# Patient Record
Sex: MALE | Race: WHITE | NOT HISPANIC OR LATINO | Employment: OTHER | URBAN - METROPOLITAN AREA
[De-identification: names, ages, dates, MRNs, and addresses within clinical notes are randomized per-mention and may not be internally consistent; named-entity substitution may affect disease eponyms.]

---

## 2017-01-04 ENCOUNTER — HOSPITAL ENCOUNTER (OUTPATIENT)
Dept: MRI IMAGING | Facility: CLINIC | Age: 57
Discharge: HOME | End: 2017-01-04
Attending: INTERNAL MEDICINE | Admitting: INTERNAL MEDICINE
Payer: COMMERCIAL

## 2017-01-04 ENCOUNTER — RECORDS - HEALTHEAST (OUTPATIENT)
Dept: ADMINISTRATIVE | Facility: OTHER | Age: 57
End: 2017-01-04

## 2017-01-04 DIAGNOSIS — I10 ESSENTIAL HYPERTENSION WITH GOAL BLOOD PRESSURE LESS THAN 130/85: ICD-10-CM

## 2017-01-04 PROCEDURE — A9577 INJ MULTIHANCE: HCPCS | Performed by: INTERNAL MEDICINE

## 2017-01-04 PROCEDURE — 71555 MRI ANGIO CHEST W OR W/O DYE: CPT

## 2017-01-04 PROCEDURE — 25500045 ZZH RX 255: Performed by: INTERNAL MEDICINE

## 2017-01-04 PROCEDURE — 71555 MRI ANGIO CHEST W OR W/O DYE: CPT | Mod: 26 | Performed by: INTERNAL MEDICINE

## 2017-01-04 RX ADMIN — GADOBENATE DIMEGLUMINE 15 ML: 529 INJECTION, SOLUTION INTRAVENOUS at 08:27

## 2017-01-13 ENCOUNTER — TELEPHONE (OUTPATIENT)
Dept: ORTHOPEDICS | Facility: CLINIC | Age: 57
End: 2017-01-13

## 2017-01-13 NOTE — TELEPHONE ENCOUNTER
Patient would like to be scheduled for trigger thumb release surgery.  Per Dr. Blunt's note it needs to be done at least 3 months after his last injection which was 12/19/2016.  Surgery date of 3/31/17 given to patient.  He does not need to see Dr. Blunt in clinic beforehand.  He was given pre-op education over the phone today.  He has antiseptic soap already.  Preop packet will be mailed to him today.  He understands he needs to have pre-op H&P completed within 30 days prior to surgery.  He has our contact information for questions/concerns.  Surgery scheduler will call him to verify date and time of procedure.

## 2017-01-26 ENCOUNTER — PRE VISIT (OUTPATIENT)
Dept: CARDIOLOGY | Facility: CLINIC | Age: 57
End: 2017-01-26

## 2017-01-26 NOTE — TELEPHONE ENCOUNTER
DATE OF OPERATION:  10/04/2016.        OPERATING AREA:  Room 23.        TITLE OF PROCEDURE:  Replacement and repair of an ascending aortic aneurysm using a 32 mm Gelweave interposition graft on cardiopulmonary bypass.

## 2017-01-27 ENCOUNTER — OFFICE VISIT (OUTPATIENT)
Dept: CARDIOLOGY | Facility: CLINIC | Age: 57
End: 2017-01-27
Attending: INTERNAL MEDICINE
Payer: COMMERCIAL

## 2017-01-27 ENCOUNTER — OFFICE VISIT (OUTPATIENT)
Dept: INTERNAL MEDICINE | Facility: CLINIC | Age: 57
End: 2017-01-27

## 2017-01-27 ENCOUNTER — OFFICE VISIT (OUTPATIENT)
Dept: ORTHOPEDICS | Facility: CLINIC | Age: 57
End: 2017-01-27

## 2017-01-27 VITALS
DIASTOLIC BLOOD PRESSURE: 74 MMHG | OXYGEN SATURATION: 95 % | HEIGHT: 70 IN | HEART RATE: 82 BPM | SYSTOLIC BLOOD PRESSURE: 112 MMHG

## 2017-01-27 VITALS — HEIGHT: 70 IN | WEIGHT: 253.9 LBS | BODY MASS INDEX: 36.35 KG/M2

## 2017-01-27 DIAGNOSIS — Z79.01 LONG-TERM (CURRENT) USE OF ANTICOAGULANTS: ICD-10-CM

## 2017-01-27 DIAGNOSIS — D50.0 IRON DEFICIENCY ANEMIA DUE TO CHRONIC BLOOD LOSS: Primary | ICD-10-CM

## 2017-01-27 DIAGNOSIS — D50.0 IRON DEFICIENCY ANEMIA DUE TO CHRONIC BLOOD LOSS: ICD-10-CM

## 2017-01-27 DIAGNOSIS — R03.0 ELEVATED BLOOD PRESSURE READING WITHOUT DIAGNOSIS OF HYPERTENSION: ICD-10-CM

## 2017-01-27 DIAGNOSIS — I71.21 ASCENDING AORTIC ANEURYSM (H): Primary | ICD-10-CM

## 2017-01-27 DIAGNOSIS — I48.91 ATRIAL FIBRILLATION, UNSPECIFIED TYPE (H): ICD-10-CM

## 2017-01-27 DIAGNOSIS — I48.91 ATRIAL FIBRILLATION (H): ICD-10-CM

## 2017-01-27 DIAGNOSIS — M76.61 ACHILLES TENDINITIS OF RIGHT LOWER EXTREMITY: Primary | ICD-10-CM

## 2017-01-27 LAB
BASOPHILS # BLD AUTO: 0.1 10E9/L (ref 0–0.2)
BASOPHILS NFR BLD AUTO: 0.8 %
DIFFERENTIAL METHOD BLD: NORMAL
EOSINOPHIL # BLD AUTO: 0.2 10E9/L (ref 0–0.7)
EOSINOPHIL NFR BLD AUTO: 2.5 %
ERYTHROCYTE [DISTWIDTH] IN BLOOD BY AUTOMATED COUNT: 14 % (ref 10–15)
FERRITIN SERPL-MCNC: 24 NG/ML (ref 26–388)
FOLATE SERPL-MCNC: 19.8 NG/ML
HCT VFR BLD AUTO: 43.2 % (ref 40–53)
HGB BLD-MCNC: 14.3 G/DL (ref 13.3–17.7)
IMM GRANULOCYTES # BLD: 0 10E9/L (ref 0–0.4)
IMM GRANULOCYTES NFR BLD: 0.3 %
INR PPP: 0.95 (ref 0.86–1.14)
IRON SATN MFR SERPL: 7 % (ref 15–46)
IRON SERPL-MCNC: 27 UG/DL (ref 35–180)
LYMPHOCYTES # BLD AUTO: 1.9 10E9/L (ref 0.8–5.3)
LYMPHOCYTES NFR BLD AUTO: 20.7 %
MCH RBC QN AUTO: 27.3 PG (ref 26.5–33)
MCHC RBC AUTO-ENTMCNC: 33.1 G/DL (ref 31.5–36.5)
MCV RBC AUTO: 83 FL (ref 78–100)
MONOCYTES # BLD AUTO: 0.9 10E9/L (ref 0–1.3)
MONOCYTES NFR BLD AUTO: 9.6 %
NEUTROPHILS # BLD AUTO: 6.1 10E9/L (ref 1.6–8.3)
NEUTROPHILS NFR BLD AUTO: 66.1 %
NRBC # BLD AUTO: 0 10*3/UL
NRBC BLD AUTO-RTO: 0 /100
PLATELET # BLD AUTO: 247 10E9/L (ref 150–450)
RBC # BLD AUTO: 5.23 10E12/L (ref 4.4–5.9)
TIBC SERPL-MCNC: 361 UG/DL (ref 240–430)
TSH SERPL DL<=0.005 MIU/L-ACNC: 3.36 MU/L (ref 0.4–4)
VIT B12 SERPL-MCNC: 513 PG/ML (ref 193–986)
WBC # BLD AUTO: 9.2 10E9/L (ref 4–11)

## 2017-01-27 PROCEDURE — 99214 OFFICE O/P EST MOD 30 MIN: CPT | Mod: GC | Performed by: INTERNAL MEDICINE

## 2017-01-27 PROCEDURE — 99213 OFFICE O/P EST LOW 20 MIN: CPT | Mod: 25,ZF

## 2017-01-27 ASSESSMENT — PAIN SCALES - GENERAL
PAINLEVEL: NO PAIN (0)
PAINLEVEL: MILD PAIN (2)

## 2017-01-27 NOTE — PROGRESS NOTES
"Chief Complaint:   Chief Complaint   Patient presents with     Foot Problems     f/u right foot Achilles tendonitis        No Known Allergies      Subjective: Kobe is a 56 year old male who presents to the clinic today for a follow up of right achilles tendonitis. He has been performing the stretches as directed. He used the brace for a week, but then the pain went away. He relates that he is pain free today. He relates no new complaints.     Objective   5' 10\" 253 lbs 14.4 oz  He relates some minimal discomfort with deep palpation of the right Achilles tendon. Equinus noted BL. He notes no pain along the courses of the PT or peroneal tendons. No pain with lateral calcaneal squeeze BL.       Assessment: Right Achilles tendonitis - resolved.    Plan:   - Pt seen and evaluated  - Continue with stretching. If the pain returns, return to the brace and continue stretching.   - Pt to return to clinic PRN      "

## 2017-01-27 NOTE — PROGRESS NOTES
HPI:    Pt. Comes in for follow up today.  He had aortic graft and valve repair with Dr. Townsend 10/16. He had post-op afib and is now off coumadin and amiodarone.   He had  R  shoulder arthroplasty with Dr. Aceves; follow up 10/8/14, 11/19/14 and was last seen 8/31/15.   He does not smoke, no EtOH abuse. He has no known lung disease. He denies any bleeding or anesthesia issues.  He states he can climb stairs w/o functional limitations. He has occasional stress taking care of his elderly parents. He o/w denies additional HEENT, cardiopulmonary, abdominal, , neurological, systemic complaints.Patient  underwent a TURP procedure on 1/23/14 by Dr. Taveras. He was seen by Dr. Taveras 10/28/14. He was seen again by Dr. Leslie 7/8/16 for bladder neck contracture. He had colonoscopy 6/30/14; no adenomatous polyps.  He was seen by Dr. Koehler Hematology 2/16 for low Ferritin.         PMH:    1. Gastris by pass surgery 2005  2. Cervical neck surgery at [facility] 2007  3. Hand surgery; L middle finger cyst 6/11  4. Chronic neck pain on narcotics  5. HTN  6. Depression  7. SAVITA  8. Possible old h/o elevated glucose last treated with Metformin 2004.  9. Left Total Shoulder Arthroplasty 4/15/14.  10. Bicuspid aortic valve and enlarged aorta; see Cardiology note from 8/19/14 currently stable.       PE:    Vitals noted gen nad cooperative, alert, HEENT, ears normal oropharynx clear no exudate, neck supple nl rom no adenopathy, LCTA, B, normal resp. System exam, RRR, S1, S2,   murmur not present today,  abdomen, nt, nd no masses, normal GI system exam, obese, grossly normal neurological exam. Sternal wound healing no erythema, no drainage, no tenderness.        A/P:    1. HTN; he remains on the same medications; labs checked  Last visit 11/2/16.  2. RTHC; as above for immunization (flu shot done) and colonoscopy; PSA 1/16  3. Neck and back pain see notes from Dr. Puente 11/19/14.  4. He has followed up with Dr. Aceves, [facility] shoulder  8/31/15.  5. Ascending aortic repair with Dr. Townsend done  10/4/16; he was seen 10/25/16.  He was seen by today 1/27/17; he is off amiodarone and coumadin.  6. Pain management; see detailed 8/7/14 and 12/5/14 notes from  Dr. Alford.  He is currently followed at outside Pain Clinic and he is getting all his pain medications there.  7. See Sleep Medicine note from 12/16/14.  8. He was seen by Dr. Renae, ortho 11/15 for R knee pain.   9. He was seen by Dr. Leslie, Urology for bladder neck contracture 7/8/16.  10. Elevated liver tests will recheck 11/2/16 as normal.   He states treatment with interferon for Hep C many years ago. He is antibody positive and Viral load negative for Hep C.   11. Will recheck TSH today for past abnormal labs (related to Amiodarone)  12. Will recheck CBC, iron and ferritin today      I will see him back in 3 months        Total time spent 25 minutes.  More than 50% of the time spent with Mr. Henriquez on counseling / coordinating his care

## 2017-01-27 NOTE — PATIENT INSTRUCTIONS
[facility] Medication Refill Request Information:  * Please contact your pharmacy regarding ANY request for medication refills.  ** [facility] Prescription Fax = [phone]  * Please allow 3 business days for routine medication refills.  * Please allow 5 business days for controlled substance medication refills.     [facility] Test Result notification information:  *You will be notified with in 7-10 days of your appointment day regarding the results of your test.  If you are on MyChart you will be notified as soon as the provider has reviewed the results and signed off on them.

## 2017-01-27 NOTE — NURSING NOTE
Chief Complaint   Patient presents with     Follow Up For     s/p Ascending aortic aneurysm repair, valve sparing on 10/4/16

## 2017-01-27 NOTE — Clinical Note
"1/27/2017       RE: Kobe Buchanan  [address]  [place] [address]     Dear Colleague,    Thank you for referring your patient, Kobe Buchanan, to the [facility] at [facility]. Please see a copy of my visit note below.    Chief Complaint:   Chief Complaint   Patient presents with     Foot Problems     f/u right foot Achilles tendonitis        No Known Allergies      Subjective: Kobe is a 56 year old male who presents to the clinic today for a follow up of right achilles tendonitis. He has been performing the stretches as directed. He used the brace for a week, but then the pain went away. He relates that he is pain free today. He relates no new complaints.     Objective   5' 10\" 253 lbs 14.4 oz  He relates some minimal discomfort with deep palpation of the right Achilles tendon. Equinus noted BL. He notes no pain along the courses of the PT or peroneal tendons. No pain with lateral calcaneal squeeze BL.       Assessment: Right Achilles tendonitis - resolved.    Plan:   - Pt seen and evaluated  - Continue with stretching. If the pain returns, return to the brace and continue stretching.   - Pt to return to clinic PRN    Again, thank you for allowing me to participate in the care of your patient.      Sincerely,    Miguel Albert DPM      "

## 2017-01-27 NOTE — PATIENT INSTRUCTIONS
Thank you for your visit today.  Please call me with any questions or concerns.   Juarez Sahu RN  Cardiology Care Coordinator  [phone], press option 1 then option 3

## 2017-01-27 NOTE — NURSING NOTE
"Reason For Visit:   Chief Complaint   Patient presents with     Foot Problems     f/u right foot Achilles tendonitis       Ht 1.778 m (5' 10\")  Wt 115.168 kg (253 lb 14.4 oz)  BMI 36.43 kg/m2    Pain Assessment  Patient Currently in Pain: No    "

## 2017-01-27 NOTE — NURSING NOTE
Chief Complaint   Patient presents with     Results     Pt is here to follow up on results. VS done at prior appt.      Chika Fisher LPN January 27, 2017 3:49 PM

## 2017-01-27 NOTE — LETTER
1/27/2017      RE: Kobe Buchanan  [address]  [place] [address]       Dear Colleague,    Thank you for the opportunity to participate in the care of your patient, Kobe Buchanan, at the [facility]. Please see a copy of my visit note below.    HPI:   Kobe Buchanan is a 57 year old man, formerly a patient of Dr. Gamez, with history of bicuspid aortic valve and thoracic aortic aneurysm status post open aortic aneurysm repair with 32mm Gelweave graft on 10/4/16, who presents for follow up.     He had MRA aorta on 1/4/17 which showed an adequate repair with no evidence of stenosis and no residual aneurysm.    He reports feeling well. He does have SAVITA but has had difficulty with his current CPAP mask and has not used it recently.    He denies any chest pain, dyspnea at rest or with exertion, palpitations, PND, orthopnea, peripheral edema, LH, or syncope.       PAST MEDICAL HISTORY:  Past Medical History   Diagnosis Date     Hypertension      Ascending aortic aneurysm (H)      Anxiety      Depression      Multiple stiff joints      Sleep apnea      does not use cpap     Hyperlipidemia 4/10/2012     BPPV (benign paroxysmal positional vertigo) 7/11/2014     Obesity 2/9/2015     Chronic neck pain      Chronic narcotic use      Skin picking habit      Bicuspid aortic valve      Degenerative joint disease      Chronic osteoarthritis      Neck injuries        CURRENT MEDICATIONS:  Current Outpatient Prescriptions   Medication Sig Dispense Refill     fentaNYL (DURAGESIC) 50 mcg/hr patch 72 hr Place 1 patch onto the skin every 72 hours       pantoprazole (PROTONIX) 40 MG enteric coated tablet Take 1 tablet (40 mg) by mouth every morning 30 tablet 3     order for DME Walker for cardiac rehab with 4 wheels, brakes and seat 1 Device 0     fluocinonide (LIDEX) 0.05 % ointment Apply twice daily to itchy skin nodules for 1-2  weeks at a time. 30 g 3     senna-docusate (SENOKOT-S;PERICOLACE) 8.6-50 MG per tablet Take 1-2 tablets by mouth 2 times daily To prevent constipation while taking narcotic pain medication. Start with 1 tablet twice daily. If no bowel movement in 24 hours, increase to 2 tablets twice daily.  Discontinue if you have loose stools or when you are no longer taking narcotics. 100 tablet 0     neomycin-polymyxin-hydrocortisone (CORTISPORIN) otic solution Place 3 drops in ear(s) 4 times daily 10 mL 3     tacrolimus (PROTOPIC) 0.1 % ointment Apply topically as needed Apply to affected areas on body. 120 g 11     clonazePAM (KLONOPIN) 0.5 MG tablet Take 1 tablet (0.5 mg) by mouth nightly as needed for anxiety 6 tablet 0     DOCUSATE SODIUM PO Take 100 mg by mouth daily        NAPROXEN SODIUM PO Take 220 mg by mouth 2 times daily       ferrous sulfate 325 (65 FE) MG tablet Take 1 tablet (325 mg) by mouth daily (with breakfast) 30 tablet 3     carvedilol (COREG) 25 MG tablet Take 1 tablet (25 mg) by mouth 2 times daily (with meals) 180 tablet 3     amLODIPine-benazepril (LOTREL) 10-40 MG per capsule Take 1 capsule by mouth daily 90 capsule 3     buPROPion (WELLBUTRIN SR) 200 MG 12 hr tablet Take 1 tablet (200 mg) by mouth 2 times daily 180 tablet 1     amoxicillin (AMOXIL) 500 MG capsule Take 4 tablets 1 hour before dental procedure 4 capsule 4     simvastatin (ZOCOR) 10 MG tablet Take 1 tablet (10 mg) by mouth At Bedtime 90 tablet 3     dexamethasone (DECADRON) 4 MG/ML injection Inject 1 mL (4 mg) as directed once for 1 dose Use 4 mg or dose determined by provider for iontophoresis. 1 mL 0     aspirin 81 MG chewable tablet Take 1 tablet (81 mg) by mouth daily 120 tablet 0     mometasone (ELOCON) 0.1 % ointment Apply  topically daily. (Patient taking differently: Apply topically as needed ) 90 g 1       PAST SURGICAL HISTORY:  Past Surgical History   Procedure Laterality Date     Hand surgery       excision of tendon cyst on  left hand     Excise mass finger  6/14/2011     Procedure:EXCISE MASS FINGER; Middle Flexor Cyst; Surgeon:SHAYY RUSSELL; Location:[facility] OR     Bypass gastric mark-en-y, liver biopsy, combined  8/8/2005     Laser ktp green light photoselective vaporization prostate  1/23/2014     Procedure: LASER KTP GREEN LIGHT PHOTOSELECTIVE VAPORIZATION PROSTATE;  Greenlight Photovaporization Of Prostate  ;  Surgeon: Sahil Taveras MD;  Location: [facility] OR     Arthroplasty shoulder  4/15/2014     Procedure: Left Total Shoulder Arthroplasty ;  Surgeon: Analilia Aceves MD;  Location: [facility] OR     Turp  2014     Colonoscopy  6/30/2014     Procedure: COMBINED COLONOSCOPY, SINGLE BIOPSY/POLYPECTOMY BY BIOPSY;  Surgeon: Chester Patton MD;  [facility]     Esophagoscopy, gastroscopy, duodenoscopy (egd), combined  6/30/2014     Procedure: COMBINED ESOPHAGOSCOPY, GASTROSCOPY, DUODENOSCOPY (EGD), BIOPSY SINGLE OR MULTIPLE;  Surgeon: Chester Patton MD;  [facility]     Arthroplasty shoulder Right 8/26/2014     Procedure: ARTHROPLASTY SHOULDER;  Surgeon: Analilia Aceves MD;  [facility]     Laser holmium lithotripsy bladder N/A 10/15/2014     Procedure: LASER HOLMIUM LITHOTRIPSY BLADDER;  Surgeon: Sahil Taveras MD;  Location: [facility]     Cystoscopy, bladder neck cuts, combined N/A 7/18/2016     Procedure: COMBINED CYSTOSCOPY, BLADDER NECK CUTS;  Surgeon: Riut Leslie MD;  [facility] OR     Repair aneurysm ascending aorta N/A 10/4/2016     Procedure: REPAIR ANEURYSM ASCENDING AORTA;  Surgeon: Mckenzie Townsend MD;  [facility] OR     C shoulder surg proc unlisted       C hand/finger surgery unlisted       Hc vascular surgery procedure unlist         ALLERGIES   No Known Allergies    FAMILY HISTORY:  Family History   Problem Relation Age of Onset     Arthritis Other      GASTROINTESTINAL DISEASE Other      Cardiovascular Father      aortic aneurysm     Arrhythmia Father   "    Nephrolithiasis Father      Sleep Apnea Father      Anxiety Disorder Mother      Anxiety Disorder Father      Anxiety Disorder Sister      Anxiety Disorder Sister      Depression Father      Depression Sister      Hypertension Mother      Hypertension Father      Hypertension Sister      Hypertension Sister      OSTEOPOROSIS Mother      OSTEOPOROSIS Sister      OSTEOPOROSIS Sister      Obesity Mother      Obesity Father      Obesity Sister      Obesity Sister      Hyperlipidemia Mother      Hyperlipidemia Father      Hyperlipidemia Sister      Hyperlipidemia Sister      Coronary Artery Disease Father      history of MI and stent     Low Back Problems Mother      Low Back Problems Father      Low Back Problems Sister      Low Back Problems Sister      Spine Problems Father      Spine Problems Sister               SOCIAL HISTORY:  Social History     Social History     Marital Status: Single     Spouse Name: N/A     Number of Children: N/A     Years of Education: N/A     Social History Main Topics     Smoking status: Former Smoker -- 0.50 packs/day for 6 years     Types: Cigarettes     Start date: 02/01/1977     Quit date: 09/01/1983     Smokeless tobacco: Never Used      Comment: quit 35 years ago     Alcohol Use: No     Drug Use: No     Sexual Activity:     Partners: Female     Birth Control/ Protection: Abstinence     Other Topics Concern     None     Social History Narrative       ROS:   Constitutional: No fever, chills, or sweats. No weight gain/loss   ENT: No visual disturbance, ear ache, epistaxis, sore throat  Allergies/Immunologic: Negative.   Respiratory: No cough, hemoptysia  Cardiovascular: As per HPI  GI: No nausea, vomiting, hematemesis, melena, or hematochezia  : No urinary frequency, dysuria, or hematuria  Integument: Negative  Psychiatric: Negative  Neuro: Negative  Endocrinology: Negative   Musculoskeletal: Negative    EXAM:  /74 mmHg  Pulse 82  Ht 1.778 m (5' 10\")  SpO2 95%  In " general, the patient is a pleasant male in no apparent distress.    HEENT: NC/AT.  PERRLA.  EOMI.  Sclerae white, not injected.  Nares clear.  Pharynx without erythema or exudate.  Dentition intact.    Neck: No adenopathy.  No thyromegaly. Carotids +4/4 bilaterally without bruits.  No jugular venous distension.   Heart: RRR. Normal S1, S2 splits physiologically. No murmur, rub, click, or gallop. The PMI is in the 5th ICS in the midclavicular line. There is no heave.    Lungs: CTA.  No ronchi, wheezes, rales.  No dullness to percussion.   Abdomen: Soft, nontender, nondistended. No organomegaly.  No bruits.   Extremities: No clubbing, cyanosis, or edema.  The pulses are +4/4 at the radial, brachial, femoral, popliteal, DP, and PT sites bilaterally.  No bruits are noted.  Neurologic: Alert and oriented to person/place/time, normal speech, gait and affect  Skin: No petechiae, purpura or rash.    Labs:  LIPID RESULTS:  Lab Results   Component Value Date    CHOL 157 01/28/2016    HDL 45 01/28/2016    LDL 84 01/28/2016    TRIG 142 01/28/2016    CHOLHDLRATIO 3.7 06/08/2015    NHDL 112 01/28/2016       LIVER ENZYME RESULTS:  Lab Results   Component Value Date    AST 20 10/26/2016    ALT 36 10/26/2016       CBC RESULTS:  Lab Results   Component Value Date    WBC 9.2 01/27/2017    RBC 5.23 01/27/2017    HGB 14.3 01/27/2017    HCT 43.2 01/27/2017    MCV 83 01/27/2017    MCH 27.3 01/27/2017    MCHC 33.1 01/27/2017    RDW 14.0 01/27/2017     01/27/2017       BMP RESULTS:  Lab Results   Component Value Date     10/26/2016    POTASSIUM 4.3 10/26/2016    CHLORIDE 102 10/26/2016    CO2 27 10/26/2016    ANIONGAP 7 10/26/2016    * 10/26/2016    BUN 20 10/26/2016    CR 0.99 10/26/2016    GFRESTIMATED 78 10/26/2016    GFRESTBLACK >90   GFR Calc   10/26/2016    NIA 8.9 10/26/2016        A1C RESULTS:  Lab Results   Component Value Date    A1C 5.3 10/05/2016       INR RESULTS:  Lab Results   Component  Value Date    INR 0.95 01/27/2017    INR 2.08* 11/16/2016       Procedures:  MRA 1/4/17:   1. Patient is status post repair of a ascending aortic aneurysm. The ascending aorta, arch, and descending aorta are now normal in diameter. There is no dissection seen.   2. The aortic arch is left sided. There is a bovine variant branching of the arch vessels.   3. The main and proximal branch pulmonary arteries are normal in size.   4. The systemic venous connections are normal.       Bi-orthogonal luminal aortic dimensions are:     1.  Aortic root at the sinuses of Valsalva =  3.6 cm x 3.5 cm    2.  Sinotubular junction =  2.8 cm x 2.6 cm    3.  Mid-ascending aorta (midpoint in length between Nos. 2 and 4) =  3.4 cm x 3.3 cm    4.  Proximal aortic arch (aorta at the origin of the innominateartery) =  3.0 cm x 2.8 cm    5.  Mid-aortic arch (between left common carotid and subclavian arteries) =  2.2 cm x 1.9 cm    6.  Proximal descending thoracic aorta (begins at the isthmus) =  2.1 cm x 2.1 cm  7.  Mid-descending aorta (midpoint in length between Nos. 6 and 8) =  2.1 cm x 2.0 cm    8.  Aorta at diaphragm (2 cm above the celiac axis origin) =  2.1 cm x2.0 cm  9.  Abdominal aorta at the celiac axis origin =  2.0 cm x 1.9 cm       Assessment and Plan:   1. Thoracic aortic aneurysm status post repair:  MRA 1/4/17 with excellent postoperative result. He was counseled regarding the importance of continued tight BP control (see below.)    2. HTN:  We have ordered 24-hour ambulatory BP monitoring for better assessment of his BP control; we will guide our therapy based on this. He should continue his present regimen of amlodipine-benazepril 10/40, carvedilol 25 mg BID for now.     3. Bicuspid aortic valve:  No history of significant AS/AI. We will reassess with TTE at next visit.    4. Obstructive sleep apnea:  He should follow up in sleep clinic for troubleshooting his CPAP system in order to improve adherence.     He should  follow up in 1 year, with TTE at that time.    The patient was seen with and examined by the attending physician, Dr. Novoa, who agrees with the above plan.   Jerel Blum MD  Cardiology Fellow     Attending Attestation:  Patient seen and examined by me with the Fellow, Dr. Blum. I have performed all pertinent elements of the physical examination and reviewed the note above. I have reviewed pertinent laboratory, echocardiographic, imaging, and cardiac catheterization results. I agree with the plan of care as described in this note.    Luis Novoa MD, PhD      CC  Patient Care Team:  Michael Styles MD as PCP - General (Internal Medicine)    Juan Carlos Blunt MD as MD (Hand Surgery)

## 2017-01-27 NOTE — NURSING NOTE
Cardiac Testing: Patient given instructions regarding  echocardiogram in one year. Discussed purpose, preparation, procedure and when to expect results reported back to the patient. Patient demonstrated understanding of this information and agreed to call with further questions or concerns.  Med Reconcile: Reviewed and verified all current medications with the patient. The updated medication list was printed and given to the patient.  Return Appointment:Follow up in one year.  Patient given instructions regarding scheduling next clinic visit. Patient demonstrated understanding of this information and agreed to call with further questions or concerns.  Patient stated he understood all health information given and agreed to call with further questions or concerns.

## 2017-01-30 ENCOUNTER — MYC REFILL (OUTPATIENT)
Dept: INTERNAL MEDICINE | Facility: CLINIC | Age: 57
End: 2017-01-30

## 2017-01-30 DIAGNOSIS — E78.00 HYPERCHOLESTEREMIA: ICD-10-CM

## 2017-01-30 DIAGNOSIS — F34.1 DYSTHYMIC DISORDER: ICD-10-CM

## 2017-01-30 DIAGNOSIS — K00.9 DENTAL ANOMALY: ICD-10-CM

## 2017-01-30 RX ORDER — BUPROPION HYDROCHLORIDE 200 MG/1
200 TABLET, EXTENDED RELEASE ORAL 2 TIMES DAILY
Qty: 180 TABLET | Refills: 1 | Status: SHIPPED | OUTPATIENT
Start: 2017-01-30 | End: 2017-07-20

## 2017-01-30 RX ORDER — SIMVASTATIN 10 MG
10 TABLET ORAL AT BEDTIME
Qty: 90 TABLET | Refills: 1 | Status: CANCELLED | OUTPATIENT
Start: 2017-01-30

## 2017-01-30 RX ORDER — AMOXICILLIN 500 MG/1
CAPSULE ORAL
Qty: 4 CAPSULE | Refills: 4 | Status: SHIPPED | OUTPATIENT
Start: 2017-01-30 | End: 2017-10-02

## 2017-01-30 RX ORDER — SIMVASTATIN 10 MG
10 TABLET ORAL AT BEDTIME
Qty: 90 TABLET | Refills: 3 | Status: SHIPPED | OUTPATIENT
Start: 2017-01-30 | End: 2017-12-20

## 2017-01-30 NOTE — TELEPHONE ENCOUNTER
Message from Lesvia:  Mary Cárdenas RN Mon Jan 30, 2017 1:41 PM        ----- Message -----   From: Kobe Buchanan   Sent: 1/30/2017 1:21 PM   To: [facility] Nursing Staff-  Subject: Medication Renewal Request     Original authorizing provider: MD Kobe Douglass would like a refill of the following medications:  simvastatin (ZOCOR) 10 MG tablet [Michael Styles MD]  buPROPion (WELLBUTRIN SR) 200 MG 12 hr tablet [Michael Styles MD]  amoxicillin (AMOXIL) 500 MG capsule [Michael Styles MD]    Preferred pharmacy: [id number] - [place], [place] - [address]    Comment:  I need about 5 refills on the Amoxicillin for dental procedures. It's a long drawn out affair! I would like a full year's worth of 90 day refills on all the other prescriptions. And please make a note to the pharmacy not to fill any of these prescriptions until I call for them. I will need them all in the next month or so but nothing to be filled immediately.    Medication renewals requested in this message routed to other providers:  carvedilol (COREG) 25 MG tablet [Kvng Gamez MD]  amLODIPine-benazepril (LOTREL) 10-40 MG per capsule [Kvng Gamez MD]

## 2017-01-30 NOTE — TELEPHONE ENCOUNTER
simvastatin (ZOCOR) 10 MG tablet  Last Written Prescription Date:  10/26/16  Last Fill Quantity: 90,   # refills: 1  Last Office Visit with G, P or [facility] prescribing provider: 1/27/17  Future Office visit:   4/26/17  Recent Labs   Lab Test  01/28/16   0735  06/08/15   0755  10/01/13   0902   CHOL  157  138  162   HDL  45  38*  48   LDL  84  58  90   TRIG  142  211*  122   CHOLHDLRATIO   --   3.7  3.4     buPROPion (WELLBUTRIN SR) 200 MG 12 hr tablet  Last Written Prescription Date:  11/16/16  Last Fill Quantity: 180,   # refills: 0     PHQ-9 score:    PHQ-9 SCORE 6/18/2014   Total Score 3     Amoxicillin:  Last Written Prescription Date:  11/16/16  Last Fill Quantity: 4   # refills: 3    Routing refill request to provider for review/approval because:  amoxicillin (AMOXIL) 500 MG capsule  Not on SO protocol for dental.

## 2017-01-31 ENCOUNTER — TELEPHONE (OUTPATIENT)
Dept: SLEEP MEDICINE | Facility: CLINIC | Age: 57
End: 2017-01-31

## 2017-01-31 NOTE — TELEPHONE ENCOUNTER
Orders rev'd from [facility] for cpap head gear.  Order has been rev'd, signed and faxed.    CHANDLER Amaya

## 2017-02-02 ENCOUNTER — HOSPITAL ENCOUNTER (OUTPATIENT)
Dept: CARDIOLOGY | Facility: CLINIC | Age: 57
Discharge: HOME | End: 2017-02-02
Attending: INTERNAL MEDICINE | Admitting: INTERNAL MEDICINE
Payer: COMMERCIAL

## 2017-02-02 DIAGNOSIS — R03.0 ELEVATED BLOOD PRESSURE READING WITHOUT DIAGNOSIS OF HYPERTENSION: ICD-10-CM

## 2017-02-02 PROCEDURE — 93786 AMBL BP MNTR W/SW REC ONLY: CPT | Performed by: INTERNAL MEDICINE

## 2017-02-02 PROCEDURE — 93788 AMBL BP MNTR W/SW A/R: CPT | Performed by: INTERNAL MEDICINE

## 2017-02-02 PROCEDURE — 93790 AMBL BP MNTR W/SW I&R: CPT | Performed by: INTERNAL MEDICINE

## 2017-02-10 ENCOUNTER — TELEPHONE (OUTPATIENT)
Dept: INTERNAL MEDICINE | Facility: CLINIC | Age: 57
End: 2017-02-10

## 2017-02-10 DIAGNOSIS — E61.1 LOW IRON: Primary | ICD-10-CM

## 2017-02-10 DIAGNOSIS — D50.0 IRON DEFICIENCY ANEMIA DUE TO CHRONIC BLOOD LOSS: Primary | ICD-10-CM

## 2017-02-10 NOTE — TELEPHONE ENCOUNTER
Dr. Styles,     I received a telephone call from [facility] that they are not able to get Proferrin in for this patient. Either need to order a different form of iron, or need to find a pharmacy that can order the Proferrin.

## 2017-02-10 NOTE — TELEPHONE ENCOUNTER
I sent pt. A results letter regarding starting Proferrin and sent Rx. To his pharmacy.  AMEYA Styles          Dear Kobe;    Your labs are attached and as we discussed on the phone, I recommend you try to start taking oral iron. I reviewed Dr. Koehler's Hematology note from 2/2016 as well. I sent in a Rx. For Proferrin to your pharmacy. We can also discuss this further at your 3/1/17 appointment with me.      AMEYA Styles MD

## 2017-02-10 NOTE — TELEPHONE ENCOUNTER
Dear Fauzia;     Thanks for the follow up. I have reviewed Hematology notes from Dr. Koehler 2/16 and he needs Proferrin. Please see if we can use different pharmacy.     AMEYA Styles

## 2017-02-14 DIAGNOSIS — D50.0 IRON DEFICIENCY ANEMIA DUE TO CHRONIC BLOOD LOSS: ICD-10-CM

## 2017-02-14 NOTE — TELEPHONE ENCOUNTER
[facility] called and they can order Proferrin 12 mg with 1 mg Folic acid. Message sent to Dr Styles.

## 2017-02-15 NOTE — TELEPHONE ENCOUNTER
Proferrin with 1 mg Folic Acid ordered through [facility].  Pharmacy will call pt if avialable.  Pt called and message left.  Chelsey Blake RN 1:17 PM on 2/20/2017.

## 2017-02-24 ASSESSMENT — ACTIVITIES OF DAILY LIVING (ADL)
DO_MEMBERS_OF_YOUR_HOUSEHOLD_WEAR_SEAT_BELTS?: Y
ARE_THERE_FIREARMS_IN_YOUR_HOME?: N
ARE_THERE_CARBON_MONOXIDE_DETECTORS_IN_YOUR_HOME?: Y
DO_MEMBERS_OF_YOUR_HOUSEHOLD_USE_SUNSCREEN?: N
ARE_THERE_SMOKE_DETECTORS_IN_YOUR_HOME?: Y
DO_MEMBERS_OF_YOUR_HOUSEHOLD_USE_SAFETY_HELMETS?: N

## 2017-03-01 ENCOUNTER — OFFICE VISIT (OUTPATIENT)
Dept: INTERNAL MEDICINE | Facility: CLINIC | Age: 57
End: 2017-03-01

## 2017-03-01 VITALS
HEIGHT: 70 IN | HEART RATE: 68 BPM | WEIGHT: 255.8 LBS | BODY MASS INDEX: 36.62 KG/M2 | SYSTOLIC BLOOD PRESSURE: 139 MMHG | DIASTOLIC BLOOD PRESSURE: 79 MMHG | RESPIRATION RATE: 16 BRPM

## 2017-03-01 DIAGNOSIS — N40.0 BENIGN NODULAR PROSTATIC HYPERPLASIA WITHOUT LOWER URINARY TRACT SYMPTOMS: ICD-10-CM

## 2017-03-01 DIAGNOSIS — D50.0 IRON DEFICIENCY ANEMIA DUE TO CHRONIC BLOOD LOSS: Primary | ICD-10-CM

## 2017-03-01 DIAGNOSIS — I48.91 ATRIAL FIBRILLATION (H): ICD-10-CM

## 2017-03-01 DIAGNOSIS — Z79.01 LONG-TERM (CURRENT) USE OF ANTICOAGULANTS: ICD-10-CM

## 2017-03-01 DIAGNOSIS — D50.0 IRON DEFICIENCY ANEMIA DUE TO CHRONIC BLOOD LOSS: ICD-10-CM

## 2017-03-01 LAB
ALBUMIN SERPL-MCNC: 4.2 G/DL (ref 3.4–5)
ALP SERPL-CCNC: 117 U/L (ref 40–150)
ALT SERPL W P-5'-P-CCNC: 28 U/L (ref 0–70)
ANION GAP SERPL CALCULATED.3IONS-SCNC: 7 MMOL/L (ref 3–14)
AST SERPL W P-5'-P-CCNC: 21 U/L (ref 0–45)
BASOPHILS # BLD AUTO: 0.1 10E9/L (ref 0–0.2)
BASOPHILS NFR BLD AUTO: 0.9 %
BILIRUB SERPL-MCNC: 1.2 MG/DL (ref 0.2–1.3)
BUN SERPL-MCNC: 28 MG/DL (ref 7–30)
CALCIUM SERPL-MCNC: 8.8 MG/DL (ref 8.5–10.1)
CHLORIDE SERPL-SCNC: 102 MMOL/L (ref 94–109)
CO2 SERPL-SCNC: 29 MMOL/L (ref 20–32)
CREAT SERPL-MCNC: 0.84 MG/DL (ref 0.66–1.25)
DIFFERENTIAL METHOD BLD: NORMAL
EOSINOPHIL # BLD AUTO: 0.2 10E9/L (ref 0–0.7)
EOSINOPHIL NFR BLD AUTO: 3.7 %
ERYTHROCYTE [DISTWIDTH] IN BLOOD BY AUTOMATED COUNT: 14.1 % (ref 10–15)
FERRITIN SERPL-MCNC: 61 NG/ML (ref 26–388)
FOLATE SERPL-MCNC: 35.3 NG/ML
GFR SERPL CREATININE-BSD FRML MDRD: NORMAL ML/MIN/1.7M2
GLUCOSE SERPL-MCNC: 97 MG/DL (ref 70–99)
HCT VFR BLD AUTO: 41.9 % (ref 40–53)
HGB BLD-MCNC: 14.3 G/DL (ref 13.3–17.7)
IMM GRANULOCYTES # BLD: 0 10E9/L (ref 0–0.4)
IMM GRANULOCYTES NFR BLD: 0.3 %
INR PPP: 1 (ref 0.86–1.14)
INTERPRETATION ECG - MUSE: NORMAL
IRON SATN MFR SERPL: 26 % (ref 15–46)
IRON SERPL-MCNC: 102 UG/DL (ref 35–180)
LYMPHOCYTES # BLD AUTO: 1.8 10E9/L (ref 0.8–5.3)
LYMPHOCYTES NFR BLD AUTO: 27.9 %
MCH RBC QN AUTO: 27.8 PG (ref 26.5–33)
MCHC RBC AUTO-ENTMCNC: 34.1 G/DL (ref 31.5–36.5)
MCV RBC AUTO: 82 FL (ref 78–100)
MONOCYTES # BLD AUTO: 0.6 10E9/L (ref 0–1.3)
MONOCYTES NFR BLD AUTO: 9.2 %
NEUTROPHILS # BLD AUTO: 3.7 10E9/L (ref 1.6–8.3)
NEUTROPHILS NFR BLD AUTO: 58 %
NRBC # BLD AUTO: 0 10*3/UL
NRBC BLD AUTO-RTO: 0 /100
PLATELET # BLD AUTO: 234 10E9/L (ref 150–450)
POTASSIUM SERPL-SCNC: 4 MMOL/L (ref 3.4–5.3)
PROT SERPL-MCNC: 7.8 G/DL (ref 6.8–8.8)
PSA SERPL-ACNC: 0.18 UG/L (ref 0–4)
RBC # BLD AUTO: 5.14 10E12/L (ref 4.4–5.9)
SODIUM SERPL-SCNC: 139 MMOL/L (ref 133–144)
TIBC SERPL-MCNC: 387 UG/DL (ref 240–430)
TSH SERPL DL<=0.005 MIU/L-ACNC: 3.13 MU/L (ref 0.4–4)
VIT B12 SERPL-MCNC: 526 PG/ML (ref 193–986)
WBC # BLD AUTO: 6.4 10E9/L (ref 4–11)

## 2017-03-01 RX ORDER — HYDROCODONE BITARTRATE AND ACETAMINOPHEN 10; 325 MG/1; MG/1
1 TABLET ORAL
COMMUNITY
End: 2018-10-23

## 2017-03-01 ASSESSMENT — PAIN SCALES - GENERAL: PAINLEVEL: NO PAIN (1)

## 2017-03-01 NOTE — LETTER
"3/1/2017      RE: Kobe Buchanan  [address]  [place] [address]     Surgeon (please enter first and last name): Dr. Blunt  Fax number for Preop Evaluation:   Location of Surgery: [facility] [place]  Date of Surgery: 03/31/17  Procedure: Right Thumb Trigger Release  History of reaction to anesthesia? No          HPI:    Pt. Comes in for preop R thumb surgery. He had \"trigger-like\" sxs. For several months. He denies any anesthesia or bleeding issues.  He had aortic graft and valve repair with Dr. Townsend 10/16. He had post-op afib and is now off coumadin and amiodarone.   He had  R  shoulder arthroplasty with Dr. Aceves; follow up 10/8/14, 11/19/14 and was last seen 8/31/15.   He does not smoke, no EtOH abuse. He has no known lung disease. He denies any bleeding or anesthesia issues.  He states he can climb stairs w/o functional limitations. He has occasional stress taking care of his elderly parents. He o/w denies additional HEENT, cardiopulmonary, abdominal, , neurological, systemic complaints.Patient  underwent a TURP procedure on 1/23/14 by Dr. Taveras. He was seen by Dr. Taveras 10/28/14. He was seen again by Dr. Leslie 7/8/16 for bladder neck contracture. He had colonoscopy 6/30/14; no adenomatous polyps.  He was seen by Dr. Koehler Hematology 2/16 for low Ferritin.         PMH:    1. Gastris by pass surgery 2005  2. Cervical neck surgery at [facility] 2007  3. Hand surgery; L middle finger cyst 6/11  4. Chronic neck pain on narcotics  5. HTN  6. Depression  7. SAVITA  8. Possible old h/o elevated glucose last treated with Metformin 2004.  9. Left Total Shoulder Arthroplasty 4/15/14.  10. Bicuspid aortic valve and enlarged aorta; see Cardiology note from 8/19/14 currently stable.       PE:    Vitals noted gen nad cooperative, alert, HEENT, ears normal oropharynx clear no exudate, neck supple nl rom no adenopathy, LCTA, B, normal resp. System exam, RRR, S1, S2,   murmur not present today,  abdomen, nt, " nd no masses, normal GI system exam, obese, grossly normal neurological exam. Sternal wound healing no erythema, no drainage, no tenderness.    EKG; SR at 69; AL interval 210 ms. No acute findings.     A/P:    Low risk for planned R thumb surgery. EKG and labs today; he can avoid NSAIDS/ASA before surgery. Labs are stable      1. HTN; he remains on the same medications; labs checked  Last visit 11/2/16. He had normal 24 hr BP monitor 2/2/17  2. RTHC; as above for immunization (flu shot done) and colonoscopy; PSA 1/16; ordered recheck PSA today 3/1/17.  3. Neck and back pain see notes from Dr. Puente 11/19/14.  4. He has followed up with jarod Gibson shoulder 8/31/15.  5. Ascending aortic repair with Dr. Townsend done  10/4/16; he was seen 10/25/16.  He was seen by   1/27/17; he is off amiodarone and coumadin.  6. Pain management; see detailed 8/7/14 and 12/5/14 notes from  Dr. Alford.  He is currently followed at outside Pain Clinic and he is getting all his pain medications there.  7. See Sleep Medicine note from 12/16/14.  8. He was seen by jarod Mcclain 11/15 for R knee pain.   9. He was seen by Dr. Leslie, Urology for bladder neck contracture 7/8/16.  10. Elevated liver tests will recheck 11/2/16 as normal.   He states treatment with interferon for Hep C many years ago. He is antibody positive and Viral load negative for Hep C.   11. Will recheck TSH 1/27/17 for past abnormal labs (related to Amiodarone); normal; stable.   12.  Rechecked CBC, iron and ferritin; placed Hematology referral back to Dr. Koehler      Total time spent 25 minutes.  More than 50% of the time spent with Mr. Henriquez on counseling / coordinating his care      Michael Styles MD

## 2017-03-01 NOTE — NURSING NOTE
Chief Complaint   Patient presents with     Pre-Op Exam     Trigger Finger 03/31/2017 with Dr. Blunt at [facility]     LUMA OSPINA at 7:10 AM on 3/1/2017.

## 2017-03-01 NOTE — PROGRESS NOTES
Surgeon (please enter first and last name): Dr. Blunt  Fax number for Preop Evaluation:   Location of [place] [facility] [place]  Date of Surgery: 03/31/17  Procedure: Right Thumb Trigger Release  History of reaction to anesthesia? No

## 2017-03-01 NOTE — PATIENT INSTRUCTIONS
Primary Care Center Medication Refill Request Information:  * Please contact your pharmacy regarding ANY request for medication refills.  ** [facility] Fax = [phone]  * Please allow 3 business days for routine medication refills.  * Please allow 5 business days for controlled substance medication refills.     Primary Care Center Test Result notification information:  *You will be notified with in 7-10 days of your appointment day regarding the results of your test.  If you are on MyChart you will be notified as soon as the provider has reviewed the results and signed off on them.

## 2017-03-01 NOTE — MR AVS SNAPSHOT
After Visit Summary   3/1/2017    Kobe Buchanan    MRN: [id number]           Patient Information     Date Of Birth          1960        Visit Information        Provider Department      3/1/2017 7:05 AM Michael Styles MD [facility]        Today's Diagnoses     Iron deficiency anemia due to chronic blood loss    -  1    Benign nodular prostatic hyperplasia without lower urinary tract symptoms          Care Instructions    Primary Care Center Medication Refill Request Information:  * Please contact your pharmacy regarding ANY request for medication refills.  ** [facility] Prescription Fax = [phone]  * Please allow 3 business days for routine medication refills.  * Please allow 5 business days for controlled substance medication refills.     Primary Care Center Test Result notification information:  *You will be notified with in 7-10 days of your appointment day regarding the results of your test.  If you are on MyChart you will be notified as soon as the provider has reviewed the results and signed off on them.          Follow-ups after your visit        Additional Services     ONC/HEME ADULT REFERRAL       Anemia has seen Dr. Koehler in the past                  Your next 10 appointments already scheduled     Mar 01, 2017  8:00 AM CST   LAB with  LAB   [facility] ([facility])    [address] [place] [id number] [phone]           Patient must bring picture ID.  Patient should be prepared to give a urine specimen  Please do not eat 10-12 hours before your appointment if you are coming in fasting for labs on lipids, cholesterol, or glucose (sugar).  Pregnant women should follow their Care Team instructions. Water with medications is okay. Do not drink coffee or other fluids.   If you have concerns about taking  your medications, please ask at office or if scheduling via [facility], send a message by clicking on Secure  Messaging, Message Your Care Team.            Mar 31, 2017   Procedure with Juan Carlos Blunt MD   [facility] ([facility])    [address] [place] [address]  [address] [facility] [id number] [phone]           Located in the Clinics and Surgery Center at [address] [place] [address].   parking is very convenient and highly recommended.  is a $6 flat rate fee.  Both  and self parkers should enter the main arrival plaza from [facility]; parking attendants will direct you based on your parking preference.            Apr 07, 2017  9:00 AM CDT   (Arrive by 8:45 AM)   POST-OP HAND with Juan Carlos Blunt MD   [facility] ([facility])    [address] [place] [address]  [address] [facility] [id number] [phone]            Apr 26, 2017  7:05 AM CDT   (Arrive by 6:50 AM)   Return Visit with Michael Styles MD   [facility] ([facility])    [address] [place] [address]  [address] [facility] [address] [phone]              Future tests that were ordered for you today     Open Future Orders        Priority Expected Expires Ordered    Iron and iron binding capacity Routine 3/1/2017 3/1/2018 3/1/2017    Ferritin Routine 3/1/2017 3/1/2018 3/1/2017    Vitamin B12 Routine 3/1/2017 3/1/2018 3/1/2017    Folate Routine 3/1/2017 3/1/2018 3/1/2017    TSH with free T4 reflex Routine 3/1/2017 3/1/2018 3/1/2017    PSA screen Routine 3/1/2017 3/1/2018 3/1/2017    CBC with platelets differential Routine 3/1/2017 3/15/2017 3/1/2017    Comprehensive metabolic panel Routine 3/1/2017 3/1/2018 3/1/2017            Who to contact     Please call your clinic at [phone] to:    Ask questions about your health    Make or cancel appointments    Discuss your medicines    Learn about your test results    Speak to your doctor   If you have compliments or concerns about an  "experience at your clinic, or if you wish to file a complaint, please contact [facility] Patient Relations at [phone] or email us at [email or web address]         Additional Information About Your Visit        FolderBoyhart Information     [facility] gives you secure access to your electronic health record. If you see a primary care provider, you can also send messages to your care team and make appointments. If you have questions, please call your primary care clinic.  If you do not have a primary care provider, please call [phone] and they will assist you.      [employer] is an electronic gateway that provides easy, online access to your medical records. With [employer], you can request a clinic appointment, read your test results, renew a prescription or communicate with your care team.     To access your existing account, please contact your [facility] or call [phone] for assistance.        Care EveryWhere ID     This is your Care EveryWhere ID. This could be used by other organizations to access your [facility] medical records  [id number]        Your Vitals Were     Pulse Respirations Height BMI (Body Mass Index)          68 16 1.778 m (5' 10\") 36.7 kg/m2         Blood Pressure from Last 3 Encounters:   03/01/17 139/79   01/27/17 112/74   12/05/16 127/69    Weight from Last 3 Encounters:   03/01/17 116 kg (255 lb 12.8 oz)   01/27/17 115.2 kg (253 lb 14.4 oz)   12/30/16 110.2 kg (243 lb)              We Performed the Following     EKG Performed in Clinic w/ Provider Reading Fee     ONC/HEME ADULT REFERRAL          Today's Medication Changes          These changes are accurate as of: 3/1/17  7:48 AM.  If you have any questions, ask your nurse or doctor.               These medicines have changed or have updated prescriptions.        Dose/Directions    mometasone 0.1 % ointment   Commonly known as:  ELOCON   This may have changed:    - when to " take this  - reasons to take this   Used for:  Eczema        Apply  topically daily.   Quantity:  90 g   Refills:  1                Primary Care Provider Office Phone # Fax #    Michael Styles [phone]       [address] [address]        Thank you!     Thank you for choosing [facility]  for your care. Our goal is always to provide you with excellent care. Hearing back from our patients is one way we can continue to improve our services. Please take a few minutes to complete the written survey that you may receive in the mail after your visit with us. Thank you!             Your Updated Medication List - Protect others around you: Learn how to safely use, store and throw away your medicines at www.disposemymeds.org.          This list is accurate as of: 3/1/17  7:48 AM.  Always use your most recent med list.                   Brand Name Dispense Instructions for use    amLODIPine-benazepril 10-40 MG per capsule    LOTREL    90 capsule    Take 1 capsule by mouth daily       amoxicillin 500 MG capsule    AMOXIL    4 capsule    Take 4 tablets 1 hour before dental procedure       aspirin 81 MG chewable tablet     120 tablet    Take 1 tablet (81 mg) by mouth daily       buPROPion 200 MG 12 hr tablet    WELLBUTRIN SR    180 tablet    Take 1 tablet (200 mg) by mouth 2 times daily       carvedilol 25 MG tablet    COREG    180 tablet    Take 1 tablet (25 mg) by mouth 2 times daily (with meals)       clonazePAM 0.5 MG tablet    klonoPIN    6 tablet    Take 1 tablet (0.5 mg) by mouth nightly as needed for anxiety       dexamethasone 4 MG/ML injection    DECADRON    1 mL    Inject 1 mL (4 mg) as directed once for 1 dose Use 4 mg or dose determined by provider for iontophoresis.       DOCUSATE SODIUM PO      Take 100 mg by mouth daily       Fe Heme Polypeptide-folic acid 12-1 MG Tabs     90 tablet    Take 1 tablet by mouth daily       fentaNYL 50  mcg/hr 72 hr patch    DURAGESIC     Place 1 patch onto the skin every 72 hours       ferrous sulfate 325 (65 FE) MG tablet    IRON    30 tablet    Take 1 tablet (325 mg) by mouth daily (with breakfast)       fluocinonide 0.05 % ointment    LIDEX    30 g    Apply twice daily to itchy skin nodules for 1-2 weeks at a time.       HYDROcodone-acetaminophen  MG per tablet    NORCO     Take 1 tablet by mouth       Iron Heme Polypeptide 12 MG Tabs    PROFERRIN ES    30 tablet    Take 12 mg/day by mouth daily       mometasone 0.1 % ointment    ELOCON    90 g    Apply  topically daily.       NAPROXEN SODIUM PO      Take 220 mg by mouth 2 times daily       neomycin-polymyxin-hydrocortisone otic solution    CORTISPORIN    10 mL    Place 3 drops in ear(s) 4 times daily       order for DME     1 Device    Walker for cardiac rehab with 4 wheels, brakes and seat       pantoprazole 40 MG EC tablet    PROTONIX    30 tablet    Take 1 tablet (40 mg) by mouth every morning       senna-docusate 8.6-50 MG per tablet    SENOKOT-S;PERICOLACE    100 tablet    Take 1-2 tablets by mouth 2 times daily To prevent constipation while taking narcotic pain medication. Start with 1 tablet twice daily. If no bowel movement in 24 hours, increase to 2 tablets twice daily.  Discontinue if you have loose stools or when you are no longer taking narcotics.       simvastatin 10 MG tablet    ZOCOR    90 tablet    Take 1 tablet (10 mg) by mouth At Bedtime       tacrolimus 0.1 % ointment    PROTOPIC    120 g    Apply topically as needed Apply to affected areas on body.

## 2017-03-01 NOTE — PROGRESS NOTES
"HPI:    Pt. Comes in for preop R thumb surgery. He had \"trigger-like\" sxs. For several months. He denies any anesthesia or bleeding issues.  He had aortic graft and valve repair with Dr. Townsend 10/16. He had post-op afib and is now off coumadin and amiodarone.   He had  R  shoulder arthroplasty with Dr. Aceves; follow up 10/8/14, 11/19/14 and was last seen 8/31/15.   He does not smoke, no EtOH abuse. He has no known lung disease. He denies any bleeding or anesthesia issues.  He states he can climb stairs w/o functional limitations. He has occasional stress taking care of his elderly parents. He o/w denies additional HEENT, cardiopulmonary, abdominal, , neurological, systemic complaints.Patient  underwent a TURP procedure on 1/23/14 by Dr. Taveras. He was seen by Dr. Taveras 10/28/14. He was seen again by Dr. Leslie 7/8/16 for bladder neck contracture. He had colonoscopy 6/30/14; no adenomatous polyps.  He was seen by Dr. Koehler Hematology 2/16 for low Ferritin.         PMH:    1. Gastris by pass surgery 2005  2. Cervical neck surgery at [facility] 2007  3. Hand surgery; L middle finger cyst 6/11  4. Chronic neck pain on narcotics  5. HTN  6. Depression  7. SAVITA  8. Possible old h/o elevated glucose last treated with Metformin 2004.  9. Left Total Shoulder Arthroplasty 4/15/14.  10. Bicuspid aortic valve and enlarged aorta; see Cardiology note from 8/19/14 currently stable.       PE:    Vitals noted gen nad cooperative, alert, HEENT, ears normal oropharynx clear no exudate, neck supple nl rom no adenopathy, LCTA, B, normal resp. System exam, RRR, S1, S2,   murmur not present today,  abdomen, nt, nd no masses, normal GI system exam, obese, grossly normal neurological exam. Sternal wound healing no erythema, no drainage, no tenderness.    EKG; SR at 69; SC interval 210 ms. No acute findings.     A/P:    Low risk for planned R thumb surgery. EKG and labs today; he can avoid NSAIDS/ASA before surgery. Labs are " stable      1. HTN; he remains on the same medications; labs checked  Last visit 11/2/16. He had normal 24 hr BP monitor 2/2/17  2. RTHC; as above for immunization (flu shot done) and colonoscopy; PSA 1/16; ordered recheck PSA today 3/1/17.  3. Neck and back pain see notes from Dr. Puente 11/19/14.  4. He has followed up with jarod Gibson shoulder 8/31/15.  5. Ascending aortic repair with Dr. Townsend done  10/4/16; he was seen 10/25/16.  He was seen by   1/27/17; he is off amiodarone and coumadin.  6. Pain management; see detailed 8/7/14 and 12/5/14 notes from  Dr. Alford.  He is currently followed at outside Pain Clinic and he is getting all his pain medications there.  7. See Sleep Medicine note from 12/16/14.  8. He was seen by jarod Mcclain 11/15 for R knee pain.   9. He was seen by Dr. Leslie, Urology for bladder neck contracture 7/8/16.  10. Elevated liver tests will recheck 11/2/16 as normal.   He states treatment with interferon for Hep C many years ago. He is antibody positive and Viral load negative for Hep C.   11. Will recheck TSH 1/27/17 for past abnormal labs (related to Amiodarone); normal; stable.   12.  Rechecked CBC, iron and ferritin; placed Hematology referral back to Dr. Koehler              Total time spent 25 minutes.  More than 50% of the time spent with Mr. Henriquez on counseling / coordinating his care

## 2017-03-31 ENCOUNTER — HOSPITAL ENCOUNTER (OUTPATIENT)
Facility: AMBULATORY SURGERY CENTER | Age: 57
End: 2017-03-31
Attending: ORTHOPAEDIC SURGERY

## 2017-03-31 VITALS
BODY MASS INDEX: 36.62 KG/M2 | HEIGHT: 70 IN | DIASTOLIC BLOOD PRESSURE: 85 MMHG | OXYGEN SATURATION: 98 % | WEIGHT: 255.8 LBS | SYSTOLIC BLOOD PRESSURE: 148 MMHG | RESPIRATION RATE: 14 BRPM | TEMPERATURE: 97.7 F

## 2017-03-31 DIAGNOSIS — M65.311 TRIGGER FINGER OF RIGHT THUMB: Primary | ICD-10-CM

## 2017-03-31 RX ORDER — ONDANSETRON 4 MG/1
4 TABLET, ORALLY DISINTEGRATING ORAL
Status: DISCONTINUED | OUTPATIENT
Start: 2017-03-31 | End: 2017-04-01 | Stop reason: HOSPADM

## 2017-03-31 RX ORDER — HYDROCODONE BITARTRATE AND ACETAMINOPHEN 5; 325 MG/1; MG/1
1-2 TABLET ORAL
Status: DISCONTINUED | OUTPATIENT
Start: 2017-03-31 | End: 2017-04-01 | Stop reason: HOSPADM

## 2017-03-31 RX ORDER — LIDOCAINE HYDROCHLORIDE AND EPINEPHRINE 10; 10 MG/ML; UG/ML
INJECTION, SOLUTION INFILTRATION; PERINEURAL PRN
Status: DISCONTINUED | OUTPATIENT
Start: 2017-03-31 | End: 2017-03-31 | Stop reason: HOSPADM

## 2017-03-31 RX ORDER — CEFAZOLIN SODIUM 1 G/3ML
1 INJECTION, POWDER, FOR SOLUTION INTRAMUSCULAR; INTRAVENOUS SEE ADMIN INSTRUCTIONS
Status: DISCONTINUED | OUTPATIENT
Start: 2017-03-31 | End: 2017-03-31 | Stop reason: HOSPADM

## 2017-03-31 RX ORDER — BUPIVACAINE HYDROCHLORIDE 5 MG/ML
INJECTION, SOLUTION PERINEURAL PRN
Status: DISCONTINUED | OUTPATIENT
Start: 2017-03-31 | End: 2017-03-31 | Stop reason: HOSPADM

## 2017-03-31 NOTE — DISCHARGE INSTRUCTIONS
Discharge Instructions: Following Surgery on your Arm or Hand    Comfort:    Keep the affected arm or hand elevated to reduce pain and swelling. Use pillows at night and a sling (if ordered) during the day.    Take the pain medication as ordered.     Care:    Keep the dressing clean, dry and intact.    Watch for drainage    Check color, motion, and sensation of the fingers/hand on a regular basis.     Activity:    Spend a quiet afternoon and evening at home on the day of your surgery.    No tub baths or showers until your dressing/cast is removed by your doctor.     Report to your doctor at once if:    Your fingers below the dressing/cast are numb, difficult or painful to move, and this is not relieved after elevation.    There is a change in the color of your fingers below the dressing/cast that does not go away when elevated.     The affected arm/hand is much cooler or warmer than the other side.    Your temperature is elevated.    There is an odor or unusual drainage on the dressing/cast.    Your dressing/cast is uncomfortably snug or tight.     Follow up:    Call your doctor s office to schedule a follow-up appointment       [facility]  Home Care Following Your Procedure  Call a doctor if you have signs of infection (fever, growing tenderness at the surgery site, a large amount of drainage or bleeding, severe pain, foul-smelling drainage, redness, swelling).  Your doctor is:  Dr. Juan Carlos Blunt, Orthopaedics: [phone]                                    Or dial [phone] and ask for the resident on call for:  Orthopaedics  For emergency care, call the:  [facility]: [phone] (TTY for hearing impaired: [phone])

## 2017-03-31 NOTE — IP AVS SNAPSHOT
MRN:[id number]                      After Visit Summary   3/31/2017    Kobe Buchanan    MRN: [id number]           Thank you!     Thank you for choosing [facility] for your care. Our goal is always to provide you with excellent care. Hearing back from our patients is one way we can continue to improve our services. Please take a few minutes to complete the written survey that you may receive in the mail after you visit with us. Thank you!        Patient Information     Date Of Birth          1960        About your hospital stay     You were admitted on:  March 31, 2017 You last received care in the:  [facility]    You were discharged on:  March 31, 2017       Who to Call     For medical emergencies, please call 911.  For non-urgent questions about your medical care, please call your primary care provider or clinic, [phone]  For questions related to your surgery, please call your surgery clinic        Attending Provider     Provider Specialty    Juan Carlos Blunt MD Hand Surgery       Primary Care Provider Office Phone # Fax #    Michael Styles [phone]       [place] [address]        After Care Instructions      Diet as Tolerated       Return to diet before surgery, unless instructed otherwise.            Discharge Instructions       Review outpatient procedure discharge instructions with patient as directed by Provider            Dressing Change       Change dressing on third day after surgery.            Ice to affected area       Ice pack to surgical site every 15 minutes per hour for 24 hours            Notify Provider       For signs and symptoms of infection: Fever greater than 101, redness, swelling, heat at site, drainage, pus.            Return to clinic       Return to clinic in 1 week            Weight bearing - As tolerated                 Your next 10 appointments already  scheduled     Apr 07, 2017  9:00 AM CDT   (Arrive by 8:45 AM)   POST-OP HAND with Juan Carlos Blunt MD   [facility] ([facility])    [address] [place] [id number] [phone]            Apr 26, 2017  7:05 AM CDT   (Arrive by 6:50 AM)   Return Visit with Michael Styles MD   [facility] ([facility])    [address] [place] [id number] [phone]              Further instructions from your care team       Discharge Instructions: Following Surgery on your Arm or Hand    Comfort:    Keep the affected arm or hand elevated to reduce pain and swelling. Use pillows at night and a sling (if ordered) during the day.    Take the pain medication as ordered.     Care:    Keep the dressing clean, dry and intact.    Watch for drainage    Check color, motion, and sensation of the fingers/hand on a regular basis.     Activity:    Spend a quiet afternoon and evening at home on the day of your surgery.    No tub baths or showers until your dressing/cast is removed by your doctor.     Report to your doctor at once if:    Your fingers below the dressing/cast are numb, difficult or painful to move, and this is not relieved after elevation.    There is a change in the color of your fingers below the dressing/cast that does not go away when elevated.     The affected arm/hand is much cooler or warmer than the other side.    Your temperature is elevated.    There is an odor or unusual drainage on the dressing/cast.    Your dressing/cast is uncomfortably snug or tight.     Follow up:    Call your doctor s office to schedule a follow-up appointment       [facility]  Home Care Following Your Procedure  Call a doctor if you have signs of infection (fever, growing tenderness at the surgery site, a large amount of drainage or bleeding, severe pain,  "foul-smelling drainage, redness, swelling).  Your doctor is:  Dr. Juan Carlos Blunt, Orthopaedics: [phone]                                    Or dial [phone] and ask for the resident on call for:  Orthopaedics  For emergency care, call the:  [facility]: [phone] (TTY for hearing impaired: [phone])                Pending Results     No orders found from 3/29/2017 to 4/1/2017.            Admission Information     Date & Time Provider Department Dept. Phone    3/31/2017 Juan Carlos Blunt MD [facility] [phone]      Your Vitals Were     Blood Pressure Temperature Respirations Height Weight Pulse Oximetry    161/90 97.7  F (36.5  C) (Oral) 12 1.778 m (5' 10\") 116 kg (255 lb 12.8 oz) 97%    BMI (Body Mass Index)                   36.7 kg/m2           [employer] Information     [employer] gives you secure access to your electronic health record. If you see a primary care provider, you can also send messages to your care team and make appointments. If you have questions, please call your primary care clinic.  If you do not have a primary care provider, please call [phone] and they will assist you.      [employer] is an electronic gateway that provides easy, online access to your medical records. With [employer], you can request a clinic appointment, read your test results, renew a prescription or communicate with your care team.     To access your existing account, please contact your [facility] or call [phone] for assistance.        Care EveryWhere ID     This is your Care EveryWhere ID. This could be used by other organizations to access your [facility] medical records  [id number]           Review of your medicines      CONTINUE these medicines which may have CHANGED, or have new prescriptions. If we are uncertain of the size of tablets/capsules you have at home, strength may be listed as something that might have changed.        Dose / " Directions    mometasone 0.1 % ointment   Commonly known as:  ELOCON   This may have changed:    - when to take this  - reasons to take this   Used for:  Eczema        Apply  topically daily.   Quantity:  90 g   Refills:  1         CONTINUE these medicines which have NOT CHANGED        Dose / Directions    amLODIPine-benazepril 10-40 MG per capsule   Commonly known as:  LOTREL   Used for:  Essential hypertension with goal blood pressure less than 130/85        Dose:  1 capsule   Take 1 capsule by mouth daily   Quantity:  90 capsule   Refills:  3       amoxicillin 500 MG capsule   Commonly known as:  AMOXIL   Used for:  Dental anomaly        Take 4 tablets 1 hour before dental procedure   Quantity:  4 capsule   Refills:  4       aspirin 81 MG chewable tablet   Used for:  S/P ascending aortic aneurysm repair        Dose:  81 mg   Take 1 tablet (81 mg) by mouth daily   Quantity:  120 tablet   Refills:  0       buPROPion 200 MG 12 hr tablet   Commonly known as:  WELLBUTRIN SR   Used for:  Dysthymic disorder        Dose:  200 mg   Take 1 tablet (200 mg) by mouth 2 times daily   Quantity:  180 tablet   Refills:  1       carvedilol 25 MG tablet   Commonly known as:  COREG   Used for:  Essential hypertension with goal blood pressure less than 130/85        Dose:  25 mg   Take 1 tablet (25 mg) by mouth 2 times daily (with meals)   Quantity:  180 tablet   Refills:  3       clonazePAM 0.5 MG tablet   Commonly known as:  klonoPIN   Used for:  Stress        Dose:  0.5 mg   Take 1 tablet (0.5 mg) by mouth nightly as needed for anxiety   Quantity:  6 tablet   Refills:  0       dexamethasone 4 MG/ML injection   Commonly known as:  DECADRON   Used for:  Trigger finger of right thumb        Dose:  4 mg   Inject 1 mL (4 mg) as directed once for 1 dose Use 4 mg or dose determined by provider for iontophoresis.   Quantity:  1 mL   Refills:  0       DOCUSATE SODIUM PO        Dose:  100 mg   Take 100 mg by mouth daily   Refills:  0        Fe Heme Polypeptide-folic acid 12-1 MG Tabs   Used for:  Low iron        Dose:  1 tablet   Take 1 tablet by mouth daily   Quantity:  90 tablet   Refills:  3       fentaNYL 50 mcg/hr 72 hr patch   Commonly known as:  DURAGESIC        Dose:  1 patch   Place 1 patch onto the skin every 72 hours   Refills:  0       ferrous sulfate 325 (65 FE) MG tablet   Commonly known as:  IRON        Dose:  325 mg   Take 1 tablet (325 mg) by mouth daily (with breakfast)   Quantity:  30 tablet   Refills:  3       fluocinonide 0.05 % ointment   Commonly known as:  LIDEX   Used for:  Prurigo nodularis        Apply twice daily to itchy skin nodules for 1-2 weeks at a time.   Quantity:  30 g   Refills:  3       HYDROcodone-acetaminophen  MG per tablet   Commonly known as:  NORCO   Used for:  Iron deficiency anemia due to chronic blood loss, Benign nodular prostatic hyperplasia without lower urinary tract symptoms        Dose:  1 tablet   Take 1 tablet by mouth   Refills:  0       Iron Heme Polypeptide 12 MG Tabs   Commonly known as:  PROFERRIN ES   Used for:  Iron deficiency anemia due to chronic blood loss        Dose:  12 mg/day   Take 12 mg/day by mouth daily   Quantity:  30 tablet   Refills:  3       NAPROXEN SODIUM PO        Dose:  220 mg   Take 220 mg by mouth 2 times daily   Refills:  0       neomycin-polymyxin-hydrocortisone otic solution   Commonly known as:  CORTISPORIN   Used for:  History of impacted ear wax        Dose:  3 drop   Place 3 drops in ear(s) 4 times daily   Quantity:  10 mL   Refills:  3       order for DME   Used for:  SOB (shortness of breath)        Walker for cardiac rehab with 4 wheels, brakes and seat   Quantity:  1 Device   Refills:  0       pantoprazole 40 MG EC tablet   Commonly known as:  PROTONIX   Used for:  Acute post-operative pain        Dose:  40 mg   Take 1 tablet (40 mg) by mouth every morning   Quantity:  30 tablet   Refills:  3       senna-docusate 8.6-50 MG per tablet   Commonly known as:   SENOKOT-S;PERICOLACE   Used for:  Bladder neck contracture        Dose:  1-2 tablet   Take 1-2 tablets by mouth 2 times daily To prevent constipation while taking narcotic pain medication. Start with 1 tablet twice daily. If no bowel movement in 24 hours, increase to 2 tablets twice daily.  Discontinue if you have loose stools or when you are no longer taking narcotics.   Quantity:  100 tablet   Refills:  0       simvastatin 10 MG tablet   Commonly known as:  ZOCOR   Used for:  Hypercholesteremia        Dose:  10 mg   Take 1 tablet (10 mg) by mouth At Bedtime   Quantity:  90 tablet   Refills:  3       tacrolimus 0.1 % ointment   Commonly known as:  PROTOPIC   Used for:  Other eczema, Prurigo nodularis        Apply topically as needed Apply to affected areas on body.   Quantity:  120 g   Refills:  11                Protect others around you: Learn how to safely use, store and throw away your medicines at www.disposemymeds.org.             Medication List: This is a list of all your medications and when to take them. Check marks below indicate your daily home schedule. Keep this list as a reference.      Medications           Morning Afternoon Evening Bedtime As Needed    amLODIPine-benazepril 10-40 MG per capsule   Commonly known as:  LOTREL   Take 1 capsule by mouth daily                                amoxicillin 500 MG capsule   Commonly known as:  AMOXIL   Take 4 tablets 1 hour before dental procedure                                aspirin 81 MG chewable tablet   Take 1 tablet (81 mg) by mouth daily                                buPROPion 200 MG 12 hr tablet   Commonly known as:  WELLBUTRIN SR   Take 1 tablet (200 mg) by mouth 2 times daily                                carvedilol 25 MG tablet   Commonly known as:  COREG   Take 1 tablet (25 mg) by mouth 2 times daily (with meals)                                clonazePAM 0.5 MG tablet   Commonly known as:  klonoPIN   Take 1 tablet (0.5 mg) by mouth nightly as  needed for anxiety                                dexamethasone 4 MG/ML injection   Commonly known as:  DECADRON   Inject 1 mL (4 mg) as directed once for 1 dose Use 4 mg or dose determined by provider for iontophoresis.                                DOCUSATE SODIUM PO   Take 100 mg by mouth daily                                Fe Heme Polypeptide-folic acid 12-1 MG Tabs   Take 1 tablet by mouth daily                                fentaNYL 50 mcg/hr 72 hr patch   Commonly known as:  DURAGESIC   Place 1 patch onto the skin every 72 hours                                ferrous sulfate 325 (65 FE) MG tablet   Commonly known as:  IRON   Take 1 tablet (325 mg) by mouth daily (with breakfast)                                fluocinonide 0.05 % ointment   Commonly known as:  LIDEX   Apply twice daily to itchy skin nodules for 1-2 weeks at a time.                                HYDROcodone-acetaminophen  MG per tablet   Commonly known as:  NORCO   Take 1 tablet by mouth                                Iron Heme Polypeptide 12 MG Tabs   Commonly known as:  PROFERRIN ES   Take 12 mg/day by mouth daily                                mometasone 0.1 % ointment   Commonly known as:  ELOCON   Apply  topically daily.                                NAPROXEN SODIUM PO   Take 220 mg by mouth 2 times daily                                neomycin-polymyxin-hydrocortisone otic solution   Commonly known as:  CORTISPORIN   Place 3 drops in ear(s) 4 times daily                                order for DME   Walker for cardiac rehab with 4 wheels, brakes and seat                                pantoprazole 40 MG EC tablet   Commonly known as:  PROTONIX   Take 1 tablet (40 mg) by mouth every morning                                senna-docusate 8.6-50 MG per tablet   Commonly known as:  SENOKOT-S;PERICOLACE   Take 1-2 tablets by mouth 2 times daily To prevent constipation while taking narcotic pain medication. Start with 1 tablet twice  daily. If no bowel movement in 24 hours, increase to 2 tablets twice daily.  Discontinue if you have loose stools or when you are no longer taking narcotics.                                simvastatin 10 MG tablet   Commonly known as:  ZOCOR   Take 1 tablet (10 mg) by mouth At Bedtime                                tacrolimus 0.1 % ointment   Commonly known as:  PROTOPIC   Apply topically as needed Apply to affected areas on body.

## 2017-03-31 NOTE — IP AVS SNAPSHOT
[facility]    [address] [place] [address]    Phone:  [phone]    Fax:  [phone]                                       After Visit Summary   3/31/2017    Kobe Buchanan    MRN: [id number]           After Visit Summary Signature Page     I have received my discharge instructions, and my questions have been answered. I have discussed any challenges I see with this plan with the nurse or doctor.    ..........................................................................................................................................  Patient/Patient Representative Signature      ..........................................................................................................................................  Patient Representative Print Name and Relationship to Patient    ..................................................               ................................................  Date                                            Time    ..........................................................................................................................................  Reviewed by Signature/Title    ...................................................              ..............................................  Date                                                            Time

## 2017-04-01 NOTE — OP NOTE
DATE OF PROCEDURE:  03/31/2017.        STAFF:  Dr. Juan Carlos Blunt.      PREOPERATIVE DIAGNOSIS:  Right thumb trigger finger.      POSTOPERATIVE DIAGNOSIS:  Right thumb trigger finger.      PROCEDURE PERFORMED:  Right thumb trigger finger release.      ANESTHESIA TYPE:  Local only.      COMPLICATIONS:  None apparent.      ESTIMATED BLOOD LOSS:  2 mL.      TOURNIQUET TIME:  Zero.      INDICATIONS:  The patient is a 57-year-old male who developed a catching and locking of his right thumb.  He was evaluated in the clinic and this was determined to be a trigger digit.  He elected to proceed with release of the A1 pulley after risks and benefits were discussed with him.      DESCRIPTION OF PROCEDURE:  The patient was identified in the preoperative holding area by Dr. Blunt, who placed the site marking, confirmed consent with the patient and injected the local anesthetic.  This was a mixture of 1% lidocaine with epinephrine and 0.5% Marcaine in a 1:1 mixture.  The patient tolerated this injection well.  He was then brought back to the operating room, where a timeout was performed to confirm we had the correct patient, correct procedure to be performed and all needed equipment was available.  Everyone was in agreement and there were no concerns.  The patient was positioned supine on the operating table.  The procedure was done with the local anesthetic only without sedation.  The right upper extremity had forearm placed on the tourniquet, although this was not inflated during the case.  The right upper extremity was then prepped and draped in our standard sterile fashion.  We then ensured that he had adequate analgesia from the local anesthetic.  We marked out our skin incision in the flexor crease at the base of the thumb.  We made our skin incision with a 15 blade and then used tenotomy to dissect down to the level of the A1 pulley.  Prior to exposing the A1 pulley, we did identify the radial digital nerve and took  care to protect this throughout the case.  After identifying the A1 pulley, we sharply incised this in line with the flexor tendon, evaluated to ensure that we had adequately released both proximally and distally.  We then had the patient actively flex and extend his thumb multiple times to ensure that there was no persistent locking or catching.  We also passively tested this.  We then irrigated the wound, ensured we had adequate hemostasis and then closed the wound with subcutaneous Monocryl.  A soft sterile dressing was placed and the patient was brought back to the recovery area in stable condition.      POSTOPERATIVE PLAN:  The patient may change his postop dressing in 3 days.  We did discuss pain control with him, and he was not interested in any further narcotics at this point as he is already on narcotic medication secondary to his back.  He will ice and elevate, can use the hand for gentle activity, will keep his incision clean and dry.  We will have him follow up in 1 week.  However, if he is doing well, he did express an interest in not returning so soon, and in that case he can reschedule.  Dr. Blunt was present and scrubbed for all portions of the case.         CARMEN BLUNT MD       As dictated by SHE SMITH MD            D: 2017 17:44   T: 2017 11:35   MT: MAURISIO      Name:     RAZA SHEPPARD   MRN:      [id number]        Account:        [id number]   :      1960           Procedure Date: 2017      Document: [id number]

## 2017-04-26 ENCOUNTER — OFFICE VISIT (OUTPATIENT)
Dept: INTERNAL MEDICINE | Facility: CLINIC | Age: 57
End: 2017-04-26

## 2017-04-26 VITALS
WEIGHT: 251.5 LBS | DIASTOLIC BLOOD PRESSURE: 82 MMHG | RESPIRATION RATE: 16 BRPM | HEART RATE: 69 BPM | BODY MASS INDEX: 36.09 KG/M2 | SYSTOLIC BLOOD PRESSURE: 123 MMHG

## 2017-04-26 DIAGNOSIS — I10 BENIGN ESSENTIAL HYPERTENSION: Primary | ICD-10-CM

## 2017-04-26 ASSESSMENT — PAIN SCALES - GENERAL: PAINLEVEL: NO PAIN (1)

## 2017-04-26 NOTE — PROGRESS NOTES
"HPI:    Pt. Comes in for follow up. He had 3/31/17  R thumb surgery. He had \"trigger-like\" sxs. For several months. He had aortic graft and valve repair with Dr. Townsend 10/16. He had post-op afib and is now off coumadin and amiodarone.   He had  R  shoulder arthroplasty with Dr. Aceves; follow up 10/8/14, 11/19/14 and was last seen 8/31/15.   He does not smoke, no EtOH abuse. He has no known lung disease. He denies any bleeding or anesthesia issues.  He states he can climb stairs w/o functional limitations. He has occasional stress taking care of his elderly parents. He o/w denies additional HEENT, cardiopulmonary, abdominal, , neurological, systemic complaints.Patient  underwent a TURP procedure on 1/23/14 by Dr. Taveras. He was seen by Dr. Taveras 10/28/14. He was seen again by Dr. Leslie 7/8/16 for bladder neck contracture. He had colonoscopy 6/30/14; no adenomatous polyps.  He was seen by Dr. Koehler Hematology 2/16 for low Ferritin.         PMH:    1. Gastris by pass surgery 2005  2. Cervical neck surgery at [facility] 2007  3. Hand surgery; L middle finger cyst 6/11  4. Chronic neck pain on narcotics  5. HTN  6. Depression  7. SAVITA  8. Possible old h/o elevated glucose last treated with Metformin 2004.  9. Left Total Shoulder Arthroplasty 4/15/14.  10. Bicuspid aortic valve and enlarged aorta; see Cardiology note from 8/19/14 currently stable.       PE:    Vitals noted gen nad cooperative, alert, HEENT, ears normal oropharynx clear no exudate, neck supple nl rom no adenopathy, LCTA, B, normal resp. System exam, RRR, S1, S2,   murmur not present today,  abdomen, nt, nd no masses, normal GI system exam, obese, grossly normal neurological exam.        A/P:    1. HTN; he remains on the same medications; labs checked 3/1/17 as angelica.. He had normal 24 hr BP monitor 2/2/17  2. RTHC; as above for immunization (flu shot done) and colonoscopy; PSA 1/16; ordered rechecked PSA  3/1/17 stable.  3. Neck and back pain see notes " from Dr. Puente 11/19/14.  4. He has followed up with jarod Gibson shoulder 8/31/15.  5. Ascending aortic repair with Dr. Townsend done  10/4/16; he was seen 10/25/16.  He was seen by   1/27/17; he is off amiodarone and coumadin.  6. Pain management; see detailed 8/7/14 and 12/5/14 notes from  Dr. Alford.  He is currently followed at outside Pain Clinic and he is getting all his pain medications there.  7. See Sleep Medicine note from 12/16/14.  8. He was seen by jarod Mcclain 11/15 for R knee pain.   9. He was seen by Dr. Leslie, Urology for bladder neck contracture 7/8/16.  10. Elevated liver tests rechecked 11/2/16 as normal.   He states treatment with interferon for Hep C many years ago. He is antibody positive and Viral load negative for Hep C.   11 .Rechecked TSH  3/1/17 normal  for past abnormal labs (related to Amiodarone).  12.  Rechecked CBC, iron and ferritin stable and placed Hematology referral back to Dr. Koehler on 3/1/17              Total time spent 25 minutes.  More than 50% of the time spent with Mr. Henriquez on counseling / coordinating his care

## 2017-04-26 NOTE — MR AVS SNAPSHOT
After Visit Summary   4/26/2017    Kobe Buchanan    MRN: [id number]           Patient Information     Date Of Birth          1960        Visit Information        Provider Department      4/26/2017 7:05 AM Michael Styles MD [facility]        Care Instructions    Primary Care Center Medication Refill Request Information:  * Please contact your pharmacy regarding ANY request for medication refills.  ** [facility] Prescription Fax = [phone]  * Please allow 3 business days for routine medication refills.  * Please allow 5 business days for controlled substance medication refills.     Primary Care Center Test Result notification information:  *You will be notified with in 7-10 days of your appointment day regarding the results of your test.  If you are on MyChart you will be notified as soon as the provider has reviewed the results and signed off on them.          Follow-ups after your visit        Your next 10 appointments already scheduled     Jul 26, 2017  7:35 AM CDT   (Arrive by 7:20 AM)   Return Visit with Michael Styles MD   [facility] ([facility])    [address] [place] [id number] [phone]              Who to contact     Please call your clinic at [phone] to:    Ask questions about your health    Make or cancel appointments    Discuss your medicines    Learn about your test results    Speak to your doctor   If you have compliments or concerns about an experience at your clinic, or if you wish to file a complaint, please contact [facility] Patient Relations at [phone] or email us at [email or web address]         Additional Information About Your Visit        MyChart Information     [employer] gives you secure access to your electronic health record. If you see a primary care provider, you can also send messages to your care team and make  appointments. If you have questions, please call your primary care clinic.  If you do not have a primary care provider, please call [phone] and they will assist you.      [email or web address] is an electronic gateway that provides easy, online access to your medical records. With [email or web address], you can request a clinic appointment, read your test results, renew a prescription or communicate with your care team.     To access your existing account, please contact your [facility] or call [phone] for assistance.        Care EveryWhere ID     This is your Care EveryWhere ID. This could be used by other organizations to access your [place] medical records  [id number]        Your Vitals Were     Pulse Respirations BMI (Body Mass Index)             69 16 36.09 kg/m2          Blood Pressure from Last 3 Encounters:   04/26/17 123/82   03/31/17 148/85   03/01/17 139/79    Weight from Last 3 Encounters:   04/26/17 114.1 kg (251 lb 8 oz)   03/31/17 116 kg (255 lb 12.8 oz)   03/01/17 116 kg (255 lb 12.8 oz)              Today, you had the following     No orders found for display         Today's Medication Changes          These changes are accurate as of: 4/26/17  7:46 AM.  If you have any questions, ask your nurse or doctor.               These medicines have changed or have updated prescriptions.        Dose/Directions    mometasone 0.1 % ointment   Commonly known as:  ELOCON   This may have changed:    - when to take this  - reasons to take this   Used for:  Eczema        Apply  topically daily.   Quantity:  90 g   Refills:  1                Primary Care Provider Office Phone # Fax #    Michael Styles [phone]       [address] [address]        Thank you!     Thank you for choosing [facility]  for your care. Our goal is always to provide you with excellent care. Hearing back from our patients is one way we can continue  to improve our services. Please take a few minutes to complete the written survey that you may receive in the mail after your visit with us. Thank you!             Your Updated Medication List - Protect others around you: Learn how to safely use, store and throw away your medicines at www.disposemymeds.org.          This list is accurate as of: 4/26/17  7:46 AM.  Always use your most recent med list.                   Brand Name Dispense Instructions for use    amLODIPine-benazepril 10-40 MG per capsule    LOTREL    90 capsule    Take 1 capsule by mouth daily       amoxicillin 500 MG capsule    AMOXIL    4 capsule    Take 4 tablets 1 hour before dental procedure       aspirin 81 MG chewable tablet     120 tablet    Take 1 tablet (81 mg) by mouth daily       buPROPion 200 MG 12 hr tablet    WELLBUTRIN SR    180 tablet    Take 1 tablet (200 mg) by mouth 2 times daily       carvedilol 25 MG tablet    COREG    180 tablet    Take 1 tablet (25 mg) by mouth 2 times daily (with meals)       clonazePAM 0.5 MG tablet    klonoPIN    6 tablet    Take 1 tablet (0.5 mg) by mouth nightly as needed for anxiety       dexamethasone 4 MG/ML injection    DECADRON    1 mL    Inject 1 mL (4 mg) as directed once for 1 dose Use 4 mg or dose determined by provider for iontophoresis.       DOCUSATE SODIUM PO      Take 100 mg by mouth daily       Fe Heme Polypeptide-folic acid 12-1 MG Tabs     90 tablet    Take 1 tablet by mouth daily       fentaNYL 50 mcg/hr 72 hr patch    DURAGESIC     Place 1 patch onto the skin every 72 hours       ferrous sulfate 325 (65 FE) MG tablet    IRON    30 tablet    Take 1 tablet (325 mg) by mouth daily (with breakfast)       fluocinonide 0.05 % ointment    LIDEX    30 g    Apply twice daily to itchy skin nodules for 1-2 weeks at a time.       HYDROcodone-acetaminophen  MG per tablet    NORCO     Take 1 tablet by mouth       Iron Heme Polypeptide 12 MG Tabs    PROFERRIN ES    30 tablet    Take 12 mg/day by  mouth daily       mometasone 0.1 % ointment    ELOCON    90 g    Apply  topically daily.       NAPROXEN SODIUM PO      Take 220 mg by mouth 2 times daily       neomycin-polymyxin-hydrocortisone otic solution    CORTISPORIN    10 mL    Place 3 drops in ear(s) 4 times daily       order for DME     1 Device    Walker for cardiac rehab with 4 wheels, brakes and seat       pantoprazole 40 MG EC tablet    PROTONIX    30 tablet    Take 1 tablet (40 mg) by mouth every morning       senna-docusate 8.6-50 MG per tablet    SENOKOT-S;PERICOLACE    100 tablet    Take 1-2 tablets by mouth 2 times daily To prevent constipation while taking narcotic pain medication. Start with 1 tablet twice daily. If no bowel movement in 24 hours, increase to 2 tablets twice daily.  Discontinue if you have loose stools or when you are no longer taking narcotics.       simvastatin 10 MG tablet    ZOCOR    90 tablet    Take 1 tablet (10 mg) by mouth At Bedtime       tacrolimus 0.1 % ointment    PROTOPIC    120 g    Apply topically as needed Apply to affected areas on body.

## 2017-04-26 NOTE — PATIENT INSTRUCTIONS
Primary Care Center Medication Refill Request Information:  * Please contact your pharmacy regarding ANY request for medication refills.  ** [facility] Prescription Fax = [phone]  * Please allow 3 business days for routine medication refills.  * Please allow 5 business days for controlled substance medication refills.     Primary Care Center Test Result notification information:  *You will be notified with in 7-10 days of your appointment day regarding the results of your test.  If you are on MyChart you will be notified as soon as the provider has reviewed the results and signed off on them.

## 2017-04-26 NOTE — NURSING NOTE
Chief Complaint   Patient presents with     Medication Request     Patient would like to discuss medications and changes     LUMA OSPINA at 6:58 AM on 4/26/2017.

## 2017-05-02 ENCOUNTER — TELEPHONE (OUTPATIENT)
Dept: CARDIOLOGY | Facility: CLINIC | Age: 57
End: 2017-05-02

## 2017-05-02 ENCOUNTER — TELEPHONE (OUTPATIENT)
Dept: INTERNAL MEDICINE | Facility: CLINIC | Age: 57
End: 2017-05-02

## 2017-05-02 NOTE — TELEPHONE ENCOUNTER
Since he vascular surgery he would get on and off vertigo. This morning the vertigo lasted 3-4 minutes, denies nausea or vomiting.. Pt requesting head scan.  Appt 9:25am 05/03/2017 with Dr Segura. Pt does not want to go to the ER

## 2017-05-02 NOTE — TELEPHONE ENCOUNTER
Type of call: Symptoms Call  Symptoms Call  Symptom: Dizziness  Active now: Off and on, intermittent    Duration: 2-4 minutes  Notes: Kobe states that after bending over, and then straightening up he felt very dizzy.  He states that this has happened before, but was usually over quickly.  This time he states that it lasted for at least 4 mins and he thought that he would need someone to come get him because he would not be able to drive.  These symptoms did resolve.      He stated that a couple of months ago he struck his head hard, but did not follow up with an MD about this.    He called stating that he needs to have a Head CT ordered as well as a tilt table test.      I told him that he would have to see an MD before any of these tests would be ordered or scheduled.    He states that he would take care of this, and that he had a telephone call into Dr. Styles's office.

## 2017-05-03 ENCOUNTER — OFFICE VISIT (OUTPATIENT)
Dept: INTERNAL MEDICINE | Facility: CLINIC | Age: 57
End: 2017-05-03

## 2017-05-03 VITALS
HEART RATE: 67 BPM | RESPIRATION RATE: 16 BRPM | SYSTOLIC BLOOD PRESSURE: 138 MMHG | WEIGHT: 250 LBS | BODY MASS INDEX: 35.87 KG/M2 | DIASTOLIC BLOOD PRESSURE: 88 MMHG

## 2017-05-03 DIAGNOSIS — H81.10 BPPV (BENIGN PAROXYSMAL POSITIONAL VERTIGO), UNSPECIFIED LATERALITY: Primary | ICD-10-CM

## 2017-05-03 ASSESSMENT — PAIN SCALES - GENERAL: PAINLEVEL: NO PAIN (1)

## 2017-05-03 NOTE — PATIENT INSTRUCTIONS
[facility] Medication Refill Request Information:  * Please contact your pharmacy regarding ANY request for medication refills.  ** [facility] Prescription Fax = [phone]  * Please allow 3 business days for routine medication refills.  * Please allow 5 business days for controlled substance medication refills.     [facility] Test Result notification information:  *You will be notified with in 7-10 days of your appointment day regarding the results of your test.  If you are on MyChart you will be notified as soon as the provider has reviewed the results and signed off on them.    [facility] [phone]

## 2017-05-03 NOTE — NURSING NOTE
"Chief Complaint   Patient presents with     Dizziness     patient states he is having a \"different kind of dizziness\" that lasted longer     LUMA OSPINA at 9:04 AM on 5/3/2017.      "

## 2017-05-03 NOTE — PROGRESS NOTES
HPI:   Kobe Buchanan is a 57 year old  male presents today for followup. SUBJECTIVE:  He is here today with episodes of vertigo yesterday.  He says he was bending over a couple times yesterday, and then when he sat back up, the room started spinning.  He felt like he was in a gyroscope.  It lasted for about 3 or 4 minutes and then stopped and has not returned.  He had no associated nausea or vomiting.  He says he has had 2 previous episodes of dizziness brought on by turning his head.  Once was about 18 years ago and 1 about 3 years ago.  He had an MRI of the brain 3 years ago that was unremarkable.  Those episodes he says were helped with meclizine.  In October of last year, he had aortic root repair due to a bicuspid aortic valve and aortic root dilation.  Since that time, he says about once a month if he bends over to do something he feels transiently a little lightheaded or dizzy, but that is different than the symptom he is describing to me that occurred yesterday.  He notes that in January and February he slipped on the ice and hit his head a total of 2 times.  He felt fine afterwards and was not evaluated.  He has had no neurologic symptoms since then until this episode yesterday and he wonders if that could have anything to do with his symptoms that occurred yesterday.           ASSESSMENT/PLAN:   1. Episode of vertigoThis episode was self-limited.  Precipitated by positional change and has not recurred.  He has a normal neurologic exam.  I explained that I thought this was most likely benign paroxysmal positional vertigo and explained the pathophysiology of that condition.  Given his unremarkable exam, I suggested we did not need to do any imaging of his brain at this time and he is comfortable with that approach.  I did refer him to Physical Therapy which he will do if he has recurrent episodes.  I also explained that if he has recurrent episodes or if he has prolonged episodes he can discuss with  his primary care doctor, Dr. Styles, further evaluation, but based on the single, self-limited episode, I would not undertake additional imaging at this time.  All questions were answered.             Patient Active Problem List   Diagnosis     Essential hypertension     Hyperlipidemia     Abdominal pain, unspecified abdominal location     Ascending aortic aneurysm (H)     Medication refill- do not delete      Pain medication agreement signed     S/P shoulder replacement     BPPV (benign paroxysmal positional vertigo)     Shoulder joint pain     Obstructive sleep apnea     Obesity     Rhinitis     Right knee pain     Status post coronary angiogram     Long-term (current) use of anticoagulants [Z79.01]     Atrial fibrillation (H) [I48.91]     Trigger finger of right thumb       Current Outpatient Prescriptions   Medication Sig Dispense Refill     HYDROcodone-acetaminophen (NORCO)  MG per tablet Take 1 tablet by mouth       Fe Heme Polypeptide-folic acid 12-1 MG TABS Take 1 tablet by mouth daily 90 tablet 3     Iron Heme Polypeptide (PROFERRIN ES) 12 MG TABS Take 12 mg/day by mouth daily 30 tablet 3     carvedilol (COREG) 25 MG tablet Take 1 tablet (25 mg) by mouth 2 times daily (with meals) 180 tablet 3     amLODIPine-benazepril (LOTREL) 10-40 MG per capsule Take 1 capsule by mouth daily 90 capsule 3     buPROPion (WELLBUTRIN SR) 200 MG 12 hr tablet Take 1 tablet (200 mg) by mouth 2 times daily 180 tablet 1     amoxicillin (AMOXIL) 500 MG capsule Take 4 tablets 1 hour before dental procedure 4 capsule 4     simvastatin (ZOCOR) 10 MG tablet Take 1 tablet (10 mg) by mouth At Bedtime 90 tablet 3     fentaNYL (DURAGESIC) 50 mcg/hr patch 72 hr Place 1 patch onto the skin every 72 hours       pantoprazole (PROTONIX) 40 MG enteric coated tablet Take 1 tablet (40 mg) by mouth every morning 30 tablet 3     order for DME Walker for cardiac rehab with 4 wheels, brakes and seat 1 Device 0     aspirin 81 MG chewable  tablet Take 1 tablet (81 mg) by mouth daily 120 tablet 0     fluocinonide (LIDEX) 0.05 % ointment Apply twice daily to itchy skin nodules for 1-2 weeks at a time. 30 g 3     senna-docusate (SENOKOT-S;PERICOLACE) 8.6-50 MG per tablet Take 1-2 tablets by mouth 2 times daily To prevent constipation while taking narcotic pain medication. Start with 1 tablet twice daily. If no bowel movement in 24 hours, increase to 2 tablets twice daily.  Discontinue if you have loose stools or when you are no longer taking narcotics. 100 tablet 0     neomycin-polymyxin-hydrocortisone (CORTISPORIN) otic solution Place 3 drops in ear(s) 4 times daily 10 mL 3     tacrolimus (PROTOPIC) 0.1 % ointment Apply topically as needed Apply to affected areas on body. 120 g 11     clonazePAM (KLONOPIN) 0.5 MG tablet Take 1 tablet (0.5 mg) by mouth nightly as needed for anxiety 6 tablet 0     DOCUSATE SODIUM PO Take 100 mg by mouth daily        NAPROXEN SODIUM PO Take 220 mg by mouth 2 times daily       ferrous sulfate 325 (65 FE) MG tablet Take 1 tablet (325 mg) by mouth daily (with breakfast) 30 tablet 3     mometasone (ELOCON) 0.1 % ointment Apply  topically daily. (Patient taking differently: Apply topically as needed ) 90 g 1     dexamethasone (DECADRON) 4 MG/ML injection Inject 1 mL (4 mg) as directed once for 1 dose Use 4 mg or dose determined by provider for iontophoresis. 1 mL 0         ROS:    Constitutional: no fevers, chill   Cardiovascular: no chest pain, palpitations  Respiratory: no dyspnea, cough, shortness of breath or wheezing   GI: no nausea, vomiting, diarrhea, no abdominal pain   Musculoskeletal: no edema       PHYSICAL EXAM:   /88  Pulse 67  Resp 16  Wt 113.4 kg (250 lb)  BMI 35.87 kg/m2   Wt Readings from Last 1 Encounters:   05/03/17 113.4 kg (250 lb)       Constitutional: no distress, comfortable, pleasant    HEENT; EOMI, PERRL; no nystagmus.  CN 2-12 intact bilaterally  DTRS 2+ throughout, strength 5/5 UE and  LE  Cardiovascular: regular rate and rhythm, normal S1 and S2, no murmurs, rubs or gallops. No carotid bruits heard  Respiratory: clear to auscultation, no wheezes or crackles, normal breath sounds   Musculoskeletal:  no edema       Supriya Segura MD

## 2017-05-03 NOTE — MR AVS SNAPSHOT
After Visit Summary   5/3/2017    Kobe Buchanan    MRN: [id number]           Patient Information     Date Of Birth          1960        Visit Information        Provider Department      5/3/2017 9:25 AM Supriya Segura MD [facility]        Today's Diagnoses     BPPV (benign paroxysmal positional vertigo), unspecified laterality    -  1      Care Instructions    Primary Care Center Medication Refill Request Information:  * Please contact your pharmacy regarding ANY request for medication refills.  ** [facility] Prescription Fax = [phone]  * Please allow 3 business days for routine medication refills.  * Please allow 5 business days for controlled substance medication refills.     Primary Care Center Test Result notification information:  *You will be notified with in 7-10 days of your appointment day regarding the results of your test.  If you are on MyChart you will be notified as soon as the provider has reviewed the results and signed off on them.    Primary Care Center [phone]           Follow-ups after your visit        Additional Services     PHYSICAL THERAPY REFERRAL       *This therapy referral will be filtered to a centralized scheduling office at [facility] and the patient will receive a call to schedule an appointment at a [place] location most convenient for them. *     [facility] provides Physical Therapy evaluation and treatment and many specialty services across the [place] system.  If requesting a specialty program, please choose from the list below.    If you have not heard from the scheduling office within 2 business days, please call [phone] for all locations, with the exception of [place], please call [phone].  Treatment: Evaluation & Treatment  Special Instructions/Modalities: None - episode of BPPV  Special Programs: Balance/Vestibular    Please be aware that coverage of these services is  subject to the terms and limitations of your health insurance plan.  Call member services at your health plan with any benefit or coverage questions.      **Note to Provider:  If you are referring outside of [place] for the therapy appointment, please list the name of the location in the  special instructions  above, print the referral and give to the patient to schedule the appointment.                  Your next 10 appointments already scheduled     May 23, 2017  7:35 AM CDT   (Arrive by 7:20 AM)   Return Visit with Michael Styles MD   [facility] ([facility])    [address] [place] [id number] [phone]            Jul 26, 2017  7:35 AM CDT   (Arrive by 7:20 AM)   Return Visit with Michael Styles MD   [facility] ([facility])    [address] [place] [id number] [phone]              Who to contact     Please call your clinic at [phone] to:    Ask questions about your health    Make or cancel appointments    Discuss your medicines    Learn about your test results    Speak to your doctor   If you have compliments or concerns about an experience at your clinic, or if you wish to file a complaint, please contact [facility] Patient Relations at [phone] or email us at [email or web address]         Additional Information About Your Visit        MyChart Information     [employer] gives you secure access to your electronic health record. If you see a primary care provider, you can also send messages to your care team and make appointments. If you have questions, please call your primary care clinic.  If you do not have a primary care provider, please call [phone] and they will assist you.      [employer] is an electronic gateway that provides easy, online access to your medical records. With [employer], you can request a clinic  appointment, read your test results, renew a prescription or communicate with your care team.     To access your existing account, please contact your [facility] or call [phone] for assistance.        Care EveryWhere ID     This is your Care EveryWhere ID. This could be used by other organizations to access your [place] medical records  [id number]        Your Vitals Were     Pulse Respirations BMI (Body Mass Index)             67 16 35.87 kg/m2          Blood Pressure from Last 3 Encounters:   05/03/17 138/88   04/26/17 123/82   03/31/17 148/85    Weight from Last 3 Encounters:   05/03/17 113.4 kg (250 lb)   04/26/17 114.1 kg (251 lb 8 oz)   03/31/17 116 kg (255 lb 12.8 oz)              We Performed the Following     PHYSICAL THERAPY REFERRAL          Today's Medication Changes          These changes are accurate as of: 5/3/17  9:26 AM.  If you have any questions, ask your nurse or doctor.               These medicines have changed or have updated prescriptions.        Dose/Directions    mometasone 0.1 % ointment   Commonly known as:  ELOCON   This may have changed:    - when to take this  - reasons to take this   Used for:  Eczema        Apply  topically daily.   Quantity:  90 g   Refills:  1                Primary Care Provider Office Phone # Fax #    Michael Styles [phone]       [address] [address]        Thank you!     Thank you for choosing [facility]  for your care. Our goal is always to provide you with excellent care. Hearing back from our patients is one way we can continue to improve our services. Please take a few minutes to complete the written survey that you may receive in the mail after your visit with us. Thank you!             Your Updated Medication List - Protect others around you: Learn how to safely use, store and throw away your medicines at www.disposemymeds.org.           This list is accurate as of: 5/3/17  9:26 AM.  Always use your most recent med list.                   Brand Name Dispense Instructions for use    amLODIPine-benazepril 10-40 MG per capsule    LOTREL    90 capsule    Take 1 capsule by mouth daily       amoxicillin 500 MG capsule    AMOXIL    4 capsule    Take 4 tablets 1 hour before dental procedure       aspirin 81 MG chewable tablet     120 tablet    Take 1 tablet (81 mg) by mouth daily       buPROPion 200 MG 12 hr tablet    WELLBUTRIN SR    180 tablet    Take 1 tablet (200 mg) by mouth 2 times daily       carvedilol 25 MG tablet    COREG    180 tablet    Take 1 tablet (25 mg) by mouth 2 times daily (with meals)       clonazePAM 0.5 MG tablet    klonoPIN    6 tablet    Take 1 tablet (0.5 mg) by mouth nightly as needed for anxiety       dexamethasone 4 MG/ML injection    DECADRON    1 mL    Inject 1 mL (4 mg) as directed once for 1 dose Use 4 mg or dose determined by provider for iontophoresis.       DOCUSATE SODIUM PO      Take 100 mg by mouth daily       Fe Heme Polypeptide-folic acid 12-1 MG Tabs     90 tablet    Take 1 tablet by mouth daily       fentaNYL 50 mcg/hr 72 hr patch    DURAGESIC     Place 1 patch onto the skin every 72 hours       ferrous sulfate 325 (65 FE) MG tablet    IRON    30 tablet    Take 1 tablet (325 mg) by mouth daily (with breakfast)       fluocinonide 0.05 % ointment    LIDEX    30 g    Apply twice daily to itchy skin nodules for 1-2 weeks at a time.       HYDROcodone-acetaminophen  MG per tablet    NORCO     Take 1 tablet by mouth       Iron Heme Polypeptide 12 MG Tabs    PROFERRIN ES    30 tablet    Take 12 mg/day by mouth daily       mometasone 0.1 % ointment    ELOCON    90 g    Apply  topically daily.       NAPROXEN SODIUM PO      Take 220 mg by mouth 2 times daily       neomycin-polymyxin-hydrocortisone otic solution    CORTISPORIN    10 mL    Place 3 drops in ear(s) 4 times daily       order for DME     1 Device    Walker  for cardiac rehab with 4 wheels, brakes and seat       pantoprazole 40 MG EC tablet    PROTONIX    30 tablet    Take 1 tablet (40 mg) by mouth every morning       senna-docusate 8.6-50 MG per tablet    SENOKOT-S;PERICOLACE    100 tablet    Take 1-2 tablets by mouth 2 times daily To prevent constipation while taking narcotic pain medication. Start with 1 tablet twice daily. If no bowel movement in 24 hours, increase to 2 tablets twice daily.  Discontinue if you have loose stools or when you are no longer taking narcotics.       simvastatin 10 MG tablet    ZOCOR    90 tablet    Take 1 tablet (10 mg) by mouth At Bedtime       tacrolimus 0.1 % ointment    PROTOPIC    120 g    Apply topically as needed Apply to affected areas on body.

## 2017-05-08 DIAGNOSIS — I10 ESSENTIAL HYPERTENSION: Primary | ICD-10-CM

## 2017-05-08 DIAGNOSIS — I71.21 ASCENDING AORTIC ANEURYSM (H): ICD-10-CM

## 2017-05-08 DIAGNOSIS — I10 ESSENTIAL HYPERTENSION WITH GOAL BLOOD PRESSURE LESS THAN 130/85: ICD-10-CM

## 2017-05-08 DIAGNOSIS — I48.91 ATRIAL FIBRILLATION, UNSPECIFIED TYPE (H): ICD-10-CM

## 2017-05-08 RX ORDER — AMLODIPINE AND BENAZEPRIL HYDROCHLORIDE 5; 20 MG/1; MG/1
1 CAPSULE ORAL
Qty: 180 CAPSULE | Refills: 3 | Status: SHIPPED | OUTPATIENT
Start: 2017-05-08 | End: 2018-04-02

## 2017-06-22 ENCOUNTER — HOSPITAL ENCOUNTER (OUTPATIENT)
Dept: PHYSICAL THERAPY | Facility: CLINIC | Age: 57
Setting detail: THERAPIES SERIES
End: 2017-06-22
Attending: INTERNAL MEDICINE
Payer: COMMERCIAL

## 2017-06-22 PROCEDURE — 40000840 ZZHC STATISTIC PT VESTIBULAR VISIT: Performed by: PHYSICAL THERAPIST

## 2017-06-22 PROCEDURE — 97161 PT EVAL LOW COMPLEX 20 MIN: CPT | Mod: GP | Performed by: PHYSICAL THERAPIST

## 2017-06-22 PROCEDURE — 95992 CANALITH REPOSITIONING PROC: CPT | Mod: GP | Performed by: PHYSICAL THERAPIST

## 2017-06-24 NOTE — PROGRESS NOTES
06/22/17 1030   Signing Clinician's Name / Credentials   Signing clinician's name / credentials Madeline Gee, DPT, NCS   Functional Gait Assessment (AMEYA Ahn, DEBORA Valle, et al. (2004))   1. GAIT LEVEL SURFACE 3  (4.93 secs)   2. CHANGE IN GAIT SPEED 3   3. GAIT WITH HORIZONTAL HEAD TURNS 3   4. GAIT WITH VERTICAL HEAD TURNS 2   5. GAIT AND PIVOT TURN 3   6. STEP OVER OBSTACLE 3   7. GAIT WITH NARROW BASE OF SUPPORT 3   8. GAIT WITH EYES CLOSED 3   9. AMBULATING BACKWARDS 3   10. STEPS 2   Total Functional Gait Assessment Score   TOTAL SCORE: (MAXIMUM SCORE 30) 28     Functional Gait Assessment (FGA): The FGA assesses postural stability during various walking tasks.   Gait assistive device used: none    Patient Score: 28/30  Scores of <22/30 have been correlated with predicting falls in community-dwelling older adults according to Jimy & Josh 2010.   Scores of <18/30 have been correlated with increased risk for falls in patients with Parkinsons Disease according to Obey Fu Zhou et al 2014.  Minimal Detectable Change for patients with acute/chronic stroke = 4.2 according to Heike & Margaretteschel 2009  Minimal Detectable Change for patients with vestibular disorder = 8 according to Jimy & Josh 2010    Assessment (rationale for performing, application to patient s function & care plan): assess falls risk, provoking activities.    Minutes billed as physical performance test: 0 - day of eval

## 2017-06-24 NOTE — PROGRESS NOTES
06/22/17 1000   Quick Adds   Type of Visit Initial OP PT Evaluation   General Information   Start of Care Date 06/22/17   Referring Physician Supriya Segura   Orders Evaluate and Treat as Indicated   Order Date 05/03/17   Medical Diagnosis BPPV unspecified laterality   Onset of illness/injury or Date of Surgery 05/03/17   Surgical/Medical history reviewed Yes   Pertinent history of current problem (include personal factors and/or comorbidities that impact the POC) Pt had 2 falls on ice over the winter - hit head each time with noticing neck cracking with impact. Didn't have spinning at all initially. Did have 2 episodes since then when bending over, and then sensation of being on a gyroscope for 3-4 mins afterwards despite head being still. No changes in hearing recently - has B mild tinittus. No migraines - has chronic cervicogenic headaches daily. No change in intensity since then. 10/2016 had ascending aortic aneurysm repaired - some lightheadedness after that, but none since. Pt notes he is working with cardiology re; BP, but still very elevated despite medications and regular exercise. H/o B TSA, chronic neck pain (pt notes surgeon wants to fuse him from C2-7 eventually). Complexity: uncontrolled HTN.   Patient role/Employment history Disabled   [facility]   Home/Community Accessibility Comments stairs ok, driving ok   Patient/Family Goals Statement I want to not feel off balance when walking dog.   General Information Comments Exercise: swimming 4x per week, walk dog daily 2-3miles, helps parents.   Fall Risk Screen   Fall screen completed by PT   Per patient - Fall 2 or more times in past year? Yes   Per patient - Fall with injury in past year? Yes   Is patient a fall risk? No   Fall screen comments both on ice, hit back of head with neck cracking   System Outcome Measures   AM-PAC  Basic Mobility Score Level  (Lower scores equate to lower levels of function) 69.23   AM-PAC  Daily  Activity Score Level  (Lower scores equate to lower levels of function) 62.67   AM-PAC  Applied Cognitive Score Level  (Lower scores equate to lower levels of function) 52.93   Dizziness Handicap Inventory (score out of 100) A decrease in score by 17.18 or greater indicates a clinically significant change in symptoms. 0   Was BPPV resolved at end of session? Yes   Pain   Patient currently in pain No   Pain comments neck and head - chronic   Vital Signs   Pulse 77   BP (!) 171/100   Cognitive Status Examination   Orientation orientation to person, place and time   Level of Consciousness alert   Follows Commands and Answers Questions 100% of the time;able to follow multistep instructions   Personal Safety and Judgment intact   Memory intact   Posture   Posture Forward head position   Posture Comments pt unable to retract to neutral due to chronic neck pain and degeneration   Strength   Manual Muscle Testing Quick Adds No deficits were identified   Transfer Skills   Transfer Comments able to safely and easily stand without UEs   Gait   Gait Comments No gross gait deviations, mildly slowed   Gait Special Tests   Gait Special Tests FUNCTIONAL GAIT ASSESSMENT   Gait Special Tests Functional Gait Assessment Score out of 30   Score out of 30 28   Comments see note   Balance Special Tests Modified CTSIB Conditions   Condition 1, seconds 30 Seconds   Condition 2, seconds 30 Seconds   Condition 4, seconds 30 Seconds   Condition 5, seconds 30 Seconds   Sensory Examination   Sensory Perception no deficits were identified   Cervicogenic Screen   Neck ROM Rotation L 45* with pain end range, R 60*, ext 50*.   Oculomotor Exam   Smooth Pursuit Normal   Saccades Normal   VOR Normal   VOR Cancellation Normal   Rapid Head Thrust Normal   Infrared Goggle Exam or Frenzel Lense Exam   Vestibular Suppressant in Last 24 Hours? No   Exam completed with Infrared Goggles   Spontaneous Nystagmus Negative   Gaze Evoked Nystagmus Negative   Head  Shake Horizontal Nystagmus Negative   [place]-Hallpike (right) Negative   [place]-Hallpike (Left) Upbeating L torsional  (18 secs)   [place]-Hallpike (left) comments no sx   HSCC Supine Roll Test (Right) Negative   HSCC Supine Roll Test (Left) Negative   BPPV Canal(s) L Posterior   BPPV Type Canalithasis   Planned Therapy Interventions   Planned Therapy Interventions Comment canalith repositioning maneuvers   Clinical Impression   Criteria for Skilled Therapeutic Interventions Met yes, treatment indicated   PT Diagnosis vertigo with bending over activities, intermittent   Influenced by the following impairments nystagmus with positional testing   Functional limitations due to impairments imbalance/vertigo with bending over   Clinical Presentation Stable/Uncomplicated   Clinical Presentation Rationale no complicating factors   Clinical Decision Making (Complexity) Low complexity   Therapy Frequency (max 5 visits)   Predicted Duration of Therapy Intervention (days/wks) 90 days   Risk & Benefits of therapy have been explained Yes   Patient, Family & other staff in agreement with plan of care Yes   Goal 1   Goal Identifier Nystagmus   Goal Description Resoluction of BPPV via positional testing with lack of nystagmus.   Target Date 09/19/17   Goal 2   Goal Identifier Bending over   Goal Description Pt report resolution of dizziness with bending over for at least 2 weeks.   Target Date 09/19/17   Total Evaluation Time   Total Evaluation Time (Minutes) 25

## 2017-07-06 ENCOUNTER — HOSPITAL ENCOUNTER (OUTPATIENT)
Dept: PHYSICAL THERAPY | Facility: CLINIC | Age: 57
Setting detail: THERAPIES SERIES
End: 2017-07-06
Attending: INTERNAL MEDICINE
Payer: COMMERCIAL

## 2017-07-06 PROCEDURE — 97530 THERAPEUTIC ACTIVITIES: CPT | Mod: GP | Performed by: PHYSICAL THERAPIST

## 2017-07-06 PROCEDURE — 97112 NEUROMUSCULAR REEDUCATION: CPT | Mod: GP | Performed by: PHYSICAL THERAPIST

## 2017-07-06 PROCEDURE — 40000840 ZZHC STATISTIC PT VESTIBULAR VISIT: Performed by: PHYSICAL THERAPIST

## 2017-07-20 DIAGNOSIS — F34.1 DYSTHYMIC DISORDER: ICD-10-CM

## 2017-07-21 ENCOUNTER — MYC REFILL (OUTPATIENT)
Dept: INTERNAL MEDICINE | Facility: CLINIC | Age: 57
End: 2017-07-21

## 2017-07-21 DIAGNOSIS — F34.1 DYSTHYMIC DISORDER: ICD-10-CM

## 2017-07-21 RX ORDER — BUPROPION HYDROCHLORIDE 200 MG/1
200 TABLET, EXTENDED RELEASE ORAL 2 TIMES DAILY
Qty: 180 TABLET | Refills: 1 | Status: CANCELLED | OUTPATIENT
Start: 2017-07-21

## 2017-07-21 RX ORDER — BUPROPION HYDROCHLORIDE 200 MG/1
TABLET, EXTENDED RELEASE ORAL
Qty: 180 TABLET | Refills: 0 | Status: SHIPPED | OUTPATIENT
Start: 2017-07-21 | End: 2017-09-22

## 2017-07-21 NOTE — TELEPHONE ENCOUNTER
Pt has office visit on 7/26. Last office visit with PCP on 4/26. PHQ-9 due. Will route to PCP.    Meryl Swanson RN

## 2017-07-26 ENCOUNTER — OFFICE VISIT (OUTPATIENT)
Dept: INTERNAL MEDICINE | Facility: CLINIC | Age: 57
End: 2017-07-26

## 2017-07-26 VITALS
RESPIRATION RATE: 16 BRPM | HEART RATE: 68 BPM | WEIGHT: 257.3 LBS | SYSTOLIC BLOOD PRESSURE: 138 MMHG | DIASTOLIC BLOOD PRESSURE: 88 MMHG | BODY MASS INDEX: 36.92 KG/M2

## 2017-07-26 DIAGNOSIS — R42 DIZZINESS: Primary | ICD-10-CM

## 2017-07-26 ASSESSMENT — PAIN SCALES - GENERAL: PAINLEVEL: MILD PAIN (2)

## 2017-07-26 NOTE — PATIENT INSTRUCTIONS
[facility] Medication Refill Request Information:  * Please contact your pharmacy regarding ANY request for medication refills.  ** [facility] Prescription Fax = [phone]  * Please allow 3 business days for routine medication refills.  * Please allow 5 business days for controlled substance medication refills.     [facility] Test Result notification information:  *You will be notified with in 7-10 days of your appointment day regarding the results of your test.  If you are on MyChart you will be notified as soon as the provider has reviewed the results and signed off on them.    [facility] [phone]

## 2017-07-26 NOTE — MR AVS SNAPSHOT
After Visit Summary   7/26/2017    Kobe Buchanan    MRN: [id number]           Patient Information     Date Of Birth          1960        Visit Information        Provider Department      7/26/2017 7:35 AM Michael Styles MD [facility]        Today's Diagnoses     Dizziness    -  1      Care Instructions    Primary Care Center Medication Refill Request Information:  * Please contact your pharmacy regarding ANY request for medication refills.  ** [facility] Prescription Fax = [phone]  * Please allow 3 business days for routine medication refills.  * Please allow 5 business days for controlled substance medication refills.     Primary Care Center Test Result notification information:  *You will be notified with in 7-10 days of your appointment day regarding the results of your test.  If you are on MyChart you will be notified as soon as the provider has reviewed the results and signed off on them.    [facility] [phone]             Follow-ups after your visit        Additional Services     NEUROLOGY ADULT REFERRAL       Dizziness                  Your next 10 appointments already scheduled     Jul 30, 2017  7:00 AM CDT   (Arrive by 6:45 AM)   MR BRAIN W/O CONTRAST with [id number]   [facility] MRI ([facility])    [address] [address] [phone]           Take your medicines as usual, unless your doctor tells you not to. Bring a list of your current medicines to your exam (including vitamins, minerals and over-the-counter drugs). Also bring the results of similar scans you may have had.  Please remove any body piercings and hair extensions before you arrive.  Follow your doctor s orders. If you do not, we may have to postpone your exam.  You will not have contrast for this exam. You do not need to do anything special to prepare.  The MRI machine uses a strong magnet. Please wear clothes  without metal (snaps, zippers). A sweatsuit works well, or we may give you a hospital gown.   **IMPORTANT** THE INSTRUCTIONS BELOW ARE ONLY FOR THOSE PATIENTS WHO HAVE BEEN TOLD THEY WILL RECEIVE SEDATION OR GENERAL ANESTHESIA DURING THEIR MRI PROCEDURE:  IF YOU WILL RECEIVE SEDATION (take medicine to help you relax during your exam):   You must get the medicine from your doctor before you arrive. Bring the medicine to the exam. Do not take it at home.   Arrive one hour early. Bring someone who can take you home after the test. Your medicine will make you sleepy. After the exam, you may not drive, take a bus or take a taxi by yourself.   No eating 8 hours before your exam. You may have clear liquids up until 4 hours before your exam. (Clear liquids include water, clear tea, black coffee and fruit juice without pulp.)  IF YOU WILL RECEIVE ANESTHESIA (be asleep for your exam):   Arrive 1 1/2 hours early. Bring someone who can take you home after the test. You may not drive, take a bus or take a taxi by yourself.   No eating 8 hours before your exam. You may have clear liquids up until 4 hours before your exam. (Clear liquids include water, clear tea, black coffee and fruit juice without pulp.)   You will spend four to five hours in the recovery room.  Please call the Imaging Department at your exam site with any questions.            Sep 15, 2017  7:10 AM CDT   (Arrive by 6:55 AM)   New Patient Visit with Shyam Luna MD   [facility] Neurology ([facility])    [address] [place] [address] [phone]              Future tests that were ordered for you today     Open Future Orders        Priority Expected Expires Ordered    MRI Brain w/o contrast Routine  7/26/2018 7/26/2017            Who to contact     Please call your clinic at [phone] to:    Ask questions about your health    Make or cancel appointments    Discuss your medicines    Learn about your  test results    Speak to your doctor   If you have compliments or concerns about an experience at your clinic, or if you wish to file a complaint, please contact [facility] Patient Relations at [phone] or email us at [email or web address]         Additional Information About Your Visit        [employer] Information     [employer] gives you secure access to your electronic health record. If you see a primary care provider, you can also send messages to your care team and make appointments. If you have questions, please call your primary care clinic.  If you do not have a primary care provider, please call [phone] and they will assist you.      [employer] is an electronic gateway that provides easy, online access to your medical records. With [employer], you can request a clinic appointment, read your test results, renew a prescription or communicate with your care team.     To access your existing account, please contact your [facility] or call [phone] for assistance.        Care EveryWhere ID     This is your Care EveryWhere ID. This could be used by other organizations to access your [place] medical records  [id number]        Your Vitals Were     Pulse Respirations BMI (Body Mass Index)             68 16 36.92 kg/m2          Blood Pressure from Last 3 Encounters:   07/26/17 138/88   05/03/17 138/88   04/26/17 123/82    Weight from Last 3 Encounters:   07/26/17 116.7 kg (257 lb 4.8 oz)   05/03/17 113.4 kg (250 lb)   04/26/17 114.1 kg (251 lb 8 oz)              We Performed the Following     NEUROLOGY ADULT REFERRAL          Today's Medication Changes          These changes are accurate as of: 7/26/17  7:48 AM.  If you have any questions, ask your nurse or doctor.               These medicines have changed or have updated prescriptions.        Dose/Directions    mometasone 0.1 % ointment   Commonly known as:  ELOCON   This may have changed:     - when to take this  - reasons to take this   Used for:  Eczema        Apply  topically daily.   Quantity:  90 g   Refills:  1         Stop taking these medicines if you haven't already. Please contact your care team if you have questions.     dexamethasone 4 MG/ML injection   Commonly known as:  DECADRON   Stopped by:  Michael Styles MD                    Primary Care Provider Office Phone # Fax #    Michael Styles [phone]       [address] [address]        Equal Access to Services     [facility]: Hadii aad ku hadasho Soomaali, waaxda luqadaha, qaybta kaalmada adeegyada, waxay idiin hayaan adeopal mcclellanarashaista huffman . So [place] [phone].    ATENCIÓN: Si habla español, tiene a bee disposición servicios gratuitos de asistencia lingüística. LlSt. Elizabeth Hospital [phone].    We comply with applicable federal civil rights laws and [place] laws. We do not discriminate on the basis of race, color, national origin, age, disability sex, sexual orientation or gender identity.            Thank you!     Thank you for choosing [facility]  for your care. Our goal is always to provide you with excellent care. Hearing back from our patients is one way we can continue to improve our services. Please take a few minutes to complete the written survey that you may receive in the mail after your visit with us. Thank you!             Your Updated Medication List - Protect others around you: Learn how to safely use, store and throw away your medicines at www.disposemymeds.org.          This list is accurate as of: 7/26/17  7:48 AM.  Always use your most recent med list.                   Brand Name Dispense Instructions for use Diagnosis    amLODIPine-benazepril 5-20 MG per capsule    LOTREL    180 capsule    Take 1 capsule by mouth 2 times daily    Essential hypertension with goal blood pressure less than 130/85, Essential hypertension, Ascending aortic  aneurysm (H), Atrial fibrillation, unspecified type (H)       amoxicillin 500 MG capsule    AMOXIL    4 capsule    Take 4 tablets 1 hour before dental procedure    Dental anomaly       aspirin 81 MG chewable tablet     120 tablet    Take 1 tablet (81 mg) by mouth daily    S/P ascending aortic aneurysm repair       buPROPion 200 MG 12 hr tablet    WELLBUTRIN SR    180 tablet    TAKE 1 TABLET BY MOUTH 2 TIMES DAILY    Dysthymic disorder       carvedilol 25 MG tablet    COREG    180 tablet    Take 1 tablet (25 mg) by mouth 2 times daily (with meals)    Essential hypertension with goal blood pressure less than 130/85       clonazePAM 0.5 MG tablet    klonoPIN    6 tablet    Take 1 tablet (0.5 mg) by mouth nightly as needed for anxiety    Stress       DOCUSATE SODIUM PO      Take 100 mg by mouth daily        Fe Heme Polypeptide-folic acid 12-1 MG Tabs     90 tablet    Take 1 tablet by mouth daily    Low iron       fentaNYL 50 mcg/hr 72 hr patch    DURAGESIC     Place 1 patch onto the skin every 72 hours        ferrous sulfate 325 (65 FE) MG tablet    IRON    30 tablet    Take 1 tablet (325 mg) by mouth daily (with breakfast)        fluocinonide 0.05 % ointment    LIDEX    30 g    Apply twice daily to itchy skin nodules for 1-2 weeks at a time.    Prurigo nodularis       HYDROcodone-acetaminophen  MG per tablet    NORCO     Take 1 tablet by mouth    Iron deficiency anemia due to chronic blood loss, Benign nodular prostatic hyperplasia without lower urinary tract symptoms       Iron Heme Polypeptide 12 MG Tabs    PROFERRIN ES    30 tablet    Take 12 mg/day by mouth daily    Iron deficiency anemia due to chronic blood loss       mometasone 0.1 % ointment    ELOCON    90 g    Apply  topically daily.    Eczema       NAPROXEN SODIUM PO      Take 220 mg by mouth 2 times daily        neomycin-polymyxin-hydrocortisone otic solution    CORTISPORIN    10 mL    Place 3 drops in ear(s) 4 times daily    History of impacted ear  wax       order for DME     1 Device    Walker for cardiac rehab with 4 wheels, brakes and seat    SOB (shortness of breath)       pantoprazole 40 MG EC tablet    PROTONIX    30 tablet    Take 1 tablet (40 mg) by mouth every morning    Acute post-operative pain       senna-docusate 8.6-50 MG per tablet    SENOKOT-S;PERICOLACE    100 tablet    Take 1-2 tablets by mouth 2 times daily To prevent constipation while taking narcotic pain medication. Start with 1 tablet twice daily. If no bowel movement in 24 hours, increase to 2 tablets twice daily.  Discontinue if you have loose stools or when you are no longer taking narcotics.    Bladder neck contracture       simvastatin 10 MG tablet    ZOCOR    90 tablet    Take 1 tablet (10 mg) by mouth At Bedtime    Hypercholesteremia       tacrolimus 0.1 % ointment    PROTOPIC    120 g    Apply topically as needed Apply to affected areas on body.    Other eczema, Prurigo nodularis

## 2017-07-26 NOTE — PROGRESS NOTES
"HPI:    Pt. Comes in for follow up. He had 3/31/17  R thumb surgery. He had \"trigger-like\" sxs. For several months. He had aortic graft and valve repair with Dr. Townsend 10/16. He had post-op afib and is now off coumadin and amiodarone.   He had  R  shoulder arthroplasty with Dr. Aceves; follow up 10/8/14, 11/19/14 and was last seen 8/31/15.   He does not smoke, no EtOH abuse. He has no known lung disease. He denies any bleeding or anesthesia issues.  He states he can climb stairs w/o functional limitations. He has occasional stress taking care of his elderly parents. He o/w denies additional HEENT, cardiopulmonary, abdominal, , neurological, systemic complaints.Patient  underwent a TURP procedure on 1/23/14 by Dr. Taveras. He was seen by Dr. Taveras 10/28/14. He was seen again by Dr. Leslie 7/8/16 for bladder neck contracture. He had colonoscopy 6/30/14; no adenomatous polyps.  He was seen by Dr. Koehler Hematology 2/16 for low Ferritin.         PMH:    1. Gastris by pass surgery 2005  2. Cervical neck surgery at [facility] 2007  3. Hand surgery; L middle finger cyst 6/11  4. Chronic neck pain on narcotics  5. HTN  6. Depression  7. SAVITA  8. Possible old h/o elevated glucose last treated with Metformin 2004.  9. Left Total Shoulder Arthroplasty 4/15/14.  10. Bicuspid aortic valve and enlarged aorta; see Cardiology note from 8/19/14 currently stable.       PE:    Vitals noted gen nad cooperative, alert, HEENT, ears normal oropharynx clear no exudate, neck supple nl rom no adenopathy, no B carotid bruits,  LCTA, B, normal resp. System exam, RRR, S1, S2,   murmur not present today,  abdomen, nt, nd no masses, normal GI system exam, obese, Neurological exam B UE/LE/proximal/distal is symmetric and unremarkable for strength, reflexes and sensation.        A/P:    1. HTN; he remains on the same medications; labs checked 3/1/17 as normal. He had normal 24 hr BP monitor 2/2/17  2. RTHC; as above for immunization  and " colonoscopy; PSA  3/1/17 stable.  3. Neck and back pain see notes from Dr. Puente 11/19/14.  4. He has followed up with jarod Gibson shoulder 8/31/15.  5. Ascending aortic repair with Dr. Townsend done  10/4/16; he was seen 10/25/16.  He was seen by   1/27/17; he is off amiodarone and coumadin.  6. Pain management; see detailed 8/7/14 and 12/5/14 notes from  Dr. Alford.  He is currently followed at outside Pain Clinic and he is getting all his pain medications there.  7. See Sleep Medicine note from 12/16/14.  8. He was seen by jarod Mcclain 11/15 for R knee pain.   9. He was seen by Dr. Leslie, Urology for bladder neck contracture 7/8/16.  10. Elevated liver tests rechecked 11/2/16 as normal.   He states treatment with interferon for Hep C many years ago. He is antibody positive and Viral load negative for Hep C.   11 .Rechecked TSH  3/1/17 normal  for past abnormal labs (related to Amiodarone).  12.  Rechecked CBC, iron and ferritin stable and placed Hematology referral back to Dr. Koehler on 3/1/17  13. Dizziness; MRI/MRA brain and refer to Neurology    I will see him back in one month              Total time spent 25 minutes.  More than 50% of the time spent with Mr. Henriquez on counseling / coordinating his care

## 2017-09-04 ASSESSMENT — ENCOUNTER SYMPTOMS
DIFFICULTY URINATING: 1
NUMBNESS: 0
TINGLING: 1
JOINT SWELLING: 0
BACK PAIN: 1
SEIZURES: 0
LOSS OF CONSCIOUSNESS: 0
DISTURBANCES IN COORDINATION: 0
HEADACHES: 1
SPEECH CHANGE: 0
MUSCLE CRAMPS: 0
ARTHRALGIAS: 1
TREMORS: 0
PARALYSIS: 0
WEAKNESS: 0
MEMORY LOSS: 0
MYALGIAS: 1
STIFFNESS: 1
NECK PAIN: 1
DIZZINESS: 1
MUSCLE WEAKNESS: 0

## 2017-09-06 ENCOUNTER — PRE VISIT (OUTPATIENT)
Dept: NEUROLOGY | Facility: CLINIC | Age: 57
End: 2017-09-06

## 2017-09-06 NOTE — TELEPHONE ENCOUNTER
1.  Date/reason for appt:9/15/17, Dizziness     2.  Referring provider: DEANNA FLYNN    3.  Call to patient (Yes / No - short description):No, referred     4.  Previous care at / records requested from:   [facility]

## 2017-09-15 ENCOUNTER — OFFICE VISIT (OUTPATIENT)
Dept: NEUROLOGY | Facility: CLINIC | Age: 57
End: 2017-09-15

## 2017-09-15 VITALS
HEIGHT: 70 IN | SYSTOLIC BLOOD PRESSURE: 146 MMHG | BODY MASS INDEX: 35.79 KG/M2 | HEART RATE: 67 BPM | DIASTOLIC BLOOD PRESSURE: 82 MMHG | WEIGHT: 250 LBS

## 2017-09-15 DIAGNOSIS — H81.10 BPPV (BENIGN PAROXYSMAL POSITIONAL VERTIGO), UNSPECIFIED LATERALITY: Primary | ICD-10-CM

## 2017-09-15 PROBLEM — R42 DIZZINESS: Status: ACTIVE | Noted: 2017-09-15

## 2017-09-15 ASSESSMENT — PAIN SCALES - GENERAL: PAINLEVEL: NO PAIN (1)

## 2017-09-15 NOTE — MR AVS SNAPSHOT
After Visit Summary   9/15/2017    Kobe Buchanan    MRN: [id number]           Patient Information     Date Of Birth          1960        Visit Information        Provider Department      9/15/2017 7:10 AM Shyam Luna MD [facility] Neurology        Today's Diagnoses     BPPV (benign paroxysmal positional vertigo), unspecified laterality    -  1       Follow-ups after your visit        Follow-up notes from your care team     Return if symptoms worsen or fail to improve.      Your next 10 appointments already scheduled     Oct 02, 2017  7:05 AM CDT   (Arrive by 6:50 AM)   Return Visit with Michael Styles MD   [facility] ([facility])    [address] [facility]  [address] [facility] [address] [phone]              Who to contact     Please call your clinic at [phone] to:    Ask questions about your health    Make or cancel appointments    Discuss your medicines    Learn about your test results    Speak to your doctor   If you have compliments or concerns about an experience at your clinic, or if you wish to file a complaint, please contact [facility] Patient Relations at [phone] or email us at [email or web address]         Additional Information About Your Visit        MyChart Information     [employer] gives you secure access to your electronic health record. If you see a primary care provider, you can also send messages to your care team and make appointments. If you have questions, please call your primary care clinic.  If you do not have a primary care provider, please call [phone] and they will assist you.      [employer] is an electronic gateway that provides easy, online access to your medical records. With [employer], you can request a clinic appointment, read your test results, renew a prescription or communicate with your care team.     To access your existing account, please  "contact your [facility] or call [phone] for assistance.        Care EveryWhere ID     This is your Care EveryWhere ID. This could be used by other organizations to access your [place] medical records  [id number]        Your Vitals Were     Pulse Height BMI (Body Mass Index)             67 1.778 m (5' 10\") 35.87 kg/m2          Blood Pressure from Last 3 Encounters:   09/15/17 146/82   07/26/17 138/88   05/03/17 138/88    Weight from Last 3 Encounters:   09/15/17 113.4 kg (250 lb)   07/26/17 116.7 kg (257 lb 4.8 oz)   05/03/17 113.4 kg (250 lb)              Today, you had the following     No orders found for display         Today's Medication Changes          These changes are accurate as of: 9/15/17 11:59 PM.  If you have any questions, ask your nurse or doctor.               These medicines have changed or have updated prescriptions.        Dose/Directions    mometasone 0.1 % ointment   Commonly known as:  ELOCON   This may have changed:    - when to take this  - reasons to take this   Used for:  Eczema        Apply  topically daily.   Quantity:  90 g   Refills:  1                Primary Care Provider Office Phone # Fax #    Michael Styles [phone]       [address] [address]        Equal Access to Services     JOHN FALK AH: Carmen Zaragoza, wasari barton, qaybta robertaljoshua otoole. So [facility] [phone].    ATENCIÓN: Si habla español, tiene a bee disposición servicios gratuitos de asistencia lingüística. Llame al [phone].    We comply with applicable federal civil rights laws and [place] laws. We do not discriminate on the basis of race, color, national origin, age, disability sex, sexual orientation or gender identity.            Thank you!     Thank you for choosing [facility] NEUROLOGY  for your care. Our goal is always to provide you with excellent care. Hearing " back from our patients is one way we can continue to improve our services. Please take a few minutes to complete the written survey that you may receive in the mail after your visit with us. Thank you!             Your Updated Medication List - Protect others around you: Learn how to safely use, store and throw away your medicines at www.disposemymeds.org.          This list is accurate as of: 9/15/17 11:59 PM.  Always use your most recent med list.                   Brand Name Dispense Instructions for use Diagnosis    amLODIPine-benazepril 5-20 MG per capsule    LOTREL    180 capsule    Take 1 capsule by mouth 2 times daily    Essential hypertension with goal blood pressure less than 130/85, Essential hypertension, Ascending aortic aneurysm (H), Atrial fibrillation, unspecified type (H)       amoxicillin 500 MG capsule    AMOXIL    4 capsule    Take 4 tablets 1 hour before dental procedure    Dental anomaly       aspirin 81 MG chewable tablet     120 tablet    Take 1 tablet (81 mg) by mouth daily    S/P ascending aortic aneurysm repair       buPROPion 200 MG 12 hr tablet    WELLBUTRIN SR    180 tablet    TAKE 1 TABLET BY MOUTH 2 TIMES DAILY    Dysthymic disorder       carvedilol 25 MG tablet    COREG    180 tablet    Take 1 tablet (25 mg) by mouth 2 times daily (with meals)    Essential hypertension with goal blood pressure less than 130/85       clonazePAM 0.5 MG tablet    klonoPIN    6 tablet    Take 1 tablet (0.5 mg) by mouth nightly as needed for anxiety    Stress       DOCUSATE SODIUM PO      Take 100 mg by mouth daily        Fe Heme Polypeptide-folic acid 12-1 MG Tabs     90 tablet    Take 1 tablet by mouth daily    Low iron       fentaNYL 50 mcg/hr 72 hr patch    DURAGESIC     Place 1 patch onto the skin every 72 hours        ferrous sulfate 325 (65 FE) MG tablet    IRON    30 tablet    Take 1 tablet (325 mg) by mouth daily (with breakfast)        fluocinonide 0.05 % ointment    LIDEX    30 g    Apply twice  daily to itchy skin nodules for 1-2 weeks at a time.    Prurigo nodularis       HYDROcodone-acetaminophen  MG per tablet    NORCO     Take 1 tablet by mouth    Iron deficiency anemia due to chronic blood loss, Benign nodular prostatic hyperplasia without lower urinary tract symptoms       Iron Heme Polypeptide 12 MG Tabs    PROFERRIN ES    30 tablet    Take 12 mg/day by mouth daily    Iron deficiency anemia due to chronic blood loss       mometasone 0.1 % ointment    ELOCON    90 g    Apply  topically daily.    Eczema       NAPROXEN SODIUM PO      Take 220 mg by mouth 2 times daily        neomycin-polymyxin-hydrocortisone otic solution    CORTISPORIN    10 mL    Place 3 drops in ear(s) 4 times daily    History of impacted ear wax       order for DME     1 Device    Walker for cardiac rehab with 4 wheels, brakes and seat    SOB (shortness of breath)       pantoprazole 40 MG EC tablet    PROTONIX    30 tablet    Take 1 tablet (40 mg) by mouth every morning    Acute post-operative pain       senna-docusate 8.6-50 MG per tablet    SENOKOT-S;PERICOLACE    100 tablet    Take 1-2 tablets by mouth 2 times daily To prevent constipation while taking narcotic pain medication. Start with 1 tablet twice daily. If no bowel movement in 24 hours, increase to 2 tablets twice daily.  Discontinue if you have loose stools or when you are no longer taking narcotics.    Bladder neck contracture       simvastatin 10 MG tablet    ZOCOR    90 tablet    Take 1 tablet (10 mg) by mouth At Bedtime    Hypercholesteremia       tacrolimus 0.1 % ointment    PROTOPIC    120 g    Apply topically as needed Apply to affected areas on body.    Other eczema, Prurigo nodularis

## 2017-09-15 NOTE — LETTER
"9/15/2017       RE: Kobe Buchanan  [address]  [place] [address]     Dear Colleague,    Thank you for referring your patient, Kobe Buchanan, to the [facility] NEUROLOGY at [facility]. Please see a copy of my visit note below.    REASON FOR VISIT:   This patient is a 57-year-old right-handed man seen at the request of Dr. Styles for issues of dizziness.        HISTORY OF PRESENT ILLNESS:   The patient reports that 20 years ago he was diagnosed with benign positional vertigo.  If he would turn his head while driving he would get a vertigo type sensation.  He was diagnosed with benign positional vertigo and successfully treated.  In 10/2016, he had to have surgery for an ascending aortic aneurysm.  After that if he would bend over he would get a sensation of dysequilibrium that was \"more of a gyroscope\" or a lightheadedness.  This was somewhat different from his typical feeling of benign positional vertigo.  Then, about 4-5 months ago he had an episode that lasted about 3 minutes of this type of gyroscope feeling.  He went to physical therapy.  He had successful treatment with the Epley maneuver.  However, 2 weeks later [place] dog park and again had an episode of benign positional vertigo.  Since that time he has not had any symptoms.  However, in the last 6 weeks or so he has noticed that if he sits at the computer he will get a dizzy sensation.  It is not the \"gyroscope\" feeling, but more of a lightheadedness, similar to what he experienced after the aortic aneurysm surgery.  He has had a total of 3 such episodes of lightheadedness.  They each last about 30 seconds.  They pass spontaneously.  He has chronic headaches from his neck problems.  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  He is putting up with the symptoms for now.  The headaches " are more on the left than on the right.      PAST MEDICAL HISTORY:   Significant for gastric bypass surgery many years ago.  He also had the aortic aneurysm surgery as noted above.  He has a history of sleep apnea but that has improved once he lost weight.  He has high blood pressure, but no diabetes, thyroid or asthma.  He does not drink or smoke.      CURRENT MEDICATIONS:  He is on a complex regimen of medications.  This includes:   1.  Clonazepam.     2.  Bupropion.     3.  Fentanyl.     4.  Hydrocodone.     5.  He has orders for 81 mg aspirin daily, but he says he does not take it because he takes naproxen.  I did advise him that he should be taking the aspirin for prevention of thrombotic events.      SOCIAL HISTORY:  He is unmarried without children.  He is an , but is on disability.      FAMILY HISTORY:  Noncontributory.      PHYSICAL EXAMINATION:   GENERAL:  The patient is cooperative and in no distress.   VITAL SIGNS:  His blood pressure is 160/80 in the sitting position and 145/80 in the standing position.     There are no carotid bruits.  Auscultation of the heart shows S1, S2, without murmur, rub or gallop.  Chest is clear to auscultation.     NEUROLOGIC:  The patient is alert, oriented and lucid.  Cranial nerve testing shows full visual fields to confrontation.  Funduscopic exam shows sharp discs bilaterally.  Visual acuity is 20/20 bilaterally.  Eye movements are complete and conjugate without nystagmus.  Pupils react to light.  Facies are symmetric.  Tongue protrudes in the midline.  Head impulse test is negative.  There is no skew deviation.  Hallpike maneuver is negative.  Hearing is intact bilaterally.  Motor evaluation shows no pronator drift, normal finger tapping, finger-nose-finger and heel-knee-shin.  He has good strength in the arms, hands and legs.  Muscle stretch reflexes are absent in the arms and reduced at the knees and ankles.  Toes are downgoing.  Sensory exam shows  preserved vibration and temperature.  Romberg sign is absent.  He can walk on his heels, toes and tandem.      He did have an MRI scan of the brain performed along with an MR angiogram of the head and neck.  These studies were done in July.  I have reviewed with him.  They are unremarkable.  In addition, he had lab work performed.  A vitamin B12 level is 520 and TSH is normal.      ASSESSMENT:   1.  Recurring episodes of lightheadedness.   2.  History of benign positional vertigo.   3.  Complex medical history and medication regimen.      DISCUSSION:  The patient is seen with the above problem list.  The reason for his referral today has to do with the episodes of lightheadedness that occur while he is sitting at the computer.  His exam on this point is normal.  Etiology of these symptoms is not entirely clear to me.  It could relate to his complex regimen of medication.  I reassured the patient on his normal exam and negative workup to date.  I would not pursue additional investigations at this time.  He should be seen in followup on an as needed basis.  He is in agreement with the plan.      Shyam Luna MD      cc:   Michael Styles MD   [address] [address]                 D: 09/15/2017 07:59   T: 09/15/2017 09:33   MT: CONNOR      Name:     RAZA SHEPPARD   MRN:      [id number]        Account:      [id number]   :      1960           Service Date: 09/15/2017      Document: [id number]

## 2017-09-15 NOTE — PROGRESS NOTES
"REASON FOR VISIT:   This patient is a 57-year-old right-handed man seen at the request of Dr. Styles for issues of dizziness.        HISTORY OF PRESENT ILLNESS:   The patient reports that 20 years ago he was diagnosed with benign positional vertigo.  If he would turn his head while driving he would get a vertigo type sensation.  He was diagnosed with benign positional vertigo and successfully treated.  In 10/2016, he had to have surgery for an ascending aortic aneurysm.  After that if he would bend over he would get a sensation of dysequilibrium that was \"more of a gyroscope\" or a lightheadedness.  This was somewhat different from his typical feeling of benign positional vertigo.  Then, about 4-5 months ago he had an episode that lasted about 3 minutes of this type of gyroscope feeling.  He went to physical therapy.  He had successful treatment with the Epley maneuver.  However, 2 weeks later [place] and again had an episode of benign positional vertigo.  Since that time he has not had any symptoms.  However, in the last 6 weeks or so he has noticed that if he sits at the computer he will get a dizzy sensation.  It is not the \"gyroscope\" feeling, but more of a lightheadedness, similar to what he experienced after the aortic aneurysm surgery.  He has had a total of 3 such episodes of lightheadedness.  They each last about 30 seconds.  They pass spontaneously.  He has chronic headaches from his neck problems.  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  He is putting up with the symptoms for now.  The headaches are more on the left than on the right.      PAST MEDICAL HISTORY:   Significant for gastric bypass surgery many years ago.  He also had the aortic aneurysm surgery as noted above.  He has a history of sleep apnea but that has improved once he lost weight.  He has high blood pressure, but no diabetes, " thyroid or asthma.  He does not drink or smoke.      CURRENT MEDICATIONS:  He is on a complex regimen of medications.  This includes:   1.  Clonazepam.     2.  Bupropion.     3.  Fentanyl.     4.  Hydrocodone.     5.  He has orders for 81 mg aspirin daily, but he says he does not take it because he takes naproxen.  I did advise him that he should be taking the aspirin for prevention of thrombotic events.      SOCIAL HISTORY:  He is unmarried without children.  He is an , but is on disability.      FAMILY HISTORY:  Noncontributory.      PHYSICAL EXAMINATION:   GENERAL:  The patient is cooperative and in no distress.   VITAL SIGNS:  His blood pressure is 160/80 in the sitting position and 145/80 in the standing position.     There are no carotid bruits.  Auscultation of the heart shows S1, S2, without murmur, rub or gallop.  Chest is clear to auscultation.     NEUROLOGIC:  The patient is alert, oriented and lucid.  Cranial nerve testing shows full visual fields to confrontation.  Funduscopic exam shows sharp discs bilaterally.  Visual acuity is 20/20 bilaterally.  Eye movements are complete and conjugate without nystagmus.  Pupils react to light.  Facies are symmetric.  Tongue protrudes in the midline.  Head impulse test is negative.  There is no skew deviation.  Hallpike maneuver is negative.  Hearing is intact bilaterally.  Motor evaluation shows no pronator drift, normal finger tapping, finger-nose-finger and heel-knee-shin.  He has good strength in the arms, hands and legs.  Muscle stretch reflexes are absent in the arms and reduced at the knees and ankles.  Toes are downgoing.  Sensory exam shows preserved vibration and temperature.  Romberg sign is absent.  He can walk on his heels, toes and tandem.      He did have an MRI scan of the brain performed along with an MR angiogram of the head and neck.  These studies were done in July.  I have reviewed with him.  They are unremarkable.  In  addition, he had lab work performed.  A vitamin B12 level is 520 and TSH is normal.      ASSESSMENT:   1.  Recurring episodes of lightheadedness.   2.  History of benign positional vertigo.   3.  Complex medical history and medication regimen.      DISCUSSION:  The patient is seen with the above problem list.  The reason for his referral today has to do with the episodes of lightheadedness that occur while he is sitting at the computer.  His exam on this point is normal.  Etiology of these symptoms is not entirely clear to me.  It could relate to his complex regimen of medication.  I reassured the patient on his normal exam and negative workup to date.  I would not pursue additional investigations at this time.  He should be seen in followup on an as needed basis.  He is in agreement with the plan.      Melody Luna MD      cc:   Michael Styles MD   [address] [address]         MELODY LUNA MD             D: 09/15/2017 07:59   T: 09/15/2017 09:33   MT: CONNOR      Name:     RAZA SHEPPARD   MRN:      [id number]        Account:      [id number]   :      1960           Service Date: 09/15/2017      Document: [id number]

## 2017-09-15 NOTE — NURSING NOTE
Chief Complaint   Patient presents with     Consult     [facility] NEW - DIZZINESS     Galina Hastings MA

## 2017-09-20 NOTE — BRIEF OP NOTE
Brief Orthopaedic Op Note    Date of Service: 03/31/2017    Attending Surgeon: Juan Carlos Blunt MD  Resident Surgeons: Maciel RAY  Assistants: BLAS Pa    Pre-Op Diagnosis: Right Trigger Thumb Release  Post-Op Diagnosis: same  Procedures: Procedure(s):  RELEASE TRIGGER FINGER right thumb    EBL: 2cc  Anesthesia: Local anesthesia only  Blood Transfusion:  none administered  Fluids: (See anesthesia record)      Complications:  None  Condition: Stable to PACU  Comments: See Full Dictated Operative Report for Full Details        Post-Operative Care:   -  DC home when meets criteria  - Ice and elevate  - Pain control (patient has own meds)  - Dressing change POD#3  - F/u 1 week     Demi St MD  Orthopaedic Surgery Resident R4  [facility] [phone]               none

## 2017-09-22 DIAGNOSIS — F34.1 DYSTHYMIC DISORDER: ICD-10-CM

## 2017-09-22 RX ORDER — BUPROPION HYDROCHLORIDE 200 MG/1
TABLET, EXTENDED RELEASE ORAL
Qty: 180 TABLET | Refills: 2 | Status: SHIPPED | OUTPATIENT
Start: 2017-09-22 | End: 2017-12-20

## 2017-09-22 NOTE — TELEPHONE ENCOUNTER
wellbutrin  Last Written Prescription Date:  7/21/17  Last Fill Quantity: 180,   # refills: 0  Last Office Visit : 7/26/17  Future Office visit:  10/2/17

## 2017-10-02 ENCOUNTER — OFFICE VISIT (OUTPATIENT)
Dept: INTERNAL MEDICINE | Facility: CLINIC | Age: 57
End: 2017-10-02

## 2017-10-02 VITALS
SYSTOLIC BLOOD PRESSURE: 109 MMHG | RESPIRATION RATE: 12 BRPM | HEART RATE: 52 BPM | BODY MASS INDEX: 35.14 KG/M2 | DIASTOLIC BLOOD PRESSURE: 76 MMHG | WEIGHT: 244.9 LBS

## 2017-10-02 DIAGNOSIS — Z23 NEED FOR PROPHYLACTIC VACCINATION AND INOCULATION AGAINST INFLUENZA: Primary | ICD-10-CM

## 2017-10-02 DIAGNOSIS — D50.0 IRON DEFICIENCY ANEMIA DUE TO CHRONIC BLOOD LOSS: ICD-10-CM

## 2017-10-02 DIAGNOSIS — K00.9 DENTAL ANOMALY: ICD-10-CM

## 2017-10-02 RX ORDER — AMOXICILLIN 500 MG/1
CAPSULE ORAL
Qty: 4 CAPSULE | Refills: 4 | Status: SHIPPED | OUTPATIENT
Start: 2017-10-02 | End: 2018-01-10

## 2017-10-02 ASSESSMENT — PAIN SCALES - GENERAL: PAINLEVEL: NO PAIN (0)

## 2017-10-02 NOTE — NURSING NOTE
"Chief Complaint   Patient presents with     Recheck Medication     3 month check     LUMA OSPINA at 6:55 AM on 10/2/2017.    Injectable Influenza Immunization Documentation    1.  Has the patient received the information for the injectable influenza vaccine? YES     2. Is the patient 6 months of age or older? YES     3. Does the patient have any of the following contraindications?         Severe allergy to eggs? No     Severe allergic reaction to previous influenza vaccines? No   Severe allergy to latex? No       History of Guillain-Sandwich syndrome? No     Currently have a temperature greater than 100.4F? No        4.  Severely egg allergic patients should have flu vaccine eligibility assessed by an MD, RN, or pharmacist, and those who received flu vaccine should be observed for 15 min by an MD, RN, Pharmacist, Medical Technician, or member of clinic staff.\": YES    5. Latex-allergic patients should be given latex-free influenza vaccine No. Please reference the Vaccine latex table to determine if your clinic s product is latex-containing.       Vaccination given by LUMA OSPINA at 7:44 AM on 10/2/2017.            "

## 2017-10-02 NOTE — PROGRESS NOTES
"HPI:    Pt. Comes in for follow up. He had 3/31/17  R thumb surgery. He had \"trigger-like\" sxs. For several months. He had aortic graft and valve repair with Dr. Townsend 10/16. He had post-op afib and is now off coumadin and amiodarone.   He had  R  shoulder arthroplasty with Dr. Aceves; follow up 10/8/14, 11/19/14 and was last seen 8/31/15.   He does not smoke, no EtOH abuse. He has no known lung disease. He denies any bleeding or anesthesia issues.  He states he can climb stairs w/o functional limitations. He has occasional stress taking care of his elderly parents. He o/w denies additional HEENT, cardiopulmonary, abdominal, , neurological, systemic complaints.Patient  underwent a TURP procedure on 1/23/14 by Dr. Taveras. He was seen by Dr. Taveras 10/28/14. He was seen again by Dr. Leslie 7/8/16 for bladder neck contracture. He had colonoscopy 6/30/14; no adenomatous polyps.  He was seen by Dr. Koehler Hematology 2/16 for low Ferritin.         PMH:    1. Gastris by pass surgery 2005  2. Cervical neck surgery at [facility] 2007  3. Hand surgery; L middle finger cyst 6/11  4. Chronic neck pain on narcotics  5. HTN  6. Depression  7. SAVITA  8. Possible old h/o elevated glucose last treated with Metformin 2004.  9. Left Total Shoulder Arthroplasty 4/15/14.  10. Bicuspid aortic valve and enlarged aorta; see Cardiology note from 8/19/14 currently stable.       PE:    Vitals noted gen nad cooperative, alert, HEENT, ears normal oropharynx clear no exudate, neck supple nl rom no adenopathy, no B carotid bruits,  LCTA, B, normal resp. System exam, RRR, S1, S2,   murmur not present today,  abdomen, nt, nd no masses, normal GI system exam, obese, Neurological exam B UE/LE/proximal/distal is symmetric and unremarkable for strength, reflexes and sensation.        A/P:    1. HTN; he remains on the same medications; labs checked 3/1/17 as normal. He had normal 24 hr BP monitor 2/2/17  2. RTHC; as above for immunization  and " colonoscopy; PSA  3/1/17 stable. Flu shot today.   3. Neck and back pain see notes from Dr. Puente 11/19/14.  4. He has followed up with jarod Gibson shoulder 8/31/15.  5. Ascending aortic repair with Dr. Townsend done  10/4/16; he was seen 10/25/16.  He was seen by   1/27/17; he is off amiodarone and coumadin.  6. Pain management; see detailed 8/7/14 and 12/5/14 notes from  Dr. Alford.  He is currently followed at outside Pain Clinic and he is getting all his pain medications there.  7. See Sleep Medicine note from 12/16/14.  8. He was seen by jarod Mcclain 11/15 for R knee pain.   9. He was seen by Dr. Leslie, Urology for bladder neck contracture 7/8/16.  10. Elevated liver tests rechecked 11/2/16 as normal.   He states treatment with interferon for Hep C many years ago. He is antibody positive and Viral load negative for Hep C.   11 .Rechecked TSH  3/1/17 normal  for past abnormal labs (related to Amiodarone).  12.  Rechecked CBC, iron and ferritin stable and placed Hematology referral back to Dr. Koehler on 3/1/17. Ordered labs today 10/3/17  13. Dizziness; MRI/MRA brain done 7/17 and he saw Dr. Luna, Neurology    I will see him back in one month              Total time spent 25 minutes.  More than 50% of the time spent with Mr. Henriquez on counseling / coordinating his care

## 2017-10-02 NOTE — PATIENT INSTRUCTIONS
[facility]: [phone]     [facility] Medication Refill Request Information:  * Please contact your pharmacy regarding ANY request for medication refills.  ** [facility] Prescription Fax = [phone]  * Please allow 3 business days for routine medication refills.  * Please allow 5 business days for controlled substance medication refills.     [facility] Test Result notification information:  *You will be notified with in 7-10 days of your appointment day regarding the results of your test.  If you are on MyChart you will be notified as soon as the provider has reviewed the results and signed off on them.

## 2017-10-02 NOTE — MR AVS SNAPSHOT
After Visit Summary   10/2/2017    Kobe Buchanan    MRN: [id number]           Patient Information     Date Of Birth          1960        Visit Information        Provider Department      10/2/2017 7:05 AM Michael Styles MD [facility]        Today's Diagnoses     Need for prophylactic vaccination and inoculation against influenza    -  1    Iron deficiency anemia due to chronic blood loss          Care Instructions    Primary Care Center: [phone]     Primary Care Center Medication Refill Request Information:  * Please contact your pharmacy regarding ANY request for medication refills.  ** [facility] Prescription Fax = [phone]  * Please allow 3 business days for routine medication refills.  * Please allow 5 business days for controlled substance medication refills.     Primary Care Center Test Result notification information:  *You will be notified with in 7-10 days of your appointment day regarding the results of your test.  If you are on MyChart you will be notified as soon as the provider has reviewed the results and signed off on them.            Follow-ups after your visit        Future tests that were ordered for you today     Open Future Orders        Priority Expected Expires Ordered    CBC with platelets differential Routine 10/2/2017 10/16/2017 10/2/2017    Ferritin Routine 10/2/2017 10/2/2018 10/2/2017    Iron and iron binding capacity Routine 10/2/2017 10/2/2018 10/2/2017    Vitamin B12 Routine 10/2/2017 10/2/2018 10/2/2017    Folate Routine 10/2/2017 10/2/2018 10/2/2017    Comprehensive metabolic panel Routine 10/2/2017 10/2/2018 10/2/2017            Who to contact     Please call your clinic at [phone] to:    Ask questions about your health    Make or cancel appointments    Discuss your medicines    Learn about your test results    Speak to your doctor   If you have compliments or concerns about an experience at your clinic, or if you wish to file a  complaint, please contact [facility] Patient Relations at [phone] or email us at [email or web address]         Additional Information About Your Visit        Technimotionhart Information     Preclick gives you secure access to your electronic health record. If you see a primary care provider, you can also send messages to your care team and make appointments. If you have questions, please call your primary care clinic.  If you do not have a primary care provider, please call [phone] and they will assist you.      Preclick is an electronic gateway that provides easy, online access to your medical records. With Preclick, you can request a clinic appointment, read your test results, renew a prescription or communicate with your care team.     To access your existing account, please contact your [facility] or call [phone] for assistance.        Care EveryWhere ID     This is your Care EveryWhere ID. This could be used by other organizations to access your [facility] medical records  [id number]        Your Vitals Were     Pulse Respirations BMI (Body Mass Index)             52 12 35.14 kg/m2          Blood Pressure from Last 3 Encounters:   10/02/17 109/76   09/15/17 146/82   07/26/17 138/88    Weight from Last 3 Encounters:   10/02/17 111.1 kg (244 lb 14.4 oz)   09/15/17 113.4 kg (250 lb)   07/26/17 116.7 kg (257 lb 4.8 oz)              We Performed the Following     FLU VACCINE, 3 YRS +, IM  [[id number]]          Today's Medication Changes          These changes are accurate as of: 10/2/17  7:35 AM.  If you have any questions, ask your nurse or doctor.               These medicines have changed or have updated prescriptions.        Dose/Directions    mometasone 0.1 % ointment   Commonly known as:  ELOCON   This may have changed:    - when to take this  - reasons to take this   Used for:  Eczema        Apply  topically daily.   Quantity:  90 g   Refills:   1                Primary Care Provider Office Phone # Fax #    Michael Styles [phone] [address] [address]        Equal Access to Services     JOHN FALK : Hadii aad ku hadsteveo Soomaali, waaxda luqadaha, qaybta kaalmada adeegyada, joshua alvesephraim lainez. So [facility] [phone].    ATENCIÓN: Si habla español, tiene a bee disposición servicios gratuitos de asistencia lingüística. Llame al [phone].    We comply with applicable federal civil rights laws and [place] laws. We do not discriminate on the basis of race, color, national origin, age, disability, sex, sexual orientation, or gender identity.            Thank you!     Thank you for choosing [facility]  for your care. Our goal is always to provide you with excellent care. Hearing back from our patients is one way we can continue to improve our services. Please take a few minutes to complete the written survey that you may receive in the mail after your visit with us. Thank you!             Your Updated Medication List - Protect others around you: Learn how to safely use, store and throw away your medicines at www.disposemymeds.org.          This list is accurate as of: 10/2/17  7:35 AM.  Always use your most recent med list.                   Brand Name Dispense Instructions for use Diagnosis    amLODIPine-benazepril 5-20 MG per capsule    LOTREL    180 capsule    Take 1 capsule by mouth 2 times daily    Essential hypertension with goal blood pressure less than 130/85, Essential hypertension, Ascending aortic aneurysm (H), Atrial fibrillation, unspecified type (H)       amoxicillin 500 MG capsule    AMOXIL    4 capsule    Take 4 tablets 1 hour before dental procedure    Dental anomaly       aspirin 81 MG chewable tablet     120 tablet    Take 1 tablet (81 mg) by mouth daily    S/P ascending aortic aneurysm repair       buPROPion 200 MG 12 hr tablet    WELLBUTRIN SR     180 tablet    TAKE 1 TABLET BY MOUTH 2 TIMES DAILY    Dysthymic disorder       carvedilol 25 MG tablet    COREG    180 tablet    Take 1 tablet (25 mg) by mouth 2 times daily (with meals)    Essential hypertension with goal blood pressure less than 130/85       clonazePAM 0.5 MG tablet    klonoPIN    6 tablet    Take 1 tablet (0.5 mg) by mouth nightly as needed for anxiety    Stress       DOCUSATE SODIUM PO      Take 100 mg by mouth daily        Fe Heme Polypeptide-folic acid 12-1 MG Tabs     90 tablet    Take 1 tablet by mouth daily    Low iron       fentaNYL 50 mcg/hr 72 hr patch    DURAGESIC     Place 1 patch onto the skin every 72 hours        ferrous sulfate 325 (65 FE) MG tablet    IRON    30 tablet    Take 1 tablet (325 mg) by mouth daily (with breakfast)        fluocinonide 0.05 % ointment    LIDEX    30 g    Apply twice daily to itchy skin nodules for 1-2 weeks at a time.    Prurigo nodularis       HYDROcodone-acetaminophen  MG per tablet    NORCO     Take 1 tablet by mouth    Iron deficiency anemia due to chronic blood loss, Benign nodular prostatic hyperplasia without lower urinary tract symptoms       Iron Heme Polypeptide 12 MG Tabs    PROFERRIN ES    30 tablet    Take 12 mg/day by mouth daily    Iron deficiency anemia due to chronic blood loss       mometasone 0.1 % ointment    ELOCON    90 g    Apply  topically daily.    Eczema       NAPROXEN SODIUM PO      Take 220 mg by mouth 2 times daily        neomycin-polymyxin-hydrocortisone otic solution    CORTISPORIN    10 mL    Place 3 drops in ear(s) 4 times daily    History of impacted ear wax       order for DME     1 Device    Walker for cardiac rehab with 4 wheels, brakes and seat    SOB (shortness of breath)       pantoprazole 40 MG EC tablet    PROTONIX    30 tablet    Take 1 tablet (40 mg) by mouth every morning    Acute post-operative pain       senna-docusate 8.6-50 MG per tablet    SENOKOT-S;PERICOLACE    100 tablet    Take 1-2 tablets by  mouth 2 times daily To prevent constipation while taking narcotic pain medication. Start with 1 tablet twice daily. If no bowel movement in 24 hours, increase to 2 tablets twice daily.  Discontinue if you have loose stools or when you are no longer taking narcotics.    Bladder neck contracture       simvastatin 10 MG tablet    ZOCOR    90 tablet    Take 1 tablet (10 mg) by mouth At Bedtime    Hypercholesteremia       tacrolimus 0.1 % ointment    PROTOPIC    120 g    Apply topically as needed Apply to affected areas on body.    Other eczema, Prurigo nodularis

## 2017-10-04 ENCOUNTER — TELEPHONE (OUTPATIENT)
Dept: INTERNAL MEDICINE | Facility: CLINIC | Age: 57
End: 2017-10-04

## 2017-10-04 NOTE — TELEPHONE ENCOUNTER
----- Message from Rajat Shanks sent at 10/3/2017  3:27 PM CDT -----  Regarding: Question on cardiology referral   Contact: [phone]  He states his father and him saw Dr. Styles yesterday about seeing a cardiologist. They all see the same cardiologist.     He would like to get in with the new cardiologist that Dr. Styles suggested. Wants to speak with you about this and seeing if he can get a referral for him and his dad.     Referral to Dr Martínez.

## 2017-12-19 NOTE — ADDENDUM NOTE
Encounter addended by: Malia Gee, PT on: 12/19/2017  1:39 PM<BR>     Actions taken: Episode resolved, Pend clinical note, Sign clinical note

## 2017-12-19 NOTE — PROGRESS NOTES
"Outpatient Physical Therapy Discharge Note     Patient: Kobe Buchanan  : 1960    Beginning/End Dates of Reporting Period:  17 to 2017    Referring Provider: Supriya Segura    Therapy Diagnosis: vertigo with bending over activities, intermittent     Client Self Report: I have had 3 instances while sitting and reading, or doing computer work when I have felt \"off.\" Not spinning, vision stable, but not right. I was sitting each time, head not moving.    Outcome Measures (most recent score):  Dizziness Handicap Inventory (score out of 100) A decrease in score by 17.18 or greater indicates a clinically significant change in symptoms.: 0       Goals:  Goal Identifier Nystagmus   Goal Description Resoluction of BPPV via positional testing with lack of nystagmus.   Target Date 17   Date Met  17   Progress:     Goal Identifier Bending over   Goal Description Pt report resolution of dizziness with bending over for at least 2 weeks.   Target Date 17   Date Met      Progress: At last attended session, still had some sx with bending over. Did not hear from pt again after last attended session. Assumed met as pt did not return.       Progress Toward Goals:   Progress this reporting period: no further dizziness.      Plan:  Discharge from therapy.    Discharge:    Reason for Discharge: Patient has met all goals.    Equipment Issued: none    Discharge Plan: Other services: return to PT as needed.  "

## 2017-12-20 DIAGNOSIS — E78.00 HYPERCHOLESTEREMIA: ICD-10-CM

## 2017-12-20 DIAGNOSIS — F34.1 DYSTHYMIC DISORDER: ICD-10-CM

## 2017-12-20 RX ORDER — SIMVASTATIN 10 MG
10 TABLET ORAL AT BEDTIME
Qty: 90 TABLET | Refills: 3 | Status: SHIPPED | OUTPATIENT
Start: 2017-12-20 | End: 2018-04-02

## 2017-12-20 RX ORDER — BUPROPION HYDROCHLORIDE 200 MG/1
TABLET, EXTENDED RELEASE ORAL
Qty: 180 TABLET | Refills: 2 | Status: SHIPPED | OUTPATIENT
Start: 2017-12-20 | End: 2018-04-02

## 2018-01-10 ENCOUNTER — OFFICE VISIT (OUTPATIENT)
Dept: INTERNAL MEDICINE | Facility: CLINIC | Age: 58
End: 2018-01-10
Payer: COMMERCIAL

## 2018-01-10 ENCOUNTER — TELEPHONE (OUTPATIENT)
Dept: INTERNAL MEDICINE | Facility: CLINIC | Age: 58
End: 2018-01-10

## 2018-01-10 VITALS
DIASTOLIC BLOOD PRESSURE: 73 MMHG | WEIGHT: 238 LBS | RESPIRATION RATE: 16 BRPM | SYSTOLIC BLOOD PRESSURE: 121 MMHG | BODY MASS INDEX: 34.15 KG/M2 | HEART RATE: 67 BPM

## 2018-01-10 DIAGNOSIS — K00.9 DENTAL ANOMALY: ICD-10-CM

## 2018-01-10 DIAGNOSIS — Z23 NEED FOR PROPHYLACTIC VACCINATION AND INOCULATION AGAINST INFLUENZA: ICD-10-CM

## 2018-01-10 DIAGNOSIS — R94.6 ABNORMAL FINDING ON THYROID FUNCTION TEST: ICD-10-CM

## 2018-01-10 DIAGNOSIS — E78.00 HYPERCHOLESTEREMIA: ICD-10-CM

## 2018-01-10 DIAGNOSIS — D50.0 IRON DEFICIENCY ANEMIA DUE TO CHRONIC BLOOD LOSS: ICD-10-CM

## 2018-01-10 LAB
ALBUMIN SERPL-MCNC: 4.4 G/DL (ref 3.4–5)
ALP SERPL-CCNC: 90 U/L (ref 40–150)
ALT SERPL W P-5'-P-CCNC: 23 U/L (ref 0–70)
ANION GAP SERPL CALCULATED.3IONS-SCNC: 8 MMOL/L (ref 3–14)
AST SERPL W P-5'-P-CCNC: 20 U/L (ref 0–45)
BASOPHILS # BLD AUTO: 0.1 10E9/L (ref 0–0.2)
BASOPHILS NFR BLD AUTO: 0.7 %
BILIRUB SERPL-MCNC: 1 MG/DL (ref 0.2–1.3)
BUN SERPL-MCNC: 21 MG/DL (ref 7–30)
CALCIUM SERPL-MCNC: 8.9 MG/DL (ref 8.5–10.1)
CHLORIDE SERPL-SCNC: 102 MMOL/L (ref 94–109)
CO2 SERPL-SCNC: 28 MMOL/L (ref 20–32)
CREAT SERPL-MCNC: 0.98 MG/DL (ref 0.66–1.25)
DIFFERENTIAL METHOD BLD: NORMAL
EOSINOPHIL # BLD AUTO: 0.2 10E9/L (ref 0–0.7)
EOSINOPHIL NFR BLD AUTO: 3 %
ERYTHROCYTE [DISTWIDTH] IN BLOOD BY AUTOMATED COUNT: 12.2 % (ref 10–15)
FERRITIN SERPL-MCNC: 48 NG/ML (ref 26–388)
FOLATE SERPL-MCNC: 26 NG/ML
GFR SERPL CREATININE-BSD FRML MDRD: 79 ML/MIN/1.7M2
GLUCOSE SERPL-MCNC: 106 MG/DL (ref 70–99)
HCT VFR BLD AUTO: 45.3 % (ref 40–53)
HGB BLD-MCNC: 15.5 G/DL (ref 13.3–17.7)
IMM GRANULOCYTES # BLD: 0 10E9/L (ref 0–0.4)
IMM GRANULOCYTES NFR BLD: 0.3 %
IRON SATN MFR SERPL: 26 % (ref 15–46)
IRON SERPL-MCNC: 75 UG/DL (ref 35–180)
LYMPHOCYTES # BLD AUTO: 1.6 10E9/L (ref 0.8–5.3)
LYMPHOCYTES NFR BLD AUTO: 23.3 %
MCH RBC QN AUTO: 29.9 PG (ref 26.5–33)
MCHC RBC AUTO-ENTMCNC: 34.2 G/DL (ref 31.5–36.5)
MCV RBC AUTO: 88 FL (ref 78–100)
MONOCYTES # BLD AUTO: 0.5 10E9/L (ref 0–1.3)
MONOCYTES NFR BLD AUTO: 7.7 %
NEUTROPHILS # BLD AUTO: 4.4 10E9/L (ref 1.6–8.3)
NEUTROPHILS NFR BLD AUTO: 65 %
NRBC # BLD AUTO: 0 10*3/UL
NRBC BLD AUTO-RTO: 0 /100
PLATELET # BLD AUTO: 250 10E9/L (ref 150–450)
POTASSIUM SERPL-SCNC: 4.2 MMOL/L (ref 3.4–5.3)
PROT SERPL-MCNC: 7.6 G/DL (ref 6.8–8.8)
RBC # BLD AUTO: 5.18 10E12/L (ref 4.4–5.9)
SODIUM SERPL-SCNC: 136 MMOL/L (ref 133–144)
TIBC SERPL-MCNC: 293 UG/DL (ref 240–430)
TSH SERPL DL<=0.005 MIU/L-ACNC: 2.96 MU/L (ref 0.4–4)
VIT B12 SERPL-MCNC: 567 PG/ML (ref 193–986)
WBC # BLD AUTO: 6.8 10E9/L (ref 4–11)

## 2018-01-10 RX ORDER — AMOXICILLIN 500 MG/1
CAPSULE ORAL
Qty: 24 CAPSULE | Refills: 4 | Status: SHIPPED | OUTPATIENT
Start: 2018-01-10 | End: 2018-10-23

## 2018-01-10 RX ORDER — AMOXICILLIN 500 MG/1
CAPSULE ORAL
Qty: 24 CAPSULE | Refills: 4 | Status: SHIPPED | OUTPATIENT
Start: 2018-01-10 | End: 2018-01-10

## 2018-01-10 RX ORDER — SIMVASTATIN 10 MG
10 TABLET ORAL AT BEDTIME
Qty: 90 TABLET | Refills: 3 | Status: CANCELLED | OUTPATIENT
Start: 2018-01-10

## 2018-01-10 ASSESSMENT — PAIN SCALES - GENERAL: PAINLEVEL: NO PAIN (0)

## 2018-01-10 NOTE — PATIENT INSTRUCTIONS
[facility] Medication Refill Request Information:  * Please contact your pharmacy regarding ANY request for medication refills.  ** [facility] Fax = [phone]  * Please allow 3 business days for routine medication refills.  * Please allow 5 business days for controlled substance medication refills.     [facility] Test Result notification information:  *You will be notified with in 7-10 days of your appointment day regarding the results of your test.  If you are on MyChart you will be notified as soon as the provider has reviewed the results and signed off on them.    [facility] [phone]

## 2018-01-10 NOTE — MR AVS SNAPSHOT
After Visit Summary   1/10/2018    Kobe Buchanan    MRN: [id number]           Patient Information     Date Of Birth          1960        Visit Information        Provider Department      1/10/2018 7:05 AM Michael Styles MD [facility]        Today's Diagnoses     Dental anomaly        Hypercholesteremia          Care Instructions    Primary Care Center Medication Refill Request Information:  * Please contact your pharmacy regarding ANY request for medication refills.  ** [facility] Prescription Fax = [phone]  * Please allow 3 business days for routine medication refills.  * Please allow 5 business days for controlled substance medication refills.     Primary Care Center Test Result notification information:  *You will be notified with in 7-10 days of your appointment day regarding the results of your test.  If you are on MyChart you will be notified as soon as the provider has reviewed the results and signed off on them.    [facility] [phone]             Follow-ups after your visit        Your next 10 appointments already scheduled     Mar 12, 2018  8:00 AM CDT   Ech Complete with [id number]   [facility] ([facility])    [address] [place], [place] [address] [phone]           1. Please bring or wear a comfortable two-piece outfit. 2. You may eat, drink and take your normal medicines. 3. For any questions that cannot be answered, please contact the ordering physician            Apr 02, 2018 12:30 PM CDT   (Arrive by 12:15 PM)   Return Visit with Eufemia Martínez MD   [facility] ([facility])    [address] [place] [address] [phone]            Apr 02, 2018  1:35 PM CDT   (Arrive by 1:20 PM)   Return Visit with Michael Styles MD   [facility] ([facility])    [address] [place] [address]  [facility] [address] [phone]              Who to contact     Please call your clinic at [phone] to:    Ask questions about your health    Make or cancel appointments    Discuss your medicines    Learn about your test results    Speak to your doctor   If you have compliments or concerns about an experience at your clinic, or if you wish to file a complaint, please contact [facility] Patient Relations at [phone] or email us at [email or web address]         Additional Information About Your Visit        Calerahart Information     Hotlist gives you secure access to your electronic health record. If you see a primary care provider, you can also send messages to your care team and make appointments. If you have questions, please call your primary care clinic.  If you do not have a primary care provider, please call [phone] and they will assist you.      Hotlist is an electronic gateway that provides easy, online access to your medical records. With Hotlist, you can request a clinic appointment, read your test results, renew a prescription or communicate with your care team.     To access your existing account, please contact your [facility] or call [phone] for assistance.        Care EveryWhere ID     This is your Care EveryWhere ID. This could be used by other organizations to access your [facility] medical records  [id number]        Your Vitals Were     Pulse Respirations BMI (Body Mass Index)             67 16 34.15 kg/m2          Blood Pressure from Last 3 Encounters:   01/10/18 121/73   10/02/17 109/76   09/15/17 146/82    Weight from Last 3 Encounters:   01/10/18 108 kg (238 lb)   10/02/17 111.1 kg (244 lb 14.4 oz)   09/15/17 113.4 kg (250 lb)              Today, you had the following     No orders found for display         Today's Medication Changes          These changes are accurate as of: 1/10/18  4:44 PM.  If  you have any questions, ask your nurse or doctor.               Start taking these medicines.        Dose/Directions    amoxicillin 500 MG capsule   Commonly known as:  AMOXIL   Used for:  Dental anomaly   Started by:  Michael Styles MD        Take 4 tablets 1 hour before dental procedure   Quantity:  24 capsule   Refills:  4         These medicines have changed or have updated prescriptions.        Dose/Directions    mometasone 0.1 % ointment   Commonly known as:  ELOCON   This may have changed:    - when to take this  - reasons to take this   Used for:  Eczema        Apply  topically daily.   Quantity:  90 g   Refills:  1            Where to get your medicines      These medications were sent to [id number] - [place], [place] - [address]  [address] [facility], [place] [address]     Phone:  [phone]     amoxicillin 500 MG capsule                Primary Care Provider Office Phone # Fax #    Michael Styles [phone]       [address] [address]        Equal Access to Services     [facility] AH: Hadii polly ku hadasho Soomaali, waaxda luqadaha, qaybta kaalmada adeegyada, waxay idiin hayaan jatin huffman . So [facility] [phone].    ATENCIÓN: Si habla español, tiene a bee disposición servicios gratuitos de asistencia lingüística. Llame al [phone].    We comply with applicable federal civil rights laws and [place] laws. We do not discriminate on the basis of race, color, national origin, age, disability, sex, sexual orientation, or gender identity.            Thank you!     Thank you for choosing [facility]  for your care. Our goal is always to provide you with excellent care. Hearing back from our patients is one way we can continue to improve our services. Please take a few minutes to complete the written survey that you may receive in the mail after your visit with us. Thank you!             Your Updated Medication List -  Protect others around you: Learn how to safely use, store and throw away your medicines at www.disposemymeds.org.          This list is accurate as of: 1/10/18  4:44 PM.  Always use your most recent med list.                   Brand Name Dispense Instructions for use Diagnosis    amLODIPine-benazepril 5-20 MG per capsule    LOTREL    180 capsule    Take 1 capsule by mouth 2 times daily    Essential hypertension with goal blood pressure less than 130/85, Essential hypertension, Ascending aortic aneurysm (H), Atrial fibrillation, unspecified type (H)       amoxicillin 500 MG capsule    AMOXIL    24 capsule    Take 4 tablets 1 hour before dental procedure    Dental anomaly       aspirin 81 MG chewable tablet     120 tablet    Take 1 tablet (81 mg) by mouth daily    S/P ascending aortic aneurysm repair       buPROPion 200 MG 12 hr tablet    WELLBUTRIN SR    180 tablet    TAKE 1 TABLET BY MOUTH 2 TIMES DAILY    Dysthymic disorder       carvedilol 25 MG tablet    COREG    180 tablet    Take 1 tablet (25 mg) by mouth 2 times daily (with meals)    Essential hypertension with goal blood pressure less than 130/85       clonazePAM 0.5 MG tablet    klonoPIN    6 tablet    Take 1 tablet (0.5 mg) by mouth nightly as needed for anxiety    Stress       DOCUSATE SODIUM PO      Take 100 mg by mouth daily        Fe Heme Polypeptide-folic acid 12-1 MG Tabs     90 tablet    Take 1 tablet by mouth daily    Low iron       fentaNYL 50 mcg/hr 72 hr patch    DURAGESIC     Place 1 patch onto the skin every 72 hours        ferrous sulfate 325 (65 FE) MG tablet    IRON    30 tablet    Take 1 tablet (325 mg) by mouth daily (with breakfast)        fluocinonide 0.05 % ointment    LIDEX    30 g    Apply twice daily to itchy skin nodules for 1-2 weeks at a time.    Prurigo nodularis       HYDROcodone-acetaminophen  MG per tablet    NORCO     Take 1 tablet by mouth    Iron deficiency anemia due to chronic blood loss, Benign nodular prostatic  hyperplasia without lower urinary tract symptoms       Iron Heme Polypeptide 12 MG Tabs    PROFERRIN ES    30 tablet    Take 12 mg/day by mouth daily    Iron deficiency anemia due to chronic blood loss       mometasone 0.1 % ointment    ELOCON    90 g    Apply  topically daily.    Eczema       NAPROXEN SODIUM PO      Take 220 mg by mouth 2 times daily        neomycin-polymyxin-hydrocortisone otic solution    CORTISPORIN    10 mL    Place 3 drops in ear(s) 4 times daily    History of impacted ear wax       order for DME     1 Device    Walker for cardiac rehab with 4 wheels, brakes and seat    SOB (shortness of breath)       pantoprazole 40 MG EC tablet    PROTONIX    30 tablet    Take 1 tablet (40 mg) by mouth every morning    Acute post-operative pain       senna-docusate 8.6-50 MG per tablet    SENOKOT-S;PERICOLACE    100 tablet    Take 1-2 tablets by mouth 2 times daily To prevent constipation while taking narcotic pain medication. Start with 1 tablet twice daily. If no bowel movement in 24 hours, increase to 2 tablets twice daily.  Discontinue if you have loose stools or when you are no longer taking narcotics.    Bladder neck contracture       simvastatin 10 MG tablet    ZOCOR    90 tablet    Take 1 tablet (10 mg) by mouth At Bedtime    Hypercholesteremia       tacrolimus 0.1 % ointment    PROTOPIC    120 g    Apply topically as needed Apply to affected areas on body.    Other eczema, Prurigo nodularis

## 2018-01-10 NOTE — NURSING NOTE
Chief Complaint   Patient presents with     Refill Request     Patient is here for medication refills and follow up     Tayo Fu CMA ([facility]) at 7:02 AM on 1/10/2018

## 2018-01-10 NOTE — TELEPHONE ENCOUNTER
Pt was seen today in clinic.     Wants to talk to Dr. Styles about some of his issues that he brought up today in clinic. Did not want to provide further details.     He states he need some advice, and would like Dr. Styles to call him in the evening after work if possible.

## 2018-01-10 NOTE — PROGRESS NOTES
HPI:    Pt. Comes in for follow up. He had 3/31/17  R thumb surgery.  He had aortic graft and valve repair with Dr. Townsend 10/16. He had post-op afib and is now off coumadin and amiodarone.   He had  R  shoulder arthroplasty with Dr. Aceves; follow up 10/8/14, 11/19/14 and was last seen 8/31/15.   He does not smoke, no EtOH abuse. He has no known lung disease. He denies any bleeding or anesthesia issues.  He states he can climb stairs w/o functional limitations. He has occasional stress taking care of his elderly parents. He o/w denies additional HEENT, cardiopulmonary, abdominal, , neurological, systemic complaints.Patient  underwent a TURP procedure on 1/23/14 by Dr. Taveras. He was seen by Dr. Taveras 10/28/14. He was seen again by Dr. Leslie 7/8/16 for bladder neck contracture. He had colonoscopy 6/30/14; no adenomatous polyps.  He was seen by Dr. Koehler Hematology 2/16 for low Ferritin.         PMH:    1. Gastris by pass surgery 2005  2. Cervical neck surgery at [facility] 2007  3. Hand surgery; L middle finger cyst 6/11  4. Chronic neck pain on narcotics  5. HTN  6. Depression  7. SAVITA  8. Possible old h/o elevated glucose last treated with Metformin 2004.  9. Left Total Shoulder Arthroplasty 4/15/14.  10. Bicuspid aortic valve and enlarged aorta; see Cardiology note from 8/19/14 currently stable.       PE:    Vitals noted gen nad cooperative, alert, HEENT, ears normal oropharynx clear no exudate, neck supple nl rom no adenopathy, no B carotid bruits,  LCTA, B, normal resp. System exam, RRR, S1, S2,   murmur not present today,  abdomen, nt, nd no masses, normal GI system exam, obese, Neurological exam B UE/LE/proximal/distal is symmetric and unremarkable for strength, reflexes and sensation.        A/P:    1. HTN; he remains on the same medications; labs checked 3/1/17 as normal. He had normal 24 hr BP monitor 2/2/17  2. RTHC; as above for immunization  and colonoscopy; PSA  3/1/17 stable. Flu shot 10/17  3. Neck  and back pain see notes from Dr. Puente 11/19/14.  4. He has followed up with Dr. Aceves, ortho shoulder 8/31/15.  5. Ascending aortic repair with Dr. Townsend done  10/4/16; he was seen 10/25/16.  He was seen by   1/27/17; he is off amiodarone and coumadin.   He will follow up with Dr. Martínez, Cardiology 4/2018  6. Pain management; see detailed 8/7/14 and 12/5/14 notes from  Dr. Alford.    7. See Sleep Medicine note from 12/16/14.  8. He was seen by Dr. Renae ortho 11/15 for R knee pain.   9. He was seen by Dr. Leslie, Urology for bladder neck contracture 7/8/16.  10. Elevated liver tests rechecked 11/2/16 as normal.   He states treatment with interferon for Hep C many years ago. He is antibody positive and Viral load negative for Hep C.   11 .Rechecked TSH  3/1/17 normal  for past abnormal labs (related to Amiodarone).  12.  Rechecked CBC, iron and ferritin stable and placed Hematology referral back to Dr. Koehler on 3/1/17. Ordered labs today 10/3/17  13. Dizziness; MRI/MRA brain done 7/17 and he saw Dr. Luna, Neurology 9/15/17      I will see him back in 3/2018          Total time spent 25 minutes.  More than 50% of the time spent with Mr. Henriquez on counseling / coordinating his care

## 2018-03-12 ENCOUNTER — RADIANT APPOINTMENT (OUTPATIENT)
Dept: CARDIOLOGY | Facility: CLINIC | Age: 58
End: 2018-03-12
Attending: INTERNAL MEDICINE
Payer: COMMERCIAL

## 2018-03-12 DIAGNOSIS — I48.91 ATRIAL FIBRILLATION, UNSPECIFIED TYPE (H): ICD-10-CM

## 2018-03-12 DIAGNOSIS — I71.21 ASCENDING AORTIC ANEURYSM (H): ICD-10-CM

## 2018-03-19 DIAGNOSIS — Z09 FOLLOW-UP EXAMINATION FOLLOWING SURGERY: Primary | ICD-10-CM

## 2018-03-21 ENCOUNTER — OFFICE VISIT (OUTPATIENT)
Dept: ORTHOPEDICS | Facility: CLINIC | Age: 58
End: 2018-03-21
Payer: COMMERCIAL

## 2018-03-21 ENCOUNTER — RADIANT APPOINTMENT (OUTPATIENT)
Dept: GENERAL RADIOLOGY | Facility: CLINIC | Age: 58
End: 2018-03-21
Attending: ORTHOPAEDIC SURGERY
Payer: COMMERCIAL

## 2018-03-21 DIAGNOSIS — E78.5 HYPERLIPIDEMIA LDL GOAL <100: Primary | ICD-10-CM

## 2018-03-21 DIAGNOSIS — Z96.612 STATUS POST REPLACEMENT OF BOTH SHOULDER JOINTS: Primary | ICD-10-CM

## 2018-03-21 DIAGNOSIS — Z96.611 STATUS POST REPLACEMENT OF BOTH SHOULDER JOINTS: Primary | ICD-10-CM

## 2018-03-21 DIAGNOSIS — Z09 FOLLOW-UP EXAMINATION FOLLOWING SURGERY: ICD-10-CM

## 2018-03-21 NOTE — MR AVS SNAPSHOT
After Visit Summary   3/21/2018    Kobe Buchanan    MRN: [id number]           Patient Information     Date Of Birth          1960        Visit Information        Provider Department      3/21/2018 7:15 AM Analilia Aceves MD [facility]        Today's Diagnoses     Status post replacement of both shoulder joints    -  1       Follow-ups after your visit        Your next 10 appointments already scheduled     Apr 02, 2018 12:30 PM CDT   (Arrive by 12:15 PM)   Return Visit with Eufemia Martínez MD   [facility] ([facility])    [address]  [address]  [facility] [id number] [phone]            Apr 02, 2018  1:35 PM CDT   (Arrive by 1:20 PM)   Return Visit with Michael Styles MD   [facility] ([facility])    [address]  4th Floor  [facility] [id number] [phone]            Jul 18, 2018  8:15 AM CDT   NEW RETINA with Brenda Espinal MD   [facility] ([facility])    [address]  [facility] [id number] [phone]              Who to contact     Please call your clinic at [phone] to:    Ask questions about your health    Make or cancel appointments    Discuss your medicines    Learn about your test results    Speak to your doctor            Additional Information About Your Visit        MyChart Information     [employer] gives you secure access to your electronic health record. If you see a primary care provider, you can also send messages to your care team and make appointments. If you have questions, please call your primary care clinic.  If you do not have a primary care provider, please call [phone] and they will assist you.      [employer] is an electronic gateway that provides easy, online access to your medical records. With [employer], you can request a clinic appointment, read your  test results, renew a prescription or communicate with your care team.     To access your existing account, please contact your [facility] or call [phone] for assistance.        Care EveryWhere ID     This is your Care EveryWhere ID. This could be used by other organizations to access your [facility] medical records  [id number]         Blood Pressure from Last 3 Encounters:   01/10/18 121/73   10/02/17 109/76   09/15/17 146/82    Weight from Last 3 Encounters:   01/10/18 108 kg (238 lb)   10/02/17 111.1 kg (244 lb 14.4 oz)   09/15/17 113.4 kg (250 lb)              Today, you had the following     No orders found for display         Today's Medication Changes          These changes are accurate as of 3/21/18  8:47 AM.  If you have any questions, ask your nurse or doctor.               These medicines have changed or have updated prescriptions.        Dose/Directions    mometasone 0.1 % ointment   Commonly known as:  ELOCON   This may have changed:    - when to take this  - reasons to take this   Used for:  Eczema        Apply  topically daily.   Quantity:  90 g   Refills:  1                Primary Care Provider Office Phone # Fax #    Michael Styles [phone]       [address] [address]        Equal Access to Services     JOHN FALK AH: Carmen borjaso Sokatherine, waaxda luqadaha, qaybta kaalmada adeomar, joshua lainez. So [facility] [phone].    ATENCIÓN: Si habla español, tiene a bee disposición servicios gratuitos de asistencia lingüística. Llame al [phone].    We comply with applicable federal civil rights laws and [place] laws. We do not discriminate on the basis of race, color, national origin, age, disability, sex, sexual orientation, or gender identity.            Thank you!     Thank you for choosing [facility]  for your care. Our goal is always to provide you with excellent  care. Hearing back from our patients is one way we can continue to improve our services. Please take a few minutes to complete the written survey that you may receive in the mail after your visit with us. Thank you!             Your Updated Medication List - Protect others around you: Learn how to safely use, store and throw away your medicines at www.disposemymeds.org.          This list is accurate as of 3/21/18  8:47 AM.  Always use your most recent med list.                   Brand Name Dispense Instructions for use Diagnosis    amLODIPine-benazepril 5-20 MG per capsule    LOTREL    180 capsule    Take 1 capsule by mouth 2 times daily    Essential hypertension with goal blood pressure less than 130/85, Essential hypertension, Ascending aortic aneurysm (H), Atrial fibrillation, unspecified type (H)       amoxicillin 500 MG capsule    AMOXIL    24 capsule    Take 4 tablets 1 hour before dental procedure    Dental anomaly       aspirin 81 MG chewable tablet     120 tablet    Take 1 tablet (81 mg) by mouth daily    S/P ascending aortic aneurysm repair       buPROPion 200 MG 12 hr tablet    WELLBUTRIN SR    180 tablet    TAKE 1 TABLET BY MOUTH 2 TIMES DAILY    Dysthymic disorder       carvedilol 25 MG tablet    COREG    180 tablet    Take 1 tablet (25 mg) by mouth 2 times daily (with meals)    Essential hypertension with goal blood pressure less than 130/85       clonazePAM 0.5 MG tablet    klonoPIN    6 tablet    Take 1 tablet (0.5 mg) by mouth nightly as needed for anxiety    Stress       DOCUSATE SODIUM PO      Take 100 mg by mouth daily        Fe Heme Polypeptide-folic acid 12-1 MG Tabs     90 tablet    Take 1 tablet by mouth daily    Low iron       fentaNYL 50 mcg/hr 72 hr patch    DURAGESIC     Place 1 patch onto the skin every 72 hours        ferrous sulfate 325 (65 FE) MG tablet    IRON    30 tablet    Take 1 tablet (325 mg) by mouth daily (with breakfast)        fluocinonide 0.05 % ointment    LIDEX    30 g     Apply twice daily to itchy skin nodules for 1-2 weeks at a time.    Prurigo nodularis       HYDROcodone-acetaminophen  MG per tablet    NORCO     Take 1 tablet by mouth    Iron deficiency anemia due to chronic blood loss, Benign nodular prostatic hyperplasia without lower urinary tract symptoms       Iron Heme Polypeptide 12 MG Tabs    PROFERRIN ES    30 tablet    Take 12 mg/day by mouth daily    Iron deficiency anemia due to chronic blood loss       mometasone 0.1 % ointment    ELOCON    90 g    Apply  topically daily.    Eczema       NAPROXEN SODIUM PO      Take 220 mg by mouth 2 times daily        neomycin-polymyxin-hydrocortisone otic solution    CORTISPORIN    10 mL    Place 3 drops in ear(s) 4 times daily    History of impacted ear wax       order for DME     1 Device    Walker for cardiac rehab with 4 wheels, brakes and seat    SOB (shortness of breath)       pantoprazole 40 MG EC tablet    PROTONIX    30 tablet    Take 1 tablet (40 mg) by mouth every morning    Acute post-operative pain       senna-docusate 8.6-50 MG per tablet    SENOKOT-S;PERICOLACE    100 tablet    Take 1-2 tablets by mouth 2 times daily To prevent constipation while taking narcotic pain medication. Start with 1 tablet twice daily. If no bowel movement in 24 hours, increase to 2 tablets twice daily.  Discontinue if you have loose stools or when you are no longer taking narcotics.    Bladder neck contracture       simvastatin 10 MG tablet    ZOCOR    90 tablet    Take 1 tablet (10 mg) by mouth At Bedtime    Hypercholesteremia       tacrolimus 0.1 % ointment    PROTOPIC    120 g    Apply topically as needed Apply to affected areas on body.    Other eczema, Prurigo nodularis

## 2018-03-21 NOTE — LETTER
3/21/2018       RE: Kobe Henriquez  [address]  [place] [address]     Dear Colleague,    Thank you for referring your patient, Kobe Henriquez, to the [facility] at [facility]. Please see a copy of my visit note below.    CHIEF CONCERN: Right total shoulder arthroplasty and Left TSA  DATE OF SURGERY:  08/26/2014 (R) and 4/15/14 (L)     HISTORY OF PRESENT ILLNESS:  Mr. Henriquez is a 58-year-old male who is status post bilateral TSA. He notes his shoulders are doing well. He would like to be doing more swimming but has been unable to due to time commitments taking care of his parents.      PHYSICAL EXAMINATION:    Exam shows a pleasant, awake, alert male in no distress.   Patient ambulates without antalgic gait without assistive devices   Both incisions are well-healed. Range of motion is symmetric and to 170 of active forward elevation, 140 abduction, external rotation at the side to 70 degrees, internal rotation to T12.     IMAGING:  Bilateral shoulder xrays today demonstrate that both shoulder arthroplasty components are in good position without lucency.     IMPRESSION:     1.  Status post bilateral TSA.        PLAN:    I encouraged the patient to return to swimming if he is able - he enjoys it and feels it helps his shoulder. We again discussed the ongoing restriction to include no lifting more than 50 pounds repetitively. He had all his questions answered. I asked him to follow-up in 5 years.      Again, thank you for allowing me to participate in the care of your patient.      Sincerely,    Analilia Aceves MD

## 2018-03-21 NOTE — PROGRESS NOTES
CHIEF CONCERN: Right total shoulder arthroplasty and Left TSA  DATE OF SURGERY:  08/26/2014 (R) and 4/15/14 (L)     HISTORY OF PRESENT ILLNESS:  Mr. Henriquez is a 58-year-old male who is status post bilateral TSA. He notes his shoulders are doing well. He would like to be doing more swimming but has been unable to due to time commitments taking care of his parents.      PHYSICAL EXAMINATION:    Exam shows a pleasant, awake, alert male in no distress.   Patient ambulates without antalgic gait without assistive devices   Both incisions are well-healed. Range of motion is symmetric and to 170 of active forward elevation, 140 abduction, external rotation at the side to 70 degrees, internal rotation to T12.     IMAGING:  Bilateral shoulder xrays today demonstrate that both shoulder arthroplasty components are in good position without lucency.     IMPRESSION:     1.  Status post bilateral TSA.        PLAN:    I encouraged the patient to return to swimming if he is able - he enjoys it and feels it helps his shoulder. We again discussed the ongoing restriction to include no lifting more than 50 pounds repetitively. He had all his questions answered. I asked him to follow-up in 5 years.    Analilia Aceves MD

## 2018-03-21 NOTE — NURSING NOTE
Reason For Visit:   Chief Complaint   Patient presents with     RECHECK     Follow up bilateral shoulders.  More concern about right shoulder.  R TSA 8/26/2014, L TSA 04/15/2014       PCP: Michael Styles  Ref: Self    ?  No  Occupation Retired.  Currently working? No.  Work status?  Retired.  Date of injury: ongoing    Date of surgery: R TSA 8/26/2014  L TSA 04/15/2014    Smoker: No  Request smoking cessation information: No    Right hand dominant    SANE score  Affected shoulder: Bilateral   Right shoulder SANE: 60  Left shoulder SANE: 60    There were no vitals taken for this visit.      Pain Assessment  Patient Currently in Pain: [place], ATC

## 2018-03-22 DIAGNOSIS — Z79.01 LONG-TERM (CURRENT) USE OF ANTICOAGULANTS: ICD-10-CM

## 2018-03-22 DIAGNOSIS — I48.91 ATRIAL FIBRILLATION (H): ICD-10-CM

## 2018-03-22 DIAGNOSIS — E78.5 HYPERLIPIDEMIA LDL GOAL <100: ICD-10-CM

## 2018-03-22 LAB
CHOLEST SERPL-MCNC: 179 MG/DL
HDLC SERPL-MCNC: 39 MG/DL
INR PPP: 0.97 (ref 0.86–1.14)
LDLC SERPL CALC-MCNC: 89 MG/DL
NONHDLC SERPL-MCNC: 140 MG/DL
TRIGL SERPL-MCNC: 255 MG/DL

## 2018-03-28 ASSESSMENT — ENCOUNTER SYMPTOMS
NECK PAIN: 1
MYALGIAS: 1
BACK PAIN: 1
STIFFNESS: 1

## 2018-04-02 ENCOUNTER — OFFICE VISIT (OUTPATIENT)
Dept: CARDIOLOGY | Facility: CLINIC | Age: 58
End: 2018-04-02
Attending: INTERNAL MEDICINE
Payer: COMMERCIAL

## 2018-04-02 ENCOUNTER — OFFICE VISIT (OUTPATIENT)
Dept: INTERNAL MEDICINE | Facility: CLINIC | Age: 58
End: 2018-04-02
Payer: COMMERCIAL

## 2018-04-02 VITALS
HEIGHT: 71 IN | WEIGHT: 235.1 LBS | OXYGEN SATURATION: 95 % | DIASTOLIC BLOOD PRESSURE: 80 MMHG | HEART RATE: 69 BPM | SYSTOLIC BLOOD PRESSURE: 149 MMHG | BODY MASS INDEX: 32.91 KG/M2

## 2018-04-02 DIAGNOSIS — M79.609 PAIN IN EXTREMITY, UNSPECIFIED EXTREMITY: Primary | ICD-10-CM

## 2018-04-02 DIAGNOSIS — I10 ESSENTIAL HYPERTENSION WITH GOAL BLOOD PRESSURE LESS THAN 130/85: ICD-10-CM

## 2018-04-02 DIAGNOSIS — E78.1 HYPERTRIGLYCERIDEMIA: ICD-10-CM

## 2018-04-02 DIAGNOSIS — F34.1 DYSTHYMIC DISORDER: ICD-10-CM

## 2018-04-02 DIAGNOSIS — E78.00 HYPERCHOLESTEREMIA: ICD-10-CM

## 2018-04-02 DIAGNOSIS — I10 ESSENTIAL HYPERTENSION: Primary | ICD-10-CM

## 2018-04-02 DIAGNOSIS — I71.21 ASCENDING AORTIC ANEURYSM (H): ICD-10-CM

## 2018-04-02 DIAGNOSIS — Z95.2 S/P AVR (AORTIC VALVE REPLACEMENT) AND AORTOPLASTY: ICD-10-CM

## 2018-04-02 DIAGNOSIS — I48.91 ATRIAL FIBRILLATION, UNSPECIFIED TYPE (H): ICD-10-CM

## 2018-04-02 DIAGNOSIS — I10 ESSENTIAL HYPERTENSION: ICD-10-CM

## 2018-04-02 PROCEDURE — 99214 OFFICE O/P EST MOD 30 MIN: CPT | Mod: ZP | Performed by: INTERNAL MEDICINE

## 2018-04-02 PROCEDURE — 93010 ELECTROCARDIOGRAM REPORT: CPT | Mod: ZP | Performed by: INTERNAL MEDICINE

## 2018-04-02 PROCEDURE — 93005 ELECTROCARDIOGRAM TRACING: CPT | Mod: ZF

## 2018-04-02 PROCEDURE — G0463 HOSPITAL OUTPT CLINIC VISIT: HCPCS | Mod: 25,ZF

## 2018-04-02 RX ORDER — CLONIDINE HYDROCHLORIDE 0.1 MG/1
0.1 TABLET ORAL 2 TIMES DAILY
Qty: 90 TABLET | Refills: 3 | Status: SHIPPED | OUTPATIENT
Start: 2018-04-02 | End: 2018-05-01

## 2018-04-02 RX ORDER — BUPROPION HYDROCHLORIDE 200 MG/1
TABLET, EXTENDED RELEASE ORAL
Qty: 180 TABLET | Refills: 3 | Status: SHIPPED | OUTPATIENT
Start: 2018-04-02 | End: 2019-03-14

## 2018-04-02 RX ORDER — AMLODIPINE AND BENAZEPRIL HYDROCHLORIDE 5; 20 MG/1; MG/1
1 CAPSULE ORAL
Qty: 180 CAPSULE | Refills: 3 | Status: SHIPPED | OUTPATIENT
Start: 2018-04-02 | End: 2019-03-14

## 2018-04-02 RX ORDER — SIMVASTATIN 10 MG
10 TABLET ORAL AT BEDTIME
Qty: 90 TABLET | Refills: 3 | Status: SHIPPED | OUTPATIENT
Start: 2018-04-02 | End: 2019-04-08

## 2018-04-02 RX ORDER — CARVEDILOL 25 MG/1
25 TABLET ORAL 2 TIMES DAILY WITH MEALS
Qty: 180 TABLET | Refills: 3 | Status: SHIPPED | OUTPATIENT
Start: 2018-04-02 | End: 2019-03-14

## 2018-04-02 ASSESSMENT — PAIN SCALES - GENERAL
PAINLEVEL: NO PAIN (0)
PAINLEVEL: NO PAIN (0)

## 2018-04-02 NOTE — PROGRESS NOTES
HPI:    Pt. Comes in for follow up.   He had aortic graft and valve repair with Dr. Townsend 10/16. He had post-op afib and is now off coumadin and amiodarone.   He had  R  shoulder arthroplasty with Dr. Aceves; follow up 10/8/14, 11/19/14 and was last seen 3/21/2018.   He does not smoke, no EtOH abuse. He has no known lung disease.  He states he can climb stairs w/o functional limitations. He has occasional stress taking care of his elderly parents. He o/w denies additional HEENT, cardiopulmonary, abdominal, , neurological, systemic complaints.Patient  underwent a TURP procedure on 1/23/14 by Dr. Taveras. He was seen by Dr. Taveras 10/28/14. He was seen again by Dr. Leslie 7/8/16 for bladder neck contracture. He had colonoscopy 6/30/14; no adenomatous polyps.  He was seen by Dr. Koehler H[facility]ology 2/16 for low Ferritin.         PMH:    1. Gastris by pass surgery 2005  2. Cervical neck surgery at [facility] 2007  3. Hand surgery; L middle finger cyst 6/11  4. Chronic neck pain on narcotics  5. HTN  6. Depression  7. SAVITA  8. Possible old h/o elevated glucose last treated with Metformin 2004.  9. Left Total Shoulder Arthroplasty 4/15/14.  10. Bicuspid aortic valve and enlarged aorta; see Cardiology note from 8/19/14 currently stable.       PE:    Vitals noted gen nad cooperative, alert, HEENT, ears normal oropharynx clear no exudate, neck supple nl rom no adenopathy, no B carotid bruits,  LCTA, B, normal resp. System exam, RRR, S1, S2,   murmur not present today,  abdomen, nt, nd no masses, normal GI system exam, obese, Neurological exam B UE/LE/proximal/distal is symmetric and unremarkable for strength, reflexes and sensation.        A/P:    1. HTN; he remains on the same medications; labs checked 1/10/2018 as normal. He had normal 24 hr BP monitor 2/2/17  2. RTHC; as above for immunization  and colonoscopy; PSA  3/1/17 stable. Flu shot 10/17. Check with insurance for new Shingles vaccine.   3. He has followed up with   [facility], ortho shoulder 3/21/2018  4. Ascending aortic repair with Dr. Townsend done  10/4/16; he was seen 10/25/16.  He was seen by   1/27/17; he is off amiodarone and coumadin.   He saw  Dr. Martínez, Cardiology today 4/2/2018 started on Clonidine.   5. Pain management; follows outside Clinic.    6. Elevated liver tests rechecked 11/2/16 as normal.   He states treatment with interferon for Hep C many years ago. He is antibody positive and Viral load negative for Hep C.   7 .Rechecked TSH  1/2018 normal  for past abnormal labs (related to Amiodarone).  8.  Rechecked CBC and other labs 1/10/2018              Total time spent 25 minutes.  More than 50% of the time spent with Mr. Henriquez on counseling / coordinating his care

## 2018-04-02 NOTE — NURSING NOTE
Chief Complaint   Patient presents with     Results     Patient here to go over results.     Jo Cotter LPN at 1:30 PM on 4/2/2018.

## 2018-04-02 NOTE — PATIENT INSTRUCTIONS
You were seen today in the Cardiovascular Clinic at the [facility].     Cardiology Providers you saw during your visit: Dr Eufemia Martínez  Recommendations:    1.  Please work on staggering your blood pressure medication to help with tolerating all of them.   2. Dr. Martínez recommends starting 0.1 mg of Clonidine.  Please have Dr. Styles start you on this prescription.    Follow-up: Follow up with Dr Martínez in 1 year.       For emergencies call 911.    For any scheduling needs, please call [phone].    Thank you for your visit today! If you have questions or concerns about today's visit, please call me.      Flori Samayoa RN  Cardiology Care Coordinator  [facility]  [email or web address]  [phone]

## 2018-04-02 NOTE — NURSING NOTE
Chief Complaint   Patient presents with     Follow Up For      self referred, previous [facility] patient/FLP prior to visit        Med Reconcile: Reviewed and verified all current medications with the patient. The updated medication list was printed and given to the patient.  Return Appointment: Patient given instructions regarding scheduling next clinic visit. Patient demonstrated understanding of this information and agreed to call with further questions or concerns.  Patient stated he understood all health information given and agreed to call with further questions or concerns.     Flori Samayoa RN, BSN  Cardiology Care Coordinator  [facility]  [email or web address]  [phone]

## 2018-04-02 NOTE — PROGRESS NOTES
HPI:   Kobe Buchanan is a 58 year old man, with history of bicuspid aortic valve (partial fusion of the right and left coronary cusps) and thoracic aortic aneurysm (measuring 5-5.3 cm) status post open aortic aneurysm repair with 32mm Gelweave graft on 10/4/16, who presents for follow up. He had MRA aorta on 1/4/17 which showed an adequate repair with no evidence of stenosis and no residual aneurysm.  He has hypertension and is on several medications.  He has chronic pain and takes hydrocodone and is on a fentanyl patch.     He denies any chest pain, dyspnea at rest or with exertion, palpitations, PND, orthopnea, peripheral edema, LH, or syncope.  His blood pressure at home is continues to be in the 140-150 range.  He does not like to restrict his salt intake.  He does not engage in regular exercise.       PAST MEDICAL HISTORY:  Past Medical History:   Diagnosis Date     Anxiety      Ascending aortic aneurysm (H)      Bicuspid aortic valve      BPPV (benign paroxysmal positional vertigo) 7/11/2014     Chronic narcotic use      Chronic neck pain      Chronic osteoarthritis      Degenerative joint disease      Depression      Hyperlipidemia 4/10/2012     Hypertension      Multiple stiff joints      Neck injuries      Obesity 2/9/2015     Skin picking habit      Sleep apnea     does not use cpap       CURRENT MEDICATIONS:  Current Outpatient Prescriptions   Medication Sig Dispense Refill     amoxicillin (AMOXIL) 500 MG capsule Take 4 tablets 1 hour before dental procedure 24 capsule 4     buPROPion (WELLBUTRIN SR) 200 MG 12 hr tablet TAKE 1 TABLET BY MOUTH 2 TIMES DAILY 180 tablet 2     simvastatin (ZOCOR) 10 MG tablet Take 1 tablet (10 mg) by mouth At Bedtime 90 tablet 3     amLODIPine-benazepril (LOTREL) 5-20 MG per capsule Take 1 capsule by mouth 2 times daily 180 capsule 3     HYDROcodone-acetaminophen (NORCO)  MG per tablet Take 1 tablet by mouth       Fe Heme Polypeptide-folic acid 12-1 MG TABS Take  1 tablet by mouth daily 90 tablet 3     Iron Heme Polypeptide (PROFERRIN ES) 12 MG TABS Take 12 mg/day by mouth daily 30 tablet 3     carvedilol (COREG) 25 MG tablet Take 1 tablet (25 mg) by mouth 2 times daily (with meals) 180 tablet 3     fentaNYL (DURAGESIC) 50 mcg/hr patch 72 hr Place 1 patch onto the skin every 72 hours       pantoprazole (PROTONIX) 40 MG enteric coated tablet Take 1 tablet (40 mg) by mouth every morning 30 tablet 3     order for DME Walker for cardiac rehab with 4 wheels, brakes and seat 1 Device 0     fluocinonide (LIDEX) 0.05 % ointment Apply twice daily to itchy skin nodules for 1-2 weeks at a time. 30 g 3     senna-docusate (SENOKOT-S;PERICOLACE) 8.6-50 MG per tablet Take 1-2 tablets by mouth 2 times daily To prevent constipation while taking narcotic pain medication. Start with 1 tablet twice daily. If no bowel movement in 24 hours, increase to 2 tablets twice daily.  Discontinue if you have loose stools or when you are no longer taking narcotics. 100 tablet 0     neomycin-polymyxin-hydrocortisone (CORTISPORIN) otic solution Place 3 drops in ear(s) 4 times daily 10 mL 3     tacrolimus (PROTOPIC) 0.1 % ointment Apply topically as needed Apply to affected areas on body. 120 g 11     clonazePAM (KLONOPIN) 0.5 MG tablet Take 1 tablet (0.5 mg) by mouth nightly as needed for anxiety 6 tablet 0     DOCUSATE SODIUM PO Take 100 mg by mouth daily        NAPROXEN SODIUM PO Take 220 mg by mouth 2 times daily       ferrous sulfate 325 (65 FE) MG tablet Take 1 tablet (325 mg) by mouth daily (with breakfast) 30 tablet 3     mometasone (ELOCON) 0.1 % ointment Apply  topically daily. (Patient taking differently: Apply topically as needed ) 90 g 1       PAST SURGICAL HISTORY:  Past Surgical History:   Procedure Laterality Date     ARTHROPLASTY SHOULDER  4/15/2014    Procedure: Left Total Shoulder Arthroplasty ;  Surgeon: Analilia Aceves MD;  [facility]     ARTHROPLASTY SHOULDER Right 8/26/2014     Procedure: ARTHROPLASTY SHOULDER;  Surgeon: Analilia Aceves MD;  [facility]     BYPASS GASTRIC TAVO-EN-Y, LIVER BIOPSY, COMBINED  8/8/2005     C HAND/FINGER SURGERY UNLISTED       C SHOULDER SURG PROC UNLISTED       COLONOSCOPY  6/30/2014    Procedure: COMBINED COLONOSCOPY, SINGLE BIOPSY/POLYPECTOMY BY BIOPSY;  Surgeon: Chester Patton MD;  [facility]: [facility]     CYSTOSCOPY, BLADDER NECK CUTS, COMBINED N/A 7/18/2016    Procedure: COMBINED CYSTOSCOPY, BLADDER NECK CUTS;  Surgeon: Ritu Leslie MD;  [facility]     ESOPHAGOSCOPY, GASTROSCOPY, DUODENOSCOPY (EGD), COMBINED  6/30/2014    Procedure: COMBINED ESOPHAGOSCOPY, GASTROSCOPY, DUODENOSCOPY (EGD), BIOPSY SINGLE OR MULTIPLE;  Surgeon: Chester Patton MD;  Location: [facility]     EXCISE MASS FINGER  6/14/2011    Procedure:EXCISE MASS FINGER; Middle Flexor Cyst; Surgeon:SHAYY RUSSELL; Location:[facility]     HAND SURGERY      excision of tendon cyst on left hand     [facility] VASCULAR SURGERY PROCEDURE UNLIST       LASER HOLMIUM LITHOTRIPSY BLADDER N/A 10/15/2014    Procedure: LASER HOLMIUM LITHOTRIPSY BLADDER;  Surgeon: Sahil Taveras MD;  [facility]     LASER KTP GREEN LIGHT PHOTOSELECTIVE VAPORIZATION PROSTATE  1/23/2014    Procedure: LASER KTP GREEN LIGHT PHOTOSELECTIVE VAPORIZATION PROSTATE;  Greenlight Photovaporization Of Prostate  ;  Surgeon: Sahil Taveras MD;  Location: [facility]     RELEASE TRIGGER FINGER Right 3/31/2017    Procedure: RELEASE TRIGGER FINGER;  Surgeon: Juan Carlos Blunt MD;  [facility]     REPAIR ANEURYSM ASCENDING AORTA N/A 10/4/2016    Procedure: REPAIR ANEURYSM ASCENDING AORTA;  Surgeon: Mckenzie Townsend MD;  [facility]: [facility]     TURP  2014       ALLERGIES   No Known Allergies    FAMILY HISTORY:  Family History   Problem Relation Age of Onset     Arthritis Other      GASTROINTESTINAL DISEASE Other      Cardiovascular Father      aortic aneurysm     Arrhythmia Father       "Nephrolithiasis Father      Sleep Apnea Father      Anxiety Disorder Father      Depression Father      Hypertension Father      Obesity Father      Hyperlipidemia Father      Coronary Artery Disease Father      history of MI and stent     Low Back Problems Father      Spine Problems Father      Anxiety Disorder Mother      Hypertension Mother      OSTEOPOROSIS Mother      Obesity Mother      Hyperlipidemia Mother      Low Back Problems Mother      Anxiety Disorder Sister      Hypertension Sister      OSTEOPOROSIS Sister      Obesity Sister      Hyperlipidemia Sister      Low Back Problems Sister      Spine Problems Sister      Anxiety Disorder Sister      Depression Sister      Hypertension Sister      OSTEOPOROSIS Sister      Obesity Sister      Hyperlipidemia Sister      Low Back Problems Sister        SOCIAL HISTORY:  Social History     Social History     Marital Status: Single     Spouse Name: N/A     Number of Children: N/A     Years of Education: N/A     Social History Main Topics     Smoking status: Former Smoker -- 0.50 packs/day for 6 years     Types: Cigarettes     Start date: 02/01/1977     Quit date: 09/01/1983     Smokeless tobacco: Never Used      Comment: quit 35 years ago     Alcohol Use: No     Drug Use: No     Sexual Activity:     Partners: Female     Birth Control/ Protection: Abstinence       ROS:   Constitutional: No fever, chills, or sweats. No weight gain/loss   ENT: No visual disturbance, ear ache, epistaxis, sore throat  Allergies/Immunologic: Negative.   Respiratory: No cough, hemoptysia  Cardiovascular: As per HPI  GI: No nausea, vomiting, hematemesis, melena, or hematochezia  : No urinary frequency, dysuria, or hematuria  Integument: Negative  Psychiatric: Negative  Neuro: Negative  Endocrinology: Negative   Musculoskeletal: Negative    EXAM:    /80 (BP Location: Left arm)  Pulse 69  Ht 1.791 m (5' 10.5\")  Wt 106.6 kg (235 lb 1.6 oz)  SpO2 95%  BMI 33.26 kg/m2     R arm " 157/87    In general, the patient is a pleasant male in no apparent distress.      HEENT: NC/AT.  PERRLA.  EOMI.  Sclerae white, not injected.    Neck: Carotids 2+ bilaterally without bruits.  No jugular venous distension.   Lymph: No cervical adenopathy. No thyromegaly.   Heart: RRR. Normal S1, S2. No murmur, rub, click, or gallop. There is no heave.    Lungs: Clear bilaterally.  No rhonchi, wheezes, rales.   GI: Soft, nontender, nondistended.   Extremities: No edema.  The pulses are 2+at the radial and DP bilaterally.  Neuro: grossly non focal.   Skin: no rashes. well healed sternal scar  Musculoskeletal: normal muscle strength, no acute arthritis, gait normal.    Labs:  LIPID RESULTS:  Lab Results   Component Value Date    CHOL 179 03/22/2018    HDL 39 (L) 03/22/2018    LDL 89 03/22/2018    TRIG 255 (H) 03/22/2018    CHOLHDLRATIO 3.7 06/08/2015    NHDL 140 (H) 03/22/2018       LIVER ENZYME RESULTS:  Lab Results   Component Value Date    AST 20 01/10/2018    ALT 23 01/10/2018       CBC RESULTS:  Lab Results   Component Value Date    WBC 6.8 01/10/2018    RBC 5.18 01/10/2018    HGB 15.5 01/10/2018    HCT 45.3 01/10/2018    MCV 88 01/10/2018    MCH 29.9 01/10/2018    MCHC 34.2 01/10/2018    RDW 12.2 01/10/2018     01/10/2018       BMP RESULTS:  Lab Results   Component Value Date     01/10/2018    POTASSIUM 4.2 01/10/2018    CHLORIDE 102 01/10/2018    CO2 28 01/10/2018    ANIONGAP 8 01/10/2018     (H) 01/10/2018    BUN 21 01/10/2018    CR 0.98 01/10/2018    GFRESTIMATED 79 01/10/2018    GFRESTBLACK >90 01/10/2018    NIA 8.9 01/10/2018        A1C RESULTS:  Lab Results   Component Value Date    A1C 5.3 10/05/2016       INR RESULTS:  Lab Results   Component Value Date    INR 0.97 03/22/2018    INR 1.00 03/01/2017       Cardiac data:  ECG today shows normal sinus rhythm with no acute ST-T changes        Echo 3/12/2018  S/P valve-sparing ascending aorta replacement with 32mm Gelweave  interposition  graft 10/4/16 by history.     Normal biventricular size, thickness, global, and regional systolic function, LVEF=60-65%.  Bicsupid aortic valve (Lucy type 1 L-R) with mild aortic insufficiency and no aortic stenosis.  Normal appearance of the aortic root and ascending aortic graft. Sinuses of Valsalva 3.8 cm, ascending aortic graft 3.2 cm.  No pericardial effusion is present.  This study was compared with the study from 8/30/2016 ([facility]OMY) : There has been interval valve-sparing ascending aortic replacement. There has otherwise been no significant change.    MRA 1/4/17:   1. Patient is status post repair of a ascending aortic aneurysm. The ascending aorta, arch, and descending aorta are now normal in diameter. There is no dissection seen.   2. The aortic arch is left sided. There is a bovine variant branching of the arch vessels.   3. The main and proximal branch pulmonary arteries are normal in size.   4. The systemic venous connections are normal.       Bi-orthogonal luminal aortic dimensions are:     1.  Aortic root at the sinuses of Valsalva =  3.6 cm x 3.5 cm    2.  Sinotubular junction =  2.8 cm x 2.6 cm    3.  Mid-ascending aorta (midpoint in length between Nos. 2 and 4) =  3.4 cm x 3.3 cm    4.  Proximal aortic arch (aorta at the origin of the innominateartery) =  3.0 cm x 2.8 cm    5.  Mid-aortic arch (between left common carotid and subclavian arteries) =  2.2 cm x 1.9 cm    6.  Proximal descending thoracic aorta (begins at the isthmus) =  2.1 cm --x 2.1 cm  7.  Mid-descending aorta (midpoint in length between Nos. 6 and 8) =  2.1 cm x 2.0 cm    8.  Aorta at diaphragm (2 cm above the celiac axis origin) =  2.1 cm x2.0 cm  9.  Abdominal aorta at the celiac axis origin =  2.0 cm x 1.9 cm       Assessment and Plan:     58-year-old male with  1. Thoracic aortic aneurysm status post repair:  2. Bicuspid aortic valve, No significant AS/AI.  3. Essential hypertension, suboptimal  4. Obstructive  sleep apnea  5. Hypertriglyceridemia    Kobe is without any active angina or heart failure symptoms.  His MRA dated 1/4/17 showed no evidence of dissection or pseudoaneurysm. He was counseled regarding the importance of continued tight BP control (see below.) He is not interested in undergoing a 24-hour ambulatory BP monitoring for better assessment of his BP control. He is currently on amlodipine-benazepril 10/40, carvedilol 25 mg BID. He is reluctant to take a diuretic. I recommend adding a low-dose clonidine 0.1 mg once daily to be titrated up as he tolerates. Diet low in saturated fat and daily salt was reinforced. Recommended follow up in sleep clinic for troubleshooting his CPAP system in order to improve adherence.     He agrees to follow-up with his primary care physician on a regular basis, and be seen in the cardiology clinic annually.    Eufemia Martínez MD, MS  Staff Cardiologist, [facility]   Pager: [phone]        CC  Patient Care Team:  Michael Styles MD as PCP - General (Internal Medicine)  Michael Styles MD as Referring Physician (Internal Medicine)  Juan Carlos Blunt MD as MD (Hand Surgery)  Kym Yates, RN as Nurse Coordinator (Neurology)  Demi Vance, RN as Nurse Coordinator (Neurology)  Brenda Espinal MD as MD (Ophthalmology)

## 2018-04-02 NOTE — LETTER
4/2/2018      RE: Kobe Buchanan  [address]  [place] [address]       Dear Colleague,    Thank you for the opportunity to participate in the care of your patient, Kobe Buchanan, at the [facility] at [facility]. Please see a copy of my visit note below.    HPI:   Kobe Buchanan is a 58 year old man, with history of bicuspid aortic valve (partial fusion of the right and left coronary cusps) and thoracic aortic aneurysm (measuring 5-5.3 cm) status post open aortic aneurysm repair with 32mm Gelweave graft on 10/4/16, who presents for follow up. He had MRA aorta on 1/4/17 which showed an adequate repair with no evidence of stenosis and no residual aneurysm.  He has hypertension and is on several medications.  He has chronic pain and takes hydrocodone and is on a fentanyl patch.     He denies any chest pain, dyspnea at rest or with exertion, palpitations, PND, orthopnea, peripheral edema, LH, or syncope.  His blood pressure at home is continues to be in the 140-150 range.  He does not like to restrict his salt intake.  He does not engage in regular exercise.       PAST MEDICAL HISTORY:  Past Medical History:   Diagnosis Date     Anxiety      Ascending aortic aneurysm (H)      Bicuspid aortic valve      BPPV (benign paroxysmal positional vertigo) 7/11/2014     Chronic narcotic use      Chronic neck pain      Chronic osteoarthritis      Degenerative joint disease      Depression      Hyperlipidemia 4/10/2012     Hypertension      Multiple stiff joints      Neck injuries      Obesity 2/9/2015     Skin picking habit      Sleep apnea     does not use cpap       CURRENT MEDICATIONS:  Current Outpatient Prescriptions   Medication Sig Dispense Refill     amoxicillin (AMOXIL) 500 MG capsule Take 4 tablets 1 hour before dental procedure 24 capsule 4     buPROPion (WELLBUTRIN SR) 200 MG 12 hr tablet TAKE 1 TABLET BY MOUTH 2 TIMES DAILY 180  tablet 2     simvastatin (ZOCOR) 10 MG tablet Take 1 tablet (10 mg) by mouth At Bedtime 90 tablet 3     amLODIPine-benazepril (LOTREL) 5-20 MG per capsule Take 1 capsule by mouth 2 times daily 180 capsule 3     HYDROcodone-acetaminophen (NORCO)  MG per tablet Take 1 tablet by mouth       Fe Heme Polypeptide-folic acid 12-1 MG TABS Take 1 tablet by mouth daily 90 tablet 3     Iron Heme Polypeptide (PROFERRIN ES) 12 MG TABS Take 12 mg/day by mouth daily 30 tablet 3     carvedilol (COREG) 25 MG tablet Take 1 tablet (25 mg) by mouth 2 times daily (with meals) 180 tablet 3     fentaNYL (DURAGESIC) 50 mcg/hr patch 72 hr Place 1 patch onto the skin every 72 hours       pantoprazole (PROTONIX) 40 MG enteric coated tablet Take 1 tablet (40 mg) by mouth every morning 30 tablet 3     order for DME Walker for cardiac rehab with 4 wheels, brakes and seat 1 Device 0     fluocinonide (LIDEX) 0.05 % ointment Apply twice daily to itchy skin nodules for 1-2 weeks at a time. 30 g 3     senna-docusate (SENOKOT-S;PERICOLACE) 8.6-50 MG per tablet Take 1-2 tablets by mouth 2 times daily To prevent constipation while taking narcotic pain medication. Start with 1 tablet twice daily. If no bowel movement in 24 hours, increase to 2 tablets twice daily.  Discontinue if you have loose stools or when you are no longer taking narcotics. 100 tablet 0     neomycin-polymyxin-hydrocortisone (CORTISPORIN) otic solution Place 3 drops in ear(s) 4 times daily 10 mL 3     tacrolimus (PROTOPIC) 0.1 % ointment Apply topically as needed Apply to affected areas on body. 120 g 11     clonazePAM (KLONOPIN) 0.5 MG tablet Take 1 tablet (0.5 mg) by mouth nightly as needed for anxiety 6 tablet 0     DOCUSATE SODIUM PO Take 100 mg by mouth daily        NAPROXEN SODIUM PO Take 220 mg by mouth 2 times daily       ferrous sulfate 325 (65 FE) MG tablet Take 1 tablet (325 mg) by mouth daily (with breakfast) 30 tablet 3     mometasone (ELOCON) 0.1 % ointment Apply   topically daily. (Patient taking differently: Apply topically as needed ) 90 g 1       PAST SURGICAL HISTORY:  Past Surgical History:   Procedure Laterality Date     ARTHROPLASTY SHOULDER  4/15/2014    Procedure: Left Total Shoulder Arthroplasty ;  Surgeon: Analilia Aceves MD;  [facility]     ARTHROPLASTY SHOULDER Right 8/26/2014    Procedure: ARTHROPLASTY SHOULDER;  Surgeon: Analilia Aceves MD;  [facility]     BYPASS GASTRIC TAVO-EN-Y, LIVER BIOPSY, COMBINED  8/8/2005     C HAND/FINGER SURGERY UNLISTED       C SHOULDER SURG PROC UNLISTED       COLONOSCOPY  6/30/2014    Procedure: COMBINED COLONOSCOPY, SINGLE BIOPSY/POLYPECTOMY BY BIOPSY;  Surgeon: Chester Patton MD;  [facility]     CYSTOSCOPY, BLADDER NECK CUTS, COMBINED N/A 7/18/2016    Procedure: COMBINED CYSTOSCOPY, BLADDER NECK CUTS;  Surgeon: Ritu Leslie MD;  [facility]     ESOPHAGOSCOPY, GASTROSCOPY, DUODENOSCOPY (EGD), COMBINED  6/30/2014    Procedure: COMBINED ESOPHAGOSCOPY, GASTROSCOPY, DUODENOSCOPY (EGD), BIOPSY SINGLE OR MULTIPLE;  Surgeon: Chester Patton MD;  [facility]     EXCISE MASS FINGER  6/14/2011    Procedure:EXCISE MASS FINGER; Middle Flexor Cyst; Surgeon:SHAYY RUSSELL; Location:[facility]     HAND SURGERY      excision of tendon cyst on left hand     HC VASCULAR SURGERY PROCEDURE UNLIST       LASER HOLMIUM LITHOTRIPSY BLADDER N/A 10/15/2014    Procedure: LASER HOLMIUM LITHOTRIPSY BLADDER;  Surgeon: Sahil Taveras MD;  [facility]     LASER KTP GREEN LIGHT PHOTOSELECTIVE VAPORIZATION PROSTATE  1/23/2014    Procedure: LASER KTP GREEN LIGHT PHOTOSELECTIVE VAPORIZATION PROSTATE;  Greenlight Photovaporization Of Prostate  ;  Surgeon: Sahil Taveras MD;  [facility]: [facility]     RELEASE TRIGGER FINGER Right 3/31/2017    Procedure: RELEASE TRIGGER FINGER;  Surgeon: Juan Carlos Blunt MD;  [facility]     REPAIR ANEURYSM ASCENDING AORTA N/A 10/4/2016     Procedure: REPAIR ANEURYSM ASCENDING AORTA;  Surgeon: Mckenzie Townsend MD;  Location: [facility]  2014       ALLERGIES   No Known Allergies    FAMILY HISTORY:  Family History   Problem Relation Age of Onset     Arthritis Other      GASTROINTESTINAL DISEASE Other      Cardiovascular Father      aortic aneurysm     Arrhythmia Father      Nephrolithiasis Father      Sleep Apnea Father      Anxiety Disorder Father      Depression Father      Hypertension Father      Obesity Father      Hyperlipidemia Father      Coronary Artery Disease Father      history of MI and stent     Low Back Problems Father      Spine Problems Father      Anxiety Disorder Mother      Hypertension Mother      OSTEOPOROSIS Mother      Obesity Mother      Hyperlipidemia Mother      Low Back Problems Mother      Anxiety Disorder Sister      Hypertension Sister      OSTEOPOROSIS Sister      Obesity Sister      Hyperlipidemia Sister      Low Back Problems Sister      Spine Problems Sister      Anxiety Disorder Sister      Depression Sister      Hypertension Sister      OSTEOPOROSIS Sister      Obesity Sister      Hyperlipidemia Sister      Low Back Problems Sister        SOCIAL HISTORY:  Social History     Social History     Marital Status: Single     Spouse Name: N/A     Number of Children: N/A     Years of Education: N/A     Social History Main Topics     Smoking status: Former Smoker -- 0.50 packs/day for 6 years     Types: Cigarettes     Start date: 02/01/1977     Quit date: 09/01/1983     Smokeless tobacco: Never Used      Comment: quit 35 years ago     Alcohol Use: No     Drug Use: No     Sexual Activity:     Partners: Female     Birth Control/ Protection: Abstinence       ROS:   Constitutional: No fever, chills, or sweats. No weight gain/loss   ENT: No visual disturbance, ear ache, epistaxis, sore throat  Allergies/Immunologic: Negative.   Respiratory: No cough, hemoptysia  Cardiovascular: As per HPI  GI: No nausea, vomiting,  "hematemesis, melena, or hematochezia  : No urinary frequency, dysuria, or hematuria  Integument: Negative  Psychiatric: Negative  Neuro: Negative  Endocrinology: Negative   Musculoskeletal: Negative    EXAM:    /80 (BP Location: Left arm)  Pulse 69  Ht 1.791 m (5' 10.5\")  Wt 106.6 kg (235 lb 1.6 oz)  SpO2 95%  BMI 33.26 kg/m2     R arm 157/87    In general, the patient is a pleasant male in no apparent distress.      HEENT: NC/AT.  PERRLA.  EOMI.  Sclerae white, not injected.    Neck: Carotids 2+ bilaterally without bruits.  No jugular venous distension.   Lymph: No cervical adenopathy. No thyromegaly.   Heart: RRR. Normal S1, S2. No murmur, rub, click, or gallop. There is no heave.    Lungs: Clear bilaterally.  No rhonchi, wheezes, rales.   GI: Soft, nontender, nondistended.   Extremities: No edema.  The pulses are 2+at the radial and DP bilaterally.  Neuro: grossly non focal.   Skin: no rashes. well healed sternal scar  Musculoskeletal: normal muscle strength, no acute arthritis, gait normal.    Labs:  LIPID RESULTS:  Lab Results   Component Value Date    CHOL 179 03/22/2018    HDL 39 (L) 03/22/2018    LDL 89 03/22/2018    TRIG 255 (H) 03/22/2018    CHOLHDLRATIO 3.7 06/08/2015    NHDL 140 (H) 03/22/2018       LIVER ENZYME RESULTS:  Lab Results   Component Value Date    AST 20 01/10/2018    ALT 23 01/10/2018       CBC RESULTS:  Lab Results   Component Value Date    WBC 6.8 01/10/2018    RBC 5.18 01/10/2018    HGB 15.5 01/10/2018    HCT 45.3 01/10/2018    MCV 88 01/10/2018    MCH 29.9 01/10/2018    MCHC 34.2 01/10/2018    RDW 12.2 01/10/2018     01/10/2018       BMP RESULTS:  Lab Results   Component Value Date     01/10/2018    POTASSIUM 4.2 01/10/2018    CHLORIDE 102 01/10/2018    CO2 28 01/10/2018    ANIONGAP 8 01/10/2018     (H) 01/10/2018    BUN 21 01/10/2018    CR 0.98 01/10/2018    GFRESTIMATED 79 01/10/2018    GFRESTBLACK >90 01/10/2018    NIA 8.9 01/10/2018        A1C " RESULTS:  Lab Results   Component Value Date    A1C 5.3 10/05/2016       INR RESULTS:  Lab Results   Component Value Date    INR 0.97 03/22/2018    INR 1.00 03/01/2017       Cardiac data:  ECG today shows normal sinus rhythm with no acute ST-T changes        Echo 3/12/2018  S/P valve-sparing ascending aorta replacement with 32mm Gelweave interposition  graft 10/4/16 by history.     Normal biventricular size, thickness, global, and regional systolic function, LVEF=60-65%.  Bicsupid aortic valve (Lucy type 1 L-R) with mild aortic insufficiency and no aortic stenosis.  Normal appearance of the aortic root and ascending aortic graft. Sinuses of Valsalva 3.8 cm, ascending aortic graft 3.2 cm.  No pericardial effusion is present.  This study was compared with the study from 8/30/2016 (XIOMY) : There has been interval valve-sparing ascending aortic replacement. There has otherwise been no significant change.    MRA 1/4/17:   1. Patient is status post repair of a ascending aortic aneurysm. The ascending aorta, arch, and descending aorta are now normal in diameter. There is no dissection seen.   2. The aortic arch is left sided. There is a bovine variant branching of the arch vessels.   3. The main and proximal branch pulmonary arteries are normal in size.   4. The systemic venous connections are normal.       Bi-orthogonal luminal aortic dimensions are:     1.  Aortic root at the sinuses of Valsalva =  3.6 cm x 3.5 cm    2.  Sinotubular junction =  2.8 cm x 2.6 cm    3.  Mid-ascending aorta (midpoint in length between Nos. 2 and 4) =  3.4 cm x 3.3 cm    4.  Proximal aortic arch (aorta at the origin of the innominateartery) =  3.0 cm x 2.8 cm    5.  Mid-aortic arch (between left common carotid and subclavian arteries) =  2.2 cm x 1.9 cm    6.  Proximal descending thoracic aorta (begins at the isthmus) =  2.1 cm --x 2.1 cm  7.  Mid-descending aorta (midpoint in length between Nos. 6 and 8) =  2.1 cm x 2.0 cm    8.  Aorta at  diaphragm (2 cm above the celiac axis origin) =  2.1 cm x2.0 cm  9.  Abdominal aorta at the celiac axis origin =  2.0 cm x 1.9 cm       Assessment and Plan:     58-year-old male with  1. Thoracic aortic aneurysm status post repair:  2. Bicuspid aortic valve, No significant AS/AI.  3. Essential hypertension, suboptimal  4. Obstructive sleep apnea  5. Hypertriglyceridemia    Kobe is without any active angina or heart failure symptoms.  His MRA dated 1/4/17 showed no evidence of dissection or pseudoaneurysm. He was counseled regarding the importance of continued tight BP control (see below.) He is not interested in undergoing a 24-hour ambulatory BP monitoring for better assessment of his BP control. He is currently on amlodipine-benazepril 10/40, carvedilol 25 mg BID. He is reluctant to take a diuretic. I recommend adding a low-dose clonidine 0.1 mg once daily to be titrated up as he tolerates. Diet low in saturated fat and daily salt was reinforced. Recommended follow up in sleep clinic for troubleshooting his CPAP system in order to improve adherence.     He agrees to follow-up with his primary care physician on a regular basis, and be seen in the cardiology clinic annually.    Eufemia Martínez MD, MS  Staff Cardiologist, [facility]   Pager: [phone]        CC  Patient Care Team:  Michael Styles MD as PCP - General (Internal Medicine)  Michael Styles MD as Referring Physician (Internal Medicine)  Juan Carlos Blunt MD as MD (Hand Surgery)  Kym Yates, KEELY as Nurse Coordinator (Neurology)  Demi Vance, RN as Nurse Coordinator (Neurology)  Brenda Espinal MD as MD (Ophthalmology)

## 2018-04-02 NOTE — NURSING NOTE
Chief Complaint   Patient presents with     Follow Up For      [facility]     Vitals were taken and medications were reconciled. EKG was performed    CHANDLER Garcia  12:11 PM

## 2018-04-02 NOTE — MR AVS SNAPSHOT
After Visit Summary   4/2/2018    Kobe Buchanan    MRN: [id number]           Patient Information     Date Of Birth          1960        Visit Information        Provider Department      4/2/2018 12:30 PM Eufemia Martínez MD [facility]        Today's Diagnoses     Atrial fibrillation, unspecified type (H)    -  1    HTN (hypertension)        Enlarged aorta (H)          Care Instructions    You were seen today in the Cardiovascular Clinic at the [facility].     Cardiology Providers you saw during your visit: Dr Eufemia Martínez  Recommendations:    1.  Please work on staggering your blood pressure medication to help with tolerating all of them.   2. Dr. Martínez recommends starting 0.1 mg of Clonidine.  Please have Dr. Styles start you on this prescription.    Follow-up: Follow up with Dr Martínez in 1 year.       For emergencies call 911.    For any scheduling needs, please call [phone].    Thank you for your visit today! If you have questions or concerns about today's visit, please call me.      Flori Samayoa RN  Cardiology Care Coordinator  [facility]  [email or web address]  [phone]             Follow-ups after your visit        Additional Services     Follow-Up with Cardiologist                 Your next 10 appointments already scheduled     Apr 02, 2018  1:35 PM CDT   (Arrive by 1:20 PM)   Return Visit with Michael Styles MD   [facility] ([facility])    [address] [id number] [phone]            Jul 18, 2018  8:15 AM CDT   NEW RETINA with Brenda Espinal MD   [facility] ([facility])    [address]  [facility] [address] [phone]              Future tests that were ordered for you today     Open Future Orders        Priority Expected Expires Ordered     "Follow-Up with Cardiologist Routine 4/1/2019 4/2/2019 4/2/2018            Who to contact     If you have questions or need follow up information about today's clinic visit or your schedule please contact [facility] directly at [phone].  Normal or non-critical lab and imaging results will be communicated to you by [employer], letter or phone within 4 business days after the clinic has received the results. If you do not hear from us within 7 days, please contact the clinic through [employer] or phone. If you have a critical or abnormal lab result, we will notify you by phone as soon as possible.  Submit refill requests through [employer] or call your pharmacy and they will forward the refill request to us. Please allow 3 business days for your refill to be completed.          Additional Information About Your Visit        [employer] Information     [employer] gives you secure access to your electronic health record. If you see a primary care provider, you can also send messages to your care team and make appointments. If you have questions, please call your primary care clinic.  If you do not have a primary care provider, please call [phone] and they will assist you.        Care EveryWhere ID     This is your Care EveryWhere ID. This could be used by other organizations to access your [place] medical records  [id number]        Your Vitals Were     Pulse Height Pulse Oximetry BMI (Body Mass Index)          69 1.791 m (5' 10.5\") 95% 33.26 kg/m2         Blood Pressure from Last 3 Encounters:   04/02/18 149/80   01/10/18 121/73   10/02/17 109/76    Weight from Last 3 Encounters:   04/02/18 106.6 kg (235 lb 1.6 oz)   01/10/18 108 kg (238 lb)   10/02/17 111.1 kg (244 lb 14.4 oz)              We Performed the Following     EKG 12-lead, tracing only (Same Day)          Today's Medication Changes          These changes are accurate as of 4/2/18  1:22 PM.  If you have any questions, ask your nurse or doctor.             "   These medicines have changed or have updated prescriptions.        Dose/Directions    mometasone 0.1 % ointment   Commonly known as:  ELOCON   This may have changed:    - when to take this  - reasons to take this   Used for:  Eczema        Apply  topically daily.   Quantity:  90 g   Refills:  1         Stop taking these medicines if you haven't already. Please contact your care team if you have questions.     aspirin 81 MG chewable tablet   Stopped by:  Eufemia Martínez MD                    Primary Care Provider Office Phone # Fax #    Michael FUENTES MD Jensen [phone] [address] [address]        Equal Access to Services     [facility]: Hadii polly cortez hadasho Sokatherine, waaxda luqadaha, qaybta kaalmada adeegyada, joshua huffman . So [facility] [phone].    ATENCIÓN: Si habla español, tiene a bee disposición servicios gratuitos de asistencia lingüística. [facility] [phone].    We comply with applicable federal civil rights laws and [place] laws. We do not discriminate on the basis of race, color, national origin, age, disability, sex, sexual orientation, or gender identity.            Thank you!     Thank you for choosing [facility]  for your care. Our goal is always to provide you with excellent care. Hearing back from our patients is one way we can continue to improve our services. Please take a few minutes to complete the written survey that you may receive in the mail after your visit with us. Thank you!             Your Updated Medication List - Protect others around you: Learn how to safely use, store and throw away your medicines at www.disposemymeds.org.          This list is accurate as of 4/2/18  1:22 PM.  Always use your most recent med list.                   Brand Name Dispense Instructions for use Diagnosis    amLODIPine-benazepril 5-20 MG per capsule    LOTREL    180 capsule    Take 1 capsule by mouth 2 times daily     Essential hypertension with goal blood pressure less than 130/85, Essential hypertension, Ascending aortic aneurysm (H), Atrial fibrillation, unspecified type (H)       amoxicillin 500 MG capsule    AMOXIL    24 capsule    Take 4 tablets 1 hour before dental procedure    Dental anomaly       buPROPion 200 MG 12 hr tablet    WELLBUTRIN SR    180 tablet    TAKE 1 TABLET BY MOUTH 2 TIMES DAILY    Dysthymic disorder       carvedilol 25 MG tablet    COREG    180 tablet    Take 1 tablet (25 mg) by mouth 2 times daily (with meals)    Essential hypertension with goal blood pressure less than 130/85       clonazePAM 0.5 MG tablet    klonoPIN    6 tablet    Take 1 tablet (0.5 mg) by mouth nightly as needed for anxiety    Stress       DOCUSATE SODIUM PO      Take 100 mg by mouth daily        Fe Heme Polypeptide-folic acid 12-1 MG Tabs     90 tablet    Take 1 tablet by mouth daily    Low iron       fentaNYL 50 mcg/hr 72 hr patch    DURAGESIC     Place 1 patch onto the skin every 72 hours        ferrous sulfate 325 (65 FE) MG tablet    IRON    30 tablet    Take 1 tablet (325 mg) by mouth daily (with breakfast)        fluocinonide 0.05 % ointment    LIDEX    30 g    Apply twice daily to itchy skin nodules for 1-2 weeks at a time.    Prurigo nodularis       HYDROcodone-acetaminophen  MG per tablet    NORCO     Take 1 tablet by mouth    Iron deficiency anemia due to chronic blood loss, Benign nodular prostatic hyperplasia without lower urinary tract symptoms       Iron Heme Polypeptide 12 MG Tabs    PROFERRIN ES    30 tablet    Take 12 mg/day by mouth daily    Iron deficiency anemia due to chronic blood loss       mometasone 0.1 % ointment    ELOCON    90 g    Apply  topically daily.    Eczema       NAPROXEN SODIUM PO      Take 220 mg by mouth 2 times daily        neomycin-polymyxin-hydrocortisone otic solution    CORTISPORIN    10 mL    Place 3 drops in ear(s) 4 times daily    History of impacted ear wax       order for DME      1 Device    Walker for cardiac rehab with 4 wheels, brakes and seat    SOB (shortness of breath)       pantoprazole 40 MG EC tablet    PROTONIX    30 tablet    Take 1 tablet (40 mg) by mouth every morning    Acute post-operative pain       senna-docusate 8.6-50 MG per tablet    SENOKOT-S;PERICOLACE    100 tablet    Take 1-2 tablets by mouth 2 times daily To prevent constipation while taking narcotic pain medication. Start with 1 tablet twice daily. If no bowel movement in 24 hours, increase to 2 tablets twice daily.  Discontinue if you have loose stools or when you are no longer taking narcotics.    Bladder neck contracture       simvastatin 10 MG tablet    ZOCOR    90 tablet    Take 1 tablet (10 mg) by mouth At Bedtime    Hypercholesteremia       tacrolimus 0.1 % ointment    PROTOPIC    120 g    Apply topically as needed Apply to affected areas on body.    Other eczema, Prurigo nodularis

## 2018-04-02 NOTE — MR AVS SNAPSHOT
After Visit Summary   4/2/2018    Kobe Buchanan    MRN: [id number]           Patient Information     Date Of Birth          1960        Visit Information        Provider Department      4/2/2018 1:35 PM Michael Styles MD [facility]        Today's Diagnoses     Pain in extremity, unspecified extremity    -  1    Hypercholesteremia        Dysthymic disorder        Essential hypertension with goal blood pressure less than 130/85        Essential hypertension        Ascending aortic aneurysm (H)        Atrial fibrillation, unspecified type (H)           Follow-ups after your visit        Your next 10 appointments already scheduled     Jul 18, 2018  8:15 AM CDT   NEW RETINA with Brenda Espinal MD   Eye Clinic ([facility])    [address] [facility] [address] [place] [address] [phone]              Future tests that were ordered for you today     Open Future Orders        Priority Expected Expires Ordered    Follow-Up with Cardiologist Routine 4/1/2019 4/2/2019 4/2/2018            Who to contact     Please call your clinic at [phone] to:    Ask questions about your health    Make or cancel appointments    Discuss your medicines    Learn about your test results    Speak to your doctor            Additional Information About Your Visit        [employer] Information     [employer] gives you secure access to your electronic health record. If you see a primary care provider, you can also send messages to your care team and make appointments. If you have questions, please call your primary care clinic.  If you do not have a primary care provider, please call [phone] and they will assist you.      [employer] is an electronic gateway that provides easy, online access to your medical records. With [employer], you can request a clinic appointment, read your test results, renew a prescription or communicate with your care  team.     To access your existing account, please contact your [facility] or call [phone] for assistance.        Care EveryWhere ID     This is your Care EveryWhere ID. This could be used by other organizations to access your [place] medical records  [id number]         Blood Pressure from Last 3 Encounters:   04/02/18 149/80   01/10/18 121/73   10/02/17 109/76    Weight from Last 3 Encounters:   04/02/18 106.6 kg (235 lb 1.6 oz)   01/10/18 108 kg (238 lb)   10/02/17 111.1 kg (244 lb 14.4 oz)              Today, you had the following     No orders found for display         Today's Medication Changes          These changes are accurate as of 4/2/18  2:18 PM.  If you have any questions, ask your nurse or doctor.               Start taking these medicines.        Dose/Directions    cloNIDine 0.1 MG tablet   Commonly known as:  CATAPRES   Used for:  Essential hypertension   Started by:  Michael Styles MD        Dose:  0.1 mg   Take 1 tablet (0.1 mg) by mouth 2 times daily   Quantity:  90 tablet   Refills:  3       order for DME   Used for:  Pain in extremity, unspecified extremity   Started by:  Michael Styles MD        Bilateral hand brace(s) for Carpal Tunnel disorder   Quantity:  1 Device   Refills:  1         These medicines have changed or have updated prescriptions.        Dose/Directions    mometasone 0.1 % ointment   Commonly known as:  ELOCON   This may have changed:    - when to take this  - reasons to take this   Used for:  Eczema        Apply  topically daily.   Quantity:  90 g   Refills:  1         Stop taking these medicines if you haven't already. Please contact your care team if you have questions.     aspirin 81 MG chewable tablet   Stopped by:  Eufemia Martínez MD                Where to get your medicines      These medications were sent to [id number] - [place], [place] - [address]  [address], [place] [address]     Phone:   [phone]     amLODIPine-benazepril 5-20 MG per capsule    buPROPion 200 MG 12 hr tablet    carvedilol 25 MG tablet    cloNIDine 0.1 MG tablet    simvastatin 10 MG tablet         Some of these will need a paper prescription and others can be bought over the counter.  Ask your nurse if you have questions.     Bring a paper prescription for each of these medications     order for DME                Primary Care Provider Office Phone # Fax #    Michael Styles [phone]       [address] [address]        Equal Access to Services     [facility]: Hadii aad ku hadasho Soomaali, waaxda luqadaha, qaybta kaalmada adeegyasaturnino, joshua huffman . So [facility] [phone].    ATENCIÓN: Si habla español, tiene a bee disposición servicios gratuitos de asistencia lingüística. [facility] [phone].    We comply with applicable federal civil rights laws and [place] laws. We do not discriminate on the basis of race, color, national origin, age, disability, sex, sexual orientation, or gender identity.            Thank you!     Thank you for choosing [facility]  for your care. Our goal is always to provide you with excellent care. Hearing back from our patients is one way we can continue to improve our services. Please take a few minutes to complete the written survey that you may receive in the mail after your visit with us. Thank you!             Your Updated Medication List - Protect others around you: Learn how to safely use, store and throw away your medicines at www.disposemymeds.org.          This list is accurate as of 4/2/18  2:18 PM.  Always use your most recent med list.                   Brand Name Dispense Instructions for use Diagnosis    amLODIPine-benazepril 5-20 MG per capsule    LOTREL    180 capsule    Take 1 capsule by mouth 2 times daily    Essential hypertension with goal blood pressure less than 130/85, Essential  hypertension, Ascending aortic aneurysm (H), Atrial fibrillation, unspecified type (H)       amoxicillin 500 MG capsule    AMOXIL    24 capsule    Take 4 tablets 1 hour before dental procedure    Dental anomaly       buPROPion 200 MG 12 hr tablet    WELLBUTRIN SR    180 tablet    TAKE 1 TABLET BY MOUTH 2 TIMES DAILY    Dysthymic disorder       carvedilol 25 MG tablet    COREG    180 tablet    Take 1 tablet (25 mg) by mouth 2 times daily (with meals)    Essential hypertension with goal blood pressure less than 130/85       clonazePAM 0.5 MG tablet    klonoPIN    6 tablet    Take 1 tablet (0.5 mg) by mouth nightly as needed for anxiety    Stress       cloNIDine 0.1 MG tablet    CATAPRES    90 tablet    Take 1 tablet (0.1 mg) by mouth 2 times daily    Essential hypertension       DOCUSATE SODIUM PO      Take 100 mg by mouth daily        Fe Heme Polypeptide-folic acid 12-1 MG Tabs     90 tablet    Take 1 tablet by mouth daily    Low iron       fentaNYL 50 mcg/hr 72 hr patch    DURAGESIC     Place 1 patch onto the skin every 72 hours        ferrous sulfate 325 (65 FE) MG tablet    IRON    30 tablet    Take 1 tablet (325 mg) by mouth daily (with breakfast)        fluocinonide 0.05 % ointment    LIDEX    30 g    Apply twice daily to itchy skin nodules for 1-2 weeks at a time.    Prurigo nodularis       HYDROcodone-acetaminophen  MG per tablet    NORCO     Take 1 tablet by mouth    Iron deficiency anemia due to chronic blood loss, Benign nodular prostatic hyperplasia without lower urinary tract symptoms       Iron Heme Polypeptide 12 MG Tabs    PROFERRIN ES    30 tablet    Take 12 mg/day by mouth daily    Iron deficiency anemia due to chronic blood loss       mometasone 0.1 % ointment    ELOCON    90 g    Apply  topically daily.    Eczema       NAPROXEN SODIUM PO      Take 220 mg by mouth 2 times daily        neomycin-polymyxin-hydrocortisone otic solution    CORTISPORIN    10 mL    Place 3 drops in ear(s) 4 times  daily    History of impacted ear wax       order for DME     1 Device    Walker for cardiac rehab with 4 wheels, brakes and seat    SOB (shortness of breath)       order for DME     1 Device    Bilateral hand brace(s) for Carpal Tunnel disorder    Pain in extremity, unspecified extremity       pantoprazole 40 MG EC tablet    PROTONIX    30 tablet    Take 1 tablet (40 mg) by mouth every morning    Acute post-operative pain       senna-docusate 8.6-50 MG per tablet    SENOKOT-S;PERICOLACE    100 tablet    Take 1-2 tablets by mouth 2 times daily To prevent constipation while taking narcotic pain medication. Start with 1 tablet twice daily. If no bowel movement in 24 hours, increase to 2 tablets twice daily.  Discontinue if you have loose stools or when you are no longer taking narcotics.    Bladder neck contracture       simvastatin 10 MG tablet    ZOCOR    90 tablet    Take 1 tablet (10 mg) by mouth At Bedtime    Hypercholesteremia       tacrolimus 0.1 % ointment    PROTOPIC    120 g    Apply topically as needed Apply to affected areas on body.    Other eczema, Prurigo nodularis

## 2018-04-03 LAB — INTERPRETATION ECG - MUSE: NORMAL

## 2018-05-01 DIAGNOSIS — I10 ESSENTIAL HYPERTENSION: ICD-10-CM

## 2018-05-01 RX ORDER — CLONIDINE HYDROCHLORIDE 0.1 MG/1
0.1 TABLET ORAL 2 TIMES DAILY
Qty: 180 TABLET | Refills: 3 | Status: SHIPPED | OUTPATIENT
Start: 2018-05-01 | End: 2019-03-14

## 2018-05-01 NOTE — TELEPHONE ENCOUNTER
Last Clinic Visit: 4/2/2018  [facility]  Pharmacy asking for 90 day supply.    *BP- per last visit, normal 24 hr BP

## 2018-07-18 ENCOUNTER — OFFICE VISIT (OUTPATIENT)
Dept: OPHTHALMOLOGY | Facility: CLINIC | Age: 58
End: 2018-07-18
Attending: OPHTHALMOLOGY
Payer: COMMERCIAL

## 2018-07-18 DIAGNOSIS — Z13.5 SCREENING FOR EYE CONDITION: Primary | ICD-10-CM

## 2018-07-18 DIAGNOSIS — H35.54 PATTERN DYSTROPHY OF MACULA: ICD-10-CM

## 2018-07-18 DIAGNOSIS — Z13.5 SCREENING FOR EYE CONDITION: ICD-10-CM

## 2018-07-18 DIAGNOSIS — H40.053 BORDERLINE GLAUCOMA OF BOTH EYES WITH OCULAR HYPERTENSION: ICD-10-CM

## 2018-07-18 DIAGNOSIS — H43.393 VITREOUS SYNERESIS OF BOTH EYES: Primary | ICD-10-CM

## 2018-07-18 PROCEDURE — G0463 HOSPITAL OUTPT CLINIC VISIT: HCPCS | Mod: ZF

## 2018-07-18 PROCEDURE — 92134 CPTRZ OPH DX IMG PST SGM RTA: CPT | Mod: ZF | Performed by: OPHTHALMOLOGY

## 2018-07-18 PROCEDURE — 92250 FUNDUS PHOTOGRAPHY W/I&R: CPT | Mod: ZF | Performed by: OPHTHALMOLOGY

## 2018-07-18 ASSESSMENT — REFRACTION_WEARINGRX
OS_CYLINDER: +0.50
OD_CYLINDER: +1.00
OD_AXIS: 005
OD_SPHERE: +1.00
SPECS_TYPE: COMPUTER
OD_SPHERE: +2.00
OS_AXIS: 005
OD_ADD: +2.25
OS_CYLINDER: +0.50
OS_SPHERE: +1.25
OS_ADD: +2.25
OS_AXIS: 180
OD_AXIS: 005
OS_SPHERE: +2.00
OD_CYLINDER: +1.00

## 2018-07-18 ASSESSMENT — CONF VISUAL FIELD
METHOD: COUNTING FINGERS
OS_NORMAL: 1
OD_NORMAL: 1

## 2018-07-18 ASSESSMENT — TONOMETRY
IOP_METHOD: TONOPEN
OD_IOP_MMHG: 20
OS_IOP_MMHG: 21

## 2018-07-18 ASSESSMENT — VISUAL ACUITY
CORRECTION_TYPE: GLASSES
OS_CC: 20/25
OD_CC: 20/20
OD_CC+: -2
METHOD: SNELLEN - LINEAR

## 2018-07-18 ASSESSMENT — SLIT LAMP EXAM - LIDS
COMMENTS: NORMAL
COMMENTS: NORMAL

## 2018-07-18 ASSESSMENT — CUP TO DISC RATIO
OS_RATIO: 0.6
OD_RATIO: 0.6

## 2018-07-18 ASSESSMENT — EXTERNAL EXAM - LEFT EYE: OS_EXAM: NORMAL

## 2018-07-18 ASSESSMENT — EXTERNAL EXAM - RIGHT EYE: OD_EXAM: NORMAL

## 2018-07-18 NOTE — MR AVS SNAPSHOT
After Visit Summary   7/18/2018    Kobe Buchanan    MRN: [id number]           Patient Information     Date Of Birth          1960        Visit Information        Provider Department      7/18/2018 8:15 AM Brenda Espinal MD [facility]        Today's Diagnoses     Screening for eye condition - Both Eyes           Follow-ups after your visit        Follow-up notes from your care team     Return in about 6 months (around 1/18/2019) for DFE, OCT RNFL, OCT Mac.      Your next 10 appointments already scheduled     Jan 16, 2019  7:30 AM CST   RETURN RETINA with Brenda Espinal MD   Eye Clinic ([facility])    [place]  [address] [address]  [facility] [address] [phone]              Who to contact     Please call your clinic at [phone] to:    Ask questions about your health    Make or cancel appointments    Discuss your medicines    Learn about your test results    Speak to your doctor            Additional Information About Your Visit        [employer] Information     [employer] gives you secure access to your electronic health record. If you see a primary care provider, you can also send messages to your care team and make appointments. If you have questions, please call your primary care clinic.  If you do not have a primary care provider, please call [phone] and they will assist you.      [employer] is an electronic gateway that provides easy, online access to your medical records. With [employer], you can request a clinic appointment, read your test results, renew a prescription or communicate with your care team.     To access your existing account, please contact your [facility] or call [phone] for assistance.        Care EveryWhere ID     This is your Care EveryWhere ID. This could be used by other organizations to access your [place] medical records  [id number]         Blood Pressure  from Last 3 Encounters:   04/02/18 149/80   01/10/18 121/73   10/02/17 109/76    Weight from Last 3 Encounters:   04/02/18 106.6 kg (235 lb 1.6 oz)   01/10/18 108 kg (238 lb)   10/02/17 111.1 kg (244 lb 14.4 oz)              We Performed the Following     Fundus Autofluorescence Image (FAF) OU (both eyes)     OCT Retina Spectralis OU (both eyes)          Today's Medication Changes          These changes are accurate as of 7/18/18 10:22 AM.  If you have any questions, ask your nurse or doctor.               These medicines have changed or have updated prescriptions.        Dose/Directions    mometasone 0.1 % ointment   Commonly known as:  ELOCON   This may have changed:    - when to take this  - reasons to take this   Used for:  Eczema        Apply  topically daily.   Quantity:  90 g   Refills:  1                Primary Care Provider Office Phone # Fax #    Michael Styles [phone]       5 [address] [address]        Equal Access to Services     [facility]: Hadii polly sin Sokatherine, waaxda luqadaha, qaybta kaalmasaturnino fry, joshua huffman . So [facility] [phone].    ATENCIÓN: Si habla español, tiene a bee disposición servicios gratuitos de asistencia lingüística. Llame al [phone].    We comply with applicable federal civil rights laws and [place] laws. We do not discriminate on the basis of race, color, national origin, age, disability, sex, sexual orientation, or gender identity.            Thank you!     Thank you for choosing EYE CLINIC  for your care. Our goal is always to provide you with excellent care. Hearing back from our patients is one way we can continue to improve our services. Please take a few minutes to complete the written survey that you may receive in the mail after your visit with us. Thank you!             Your Updated Medication List - Protect others around you: Learn how to safely use, store and throw away  your medicines at www.disposemymeds.org.          This list is accurate as of 7/18/18 10:22 AM.  Always use your most recent med list.                   Brand Name Dispense Instructions for use Diagnosis    amLODIPine-benazepril 5-20 MG per capsule    LOTREL    180 capsule    Take 1 capsule by mouth 2 times daily    Essential hypertension with goal blood pressure less than 130/85, Essential hypertension, Ascending aortic aneurysm (H), Atrial fibrillation, unspecified type (H)       amoxicillin 500 MG capsule    AMOXIL    24 capsule    Take 4 tablets 1 hour before dental procedure    Dental anomaly       buPROPion 200 MG 12 hr tablet    WELLBUTRIN SR    180 tablet    TAKE 1 TABLET BY MOUTH 2 TIMES DAILY    Dysthymic disorder       carvedilol 25 MG tablet    COREG    180 tablet    Take 1 tablet (25 mg) by mouth 2 times daily (with meals)    Essential hypertension with goal blood pressure less than 130/85       clonazePAM 0.5 MG tablet    klonoPIN    6 tablet    Take 1 tablet (0.5 mg) by mouth nightly as needed for anxiety    Stress       cloNIDine 0.1 MG tablet    CATAPRES    180 tablet    Take 1 tablet (0.1 mg) by mouth 2 times daily    Essential hypertension       DOCUSATE SODIUM PO      Take 100 mg by mouth daily        Fe Heme Polypeptide-folic acid 12-1 MG Tabs     90 tablet    Take 1 tablet by mouth daily    Low iron       fentaNYL 50 mcg/hr 72 hr patch    DURAGESIC     Place 1 patch onto the skin every 72 hours        ferrous sulfate 325 (65 Fe) MG tablet    IRON    30 tablet    Take 1 tablet (325 mg) by mouth daily (with breakfast)        fluocinonide 0.05 % ointment    LIDEX    30 g    Apply twice daily to itchy skin nodules for 1-2 weeks at a time.    Prurigo nodularis       HYDROcodone-acetaminophen  MG per tablet    NORCO     Take 1 tablet by mouth    Iron deficiency anemia due to chronic blood loss, Benign nodular prostatic hyperplasia without lower urinary tract symptoms       Iron Heme Polypeptide  12 MG Tabs    PROFERRIN ES    30 tablet    Take 12 mg/day by mouth daily    Iron deficiency anemia due to chronic blood loss       mometasone 0.1 % ointment    ELOCON    90 g    Apply  topically daily.    Eczema       NAPROXEN SODIUM PO      Take 220 mg by mouth 2 times daily        neomycin-polymyxin-hydrocortisone otic solution    CORTISPORIN    10 mL    Place 3 drops in ear(s) 4 times daily    History of impacted ear wax       order for DME     1 Device    Walker for cardiac rehab with 4 wheels, brakes and seat    SOB (shortness of breath)       order for DME     1 Device    Bilateral hand brace(s) for Carpal Tunnel disorder    Pain in extremity, unspecified extremity       pantoprazole 40 MG EC tablet    PROTONIX    30 tablet    Take 1 tablet (40 mg) by mouth every morning    Acute post-operative pain       senna-docusate 8.6-50 MG per tablet    SENOKOT-S;PERICOLACE    100 tablet    Take 1-2 tablets by mouth 2 times daily To prevent constipation while taking narcotic pain medication. Start with 1 tablet twice daily. If no bowel movement in 24 hours, increase to 2 tablets twice daily.  Discontinue if you have loose stools or when you are no longer taking narcotics.    Bladder neck contracture       simvastatin 10 MG tablet    ZOCOR    90 tablet    Take 1 tablet (10 mg) by mouth At Bedtime    Hypercholesteremia       tacrolimus 0.1 % ointment    PROTOPIC    120 g    Apply topically as needed Apply to affected areas on body.    Other eczema, Prurigo nodularis

## 2018-07-18 NOTE — PROGRESS NOTES
CC: no visual acuity changes noted. Has family history of Age related macular degeneration vs adult onset vitelliform dystrophy    HPI: Kobe Buchanan is a  58 year old year-old patient with a family history for Age related macular degeneration and would like a screening for this     His mother is patient of Dr. Espinal and was diagnosed with pattern dystrophy    Retinal Imaging:  OCT  7-18-18  RE: superior outer retinal disruption  LE: normal    Autofluorescence 7-18-18  Right eye normal  Left eye normal    Assessment & Plan:  1. Glaucoma suspect   - increased high disc/ ratio   - IOP acceptable today    - baseline OCT RNFL next visit, and OVF 24-2    2. 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 Increased choroidal thickness   -  Monitor    4. Vitreous syneresis    Return to Clinic 1 year with OCT Mac and OCT RNFL both eyes    Sphere Cylinder Axis Add   Right +1.00 +1.00 010 +2.50   Left +1.00 +0.75 005 +2.50       baseline OCT RNFL next visit, and OVF 24-2 and macula Optical Coherence Tomography in 6 months    Adolfo Brown MD, PhD  Vitreoretinal Surgery Fellow    ~~~~~~~~~~~~~~~~~~~~~~~~~~~~~~~~~~   Complete documentation of historical and exam elements from today's encounter can be found in the full encounter summary report (not reduplicated in this progress note).  I personally obtained the chief complaint(s) and history of present illness.  I confirmed and edited as necessary the review of systems, past medical/surgical history, family history, social history, and examination findings as documented by others; and I examined the patient myself.  I personally reviewed the relevant tests, images, and reports as documented above.  I personally reviewed the ophthalmic test(s) associated with this encounter, agree with the interpretation(s) as documented by the resident/fellow, and have edited the corresponding report(s)  as necessary.   I formulated and edited as necessary the assessment and plan and discussed the findings and management plan with the patient and family    Brenda Espinal MD  .  Retina Service   Department of Ophthalmology and Visual Neurosciences   [facility]  Phone: [phone]   Fax: [phone]

## 2018-07-18 NOTE — NURSING NOTE
Chief Complaints and History of Present Illnesses   Patient presents with     Consult For     family hx of AMD     HPI    Affected eye(s):  Both   Symptoms:        Frequency:  Constant       Do you have eye pain now?:  No      Comments:  States va is the same since last visit  No F&F  No red watery or dry  Nai Ledezma COT 8:28 AM July 18, 2018

## 2018-08-29 ENCOUNTER — TELEPHONE (OUTPATIENT)
Dept: INTERNAL MEDICINE | Facility: CLINIC | Age: 58
End: 2018-08-29

## 2018-08-29 DIAGNOSIS — G89.29 OTHER CHRONIC PAIN: Primary | ICD-10-CM

## 2018-08-29 NOTE — TELEPHONE ENCOUNTER
[facility]    Phone Message    May a detailed message be left on voicemail: yes    Reason for Call: Order(s): Other:   Reason for requested: Pt's current pain clinic is closing, and he is requesting a referral to the [facility] pain clinic. Please give him a call once it has been placed.   Date needed: N/A  Provider name: Dr. Styles       Action Taken: Message routed to:  [facility] ([facility] ERNST

## 2018-08-31 NOTE — TELEPHONE ENCOUNTER
Placed Pain Clinic this referral    AMEYA Styles    Pt called and he has appt in pain clinic in Oct 2018.  Chelsey Blake RN 8:24 AM on 9/4/2018.

## 2018-09-18 ENCOUNTER — TRANSFERRED RECORDS (OUTPATIENT)
Dept: HEALTH INFORMATION MANAGEMENT | Facility: CLINIC | Age: 58
End: 2018-09-18

## 2018-09-27 ENCOUNTER — OFFICE VISIT (OUTPATIENT)
Dept: FAMILY MEDICINE | Facility: CLINIC | Age: 58
End: 2018-09-27
Payer: COMMERCIAL

## 2018-09-27 VITALS
HEART RATE: 67 BPM | SYSTOLIC BLOOD PRESSURE: 159 MMHG | TEMPERATURE: 97.5 F | OXYGEN SATURATION: 98 % | BODY MASS INDEX: 35.81 KG/M2 | HEIGHT: 69 IN | DIASTOLIC BLOOD PRESSURE: 90 MMHG | WEIGHT: 241.8 LBS | RESPIRATION RATE: 20 BRPM

## 2018-09-27 DIAGNOSIS — R10.13 ABDOMINAL PAIN, EPIGASTRIC: Primary | ICD-10-CM

## 2018-09-27 DIAGNOSIS — F43.9 STRESS: ICD-10-CM

## 2018-09-27 RX ORDER — MORPHINE SULFATE 30 MG/1
30 TABLET ORAL EVERY 6 HOURS PRN
Qty: 120 TABLET | Refills: 0 | Status: SHIPPED | OUTPATIENT
Start: 2018-09-27 | End: 2018-10-23

## 2018-09-27 RX ORDER — CLONAZEPAM 0.5 MG/1
0.5 TABLET ORAL 3 TIMES DAILY
Qty: 90 TABLET | Refills: 0 | Status: SHIPPED | OUTPATIENT
Start: 2018-09-27 | End: 2018-11-08

## 2018-09-27 NOTE — MR AVS SNAPSHOT
After Visit Summary   9/27/2018    Kobe Buchanan    MRN: [id number]           Patient Information     Date Of Birth          1960        Visit Information        Provider Department      9/27/2018 8:00 AM Riley Fernandez MD [facility]        Today's Diagnoses     Abdominal pain, epigastric    -  1    Stress           Follow-ups after your visit        Your next 10 appointments already scheduled     Oct 02, 2018  7:35 AM CDT   (Arrive by 7:20 AM)   Return Visit with Michael Styles MD   [facility] ([facility])    [address] [place] [id number] [phone]            Oct 12, 2018  8:20 AM CDT   (Arrive by 8:05 AM)   New Patient Visit with ALAYNA Ayon [facility] ([facility])    [address] [place] [id number] [phone]            Jan 16, 2019  7:30 AM CST   RETURN RETINA with Brenda Espinal MD   [facility] Clinic ([facility])    [address]  [facility] [id number] [phone]              Who to contact     Please call your clinic at [phone] to:    Ask questions about your health    Make or cancel appointments    Discuss your medicines    Learn about your test results    Speak to your doctor            Additional Information About Your Visit        [employer] Information     [employer] gives you secure access to your electronic health record. If you see a primary care provider, you can also send messages to your care team and make appointments. If you have questions, please call your primary care clinic.  If you do not have a primary care provider, please call [phone] and they will assist you.      [employer] is an electronic gateway that provides easy, online access to your medical records. With [employer], you can request a  "clinic appointment, read your test results, renew a prescription or communicate with your care team.     To access your existing account, please contact your [facility] or call [phone] for assistance.        Care EveryWhere ID     This is your Care EveryWhere ID. This could be used by other organizations to access your [place] medical records  [id number]        Your Vitals Were     Pulse Temperature Respirations Height Pulse Oximetry BMI (Body Mass Index)    67 97.5  F (36.4  C) 20 5' 8.5\" (174 cm) 98% 36.23 kg/m2       Blood Pressure from Last 3 Encounters:   09/27/18 159/90   04/02/18 149/80   01/10/18 121/73    Weight from Last 3 Encounters:   09/27/18 241 lb 12.8 oz (109.7 kg)   04/02/18 235 lb 1.6 oz (106.6 kg)   01/10/18 238 lb (108 kg)              Today, you had the following     No orders found for display         Today's Medication Changes          These changes are accurate as of 9/27/18 10:59 AM.  If you have any questions, ask your nurse or doctor.               Start taking these medicines.        Dose/Directions    morphine 30 MG IR tablet   Commonly known as:  MSIR   Used for:  Abdominal pain, epigastric   Started by:  Riley Fernandez MD        Dose:  30 mg   Take 1 tablet (30 mg) by mouth every 6 hours as needed for severe pain   Quantity:  120 tablet   Refills:  0         These medicines have changed or have updated prescriptions.        Dose/Directions    clonazePAM 0.5 MG tablet   Commonly known as:  klonoPIN   This may have changed:    - when to take this  - reasons to take this   Used for:  Stress   Changed by:  Riley Fernandez MD        Dose:  0.5 mg   Take 1 tablet (0.5 mg) by mouth 3 times daily   Quantity:  90 tablet   Refills:  0       mometasone 0.1 % ointment   Commonly known as:  ELOCON   This may have changed:    - when to take this  - reasons to take this   Used for:  Eczema        Apply  topically daily.   Quantity:  90 g   Refills:  1    "         Where to get your medicines      Some of these will need a paper prescription and others can be bought over the counter.  Ask your nurse if you have questions.     Bring a paper prescription for each of these medications     clonazePAM 0.5 MG tablet    morphine 30 MG IR tablet               Information about OPIOIDS     PRESCRIPTION OPIOIDS: WHAT YOU NEED TO KNOW   We gave you an opioid (narcotic) pain medicine. It is important to manage your pain, but opioids are not always the best choice. You should first try all the other options your care team gave you. Take this medicine for as short a time (and as few doses) as possible.    Some activities can increase your pain, such as bandage changes or therapy sessions. It may help to take your pain medicine 30 to 60 minutes before these activities. Reduce your stress by getting enough sleep, working on hobbies you enjoy and practicing relaxation or meditation. Talk to your care team about ways to manage your pain beyond prescription opioids.    These medicines have risks:    DO NOT drive when on new or higher doses of pain medicine. These medicines can affect your alertness and reaction times, and you could be arrested for driving under the influence (DUI). If you need to use opioids long-term, talk to your care team about driving.    DO NOT operate heavy machinery    DO NOT do any other dangerous activities while taking these medicines.    DO NOT drink any alcohol while taking these medicines.     If the opioid prescribed includes acetaminophen, DO NOT take with any other medicines that contain acetaminophen. Read all labels carefully. Look for the word  acetaminophen  or  Tylenol.  Ask your pharmacist if you have questions or are unsure.    You can get addicted to pain medicines, especially if you have a history of addiction (chemical, alcohol or substance dependence). Talk to your care team about ways to reduce this risk.    All opioids tend to cause  constipation. Drink plenty of water and eat foods that have a lot of fiber, such as fruits, vegetables, prune juice, apple juice and high-fiber cereal. Take a laxative (Miralax, milk of magnesia, Colace, Senna) if you don t move your bowels at least every other day. Other side effects include upset stomach, sleepiness, dizziness, throwing up, tolerance (needing more of the medicine to have the same effect), physical dependence and slowed breathing.    Store your pills in a secure place, locked if possible. We will not replace any lost or stolen medicine. If you don t finish your medicine, please throw away (dispose) as directed by your pharmacist. The [place] Pollution Control Agency has more information about safe disposal: [email or web address]         Primary Care Provider Office Phone # Fax #    Michael Styles [phone]        [address] [address]        Equal Access to Services     JOHN FALK : Hadii aad ku hadasho Soomaali, waaxda luqadaha, qaybta kaalmada adeegyada, waxay narain haygiann jatin huffman . So [facility] [phone].    ATENCIÓN: Si habla español, tiene a bee disposición servicios gratuitos de asistencia lingüística. [facility] [phone].    We comply with applicable federal civil rights laws and [place] laws. We do not discriminate on the basis of race, color, national origin, age, disability, sex, sexual orientation, or gender identity.            Thank you!     Thank you for choosing [facility]  for your care. Our goal is always to provide you with excellent care. Hearing back from our patients is one way we can continue to improve our services. Please take a few minutes to complete the written survey that you may receive in the mail after your visit with us. Thank you!             Your Updated Medication List - Protect others around you: Learn how to safely use, store and throw away  your medicines at www.disposemymeds.org.          This list is accurate as of 9/27/18 10:59 AM.  Always use your most recent med list.                   Brand Name Dispense Instructions for use Diagnosis    amLODIPine-benazepril 5-20 MG per capsule    LOTREL    180 capsule    Take 1 capsule by mouth 2 times daily    Essential hypertension with goal blood pressure less than 130/85, Essential hypertension, Ascending aortic aneurysm (H), Atrial fibrillation, unspecified type (H)       amoxicillin 500 MG capsule    AMOXIL    24 capsule    Take 4 tablets 1 hour before dental procedure    Dental anomaly       buPROPion 200 MG 12 hr tablet    WELLBUTRIN SR    180 tablet    TAKE 1 TABLET BY MOUTH 2 TIMES DAILY    Dysthymic disorder       carvedilol 25 MG tablet    COREG    180 tablet    Take 1 tablet (25 mg) by mouth 2 times daily (with meals)    Essential hypertension with goal blood pressure less than 130/85       clonazePAM 0.5 MG tablet    klonoPIN    90 tablet    Take 1 tablet (0.5 mg) by mouth 3 times daily    Stress       cloNIDine 0.1 MG tablet    CATAPRES    180 tablet    Take 1 tablet (0.1 mg) by mouth 2 times daily    Essential hypertension       DOCUSATE SODIUM PO      Take 100 mg by mouth daily        Fe Heme Polypeptide-folic acid 12-1 MG Tabs     90 tablet    Take 1 tablet by mouth daily    Low iron       fentaNYL 50 mcg/hr 72 hr patch    DURAGESIC     Place 1 patch onto the skin every 72 hours        ferrous sulfate 325 (65 Fe) MG tablet    IRON    30 tablet    Take 1 tablet (325 mg) by mouth daily (with breakfast)        fluocinonide 0.05 % ointment    LIDEX    30 g    Apply twice daily to itchy skin nodules for 1-2 weeks at a time.    Prurigo nodularis       HYDROcodone-acetaminophen  MG per tablet    NORCO     Take 1 tablet by mouth    Iron deficiency anemia due to chronic blood loss, Benign nodular prostatic hyperplasia without lower urinary tract symptoms       Iron Heme Polypeptide 12 MG Tabs     PROFERRIN ES    30 tablet    Take 12 mg/day by mouth daily    Iron deficiency anemia due to chronic blood loss       mometasone 0.1 % ointment    ELOCON    90 g    Apply  topically daily.    Eczema       morphine 30 MG IR tablet    MSIR    120 tablet    Take 1 tablet (30 mg) by mouth every 6 hours as needed for severe pain    Abdominal pain, epigastric       NAPROXEN SODIUM PO      Take 220 mg by mouth 2 times daily        neomycin-polymyxin-hydrocortisone otic solution    CORTISPORIN    10 mL    Place 3 drops in ear(s) 4 times daily    History of impacted ear wax       order for DME     1 Device    Walker for cardiac rehab with 4 wheels, brakes and seat    SOB (shortness of breath)       order for DME     1 Device    Bilateral hand brace(s) for Carpal Tunnel disorder    Pain in extremity, unspecified extremity       pantoprazole 40 MG EC tablet    PROTONIX    30 tablet    Take 1 tablet (40 mg) by mouth every morning    Acute post-operative pain       senna-docusate 8.6-50 MG per tablet    SENOKOT-S;PERICOLACE    100 tablet    Take 1-2 tablets by mouth 2 times daily To prevent constipation while taking narcotic pain medication. Start with 1 tablet twice daily. If no bowel movement in 24 hours, increase to 2 tablets twice daily.  Discontinue if you have loose stools or when you are no longer taking narcotics.    Bladder neck contracture       simvastatin 10 MG tablet    ZOCOR    90 tablet    Take 1 tablet (10 mg) by mouth At Bedtime    Hypercholesteremia       tacrolimus 0.1 % ointment    PROTOPIC    120 g    Apply topically as needed Apply to affected areas on body.    Other eczema, Prurigo nodularis

## 2018-09-27 NOTE — PROGRESS NOTES
HPI:       Kobe Buchanan is a 58 year old  male who presents for multiple problems. He has a long and complex history at the [place]. His long term pain control has been previously managed by a [facility], but apparently the pain clinic is closing, and the patient is no longer able to get his medications there.  Is awaiting an appointment at the pain clinic.   Patient main problem is degenerative joint disease affecting the nex. He has chronic neck pain, and has been controlled with pain medication for several years. Was on up to 150 mg fentanyl patch, and has had multiple long term dosing with pain medications.  Patient is aware that long term narcotics is not a good idea, and is pursuing the possibility of neck surgery and fusion.  He will need to balance the effect of surgery versus long term pain meds.   Patient is on disability for mental health problems. His insurance is limited to medicare, and he has now bumped into his 'Leftronic hole' where he is responsible for his next $5000. He does not have the money to purchase fentanyl patches.  Patient wants pain control.   He also has a substantial history of depression and anxiety.  He is on buproprion 200 mg bid, which he thinks is a great pill. He is still taking 3-4 clonazepam per day. He knows he has to decrease this medicine as well. Has already tried prozac, zoloft, paxil, but found problems with each of these. Likes the buproprion, but it is not having good control of his anxiety.  Is unable to afford a new medicine such as velazadone at $250 per month.    Patient is limited financially.  States he has little money to pay for his medicines.  Wants the cheapest form of medicine we can find.    Degenerative neck pain.   Is currently on fentanyl patch 25 nmg/hr and also 6 Hydrocodone  per day.r tabs per day.   Also has a major depressive disorder, and has been treated with clonezapam 0.5 four tabs per day. Is also taking burproprion.     He has a sister who is an MD in [place].   Addiction to narcotics- patient is chronically taking narcotics for chronic pain. I have discussed his with him at length.   Anxiety/depression- will hold with juan carlos at this time. He had problems with libido and fatigue with other SSRIs. Have discussed the need to see a psychiatrist, and possible venlafaxine, but patient states he doesn't have the money to see a psychiatrist at this time, and doesn't have the money   Gastric bypass - 2005.     Has MRI on Monday. This is in follow up for the surgeon and the neck pain. He does not want to pursue surgery, but realizes that it is becoming impossible for him to continue with the long term narcotics.         Unemployed, on disability for depression.   FH- Apparently father has a similar problem with his back, and is also on chronic narcotics. Father is with the patient today, but we did not discuss his medical problems.                PMHX:   Current Medications:   Current Outpatient Prescriptions   Medication Sig Dispense Refill     amLODIPine-benazepril (LOTREL) 5-20 MG per capsule Take 1 capsule by mouth 2 times daily 180 capsule 3     buPROPion (WELLBUTRIN SR) 200 MG 12 hr tablet TAKE 1 TABLET BY MOUTH 2 TIMES DAILY 180 tablet 3     carvedilol (COREG) 25 MG tablet Take 1 tablet (25 mg) by mouth 2 times daily (with meals) 180 tablet 3     clonazePAM (KLONOPIN) 0.5 MG tablet Take 1 tablet (0.5 mg) by mouth nightly as needed for anxiety 6 tablet 0     cloNIDine (CATAPRES) 0.1 MG tablet Take 1 tablet (0.1 mg) by mouth 2 times daily 180 tablet 3     Fe Heme Polypeptide-folic acid 12-1 MG TABS Take 1 tablet by mouth daily 90 tablet 3     fentaNYL (DURAGESIC) 50 mcg/hr patch 72 hr Place 1 patch onto the skin every 72 hours       ferrous sulfate 325 (65 FE) MG tablet Take 1 tablet (325 mg) by mouth daily (with breakfast) 30 tablet 3     amoxicillin (AMOXIL) 500 MG capsule Take 4 tablets 1 hour before dental procedure (Patient  not taking: Reported on 9/27/2018) 24 capsule 4     DOCUSATE SODIUM PO Take 100 mg by mouth daily        fluocinonide (LIDEX) 0.05 % ointment Apply twice daily to itchy skin nodules for 1-2 weeks at a time. 30 g 3     HYDROcodone-acetaminophen (NORCO)  MG per tablet Take 1 tablet by mouth       Iron Heme Polypeptide (PROFERRIN ES) 12 MG TABS Take 12 mg/day by mouth daily 30 tablet 3     mometasone (ELOCON) 0.1 % ointment Apply  topically daily. (Patient taking differently: Apply topically as needed ) 90 g 1     NAPROXEN SODIUM PO Take 220 mg by mouth 2 times daily       neomycin-polymyxin-hydrocortisone (CORTISPORIN) otic solution Place 3 drops in ear(s) 4 times daily 10 mL 3     order for DME Bilateral hand brace(s) for Carpal Tunnel disorder 1 Device 1     order for DME Walker for cardiac rehab with 4 wheels, brakes and seat 1 Device 0     pantoprazole (PROTONIX) 40 MG enteric coated tablet Take 1 tablet (40 mg) by mouth every morning 30 tablet 3     senna-docusate (SENOKOT-S;PERICOLACE) 8.6-50 MG per tablet Take 1-2 tablets by mouth 2 times daily To prevent constipation while taking narcotic pain medication. Start with 1 tablet twice daily. If no bowel movement in 24 hours, increase to 2 tablets twice daily.  Discontinue if you have loose stools or when you are no longer taking narcotics. 100 tablet 0     simvastatin (ZOCOR) 10 MG tablet Take 1 tablet (10 mg) by mouth At Bedtime 90 tablet 3     tacrolimus (PROTOPIC) 0.1 % ointment Apply topically as needed Apply to affected areas on body. 120 g 11       Existing Problems  Patient Active Problem List   Diagnosis     Essential hypertension     Hyperlipidemia     Abdominal pain, unspecified abdominal location     Ascending aortic aneurysm (H)     Medication refill- do not delete      Pain medication agreement signed     S/P shoulder replacement     BPPV (benign paroxysmal positional vertigo)     Shoulder joint pain     Obstructive sleep apnea     Obesity      "Rhinitis     Right knee pain     Status post coronary angiogram     Long-term (current) use of anticoagulants [Z79.01]     Atrial fibrillation (H) [I48.91]     Trigger finger of right thumb     Dizzy       Allergies:  Allergies   Allergen Reactions     No Known Allergies        Previous labs:  Lab Results   Component Value Date    WBC 6.8 01/10/2018    HGB 15.5 01/10/2018    HCT 45.3 01/10/2018     01/10/2018    CHOL 179 03/22/2018    TRIG 255 (H) 03/22/2018    HDL 39 (L) 03/22/2018    ALT 23 01/10/2018    AST 20 01/10/2018     01/10/2018    BUN 21 01/10/2018    CO2 28 01/10/2018    TSH 2.96 01/10/2018    PSA 0.18 03/01/2017    INR 0.97 03/22/2018               Review of Systems:    CONSTITUTIONAL: no fatigue, no unexpected change in weight  SKIN: no worrisome rashes, no worrisome moles, no worrisome lesions  EYES: no acute vision problems or changes  ENT: no ear problems, no mouth problems, no throat problems  RESP: no significant cough, no shortness of breath  CV: no chest pain, no palpitations, no new or worsening peripheral edema  GI: no nausea, no vomiting, no constipation, no diarrhea          Physical Exam:     Vitals:    09/27/18 0801   BP: 159/90   Pulse: 67   Resp: 20   Temp: 97.5  F (36.4  C)   SpO2: 98%   Weight: 241 lb 12.8 oz (109.7 kg)   Height: 5' 8.5\" (174 cm)     Body mass index is 36.23 kg/(m^2).    GENERAL:alert, well hydrated, no distress  EYES: Eyes grossly normal to inspection, extraocular movements - intact, and PERRL  HENT: ear canals- normal; TMs- normal; Nose- normal; Mouth- no ulcers, no lesions  NECK: no tenderness, no adenopathy, no asymmetry, no masses, no stiffness; thyroid- normal to palpation  RESP: lungs clear to auscultation - no rales, no rhonchi, no wheezes  CV: regular rates and rhythm, normal S1 S2, no S3 or S4 and no murmur, no click or rub -  ABDOMEN: soft, no tenderness, no  hepatosplenomegaly, no masses, normal bowel sounds             Labs and Procedures "     Office Visit on 04/02/2018   Component Date Value Ref Range Status     Interpretation ECG 04/02/2018 Click View Image link to view waveform and result   Final              Assessment and Plan     1.Chronic pain.  Patient has a long history of degenerative joint disease and neck pain controlled with large doses of chronic narcotics.  We have discussed that we are not able to continue narcotic medicines on a long term basis, and will need to refer him to a chronic pain clinic. Patient understands, and already has an appointment a the [place] clinic in two weeks.  The doctor previously treating his pain has now retired.    2. I will provide him with narcotis at lower than his current level for one month.  Patient will need to pursue further treatment with the pain clinic. If not sucessful at the [facility], will refer to the [facility] chronic pain clinic.  It is a difficult decision to consider meds versus surgery, but patient may need to discuss this with his surgeon.    3.Anxiety and depression - The depression appears satisfactory, but the anxiety component is not well controlled with the buproprion. Would recommend switching to a new serotonin specific reuptake inhibitor such as vilazodone with fewer side effects. Patient can't afford this and refuses at this time.  Will recommend referral to a mental health provider. Patient is resisting this at this time because of cost. I have explained to the patient that better control of the anxiety may allow him to cope more effectively with the neck pain.   . 4 Patient has a long history of medical problems.  He is very well versed in seeking medical care and has high medical literacy.  He wants to come to this clinic because it is easier to stop by and  pain med prescription rather than go down to the [place].  I have suggested he also bring his primary care, including BP control, and other problems here for management.    5. Have discussed possible role with the  pain clinic. He will need his narcotic management at the pain clinic, and so the clinic may not be able to fulfill his expectations as a convenient place to  prescriptions.  Have provided one month supply of morphine, since this is less expensive than the fentanyl patches. Patient will need this to be transferred to the pain clinic.     6. Have provide clonazepam for one month. Patient will need to pursue further treatment of anxiety with mental health. Will manage his anxiety/depresson in cooperation with a mental health provider.         Options for treatment and follow-up care were reviewed with the patient and/or guardian. Kobe Buchanan and/or guardian engaged in the decision making process and verbalized understanding of the options discussed and agreed with the final plan.    Riley Fernandez MD

## 2018-10-01 ENCOUNTER — TRANSFERRED RECORDS (OUTPATIENT)
Dept: HEALTH INFORMATION MANAGEMENT | Facility: CLINIC | Age: 58
End: 2018-10-01

## 2018-10-12 ENCOUNTER — OFFICE VISIT (OUTPATIENT)
Dept: ANESTHESIOLOGY | Facility: CLINIC | Age: 58
End: 2018-10-12
Payer: COMMERCIAL

## 2018-10-12 VITALS
DIASTOLIC BLOOD PRESSURE: 86 MMHG | WEIGHT: 241 LBS | SYSTOLIC BLOOD PRESSURE: 137 MMHG | HEART RATE: 65 BPM | BODY MASS INDEX: 35.7 KG/M2 | HEIGHT: 69 IN | RESPIRATION RATE: 16 BRPM

## 2018-10-12 DIAGNOSIS — F11.90 CHRONIC, CONTINUOUS USE OF OPIOIDS: ICD-10-CM

## 2018-10-12 DIAGNOSIS — M47.812 SPONDYLOSIS OF CERVICAL REGION WITHOUT MYELOPATHY OR RADICULOPATHY: Primary | ICD-10-CM

## 2018-10-12 ASSESSMENT — PAIN SCALES - GENERAL: PAINLEVEL: MODERATE PAIN (4)

## 2018-10-12 NOTE — LETTER
10/12/2018       RE: Kobe Buchanan  [address] [place] [address] [address]  [place] [address]     Dear Colleague,    Thank you for referring your patient, Kobe Buchanan, to the [facility] CL[facility] FOR COMPREHENSIVE PAIN MANAGEMENT at [facility]. Please see a copy of my visit note below.    I had the pleasure of meeting Mr. Kobe Buchanan on 10/12/2018 in the outpatient pain clinic in consult for Dr. Styles with regards to his chronic neck pain.   HPI:  Patient presents with past medical history of cervical spondylosis and degenerative disc disease, chronic narcotic use, and depression and borderline personality disorder; he is here today for evaluation of chronic medication management.     His long term pain control has been previously managed by Dr. Sahil Welch, however, the pain clinic has closed due to inappropriate prescribing practices and the patient is no longer able to get his medications there.    Patient has chronic neck pain, and has been controlled with pain medication for several years. Patient reports hydrocodone initiated in 2008; he was prescribed 3-4 tablets/day for several years. Due to request for an increase of this dose, patient was referred to  Pain Clinic. He underwent a pain psychology evaluation which determined patient's reluctance to participate in comprehensive management. Patient then consulted with [facility] and was placed on fentanyl. 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 Was most recently taking fentanyl 75 mcg Q72 hours and Norco  mg, 10 tablets/day (280 MME/day). Following his presentation to primary care provider, he was prescribed morphine IR 30 mg qid prn (120 MME) on 9/28/18.  Patient is satisfied at this dose currently, primarily for financial reasons, and is hopeful that either myself, or his PCP will continue to manage this for the time being. Patient is aware that long  term narcotics is not a good idea, and is pursuing the possibility of neck surgery and fusion. Dr. Puente offered patient a C2-7 fusion in 2014 but encouraged him to delay as long as possible. Patient denies upper extremity weakness or radicular symptoms. He states he also underwent multiple injections with ILBNC (facet joint and RFA)- all of which did not provide significant pain relief.   ?  Past Medical History:   Diagnosis Date     Anxiety      Ascending aortic aneurysm (H)      Bicuspid aortic valve      BPPV (benign paroxysmal positional vertigo) 7/11/2014     Chronic narcotic use      Chronic neck pain      Chronic osteoarthritis      Degenerative joint disease      Depression      Hyperlipidemia 4/10/2012     Hypertension      Multiple stiff joints      Neck injuries      Obesity 2/9/2015     Skin picking habit      Sleep apnea     does not use cpap    past medical history reviewed with patient.   Past Surgical History:   Procedure Laterality Date     ARTHROPLASTY SHOULDER  4/15/2014    Procedure: Left Total Shoulder Arthroplasty ;  Surgeon: Analilia Aceves MD;  [facility]     ARTHROPLASTY SHOULDER Right 8/26/2014    Procedure: ARTHROPLASTY SHOULDER;  Surgeon: Analilia Aceves MD;  Location:  OR     BYPASS GASTRIC TAVO-EN-Y, LIVER BIOPSY, COMBINED  8/8/2005     C HAND/FINGER SURGERY UNLISTED       C SHOULDER SURG PROC UNLISTED       COLONOSCOPY  6/30/2014    Procedure: COMBINED COLONOSCOPY, SINGLE BIOPSY/POLYPECTOMY BY BIOPSY;  Surgeon: Chester Patton MD;  [facility]     CYSTOSCOPY, BLADDER NECK CUTS, COMBINED N/A 7/18/2016    Procedure: COMBINED CYSTOSCOPY, BLADDER NECK CUTS;  Surgeon: Ritu Leslie MD;  [facility]     ESOPHAGOSCOPY, GASTROSCOPY, DUODENOSCOPY (EGD), COMBINED  6/30/2014    Procedure: COMBINED ESOPHAGOSCOPY, GASTROSCOPY, DUODENOSCOPY (EGD), BIOPSY SINGLE OR MULTIPLE;  Surgeon: Chester Patton MD;  [facility]     EXCISE MASS FINGER   6/14/2011    Procedure:EXCISE MASS FINGER; Middle Flexor Cyst; Surgeon:SHAYY RUSSELL; Location:[facility] OR     HAND SURGERY      excision of tendon cyst on left hand     HC VASCULAR SURGERY PROCEDURE UNLIST       LASER HOLMIUM LITHOTRIPSY BLADDER N/A 10/15/2014    Procedure: LASER HOLMIUM LITHOTRIPSY BLADDER;  Surgeon: Sahil Taveras MD;  Location: [facility] OR     LASER KTP GREEN LIGHT PHOTOSELECTIVE VAPORIZATION PROSTATE  1/23/2014    Procedure: LASER KTP GREEN LIGHT PHOTOSELECTIVE VAPORIZATION PROSTATE;  Greenlight Photovaporization Of Prostate  ;  Surgeon: Sahil Taveras MD;  Location: [facility] OR     RELEASE TRIGGER FINGER Right 3/31/2017    Procedure: RELEASE TRIGGER FINGER;  Surgeon: Juan Carlos Blunt MD;  [facility] OR     REPAIR ANEURYSM ASCENDING AORTA N/A 10/4/2016    Procedure: REPAIR ANEURYSM ASCENDING AORTA;  Surgeon: Mckenzie Townsend MD;  [facility]     TURP  2014    past surgical history reviewed with patient.   Medications:  Current Outpatient Prescriptions   Medication     amLODIPine-benazepril (LOTREL) 5-20 MG per capsule     buPROPion (WELLBUTRIN SR) 200 MG 12 hr tablet     carvedilol (COREG) 25 MG tablet     clonazePAM (KLONOPIN) 0.5 MG tablet     cloNIDine (CATAPRES) 0.1 MG tablet     DOCUSATE SODIUM PO     Fe Heme Polypeptide-folic acid 12-1 MG TABS     fentaNYL (DURAGESIC) 50 mcg/hr patch 72 hr     ferrous sulfate 325 (65 FE) MG tablet     fluocinonide (LIDEX) 0.05 % ointment     HYDROcodone-acetaminophen (NORCO)  MG per tablet     Iron Heme Polypeptide (PROFERRIN ES) 12 MG TABS     mometasone (ELOCON) 0.1 % ointment     morphine (MSIR) 30 MG IR tablet     NAPROXEN SODIUM PO     neomycin-polymyxin-hydrocortisone (CORTISPORIN) otic solution     order for DME     order for DME     pantoprazole (PROTONIX) 40 MG enteric coated tablet     senna-docusate (SENOKOT-S;PERICOLACE) 8.6-50 MG per tablet     simvastatin (ZOCOR) 10 MG tablet     tacrolimus (PROTOPIC) 0.1 %  "ointment     amoxicillin (AMOXIL) 500 MG capsule     No current facility-administered medications for this visit.      A multi-state Prescription Monitoring Program reviewed and no aberrancies noted.     Allergies:     Allergies   Allergen Reactions     No Known Allergies      Family History:  family history includes Anxiety Disorder in his father, mother, sister, and sister; Arrhythmia in his father; Arthritis in an other family member; Cardiovascular in his father; Coronary Artery Disease in his father; Depression in his father and sister; GASTROINTESTINAL DISEASE in an other family member; Hyperlipidemia in his father, mother, sister, and sister; Hypertension in his father, mother, sister, and sister; Low Back Problems in his father, mother, sister, and sister; Nephrolithiasis in his father; Obesity in his father, mother, sister, and sister; Osteoporosis in his mother, sister, and sister; Sleep Apnea in his father; Spine Problems in his father and sister.  Social history: he lives in [place].  He is disabled.   Smoking: former smoker. Alcohol: denies. Street drugs: denies.     ROS:  A 14 point review of systems was completed; results are listed at end of note.     Objective:   /86  Pulse 65  Resp 16  Ht 1.74 m (5' 8.5\")  Wt 109.3 kg (241 lb)  BMI 36.11 kg/m2  Body mass index is 36.11 kg/(m^2).  General: In no apparent distress, obese  Mental status: Normal mood and affect, pleasant  Neuro: Alert, oriented. No sensory deficits.   Head: Atraumatic, normocephalic  Eyes: Extra-ocular movements grossly intact, no scleral icterus  Respiratory: Non-labored breathing; No respiratory distress  Skin: No rashes or lesions noted on exposed areas of skin    Assessment:  Mr. Kobe Buchanan is a 57 yo male seen today for cervical spondylosis C2-7 without myelopathy or radiculopathy.   Plan:   1. Patient education: I went over the above diagnoses and treatment plan with him and answered all of his " questions.  2. Interventions:  -Patient reports he has attempted and not demonstrated prolonged success >2 weeks with RFA/facet joint injections, performed at [facility]. Will have him sign ANDER to obtain most recent cervical MRI from [facility] to determine if there are any procedural options that have not been previously attempted. Patient is reluctant to consider cervical fusion as offered by Dr. Puente at this time.   3. Medications  -I discussed with patient that, per our clinic policy, we typically do not take over management of chronic opioid regimens. Additionally, I discussed the limited role of long-term opiate medications in chronic musculoskeletal pain. However, I did commend patient's dose decrease from 280 MME/day to 120 MME/day. It may be reasonable for his primary care provider, if willing, to continue this regimen and/or attempt to decrease to CDC recommendations of <90 MME/day. Would advise establishment of Controlled Substance Agreement and urine drug screens. Patient was also provided with a list of alternate pain clinics so that he may establish if his PCP will not manage his regimen.  I did offer him temporary management if he were willing to taper his dose; he was not agreeable to this.   4. Follow up: RTC as needed.     Total time spent was 30 minutes, and more than 50% of face to face time was spent in counseling and/or coordination of care regarding the above plan.      Again, thank you for allowing me to participate in the care of your patient.      Sincerely,    ALAYNA Ayon CNP

## 2018-10-12 NOTE — PROGRESS NOTES
I had the pleasure of meeting Mr. Kobe Buchanan on 10/12/2018 in the outpatient pain clinic in consult for Dr. Styles with regards to his chronic neck pain.   HPI:  Patient presents with past medical history of cervical spondylosis and degenerative disc disease, chronic narcotic use, and depression and borderline personality disorder; he is here today for evaluation of chronic medication management.     His long term pain control has been previously managed by Dr. Sahil Welch, however, the pain clinic has closed due to inappropriate prescribing practices and the patient is no longer able to get his medications there.    Patient has chronic neck pain, and has been controlled with pain medication for several years. Patient reports hydrocodone initiated in 2008; he was prescribed 3-4 tablets/day for several years. Due to request for an increase of this dose, patient was referred to  Pain Clinic. He underwent a pain psychology evaluation which determined patient's reluctance to participate in comprehensive management. Patient then consulted with [facility] and was placed on fentanyl. 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 Was most recently taking fentanyl 75 mcg Q72 hours and Norco  mg, 10 tablets/day (280 MME/day). Following his presentation to primary care provider, he was prescribed morphine IR 30 mg qid prn (120 MME) on 9/28/18.  Patient is satisfied at this dose currently, primarily for financial reasons, and is hopeful that either myself, or his PCP will continue to manage this for the time being. Patient is aware that long term narcotics is not a good idea, and is pursuing the possibility of neck surgery and fusion. Dr. Puente offered patient a C2-7 fusion in 2014 but encouraged him to delay as long as possible. Patient denies upper extremity weakness or radicular symptoms. He states he also underwent multiple injections with ILBNC (facet joint and RFA)-  all of which did not provide significant pain relief.   ?  Past Medical History:   Diagnosis Date     Anxiety      Ascending aortic aneurysm (H)      Bicuspid aortic valve      BPPV (benign paroxysmal positional vertigo) 7/11/2014     Chronic narcotic use      Chronic neck pain      Chronic osteoarthritis      Degenerative joint disease      Depression      Hyperlipidemia 4/10/2012     Hypertension      Multiple stiff joints      Neck injuries      Obesity 2/9/2015     Skin picking habit      Sleep apnea     does not use cpap    past medical history reviewed with patient.   Past Surgical History:   Procedure Laterality Date     ARTHROPLASTY SHOULDER  4/15/2014    Procedure: Left Total Shoulder Arthroplasty ;  Surgeon: Analilia Aceves MD;  [facility]     ARTHROPLASTY SHOULDER Right 8/26/2014    Procedure: ARTHROPLASTY SHOULDER;  Surgeon: Analilia Aceves MD;  [facility]     BYPASS GASTRIC TVAO-EN-Y, LIVER BIOPSY, COMBINED  8/8/2005     C HAND/FINGER SURGERY UNLISTED       C SHOULDER SURG PROC UNLISTED       COLONOSCOPY  6/30/2014    Procedure: COMBINED COLONOSCOPY, SINGLE BIOPSY/POLYPECTOMY BY BIOPSY;  Surgeon: Chester Patton MD;  [facility]     CYSTOSCOPY, BLADDER NECK CUTS, COMBINED N/A 7/18/2016    Procedure: COMBINED CYSTOSCOPY, BLADDER NECK CUTS;  Surgeon: Ritu Leslie MD;  [facility]     ESOPHAGOSCOPY, GASTROSCOPY, DUODENOSCOPY (EGD), COMBINED  6/30/2014    Procedure: COMBINED ESOPHAGOSCOPY, GASTROSCOPY, DUODENOSCOPY (EGD), BIOPSY SINGLE OR MULTIPLE;  Surgeon: Chester Patton MD;  [facility]     EXCISE MASS FINGER  6/14/2011    Procedure:EXCISE MASS FINGER; Middle Flexor Cyst; Surgeon:SHAYY RUSSELL; Location:[facility] OR     HAND SURGERY      excision of tendon cyst on left hand     HC VASCULAR SURGERY PROCEDURE UNLIST       LASER HOLMIUM LITHOTRIPSY BLADDER N/A 10/15/2014    Procedure: LASER HOLMIUM LITHOTRIPSY BLADDER;  Surgeon: Darcy  Sahil Duenas MD;  [facility]: [facility] OR     LASER KTP GREEN LIGHT PHOTOSELECTIVE VAPORIZATION PROSTATE  1/23/2014    Procedure: LASER KTP GREEN LIGHT PHOTOSELECTIVE VAPORIZATION PROSTATE;  Greenlight Photovaporization Of Prostate  ;  Surgeon: Sahil Taveras MD;  [facility] OR     RELEASE TRIGGER FINGER Right 3/31/2017    Procedure: RELEASE TRIGGER FINGER;  Surgeon: Juan Carlos Blunt MD;  [facility]     REPAIR ANEURYSM ASCENDING AORTA N/A 10/4/2016    Procedure: REPAIR ANEURYSM ASCENDING AORTA;  Surgeon: Mckenzie Townsend MD;  [facility]     TURP  2014    past surgical history reviewed with patient.   Medications:  Current Outpatient Prescriptions   Medication     amLODIPine-benazepril (LOTREL) 5-20 MG per capsule     buPROPion (WELLBUTRIN SR) 200 MG 12 hr tablet     carvedilol (COREG) 25 MG tablet     clonazePAM (KLONOPIN) 0.5 MG tablet     cloNIDine (CATAPRES) 0.1 MG tablet     DOCUSATE SODIUM PO     Fe Heme Polypeptide-folic acid 12-1 MG TABS     fentaNYL (DURAGESIC) 50 mcg/hr patch 72 hr     ferrous sulfate 325 (65 FE) MG tablet     fluocinonide (LIDEX) 0.05 % ointment     HYDROcodone-acetaminophen (NORCO)  MG per tablet     Iron Heme Polypeptide (PROFERRIN ES) 12 MG TABS     mometasone (ELOCON) 0.1 % ointment     morphine (MSIR) 30 MG IR tablet     NAPROXEN SODIUM PO     neomycin-polymyxin-hydrocortisone (CORTISPORIN) otic solution     order for DME     order for DME     pantoprazole (PROTONIX) 40 MG enteric coated tablet     senna-docusate (SENOKOT-S;PERICOLACE) 8.6-50 MG per tablet     simvastatin (ZOCOR) 10 MG tablet     tacrolimus (PROTOPIC) 0.1 % ointment     amoxicillin (AMOXIL) 500 MG capsule     No current facility-administered medications for this visit.      A multi-state Prescription Monitoring Program reviewed and no aberrancies noted.     Allergies:     Allergies   Allergen Reactions     No Known Allergies      Family History:  family history includes Anxiety Disorder in  "his father, mother, sister, and sister; Arrhythmia in his father; Arthritis in an other family member; Cardiovascular in his father; Coronary Artery Disease in his father; Depression in his father and sister; GASTROINTESTINAL DISEASE in an other family member; Hyperlipidemia in his father, mother, sister, and sister; Hypertension in his father, mother, sister, and sister; Low Back Problems in his father, mother, sister, and sister; Nephrolithiasis in his father; Obesity in his father, mother, sister, and sister; Osteoporosis in his mother, sister, and sister; Sleep Apnea in his father; Spine Problems in his father and sister.  Social history: he lives in [place].  He is disabled.   Smoking: former smoker. Alcohol: denies. Street drugs: denies.     ROS:  A 14 point review of systems was completed; results are listed at end of note.     Objective:   /86  Pulse 65  Resp 16  Ht 1.74 m (5' 8.5\")  Wt 109.3 kg (241 lb)  BMI 36.11 kg/m2  Body mass index is 36.11 kg/(m^2).  General: In no apparent distress, obese  Mental status: Normal mood and affect, pleasant  Neuro: Alert, oriented. No sensory deficits.   Head: Atraumatic, normocephalic  Eyes: Extra-ocular movements grossly intact, no scleral icterus  Respiratory: Non-labored breathing; No respiratory distress  Skin: No rashes or lesions noted on exposed areas of skin    Assessment:  Mr. Kobe Buchanan is a 59 yo male seen today for cervical spondylosis C2-7 without myelopathy or radiculopathy.   Plan:   1. Patient education: I went over the above diagnoses and treatment plan with him and answered all of his questions.  2. Interventions:  -Patient reports he has attempted and not demonstrated prolonged success >2 weeks with RFA/facet joint injections, performed at [facility]. Will have him sign ANDER to obtain most recent cervical MRI from [facility] to determine if there are any procedural options that have not been previously attempted. Patient is " reluctant to consider cervical fusion as offered by Dr. Puente at this time.   3. Medications  -I discussed with patient that, per our clinic policy, we typically do not take over management of chronic opioid regimens. Additionally, I discussed the limited role of long-term opiate medications in chronic musculoskeletal pain. However, I did commend patient's dose decrease from 280 MME/day to 120 MME/day. It may be reasonable for his primary care provider, if willing, to continue this regimen and/or attempt to decrease to CDC recommendations of <90 MME/day. Would advise establishment of Controlled Substance Agreement and urine drug screens. Patient was also provided with a list of alternate pain clinics so that he may establish if his PCP will not manage his regimen.  I did offer him temporary management if he were willing to taper his dose; he was not agreeable to this.   4. Follow up: RTC as needed.     Total time spent was 30 minutes, and more than 50% of face to face time was spent in counseling and/or coordination of care regarding the above plan.    Thank you for the consult.   ALAYNA Ayon, [facility]  [facility]

## 2018-10-12 NOTE — PATIENT INSTRUCTIONS
1. Recommendations will be made in the clinic note, for your care team to reference.     2. List of alternate pain clinic was provided to you per your request.     3. Sign a Release of Information for [facility], for your Images.     Follow up: As needed.     To speak with a nurse, schedule/reschedule/cancel a clinic appointment, or request a medication refill call: [phone]     You can also reach us by "[employer]": [email or web address]    [facility] PAIN CLINIC     Appointments: [phone]  Fax: [phone]    [address] [address] [address]  [place] [address]    [place]  [address] [place] [address]    [place]  [address]  [address]  [place] [address]    [address] [facility]  [address]  [place] [address]    [facility] [place]  [address] [place] [address]      [facility]  [address]  [place] [id number] [phone]     [facility]  [address]   [place] [address] [phone]     [facility] [place]  [address] [place], [place] [address] [phone]        [facility]     [phone] ([phone])     [facility]  Clinic  [address]  [place] [address]     [facility]  Clinic  [address], [address]  [place] [address]  Phone: [phone]     [facility]  Clinic  [address], [address]  [place] [address]  Phone: [phone]     [facility]  Clinic  [address], [address]  [place] [address]  Phone: [phone]     [facility]  Clinic  [address], [address]   [place] [address]  Phone: [phone]     [facility]  Clinic  [address], [address]  [place] [address]  Phone:  [phone]        [place]     [place]  [phone]   [place]   [phone]  All medical and medication questions can be routed to the Pain Nurse Line at [phone].     Rule  25  Contact numbers for each [facility]    [email or web address]      [facility]   [phone]  [facility]    [phone]  [place]   [phone]  These private agencies/hospitals will evaluate for appropriate level of care and provide care or refer for care as allowed by insurance coverage.        Detox/treatment options:     [facility]:  [phone]   Intake line for 28 day treatment program with lodging and out patient programs     [facility] intake line for detox [phone]    [facility] asks that the patient call this number for information and evaluation.     or the patient can go to the Behavioral Health ED at [facility].   There, patient will be evaluated  for appropriate level of care.   Staff will assist patient in arranging for appropriate care.

## 2018-10-12 NOTE — NURSING NOTE
LPN reviewed AVS with Pt.  Pt verbalized an understanding of information, and was asked to contact clinic with questions.    Angely Gonzalez LPN

## 2018-10-16 ENCOUNTER — TELEPHONE (OUTPATIENT)
Dept: FAMILY MEDICINE | Facility: CLINIC | Age: 58
End: 2018-10-16

## 2018-10-16 NOTE — TELEPHONE ENCOUNTER
[facility] phone call message- general phone call:    Reason for call: Patient is calling stating that his last pain clinic provider is no longer. He is wondering if you can give him some input on another one that he could go to. Would like Dr. Fernandez to call him.     Return call needed: Yes    OK to leave a message on voice mail? Yes    Primary language: English      needed? No    Call taken on October 16, 2018 at 8:21 AM by Portia Francis

## 2018-10-16 NOTE — TELEPHONE ENCOUNTER
Called Kobe back. He informs me in the interim from initial contact to receiving return call from myself, he was able to call and get scheduled with another pain clinic for early next week. Will plan on attending this appt to see if it will go well. For now will keep his appt with Dr Larose for 10/23/2018 if after seeing new pain clinic does not work out well. No longer needs to speak with Dr Fernandez, who is expected to be back in clinic 11/1/2018. Baylee RIGGS

## 2018-10-22 ENCOUNTER — TRANSFERRED RECORDS (OUTPATIENT)
Dept: HEALTH INFORMATION MANAGEMENT | Facility: CLINIC | Age: 58
End: 2018-10-22

## 2018-10-23 ENCOUNTER — OFFICE VISIT (OUTPATIENT)
Dept: FAMILY MEDICINE | Facility: CLINIC | Age: 58
End: 2018-10-23
Payer: COMMERCIAL

## 2018-10-23 VITALS
HEART RATE: 76 BPM | BODY MASS INDEX: 34.79 KG/M2 | HEIGHT: 70 IN | DIASTOLIC BLOOD PRESSURE: 94 MMHG | TEMPERATURE: 97.7 F | SYSTOLIC BLOOD PRESSURE: 164 MMHG | WEIGHT: 243 LBS

## 2018-10-23 DIAGNOSIS — F11.90 CHRONIC, CONTINUOUS USE OF OPIOIDS: Primary | ICD-10-CM

## 2018-10-23 DIAGNOSIS — I10 ESSENTIAL HYPERTENSION, BENIGN: ICD-10-CM

## 2018-10-23 DIAGNOSIS — M54.2 CHRONIC NECK PAIN: ICD-10-CM

## 2018-10-23 DIAGNOSIS — G89.29 CHRONIC NECK PAIN: ICD-10-CM

## 2018-10-23 RX ORDER — MORPHINE SULFATE 30 MG/1
30 TABLET ORAL EVERY 6 HOURS PRN
Qty: 84 TABLET | Refills: 0 | Status: SHIPPED | OUTPATIENT
Start: 2018-10-23 | End: 2018-11-08

## 2018-10-23 NOTE — MR AVS SNAPSHOT
After Visit Summary   10/23/2018    Kobe Buchanan    MRN: [id number]           Patient Information     Date Of Birth          1960        Visit Information        Provider Department      10/23/2018 9:00 AM Angely Larose DO [facility]        Today's Diagnoses     Chronic, continuous use of opioids    -  1    Chronic neck pain        Essential hypertension, benign           Follow-ups after your visit        Follow-up notes from your care team     Return in about 4 weeks (around 11/20/2018).      Your next 10 appointments already scheduled     Nov 29, 2018  8:00 AM CST   Return Visit with Riley Fernandez MD   [facility] ([facility])    [address] [place] [id number]   [phone]            Jan 16, 2019  7:30 AM CST   RETURN RETINA with Brenda Espinal MD   [facility] ([facility])    [address]  [facility] [address] [phone]              Who to contact     Please call your clinic at [phone] to:    Ask questions about your health    Make or cancel appointments    Discuss your medicines    Learn about your test results    Speak to your doctor            Additional Information About Your Visit        MyChart Information     [employer] gives you secure access to your electronic health record. If you see a primary care provider, you can also send messages to your care team and make appointments. If you have questions, please call your primary care clinic.  If you do not have a primary care provider, please call [phone] and they will assist you.      [employer] is an electronic gateway that provides easy, online access to your medical records. With [employer], you can request a clinic appointment, read your test results, renew a prescription or communicate with your care team.     To access your existing account, please contact your [facility]  "or call [phone] for assistance.        Care EveryWhere ID     This is your Care EveryWhere ID. This could be used by other organizations to access your [facility] medical records  [id number]        Your Vitals Were     Pulse Temperature Height BMI (Body Mass Index)          76 97.7  F (36.5  C) (Oral) 5' 10.47\" (179 cm) 34.4 kg/m2         Blood Pressure from Last 3 Encounters:   10/23/18 (!) 164/94   10/12/18 137/86   09/27/18 159/90    Weight from Last 3 Encounters:   10/23/18 243 lb (110.2 kg)   10/12/18 241 lb (109.3 kg)   09/27/18 241 lb 12.8 oz (109.7 kg)              Today, you had the following     No orders found for display         Where to get your medicines      Some of these will need a paper prescription and others can be bought over the counter.  Ask your nurse if you have questions.     Bring a paper prescription for each of these medications     morphine 30 MG IR tablet         Information about OPIOIDS     PRESCRIPTION OPIOIDS: WHAT YOU NEED TO KNOW   We gave you an opioid (narcotic) pain medicine. It is important to manage your pain, but opioids are not always the best choice. You should first try all the other options your care team gave you. Take this medicine for as short a time (and as few doses) as possible.    Some activities can increase your pain, such as bandage changes or therapy sessions. It may help to take your pain medicine 30 to 60 minutes before these activities. Reduce your stress by getting enough sleep, working on hobbies you enjoy and practicing relaxation or meditation. Talk to your care team about ways to manage your pain beyond prescription opioids.    These medicines have risks:    DO NOT drive when on new or higher doses of pain medicine. These medicines can affect your alertness and reaction times, and you could be arrested for driving under the influence (DUI). If you need to use opioids long-term, talk to your care team about driving.    DO NOT operate heavy " machinery    DO NOT do any other dangerous activities while taking these medicines.    DO NOT drink any alcohol while taking these medicines.     If the opioid prescribed includes acetaminophen, DO NOT take with any other medicines that contain acetaminophen. Read all labels carefully. Look for the word  acetaminophen  or  Tylenol.  Ask your pharmacist if you have questions or are unsure.    You can get addicted to pain medicines, especially if you have a history of addiction (chemical, alcohol or substance dependence). Talk to your care team about ways to reduce this risk.    All opioids tend to cause constipation. Drink plenty of water and eat foods that have a lot of fiber, such as fruits, vegetables, prune juice, apple juice and high-fiber cereal. Take a laxative (Miralax, milk of magnesia, Colace, Senna) if you don t move your bowels at least every other day. Other side effects include upset stomach, sleepiness, dizziness, throwing up, tolerance (needing more of the medicine to have the same effect), physical dependence and slowed breathing.    Store your pills in a secure place, locked if possible. We will not replace any lost or stolen medicine. If you don t finish your medicine, please throw away (dispose) as directed by your pharmacist. The [place] [employer] has more information about safe disposal: https://www.pca.[email or web address]-medications         Primary Care Provider Office Phone # Fax #    Riley Fernandez [phone]       [facility] [place] [address]        Equal Access to Services     JOHN FALK AH: Hadhoney sin Sokatherine, waaxda luqadaha, qaybta kaalmada joshua fry. So [facility] [phone].    ATENCIÓN: Si habla español, tiene a bee disposición servicios gratuitos de asistencia lingüística. Llame al [phone].    We comply with applicable federal civil rights laws and [place]  laws. We do not discriminate on the basis of race, color, national origin, age, disability, sex, sexual orientation, or gender identity.            Thank you!     Thank you for choosing [facility]  for your care. Our goal is always to provide you with excellent care. Hearing back from our patients is one way we can continue to improve our services. Please take a few minutes to complete the written survey that you may receive in the mail after your visit with us. Thank you!             Your Updated Medication List - Protect others around you: Learn how to safely use, store and throw away your medicines at www.disposemymeds.org.          This list is accurate as of 10/23/18 11:59 PM.  Always use your most recent med list.                   Brand Name Dispense Instructions for use Diagnosis    amLODIPine-benazepril 5-20 MG per capsule    LOTREL    180 capsule    Take 1 capsule by mouth 2 times daily    Essential hypertension with goal blood pressure less than 130/85, Essential hypertension, Ascending aortic aneurysm (H), Atrial fibrillation, unspecified type (H)       buPROPion 200 MG 12 hr tablet    WELLBUTRIN SR    180 tablet    TAKE 1 TABLET BY MOUTH 2 TIMES DAILY    Dysthymic disorder       carvedilol 25 MG tablet    COREG    180 tablet    Take 1 tablet (25 mg) by mouth 2 times daily (with meals)    Essential hypertension with goal blood pressure less than 130/85       clonazePAM 0.5 MG tablet    klonoPIN    90 tablet    Take 1 tablet (0.5 mg) by mouth 3 times daily    Stress       cloNIDine 0.1 MG tablet    CATAPRES    180 tablet    Take 1 tablet (0.1 mg) by mouth 2 times daily    Essential hypertension       DOCUSATE SODIUM PO      Take 100 mg by mouth daily        Fe Heme Polypeptide-folic acid 12-1 MG Tabs     90 tablet    Take 1 tablet by mouth daily    Low iron       ferrous sulfate 325 (65 Fe) MG tablet    IRON    30 tablet    Take 1 tablet (325 mg) by mouth daily (with breakfast)         fluocinonide 0.05 % ointment    LIDEX    30 g    Apply twice daily to itchy skin nodules for 1-2 weeks at a time.    Prurigo nodularis       Iron Heme Polypeptide 12 MG Tabs    PROFERRIN ES    30 tablet    Take 12 mg/day by mouth daily    Iron deficiency anemia due to chronic blood loss       morphine 30 MG IR tablet    MSIR    84 tablet    Take 1 tablet (30 mg) by mouth every 6 hours as needed for severe pain    Chronic neck pain       naloxone nasal spray    NARCAN    0.2 mL    Spray 1 spray (4 mg) into one nostril alternating nostrils as needed for opioid reversal every 2-3 minutes until assistance arrives    Chronic, continuous use of opioids       NAPROXEN SODIUM PO      Take 220 mg by mouth 2 times daily        neomycin-polymyxin-hydrocortisone otic solution    CORTISPORIN    10 mL    Place 3 drops in ear(s) 4 times daily    History of impacted ear wax       order for DME     1 Device    Walker for cardiac rehab with 4 wheels, brakes and seat    SOB (shortness of breath)       order for DME     1 Device    Bilateral hand brace(s) for Carpal Tunnel disorder    Pain in extremity, unspecified extremity       pantoprazole 40 MG EC tablet    PROTONIX    30 tablet    Take 1 tablet (40 mg) by mouth every morning    Acute post-operative pain       senna-docusate 8.6-50 MG per tablet    SENOKOT-S;PERICOLACE    100 tablet    Take 1-2 tablets by mouth 2 times daily To prevent constipation while taking narcotic pain medication. Start with 1 tablet twice daily. If no bowel movement in 24 hours, increase to 2 tablets twice daily.  Discontinue if you have loose stools or when you are no longer taking narcotics.    Bladder neck contracture       simvastatin 10 MG tablet    ZOCOR    90 tablet    Take 1 tablet (10 mg) by mouth At Bedtime    Hypercholesteremia       tacrolimus 0.1 % ointment    PROTOPIC    120 g    Apply topically as needed Apply to affected areas on body.    Other eczema, Prurigo nodularis

## 2018-10-30 ENCOUNTER — TRANSFERRED RECORDS (OUTPATIENT)
Dept: HEALTH INFORMATION MANAGEMENT | Facility: CLINIC | Age: 58
End: 2018-10-30

## 2018-11-01 ENCOUNTER — TELEPHONE (OUTPATIENT)
Dept: FAMILY MEDICINE | Facility: CLINIC | Age: 58
End: 2018-11-01

## 2018-11-01 NOTE — TELEPHONE ENCOUNTER
Left detailed message, per clinic policy he is required to be seen in clinic for controlled medication refills. His primary Dr Fernandez as of 8:57 am this morning has a 11:40am appt available for today.  This is does not work for Kobe or is not available when he returns call, he may see another provider sooner than already scheduled appt. Baylee RIGGS

## 2018-11-01 NOTE — TELEPHONE ENCOUNTER
Spoke with Kobe, his original appt 11/29/2018 has been rescheduled for 11/8/2018, same day as Kobe will be bringing his father in to see Dr Fernandez as well. Baylee RIGGS

## 2018-11-01 NOTE — TELEPHONE ENCOUNTER
[facility] phone call message- patient requesting a refill:    Full Medication Name: Clonazepam    Dose: 0.5 mg    Pharmacy confirmed as   [id number] - [place], [place] - [address]  [address]  [place] [address]  Phone: [phone] Fax: [phone]  : Yes    Additional Comments:  Patient will run out before his appointment with provider    OK to leave a message on voice mail? Yes    Primary language: English      needed? No    Call taken on November 1, 2018 at 8:27 AM by Portia Francis

## 2018-11-05 DIAGNOSIS — Q23.81 BICUSPID AORTIC VALVE: Primary | ICD-10-CM

## 2018-11-08 ENCOUNTER — OFFICE VISIT (OUTPATIENT)
Dept: FAMILY MEDICINE | Facility: CLINIC | Age: 58
End: 2018-11-08
Payer: COMMERCIAL

## 2018-11-08 VITALS
WEIGHT: 242.8 LBS | BODY MASS INDEX: 34.37 KG/M2 | DIASTOLIC BLOOD PRESSURE: 83 MMHG | RESPIRATION RATE: 16 BRPM | HEART RATE: 67 BPM | SYSTOLIC BLOOD PRESSURE: 137 MMHG | TEMPERATURE: 97.9 F | OXYGEN SATURATION: 96 %

## 2018-11-08 DIAGNOSIS — F34.1 DYSTHYMIC DISORDER: ICD-10-CM

## 2018-11-08 DIAGNOSIS — F43.9 STRESS: ICD-10-CM

## 2018-11-08 DIAGNOSIS — G89.29 CHRONIC NECK PAIN: ICD-10-CM

## 2018-11-08 DIAGNOSIS — M54.2 CHRONIC NECK PAIN: ICD-10-CM

## 2018-11-08 RX ORDER — MORPHINE SULFATE 30 MG/1
30 TABLET ORAL EVERY 6 HOURS PRN
Qty: 20 TABLET | Refills: 0 | Status: SHIPPED | OUTPATIENT
Start: 2018-11-08 | End: 2019-10-29

## 2018-11-08 RX ORDER — CLONAZEPAM 0.5 MG/1
0.5 TABLET ORAL 3 TIMES DAILY
Qty: 90 TABLET | Refills: 2 | Status: SHIPPED | OUTPATIENT
Start: 2018-11-08 | End: 2019-02-04

## 2018-11-08 NOTE — PROGRESS NOTES
HPI:       Kobe Buchanan is a 58 year old  male who presents for evaluation of anxiety meds, and pain.   Patient presents with his father who has recently been to the ER.  Patient states that he has been undr a lot of stress, and has run out of his xanax.  He has been taking three per day, but has sometimes een taking 4 times per day.  Due to see the chronic pain clinic next Tuesday. Does not have enough pills to make it to the chronic pain clinic appointment. Patient understands that all pain meds will be provided by the pain clinic once he has been seen there. (He was seen there once, but they don't provide any pain meds during the first visit.)             PMHX:   Current Medications:   Current Outpatient Prescriptions   Medication Sig Dispense Refill     amLODIPine-benazepril (LOTREL) 5-20 MG per capsule Take 1 capsule by mouth 2 times daily 180 capsule 3     buPROPion (WELLBUTRIN SR) 200 MG 12 hr tablet TAKE 1 TABLET BY MOUTH 2 TIMES DAILY 180 tablet 3     carvedilol (COREG) 25 MG tablet Take 1 tablet (25 mg) by mouth 2 times daily (with meals) 180 tablet 3     clonazePAM (KLONOPIN) 0.5 MG tablet Take 1 tablet (0.5 mg) by mouth 3 times daily 90 tablet 0     cloNIDine (CATAPRES) 0.1 MG tablet Take 1 tablet (0.1 mg) by mouth 2 times daily 180 tablet 3     DOCUSATE SODIUM PO Take 100 mg by mouth daily        Fe Heme Polypeptide-folic acid 12-1 MG TABS Take 1 tablet by mouth daily 90 tablet 3     ferrous sulfate 325 (65 FE) MG tablet Take 1 tablet (325 mg) by mouth daily (with breakfast) 30 tablet 3     fluocinonide (LIDEX) 0.05 % ointment Apply twice daily to itchy skin nodules for 1-2 weeks at a time. 30 g 3     Iron Heme Polypeptide (PROFERRIN ES) 12 MG TABS Take 12 mg/day by mouth daily 30 tablet 3     morphine (MSIR) 30 MG IR tablet Take 1 tablet (30 mg) by mouth every 6 hours as needed for severe pain 84 tablet 0     naloxone (NARCAN) nasal spray Spray 1 spray (4 mg) into one nostril alternating  nostrils as needed for opioid reversal every 2-3 minutes until assistance arrives 0.2 mL 0     NAPROXEN SODIUM PO Take 220 mg by mouth 2 times daily       neomycin-polymyxin-hydrocortisone (CORTISPORIN) otic solution Place 3 drops in ear(s) 4 times daily 10 mL 3     order for DME Bilateral hand brace(s) for Carpal Tunnel disorder 1 Device 1     order for DME Walker for cardiac rehab with 4 wheels, brakes and seat 1 Device 0     pantoprazole (PROTONIX) 40 MG enteric coated tablet Take 1 tablet (40 mg) by mouth every morning 30 tablet 3     senna-docusate (SENOKOT-S;PERICOLACE) 8.6-50 MG per tablet Take 1-2 tablets by mouth 2 times daily To prevent constipation while taking narcotic pain medication. Start with 1 tablet twice daily. If no bowel movement in 24 hours, increase to 2 tablets twice daily.  Discontinue if you have loose stools or when you are no longer taking narcotics. 100 tablet 0     simvastatin (ZOCOR) 10 MG tablet Take 1 tablet (10 mg) by mouth At Bedtime 90 tablet 3     tacrolimus (PROTOPIC) 0.1 % ointment Apply topically as needed Apply to affected areas on body. 120 g 11       Existing Problems  Patient Active Problem List   Diagnosis     Essential hypertension     Hyperlipidemia     Abdominal pain, unspecified abdominal location     Ascending aortic aneurysm (H)     Medication refill- do not delete      Pain medication agreement signed     S/P shoulder replacement     BPPV (benign paroxysmal positional vertigo)     Shoulder joint pain     Obstructive sleep apnea     Obesity     Rhinitis     Right knee pain     Status post coronary angiogram     Long-term (current) use of anticoagulants [Z79.01]     Atrial fibrillation (H) [I48.91]     Trigger finger of right thumb     Dizzy     Spondylosis of cervical region without myelopathy or radiculopathy       Allergies:  Allergies   Allergen Reactions     No Known Allergies        Previous labs:  Lab Results   Component Value Date    WBC 6.8 01/10/2018    HGB  15.5 01/10/2018    HCT 45.3 01/10/2018     01/10/2018    CHOL 179 03/22/2018    TRIG 255 (H) 03/22/2018    HDL 39 (L) 03/22/2018    ALT 23 01/10/2018    AST 20 01/10/2018     01/10/2018    BUN 21 01/10/2018    CO2 28 01/10/2018    TSH 2.96 01/10/2018    PSA 0.18 03/01/2017    INR 0.97 03/22/2018               Review of Systems:    CONSTITUTIONAL: no fatigue, no unexpected change in weight  SKIN: no worrisome rashes, no worrisome moles, no worrisome lesions  EYES: no acute vision problems or changes  ENT: no ear problems, no mouth problems, no throat problems  RESP: no significant cough, no shortness of breath  CV: no chest pain, no palpitations, no new or worsening peripheral edema  GI: no nausea, no vomiting, no constipation, no diarrhea          Physical Exam:     Vitals:    11/08/18 0805   BP: 137/83   Pulse: 67   Resp: 16   Temp: 97.9  F (36.6  C)   TempSrc: Oral   SpO2: 96%   Weight: 242 lb 12.8 oz (110.1 kg)     Body mass index is 34.37 kg/(m^2).    GENERAL:alert, well hydrated, no distress  EYES: Eyes grossly normal to inspection, extraocular movements - intact, and PERRL  HENT: Nose- normal; Mouth- no ulcers, no lesions  NECK: no tenderness, no adenopathy, no asymmetry, no masses, no stiffness;  RESP: lungs clear to auscultation - no rales, no rhonchi, no wheezes  CV: regular rates and rhythm, normal S1 S2, no S3 or S4 and no murmur, no click or rub -               Labs and Procedures     Office Visit on 04/02/2018   Component Date Value Ref Range Status     Interpretation ECG 04/02/2018 Click View Image link to view waveform and result   Final              Assessment and Plan     1.Will give patient additional clonezapam- and will refill prescription. Patient is not interested in receiving mental health services because it is too expensive, and because his personality disorder leads to problems with the psychiatrist.   He is comfortable maintaining on buspar and clonezapam, and kia well at  current dose of buspar with 3 Klonopin per day.  Has recently needed four.  2. Pain is going to be managed by the chronic pain clinic. He currently needs a refill to make it to the appointment of Tuesday with the pain clinic.  Patient understands that his pain medication in the future will be managed by the pain clinic only.       Options for treatment and follow-up care were reviewed with the patient and/or guardian. Kobe Buchanan and/or guardian engaged in the decision making process and verbalized understanding of the options discussed and agreed with the final plan.    Riley Fernandez MD

## 2018-11-08 NOTE — MR AVS SNAPSHOT
After Visit Summary   11/8/2018    Kobe Buchanan    MRN: [id number]           Patient Information     Date Of Birth          1960        Visit Information        Provider Department      11/8/2018 8:40 AM Riley Fernandez MD [facility]        Today's Diagnoses     Dysthymic disorder        Stress        Chronic neck pain           Follow-ups after your visit        Your next 10 appointments already scheduled     Jan 16, 2019  7:30 AM CST   RETURN RETINA with Brenda Espinal MD   [facility] ([facility])    [address]  [facility] [address]   [phone]            Apr 02, 2019 11:30 AM CDT   (Arrive by 11:15 AM)   New Vascular Visit with Enriqueta Zhou MD   [facility] ([facility] [place])    [address]  [address]  [facility] [address] [phone]              Who to contact     Please call your clinic at [phone] to:    Ask questions about your health    Make or cancel appointments    Discuss your medicines    Learn about your test results    Speak to your doctor            Additional Information About Your Visit        MyChart Information     [employer] gives you secure access to your electronic health record. If you see a primary care provider, you can also send messages to your care team and make appointments. If you have questions, please call your primary care clinic.  If you do not have a primary care provider, please call [phone] and they will assist you.      [employer] is an electronic gateway that provides easy, online access to your medical records. With [facility], you can request a clinic appointment, read your test results, renew a prescription or communicate with your care team.     To access your existing account, please contact your [facility] or call [phone] for assistance.        Care EveryWhere ID     This  is your Care EveryWhere ID. This could be used by other organizations to access your [facility] medical records  [id number]        Your Vitals Were     Pulse Temperature Respirations Pulse Oximetry BMI (Body Mass Index)       67 97.9  F (36.6  C) (Oral) 16 96% 34.37 kg/m2        Blood Pressure from Last 3 Encounters:   11/08/18 137/83   10/23/18 (!) 164/94   10/12/18 137/86    Weight from Last 3 Encounters:   11/08/18 242 lb 12.8 oz (110.1 kg)   10/23/18 243 lb (110.2 kg)   10/12/18 241 lb (109.3 kg)              Today, you had the following     No orders found for display         Where to get your medicines      Some of these will need a paper prescription and others can be bought over the counter.  Ask your nurse if you have questions.     Bring a paper prescription for each of these medications     clonazePAM 0.5 MG tablet    morphine 30 MG IR tablet         Information about OPIOIDS     PRESCRIPTION OPIOIDS: WHAT YOU NEED TO KNOW   We gave you an opioid (narcotic) pain medicine. It is important to manage your pain, but opioids are not always the best choice. You should first try all the other options your care team gave you. Take this medicine for as short a time (and as few doses) as possible.    Some activities can increase your pain, such as bandage changes or therapy sessions. It may help to take your pain medicine 30 to 60 minutes before these activities. Reduce your stress by getting enough sleep, working on hobbies you enjoy and practicing relaxation or meditation. Talk to your care team about ways to manage your pain beyond prescription opioids.    These medicines have risks:    DO NOT drive when on new or higher doses of pain medicine. These medicines can affect your alertness and reaction times, and you could be arrested for driving under the influence (DUI). If you need to use opioids long-term, talk to your care team about driving.    DO NOT operate heavy machinery    DO NOT do any other dangerous  activities while taking these medicines.    DO NOT drink any alcohol while taking these medicines.     If the opioid prescribed includes acetaminophen, DO NOT take with any other medicines that contain acetaminophen. Read all labels carefully. Look for the word  acetaminophen  or  Tylenol.  Ask your pharmacist if you have questions or are unsure.    You can get addicted to pain medicines, especially if you have a history of addiction (chemical, alcohol or substance dependence). Talk to your care team about ways to reduce this risk.    All opioids tend to cause constipation. Drink plenty of water and eat foods that have a lot of fiber, such as fruits, vegetables, prune juice, apple juice and high-fiber cereal. Take a laxative (Miralax, milk of magnesia, Colace, Senna) if you don t move your bowels at least every other day. Other side effects include upset stomach, sleepiness, dizziness, throwing up, tolerance (needing more of the medicine to have the same effect), physical dependence and slowed breathing.    Store your pills in a secure place, locked if possible. We will not replace any lost or stolen medicine. If you don t finish your medicine, please throw away (dispose) as directed by your pharmacist. The [place] [employer]ution [employer] has more information about safe disposal: [email or web address].[email or web address]/living-[email or web address]/[email or web address]-medications         Primary Care Provider Office Phone # Fax #    Riley Fernandez [phone]       [facility] [place] [address]        Equal Access to Services     JOHN FALK : Hadii aad ku hadasho Sotiaali, waaxda luqadaha, qaybta kaalmada adeopalyada, joshua lainez. So [facility] [place] [phone].    ATENCIÓN: Si habla español, tiene a bee disposición servicios gratuitos de asistencia lingüística. Llame al [phone].    We comply with applicable federal civil rights laws and [place] laws. We do not discriminate on the basis  of race, color, national origin, age, disability, sex, sexual orientation, or gender identity.            Thank you!     Thank you for choosing [facility]  for your care. Our goal is always to provide you with excellent care. Hearing back from our patients is one way we can continue to improve our services. Please take a few minutes to complete the written survey that you may receive in the mail after your visit with us. Thank you!             Your Updated Medication List - Protect others around you: Learn how to safely use, store and throw away your medicines at www.disposemymeds.org.          This list is accurate as of 11/8/18 12:27 PM.  Always use your most recent med list.                   Brand Name Dispense Instructions for use Diagnosis    amLODIPine-benazepril 5-20 MG per capsule    LOTREL    180 capsule    Take 1 capsule by mouth 2 times daily    Essential hypertension with goal blood pressure less than 130/85, Essential hypertension, Ascending aortic aneurysm (H), Atrial fibrillation, unspecified type (H)       buPROPion 200 MG 12 hr tablet    WELLBUTRIN SR    180 tablet    TAKE 1 TABLET BY MOUTH 2 TIMES DAILY    Dysthymic disorder       carvedilol 25 MG tablet    COREG    180 tablet    Take 1 tablet (25 mg) by mouth 2 times daily (with meals)    Essential hypertension with goal blood pressure less than 130/85       clonazePAM 0.5 MG tablet    klonoPIN    90 tablet    Take 1 tablet (0.5 mg) by mouth 3 times daily    Stress       cloNIDine 0.1 MG tablet    CATAPRES    180 tablet    Take 1 tablet (0.1 mg) by mouth 2 times daily    Essential hypertension       DOCUSATE SODIUM PO      Take 100 mg by mouth daily        Fe Heme Polypeptide-folic acid 12-1 MG Tabs     90 tablet    Take 1 tablet by mouth daily    Low iron       ferrous sulfate 325 (65 Fe) MG tablet    IRON    30 tablet    Take 1 tablet (325 mg) by mouth daily (with breakfast)        fluocinonide 0.05 % ointment    LIDEX    30 g     Apply twice daily to itchy skin nodules for 1-2 weeks at a time.    Prurigo nodularis       Iron Heme Polypeptide 12 MG Tabs    PROFERRIN ES    30 tablet    Take 12 mg/day by mouth daily    Iron deficiency anemia due to chronic blood loss       morphine 30 MG IR tablet    MSIR    20 tablet    Take 1 tablet (30 mg) by mouth every 6 hours as needed for severe pain    Chronic neck pain       naloxone nasal spray    NARCAN    0.2 mL    Spray 1 spray (4 mg) into one nostril alternating nostrils as needed for opioid reversal every 2-3 minutes until assistance arrives    Chronic, continuous use of opioids       NAPROXEN SODIUM PO      Take 220 mg by mouth 2 times daily        neomycin-polymyxin-hydrocortisone otic solution    CORTISPORIN    10 mL    Place 3 drops in ear(s) 4 times daily    History of impacted ear wax       order for DME     1 Device    Walker for cardiac rehab with 4 wheels, brakes and seat    SOB (shortness of breath)       order for DME     1 Device    Bilateral hand brace(s) for Carpal Tunnel disorder    Pain in extremity, unspecified extremity       pantoprazole 40 MG EC tablet    PROTONIX    30 tablet    Take 1 tablet (40 mg) by mouth every morning    Acute post-operative pain       senna-docusate 8.6-50 MG per tablet    SENOKOT-S;PERICOLACE    100 tablet    Take 1-2 tablets by mouth 2 times daily To prevent constipation while taking narcotic pain medication. Start with 1 tablet twice daily. If no bowel movement in 24 hours, increase to 2 tablets twice daily.  Discontinue if you have loose stools or when you are no longer taking narcotics.    Bladder neck contracture       simvastatin 10 MG tablet    ZOCOR    90 tablet    Take 1 tablet (10 mg) by mouth At Bedtime    Hypercholesteremia       tacrolimus 0.1 % ointment    PROTOPIC    120 g    Apply topically as needed Apply to affected areas on body.    Other eczema, Prurigo nodularis

## 2018-11-12 ENCOUNTER — TRANSFERRED RECORDS (OUTPATIENT)
Dept: HEALTH INFORMATION MANAGEMENT | Facility: CLINIC | Age: 58
End: 2018-11-12

## 2019-01-16 ENCOUNTER — OFFICE VISIT (OUTPATIENT)
Dept: OPHTHALMOLOGY | Facility: CLINIC | Age: 59
End: 2019-01-16
Attending: OPHTHALMOLOGY
Payer: COMMERCIAL

## 2019-01-16 DIAGNOSIS — H43.393 VITREOUS SYNERESIS OF BOTH EYES: ICD-10-CM

## 2019-01-16 DIAGNOSIS — H40.003 GLAUCOMA SUSPECT OF BOTH EYES: ICD-10-CM

## 2019-01-16 DIAGNOSIS — H43.393 VITREOUS SYNERESIS OF BOTH EYES: Primary | ICD-10-CM

## 2019-01-16 PROCEDURE — 92134 CPTRZ OPH DX IMG PST SGM RTA: CPT | Mod: ZF | Performed by: OPHTHALMOLOGY

## 2019-01-16 PROCEDURE — 92015 DETERMINE REFRACTIVE STATE: CPT | Mod: ZF

## 2019-01-16 PROCEDURE — 92133 CPTRZD OPH DX IMG PST SGM ON: CPT | Mod: ZF | Performed by: OPHTHALMOLOGY

## 2019-01-16 PROCEDURE — G0463 HOSPITAL OUTPT CLINIC VISIT: HCPCS | Mod: ZF

## 2019-01-16 ASSESSMENT — EXTERNAL EXAM - LEFT EYE: OS_EXAM: NORMAL

## 2019-01-16 ASSESSMENT — REFRACTION_MANIFEST
OS_ADD: +2.50
OS_AXIS: 005
OD_ADD: +2.50
OS_AXIS: 005
OD_CYLINDER: +1.25
OD_SPHERE: +1.25
OS_SPHERE: +3.00
OD_AXIS: 010
OS_CYLINDER: +1.00
OS_SPHERE: +1.50
OD_CYLINDER: +1.25
OD_SPHERE: +2.75
OD_AXIS: 010
OS_CYLINDER: +1.00

## 2019-01-16 ASSESSMENT — TONOMETRY
OS_IOP_MMHG: 14
IOP_METHOD: TONOPEN
OD_IOP_MMHG: 13

## 2019-01-16 ASSESSMENT — VISUAL ACUITY
OS_CC: 20/20
CORRECTION_TYPE: GLASSES
METHOD: SNELLEN - LINEAR
OD_CC: 20/20
OS_CC+: -1

## 2019-01-16 ASSESSMENT — REFRACTION_WEARINGRX
OD_ADD: +2.25
OS_SPHERE: +1.25
OD_CYLINDER: +1.00
OD_AXIS: 005
OS_CYLINDER: +0.50
OD_SPHERE: +1.00
OS_AXIS: 180
OS_ADD: +2.25

## 2019-01-16 ASSESSMENT — CONF VISUAL FIELD
OD_NORMAL: 1
METHOD: COUNTING FINGERS
OS_NORMAL: 1

## 2019-01-16 ASSESSMENT — CUP TO DISC RATIO
OS_RATIO: 0.6
OD_RATIO: 0.6

## 2019-01-16 ASSESSMENT — SLIT LAMP EXAM - LIDS
COMMENTS: NORMAL
COMMENTS: NORMAL

## 2019-01-16 ASSESSMENT — EXTERNAL EXAM - RIGHT EYE: OD_EXAM: NORMAL

## 2019-01-16 NOTE — PROGRESS NOTES
CC: no visual acuity changes noted. Has family history of Age related macular degeneration vs adult onset vitelliform dystrophy    HPI: Kobe Buchanan is a  58 year old year-old patient with a family history for Age related macular degeneration and would like a screening for this.    His mother is patient of Dr. Espinal and was diagnosed with pattern dystrophy    Retinal Imaging:  OCT  1-16-19  RE: superior outer retinal disruption  LE: normal    Autofluorescence 7-18-18  OD normal  OS normal    OCT rNFL 1-16-19  OD Normal, no thinning  OS Mild temporal thinning    OVF 24-2 1-16-19  OD: Mild inferotemporal loss  OS: Mild inferior loss    Assessment & Plan:  1. Glaucoma suspect   - increased high disc/ ratio   - Mother with glaucoma   - IOP great today   - baseline OCT rNFL and OVF not worrisome today    2. 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 Increased choroidal thickness   -  Monitor    4. Vitreous syneresis    Return to Clinic 1 year with OCT Mac, FAF. OVF and ONFL    Paramjit Ryan M.D.  PGY-3, Ophthalmology      ~~~~~~~~~~~~~~~~~~~~~~~~~~~~~~~~~~   Complete documentation of historical and exam elements from today's encounter can be found in the full encounter summary report (not reduplicated in this progress note).  I personally obtained the chief complaint(s) and history of present illness.  I confirmed and edited as necessary the review of systems, past medical/surgical history, family history, social history, and examination findings as documented by others; and I examined the patient myself.  I personally reviewed the relevant tests, images, and reports as documented above.  I personally reviewed the ophthalmic test(s) associated with this encounter, agree with the interpretation(s) as documented by the resident/fellow, and have edited the corresponding report(s) as necessary.   I formulated and edited as  necessary the assessment and plan and discussed the findings and management plan with the patient and family    Brenda Espinal MD  .  Retina Service   Department of Ophthalmology and Visual Neurosciences   [facility]  Phone: [phone]   Fax: [phone]

## 2019-01-16 NOTE — NURSING NOTE
Chief Complaints and History of Present Illnesses   Patient presents with     Follow Up     Chief Complaint(s) and History of Present Illness(es)     Follow Up     Laterality: both eyes    Onset: gradual    Quality: States va is the same since last visit      Frequency: constantly    Associated symptoms: Negative for flashes and floaters    Treatments tried: no treatments    Pain scale: 0/10              Comments     Vitreous syneresis   Nai Ledezma COT 7:39 AM January 16, 2019

## 2019-01-17 ENCOUNTER — OFFICE VISIT (OUTPATIENT)
Dept: FAMILY MEDICINE | Facility: CLINIC | Age: 59
End: 2019-01-17
Payer: COMMERCIAL

## 2019-01-17 VITALS
RESPIRATION RATE: 20 BRPM | BODY MASS INDEX: 34.17 KG/M2 | TEMPERATURE: 97.9 F | SYSTOLIC BLOOD PRESSURE: 129 MMHG | HEART RATE: 65 BPM | OXYGEN SATURATION: 96 % | DIASTOLIC BLOOD PRESSURE: 76 MMHG | WEIGHT: 241.4 LBS

## 2019-01-17 DIAGNOSIS — D50.0 IRON DEFICIENCY ANEMIA DUE TO CHRONIC BLOOD LOSS: ICD-10-CM

## 2019-01-17 DIAGNOSIS — L29.0 PRURITUS ANI: ICD-10-CM

## 2019-01-17 DIAGNOSIS — M54.2 NECK PAIN: Primary | ICD-10-CM

## 2019-01-17 DIAGNOSIS — G89.18 ACUTE POST-OPERATIVE PAIN: ICD-10-CM

## 2019-01-17 RX ORDER — CYCLOBENZAPRINE HCL 10 MG
10 TABLET ORAL 3 TIMES DAILY PRN
Qty: 30 TABLET | Refills: 1 | Status: SHIPPED | OUTPATIENT
Start: 2019-01-17 | End: 2019-02-04

## 2019-01-17 RX ORDER — TRIAMCINOLONE ACETONIDE 1 MG/G
CREAM TOPICAL 2 TIMES DAILY
Qty: 80 G | Refills: 0 | Status: SHIPPED | OUTPATIENT
Start: 2019-01-17 | End: 2020-01-28

## 2019-01-17 RX ORDER — PANTOPRAZOLE SODIUM 40 MG/1
40 TABLET, DELAYED RELEASE ORAL EVERY MORNING
Qty: 30 TABLET | Refills: 3 | Status: SHIPPED | OUTPATIENT
Start: 2019-01-17 | End: 2019-08-22

## 2019-01-17 NOTE — PROGRESS NOTES
HPI:       Kobe Buchanan is a 58 year old  male who presents for recheck. He is accompanying his father.  Patient has been having neck pain, but has been overall doing well since his father was hospitalized.  He has had some recurrent trouble with pruritus ani. This has previously responded to triamcinolone.    He also complains that he is out of his prescription for pantoprazole, 40 mg.  He requests a refill   Healso notes that with his back and neck pain, that cyclobenzaprine, 10 mg, is the only one that provides him substantial muscle relaxation. He requests a refill.   He also requests a prescription for Proferrin - a mixture of iron and folic acid for history of iron deficiency.                PMHX:   Current Medications:   Current Outpatient Medications   Medication Sig Dispense Refill     amLODIPine-benazepril (LOTREL) 5-20 MG per capsule Take 1 capsule by mouth 2 times daily 180 capsule 3     buPROPion (WELLBUTRIN SR) 200 MG 12 hr tablet TAKE 1 TABLET BY MOUTH 2 TIMES DAILY 180 tablet 3     carvedilol (COREG) 25 MG tablet Take 1 tablet (25 mg) by mouth 2 times daily (with meals) 180 tablet 3     clonazePAM (KLONOPIN) 0.5 MG tablet Take 1 tablet (0.5 mg) by mouth 3 times daily 90 tablet 2     cloNIDine (CATAPRES) 0.1 MG tablet Take 1 tablet (0.1 mg) by mouth 2 times daily 180 tablet 3     DOCUSATE SODIUM PO Take 100 mg by mouth daily        Fe Heme Polypeptide-folic acid 12-1 MG TABS Take 1 tablet by mouth daily 90 tablet 3     ferrous sulfate 325 (65 FE) MG tablet Take 1 tablet (325 mg) by mouth daily (with breakfast) 30 tablet 3     fluocinonide (LIDEX) 0.05 % ointment Apply twice daily to itchy skin nodules for 1-2 weeks at a time. 30 g 3     Iron Heme Polypeptide (PROFERRIN ES) 12 MG TABS Take 12 mg/day by mouth daily 30 tablet 3     morphine (MSIR) 30 MG IR tablet Take 1 tablet (30 mg) by mouth every 6 hours as needed for severe pain 20 tablet 0     naloxone (NARCAN) nasal spray Spray 1  spray (4 mg) into one nostril alternating nostrils as needed for opioid reversal every 2-3 minutes until assistance arrives 0.2 mL 0     NAPROXEN SODIUM PO Take 220 mg by mouth 2 times daily       neomycin-polymyxin-hydrocortisone (CORTISPORIN) otic solution Place 3 drops in ear(s) 4 times daily 10 mL 3     order for DME Bilateral hand brace(s) for Carpal Tunnel disorder 1 Device 1     order for DME Walker for cardiac rehab with 4 wheels, brakes and seat 1 Device 0     pantoprazole (PROTONIX) 40 MG enteric coated tablet Take 1 tablet (40 mg) by mouth every morning 30 tablet 3     senna-docusate (SENOKOT-S;PERICOLACE) 8.6-50 MG per tablet Take 1-2 tablets by mouth 2 times daily To prevent constipation while taking narcotic pain medication. Start with 1 tablet twice daily. If no bowel movement in 24 hours, increase to 2 tablets twice daily.  Discontinue if you have loose stools or when you are no longer taking narcotics. 100 tablet 0     simvastatin (ZOCOR) 10 MG tablet Take 1 tablet (10 mg) by mouth At Bedtime 90 tablet 3     tacrolimus (PROTOPIC) 0.1 % ointment Apply topically as needed Apply to affected areas on body. 120 g 11       Existing Problems  Patient Active Problem List   Diagnosis     Essential hypertension     Hyperlipidemia     Abdominal pain, unspecified abdominal location     Ascending aortic aneurysm (H)     Medication refill- do not delete      Pain medication agreement signed     S/P shoulder replacement     BPPV (benign paroxysmal positional vertigo)     Shoulder joint pain     Obstructive sleep apnea     Obesity     Rhinitis     Right knee pain     Status post coronary angiogram     Long-term (current) use of anticoagulants [Z79.01]     Atrial fibrillation (H) [I48.91]     Trigger finger of right thumb     Dizzy     Spondylosis of cervical region without myelopathy or radiculopathy       Allergies:  Allergies   Allergen Reactions     No Known Allergies        Previous labs:  Lab Results    Component Value Date    WBC 6.8 01/10/2018    HGB 15.5 01/10/2018    HCT 45.3 01/10/2018     01/10/2018    CHOL 179 03/22/2018    TRIG 255 (H) 03/22/2018    HDL 39 (L) 03/22/2018    ALT 23 01/10/2018    AST 20 01/10/2018     01/10/2018    BUN 21 01/10/2018    CO2 28 01/10/2018    TSH 2.96 01/10/2018    PSA 0.18 03/01/2017    INR 0.97 03/22/2018               Review of Systems:    CONSTITUTIONAL: no fatigue, no unexpected change in weight  SKIN: no worrisome rashes, no worrisome moles, no worrisome lesions  EYES: no acute vision problems or changes  ENT: no ear problems, no mouth problems, no throat problems  RESP: no significant cough, no shortness of breath  CV: no chest pain, no palpitations, no new or worsening peripheral edema  GI: no nausea, no vomiting, no constipation, no diarrhea          Physical Exam:     Vitals:    01/17/19 0809   BP: 129/76   Pulse: 65   Resp: 20   Temp: 97.9  F (36.6  C)   TempSrc: Oral   SpO2: 96%   Weight: 109.5 kg (241 lb 6.4 oz)     Body mass index is 34.17 kg/m .    GENERAL:alert, well hydrated, no distress  EYES: Eyes grossly normal to inspection, extraocular movements - intact, and PERRL  HENT: ear canals- normal; TMs- normal; Nose- normal; Mouth- no ulcers, no lesions  NECK: no tenderness, no adenopathy, no asymmetry, no masses, no stiffness; thyroid- normal to palpation  RESP: lungs clear to auscultation - no rales, no rhonchi, no wheezes  CV: regular rates and rhythm, normal S1 S2, no S3 or S4 and no murmur, no click or rub -               Labs and Procedures     Office Visit on 04/02/2018   Component Date Value Ref Range Status     Interpretation ECG 04/02/2018 Click View Image link to view waveform and result   Final              Assessment and Plan     1.Pruritus ani - refill triamcinolone.   2. Neck pain - will refill the Flexeril  3. Refill the pantoprazole - patient takes this intermittently.   4. Refill the Proferrin for history of iron deficiency.        Options for treatment and follow-up care were reviewed with the patient and/or guardian. Kobe Buchanan and/or guardian engaged in the decision making process and verbalized understanding of the options discussed and agreed with the final plan.    Riley Fernandez MD

## 2019-02-04 DIAGNOSIS — F43.9 STRESS: ICD-10-CM

## 2019-02-04 DIAGNOSIS — M54.2 NECK PAIN: ICD-10-CM

## 2019-02-04 RX ORDER — CLONAZEPAM 0.5 MG/1
0.5 TABLET ORAL 3 TIMES DAILY PRN
Qty: 90 TABLET | Refills: 1 | Status: SHIPPED | OUTPATIENT
Start: 2019-02-04 | End: 2019-03-14

## 2019-02-04 RX ORDER — CYCLOBENZAPRINE HCL 10 MG
10 TABLET ORAL 3 TIMES DAILY PRN
Qty: 30 TABLET | Refills: 1 | Status: SHIPPED | OUTPATIENT
Start: 2019-02-04 | End: 2019-03-14

## 2019-02-05 ENCOUNTER — TELEPHONE (OUTPATIENT)
Dept: FAMILY MEDICINE | Facility: CLINIC | Age: 59
End: 2019-02-05

## 2019-02-05 NOTE — TELEPHONE ENCOUNTER
[facility] phone call message- medication clarification/question:    Full Medication Name: clonazePAM (KLONOPIN)    Dose: 0.5 MG tablet    Question: Caller states the patient was prescribed the medication listed above. She states in order to fill the medication for the patient she needs a note from Dr. Fernandez stating he is are aware and understands that the patient is already taking the medications Morphine and [place] that are prescribed by another provider. Caller states she cannot fill Clonazepam until this note is complete. Please call and advise.     Pharmacy confirmed as [id number] - [place], [place] - [address]: Yes    OK to leave a message on voice mail? Yes    Primary language: English      needed? No    Call taken on February 5, 2019 at 10:07 AM by Amberly Clark

## 2019-02-07 ENCOUNTER — TRANSFERRED RECORDS (OUTPATIENT)
Dept: HEALTH INFORMATION MANAGEMENT | Facility: CLINIC | Age: 59
End: 2019-02-07

## 2019-02-15 DIAGNOSIS — Z86.79 S/P THORACIC AORTIC ANEURYSM REPAIR: ICD-10-CM

## 2019-02-15 DIAGNOSIS — Z98.890 S/P THORACIC AORTIC ANEURYSM REPAIR: ICD-10-CM

## 2019-02-15 DIAGNOSIS — I71.21 ASCENDING AORTIC ANEURYSM (H): Primary | ICD-10-CM

## 2019-02-15 DIAGNOSIS — I10 ESSENTIAL HYPERTENSION: ICD-10-CM

## 2019-02-15 NOTE — PROGRESS NOTES
Date: 2/15/2019    Time of Call: 10:16 AM     Diagnosis:  Ascending aortic aneurysm     [ TORB ] Ordering provider: Enriqueta Zhou MD  Order: MRA chest     Order received by: Juarez Will RN     Follow-up/additional notes: see MyChart message

## 2019-02-19 ENCOUNTER — TRANSFERRED RECORDS (OUTPATIENT)
Dept: HEALTH INFORMATION MANAGEMENT | Facility: CLINIC | Age: 59
End: 2019-02-19

## 2019-02-25 ENCOUNTER — HOSPITAL ENCOUNTER (OUTPATIENT)
Dept: MRI IMAGING | Facility: CLINIC | Age: 59
Discharge: HOME | End: 2019-02-25
Attending: INTERNAL MEDICINE | Admitting: INTERNAL MEDICINE
Payer: COMMERCIAL

## 2019-02-25 DIAGNOSIS — I71.21 ASCENDING AORTIC ANEURYSM (H): ICD-10-CM

## 2019-02-25 DIAGNOSIS — I10 ESSENTIAL HYPERTENSION: ICD-10-CM

## 2019-02-25 DIAGNOSIS — Z86.79 S/P THORACIC AORTIC ANEURYSM REPAIR: ICD-10-CM

## 2019-02-25 DIAGNOSIS — Z98.890 S/P THORACIC AORTIC ANEURYSM REPAIR: ICD-10-CM

## 2019-02-25 PROCEDURE — 25500064 ZZH RX 255 OP 636: Performed by: INTERNAL MEDICINE

## 2019-02-25 PROCEDURE — A9577 INJ MULTIHANCE: HCPCS | Performed by: INTERNAL MEDICINE

## 2019-02-25 PROCEDURE — 71555 MRI ANGIO CHEST W OR W/O DYE: CPT

## 2019-02-25 RX ADMIN — GADOBENATE DIMEGLUMINE 15 ML: 529 INJECTION, SOLUTION INTRAVENOUS at 10:25

## 2019-02-26 ENCOUNTER — TELEPHONE (OUTPATIENT)
Dept: FAMILY MEDICINE | Facility: CLINIC | Age: 59
End: 2019-02-26

## 2019-02-26 NOTE — TELEPHONE ENCOUNTER
[facility] phone call message- medication clarification/question:    Full Medication Name: Generic Aleve   Dose: 500 MG    Question: Patient is requesting a generic brand for Aleve 500 MG to be sent to the pharmacy. He states it's a naproxen-sodium, like a high power Asprin. Notified it may take 2 business days for a response. Please call and advise.      Pharmacy confirmed as [id number] - [place], [place] - [address]: Yes    OK to leave a message on voice mail? Yes    Primary language: English      needed? No    Call taken on February 26, 2019 at 4:10 PM by Amberly Clark

## 2019-03-14 ENCOUNTER — OFFICE VISIT (OUTPATIENT)
Dept: FAMILY MEDICINE | Facility: CLINIC | Age: 59
End: 2019-03-14
Payer: COMMERCIAL

## 2019-03-14 VITALS
BODY MASS INDEX: 32.48 KG/M2 | TEMPERATURE: 98 F | HEART RATE: 72 BPM | DIASTOLIC BLOOD PRESSURE: 84 MMHG | RESPIRATION RATE: 18 BRPM | WEIGHT: 229.4 LBS | SYSTOLIC BLOOD PRESSURE: 117 MMHG | OXYGEN SATURATION: 100 %

## 2019-03-14 DIAGNOSIS — I71.21 ASCENDING AORTIC ANEURYSM (H): ICD-10-CM

## 2019-03-14 DIAGNOSIS — M54.2 NECK PAIN: ICD-10-CM

## 2019-03-14 DIAGNOSIS — I10 ESSENTIAL HYPERTENSION WITH GOAL BLOOD PRESSURE LESS THAN 130/85: ICD-10-CM

## 2019-03-14 DIAGNOSIS — I10 ESSENTIAL HYPERTENSION: ICD-10-CM

## 2019-03-14 DIAGNOSIS — F34.1 DYSTHYMIC DISORDER: ICD-10-CM

## 2019-03-14 DIAGNOSIS — F43.9 STRESS: ICD-10-CM

## 2019-03-14 DIAGNOSIS — I48.91 ATRIAL FIBRILLATION, UNSPECIFIED TYPE (H): ICD-10-CM

## 2019-03-14 RX ORDER — CYCLOBENZAPRINE HCL 10 MG
10 TABLET ORAL 3 TIMES DAILY PRN
Qty: 60 TABLET | Refills: 3 | Status: SHIPPED | OUTPATIENT
Start: 2019-03-14 | End: 2019-08-05

## 2019-03-14 RX ORDER — CLONIDINE HYDROCHLORIDE 0.1 MG/1
0.1 TABLET ORAL 2 TIMES DAILY
Qty: 180 TABLET | Refills: 3 | Status: SHIPPED | OUTPATIENT
Start: 2019-03-14 | End: 2019-04-02 | Stop reason: ALTCHOICE

## 2019-03-14 RX ORDER — AMLODIPINE AND BENAZEPRIL HYDROCHLORIDE 5; 20 MG/1; MG/1
1 CAPSULE ORAL
Qty: 180 CAPSULE | Refills: 3 | Status: SHIPPED | OUTPATIENT
Start: 2019-03-14 | End: 2019-10-29

## 2019-03-14 RX ORDER — CLONAZEPAM 0.5 MG/1
0.5 TABLET ORAL 3 TIMES DAILY PRN
Qty: 90 TABLET | Refills: 5 | Status: SHIPPED | OUTPATIENT
Start: 2019-03-14 | End: 2019-10-29

## 2019-03-14 RX ORDER — NAPROXEN SODIUM 500 MG/1
500 TABLET, FILM COATED, EXTENDED RELEASE ORAL DAILY
Qty: 60 TABLET | Refills: 3 | Status: SHIPPED | OUTPATIENT
Start: 2019-03-14 | End: 2019-04-08 | Stop reason: ALTCHOICE

## 2019-03-14 RX ORDER — CARVEDILOL 25 MG/1
25 TABLET ORAL 2 TIMES DAILY WITH MEALS
Qty: 180 TABLET | Refills: 3 | Status: SHIPPED | OUTPATIENT
Start: 2019-03-14 | End: 2019-10-29

## 2019-03-14 RX ORDER — BUPROPION HYDROCHLORIDE 200 MG/1
TABLET, EXTENDED RELEASE ORAL
Qty: 180 TABLET | Refills: 3 | Status: SHIPPED | OUTPATIENT
Start: 2019-03-14 | End: 2019-10-29

## 2019-03-14 NOTE — PROGRESS NOTES
HPI:       Kobe Buchanan is a 59 year old  male who presents for recheck of neck and back pain, and medication refill.   Patient is well known to the provider. History of anxiety disorder with substantial somatic complaints. Today has been having additional pain of the neck.  He has given up wearing magnets on the neck, but is now wearing a narrow collar. We discussed the dangers of wearing a neck brace for extended periods, and he understands.  He takes it off at night, and agreed to only wear it 3-4 days per week. I advised him to seek physical therapy, but he discussed with another doctor and decided he will go to a chiropractor.    Discussed the clonazepam use. Patient is continuing on the buproprion, which has been a great help. Has tried multiple other serotonin specific reuptake inhibitor with psychiatry, but settled on this.  He takes the klonopin 2-3 times per day, and finds he needs this to stay comfortable.  Have advised patient to return to mental health provider, but he says that he can't afford it at this time.  Patient brought his mother with him today.     Have refilled the patient's meds today.                  PMHX:   Current Medications:   Current Outpatient Medications   Medication Sig Dispense Refill     amLODIPine-benazepril (LOTREL) 5-20 MG per capsule Take 1 capsule by mouth 2 times daily 180 capsule 3     buPROPion (WELLBUTRIN SR) 200 MG 12 hr tablet TAKE 1 TABLET BY MOUTH 2 TIMES DAILY 180 tablet 3     carvedilol (COREG) 25 MG tablet Take 1 tablet (25 mg) by mouth 2 times daily (with meals) 180 tablet 3     clonazePAM (KLONOPIN) 0.5 MG tablet Take 1 tablet (0.5 mg) by mouth 3 times daily as needed for anxiety 90 tablet 1     cloNIDine (CATAPRES) 0.1 MG tablet Take 1 tablet (0.1 mg) by mouth 2 times daily 180 tablet 3     cyclobenzaprine (FLEXERIL) 10 MG tablet Take 1 tablet (10 mg) by mouth 3 times daily as needed for muscle spasms 30 tablet 1     DOCUSATE SODIUM PO Take 100  mg by mouth daily        Fe Heme Polypeptide-folic acid 12-1 MG TABS Take 1 tablet by mouth daily 90 tablet 3     ferrous sulfate 325 (65 FE) MG tablet Take 1 tablet (325 mg) by mouth daily (with breakfast) 30 tablet 3     fluocinonide (LIDEX) 0.05 % ointment Apply twice daily to itchy skin nodules for 1-2 weeks at a time. 30 g 3     Iron Heme Polypeptide (PROFERRIN ES) 12 MG TABS Take 12 mg/day by mouth daily 30 tablet 3     morphine (MSIR) 30 MG IR tablet Take 1 tablet (30 mg) by mouth every 6 hours as needed for severe pain 20 tablet 0     naloxone (NARCAN) nasal spray Spray 1 spray (4 mg) into one nostril alternating nostrils as needed for opioid reversal every 2-3 minutes until assistance arrives 0.2 mL 0     NAPROXEN SODIUM PO Take 220 mg by mouth 2 times daily       neomycin-polymyxin-hydrocortisone (CORTISPORIN) otic solution Place 3 drops in ear(s) 4 times daily 10 mL 3     order for DME Bilateral hand brace(s) for Carpal Tunnel disorder 1 Device 1     order for DME Walker for cardiac rehab with 4 wheels, brakes and seat 1 Device 0     pantoprazole (PROTONIX) 40 MG EC tablet Take 1 tablet (40 mg) by mouth every morning 30 tablet 3     senna-docusate (SENOKOT-S;PERICOLACE) 8.6-50 MG per tablet Take 1-2 tablets by mouth 2 times daily To prevent constipation while taking narcotic pain medication. Start with 1 tablet twice daily. If no bowel movement in 24 hours, increase to 2 tablets twice daily.  Discontinue if you have loose stools or when you are no longer taking narcotics. 100 tablet 0     simvastatin (ZOCOR) 10 MG tablet Take 1 tablet (10 mg) by mouth At Bedtime 90 tablet 3     tacrolimus (PROTOPIC) 0.1 % ointment Apply topically as needed Apply to affected areas on body. 120 g 11     triamcinolone (KENALOG) 0.1 % external cream Apply topically 2 times daily As needed to affected area 80 g 0       Existing Problems  Patient Active Problem List   Diagnosis     Essential hypertension     Hyperlipidemia      Abdominal pain, unspecified abdominal location     Ascending aortic aneurysm (H)     Medication refill- do not delete      Pain medication agreement signed     S/P shoulder replacement     BPPV (benign paroxysmal positional vertigo)     Shoulder joint pain     Obstructive sleep apnea     Obesity     Rhinitis     Right knee pain     Status post coronary angiogram     Long-term (current) use of anticoagulants [Z79.01]     Atrial fibrillation (H) [I48.91]     Trigger finger of right thumb     Dizzy     Spondylosis of cervical region without myelopathy or radiculopathy       Allergies:  Allergies   Allergen Reactions     No Known Allergies        Previous labs:  Lab Results   Component Value Date    WBC 6.8 01/10/2018    HGB 15.5 01/10/2018    HCT 45.3 01/10/2018     01/10/2018    CHOL 179 03/22/2018    TRIG 255 (H) 03/22/2018    HDL 39 (L) 03/22/2018    ALT 23 01/10/2018    AST 20 01/10/2018     01/10/2018    BUN 21 01/10/2018    CO2 28 01/10/2018    TSH 2.96 01/10/2018    PSA 0.18 03/01/2017    INR 0.97 03/22/2018               Review of Systems:    CONSTITUTIONAL: no fatigue, no unexpected change in weight  SKIN: no worrisome rashes, no worrisome moles, no worrisome lesions  EYES: no acute vision problems or changes  ENT: no ear problems, no mouth problems, no throat problems  RESP: no significant cough, no shortness of breath  CV: no chest pain, no palpitations, no new or worsening peripheral edema  GI: no nausea, no vomiting, no constipation, no diarrhea          Physical Exam:     Vitals:    03/14/19 0807   BP: 117/84   Pulse: 72   Resp: 18   Temp: 98  F (36.7  C)   TempSrc: Oral   SpO2: 100%   Weight: 104.1 kg (229 lb 6.4 oz)     Body mass index is 32.48 kg/m .    GENERAL:alert, well hydrated, no distress  EYES: Eyes grossly normal to inspection, extraocular movements - intact, and PERRL  HENT: ear canals- normal; TMs- normal; Nose- normal; Mouth- no ulcers, no lesions  NECK: no tenderness, no  adenopathy, no asymmetry, no masses, no stiffness; thyroid- normal to palpation  RESP: lungs clear to auscultation - no rales, no rhonchi, no wheezes  CV: regular rates and rhythm, normal S1 S2, no S3 or S4 and no murmur, no click or rub -  Neck: range of motion 20 degrees lateral rotation bilaterally.  Limited flexion.  Mildly stiff paravertebral cervical area.               Labs and Procedures     Office Visit on 04/02/2018   Component Date Value Ref Range Status     Interpretation ECG 04/02/2018 Click View Image link to view waveform and result   Final              Assessment and Plan     1.Anxiety and depression - moderately controlled at this time [facility] buproprion and klonopin.  Discussed the need to seek additional mental health support, but patient refuses at this time. Will continue this regimen at this time.   2. Cervical pain - following up with ortho.  3. Refill other meds.   4. More than 50% of this 30 minute visit was spent in face-to-face counseling.  Counseling included discussion of   Depression, anxiety, use of klonopin, need for additional mental health support, use of neck braces and contraindications and side effects, involvement of other doctors in a variety of other complaints.     Note: Kobe is receiving fentanyl patches 50 mcg/hour and morphine sulfate 15 mg 1-2 tabs q 4-6 hours max 4 per day from Dr. Gayle Alford.      Options for treatment and follow-up care were reviewed with the patient and/or guardian. Kobe Buchanan and/or guardian engaged in the decision making process and verbalized understanding of the options discussed and agreed with the final plan.    Riley Fernandez MD

## 2019-03-19 ENCOUNTER — TRANSFERRED RECORDS (OUTPATIENT)
Dept: HEALTH INFORMATION MANAGEMENT | Facility: CLINIC | Age: 59
End: 2019-03-19

## 2019-04-02 ENCOUNTER — OFFICE VISIT (OUTPATIENT)
Dept: CARDIOLOGY | Facility: CLINIC | Age: 59
End: 2019-04-02
Attending: INTERNAL MEDICINE
Payer: COMMERCIAL

## 2019-04-02 VITALS
SYSTOLIC BLOOD PRESSURE: 125 MMHG | OXYGEN SATURATION: 94 % | WEIGHT: 226 LBS | HEIGHT: 71 IN | HEART RATE: 69 BPM | DIASTOLIC BLOOD PRESSURE: 78 MMHG | BODY MASS INDEX: 31.64 KG/M2

## 2019-04-02 DIAGNOSIS — I71.21 ASCENDING AORTIC ANEURYSM (H): Primary | ICD-10-CM

## 2019-04-02 PROCEDURE — G0463 HOSPITAL OUTPT CLINIC VISIT: HCPCS | Mod: ZF

## 2019-04-02 PROCEDURE — 99204 OFFICE O/P NEW MOD 45 MIN: CPT | Mod: 25 | Performed by: INTERNAL MEDICINE

## 2019-04-02 RX ORDER — FENTANYL 75 UG/1
PATCH TRANSDERMAL
Refills: 0 | COMMUNITY
Start: 2019-03-19 | End: 2019-10-29

## 2019-04-02 RX ORDER — LOSARTAN POTASSIUM 25 MG/1
25 TABLET ORAL DAILY
Qty: 30 TABLET | Refills: 3 | Status: SHIPPED | OUTPATIENT
Start: 2019-04-02 | End: 2019-04-08

## 2019-04-02 ASSESSMENT — MIFFLIN-ST. JEOR: SCORE: 1859.32

## 2019-04-02 ASSESSMENT — PAIN SCALES - GENERAL: PAINLEVEL: NO PAIN (0)

## 2019-04-02 NOTE — PATIENT INSTRUCTIONS
You were seen today in the Cardiovascular Clinic at the [facility].      Cardiology Providers you saw during your visit:   Dr. Zhou    Diagnosis:   (I71.2) Ascending aortic aneurysm (H)  (primary encounter diagnosis)    Results:  None today    Recommendations:    -Echocardiogram every year, aortic insufficiency.  Schedule at your convenience.  -stop clonidine, start losartan   -have labs drawn 1 week after starting losartan.  Can be done at local [place] lab.    Follow-up:  6 months with Dr. Zhou      For emergencies call [phone].    For any scheduling needs, please call [phone].     Thank you for your visit today!     Please call if you have any questions or concerns.  Juarez Will RN

## 2019-04-02 NOTE — NURSING NOTE
Chief Complaint   Patient presents with     New Patient     58 y/o for evaluation of bicuspid aortic valve and aortic aneurysm, s/p repair     Medications reviewed and vitals performed.  Alix Luong CMA

## 2019-04-02 NOTE — NURSING NOTE
Cardiac Testing: Patient given instructions regarding  echocardiogram . Discussed purpose, preparation, procedure and when to expect results reported back to the patient. Patient demonstrated understanding of this information and agreed to call with further questions or concerns.  Diet: Patient instructed regarding a heart healthy diet, including discussion of reduced fat and sodium intake. Patient demonstrated understanding of this information and agreed to call with further questions or concerns.  Labs:  Patient was instructed to return for the next laboratory testing 1 week after starting losartan. Patient demonstrated understanding of this information and agreed to call with further questions or concerns.   Med Reconcile: Reviewed and verified all current medications with the patient. The updated medication list was printed and given to the patient.  New Medication: Losartan 25 mg po every day.  Patient was educated regarding newly prescribed medication, including discussion of  the indication, administration, side effects, and when to report to MD or RN. Patient demonstrated understanding of this information and agreed to call with further questions or concerns.  Return Appointment: 6 months with Dr. Zhou.  Patient given instructions regarding scheduling next clinic visit. Patient demonstrated understanding of this information and agreed to call with further questions or concerns.  Medication Change: discontinue clonidine.  Patient was educated regarding prescribed medication change, including discussion of the indication, administration, side effects, and when to report to MD or RN. Patient demonstrated understanding of this information and agreed to call with further questions or concerns.  Patient stated he understood all health information given and agreed to call with further questions or concerns.

## 2019-04-02 NOTE — LETTER
4/2/2019      RE: Kobe Buchanan  [address] [place] [address]       Dear Colleague,    Thank you for the opportunity to participate in the care of your patient, Kobe Buchanan, at the [facility] at [facility]. Please see a copy of my visit note below.             Vascular Cardiology Consultation      HPI:     This is a pleasant 59-year-old male with past medical history hypertension, hyperlipidemia, bicuspid aortic valve R-L with mild aortic insufficiency and a 5.3 cm proximal ascending aortic aneurysm, no coarctation, status post valve sparing aortic aneurysm repair with Gelweave graft.  Patient is referred by Atul Martínez and [place] to establish care into congenital vascular cardiology clinic.  Patient reports his nephew at 28 years old has a known bicuspid aortic valve without aortopathy.  He has been seen in [place] and followed.  His father is 91 years old and has a known aneurysm of 4.9 cm in bicuspid valve.  Patient reports no family history of sudden death or known dissection.  No history of strokes.  Blood pressure has been well controlled on multidrug regimen.  He partakes in no heavy lifting and overall adherence to a healthy lifestyle.     MRA performed today and results are pending. Last echocardiogram performed last year 3/2018.      PAST MEDICAL HISTORY  Past Medical History:   Diagnosis Date     Anxiety      Ascending aortic aneurysm (H)      Bicuspid aortic valve      BPPV (benign paroxysmal positional vertigo) 7/11/2014     Chronic narcotic use      Chronic neck pain      Chronic osteoarthritis      Degenerative joint disease      Depression      Hyperlipidemia 4/10/2012     Hypertension      Multiple stiff joints      Neck injuries      Obesity 2/9/2015     Skin picking habit      Sleep apnea     does not use cpap       CURRENT MEDICATIONS  Current Outpatient Medications   Medication Sig Dispense Refill      amLODIPine-benazepril (LOTREL) 5-20 MG capsule Take 1 capsule by mouth 2 times daily 180 capsule 3     buPROPion (WELLBUTRIN SR) 200 MG 12 hr tablet TAKE 1 TABLET BY MOUTH 2 TIMES DAILY 180 tablet 3     carvedilol (COREG) 25 MG tablet Take 1 tablet (25 mg) by mouth 2 times daily (with meals) 180 tablet 3     clonazePAM (KLONOPIN) 0.5 MG tablet Take 1 tablet (0.5 mg) by mouth 3 times daily as needed for anxiety 90 tablet 5     cloNIDine (CATAPRES) 0.1 MG tablet Take 1 tablet (0.1 mg) by mouth 2 times daily 180 tablet 3     cyclobenzaprine (FLEXERIL) 10 MG tablet Take 1 tablet (10 mg) by mouth 3 times daily as needed for muscle spasms 60 tablet 3     DOCUSATE SODIUM PO Take 100 mg by mouth daily        fentaNYL (DURAGESIC) 75 mcg/hr 72 hr patch   0     fluocinonide (LIDEX) 0.05 % ointment Apply twice daily to itchy skin nodules for 1-2 weeks at a time. 30 g 3     morphine (MSIR) 30 MG IR tablet Take 1 tablet (30 mg) by mouth every 6 hours as needed for severe pain 20 tablet 0     naproxen sodium ER (NAPRELAN) 500 MG 24 hr tablet Take 1 tablet (500 mg) by mouth daily 60 tablet 3     neomycin-polymyxin-hydrocortisone (CORTISPORIN) otic solution Place 3 drops in ear(s) 4 times daily 10 mL 3     order for DME Walker for cardiac rehab with 4 wheels, brakes and seat 1 Device 0     pantoprazole (PROTONIX) 40 MG EC tablet Take 1 tablet (40 mg) by mouth every morning 30 tablet 3     senna-docusate (SENOKOT-S;PERICOLACE) 8.6-50 MG per tablet Take 1-2 tablets by mouth 2 times daily To prevent constipation while taking narcotic pain medication. Start with 1 tablet twice daily. If no bowel movement in 24 hours, increase to 2 tablets twice daily.  Discontinue if you have loose stools or when you are no longer taking narcotics. 100 tablet 0     simvastatin (ZOCOR) 10 MG tablet Take 1 tablet (10 mg) by mouth At Bedtime 90 tablet 3     tacrolimus (PROTOPIC) 0.1 % ointment Apply topically as needed Apply to affected areas on body. 120  g 11     triamcinolone (KENALOG) 0.1 % external cream Apply topically 2 times daily As needed to affected area 80 g 0     Fe Heme Polypeptide-folic acid 12-1 MG TABS Take 1 tablet by mouth daily (Patient not taking: Reported on 4/2/2019) 90 tablet 3     ferrous sulfate 325 (65 FE) MG tablet Take 1 tablet (325 mg) by mouth daily (with breakfast) 30 tablet 3     Iron Heme Polypeptide (PROFERRIN ES) 12 MG TABS Take 12 mg/day by mouth daily (Patient not taking: Reported on 4/2/2019) 30 tablet 3     naloxone (NARCAN) nasal spray Spray 1 spray (4 mg) into one nostril alternating nostrils as needed for opioid reversal every 2-3 minutes until assistance arrives 0.2 mL 0     order for DME Bilateral hand brace(s) for Carpal Tunnel disorder (Patient not taking: Reported on 4/2/2019) 1 Device 1       PAST SURGICAL HISTORY:  Past Surgical History:   Procedure Laterality Date     ARTHROPLASTY SHOULDER  4/15/2014    Procedure: Left Total Shoulder Arthroplasty ;  Surgeon: Analilia Aceves MD;  [facility]     ARTHROPLASTY SHOULDER Right 8/26/2014    Procedure: ARTHROPLASTY SHOULDER;  Surgeon: Analilia Aceves MD;  [facility]     BYPASS GASTRIC TAVO-EN-Y, LIVER BIOPSY, COMBINED  8/8/2005     C HAND/FINGER SURGERY UNLISTED       C SHOULDER SURG PROC UNLISTED       COLONOSCOPY  6/30/2014    Procedure: COMBINED COLONOSCOPY, SINGLE BIOPSY/POLYPECTOMY BY BIOPSY;  Surgeon: Chester Patton MD;  [facility]     CYSTOSCOPY, BLADDER NECK CUTS, COMBINED N/A 7/18/2016    Procedure: COMBINED CYSTOSCOPY, BLADDER NECK CUTS;  Surgeon: Ritu Leslie MD;  [facility]     ESOPHAGOSCOPY, GASTROSCOPY, DUODENOSCOPY (EGD), COMBINED  6/30/2014    Procedure: COMBINED ESOPHAGOSCOPY, GASTROSCOPY, DUODENOSCOPY (EGD), BIOPSY SINGLE OR MULTIPLE;  Surgeon: Chester Patton MD;  [facility]: [facility]     EXCISE MASS FINGER  6/14/2011    Procedure:EXCISE MASS FINGER; Middle Flexor Cyst; Surgeon:SHAYY RUSSELL  ARTI; Location:[facility]     HAND SURGERY      excision of tendon cyst on left hand     [facility] VASCULAR SURGERY PROCEDURE UNLIST       LASER HOLMIUM LITHOTRIPSY BLADDER N/A 10/15/2014    Procedure: LASER HOLMIUM LITHOTRIPSY BLADDER;  Surgeon: Sahil Taveras MD;  [facility]     LASER KTP GREEN LIGHT PHOTOSELECTIVE VAPORIZATION PROSTATE  1/23/2014    Procedure: LASER KTP GREEN LIGHT PHOTOSELECTIVE VAPORIZATION PROSTATE;  Greenlight Photovaporization Of Prostate  ;  Surgeon: Sahil Taveras MD;  Location: [facility]     RELEASE TRIGGER FINGER Right 3/31/2017    Procedure: RELEASE TRIGGER FINGER;  Surgeon: Juan Carlos Blunt MD;  [facility]     REPAIR ANEURYSM ASCENDING AORTA N/A 10/4/2016    Procedure: REPAIR ANEURYSM ASCENDING AORTA;  Surgeon: Mckenzie Townsend MD;  [facility]     TURP  2014       ALLERGIES     Allergies   Allergen Reactions     No Known Allergies        FAMILY HISTORY  Family History   Problem Relation Age of Onset     Arthritis Other      Gastrointestinal Disease Other      Cardiovascular Father         aortic aneurysm     Arrhythmia Father      Nephrolithiasis Father      Sleep Apnea Father      Anxiety Disorder Father      Depression Father      Hypertension Father      Obesity Father      Hyperlipidemia Father      Coronary Artery Disease Father         history of MI and stent     Low Back Problems Father      Spine Problems Father      Anxiety Disorder Mother      Hypertension Mother      Osteoporosis Mother      Obesity Mother      Hyperlipidemia Mother      Low Back Problems Mother      Anxiety Disorder Sister      Hypertension Sister      Osteoporosis Sister      Obesity Sister      Hyperlipidemia Sister      Low Back Problems Sister      Spine Problems Sister      Anxiety Disorder Sister      Depression Sister      Hypertension Sister      Osteoporosis Sister      Obesity Sister      Hyperlipidemia Sister      Low Back Problems Sister        SOCIAL HISTORY  Social  History     Socioeconomic History     Marital status: Single     Spouse name: Not on file     Number of children: Not on file     Years of education: Not on file     Highest education level: Not on file   Occupational History     Occupation: Disabled   Social Needs     Financial resource strain: Not on file     Food insecurity:     Worry: Not on file     Inability: Not on file     Transportation needs:     Medical: Not on file     Non-medical: Not on file   Tobacco Use     Smoking status: Former Smoker     Packs/day: 0.50     Years: 6.00     Pack years: 3.00     Types: Cigarettes     Start date: 1977     Last attempt to quit: 1983     Years since quittin.6     Smokeless tobacco: Never Used     Tobacco comment: quit 35 years ago   Substance and Sexual Activity     Alcohol use: No     Alcohol/week: 0.0 oz     Drug use: No     Sexual activity: Not Currently     Partners: Female     Birth control/protection: Abstinence   Lifestyle     Physical activity:     Days per week: Not on file     Minutes per session: Not on file     Stress: Not on file   Relationships     Social connections:     Talks on phone: Not on file     Gets together: Not on file     Attends Hoahaoism service: Not on file     Active member of club or organization: Not on file     Attends meetings of clubs or organizations: Not on file     Relationship status: Not on file     Intimate partner violence:     Fear of current or ex partner: Not on file     Emotionally abused: Not on file     Physically abused: Not on file     Forced sexual activity: Not on file   Other Topics Concern     Parent/sibling w/ CABG, MI or angioplasty before 65F 55M? Not Asked   Social History Narrative     Not on file       ROS:    Constitutional: No fever, chills, or sweats. No weight gain/loss   ENT: No visual disturbance, ear ache, epistaxis, sore throat  Allergies/Immunologic: Negative  Respiratory: No cough, hemoptysia  Cardiovascular: As per HPI  GI: No nausea,  "vomiting, hematemesis, melena, or hematochezia  : No urinary frequency, dysuria, or hematuria  Integument: Negative  Psychiatric: Negative  Neuro: Negative  Endocrinology: Negative   Musculoskeletal: Negative  Vascular: No walking impairment, claudication, ischemic rest pain or nonhealing wounds    EXAM:  /78 (BP Location: Right arm, Patient Position: Chair, Cuff Size: Adult Large)   Pulse 69   Ht 1.791 m (5' 10.5\")   Wt 102.5 kg (226 lb)   SpO2 94%   BMI 31.97 kg/m     In general, the patient is a pleasant male in no apparent distress.    HEENT: NC/AT.  PERRLA.  EOMI.  Sclerae white, not injected.  Nares clear.  Pharynx without erythema or exudate.  Dentition intact.    Neck: No adenopathy.  No thyromegaly. Carotids +2/2 bilaterally without bruits.  No jugular venous distension.   Heart: RRR. Normal S1, S2 splits physiologically. No murmur, rub, click, or gallop. The PMI is in the 5th ICS in the midclavicular line. There is no heave.    Lungs: CTA.  No ronchi, wheezes, rales.  No dullness to percussion.   Abdomen: Soft, nontender, nondistended. No organomegaly. No AAA.  No bruits.   Extremities: No clubbing, cyanosis, or edema.  No wounds. No varicose veins signs of chronic venous insufficiency.   Vascular: No bruits are noted. 2+ DP and PT pulses bilaterally. 2+ radial and ulnar. 2+ carotid bilaterally.     Labs:  LIPID RESULTS:  Lab Results   Component Value Date    CHOL 179 03/22/2018    HDL 39 (L) 03/22/2018    LDL 89 03/22/2018    TRIG 255 (H) 03/22/2018    CHOLHDLRATIO 3.7 06/08/2015    NHDL 140 (H) 03/22/2018       LIVER ENZYME RESULTS:  Lab Results   Component Value Date    AST 20 01/10/2018    ALT 23 01/10/2018       CBC RESULTS:  Lab Results   Component Value Date    WBC 6.8 01/10/2018    RBC 5.18 01/10/2018    HGB 15.5 01/10/2018    HCT 45.3 01/10/2018    MCV 88 01/10/2018    MCH 29.9 01/10/2018    MCHC 34.2 01/10/2018    RDW 12.2 01/10/2018     01/10/2018       BMP RESULTS:  Lab " Results   Component Value Date     01/10/2018    POTASSIUM 4.2 01/10/2018    CHLORIDE 102 01/10/2018    CO2 28 01/10/2018    ANIONGAP 8 01/10/2018     (H) 01/10/2018    BUN 21 01/10/2018    CR 0.98 01/10/2018    GFRESTIMATED 79 01/10/2018    GFRESTBLACK >90 01/10/2018    NIA 8.9 01/10/2018        A1C RESULTS:  Lab Results   Component Value Date    A1C 5.3 10/05/2016       Procedures:      Echo 3/12/2018  S/P valve-sparing ascending aorta replacement with 32mm Gelweave interposition  graft 10/4/16 by history.     Normal biventricular size, thickness, global, and regional systolic function, LVEF=60-65%.  Bicsupid aortic valve (Lucy type 1 L-R) with mild aortic insufficiency and no aortic stenosis.  Normal appearance of the aortic root and ascending aortic graft. Sinuses of Valsalva 3.8 cm, ascending aortic graft 3.2 cm.  No pericardial effusion is present.  This study was compared with the study from 8/30/2016 (XIOMY) : There has been interval valve-sparing ascending aortic replacement. There has otherwise been no significant change.         MRA 1/4/17:     1. Patient is status post repair of a ascending aortic aneurysm. The ascending aorta, arch, and descending aorta are now normal in diameter. There is no dissection seen.   2. The aortic arch is left sided. There is a bovine variant branching of the arch vessels.   3. The main and proximal branch pulmonary arteries are normal in size.   4. The systemic venous connections are normal.       Bi-orthogonal luminal aortic dimensions are:     1.  Aortic root at the sinuses of Valsalva =  3.6 cm x 3.5 cm    2.  Sinotubular junction =  2.8 cm x 2.6 cm    3.  Mid-ascending aorta (midpoint in length between Nos. 2 and 4) =  3.4 cm x 3.3 cm    4.  Proximal aortic arch (aorta at the origin of the innominateartery) =  3.0 cm x 2.8 cm    5.  Mid-aortic arch (between left common carotid and subclavian arteries) =  2.2 cm x 1.9 cm    6.  Proximal descending thoracic  aorta (begins at the isthmus) =  2.1 cm --x 2.1 cm  7.  Mid-descending aorta (midpoint in length between Nos. 6 and 8) =  2.1 cm x 2.0 cm    8.  Aorta at diaphragm (2 cm above the celiac axis origin) =  2.1 cm x2.0 cm  9.  Abdominal aorta at the celiac axis origin =  2.0 cm x 1.9 cm        Assessment and Plan:     59-year-old male with hypertension, hyperlipidemia, right left fusion of his aortic valve with bicuspid aortopathy.  No known coarctation.  No other peripheral aneurysmal disease.  He is status post valve sparing aortic repair with Gelweave interposition graft.  Blood pressure overall well controlled.  He will need ongoing long-term surveillance of both of his aortic repair and his bicuspid valve given he has some dysfunction within the native valve.  He only has mild aortic insufficiency but will need to follow this over time.  He also has hypertension requiring multidrug regimen.  There is some data suggestive of losartan as aorto protective.  We will prefer he be on losartan over clonidine.    Recommendations:    -Will review MRA from today  -Echocardiogram for aortic valve function  -Start losartan 25 mg daily, stop clonidine  -Will have patient report BPs to us and obtain labs (chemistry panel) in one week after starting  -Follow up 6 months, will order surveillance MRA at that time (likely q1-2 years depending on stability)      Enriqueta Zhou MD MSc   Aortopathy Clinic  Vascular Genetics, Adult Congenital Section  Division of Cardiology  [facility]         Please do not hesitate to contact me if you have any questions/concerns.     Sincerely,     Enriqueta Zhou MD

## 2019-04-02 NOTE — PROGRESS NOTES
Vascular Cardiology Consultation      HPI:     This is a pleasant 59-year-old male with past medical history hypertension, hyperlipidemia, bicuspid aortic valve R-L with mild aortic insufficiency and a 5.3 cm proximal ascending aortic aneurysm, no coarctation, status post valve sparing aortic aneurysm repair with Gelweave graft.  Patient is referred by Atul Martínez and [place] to establish care into congenital vascular cardiology clinic.  Patient reports his nephew at 28 years old has a known bicuspid aortic valve without aortopathy.  He has been seen in [place] and followed.  His father is 91 years old and has a known aneurysm of 4.9 cm in bicuspid valve.  Patient reports no family history of sudden death or known dissection.  No history of strokes.  Blood pressure has been well controlled on multidrug regimen.  He partakes in no heavy lifting and overall adherence to a healthy lifestyle.     MRA performed today and results are pending. Last echocardiogram performed last year 3/2018.      PAST MEDICAL HISTORY  Past Medical History:   Diagnosis Date     Anxiety      Ascending aortic aneurysm (H)      Bicuspid aortic valve      BPPV (benign paroxysmal positional vertigo) 7/11/2014     Chronic narcotic use      Chronic neck pain      Chronic osteoarthritis      Degenerative joint disease      Depression      Hyperlipidemia 4/10/2012     Hypertension      Multiple stiff joints      Neck injuries      Obesity 2/9/2015     Skin picking habit      Sleep apnea     does not use cpap       CURRENT MEDICATIONS  Current Outpatient Medications   Medication Sig Dispense Refill     amLODIPine-benazepril (LOTREL) 5-20 MG capsule Take 1 capsule by mouth 2 times daily 180 capsule 3     buPROPion (WELLBUTRIN SR) 200 MG 12 hr tablet TAKE 1 TABLET BY MOUTH 2 TIMES DAILY 180 tablet 3     carvedilol (COREG) 25 MG tablet Take 1 tablet (25 mg) by mouth 2 times daily (with meals) 180 tablet 3     clonazePAM (KLONOPIN) 0.5 MG tablet  Take 1 tablet (0.5 mg) by mouth 3 times daily as needed for anxiety 90 tablet 5     cloNIDine (CATAPRES) 0.1 MG tablet Take 1 tablet (0.1 mg) by mouth 2 times daily 180 tablet 3     cyclobenzaprine (FLEXERIL) 10 MG tablet Take 1 tablet (10 mg) by mouth 3 times daily as needed for muscle spasms 60 tablet 3     DOCUSATE SODIUM PO Take 100 mg by mouth daily        fentaNYL (DURAGESIC) 75 mcg/hr 72 hr patch   0     fluocinonide (LIDEX) 0.05 % ointment Apply twice daily to itchy skin nodules for 1-2 weeks at a time. 30 g 3     morphine (MSIR) 30 MG IR tablet Take 1 tablet (30 mg) by mouth every 6 hours as needed for severe pain 20 tablet 0     naproxen sodium ER (NAPRELAN) 500 MG 24 hr tablet Take 1 tablet (500 mg) by mouth daily 60 tablet 3     neomycin-polymyxin-hydrocortisone (CORTISPORIN) otic solution Place 3 drops in ear(s) 4 times daily 10 mL 3     order for DME Walker for cardiac rehab with 4 wheels, brakes and seat 1 Device 0     pantoprazole (PROTONIX) 40 MG EC tablet Take 1 tablet (40 mg) by mouth every morning 30 tablet 3     senna-docusate (SENOKOT-S;PERICOLACE) 8.6-50 MG per tablet Take 1-2 tablets by mouth 2 times daily To prevent constipation while taking narcotic pain medication. Start with 1 tablet twice daily. If no bowel movement in 24 hours, increase to 2 tablets twice daily.  Discontinue if you have loose stools or when you are no longer taking narcotics. 100 tablet 0     simvastatin (ZOCOR) 10 MG tablet Take 1 tablet (10 mg) by mouth At Bedtime 90 tablet 3     tacrolimus (PROTOPIC) 0.1 % ointment Apply topically as needed Apply to affected areas on body. 120 g 11     triamcinolone (KENALOG) 0.1 % external cream Apply topically 2 times daily As needed to affected area 80 g 0     Fe Heme Polypeptide-folic acid 12-1 MG TABS Take 1 tablet by mouth daily (Patient not taking: Reported on 4/2/2019) 90 tablet 3     ferrous sulfate 325 (65 FE) MG tablet Take 1 tablet (325 mg) by mouth daily (with breakfast)  30 tablet 3     Iron Heme Polypeptide (PROFERRIN ES) 12 MG TABS Take 12 mg/day by mouth daily (Patient not taking: Reported on 4/2/2019) 30 tablet 3     naloxone (NARCAN) nasal spray Spray 1 spray (4 mg) into one nostril alternating nostrils as needed for opioid reversal every 2-3 minutes until assistance arrives 0.2 mL 0     order for DME Bilateral hand brace(s) for Carpal Tunnel disorder (Patient not taking: Reported on 4/2/2019) 1 Device 1       PAST SURGICAL HISTORY:  Past Surgical History:   Procedure Laterality Date     ARTHROPLASTY SHOULDER  4/15/2014    Procedure: Left Total Shoulder Arthroplasty ;  Surgeon: Analilia Aceves MD;  [facility]     ARTHROPLASTY SHOULDER Right 8/26/2014    Procedure: ARTHROPLASTY SHOULDER;  Surgeon: Analilia Aceves MD;  [facility]     BYPASS GASTRIC TAVO-EN-Y, LIVER BIOPSY, COMBINED  8/8/2005     C HAND/FINGER SURGERY UNLISTED       C SHOULDER SURG PROC UNLISTED       COLONOSCOPY  6/30/2014    Procedure: COMBINED COLONOSCOPY, SINGLE BIOPSY/POLYPECTOMY BY BIOPSY;  Surgeon: Chester Patton MD;  [facility]     CYSTOSCOPY, BLADDER NECK CUTS, COMBINED N/A 7/18/2016    Procedure: COMBINED CYSTOSCOPY, BLADDER NECK CUTS;  Surgeon: Ritu Leslie MD;  [facility]     ESOPHAGOSCOPY, GASTROSCOPY, DUODENOSCOPY (EGD), COMBINED  6/30/2014    Procedure: COMBINED ESOPHAGOSCOPY, GASTROSCOPY, DUODENOSCOPY (EGD), BIOPSY SINGLE OR MULTIPLE;  Surgeon: Chester Patton MD;  [facility]     EXCISE MASS FINGER  6/14/2011    Procedure:EXCISE MASS FINGER; Middle Flexor Cyst; Surgeon:SHAYY RUSSELL; Location:[facility]     HAND SURGERY      excision of tendon cyst on left hand     [facility] VASCULAR SURGERY PROCEDURE UNLIST       LASER HOLMIUM LITHOTRIPSY BLADDER N/A 10/15/2014    Procedure: LASER HOLMIUM LITHOTRIPSY BLADDER;  Surgeon: Sahil Taveras MD;  [facility]     LASER KTP GREEN LIGHT PHOTOSELECTIVE VAPORIZATION PROSTATE  1/23/2014     Procedure: LASER KTP GREEN LIGHT PHOTOSELECTIVE VAPORIZATION PROSTATE;  Greenlight Photovaporization Of Prostate  ;  Surgeon: Sahil Taveras MD;  [facility]     RELEASE TRIGGER FINGER Right 3/31/2017    Procedure: RELEASE TRIGGER FINGER;  Surgeon: Juan Carlos Blunt MD;  [facility]     REPAIR ANEURYSM ASCENDING AORTA N/A 10/4/2016    Procedure: REPAIR ANEURYSM ASCENDING AORTA;  Surgeon: Mckenzie Townsend MD;  [facility]     TURP  2014       ALLERGIES     Allergies   Allergen Reactions     No Known Allergies        FAMILY HISTORY  Family History   Problem Relation Age of Onset     Arthritis Other      Gastrointestinal Disease Other      Cardiovascular Father         aortic aneurysm     Arrhythmia Father      Nephrolithiasis Father      Sleep Apnea Father      Anxiety Disorder Father      Depression Father      Hypertension Father      Obesity Father      Hyperlipidemia Father      Coronary Artery Disease Father         history of MI and stent     Low Back Problems Father      Spine Problems Father      Anxiety Disorder Mother      Hypertension Mother      Osteoporosis Mother      Obesity Mother      Hyperlipidemia Mother      Low Back Problems Mother      Anxiety Disorder Sister      Hypertension Sister      Osteoporosis Sister      Obesity Sister      Hyperlipidemia Sister      Low Back Problems Sister      Spine Problems Sister      Anxiety Disorder Sister      Depression Sister      Hypertension Sister      Osteoporosis Sister      Obesity Sister      Hyperlipidemia Sister      Low Back Problems Sister        SOCIAL HISTORY  Social History     Socioeconomic History     Marital status: Single     Spouse name: Not on file     Number of children: Not on file     Years of education: Not on file     Highest education level: Not on file   Occupational History     Occupation: Disabled   Social Needs     Financial resource strain: Not on file     Food insecurity:     Worry: Not on file      Inability: Not on file     Transportation needs:     Medical: Not on file     Non-medical: Not on file   Tobacco Use     Smoking status: Former Smoker     Packs/day: 0.50     Years: 6.00     Pack years: 3.00     Types: Cigarettes     Start date: 1977     Last attempt to quit: 1983     Years since quittin.6     Smokeless tobacco: Never Used     Tobacco comment: quit 35 years ago   Substance and Sexual Activity     Alcohol use: No     Alcohol/week: 0.0 oz     Drug use: No     Sexual activity: Not Currently     Partners: Female     Birth control/protection: Abstinence   Lifestyle     Physical activity:     Days per week: Not on file     Minutes per session: Not on file     Stress: Not on file   Relationships     Social connections:     Talks on phone: Not on file     Gets together: Not on file     Attends Jew service: Not on file     Active member of club or organization: Not on file     Attends meetings of clubs or organizations: Not on file     Relationship status: Not on file     Intimate partner violence:     Fear of current or ex partner: Not on file     Emotionally abused: Not on file     Physically abused: Not on file     Forced sexual activity: Not on file   Other Topics Concern     Parent/sibling w/ CABG, MI or angioplasty before 65F 55M? Not Asked   Social History Narrative     Not on file       ROS:    Constitutional: No fever, chills, or sweats. No weight gain/loss   ENT: No visual disturbance, ear ache, epistaxis, sore throat  Allergies/Immunologic: Negative  Respiratory: No cough, hemoptysia  Cardiovascular: As per HPI  GI: No nausea, vomiting, hematemesis, melena, or hematochezia  : No urinary frequency, dysuria, or hematuria  Integument: Negative  Psychiatric: Negative  Neuro: Negative  Endocrinology: Negative   Musculoskeletal: Negative  Vascular: No walking impairment, claudication, ischemic rest pain or nonhealing wounds    EXAM:  /78 (BP Location: Right arm, Patient  "Position: Chair, Cuff Size: Adult Large)   Pulse 69   Ht 1.791 m (5' 10.5\")   Wt 102.5 kg (226 lb)   SpO2 94%   BMI 31.97 kg/m    In general, the patient is a pleasant male in no apparent distress.    HEENT: NC/AT.  PERRLA.  EOMI.  Sclerae white, not injected.  Nares clear.  Pharynx without erythema or exudate.  Dentition intact.    Neck: No adenopathy.  No thyromegaly. Carotids +2/2 bilaterally without bruits.  No jugular venous distension.   Heart: RRR. Normal S1, S2 splits physiologically. No murmur, rub, click, or gallop. The PMI is in the 5th ICS in the midclavicular line. There is no heave.    Lungs: CTA.  No ronchi, wheezes, rales.  No dullness to percussion.   Abdomen: Soft, nontender, nondistended. No organomegaly. No AAA.  No bruits.   Extremities: No clubbing, cyanosis, or edema.  No wounds. No varicose veins signs of chronic venous insufficiency.   Vascular: No bruits are noted. 2+ DP and PT pulses bilaterally. 2+ radial and ulnar. 2+ carotid bilaterally.     Labs:  LIPID RESULTS:  Lab Results   Component Value Date    CHOL 179 03/22/2018    HDL 39 (L) 03/22/2018    LDL 89 03/22/2018    TRIG 255 (H) 03/22/2018    CHOLHDLRATIO 3.7 06/08/2015    NHDL 140 (H) 03/22/2018       LIVER ENZYME RESULTS:  Lab Results   Component Value Date    AST 20 01/10/2018    ALT 23 01/10/2018       CBC RESULTS:  Lab Results   Component Value Date    WBC 6.8 01/10/2018    RBC 5.18 01/10/2018    HGB 15.5 01/10/2018    HCT 45.3 01/10/2018    MCV 88 01/10/2018    MCH 29.9 01/10/2018    MCHC 34.2 01/10/2018    RDW 12.2 01/10/2018     01/10/2018       BMP RESULTS:  Lab Results   Component Value Date     01/10/2018    POTASSIUM 4.2 01/10/2018    CHLORIDE 102 01/10/2018    CO2 28 01/10/2018    ANIONGAP 8 01/10/2018     (H) 01/10/2018    BUN 21 01/10/2018    CR 0.98 01/10/2018    GFRESTIMATED 79 01/10/2018    GFRESTBLACK >90 01/10/2018    NIA 8.9 01/10/2018        A1C RESULTS:  Lab Results   Component Value " Date    A1C 5.3 10/05/2016       Procedures:      Echo 3/12/2018  S/P valve-sparing ascending aorta replacement with 32mm Gelweave interposition  graft 10/4/16 by history.     Normal biventricular size, thickness, global, and regional systolic function, LVEF=60-65%.  Bicsupid aortic valve (Lucy type 1 L-R) with mild aortic insufficiency and no aortic stenosis.  Normal appearance of the aortic root and ascending aortic graft. Sinuses of Valsalva 3.8 cm, ascending aortic graft 3.2 cm.  No pericardial effusion is present.  This study was compared with the study from 8/30/2016 (XIOMY) : There has been interval valve-sparing ascending aortic replacement. There has otherwise been no significant change.         MRA 1/4/17:     1. Patient is status post repair of a ascending aortic aneurysm. The ascending aorta, arch, and descending aorta are now normal in diameter. There is no dissection seen.   2. The aortic arch is left sided. There is a bovine variant branching of the arch vessels.   3. The main and proximal branch pulmonary arteries are normal in size.   4. The systemic venous connections are normal.       Bi-orthogonal luminal aortic dimensions are:     1.  Aortic root at the sinuses of Valsalva =  3.6 cm x 3.5 cm    2.  Sinotubular junction =  2.8 cm x 2.6 cm    3.  Mid-ascending aorta (midpoint in length between Nos. 2 and 4) =  3.4 cm x 3.3 cm    4.  Proximal aortic arch (aorta at the origin of the innominateartery) =  3.0 cm x 2.8 cm    5.  Mid-aortic arch (between left common carotid and subclavian arteries) =  2.2 cm x 1.9 cm    6.  Proximal descending thoracic aorta (begins at the isthmus) =  2.1 cm --x 2.1 cm  7.  Mid-descending aorta (midpoint in length between Nos. 6 and 8) =  2.1 cm x 2.0 cm    8.  Aorta at diaphragm (2 cm above the celiac axis origin) =  2.1 cm x2.0 cm  9.  Abdominal aorta at the celiac axis origin =  2.0 cm x 1.9 cm        Assessment and Plan:     59-year-old male with hypertension,  hyperlipidemia, right left fusion of his aortic valve with bicuspid aortopathy.  No known coarctation.  No other peripheral aneurysmal disease.  He is status post valve sparing aortic repair with Gelweave interposition graft.  Blood pressure overall well controlled.  He will need ongoing long-term surveillance of both of his aortic repair and his bicuspid valve given he has some dysfunction within the native valve.  He only has mild aortic insufficiency but will need to follow this over time.  He also has hypertension requiring multidrug regimen.  There is some data suggestive of losartan as aorto protective.  We will prefer he be on losartan over clonidine.    Recommendations:    -Will review MRA from today  -Echocardiogram for aortic valve function  -Start losartan 25 mg daily, stop clonidine  -Will have patient report BPs to us and obtain labs (chemistry panel) in one week after starting  -Follow up 6 months, will order surveillance MRA at that time (likely q1-2 years depending on stability)      Enriqueta Zhou MD MSc   Aortopathy Clinic  Vascular Genetics, Adult Congenital Section  Division of Cardiology  [facility]

## 2019-04-08 ENCOUNTER — MYC MEDICAL ADVICE (OUTPATIENT)
Dept: FAMILY MEDICINE | Facility: CLINIC | Age: 59
End: 2019-04-08

## 2019-04-08 ENCOUNTER — MYC MEDICAL ADVICE (OUTPATIENT)
Dept: CARDIOLOGY | Facility: CLINIC | Age: 59
End: 2019-04-08

## 2019-04-08 DIAGNOSIS — I71.21 ASCENDING AORTIC ANEURYSM (H): ICD-10-CM

## 2019-04-08 DIAGNOSIS — E78.00 HYPERCHOLESTEREMIA: ICD-10-CM

## 2019-04-08 DIAGNOSIS — L28.1 PRURIGO NODULARIS: ICD-10-CM

## 2019-04-08 RX ORDER — NAPROXEN 500 MG/1
500 TABLET ORAL 2 TIMES DAILY
Qty: 60 TABLET | Refills: 3 | Status: SHIPPED | OUTPATIENT
Start: 2019-04-08 | End: 2019-08-22

## 2019-04-08 RX ORDER — FLUOCINONIDE 0.5 MG/G
OINTMENT TOPICAL
Qty: 30 G | Refills: 3 | Status: SHIPPED | OUTPATIENT
Start: 2019-04-08

## 2019-04-08 RX ORDER — LOSARTAN POTASSIUM 25 MG/1
25 TABLET ORAL DAILY
Qty: 90 TABLET | Refills: 1 | Status: SHIPPED | OUTPATIENT
Start: 2019-04-08 | End: 2019-10-29

## 2019-04-08 RX ORDER — SIMVASTATIN 10 MG
10 TABLET ORAL AT BEDTIME
Qty: 90 TABLET | Refills: 3 | Status: SHIPPED | OUTPATIENT
Start: 2019-04-08 | End: 2019-10-29

## 2019-04-10 ENCOUNTER — ANCILLARY PROCEDURE (OUTPATIENT)
Dept: CARDIOLOGY | Facility: CLINIC | Age: 59
End: 2019-04-10
Attending: INTERNAL MEDICINE
Payer: COMMERCIAL

## 2019-04-10 DIAGNOSIS — I71.21 ASCENDING AORTIC ANEURYSM (H): ICD-10-CM

## 2019-04-18 ENCOUNTER — TRANSFERRED RECORDS (OUTPATIENT)
Dept: HEALTH INFORMATION MANAGEMENT | Facility: CLINIC | Age: 59
End: 2019-04-18

## 2019-05-06 DIAGNOSIS — I71.21 ASCENDING AORTIC ANEURYSM (H): Primary | ICD-10-CM

## 2019-05-06 DIAGNOSIS — I71.21 ASCENDING AORTIC ANEURYSM (H): ICD-10-CM

## 2019-05-06 LAB
ANION GAP SERPL CALCULATED.3IONS-SCNC: 7 MMOL/L (ref 3–14)
BUN SERPL-MCNC: 17 MG/DL (ref 7–30)
CALCIUM SERPL-MCNC: 8.9 MG/DL (ref 8.5–10.1)
CHLORIDE SERPL-SCNC: 103 MMOL/L (ref 94–109)
CO2 SERPL-SCNC: 26 MMOL/L (ref 20–32)
CREAT SERPL-MCNC: 1.09 MG/DL (ref 0.66–1.25)
GFR SERPL CREATININE-BSD FRML MDRD: 74 ML/MIN/{1.73_M2}
GLUCOSE SERPL-MCNC: 86 MG/DL (ref 70–99)
POTASSIUM SERPL-SCNC: 4.4 MMOL/L (ref 3.4–5.3)
SODIUM SERPL-SCNC: 136 MMOL/L (ref 133–144)

## 2019-05-06 PROCEDURE — 80048 BASIC METABOLIC PNL TOTAL CA: CPT | Performed by: INTERNAL MEDICINE

## 2019-05-06 PROCEDURE — 36415 COLL VENOUS BLD VENIPUNCTURE: CPT | Performed by: INTERNAL MEDICINE

## 2019-05-16 ENCOUNTER — TRANSFERRED RECORDS (OUTPATIENT)
Dept: HEALTH INFORMATION MANAGEMENT | Facility: CLINIC | Age: 59
End: 2019-05-16

## 2019-06-18 ENCOUNTER — TRANSFERRED RECORDS (OUTPATIENT)
Dept: HEALTH INFORMATION MANAGEMENT | Facility: CLINIC | Age: 59
End: 2019-06-18

## 2019-07-17 ENCOUNTER — TRANSFERRED RECORDS (OUTPATIENT)
Dept: HEALTH INFORMATION MANAGEMENT | Facility: CLINIC | Age: 59
End: 2019-07-17

## 2019-07-31 DIAGNOSIS — K00.9 DENTAL ANOMALY: ICD-10-CM

## 2019-08-02 RX ORDER — AMOXICILLIN 500 MG/1
CAPSULE ORAL
Qty: 24 CAPSULE | Refills: 3 | OUTPATIENT
Start: 2019-08-02

## 2019-08-05 DIAGNOSIS — M54.2 NECK PAIN: ICD-10-CM

## 2019-08-06 RX ORDER — CYCLOBENZAPRINE HCL 10 MG
10 TABLET ORAL 3 TIMES DAILY PRN
Qty: 30 TABLET | Refills: 1 | Status: SHIPPED | OUTPATIENT
Start: 2019-08-06 | End: 2019-08-22

## 2019-08-16 ENCOUNTER — TRANSFERRED RECORDS (OUTPATIENT)
Dept: HEALTH INFORMATION MANAGEMENT | Facility: CLINIC | Age: 59
End: 2019-08-16

## 2019-08-22 ENCOUNTER — OFFICE VISIT (OUTPATIENT)
Dept: FAMILY MEDICINE | Facility: CLINIC | Age: 59
End: 2019-08-22
Payer: COMMERCIAL

## 2019-08-22 VITALS
WEIGHT: 220 LBS | DIASTOLIC BLOOD PRESSURE: 79 MMHG | RESPIRATION RATE: 18 BRPM | TEMPERATURE: 97.4 F | OXYGEN SATURATION: 99 % | SYSTOLIC BLOOD PRESSURE: 133 MMHG | HEART RATE: 58 BPM | BODY MASS INDEX: 31.12 KG/M2

## 2019-08-22 DIAGNOSIS — L28.1 PRURIGO NODULARIS: ICD-10-CM

## 2019-08-22 DIAGNOSIS — M47.812 SPONDYLOSIS OF CERVICAL REGION WITHOUT MYELOPATHY OR RADICULOPATHY: Primary | ICD-10-CM

## 2019-08-22 DIAGNOSIS — M54.2 NECK PAIN: ICD-10-CM

## 2019-08-22 RX ORDER — PANTOPRAZOLE SODIUM 40 MG/1
40 TABLET, DELAYED RELEASE ORAL EVERY MORNING
Qty: 30 TABLET | Refills: 3 | Status: SHIPPED | OUTPATIENT
Start: 2019-08-22 | End: 2019-10-29

## 2019-08-22 RX ORDER — CYCLOBENZAPRINE HCL 10 MG
10 TABLET ORAL 3 TIMES DAILY PRN
Qty: 30 TABLET | Refills: 1 | Status: SHIPPED | OUTPATIENT
Start: 2019-08-22 | End: 2019-10-29

## 2019-08-22 RX ORDER — NAPROXEN 500 MG/1
500 TABLET ORAL 2 TIMES DAILY
Qty: 60 TABLET | Refills: 3 | Status: SHIPPED | OUTPATIENT
Start: 2019-08-22 | End: 2019-10-29

## 2019-08-22 ASSESSMENT — PATIENT HEALTH QUESTIONNAIRE - PHQ9: SUM OF ALL RESPONSES TO PHQ QUESTIONS 1-9: 4

## 2019-08-22 NOTE — PROGRESS NOTES
SUBJECTIVE:            Kobe Buchanan is a 59 year old male, here with father, in today for medication refills.     Chief Complaint   Patient presents with     Recheck Medication     Refill Request         1. Chronic pain:  Stable and just wanted to establish care and discuss meds.  Not wanting any changes in doses: refills for cyclobenzaprine which helps with neck pains, with PPI (no relief from omeprazole only tolerates pantoprazole) and with naproxen which he uses along with morphine to control pain during the day.    ROS:  CONSTITUTIONAL: NEGATIVE for fever, chills, change in weight  ENT/MOUTH: NEGATIVE for ear, mouth and throat problems  RESP: NEGATIVE for significant cough or SOB  CV: NEGATIVE for chest pain, palpitations or peripheral edema    Problem list, Medication list, Allergies, and Medical/Social/Surgical histories reviewed in EPIC and updated as appropriate.    OBJECTIVE:                          /79   Pulse 58   Temp 97.4  F (36.3  C) (Oral)   Resp 18   Wt 99.8 kg (220 lb)   SpO2 99%   BMI 31.12 kg/m     GENERAL: healthy, alert, well nourished, well hydrated, no distress  MENTAL STATUS EXAM:  Appearance/Behavior:No apparent distress and Neatly groomed  Speech:Normal  Mood/Affect:normal affect  Insight:Adequate         Diagnostic testing:(labs, x-rays, EKG) - None    ASSESSMENT/PLAN:            Medication refills for Pantoprazole 40mg, Naproxen DR 500mg, and cyclobenzaprine 10mg sent to pharmacy.     Risks, benefits and alternatives of treatments discussed. Plan agreed on.      Followup: Pt will schedule appointment following appointment with pain clinic 9/5/2019    Will call, return to clinic, or go to ED if worsening or symptoms not improving as discussed.    See patient instructions.     Health Maintenance Due   Topic Date Due     THERESA ASSESSMENT  1960     ADVANCE CARE PLANNING  1960     DEPRESSION ACTION PLAN  1960     HIV SCREENING  01/28/1976     MEDICARE  ANNUAL WELLNESS VISIT  01/28/1979     ZOSTER IMMUNIZATION (1 of 2) 01/28/2011     PHQ-9  12/18/2014     Health maintenance reviewed/updated? Yes        Demi Hardin MD

## 2019-08-27 DIAGNOSIS — I71.21 ASCENDING AORTIC ANEURYSM (H): ICD-10-CM

## 2019-08-29 RX ORDER — LOSARTAN POTASSIUM 25 MG/1
TABLET ORAL
Qty: 90 TABLET | Refills: 0 | OUTPATIENT
Start: 2019-08-29

## 2019-09-13 ENCOUNTER — TRANSFERRED RECORDS (OUTPATIENT)
Dept: HEALTH INFORMATION MANAGEMENT | Facility: CLINIC | Age: 59
End: 2019-09-13

## 2019-09-16 ENCOUNTER — TRANSFERRED RECORDS (OUTPATIENT)
Dept: HEALTH INFORMATION MANAGEMENT | Facility: CLINIC | Age: 59
End: 2019-09-16

## 2019-09-17 ENCOUNTER — TELEPHONE (OUTPATIENT)
Dept: ORTHOPEDICS | Facility: CLINIC | Age: 59
End: 2019-09-17

## 2019-09-17 NOTE — TELEPHONE ENCOUNTER
[facility] Call Center    Phone Message    May a detailed message be left on voicemail: yes    Reason for Call: Other: Pt was wondering if he should come in for a steroid injection for his should pain or if it would be a better idea for him to get an MRI order for his shoulder. Please call pt to to discuss his options. Pt does have an upcoming appt with Dr. Aceves.      Action Taken: Message routed to:  [facility] Orthopedic

## 2019-09-18 NOTE — TELEPHONE ENCOUNTER
Patient was called back and told that Dr. Aceevs will see him first before we decide on an MRI.  He has an appointment to see her next Wednesday.  He stated that he went to lift a 35 lbs jug in his right hand and is arm came right back down.  He is agreeable with the plan to be seen next Wednesday.

## 2019-09-23 DIAGNOSIS — Z96.611 STATUS POST TOTAL SHOULDER ARTHROPLASTY, RIGHT: Primary | ICD-10-CM

## 2019-10-01 ENCOUNTER — HEALTH MAINTENANCE LETTER (OUTPATIENT)
Age: 59
End: 2019-10-01

## 2019-10-07 ENCOUNTER — TELEPHONE (OUTPATIENT)
Dept: ORTHOPEDICS | Facility: CLINIC | Age: 59
End: 2019-10-07

## 2019-10-07 ENCOUNTER — RECORDS - HEALTHEAST (OUTPATIENT)
Dept: LAB | Facility: CLINIC | Age: 59
End: 2019-10-07

## 2019-10-07 ENCOUNTER — OFFICE VISIT - HEALTHEAST (OUTPATIENT)
Dept: GERIATRICS | Facility: CLINIC | Age: 59
End: 2019-10-07

## 2019-10-07 ENCOUNTER — COMMUNICATION - HEALTHEAST (OUTPATIENT)
Dept: GERIATRICS | Facility: CLINIC | Age: 59
End: 2019-10-07

## 2019-10-07 DIAGNOSIS — I10 ESSENTIAL HYPERTENSION: ICD-10-CM

## 2019-10-07 DIAGNOSIS — E78.5 HYPERLIPIDEMIA, UNSPECIFIED HYPERLIPIDEMIA TYPE: ICD-10-CM

## 2019-10-07 DIAGNOSIS — F31.9 BIPOLAR 1 DISORDER (H): ICD-10-CM

## 2019-10-07 DIAGNOSIS — I48.91 ATRIAL FIBRILLATION, UNSPECIFIED TYPE (H): ICD-10-CM

## 2019-10-07 DIAGNOSIS — N17.9 AKI (ACUTE KIDNEY INJURY) (H): ICD-10-CM

## 2019-10-07 DIAGNOSIS — M00.011 STAPHYLOCOCCAL ARTHRITIS OF RIGHT SHOULDER (H): ICD-10-CM

## 2019-10-07 DIAGNOSIS — G89.29 OTHER CHRONIC PAIN: ICD-10-CM

## 2019-10-07 DIAGNOSIS — E11.9 TYPE 2 DIABETES MELLITUS WITHOUT COMPLICATION, WITHOUT LONG-TERM CURRENT USE OF INSULIN (H): ICD-10-CM

## 2019-10-07 DIAGNOSIS — D64.9 ANEMIA, UNSPECIFIED TYPE: ICD-10-CM

## 2019-10-07 DIAGNOSIS — S41.101D: ICD-10-CM

## 2019-10-07 DIAGNOSIS — R52 ACUTE PAIN: ICD-10-CM

## 2019-10-07 LAB
ALP SERPL-CCNC: 111 U/L (ref 45–120)
ALT SERPL W P-5'-P-CCNC: 9 U/L (ref 0–45)
AST SERPL W P-5'-P-CCNC: 27 U/L (ref 0–40)
BASOPHILS # BLD AUTO: 0 THOU/UL (ref 0–0.2)
BASOPHILS NFR BLD AUTO: 0 % (ref 0–2)
BILIRUB DIRECT SERPL-MCNC: 0.3 MG/DL
BILIRUB SERPL-MCNC: 0.6 MG/DL (ref 0–1)
BUN SERPL-MCNC: 12 MG/DL (ref 8–22)
C REACTIVE PROTEIN LHE: 8 MG/DL (ref 0–0.8)
CREAT SERPL-MCNC: 1.16 MG/DL (ref 0.7–1.3)
EOSINOPHIL # BLD AUTO: 0.1 THOU/UL (ref 0–0.4)
EOSINOPHIL NFR BLD AUTO: 2 % (ref 0–6)
ERYTHROCYTE [DISTWIDTH] IN BLOOD BY AUTOMATED COUNT: 13.2 % (ref 11–14.5)
GFR SERPL CREATININE-BSD FRML MDRD: >60 ML/MIN/1.73M2
HCT VFR BLD AUTO: 22.2 % (ref 40–54)
HGB BLD-MCNC: 7.3 G/DL (ref 14–18)
LYMPHOCYTES # BLD AUTO: 0.9 THOU/UL (ref 0.8–4.4)
LYMPHOCYTES NFR BLD AUTO: 15 % (ref 20–40)
MCH RBC QN AUTO: 30 PG (ref 27–34)
MCHC RBC AUTO-ENTMCNC: 32.9 G/DL (ref 32–36)
MCV RBC AUTO: 91 FL (ref 80–100)
MONOCYTES # BLD AUTO: 0.6 THOU/UL (ref 0–0.9)
MONOCYTES NFR BLD AUTO: 10 % (ref 2–10)
NEUTROPHILS # BLD AUTO: 4.4 THOU/UL (ref 2–7.7)
NEUTROPHILS NFR BLD AUTO: 73 % (ref 50–70)
PLATELET # BLD AUTO: 288 THOU/UL (ref 140–440)
PMV BLD AUTO: 9.6 FL (ref 8.5–12.5)
RBC # BLD AUTO: 2.43 MILL/UL (ref 4.4–6.2)
WBC: 6.1 THOU/UL (ref 4–11)

## 2019-10-07 NOTE — TELEPHONE ENCOUNTER
Health Call Center    Phone Message    May a detailed message be left on voicemail: yes    Reason for Call: Other: Janice from [facility] called as the need orders for weight bearing and range of motion for right shoulder.  Please follow up with Janice at [phone].  Thank you!     Action Taken: Message routed to:  [facility]

## 2019-10-07 NOTE — TELEPHONE ENCOUNTER
Writer called pt and notified him that his phone message was received. Dr. Aceves's nurse is out today and will call him back at their earliest convince. He should hear something by this Wednesday, and if he has not he is to call back.     Dania Salazar LPN     [facility] Call Center    Phone Message    May a detailed message be left on voicemail: yes    Reason for Call: Other: Patient would like a call back from Dr. Aceves or nurse, in regard to the cervical degeneration and the ultra sling and seeing Dr. Aceves sooner than later.  Please follow up with patient.  THank you!     Action Taken: Message routed to:  [facility]

## 2019-10-07 NOTE — TELEPHONE ENCOUNTER
I called the [facility] back regarding their questions about Kobe's restrictions. Based on Dr. Aceves's note a year ago which relates the following:  encouraged the patient to return to swimming if he is able - he enjoys it and feels it helps his shoulder. We again discussed the ongoing restriction to include no lifting more than 50 pounds repetitively.    Angela Ramey, ATC

## 2019-10-09 ENCOUNTER — COMMUNICATION - HEALTHEAST (OUTPATIENT)
Dept: GERIATRICS | Facility: CLINIC | Age: 59
End: 2019-10-09

## 2019-10-09 ENCOUNTER — RECORDS - HEALTHEAST (OUTPATIENT)
Dept: LAB | Facility: CLINIC | Age: 59
End: 2019-10-09

## 2019-10-09 ENCOUNTER — TELEPHONE (OUTPATIENT)
Dept: ORTHOPEDICS | Facility: CLINIC | Age: 59
End: 2019-10-09

## 2019-10-09 ENCOUNTER — OFFICE VISIT - HEALTHEAST (OUTPATIENT)
Dept: GERIATRICS | Facility: CLINIC | Age: 59
End: 2019-10-09

## 2019-10-09 ENCOUNTER — AMBULATORY - HEALTHEAST (OUTPATIENT)
Dept: GERIATRICS | Facility: CLINIC | Age: 59
End: 2019-10-09

## 2019-10-09 DIAGNOSIS — T84.59XD INFECTION OF PROSTHETIC SHOULDER JOINT, SUBSEQUENT ENCOUNTER: Primary | ICD-10-CM

## 2019-10-09 DIAGNOSIS — Z96.619 INFECTION OF PROSTHETIC SHOULDER JOINT, SUBSEQUENT ENCOUNTER: Primary | ICD-10-CM

## 2019-10-09 DIAGNOSIS — I10 ESSENTIAL HYPERTENSION: ICD-10-CM

## 2019-10-09 DIAGNOSIS — D62 ACUTE BLOOD LOSS ANEMIA: ICD-10-CM

## 2019-10-09 DIAGNOSIS — I48.91 ATRIAL FIBRILLATION, UNSPECIFIED TYPE (H): ICD-10-CM

## 2019-10-09 DIAGNOSIS — M00.011 STAPHYLOCOCCAL ARTHRITIS OF RIGHT SHOULDER (H): ICD-10-CM

## 2019-10-09 DIAGNOSIS — N17.9 AKI (ACUTE KIDNEY INJURY) (H): ICD-10-CM

## 2019-10-09 DIAGNOSIS — G89.4 CHRONIC PAIN SYNDROME: ICD-10-CM

## 2019-10-09 LAB
BASOPHILS # BLD AUTO: 0 THOU/UL (ref 0–0.2)
BASOPHILS NFR BLD AUTO: 0 % (ref 0–2)
EOSINOPHIL # BLD AUTO: 0.1 THOU/UL (ref 0–0.4)
EOSINOPHIL NFR BLD AUTO: 2 % (ref 0–6)
ERYTHROCYTE [DISTWIDTH] IN BLOOD BY AUTOMATED COUNT: 13.2 % (ref 11–14.5)
FERRITIN SERPL-MCNC: 277 NG/ML (ref 27–300)
HCT VFR BLD AUTO: 24.7 % (ref 40–54)
HGB BLD-MCNC: 8.1 G/DL (ref 14–18)
IRON SATN MFR SERPL: 18 % (ref 20–50)
IRON SERPL-MCNC: 31 UG/DL (ref 42–175)
LYMPHOCYTES # BLD AUTO: 1.2 THOU/UL (ref 0.8–4.4)
LYMPHOCYTES NFR BLD AUTO: 22 % (ref 20–40)
MCH RBC QN AUTO: 30.2 PG (ref 27–34)
MCHC RBC AUTO-ENTMCNC: 32.8 G/DL (ref 32–36)
MCV RBC AUTO: 92 FL (ref 80–100)
MONOCYTES # BLD AUTO: 0.6 THOU/UL (ref 0–0.9)
MONOCYTES NFR BLD AUTO: 11 % (ref 2–10)
NEUTROPHILS # BLD AUTO: 3.6 THOU/UL (ref 2–7.7)
NEUTROPHILS NFR BLD AUTO: 65 % (ref 50–70)
PLATELET # BLD AUTO: 249 THOU/UL (ref 140–440)
PMV BLD AUTO: 9.2 FL (ref 8.5–12.5)
RBC # BLD AUTO: 2.68 MILL/UL (ref 4.4–6.2)
TIBC SERPL-MCNC: 174 UG/DL (ref 313–563)
TRANSFERRIN SERPL-MCNC: 139 MG/DL (ref 212–360)
WBC: 5.6 THOU/UL (ref 4–11)

## 2019-10-09 NOTE — TELEPHONE ENCOUNTER
[facility] Call Center    Phone Message    May a detailed message be left on voicemail: yes    Reason for Call: Other: Pt calling back because he hasn't heard back from Ketan's nurse. Pt is wanting someone to help him pick out slings for his shoulders and arms. Pt has been given a 75 pound limit weight restriction. Please call pt back to advise.     Action Taken: Message routed to:  [facility] ortho

## 2019-10-10 ENCOUNTER — COMMUNICATION - HEALTHEAST (OUTPATIENT)
Dept: GERIATRICS | Facility: CLINIC | Age: 59
End: 2019-10-10

## 2019-10-10 ENCOUNTER — OFFICE VISIT - HEALTHEAST (OUTPATIENT)
Dept: GERIATRICS | Facility: CLINIC | Age: 59
End: 2019-10-10

## 2019-10-10 ENCOUNTER — RECORDS - HEALTHEAST (OUTPATIENT)
Dept: LAB | Facility: CLINIC | Age: 59
End: 2019-10-10

## 2019-10-10 DIAGNOSIS — F31.9 BIPOLAR AFFECTIVE DISORDER, REMISSION STATUS UNSPECIFIED (H): ICD-10-CM

## 2019-10-10 DIAGNOSIS — I10 HYPERTENSION, UNSPECIFIED TYPE: ICD-10-CM

## 2019-10-10 DIAGNOSIS — D62 ACUTE BLOOD LOSS ANEMIA: ICD-10-CM

## 2019-10-10 DIAGNOSIS — M00.9 PYOGENIC ARTHRITIS OF RIGHT SHOULDER REGION, DUE TO UNSPECIFIED ORGANISM (H): ICD-10-CM

## 2019-10-10 LAB — HGB BLD-MCNC: 7.6 G/DL (ref 14–18)

## 2019-10-11 ENCOUNTER — OFFICE VISIT - HEALTHEAST (OUTPATIENT)
Dept: GERIATRICS | Facility: CLINIC | Age: 59
End: 2019-10-11

## 2019-10-11 ENCOUNTER — TELEPHONE (OUTPATIENT)
Dept: ORTHOPEDICS | Facility: CLINIC | Age: 59
End: 2019-10-11

## 2019-10-11 DIAGNOSIS — I48.20 CHRONIC ATRIAL FIBRILLATION (H): ICD-10-CM

## 2019-10-11 DIAGNOSIS — E11.9 TYPE 2 DIABETES MELLITUS WITHOUT COMPLICATION, WITHOUT LONG-TERM CURRENT USE OF INSULIN (H): ICD-10-CM

## 2019-10-11 DIAGNOSIS — F31.9 BIPOLAR 1 DISORDER (H): ICD-10-CM

## 2019-10-11 DIAGNOSIS — K92.2 GASTROINTESTINAL HEMORRHAGE, UNSPECIFIED GASTROINTESTINAL HEMORRHAGE TYPE: ICD-10-CM

## 2019-10-11 DIAGNOSIS — G89.4 CHRONIC PAIN SYNDROME: ICD-10-CM

## 2019-10-11 DIAGNOSIS — B95.61 MSSA BACTEREMIA: ICD-10-CM

## 2019-10-11 DIAGNOSIS — E44.0 MODERATE PROTEIN-CALORIE MALNUTRITION (H): ICD-10-CM

## 2019-10-11 DIAGNOSIS — D62 ACUTE BLOOD LOSS ANEMIA: ICD-10-CM

## 2019-10-11 DIAGNOSIS — R52 ACUTE PAIN: ICD-10-CM

## 2019-10-11 DIAGNOSIS — I10 ESSENTIAL HYPERTENSION: ICD-10-CM

## 2019-10-11 DIAGNOSIS — R78.81 MSSA BACTEREMIA: ICD-10-CM

## 2019-10-11 DIAGNOSIS — G47.33 OBSTRUCTIVE SLEEP APNEA: ICD-10-CM

## 2019-10-11 LAB
ERYTHROCYTE [DISTWIDTH] IN BLOOD BY AUTOMATED COUNT: 13.2 % (ref 11–14.5)
HCT VFR BLD AUTO: 21.9 % (ref 40–54)
HGB BLD-MCNC: 7 G/DL (ref 14–18)
MCH RBC QN AUTO: 29.4 PG (ref 27–34)
MCHC RBC AUTO-ENTMCNC: 32 G/DL (ref 32–36)
MCV RBC AUTO: 92 FL (ref 80–100)
PLATELET # BLD AUTO: 219 THOU/UL (ref 140–440)
PMV BLD AUTO: 9 FL (ref 8.5–12.5)
RBC # BLD AUTO: 2.38 MILL/UL (ref 4.4–6.2)
WBC: 5.2 THOU/UL (ref 4–11)

## 2019-10-11 NOTE — TELEPHONE ENCOUNTER
Patient is to have no shoulder range of motion at this time.  He can do elbow, wrist, hand range of motion only.  Message left on therapist's voicemail with instruction to call for any further questions.

## 2019-10-11 NOTE — TELEPHONE ENCOUNTER
[facility] Call Center    Phone Message    May a detailed message be left on voicemail: yes    Reason for Call: Other: Pt requesting call back from his care team. Pt stated that at [facility] they took out his shoulder replacement implant due to infection. Pt needing call back to the therapy dept at the [facility] that he is currently at to discuss how much he can move his shoulder and arm, etc. They can be reached at [phone]      Action Taken: Message routed to:  [facility] ortho

## 2019-10-14 ENCOUNTER — RECORDS - HEALTHEAST (OUTPATIENT)
Dept: LAB | Facility: CLINIC | Age: 59
End: 2019-10-14

## 2019-10-14 ENCOUNTER — OFFICE VISIT - HEALTHEAST (OUTPATIENT)
Dept: GERIATRICS | Facility: CLINIC | Age: 59
End: 2019-10-14

## 2019-10-14 DIAGNOSIS — B95.61 MSSA BACTEREMIA: ICD-10-CM

## 2019-10-14 DIAGNOSIS — I10 ESSENTIAL HYPERTENSION: ICD-10-CM

## 2019-10-14 DIAGNOSIS — D62 ACUTE BLOOD LOSS ANEMIA: ICD-10-CM

## 2019-10-14 DIAGNOSIS — M00.011 STAPHYLOCOCCAL ARTHRITIS OF RIGHT SHOULDER (H): ICD-10-CM

## 2019-10-14 DIAGNOSIS — R78.81 MSSA BACTEREMIA: ICD-10-CM

## 2019-10-14 LAB
ALP SERPL-CCNC: 138 U/L (ref 45–120)
ALT SERPL W P-5'-P-CCNC: <9 U/L (ref 0–45)
ANION GAP SERPL CALCULATED.3IONS-SCNC: 9 MMOL/L (ref 5–18)
BASOPHILS # BLD AUTO: 0 THOU/UL (ref 0–0.2)
BASOPHILS NFR BLD AUTO: 0 % (ref 0–2)
BILIRUB SERPL-MCNC: 0.4 MG/DL (ref 0–1)
BUN SERPL-MCNC: 11 MG/DL (ref 8–22)
BUN SERPL-MCNC: 11 MG/DL (ref 8–22)
C REACTIVE PROTEIN LHE: 4.1 MG/DL (ref 0–0.8)
CALCIUM SERPL-MCNC: 8.4 MG/DL (ref 8.5–10.5)
CHLORIDE BLD-SCNC: 100 MMOL/L (ref 98–107)
CO2 SERPL-SCNC: 25 MMOL/L (ref 22–31)
CREAT SERPL-MCNC: 1.2 MG/DL (ref 0.7–1.3)
CREAT SERPL-MCNC: 1.2 MG/DL (ref 0.7–1.3)
EOSINOPHIL # BLD AUTO: 0.1 THOU/UL (ref 0–0.4)
EOSINOPHIL NFR BLD AUTO: 1 % (ref 0–6)
ERYTHROCYTE [DISTWIDTH] IN BLOOD BY AUTOMATED COUNT: 13.2 % (ref 11–14.5)
GFR SERPL CREATININE-BSD FRML MDRD: >60 ML/MIN/1.73M2
GFR SERPL CREATININE-BSD FRML MDRD: >60 ML/MIN/1.73M2
GLUCOSE BLD-MCNC: 201 MG/DL (ref 70–125)
HCT VFR BLD AUTO: 24.3 % (ref 40–54)
HGB BLD-MCNC: 7.6 G/DL (ref 14–18)
LYMPHOCYTES # BLD AUTO: 0.7 THOU/UL (ref 0.8–4.4)
LYMPHOCYTES NFR BLD AUTO: 10 % (ref 20–40)
MCH RBC QN AUTO: 29.6 PG (ref 27–34)
MCHC RBC AUTO-ENTMCNC: 31.3 G/DL (ref 32–36)
MCV RBC AUTO: 95 FL (ref 80–100)
MONOCYTES # BLD AUTO: 0.5 THOU/UL (ref 0–0.9)
MONOCYTES NFR BLD AUTO: 7 % (ref 2–10)
NEUTROPHILS # BLD AUTO: 5.9 THOU/UL (ref 2–7.7)
NEUTROPHILS NFR BLD AUTO: 81 % (ref 50–70)
PLATELET # BLD AUTO: 269 THOU/UL (ref 140–440)
PMV BLD AUTO: 8.9 FL (ref 8.5–12.5)
POTASSIUM BLD-SCNC: 4.5 MMOL/L (ref 3.5–5)
RBC # BLD AUTO: 2.57 MILL/UL (ref 4.4–6.2)
SODIUM SERPL-SCNC: 134 MMOL/L (ref 136–145)
WBC: 7.2 THOU/UL (ref 4–11)

## 2019-10-16 ENCOUNTER — TELEPHONE (OUTPATIENT)
Dept: ORTHOPEDICS | Facility: CLINIC | Age: 59
End: 2019-10-16

## 2019-10-16 NOTE — TELEPHONE ENCOUNTER
[facility] Call Center    Phone Message    May a detailed message be left on voicemail: yes    Reason for Call: Other: Pt would like call back to discuss upcoming appt and possible further surgery. Pt would like call back ASAP from Krupa MUNROE,     Action Taken: Message routed to:  [facility] Ortho

## 2019-10-17 ENCOUNTER — RECORDS - HEALTHEAST (OUTPATIENT)
Dept: LAB | Facility: CLINIC | Age: 59
End: 2019-10-17

## 2019-10-17 ENCOUNTER — OFFICE VISIT - HEALTHEAST (OUTPATIENT)
Dept: GERIATRICS | Facility: CLINIC | Age: 59
End: 2019-10-17

## 2019-10-17 ENCOUNTER — HOME CARE/HOSPICE - HEALTHEAST (OUTPATIENT)
Dept: HOME HEALTH SERVICES | Facility: HOME HEALTH | Age: 59
End: 2019-10-17

## 2019-10-17 DIAGNOSIS — I10 ESSENTIAL HYPERTENSION: ICD-10-CM

## 2019-10-17 DIAGNOSIS — D62 ACUTE BLOOD LOSS ANEMIA: ICD-10-CM

## 2019-10-17 DIAGNOSIS — E87.8 ELECTROLYTE IMBALANCE: ICD-10-CM

## 2019-10-17 DIAGNOSIS — R78.81 MSSA BACTEREMIA: ICD-10-CM

## 2019-10-17 DIAGNOSIS — G89.4 CHRONIC PAIN SYNDROME: ICD-10-CM

## 2019-10-17 DIAGNOSIS — B95.61 MSSA BACTEREMIA: ICD-10-CM

## 2019-10-17 DIAGNOSIS — M00.011 STAPHYLOCOCCAL ARTHRITIS OF RIGHT SHOULDER (H): ICD-10-CM

## 2019-10-17 LAB
ANION GAP SERPL CALCULATED.3IONS-SCNC: 10 MMOL/L (ref 5–18)
BUN SERPL-MCNC: 13 MG/DL (ref 8–22)
CALCIUM SERPL-MCNC: 9.1 MG/DL (ref 8.5–10.5)
CHLORIDE BLD-SCNC: 101 MMOL/L (ref 98–107)
CO2 SERPL-SCNC: 24 MMOL/L (ref 22–31)
CREAT SERPL-MCNC: 1.23 MG/DL (ref 0.7–1.3)
GFR SERPL CREATININE-BSD FRML MDRD: 60 ML/MIN/1.73M2
GLUCOSE BLD-MCNC: 125 MG/DL (ref 70–125)
HGB BLD-MCNC: 8.4 G/DL (ref 14–18)
POTASSIUM BLD-SCNC: 5.1 MMOL/L (ref 3.5–5)
SODIUM SERPL-SCNC: 135 MMOL/L (ref 136–145)

## 2019-10-18 ENCOUNTER — RECORDS - HEALTHEAST (OUTPATIENT)
Dept: LAB | Facility: CLINIC | Age: 59
End: 2019-10-18

## 2019-10-18 LAB
POTASSIUM BLD-SCNC: 4.5 MMOL/L (ref 3.5–5)
SODIUM SERPL-SCNC: 136 MMOL/L (ref 136–145)

## 2019-10-18 NOTE — TELEPHONE ENCOUNTER
[facility] Call Center    Phone Message    May a detailed message be left on voicemail: yes    Reason for Call: Other: Pt returned call to Krupa MUNROE Pt needing to know if the CT scan will also be moved to 10/23?     Action Taken: Message routed to:  [facility] ortho

## 2019-10-18 NOTE — TELEPHONE ENCOUNTER
Patient was informed Dr Aceves will be consulted concerning the urgency of the CT.  It can be done on the day of the appointment if needed.  He verbalized understanding.

## 2019-10-18 NOTE — TELEPHONE ENCOUNTER
Message left to call the clinic.  Appointment with Dr Aceves will be moved up to 10/23/19 at 10:15 am.

## 2019-10-21 ENCOUNTER — RECORDS - HEALTHEAST (OUTPATIENT)
Dept: LAB | Facility: CLINIC | Age: 59
End: 2019-10-21

## 2019-10-21 LAB
ALP SERPL-CCNC: 140 U/L (ref 45–120)
ALT SERPL W P-5'-P-CCNC: <9 U/L (ref 0–45)
BASOPHILS # BLD AUTO: 0.1 THOU/UL (ref 0–0.2)
BASOPHILS NFR BLD AUTO: 1 % (ref 0–2)
BILIRUB SERPL-MCNC: 0.5 MG/DL (ref 0–1)
BUN SERPL-MCNC: 15 MG/DL (ref 8–22)
C REACTIVE PROTEIN LHE: 2.3 MG/DL (ref 0–0.8)
CREAT SERPL-MCNC: 1.21 MG/DL (ref 0.7–1.3)
EOSINOPHIL # BLD AUTO: 0.2 THOU/UL (ref 0–0.4)
EOSINOPHIL NFR BLD AUTO: 2 % (ref 0–6)
ERYTHROCYTE [DISTWIDTH] IN BLOOD BY AUTOMATED COUNT: 14.1 % (ref 11–14.5)
GFR SERPL CREATININE-BSD FRML MDRD: >60 ML/MIN/1.73M2
HCT VFR BLD AUTO: 28.8 % (ref 40–54)
HGB BLD-MCNC: 9.2 G/DL (ref 14–18)
LYMPHOCYTES # BLD AUTO: 1.4 THOU/UL (ref 0.8–4.4)
LYMPHOCYTES NFR BLD AUTO: 16 % (ref 20–40)
MCH RBC QN AUTO: 29.6 PG (ref 27–34)
MCHC RBC AUTO-ENTMCNC: 31.9 G/DL (ref 32–36)
MCV RBC AUTO: 93 FL (ref 80–100)
MONOCYTES # BLD AUTO: 0.8 THOU/UL (ref 0–0.9)
MONOCYTES NFR BLD AUTO: 9 % (ref 2–10)
NEUTROPHILS # BLD AUTO: 6.6 THOU/UL (ref 2–7.7)
NEUTROPHILS NFR BLD AUTO: 72 % (ref 50–70)
PLATELET # BLD AUTO: 316 THOU/UL (ref 140–440)
PMV BLD AUTO: 8.9 FL (ref 8.5–12.5)
RBC # BLD AUTO: 3.11 MILL/UL (ref 4.4–6.2)
WBC: 9.1 THOU/UL (ref 4–11)

## 2019-10-22 ENCOUNTER — OFFICE VISIT - HEALTHEAST (OUTPATIENT)
Dept: GERIATRICS | Facility: CLINIC | Age: 59
End: 2019-10-22

## 2019-10-22 DIAGNOSIS — M25.511 RIGHT SHOULDER PAIN, UNSPECIFIED CHRONICITY: Primary | ICD-10-CM

## 2019-10-22 DIAGNOSIS — G89.4 CHRONIC PAIN SYNDROME: ICD-10-CM

## 2019-10-22 DIAGNOSIS — E87.8 ELECTROLYTE IMBALANCE: ICD-10-CM

## 2019-10-22 DIAGNOSIS — M00.011 STAPHYLOCOCCAL ARTHRITIS OF RIGHT SHOULDER (H): ICD-10-CM

## 2019-10-22 DIAGNOSIS — I10 ESSENTIAL HYPERTENSION: ICD-10-CM

## 2019-10-22 DIAGNOSIS — D62 ACUTE BLOOD LOSS ANEMIA: ICD-10-CM

## 2019-10-22 DIAGNOSIS — I48.20 CHRONIC ATRIAL FIBRILLATION (H): ICD-10-CM

## 2019-10-23 ENCOUNTER — OFFICE VISIT (OUTPATIENT)
Dept: ORTHOPEDICS | Facility: CLINIC | Age: 59
End: 2019-10-23
Payer: COMMERCIAL

## 2019-10-23 ENCOUNTER — TELEPHONE (OUTPATIENT)
Dept: ORTHOPEDICS | Facility: CLINIC | Age: 59
End: 2019-10-23

## 2019-10-23 ENCOUNTER — ANCILLARY PROCEDURE (OUTPATIENT)
Dept: GENERAL RADIOLOGY | Facility: CLINIC | Age: 59
End: 2019-10-23
Attending: ORTHOPAEDIC SURGERY
Payer: COMMERCIAL

## 2019-10-23 ENCOUNTER — OFFICE VISIT - HEALTHEAST (OUTPATIENT)
Dept: GERIATRICS | Facility: CLINIC | Age: 59
End: 2019-10-23

## 2019-10-23 DIAGNOSIS — M25.511 RIGHT SHOULDER PAIN, UNSPECIFIED CHRONICITY: ICD-10-CM

## 2019-10-23 DIAGNOSIS — G89.4 CHRONIC PAIN SYNDROME: ICD-10-CM

## 2019-10-23 DIAGNOSIS — D62 ACUTE BLOOD LOSS ANEMIA: ICD-10-CM

## 2019-10-23 DIAGNOSIS — F31.9 BIPOLAR 1 DISORDER (H): ICD-10-CM

## 2019-10-23 DIAGNOSIS — M00.011 STAPHYLOCOCCAL ARTHRITIS OF RIGHT SHOULDER (H): ICD-10-CM

## 2019-10-23 DIAGNOSIS — I10 ESSENTIAL HYPERTENSION: ICD-10-CM

## 2019-10-23 DIAGNOSIS — M86.9 BONE INFECTION, SHOULDER REGION (H): ICD-10-CM

## 2019-10-23 DIAGNOSIS — M86.9: Primary | ICD-10-CM

## 2019-10-23 RX ORDER — HEPARIN SODIUM,PORCINE 10 UNIT/ML
5 VIAL (ML) INTRAVENOUS
Status: CANCELLED | OUTPATIENT
Start: 2019-10-23

## 2019-10-23 RX ORDER — CEFAZOLIN SODIUM 2 G/50ML
2 SOLUTION INTRAVENOUS EVERY 8 HOURS
Status: CANCELLED
Start: 2019-10-23

## 2019-10-23 RX ORDER — HEPARIN SODIUM (PORCINE) LOCK FLUSH IV SOLN 100 UNIT/ML 100 UNIT/ML
5 SOLUTION INTRAVENOUS
Status: CANCELLED | OUTPATIENT
Start: 2019-10-23

## 2019-10-23 NOTE — LETTER
10/23/2019       RE: Kobe Buchanan  [address] [place] [address]  [address]  [place] [address]     Dear Colleague,    Thank you for referring your patient, Kobe Buchanan, to the [facility]. Please see a copy of my visit note below.    CHIEF CONCERN: Follow-up right shoulder irrigation debridement, explant and replacement of spacer on September 23, 2019 at [facility]    HISTORY OF PRESENT ILLNESS: Patient is a 59-year-old right-hand-dominant male status post above procedure who presents with concern of draining right shoulder wound.  States right shoulder wound has had a quarter size amount of drainage at each dressing change in morning and night until 1 day ago.  No pain.  Compliant with shoulder range of motion restriction. Denies fevers or chills.      Currently on IV Ancef 3 times a day, seeking the home infusion therapy for continued IV antibiotics through November 4.  Endorses left shoulder pain with concern for infection.  Seeking medical evaluation/infectious workup if possible    PHYSICAL EXAM:    Gen: Adult  in no acute distress. Articulates and communicates with normal affect.  Pulm: Respirations even and unlabored on room air  CV: Regular rate via peripheral pulse, extremities wwp  MSK: Focused upper extremity exam:   RUE:  - Well-healing surgical incision, no associated erythema, drainage, or fluctuance.   -Unable to express further drainage  -Nontender to palpation over incision  - Sensation intact all dermatomes into the hand to light touch.   - Fires EPL, FPL, and Intrinsics.      IMAGING:  XR R shoulder 10/23/2019:  Explantation of a right total shoulder arthroplasty and placement of antibiotic spacer proximal humerus.    ASSESSMENT:  59-year-old right-hand-dominant male status post right total shoulder arthroplasty explantation and conversion to antibiotic spacer, irrigation and debridement.    PLAN:  -No immediate operative  plans at this point given well-healing incision and lack of drainage  -Continued IV antibiotics through November 4; will then proceed with arthroscopic culture of right shoulder and aspiration of left shoulder synovial fluid 2 weeks later  -Recommended against aspiration of left shoulder at this point given possibility for false negative on antibiotics  -Will contact infectious disease to try and establish home infusion therapy for continued IV antibiotics    Simon Alvarado MD  Orthopaedic Surgery PGY-1    Patient seen and discussed with Dr. Aceves who agrees with aforementioned plan.    I have personally examined this patient and have reviewed the clinical presentation and progress note with the resident.  I agree with the treatment plan as outlined.  The plan was formulated with the resident on the day of the resident's note.         Again, thank you for allowing me to participate in the care of your patient.      Sincerely,    Analilia Aceves MD

## 2019-10-23 NOTE — NURSING NOTE
Reason For Visit:   Chief Complaint   Patient presents with     RECHECK     Follow up right shoulder I&D, explant and placement of spacer on 9/23/19 at [facility]       PCP: Michael Styles  Ref: Self     ?  No  Occupation Retired.  Currently working? No.  Work status?  Retired.  Date of injury: ongoing     Date of surgery: R TSA 8/26/2014  L TSA 04/15/2014    Date of surgery: 9/23/19  Type of surgery: I and D Right shoulder at [facility]     Smoker: No  Request smoking cessation information: No     Right hand dominant    SANE score  Affected shoulder: Right  Right shoulder SANE: 0  Left shoulder SANE: 100    There were no vitals taken for this visit.      Pain Assessment  Patient Currently in Pain: Denies(only with movement )    Roberta Obregon ATC

## 2019-10-23 NOTE — TELEPHONE ENCOUNTER
Nurse and  Dmirty ([phone]) were informed patient does not need to have surgery today.  He will complete his 6 week IV antibiotic course, have a 2 week hiatus, and then have repeat biopsy for cultures.  Patient is agreeable with this.  He currently is getting his IV infusions through [facility].  Patient plans to go home today.   Dmitry stated he has a referral for home infusions which he will activate today.

## 2019-10-23 NOTE — PROGRESS NOTES
CHIEF CONCERN: Follow-up right shoulder irrigation debridement, explant and replacement of spacer on September 23, 2019 at [facility]    HISTORY OF PRESENT ILLNESS: Patient is a 59-year-old right-hand-dominant male status post above procedure who presents with concern of draining right shoulder wound.  States right shoulder wound has had a quarter size amount of drainage at each dressing change in morning and night until 1 day ago.  No pain.  Compliant with shoulder range of motion restriction. Denies fevers or chills.      Currently on IV Ancef 3 times a day, seeking the home infusion therapy for continued IV antibiotics through November 4.  Endorses left shoulder pain with concern for infection.  Seeking medical evaluation/infectious workup if possible    PHYSICAL EXAM:    Gen: Adult  in no acute distress. Articulates and communicates with normal affect.  Pulm: Respirations even and unlabored on room air  CV: Regular rate via peripheral pulse, extremities wwp  MSK: Focused upper extremity exam:   RUE:  - Well-healing surgical incision, no associated erythema, drainage, or fluctuance.   -Unable to express further drainage  -Nontender to palpation over incision  - Sensation intact all dermatomes into the hand to light touch.   - Fires EPL, FPL, and Intrinsics.      IMAGING:  XR R shoulder 10/23/2019:  Explantation of a right total shoulder arthroplasty and placement of antibiotic spacer proximal humerus.    ASSESSMENT:  59-year-old right-hand-dominant male status post right total shoulder arthroplasty explantation and conversion to antibiotic spacer, irrigation and debridement.    PLAN:  -No immediate operative plans at this point given well-healing incision and lack of drainage  -Continued IV antibiotics through November 4; will then proceed with arthroscopic culture of right shoulder and aspiration of left shoulder synovial fluid 2 weeks later  -Recommended against aspiration of left shoulder at this point given  possibility for false negative on antibiotics  -Will contact infectious disease to try and establish home infusion therapy for continued IV antibiotics    Simon Alvarado MD  Orthopaedic Surgery PGY-1    Patient seen and discussed with Dr. Aceves who agrees with aforementioned plan.    I have personally examined this patient and have reviewed the clinical presentation and progress note with the resident.  I agree with the treatment plan as outlined.  The plan was formulated with the resident on the day of the resident's note.

## 2019-10-23 NOTE — TELEPHONE ENCOUNTER
[facility] Call Center    Phone Message    May a detailed message be left on voicemail: yes    Reason for Call: Other: [facility] wanted clarification: Patient has the impression that his surgery is scheduled for today, at 3pm, after he sees Dr. Aceves at 10am.  [facility] would like to know where this communication is coming from.     Action Taken: Message routed to:  [facility] ([facility] orth[facility]

## 2019-10-23 NOTE — NURSING NOTE
Teaching Flowsheet   Relevant Diagnosis: Shoulder infection  Teaching Topic: Pre-op for right shoulder arthroscopic biopsy for cultures, left shoulder aspiration - surgery coordinator will call to schedule procedure 2 weeks after completion of IV antibiotic course     Person(s) involved in teaching:   Patient  Sister     Motivation Level:  Asks Questions: Yes  Eager to Learn: Yes  Cooperative: Yes  Receptive (willing/able to accept information): Yes  Any cultural factors/Yazdanism beliefs that may influence understanding or compliance? No     Family demonstrates understanding of the following:  Reason for the appointment, diagnosis and treatment plan: Yes  Knowledge of proper use of medications and conditions for which they are ordered (with special attention to potential side effects or drug interactions): Yes  Which situations necessitate calling provider and whom to contact: Yes     Teaching Concerns Addressed:   PAC for H&P  Family member will provide transportation - patient will verify before surgery  [facility] will be contacted concerning pain medication management  Aware of post-op expectations - sling prn.  Patient requests Ultrasling IV due to neck issues.     Proper use and care of sling prn (medical equip, care aids, etc.): Yes  Nutritional needs and diet plan: Yes  Pain management techniques: Yes  Wound Care: Yes  How and/when to access community resources: Yes     Instructional Materials Used/Given: Pre-op packet

## 2019-10-25 ENCOUNTER — RECORDS - HEALTHEAST (OUTPATIENT)
Dept: LAB | Facility: CLINIC | Age: 59
End: 2019-10-25

## 2019-10-25 ENCOUNTER — HOME CARE/HOSPICE - HEALTHEAST (OUTPATIENT)
Dept: HOME HEALTH SERVICES | Facility: HOME HEALTH | Age: 59
End: 2019-10-25

## 2019-10-25 LAB
ALP SERPL-CCNC: 131 U/L (ref 45–120)
AST SERPL W P-5'-P-CCNC: 14 U/L (ref 0–40)
BASOPHILS # BLD AUTO: 0.1 THOU/UL (ref 0–0.2)
BASOPHILS NFR BLD AUTO: 1 % (ref 0–2)
BUN SERPL-MCNC: 13 MG/DL (ref 8–22)
BUN SERPL-MCNC: 13 MG/DL (ref 8–22)
C REACTIVE PROTEIN LHE: 3 MG/DL (ref 0–0.8)
CREAT SERPL-MCNC: 1.08 MG/DL (ref 0.7–1.3)
EOSINOPHIL # BLD AUTO: 0.1 THOU/UL (ref 0–0.4)
EOSINOPHIL NFR BLD AUTO: 2 % (ref 0–6)
ERYTHROCYTE [DISTWIDTH] IN BLOOD BY AUTOMATED COUNT: 13.8 % (ref 11–14.5)
GFR SERPL CREATININE-BSD FRML MDRD: >60 ML/MIN/1.73M2
HCT VFR BLD AUTO: 28.5 % (ref 40–54)
HGB BLD-MCNC: 9.2 G/DL (ref 14–18)
LYMPHOCYTES # BLD AUTO: 1.4 THOU/UL (ref 0.8–4.4)
LYMPHOCYTES NFR BLD AUTO: 19 % (ref 20–40)
MCH RBC QN AUTO: 29.4 PG (ref 27–34)
MCHC RBC AUTO-ENTMCNC: 32.3 G/DL (ref 32–36)
MCV RBC AUTO: 91 FL (ref 80–100)
MONOCYTES # BLD AUTO: 0.6 THOU/UL (ref 0–0.9)
MONOCYTES NFR BLD AUTO: 8 % (ref 2–10)
NEUTROPHILS # BLD AUTO: 5.3 THOU/UL (ref 2–7.7)
NEUTROPHILS NFR BLD AUTO: 70 % (ref 50–70)
PLATELET # BLD AUTO: 324 THOU/UL (ref 140–440)
PMV BLD AUTO: 8.8 FL (ref 8.5–12.5)
RBC # BLD AUTO: 3.13 MILL/UL (ref 4.4–6.2)
WBC: 7.5 THOU/UL (ref 4–11)

## 2019-10-28 ENCOUNTER — HOME CARE/HOSPICE - HEALTHEAST (OUTPATIENT)
Dept: HOME HEALTH SERVICES | Facility: HOME HEALTH | Age: 59
End: 2019-10-28

## 2019-10-28 NOTE — PROGRESS NOTES
SUBJECTIVE:            Kobe Buchanan is a 59 year old male, here {ACCOMPANIED BY:041468}, in today for:    1. Infected shoulder and hospital/TCU follow up    Patient recent hx:    MSSA bacteremia with a right septic shoulder. He had his right shoulder washed out and was put on long-term IV antibiotics. His hospitalization was complicated by NEMESIO in which he needed 2 rounds of dialysis with the last one being 9/24.     He also had acute toxic encephalopathy so there was a question of septic emboli however this did resolve. EEG showed no signs of seizure.    2. Chronic pain    3. HTN    4. Anemia      ---------------------------------------------------------------------------------------------------------------------  Patient Active Problem List   Diagnosis     Essential hypertension     Hyperlipidemia     Abdominal pain, unspecified abdominal location     Ascending aortic aneurysm (H)     Medication refill- do not delete      Pain medication agreement signed     S/P shoulder replacement     BPPV (benign paroxysmal positional vertigo)     Shoulder joint pain     Obstructive sleep apnea     Obesity     Rhinitis     Right knee pain     Status post coronary angiogram     Atrial fibrillation (H) [I48.91]     Trigger finger of right thumb     Dizzy     Spondylosis of cervical region without myelopathy or radiculopathy     Bone infection, shoulder region (H)     Past Surgical History:   Procedure Laterality Date     ARTHROPLASTY SHOULDER  4/15/2014    Procedure: Left Total Shoulder Arthroplasty ;  Surgeon: Analilia Aceves MD;  [facility]     ARTHROPLASTY SHOULDER Right 8/26/2014    Procedure: ARTHROPLASTY SHOULDER;  Surgeon: Analilia Aceves MD;  [facility]     BYPASS GASTRIC TAVO-EN-Y, LIVER BIOPSY, COMBINED  8/8/2005     C HAND/FINGER SURGERY UNLISTED       C SHOULDER SURG PROC UNLISTED       COLONOSCOPY  6/30/2014    Procedure: COMBINED COLONOSCOPY, SINGLE BIOPSY/POLYPECTOMY BY BIOPSY;   Surgeon: Chester Patton MD;  [facility]     CYSTOSCOPY, BLADDER NECK CUTS, COMBINED N/A 2016    Procedure: COMBINED CYSTOSCOPY, BLADDER NECK CUTS;  Surgeon: Ritu Leslie MD;  [facility]     ESOPHAGOSCOPY, GASTROSCOPY, DUODENOSCOPY (EGD), COMBINED  2014    Procedure: COMBINED ESOPHAGOSCOPY, GASTROSCOPY, DUODENOSCOPY (EGD), BIOPSY SINGLE OR MULTIPLE;  Surgeon: Chester Patton MD;  Location: [facility]     EXCISE MASS FINGER  2011    Procedure:EXCISE MASS FINGER; Middle Flexor Cyst; Surgeon:SHAYY RUSSELL; Location:[facility]     HAND SURGERY      excision of tendon cyst on left hand     [facility] VASCULAR SURGERY PROCEDURE UNLIST       LASER HOLMIUM LITHOTRIPSY BLADDER N/A 10/15/2014    Procedure: LASER HOLMIUM LITHOTRIPSY BLADDER;  Surgeon: Sahil Taveras MD;  [facility]: [facility]     LASER KTP GREEN LIGHT PHOTOSELECTIVE VAPORIZATION PROSTATE  2014    Procedure: LASER KTP GREEN LIGHT PHOTOSELECTIVE VAPORIZATION PROSTATE;  Greenlight Photovaporization Of Prostate  ;  Surgeon: Sahil Taveras MD;  Location: [facility]     RELEASE TRIGGER FINGER Right 3/31/2017    Procedure: RELEASE TRIGGER FINGER;  Surgeon: Juan Carlos Blunt MD;  [facility]     REPAIR ANEURYSM ASCENDING AORTA N/A 10/4/2016    Procedure: REPAIR ANEURYSM ASCENDING AORTA;  Surgeon: Mckenzie Townsend MD;  [facility]: [facility]     TURP         Social History     Tobacco Use     Smoking status: Former Smoker     Packs/day: 0.50     Years: 6.00     Pack years: 3.00     Types: Cigarettes     Start date: 1977     Last attempt to quit: 1983     Years since quittin.1     Smokeless tobacco: Never Used     Tobacco comment: quit 35 years ago   Substance Use Topics     Alcohol use: No     Alcohol/week: 0.0 standard drinks     Family History   Problem Relation Age of Onset     Arthritis Other      Gastrointestinal Disease Other      Cardiovascular Father         aortic aneurysm     Arrhythmia  "Father      Nephrolithiasis Father      Sleep Apnea Father      Anxiety Disorder Father      Depression Father      Hypertension Father      Obesity Father      Hyperlipidemia Father      Coronary Artery Disease Father         history of MI and stent     Low Back Problems Father      Spine Problems Father      Anxiety Disorder Mother      Hypertension Mother      Osteoporosis Mother      Obesity Mother      Hyperlipidemia Mother      Low Back Problems Mother      Anxiety Disorder Sister      Hypertension Sister      Osteoporosis Sister      Obesity Sister      Hyperlipidemia Sister      Low Back Problems Sister      Spine Problems Sister      Anxiety Disorder Sister      Depression Sister      Hypertension Sister      Osteoporosis Sister      Obesity Sister      Hyperlipidemia Sister      Low Back Problems Sister          ROS:  { :[id number]}    Problem list, Medication list, Allergies, and Medical/Social/Surgical histories reviewed in [facility] and updated as appropriate.    OBJECTIVE:                          There were no vitals taken for this visit.   { :877177::\"GENERAL: healthy, alert, well nourished, well hydrated, no distress\",\"HENT: ear canals- normal; TMs- normal; Nose- normal; Mouth- no ulcers, no lesions\",\"NECK: no tenderness, no adenopathy, no asymmetry, no masses, no stiffness; thyroid- normal to palpation\",\"RESP: lungs clear to auscultation - no rales, no rhonchi, no wheezes\",\"CV: regular rates and rhythm, normal S1 S2, no S3 or S4 and no murmur, no click or rub -\",\"ABDOMEN: soft, no tenderness, no  hepatosplenomegaly, no masses, normal bowel sounds\"}     Diagnostic testing:(labs, x-rays, EKG) - {[place] test list:345463::\"None\"}    ASSESSMENT/PLAN:            {Diag Picklist:586223}    Risks, benefits and alternatives of treatments discussed. Plan agreed on.      Followup:***    Will call, return to clinic, or go to ED if worsening or symptoms not improving as discussed.    See patient instructions. " "    Health Maintenance Due   Topic Date Due     THERESA ASSESSMENT  1960     ADVANCE CARE PLANNING  1960     DEPRESSION ACTION PLAN  1960     HIV SCREENING  01/28/1976     MEDICARE ANNUAL WELLNESS VISIT  01/28/1979     ZOSTER IMMUNIZATION (1 of 2) 01/28/2011     INFLUENZA VACCINE (1) 09/01/2019     Health maintenance reviewed/updated? {Yes/No:943566::\"Yes\"}    Demi Hardin MD  [facility]        "

## 2019-10-29 ENCOUNTER — RECORDS - HEALTHEAST (OUTPATIENT)
Dept: ADMINISTRATIVE | Facility: OTHER | Age: 59
End: 2019-10-29

## 2019-10-29 ENCOUNTER — OFFICE VISIT (OUTPATIENT)
Dept: FAMILY MEDICINE | Facility: CLINIC | Age: 59
End: 2019-10-29
Payer: COMMERCIAL

## 2019-10-29 VITALS
OXYGEN SATURATION: 98 % | TEMPERATURE: 97.6 F | BODY MASS INDEX: 28.29 KG/M2 | WEIGHT: 200 LBS | HEART RATE: 74 BPM | RESPIRATION RATE: 16 BRPM

## 2019-10-29 DIAGNOSIS — R78.81 MSSA BACTEREMIA: Primary | ICD-10-CM

## 2019-10-29 DIAGNOSIS — E78.00 HYPERCHOLESTEREMIA: ICD-10-CM

## 2019-10-29 DIAGNOSIS — F34.1 DYSTHYMIC DISORDER: ICD-10-CM

## 2019-10-29 DIAGNOSIS — B95.61 MSSA BACTEREMIA: Primary | ICD-10-CM

## 2019-10-29 DIAGNOSIS — M00.011 STAPHYLOCOCCAL ARTHRITIS OF RIGHT SHOULDER (H): ICD-10-CM

## 2019-10-29 DIAGNOSIS — I10 ESSENTIAL HYPERTENSION: ICD-10-CM

## 2019-10-29 DIAGNOSIS — I48.91 ATRIAL FIBRILLATION, UNSPECIFIED TYPE (H): ICD-10-CM

## 2019-10-29 DIAGNOSIS — D50.0 IRON DEFICIENCY ANEMIA DUE TO CHRONIC BLOOD LOSS: ICD-10-CM

## 2019-10-29 DIAGNOSIS — F43.9 STRESS: ICD-10-CM

## 2019-10-29 DIAGNOSIS — I71.21 ASCENDING AORTIC ANEURYSM (H): ICD-10-CM

## 2019-10-29 DIAGNOSIS — M54.2 NECK PAIN: ICD-10-CM

## 2019-10-29 DIAGNOSIS — I10 ESSENTIAL HYPERTENSION WITH GOAL BLOOD PRESSURE LESS THAN 130/85: ICD-10-CM

## 2019-10-29 RX ORDER — CLONAZEPAM 0.5 MG/1
0.5 TABLET ORAL 3 TIMES DAILY PRN
Qty: 90 TABLET | Refills: 5 | Status: SHIPPED | OUTPATIENT
Start: 2019-10-29 | End: 2020-04-14

## 2019-10-29 RX ORDER — BUPROPION HYDROCHLORIDE 200 MG/1
TABLET, EXTENDED RELEASE ORAL
Qty: 180 TABLET | Refills: 3 | Status: SHIPPED | OUTPATIENT
Start: 2019-10-29 | End: 2020-12-07

## 2019-10-29 RX ORDER — PANTOPRAZOLE SODIUM 40 MG/1
40 TABLET, DELAYED RELEASE ORAL EVERY MORNING
Qty: 30 TABLET | Refills: 3 | Status: SHIPPED | OUTPATIENT
Start: 2019-10-29 | End: 2019-11-12

## 2019-10-29 RX ORDER — CARVEDILOL 25 MG/1
25 TABLET ORAL 2 TIMES DAILY WITH MEALS
Qty: 180 TABLET | Refills: 3 | Status: SHIPPED | OUTPATIENT
Start: 2019-10-29 | End: 2020-10-27

## 2019-10-29 RX ORDER — OXYCODONE HYDROCHLORIDE 10 MG/1
TABLET ORAL
Status: ON HOLD | COMMUNITY
Start: 2019-10-28 | End: 2019-11-19

## 2019-10-29 RX ORDER — AMLODIPINE AND BENAZEPRIL HYDROCHLORIDE 5; 20 MG/1; MG/1
1 CAPSULE ORAL
Qty: 180 CAPSULE | Refills: 3 | Status: SHIPPED | OUTPATIENT
Start: 2019-10-29 | End: 2020-02-14

## 2019-10-29 RX ORDER — SIMVASTATIN 10 MG
10 TABLET ORAL AT BEDTIME
Qty: 90 TABLET | Refills: 3 | Status: SHIPPED | OUTPATIENT
Start: 2019-10-29 | End: 2020-04-14

## 2019-10-29 RX ORDER — LOSARTAN POTASSIUM 25 MG/1
25 TABLET ORAL DAILY
Qty: 90 TABLET | Refills: 1 | Status: ON HOLD | OUTPATIENT
Start: 2019-10-29 | End: 2020-01-31

## 2019-10-29 RX ORDER — CYCLOBENZAPRINE HCL 10 MG
10 TABLET ORAL 3 TIMES DAILY PRN
Qty: 30 TABLET | Refills: 1 | Status: SHIPPED | OUTPATIENT
Start: 2019-10-29 | End: 2019-11-29

## 2019-10-29 RX ORDER — FENTANYL 75 UG/1
1 PATCH TRANSDERMAL
COMMUNITY
End: 2021-06-30

## 2019-10-29 NOTE — PATIENT INSTRUCTIONS
Referral for :     Infectious Disease   [facility]Provider [facility]   DATE & TIME :    Location will call, they requested notes and etc be sent first.   PHONE :     [phone]  FAX :    [phone]  Appointment made by clinic staff/:    Portia

## 2019-10-29 NOTE — PROGRESS NOTES
SUBJECTIVE:            Kobe Buchanan is a 59 year old male, here with mother, in today for:    1. Infected shoulder and hospital/TCU follow up [facility] then TCU now home    Patient recent hx:    MSSA bacteremia with a right septic shoulder. He had his right shoulder washed out and was put on long-term IV antibiotics (6 weeks). His hospitalization was complicated by NEMESIO in which he needed 2 rounds of dialysis with the last one being 9/24.     -will need abx until 11/4 and [facility]has surgery of right and left shoulders planned after 6 weeks of abx.    He also had acute toxic encephalopathy so there was a question of septic emboli however this did resolve. EEG showed no signs of seizure.  Considered metabolic, infectious, medication     2. Chronic pain: on 75 mcg of fentanyl and 10 mg oxycodone q 4 hr using almost scheduled (Sees [facility])    3. HTN: on meds and checking BP only checking on legs due to arm catheter and shoulder surgery    4. Anemia: does think he has some blood in the stool, on iron supplement    5. Anxiety: taking klonopin consistently and wellbutrin and that seems to be working      ---------------------------------------------------------------------------------------------------------------------  Patient Active Problem List   Diagnosis     Essential hypertension     Hyperlipidemia     Abdominal pain, unspecified abdominal location     Ascending aortic aneurysm (H)     Medication refill- do not delete      Pain medication agreement signed     S/P shoulder replacement     BPPV (benign paroxysmal positional vertigo)     Shoulder joint pain     Obstructive sleep apnea     Obesity     Rhinitis     Right knee pain     Status post coronary angiogram     Atrial fibrillation (H) [I48.91]     Trigger finger of right thumb     Dizzy     Spondylosis of cervical region without myelopathy or radiculopathy     Bone infection, shoulder region (H)     Past  Surgical History:   Procedure Laterality Date     ARTHROPLASTY SHOULDER  4/15/2014    Procedure: Left Total Shoulder Arthroplasty ;  Surgeon: Analilia Aceves MD;  [facility]     ARTHROPLASTY SHOULDER Right 8/26/2014    Procedure: ARTHROPLASTY SHOULDER;  Surgeon: Analilia Aceves MD;  [facility]     BYPASS GASTRIC TAVO-EN-Y, LIVER BIOPSY, COMBINED  8/8/2005     C HAND/FINGER SURGERY UNLISTED       C SHOULDER SURG PROC UNLISTED       COLONOSCOPY  6/30/2014    Procedure: COMBINED COLONOSCOPY, SINGLE BIOPSY/POLYPECTOMY BY BIOPSY;  Surgeon: Chester Patton MD;  [facility]     CYSTOSCOPY, BLADDER NECK CUTS, COMBINED N/A 7/18/2016    Procedure: COMBINED CYSTOSCOPY, BLADDER NECK CUTS;  Surgeon: Ritu Leslie MD;  [facility]     ESOPHAGOSCOPY, GASTROSCOPY, DUODENOSCOPY (EGD), COMBINED  6/30/2014    Procedure: COMBINED ESOPHAGOSCOPY, GASTROSCOPY, DUODENOSCOPY (EGD), BIOPSY SINGLE OR MULTIPLE;  Surgeon: Chester Patton MD;  Location: [facility]     EXCISE MASS FINGER  6/14/2011    Procedure:EXCISE MASS FINGER; Middle Flexor Cyst; Surgeon:SHAYY RUSSELL; Location:[facility] OR     HAND SURGERY      excision of tendon cyst on left hand     HC VASCULAR SURGERY PROCEDURE UNLIST       LASER HOLMIUM LITHOTRIPSY BLADDER N/A 10/15/2014    Procedure: LASER HOLMIUM LITHOTRIPSY BLADDER;  Surgeon: Sahil Taveras MD;  Location: [facility]     LASER KTP GREEN LIGHT PHOTOSELECTIVE VAPORIZATION PROSTATE  1/23/2014    Procedure: LASER KTP GREEN LIGHT PHOTOSELECTIVE VAPORIZATION PROSTATE;  Greenlight Photovaporization Of Prostate  ;  Surgeon: Sahil Taveras MD;  Location: [facility] OR     RELEASE TRIGGER FINGER Right 3/31/2017    Procedure: RELEASE TRIGGER FINGER;  Surgeon: Juan Carlos Blunt MD;  [facility]     REPAIR ANEURYSM ASCENDING AORTA N/A 10/4/2016    Procedure: REPAIR ANEURYSM ASCENDING AORTA;  Surgeon: Mckenzie Townsend MD;  [facility]: [facility]     TURP  2014        Social History     Tobacco Use     Smoking status: Former Smoker     Packs/day: 0.50     Years: 6.00     Pack years: 3.00     Types: Cigarettes     Start date: 1977     Last attempt to quit: 1983     Years since quittin.1     Smokeless tobacco: Never Used     Tobacco comment: quit 35 years ago   Substance Use Topics     Alcohol use: No     Alcohol/week: 0.0 standard drinks     Family History   Problem Relation Age of Onset     Arthritis Other      Gastrointestinal Disease Other      Cardiovascular Father         aortic aneurysm     Arrhythmia Father      Nephrolithiasis Father      Sleep Apnea Father      Anxiety Disorder Father      Depression Father      Hypertension Father      Obesity Father      Hyperlipidemia Father      Coronary Artery Disease Father         history of MI and stent     Low Back Problems Father      Spine Problems Father      Anxiety Disorder Mother      Hypertension Mother      Osteoporosis Mother      Obesity Mother      Hyperlipidemia Mother      Low Back Problems Mother      Anxiety Disorder Sister      Hypertension Sister      Osteoporosis Sister      Obesity Sister      Hyperlipidemia Sister      Low Back Problems Sister      Spine Problems Sister      Anxiety Disorder Sister      Depression Sister      Hypertension Sister      Osteoporosis Sister      Obesity Sister      Hyperlipidemia Sister      Low Back Problems Sister          ROS:  Denies fevers/chills  Denies n/v/d/c  Denies cp, skipping    Problem list, Medication list, Allergies, and Medical/Social/Surgical histories reviewed in [facility] and updated as appropriate.    OBJECTIVE:                          Pulse 74   Temp 97.6  F (36.4  C) (Oral)   Resp 16   Wt 90.7 kg (200 lb)   SpO2 98%   BMI 28.29 kg/m     GENERAL: pale,alert, well nourished, well hydrated, no distress, looking less robust then previously  NECK: no tenderness, no adenopathy, no asymmetry, no masses, no stiffness; thyroid- normal to  palpation  RESP: lungs clear to auscultation - no rales, no rhonchi, no wheezes  CV: regular rates and rhythm, normal S1 S2, no S3 or S4 and II/VI systolic murmur, no click or rub -  ABDOMEN: soft, no tenderness, no  hepatosplenomegaly, no masses, normal bowel sounds  SHOULDER Exam-Bilateral very minimal movement, arms extend perhaps 30 degrees        Diagnostic testing:(labs, x-rays, EKG) -   Reviewed recent labs, getting routinely at infectious disease [facility]    ASSESSMENT/PLAN:            (R78.81) MSSA bacteremia  (primary encounter diagnosis)  Comment: will transfer care to location he desires  Plan: INFECTIOUS DISEASE REFERRAL        Continue with home IV as planned    (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)  Comment: continue plan  Plan: INFECTIOUS DISEASE REFERRAL            (D50.0) Iron deficiency anemia due to chronic blood loss  Comment: refilled iron medication and PPI  Plan: Fe Heme Polypeptide-folic acid         (PROFERRIN-FORTE) 12-1 MG TABS, pantoprazole         (PROTONIX) 40 MG EC tablet            (F43.9) Stress  Comment: refilled, appears stable, complex management, has normal renal function currently  Plan: clonazePAM (KLONOPIN) 0.5 MG tablet            (I71.2) Ascending aortic aneurysm (H)  Comment: stable  Plan: losartan (COZAAR) 25 MG tablet,         amLODIPine-benazepril (LOTREL) 5-20 MG capsule        Given anemia and sepsis need to watch closely, had discused metoprolol when reviewing TCU records, patient not interested given psych issues    (E78.00) Hypercholesteremia  Comment: contineu  Plan: simvastatin (ZOCOR) 10 MG tablet            (F34.1) Dysthymic disorder  Comment: stable  Plan: buPROPion (WELLBUTRIN SR) 200 MG 12 hr tablet            (M54.2) Neck pain  Comment: relatively stable, difficult to tell with shoulder issues  Plan: cyclobenzaprine (FLEXERIL) 10 MG tablet            (I10) Essential hypertension with goal blood pressure less than 130/85  Comment:  refilled  Plan: carvedilol (COREG) 25 MG tablet,         amLODIPine-benazepril (LOTREL) 5-20 MG capsule            (I10) Essential hypertension  Comment: refilled  Plan: amLODIPine-benazepril (LOTREL) 5-20 MG capsule            (I48.91) Atrial fibrillation, unspecified type (H)  Comment: refilled  Plan: amLODIPine-benazepril (LOTREL) 5-20 MG capsule              Risks, benefits and alternatives of treatments discussed. Plan agreed on.      Followup:one month, will need preop    Will call, return to clinic, or go to ED if worsening or symptoms not improving as discussed.    See patient instructions.     Health Maintenance Due   Topic Date Due     THERESA ASSESSMENT  1960     ADVANCE CARE PLANNING  1960     DEPRESSION ACTION PLAN  1960     HIV SCREENING  01/28/1976     MEDICARE ANNUAL WELLNESS VISIT  01/28/1979     ZOSTER IMMUNIZATION (1 of 2) 01/28/2011     INFLUENZA VACCINE (1) 09/01/2019     Health maintenance reviewed/updated? Yes    Demi Hardin MD  [facility]

## 2019-10-30 ENCOUNTER — TELEPHONE (OUTPATIENT)
Dept: ORTHOPEDICS | Facility: CLINIC | Age: 59
End: 2019-10-30

## 2019-10-30 ENCOUNTER — COMMUNICATION - HEALTHEAST (OUTPATIENT)
Dept: HOME HEALTH SERVICES | Facility: HOME HEALTH | Age: 59
End: 2019-10-30

## 2019-10-30 ENCOUNTER — COMMUNICATION - HEALTHEAST (OUTPATIENT)
Dept: ADMINISTRATIVE | Facility: CLINIC | Age: 59
End: 2019-10-30

## 2019-10-30 NOTE — TELEPHONE ENCOUNTER
Patient requested clarification concerning surgery,  He was informed the procedure will be done 12/10/19 at the [facility].  He plans to have the PICC removed after his last antibiotic infusion 11/05/19 as recommended by [facility] and his Infectious Disease provider.

## 2019-10-31 ENCOUNTER — HOME CARE/HOSPICE - HEALTHEAST (OUTPATIENT)
Dept: HOME HEALTH SERVICES | Facility: HOME HEALTH | Age: 59
End: 2019-10-31

## 2019-10-31 ENCOUNTER — RECORDS - HEALTHEAST (OUTPATIENT)
Dept: LAB | Facility: CLINIC | Age: 59
End: 2019-10-31

## 2019-10-31 PROBLEM — R78.81 MSSA BACTEREMIA: Status: ACTIVE | Noted: 2019-10-13

## 2019-10-31 PROBLEM — E44.0 MODERATE PROTEIN-CALORIE MALNUTRITION (H): Status: ACTIVE | Noted: 2019-10-13

## 2019-10-31 PROBLEM — M00.9: Status: ACTIVE | Noted: 2019-10-13

## 2019-10-31 PROBLEM — B95.61 MSSA BACTEREMIA: Status: ACTIVE | Noted: 2019-10-13

## 2019-10-31 PROBLEM — D62 ACUTE BLOOD LOSS ANEMIA: Status: ACTIVE | Noted: 2019-10-13

## 2019-10-31 LAB
ALP SERPL-CCNC: 103 U/L (ref 45–120)
ALT SERPL W P-5'-P-CCNC: <9 U/L (ref 0–45)
AST SERPL W P-5'-P-CCNC: 18 U/L (ref 0–40)
BASOPHILS # BLD AUTO: 0 THOU/UL (ref 0–0.2)
BASOPHILS NFR BLD AUTO: 1 % (ref 0–2)
BUN SERPL-MCNC: 18 MG/DL (ref 8–22)
C REACTIVE PROTEIN LHE: 2.2 MG/DL (ref 0–0.8)
CREAT SERPL-MCNC: 0.94 MG/DL (ref 0.7–1.3)
EOSINOPHIL # BLD AUTO: 0.1 THOU/UL (ref 0–0.4)
EOSINOPHIL NFR BLD AUTO: 2 % (ref 0–6)
ERYTHROCYTE [DISTWIDTH] IN BLOOD BY AUTOMATED COUNT: 13.6 % (ref 11–14.5)
GFR SERPL CREATININE-BSD FRML MDRD: >60 ML/MIN/1.73M2
HCT VFR BLD AUTO: 25.9 % (ref 40–54)
HGB BLD-MCNC: 8.4 G/DL (ref 14–18)
LYMPHOCYTES # BLD AUTO: 1.5 THOU/UL (ref 0.8–4.4)
LYMPHOCYTES NFR BLD AUTO: 24 % (ref 20–40)
MCH RBC QN AUTO: 29.8 PG (ref 27–34)
MCHC RBC AUTO-ENTMCNC: 32.4 G/DL (ref 32–36)
MCV RBC AUTO: 92 FL (ref 80–100)
MONOCYTES # BLD AUTO: 0.6 THOU/UL (ref 0–0.9)
MONOCYTES NFR BLD AUTO: 10 % (ref 2–10)
NEUTROPHILS # BLD AUTO: 3.8 THOU/UL (ref 2–7.7)
NEUTROPHILS NFR BLD AUTO: 63 % (ref 50–70)
PLATELET # BLD AUTO: 250 THOU/UL (ref 140–440)
PMV BLD AUTO: 9.3 FL (ref 8.5–12.5)
POTASSIUM BLD-SCNC: 4.8 MMOL/L (ref 3.5–5)
RBC # BLD AUTO: 2.82 MILL/UL (ref 4.4–6.2)
WBC: 6.1 THOU/UL (ref 4–11)

## 2019-11-01 ENCOUNTER — HOME INFUSION (PRE-WILLOW HOME INFUSION) (OUTPATIENT)
Dept: PHARMACY | Facility: CLINIC | Age: 59
End: 2019-11-01

## 2019-11-02 ENCOUNTER — HOME INFUSION (PRE-WILLOW HOME INFUSION) (OUTPATIENT)
Dept: PHARMACY | Facility: CLINIC | Age: 59
End: 2019-11-02

## 2019-11-04 NOTE — PROGRESS NOTES
This is a recent snapshot of the patient's [facility] medical record.  For current drug dose and complete information and questions, call [phone] or In Basket pool, fv home infusion ([id number])  CSN Number:  [id number]

## 2019-11-04 NOTE — PROGRESS NOTES
This is a recent snapshot of the patient's [facility] medical record.  For current drug dose and complete information and questions, call [phone] or In Basket pool, fv home infusion ([id number])  CSN Number:  [id number]

## 2019-11-05 ENCOUNTER — HOME CARE/HOSPICE - HEALTHEAST (OUTPATIENT)
Dept: HOME HEALTH SERVICES | Facility: HOME HEALTH | Age: 59
End: 2019-11-05

## 2019-11-05 ENCOUNTER — ALLIED HEALTH/NURSE VISIT (OUTPATIENT)
Dept: FAMILY MEDICINE | Facility: CLINIC | Age: 59
End: 2019-11-05
Payer: COMMERCIAL

## 2019-11-05 DIAGNOSIS — Z23 NEED FOR PROPHYLACTIC VACCINATION AND INOCULATION AGAINST INFLUENZA: Primary | ICD-10-CM

## 2019-11-07 ENCOUNTER — HOME CARE/HOSPICE - HEALTHEAST (OUTPATIENT)
Dept: HOME HEALTH SERVICES | Facility: HOME HEALTH | Age: 59
End: 2019-11-07

## 2019-11-07 ENCOUNTER — MEDICAL CORRESPONDENCE (OUTPATIENT)
Dept: HEALTH INFORMATION MANAGEMENT | Facility: CLINIC | Age: 59
End: 2019-11-07

## 2019-11-07 LAB
ALP SERPL-CCNC: 103 U/L (ref 40–150)
ALT SERPL W P-5'-P-CCNC: 7 U/L (ref 0–70)
AST SERPL W P-5'-P-CCNC: 13 U/L (ref 0–45)
BASOPHILS # BLD AUTO: 0 10E9/L (ref 0–0.2)
BASOPHILS NFR BLD AUTO: 0.2 %
BUN SERPL-MCNC: 21 MG/DL (ref 7–30)
CREAT SERPL-MCNC: 1.06 MG/DL (ref 0.66–1.25)
CRP SERPL-MCNC: 17.5 MG/L (ref 0–8)
DIFFERENTIAL METHOD BLD: ABNORMAL
EOSINOPHIL # BLD AUTO: 0.3 10E9/L (ref 0–0.7)
EOSINOPHIL NFR BLD AUTO: 4 %
ERYTHROCYTE [DISTWIDTH] IN BLOOD BY AUTOMATED COUNT: 13.7 % (ref 10–15)
GFR SERPL CREATININE-BSD FRML MDRD: 77 ML/MIN/{1.73_M2}
HCT VFR BLD AUTO: 26.5 % (ref 40–53)
HGB BLD-MCNC: 8.7 G/DL (ref 13.3–17.7)
IMM GRANULOCYTES # BLD: 0 10E9/L (ref 0–0.4)
IMM GRANULOCYTES NFR BLD: 0.3 %
LYMPHOCYTES # BLD AUTO: 1.5 10E9/L (ref 0.8–5.3)
LYMPHOCYTES NFR BLD AUTO: 22.6 %
MCH RBC QN AUTO: 29.9 PG (ref 26.5–33)
MCHC RBC AUTO-ENTMCNC: 32.8 G/DL (ref 31.5–36.5)
MCV RBC AUTO: 91 FL (ref 78–100)
MONOCYTES # BLD AUTO: 0.7 10E9/L (ref 0–1.3)
MONOCYTES NFR BLD AUTO: 10.3 %
NEUTROPHILS # BLD AUTO: 4.1 10E9/L (ref 1.6–8.3)
NEUTROPHILS NFR BLD AUTO: 62.6 %
NRBC # BLD AUTO: 0 10*3/UL
NRBC BLD AUTO-RTO: 0 /100
PLATELET # BLD AUTO: 245 10E9/L (ref 150–450)
POTASSIUM SERPL-SCNC: 4.3 MMOL/L (ref 3.4–5.3)
RBC # BLD AUTO: 2.91 10E12/L (ref 4.4–5.9)
WBC # BLD AUTO: 6.6 10E9/L (ref 4–11)

## 2019-11-07 PROCEDURE — 85025 COMPLETE CBC W/AUTO DIFF WBC: CPT | Performed by: INTERNAL MEDICINE

## 2019-11-07 PROCEDURE — 82565 ASSAY OF CREATININE: CPT | Performed by: INTERNAL MEDICINE

## 2019-11-07 PROCEDURE — 84132 ASSAY OF SERUM POTASSIUM: CPT | Performed by: INTERNAL MEDICINE

## 2019-11-07 PROCEDURE — 84520 ASSAY OF UREA NITROGEN: CPT | Performed by: INTERNAL MEDICINE

## 2019-11-07 PROCEDURE — 84450 TRANSFERASE (AST) (SGOT): CPT | Performed by: INTERNAL MEDICINE

## 2019-11-07 PROCEDURE — 86140 C-REACTIVE PROTEIN: CPT | Performed by: INTERNAL MEDICINE

## 2019-11-07 PROCEDURE — 84460 ALANINE AMINO (ALT) (SGPT): CPT | Performed by: INTERNAL MEDICINE

## 2019-11-07 PROCEDURE — 84075 ASSAY ALKALINE PHOSPHATASE: CPT | Performed by: INTERNAL MEDICINE

## 2019-11-08 ENCOUNTER — HOME INFUSION (PRE-WILLOW HOME INFUSION) (OUTPATIENT)
Dept: PHARMACY | Facility: CLINIC | Age: 59
End: 2019-11-08

## 2019-11-08 ENCOUNTER — RECORDS - HEALTHEAST (OUTPATIENT)
Dept: ADMINISTRATIVE | Facility: OTHER | Age: 59
End: 2019-11-08

## 2019-11-10 ENCOUNTER — HOME CARE/HOSPICE - HEALTHEAST (OUTPATIENT)
Dept: HOME HEALTH SERVICES | Facility: HOME HEALTH | Age: 59
End: 2019-11-10

## 2019-11-11 ENCOUNTER — AMBULATORY - HEALTHEAST (OUTPATIENT)
Dept: LAB | Facility: CLINIC | Age: 59
End: 2019-11-11

## 2019-11-11 ENCOUNTER — TRANSFERRED RECORDS (OUTPATIENT)
Dept: HEALTH INFORMATION MANAGEMENT | Facility: CLINIC | Age: 59
End: 2019-11-11

## 2019-11-11 ENCOUNTER — TELEPHONE (OUTPATIENT)
Dept: ORTHOPEDICS | Facility: CLINIC | Age: 59
End: 2019-11-11

## 2019-11-11 ENCOUNTER — OFFICE VISIT - HEALTHEAST (OUTPATIENT)
Dept: INFECTIOUS DISEASES | Facility: CLINIC | Age: 59
End: 2019-11-11

## 2019-11-11 DIAGNOSIS — D50.9 IRON DEFICIENCY ANEMIA, UNSPECIFIED IRON DEFICIENCY ANEMIA TYPE: ICD-10-CM

## 2019-11-11 LAB
BASOPHILS # BLD AUTO: 0 THOU/UL (ref 0–0.2)
BASOPHILS NFR BLD AUTO: 0 % (ref 0–2)
C REACTIVE PROTEIN LHE: 2.7 MG/DL (ref 0–0.8)
EOSINOPHIL # BLD AUTO: 0.3 THOU/UL (ref 0–0.4)
EOSINOPHIL NFR BLD AUTO: 4 % (ref 0–6)
ERYTHROCYTE [DISTWIDTH] IN BLOOD BY AUTOMATED COUNT: 13.1 % (ref 11–14.5)
HCT VFR BLD AUTO: 26.6 % (ref 40–54)
HGB BLD-MCNC: 8.8 G/DL (ref 14–18)
IRON SERPL-MCNC: 29 UG/DL (ref 42–175)
LYMPHOCYTES # BLD AUTO: 1.8 THOU/UL (ref 0.8–4.4)
LYMPHOCYTES NFR BLD AUTO: 26 % (ref 20–40)
MCH RBC QN AUTO: 29.4 PG (ref 27–34)
MCHC RBC AUTO-ENTMCNC: 33.1 G/DL (ref 32–36)
MCV RBC AUTO: 89 FL (ref 80–100)
MONOCYTES # BLD AUTO: 0.5 THOU/UL (ref 0–0.9)
MONOCYTES NFR BLD AUTO: 7 % (ref 2–10)
NEUTROPHILS # BLD AUTO: 4.5 THOU/UL (ref 2–7.7)
NEUTROPHILS NFR BLD AUTO: 63 % (ref 50–70)
PLATELET # BLD AUTO: 273 THOU/UL (ref 140–440)
PMV BLD AUTO: 6.5 FL (ref 7–10)
RBC # BLD AUTO: 2.99 MILL/UL (ref 4.4–6.2)
WBC: 7.1 THOU/UL (ref 4–11)

## 2019-11-11 NOTE — PROGRESS NOTES
This is a recent snapshot of the patient's [facility] medical record.  For current drug dose and complete information and questions, call [phone] or In Basket pool, fv home infusion ([id number])  CSN Number:  [id number]

## 2019-11-11 NOTE — TELEPHONE ENCOUNTER
"[facility] Call Center    Phone Message    May a detailed message be left on voicemail: yes    Reason for Call: Symptoms or Concerns     If patient has red-flag symptoms, warm transfer to triage line    Current symptom or concern: Left shoulder hurts so bad, patient can hardly move the L shoulder    Symptoms have been present for:  1-2 day(s)    Has patient previously been seen for this? No    Are there any new or worsening symptoms? Yes: as soon as patient starts to engage any muscles for the left shoulder, patient is in \"bloody murder\" pain.      Action Taken: Message routed to:  [facility]    "

## 2019-11-12 ENCOUNTER — APPOINTMENT (OUTPATIENT)
Dept: CT IMAGING | Facility: CLINIC | Age: 59
DRG: 497 | End: 2019-11-12
Attending: INTERNAL MEDICINE
Payer: COMMERCIAL

## 2019-11-12 ENCOUNTER — HOSPITAL ENCOUNTER (INPATIENT)
Facility: CLINIC | Age: 59
LOS: 6 days | Discharge: HOME HEALTH | DRG: 497 | End: 2019-11-19
Attending: INTERNAL MEDICINE | Admitting: PHYSICIAN ASSISTANT
Payer: COMMERCIAL

## 2019-11-12 ENCOUNTER — APPOINTMENT (OUTPATIENT)
Dept: GENERAL RADIOLOGY | Facility: CLINIC | Age: 59
DRG: 497 | End: 2019-11-12
Attending: PHYSICIAN ASSISTANT
Payer: COMMERCIAL

## 2019-11-12 DIAGNOSIS — Z96.612 S/P SHOULDER REPLACEMENT, LEFT: ICD-10-CM

## 2019-11-12 DIAGNOSIS — R79.82 ELEVATED C-REACTIVE PROTEIN (CRP): ICD-10-CM

## 2019-11-12 DIAGNOSIS — M25.512 ACUTE PAIN OF LEFT SHOULDER: ICD-10-CM

## 2019-11-12 DIAGNOSIS — M00.012 STAPHYLOCOCCAL ARTHRITIS OF LEFT SHOULDER (H): Primary | ICD-10-CM

## 2019-11-12 LAB
ALBUMIN SERPL-MCNC: 3.4 G/DL (ref 3.4–5)
ALP SERPL-CCNC: 105 U/L (ref 40–150)
ALT SERPL W P-5'-P-CCNC: 10 U/L (ref 0–70)
ANION GAP SERPL CALCULATED.3IONS-SCNC: 5 MMOL/L (ref 3–14)
APPEARANCE FLD: NORMAL
AST SERPL W P-5'-P-CCNC: 16 U/L (ref 0–45)
BASOPHILS # BLD AUTO: 0.1 10E9/L (ref 0–0.2)
BASOPHILS NFR BLD AUTO: 0.5 %
BILIRUB SERPL-MCNC: 0.8 MG/DL (ref 0.2–1.3)
BUN SERPL-MCNC: 16 MG/DL (ref 7–30)
CALCIUM SERPL-MCNC: 9.5 MG/DL (ref 8.5–10.1)
CHLORIDE SERPL-SCNC: 100 MMOL/L (ref 94–109)
CO2 BLDCOV-SCNC: 27 MMOL/L (ref 21–28)
CO2 SERPL-SCNC: 30 MMOL/L (ref 20–32)
COLOR FLD: NORMAL
CREAT SERPL-MCNC: 1.08 MG/DL (ref 0.66–1.25)
CRP SERPL-MCNC: 98 MG/L (ref 0–8)
DIFFERENTIAL METHOD BLD: ABNORMAL
EOSINOPHIL # BLD AUTO: 0.1 10E9/L (ref 0–0.7)
EOSINOPHIL NFR BLD AUTO: 0.7 %
EOSINOPHIL NFR FLD MANUAL: 1 %
ERYTHROCYTE [DISTWIDTH] IN BLOOD BY AUTOMATED COUNT: 13 % (ref 10–15)
ERYTHROCYTE [SEDIMENTATION RATE] IN BLOOD BY WESTERGREN METHOD: 91 MM/H (ref 0–20)
GFR SERPL CREATININE-BSD FRML MDRD: 75 ML/MIN/{1.73_M2}
GLUCOSE SERPL-MCNC: 117 MG/DL (ref 70–99)
GRAM STN SPEC: NORMAL
HCT VFR BLD AUTO: 30.3 % (ref 40–53)
HGB BLD-MCNC: 9.6 G/DL (ref 13.3–17.7)
IMM GRANULOCYTES # BLD: 0.1 10E9/L (ref 0–0.4)
IMM GRANULOCYTES NFR BLD: 0.7 %
INR PPP: 1.16 (ref 0.86–1.14)
LACTATE BLD-SCNC: 0.6 MMOL/L (ref 0.7–2.1)
LYMPHOCYTES # BLD AUTO: 1.2 10E9/L (ref 0.8–5.3)
LYMPHOCYTES NFR BLD AUTO: 13.2 %
LYMPHOCYTES NFR FLD MANUAL: 12 %
MCH RBC QN AUTO: 29.4 PG (ref 26.5–33)
MCHC RBC AUTO-ENTMCNC: 31.7 G/DL (ref 31.5–36.5)
MCV RBC AUTO: 93 FL (ref 78–100)
MONOCYTES # BLD AUTO: 1 10E9/L (ref 0–1.3)
MONOCYTES NFR BLD AUTO: 10.8 %
MONOS+MACROS NFR FLD MANUAL: 6 %
NEUTROPHILS # BLD AUTO: 6.8 10E9/L (ref 1.6–8.3)
NEUTROPHILS NFR BLD AUTO: 74.1 %
NEUTS BAND NFR FLD MANUAL: 81 %
NRBC # BLD AUTO: 0 10*3/UL
NRBC BLD AUTO-RTO: 0 /100
PCO2 BLDV: 42 MM HG (ref 40–50)
PH BLDV: 7.41 PH (ref 7.32–7.43)
PLATELET # BLD AUTO: 267 10E9/L (ref 150–450)
PO2 BLDV: 27 MM HG (ref 25–47)
POTASSIUM SERPL-SCNC: 4.8 MMOL/L (ref 3.4–5.3)
PROT SERPL-MCNC: 7.9 G/DL (ref 6.8–8.8)
RBC # BLD AUTO: 3.27 10E12/L (ref 4.4–5.9)
SAO2 % BLDV FROM PO2: 51 %
SODIUM SERPL-SCNC: 134 MMOL/L (ref 133–144)
SPECIMEN SOURCE FLD: NORMAL
SPECIMEN SOURCE: NORMAL
WBC # BLD AUTO: 9.2 10E9/L (ref 4–11)
WBC # FLD AUTO: 1588 /UL

## 2019-11-12 PROCEDURE — 85610 PROTHROMBIN TIME: CPT | Performed by: INTERNAL MEDICINE

## 2019-11-12 PROCEDURE — 25800030 ZZH RX IP 258 OP 636: Performed by: PHYSICIAN ASSISTANT

## 2019-11-12 PROCEDURE — 87070 CULTURE OTHR SPECIMN AEROBIC: CPT | Performed by: PHYSICIAN ASSISTANT

## 2019-11-12 PROCEDURE — 82803 BLOOD GASES ANY COMBINATION: CPT

## 2019-11-12 PROCEDURE — 80053 COMPREHEN METABOLIC PANEL: CPT | Performed by: INTERNAL MEDICINE

## 2019-11-12 PROCEDURE — 86140 C-REACTIVE PROTEIN: CPT | Performed by: INTERNAL MEDICINE

## 2019-11-12 PROCEDURE — 99207 ZZC CONSULT E&M CHANGED TO INITIAL LEVEL: CPT | Performed by: INTERNAL MEDICINE

## 2019-11-12 PROCEDURE — 73030 X-RAY EXAM OF SHOULDER: CPT | Mod: LT

## 2019-11-12 PROCEDURE — 99285 EMERGENCY DEPT VISIT HI MDM: CPT | Mod: Z6 | Performed by: INTERNAL MEDICINE

## 2019-11-12 PROCEDURE — 99285 EMERGENCY DEPT VISIT HI MDM: CPT | Mod: 25 | Performed by: INTERNAL MEDICINE

## 2019-11-12 PROCEDURE — 83605 ASSAY OF LACTIC ACID: CPT

## 2019-11-12 PROCEDURE — 85025 COMPLETE CBC W/AUTO DIFF WBC: CPT | Performed by: INTERNAL MEDICINE

## 2019-11-12 PROCEDURE — 25800030 ZZH RX IP 258 OP 636: Performed by: INTERNAL MEDICINE

## 2019-11-12 PROCEDURE — 96360 HYDRATION IV INFUSION INIT: CPT | Performed by: INTERNAL MEDICINE

## 2019-11-12 PROCEDURE — 99223 1ST HOSP IP/OBS HIGH 75: CPT | Performed by: INTERNAL MEDICINE

## 2019-11-12 PROCEDURE — 87205 SMEAR GRAM STAIN: CPT | Performed by: PHYSICIAN ASSISTANT

## 2019-11-12 PROCEDURE — 0R9K3ZX DRAINAGE OF LEFT SHOULDER JOINT, PERCUTANEOUS APPROACH, DIAGNOSTIC: ICD-10-PCS | Performed by: PHYSICIAN ASSISTANT

## 2019-11-12 PROCEDURE — 89051 BODY FLUID CELL COUNT: CPT | Performed by: PHYSICIAN ASSISTANT

## 2019-11-12 PROCEDURE — 73201 CT UPPER EXTREMITY W/DYE: CPT | Mod: LT

## 2019-11-12 PROCEDURE — 25000132 ZZH RX MED GY IP 250 OP 250 PS 637: Performed by: INTERNAL MEDICINE

## 2019-11-12 PROCEDURE — 25000128 H RX IP 250 OP 636: Performed by: RADIOLOGY

## 2019-11-12 PROCEDURE — G0378 HOSPITAL OBSERVATION PER HR: HCPCS

## 2019-11-12 PROCEDURE — 96361 HYDRATE IV INFUSION ADD-ON: CPT | Performed by: INTERNAL MEDICINE

## 2019-11-12 PROCEDURE — 85652 RBC SED RATE AUTOMATED: CPT | Performed by: INTERNAL MEDICINE

## 2019-11-12 RX ORDER — BUPROPION HYDROCHLORIDE 200 MG/1
200 TABLET, EXTENDED RELEASE ORAL 2 TIMES DAILY
Status: DISCONTINUED | OUTPATIENT
Start: 2019-11-12 | End: 2019-11-19 | Stop reason: HOSPADM

## 2019-11-12 RX ORDER — OXYCODONE HYDROCHLORIDE 10 MG/1
10 TABLET ORAL EVERY 4 HOURS PRN
Status: DISCONTINUED | OUTPATIENT
Start: 2019-11-12 | End: 2019-11-13

## 2019-11-12 RX ORDER — OXYCODONE HYDROCHLORIDE 5 MG/1
5 TABLET ORAL EVERY 4 HOURS PRN
Status: DISCONTINUED | OUTPATIENT
Start: 2019-11-12 | End: 2019-11-12

## 2019-11-12 RX ORDER — CLONAZEPAM 0.5 MG/1
0.5 TABLET ORAL 3 TIMES DAILY PRN
Status: DISCONTINUED | OUTPATIENT
Start: 2019-11-12 | End: 2019-11-19 | Stop reason: HOSPADM

## 2019-11-12 RX ORDER — FENTANYL 75 UG/1
75 PATCH TRANSDERMAL
Status: DISCONTINUED | OUTPATIENT
Start: 2019-11-14 | End: 2019-11-14

## 2019-11-12 RX ORDER — AMLODIPINE AND BENAZEPRIL HYDROCHLORIDE 5; 20 MG/1; MG/1
1 CAPSULE ORAL
Status: DISCONTINUED | OUTPATIENT
Start: 2019-11-13 | End: 2019-11-19 | Stop reason: HOSPADM

## 2019-11-12 RX ORDER — CYCLOBENZAPRINE HCL 10 MG
10 TABLET ORAL 3 TIMES DAILY PRN
Status: DISCONTINUED | OUTPATIENT
Start: 2019-11-12 | End: 2019-11-19 | Stop reason: HOSPADM

## 2019-11-12 RX ORDER — ONDANSETRON 4 MG/1
4 TABLET, ORALLY DISINTEGRATING ORAL EVERY 6 HOURS PRN
Status: DISCONTINUED | OUTPATIENT
Start: 2019-11-12 | End: 2019-11-19 | Stop reason: HOSPADM

## 2019-11-12 RX ORDER — CARVEDILOL 25 MG/1
25 TABLET ORAL 2 TIMES DAILY WITH MEALS
Status: DISCONTINUED | OUTPATIENT
Start: 2019-11-12 | End: 2019-11-19 | Stop reason: HOSPADM

## 2019-11-12 RX ORDER — LOSARTAN POTASSIUM 25 MG/1
25 TABLET ORAL DAILY
Status: DISCONTINUED | OUTPATIENT
Start: 2019-11-13 | End: 2019-11-19 | Stop reason: HOSPADM

## 2019-11-12 RX ORDER — AMOXICILLIN 250 MG
CAPSULE ORAL
Status: ON HOLD | COMMUNITY
End: 2019-11-19

## 2019-11-12 RX ORDER — LIDOCAINE 40 MG/G
CREAM TOPICAL
Status: DISCONTINUED | OUTPATIENT
Start: 2019-11-12 | End: 2019-11-15

## 2019-11-12 RX ORDER — ACETAMINOPHEN 650 MG/1
650 SUPPOSITORY RECTAL EVERY 4 HOURS PRN
Status: DISCONTINUED | OUTPATIENT
Start: 2019-11-12 | End: 2019-11-19 | Stop reason: HOSPADM

## 2019-11-12 RX ORDER — CLONAZEPAM 0.5 MG/1
0.5 TABLET ORAL ONCE
Status: COMPLETED | OUTPATIENT
Start: 2019-11-12 | End: 2019-11-12

## 2019-11-12 RX ORDER — LIDOCAINE HYDROCHLORIDE 10 MG/ML
INJECTION, SOLUTION EPIDURAL; INFILTRATION; INTRACAUDAL; PERINEURAL
Status: DISCONTINUED
Start: 2019-11-12 | End: 2019-11-12 | Stop reason: HOSPADM

## 2019-11-12 RX ORDER — OXYCODONE HYDROCHLORIDE 5 MG/1
10 TABLET ORAL ONCE
Status: COMPLETED | OUTPATIENT
Start: 2019-11-12 | End: 2019-11-12

## 2019-11-12 RX ORDER — SODIUM CHLORIDE, SODIUM LACTATE, POTASSIUM CHLORIDE, CALCIUM CHLORIDE 600; 310; 30; 20 MG/100ML; MG/100ML; MG/100ML; MG/100ML
INJECTION, SOLUTION INTRAVENOUS CONTINUOUS
Status: DISCONTINUED | OUTPATIENT
Start: 2019-11-12 | End: 2019-11-14

## 2019-11-12 RX ORDER — SIMVASTATIN 10 MG
10 TABLET ORAL AT BEDTIME
Status: DISCONTINUED | OUTPATIENT
Start: 2019-11-12 | End: 2019-11-19 | Stop reason: HOSPADM

## 2019-11-12 RX ORDER — HYDROMORPHONE HCL/0.9% NACL/PF 0.2MG/0.2
0.2 SYRINGE (ML) INTRAVENOUS
Status: DISCONTINUED | OUTPATIENT
Start: 2019-11-12 | End: 2019-11-15

## 2019-11-12 RX ORDER — AMOXICILLIN 250 MG
2 CAPSULE ORAL 2 TIMES DAILY
Status: DISCONTINUED | OUTPATIENT
Start: 2019-11-12 | End: 2019-11-13

## 2019-11-12 RX ORDER — ACETAMINOPHEN 325 MG/1
650 TABLET ORAL EVERY 4 HOURS PRN
Status: DISCONTINUED | OUTPATIENT
Start: 2019-11-12 | End: 2019-11-19 | Stop reason: HOSPADM

## 2019-11-12 RX ORDER — NALOXONE HYDROCHLORIDE 0.4 MG/ML
.1-.4 INJECTION, SOLUTION INTRAMUSCULAR; INTRAVENOUS; SUBCUTANEOUS
Status: DISCONTINUED | OUTPATIENT
Start: 2019-11-12 | End: 2019-11-19 | Stop reason: HOSPADM

## 2019-11-12 RX ORDER — IOPAMIDOL 755 MG/ML
122 INJECTION, SOLUTION INTRAVASCULAR ONCE
Status: COMPLETED | OUTPATIENT
Start: 2019-11-12 | End: 2019-11-12

## 2019-11-12 RX ORDER — DOCUSATE SODIUM 100 MG/1
100 CAPSULE, LIQUID FILLED ORAL 2 TIMES DAILY
Status: DISCONTINUED | OUTPATIENT
Start: 2019-11-12 | End: 2019-11-13

## 2019-11-12 RX ORDER — ONDANSETRON 2 MG/ML
4 INJECTION INTRAMUSCULAR; INTRAVENOUS EVERY 6 HOURS PRN
Status: DISCONTINUED | OUTPATIENT
Start: 2019-11-12 | End: 2019-11-19 | Stop reason: HOSPADM

## 2019-11-12 RX ADMIN — OXYCODONE HYDROCHLORIDE 10 MG: 5 TABLET ORAL at 15:38

## 2019-11-12 RX ADMIN — SODIUM CHLORIDE, POTASSIUM CHLORIDE, SODIUM LACTATE AND CALCIUM CHLORIDE: 600; 310; 30; 20 INJECTION, SOLUTION INTRAVENOUS at 21:51

## 2019-11-12 RX ADMIN — SODIUM CHLORIDE 1000 ML: 9 INJECTION, SOLUTION INTRAVENOUS at 10:44

## 2019-11-12 RX ADMIN — CLONAZEPAM 0.5 MG: 0.5 TABLET ORAL at 15:38

## 2019-11-12 RX ADMIN — IOPAMIDOL 122 ML: 755 INJECTION, SOLUTION INTRAVENOUS at 11:02

## 2019-11-12 ASSESSMENT — ENCOUNTER SYMPTOMS
EYE REDNESS: 0
SHORTNESS OF BREATH: 0
NECK STIFFNESS: 0
HEADACHES: 0
FEVER: 0
COLOR CHANGE: 0
ARTHRALGIAS: 1
JOINT SWELLING: 0
ACTIVITY CHANGE: 1
CONFUSION: 0
ABDOMINAL PAIN: 0
DIFFICULTY URINATING: 0

## 2019-11-12 ASSESSMENT — MIFFLIN-ST. JEOR: SCORE: 1726.19

## 2019-11-12 NOTE — ED PROVIDER NOTES
[place] EMERGENCY DEPARTMENT ([facility])  11/12/19 Vertical Triage D    History     Chief Complaint   Patient presents with     Arm Pain     The history is provided by the patient, the spouse and medical records.     Kobe Buchanan is a 59 year old male with a past medical history significant for HTN, HLD, chronic neck pain, TAA repair, bipolar disorder, atrial fibrillation (not anticoagulated), SAVITA, DM2 (Diet managed), s/p bilateral shoulder replacements (2014) c/b recent MSSA bacteremia of right septic shoulder (9/2019) s/p washout/explant of shoulder arthroplasty and long-term IV antibiotics (IV cefazolin x6 week, with PICC removal 11/10/2019) c/b NEMESIO requiring 2 rounds of dialysis (Last on 9/24), acute toxic encephalopathy and postop anemia requiring blood transfusion who presents here to the Emergency Department due to left shoulder pain. Patient reports that he has been having left shoulder pain for the past 2 days. Patient notes that this shoulder pain feels similar to his MSSA infection he had in his right shoulder and is concerned this is what is occurring. Patient reports he is unable to do his daily tasks, like driving due to his shoulder pain. Patient denies fevers or swelling. Notes that movement exacerbates his pain.    Per chart review patient was seen at [facility] yesterday for transfer of care to the [facility] system. Patient endorsed at that time that he had been complaining of pain in his left shoulder since the episode of bacteremia and believed his left shoulder is infected. It was noted that he had fairly good motion on exam and had no fluid aspirated from the left shoulder. Patient had a CRP of 2.7 and Iron of 29 at that time. Patient has plans to replace the arthroplasty in his right shoulder for early December so it was recommended no antibiotics be started at that time. It was advised he continue hemogram and CRP checks weekly.    I have reviewed the  Medications, Allergies, Past Medical and Surgical History, and Social History in the [facility] system.    Past Medical History:   Diagnosis Date     Anxiety      Ascending aortic aneurysm (H)      Bicuspid aortic valve      BPPV (benign paroxysmal positional vertigo) 7/11/2014     Chronic narcotic use      Chronic neck pain      Chronic osteoarthritis      Degenerative joint disease      Depression      Hyperlipidemia 4/10/2012     Hypertension      Multiple stiff joints      Neck injuries      Obesity 2/9/2015     Skin picking habit      Sleep apnea     does not use cpap       Past Surgical History:   Procedure Laterality Date     ARTHROPLASTY SHOULDER  4/15/2014    Procedure: Left Total Shoulder Arthroplasty ;  Surgeon: Analilia Aceves MD;  [facility]     ARTHROPLASTY SHOULDER Right 8/26/2014    Procedure: ARTHROPLASTY SHOULDER;  Surgeon: Analilia Aceves MD;  [facility]: [facility]     BYPASS GASTRIC TAVO-EN-Y, LIVER BIOPSY, COMBINED  8/8/2005     C HAND/FINGER SURGERY UNLISTED       C SHOULDER SURG PROC UNLISTED       COLONOSCOPY  6/30/2014    Procedure: COMBINED COLONOSCOPY, SINGLE BIOPSY/POLYPECTOMY BY BIOPSY;  Surgeon: Chester Patton MD;  [facility]     CYSTOSCOPY, BLADDER NECK CUTS, COMBINED N/A 7/18/2016    Procedure: COMBINED CYSTOSCOPY, BLADDER NECK CUTS;  Surgeon: Ritu Leslie MD;  [facility]: [facility] OR     ESOPHAGOSCOPY, GASTROSCOPY, DUODENOSCOPY (EGD), COMBINED  6/30/2014    Procedure: COMBINED ESOPHAGOSCOPY, GASTROSCOPY, DUODENOSCOPY (EGD), BIOPSY SINGLE OR MULTIPLE;  Surgeon: Chester Patton MD;  Location: [facility]     EXCISE MASS FINGER  6/14/2011    Procedure:EXCISE MASS FINGER; Middle Flexor Cyst; Surgeon:SHAYY RUSSELL; Location:[facility] OR     HAND SURGERY      excision of tendon cyst on left hand     HC VASCULAR SURGERY PROCEDURE UNLIST       LASER HOLMIUM LITHOTRIPSY BLADDER N/A 10/15/2014    Procedure: LASER HOLMIUM LITHOTRIPSY BLADDER;  Surgeon: Sahil Taveras  MD Yulissa;  Location: [facility] OR     LASER KTP GREEN LIGHT PHOTOSELECTIVE VAPORIZATION PROSTATE  2014    Procedure: LASER KTP GREEN LIGHT PHOTOSELECTIVE VAPORIZATION PROSTATE;  Greenlight Photovaporization Of Prostate  ;  Surgeon: Sahil Taveras MD;  Location: [facility] OR     RELEASE TRIGGER FINGER Right 3/31/2017    Procedure: RELEASE TRIGGER FINGER;  Surgeon: Juan Carlos Blunt MD;  [facility]: [facility] OR     REPAIR ANEURYSM ASCENDING AORTA N/A 10/4/2016    Procedure: REPAIR ANEURYSM ASCENDING AORTA;  Surgeon: Mckenzie Townsend MD;  Location: [facility] OR     TURP         Family History   Problem Relation Age of Onset     Arthritis Other      Gastrointestinal Disease Other      Cardiovascular Father         aortic aneurysm     Arrhythmia Father      Nephrolithiasis Father      Sleep Apnea Father      Anxiety Disorder Father      Depression Father      Hypertension Father      Obesity Father      Hyperlipidemia Father      Coronary Artery Disease Father         history of MI and stent     Low Back Problems Father      Spine Problems Father      Anxiety Disorder Mother      Hypertension Mother      Osteoporosis Mother      Obesity Mother      Hyperlipidemia Mother      Low Back Problems Mother      Anxiety Disorder Sister      Hypertension Sister      Osteoporosis Sister      Obesity Sister      Hyperlipidemia Sister      Low Back Problems Sister      Spine Problems Sister      Anxiety Disorder Sister      Depression Sister      Hypertension Sister      Osteoporosis Sister      Obesity Sister      Hyperlipidemia Sister      Low Back Problems Sister        Social History     Tobacco Use     Smoking status: Former Smoker     Packs/day: 0.50     Years: 6.00     Pack years: 3.00     Types: Cigarettes     Start date: 1977     Last attempt to quit: 1983     Years since quittin.2     Smokeless tobacco: Never Used     Tobacco comment: quit 35 years ago   Substance Use Topics     Alcohol use: No     Alcohol/week:  "0.0 standard drinks       No current facility-administered medications for this encounter.      Current Outpatient Medications   Medication     amLODIPine-benazepril (LOTREL) 5-20 MG capsule     buPROPion (WELLBUTRIN SR) 200 MG 12 hr tablet     carvedilol (COREG) 25 MG tablet     clonazePAM (KLONOPIN) 0.5 MG tablet     cyclobenzaprine (FLEXERIL) 10 MG tablet     docusate sodium (COLACE) 100 MG capsule     Fe Heme Polypeptide-folic acid (PROFERRIN-FORTE) 12-1 MG TABS     fentaNYL (DURAGESIC) 75 mcg/hr 72 hr patch     fluocinonide (LIDEX) 0.05 % external ointment     losartan (COZAAR) 25 MG tablet     oxyCODONE IR (ROXICODONE) 10 MG tablet     senna-docusate (SENOKOT-S/PERICOLACE) 8.6-50 MG tablet     simvastatin (ZOCOR) 10 MG tablet     tacrolimus (PROTOPIC) 0.1 % ointment     triamcinolone (KENALOG) 0.1 % external cream     naloxone (NARCAN) nasal spray     neomycin-polymyxin-hydrocortisone (CORTISPORIN) otic solution     order for DME        Allergies   Allergen Reactions     Ciprofloxacin      History of aortic aneurysms     Liquid Adhesive Other (See Comments)     Blistering of skin         Review of Systems   Constitutional: Positive for activity change. Negative for fever.   HENT: Negative for congestion.    Eyes: Negative for redness.   Respiratory: Negative for shortness of breath.    Cardiovascular: Negative for chest pain.   Gastrointestinal: Negative for abdominal pain.   Genitourinary: Negative for difficulty urinating.   Musculoskeletal: Positive for arthralgias. Negative for joint swelling and neck stiffness.        Positive for left shoulder pain  Negative for left shoulder swelling   Skin: Negative for color change.   Neurological: Negative for headaches.   Psychiatric/Behavioral: Negative for confusion.       Physical Exam   BP: (!) 161/104(left lower leg)  Pulse: 79  Temp: 98.5  F (36.9  C)  Resp: 16  Height: 177.8 cm (5' 10\")  Weight: 90 kg (198 lb 6.4 oz)  SpO2: 97 %      Physical " Exam  Constitutional:       General: He is not in acute distress.     Appearance: He is not diaphoretic.   HENT:      Head: Atraumatic.   Eyes:      General: No scleral icterus.     Pupils: Pupils are equal, round, and reactive to light.   Neck:      Musculoskeletal: Neck supple.   Cardiovascular:      Rate and Rhythm: Normal rate and regular rhythm.      Heart sounds: Normal heart sounds. No murmur. No friction rub. No gallop.    Pulmonary:      Effort: Pulmonary effort is normal. No respiratory distress.      Breath sounds: Normal breath sounds. No stridor. No wheezing, rhonchi or rales.   Chest:      Chest wall: No tenderness.   Abdominal:      General: Abdomen is flat. Bowel sounds are normal. There is no distension.      Palpations: Abdomen is soft. There is no mass.      Tenderness: There is no tenderness. There is no right CVA tenderness, left CVA tenderness, guarding or rebound.      Hernia: No hernia is present.   Musculoskeletal:      Left shoulder: He exhibits decreased range of motion, tenderness, pain and spasm. He exhibits no bony tenderness, no swelling, no effusion, no crepitus, no deformity, no laceration, normal pulse and normal strength.        Arms:    Skin:     General: Skin is warm.      Findings: No rash.         ED Course   9:50 AM  The patient was seen and examined by Monserrat Ríos MD in Room VTD.        Procedures        Results for orders placed or performed during the hospital encounter of 11/12/19 (from the past 24 hour(s))   CBC with platelets differential     Status: Abnormal    Collection Time: 11/12/19 10:44 AM   Result Value Ref Range    WBC 9.2 4.0 - 11.0 10e9/L    RBC Count 3.27 (L) 4.4 - 5.9 10e12/L    Hemoglobin 9.6 (L) 13.3 - 17.7 g/dL    Hematocrit 30.3 (L) 40.0 - 53.0 %    MCV 93 78 - 100 fl    MCH 29.4 26.5 - 33.0 pg    MCHC 31.7 31.5 - 36.5 g/dL    RDW 13.0 10.0 - 15.0 %    Platelet Count 267 150 - 450 10e9/L    Diff Method Automated Method     % Neutrophils 74.1 %    %  Lymphocytes 13.2 %    % Monocytes 10.8 %    % Eosinophils 0.7 %    % Basophils 0.5 %    % Immature Granulocytes 0.7 %    Nucleated RBCs 0 0 /100    Absolute Neutrophil 6.8 1.6 - 8.3 10e9/L    Absolute Lymphocytes 1.2 0.8 - 5.3 10e9/L    Absolute Monocytes 1.0 0.0 - 1.3 10e9/L    Absolute Eosinophils 0.1 0.0 - 0.7 10e9/L    Absolute Basophils 0.1 0.0 - 0.2 10e9/L    Abs Immature Granulocytes 0.1 0 - 0.4 10e9/L    Absolute Nucleated RBC 0.0    INR     Status: Abnormal    Collection Time: 11/12/19 10:44 AM   Result Value Ref Range    INR 1.16 (H) 0.86 - 1.14   Comprehensive metabolic panel     Status: Abnormal    Collection Time: 11/12/19 10:44 AM   Result Value Ref Range    Sodium 134 133 - 144 mmol/L    Potassium 4.8 3.4 - 5.3 mmol/L    Chloride 100 94 - 109 mmol/L    Carbon Dioxide 30 20 - 32 mmol/L    Anion Gap 5 3 - 14 mmol/L    Glucose 117 (H) 70 - 99 mg/dL    Urea Nitrogen 16 7 - 30 mg/dL    Creatinine 1.08 0.66 - 1.25 mg/dL    GFR Estimate 75 >60 mL/min/[1.73_m2]    GFR Estimate If Black 87 >60 mL/min/[1.73_m2]    Calcium 9.5 8.5 - 10.1 mg/dL    Bilirubin Total 0.8 0.2 - 1.3 mg/dL    Albumin 3.4 3.4 - 5.0 g/dL    Protein Total 7.9 6.8 - 8.8 g/dL    Alkaline Phosphatase 105 40 - 150 U/L    ALT 10 0 - 70 U/L    AST 16 0 - 45 U/L   Erythrocyte sedimentation rate auto     Status: Abnormal    Collection Time: 11/12/19 10:44 AM   Result Value Ref Range    Sed Rate 91 (H) 0 - 20 mm/h   CRP inflammation     Status: Abnormal    Collection Time: 11/12/19 10:44 AM   Result Value Ref Range    CRP Inflammation 98.0 (H) 0.0 - 8.0 mg/L   ISTAT gases lactate tanja POCT     Status: Abnormal    Collection Time: 11/12/19 10:53 AM   Result Value Ref Range    Ph Venous 7.41 7.32 - 7.43 pH    PCO2 Venous 42 40 - 50 mm Hg    PO2 Venous 27 25 - 47 mm Hg    Bicarbonate Venous 27 21 - 28 mmol/L    O2 Sat Venous 51 %    Lactic Acid 0.6 (L) 0.7 - 2.1 mmol/L   CT Shoulder Left w Contrast     Status: None    Collection Time: 11/12/19 11:12 AM     Narrative    EXAM: CT SHOULDER LEFT W CONTRAST  11/12/2019 11:12 AM      HISTORY: Shoulder replaced, symptomatic, initial exam    COMPARISON: Radiographs 3/21/2018    TECHNIQUE: CT imaging of the left shoulder after the administration of  IV contrast. Contrast: iopamidol (ISOVUE-370) solution 122 mL.  Multiplanar reconstructions.    FINDINGS:     Examination degraded by metal artifact related to total left shoulder  arthroplasty.    Total left shoulder arthroplasty without evidence of hardware  complication. No abnormal humeral periprosthetic lucency. Apparent  increasing lucency about the glenoid component, especially at the  cranial and caudal margins on coronal series 7 image 94. No acute  fracture.    Type II acromion with moderate acromial clavicular joint degenerative  changes.    CT is limited for evaluation of internal derangement. Small joint  effusion (axial series 2 image 42). Small calcific density within the  dependent joint space on series 3 image 31. Muscle bulk grossly  maintained.    Visualized lung grossly clear. Median sternotomy wires.       Impression    IMPRESSION:   Examination degraded by metal artifact related to total left shoulder  arthroplasty.    1. Nonspecific increasing lucency about the cranial and caudal margins  of the glenoid component. Further evaluation with radiographs would be  helpful for comparison to prior radiographs 3/21/2018 and 8/19/2015.    2. Small left shoulder effusion.    LENA (Smiin VIGIL MD   XR Shoulder Left G/E 3 Views     Status: None    Collection Time: 11/12/19  1:36 PM    Narrative    4 views left shoulder radiographs 11/12/2019 2:06 PM    History: Pain, infection, comparison to 3/21/2019 films     Comparison: 3/21/2018, CT same-day    Findings:    AP, Grashey, transscapular Y, axillary  views of the left shoulder  were obtained.     No acute osseous abnormality.    Total left shoulder arthroplasty. Fractured glenoid metallic marker  unchanged.  Slight inferior angulation of the glenoid marker new since  prior however may be exaggerated by positioning. Notching of the  medial proximal humerus not substantially changed. Slightly increased  lucency surrounding the proximal humeral stem when compared to  8/19/2015 but not substantially changed compared to 3/21/2018.  Real-time bone loss is not appear substantially changed compared to  3/21/2018. Calcific fragments about the left shoulder similar to 2018.  Degenerative changes of the common clavicular joints.    The visualized left lung is grossly clear. Median sternotomy wires.      Impression    Impression:  Total left shoulder arthroplasty. Less than 2 mm of lucency  surrounding the humeral stem similar to 3/21/2018 but increased  compared to 8/19/2015. Stable fractured glenoid metallic marker.  Degree of glenoid bone loss does not appear radiographically changed  compared to prior radiographs.    LENA VIGIL MD (Joe)   Cell count with differential fluid     Status: None    Collection Time: 11/12/19  2:14 PM   Result Value Ref Range    Body Fluid Analysis Source Synovial fluid     % Neutrophils Fluid 81 %    % Lymphocytes Fluid 12 %    % Mono/Macro Fluid 6 %    % Eosinophils Fluid 1 %    Color Fluid Bloody     Appearance Fluid Slightly Cloudy     WBC Fluid 1588 /uL   Gram stain     Status: None    Collection Time: 11/12/19  2:14 PM   Result Value Ref Range    Specimen Description Synovial fluid LEFT SHOULDER     Gram Stain No organisms seen     Gram Stain Rare  PMNs seen       Gram Stain       Critical Value/Significant Value called to and read back by  Dr. Monserrat Ríos @16:07 on 11/12/19,KO             Labs Ordered and Resulted from Time of ED Arrival Up to the Time of Departure from the ED   CBC WITH PLATELETS DIFFERENTIAL - Abnormal; Notable for the following components:       Result Value    RBC Count 3.27 (*)     Hemoglobin 9.6 (*)     Hematocrit 30.3 (*)     All other components within normal  limits   INR - Abnormal; Notable for the following components:    INR 1.16 (*)     All other components within normal limits   COMPREHENSIVE METABOLIC PANEL - Abnormal; Notable for the following components:    Glucose 117 (*)     All other components within normal limits   ERYTHROCYTE SEDIMENTATION RATE AUTO - Abnormal; Notable for the following components:    Sed Rate 91 (*)     All other components within normal limits   CRP INFLAMMATION - Abnormal; Notable for the following components:    CRP Inflammation 98.0 (*)     All other components within normal limits   ISTAT  GASES LACTATE RACHELLE POCT - Abnormal; Notable for the following components:    Lactic Acid 0.6 (*)     All other components within normal limits   ISTAT CG4 GASES LACTATE RACHELLE NURSING POCT   CELL COUNT WITH DIFFERENTIAL FLUID   FLUID CULTURE AEROBIC BACTERIAL   GRAM STAIN       Consults  Neurosurgery: Responded (11/12/19 6110)  Orthopedics: Called (11/12/19 1144)    Assessments & Plan (with Medical Decision Making)  Acute nontraumatic left shoulder pain s/p shoulder replaced in the pt who had infected right shoulder replament s/p drug eluting spacer s/p iv ancef just completed 2 days ago, CT increased air, acutely more elevated inflammatory markers noted, D/W Ortho-who attempted tap, admit to [facility] Dr Aceves as observation, plan for IR tap.       I have reviewed the nursing notes.    I have reviewed the findings, diagnosis, plan and need for follow up with the patient.    New Prescriptions    No medications on file       Final diagnoses:   Acute pain of left shoulder   S/P shoulder replacement, left   Elevated C-reactive protein (CRP)     IJaden, am serving as a trained medical scribe to document services personally performed by Monserrat Ríos MD, based on the provider's statements to me.   IMonserrat MD, was physically present and have reviewed and verified the accuracy of this note documented by Jaden Neil.    11/12/2019    [facility], [place], EMERGENCY DEPARTMENT     Monserrat Ríos MD  11/12/19 2223

## 2019-11-12 NOTE — PROCEDURES
DATE OF PROCEDURE: 11/12/2019     PROVIDER: Juan Jose Guo PA-C    ANESTHESIA: none required    INDICATION: suspected left shoulder periprosthetic joint infection    POSTPROCEDURE DIAGNOSIS: same    PROCEDURE(S) PERFORMED: left shoulder aspiration    BACKGROUND:  Patient with history of left TSA in 2011, status post right shoulder periprosthetic joint infection (explant and spacer placement in 9/2019, placed on IV cefazolin x 6 weeks). Completed ABx on 11/10/2019, now complains of 2 days of acute left shoulder pain    PROCEDURE FINDINGS: return of 1-2 ml bloody fluid    PROCEDURE:    After written informed consent was obtained, and the patient elected to proceed, a brief time out was held in accordance with hospital policy confirming the correct patient, procedure, site, and side. The left shoulder was then prepped and draped in the usual sterile fashion using chlorhexidine scrub x2. The area was locally infilitrated with 3 ml lidocaine without epinephrine. After analgesia had taken affect, the left shoulder joint was then entered via a posterior approach, approximately 1 cm distal of the inferior acromion border with a 22 gauge needle. The joint was then aspirated with return of 1-2 cc of bloody fluid. The needle was withdrawn, and a sterile bandage was placed. The patient tolerated the procedure well and no immediate complications were observed. CMS was tested and intact following the aspiration.     Fluid will be sent for cell count with diff (priority) and gram stain, aerobic cultures.     Juan Jose Guo PA-C  Orthopedic Surgery  Pager: [phone]

## 2019-11-12 NOTE — TELEPHONE ENCOUNTER
Patient was contacted after message was received.  He is currently in the [place] ER. He reports he was also seen at [facility] 11/11/19.  Dr Aceves will be informed.

## 2019-11-12 NOTE — TELEPHONE ENCOUNTER
Health Call Center    Phone Message    May a detailed message be left on voicemail: yes    Reason for Call: Symptoms or Concerns     Current symptom or concern: Left shoulder hurts and has severe pain    Symptoms have been present for:  1-2 day(s)    Has patient previously been seen for this? Yes    Are there any new or worsening symptoms? Yes: can't move left shoulder, can only move left arm from wrist to elbow only. This same exact thing happened in the Right Shoulder, so now it's in the Left one.      Action Taken: Message routed to:  [facility] Clinic

## 2019-11-12 NOTE — H&P
"[facility]  ORTHOPAEDIC SURGERY CONSULT - HISTORY AND PHYSICAL    DATE OF CONSULT: 11/12/2019   REQUESTING PROVIDER: Monserrat Ríos MD, MD    CC: left shoulder pain  DATE OF INJURY: two days, status post left total shoulder arthoplasty in 4/2014       HISTORY OF PRESENT ILLNESS:   Kobe Buchanan is a 59 year old right-hand dominant male with a significant PMH including hypertension, hyperlipidemia, chronic back pain, TAA repair, bipolar disorder, atrial fibrillation (not currently anticoagulated), obstructive sleep apnea, and type 2 diabetes mellitus (diet managed). The patient is status post right total shoulder arthroplasty with subsequent explant and antibiotic spacer placement in 09/2014, MSSA, and status post left total shoulder arthroplasty in 4/2014. The patient presents with complaint of left shoulder pain, worsening over the last two days. Notably, the patient was receiving treatment with IV cefazolin for previous periprosthetic right shoulder infection x 6 weeks, last dose on 11/10/2019. He describes his symptoms of pain as 10/10 burning/aching pain located in the left shoulder that is exacerbated with any movement, somewhat alleviated by rest. While he endorses chronic aches and pains of his left shoulder since September 2019, his present symptoms are new and have been worsening over the last 24 hours. In fact, he reports, \"Yesterday morning, I was able to drive my car. This morning, I can't even get my arm up to the steering wheel without excruciating pain.\" He denies any preceding trauma. He denies peripheral pain, muscle weakness, new numbness and tingling. He denies fevers, chills, chest pain, shortness of breath, change in urinary frequency, dysuria, change in bowel habits - diarrhea, constipation. He has experienced similar symptoms before only on the contralateral shoulder (right) back in September 2019 before admission for MSSA bacteremia and  right shoulder periprosthetic " joint infection.     With regard to the right shoulder, he initially had a right total shoulder arthroplasty in 2014. Unfortunately, he developed a right shoulder periprosthetic joint infection, MSSA bacteremia in 09/2019. He underwent an open irrigation and debridement with explant of right total shoulder arthroplasty, placement of antibiotic spacer on 9/23/2019. He was discharged on IV cefazolin.      In addition to the above complaints, the patient also reports the following:   General: denies fever, chills, sweats, fatigue, recent changes in weight or appetite  Integument: denies rashes, sores, lumps/bumps not otherwise noted above  Respiratory: denies cough, sputum production, hemoptysis, dyspnea, and wheezing  Cardiac: denies chest pain, SOB, palpitations  GI: denies abdominal pain, nausea, vomiting, change in bowel habits - diarrhea or constipation  Peripheral Vascular: denies history of DVT, peripheral edema  Musculoskeletal:  denies joint pain, swollen or stiff joints not otherwise noted above  Neurological: endorses some dizziness earlier today, but quickly resolved when seated; denies blackouts, focal weakness or paralysis, numbness/loss of sensation, tingling/altered sensation, tremors or other involuntary movements not otherwise noted above  Hematological: denies blood abnormalities other than those noted above    History obtained from patient interview and chart review. All relevant notes were reviewed and HPI/pertinent ROS were updated to reflect their current presentation and hospital course.       PAST MEDICAL HISTORY:   Past Medical History:   Diagnosis Date     Anxiety      Ascending aortic aneurysm (H)      Bicuspid aortic valve      BPPV (benign paroxysmal positional vertigo) 7/11/2014     Chronic narcotic use      Chronic neck pain      Chronic osteoarthritis      Degenerative joint disease      Depression      Hyperlipidemia 4/10/2012     Hypertension      Multiple stiff joints      Neck  injuries      Obesity 2/9/2015     Skin picking habit      Sleep apnea     does not use cpap       Patient denies any personal history of bleeding disorders, clotting disorders, or adverse reactions to anesthesia.      PAST SURGICAL HISTORY:   Past Surgical History:   Procedure Laterality Date     ARTHROPLASTY SHOULDER  4/15/2014    Procedure: Left Total Shoulder Arthroplasty ;  Surgeon: Analilia Aceves MD;  [facility]     ARTHROPLASTY SHOULDER Right 8/26/2014    Procedure: ARTHROPLASTY SHOULDER;  Surgeon: Analilia Aceves MD;  [facility]     BYPASS GASTRIC TAVO-EN-Y, LIVER BIOPSY, COMBINED  8/8/2005     C HAND/FINGER SURGERY UNLISTED       C SHOULDER SURG PROC UNLISTED       COLONOSCOPY  6/30/2014    Procedure: COMBINED COLONOSCOPY, SINGLE BIOPSY/POLYPECTOMY BY BIOPSY;  Surgeon: Chester Patton MD;  [facility]     CYSTOSCOPY, BLADDER NECK CUTS, COMBINED N/A 7/18/2016    Procedure: COMBINED CYSTOSCOPY, BLADDER NECK CUTS;  Surgeon: Ritu Leslie MD;  [facility]     ESOPHAGOSCOPY, GASTROSCOPY, DUODENOSCOPY (EGD), COMBINED  6/30/2014    Procedure: COMBINED ESOPHAGOSCOPY, GASTROSCOPY, DUODENOSCOPY (EGD), BIOPSY SINGLE OR MULTIPLE;  Surgeon: Chester Patton MD;  Location: [facility]     EXCISE MASS FINGER  6/14/2011    Procedure:EXCISE MASS FINGER; Middle Flexor Cyst; Surgeon:SHAYY RUSSELL; Location:[facility]     HAND SURGERY      excision of tendon cyst on left hand     [facility] VASCULAR SURGERY PROCEDURE UNLIST       LASER HOLMIUM LITHOTRIPSY BLADDER N/A 10/15/2014    Procedure: LASER HOLMIUM LITHOTRIPSY BLADDER;  Surgeon: Sahil Taveras MD;  Location: [facility]     LASER KTP GREEN LIGHT PHOTOSELECTIVE VAPORIZATION PROSTATE  1/23/2014    Procedure: LASER KTP GREEN LIGHT PHOTOSELECTIVE VAPORIZATION PROSTATE;  Greenlight Photovaporization Of Prostate  ;  Surgeon: Sahil Taveras MD;  Location: [facility]     RELEASE TRIGGER FINGER Right 3/31/2017    Procedure:  RELEASE TRIGGER FINGER;  Surgeon: Juan Carlos Blunt MD;  [facility]     REPAIR ANEURYSM ASCENDING AORTA N/A 10/4/2016    Procedure: REPAIR ANEURYSM ASCENDING AORTA;  Surgeon: Mckenzie Townsend MD;  [facility]: [facility]     TUR  2014         MEDICATIONS:   Prior to Admission medications    Medication Sig Last Dose Taking? Auth Provider   amLODIPine-benazepril (LOTREL) 5-20 MG capsule Take 1 capsule by mouth 2 times daily   Demi Hardin MD   buPROPion (WELLBUTRIN SR) 200 MG 12 hr tablet TAKE 1 TABLET BY MOUTH 2 TIMES DAILY   Demi Hardin MD   carvedilol (COREG) 25 MG tablet Take 1 tablet (25 mg) by mouth 2 times daily (with meals)   Demi Hardin MD   CEFAZOLIN IN D5W IV Inject 200 mg into the vein 3 times daily   Reported, Patient   clonazePAM (KLONOPIN) 0.5 MG tablet Take 1 tablet (0.5 mg) by mouth 3 times daily as needed for anxiety   Demi Hardin MD   cyclobenzaprine (FLEXERIL) 10 MG tablet Take 1 tablet (10 mg) by mouth 3 times daily as needed for muscle spasms   Demi Hardin MD   DOCUSATE SODIUM PO Take 100 mg by mouth daily    Reported, Patient   Fe Heme Polypeptide-folic acid (PROFERRIN-FORTE) 12-1 MG TABS Take 1 tablet by mouth daily   Demi Hardin MD   fentaNYL (DURAGESIC) 75 mcg/hr 72 hr patch Place 1 patch onto the skin   Reported, Patient   fluocinonide (LIDEX) 0.05 % external ointment Apply twice daily to itchy skin nodules for 1-2 weeks at a time.   Riley Fernandez MD   losartan (COZAAR) 25 MG tablet Take 1 tablet (25 mg) by mouth daily   Demi Hardin MD   naloxone (NARCAN) nasal spray Spray 1 spray (4 mg) into one nostril alternating nostrils as needed for opioid reversal every 2-3 minutes until assistance arrives   Angely Larose DO   neomycin-polymyxin-hydrocortisone (CORTISPORIN) otic solution Place 3 drops in ear(s) 4 times daily   Rose Marie Sheldon MD   order for [facility] for cardiac rehab with 4 wheels, brakes and seat    Michael Styles MD   oxyCODONE IR (ROXICODONE) 10 MG tablet take 1 tablet by oral route  every 4 hours as needed for pain, max 6/day   Reported, Patient   pantoprazole (PROTONIX) 40 MG EC tablet Take 1 tablet (40 mg) by mouth every morning   Demi Hardin MD   senna-docusate (SENOKOT-S;PERICOLACE) 8.6-50 MG per tablet Take 1-2 tablets by mouth 2 times daily To prevent constipation while taking narcotic pain medication. Start with 1 tablet twice daily. If no bowel movement in 24 hours, increase to 2 tablets twice daily.  Discontinue if you have loose stools or when you are no longer taking narcotics.   Ritu Leslie MD   simvastatin (ZOCOR) 10 MG tablet Take 1 tablet (10 mg) by mouth At Bedtime   Demi Hardin MD   tacrolimus (PROTOPIC) 0.1 % ointment Apply topically as needed Apply to affected areas on body.   Rose Marie Sheldon MD   triamcinolone (KENALOG) 0.1 % external cream Apply topically 2 times daily As needed to affected area   Rliey Fernandez MD         ALLERGIES:   Ciprofloxacin and Liquid adhesive      SOCIAL HISTORY:   Social History     Occupational History     Occupation: Disabled   Tobacco Use     Smoking status: Former Smoker     Packs/day: 0.50     Years: 6.00     Pack years: 3.00     Types: Cigarettes     Start date: 1977     Last attempt to quit: 1983     Years since quittin.2     Smokeless tobacco: Never Used     Tobacco comment: quit 35 years ago   Substance and Sexual Activity     Alcohol use: No     Alcohol/week: 0.0 standard drinks     Drug use: No     Sexual activity: Not Currently     Partners: Female     Birth control/protection: Abstinence       FAMILY HISTORY:  Patient denies known family history of bleeding, clotting, or anesthesia related complications.     REVIEW OF SYSTEMS:   A brief 10-point ROS was performed during the patient encounter. All pertinent items are included in the HPI. Other systems were negative, as below:    Head:   "denies new headache; endorses one episode of light-headedness/dizziness earlier this AM  Eyes: denies new changes in vision, blurred vision, diplopia, excessive tearing  Ears: denies new hearing loss, pain  Nose: denies recent nasal congestion   Mouth: denies new oral sores, ulcers  Throat:  denies new pain, hoarseness, difficulty swallowing  Urinary: denies new onset dysuria, hematuria, polyuria, nocturia   Endocrine:  denies excessive sweating, polyuria, polydipsia, polyphagia   Psychiatric: denies new onset depression, sleep disturbances, substance abuse      PHYSICAL EXAM:   Vitals:    11/12/19 0917   BP: (!) 161/104   Pulse: 79   Resp: 16   Temp: 98.5  F (36.9  C)   TempSrc: Oral   SpO2: 97%   Weight: 90 kg (198 lb 6.4 oz)   Height: 1.778 m (5' 10\")     General: Awake, alert, appropriate, following commands, NAD  Neuro: CN II-XII grossly intact  Psych: Appropriate affect  Skin: No rashes, lumps or bumps; skin color normal  HEENT:  Normocephalic, atraumatic; EOMs grossly intact; external ear without erythema or edema bilaterally; no septal deviation  Lungs: Breathing comfortably and nonlabored, no wheezes or stridor noted  Heart/Cardiovascular: Regular pulse, no peripheral cyanosis  Abdomen: Soft, non-tender, non-distended   Musculoskeletal:   Right Upper Extremity:     Prior anterior incision dry, without surrounding erythema     No significant tenderness to palpation over clavicle, arm, elbow, forearm, wrist.     Normal ROM of elbow, wrist without pain; limited flexion, adduction, and abduction of shoulder; extension not attempted; limited ER/IR rotation, non-painfull    Motor intact distally throughout the radial, ulnar, and median nerve distributions as indicated by intact, 5/5 strength with thumbs up, finger crossing, and OK sign    Neurologic: SILT ax/m/r/u nerve distributions.     Vascular: Radial pulse palpable, 2+.   Left Upper Extremity:     Previous anterior incision clean, dry, without surrounding " erythema    No significant tenderness to palpation over clavicle, AC joint, shoulder, arm, elbow, forearm, wrist.     Normal ROM of elbow, wrist without pain; limited flexion and extension to 30 and -10, respectively; no ability to abduct, adduction tender and limited to 15 degrees; IR/ER limited by tenderness at 80 degrees, 45 degrees, respectively    Motor intact distally throughout the radial, ulnar, and median nerve distributions as indicated by intact, 5/5 strength with thumbs up, finger crossing, and OK sign    Neurologic: SILT ax/m/r/u nerve distributions.     Vascular: Radial pulse palpable, 2+.   Lower Extremity:     Ambulates without complication      LABS:  CBC  Hemoglobin   Date Value Ref Range Status   2019 9.6 (L) 13.3 - 17.7 g/dL Final     WBC   Date Value Ref Range Status   2019 9.2 4.0 - 11.0 10e9/L Final     Hematocrit   Date Value Ref Range Status   2019 30.3 (L) 40.0 - 53.0 % Final     Platelet Count   Date Value Ref Range Status   2019 267 150 - 450 10e9/L Final       CREATININE  Creatinine   Date Value Ref Range Status   2019 1.08 0.66 - 1.25 mg/dL Final       GLUCOSE  Glucose   Date Value Ref Range Status   2019 117 (H) 70 - 99 mg/dL Final       INR  INR   Date Value Ref Range Status   2019 1.16 (H) 0.86 - 1.14 Final       INFLAMMATORY LABS  WBC   Date Value Ref Range Status   2019 9.2 4.0 - 11.0 10e9/L Final     CRP Inflammation   Date Value Ref Range Status   2019 98.0 (H) 0.0 - 8.0 mg/L Final     Sed Rate   Date Value Ref Range Status   2019 91 (H) 0 - 20 mm/h Final         IMAGIN views left shoulder radiographs 2019 2:06 PM     History: Pain, infection, comparison to 3/21/2019 films      Comparison: 3/21/2018, CT same-day     Findings:     AP, Grashey, transscapular Y, axillary  views of the left shoulder  were obtained.      No acute osseous abnormality.     Total left shoulder arthroplasty. Fractured glenoid metallic  marker  unchanged. Slight inferior angulation of the glenoid marker new since  prior however may be exaggerated by positioning. Notching of the  medial proximal humerus not substantially changed. Slightly increased  lucency surrounding the proximal humeral stem when compared to  8/19/2015 but not substantially changed compared to 3/21/2018.  Real-time bone loss is not appear substantially changed compared to  3/21/2018. Calcific fragments about the left shoulder similar to 2018.  Degenerative changes of the common clavicular joints.     The visualized left lung is grossly clear. Median sternotomy wires.                                                                      Impression:  Total left shoulder arthroplasty. Less than 2 mm of lucency  surrounding the humeral stem similar to 3/21/2018 but increased  compared to 8/19/2015. Stable fractured glenoid metallic marker.  Degree of glenoid bone loss does not appear radiographically changed  compared to prior radiographs.    ASSESSMENT AND PLAN:    Kobe Buchanan is a 59 year old male who presents with acute onset left shoulder pain in the setting of previous left total shoulder arthroplasty and elevated CRP and ESR since discontinuation of antibiotics on 11/10/2019 for previous right shoulder periprosthetic joint infection.     The patient presents with acute pain of 2 days in the setting of elevated CRP and ESR following discontinuation of IV antibiotics on 11/10/2019.  Although the patient is afebrile and presents with a normal white blood cell count, suspect left shoulder periprosthetic joint infection.    An aspiration was attempted at bedside.  Unfortunately, the aspiration provided only 1 to 2 cc of bloody fluid.  This fluid was sent down for cell count with differential and Gram stain/aerobic cultures, however, it is unclear whether this sample will be enough to obtain the desired labs. The next best step would be to obtain an IR guided aspiration of  the left shoulder.    The patient potentially faces a very complex explant of left total shoulder component with placement of antibiotic spacer if aspiration results indicate a left shoulder periprosthetic joint infection. The case was discussed with Dr. Aceves. Given the complex nature of this case and potential surgical implication, the patient will be transferred to [facility]. The patient will be admitted to obs and an IR-guided aspiration will be performed as soon as possible.     Activity: NWB right upper extremity, WBAT with left upper extremity; ROM left upper extremity as tolerated  Antibiotics/Tetanus: none at this time  Diet: OK for diet  Labs: following aspiration results  Consults: IR for left shoulder aspiration    Admit to ortho 8A for obs.    Assessment and Plan discussed with Dr. Aceves.       Juan Jose Guo PA-C  Orthopaedic Surgery  Pager: [phone]     Thank you for allowing me to participate in this patient's care. Please page me at [phone] with any questions/concerns. If there is no response, if it is a weekend, or if it is during evening hours, please page the orthopaedic surgery resident on call.

## 2019-11-12 NOTE — PHARMACY-ADMISSION MEDICATION HISTORY
"Admission medication history interview status for the 11/12/2019 admission is complete. See Epic admission navigator for allergy information, pharmacy, prior to admission medications and immunization status.     Medication history interview sources:  Patient, Chart Review, Care Everywhere, and Dispense Records     Changes made to PTA medication list (reason)  Added: None  Deleted: Cefazolin in D5W (Patient completed 6 week course of antibiotic on 11/9, and PICC was pulled on 11/10)  Pantoprazole 40 mg tablet (Per patient does not use this, but it was added to list by [facility] during last hospital stay)  Changed: Docusate 100 mg capsule from 1 capsule by mouth daily to Takes 1 capsule by mouth in the AM and 2 capsules by mouth in the PM (Per patient)   Fentanyl 75 mcg/hr patch from Place 1 patch every 72 hours to Place 1 patch every 48 hours (Per patient)    Additional medication history information: Patient was a wonderful historian. Patient knew his medications and doses.  Of note    Patient is currently wearing Fentanyl 75 mcg/hr patch that was placed this morning (11/12/19), and he changes his patch every 48 hours.    Patient uses neomycin-polymyxin-hydrocortisone otic solution twice a year for an increase in cerumen    Patient cannot take ferrous sulfate as it will \"burn a whole\" in his stomach, and must use the chelated Proferrin-Forte     Patient asked writer to document that he likes to eat many little meals instead of 3 larger meals, as he received gastric bypass ~15 years ago, and also asked writer to document that if he wakes up in the middle the night, a warm cup of Cream of Mushroom Soup would put him right back to sleep.     Prior to Admission medications    Medication Sig Last Dose Taking? Auth Provider   amLODIPine-benazepril (LOTREL) 5-20 MG capsule Take 1 capsule by mouth 2 times daily 11/12/2019 at AM Yes Demi Hardin MD   buPROPion (WELLBUTRIN SR) 200 MG 12 hr tablet TAKE 1 TABLET BY MOUTH " 2 TIMES DAILY 11/12/2019 at AM Yes Demi Hardin MD   carvedilol (COREG) 25 MG tablet Take 1 tablet (25 mg) by mouth 2 times daily (with meals) 11/12/2019 at AM Yes Demi Hardin MD   clonazePAM (KLONOPIN) 0.5 MG tablet Take 1 tablet (0.5 mg) by mouth 3 times daily as needed for anxiety 11/12/2019 at Unknown time Yes Demi Hardin MD   cyclobenzaprine (FLEXERIL) 10 MG tablet Take 1 tablet (10 mg) by mouth 3 times daily as needed for muscle spasms Past Week at Unknown time Yes Demi Hardin MD   docusate sodium (COLACE) 100 MG capsule Take 1 capsule in the AM and 2 capsules in the PM 11/12/2019 at Unknown time Yes Reported, Patient   Fe Heme Polypeptide-folic acid (PROFERRIN-FORTE) 12-1 MG TABS Take 1 tablet by mouth daily 11/12/2019 at Unknown time Yes Demi Hardin MD   fentaNYL (DURAGESIC) 75 mcg/hr 72 hr patch Place 1 patch onto the skin every 48 hours  11/12/2019 at Unknown time Yes Reported, Patient   fluocinonide (LIDEX) 0.05 % external ointment Apply twice daily to itchy skin nodules for 1-2 weeks at a time.  Yes Riley Fernandez MD   losartan (COZAAR) 25 MG tablet Take 1 tablet (25 mg) by mouth daily 11/12/2019 at Unknown time Yes Demi Hardin MD   oxyCODONE IR (ROXICODONE) 10 MG tablet take 1 tablet by oral route  every 4 hours as needed for pain, max 6/day 11/12/2019 at Unknown time Yes Reported, Patient   senna-docusate (SENOKOT-S/PERICOLACE) 8.6-50 MG tablet Takes 3 tablets by mouth in the AM and 4 tablets by mouth in the PM most days, but will sometimes take 2 tablets by mouth in AM and 3 tablets by mouth in the PM 11/12/2019 at Unknown time Yes Unknown, Entered By History   simvastatin (ZOCOR) 10 MG tablet Take 1 tablet (10 mg) by mouth At Bedtime 11/11/2019 at Unknown time Yes Demi Hardin MD   tacrolimus (PROTOPIC) 0.1 % ointment Apply topically as needed Apply to affected areas on body.  Yes Rose Marie Sheldon MD   triamcinolone (KENALOG) 0.1 %  external cream Apply topically 2 times daily As needed to affected area  Yes Riley Fernandez MD   naloxone (NARCAN) nasal spray Spray 1 spray (4 mg) into one nostril alternating nostrils as needed for opioid reversal every 2-3 minutes until assistance arrives   Angely Larose DO   neomycin-polymyxin-hydrocortisone (CORTISPORIN) otic solution Place 3 drops in ear(s) 4 times daily More than a month at Unknown time  Rose Marie Sheldon MD   order for [facility] for cardiac rehab with 4 wheels, brakes and seat   Michael Styles MD         Medication history completed by:   Cora Chavez  Pharm D Candidate  November 12, 2019

## 2019-11-12 NOTE — ED TRIAGE NOTES
Patient presents ambulatory from home with complaints of left shoulder pain that he is worried is related to infection. Patient reports he had bilateral shoulder replacements done approximately 5 years ago. At the end of September 2019 the right shoulder implant was removed due to infection. Patient was started on IV antibiotics for 6 weeks for the left shoulder infection but patient reports he finished the antibiotics recently and the PICC line was pulled but now he is unable to move the shoulder and it is causing him a lot of pain.

## 2019-11-12 NOTE — ED NOTES
[facility], [place]   ED Nurse to Floor Handoff     Kobe Buchanan is a 59 year old male who speaks English and lives with a spouse,  in a home  They arrived in the ED by car from home    ED Chief Complaint: Arm Pain    ED Dx;   Final diagnoses:   Acute pain of left shoulder   S/P shoulder replacement, left   Elevated C-reactive protein (CRP)         Needed?: No    Allergies:   Allergies   Allergen Reactions     Ciprofloxacin      History of aortic aneurysms     Liquid Adhesive Other (See Comments)     Blistering of skin   .  Past Medical Hx:   Past Medical History:   Diagnosis Date     Anxiety      Ascending aortic aneurysm (H)      Bicuspid aortic valve      BPPV (benign paroxysmal positional vertigo) 7/11/2014     Chronic narcotic use      Chronic neck pain      Chronic osteoarthritis      Degenerative joint disease      Depression      Hyperlipidemia 4/10/2012     Hypertension      Multiple stiff joints      Neck injuries      Obesity 2/9/2015     Skin picking habit      Sleep apnea     does not use cpap      Baseline Mental status: WDL  Current Mental Status changes: at basesline    Infection present or suspected this encounter: no  Sepsis suspected: No  Isolation type: No active isolations     Activity level - Baseline/Home:  Independent  Activity Level - Current:   Independent    Bariatric equipment needed?: No    In the ED these meds were given:   Medications   0.9% sodium chloride BOLUS (1,000 mLs Intravenous New Bag 11/12/19 1044)   iopamidol (ISOVUE-370) solution 122 mL (122 mLs Intravenous Given 11/12/19 1102)   sodium chloride (PF) 0.9% PF flush 80 mL (80 mLs Intravenous Given 11/12/19 1102)       Drips running?  No    Home pump  No    Current LDAs  Peripheral IV 11/12/19 Right Lower forearm (Active)   Number of days: 0       Urethral Catheter Latex 22 fr (Active)   Number of days: 1854       Urethral Catheter Non-latex;Silver  coated;Double-lumen;Straight-tip 24 fr (Active)   Number of days: 1212       Incision/Surgical Site 10/15/14 Penis (Active)   Number of days: 1854       Incision/Surgical Site 10/04/16 Chest (Active)   Number of days: 1134       Incision/Surgical Site 03/31/17 Right Hand (Active)   Number of days: 956       Labs results:   Labs Ordered and Resulted from Time of ED Arrival Up to the Time of Departure from the ED   CBC WITH PLATELETS DIFFERENTIAL - Abnormal; Notable for the following components:       Result Value    RBC Count 3.27 (*)     Hemoglobin 9.6 (*)     Hematocrit 30.3 (*)     All other components within normal limits   INR - Abnormal; Notable for the following components:    INR 1.16 (*)     All other components within normal limits   COMPREHENSIVE METABOLIC PANEL - Abnormal; Notable for the following components:    Glucose 117 (*)     All other components within normal limits   ERYTHROCYTE SEDIMENTATION RATE AUTO - Abnormal; Notable for the following components:    Sed Rate 91 (*)     All other components within normal limits   CRP INFLAMMATION - Abnormal; Notable for the following components:    CRP Inflammation 98.0 (*)     All other components within normal limits   ISTAT  GASES LACTATE RACHELLE POCT - Abnormal; Notable for the following components:    Lactic Acid 0.6 (*)     All other components within normal limits   ISTAT CG4 GASES LACTATE RACHELLE NURSING POCT   CELL COUNT WITH DIFFERENTIAL FLUID   FLUID CULTURE AEROBIC BACTERIAL   GRAM STAIN       Imaging Studies:   Recent Results (from the past 24 hour(s))   CT Shoulder Left w Contrast    Narrative    EXAM: CT SHOULDER LEFT W CONTRAST  11/12/2019 11:12 AM      HISTORY: Shoulder replaced, symptomatic, initial exam    COMPARISON: Radiographs 3/21/2018    TECHNIQUE: CT imaging of the left shoulder after the administration of  IV contrast. Contrast: iopamidol (ISOVUE-370) solution 122 mL.  Multiplanar reconstructions.    FINDINGS:     Examination degraded by  metal artifact related to total left shoulder  arthroplasty.    Total left shoulder arthroplasty without evidence of hardware  complication. No abnormal humeral periprosthetic lucency. Apparent  increasing lucency about the glenoid component, especially at the  cranial and caudal margins on coronal series 7 image 94. No acute  fracture.    Type II acromion with moderate acromial clavicular joint degenerative  changes.    CT is limited for evaluation of internal derangement. Small joint  effusion (axial series 2 image 42). Small calcific density within the  dependent joint space on series 3 image 31. Muscle bulk grossly  maintained.    Visualized lung grossly clear. Median sternotomy wires.       Impression    IMPRESSION:   Examination degraded by metal artifact related to total left shoulder  arthroplasty.    1. Nonspecific increasing lucency about the cranial and caudal margins  of the glenoid component. Further evaluation with radiographs would be  helpful for comparison to prior radiographs 3/21/2018 and 8/19/2015.    2. Small left shoulder effusion.    LENA (Simin VIGIL MD   XR Shoulder Left G/E 3 Views    Narrative    4 views left shoulder radiographs 11/12/2019 2:06 PM    History: Pain, infection, comparison to 3/21/2019 films     Comparison: 3/21/2018, CT same-day    Findings:    AP, Grashey, transscapular Y, axillary  views of the left shoulder  were obtained.     No acute osseous abnormality.    Total left shoulder arthroplasty. Fractured glenoid metallic marker  unchanged. Slight inferior angulation of the glenoid marker new since  prior however may be exaggerated by positioning. Notching of the  medial proximal humerus not substantially changed. Slightly increased  lucency surrounding the proximal humeral stem when compared to  8/19/2015 but not substantially changed compared to 3/21/2018.  Real-time bone loss is not appear substantially changed compared to  3/21/2018. Calcific fragments about  "the left shoulder similar to 2018.  Degenerative changes of the common clavicular joints.    The visualized left lung is grossly clear. Median sternotomy wires.      Impression    Impression:  Total left shoulder arthroplasty. Less than 2 mm of lucency  surrounding the humeral stem similar to 3/21/2018 but increased  compared to 8/19/2015. Stable fractured glenoid metallic marker.  Degree of glenoid bone loss does not appear radiographically changed  compared to prior radiographs.    LENA (Simin VIGIL MD       Recent vital signs:   BP (!) 161/104   Pulse 79   Temp 98.5  F (36.9  C) (Oral)   Resp 16   Ht 1.778 m (5' 10\")   Wt 90 kg (198 lb 6.4 oz)   SpO2 97%   BMI 28.47 kg/m      Isaias Coma Scale Score: 15 (11/12/19 1155)       Cardiac Rhythm: Normal Sinus  Pt needs tele? No  Skin/wound Issues: have not completed full skin assessment    Code Status: Full Code    Pain control: fair    Nausea control: pt had none    Abnormal labs/tests/findings requiring intervention:     Family present during ED course? No   Family Comments/Social Situation comments:     Tasks needing completion: None    Irish Ngo, RN    [phone] [facility], [place]    "

## 2019-11-13 ENCOUNTER — APPOINTMENT (OUTPATIENT)
Dept: INTERVENTIONAL RADIOLOGY/VASCULAR | Facility: CLINIC | Age: 59
DRG: 497 | End: 2019-11-13
Attending: PHYSICIAN ASSISTANT
Payer: COMMERCIAL

## 2019-11-13 PROBLEM — M00.9 SEPTIC JOINT OF LEFT SHOULDER REGION (H): Status: ACTIVE | Noted: 2019-11-13

## 2019-11-13 LAB
APPEARANCE FLD: NORMAL
BASOPHILS # BLD AUTO: 0.1 10E9/L (ref 0–0.2)
BASOPHILS NFR BLD AUTO: 0.5 %
COLOR FLD: YELLOW
CRYSTALS SNV MICRO: NORMAL
DIFFERENTIAL METHOD BLD: ABNORMAL
EOSINOPHIL # BLD AUTO: 0.2 10E9/L (ref 0–0.7)
EOSINOPHIL NFR BLD AUTO: 2.1 %
EOSINOPHIL NFR FLD MANUAL: 8 %
ERYTHROCYTE [DISTWIDTH] IN BLOOD BY AUTOMATED COUNT: 12.9 % (ref 10–15)
GRAM STN SPEC: ABNORMAL
HCT VFR BLD AUTO: 28.9 % (ref 40–53)
HGB BLD-MCNC: 9.5 G/DL (ref 13.3–17.7)
IMM GRANULOCYTES # BLD: 0 10E9/L (ref 0–0.4)
IMM GRANULOCYTES NFR BLD: 0.4 %
LYMPHOCYTES # BLD AUTO: 1.7 10E9/L (ref 0.8–5.3)
LYMPHOCYTES NFR BLD AUTO: 18 %
LYMPHOCYTES NFR FLD MANUAL: 6 %
MCH RBC QN AUTO: 29.5 PG (ref 26.5–33)
MCHC RBC AUTO-ENTMCNC: 32.9 G/DL (ref 31.5–36.5)
MCV RBC AUTO: 90 FL (ref 78–100)
MONOCYTES # BLD AUTO: 1 10E9/L (ref 0–1.3)
MONOCYTES NFR BLD AUTO: 10.8 %
MONOS+MACROS NFR FLD MANUAL: 6 %
NEUTROPHILS # BLD AUTO: 6.5 10E9/L (ref 1.6–8.3)
NEUTROPHILS NFR BLD AUTO: 68.2 %
NEUTS BAND NFR FLD MANUAL: 80 %
NRBC # BLD AUTO: 0 10*3/UL
NRBC BLD AUTO-RTO: 0 /100
PLATELET # BLD AUTO: 290 10E9/L (ref 150–450)
RBC # BLD AUTO: 3.22 10E12/L (ref 4.4–5.9)
SPECIMEN SOURCE FLD: NORMAL
SPECIMEN SOURCE SNV: NORMAL
SPECIMEN SOURCE: ABNORMAL
WBC # BLD AUTO: 9.6 10E9/L (ref 4–11)
WBC # FLD AUTO: NORMAL /UL

## 2019-11-13 PROCEDURE — 0R9K3ZX DRAINAGE OF LEFT SHOULDER JOINT, PERCUTANEOUS APPROACH, DIAGNOSTIC: ICD-10-PCS | Performed by: PHYSICIAN ASSISTANT

## 2019-11-13 PROCEDURE — 36415 COLL VENOUS BLD VENIPUNCTURE: CPT | Performed by: INTERNAL MEDICINE

## 2019-11-13 PROCEDURE — 87040 BLOOD CULTURE FOR BACTERIA: CPT | Performed by: INTERNAL MEDICINE

## 2019-11-13 PROCEDURE — 85025 COMPLETE CBC W/AUTO DIFF WBC: CPT | Performed by: PHYSICIAN ASSISTANT

## 2019-11-13 PROCEDURE — 99232 SBSQ HOSP IP/OBS MODERATE 35: CPT | Performed by: HOSPITALIST

## 2019-11-13 PROCEDURE — 87205 SMEAR GRAM STAIN: CPT | Performed by: PHYSICIAN ASSISTANT

## 2019-11-13 PROCEDURE — 27211039 ZZH NEEDLE CR2

## 2019-11-13 PROCEDURE — 87015 SPECIMEN INFECT AGNT CONCNTJ: CPT | Performed by: PHYSICIAN ASSISTANT

## 2019-11-13 PROCEDURE — 25000132 ZZH RX MED GY IP 250 OP 250 PS 637: Performed by: NURSE PRACTITIONER

## 2019-11-13 PROCEDURE — 89051 BODY FLUID CELL COUNT: CPT | Performed by: PHYSICIAN ASSISTANT

## 2019-11-13 PROCEDURE — 36415 COLL VENOUS BLD VENIPUNCTURE: CPT | Performed by: PHYSICIAN ASSISTANT

## 2019-11-13 PROCEDURE — 25000128 H RX IP 250 OP 636: Performed by: NURSE PRACTITIONER

## 2019-11-13 PROCEDURE — 12000001 ZZH R&B MED SURG/OB UMMC

## 2019-11-13 PROCEDURE — 87075 CULTR BACTERIA EXCEPT BLOOD: CPT | Performed by: PHYSICIAN ASSISTANT

## 2019-11-13 PROCEDURE — 25000132 ZZH RX MED GY IP 250 OP 250 PS 637: Performed by: STUDENT IN AN ORGANIZED HEALTH CARE EDUCATION/TRAINING PROGRAM

## 2019-11-13 PROCEDURE — 87077 CULTURE AEROBIC IDENTIFY: CPT | Performed by: PHYSICIAN ASSISTANT

## 2019-11-13 PROCEDURE — 76942 ECHO GUIDE FOR BIOPSY: CPT

## 2019-11-13 PROCEDURE — 25000132 ZZH RX MED GY IP 250 OP 250 PS 637: Performed by: INTERNAL MEDICINE

## 2019-11-13 PROCEDURE — 20605 DRAIN/INJ JOINT/BURSA W/O US: CPT

## 2019-11-13 PROCEDURE — 89060 EXAM SYNOVIAL FLUID CRYSTALS: CPT | Performed by: PHYSICIAN ASSISTANT

## 2019-11-13 PROCEDURE — 87070 CULTURE OTHR SPECIMN AEROBIC: CPT | Performed by: PHYSICIAN ASSISTANT

## 2019-11-13 PROCEDURE — 25000132 ZZH RX MED GY IP 250 OP 250 PS 637: Performed by: HOSPITALIST

## 2019-11-13 PROCEDURE — 77002 NEEDLE LOCALIZATION BY XRAY: CPT

## 2019-11-13 PROCEDURE — 25800030 ZZH RX IP 258 OP 636: Performed by: PHYSICIAN ASSISTANT

## 2019-11-13 PROCEDURE — 87186 SC STD MICRODIL/AGAR DIL: CPT | Performed by: PHYSICIAN ASSISTANT

## 2019-11-13 PROCEDURE — 25000125 ZZHC RX 250: Performed by: PHYSICIAN ASSISTANT

## 2019-11-13 PROCEDURE — G0378 HOSPITAL OBSERVATION PER HR: HCPCS

## 2019-11-13 RX ORDER — CEFAZOLIN SODIUM 2 G/100ML
2 INJECTION, SOLUTION INTRAVENOUS EVERY 8 HOURS
Status: DISCONTINUED | OUTPATIENT
Start: 2019-11-13 | End: 2019-11-19 | Stop reason: HOSPADM

## 2019-11-13 RX ORDER — LACTOBACILLUS RHAMNOSUS GG 10B CELL
1 CAPSULE ORAL DAILY
Status: DISCONTINUED | OUTPATIENT
Start: 2019-11-13 | End: 2019-11-19 | Stop reason: HOSPADM

## 2019-11-13 RX ORDER — OXYCODONE HYDROCHLORIDE 5 MG/1
5-10 TABLET ORAL
Status: DISCONTINUED | OUTPATIENT
Start: 2019-11-13 | End: 2019-11-15

## 2019-11-13 RX ORDER — SENNOSIDES 8.6 MG
3 TABLET ORAL DAILY
Status: DISCONTINUED | OUTPATIENT
Start: 2019-11-14 | End: 2019-11-19 | Stop reason: HOSPADM

## 2019-11-13 RX ORDER — DOCUSATE SODIUM 100 MG/1
100 CAPSULE, LIQUID FILLED ORAL DAILY
Status: DISCONTINUED | OUTPATIENT
Start: 2019-11-14 | End: 2019-11-19 | Stop reason: HOSPADM

## 2019-11-13 RX ORDER — SENNOSIDES 8.6 MG
4 TABLET ORAL AT BEDTIME
Status: DISCONTINUED | OUTPATIENT
Start: 2019-11-13 | End: 2019-11-19 | Stop reason: HOSPADM

## 2019-11-13 RX ORDER — AMOXICILLIN 250 MG
2 CAPSULE ORAL 3 TIMES DAILY
Status: DISCONTINUED | OUTPATIENT
Start: 2019-11-13 | End: 2019-11-13

## 2019-11-13 RX ORDER — DOCUSATE SODIUM 100 MG/1
200 CAPSULE, LIQUID FILLED ORAL AT BEDTIME
Status: DISCONTINUED | OUTPATIENT
Start: 2019-11-13 | End: 2019-11-19 | Stop reason: HOSPADM

## 2019-11-13 RX ADMIN — CYCLOBENZAPRINE 10 MG: 10 TABLET, FILM COATED ORAL at 19:46

## 2019-11-13 RX ADMIN — OXYCODONE HYDROCHLORIDE 10 MG: 10 TABLET ORAL at 01:02

## 2019-11-13 RX ADMIN — AMLODIPINE AND BENAZEPRIL HYDROCHLORIDE 1 CAPSULE: 5; 20 CAPSULE ORAL at 07:45

## 2019-11-13 RX ADMIN — OXYCODONE HYDROCHLORIDE 10 MG: 5 TABLET ORAL at 23:15

## 2019-11-13 RX ADMIN — SODIUM CHLORIDE, POTASSIUM CHLORIDE, SODIUM LACTATE AND CALCIUM CHLORIDE: 600; 310; 30; 20 INJECTION, SOLUTION INTRAVENOUS at 07:46

## 2019-11-13 RX ADMIN — CLONAZEPAM 0.5 MG: 0.5 TABLET ORAL at 10:05

## 2019-11-13 RX ADMIN — CARVEDILOL 25 MG: 25 TABLET, FILM COATED ORAL at 05:53

## 2019-11-13 RX ADMIN — BUPROPION HYDROCHLORIDE 200 MG: 200 TABLET, EXTENDED RELEASE ORAL at 01:20

## 2019-11-13 RX ADMIN — SENNOSIDES 4 TABLET: 8.6 TABLET, FILM COATED ORAL at 17:21

## 2019-11-13 RX ADMIN — AMLODIPINE AND BENAZEPRIL HYDROCHLORIDE 1 CAPSULE: 5; 20 CAPSULE ORAL at 17:21

## 2019-11-13 RX ADMIN — OXYCODONE HYDROCHLORIDE 10 MG: 5 TABLET ORAL at 20:19

## 2019-11-13 RX ADMIN — OXYCODONE HYDROCHLORIDE 10 MG: 5 TABLET ORAL at 17:21

## 2019-11-13 RX ADMIN — SENNOSIDES AND DOCUSATE SODIUM 2 TABLET: 8.6; 5 TABLET ORAL at 13:57

## 2019-11-13 RX ADMIN — LOSARTAN POTASSIUM 25 MG: 25 TABLET ORAL at 05:54

## 2019-11-13 RX ADMIN — SENNOSIDES AND DOCUSATE SODIUM 2 TABLET: 8.6; 5 TABLET ORAL at 05:53

## 2019-11-13 RX ADMIN — Medication 1 CAPSULE: at 05:53

## 2019-11-13 RX ADMIN — OXYCODONE HYDROCHLORIDE 10 MG: 10 TABLET ORAL at 13:57

## 2019-11-13 RX ADMIN — SIMVASTATIN 10 MG: 10 TABLET, FILM COATED ORAL at 17:21

## 2019-11-13 RX ADMIN — CARVEDILOL 25 MG: 25 TABLET, FILM COATED ORAL at 01:09

## 2019-11-13 RX ADMIN — DOCUSATE SODIUM 100 MG: 100 CAPSULE, LIQUID FILLED ORAL at 01:09

## 2019-11-13 RX ADMIN — SIMVASTATIN 10 MG: 10 TABLET, FILM COATED ORAL at 01:08

## 2019-11-13 RX ADMIN — CLONAZEPAM 0.5 MG: 0.5 TABLET ORAL at 17:21

## 2019-11-13 RX ADMIN — DOCUSATE SODIUM 200 MG: 100 CAPSULE, LIQUID FILLED ORAL at 17:21

## 2019-11-13 RX ADMIN — BUPROPION HYDROCHLORIDE 200 MG: 200 TABLET, EXTENDED RELEASE ORAL at 05:53

## 2019-11-13 RX ADMIN — OXYCODONE HYDROCHLORIDE 10 MG: 10 TABLET ORAL at 05:17

## 2019-11-13 RX ADMIN — DOCUSATE SODIUM 100 MG: 100 CAPSULE, LIQUID FILLED ORAL at 05:54

## 2019-11-13 RX ADMIN — BUPROPION HYDROCHLORIDE 200 MG: 200 TABLET, EXTENDED RELEASE ORAL at 17:21

## 2019-11-13 RX ADMIN — SENNOSIDES AND DOCUSATE SODIUM 2 TABLET: 8.6; 5 TABLET ORAL at 01:08

## 2019-11-13 RX ADMIN — CEFAZOLIN SODIUM 2 G: 2 INJECTION, SOLUTION INTRAVENOUS at 17:37

## 2019-11-13 RX ADMIN — CARVEDILOL 25 MG: 25 TABLET, FILM COATED ORAL at 17:22

## 2019-11-13 RX ADMIN — OXYCODONE HYDROCHLORIDE 10 MG: 10 TABLET ORAL at 09:20

## 2019-11-13 RX ADMIN — LIDOCAINE HYDROCHLORIDE 0.5 ML: 10 INJECTION, SOLUTION EPIDURAL; INFILTRATION; INTRACAUDAL; PERINEURAL at 08:47

## 2019-11-13 ASSESSMENT — ACTIVITIES OF DAILY LIVING (ADL)
FALL_HISTORY_WITHIN_LAST_SIX_MONTHS: NO
BATHING: 0-->INDEPENDENT
TOILETING: 0-->INDEPENDENT
DRESS: 0-->INDEPENDENT
AMBULATION: 0-->INDEPENDENT
SWALLOWING: 0-->SWALLOWS FOODS/LIQUIDS WITHOUT DIFFICULTY
RETIRED_EATING: 0-->INDEPENDENT
TRANSFERRING: 0-->INDEPENDENT
COGNITION: 0 - NO COGNITION ISSUES REPORTED
ADLS_ACUITY_SCORE: 13
RETIRED_COMMUNICATION: 0-->UNDERSTANDS/COMMUNICATES WITHOUT DIFFICULTY
ADLS_ACUITY_SCORE: 13

## 2019-11-13 NOTE — PROGRESS NOTES
[facility], [place]    Hospitalist Progress Note      Assessment & Plan     Kobe Buchanan is a 59 year old male admitted on 11/12/2019. He has a significant PMH including hypertension, hyperlipidemia, chronic back pain, TAA repair, bipolar disorder, atrial fibrillation (not currently anticoagulated), obstructive sleep apnea, and type 2 diabetes mellitus (diet managed). 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   Individual problems and their management are outlined below     # Concern for infection of Left prosthetic shoulder joint: Presented with tenderness and marked restriction in movement of the left joint, similar to how he had presented with his rt shoulder joint infection. CRP at 98. Left shoulder  Synovial fluid analysis with 33K WBC (predominantly neutrophils). Likely infected. NO crystals on analysis.   -can start IV Ab as we have cultre sample from aspirate.   -ID consult.   # HTN. BP is high side, but they are being checked in the leg, so not accurate. Can check in Forearm.   -Ct home mediations of Lotrel 5-20 mg, carvedilol 25 mg BID, Losartan 25 mg   -Check BP in Forearm.  # Anxiety: Resume Klonopin 1 mg TID PRN  # HO Major Depression: Resume Wellbutrin 200 mg BID  # Dyslipidemia: Resume Simvastatin 10 mg daily       DVT Prophylaxis: Per primary team  Code Status: Full Code  Disposition: Per primary    Adam Gonzales MD  Text Page  (7am - 5pm, M-F)    Interval History       Overnight Events reviewed, Chart Reviewed   No chest Pain or Sob Or Cough or Wheezing   No Nausea or Vomiting   No Fever    -Data reviewed today: I reviewed all new labs and imaging results over the last 24 hours.     Physical Exam   Temp: 97.4  F (36.3  C) Temp src: Oral BP: (!) 178/83(antihypertensive  given (morning meds)) Pulse: 75 Heart Rate: 76 Resp: 16 SpO2: 96 % O2 Device: None (Room air)    Vitals:    11/12/19 0917   Weight: 90 kg (198 lb 6.4 oz)     Vital Signs with Ranges  Temp:  [97.4  F (36.3  C)-98.2  F (36.8  C)] 97.4  F (36.3  C)  Pulse:  [75-86] 75  Heart Rate:  [76-88] 76  Resp:  [16] 16  BP: (139-178)/(58-83) 178/83  SpO2:  [92 %-98 %] 96 %  I/O last 3 completed shifts:  In: -   Out: 800 [Urine:800]    Constitutional: No distress noted, Alert, Answering questions appropriately  Respiratory: AE is goog on both sides, no wheezing onr crackles  Cardiovascular: S1S2 normal, no new murmur  GI: Soft, non tender  Skin/Integumen: No rash  Other: No lege edema    Medications     lactated ringers 100 mL/hr at 11/13/19 0746       amLODIPine-benazepril  1 capsule Oral BID     buPROPion  200 mg Oral BID     carvedilol  25 mg Oral BID w/meals     ceFAZolin  2 g Intravenous Q8H     [START ON 11/14/2019] docusate sodium  100 mg Oral Daily     docusate sodium  200 mg Oral At Bedtime     [START ON 11/15/2019] fentaNYL  75 mcg Transdermal Q72H     fentaNYL   Transdermal Q8H     [START ON 11/15/2019] fentaNYL   Transdermal Q72H     lactobacillus rhamnosus (GG)  1 capsule Oral Daily     losartan  25 mg Oral Daily     [START ON 11/14/2019] sennosides  3 tablet Oral Daily     sennosides  4 tablet Oral At Bedtime     simvastatin  10 mg Oral At Bedtime     sodium chloride (PF)  3 mL Intracatheter Q8H       Data   Recent Labs   Lab 11/13/19  0648 11/12/19  1044 11/07/19  1545   WBC 9.6 9.2 6.6   HGB 9.5* 9.6* 8.7*   MCV 90 93 91    267 245   INR  --  1.16*  --    NA  --  134  --    POTASSIUM  --  4.8 4.3   CHLORIDE  --  100  --    CO2  --  30  --    BUN  --  16 21   CR  --  1.08 1.06   ANIONGAP  --  5  --    NIA  --  9.5  --    GLC  --  117*  --    ALBUMIN  --  3.4  --    PROTTOTAL  --  7.9  --    BILITOTAL  --  0.8  --    ALKPHOS  --  105 103   ALT  --  10 7   AST  --  16 13       Recent Results (from the past 24  hour(s))   IR Fine Needle Aspiration w Imaging    Narrative    EXAMINATION: IR FINE NEEDLE ASPIRATION WITH FLUOROSCOPY    DATE: 11/13/2019    HISTORY: Patient with history of left total shoulder arthroplasty in  2011 now with left shoulder pain concerning for infection of  prosthesis. An aspiration was attempted at bedside by orthopedics  yesterday with return of 1 cc of blood-tinged fluid so IR has been  consulted for left shoulder joint aspiration to obtain more fluid for  analysis as patient may require explantation.     FINDINGS: The procedure was discussed with the patient including the  benefits and risks. Written informed consent was obtained. The patient  was placed in a supine position on the fluoroscopy table. Limited  ultrasound shows effusion of the left shoulder joint. The skin over  the left shoulder was sterilely prepped and draped in the usual  fashion. One mL 1% lidocaine was used for local anesthesia. Following,  a 22-gauge needle was advanced into the left glenohumeral joint under  fluoroscopic guidance. The stylette was removed with immediate return  of hazy yellow fluid. 10 mL was aspirated and sent for laboratory  analysis as requested by orthopedics. The patient tolerated the  procedure well. There were no immediate complications. Patient was  transferred to the floor.    FLUOROSCOPY TIME: 0.2 minutes      Impression    IMPRESSION:  Completed fluoroscopy-guided left glenohumeral joint aspiration. 10 mL  hazy yellow fluid aspirated and sent to lab for analysis.     BRENT DAWN PA-C

## 2019-11-13 NOTE — PROGRESS NOTES
[id number]    Outpatient/Observation goals to be met before discharge home: in process  Obtain IR-guided aspiration results : in process     VSS. Pain managed with oxycodone 10 mg Q4H.

## 2019-11-13 NOTE — CONSULTS
INTERVENTIONAL RADIOLOGY CONSULT    Kobe Buchanan is a 59 year old male s/p total shoulder arthroplasty in 2011 with new 2 day history of left shoulder pain concerning for infection of prosthesis. This is consistent with recent right shoulder prosthesis infection which he just finished antibiotic course for. IR has been consulted for left shoulder aspiration. An aspiration was performed yesterday with 1-2 mL of fluid aspirated and ortho would like more to help decide if an explant is necessary.    Patient is on IR schedule this morning for a left shoulder joint aspiration. Consent will be done prior to procedure.    Discussed with Juan Jose Guo PA-C, ortho.    Kee Mendez PA-C  Interventional Radiology  [phone] (daytime IR pager)

## 2019-11-13 NOTE — PROGRESS NOTES
"Orthopedic Surgery Progress Note    Subjective: No acute events overnight. Pain in left shoulder present, but denies cp, sob, calf pain, fevers, chills, sweats. Denies new onset numbness/tingling in operative extremity.    Exam:  BP (!) 178/83   Pulse 75   Temp 97.4  F (36.3  C) (Oral)   Resp 16   Ht 1.778 m (5' 10\")   Wt 90 kg (198 lb 6.4 oz)   SpO2 96%   BMI 28.47 kg/m    Gen: Awake, alert, NAD  Resp: breathing equal and non-labored  Extremities:    LUE: pain in left shoulder with A/PROM. 5/5 AIN/PIN/m/r/u. SILT in a/m/r/u distributions. 2+ radial pulse. Fingers WWP    Labs:  Recent Labs   Lab 11/13/19  0648 11/12/19  1044 11/07/19  1545   WBC 9.6 9.2 6.6   HGB 9.5* 9.6* 8.7*    267 245     Recent Labs   Lab 11/12/19  1044 11/07/19  1545     --    POTASSIUM 4.8 4.3   CHLORIDE 100  --    CO2 30  --    BUN 16 21   CR 1.08 1.06   *  --      Recent Labs   Lab 11/12/19  1044   INR 1.16*       Assessment:   59 year old male with PMH of bilateral TSA s/p right shoulder PJI followed by explant, spacer placement (9/2019) placed on IV ancef x6 weeks. Completed abx course on 11/10/19 now with acute development with left shoulder pain.     Attempted bedside left shoulder aspiration yielded 1-2ml blood fluid. >1500 cells, 81% PMNs.     Plan for today:  - IR image guided left shoulder arthrocentesis     Plan:  Ortho Primary - observation status  Activity: Up with assist.  Weight bearing status: NWB RUE, WBAT LUE  Antibiotics: hold pending aspiration today  Diet: ok for diet  Pain management: transition from IV to orals as tolerated.   Cultures: pending, follow closely  Consults: infectious disease, hospitalist, IR  Follow-up: pending hospital course    Disposition: pending IR aspiration today. Possible explant left TSA, abx spacer placement as early as 11/15 with Dr. Aceves.     Future Appointments   Date Time Provider Department Center   12/24/2019  1:30 PM Enriqueta Zhou MD [facility] "   12/27/2019  9:45 AM Analilia Aceves MD [facility]   1/22/2020  9:00 AM Analilia Aceves MD [facility]        Juan Carlos Hernandez, PGY4  Orthopedic Surgery Resident  Pager [phone]

## 2019-11-13 NOTE — PROGRESS NOTES
[id number]    Outpatient/Observation goals to be met before discharge home: in process  Obtain IR-guided aspiration results : complete

## 2019-11-13 NOTE — PLAN OF CARE
Patient A & O x4. VSS. Pt arrived from [place] ED at 2045 was oriented to room/call light. Pt up with SBA. Lung sounds clear throughout. Patient denies chest pain, SOB, lightheadedness, dizziness, tingling, numbness, nausea, and vomiting. Bowel sounds active, passing flatus, and tolerating regular diet. Drinking well and voiding spontaneously without difficulties. PIV infusing in R lower. Patient able to wiggle fingers. CMS intact. Pt has limited mobility with BUE, RUE is worse than LUE right now. Pain is tolerable and patient taking PRN oxy for pain, ice pack applied. Pt had bedside aspiration at ED but only able to get 1-2cc of bloody fluid. Plan is to have aspiration down in IR to get more fluid. Hospitalist saw pt after arrival. Patient demonstrates ability to use call light appropriately. Will continue to monitor patient.         OBS Goals: 2045-2330 2045-2245-pt oriented to room  Obtain IR-guided aspiration results: No    [id number] laying in bed. Just talked with MD about home meds.   Obtain IR-guided aspiration results: No

## 2019-11-13 NOTE — PLAN OF CARE
VS: BP before morning medication was 178/83. Gave scheduled antihypertensive. Other vitals stable this shift.    O2: Room air, maintaining sats >90   Output: Voiding adequate amounts. Used bedside urinal x1 due to pain, ambulated to bathroom when more awake.   Last BM: 11/12/19   Activity: Assist x1, pt. Has limited range of motion BUE (only 12 inches he is able to reach).    Skin: Incisional scars to BUE shoulders and scar on midline chest.    Pain: Managed with Oxycodone x2 this shift.   CMS: Denies numbness/tingling   Dressing: Dressing to back of left shoulder CDI. Dressing on Left arm CDI.   Diet: Regular as tolerated by pt.    LDA: Right peripheral IV infusing   Equipment: IV pole   Plan: Continue plan of care.   Additional Info: Pt. Morning meds were moved to 0500 per pt. Request.

## 2019-11-13 NOTE — CONSULTS
[facility], [place]  Consult Note - Hospitalist Service     Date of Admission:  11/12/2019  Consult Requested by: Juan Jose Guo PA-C  Reason for Consult: Medical Co-Management     Assessment & Plan   Kobe Buchanan is a 59 year old male admitted on 11/12/2019. He has a significant PMH including hypertension, hyperlipidemia, chronic back pain, TAA repair, bipolar disorder, atrial fibrillation (not currently anticoagulated), obstructive sleep apnea, and type 2 diabetes mellitus (diet managed). 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   Individual problems and their management are outlined below    Concern for infection of Left prosthetic shoulder joint:  Presented with tenderness and marked restriction in movement of the left joint, similar to how he had presented with his rt shoulder joint infection   CRP at 98. Left shoulder  Synovial fluid analysis with 1599 WBC ( 81% neutrophils)  Abx and surgical management per primary team  Resume patient's home pain regimen ( Fentanyl patch 75mcg/ Q 72 hrs) and Oxycodone 10 mg Q 4 hrs PRN  Bowel regimen in place    HTN  Resume home mediations of Lotrel 5-20 mg, carvedilol 25 mg BID, Losartan 25 mg     Anxiety  Resume Klonopin 1 mg TID PRN    Depression:  Resume Wellbutrin 200 mg BID     Dyslipidemia:   Resume Simvastatin 10 mg daily       The patient's care was discussed with the Bedside Nurse and Patient.    Kevin Holguin MD  [facility], [place]    ______________________________________________________________________    Chief Complaint   Left shoulder pain     History is obtained from the patient and chart review     History of Present Illness   Kobe Buchanan is a 59 year old male  with a significant  PMH including hypertension, hyperlipidemia, chronic back pain, TAA repair, bipolar disorder, atrial fibrillation (not currently anticoagulated), obstructive sleep apnea, and type 2 diabetes mellitus (diet managed).    Heis  status post right total shoulder arthroplasty with subsequent explant and antibiotic spacer placement in 09/2014, MSSA, and status post left total shoulder arthroplasty in 4/2014. The patient presents with complaint of left shoulder pain, worsening over the last two days. Notably, the patient was receiving treatment with IV cefazolin for previous periprosthetic right shoulder infection x 6 weeks, last dose on 11/10/2019. He describes his symptoms of pain as 10/10 burning/aching pain located in the left shoulder that is exacerbated with any movement, somewhat alleviated by rest. While he endorses chronic aches and pains of his left shoulder since September 2019, his present symptoms are new and have been worsening over the last 24 hours. Patient thinks that his left shoulder was infected in September    He denies any preceding trauma. He denies peripheral pain, muscle weakness, new numbness and tingling. He denies fevers, chills, chest pain, shortness of breath, change in urinary frequency, dysuria, change in bowel habits - diarrhea, constipation. He has experienced similar symptoms before only on the contralateral shoulder (right) back in September 2019 before admission for MSSA bacteremia and  right shoulder periprosthetic joint infection.   He denies any chest pain or SOB   Denies any fevers or chills, but has always felt cold ever since he has had the infetion   Denies any nausea, vomiting or diarrhea  He Is upset that he is going to be so limited with his ability to use his hands given the spacer in the right shoulder and now concerns of infection in his left shoulder    All medications were reviewed with patient     Review of Systems   The 10 point Review of Systems is negative other than  noted in the HPI     Past Medical History    I have reviewed this patient's medical history and updated it with pertinent information if needed.   Past Medical History:   Diagnosis Date     Anxiety      Ascending aortic aneurysm (H)      Bicuspid aortic valve      BPPV (benign paroxysmal positional vertigo) 7/11/2014     Chronic narcotic use      Chronic neck pain      Chronic osteoarthritis      Degenerative joint disease      Depression      Hyperlipidemia 4/10/2012     Hypertension      Multiple stiff joints      Neck injuries      Obesity 2/9/2015     Skin picking habit      Sleep apnea     does not use cpap       Past Surgical History   I have reviewed this patient's surgical history and updated it with pertinent information if needed.  Past Surgical History:   Procedure Laterality Date     ARTHROPLASTY SHOULDER  4/15/2014    Procedure: Left Total Shoulder Arthroplasty ;  Surgeon: Analilia Aceves MD;  [facility]: [facility]     ARTHROPLASTY SHOULDER Right 8/26/2014    Procedure: ARTHROPLASTY SHOULDER;  Surgeon: Analilia Aceves MD;  Location:  OR     BYPASS GASTRIC TAVO-EN-Y, LIVER BIOPSY, COMBINED  8/8/2005     C HAND/FINGER SURGERY UNLISTED       C SHOULDER SURG PROC UNLISTED       COLONOSCOPY  6/30/2014    Procedure: COMBINED COLONOSCOPY, SINGLE BIOPSY/POLYPECTOMY BY BIOPSY;  Surgeon: Chester Patton MD;  Location: [facility]     CYSTOSCOPY, BLADDER NECK CUTS, COMBINED N/A 7/18/2016    Procedure: COMBINED CYSTOSCOPY, BLADDER NECK CUTS;  Surgeon: Ritu Leslie MD;  [facility]: [facility]     ESOPHAGOSCOPY, GASTROSCOPY, DUODENOSCOPY (EGD), COMBINED  6/30/2014    Procedure: COMBINED ESOPHAGOSCOPY, GASTROSCOPY, DUODENOSCOPY (EGD), BIOPSY SINGLE OR MULTIPLE;  Surgeon: Chester Patton MD;  Location: [facility]     EXCISE MASS FINGER  6/14/2011    Procedure:EXCISE MASS FINGER; Middle Flexor Cyst; Surgeon:SHAYY RUSSELL; Location:[facility] OR     HAND SURGERY      excision of tendon cyst on left  hand     HC VASCULAR SURGERY PROCEDURE UNLIST       LASER HOLMIUM LITHOTRIPSY BLADDER N/A 10/15/2014    Procedure: LASER HOLMIUM LITHOTRIPSY BLADDER;  Surgeon: Sahil Taveras MD;  Location: [facility] OR     LASER KTP GREEN LIGHT PHOTOSELECTIVE VAPORIZATION PROSTATE  2014    Procedure: LASER KTP GREEN LIGHT PHOTOSELECTIVE VAPORIZATION PROSTATE;  Greenlight Photovaporization Of Prostate  ;  Surgeon: Sahil Taveras MD;  Location: [facility] OR     RELEASE TRIGGER FINGER Right 3/31/2017    Procedure: RELEASE TRIGGER FINGER;  Surgeon: Juan Carlos Blunt MD;  [facility]: [facility]     REPAIR ANEURYSM ASCENDING AORTA N/A 10/4/2016    Procedure: REPAIR ANEURYSM ASCENDING AORTA;  Surgeon: Mckenzie Townsend MD;  Location: [facility]     TURP         Social History   I have reviewed this patient's social history and updated it with pertinent information if needed.  Social History     Tobacco Use     Smoking status: Former Smoker     Packs/day: 0.50     Years: 6.00     Pack years: 3.00     Types: Cigarettes     Start date: 1977     Last attempt to quit: 1983     Years since quittin.2     Smokeless tobacco: Never Used     Tobacco comment: quit 35 years ago   Substance Use Topics     Alcohol use: No     Alcohol/week: 0.0 standard drinks     Drug use: No       Family History   I have reviewed this patient's family history and updated it with pertinent information if needed.   Family History   Problem Relation Age of Onset     Arthritis Other      Gastrointestinal Disease Other      Cardiovascular Father         aortic aneurysm     Arrhythmia Father      Nephrolithiasis Father      Sleep Apnea Father      Anxiety Disorder Father      Depression Father      Hypertension Father      Obesity Father      Hyperlipidemia Father      Coronary Artery Disease Father         history of MI and stent     Low Back Problems Father      Spine Problems Father      Anxiety Disorder Mother      Hypertension Mother      Osteoporosis  Mother      Obesity Mother      Hyperlipidemia Mother      Low Back Problems Mother      Anxiety Disorder Sister      Hypertension Sister      Osteoporosis Sister      Obesity Sister      Hyperlipidemia Sister      Low Back Problems Sister      Spine Problems Sister      Anxiety Disorder Sister      Depression Sister      Hypertension Sister      Osteoporosis Sister      Obesity Sister      Hyperlipidemia Sister      Low Back Problems Sister        Medications   Medications Prior to Admission   Medication Sig Dispense Refill Last Dose     amLODIPine-benazepril (LOTREL) 5-20 MG capsule Take 1 capsule by mouth 2 times daily 180 capsule 3 11/12/2019 at AM     buPROPion (WELLBUTRIN SR) 200 MG 12 hr tablet TAKE 1 TABLET BY MOUTH 2 TIMES DAILY 180 tablet 3 11/12/2019 at AM     carvedilol (COREG) 25 MG tablet Take 1 tablet (25 mg) by mouth 2 times daily (with meals) 180 tablet 3 11/12/2019 at AM     clonazePAM (KLONOPIN) 0.5 MG tablet Take 1 tablet (0.5 mg) by mouth 3 times daily as needed for anxiety 90 tablet 5 11/12/2019 at Unknown time     cyclobenzaprine (FLEXERIL) 10 MG tablet Take 1 tablet (10 mg) by mouth 3 times daily as needed for muscle spasms 30 tablet 1 Past Week at Unknown time     docusate sodium (COLACE) 100 MG capsule Take 1 capsule in the AM and 2 capsules in the PM   11/12/2019 at Unknown time     Fe Heme Polypeptide-folic acid (PROFERRIN-FORTE) 12-1 MG TABS Take 1 tablet by mouth daily 30 tablet 3 11/12/2019 at Unknown time     fentaNYL (DURAGESIC) 75 mcg/hr 72 hr patch Place 1 patch onto the skin every 48 hours    11/12/2019 at Unknown time     fluocinonide (LIDEX) 0.05 % external ointment Apply twice daily to itchy skin nodules for 1-2 weeks at a time. 30 g 3 Taking     losartan (COZAAR) 25 MG tablet Take 1 tablet (25 mg) by mouth daily 90 tablet 1 11/12/2019 at Unknown time     oxyCODONE IR (ROXICODONE) 10 MG tablet take 1 tablet by oral route  every 4 hours as needed for pain, max 6/day   11/12/2019  at Unknown time     senna-docusate (SENOKOT-S/PERICOLACE) 8.6-50 MG tablet Takes 3 tablets by mouth in the AM and 4 tablets by mouth in the PM most days, but will sometimes take 2 tablets by mouth in AM and 3 tablets by mouth in the PM   11/12/2019 at Unknown time     simvastatin (ZOCOR) 10 MG tablet Take 1 tablet (10 mg) by mouth At Bedtime 90 tablet 3 11/11/2019 at Unknown time     tacrolimus (PROTOPIC) 0.1 % ointment Apply topically as needed Apply to affected areas on body. 120 g 11 Taking     triamcinolone (KENALOG) 0.1 % external cream Apply topically 2 times daily As needed to affected area 80 g 0 Taking     naloxone (NARCAN) nasal spray Spray 1 spray (4 mg) into one nostril alternating nostrils as needed for opioid reversal every 2-3 minutes until assistance arrives 0.2 mL 0 Taking     neomycin-polymyxin-hydrocortisone (CORTISPORIN) otic solution Place 3 drops in ear(s) 4 times daily 10 mL 3 More than a month at Unknown time     order for DME [facility] for cardiac rehab with 4 wheels, brakes and seat 1 Device 0 Taking       Allergies   Allergies   Allergen Reactions     Ciprofloxacin      History of aortic aneurysms     Liquid Adhesive Other (See Comments)     Blistering of skin       Physical Exam   Vital Signs: Temp: 97.9  F (36.6  C) Temp src: Oral BP: (!) 150/58 Pulse: 86 Heart Rate: 88 Resp: 16 SpO2: 98 % O2 Device: None (Room air)    Weight: 198 lbs 6.4 oz    Constitutional: awake, alert, cooperative, no apparent distress, and appears stated age  Eyes: Lids and lashes normal, pupils equal, round and reactive to light, extra ocular muscles intact, sclera clear, conjunctiva normal  ENT: Normocephalic, without obvious abnormality, atraumatic, sinuses nontender on palpation, external ears without lesions, oral pharynx with moist mucous membranes,   Respiratory: No increased work of breathing, good air exchange, clear to auscultation bilaterally, no crackles or wheezing  Cardiovascular: Normal apical impulse,  regular rate and rhythm, normal S1 and S2, no S3 or S4, and no murmur noted  GI: Normal bowel sounds, soft, non-distended, non-tender, no masses palpated, no hepatosplenomegally  Skin: no bruising or bleeding  Musculoskeletal: Rt shoulder incision well healed. Left shoulder with no obvious erythema or swelling. Warm to touch, with significant restriction of shoulder movements due to pain. No distal neurovascular deficits   Neurologic: Awake, alert, oriented to name, place and time.  Cranial nerves II-XII are grossly intact.      Data   Results for orders placed or performed during the hospital encounter of 11/12/19 (from the past 24 hour(s))   CBC with platelets differential   Result Value Ref Range    WBC 9.2 4.0 - 11.0 10e9/L    RBC Count 3.27 (L) 4.4 - 5.9 10e12/L    Hemoglobin 9.6 (L) 13.3 - 17.7 g/dL    Hematocrit 30.3 (L) 40.0 - 53.0 %    MCV 93 78 - 100 fl    MCH 29.4 26.5 - 33.0 pg    MCHC 31.7 31.5 - 36.5 g/dL    RDW 13.0 10.0 - 15.0 %    Platelet Count 267 150 - 450 10e9/L    Diff Method Automated Method     % Neutrophils 74.1 %    % Lymphocytes 13.2 %    % Monocytes 10.8 %    % Eosinophils 0.7 %    % Basophils 0.5 %    % Immature Granulocytes 0.7 %    Nucleated RBCs 0 0 /100    Absolute Neutrophil 6.8 1.6 - 8.3 10e9/L    Absolute Lymphocytes 1.2 0.8 - 5.3 10e9/L    Absolute Monocytes 1.0 0.0 - 1.3 10e9/L    Absolute Eosinophils 0.1 0.0 - 0.7 10e9/L    Absolute Basophils 0.1 0.0 - 0.2 10e9/L    Abs Immature Granulocytes 0.1 0 - 0.4 10e9/L    Absolute Nucleated RBC 0.0    INR   Result Value Ref Range    INR 1.16 (H) 0.86 - 1.14   Comprehensive metabolic panel   Result Value Ref Range    Sodium 134 133 - 144 mmol/L    Potassium 4.8 3.4 - 5.3 mmol/L    Chloride 100 94 - 109 mmol/L    Carbon Dioxide 30 20 - 32 mmol/L    Anion Gap 5 3 - 14 mmol/L    Glucose 117 (H) 70 - 99 mg/dL    Urea Nitrogen 16 7 - 30 mg/dL    Creatinine 1.08 0.66 - 1.25 mg/dL    GFR Estimate 75 >60 mL/min/[1.73_m2]    GFR Estimate If Black  87 >60 mL/min/[1.73_m2]    Calcium 9.5 8.5 - 10.1 mg/dL    Bilirubin Total 0.8 0.2 - 1.3 mg/dL    Albumin 3.4 3.4 - 5.0 g/dL    Protein Total 7.9 6.8 - 8.8 g/dL    Alkaline Phosphatase 105 40 - 150 U/L    ALT 10 0 - 70 U/L    AST 16 0 - 45 U/L   Erythrocyte sedimentation rate auto   Result Value Ref Range    Sed Rate 91 (H) 0 - 20 mm/h   CRP inflammation   Result Value Ref Range    CRP Inflammation 98.0 (H) 0.0 - 8.0 mg/L   ISTAT gases lactate tanja POCT   Result Value Ref Range    Ph Venous 7.41 7.32 - 7.43 pH    PCO2 Venous 42 40 - 50 mm Hg    PO2 Venous 27 25 - 47 mm Hg    Bicarbonate Venous 27 21 - 28 mmol/L    O2 Sat Venous 51 %    Lactic Acid 0.6 (L) 0.7 - 2.1 mmol/L   CT Shoulder Left w Contrast    Narrative    EXAM: CT SHOULDER LEFT W CONTRAST  11/12/2019 11:12 AM      HISTORY: Shoulder replaced, symptomatic, initial exam    COMPARISON: Radiographs 3/21/2018    TECHNIQUE: CT imaging of the left shoulder after the administration of  IV contrast. Contrast: iopamidol (ISOVUE-370) solution 122 mL.  Multiplanar reconstructions.    FINDINGS:     Examination degraded by metal artifact related to total left shoulder  arthroplasty.    Total left shoulder arthroplasty without evidence of hardware  complication. No abnormal humeral periprosthetic lucency. Apparent  increasing lucency about the glenoid component, especially at the  cranial and caudal margins on coronal series 7 image 94. No acute  fracture.    Type II acromion with moderate acromial clavicular joint degenerative  changes.    CT is limited for evaluation of internal derangement. Small joint  effusion (axial series 2 image 42). Small calcific density within the  dependent joint space on series 3 image 31. Muscle bulk grossly  maintained.    Visualized lung grossly clear. Median sternotomy wires.       Impression    IMPRESSION:   Examination degraded by metal artifact related to total left shoulder  arthroplasty.    1. Nonspecific increasing lucency about  the cranial and caudal margins  of the glenoid component. Further evaluation with radiographs would be  helpful for comparison to prior radiographs 3/21/2018 and 8/19/2015.    2. Small left shoulder effusion.    LENA VIGIL MD (Joe)   XR Shoulder Left G/E 3 Views    Narrative    4 views left shoulder radiographs 11/12/2019 2:06 PM    History: Pain, infection, comparison to 3/21/2019 films     Comparison: 3/21/2018, CT same-day    Findings:    AP, Grashey, transscapular Y, axillary  views of the left shoulder  were obtained.     No acute osseous abnormality.    Total left shoulder arthroplasty. Fractured glenoid metallic marker  unchanged. Slight inferior angulation of the glenoid marker new since  prior however may be exaggerated by positioning. Notching of the  medial proximal humerus not substantially changed. Slightly increased  lucency surrounding the proximal humeral stem when compared to  8/19/2015 but not substantially changed compared to 3/21/2018.  Real-time bone loss is not appear substantially changed compared to  3/21/2018. Calcific fragments about the left shoulder similar to 2018.  Degenerative changes of the common clavicular joints.    The visualized left lung is grossly clear. Median sternotomy wires.      Impression    Impression:  Total left shoulder arthroplasty. Less than 2 mm of lucency  surrounding the humeral stem similar to 3/21/2018 but increased  compared to 8/19/2015. Stable fractured glenoid metallic marker.  Degree of glenoid bone loss does not appear radiographically changed  compared to prior radiographs.    LENA VIGIL MD (Joe)   Cell count with differential fluid   Result Value Ref Range    Body Fluid Analysis Source Synovial fluid     % Neutrophils Fluid 81 %    % Lymphocytes Fluid 12 %    % Mono/Macro Fluid 6 %    % Eosinophils Fluid 1 %    Color Fluid Bloody     Appearance Fluid Slightly Cloudy     WBC Fluid 1588 /uL   Gram stain   Result Value Ref Range     Specimen Description Synovial fluid LEFT SHOULDER     Gram Stain No organisms seen     Gram Stain Rare  PMNs seen       Gram Stain       Critical Value/Significant Value called to and read back by  Dr. Monserrat Ríos @16:07 on 11/12/19,MATT

## 2019-11-13 NOTE — PROCEDURES
[facility], [place]    Procedure: IR FINE NEEDLE ASPIRATION  Date/Time: 11/13/2019 9:00 AM  Performed by: Nael Mendez PA-C  Authorized by: Nael Mendez PA-C     UNIVERSAL PROTOCOL   Site Marked: NA  Prior Images Obtained and Reviewed:  Yes  Required items: Required blood products, implants, devices and special equipment available    Patient identity confirmed:  Verbally with patient, provided demographic data, hospital-assigned identification number and arm band  NA - No sedation, light sedation, or local anesthesia  Confirmation Checklist:  Patient's identity using two indicators, relevant allergies, procedure was appropriate and matched the consent or emergent situation and correct equipment/implants were available  Time out: Immediately prior to the procedure a time out was called    Preparation: Patient was prepped and draped in usual sterile fashion    ESBL (mL):  0     ANESTHESIA    Anesthesia: Local infiltration  Local Anesthetic:  Lidocaine 1% without epinephrine  Anesthetic Total (mL):  0.5      SEDATION    Patient Sedated: No    Fluoroscopy Time: 0 minute(s)  See dictated procedure note for full details.  Findings: Tolerated local well    Specimens: fluid and/or tissue for laboratory analysis and culture    Complications: None    Condition: Stable    Plan: Return to floor. Lab results pending.    PROCEDURE   Patient Tolerance:  Patient tolerated the procedure well with no immediate complications  Describe Procedure: Completed fluoroscopy-guided left shoulder aspiration. 10 mL cloudy yellow fluid aspirated from left shoulder joint.  Length of time physician/provider present for 1:1 monitoring during sedation: 0

## 2019-11-13 NOTE — CONSULTS
GENERAL ID SERVICE CONSULTATION     Patient:  Kobe Buchanan   Date of birth 1960, Medical record number [id number]  Date of Visit:  11/13/2019  Date of Admission: 11/12/2019  Consult Requester:Analilia Aceves, *  Reason for Consult : Right TSA infection s/p explant , now with acute left TSA pain s/p finishing 6 weeks of ancef          Assessment and Recommendations:   Kobe Buchanan is a 59 year old male  , right handed, with history significant for  hypertension, hyperlipidemia, chronic back pain, TAA repair 2016, bipolar disorder, atrial fibrillation (not on anticoagulation ), obstructive sleep apnea, obesity,  and type 2 diabetes mellitus (diet control),hx of gastric bypass 2005,  hx of C.diff, skin picking habit, chronic neck pain,  DJD, osteoarthritis,  s/p  Left total shoulder arthroplasty on 4/15/14  and right  total shoulder arthroplasty on 8/26/2014.     He presented to [facility] on 8/22/19 with right shoulder sharp and burning pain He was hospitalized at [facility] 9/20/19-10/4/19 for confusion, weakness , acute bilateral shoulder pain and after a mechanical fall. found to have acute kidney injury requiring hemodialysis,  and MSSA bacteremia  with positive blood culture from 9/20/19 -9/22/19 . EEG was negative. XIOMY was negative for thrombus / vegetation .     1. left shoulder prosthetic joint infection   - pain started in September 2019   - worsening pain x 2 days when Cefazolin was discontinued on 11/10/19  -  WBC normal Hb 9.5 Plt 290   -  worsening  ESR 91 CRP 98 on 11/12/19 ( CRP was 11/7/19)    - left shoulder aspiration 11/13/19 - cloudy yellow fluid with 58078 WBC and 80% neutrophils . Gram stain few gram positive cocci , many WBCs predom PMNs    2. Hx Right shoulder prostheric joint infection   - s/p irrigation and debridement , explant and replacement of spacer on 9/23/19.   - Intra-op cultures grew MSSA .   - s/p 6 weeks of Cefazolin and  completed antibotic on  11/10/19.  PICC was removed.     3. history of MSSA bacteremia/septicemia (9/20/19 -9/22/19)  - XIOMY neg for thrombus/vegetation 9/24/19    RECOMMENDATION:  - blood culture x2  - start Cefazolin 2 gram IV q8hr   - agree with ortho's plan to do surgery - prosthetic joint explanation, I+D, spacer   - pain management per Ortho   - follow-up cultures     Plan discussed with Angelica ( Ortho's service)     Thank you for this consult. ID will continue to follow.       Eugene Alva MD,M.Med.Sc.  Infectious Diseases  Pager: [phone]          History of Present Illness:     Kobe Buchanan is a 59 year old male  , right handed, with history significant for  hypertension, hyperlipidemia, chronic back pain, TAA repair 2016, bipolar disorder, atrial fibrillation (not on anticoagulation ), obstructive sleep apnea, obesity,  and type 2 diabetes mellitus (diet control),hx of gastric bypass 2005,  hx of C.diff, skin picking habit, chronic neck pain,  DJD, osteoarthritis,  s/p  Left total shoulder arthroplasty on 4/15/14  and right  total shoulder arthroplasty on 8/26/2014.     He presented to [facility] [place] on 8/22/19 with right shoulder sharp and burning pain He was hospitalized at [facility] 9/20/19-10/4/19 for confusion, weakness , acute bilateral shoulder pain and after a mechanical fall. found to have acute kidney injury requiring hemodialysis,  and MSSA bacteremia  with positive blood culture from 9/20/19 -9/22/19 . EEG was negative. XIOMY was negative for thrombus / vegetation .      He had  right shoulder irrigation and debridement , explant and replacement of spacer on 9/23/19. Intra-op cultures grew MSSA . He was treated with 6 weeks of Cefazolin and  completed antibotic on 11/10/19.  PICC was removed.     on 10/23/19 visit to Ortho clinic,  he had draining right shoulder wound. drainage has stopped.     He is admitted on 11/12/19 for acute on chronic left shoulder pain x 2 days described as 10/10  burning/aching pain, worse with movements . he started to have left shoulder pain at the same time he had right shoulder pain in September 2019.  Afebrile .no chills, night sweats.  WBC normal Hb 9.5 Plt 290   - but worsening  ESR 91 CRP 98 on 11/12/19 ( CRP was 11/7/19)      Imaging:  CT left shoulder w contrast 11/12/19  1. Nonspecific increasing lucency about the cranial and caudal margins of the glenoid component. Further evaluation with radiographs would be helpful for comparison to prior radiographs 3/21/2018 and 8/19/2015.  2. Small left shoulder effusion.    Xray left shoulder 11/12/19   Impression:  Total left shoulder arthroplasty. Less than 2 mm of lucency surrounding the humeral stem similar to 3/21/2018 but increased  compared to 8/19/2015. Stable fractured glenoid metallic marker. Degree of glenoid bone loss does not appear radiographically changed  compared to prior radiographs.    11/12/19  - IR - Synovial fluid - bloody , cloudy WBC 1588 with 81 % neutrophils. gram stain - rare PMNs no organism, culture - pending     11/13/19 -  fluoroscopy-guided left glenohumeral joint aspiration. 10 mL  hazy yellow fluid aspirated . cloudy yellow fluid with 88401 WBC and 80% neutrophils . Gram stain few gram positive cocci , many WBCs predom PMNs          Review of Systems:   CONSTITUTIONAL:  No fevers or chills  EYES: negative for icterus  ENT:  negative for hearing loss, tinnitus and sore throat  RESPIRATORY:  negative for cough with sputum and dyspnea  CARDIOVASCULAR:  negative for chest pain, dyspnea  GASTROINTESTINAL:  negative for nausea, vomiting, diarrhea and constipation  GENITOURINARY:  negative for dysuria  HEME:  No easy bruising  INTEGUMENT:  see HPI   NEURO:  see HPI          Past Medical History:     Past Medical History:   Diagnosis Date     Anxiety      Ascending aortic aneurysm (H)      Bicuspid aortic valve      BPPV (benign paroxysmal positional vertigo) 7/11/2014     Chronic narcotic use       Chronic neck pain      Chronic osteoarthritis      Degenerative joint disease      Depression      Hyperlipidemia 4/10/2012     Hypertension      Multiple stiff joints      Neck injuries      Obesity 2/9/2015     Skin picking habit      Sleep apnea     does not use cpap          Past Surgical History:     Past Surgical History:   Procedure Laterality Date     ARTHROPLASTY SHOULDER  4/15/2014    Procedure: Left Total Shoulder Arthroplasty ;  Surgeon: Analilia Aceves MD;  [facility]     ARTHROPLASTY SHOULDER Right 8/26/2014    Procedure: ARTHROPLASTY SHOULDER;  Surgeon: Analilia Aceves MD;  [facility]     BYPASS GASTRIC TAVO-EN-Y, LIVER BIOPSY, COMBINED  8/8/2005     C HAND/FINGER SURGERY UNLISTED       C SHOULDER SURG PROC UNLISTED       COLONOSCOPY  6/30/2014    Procedure: COMBINED COLONOSCOPY, SINGLE BIOPSY/POLYPECTOMY BY BIOPSY;  Surgeon: Chester Patton MD;  [facility]     CYSTOSCOPY, BLADDER NECK CUTS, COMBINED N/A 7/18/2016    Procedure: COMBINED CYSTOSCOPY, BLADDER NECK CUTS;  Surgeon: Ritu Leslie MD;  [facility]     ESOPHAGOSCOPY, GASTROSCOPY, DUODENOSCOPY (EGD), COMBINED  6/30/2014    Procedure: COMBINED ESOPHAGOSCOPY, GASTROSCOPY, DUODENOSCOPY (EGD), BIOPSY SINGLE OR MULTIPLE;  Surgeon: Chester Patton MD;  Location: [facility]     EXCISE MASS FINGER  6/14/2011    Procedure:EXCISE MASS FINGER; Middle Flexor Cyst; Surgeon:SHAYY RUSSELL; Location:[facility] OR     HAND SURGERY      excision of tendon cyst on left hand     [facility] VASCULAR SURGERY PROCEDURE UNLIST       LASER HOLMIUM LITHOTRIPSY BLADDER N/A 10/15/2014    Procedure: LASER HOLMIUM LITHOTRIPSY BLADDER;  Surgeon: Sahil Taveras MD;  Location: [facility] OR     LASER KTP GREEN LIGHT PHOTOSELECTIVE VAPORIZATION PROSTATE  1/23/2014    Procedure: LASER KTP GREEN LIGHT PHOTOSELECTIVE VAPORIZATION PROSTATE;  Greenlight Photovaporization Of Prostate  ;  Surgeon: Sahil Taveras MD;  Location: [facility]  OR     RELEASE TRIGGER FINGER Right 3/31/2017    Procedure: RELEASE TRIGGER FINGER;  Surgeon: Juan Carlos Blunt MD;  [facility]     REPAIR ANEURYSM ASCENDING AORTA N/A 10/4/2016    Procedure: REPAIR ANEURYSM ASCENDING AORTA;  Surgeon: Mckenzie Townsend MD;  [facility]     TURP            Family History:   Reviewed and non-contributory.   Family History   Problem Relation Age of Onset     Arthritis Other      Gastrointestinal Disease Other      Cardiovascular Father         aortic aneurysm     Arrhythmia Father      Nephrolithiasis Father      Sleep Apnea Father      Anxiety Disorder Father      Depression Father      Hypertension Father      Obesity Father      Hyperlipidemia Father      Coronary Artery Disease Father         history of MI and stent     Low Back Problems Father      Spine Problems Father      Anxiety Disorder Mother      Hypertension Mother      Osteoporosis Mother      Obesity Mother      Hyperlipidemia Mother      Low Back Problems Mother      Anxiety Disorder Sister      Hypertension Sister      Osteoporosis Sister      Obesity Sister      Hyperlipidemia Sister      Low Back Problems Sister      Spine Problems Sister      Anxiety Disorder Sister      Depression Sister      Hypertension Sister      Osteoporosis Sister      Obesity Sister      Hyperlipidemia Sister      Low Back Problems Sister           Social History:     Social History     Tobacco Use     Smoking status: Former Smoker     Packs/day: 0.50     Years: 6.00     Pack years: 3.00     Types: Cigarettes     Start date: 1977     Last attempt to quit: 1983     Years since quittin.2     Smokeless tobacco: Never Used     Tobacco comment: quit 35 years ago   Substance Use Topics     Alcohol use: No     Alcohol/week: 0.0 standard drinks   lives by himself in an apartment close to his parents; home. takes care of his parents late 80s and early 90s     History   Sexual Activity     Sexual activity: Not  "Currently     Partners: Female     Birth control/ protection: Abstinence          Current Medications:       amLODIPine-benazepril  1 capsule Oral BID     buPROPion  200 mg Oral BID     carvedilol  25 mg Oral BID w/meals     docusate sodium  100 mg Oral BID     [START ON 11/15/2019] fentaNYL  75 mcg Transdermal Q72H     fentaNYL   Transdermal Q8H     [START ON 11/15/2019] fentaNYL   Transdermal Q72H     lactobacillus rhamnosus (GG)  1 capsule Oral Daily     lidocaine  1-5 mL Intradermal Once     losartan  25 mg Oral Daily     senna-docusate  2 tablet Oral BID     simvastatin  10 mg Oral At Bedtime     sodium chloride (PF)  3 mL Intracatheter Q8H          Allergies:     Allergies   Allergen Reactions     Ciprofloxacin      History of aortic aneurysms     Liquid Adhesive Other (See Comments)     Blistering of skin          Physical Exam:   Vitals were reviewed  Patient Vitals for the past 24 hrs:   BP Temp Temp src Pulse Heart Rate Resp SpO2 Height Weight   11/13/19 0707 -- 97.4  F (36.3  C) Oral -- 76 16 96 % -- --   11/13/19 0530 (!) 178/83 -- -- -- -- -- -- -- --   11/13/19 0106 139/61 97.5  F (36.4  C) Oral 75 -- -- 92 % -- --   11/12/19 2044 (!) 150/58 97.9  F (36.6  C) -- 86 -- 16 98 % -- --   11/12/19 2026 (!) 168/64 98.2  F (36.8  C) Oral -- 88 16 98 % -- --   11/12/19 1539 (!) 160/59 -- -- 74 -- 16 96 % -- --   11/12/19 0917 (!) 161/104 98.5  F (36.9  C) Oral 79 -- 16 97 % 1.778 m (5' 10\") 90 kg (198 lb 6.4 oz)     Physical Examination:  GENERAL:  well-developed, well-nourished, in bed in no acute distress.   HEENT:  Head is normocephalic, atraumatic   EYES:  Eyes have anicteric sclerae without conjunctival injection   ENT:  Oropharynx is moist without exudates or ulcers. Tongue is midline  NECK:  Supple. No cervical lymphadenopathy  LUNGS:  Clear to auscultation bilateral.   CARDIOVASCULAR:  Regular rate and rhythm with no murmurs, gallops or rubs.  ABDOMEN:  Normal bowel sounds, soft, nontender. No " appreciable hepatosplenomegaly  SKIN:  No acute rashes.  Line(s) are in place without any surrounding erythema or exudate. many scars from skin picking   NEUROLOGIC: alert oriented, limited bilateral shoulder movement  left shoulder ; not tender on exam, no swelling, no increased warmth.          Laboratory Data:     Inflammatory Markers    Recent Labs   Lab Test 11/12/19  1044 11/07/19  1545   SED 91*  --    CRP 98.0* 17.5*     Hematology Studies    Recent Labs   Lab Test 11/13/19  0648 11/12/19  1044 11/07/19  1545 01/10/18  0808 03/01/17  1013 01/27/17  1636   WBC 9.6 9.2 6.6 6.8 6.4 9.2   ANEU 6.5 6.8 4.1 4.4 3.7 6.1   AEOS 0.2 0.1 0.3 0.2 0.2 0.2   HGB 9.5* 9.6* 8.7* 15.5 14.3 14.3   MCV 90 93 91 88 82 83    267 245 250 234 247     Metabolic Studies     Recent Labs   Lab Test 11/12/19  1044 11/07/19  1545 05/06/19  0939 01/10/18  0808 03/01/17  1013 10/26/16  0804     --  136 136 139 135   POTASSIUM 4.8 4.3 4.4 4.2 4.0 4.3   CHLORIDE 100  --  103 102 102 102   CO2 30  --  26 28 29 27   BUN 16 21 17 21 28 20   CR 1.08 1.06 1.09 0.98 0.84 0.99   GFRESTIMATED 75 77 74 79 >90  Non  GFR Calc   78     Hepatic Studies    Recent Labs   Lab Test 11/12/19  1044 11/07/19  1545 01/10/18  0808 03/01/17  1013 10/26/16  0804 10/11/16  0937 10/10/16  0744   BILITOTAL 0.8  --  1.0 1.2 0.7 1.0 0.7   ALKPHOS 105 103 90 117 119 178* 196*   ALBUMIN 3.4  --  4.4 4.2 3.2* 3.0* 2.8*   AST 16 13 20 21 20 86* 115*   ALT 10 7 23 28 36 117* 112*     Microbiology:  Culture Micro   Date Value Ref Range Status   07/12/2016 No growth  Final   04/06/2016 No growth  Final   03/04/2016 No growth  Final   01/28/2016 No growth  Final   09/30/2014 No growth  Final   04/02/2014 No growth  Final   02/24/2014 No growth  Final   02/24/2014 No growth  Final   02/11/2014 No growth  Final   08/03/2011 No growth  Final     Urine Studies    Recent Labs   Lab Test 09/15/16  1234 04/06/16  0955 03/04/16  0950 01/28/16  0729  08/21/14  1128   LEUKEST Negative Negative Negative Negative Negative   WBCU 1 0* 1* 4* <1     Hepatitis C Antibody   Date Value Ref Range Status   10/11/2016 (A) NR Final    Reactive   A reactive result indicates one of the following 1) current HCV infection 2)   past HCV infection that has resolved or 3) false positivity. The CDC recommends   that a reactive result should be followed by Nucleic acid testing for HCV RNA.  If HCV RNA is detected, that indicates current HCV infection. If HCV RNA is not   detected, that indicates either past, resolved HCV infection, or false HCV   antibody positivity.   Assay performance characteristics have not been established for newborns,   infants, and children

## 2019-11-13 NOTE — PROGRESS NOTES
[id number]    Outpatient/Observation goals to be met before discharge home: in process  Obtain IR-guided aspiration results : in process

## 2019-11-13 NOTE — PLAN OF CARE
VS: A&Ox4. VSS. Denies SOB, chest pain, dizziness, lightheadedness, numbness, tingling, nausea, and vomiting.    O2: >90% RA. Lung sounds clear bilaterally. IS encouraged.    Output: Voiding spontaneously without difficulty.    Last BM: Bowel sounds active, passing gas, and last BM 11/12 per pt report.    Activity: Up ad faby, ambulates in room and julian.    Up for meals? Up for meals.   Skin: Dressing in left shoulder and scapula. Incisional scars to BUE shoulders and scar on midline chest.   Pain: Has oxycodone 10 mg Q4H. Klonopin 0.5 mg Q6H.    CMS: Intact.    Dressing: Dressing on left shoulder CDI.    Diet: Regular diet and tolerating well.    LDA: PIV patent and infusing LR 100ml/hr.    Equipment: IV pole, personal belongings at bedside.    Plan: TBD.    Additional Info: Patient is on observation. Patient had needle aspiration with imaging on left shoulder today. Gram positive cocci with many WBC result from left shoulder - Dr. Gonzales notified. Patient able to make needs known. Will continue to monitor.

## 2019-11-14 ENCOUNTER — COMMUNICATION - HEALTHEAST (OUTPATIENT)
Dept: INFECTIOUS DISEASES | Facility: CLINIC | Age: 59
End: 2019-11-14

## 2019-11-14 ENCOUNTER — ANESTHESIA EVENT (OUTPATIENT)
Dept: SURGERY | Facility: CLINIC | Age: 59
DRG: 497 | End: 2019-11-14
Payer: COMMERCIAL

## 2019-11-14 PROBLEM — Z96.612 S/P SHOULDER REPLACEMENT, LEFT: Status: ACTIVE | Noted: 2019-11-12

## 2019-11-14 LAB
ANION GAP SERPL CALCULATED.3IONS-SCNC: 7 MMOL/L (ref 3–14)
BUN SERPL-MCNC: 13 MG/DL (ref 7–30)
CALCIUM SERPL-MCNC: 8.8 MG/DL (ref 8.5–10.1)
CHLORIDE SERPL-SCNC: 103 MMOL/L (ref 94–109)
CO2 SERPL-SCNC: 26 MMOL/L (ref 20–32)
CREAT SERPL-MCNC: 0.91 MG/DL (ref 0.66–1.25)
ERYTHROCYTE [DISTWIDTH] IN BLOOD BY AUTOMATED COUNT: 13 % (ref 10–15)
GFR SERPL CREATININE-BSD FRML MDRD: >90 ML/MIN/{1.73_M2}
GLUCOSE SERPL-MCNC: 99 MG/DL (ref 70–99)
HCT VFR BLD AUTO: 28.5 % (ref 40–53)
HGB BLD-MCNC: 9.2 G/DL (ref 13.3–17.7)
MCH RBC QN AUTO: 29.1 PG (ref 26.5–33)
MCHC RBC AUTO-ENTMCNC: 32.3 G/DL (ref 31.5–36.5)
MCV RBC AUTO: 90 FL (ref 78–100)
PLATELET # BLD AUTO: 260 10E9/L (ref 150–450)
POTASSIUM SERPL-SCNC: 4.2 MMOL/L (ref 3.4–5.3)
RBC # BLD AUTO: 3.16 10E12/L (ref 4.4–5.9)
SODIUM SERPL-SCNC: 136 MMOL/L (ref 133–144)
WBC # BLD AUTO: 7.1 10E9/L (ref 4–11)

## 2019-11-14 PROCEDURE — 80048 BASIC METABOLIC PNL TOTAL CA: CPT | Performed by: HOSPITALIST

## 2019-11-14 PROCEDURE — 12000001 ZZH R&B MED SURG/OB UMMC

## 2019-11-14 PROCEDURE — 99222 1ST HOSP IP/OBS MODERATE 55: CPT | Performed by: PHYSICIAN ASSISTANT

## 2019-11-14 PROCEDURE — 99207 ZZC CONSULT E&M CHANGED TO INITIAL LEVEL: CPT | Performed by: PHYSICIAN ASSISTANT

## 2019-11-14 PROCEDURE — 99232 SBSQ HOSP IP/OBS MODERATE 35: CPT | Performed by: HOSPITALIST

## 2019-11-14 PROCEDURE — 25000132 ZZH RX MED GY IP 250 OP 250 PS 637: Performed by: STUDENT IN AN ORGANIZED HEALTH CARE EDUCATION/TRAINING PROGRAM

## 2019-11-14 PROCEDURE — 85027 COMPLETE CBC AUTOMATED: CPT | Performed by: HOSPITALIST

## 2019-11-14 PROCEDURE — 25000128 H RX IP 250 OP 636: Performed by: NURSE PRACTITIONER

## 2019-11-14 PROCEDURE — 25000132 ZZH RX MED GY IP 250 OP 250 PS 637: Performed by: HOSPITALIST

## 2019-11-14 PROCEDURE — 36415 COLL VENOUS BLD VENIPUNCTURE: CPT | Performed by: HOSPITALIST

## 2019-11-14 PROCEDURE — 25000132 ZZH RX MED GY IP 250 OP 250 PS 637: Performed by: INTERNAL MEDICINE

## 2019-11-14 PROCEDURE — 25000132 ZZH RX MED GY IP 250 OP 250 PS 637: Performed by: NURSE PRACTITIONER

## 2019-11-14 PROCEDURE — 25000132 ZZH RX MED GY IP 250 OP 250 PS 637: Performed by: PHYSICIAN ASSISTANT

## 2019-11-14 RX ORDER — FENTANYL 75 UG/1
75 PATCH TRANSDERMAL
Status: DISCONTINUED | OUTPATIENT
Start: 2019-11-14 | End: 2019-11-19 | Stop reason: HOSPADM

## 2019-11-14 RX ORDER — FENTANYL 75 UG/1
75 PATCH TRANSDERMAL
Status: DISCONTINUED | OUTPATIENT
Start: 2019-11-16 | End: 2019-11-14

## 2019-11-14 RX ORDER — MAGNESIUM CARB/ALUMINUM HYDROX 105-160MG
296 TABLET,CHEWABLE ORAL ONCE
Status: COMPLETED | OUTPATIENT
Start: 2019-11-14 | End: 2019-11-14

## 2019-11-14 RX ADMIN — OXYCODONE HYDROCHLORIDE 10 MG: 5 TABLET ORAL at 09:06

## 2019-11-14 RX ADMIN — SENNOSIDES 3 TABLET: 8.6 TABLET, FILM COATED ORAL at 05:19

## 2019-11-14 RX ADMIN — CLONAZEPAM 0.5 MG: 0.5 TABLET ORAL at 13:13

## 2019-11-14 RX ADMIN — BUPROPION HYDROCHLORIDE 200 MG: 200 TABLET, EXTENDED RELEASE ORAL at 05:20

## 2019-11-14 RX ADMIN — DOCUSATE SODIUM 100 MG: 100 CAPSULE, LIQUID FILLED ORAL at 05:19

## 2019-11-14 RX ADMIN — MAGNESIUM CITRATE 296 ML: 1.75 LIQUID ORAL at 17:57

## 2019-11-14 RX ADMIN — CEFAZOLIN SODIUM 2 G: 2 INJECTION, SOLUTION INTRAVENOUS at 09:29

## 2019-11-14 RX ADMIN — OXYCODONE HYDROCHLORIDE 10 MG: 5 TABLET ORAL at 19:43

## 2019-11-14 RX ADMIN — SIMVASTATIN 10 MG: 10 TABLET, FILM COATED ORAL at 17:06

## 2019-11-14 RX ADMIN — LOSARTAN POTASSIUM 25 MG: 25 TABLET ORAL at 05:21

## 2019-11-14 RX ADMIN — CYCLOBENZAPRINE 10 MG: 10 TABLET, FILM COATED ORAL at 04:37

## 2019-11-14 RX ADMIN — Medication 1 CAPSULE: at 05:20

## 2019-11-14 RX ADMIN — SENNOSIDES 4 TABLET: 8.6 TABLET, FILM COATED ORAL at 17:05

## 2019-11-14 RX ADMIN — CARVEDILOL 25 MG: 25 TABLET, FILM COATED ORAL at 17:06

## 2019-11-14 RX ADMIN — BUPROPION HYDROCHLORIDE 200 MG: 200 TABLET, EXTENDED RELEASE ORAL at 17:04

## 2019-11-14 RX ADMIN — CEFAZOLIN SODIUM 2 G: 2 INJECTION, SOLUTION INTRAVENOUS at 17:12

## 2019-11-14 RX ADMIN — AMLODIPINE AND BENAZEPRIL HYDROCHLORIDE 1 CAPSULE: 5; 20 CAPSULE ORAL at 05:20

## 2019-11-14 RX ADMIN — DOCUSATE SODIUM 200 MG: 100 CAPSULE, LIQUID FILLED ORAL at 17:06

## 2019-11-14 RX ADMIN — OXYCODONE HYDROCHLORIDE 10 MG: 5 TABLET ORAL at 13:13

## 2019-11-14 RX ADMIN — CARVEDILOL 25 MG: 25 TABLET, FILM COATED ORAL at 05:20

## 2019-11-14 RX ADMIN — CEFAZOLIN SODIUM 2 G: 2 INJECTION, SOLUTION INTRAVENOUS at 01:51

## 2019-11-14 RX ADMIN — FENTANYL 1 PATCH: 75 PATCH, EXTENDED RELEASE TRANSDERMAL at 09:07

## 2019-11-14 RX ADMIN — OXYCODONE HYDROCHLORIDE 10 MG: 5 TABLET ORAL at 05:20

## 2019-11-14 RX ADMIN — AMLODIPINE AND BENAZEPRIL HYDROCHLORIDE 1 CAPSULE: 5; 20 CAPSULE ORAL at 16:22

## 2019-11-14 RX ADMIN — OXYCODONE HYDROCHLORIDE 10 MG: 5 TABLET ORAL at 16:27

## 2019-11-14 RX ADMIN — CLONAZEPAM 0.5 MG: 0.5 TABLET ORAL at 04:37

## 2019-11-14 RX ADMIN — CLONAZEPAM 0.5 MG: 0.5 TABLET ORAL at 19:43

## 2019-11-14 RX ADMIN — OXYCODONE HYDROCHLORIDE 10 MG: 5 TABLET ORAL at 02:14

## 2019-11-14 RX ADMIN — ACETAMINOPHEN 650 MG: 325 TABLET, FILM COATED ORAL at 04:37

## 2019-11-14 ASSESSMENT — ACTIVITIES OF DAILY LIVING (ADL)
ADLS_ACUITY_SCORE: 13
ADLS_ACUITY_SCORE: 13
ADLS_ACUITY_SCORE: 11
ADLS_ACUITY_SCORE: 13

## 2019-11-14 NOTE — PLAN OF CARE
VS: VSS ex elevated BP prior to med administration   O2: >90% on RA   Output: Voiding without difficulty in BR or urinal   Last BM: LBM 11/12 per pt report, passing flatus, BS active   Activity: Pt up independently, limited ROM of BUE (L worse than R)   Skin: Skin intact ex for scars and incision to LUE, skin is warm and dry   Pain: Pain is being managed with PRN oxycodone 10 mg Q3H around-the-clock dosing (please wake pt up for pain meds overnight). States chronic pain to posterior neck, radiates into shoulders at times. Ice utilized as well.    CMS: CMS intact, denies N/T in all extremities. Radial pulse +2 bilaterally, able to wiggle fingers.    Dressing: Dressing to LUE CDI, no drainage visible. Ice applied.   Diet: Tolerating regular diet without N/V, adequate intake of PO fluids.    LDA: PIV SL between abx   Equipment: IV pole, personal belongings at bedside   Plan: Continue to monitor patient, manage pain, treat infection. Possible explant left TSA, abx spacer placement as early as 11/15 with Dr. Aceves.   Additional Info: PRN klonopin TID, given x1

## 2019-11-14 NOTE — PROGRESS NOTES
A/Ox's 4. Pt rated pain as tolerable. Oxycodone and Ice packs given for pain control. Pt calls for pain meds 30-45 minutes before the become available.  Dressing CDI. CMS intact. Tolerated regular diet. Denied any nausea, CP, SOB, lightheadedness or dizziness. Antibiotics given per plan of care. Voiding without pain or difficulty. Passing flatus. Up with Ind. Limited ROM BUE. Resting in bed at this time with call light in reach. Able to make needs known. Continue to monitor.

## 2019-11-14 NOTE — PROGRESS NOTES
[facility], [place]    Hospitalist Progress Note      Assessment & Plan     Kobe Buchanan is a 59 year old male admitted on 11/12/2019. He has a significant PMH including hypertension, hyperlipidemia, chronic back pain, TAA repair, bipolar disorder, atrial fibrillation (not currently anticoagulated), obstructive sleep apnea, and type 2 diabetes mellitus (diet managed). 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   Individual problems and their management are outlined below     # Concern for infection of Left prosthetic shoulder joint: Presented with tenderness and marked restriction in movement of the left joint, similar to how he had presented with his rt shoulder joint infection. CRP at 98. Left shoulder  Synovial fluid analysis with 33K WBC (predominantly neutrophils). Likely infected. NO crystals on analysis.   -Ct Ancef.   -ID consult.   # HTN. BP is high side, but they are being checked in the leg, so not accurate. Can check in Forearm.   -Ct home mediations of Lotrel 5-20 mg, carvedilol 25 mg BID, Losartan 25 mg   -Check BP in Forearm.  # Anxiety: Resume Klonopin 1 mg TID PRN  # HO Major Depression: Resume Wellbutrin 200 mg BID  # Dyslipidemia: Resume Simvastatin 10 mg daily   # Ho Chronic Neck pain: Not worse than usual. Likely chronic issues, but If worsening consider MRI neck. Fentanyl patch changed to every 48 hr as per home. Pain consult for post op pain management.       DVT Prophylaxis: Per primary team  Code Status: Full Code  Disposition: Per primary    Adam Gonzales MD  Text Page  (7am - 5pm, M-F)    Interval History      Reports fentanyl patch does not stay for 72 hrs  Otherwise doing okay    -Data reviewed today: I reviewed all new labs and imaging results over the last  24 hours.     Physical Exam   Temp: 97.1  F (36.2  C) Temp src: Oral BP: 124/54 Pulse: 62 Heart Rate: 73 Resp: 16 SpO2: 98 % O2 Device: None (Room air)    Vitals:    11/12/19 0917   Weight: 90 kg (198 lb 6.4 oz)     Vital Signs with Ranges  Temp:  [97.1  F (36.2  C)-98  F (36.7  C)] 97.1  F (36.2  C)  Pulse:  [62] 62  Heart Rate:  [71-77] 73  Resp:  [16] 16  BP: (108-136)/(46-65) 124/54  SpO2:  [97 %-98 %] 98 %  No intake/output data recorded.    Constitutional: No distress noted, Alert, Answering questions appropriately  Respiratory: AE is goog on both sides, no wheezing onr crackles  Cardiovascular: S1S2 normal, no new murmur  GI: Soft, non tender  Skin/Integumen: No rash  Other: No lege edema    Medications       amLODIPine-benazepril  1 capsule Oral BID     buPROPion  200 mg Oral BID     carvedilol  25 mg Oral BID w/meals     ceFAZolin  2 g Intravenous Q8H     docusate sodium  100 mg Oral Daily     docusate sodium  200 mg Oral At Bedtime     fentaNYL  75 mcg Transdermal Q48H     fentaNYL   Transdermal Q8H     [START ON 11/15/2019] fentaNYL   Transdermal Q72H     lactobacillus rhamnosus (GG)  1 capsule Oral Daily     losartan  25 mg Oral Daily     magnesium citrate  296 mL Oral Once     sennosides  3 tablet Oral Daily     sennosides  4 tablet Oral At Bedtime     simvastatin  10 mg Oral At Bedtime     sodium chloride (PF)  3 mL Intracatheter Q8H       Data   Recent Labs   Lab 11/14/19  0709 11/13/19  0648 11/12/19  1044   WBC 7.1 9.6 9.2   HGB 9.2* 9.5* 9.6*   MCV 90 90 93    290 267   INR  --   --  1.16*     --  134   POTASSIUM 4.2  --  4.8   CHLORIDE 103  --  100   CO2 26  --  30   BUN 13  --  16   CR 0.91  --  1.08   ANIONGAP 7  --  5   NIA 8.8  --  9.5   GLC 99  --  117*   ALBUMIN  --   --  3.4   PROTTOTAL  --   --  7.9   BILITOTAL  --   --  0.8   ALKPHOS  --   --  105   ALT  --   --  10   AST  --   --  16       No results found for this or any previous visit (from the past 24 hour(s)).

## 2019-11-14 NOTE — PLAN OF CARE
11/14  07:00-15:30  VS: Stable   O2 Sats 98% on room air  Lungs CTA  IS goal 3100, able to reach 4000 without difficulty   Output: Voids spontaneously without difficutly   Bowels/BM BS + all quadrants, + flatus  Last BM 11/14  Requesting mag citrate prior to surgery, states he always gets bound up after anesthesia and pain meds.  New order for mag citrate obtained and was given this shift.  14:50  Pt states he wants to read about the mag citrate before he takes it.   Activity Up ad Charlene    NWB bilateral upper extremities   Skin: Intact except puncture site left shoulder.    Pain: Shoulder and neck pain managed with prn oxycodone, prn flexaril and klonopin prn.  Ice pack to neck   Neuro/CMS: Intact   Dressing: Gauze dressing left shoulder CDI   Diet: Regular, tolerating well   LDA: PIV right forearm   Equipment: IV pump and pole   Plan: Surgery 11//15: removal of hardware and placement of antibiotic spacer  NPO after midnight  Pre surgical scrubs pm and night shift   Additional Info: Neil from lab called @ 14:12,  Left shoulder fluid showing light growth probable staph aureus.  DOTTIE Veliz ortho NP notified, stated it was expected   Pt had bilateral TSA's in 2014, had hardware removal of right TSA in Sept 2019, with ABX spacer.

## 2019-11-14 NOTE — CONSULTS
Inpatient Pain Management Service: Consultation      DATE OF CONSULT: November 14, 2019      REASON FOR PAIN CONSULTATION:  Kobe Buchanan is a 59 year old male I am seeing in consultation at the request of Angelica Veliz CNP for evaluation and recommendations for his left shoulder pain.       CHIEF PAIN COMPLAINT: left shoulder infection, although states that he has minimal to no pain there.      ASSESSMENT:   1. Left shoulder MSSA infection- L shoulder pain began on 11/10/19, plan for patient to undergo I&D of bilat. shoulders, Left TSA explant and left ABX spacer placement on 11/15/19 and completed 6- week course of ABX on 11/10/19.  Pt originally had bilat. TSA in 2014 by Dr. 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  2. Chronic neck pain (no h/o cervical spine sx as he states that ortho states it's too advanced).  3. Chronic opioid management.  Currently managed at [facility].  On  fentanyl 75mcg/hour patch, 1 patch TD Q 48 hours and oxycodone 10mg up to 6tabs/day.  4. H/o bipolar disorder    -- Outpatient opioid requirements prior to admission: fentanyl 75mcg/hour patch, 1 patch TD Q 72 hours and oxycodone 10mg up to 6tabs/day.  - reviewed.    10/29/2019 clonazepam 0.5 mg tablet #90  for 30 days  10/28 2019 oxycodone 10 mg tablet #180 for 30 days  10/22/2019 oxycodone 5 mg tablet #30 for 2 days  10/19/2019 oxycodone 5 mg tablet to # 22 for 2 days  10/07/2019 fentanyl 75 mcgs/hour patch #10 for 30 days ( pt states due to insurance it had to be written this way, each really is to be changed every 48 hours-I called [facility] on 11/14/19and verified   that fentanyl dose was to be changed every 48 hours.    Primary Care Provider: Ankush Dahl  Chronic Pain Provider:[facility].     TREATMENT RECOMMENDATIONS/PLAN:     Pre Surgery pain recommendations:  1. Continue PTA fentanyl 75mcg/hour  "patch, 1 patch TD Q 48 hours.  2. Continue with oxycodone 5 to 10 mg p.o. every 3 hours as needed.  3. Continue with Dilaudid 0.2mg IV Q 2 hours PRN.   4. Continue with Flexeril 10 mg p.o. 3 times daily as needed   5. continue acetaminophen tablet 650 mg p.o. every 4 hours as needed  6. Bowel regimen to prevent opioid induced constipation.      For Post Operative pain management on 11/15/19:  1. Continue PTA fentanyl 75mcg/hour patch, 1 patch TD Q 48 hours.  2. Increase oxycodone 10-15mg PO Q3 hours PRN.  Start at 10mg dose, if pain is not well controlled and no s/s of sedation or respiratory depression, then increase to 15mg.  3. Increase Dilaudid IV to 0.3-0.5mg IV Q 2 hours PRN if maximized oral opioid is not controlling pain and no s/s of sedation or respiratory depression.  4. Continue with  acetaminophen tablet 650 mg p.o. every 4 hours as needed  5. Continue with Flexeril 10 mg p.o. 3 times daily as needed.  (Do not change to Robaxin as patient states that it made him \"feel bad\" and \"icky\"-and won't use)   5. Bowel regimen to prevent opioid induced constipation.  6. Patient declines any topicals due to ineffectiveness in the past.  7. Avoid NSAIDs due to h/o gastric bypass.  6. Please consult RAPS service for any nerve blocks for analgesia.  Dr. Marie would recommend interscalene catheter.        Pain Service will Continue to follow at this time.     Recommendations were discussed with ortho team and pain team  Plan was reviewed by the Pain Service consisting of Avril Marie MD    Thank you for consulting the Inpatient Pain Management Service.   The above recommendations are to be acted upon at the primary team s discretion.    To reach us:  Mon - Friday 8 AM - 3 PM: Pager [phone] (Text Page)  After hours, weekends and holidays: Primary service should call [phone] for the on-call pain specialist    HISTORY OF PRESENT ILLNESS: Kobe Buchanan is a 59 year old male with history of hypertension, " TAA repair, afib, SAVITA, diet controlled DM2, gastric bypass in 2005, bipolar disorder, chronic lower back, chronic opioid management who was admitted on 11/12/19 with left shoulder pain.  Pt has h/o bilateral TSA  In 2014 that was later c/b right shoulder PJI on Sept. 2019  Subsequently undergoing right TSA explant, ABX spacer placement on Sept. 2019 and completed a 6 week course of ABX on 11/10/19. Patient was admitted on 11/12/19 due to increased left shoulder pain which is found to be due to infection.  Patient is scheduled for Left TSA explant, ABX space placement with Dr. Aceves on 11/15/19.    He is lying in bed, AAOx4, and very conversant.  NAD. He states that pain is mainly from the chronic posterior neck x years, which is due to degenerative changes that are non operable and is being managed by high doses of opioids.  His baseline neck pain is 2-6/10 on the VAS and is very tolerable.  Today, he states that his pain is 2/10 while lying still in bed with ice pack behind his neck.  ROMs/movements are painful.  The reason for admission is the left shoulder prosthetic joint injection, however he states that he doesn't have pain in the shoulders, just the neck.    We reviewed the current pain medication regimen that he has available to use.  Discussed indication, risks and benefits which he is well aware.  He wanted to make sure that he has a pain plan for management of his pain post-operatively.  He states that he will return to home as he can not afford TCU as he maximized his TCU benefits for the year and would also like to be discharged on POD #2.  We reviewed the post operative pain management as outlined above which he is in agreement.   He will return to [facility] ([facility]) for opioid management of his chronic neck pain. I called [facility] and verified his PTA opioid regimen and it was as patient reported and the clinic's plan is to keep this regimen unchanged in the future.    PAIN HISTORY:  "  Location: neck  Duration: constant  Pain intensity: 2/10lying still  Quality of the pain: \"it just hurts.\"  Can't say that it's \"sharp or burning \"etc.  Aggravating factors: movements  Relieving factors : pain medication, rest    CAPA (Clinically Aligned Pain Assessment)  Comfort (How is your pain?): Comfortably manageable  Change in Pain (Since your last medication/intervention?): About the same  Pain Control (How are your pain treatments working?):  effective pain control  Functioning (Are you able to do activities to get better?) : Can do most things, but pain gets in the way of some   Sleep (Does your pain management allow you to sleep or rest?): Awake with occasional pain       FUNCTIONAL STATUS:  Change:      unchanged  Oral intake:     Regular  Activity level:     Up ambulating independently in room  Sleep:      Awake with occasional pain  Mood:      Somewhat irritable, cooperative      REVIEW OF 10 BODY SYSTEMS: 10 point ROS of systems including Constitutional, Eyes, Respiratory, Cardiovascular, Gastroenterology, Genitourinary, Integumentary, Musculoskeletal, Psychiatric were all negative except for pertinent positives noted in my HPI.       INPATIENT MEDICATIONS PERTINENT TO PAIN CONSULT:   Medications related to Pain Management (From now, onward)    Start     Dose/Rate Route Frequency Ordered Stop    11/14/19 0841  fentaNYL (DURAGESIC) 75 mcg/hr 72 hr patch 1 patch      75 mcg Transdermal EVERY 48 HOURS 11/14/19 0841      11/14/19 0500  docusate sodium (COLACE) capsule 100 mg      100 mg Oral DAILY 11/13/19 1511      11/14/19 0500  sennosides (SENOKOT) tablet 3 tablet      3 tablet Oral DAILY 11/13/19 1511      11/13/19 1715  docusate sodium (COLACE) capsule 200 mg      200 mg Oral AT BEDTIME 11/13/19 1511      11/13/19 1715  sennosides (SENOKOT) tablet 4 tablet      4 tablet Oral AT BEDTIME 11/13/19 1511      11/13/19 1515  oxyCODONE (ROXICODONE) tablet 5-10 mg      5-10 mg Oral EVERY 3 HOURS PRN " 11/13/19 1512      11/13/19 1511  magnesium hydroxide (MILK OF MAGNESIA) suspension 30 mL      30 mL Oral DAILY PRN 11/13/19 1512      11/13/19 0115  fentaNYL (DURAGESIC) Patch in Place       Transdermal EVERY 8 HOURS 11/13/19 0104      11/12/19 2226  cyclobenzaprine (FLEXERIL) tablet 10 mg      10 mg Oral 3 TIMES DAILY PRN 11/12/19 2227      11/12/19 2225  clonazePAM (klonoPIN) tablet 0.5 mg      0.5 mg Oral 3 TIMES DAILY PRN 11/12/19 2227      11/12/19 2042  lidocaine 1 % 0.1-1 mL      0.1-1 mL Other EVERY 1 HOUR PRN 11/12/19 2042      11/12/19 2042  lidocaine (LMX4) cream       Topical EVERY 1 HOUR PRN 11/12/19 2042      11/12/19 2042  HYDROmorphone (DILAUDID) injection 0.2 mg      0.2 mg Intravenous EVERY 2 HOURS PRN 11/12/19 2042      11/12/19 2042  acetaminophen (TYLENOL) tablet 650 mg      650 mg Oral EVERY 4 HOURS PRN 11/12/19 2042      11/12/19 2042  acetaminophen (TYLENOL) Suppository 650 mg      650 mg Rectal EVERY 4 HOURS PRN 11/12/19 2042              CURRENT MEDICATIONS:   Current Facility-Administered Medications Ordered in Epic   Medication Dose Route Frequency Provider Last Rate Last Dose     acetaminophen (TYLENOL) Suppository 650 mg  650 mg Rectal Q4H PRN Juan Jsoe Guo PA-C         acetaminophen (TYLENOL) tablet 650 mg  650 mg Oral Q4H PRN Juan Jose Guo PA-C   650 mg at 11/14/19 0437     amLODIPine-benazepril (LOTREL) 5-20 MG per capsule 1 capsule  1 capsule Oral BID Vinay Maurice MD   1 capsule at 11/14/19 0520     buPROPion (WELLBUTRIN SR) 12 hr tablet 200 mg  200 mg Oral BID Vinay Maurice MD   200 mg at 11/14/19 0520     carvedilol (COREG) tablet 25 mg  25 mg Oral BID w/meals Vinay Maurice MD   25 mg at 11/14/19 0520     ceFAZolin (ANCEF) intermittent infusion 2 g in 100 mL dextrose PRE-MIX  2 g Intravenous Q8H Angelica Veliz APRN CNP   2 g at 11/14/19 0917     clonazePAM (klonoPIN) tablet 0.5 mg  0.5 mg Oral TID PRN  Vinay Maurice MD   0.5 mg at 11/14/19 0437     cyclobenzaprine (FLEXERIL) tablet 10 mg  10 mg Oral TID PRN Vinay Maurice MD   10 mg at 11/14/19 0437     docusate sodium (COLACE) capsule 100 mg  100 mg Oral Daily Angelica Veliz APRN CNP   100 mg at 11/14/19 0519     docusate sodium (COLACE) capsule 200 mg  200 mg Oral At Bedtime Angelica Veliz APRN CNP   200 mg at 11/13/19 1721     fentaNYL (DURAGESIC) 75 mcg/hr 72 hr patch 1 patch  75 mcg Transdermal Q48H Adam Gonzales MD   1 patch at 11/14/19 0907     fentaNYL (DURAGESIC) Patch in Place   Transdermal Q8H Analilia Aceves MD         [START ON 11/15/2019] fentaNYL (DURAGESIC) patch REMOVAL   Transdermal Q72H Analilia Aceves MD         HYDROmorphone (DILAUDID) injection 0.2 mg  0.2 mg Intravenous Q2H PRN Juan Jose Guo PA-C         lactobacillus rhamnosus (GG) (CULTURELL) capsule 1 capsule  1 capsule Oral Daily Juan Carlos Pop MD   1 capsule at 11/14/19 0520     lidocaine (LMX4) cream   Topical Q1H PRN Juan Jose Guo PA-C         lidocaine 1 % 0.1-1 mL  0.1-1 mL Other Q1H PRN Juan Jose Guo PA-C         losartan (COZAAR) tablet 25 mg  25 mg Oral Daily Vinay Maurice MD   25 mg at 11/14/19 0521     magnesium hydroxide (MILK OF MAGNESIA) suspension 30 mL  30 mL Oral Daily PRN Angelica Veliz APRN CNP         naloxone (NARCAN) injection 0.1-0.4 mg  0.1-0.4 mg Intravenous Q2 Min PRN Juan Jose Guo PA-C         ondansetron (ZOFRAN-ODT) ODT tab 4 mg  4 mg Oral Q6H PRN Juan Jose Guo PA-C        Or     ondansetron (ZOFRAN) injection 4 mg  4 mg Intravenous Q6H PRN Juan Jose Guo PA-C         oxyCODONE (ROXICODONE) tablet 5-10 mg  5-10 mg Oral Q3H PRN Angelica Veliz APRN CNP   10 mg at 11/14/19 0906     sennosides (SENOKOT) tablet 3 tablet  3 tablet Oral Daily Angelica Veliz APRN CNP   3 tablet at 11/14/19 0519     sennosides  (SENOKOT) tablet 4 tablet  4 tablet Oral At Bedtime Jose Alfredo Angelica Roxana, APRN CNP   4 tablet at 11/13/19 1721     simvastatin (ZOCOR) tablet 10 mg  10 mg Oral At Bedtime Vinay Maurice MD   10 mg at 11/13/19 1721     sodium chloride (PF) 0.9% PF flush 3 mL  3 mL Intracatheter q1 min prn Juan Jose Guo PA-C   3 mL at 11/14/19 0927     sodium chloride (PF) 0.9% PF flush 3 mL  3 mL Intracatheter Q8H Juan Jose Guo PA-C   3 mL at 11/12/19 2151     No current [facility]-ordered outpatient medications on file.           HOME/PREVIOUS MEDICATIONS:   Prior to Admission medications    Medication Sig Start Date End Date Taking? Authorizing Provider   amLODIPine-benazepril (LOTREL) 5-20 MG capsule Take 1 capsule by mouth 2 times daily 10/29/19  Yes Demi Hardin MD   buPROPion (WELLBUTRIN SR) 200 MG 12 hr tablet TAKE 1 TABLET BY MOUTH 2 TIMES DAILY 10/29/19  Yes Demi Hardin MD   carvedilol (COREG) 25 MG tablet Take 1 tablet (25 mg) by mouth 2 times daily (with meals) 10/29/19  Yes Demi Hardin MD   clonazePAM (KLONOPIN) 0.5 MG tablet Take 1 tablet (0.5 mg) by mouth 3 times daily as needed for anxiety 10/29/19  Yes Demi Hardin MD   cyclobenzaprine (FLEXERIL) 10 MG tablet Take 1 tablet (10 mg) by mouth 3 times daily as needed for muscle spasms 10/29/19  Yes Demi Hardin MD   docusate sodium (COLACE) 100 MG capsule Take 1 capsule in the AM and 2 capsules in the PM   Yes Reported, Patient   Fe Heme Polypeptide-folic acid (PROFERRIN-FORTE) 12-1 MG TABS Take 1 tablet by mouth daily 10/29/19  Yes Demi Hardin MD   fentaNYL (DURAGESIC) 75 mcg/hr 72 hr patch Place 1 patch onto the skin every 48 hours    Yes Reported, Patient   fluocinonide (LIDEX) 0.05 % external ointment Apply twice daily to itchy skin nodules for 1-2 weeks at a time. 4/8/19  Yes Riley Fernandez MD   losartan (COZAAR) 25 MG tablet Take 1 tablet (25 mg) by mouth daily 10/29/19  Yes Lashawn  Demi Galeano MD   oxyCODONE IR (ROXICODONE) 10 MG tablet take 1 tablet by oral route  every 4 hours as needed for pain, max 6/day 10/28/19 11/26/19 Yes Reported, Patient   senna-docusate (SENOKOT-S/PERICOLACE) 8.6-50 MG tablet Takes 3 tablets by mouth in the AM and 4 tablets by mouth in the PM most days, but will sometimes take 2 tablets by mouth in AM and 3 tablets by mouth in the PM   Yes Unknown, Entered By History   simvastatin (ZOCOR) 10 MG tablet Take 1 tablet (10 mg) by mouth At Bedtime 10/29/19  Yes Demi Hardin MD   tacrolimus (PROTOPIC) 0.1 % ointment Apply topically as needed Apply to affected areas on body. 6/30/16  Yes Rose Marie Sheldon MD   triamcinolone (KENALOG) 0.1 % external cream Apply topically 2 times daily As needed to affected area 1/17/19 1/17/20 Yes Riley Fernandez MD   naloxone (NARCAN) nasal spray Spray 1 spray (4 mg) into one nostril alternating nostrils as needed for opioid reversal every 2-3 minutes until assistance arrives 10/23/18   Angely Larose DO   neomycin-polymyxin-hydrocortisone (CORTISPORIN) otic solution Place 3 drops in ear(s) 4 times daily 6/30/16   Rose Marie Sheldon MD   order for [facility] for cardiac rehab with 4 wheels, brakes and seat 10/14/16   Michael Styles MD           PAST PAIN TREATMENT:     Chronic opioid management  Cervical RF  PT    ALLERGIES:    Allergies   Allergen Reactions     Ciprofloxacin      History of aortic aneurysms     Liquid Adhesive Other (See Comments)     Blistering of skin            PAST MEDICAL AND PSYCHIATRIC HISTORY:    Past Medical History:   Diagnosis Date     Anxiety      Ascending aortic aneurysm (H)      Bicuspid aortic valve      BPPV (benign paroxysmal positional vertigo) 7/11/2014     Chronic narcotic use      Chronic neck pain      Chronic osteoarthritis      Degenerative joint disease      Depression      Hyperlipidemia 4/10/2012     Hypertension      Multiple stiff joints      Neck injuries      Obesity  2/9/2015     Skin picking habit      Sleep apnea     does not use cpap           PAST SURGICAL HISTORY:   Past Surgical History:   Procedure Laterality Date     ARTHROPLASTY SHOULDER  4/15/2014    Procedure: Left Total Shoulder Arthroplasty ;  Surgeon: Analilia Aceves MD;  [facility]     ARTHROPLASTY SHOULDER Right 8/26/2014    Procedure: ARTHROPLASTY SHOULDER;  Surgeon: Analilia Aceves MD;  [facility]     BYPASS GASTRIC TAVO-EN-Y, LIVER BIOPSY, COMBINED  8/8/2005     C HAND/FINGER SURGERY UNLISTED       C SHOULDER SURG PROC UNLISTED       COLONOSCOPY  6/30/2014    Procedure: COMBINED COLONOSCOPY, SINGLE BIOPSY/POLYPECTOMY BY BIOPSY;  Surgeon: Chester Patton MD;  Location: [facility]     CYSTOSCOPY, BLADDER NECK CUTS, COMBINED N/A 7/18/2016    Procedure: COMBINED CYSTOSCOPY, BLADDER NECK CUTS;  Surgeon: Ritu Leslie MD;  Location: [facility]     ESOPHAGOSCOPY, GASTROSCOPY, DUODENOSCOPY (EGD), COMBINED  6/30/2014    Procedure: COMBINED ESOPHAGOSCOPY, GASTROSCOPY, DUODENOSCOPY (EGD), BIOPSY SINGLE OR MULTIPLE;  Surgeon: Chester Patton MD;  Location: [facility]     EXCISE MASS FINGER  6/14/2011    Procedure:EXCISE MASS FINGER; Middle Flexor Cyst; Surgeon:SHAYY RUSSELL; Location:[facility] OR     HAND SURGERY      excision of tendon cyst on left hand     HC VASCULAR SURGERY PROCEDURE UNLIST       IR FINE NEEDLE ASPIRATION W ULTRASOUND  11/13/2019     LASER HOLMIUM LITHOTRIPSY BLADDER N/A 10/15/2014    Procedure: LASER HOLMIUM LITHOTRIPSY BLADDER;  Surgeon: Sahil Taveras MD;  Location: [facility]     LASER KTP GREEN LIGHT PHOTOSELECTIVE VAPORIZATION PROSTATE  1/23/2014    Procedure: LASER KTP GREEN LIGHT PHOTOSELECTIVE VAPORIZATION PROSTATE;  Greenlight Photovaporization Of Prostate  ;  Surgeon: Sahil Taveras MD;  Location: [facility] OR     RELEASE TRIGGER FINGER Right 3/31/2017    Procedure: RELEASE TRIGGER FINGER;  Surgeon: Juan Carlos Blunt MD;  [facility]: [facility]      REPAIR ANEURYSM ASCENDING AORTA N/A 10/4/2016    Procedure: REPAIR ANEURYSM ASCENDING AORTA;  Surgeon: Mckenzie Townsend MD;  Location: [facility]             FAMILY HISTORY: family history includes Anxiety Disorder in his father, mother, sister, and sister; Arrhythmia in his father; Arthritis in an other family member; Cardiovascular in his father; Coronary Artery Disease in his father; Depression in his father and sister; Gastrointestinal Disease in an other family member; Hyperlipidemia in his father, mother, sister, and sister; Hypertension in his father, mother, sister, and sister; Low Back Problems in his father, mother, sister, and sister; Nephrolithiasis in his father; Obesity in his father, mother, sister, and sister; Osteoporosis in his mother, sister, and sister; Sleep Apnea in his father; Spine Problems in his father and sister.      HEALTH & LIFESTYLE PRACTICES:   Tobacco:  reports that he quit smoking about 36 years ago. His smoking use included cigarettes. He started smoking about 42 years ago. He has a 3.00 pack-year smoking history. He has never used smokeless tobacco.  Alcohol:  reports no history of alcohol use.  Illicit drugs:  reports no history of drug use.      SOCIAL HISTORY:   Social History     Socioeconomic History     Marital status: Single     Spouse name: Not on file     Number of children: Not on file     Years of education: Not on file     Highest education level: Not on file   Occupational History     Occupation: Disabled   Social Needs     Financial resource strain: Not on file     Food insecurity:     Worry: Not on file     Inability: Not on file     Transportation needs:     Medical: Not on file     Non-medical: Not on file   Tobacco Use     Smoking status: Former Smoker     Packs/day: 0.50     Years: 6.00     Pack years: 3.00     Types: Cigarettes     Start date: 1977     Last attempt to quit: 1983     Years since quittin.2     Smokeless tobacco: Never Used      Tobacco comment: quit 35 years ago   Substance and Sexual Activity     Alcohol use: No     Alcohol/week: 0.0 standard drinks     Drug use: No     Sexual activity: Not Currently     Partners: Female     Birth control/protection: Abstinence   Lifestyle     Physical activity:     Days per week: Not on file     Minutes per session: Not on file     Stress: Not on file   Relationships     Social connections:     Talks on phone: Not on file     Gets together: Not on file     Attends Faith service: Not on file     Active member of club or organization: Not on file     Attends meetings of clubs or organizations: Not on file     Relationship status: Not on file     Intimate partner violence:     Fear of current or ex partner: Not on file     Emotionally abused: Not on file     Physically abused: Not on file     Forced sexual activity: Not on file   Other Topics Concern     Parent/sibling w/ CABG, MI or angioplasty before 65F 55M? Not Asked   Social History Narrative     Not on file       LABORATORY VALUES:   Last Basic Metabolic Panel:  Lab Results   Component Value Date     11/14/2019      Lab Results   Component Value Date    POTASSIUM 4.2 11/14/2019     Lab Results   Component Value Date    CHLORIDE 103 11/14/2019     Lab Results   Component Value Date    NIA 8.8 11/14/2019     Lab Results   Component Value Date    CO2 26 11/14/2019     Lab Results   Component Value Date    BUN 13 11/14/2019     Lab Results   Component Value Date    CR 0.91 11/14/2019     Lab Results   Component Value Date    GLC 99 11/14/2019       CBC results:  Lab Results   Component Value Date    WBC 7.1 11/14/2019     Lab Results   Component Value Date    HGB 9.2 11/14/2019     Lab Results   Component Value Date    HCT 28.5 11/14/2019     Lab Results   Component Value Date     11/14/2019       DIAGNOSTIC TESTS:       Labs above reviewed as well as additional relevant diagnostic studies from the EPIC record.       PHYSICAL  EXAMINATION:  VITAL SIGNS:  B/P: 108/46, T: 97.5, P: 62, R: 16    CONSTITUTIONAL/GENERAL APPEARANCE: AAOx3. Conversant.  EYES: EOMI, sclerae clear  ENT/NECK: neck is supple  RESPIRATORY: CTA, non labored breathing  CARDIOVASCULAR: S1 and S2 appreciated  GI:soft, nontender, bowel sounds present  MUSCULOSKELETAL/BACK/SPINE/EXTREMITIES: Shoulder ROMs limited and painful.  Good elbow, wrist and hand ROMs.  Moving LEs well.     NEURO:  SILT to UE and LE.  SKIN/VASCULAR EXAM:  Dry and warm.  PSYCHIATRIC/BEHAVIORAL/OBSERVATIONS:  No objective signs of pain observed during our interview.   Judgment/Insight -fair   Orientation - x3   Memory -good   Mood and affect - alert, mildly irritable, cooperative            Total face to face time spent was 60 minutes, and more than 50% of face to face time was spent in counseling and/or coordination of care regarding principles of multidisciplinary care which included discussions on self care, rehabilitation, psychological aspects of pain, medication management and interventions.    Karishma Bo PA-C  November 14, 2019, 9:43 AM  Inpatient Pain Management Service

## 2019-11-14 NOTE — PROGRESS NOTES
"Orthopedic Surgery Progress Note    Subjective: went to IR yesterday for left shoulder aspiration. Tolerated procedure well without complication. Started on IV ancef per ID recs.  Denies cp, sob, calf pain, fevers, chills, sweats. Denies new onset numbness/tingling in operative extremity.    Exam:  /65 (BP Location: Left leg)   Pulse 75   Temp 98  F (36.7  C) (Oral)   Resp 16   Ht 1.778 m (5' 10\")   Wt 90 kg (198 lb 6.4 oz)   SpO2 97%   BMI 28.47 kg/m    Gen: Awake, alert, NAD  Resp: breathing equal and non-labored  Extremities:    LUE: pain in left shoulder with A/PROM. 5/5 AIN/PIN/m/r/u. SILT in a/m/r/u distributions. 2+ radial pulse. 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 Completed abx course on 11/10/19 now with acute development with left shoulder pain just 2 days later.     Attempted bedside left shoulder aspiration yielded 1-2ml blood fluid. >1500 cells, 81% PMNs.     Planned Procedure:  Left TSA explantation, placement abx spacer on 11/15 with Dr. Aceves    Plan:  [facility]tho [facility] - inpatient status  Activity: Up with assist.  Weight bearing status: NWB BUE  Antibiotics: IV ancef per ID recs  Diet: ok for diet - NPO midnight  Pain management: transition from IV to orals as tolerated.   Cultures: pending, follow closely  Consults: infectious disease, hospitalist, IR  Follow-up: pending hospital " course    Disposition: plan for NPO midnight for explant left TSA, abx spacer placement on 11/15 with Dr. Aceves.     Future Appointments   Date Time Provider Department Center   12/24/2019  1:30 PM Enriqueta Zhou MD [facility]   12/27/2019  9:45 AM Analilia Aceves MD [facility]   1/22/2020  9:00 AM Analilia Aceves MD [facility]        Juan Carlos Hernandez, PGY4  Orthopedic Surgery Resident  Pager [phone]

## 2019-11-15 ENCOUNTER — ANESTHESIA (OUTPATIENT)
Dept: SURGERY | Facility: CLINIC | Age: 59
DRG: 497 | End: 2019-11-15
Payer: COMMERCIAL

## 2019-11-15 LAB
ABO + RH BLD: NORMAL
ABO + RH BLD: NORMAL
BLD GP AB SCN SERPL QL: NORMAL
BLOOD BANK CMNT PATIENT-IMP: NORMAL
HGB BLD-MCNC: 9.1 G/DL (ref 13.3–17.7)
INR PPP: 1.22 (ref 0.86–1.14)
SPECIMEN EXP DATE BLD: NORMAL

## 2019-11-15 PROCEDURE — 37000009 ZZH ANESTHESIA TECHNICAL FEE, EACH ADDTL 15 MIN: Performed by: ORTHOPAEDIC SURGERY

## 2019-11-15 PROCEDURE — 25000125 ZZHC RX 250: Performed by: NURSE ANESTHETIST, CERTIFIED REGISTERED

## 2019-11-15 PROCEDURE — 40000170 ZZH STATISTIC PRE-PROCEDURE ASSESSMENT II: Performed by: ORTHOPAEDIC SURGERY

## 2019-11-15 PROCEDURE — 27210794 ZZH OR GENERAL SUPPLY STERILE: Performed by: ORTHOPAEDIC SURGERY

## 2019-11-15 PROCEDURE — 25000128 H RX IP 250 OP 636: Performed by: ORTHOPAEDIC SURGERY

## 2019-11-15 PROCEDURE — 87070 CULTURE OTHR SPECIMN AEROBIC: CPT | Performed by: ORTHOPAEDIC SURGERY

## 2019-11-15 PROCEDURE — 25000125 ZZHC RX 250: Performed by: ANESTHESIOLOGY

## 2019-11-15 PROCEDURE — 25000128 H RX IP 250 OP 636: Performed by: ANESTHESIOLOGY

## 2019-11-15 PROCEDURE — 85018 HEMOGLOBIN: CPT | Performed by: STUDENT IN AN ORGANIZED HEALTH CARE EDUCATION/TRAINING PROGRAM

## 2019-11-15 PROCEDURE — 25000128 H RX IP 250 OP 636: Performed by: NURSE ANESTHETIST, CERTIFIED REGISTERED

## 2019-11-15 PROCEDURE — 25000565 ZZH ISOFLURANE, EA 15 MIN: Performed by: ORTHOPAEDIC SURGERY

## 2019-11-15 PROCEDURE — 27110028 ZZH OR GENERAL SUPPLY NON-STERILE: Performed by: ORTHOPAEDIC SURGERY

## 2019-11-15 PROCEDURE — 86850 RBC ANTIBODY SCREEN: CPT | Performed by: STUDENT IN AN ORGANIZED HEALTH CARE EDUCATION/TRAINING PROGRAM

## 2019-11-15 PROCEDURE — 25000132 ZZH RX MED GY IP 250 OP 250 PS 637: Performed by: ORTHOPAEDIC SURGERY

## 2019-11-15 PROCEDURE — 99207 ZZC NO CHARGE FOLLOW UP PS: CPT

## 2019-11-15 PROCEDURE — 99207 ZZC CDG-CUT & PASTE-POTENTIAL IMPACT ON LEVEL: CPT | Performed by: HOSPITALIST

## 2019-11-15 PROCEDURE — 86901 BLOOD TYPING SEROLOGIC RH(D): CPT | Performed by: STUDENT IN AN ORGANIZED HEALTH CARE EDUCATION/TRAINING PROGRAM

## 2019-11-15 PROCEDURE — 25800030 ZZH RX IP 258 OP 636: Performed by: NURSE ANESTHETIST, CERTIFIED REGISTERED

## 2019-11-15 PROCEDURE — 71000014 ZZH RECOVERY PHASE 1 LEVEL 2 FIRST HR: Performed by: ORTHOPAEDIC SURGERY

## 2019-11-15 PROCEDURE — 25000132 ZZH RX MED GY IP 250 OP 250 PS 637: Performed by: INTERNAL MEDICINE

## 2019-11-15 PROCEDURE — 37000008 ZZH ANESTHESIA TECHNICAL FEE, 1ST 30 MIN: Performed by: ORTHOPAEDIC SURGERY

## 2019-11-15 PROCEDURE — 87205 SMEAR GRAM STAIN: CPT | Performed by: ORTHOPAEDIC SURGERY

## 2019-11-15 PROCEDURE — 87176 TISSUE HOMOGENIZATION CULTR: CPT | Performed by: ORTHOPAEDIC SURGERY

## 2019-11-15 PROCEDURE — 25000128 H RX IP 250 OP 636: Performed by: NURSE PRACTITIONER

## 2019-11-15 PROCEDURE — C1713 ANCHOR/SCREW BN/BN,TIS/BN: HCPCS | Performed by: ORTHOPAEDIC SURGERY

## 2019-11-15 PROCEDURE — 25800025 ZZH RX 258: Performed by: ORTHOPAEDIC SURGERY

## 2019-11-15 PROCEDURE — 85610 PROTHROMBIN TIME: CPT | Performed by: STUDENT IN AN ORGANIZED HEALTH CARE EDUCATION/TRAINING PROGRAM

## 2019-11-15 PROCEDURE — 86900 BLOOD TYPING SEROLOGIC ABO: CPT | Performed by: STUDENT IN AN ORGANIZED HEALTH CARE EDUCATION/TRAINING PROGRAM

## 2019-11-15 PROCEDURE — 87075 CULTR BACTERIA EXCEPT BLOOD: CPT | Performed by: ORTHOPAEDIC SURGERY

## 2019-11-15 PROCEDURE — 36000064 ZZH SURGERY LEVEL 4 EA 15 ADDTL MIN - UMMC: Performed by: ORTHOPAEDIC SURGERY

## 2019-11-15 PROCEDURE — 0RHK08Z INSERTION OF SPACER INTO LEFT SHOULDER JOINT, OPEN APPROACH: ICD-10-PCS | Performed by: ORTHOPAEDIC SURGERY

## 2019-11-15 PROCEDURE — 25000132 ZZH RX MED GY IP 250 OP 250 PS 637: Performed by: STUDENT IN AN ORGANIZED HEALTH CARE EDUCATION/TRAINING PROGRAM

## 2019-11-15 PROCEDURE — 25000125 ZZHC RX 250: Performed by: ORTHOPAEDIC SURGERY

## 2019-11-15 PROCEDURE — 99232 SBSQ HOSP IP/OBS MODERATE 35: CPT | Performed by: HOSPITALIST

## 2019-11-15 PROCEDURE — 36000062 ZZH SURGERY LEVEL 4 1ST 30 MIN - UMMC: Performed by: ORTHOPAEDIC SURGERY

## 2019-11-15 PROCEDURE — 36415 COLL VENOUS BLD VENIPUNCTURE: CPT | Performed by: STUDENT IN AN ORGANIZED HEALTH CARE EDUCATION/TRAINING PROGRAM

## 2019-11-15 PROCEDURE — 12000001 ZZH R&B MED SURG/OB UMMC

## 2019-11-15 PROCEDURE — 25000128 H RX IP 250 OP 636: Performed by: STUDENT IN AN ORGANIZED HEALTH CARE EDUCATION/TRAINING PROGRAM

## 2019-11-15 PROCEDURE — P9041 ALBUMIN (HUMAN),5%, 50ML: HCPCS | Performed by: NURSE ANESTHETIST, CERTIFIED REGISTERED

## 2019-11-15 PROCEDURE — 0RPK0JZ REMOVAL OF SYNTHETIC SUBSTITUTE FROM LEFT SHOULDER JOINT, OPEN APPROACH: ICD-10-PCS | Performed by: ORTHOPAEDIC SURGERY

## 2019-11-15 PROCEDURE — 87077 CULTURE AEROBIC IDENTIFY: CPT | Performed by: ORTHOPAEDIC SURGERY

## 2019-11-15 PROCEDURE — 25000132 ZZH RX MED GY IP 250 OP 250 PS 637: Performed by: NURSE PRACTITIONER

## 2019-11-15 RX ORDER — HYDROMORPHONE HYDROCHLORIDE 1 MG/ML
.2-.4 INJECTION, SOLUTION INTRAMUSCULAR; INTRAVENOUS; SUBCUTANEOUS EVERY 10 MIN PRN
Status: DISCONTINUED | OUTPATIENT
Start: 2019-11-15 | End: 2019-11-15 | Stop reason: HOSPADM

## 2019-11-15 RX ORDER — SODIUM CHLORIDE, SODIUM LACTATE, POTASSIUM CHLORIDE, CALCIUM CHLORIDE 600; 310; 30; 20 MG/100ML; MG/100ML; MG/100ML; MG/100ML
INJECTION, SOLUTION INTRAVENOUS CONTINUOUS PRN
Status: DISCONTINUED | OUTPATIENT
Start: 2019-11-15 | End: 2019-11-15

## 2019-11-15 RX ORDER — NALOXONE HYDROCHLORIDE 0.4 MG/ML
.1-.4 INJECTION, SOLUTION INTRAMUSCULAR; INTRAVENOUS; SUBCUTANEOUS
Status: DISCONTINUED | OUTPATIENT
Start: 2019-11-15 | End: 2019-11-15

## 2019-11-15 RX ORDER — LIDOCAINE 40 MG/G
CREAM TOPICAL
Status: DISCONTINUED | OUTPATIENT
Start: 2019-11-15 | End: 2019-11-19

## 2019-11-15 RX ORDER — FENTANYL CITRATE 50 UG/ML
25-50 INJECTION, SOLUTION INTRAMUSCULAR; INTRAVENOUS
Status: DISCONTINUED | OUTPATIENT
Start: 2019-11-15 | End: 2019-11-15

## 2019-11-15 RX ORDER — NALOXONE HYDROCHLORIDE 0.4 MG/ML
.1-.4 INJECTION, SOLUTION INTRAMUSCULAR; INTRAVENOUS; SUBCUTANEOUS
Status: DISCONTINUED | OUTPATIENT
Start: 2019-11-15 | End: 2019-11-15 | Stop reason: HOSPADM

## 2019-11-15 RX ORDER — MAGNESIUM HYDROXIDE 1200 MG/15ML
LIQUID ORAL PRN
Status: DISCONTINUED | OUTPATIENT
Start: 2019-11-15 | End: 2019-11-15 | Stop reason: HOSPADM

## 2019-11-15 RX ORDER — CEFAZOLIN SODIUM 2 G/100ML
2 INJECTION, SOLUTION INTRAVENOUS
Status: COMPLETED | OUTPATIENT
Start: 2019-11-15 | End: 2019-11-15

## 2019-11-15 RX ORDER — ONDANSETRON 4 MG/1
4 TABLET, ORALLY DISINTEGRATING ORAL EVERY 30 MIN PRN
Status: DISCONTINUED | OUTPATIENT
Start: 2019-11-15 | End: 2019-11-15 | Stop reason: HOSPADM

## 2019-11-15 RX ORDER — OXYCODONE HYDROCHLORIDE 10 MG/1
10 TABLET ORAL
Status: DISCONTINUED | OUTPATIENT
Start: 2019-11-15 | End: 2019-11-16

## 2019-11-15 RX ORDER — ALBUMIN HUMAN 5 %
INTRAVENOUS SOLUTION INTRAVENOUS PRN
Status: DISCONTINUED | OUTPATIENT
Start: 2019-11-15 | End: 2019-11-15

## 2019-11-15 RX ORDER — ACETAMINOPHEN 325 MG/1
975 TABLET ORAL EVERY 6 HOURS PRN
Status: DISCONTINUED | OUTPATIENT
Start: 2019-11-15 | End: 2019-11-15 | Stop reason: HOSPADM

## 2019-11-15 RX ORDER — ONDANSETRON 2 MG/ML
INJECTION INTRAMUSCULAR; INTRAVENOUS PRN
Status: DISCONTINUED | OUTPATIENT
Start: 2019-11-15 | End: 2019-11-15

## 2019-11-15 RX ORDER — DEXAMETHASONE SODIUM PHOSPHATE 4 MG/ML
INJECTION, SOLUTION INTRA-ARTICULAR; INTRALESIONAL; INTRAMUSCULAR; INTRAVENOUS; SOFT TISSUE PRN
Status: DISCONTINUED | OUTPATIENT
Start: 2019-11-15 | End: 2019-11-15

## 2019-11-15 RX ORDER — HYDROMORPHONE HYDROCHLORIDE 1 MG/ML
.3-.5 INJECTION, SOLUTION INTRAMUSCULAR; INTRAVENOUS; SUBCUTANEOUS
Status: DISCONTINUED | OUTPATIENT
Start: 2019-11-15 | End: 2019-11-16

## 2019-11-15 RX ORDER — SODIUM CHLORIDE, SODIUM LACTATE, POTASSIUM CHLORIDE, CALCIUM CHLORIDE 600; 310; 30; 20 MG/100ML; MG/100ML; MG/100ML; MG/100ML
INJECTION, SOLUTION INTRAVENOUS CONTINUOUS
Status: DISCONTINUED | OUTPATIENT
Start: 2019-11-15 | End: 2019-11-15 | Stop reason: HOSPADM

## 2019-11-15 RX ORDER — SODIUM CHLORIDE, SODIUM LACTATE, POTASSIUM CHLORIDE, CALCIUM CHLORIDE 600; 310; 30; 20 MG/100ML; MG/100ML; MG/100ML; MG/100ML
INJECTION, SOLUTION INTRAVENOUS CONTINUOUS
Status: DISCONTINUED | OUTPATIENT
Start: 2019-11-15 | End: 2019-11-19 | Stop reason: HOSPADM

## 2019-11-15 RX ORDER — LIDOCAINE HYDROCHLORIDE 20 MG/ML
INJECTION, SOLUTION INFILTRATION; PERINEURAL PRN
Status: DISCONTINUED | OUTPATIENT
Start: 2019-11-15 | End: 2019-11-15

## 2019-11-15 RX ORDER — FENTANYL CITRATE 50 UG/ML
25-50 INJECTION, SOLUTION INTRAMUSCULAR; INTRAVENOUS EVERY 5 MIN PRN
Status: DISCONTINUED | OUTPATIENT
Start: 2019-11-15 | End: 2019-11-15 | Stop reason: HOSPADM

## 2019-11-15 RX ORDER — ONDANSETRON 2 MG/ML
4 INJECTION INTRAMUSCULAR; INTRAVENOUS EVERY 30 MIN PRN
Status: DISCONTINUED | OUTPATIENT
Start: 2019-11-15 | End: 2019-11-15 | Stop reason: HOSPADM

## 2019-11-15 RX ORDER — PROPOFOL 10 MG/ML
INJECTION, EMULSION INTRAVENOUS PRN
Status: DISCONTINUED | OUTPATIENT
Start: 2019-11-15 | End: 2019-11-15

## 2019-11-15 RX ORDER — CEFAZOLIN SODIUM 1 G/3ML
1 INJECTION, POWDER, FOR SOLUTION INTRAMUSCULAR; INTRAVENOUS SEE ADMIN INSTRUCTIONS
Status: DISCONTINUED | OUTPATIENT
Start: 2019-11-15 | End: 2019-11-17

## 2019-11-15 RX ORDER — FENTANYL CITRATE 50 UG/ML
INJECTION, SOLUTION INTRAMUSCULAR; INTRAVENOUS PRN
Status: DISCONTINUED | OUTPATIENT
Start: 2019-11-15 | End: 2019-11-15

## 2019-11-15 RX ORDER — EPHEDRINE SULFATE 50 MG/ML
INJECTION, SOLUTION INTRAMUSCULAR; INTRAVENOUS; SUBCUTANEOUS PRN
Status: DISCONTINUED | OUTPATIENT
Start: 2019-11-15 | End: 2019-11-15

## 2019-11-15 RX ORDER — FLUMAZENIL 0.1 MG/ML
0.2 INJECTION, SOLUTION INTRAVENOUS
Status: DISCONTINUED | OUTPATIENT
Start: 2019-11-15 | End: 2019-11-15

## 2019-11-15 RX ORDER — ALBUMIN, HUMAN INJ 5% 5 %
SOLUTION INTRAVENOUS CONTINUOUS PRN
Status: DISCONTINUED | OUTPATIENT
Start: 2019-11-15 | End: 2019-11-15

## 2019-11-15 RX ORDER — BUPIVACAINE HYDROCHLORIDE AND EPINEPHRINE 5; 5 MG/ML; UG/ML
INJECTION, SOLUTION PERINEURAL PRN
Status: DISCONTINUED | OUTPATIENT
Start: 2019-11-15 | End: 2019-11-15

## 2019-11-15 RX ORDER — OXYCODONE HYDROCHLORIDE 5 MG/1
5 TABLET ORAL EVERY 4 HOURS PRN
Status: DISCONTINUED | OUTPATIENT
Start: 2019-11-15 | End: 2019-11-15 | Stop reason: HOSPADM

## 2019-11-15 RX ORDER — HEPARIN SODIUM,PORCINE 10 UNIT/ML
2-5 VIAL (ML) INTRAVENOUS
Status: DISCONTINUED | OUTPATIENT
Start: 2019-11-15 | End: 2019-11-19 | Stop reason: HOSPADM

## 2019-11-15 RX ADMIN — PHENYLEPHRINE HYDROCHLORIDE 200 MCG: 10 INJECTION INTRAVENOUS at 18:01

## 2019-11-15 RX ADMIN — Medication 10 MG: at 18:25

## 2019-11-15 RX ADMIN — CLONAZEPAM 0.5 MG: 0.5 TABLET ORAL at 08:57

## 2019-11-15 RX ADMIN — SIMVASTATIN 10 MG: 10 TABLET, FILM COATED ORAL at 22:13

## 2019-11-15 RX ADMIN — PHENYLEPHRINE HYDROCHLORIDE 200 MCG: 10 INJECTION INTRAVENOUS at 17:49

## 2019-11-15 RX ADMIN — FENTANYL CITRATE 100 MCG: 50 INJECTION, SOLUTION INTRAMUSCULAR; INTRAVENOUS at 17:38

## 2019-11-15 RX ADMIN — Medication 10 MG: at 17:48

## 2019-11-15 RX ADMIN — BUPIVACAINE HYDROCHLORIDE AND EPINEPHRINE BITARTRATE 20 ML: 5; .005 INJECTION, SOLUTION EPIDURAL; INTRACAUDAL; PERINEURAL at 14:52

## 2019-11-15 RX ADMIN — CEFAZOLIN SODIUM 2 G: 2 INJECTION, SOLUTION INTRAVENOUS at 18:08

## 2019-11-15 RX ADMIN — BUPROPION HYDROCHLORIDE 200 MG: 200 TABLET, EXTENDED RELEASE ORAL at 22:12

## 2019-11-15 RX ADMIN — ROCURONIUM BROMIDE 50 MG: 10 INJECTION INTRAVENOUS at 17:38

## 2019-11-15 RX ADMIN — PROPOFOL 200 MG: 10 INJECTION, EMULSION INTRAVENOUS at 17:38

## 2019-11-15 RX ADMIN — PHENYLEPHRINE HYDROCHLORIDE 200 MCG: 10 INJECTION INTRAVENOUS at 18:25

## 2019-11-15 RX ADMIN — OXYCODONE HYDROCHLORIDE 10 MG: 5 TABLET ORAL at 00:03

## 2019-11-15 RX ADMIN — Medication 10 MG: at 18:13

## 2019-11-15 RX ADMIN — Medication 10 MG: at 17:55

## 2019-11-15 RX ADMIN — CEFAZOLIN SODIUM 2 G: 2 INJECTION, SOLUTION INTRAVENOUS at 08:49

## 2019-11-15 RX ADMIN — Medication 1 CAPSULE: at 05:45

## 2019-11-15 RX ADMIN — PHENYLEPHRINE HYDROCHLORIDE 0.5 MCG/KG/MIN: 10 INJECTION INTRAVENOUS at 18:07

## 2019-11-15 RX ADMIN — SENNOSIDES 4 TABLET: 8.6 TABLET, FILM COATED ORAL at 22:13

## 2019-11-15 RX ADMIN — CEFAZOLIN SODIUM 2 G: 2 INJECTION, SOLUTION INTRAVENOUS at 00:21

## 2019-11-15 RX ADMIN — SUGAMMADEX 200 MG: 100 INJECTION, SOLUTION INTRAVENOUS at 19:38

## 2019-11-15 RX ADMIN — FENTANYL CITRATE 50 MCG: 50 INJECTION INTRAMUSCULAR; INTRAVENOUS at 20:30

## 2019-11-15 RX ADMIN — MIDAZOLAM 2 MG: 1 INJECTION INTRAMUSCULAR; INTRAVENOUS at 17:34

## 2019-11-15 RX ADMIN — PHENYLEPHRINE HYDROCHLORIDE 200 MCG: 10 INJECTION INTRAVENOUS at 17:45

## 2019-11-15 RX ADMIN — ALBUMIN HUMAN: 0.05 INJECTION, SOLUTION INTRAVENOUS at 18:11

## 2019-11-15 RX ADMIN — SODIUM CHLORIDE, POTASSIUM CHLORIDE, SODIUM LACTATE AND CALCIUM CHLORIDE: 600; 310; 30; 20 INJECTION, SOLUTION INTRAVENOUS at 17:34

## 2019-11-15 RX ADMIN — OXYCODONE HYDROCHLORIDE 10 MG: 5 TABLET ORAL at 09:56

## 2019-11-15 RX ADMIN — LOSARTAN POTASSIUM 25 MG: 25 TABLET ORAL at 05:45

## 2019-11-15 RX ADMIN — FENTANYL CITRATE 25 MCG: 50 INJECTION, SOLUTION INTRAMUSCULAR; INTRAVENOUS at 14:40

## 2019-11-15 RX ADMIN — BUPROPION HYDROCHLORIDE 200 MG: 200 TABLET, EXTENDED RELEASE ORAL at 05:45

## 2019-11-15 RX ADMIN — CARVEDILOL 25 MG: 25 TABLET, FILM COATED ORAL at 22:14

## 2019-11-15 RX ADMIN — LIDOCAINE HYDROCHLORIDE 80 MG: 20 INJECTION, SOLUTION INFILTRATION; PERINEURAL at 17:38

## 2019-11-15 RX ADMIN — DOCUSATE SODIUM 200 MG: 100 CAPSULE, LIQUID FILLED ORAL at 22:14

## 2019-11-15 RX ADMIN — OXYCODONE HYDROCHLORIDE 10 MG: 10 TABLET ORAL at 22:12

## 2019-11-15 RX ADMIN — Medication 10 MG: at 17:45

## 2019-11-15 RX ADMIN — DEXAMETHASONE SODIUM PHOSPHATE 4 MG: 4 INJECTION, SOLUTION INTRAMUSCULAR; INTRAVENOUS at 19:10

## 2019-11-15 RX ADMIN — ONDANSETRON 4 MG: 2 INJECTION INTRAMUSCULAR; INTRAVENOUS at 17:38

## 2019-11-15 RX ADMIN — CLONAZEPAM 0.5 MG: 0.5 TABLET ORAL at 22:57

## 2019-11-15 RX ADMIN — OXYCODONE HYDROCHLORIDE 10 MG: 5 TABLET ORAL at 06:43

## 2019-11-15 RX ADMIN — Medication 10 MG: at 18:04

## 2019-11-15 RX ADMIN — PHENYLEPHRINE HYDROCHLORIDE 200 MCG: 10 INJECTION INTRAVENOUS at 18:07

## 2019-11-15 RX ADMIN — PHENYLEPHRINE HYDROCHLORIDE 200 MCG: 10 INJECTION INTRAVENOUS at 17:56

## 2019-11-15 RX ADMIN — CARVEDILOL 25 MG: 25 TABLET, FILM COATED ORAL at 05:44

## 2019-11-15 RX ADMIN — OXYCODONE HYDROCHLORIDE 10 MG: 5 TABLET ORAL at 03:15

## 2019-11-15 RX ADMIN — AMLODIPINE AND BENAZEPRIL HYDROCHLORIDE 1 CAPSULE: 5; 20 CAPSULE ORAL at 05:44

## 2019-11-15 ASSESSMENT — COPD QUESTIONNAIRES: COPD: 0

## 2019-11-15 ASSESSMENT — ENCOUNTER SYMPTOMS
DYSRHYTHMIAS: 0
SEIZURES: 0

## 2019-11-15 ASSESSMENT — ACTIVITIES OF DAILY LIVING (ADL)
ADLS_ACUITY_SCORE: 13

## 2019-11-15 NOTE — ANESTHESIA PROCEDURE NOTES
Peripheral Nerve Block Procedure Note    Staff:     Anesthesiologist:  Miguel Gregory DO  Location: Pre-op  Procedure Start/Stop TImes:      11/15/2019 2:46 PM     11/15/2019 2:53 PM    patient identified, IV checked, site marked, risks and benefits discussed, informed consent, monitors and equipment checked, pre-op evaluation, at physician/surgeon's request and post-op pain management      Correct Patient: Yes      Correct Position: Yes      Correct Site: Yes      Correct Procedure: Yes      Correct Laterality:  Yes    Site Marked:  Yes  Procedure details:     Procedure:  Interscalene    ASA:  3    Diagnosis:  Post op pain    Laterality:  Left    Position:  Supine    Sterile Prep: chloraprep, mask and sterile gloves      Local skin infiltration:  2% lidocaine    amount (mL):  2    Needle:  Short bevel and insulated    Needle gauge:  21    Needle length (inches):  4    Ultrasound: Yes      Ultrasound used to identify targeted nerve, plexus, or vascular structure and placed a needle adjacent to it      Permanent Image entered into patiient's record      Abnormal pain on injection: No      Blood Aspirated: No      Paresthesias:  No    Bleeding at site: No      Bolus via:  Needle    Infusion Method:  Single Shot    Complications:  None  Assessment/Narrative:     Injection made incrementally with aspirations every (mL):  5     Informed consent obtained.  All risks and benefits of the nerve block discussed with the patient.  All questions answered and all parties agreed with the plan.   Block was placed at the surgeon's request for post operative pain control.

## 2019-11-15 NOTE — PLAN OF CARE
Patient A&O x4 and able to make needs known using call light. VSS and lung sounds clear and equal bilaterally. Bowel sounds active and passing gas. Denies chest pain, lightheadedness, dizziness, and SOB. IV SL in between abx. NPO since midnight, last sip of water with medications at 1000. Voiding spontaneously without difficulties. Able to wiggle fingers and CMS intact; denies numbness or tingling. Pain in neck and taking PRN Oxycodone with relief. Ice pack applied to neck. Dressing to left shoulder from puncture yesterday. Repositions independently. Able to transfer independently. Will have PICC line placed either today or tomorrow for long term antibiotics. Second shower completed at 1130. Patient taken to pre-op at 1330 by RN.

## 2019-11-15 NOTE — PLAN OF CARE
"  VS: /62 (BP Location: Left leg)   Pulse 66   Temp 96.8  F (36  C) (Oral)   Resp 16   Ht 1.778 m (5' 10\")   Wt 90 kg (198 lb 6.4 oz)   SpO2 96%   BMI 28.47 kg/m     O2: Room air. No complaints of SoB   Output: Voids spontaneously   Last BM: LBM: 11/15/19. Pt received Magnesium citrate during evening shift. Pt reports large BM at 0400 in the morning. Pt had 2 more loose/watery BM's overnight.    Activity: Independent in room.    Skin: Skin is intact besides puncture site on (L) shoulder due to aspiration.   Pain: Pt complains of chronic neck pain. Pt received Oxycodone x3 overnight along with ice packs for neck pain. Pt states that he is always in pain but the medications keep it bearable.     Pt has fentanyl patch in place on abdomen   CMS: Alert and oriented. Calls appropriatly   Dressing: CDI on (L) shoulder   Diet: NPO after midnight   LDA: PIV in (R) AC. Saline locked in between antibiotics   Equipment: IV pole, IV pump. Call light and phone within reach.    Plan: Continue with POC. Shoulder surgery 1600 on 11/15/19   Additional Info: Surgery prep: Chlorhexidine bed bath given x1 at 0615. Pt agreed to take shower prior to surgery for second scrub.       "

## 2019-11-15 NOTE — ANESTHESIA PREPROCEDURE EVALUATION
Anesthesia Pre-Procedure Evaluation    Patient: Kobe Buchanan   MRN:     [id number] Gender:   male   Age:    59 year old :      1960        Preoperative Diagnosis: S/P shoulder replacement, left [Z96.612]   Procedure(s):  Explantation of left total shoulder arthroplasty, irrigation and debridement, and placement of antibiotic spacer     Past Medical History:   Diagnosis Date     Anxiety      Ascending aortic aneurysm (H)      Bicuspid aortic valve      BPPV (benign paroxysmal positional vertigo) 2014     Chronic narcotic use      Chronic neck pain      Chronic osteoarthritis      Degenerative joint disease      Depression      Hyperlipidemia 4/10/2012     Hypertension      Multiple stiff joints      Neck injuries      Obesity 2015     Skin picking habit      Sleep apnea     does not use cpap      Past Surgical History:   Procedure Laterality Date     ARTHROPLASTY SHOULDER  4/15/2014    Procedure: Left Total Shoulder Arthroplasty ;  Surgeon: Analilia Aceves MD;  [facility]     ARTHROPLASTY SHOULDER Right 2014    Procedure: ARTHROPLASTY SHOULDER;  Surgeon: Analilia Aceves MD;  [facility]     BYPASS GASTRIC TAVO-EN-Y, LIVER BIOPSY, COMBINED  2005     C HAND/FINGER SURGERY UNLISTED       C SHOULDER SURG PROC UNLISTED       COLONOSCOPY  2014    Procedure: COMBINED COLONOSCOPY, SINGLE BIOPSY/POLYPECTOMY BY BIOPSY;  Surgeon: Chester Patton MD;  [facility]     CYSTOSCOPY, BLADDER NECK CUTS, COMBINED N/A 2016    Procedure: COMBINED CYSTOSCOPY, BLADDER NECK CUTS;  Surgeon: Ritu Leslie MD;  [facility]     ESOPHAGOSCOPY, GASTROSCOPY, DUODENOSCOPY (EGD), COMBINED  2014    Procedure: COMBINED ESOPHAGOSCOPY, GASTROSCOPY, DUODENOSCOPY (EGD), BIOPSY SINGLE OR MULTIPLE;  Surgeon: Chester Patton MD;  Location: [facility]     EXCISE MASS FINGER  2011    Procedure:EXCISE MASS FINGER; Middle Flexor Cyst; Surgeon:SHAYY RUSSELL  ARTI; Location:[facility]     HAND SURGERY      excision of tendon cyst on left hand     [facility] VASCULAR SURGERY PROCEDURE UNLIST       IR FINE NEEDLE ASPIRATION W ULTRASOUND  11/13/2019     LASER HOLMIUM LITHOTRIPSY BLADDER N/A 10/15/2014    Procedure: LASER HOLMIUM LITHOTRIPSY BLADDER;  Surgeon: Sahil Taveras MD;  Location: [facility]     LASER KTP GREEN LIGHT PHOTOSELECTIVE VAPORIZATION PROSTATE  1/23/2014    Procedure: LASER KTP GREEN LIGHT PHOTOSELECTIVE VAPORIZATION PROSTATE;  Greenlight Photovaporization Of Prostate  ;  Surgeon: Sahil Taveras MD;  Location: [facility]     RELEASE TRIGGER FINGER Right 3/31/2017    Procedure: RELEASE TRIGGER FINGER;  Surgeon: Juan Carlos Blunt MD;  [facility]     REPAIR ANEURYSM ASCENDING AORTA N/A 10/4/2016    Procedure: REPAIR ANEURYSM ASCENDING AORTA;  Surgeon: Mckenzie Townsend MD;  [facility]: [facility]     TURP  2014          Anesthesia Evaluation     . Pt has had prior anesthetic. Type: General    No history of anesthetic complications          ROS/MED HX    ENT/Pulmonary:     (+)sleep apnea, uses CPAP , . .   (-) asthma, COPD and recent URI   Neurologic:  - neg neurologic ROS    (-) seizures   Cardiovascular:     (+) hypertension-range: 140/, ---. : . . . :. . Previous cardiac testing Echodate:2019results:Global and regional left ventricular function is normal with an EF of 60-65%.  Aortic valve is bicuspid with fusion of the right and left coronary cusps,  Lucy Type 1.  There is aortic sclerosis not meeting criteria for stenosis, Vmax 2.3 m/s,  mean gradient 11mmHg, VINCE (VTI) 2cmÂ . There is trace to mild central aortic  insufficiency.date: results: date: results: date: results:         (-) CAD, taking anticoagulants/antiplatelets and arrhythmias   METS/Exercise Tolerance:  4 - Raking leaves, gardening   Hematologic:  - neg hematologic  ROS      (-) history of blood clots and anemia   Musculoskeletal:   (+) arthritis,  other musculoskeletal- Chronic Left  shoulder OA; s/p cervical fusion      GI/Hepatic:  - neg GI/hepatic ROS      (-) GERD   Renal/Genitourinary:     (+) BPH,       Endo:     (+) Obesity, .      Psychiatric:  - neg psychiatric ROS       Infectious Disease: Comment: Patient with infected left total shoulder        Malignancy:      - no malignancy   Other:    (+) No chance of pregnancy C-spine cleared: N/A, H/O Chronic Pain,H/O chronic opiod use , no other significant disability                    JZG [facility] AN PHYSICAL EXAM    LABS:  CBC:   Lab Results   Component Value Date    WBC 7.1 11/14/2019    WBC 9.6 11/13/2019    HGB 9.2 (L) 11/14/2019    HGB 9.5 (L) 11/13/2019    HCT 28.5 (L) 11/14/2019    HCT 28.9 (L) 11/13/2019     11/14/2019     11/13/2019     BMP:   Lab Results   Component Value Date     11/14/2019     11/12/2019    POTASSIUM 4.2 11/14/2019    POTASSIUM 4.8 11/12/2019    CHLORIDE 103 11/14/2019    CHLORIDE 100 11/12/2019    CO2 26 11/14/2019    CO2 30 11/12/2019    BUN 13 11/14/2019    BUN 16 11/12/2019    CR 0.91 11/14/2019    CR 1.08 11/12/2019    GLC 99 11/14/2019     (H) 11/12/2019     COAGS:   Lab Results   Component Value Date    PTT 39 (H) 10/04/2016    INR 1.16 (H) 11/12/2019     POC:   Lab Results   Component Value Date     (H) 10/10/2016     OTHER:   Lab Results   Component Value Date    PH 7.39 10/04/2016    LACT 0.6 (L) 11/12/2019    A1C 5.3 10/05/2016    NIA 8.8 11/14/2019    PHOS 3.1 10/05/2016    MAG 2.5 (H) 10/07/2016    ALBUMIN 3.4 11/12/2019    PROTTOTAL 7.9 11/12/2019    ALT 10 11/12/2019    AST 16 11/12/2019    ALKPHOS 105 11/12/2019    BILITOTAL 0.8 11/12/2019    TSH 2.96 01/10/2018    T4 1.45 11/02/2016    CRP 98.0 (H) 11/12/2019    SED 91 (H) 11/12/2019        Preop Vitals    BP Readings from Last 3 Encounters:   11/15/19 126/61   08/22/19 133/79   04/02/19 125/78    Pulse Readings from Last 3 Encounters:   11/14/19 66   10/29/19 74   08/22/19 58      Resp Readings from Last 3  "Encounters:   11/15/19 16   10/29/19 16   08/22/19 18    SpO2 Readings from Last 3 Encounters:   11/15/19 97%   10/29/19 98%   08/22/19 99%      Temp Readings from Last 1 Encounters:   11/15/19 36.5  C (97.7  F) (Oral)    Ht Readings from Last 1 Encounters:   11/12/19 1.778 m (5' 10\")      Wt Readings from Last 1 Encounters:   11/12/19 90 kg (198 lb 6.4 oz)    Estimated body mass index is 28.47 kg/m  as calculated from the following:    Height as of this encounter: 1.778 m (5' 10\").    Weight as of this encounter: 90 kg (198 lb 6.4 oz).     LDA:  Peripheral IV 11/12/19 Right Lower forearm (Active)   Site Assessment WDL 11/15/2019 12:00 PM   Line Status Saline locked 11/15/2019 12:00 PM   Phlebitis Scale 0-->no symptoms 11/15/2019 12:00 PM   Infiltration Scale 0 11/15/2019 12:00 PM   Infiltration Site Treatment Method  None 11/15/2019 12:00 PM   Extravasation? No 11/15/2019 12:00 PM   Number of days: 3       Urethral Catheter Latex 22 fr (Active)   Number of days: 1857       Urethral Catheter Non-latex;Silver coated;Double-lumen;Straight-tip 24 fr (Active)   Number of days: 1215        Assessment:   ASA SCORE: 3    H&P: History and physical reviewed and following examination; no interval change.    NPO Status: NPO Appropriate     Plan:   Anes. Type:  General; Peripheral Nerve Block; For Post-op pain in coordination with surgeon     Block Details: Single Shot; Exparel; Interscalene   Pre-Medication: Gabapentin; Acetaminophen   Induction:  IV (Standard)   Airway: ETT; Oral   Access/Monitoring: PIV   Maintenance: Balanced     Postop Plan:   Postop Pain: Opioids  Postop Sedation/Airway: Not planned  Disposition: Outpatient     PONV Management:   Adult Risk Factors:, Postop Opioids   Prevention: Ondansetron, Dexamethasone     CONSENT: Direct conversation   Plan and risks discussed with: Patient   Blood Products: Consent Deferred (Minimal Blood Loss)                   Michael Tenorio MD  "

## 2019-11-15 NOTE — OR NURSING
Block done in preop. Pt tolerated procedure well. Vital signs stable, reports pain is comfortable in neck. Fentanyl given, see mar.

## 2019-11-15 NOTE — PROGRESS NOTES
GENERAL ID SERVICE PROGRESS NOTE     Patient:  Kobe Buchanan   Date of birth 1960, Medical record number [id number]  Date of Visit:  11/15/2019  Date of Admission: 11/12/2019  Consult Requester:Analilia Aceves, *  Reason for Consult : Right TSA infection s/p explant , now with acute left TSA pain s/p finishing 6 weeks of ancef          Assessment and Recommendations:   Kobe Buchanan is a 59 year old male  , right handed, with history significant for  hypertension, hyperlipidemia, chronic back pain, TAA repair 2016, bipolar disorder, atrial fibrillation (not on anticoagulation ), obstructive sleep apnea, obesity,  and type 2 diabetes mellitus (diet control),hx of gastric bypass 2005,  hx of C.diff, skin picking habit, chronic neck pain,  DJD, osteoarthritis,  s/p  Left total shoulder arthroplasty on 4/15/14  and right  total shoulder arthroplasty on 8/26/2014.     He presented to [facility] on 8/22/19 with right shoulder sharp and burning pain He was hospitalized at [facility] 9/20/19-10/4/19 for confusion, weakness , acute bilateral shoulder pain and after a mechanical fall. found to have acute kidney injury requiring hemodialysis,  and MSSA bacteremia  with positive blood culture from 9/20/19 -9/22/19 . EEG was negative. XIOMY was negative for thrombus / vegetation .     1. left shoulder prosthetic joint infection   - pain started in September 2019   - worsening pain x 2 days when Cefazolin was discontinued on 11/10/19  -  WBC normal Hb 9.5 Plt 290   -  worsening  ESR 91 CRP 98 on 11/12/19 ( CRP was 11/7/19)    - left shoulder aspiration 11/13/19 - cloudy yellow fluid with 60258 WBC and 80% neutrophils . Gram stain few gram positive cocci , many WBCs predom PMNs. CX grew MSSA     2. Hx Right shoulder prostheric joint infection   - s/p irrigation and debridement , explant and replacement of spacer on 9/23/19.   - Intra-op cultures grew MSSA .   - s/p 6 weeks of Cefazolin and  completed  antibotic on 11/10/19.       3. history of MSSA bacteremia/septicemia (9/20/19 -9/22/19)  - XIOMY neg for thrombus/vegetation 9/24/19  - blood cx - neg x 2 on 11/13/19     RECOMMENDATION:  - continue Cefazolin 2 gram IV q8hr   - surgery - explant left TSA, antibiotic spacer later today 11/15/19.   - recommend intra-op gram stain, aerobic, anaerobic cx     - pain management per Ortho     ID will continue to follow. Dr Fagan will be on call this weekend and Dr Hilton will assume care next week     Eugene Alva MD,M.Med.Sc.  Infectious Diseases  Pager: [phone]     Interval history   continue to have pain in left shoulder with movement . no fever, chills, diarrhea .           History of Present Illness: 11/13/19     Kobe Buchanan is a 59 year old male  , right handed, with history significant for  hypertension, hyperlipidemia, chronic back pain, TAA repair 2016, bipolar disorder, atrial fibrillation (not on anticoagulation ), obstructive sleep apnea, obesity,  and type 2 diabetes mellitus (diet control),hx of gastric bypass 2005,  hx of C.diff, skin picking habit, chronic neck pain,  DJD, osteoarthritis,  s/p  Left total shoulder arthroplasty on 4/15/14  and right  total shoulder arthroplasty on 8/26/2014.     He presented to [facility] on 8/22/19 with right shoulder sharp and burning pain He was hospitalized at [facility] 9/20/19-10/4/19 for confusion, weakness , acute bilateral shoulder pain and after a mechanical fall. found to have acute kidney injury requiring hemodialysis,  and MSSA bacteremia  with positive blood culture from 9/20/19 -9/22/19 . EEG was negative. XIOMY was negative for thrombus / vegetation .      He had  right shoulder irrigation and debridement , explant and replacement of spacer on 9/23/19. Intra-op cultures grew MSSA . He was treated with 6 weeks of Cefazolin and  completed antibotic on 11/10/19.  PICC was removed.     on 10/23/19 visit to Ortho clinic,  he had  draining right shoulder wound. drainage has stopped.     He is admitted on 11/12/19 for acute on chronic left shoulder pain x 2 days described as 10/10 burning/aching pain, worse with movements . he started to have left shoulder pain at the same time he had right shoulder pain in September 2019.  Afebrile .no chills, night sweats.  WBC normal Hb 9.5 Plt 290   - but worsening  ESR 91 CRP 98 on 11/12/19 ( CRP was 11/7/19)      Imaging:  CT left shoulder w contrast 11/12/19  1. Nonspecific increasing lucency about the cranial and caudal margins of the glenoid component. Further evaluation with radiographs would be helpful for comparison to prior radiographs 3/21/2018 and 8/19/2015.  2. Small left shoulder effusion.    Xray left shoulder 11/12/19   Impression:  Total left shoulder arthroplasty. Less than 2 mm of lucency surrounding the humeral stem similar to 3/21/2018 but increased  compared to 8/19/2015. Stable fractured glenoid metallic marker. Degree of glenoid bone loss does not appear radiographically changed  compared to prior radiographs.    11/12/19  - IR - Synovial fluid - bloody , cloudy WBC 1588 with 81 % neutrophils. gram stain - rare PMNs no organism, culture - pending     11/13/19 -  fluoroscopy-guided left glenohumeral joint aspiration. 10 mL  hazy yellow fluid aspirated . cloudy yellow fluid with 40153 WBC and 80% neutrophils . Gram stain few gram positive cocci , many WBCs predom PMNs          Review of Systems:   CONSTITUTIONAL:  No fevers or chills  EYES: negative for icterus  ENT:  negative for hearing loss, tinnitus and sore throat  RESPIRATORY:  negative for cough with sputum and dyspnea  CARDIOVASCULAR:  negative for chest pain, dyspnea  GASTROINTESTINAL:  negative for nausea, vomiting, diarrhea and constipation  GENITOURINARY:  negative for dysuria  HEME:  No easy bruising  INTEGUMENT:  see HPI   NEURO:  see HPI          Past Medical History:     Past Medical History:   Diagnosis Date      Anxiety      Ascending aortic aneurysm (H)      Bicuspid aortic valve      BPPV (benign paroxysmal positional vertigo) 7/11/2014     Chronic narcotic use      Chronic neck pain      Chronic osteoarthritis      Degenerative joint disease      Depression      Hyperlipidemia 4/10/2012     Hypertension      Multiple stiff joints      Neck injuries      Obesity 2/9/2015     Skin picking habit      Sleep apnea     does not use cpap          Past Surgical History:     Past Surgical History:   Procedure Laterality Date     ARTHROPLASTY SHOULDER  4/15/2014    Procedure: Left Total Shoulder Arthroplasty ;  Surgeon: Analilia Aceves MD;  [facility]     ARTHROPLASTY SHOULDER Right 8/26/2014    Procedure: ARTHROPLASTY SHOULDER;  Surgeon: Analilia Aceves MD;  [facility]     BYPASS GASTRIC TAVO-EN-Y, LIVER BIOPSY, COMBINED  8/8/2005     C HAND/FINGER SURGERY UNLISTED       C SHOULDER SURG PROC UNLISTED       COLONOSCOPY  6/30/2014    Procedure: COMBINED COLONOSCOPY, SINGLE BIOPSY/POLYPECTOMY BY BIOPSY;  Surgeon: Chester Patton MD;  [facility]     CYSTOSCOPY, BLADDER NECK CUTS, COMBINED N/A 7/18/2016    Procedure: COMBINED CYSTOSCOPY, BLADDER NECK CUTS;  Surgeon: Ritu Leslie MD;  [facility]     ESOPHAGOSCOPY, GASTROSCOPY, DUODENOSCOPY (EGD), COMBINED  6/30/2014    Procedure: COMBINED ESOPHAGOSCOPY, GASTROSCOPY, DUODENOSCOPY (EGD), BIOPSY SINGLE OR MULTIPLE;  Surgeon: Chester Patton MD;  Location: [facility]     EXCISE MASS FINGER  6/14/2011    Procedure:EXCISE MASS FINGER; Middle Flexor Cyst; Surgeon:SHAYY RUSSELL; Location:[facility] OR     HAND SURGERY      excision of tendon cyst on left hand     [facility] VASCULAR SURGERY PROCEDURE UNLIST       IR FINE NEEDLE ASPIRATION W ULTRASOUND  11/13/2019     LASER HOLMIUM LITHOTRIPSY BLADDER N/A 10/15/2014    Procedure: LASER HOLMIUM LITHOTRIPSY BLADDER;  Surgeon: Sahil Taveras MD;  Location: [facility]     LASER KTP GREEN LIGHT  PHOTOSELECTIVE VAPORIZATION PROSTATE  2014    Procedure: LASER KTP GREEN LIGHT PHOTOSELECTIVE VAPORIZATION PROSTATE;  Greenlight Photovaporization Of Prostate  ;  Surgeon: Sahil Taveras MD;  Location: [facility]     RELEASE TRIGGER FINGER Right 3/31/2017    Procedure: RELEASE TRIGGER FINGER;  Surgeon: Juan Carlos Blunt MD;  [facility]     REPAIR ANEURYSM ASCENDING AORTA N/A 10/4/2016    Procedure: REPAIR ANEURYSM ASCENDING AORTA;  Surgeon: Mckenzie Townsend MD;  [facility]     TURP            Family History:   Reviewed and non-contributory.   Family History   Problem Relation Age of Onset     Arthritis Other      Gastrointestinal Disease Other      Cardiovascular Father         aortic aneurysm     Arrhythmia Father      Nephrolithiasis Father      Sleep Apnea Father      Anxiety Disorder Father      Depression Father      Hypertension Father      Obesity Father      Hyperlipidemia Father      Coronary Artery Disease Father         history of MI and stent     Low Back Problems Father      Spine Problems Father      Anxiety Disorder Mother      Hypertension Mother      Osteoporosis Mother      Obesity Mother      Hyperlipidemia Mother      Low Back Problems Mother      Anxiety Disorder Sister      Hypertension Sister      Osteoporosis Sister      Obesity Sister      Hyperlipidemia Sister      Low Back Problems Sister      Spine Problems Sister      Anxiety Disorder Sister      Depression Sister      Hypertension Sister      Osteoporosis Sister      Obesity Sister      Hyperlipidemia Sister      Low Back Problems Sister           Social History:     Social History     Tobacco Use     Smoking status: Former Smoker     Packs/day: 0.50     Years: 6.00     Pack years: 3.00     Types: Cigarettes     Start date: 1977     Last attempt to quit: 1983     Years since quittin.2     Smokeless tobacco: Never Used     Tobacco comment: quit 35 years ago   Substance Use Topics     Alcohol use:  No     Alcohol/week: 0.0 standard drinks   lives by himself in an apartment close to his parents; home. takes care of his parents late 80s and early 90s     History   Sexual Activity     Sexual activity: Not Currently     Partners: Female     Birth control/ protection: Abstinence          Current Medications:       amLODIPine-benazepril  1 capsule Oral BID     buPROPion  200 mg Oral BID     carvedilol  25 mg Oral BID w/meals     ceFAZolin  2 g Intravenous Q8H     docusate sodium  100 mg Oral Daily     docusate sodium  200 mg Oral At Bedtime     fentaNYL  75 mcg Transdermal Q48H     fentaNYL   Transdermal Q8H     [START ON 11/16/2019] fentaNYL   Transdermal Q48H     lactobacillus rhamnosus (GG)  1 capsule Oral Daily     losartan  25 mg Oral Daily     sennosides  3 tablet Oral Daily     sennosides  4 tablet Oral At Bedtime     simvastatin  10 mg Oral At Bedtime     sodium chloride (PF)  3 mL Intracatheter Q8H          Allergies:     Allergies   Allergen Reactions     Ciprofloxacin      History of aortic aneurysms     Liquid Adhesive Other (See Comments)     Blistering of skin          Physical Exam:   Vitals were reviewed  Patient Vitals for the past 24 hrs:   BP Temp Temp src Pulse Heart Rate Resp SpO2   11/15/19 0544 134/60 -- -- -- -- -- --   11/14/19 2209 121/62 96.8  F (36  C) Oral -- 68 16 96 %   11/14/19 2021 119/57 96.9  F (36.1  C) Oral 66 -- 16 96 %   11/14/19 1703 127/68 -- -- -- 79 -- 94 %   11/14/19 1600 124/54 97.1  F (36.2  C) Oral -- 73 16 98 %     Physical Examination:  GENERAL:  well-developed, well-nourished, in bed in no acute distress.   HEENT:  Head is normocephalic, atraumatic   EYES:  Eyes have anicteric sclerae without conjunctival injection   ENT:  Oropharynx is moist without exudates or ulcers. Tongue is midline  NECK:  Supple. No cervical lymphadenopathy  LUNGS:  Clear to auscultation bilateral.   CARDIOVASCULAR:  Regular rate and rhythm with no murmurs, gallops or rubs.  ABDOMEN:  Normal  bowel sounds, soft, nontender. No appreciable hepatosplenomegaly  SKIN:  No acute rashes.  Line(s) are in place without any surrounding erythema or exudate. many scars from skin picking   NEUROLOGIC: alert oriented, limited bilateral shoulder movement  left shoulder ; not tender on exam, no swelling, no increased warmth. pain with movement limited range of motion Left and right shoulder   extremities : no edema bilateral          Laboratory Data:     Inflammatory Markers    Recent Labs   Lab Test 11/12/19  1044 11/07/19  1545   SED 91*  --    CRP 98.0* 17.5*     Hematology Studies    Recent Labs   Lab Test 11/14/19  0709 11/13/19  0648 11/12/19  1044 11/07/19  1545 01/10/18  0808 03/01/17  1013 01/27/17  1636   WBC 7.1 9.6 9.2 6.6 6.8 6.4 9.2   ANEU  --  6.5 6.8 4.1 4.4 3.7 6.1   AEOS  --  0.2 0.1 0.3 0.2 0.2 0.2   HGB 9.2* 9.5* 9.6* 8.7* 15.5 14.3 14.3   MCV 90 90 93 91 88 82 83    290 267 245 250 234 247     Metabolic Studies     Recent Labs   Lab Test 11/14/19  0709 11/12/19  1044 11/07/19  1545 05/06/19  0939 01/10/18  0808 03/01/17  1013    134  --  136 136 139   POTASSIUM 4.2 4.8 4.3 4.4 4.2 4.0   CHLORIDE 103 100  --  103 102 102   CO2 26 30  --  26 28 29   BUN 13 16 21 17 21 28   CR 0.91 1.08 1.06 1.09 0.98 0.84   GFRESTIMATED >90 75 77 74 79 >90  Non  GFR Calc       Hepatic Studies    Recent Labs   Lab Test 11/12/19  1044 11/07/19  1545 01/10/18  0808 03/01/17  1013 10/26/16  0804 10/11/16  0937 10/10/16  0744   BILITOTAL 0.8  --  1.0 1.2 0.7 1.0 0.7   ALKPHOS 105 103 90 117 119 178* 196*   ALBUMIN 3.4  --  4.4 4.2 3.2* 3.0* 2.8*   AST 16 13 20 21 20 86* 115*   ALT 10 7 23 28 36 117* 112*     Microbiology:  Culture Micro   Date Value Ref Range Status   11/13/2019 No growth after 2 days  Preliminary   11/13/2019 No growth after 2 days  Preliminary   11/13/2019 Light growth  Staphylococcus aureus   (A)  Preliminary   11/13/2019   Preliminary    Critical Value/Significant Value,  preliminary result only, called to and read back by  Cyndee Burroughs RN at 1410 11.14.19 [id number]     11/13/2019 Culture negative monitoring continues  Preliminary   11/12/2019 Culture negative monitoring continues  Preliminary   07/12/2016 No growth  Final   04/06/2016 No growth  Final   03/04/2016 No growth  Final   01/28/2016 No growth  Final   09/30/2014 No growth  Final   04/02/2014 No growth  Final   02/24/2014 No growth  Final   02/24/2014 No growth  Final   02/11/2014 No growth  Final   08/03/2011 No growth  Final     Urine Studies    Recent Labs   Lab Test 09/15/16  1234 04/06/16  0955 03/04/16  0950 01/28/16  0729 08/21/14  1128   LEUKEST Negative Negative Negative Negative Negative   WBCU 1 0* 1* 4* <1     Hepatitis C Antibody   Date Value Ref Range Status   10/11/2016 (A) NR Final    Reactive   A reactive result indicates one of the following 1) current HCV infection 2)   past HCV infection that has resolved or 3) false positivity. The CDC recommends   that a reactive result should be followed by Nucleic acid testing for HCV RNA.  If HCV RNA is detected, that indicates current HCV infection. If HCV RNA is not   detected, that indicates either past, resolved HCV infection, or false HCV   antibody positivity.   Assay performance characteristics have not been established for newborns,   infants, and children

## 2019-11-15 NOTE — PROGRESS NOTES
"Orthopedic Surgery Progress Note    Subjective: no acute issues overnight. Pain controlled. Able to sleep. NPO since midnight.  Denies cp, sob, calf pain, fevers, chills, sweats. Denies new onset numbness/tingling in operative extremity.    Exam:  /60   Pulse 66   Temp 96.8  F (36  C) (Oral)   Resp 16   Ht 1.778 m (5' 10\")   Wt 90 kg (198 lb 6.4 oz)   SpO2 96%   BMI 28.47 kg/m    Gen: Awake, alert, NAD  Resp: breathing equal and non-labored  Extremities:    LUE: pain in left shoulder with A/PROM. 5/5 AIN/PIN/m/r/u. SILT in a/m/r/u distributions. 2+ radial pulse. Fingers WWP    Labs:    Recent Labs   Lab 11/14/19  0709 11/13/19  0648 11/12/19  1044   WBC 7.1 9.6 9.2   HGB 9.2* 9.5* 9.6*    290 267     Recent Labs   Lab 11/14/19  0709 11/12/19  1044    134   POTASSIUM 4.2 4.8   CHLORIDE 103 100   CO2 26 30   BUN 13 16   CR 0.91 1.08   GLC 99 117*     Recent Labs   Lab 11/12/19  1044   INR 1.16*       IR image guided L shoulder aspiration (11/13) - 33,500 WBCs, 80% PMNs  Gram stain - GPC  Cultures - staph aureus      Assessment:   59 year old male with PMH of 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 Completed abx course on 11/10/19 now with acute development with left shoulder pain just 2 days later.     Attempted bedside left shoulder aspiration yielded 1-2ml blood fluid. >1500 cells, 81% PMNs.     Planned Procedure:  Left TSA explantation, placement abx spacer on 11/15 with Dr. Aceves    Plan:  [facility] - inpatient status  Activity: Up with assist.  Weight bearing status: NWB BUE  Antibiotics: IV ancef per ID recs  Diet:NPO  Pain management: transition from IV to orals as tolerated.   Cultures: pending, follow closely  Consults: infectious disease, hospitalist, IR  Follow-up: pending hospital course    Disposition: NPO for explant left TSA, abx spacer placement on 11/15 with Dr. Aceves.     Future " Appointments   Date Time Provider Department Center   12/24/2019  1:30 PM Enriqueta Zhou MD [facility]   12/27/2019  9:45 AM Analilia Aceves MD [facility]   1/22/2020  9:00 AM Analilia Aceves MD [facility]        Juan Carlos Hernandez, PGY4  Orthopedic Surgery Resident  Pager [phone]

## 2019-11-15 NOTE — PLAN OF CARE
"Patient A&O times 4; VSS; LS clear and BS+; c/o pain to neck.  Oxycodone, Klonopin given for pain; using ice pack to neck; IV antibiotic infused; bowel meds, including Mag Citrate given and encouraged to shower per surgical order.  Did not eat during shift but tolerated PO fluid intake; PIV patent and intact; up ambulating ind in room and voiding good amounts; no BM result after bowel meds; patient directs care and refused to shower; patient verbalized \" I will shower in morning and that will be enough\"; asleep currently.  Continue with patient care.  "

## 2019-11-15 NOTE — PROGRESS NOTES
[facility], [place]    Hospitalist Progress Note      Assessment & Plan     Kobe Buchanan is a 59 year old male admitted on 11/12/2019. He has a significant PMH including hypertension, hyperlipidemia, chronic back pain, TAA repair, bipolar disorder, atrial fibrillation (not currently anticoagulated), obstructive sleep apnea, and type 2 diabetes mellitus (diet managed). 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   Individual problems and their management are outlined below     # Concern for infection of Left prosthetic shoulder joint: Presented with tenderness and marked restriction in movement of the left joint, similar to how he had presented with his rt shoulder joint infection. CRP at 98. Left shoulder  Synovial fluid analysis with 33K WBC (predominantly neutrophils). Likely infected. NO crystals on analysis.   -Ct Ancef foir MSSA from the joint  -ID seeing.   -going to OR today  # HTN. BP is high side, but they are being checked in the leg, so not accurate. Can check in Forearm.   -PTA mediations of Lotrel 5-20 mg, carvedilol 25 mg BID, Losartan 25 mg.   -Check BP in Forearm.  -Post op continue Coreg, hold others and restart as able.   # Anxiety: Resume Klonopin 1 mg TID PRN  # HO Major Depression: Resume Wellbutrin 200 mg BID  # Dyslipidemia: Resume Simvastatin 10 mg daily   # Ho Chronic Neck pain: Not worse than usual. Likely chronic issues, but If worsening consider MRI neck. Fentanyl patch changed to every 48 hr as per home. Pain consult for post op pain management.       DVT Prophylaxis: Per primary team  Code Status: Full Code  Disposition: Per primary    Adam Gonzales MD  Text Page  (7am - 5pm, M-F)    Interval History      Going to OR today. Doing well otherwise.     -Data  reviewed today: I reviewed all new labs and imaging results over the last 24 hours.     Physical Exam   Temp: 96.8  F (36  C) Temp src: Oral BP: 134/60 Pulse: 66 Heart Rate: 68 Resp: 16 SpO2: 96 % O2 Device: None (Room air)    Vitals:    11/12/19 0917   Weight: 90 kg (198 lb 6.4 oz)     Vital Signs with Ranges  Temp:  [96.8  F (36  C)-97.1  F (36.2  C)] 96.8  F (36  C)  Pulse:  [66] 66  Heart Rate:  [68-79] 68  Resp:  [16] 16  BP: (119-134)/(54-68) 134/60  SpO2:  [94 %-98 %] 96 %  I/O last 3 completed shifts:  In: 726 [P.O.:720; I.V.:6]  Out: -     Constitutional: No distress noted, Alert, Answering questions appropriately  Respiratory: AE is goog on both sides, no wheezing onr crackles  Cardiovascular: S1S2 normal, no new murmur  GI: Soft, non tender  Skin/Integumen: No rash  Other: No lege edema    Medications       amLODIPine-benazepril  1 capsule Oral BID     buPROPion  200 mg Oral BID     carvedilol  25 mg Oral BID w/meals     ceFAZolin  2 g Intravenous Q8H     docusate sodium  100 mg Oral Daily     docusate sodium  200 mg Oral At Bedtime     fentaNYL  75 mcg Transdermal Q48H     fentaNYL   Transdermal Q8H     [START ON 11/16/2019] fentaNYL   Transdermal Q48H     lactobacillus rhamnosus (GG)  1 capsule Oral Daily     losartan  25 mg Oral Daily     sennosides  3 tablet Oral Daily     sennosides  4 tablet Oral At Bedtime     simvastatin  10 mg Oral At Bedtime     sodium chloride (PF)  3 mL Intracatheter Q8H       Data   Recent Labs   Lab 11/14/19  0709 11/13/19  0648 11/12/19  1044   WBC 7.1 9.6 9.2   HGB 9.2* 9.5* 9.6*   MCV 90 90 93    290 267   INR  --   --  1.16*     --  134   POTASSIUM 4.2  --  4.8   CHLORIDE 103  --  100   CO2 26  --  30   BUN 13  --  16   CR 0.91  --  1.08   ANIONGAP 7  --  5   NIA 8.8  --  9.5   GLC 99  --  117*   ALBUMIN  --   --  3.4   PROTTOTAL  --   --  7.9   BILITOTAL  --   --  0.8   ALKPHOS  --   --  105   ALT  --   --  10   AST  --   --  16       No results found for  this or any previous visit (from the past 24 hour(s)).

## 2019-11-16 LAB
GRAM STN SPEC: NORMAL
SPECIMEN SOURCE: NORMAL

## 2019-11-16 PROCEDURE — 12000001 ZZH R&B MED SURG/OB UMMC

## 2019-11-16 PROCEDURE — 25000132 ZZH RX MED GY IP 250 OP 250 PS 637: Performed by: STUDENT IN AN ORGANIZED HEALTH CARE EDUCATION/TRAINING PROGRAM

## 2019-11-16 PROCEDURE — 25000132 ZZH RX MED GY IP 250 OP 250 PS 637: Performed by: ORTHOPAEDIC SURGERY

## 2019-11-16 PROCEDURE — 40000894 ZZH STATISTIC OT IP EVAL DEFER

## 2019-11-16 PROCEDURE — 99232 SBSQ HOSP IP/OBS MODERATE 35: CPT | Performed by: HOSPITALIST

## 2019-11-16 PROCEDURE — 25000128 H RX IP 250 OP 636: Performed by: STUDENT IN AN ORGANIZED HEALTH CARE EDUCATION/TRAINING PROGRAM

## 2019-11-16 PROCEDURE — 99207 ZZC CDG-CUT & PASTE-POTENTIAL IMPACT ON LEVEL: CPT | Performed by: HOSPITALIST

## 2019-11-16 PROCEDURE — 36569 INSJ PICC 5 YR+ W/O IMAGING: CPT | Mod: 52

## 2019-11-16 PROCEDURE — 25000128 H RX IP 250 OP 636: Performed by: ORTHOPAEDIC SURGERY

## 2019-11-16 RX ORDER — HYDROMORPHONE HYDROCHLORIDE 1 MG/ML
.5-1 INJECTION, SOLUTION INTRAMUSCULAR; INTRAVENOUS; SUBCUTANEOUS
Status: DISCONTINUED | OUTPATIENT
Start: 2019-11-16 | End: 2019-11-18

## 2019-11-16 RX ORDER — OXYCODONE HYDROCHLORIDE 5 MG/1
10-15 TABLET ORAL
Status: DISCONTINUED | OUTPATIENT
Start: 2019-11-16 | End: 2019-11-18

## 2019-11-16 RX ADMIN — HYDROMORPHONE HYDROCHLORIDE 0.5 MG: 1 INJECTION, SOLUTION INTRAMUSCULAR; INTRAVENOUS; SUBCUTANEOUS at 16:11

## 2019-11-16 RX ADMIN — CLONAZEPAM 0.5 MG: 0.5 TABLET ORAL at 23:42

## 2019-11-16 RX ADMIN — CEFAZOLIN SODIUM 2 G: 2 INJECTION, SOLUTION INTRAVENOUS at 16:55

## 2019-11-16 RX ADMIN — SENNOSIDES 3 TABLET: 8.6 TABLET, FILM COATED ORAL at 05:20

## 2019-11-16 RX ADMIN — HYDROMORPHONE HYDROCHLORIDE 0.5 MG: 1 INJECTION, SOLUTION INTRAMUSCULAR; INTRAVENOUS; SUBCUTANEOUS at 13:38

## 2019-11-16 RX ADMIN — OXYCODONE HYDROCHLORIDE 15 MG: 5 TABLET ORAL at 14:25

## 2019-11-16 RX ADMIN — CARVEDILOL 25 MG: 25 TABLET, FILM COATED ORAL at 05:21

## 2019-11-16 RX ADMIN — CLONAZEPAM 0.5 MG: 0.5 TABLET ORAL at 18:45

## 2019-11-16 RX ADMIN — OXYCODONE HYDROCHLORIDE 15 MG: 5 TABLET ORAL at 23:28

## 2019-11-16 RX ADMIN — AMLODIPINE AND BENAZEPRIL HYDROCHLORIDE 1 CAPSULE: 5; 20 CAPSULE ORAL at 16:54

## 2019-11-16 RX ADMIN — DOCUSATE SODIUM 200 MG: 100 CAPSULE, LIQUID FILLED ORAL at 16:54

## 2019-11-16 RX ADMIN — OXYCODONE HYDROCHLORIDE 10 MG: 10 TABLET ORAL at 01:28

## 2019-11-16 RX ADMIN — CEFAZOLIN SODIUM 2 G: 2 INJECTION, SOLUTION INTRAVENOUS at 01:22

## 2019-11-16 RX ADMIN — BUPROPION HYDROCHLORIDE 200 MG: 200 TABLET, EXTENDED RELEASE ORAL at 05:20

## 2019-11-16 RX ADMIN — CEFAZOLIN SODIUM 2 G: 2 INJECTION, SOLUTION INTRAVENOUS at 08:36

## 2019-11-16 RX ADMIN — FENTANYL 1 PATCH: 75 PATCH, EXTENDED RELEASE TRANSDERMAL at 08:29

## 2019-11-16 RX ADMIN — BUPROPION HYDROCHLORIDE 200 MG: 200 TABLET, EXTENDED RELEASE ORAL at 16:54

## 2019-11-16 RX ADMIN — Medication 1 CAPSULE: at 05:20

## 2019-11-16 RX ADMIN — OXYCODONE HYDROCHLORIDE 10 MG: 10 TABLET ORAL at 11:23

## 2019-11-16 RX ADMIN — OXYCODONE HYDROCHLORIDE 10 MG: 10 TABLET ORAL at 08:28

## 2019-11-16 RX ADMIN — OXYCODONE HYDROCHLORIDE 10 MG: 10 TABLET ORAL at 05:27

## 2019-11-16 RX ADMIN — SIMVASTATIN 10 MG: 10 TABLET, FILM COATED ORAL at 16:54

## 2019-11-16 RX ADMIN — DOCUSATE SODIUM 100 MG: 100 CAPSULE, LIQUID FILLED ORAL at 05:20

## 2019-11-16 RX ADMIN — OXYCODONE HYDROCHLORIDE 15 MG: 5 TABLET ORAL at 20:30

## 2019-11-16 RX ADMIN — OXYCODONE HYDROCHLORIDE 15 MG: 5 TABLET ORAL at 17:35

## 2019-11-16 RX ADMIN — AMLODIPINE AND BENAZEPRIL HYDROCHLORIDE 1 CAPSULE: 5; 20 CAPSULE ORAL at 05:21

## 2019-11-16 RX ADMIN — CARVEDILOL 25 MG: 25 TABLET, FILM COATED ORAL at 16:55

## 2019-11-16 RX ADMIN — HYDROMORPHONE HYDROCHLORIDE 1 MG: 1 INJECTION, SOLUTION INTRAMUSCULAR; INTRAVENOUS; SUBCUTANEOUS at 18:46

## 2019-11-16 RX ADMIN — LOSARTAN POTASSIUM 25 MG: 25 TABLET ORAL at 05:21

## 2019-11-16 RX ADMIN — HYDROMORPHONE HYDROCHLORIDE 0.5 MG: 1 INJECTION, SOLUTION INTRAMUSCULAR; INTRAVENOUS; SUBCUTANEOUS at 16:45

## 2019-11-16 RX ADMIN — SENNOSIDES 4 TABLET: 8.6 TABLET, FILM COATED ORAL at 16:54

## 2019-11-16 ASSESSMENT — ACTIVITIES OF DAILY LIVING (ADL)
ADLS_ACUITY_SCORE: 14
ADLS_ACUITY_SCORE: 13

## 2019-11-16 NOTE — PLAN OF CARE
VS: VSS   O2: >90% on RA   Output: Adequate in BR   Last BM: 11/15; +fl   Activity: NWB BUE. Up ind and ambulating in halls. Needs assistance positioning in bed.    Up for meals? Yes   Skin: Incision L shoulder, approximated incision R shoulder   Pain: 10mg oxycodone q3h increased to 10-15. Fentanyl patch in place on abdomen LUQ   CMS: Numbness LUE; block wearing off.   Dressing: CDI   Diet: Regular   LDA: R hand PIV SL btw ancef q8h; HV patent.   Equipment: PCDs, sling, IV pole   Plan: PICC placement, home with home infusion   Additional Info: Unable to place PICC at bedside. IR consult placed for PICC placement Monday.

## 2019-11-16 NOTE — BRIEF OP NOTE
[facility], [place]    Brief Operative Note    Pre-operative diagnosis: Infected left total shoulder arthroplasty  Post-operative diagnosis Same    Procedure: Procedure(s):  Explantation of left total shoulder arthroplasty, irrigation and debridement, and placement of antibiotic spacer  Surgeon: Surgeon(s) and Role:     * Analilia Aceves MD - Primary  Anesthesia: Choice   Estimated blood loss: 150ml  Drains: Hemovac  Specimens:   ID Type Source Tests Collected by Time Destination   1 : Left Shoulder #1 Tissue Shoulder ANAEROBIC BACTERIAL CULTURE, GRAM STAIN, TISSUE CULTURE AEROBIC BACTERIAL Analilia Aceves MD 11/15/2019  6:42 PM    2 : Left Shoulder #2 Tissue Shoulder ANAEROBIC BACTERIAL CULTURE, GRAM STAIN, TISSUE CULTURE AEROBIC BACTERIAL Analilia Aceves MD 11/15/2019  6:47 PM    3 : Left Sholder #3 Tissue Shoulder ANAEROBIC BACTERIAL CULTURE, GRAM STAIN, TISSUE CULTURE AEROBIC BACTERIAL Analilia Aceves MD 11/15/2019  6:49 PM    4 : Left Shoulder #4 Tissue Shoulder ANAEROBIC BACTERIAL CULTURE, GRAM STAIN, TISSUE CULTURE AEROBIC BACTERIAL Analilia Aceves MD 11/15/2019  6:59 PM    5 : Left Shoulder #5 Tissue Shoulder ANAEROBIC BACTERIAL CULTURE, GRAM STAIN, TISSUE CULTURE AEROBIC BACTERIAL Analilia Aceves MD 11/15/2019  7:00 PM      Findings:   Loose total shoulder arthroplasty with cloudy fluid.  Complications: None.  Implants:   Implant Name Type Inv. Item Serial No.  Lot No. LRB No. Used   Palacos LV+G Bone Cement with Gentamicin   -349-01 MAICOL [id number] Left 2   Biomet Stage One Shoulder Spacer Mold    [id number]  [id number] Left 1   Biomet Cartridge Thread Adapter   [id number] BIOMET [id number] Left 1   Maicol 3 Dose Compact Mixing System    -719-01 MAICOL [id number] Left 1   BONE CEMENT SIMPLEX W/TOBRAMYCIN 6197-9-001  BONE CEMENT SIMPLEX W/TOBRAMYCIN 6197-9-001  [employer] [id number] Left 1   IMP COMP GLENOID ZIM  52MM -781-00  IMP COMP GLENOID ZIM 52MM -464-00  [employer] [id number] Left 1   IMP HEAD MOD HUMERAL ZIM [id number]  IMP HEAD MOD HUMERAL ZIM [id number]  [employer] [id number] Left 1   IMP STEM HUMERAL MOD ZIM [id number]  IMP STEM HUMERAL MOD ZIM [id number]  [employer] [id number] Left 1

## 2019-11-16 NOTE — PROGRESS NOTES
[facility], [place]    Hospitalist Progress Note      Assessment & Plan     Kobe Buchanan is a 59 year old male admitted on 11/12/2019. He has a significant PMH including hypertension, hyperlipidemia, chronic back pain, TAA repair, bipolar disorder, atrial fibrillation (not currently anticoagulated), obstructive sleep apnea, and type 2 diabetes mellitus (diet managed). 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   Individual problems and their management are outlined below     # Concern for infection of Left prosthetic shoulder joint: Presented with tenderness and marked restriction in movement of the left joint, similar to how he had presented with his rt shoulder joint infection. CRP at 98. Left shoulder  Synovial fluid analysis with 33K WBC (predominantly neutrophils). Likely infected. NO crystals on analysis.   -Ct Ancef foir MSSA from the joint  -ID seeing.   -SP OR explant and spacer after washout  -Need PICC  -Ct Ancef  # HTN. BP is high side, but they are being checked in the leg, so not accurate. Can check in Forearm.   -PTA mediations of Lotrel 5-20 mg, carvedilol 25 mg BID, Losartan 25 mg.   -Check BP in Forearm.  -Post op continue Coreg, hold others and restart as able.   # Anxiety: Resume Klonopin 1 mg TID PRN  # HO Major Depression: Resume Wellbutrin 200 mg BID  # Dyslipidemia: Resume Simvastatin 10 mg daily   # Ho Chronic Neck pain: Not worse than usual. Likely chronic issues, but If worsening consider MRI neck. Fentanyl patch changed to every 48 hr as per home. Pain consult for post op pain management.       DVT Prophylaxis: Per primary team  Code Status: Full Code  Disposition: Per primary    Adam Gonzales MD  Text Page  (7am - 5pm, M-F)    Interval History      SP surgery  "now. Doing good    -Data reviewed today: I reviewed all new labs and imaging results over the last 24 hours.     Physical Exam   Temp: 96.8  F (36  C) Temp src: Oral BP: (!) 141/53 Pulse: 64 Heart Rate: 78 Resp: 16 SpO2: 96 % O2 Device: None (Room air) Oxygen Delivery: 2 LPM  Vitals:    11/12/19 0917   Weight: 90 kg (198 lb 6.4 oz)     Vital Signs with Ranges  Temp:  [96  F (35.6  C)-97.7  F (36.5  C)] 96.8  F (36  C)  Pulse:  [64-81] 64  Heart Rate:  [66-78] 78  Resp:  [8-20] 16  BP: (105-175)/(40-74) 141/53  SpO2:  [92 %-100 %] 96 %  I/O last 3 completed shifts:  In: 1450 [P.O.:150; I.V.:800]  Out: 250 [Drains:200; Blood:50]    Constitutional: No distress noted, Alert, Answering questions appropriately  Respiratory: AE is goog on both sides, no wheezing onr crackles  Cardiovascular: S1S2 normal, no new murmur  GI: Soft, non tender  Skin/Integumen: No rash  Other: No lege edema    Medications     bupivacaine liposome (EXPAREL) LONG ACTING injection was administered into the infiltration site to produce postsurgical analgesia. Duration of action is up to 72 hours, and other \"trevor\" medications should not be given for 96 hours with the exception of the lidocaine 5% patch (LIDODERM) and the lidocaine 10mg in potassium infusions. This entry is for INFORMATION ONLY.       lactated ringers         amLODIPine-benazepril  1 capsule Oral BID     buPROPion  200 mg Oral BID     carvedilol  25 mg Oral BID w/meals     ceFAZolin  1 g Intravenous See Admin Instructions     ceFAZolin  2 g Intravenous Q8H     docusate sodium  100 mg Oral Daily     docusate sodium  200 mg Oral At Bedtime     fentaNYL  75 mcg Transdermal Q48H     fentaNYL   Transdermal Q8H     fentaNYL   Transdermal Q48H     lactobacillus rhamnosus (GG)  1 capsule Oral Daily     losartan  25 mg Oral Daily     sennosides  3 tablet Oral Daily     sennosides  4 tablet Oral At Bedtime     simvastatin  10 mg Oral At Bedtime     sodium chloride (PF)  3 mL Intracatheter Q8H "       Data   Recent Labs   Lab 11/15/19  1517 11/14/19  0709 11/13/19  0648 11/12/19  1044   WBC  --  7.1 9.6 9.2   HGB 9.1* 9.2* 9.5* 9.6*   MCV  --  90 90 93   PLT  --  260 290 267   INR 1.22*  --   --  1.16*   NA  --  136  --  134   POTASSIUM  --  4.2  --  4.8   CHLORIDE  --  103  --  100   CO2  --  26  --  30   BUN  --  13  --  16   CR  --  0.91  --  1.08   ANIONGAP  --  7  --  5   NIA  --  8.8  --  9.5   GLC  --  99  --  117*   ALBUMIN  --   --   --  3.4   PROTTOTAL  --   --   --  7.9   BILITOTAL  --   --   --  0.8   ALKPHOS  --   --   --  105   ALT  --   --   --  10   AST  --   --   --  16       No results found for this or any previous visit (from the past 24 hour(s)).

## 2019-11-16 NOTE — OR NURSING
PACU to Inpatient Nursing Handoff    Patient Kobe Buchanan is a 59 year old male who speaks English.   Procedure Procedure(s):  Explantation of left total shoulder arthroplasty, irrigation and debridement, and placement of antibiotic spacer   Surgeon(s) Primary: Analilia Aceves MD     Allergies   Allergen Reactions     Ciprofloxacin      History of aortic aneurysms     Liquid Adhesive Other (See Comments)     Blistering of skin       Isolation  [unfilled]     Past Medical History   has a past medical history of Anxiety, Ascending aortic aneurysm (H), Bicuspid aortic valve, BPPV (benign paroxysmal positional vertigo) (7/11/2014), Chronic narcotic use, Chronic neck pain, Chronic osteoarthritis, Degenerative joint disease, Depression, Hyperlipidemia (4/10/2012), Hypertension, Multiple stiff joints, Neck injuries, Obesity (2/9/2015), Skin picking habit, and Sleep apnea.    Anesthesia Choice   Dermatome Level     Preop Meds Not applicable   Nerve block Interscalene.  Location:left. Med:bupivacaine. Time given: 1500   Intraop Meds dexamethasone (Decadron)  fentanyl (Sublimaze): 100 mcg total  ondansetron (Zofran): last given at 1738   Local Meds No   Antibiotics cefazolin (Ancef) - last given at 1808     Pain Patient Currently in Pain: yes  Comfort: comfortably manageable  Pain Control: partially effective   PACU meds  fentanyl (Sublimaze): 50 mcg (total dose) last given at 0830    PCA / epidural No   Capnography     Telemetry ECG Rhythm: Normal sinus rhythm   Inpatient Telemetry Monitor Ordered? No        Labs Glucose Lab Results   Component Value Date    GLC 99 11/14/2019       Hgb Lab Results   Component Value Date    HGB 9.1 11/15/2019       INR Lab Results   Component Value Date    INR 1.22 11/15/2019      PACU Imaging Not applicable     Wound/Incision Incision/Surgical Site 10/15/14 Penis (Active)   Number of days: 1857       Incision/Surgical Site 10/04/16 Chest (Active)   Number of days: 1137        Incision/Surgical Site 03/31/17 Right Hand (Active)   Number of days: 959       Incision/Surgical Site 11/15/19 Left Shoulder (Active)   Incision Assessment UTV 11/15/2019  8:00 PM   Closure Staples 11/15/2019  7:53 PM   Dressing Intervention Clean, dry, intact 11/15/2019  8:00 PM   Number of days: 0      CMS        Equipment ice pack and shoulder sling   Other LDA       IV Access Peripheral IV 11/12/19 Right Lower forearm (Active)   Site Assessment [facility] 11/15/2019  8:00 PM   Line Status Saline locked 11/15/2019  8:00 PM   Phlebitis Scale 0-->no symptoms 11/15/2019  8:00 PM   Infiltration Scale 0 11/15/2019  8:00 PM   Infiltration Site Treatment Method  None 11/15/2019  2:00 PM   Extravasation? No 11/15/2019  2:00 PM   Number of days: 3       Peripheral IV 11/15/19 Right Hand (Active)   Site Assessment [facility] 11/15/2019  8:00 PM   Line Status Infusing 11/15/2019  8:00 PM   Phlebitis Scale 0-->no symptoms 11/15/2019  8:00 PM   Infiltration Scale 0 11/15/2019  8:00 PM   Number of days: 0      Blood Products Albumin EBL 50 mL   Intake/Output Date 11/15/19 0700 - 11/16/19 0659   Shift 1788-9718 8822-1959 5629-1515 24 Hour Total   INTAKE   I.V.  800  800   Colloid  500  500   Shift Total(mL/kg)  1300(14.45)  1300(14.45)   OUTPUT   Blood  50  50   Shift Total(mL/kg)  50(0.56)  50(0.56)   Weight (kg) 89.99 89.99 89.99 89.99      Drains / Serrano Urethral Catheter Latex 22 fr (Active)   Number of days: 1857       Urethral Catheter Non-latex;Silver coated;Double-lumen;Straight-tip 24 fr (Active)   Number of days: 1215      Time of void PreOp Void Prior to Procedure: 1345 (11/15/19 1431)    PostOp Voided (mL): 800 mL (11/13/19 0056)  Urine Occurrence: 1 (11/14/19 1946)    Diapered? No   Bladder Scan      mL (11/14/19 1946)  tolerating sips     Vitals    B/P: 108/56  T: 97.5  F (36.4  C)    Temp src: Oral  P:  Pulse: 71 (11/15/19 2000)    Heart Rate: 73 (11/15/19 2000)     R: 20  O2:  SpO2: 100 %    O2 Device: Simple face  mask (11/15/19 1957)    Oxygen Delivery: 4 LPM (11/15/19 1957)         Family/support present none   Patient belongings     Patient transported on cart and air mat   DC meds/scripts (obs/outpt) Not applicable   Inpatient Pain Meds Released? Yes       Special needs/considerations None   Tasks needing completion None       Socorro Wylie, RN  [facility]COM [id number]

## 2019-11-16 NOTE — PROGRESS NOTES
A/Ox's 4. Pt rated pain as tolerable. Oxycodone given for pain control. Dressing CDI. Pt has a Drain. Numbness RUE d/t the block. Tolerated regular diet. Denied any nausea, CP, SOB, lightheadedness or dizziness. Voiding without pain or difficulty. Passing flatus. Up with SBA. Resting in bed at this time with call light in reach. Able to make needs known. Continue to monitor.

## 2019-11-16 NOTE — ANESTHESIA CARE TRANSFER NOTE
Patient: Kobe Buchanan    Procedure(s):  Explantation of left total shoulder arthroplasty, irrigation and debridement, and placement of antibiotic spacer    Diagnosis: S/P shoulder replacement, left [Z96.612]  Diagnosis Additional Information: No value filed.    Anesthesia Type:   General, Peripheral Nerve Block, For Post-op pain in coordination with surgeon     Note:  Airway :Face Mask  Patient transferred to:PACU  Handoff Report: Identifed the Patient, Identified the Reponsible Provider, Reviewed the pertinent medical history, Discussed the surgical course, Reviewed Intra-OP anesthesia mangement and issues during anesthesia, Set expectations for post-procedure period and Allowed opportunity for questions and acknowledgement of understanding      Vitals: (Last set prior to Anesthesia Care Transfer)    CRNA VITALS  11/15/2019 1925 - 11/15/2019 2000      11/15/2019             Pulse:  75    SpO2:  100 %    Resp Rate (observed):  (!) 4                Electronically Signed By: ALAYNA Poe CRNA  November 15, 2019  8:00 PM

## 2019-11-16 NOTE — PROGRESS NOTES
"Orthopedic Surgery Progress Note    Subjective: returned to the floor s/p procedure last evening. No acute issues overnight. Pain controlled. Able to sleep. Tolerating PO trials.  Denies cp, sob, calf pain, fevers, chills, sweats. Denies new onset numbness/tingling in operative extremity.    Exam:  BP (!) 141/59 (BP Location: Left arm)   Pulse 64   Temp 96  F (35.6  C) (Oral)   Resp 12   Ht 1.778 m (5' 10\")   Wt 90 kg (198 lb 6.4 oz)   SpO2 96%   BMI 28.47 kg/m    Gen: Awake, alert, NAD  Resp: breathing equal and non-labored  Extremities:    LUE: Operative dressing c/d/i. Drain in place, patent. Numb in m/r/u distributions 2/2 regional block. 2+ radial pulse. Fingers WWP    Labs:    Recent Labs   Lab 11/15/19  1517 11/14/19  0709 11/13/19  0648 11/12/19  1044   WBC  --  7.1 9.6 9.2   HGB 9.1* 9.2* 9.5* 9.6*   PLT  --  260 290 267     Recent Labs   Lab 11/14/19  0709 11/12/19  1044    134   POTASSIUM 4.2 4.8   CHLORIDE 103 100   CO2 26 30   BUN 13 16   CR 0.91 1.08   GLC 99 117*     Recent Labs   Lab 11/15/19  1517 11/12/19  1044   INR 1.22* 1.16*       IR image guided L shoulder aspiration (11/13) - 33,500 WBCs, 80% PMNs  Gram stain - GPC  Cultures - staph aureus    Intraop Cxs - pending      Assessment:   59 year old male with PMH of 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 Completed abx course on 11/10/19 now with acute development with left shoulder pain just 2 days later.     Attempted bedside left shoulder aspiration yielded 1-2ml blood fluid. >1500 cells, 81% PMNs.     Procedure:  Left TSA explantation, placement abx spacer on 11/15 with Dr. Aceves    Plan:  Ortho Primary - inpatient status  Activity: Up with assist.  Weight bearing status: NWB BUE  Antibiotics: IV ancef per ID recs  Diet: OK for diet   Drain: continue to record output q shift  Pain management: transition from IV to orals as tolerated.   Cultures: " pending, follow closely - ID managing Abx tailoring   Consults: infectious disease, hospitalist, IR  Follow-up: clinic with Dr. Aceves care team in 2 weeks for wound check    Disposition: pending intraop cultures, ID recs for Abx tailoring, anticipated PICC placement (ID to order if appropriate). Anticipate discharge with home infusion early next week.     Future Appointments   Date Time Provider Department Center   12/24/2019  1:30 PM Enriqueta Zhou MD [facility]   12/27/2019  9:45 AM Analilia Aceves MD [facility]   1/22/2020  9:00 AM Analilia Aceves MD [facility]        Juan Cralos Hernandez, PGY4  Orthopedic Surgery Resident  Pager [phone]

## 2019-11-16 NOTE — CONSULTS
INTERVENTIONAL RADIOLOGY CONSULTATION    Name: Kobe Buchanan  Age: 59 year old   Referring Physician: Dr. Hussein ref. provider found   REASON FOR REFERRAL: PICC     HPI: We are consulted because the patient will require  PICC line for long term antibiotics, and the vascular access team is unable to place.    PAST MEDICAL HISTORY:   Past Medical History:   Diagnosis Date     Anxiety      Ascending aortic aneurysm (H)      Bicuspid aortic valve      BPPV (benign paroxysmal positional vertigo) 7/11/2014     Chronic narcotic use      Chronic neck pain      Chronic osteoarthritis      Degenerative joint disease      Depression      Hyperlipidemia 4/10/2012     Hypertension      Multiple stiff joints      Neck injuries      Obesity 2/9/2015     Skin picking habit      Sleep apnea     does not use cpap       PAST SURGICAL HISTORY:   Past Surgical History:   Procedure Laterality Date     ARTHROPLASTY SHOULDER  4/15/2014    Procedure: Left Total Shoulder Arthroplasty ;  Surgeon: Analilia Aceves MD;  [facility]     ARTHROPLASTY SHOULDER Right 8/26/2014    Procedure: ARTHROPLASTY SHOULDER;  Surgeon: Analilia Aceves MD;  [facility]     BYPASS GASTRIC TAVO-EN-Y, LIVER BIOPSY, COMBINED  8/8/2005     C HAND/FINGER SURGERY UNLISTED       C SHOULDER SURG PROC UNLISTED       COLONOSCOPY  6/30/2014    Procedure: COMBINED COLONOSCOPY, SINGLE BIOPSY/POLYPECTOMY BY BIOPSY;  Surgeon: Chester Patton MD;  [facility]     CYSTOSCOPY, BLADDER NECK CUTS, COMBINED N/A 7/18/2016    Procedure: COMBINED CYSTOSCOPY, BLADDER NECK CUTS;  Surgeon: Ritu Leslie MD;  [facility]     ESOPHAGOSCOPY, GASTROSCOPY, DUODENOSCOPY (EGD), COMBINED  6/30/2014    Procedure: COMBINED ESOPHAGOSCOPY, GASTROSCOPY, DUODENOSCOPY (EGD), BIOPSY SINGLE OR MULTIPLE;  Surgeon: Chester Patton MD;  Location: [facility]     EXCISE MASS FINGER  6/14/2011    Procedure:EXCISE MASS FINGER; Middle Flexor Cyst;  "Surgeon:SHAYY RUSSELL; Location:[facility] OR     HAND SURGERY      excision of tendon cyst on left hand     [facility] VASCULAR SURGERY PROCEDURE UNLIST       IR FINE NEEDLE ASPIRATION W ULTRASOUND  11/13/2019     LASER HOLMIUM LITHOTRIPSY BLADDER N/A 10/15/2014    Procedure: LASER HOLMIUM LITHOTRIPSY BLADDER;  Surgeon: Sahil Taveras MD;  Location: [facility]     LASER KTP GREEN LIGHT PHOTOSELECTIVE VAPORIZATION PROSTATE  1/23/2014    Procedure: LASER KTP GREEN LIGHT PHOTOSELECTIVE VAPORIZATION PROSTATE;  Greenlight Photovaporization Of Prostate  ;  Surgeon: Sahil Taveras MD;  Location: [facility] OR     RELEASE TRIGGER FINGER Right 3/31/2017    Procedure: RELEASE TRIGGER FINGER;  Surgeon: Juan Carlos Blunt MD;  [facility]     REPAIR ANEURYSM ASCENDING AORTA N/A 10/4/2016    Procedure: REPAIR ANEURYSM ASCENDING AORTA;  Surgeon: Mckenzie Townsend MD;  [facility]: [facility]     TURP  2014         Physical Examination:   VITALS:   BP (!) 157/66 (BP Location: Left leg)   Pulse 64   Temp 97.7  F (36.5  C) (Oral)   Resp 16   Ht 1.778 m (5' 10\")   Wt 90 kg (198 lb 6.4 oz)   SpO2 94%   BMI 28.47 kg/m      Labs:    BMP RESULTS:  Lab Results   Component Value Date     11/14/2019    POTASSIUM 4.2 11/14/2019    CHLORIDE 103 11/14/2019    CO2 26 11/14/2019    ANIONGAP 7 11/14/2019    GLC 99 11/14/2019    BUN 13 11/14/2019    CR 0.91 11/14/2019    GFRESTIMATED >90 11/14/2019    GFRESTBLACK >90 11/14/2019    NIA 8.8 11/14/2019        CBC RESULTS:  Lab Results   Component Value Date    WBC 7.1 11/14/2019    RBC 3.16 (L) 11/14/2019    HGB 9.1 (L) 11/15/2019    HCT 28.5 (L) 11/14/2019    MCV 90 11/14/2019    MCH 29.1 11/14/2019    MCHC 32.3 11/14/2019    RDW 13.0 11/14/2019     11/14/2019       INR/PTT:  Lab Results   Component Value Date    INR 1.22 (H) 11/15/2019    PTT 39 (H) 10/04/2016       Assessment/Plan:  No indication for urgent/emergent intervention. We will contact the team on Monday morning for " scheduling of IR PICC placement this week.    CC  Patient Care Team:  Ankush Dahl MD as PCP - General (Family Practice)  Michael Styles MD as Referring Physician (Internal Medicine)  Juan Carlos Blunt MD as MD (Hand Surgery)  Brenda Espinal MD as MD (Ophthalmology)  Analilia Aceves MD as MD (Orthopedics)

## 2019-11-16 NOTE — PLAN OF CARE
OT order received. Patient declines OT at this time. Patient reports having help at home, and verbalizes understanding of non weight bearing status. OT reviewed dressing, bathing, toileting and wrist/elbow exercises with patient, and he was able to verbalize how to complete tasks. Please reach out if new OT concerns arise.

## 2019-11-16 NOTE — PROCEDURES
Failed PICC/MIDLINE - Please document the following:          Reason for PICC: antibiotics       Type of Line requested:  Single vs double: single lumen 4 fr          1)    Which arm(s) used:right      2)    What vessel(s) were used:basilic      3)    How many attempts were made?3      4)    Reason for fail: (ie: not able to thread / vessel too small etc.) : appeared to be in the vessel but no blood returned. Attempt to aspirate blood with no return.      5)     Pt said he told providers NOT to take out the picc he had in place but they insisted on it.    Time out and report to Roberta RIGGS on 8a. Told her to contact the provider so that they could put in the order for an IR consult and to call Radiology to let them know a picc was needed.

## 2019-11-17 LAB
BACTERIA SPEC CULT: ABNORMAL
BACTERIA SPEC CULT: NO GROWTH
SPECIMEN SOURCE: ABNORMAL
SPECIMEN SOURCE: ABNORMAL
SPECIMEN SOURCE: NORMAL

## 2019-11-17 PROCEDURE — 25000132 ZZH RX MED GY IP 250 OP 250 PS 637: Performed by: ORTHOPAEDIC SURGERY

## 2019-11-17 PROCEDURE — 25000128 H RX IP 250 OP 636: Performed by: STUDENT IN AN ORGANIZED HEALTH CARE EDUCATION/TRAINING PROGRAM

## 2019-11-17 PROCEDURE — 25000132 ZZH RX MED GY IP 250 OP 250 PS 637: Performed by: STUDENT IN AN ORGANIZED HEALTH CARE EDUCATION/TRAINING PROGRAM

## 2019-11-17 PROCEDURE — 25000128 H RX IP 250 OP 636: Performed by: ORTHOPAEDIC SURGERY

## 2019-11-17 PROCEDURE — 12000001 ZZH R&B MED SURG/OB UMMC

## 2019-11-17 PROCEDURE — 99207 ZZC CDG-CUT & PASTE-POTENTIAL IMPACT ON LEVEL: CPT | Performed by: HOSPITALIST

## 2019-11-17 PROCEDURE — 99232 SBSQ HOSP IP/OBS MODERATE 35: CPT | Performed by: HOSPITALIST

## 2019-11-17 RX ORDER — SODIUM CHLORIDE 9 MG/ML
INJECTION, SOLUTION INTRAVENOUS
Status: DISPENSED
Start: 2019-11-17 | End: 2019-11-17

## 2019-11-17 RX ADMIN — BUPROPION HYDROCHLORIDE 200 MG: 200 TABLET, EXTENDED RELEASE ORAL at 05:40

## 2019-11-17 RX ADMIN — AMLODIPINE AND BENAZEPRIL HYDROCHLORIDE 1 CAPSULE: 5; 20 CAPSULE ORAL at 17:19

## 2019-11-17 RX ADMIN — SENNOSIDES 4 TABLET: 8.6 TABLET, FILM COATED ORAL at 17:19

## 2019-11-17 RX ADMIN — OXYCODONE HYDROCHLORIDE 15 MG: 5 TABLET ORAL at 21:53

## 2019-11-17 RX ADMIN — SENNOSIDES 3 TABLET: 8.6 TABLET, FILM COATED ORAL at 06:10

## 2019-11-17 RX ADMIN — BUPROPION HYDROCHLORIDE 200 MG: 200 TABLET, EXTENDED RELEASE ORAL at 17:19

## 2019-11-17 RX ADMIN — LOSARTAN POTASSIUM 25 MG: 25 TABLET ORAL at 05:40

## 2019-11-17 RX ADMIN — OXYCODONE HYDROCHLORIDE 15 MG: 5 TABLET ORAL at 02:56

## 2019-11-17 RX ADMIN — CLONAZEPAM 1 MG: 0.5 TABLET ORAL at 19:02

## 2019-11-17 RX ADMIN — OXYCODONE HYDROCHLORIDE 15 MG: 5 TABLET ORAL at 12:49

## 2019-11-17 RX ADMIN — CEFAZOLIN SODIUM 2 G: 2 INJECTION, SOLUTION INTRAVENOUS at 17:20

## 2019-11-17 RX ADMIN — OXYCODONE HYDROCHLORIDE 15 MG: 5 TABLET ORAL at 05:41

## 2019-11-17 RX ADMIN — DOCUSATE SODIUM 200 MG: 100 CAPSULE, LIQUID FILLED ORAL at 17:19

## 2019-11-17 RX ADMIN — OXYCODONE HYDROCHLORIDE 15 MG: 5 TABLET ORAL at 16:02

## 2019-11-17 RX ADMIN — CARVEDILOL 25 MG: 25 TABLET, FILM COATED ORAL at 05:40

## 2019-11-17 RX ADMIN — CARVEDILOL 25 MG: 25 TABLET, FILM COATED ORAL at 17:19

## 2019-11-17 RX ADMIN — HYDROMORPHONE HYDROCHLORIDE 1 MG: 1 INJECTION, SOLUTION INTRAMUSCULAR; INTRAVENOUS; SUBCUTANEOUS at 11:58

## 2019-11-17 RX ADMIN — Medication 1 CAPSULE: at 05:41

## 2019-11-17 RX ADMIN — AMLODIPINE AND BENAZEPRIL HYDROCHLORIDE 1 CAPSULE: 5; 20 CAPSULE ORAL at 05:41

## 2019-11-17 RX ADMIN — OXYCODONE HYDROCHLORIDE 15 MG: 5 TABLET ORAL at 19:03

## 2019-11-17 RX ADMIN — CEFAZOLIN SODIUM 2 G: 2 INJECTION, SOLUTION INTRAVENOUS at 08:43

## 2019-11-17 RX ADMIN — DOCUSATE SODIUM 100 MG: 100 CAPSULE, LIQUID FILLED ORAL at 05:39

## 2019-11-17 RX ADMIN — CEFAZOLIN SODIUM 2 G: 2 INJECTION, SOLUTION INTRAVENOUS at 00:46

## 2019-11-17 RX ADMIN — OXYCODONE HYDROCHLORIDE 15 MG: 5 TABLET ORAL at 08:43

## 2019-11-17 RX ADMIN — SIMVASTATIN 10 MG: 10 TABLET, FILM COATED ORAL at 17:19

## 2019-11-17 ASSESSMENT — ACTIVITIES OF DAILY LIVING (ADL)
ADLS_ACUITY_SCORE: 14
ADLS_ACUITY_SCORE: 14
ADLS_ACUITY_SCORE: 15
ADLS_ACUITY_SCORE: 14
ADLS_ACUITY_SCORE: 15
ADLS_ACUITY_SCORE: 15

## 2019-11-17 NOTE — PLAN OF CARE
VS: A&Ox4. VSS. Denies SOB, chest pain, dizziness, lightheadedness, numbness, tingling, nausea, and vomiting.    O2: >90% RA. Lung sounds clear bilaterally. IS encouraged.    Output: Voiding spontaneously without difficulty.    Last BM: Bowel sounds active, passing gas, and last BM 11/17 per pt report.    Activity: Up ad faby, ambulates in room and julian.    Up for meals? Up for meals.   Skin: Incision on LUE.    Pain: Has oxycodone 10-15 mg Q3H. Klonopin 0.5 mg Q6H.    CMS: Intact.    Dressing: Dressing on left shoulder CDI.    Diet: Regular diet and tolerating well.    LDA: PIV patent and infusing TKO between abx. Hemovac in place.     Equipment: IV pole, personal belongings at bedside.    Plan: TBD.   Additional Info: Patient able to make needs known. Will continue to monitor.

## 2019-11-17 NOTE — PROGRESS NOTES
"Orthopedic Surgery Progress Note    Subjective: admits to postop acute pain in the setting of known chronic pain. Not well controlled per patient. Otherwise, feeling well. Tolerating PO trials.  Denies cp, sob, calf pain, fevers, chills, sweats. Denies new onset numbness/tingling in operative extremity.    Exam:  BP (!) 158/70 (BP Location: Left leg)   Pulse 72   Temp 96.2  F (35.7  C) (Oral)   Resp 16   Ht 1.778 m (5' 10\")   Wt 90 kg (198 lb 6.4 oz)   SpO2 94%   BMI 28.47 kg/m    Gen: Awake, alert, NAD  Resp: breathing equal and non-labored  Extremities:    LUE: Operative dressing c/d/i. Drain in place, patent. Numb in m/r/u distributions 2/2 regional block. 2+ radial pulse. Fingers WWP    Drain: 200 / 0 / 0cc output last 3 shifts    Labs:    Recent Labs   Lab 11/15/19  1517 11/14/19  0709 11/13/19  0648 11/12/19  1044   WBC  --  7.1 9.6 9.2   HGB 9.1* 9.2* 9.5* 9.6*   PLT  --  260 290 267     Recent Labs   Lab 11/14/19  0709 11/12/19  1044    134   POTASSIUM 4.2 4.8   CHLORIDE 103 100   CO2 26 30   BUN 13 16   CR 0.91 1.08   GLC 99 117*     Recent Labs   Lab 11/15/19  1517 11/12/19  1044   INR 1.22* 1.16*       IR image guided L shoulder aspiration (11/13) - 33,500 WBCs, 80% PMNs  Gram stain - GPC  Cultures - staph aureus    Intraop Cxs - staph aureus      Assessment:   59 year old male with PMH of 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 Completed abx course on 11/10/19 now with acute development with left shoulder pain just 2 days later.     Procedure:  Left TSA explantation, placement abx spacer on 11/15 with Dr. Aceves    Plan:  Ortho Primary - inpatient status  Activity: Up with assist.  Weight bearing status: NWB BUE  Antibiotics: IV ancef per ID recs. Outpatient abx plan pending, anticipate PICC placement 11/18  Diet: OK for diet   Drain: continue to record output q shift  Pain management: transition from IV to " orals as tolerated.   Cultures: pending, follow closely - ID managing Abx tailoring   Consults: infectious disease, hospitalist, IR  Follow-up: clinic with Dr. Aceves care team in 2 weeks for wound check    Disposition: pending intraop cultures, ID recs for Abx tailoring, anticipated PICC placement (IR placement 11/18). Anticipate discharge with home infusion early next week.     Future Appointments   Date Time Provider Department Center   12/24/2019  1:30 PM Enriqueta Zhou MD [facility]   12/27/2019  9:45 AM Analilia Aceves MD [facility]   1/22/2020  9:00 AM Analilia Aceves MD [facility]        Juan Carlos Hernandez, PGY4  Orthopedic Surgery Resident  Pager [phone]

## 2019-11-17 NOTE — PLAN OF CARE
VS: VSS ex elevated BP Temp: 97.7  F (36.5  C) Temp src: Oral BP: (!) 157/66 Pulse: 64 Heart Rate: 80 Resp: 16 SpO2: 94 % O2 Device: None (Room air) Oxygen Delivery: 2 LPM   O2: >90% on RA   Output: Voiding without difficulty in urinal    Last BM: LBM 11/15 per pt report, passing flatus, BS active    Activity: Pt up independently, NWB to BUE   Skin: Skin intact ex for incision to LUE, incision to RUE approximated, skin is warm and dry.    Pain: Pain is being managed with PRN oxycodone 10-15 mg Q3H, given IV dilaudid 1 mg x2 but did not get much relief to L shoulder. States he does not feel like his pain is controlled on current regimen- requested to page ortho on call tonight again to adjust medications but pt adamant about RN not paging tonight. Please give 15 mg oxycodone Q3H around-the-clock tonight. Given klonopin x1. Refused flexeril when offered.    CMS: CMS intact, denies N/T. Radial pulse +2 bilaterally, good hand strength.    Dressing: Dressing to L shoulder CDI, R incision EMELI and approximated.    Diet: Tolerating regular without N/V, adequate intake of PO fluids    LDA: PIV infusing TKO between abx, will have PICC placement on Monday by IR (failed at bedside today).    Equipment: Sling, personal belongings, urinal   Plan: Continue to monitor patient, manage pain, will discharge home with home infusions once medically stable.    Additional Info: Pain not controlled on current regimen, IV dilaudid increased to 1mg Q2H but did not get relief at all to shoulder pain. Pain team may need to re-assess pt on Monday due to chronic pain and opioid tolerance.

## 2019-11-17 NOTE — PROVIDER NOTIFICATION
Notified Dr. Gonzales that no labs have been drawn since pt surgery. Pt requesting to know his hemoglobin and creatinine- verbal order placed to check labs tomorrow morning CBC and BMP.    Paged Dr. Grey regarding pt request to take full 1 mg klonopin tonight to help with anxiety and to help him sleep, current order is 0.5mg klonopin TID- per MD okay to give 2x 0.5 mg at once tonight.

## 2019-11-17 NOTE — PLAN OF CARE
VS: Vs stable this shift   O2: On room air, maintaining sats >90    Output: Voiding adequate amounts. Using bedside urinal at night   Last BM: 11/15/19   Activity: Minimal assist x1, limited range of motion BUE   Skin: LUE incision, red marks right arm (from previous PICC insertion)   Pain: Managed with prn Oxycodone 15mg x2 this shift, Fentanyl patch in place on lower abdomen   CMS: Denies numbness/tingling   Dressing: LUE dressing CDI   Diet: Regular diet as tolerated by pt.   LDA: Hemovac drain (0 output)   Equipment:    Plan: Drain out today, continue plan of care   Additional Info:

## 2019-11-17 NOTE — PROGRESS NOTES
[facility], [place]    Hospitalist Progress Note      Assessment & Plan     Kobe Buchanan is a 59 year old male admitted on 11/12/2019. He has a significant PMH including hypertension, hyperlipidemia, chronic back pain, TAA repair, bipolar disorder, atrial fibrillation (not currently anticoagulated), obstructive sleep apnea, and type 2 diabetes mellitus (diet managed). 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   Individual problems and their management are outlined below     # Concern for infection of Left prosthetic shoulder joint: Presented with tenderness and marked restriction in movement of the left joint, similar to how he had presented with his rt shoulder joint infection. CRP at 98. Left shoulder  Synovial fluid analysis with 33K WBC (predominantly neutrophils). Likely infected. NO crystals on analysis.   -Ct Ancef foir MSSA from the joint  -ID seeing.   -SP OR explant and spacer after washout  -Need PICC  -Ct Ancef  # HTN. BP is high side, but they are being checked in the leg, so not accurate. Can check in Forearm.   -PTA mediations of Lotrel 5-20 mg, carvedilol 25 mg BID, Losartan 25 mg.   -Check BP in Forearm.  -Post op continue Coreg, hold others and restart as able.   # Anxiety: Resume Klonopin 1 mg TID PRN  # HO Major Depression: Resume Wellbutrin 200 mg BID  # Dyslipidemia: Resume Simvastatin 10 mg daily   # Ho Chronic Neck pain: Not worse than usual. Likely chronic issues, but If worsening consider MRI neck. Fentanyl patch changed to every 48 hr as per home. Pain consult for post op pain management.       DVT Prophylaxis: Per primary team  Code Status: Full Code  Disposition: Per primary    Adam Gonzales MD  Text Page  (7am - 5pm, M-F)    Interval History      No new  "issues noted.     -Data reviewed today: I reviewed all new labs and imaging results over the last 24 hours.     Physical Exam   Temp: 95.5  F (35.3  C) Temp src: Oral BP: (!) 148/53 Pulse: 73 Heart Rate: 69 Resp: 16 SpO2: 95 % O2 Device: None (Room air)    Vitals:    11/12/19 0917   Weight: 90 kg (198 lb 6.4 oz)     Vital Signs with Ranges  Temp:  [95.5  F (35.3  C)-97.7  F (36.5  C)] 95.5  F (35.3  C)  Pulse:  [72-73] 73  Heart Rate:  [69-80] 69  Resp:  [16] 16  BP: (148-160)/(53-70) 148/53  SpO2:  [94 %-96 %] 95 %  I/O last 3 completed shifts:  In: -   Out: 850 [Urine:650; Drains:200]    Constitutional: No distress noted, Alert, Answering questions appropriately  Respiratory: AE is goog on both sides, no wheezing onr crackles  Cardiovascular: S1S2 normal, no new murmur  GI: Soft, non tender  Skin/Integumen: No rash  Other: No lege edema    Medications     bupivacaine liposome (EXPAREL) LONG ACTING injection was administered into the infiltration site to produce postsurgical analgesia. Duration of action is up to 72 hours, and other \"trevor\" medications should not be given for 96 hours with the exception of the lidocaine 5% patch (LIDODERM) and the lidocaine 10mg in potassium infusions. This entry is for INFORMATION ONLY.       lactated ringers         amLODIPine-benazepril  1 capsule Oral BID     buPROPion  200 mg Oral BID     carvedilol  25 mg Oral BID w/meals     ceFAZolin  2 g Intravenous Q8H     docusate sodium  100 mg Oral Daily     docusate sodium  200 mg Oral At Bedtime     fentaNYL  75 mcg Transdermal Q48H     fentaNYL   Transdermal Q8H     fentaNYL   Transdermal Q48H     lactobacillus rhamnosus (GG)  1 capsule Oral Daily     losartan  25 mg Oral Daily     sennosides  3 tablet Oral Daily     sennosides  4 tablet Oral At Bedtime     simvastatin  10 mg Oral At Bedtime     sodium chloride (PF)  3 mL Intracatheter Q8H       Data   Recent Labs   Lab 11/15/19  1517 11/14/19  0709 11/13/19  0648 11/12/19  1044 "   WBC  --  7.1 9.6 9.2   HGB 9.1* 9.2* 9.5* 9.6*   MCV  --  90 90 93   PLT  --  260 290 267   INR 1.22*  --   --  1.16*   NA  --  136  --  134   POTASSIUM  --  4.2  --  4.8   CHLORIDE  --  103  --  100   CO2  --  26  --  30   BUN  --  13  --  16   CR  --  0.91  --  1.08   ANIONGAP  --  7  --  5   NIA  --  8.8  --  9.5   GLC  --  99  --  117*   ALBUMIN  --   --   --  3.4   PROTTOTAL  --   --   --  7.9   BILITOTAL  --   --   --  0.8   ALKPHOS  --   --   --  105   ALT  --   --   --  10   AST  --   --   --  16       No results found for this or any previous visit (from the past 24 hour(s)).

## 2019-11-18 ENCOUNTER — HOME INFUSION (PRE-WILLOW HOME INFUSION) (OUTPATIENT)
Dept: PHARMACY | Facility: CLINIC | Age: 59
End: 2019-11-18

## 2019-11-18 LAB
ANION GAP SERPL CALCULATED.3IONS-SCNC: 8 MMOL/L (ref 3–14)
BACTERIA SPEC CULT: ABNORMAL
BACTERIA SPEC CULT: ABNORMAL
BUN SERPL-MCNC: 15 MG/DL (ref 7–30)
CALCIUM SERPL-MCNC: 8.9 MG/DL (ref 8.5–10.1)
CHLORIDE SERPL-SCNC: 101 MMOL/L (ref 94–109)
CO2 SERPL-SCNC: 24 MMOL/L (ref 20–32)
CREAT SERPL-MCNC: 0.89 MG/DL (ref 0.66–1.25)
ERYTHROCYTE [DISTWIDTH] IN BLOOD BY AUTOMATED COUNT: 12.9 % (ref 10–15)
GFR SERPL CREATININE-BSD FRML MDRD: >90 ML/MIN/{1.73_M2}
GLUCOSE SERPL-MCNC: 101 MG/DL (ref 70–99)
HCT VFR BLD AUTO: 28.7 % (ref 40–53)
HGB BLD-MCNC: 9.5 G/DL (ref 13.3–17.7)
MCH RBC QN AUTO: 29.4 PG (ref 26.5–33)
MCHC RBC AUTO-ENTMCNC: 33.1 G/DL (ref 31.5–36.5)
MCV RBC AUTO: 89 FL (ref 78–100)
PLATELET # BLD AUTO: 346 10E9/L (ref 150–450)
POTASSIUM SERPL-SCNC: 4.5 MMOL/L (ref 3.4–5.3)
RBC # BLD AUTO: 3.23 10E12/L (ref 4.4–5.9)
SODIUM SERPL-SCNC: 133 MMOL/L (ref 133–144)
SPECIMEN SOURCE: ABNORMAL
WBC # BLD AUTO: 9.5 10E9/L (ref 4–11)

## 2019-11-18 PROCEDURE — 25000132 ZZH RX MED GY IP 250 OP 250 PS 637: Performed by: ORTHOPAEDIC SURGERY

## 2019-11-18 PROCEDURE — 99207 ZZC CDG-CUT & PASTE-POTENTIAL IMPACT ON LEVEL: CPT | Performed by: HOSPITALIST

## 2019-11-18 PROCEDURE — 80048 BASIC METABOLIC PNL TOTAL CA: CPT | Performed by: HOSPITALIST

## 2019-11-18 PROCEDURE — 12000001 ZZH R&B MED SURG/OB UMMC

## 2019-11-18 PROCEDURE — 36416 COLLJ CAPILLARY BLOOD SPEC: CPT | Performed by: HOSPITALIST

## 2019-11-18 PROCEDURE — 85027 COMPLETE CBC AUTOMATED: CPT | Performed by: HOSPITALIST

## 2019-11-18 PROCEDURE — 25000132 ZZH RX MED GY IP 250 OP 250 PS 637: Performed by: NURSE PRACTITIONER

## 2019-11-18 PROCEDURE — 99207 ZZC NO CHARGE FIRST FOLLOW UP PS: CPT

## 2019-11-18 PROCEDURE — 99232 SBSQ HOSP IP/OBS MODERATE 35: CPT | Performed by: HOSPITALIST

## 2019-11-18 PROCEDURE — 25000128 H RX IP 250 OP 636: Performed by: ORTHOPAEDIC SURGERY

## 2019-11-18 PROCEDURE — 25000132 ZZH RX MED GY IP 250 OP 250 PS 637: Performed by: STUDENT IN AN ORGANIZED HEALTH CARE EDUCATION/TRAINING PROGRAM

## 2019-11-18 RX ORDER — HYDROMORPHONE HYDROCHLORIDE 1 MG/ML
.3-.5 INJECTION, SOLUTION INTRAMUSCULAR; INTRAVENOUS; SUBCUTANEOUS
Status: DISCONTINUED | OUTPATIENT
Start: 2019-11-18 | End: 2019-11-19 | Stop reason: HOSPADM

## 2019-11-18 RX ORDER — DEXTROSE MONOHYDRATE 25 G/50ML
25-50 INJECTION, SOLUTION INTRAVENOUS
Status: DISCONTINUED | OUTPATIENT
Start: 2019-11-18 | End: 2019-11-18 | Stop reason: HOSPADM

## 2019-11-18 RX ORDER — OXYCODONE HYDROCHLORIDE 10 MG/1
10-20 TABLET ORAL
Status: DISCONTINUED | OUTPATIENT
Start: 2019-11-18 | End: 2019-11-19 | Stop reason: HOSPADM

## 2019-11-18 RX ORDER — NICOTINE POLACRILEX 4 MG
15-30 LOZENGE BUCCAL
Status: DISCONTINUED | OUTPATIENT
Start: 2019-11-18 | End: 2019-11-18 | Stop reason: HOSPADM

## 2019-11-18 RX ORDER — HEPARIN SODIUM,PORCINE 10 UNIT/ML
2-5 VIAL (ML) INTRAVENOUS
Status: DISCONTINUED | OUTPATIENT
Start: 2019-11-18 | End: 2019-11-19 | Stop reason: HOSPADM

## 2019-11-18 RX ORDER — ACETAMINOPHEN 325 MG/1
975 TABLET ORAL EVERY 8 HOURS
Status: DISCONTINUED | OUTPATIENT
Start: 2019-11-18 | End: 2019-11-19 | Stop reason: HOSPADM

## 2019-11-18 RX ORDER — LIDOCAINE 40 MG/G
CREAM TOPICAL
Status: DISCONTINUED | OUTPATIENT
Start: 2019-11-18 | End: 2019-11-19 | Stop reason: HOSPADM

## 2019-11-18 RX ADMIN — CEFAZOLIN SODIUM 2 G: 2 INJECTION, SOLUTION INTRAVENOUS at 01:03

## 2019-11-18 RX ADMIN — CYCLOBENZAPRINE 10 MG: 10 TABLET, FILM COATED ORAL at 08:02

## 2019-11-18 RX ADMIN — OXYCODONE HYDROCHLORIDE 20 MG: 10 TABLET ORAL at 22:37

## 2019-11-18 RX ADMIN — AMLODIPINE AND BENAZEPRIL HYDROCHLORIDE 1 CAPSULE: 5; 20 CAPSULE ORAL at 05:13

## 2019-11-18 RX ADMIN — SIMVASTATIN 10 MG: 10 TABLET, FILM COATED ORAL at 17:00

## 2019-11-18 RX ADMIN — OXYCODONE HYDROCHLORIDE 20 MG: 10 TABLET ORAL at 19:37

## 2019-11-18 RX ADMIN — SENNOSIDES 3 TABLET: 8.6 TABLET, FILM COATED ORAL at 05:13

## 2019-11-18 RX ADMIN — CLONAZEPAM 0.5 MG: 0.5 TABLET ORAL at 04:15

## 2019-11-18 RX ADMIN — ACETAMINOPHEN 975 MG: 325 TABLET, FILM COATED ORAL at 22:37

## 2019-11-18 RX ADMIN — OXYCODONE HYDROCHLORIDE 20 MG: 10 TABLET ORAL at 14:45

## 2019-11-18 RX ADMIN — SENNOSIDES 4 TABLET: 8.6 TABLET, FILM COATED ORAL at 17:00

## 2019-11-18 RX ADMIN — FENTANYL 1 PATCH: 75 PATCH, EXTENDED RELEASE TRANSDERMAL at 08:02

## 2019-11-18 RX ADMIN — DOCUSATE SODIUM 200 MG: 100 CAPSULE, LIQUID FILLED ORAL at 17:00

## 2019-11-18 RX ADMIN — OXYCODONE HYDROCHLORIDE 15 MG: 5 TABLET ORAL at 11:33

## 2019-11-18 RX ADMIN — ACETAMINOPHEN 975 MG: 325 TABLET, FILM COATED ORAL at 15:21

## 2019-11-18 RX ADMIN — CARVEDILOL 25 MG: 25 TABLET, FILM COATED ORAL at 17:00

## 2019-11-18 RX ADMIN — OXYCODONE HYDROCHLORIDE 15 MG: 5 TABLET ORAL at 08:02

## 2019-11-18 RX ADMIN — BUPROPION HYDROCHLORIDE 200 MG: 200 TABLET, EXTENDED RELEASE ORAL at 05:13

## 2019-11-18 RX ADMIN — Medication 1 CAPSULE: at 05:13

## 2019-11-18 RX ADMIN — OXYCODONE HYDROCHLORIDE 15 MG: 5 TABLET ORAL at 01:01

## 2019-11-18 RX ADMIN — CARVEDILOL 25 MG: 25 TABLET, FILM COATED ORAL at 05:13

## 2019-11-18 RX ADMIN — AMLODIPINE AND BENAZEPRIL HYDROCHLORIDE 1 CAPSULE: 5; 20 CAPSULE ORAL at 17:00

## 2019-11-18 RX ADMIN — BUPROPION HYDROCHLORIDE 200 MG: 200 TABLET, EXTENDED RELEASE ORAL at 17:00

## 2019-11-18 RX ADMIN — DOCUSATE SODIUM 100 MG: 100 CAPSULE, LIQUID FILLED ORAL at 05:13

## 2019-11-18 RX ADMIN — OXYCODONE HYDROCHLORIDE 15 MG: 5 TABLET ORAL at 04:15

## 2019-11-18 RX ADMIN — LOSARTAN POTASSIUM 25 MG: 25 TABLET ORAL at 05:13

## 2019-11-18 ASSESSMENT — ACTIVITIES OF DAILY LIVING (ADL)
ADLS_ACUITY_SCORE: 15

## 2019-11-18 NOTE — CONSULTS
Patient is a 59 year old male with infected prosthesis. Patient's team requesting single lumen PICC for IV antibiotics. Due to patient's age, IR will defer to IV oncology for PICC placement. Primary team (Dr. Gonzales) was given contact information for IV oncology service.       Case discussed with primary team and IR attending physician.    Juanjose Bray PA-C  Interventional Radiology  [phone] [facility].

## 2019-11-18 NOTE — PROGRESS NOTES
Therapy: IV ABX  Insurance: [facility] MEDICARE PLAN   Ded: $0  Met: $0    Co-Insurance: 0  Max Out of Pocket: $0  Met: $0    Please contact Intake with any questions, [phone] or In Basket pool, [facility] ([id number]).  IN REFERENCE TO ADMISSION DATE 11/12/2019 TO CHECK IV ABX COVERAGE

## 2019-11-18 NOTE — PROGRESS NOTES
Patient: Kobe Buchanan  Date of Service: November 18, 2019 Admission Date:11/12/2019   3 Days Post-Op     Chief Pain Endorsement: left shoulder pain s/p left TSA explant and left ABX spacer placement on 11/15/19    Recommendations were discussed and relayed to Angelica Veliz CNP Orthopedics   Plan was reviewed by the Inpatient Pain Service and staff attending, Dr. Redman      1. Change acetaminophen to 975 mg PO every 8 hours scheduled if primary team OK with possibility of masking fevers. Would continue x1 week scheduled then change to PRN.   2. Change oxycodone to 10-20 mg PO every 4 hours as needed for moderate to severe pain. Continue at this dose upon discharge and taper with healing by cutting back on dose by 5 mg/dose every 2-3 days until back toward prior to admission dosing of oxycodone 10 mg PO ~6x/day. Further follow up per outpatient pain providers.   3. Discontinue hydromorphone 0.3-0.5 mg IV every 2 hours as needed for breakthrough pain not controlled by oral medications.   4. Continue fentanyl 75 mcg/hour patch, 1 patch transdermal every 48 hours. This is his prior to admission dose.   5. Continue cyclobenzaprine 10 mg PO TID as needed for muscle spasms. This is a prior to admission medication.   6. Patient declining other adjuvant and topical mediation therapies for pain stating he has tried them all and they don't work.  7. Bowel regimen per primary team to prevent opioid induced constipation.    Pain Service will Sign Off at this time.     Thank you for consulting the Inpatient Pain Management Service. The above recommendations are to be acted upon at the primary team s discretion.     To reach us:  Mon - Friday 8 AM - 3 PM: Pager [phone] (Text Page)  After hours, weekends and holidays: Primary service should call [phone] for the on-call pain specialist    PAIN MEDICATION SAFE USE:   Prior to discharge instruct patient on the following in addition to the medication fact  sheet:    Caution: these medications can cause sedation    Take prescription medicine only if it has been prescribed by your doctor    Do not take more medicine or take it more often than instructed     Call your doctor if pain gets worse    Never mix pain medicine with alcohol, sleeping pills, or any illicit drugs    Do not operate heavy machinery, including vehicles, when initiation opioid therapy or increasing dosage    Store prescription opioids in a locked container, whenever possible     Dispose of unused opioids appropriately     Do not stop abruptly once at higher doses.  These medications must be tapered off.    Opioid pain medications do carry the risk for physical dependence and addiction and patients should be counseled about this.         1. Left shoulder MSSA infection (presented 11/12/19) now s/p left TSA explant and left antibiotic spacer placement on 11/15/19. Pt originally had bilateral TSA in 2014 by Dr. 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  2. Chronic neck pain (no h/o cervical spine surgery as he states that ortho states it's too advanced for operative management).  3. Chronic opioid management.  Currently managed at [facility]. On fentanyl 75 mcg/hour patch, 1 patch transdermal every 48 hours and oxycodone 10 mg up to 6 tabs/day.   4. H/o bipolar disorder    -- Outpatient opioid requirements prior to admission: fentanyl 75 mcg/hour patch, 1 patch transdermal every 48 hours and oxycodone 10 mg up to 6 tabs/day.   - reviewed.     10/29/2019 clonazepam 0.5 mg tablet #90  for 30 days  10/28 2019 oxycodone 10 mg tablet #180 for 30 days  10/22/2019 oxycodone 5 mg tablet #30 for 2 days  10/19/2019 oxycodone 5 mg tablet to # 22 for 2 days  10/07/2019 fentanyl 75 mcgs/hour patch #10 for 30 days ( pt states  due to insurance it had to be written this way, each really is to be changed every 48 hours - Karishma Bo PA-C called [facility] on 11/14/19 and verified that fentanyl dose was to be changed every 48 hours.)     Primary Care Provider: Ankush Dahl  Chronic Pain Provider: [facility].     Interval History:  Kobe Buchanan was seen today (November 18, 2019). He was seen sitting in the chair in his room and was in no acute distress. He endorses his pain has been uncontrolled since Saturday when his nerve block wore off. Kobe described an extensive history with pain medications and stated he is satisfied with his current PTA regimen. He reported he is experiencing acute post-op left shoulder pain in addition to his chronic neck pain that is a constant 3-7/10 pain score. Kobe stated he isn't able to describe the shoulder pain though said it may be a burning sensation. He said he never had post-op pain before with previous surgeries, so this is unusual to him.     Kobe is focused on specific medication doses and numbers throughout the interview and stated he thinks he needs 30 mg doses. After discussion about discharge regimen plans, it was decided to change to oxycodone 10-20 mg every 4 hours as needed to which he was agreeable. He also expressed interest in eventually changing back to morphine for which he was recommended to follow up outpatient with his pain clinic. Kobe reported lack of benefit from hydromorphone IV and stated it gives him headaches, so he requested it be removed from his medication orders. When discussing NSAIDs and acetaminophen, Kobe reported a recent incident of maroon stools during an admission 9/2019 at [facility] and does have a history of gastric bypass surgery in ~2004 or 2005.    Kobe stated ice packs have been helpful for his neck pain as well as his current shoulder pain. He also stated that in the past he has not received benefit from topical analgesics and does not  "want them at this time. Kobe reported sleeping 5.5 hours last night which he said was the best sleep he's had since admission and similar to his regular 5 hours of sleep at home. The medication changes were reviewed with Kobe and he was agreeable to the plan.    His last bowel movement was 11/17/19 and it was Normal with some diarrhea.   CAPA (Clinically Aligned Pain Assessment):    Comfort (How is your pain?): Tolerable with discomfort  Change in Pain (Since your last medication/intervention?): About the same  Pain Control (How are your pain treatments working?):  Partially effective control  Functioning (Are you able to do activities to get better?) : Can do most things, but pain gets in the way of some   Sleep (Does your pain management allow you to sleep or rest?): Awake with occasional pain      FUNCTIONAL STATUS:  Change:      staying the same  Oral intake:     Regular  Activity level:     Up ambulating independently in room, ambulating in julian  Mood:      adjusting to situation     -- Inpatient Medications Related to Pain Management:   Medications related to Pain Management (From now, onward)    Start     Dose/Rate Route Frequency Ordered Stop    11/18/19 1321  oxyCODONE IR (ROXICODONE) tablet 10-20 mg      10-20 mg Oral EVERY 3 HOURS PRN 11/18/19 1321      11/18/19 1051  HYDROmorphone (PF) (DILAUDID) injection 0.3-0.5 mg      0.3-0.5 mg Intravenous EVERY 2 HOURS PRN 11/18/19 1051      11/18/19 0842  lidocaine 1 % 0.1-1 mL      0.1-1 mL Other EVERY 1 HOUR PRN 11/18/19 0844      11/18/19 0842  lidocaine (LMX4) cream       Topical EVERY 1 HOUR PRN 11/18/19 0844      11/15/19 2128  bupivacaine liposome (EXPAREL) LONG ACTING injection was administered into the infiltration site to produce postsurgical analgesia. Duration of action is up to 72 hours, and other \"trevor\" medications should not be given for 96 hours with the exception of the lidocaine 5% patch (LIDODERM) and the lidocaine 10mg in potassium " infusions. This entry is for INFORMATION ONLY.       Does not apply CONTINUOUS PRN 11/15/19 2128 11/19/19 2127    11/15/19 0903  lidocaine (LMX4) cream       Topical EVERY 1 HOUR PRN 11/15/19 0905      11/14/19 0841  fentaNYL (DURAGESIC) 75 mcg/hr 72 hr patch 1 patch      75 mcg Transdermal EVERY 48 HOURS 11/14/19 0841      11/14/19 0500  docusate sodium (COLACE) capsule 100 mg      100 mg Oral DAILY 11/13/19 1511      11/14/19 0500  sennosides (SENOKOT) tablet 3 tablet      3 tablet Oral DAILY 11/13/19 1511      11/13/19 1715  docusate sodium (COLACE) capsule 200 mg      200 mg Oral AT BEDTIME 11/13/19 1511      11/13/19 1715  sennosides (SENOKOT) tablet 4 tablet      4 tablet Oral AT BEDTIME 11/13/19 1511      11/13/19 1511  magnesium hydroxide (MILK OF MAGNESIA) suspension 30 mL      30 mL Oral DAILY PRN 11/13/19 1512      11/13/19 0115  fentaNYL (DURAGESIC) Patch in Place       Transdermal EVERY 8 HOURS 11/13/19 0104      11/12/19 2226  cyclobenzaprine (FLEXERIL) tablet 10 mg      10 mg Oral 3 TIMES DAILY PRN 11/12/19 2227      11/12/19 2225  clonazePAM (klonoPIN) tablet 0.5 mg      0.5 mg Oral 3 TIMES DAILY PRN 11/12/19 2227      11/12/19 2042  lidocaine 1 % 0.1-1 mL      0.1-1 mL Other EVERY 1 HOUR PRN 11/12/19 2042 11/12/19 2042  acetaminophen (TYLENOL) tablet 650 mg      650 mg Oral EVERY 4 HOURS PRN 11/12/19 2042 11/12/19 2042  acetaminophen (TYLENOL) Suppository 650 mg      650 mg Rectal EVERY 4 HOURS PRN 11/12/19 2042            LAB DATA:  Recent Labs   Lab 11/18/19  0623 11/15/19  1517 11/14/19  0709 11/13/19  0648 11/12/19  1044   CR 0.89  --  0.91  --  1.08   WBC 9.5  --  7.1 9.6 9.2   HGB 9.5* 9.1* 9.2* 9.5* 9.6*   AST  --   --   --   --  16   ALT  --   --   --   --  10         ----------------------------------------------------------------------------------  Deana Pace [facility]  Inpatient Pain Service     To reach us:  Mon - Friday 8 AM - 3 PM: Pager [phone] (Text Page)  After  hours, weekends and holidays: Primary service should call [phone] for the on-call pain specialist    Helpful Resources:  Getting Rid of Unwanted Medications (printable PDF for patients)   Opioid Overdose Prevention Toolkit (printable PDF for patients)   Prescription Opioids: What You Need To Know (printable PDF for patients)

## 2019-11-18 NOTE — PLAN OF CARE
VS: VSS ex BP elevated Temp: 96.9  F (36.1  C) Temp src: Oral BP: (!) 141/54 Pulse: 73 Heart Rate: 77 Resp: 16 SpO2: 98 % O2 Device: None (Room air)     O2: >90% on RA   Output: Voiding without difficulty in urinal    Last BM: LBM 11/17 per pt report, passing flatus, BS active    Activity: Pt up independently, NWB to BUE. Draw sheet placed on bed in case staff needs to assist (IV placed in wrist now for overnight and makes transferring difficult).   Skin: Skin intact ex for incision to LUE, incision to RUE approximated, skin is warm and dry.    Pain: Pain is being managed with PRN oxycodone 10-15 mg Q3H, please give 15 mg oxycodone Q3H around-the-clock tonight. Given klonopin x1 (full 1 mg x1, ok with MD for tonight to help with anxiety and sleeping). Refused flexeril when offered. Ice utilized.    CMS: CMS intact, denies N/T. Radial pulse +2 bilaterally, good hand strength.    Dressing: Dressing to L shoulder CDI, R incision EMELI and approximated.    Diet: Tolerating regular without N/V, adequate intake of PO fluids    LDA: PIV infusing TKO between abx, will have PICC placement on Monday by IR (pt requesting ASAP in morning). PIV placed by ED (very difficult stick, RN failed and anesthesia failed). PIV is placed in wrist and is a very small catheter, please be extremely careful overnight to ensure pt does not lose PIV. Coband placed around tubing to secure. Hemovac with 25 mL out- plan to remove tomorrow AM.    Equipment: Sling, personal belongings, urinal, IV pole   Plan: Continue to monitor patient, manage pain, will discharge home with home infusions once medically stable. Plan for PICC Placement tomorrow in IR.   Additional Info: Pain not controlled on current regimen, pain team may need to re-assess pt on Monday due to chronic pain and opioid tolerance.

## 2019-11-18 NOTE — PROGRESS NOTES
[facility], [place]    Hospitalist Progress Note      Assessment & Plan     Kobe Buchanan is a 59 year old male admitted on 11/12/2019. He has a significant PMH including hypertension, hyperlipidemia, chronic back pain, TAA repair, bipolar disorder, atrial fibrillation (not currently anticoagulated), obstructive sleep apnea, and type 2 diabetes mellitus (diet managed). 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     Individual problems and their management are outlined below     # Concern for infection of Left prosthetic shoulder joint: Presented with tenderness and marked restriction in movement of the left joint, similar to how he had presented with his rt shoulder joint infection. CRP at 98. Left shoulder  Synovial fluid analysis with 33K WBC (predominantly neutrophils). Likely infected. NO crystals on analysis.   -Started on Ancef for MSSA from the joint  -ID seeing.   -SP OR explant and spacer after washout  -PICC placement by IR awaited  -Ct Ancef  -Follow-up final cultures and decide Ab based on that.   -duration 6 weeks minimum. ID seeing.     # HTN. BP is high side, but they are being checked in the leg, so not accurate. Can not check in both Forearms.   -PTA mediations of Lotrel 5-20 mg, carvedilol 25 mg BID, Losartan 25 mg.   -Post op continue Coreg, hold others and restart as able.     # Anxiety: Resume Klonopin 1 mg TID PRN  # HO Major Depression: Resume Wellbutrin 200 mg BID  # Dyslipidemia: Resume Simvastatin 10 mg daily   # Ho Chronic Neck pain: Not worse than usual. Likely chronic issues, but If worsening consider MRI neck. Fentanyl patch changed to every 48 hr as per home. Pain consult for post op pain management.       DVT Prophylaxis: Per primary team  Code Status: Full Code  "  Disposition: Per primary    Adam Gonzales MD  Text Page  (7am - 5pm, M-F)    Interval History      Waiting for PICC placement. No new issues raised.     -Data reviewed today: I reviewed all new labs and imaging results over the last 24 hours.     Physical Exam   Temp: 98  F (36.7  C) Temp src: Oral BP: (!) 140/60   Heart Rate: 97 Resp: 16 SpO2: 98 % O2 Device: None (Room air)    Vitals:    11/12/19 0917   Weight: 90 kg (198 lb 6.4 oz)     Vital Signs with Ranges  Temp:  [96.9  F (36.1  C)-98  F (36.7  C)] 98  F (36.7  C)  Heart Rate:  [77-97] 97  Resp:  [16] 16  BP: (140-144)/(54-60) 140/60  SpO2:  [98 %] 98 %  I/O last 3 completed shifts:  In: -   Out: 2125 [Urine:2100; Drains:25]    Constitutional: No distress noted, Alert, Answering questions appropriately  Respiratory: AE is goog on both sides, no wheezing onr crackles  Cardiovascular: S1S2 normal, no new murmur  GI: Soft, non tender  Skin/Integumen: No rash  Other: No lege edema    Medications     bupivacaine liposome (EXPAREL) LONG ACTING injection was administered into the infiltration site to produce postsurgical analgesia. Duration of action is up to 72 hours, and other \"trevor\" medications should not be given for 96 hours with the exception of the lidocaine 5% patch (LIDODERM) and the lidocaine 10mg in potassium infusions. This entry is for INFORMATION ONLY.       lactated ringers         amLODIPine-benazepril  1 capsule Oral BID     buPROPion  200 mg Oral BID     carvedilol  25 mg Oral BID w/meals     ceFAZolin  2 g Intravenous Q8H     docusate sodium  100 mg Oral Daily     docusate sodium  200 mg Oral At Bedtime     fentaNYL  75 mcg Transdermal Q48H     fentaNYL   Transdermal Q8H     fentaNYL   Transdermal Q48H     lactobacillus rhamnosus (GG)  1 capsule Oral Daily     losartan  25 mg Oral Daily     sennosides  3 tablet Oral Daily     sennosides  4 tablet Oral At Bedtime     simvastatin  10 mg Oral At Bedtime     sodium chloride (PF)  3 mL " Intracatheter Q8H       Data   Recent Labs   Lab 11/18/19  0623 11/15/19  1517 11/14/19  0709 11/13/19  0648 11/12/19  1044   WBC 9.5  --  7.1 9.6 9.2   HGB 9.5* 9.1* 9.2* 9.5* 9.6*   MCV 89  --  90 90 93     --  260 290 267   INR  --  1.22*  --   --  1.16*     --  136  --  134   POTASSIUM 4.5  --  4.2  --  4.8   CHLORIDE 101  --  103  --  100   CO2 24  --  26  --  30   BUN 15  --  13  --  16   CR 0.89  --  0.91  --  1.08   ANIONGAP 8  --  7  --  5   NIA 8.9  --  8.8  --  9.5   *  --  99  --  117*   ALBUMIN  --   --   --   --  3.4   PROTTOTAL  --   --   --   --  7.9   BILITOTAL  --   --   --   --  0.8   ALKPHOS  --   --   --   --  105   ALT  --   --   --   --  10   AST  --   --   --   --  16       No results found for this or any previous visit (from the past 24 hour(s)).

## 2019-11-18 NOTE — PLAN OF CARE
VS: VSS   O2: > 90% on RA. Denies SOB   Output: Voiding adequate amount spontaneously in urinal   Last BM: 11/17/2019. Passing gas, bowel sounds active. Given am stool softeners at 0500   Activity: Up ad faby. NWB BUE.   Skin: Intact ex incisions   Pain: Chronic pain from cervical spine issues. Pain being managed w PRN oxycodone 15mg Q3H and PRN Klonopin TID. MD advised to not give IV dilaudid. Wants to speak w pain team to get more adequate relief d/t high opioid tolerance   CMS: Intact   Dressing: CDI   Diet: Regular   LDA: PIV removed after antiobiotic per pt request. Pt to receive PICC in IR today   Equipment: IV pump and pole, personal belongings, urinal, sling   Plan: Continue to monitor patient, manage pain, will discharge home with home infusions once medically stable. Plan for PICC Placement today in IR.   Additional Info:

## 2019-11-18 NOTE — PLAN OF CARE
"  VS: BP (!) 140/60   Pulse 73   Temp 98  F (36.7  C) (Oral)   Resp 16   Ht 1.778 m (5' 10\")   Wt 90 kg (198 lb 6.4 oz)   SpO2 98%   BMI 28.47 kg/m     O2: Room air, no complaints of SoB   Output: Voids spontaneously without difficulty in bathroom   Last BM: 11/17/2019   Activity: Up independently, using arm sling to immobilize right arm   Skin: Intact ex incision on left arm   Pain: Pain located primarily in the neck and left shoulder, using oxycodone 15 mg x 3hr, 75 mcg of fentanyl patch on LUQ, and cyclosperone for muscle spasms in neck   CMS: Intact, patient denies numbness and tingling   Dressing: Dressing on left shoulder UTV, changed today by resident   Diet: Regular, tolerating well   LDA: Hemovac removed today, PICC line will be placed today or tomorrow   Equipment: Arm sling, IV pump and pole   Plan: Plan to establish IV access, begin IVAB course, and discharge patient    Additional Info: Patients 0900 ancef has not been given, when it is given change the due time for the next dose to 8 hours in the future per pharmacy request       "

## 2019-11-18 NOTE — PROGRESS NOTES
"Orthopedic Surgery Progress Note    Subjective: Image guided PICC placement today. Wants to speak with pain service regarding ongoing acute postop pain in setting of chronic pains.  Denies cp, sob, calf pain, fevers, chills, sweats. Denies new onset numbness/tingling in operative extremity.    Exam:  BP (!) 140/60   Pulse 73   Temp 98  F (36.7  C) (Oral)   Resp 16   Ht 1.778 m (5' 10\")   Wt 90 kg (198 lb 6.4 oz)   SpO2 98%   BMI 28.47 kg/m    Gen: Awake, alert, NAD  Resp: breathing equal and non-labored  Extremities:    LUE: Operative dressing c/d/i. Drain in place, patent. Numb in m/r/u distributions 2/2 regional block. 2+ radial pulse. Fingers WWP    Drain: 0 / - / 25cc output last 3 shifts    Labs:    Recent Labs   Lab 11/18/19  0623 11/15/19  1517 11/14/19  0709 11/13/19  0648 11/12/19  1044   WBC 9.5  --  7.1 9.6 9.2   HGB 9.5* 9.1* 9.2* 9.5* 9.6*     --  260 290 267     Recent Labs   Lab 11/18/19  0623 11/14/19  0709 11/12/19  1044    136 134   POTASSIUM 4.5 4.2 4.8   CHLORIDE 101 103 100   CO2 24 26 30   BUN 15 13 16   CR 0.89 0.91 1.08   * 99 117*     Recent Labs   Lab 11/15/19  1517 11/12/19  1044   INR 1.22* 1.16*       IR image guided L shoulder aspiration (11/13) - 33,500 WBCs, 80% PMNs  Gram stain - GPC  Cultures - staph aureus    Intraop Cxs - staph aureus      Assessment:   59 year old male with PMH of 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 Completed abx course on 11/10/19 now with acute development with left shoulder pain just 2 days later.     Procedure:  Left TSA explantation, placement abx spacer on 11/15 with Dr. Aceves    Plan:  Ortho Primary - inpatient status  Activity: Up with assist.  Weight bearing status: NWB BUE  Antibiotics: IV ancef per ID recs. Outpatient abx plan pending, anticipate PICC placement 11/18  Diet: OK for diet   Drain: removed 11/18  Pain management: transition " from IV to orals as tolerated.   Cultures: pending, follow closely - ID managing Abx tailoring   Consults: infectious disease, hospitalist, IR  Follow-up: clinic with Dr. Aceves care team in 2 weeks for wound check    Disposition: pending intraop cultures, ID recs for Abx tailoring, anticipated PICC placement (IR placement 11/18). Anticipate discharge with home infusion early next week.     Future Appointments   Date Time Provider Department Center   12/24/2019  1:30 PM Enriqueta Zhou MD [facility]   12/27/2019  9:45 AM Analilia Aceves MD [facility]   1/22/2020  9:00 AM Analilia Aceves MD [facility]        Juan Carlos Hernandez, PGY4  Orthopedic Surgery Resident  Pager [phone]

## 2019-11-18 NOTE — PROVIDER NOTIFICATION
Janet paged regarding abx of Ancef. Pt currently waiting for PICC placement but has no PIV access/is a very hard stick. Per verbal order, hold off ancef until PICC is placed.

## 2019-11-18 NOTE — PROGRESS NOTES
Care Coordinator Progress Note    Admission Date/Time:  11/12/2019  Attending MD:  Analilia Aceves, *    Data  Chart reviewed, discussed with interdisciplinary team.   Patient was admitted for:    Acute pain of left shoulder  S/P shoulder replacement, left  Elevated C-reactive protein (CRP)  S/P shoulder replacement, left.    Assessment  Benefit check in progress with [facility] for home IV antibiotics. RNCC will continue to follow.    [facility]  Phone # [phone]  Fax # [phone]      Plan  Anticipated Discharge Date:  11/20/2019  Anticipated Discharge Plan:  Home with home infusion.    Hoda Baptiste RN, BSN  Care Coordinator, 8A  Phone [phone]  Pager [phone]    Udate: [facility] $30.00 a day for abx and $155.00 for each RN visit.   Referrals also sent to [facility] and [facility].

## 2019-11-18 NOTE — ANESTHESIA POSTPROCEDURE EVALUATION
Anesthesia POST Procedure Evaluation    Patient: Kobe Buchanan   MRN:     [id number] Gender:   male   Age:    59 year old :      1960        Preoperative Diagnosis: S/P shoulder replacement, left [Z96.612]   Procedure(s):  Explantation of left total shoulder arthroplasty, irrigation and debridement, and placement of antibiotic spacer   Postop Comments: No value filed.       Anesthesia Type:  Not documented  General, Peripheral Nerve Block, For Post-op pain in coordination with surgeon    Reportable Event: NO     PAIN: Uncomplicated   Sign Out status: Comfortable, Well controlled pain     PONV: No PONV   Sign Out status:  No Nausea or Vomiting     Neuro/Psych: Uneventful perioperative course   Sign Out Status: Preoperative baseline; Age appropriate mentation     Airway/Resp.: Uneventful perioperative course   Sign Out Status: Non labored breathing, age appropriate RR; Resp. Status within EXPECTED Parameters     CV: Uneventful perioperative course   Sign Out status: Appropriate BP and perfusion indices; Appropriate HR/Rhythm     Disposition:   Sign Out in:  PACU  Disposition:  Phase II; Home  Recovery Course: Uneventful  Follow-Up: Not required           Last Anesthesia Record Vitals:  CRNA VITALS  11/15/2019 1925 - 11/15/2019 2025      11/15/2019             Temp:  36.4  C (97.5  F)    EKG:  Sinus rhythm          Last PACU Vitals:  Vitals Value Taken Time   /54 2019  3:44 PM   Temp 36.1  C (96.9  F) 2019  3:44 PM   Pulse 73 2019  8:16 AM   Resp 16 2019  3:44 PM   SpO2 98 % 2019  3:44 PM   Temp src     NIBP     Pulse     SpO2     Resp     Temp 36.4  C (97.5  F) 11/15/2019  7:59 PM   Ht Rate     Temp 2           Electronically Signed By: Yesenia Caba MD, 2019, 8:45 PM

## 2019-11-19 ENCOUNTER — PATIENT OUTREACH (OUTPATIENT)
Dept: CARE COORDINATION | Facility: CLINIC | Age: 59
End: 2019-11-19

## 2019-11-19 ENCOUNTER — APPOINTMENT (OUTPATIENT)
Dept: INTERVENTIONAL RADIOLOGY/VASCULAR | Facility: CLINIC | Age: 59
DRG: 497 | End: 2019-11-19
Attending: PHYSICIAN ASSISTANT
Payer: COMMERCIAL

## 2019-11-19 ENCOUNTER — MEDICAL CORRESPONDENCE (OUTPATIENT)
Dept: HEALTH INFORMATION MANAGEMENT | Facility: CLINIC | Age: 59
End: 2019-11-19

## 2019-11-19 VITALS
OXYGEN SATURATION: 98 % | DIASTOLIC BLOOD PRESSURE: 62 MMHG | SYSTOLIC BLOOD PRESSURE: 133 MMHG | WEIGHT: 191.8 LBS | RESPIRATION RATE: 16 BRPM | HEART RATE: 73 BPM | BODY MASS INDEX: 27.46 KG/M2 | TEMPERATURE: 97.1 F | HEIGHT: 70 IN

## 2019-11-19 LAB
BACTERIA SPEC CULT: NO GROWTH
BACTERIA SPEC CULT: NO GROWTH
Lab: NORMAL
Lab: NORMAL
SPECIMEN SOURCE: NORMAL
SPECIMEN SOURCE: NORMAL

## 2019-11-19 PROCEDURE — 99207 ZZC NO CHARGE SIGN-OFF PS: CPT

## 2019-11-19 PROCEDURE — 25000125 ZZHC RX 250: Performed by: PHYSICIAN ASSISTANT

## 2019-11-19 PROCEDURE — 25000132 ZZH RX MED GY IP 250 OP 250 PS 637: Performed by: ORTHOPAEDIC SURGERY

## 2019-11-19 PROCEDURE — 25000128 H RX IP 250 OP 636: Performed by: ORTHOPAEDIC SURGERY

## 2019-11-19 PROCEDURE — C1751 CATH, INF, PER/CENT/MIDLINE: HCPCS

## 2019-11-19 PROCEDURE — 36573 INSJ PICC RS&I 5 YR+: CPT

## 2019-11-19 PROCEDURE — 27210886 ZZH ACCESSORY CR5

## 2019-11-19 PROCEDURE — 25000132 ZZH RX MED GY IP 250 OP 250 PS 637: Performed by: NURSE PRACTITIONER

## 2019-11-19 RX ORDER — ACETAMINOPHEN 325 MG/1
650 TABLET ORAL EVERY 4 HOURS PRN
Qty: 100 TABLET | Refills: 0 | Status: SHIPPED | OUTPATIENT
Start: 2019-11-19 | End: 2020-08-21

## 2019-11-19 RX ORDER — NICOTINE POLACRILEX 4 MG
15-30 LOZENGE BUCCAL
Status: CANCELLED | OUTPATIENT
Start: 2019-11-19

## 2019-11-19 RX ORDER — DEXTROSE MONOHYDRATE 25 G/50ML
25-50 INJECTION, SOLUTION INTRAVENOUS
Status: CANCELLED | OUTPATIENT
Start: 2019-11-19

## 2019-11-19 RX ORDER — OXYCODONE HYDROCHLORIDE 10 MG/1
10-20 TABLET ORAL EVERY 4 HOURS PRN
Qty: 50 TABLET | Refills: 0 | Status: SHIPPED | OUTPATIENT
Start: 2019-11-19 | End: 2020-01-28

## 2019-11-19 RX ORDER — SENNOSIDES 8.6 MG
4 TABLET ORAL AT BEDTIME
Qty: 30 TABLET | Refills: 0 | Status: SHIPPED | OUTPATIENT
Start: 2019-11-19 | End: 2019-11-19

## 2019-11-19 RX ORDER — OXYCODONE HYDROCHLORIDE 10 MG/1
10-20 TABLET ORAL
Qty: 30 TABLET | Refills: 0 | Status: SHIPPED | OUTPATIENT
Start: 2019-11-19 | End: 2019-11-19

## 2019-11-19 RX ORDER — CEFAZOLIN SODIUM 2 G/100ML
2 INJECTION, SOLUTION INTRAVENOUS EVERY 8 HOURS
Qty: 12600 ML | Refills: 0 | Status: SHIPPED | OUTPATIENT
Start: 2019-11-15 | End: 2020-01-15

## 2019-11-19 RX ADMIN — OXYCODONE HYDROCHLORIDE 20 MG: 10 TABLET ORAL at 12:22

## 2019-11-19 RX ADMIN — OXYCODONE HYDROCHLORIDE 20 MG: 10 TABLET ORAL at 02:00

## 2019-11-19 RX ADMIN — OXYCODONE HYDROCHLORIDE 20 MG: 10 TABLET ORAL at 08:02

## 2019-11-19 RX ADMIN — AMLODIPINE AND BENAZEPRIL HYDROCHLORIDE 1 CAPSULE: 5; 20 CAPSULE ORAL at 04:28

## 2019-11-19 RX ADMIN — LOSARTAN POTASSIUM 25 MG: 25 TABLET ORAL at 04:28

## 2019-11-19 RX ADMIN — CEFAZOLIN SODIUM 2 G: 2 INJECTION, SOLUTION INTRAVENOUS at 10:21

## 2019-11-19 RX ADMIN — DOCUSATE SODIUM 100 MG: 100 CAPSULE, LIQUID FILLED ORAL at 04:28

## 2019-11-19 RX ADMIN — SENNOSIDES 3 TABLET: 8.6 TABLET, FILM COATED ORAL at 04:29

## 2019-11-19 RX ADMIN — Medication 1 CAPSULE: at 04:28

## 2019-11-19 RX ADMIN — ACETAMINOPHEN 975 MG: 325 TABLET, FILM COATED ORAL at 14:01

## 2019-11-19 RX ADMIN — LIDOCAINE HYDROCHLORIDE 1 ML: 10 INJECTION, SOLUTION EPIDURAL; INFILTRATION; INTRACAUDAL; PERINEURAL at 09:45

## 2019-11-19 RX ADMIN — ACETAMINOPHEN 975 MG: 325 TABLET, FILM COATED ORAL at 06:32

## 2019-11-19 RX ADMIN — BUPROPION HYDROCHLORIDE 200 MG: 200 TABLET, EXTENDED RELEASE ORAL at 04:28

## 2019-11-19 RX ADMIN — CYCLOBENZAPRINE 10 MG: 10 TABLET, FILM COATED ORAL at 04:29

## 2019-11-19 RX ADMIN — OXYCODONE HYDROCHLORIDE 20 MG: 10 TABLET ORAL at 04:28

## 2019-11-19 RX ADMIN — CARVEDILOL 25 MG: 25 TABLET, FILM COATED ORAL at 04:29

## 2019-11-19 ASSESSMENT — ACTIVITIES OF DAILY LIVING (ADL)
ADLS_ACUITY_SCORE: 15

## 2019-11-19 ASSESSMENT — MIFFLIN-ST. JEOR: SCORE: 1696.25

## 2019-11-19 NOTE — PROGRESS NOTES
"Charts reviewed. No new complaints  Patient to be discharged today on IV abx. Plan in place  PICC line placed today    labs largely within normal limits  Labs within acceptable limits     /62 (BP Location: Left leg)   Pulse 73   Temp 97.1  F (36.2  C) (Oral)   Resp 16   Ht 1.778 m (5' 10\")   Wt 87 kg (191 lb 12.8 oz)   SpO2 98%   BMI 27.52 kg/m      ID recommendation as follows:  - continue Cefazolin 2 gram IV q8hr. He will need a minimum of 6 weeks from 11/15 which translates to a stop date of 12/27. He will require weeily CMP, CBC with diff, ESR and CRP.   - repeat blood cultures 1 week after cefazolin stops, (ie, around 1/3) for surveillance given burden of infection and TAA repair  - would suggest at least 4 week drug holiday with arthrocentesis/arthroscopic biopsies prior to hardware re-insertion given high risk for relapsed infection in this case    # HTN.   -PTA mediations of Lotrel 5-20 mg, carvedilol 25 mg BID, Losartan 25 mg.   Resume home meds at discharge       # Anxiety: Resume Klonopin 1 mg TID PRN  # Depression: Resume Wellbutrin 200 mg BID  # Dyslipidemia: Resume Simvastatin 10 mg daily       Dr MARCELL Brown MD, [facility]  Hospitalist ( Internal medicine)  Pager: [phone]    "

## 2019-11-19 NOTE — PROGRESS NOTES
VS: stable   O2: Room air saturations WNL   Output: Up to bathroom. Did not measure urine output.    Last BM: 11/17/2019 pt preferred to not take any further bowel med's this am. Will continue to monitor and offer bowel teaching tips.    Activity: Up ad faby   Skin: Left shoulder dressing CDI/old scar tissue on right shoulder.   Pain: Taking Oxycodone almost every 3 hours constantly.    CMS: Intact   Dressing: Left shoulder dressing is CDI   Diet: Regular   LDA: Patient Went to IR this am to have a new PICC line placed with success. IV are able to be run through PICC line per IR   Equipment: PICC line/IS   Plan: Awaiting for discharge authorization in hopes of patient potentially being discharged to home with his parents today.    Additional Info: Will await for insurance authorization. Will continue to monitor patients status.

## 2019-11-19 NOTE — PROGRESS NOTES
Focus: Patients discharge to home  DB: Patient very excited to be discharged to home.   I: Patient had his scripts filled here at the hospital prior to discharge. All belongings were returned to patient prior to discharge. Pt has PICC line in place and home care is established for IV antibiotic infusion( See discharge plans of cares)  E: Plan is for patient to receive IV antibiotics at 1800 tonight and have an extension placed on PICC line. Patient seems to have a good understanding of plan of cares for discharge. Patient was escorted to front door in wheelchair to be discharged to his parents home. Parents are driving patient home via car.

## 2019-11-19 NOTE — PLAN OF CARE
VS: VSS.   O2: Room air >90%.    Output: Voiding in urinal without difficulty.    Last BM: 11/17.   Activity: Up ad faby. Arm immobilizer/sling.    Skin: Intact except incision.    Pain: Managed with Oxy Q3H and scheduled Tylenol. Ice as needed.   CMS: Intact.    Dressing: CDI.    Diet: Regular.    LDA: No access/Very hard stick. Waiting for PICC placement.    Equipment: Personal belongings.   Plan: PICC placement TBD.   Additional Info: Per Dr. Gonzales telephone order, hold off on Ancef antibiotics until PICC is placed.

## 2019-11-19 NOTE — PROGRESS NOTES
Care Coordinator Progress Note    Admission Date/Time:  11/12/2019  Attending MD:  Analilia Aceves, *    Data  Chart reviewed, discussed with interdisciplinary team.   Patient was admitted for:    Acute pain of left shoulder  S/P shoulder replacement, left  Elevated C-reactive protein (CRP)  S/P shoulder replacement, left  Staphylococcal arthritis of left shoulder (H).    Concerns with insurance coverage for discharge needs: None.  Current Living Situation: Patient lives alone.  Support System: Supportive and Involved  Services Involved: Home Infusion  Transportation at Discharge: Family or friend will provide  Transportation to Medical Appointments:   - Not applicable  Barriers to Discharge: NA    Coordination of Care and Referrals: Provided patient/family with options for Home Infusion.        Assessment  Met with patient at bedside to discuss discharge planning. A referral was sent to [facility] for home IV antibiotic infusion. Patient will have home RN visit this evening and antibiotics will be delivered to his parents home, where he will be staying. All orders faxed to [facility]. RNCC available as needed.    Parents address  [address]  [place], [place] [address] [place] [address]  Phone [phone]  Fax [phone]    Plan  Anticipated Discharge Date:  11/19/2019  Anticipated Discharge Plan:  Home with home infusion.    Hoda Baptiste RN, BSN  Care Coordinator, 8A  Phone [phone]  Pager [phone]

## 2019-11-19 NOTE — PROCEDURES
[facility], [place]    Procedure: IR Procedure Note  Date/Time: 11/19/2019 9:49 AM  Performed by: Kobe Bray PA-C  Authorized by: Kobe Bray PA-C     UNIVERSAL PROTOCOL   Site Marked: NA  Prior Images Obtained and Reviewed:  No  Required items: Required blood products, implants, devices and special equipment available    Patient identity confirmed:  Verbally with patient  Patient was reevaluated immediately before administering moderate or deep sedation or anesthesia  Confirmation Checklist:  Patient's identity using two indicators, relevant allergies, procedure was appropriate and matched the consent or emergent situation and correct equipment/implants were available  Time out: Immediately prior to the procedure a time out was called    Preparation: Patient was prepped and draped in usual sterile fashion    ESBL (mL):  1     ANESTHESIA    Anesthesia: Local infiltration  Local Anesthetic:  Lidocaine 1% without epinephrine  Anesthetic Total (mL):  1      SEDATION    Patient Sedated: No    See dictated procedure note for full details.  Findings: Image guided placement of left basilic vein, 3 Fr., 41 cm single lumen valved PICC. PICC is ready for use.    Specimens: none    Complications: None    Condition: Stable    Plan: Follow up per primary team.    PROCEDURE   Patient Tolerance:  Patient tolerated the procedure well with no immediate complications    Length of time physician/provider present for 1:1 monitoring during sedation: 0

## 2019-11-19 NOTE — PROGRESS NOTES
GENERAL ID SERVICE PROGRESS NOTE     Patient:  Kobe Buchanan   Date of birth 1960, Medical record number [id number]  Date of Visit:  11/19/2019  Date of Admission: 11/12/2019  Consult Requester:Analilia Aceves, *  Reason for Consult : Right TSA infection s/p explant , now with acute left TSA pain s/p finishing 6 weeks of ancef          Assessment and Recommendations:   Kobe Buchanan is a 59 year old male, right handed, with history significant for  hypertension, hyperlipidemia, chronic back pain, TAA repair 2016, bipolar disorder, atrial fibrillation (not on anticoagulation ), obstructive sleep apnea, obesity,  and type 2 diabetes mellitus (diet control),hx of gastric bypass 2005,  hx of C.diff, skin picking habit, chronic neck pain,  DJD, osteoarthritis,  s/p  Left total shoulder arthroplasty on 4/15/14  and right  total shoulder arthroplasty on 8/26/2014. He is presenting with L TSA PJI with symptom onset 2 days after completing a 6 week antibiotic course for a recent episode of MSSA R TSA PJI s/p hardware explant.    1. left shoulder prosthetic joint infection. Notably refractory to cefazolin for #2 with relapsed sx 2 days after cefazolin was discontinued. S/p L TSA explant 11/15 with cx showing MSSA    2. Hx Right shoulder prostheric joint infection   - s/p irrigation and debridement , explant and replacement of spacer on 9/23/19.   - Intra-op cultures grew MSSA .   - s/p 6 weeks of Cefazolin and  completed antibotic on 11/10/19.       3. history of MSSA bacteremia/septicemia (9/20/19 -9/22/19) with secondary R and now L TSA PJI (see above)  - XIOMY neg for thrombus/vegetation 9/24/19  - blood cx - neg x 2 on 11/13/19   - may have been preceded by skin picking/dermatitis    RECOMMENDATION:  - continue Cefazolin 2 gram IV q8hr. He will need a minimum of 6 weeks from 11/15 which translates to a stop date of 12/27. He will require weekly CMP, CBC with diff, ESR and CRP.   - repeat blood  cultures 1 week after cefazolin stops, (ie, around 1/3) for surveillance given burden of infection and TAA repair  - would suggest at least 4 week drug holiday with arthrocentesis/arthroscopic biopsies prior to hardware re-insertion given high risk for relapsed infection in this case  - I will request that he see me in clinic 3 weeks from discharge (12/11/19)    ID will follow with you.  Ivory Hilton MD   of Medicine, Division of Infectious Diseases  [facility] [phone]      Interval history   Ongoing L shoulder pain with movement. No R shoulder pain. No chest or abdominal pain. No SOBr. No skin or urinary symptoms.         History of Present Illness: 11/13/19     Kobe Buchanan is a 59 year old male  , right handed, with history significant for  hypertension, hyperlipidemia, chronic back pain, TAA repair 2016, bipolar disorder, atrial fibrillation (not on anticoagulation ), obstructive sleep apnea, obesity,  and type 2 diabetes mellitus (diet control),hx of gastric bypass 2005,  hx of C.diff, skin picking habit, chronic neck pain,  DJD, osteoarthritis,  s/p  Left total shoulder arthroplasty on 4/15/14  and right  total shoulder arthroplasty on 8/26/2014.     He presented to [facility] on 8/22/19 with right shoulder sharp and burning pain. He was hospitalized at [facility] 9/20/19-10/4/19 for confusion, weakness , acute bilateral shoulder pain and after a mechanical fall. found to have acute kidney injury requiring hemodialysis,  and MSSA bacteremia  with positive blood culture from 9/20/19 -9/22/19 . EEG was negative. XIOMY was negative for thrombus / vegetation .      He had  right shoulder irrigation and debridement , explant and replacement of spacer on 9/23/19. Intra-op cultures grew MSSA. On 10/23/19 visit to Ortho clinic,  he had draining right shoulder wound. drainage has stopped. He was ultimately treated with 6 weeks of Cefazolin and  completed antibotic on 11/10/19.  PICC was removed.        He was admitted on 11/12/19 for acute on chronic left shoulder pain x 2 days described as 10/10 burning/aching pain, worse with movements. 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                Current Medications:       acetaminophen  975 mg Oral Q8H     amLODIPine-benazepril  1 capsule Oral BID     buPROPion  200 mg Oral BID     carvedilol  25 mg Oral BID w/meals     ceFAZolin  2 g Intravenous Q8H     docusate sodium  100 mg Oral Daily     docusate sodium  200 mg Oral At Bedtime     fentaNYL  75 mcg Transdermal Q48H     fentaNYL   Transdermal Q8H     fentaNYL   Transdermal Q48H     lactobacillus rhamnosus (GG)  1 capsule Oral Daily     losartan  25 mg Oral Daily     sennosides  3 tablet Oral Daily     sennosides  4 tablet Oral At Bedtime     simvastatin  10 mg Oral At Bedtime     sodium chloride (PF)  3 mL Intracatheter Q8H          Allergies:     Allergies   Allergen Reactions     Ciprofloxacin      History of aortic aneurysms     Liquid Adhesive Other (See Comments)     Blistering of skin          Physical Exam:   Vitals were reviewed  Patient Vitals for the past 24 hrs:   BP Temp Temp src Heart Rate Resp SpO2 Weight   11/19/19 0802 133/62 97.1  F (36.2  C) Oral 74 16 98 % --   11/19/19 0421 -- -- -- -- -- -- 87 kg (191 lb 12.8 oz)   11/19/19 0149 (!) 158/59 96.3  F (35.7  C) Oral 70 16 93 % --   11/18/19 2245 130/64 96.9  F (36.1  C) Oral 66 18 94 % --   11/18/19 1700 125/52 -- -- -- -- -- --   11/18/19 1534 (!) 152/68 97.4  F (36.3  C) Oral 72 16 98 % --     Physical Examination:  GENERAL:  well-developed, well-nourished, in bed in no acute distress.   HEENT:  Head is normocephalic, atraumatic   EYES:  Eyes have anicteric sclerae   LUNGS:  Clear to auscultation bilateral.   CARDIOVASCULAR:  RRR. I appreciate a 2/6 systolic murmur and the patient tells me this is chronic.  ABDOMEN:  Soft  SKIN:  No acute rashes.  many scars from skin picking            Laboratory Data:      Inflammatory Markers    Recent Labs   Lab Test 11/12/19  1044 11/07/19  1545   SED 91*  --    CRP 98.0* 17.5*     Hematology Studies    Recent Labs   Lab Test 11/18/19  0623 11/15/19  1517 11/14/19  0709 11/13/19  0648 11/12/19  1044 11/07/19  1545 01/10/18  0808 03/01/17  1013 01/27/17  1636   WBC 9.5  --  7.1 9.6 9.2 6.6 6.8 6.4 9.2   ANEU  --   --   --  6.5 6.8 4.1 4.4 3.7 6.1   AEOS  --   --   --  0.2 0.1 0.3 0.2 0.2 0.2   HGB 9.5* 9.1* 9.2* 9.5* 9.6* 8.7* 15.5 14.3 14.3   MCV 89  --  90 90 93 91 88 82 83     --  260 290 267 245 250 234 247     Metabolic Studies     Recent Labs   Lab Test 11/18/19  0623 11/14/19  0709 11/12/19  1044 11/07/19  1545 05/06/19  0939 01/10/18  0808    136 134  --  136 136   POTASSIUM 4.5 4.2 4.8 4.3 4.4 4.2   CHLORIDE 101 103 100  --  103 102   CO2 24 26 30  --  26 28   BUN 15 13 16 21 17 21   CR 0.89 0.91 1.08 1.06 1.09 0.98   GFRESTIMATED >90 >90 75 77 74 79     Hepatic Studies    Recent Labs   Lab Test 11/12/19  1044 11/07/19  1545 01/10/18  0808 03/01/17  1013 10/26/16  0804 10/11/16  0937 10/10/16  0744   BILITOTAL 0.8  --  1.0 1.2 0.7 1.0 0.7   ALKPHOS 105 103 90 117 119 178* 196*   ALBUMIN 3.4  --  4.4 4.2 3.2* 3.0* 2.8*   AST 16 13 20 21 20 86* 115*   ALT 10 7 23 28 36 117* 112*     Microbiology:  Culture Micro   Date Value Ref Range Status   11/15/2019 Culture negative monitoring continues  Preliminary   11/15/2019 Light growth  Staphylococcus aureus   (A)  Final   11/15/2019 Susceptibility testing done on previous specimen  Final   11/15/2019 Culture negative monitoring continues  Preliminary   11/15/2019 (A)  Preliminary    Light growth  Staphylococcus aureus  Susceptibility testing done on previous specimen     11/15/2019 (A)  Preliminary    On day 3, isolated in broth only:  Staphylococcus species  not isolated or reported on routine culture  Susceptibility testing done on previous specimen     11/15/2019 Culture negative monitoring continues   Preliminary   11/15/2019 Culture negative monitoring continues  Preliminary   11/15/2019 Culture negative monitoring continues  Preliminary   11/15/2019 Culture negative monitoring continues  Preliminary   11/15/2019 Light growth  Staphylococcus aureus   (A)  Final   11/15/2019 Susceptibility testing done on previous specimen  Final   11/13/2019 No growth  Final   11/13/2019 No growth  Final   11/13/2019 Light growth  Staphylococcus aureus   (A)  Final   11/13/2019   Final    Critical Value/Significant Value, preliminary result only, called to and read back by  Cyndee Burroughs RN at 1410 11.14.19 [id number]     11/13/2019 Culture negative monitoring continues  Preliminary   11/12/2019 No growth  Final   07/12/2016 No growth  Final   04/06/2016 No growth  Final   03/04/2016 No growth  Final   01/28/2016 No growth  Final   09/30/2014 No growth  Final   04/02/2014 No growth  Final   02/24/2014 No growth  Final   02/24/2014 No growth  Final   02/11/2014 No growth  Final   08/03/2011 No growth  Final     Urine Studies    Recent Labs   Lab Test 09/15/16  1234 04/06/16  0955 03/04/16  0950 01/28/16  0729 08/21/14  1128   LEUKEST Negative Negative Negative Negative Negative   WBCU 1 0* 1* 4* <1     Hepatitis C Antibody   Date Value Ref Range Status   10/11/2016 (A) NR Final    Reactive   A reactive result indicates one of the following 1) current HCV infection 2)   past HCV infection that has resolved or 3) false positivity. The CDC recommends   that a reactive result should be followed by Nucleic acid testing for HCV RNA.  If HCV RNA is detected, that indicates current HCV infection. If HCV RNA is not   detected, that indicates either past, resolved HCV infection, or false HCV   antibody positivity.   Assay performance characteristics have not been established for newborns,   infants, and children

## 2019-11-19 NOTE — PLAN OF CARE
VS: VS stable this shift.=   O2: Room air, maintaining sats >90 this shift   Output: Voiding adequate amounts. Up to bathroom this shift.    Last BM: 11/17/19   Activity: Independent   Skin: Incision to LUE shoulder, red marks on right arm (previous picc placement per pt. Report)   Pain: Managed with scheduled tylenol, prn Oxycodone 20mg x2, and flexeril x1, ice   CMS: Denies numbness/tingling   Dressing: LUE Dressing CDI   Diet: Regular as tolerated by pt.    LDA: None    Equipment:    Plan: PICC placement today 11/19/19   Additional Info:

## 2019-11-19 NOTE — PROGRESS NOTES
GENERAL ID SERVICE PROGRESS NOTE     Patient:  Kobe Buchanan   Date of birth 1960, Medical record number [id number]  Date of Visit:  11/18/2019  Date of Admission: 11/12/2019  Consult Requester:Analilia Aceves, *  Reason for Consult : Right TSA infection s/p explant , now with acute left TSA pain s/p finishing 6 weeks of ancef          Assessment and Recommendations:   Kobe Buchanan is a 59 year old male, right handed, with history significant for  hypertension, hyperlipidemia, chronic back pain, TAA repair 2016, bipolar disorder, atrial fibrillation (not on anticoagulation ), obstructive sleep apnea, obesity,  and type 2 diabetes mellitus (diet control),hx of gastric bypass 2005,  hx of C.diff, skin picking habit, chronic neck pain,  DJD, osteoarthritis,  s/p  Left total shoulder arthroplasty on 4/15/14  and right  total shoulder arthroplasty on 8/26/2014.     He presented to [facility] on 8/22/19 with right shoulder sharp and burning pain He was hospitalized at [facility] 9/20/19-10/4/19 for confusion, weakness , acute bilateral shoulder pain and after a mechanical fall. found to have acute kidney injury requiring hemodialysis,  and MSSA bacteremia  with positive blood culture from 9/20/19 -9/22/19 . EEG was negative. XIOMY was negative for thrombus / vegetation .     1. left shoulder prosthetic joint infection. Notably refractory to cefazolin for #2 with relapsed sx 2 days after cefazolin was discontinued. S/p L TSA explant 11/15 with cx showing MSSA    2. Hx Right shoulder prostheric joint infection   - s/p irrigation and debridement , explant and replacement of spacer on 9/23/19.   - Intra-op cultures grew MSSA .   - s/p 6 weeks of Cefazolin and  completed antibotic on 11/10/19.       3. history of MSSA bacteremia/septicemia (9/20/19 -9/22/19)  - XIOMY neg for thrombus/vegetation 9/24/19  - blood cx - neg x 2 on 11/13/19   - likely source for #1 and 2. May have been preceded by skin  picking/dermatitis    RECOMMENDATION:  - continue Cefazolin 2 gram IV q8hr. He will need a minimum of 6 weeks from 11/15 which translates to a stop date of 12/27. He will require weeily CMP, CBC with diff, ESR and CRP.   - repeat blood cultures 1 week after cefazolin stops, (ie, around 1/3) for surveillance given burden of infection and TAA repair  - would suggest at least 4 week drug holiday with arthrocentesis/arthroscopic biopsies prior to hardware re-insertion given high risk for relapsed infection in this case    ID will follow with you.  Ivory Hilton MD   of Medicine, Division of Infectious Diseases  p[facility] [phone]      Interval history   continue to have pain in left shoulder with movement. No fever. No SOBr. No chest pain. No diarrhea. No urinary complaints.         History of Present Illness: 11/13/19     Koeb Buchanan is a 59 year old male  , right handed, with history significant for  hypertension, hyperlipidemia, chronic back pain, TAA repair 2016, bipolar disorder, atrial fibrillation (not on anticoagulation ), obstructive sleep apnea, obesity,  and type 2 diabetes mellitus (diet control),hx of gastric bypass 2005,  hx of C.diff, skin picking habit, chronic neck pain,  DJD, osteoarthritis,  s/p  Left total shoulder arthroplasty on 4/15/14  and right  total shoulder arthroplasty on 8/26/2014.     He presented to [facility] on 8/22/19 with right shoulder sharp and burning pain He was hospitalized at [facility] 9/20/19-10/4/19 for confusion, weakness , acute bilateral shoulder pain and after a mechanical fall. found to have acute kidney injury requiring hemodialysis,  and MSSA bacteremia  with positive blood culture from 9/20/19 -9/22/19 . EEG was negative. XIOMY was negative for thrombus / vegetation .      He had  right shoulder irrigation and debridement , explant and replacement of spacer on 9/23/19. Intra-op cultures grew MSSA . He was treated with 6 weeks of Cefazolin and   completed antibotic on 11/10/19.  PICC was removed.     on 10/23/19 visit to Ortho clinic,  he had draining right shoulder wound. drainage has stopped.     He is admitted on 11/12/19 for acute on chronic left shoulder pain x 2 days described as 10/10 burning/aching pain, worse with movements . he started to have left shoulder pain at the same time he had right shoulder pain in September 2019.  Afebrile .no chills, night sweats.  WBC normal Hb 9.5 Plt 290   - but worsening  ESR 91 CRP 98 on 11/12/19 ( CRP was 11/7/19)      Imaging:  CT left shoulder w contrast 11/12/19  1. Nonspecific increasing lucency about the cranial and caudal margins of the glenoid component. Further evaluation with radiographs would be helpful for comparison to prior radiographs 3/21/2018 and 8/19/2015.  2. Small left shoulder effusion.    Xray left shoulder 11/12/19   Impression:  Total left shoulder arthroplasty. Less than 2 mm of lucency surrounding the humeral stem similar to 3/21/2018 but increased  compared to 8/19/2015. Stable fractured glenoid metallic marker. Degree of glenoid bone loss does not appear radiographically changed  compared to prior radiographs.    11/12/19  - IR - Synovial fluid - bloody , cloudy WBC 1588 with 81 % neutrophils. gram stain - rare PMNs no organism, culture - pending     11/13/19 -  fluoroscopy-guided left glenohumeral joint aspiration. 10 mL  hazy yellow fluid aspirated . cloudy yellow fluid with 70960 WBC and 80% neutrophils . Gram stain few gram positive cocci , many WBCs predom PMNs          Current Medications:       acetaminophen  975 mg Oral Q8H     amLODIPine-benazepril  1 capsule Oral BID     buPROPion  200 mg Oral BID     carvedilol  25 mg Oral BID w/meals     ceFAZolin  2 g Intravenous Q8H     docusate sodium  100 mg Oral Daily     docusate sodium  200 mg Oral At Bedtime     fentaNYL  75 mcg Transdermal Q48H     fentaNYL   Transdermal Q8H     fentaNYL   Transdermal Q48H     lactobacillus  rhamnosus (GG)  1 capsule Oral Daily     losartan  25 mg Oral Daily     sennosides  3 tablet Oral Daily     sennosides  4 tablet Oral At Bedtime     simvastatin  10 mg Oral At Bedtime     sodium chloride (PF)  3 mL Intracatheter Q8H          Allergies:     Allergies   Allergen Reactions     Ciprofloxacin      History of aortic aneurysms     Liquid Adhesive Other (See Comments)     Blistering of skin          Physical Exam:   Vitals were reviewed  Patient Vitals for the past 24 hrs:   BP Temp Temp src Heart Rate Resp SpO2   11/18/19 1700 125/52 -- -- -- -- --   11/18/19 1534 (!) 152/68 97.4  F (36.3  C) Oral 72 16 98 %   11/18/19 1218 129/55 97.3  F (36.3  C) Oral 76 14 100 %   11/18/19 0513 (!) 140/60 -- -- -- -- --   11/18/19 0028 (!) 144/58 98  F (36.7  C) Oral 97 16 98 %     Physical Examination:  GENERAL:  well-developed, well-nourished, in bed in no acute distress.   HEENT:  Head is normocephalic, atraumatic   EYES:  Eyes have anicteric sclerae   LUNGS:  Clear to auscultation bilateral.   CARDIOVASCULAR:  RRR. I appreciate a 2/6 systolic murmur and the patient tells me this is chronic.  ABDOMEN:  Normal bowel sounds, soft, nontender. No appreciable hepatosplenomegaly  SKIN:  No acute rashes.  many scars from skin picking   NEUROLOGIC: alert oriented, limited bilateral shoulder movement  left shoulder ; not tender on exam, no swelling, no increased warmth. pain with movement limited range of motion Left and right shoulder   extremities : no edema bilateral          Laboratory Data:     Inflammatory Markers    Recent Labs   Lab Test 11/12/19  1044 11/07/19  1545   SED 91*  --    CRP 98.0* 17.5*     Hematology Studies    Recent Labs   Lab Test 11/18/19  0623 11/15/19  1517 11/14/19  0709 11/13/19  0648 11/12/19  1044 11/07/19  1545 01/10/18  0808 03/01/17  1013 01/27/17  1636   WBC 9.5  --  7.1 9.6 9.2 6.6 6.8 6.4 9.2   ANEU  --   --   --  6.5 6.8 4.1 4.4 3.7 6.1   AEOS  --   --   --  0.2 0.1 0.3 0.2 0.2 0.2   HGB  9.5* 9.1* 9.2* 9.5* 9.6* 8.7* 15.5 14.3 14.3   MCV 89  --  90 90 93 91 88 82 83     --  260 290 267 245 250 234 247     Metabolic Studies     Recent Labs   Lab Test 11/18/19  0623 11/14/19  0709 11/12/19  1044 11/07/19  1545 05/06/19  0939 01/10/18  0808    136 134  --  136 136   POTASSIUM 4.5 4.2 4.8 4.3 4.4 4.2   CHLORIDE 101 103 100  --  103 102   CO2 24 26 30  --  26 28   BUN 15 13 16 21 17 21   CR 0.89 0.91 1.08 1.06 1.09 0.98   GFRESTIMATED >90 >90 75 77 74 79     Hepatic Studies    Recent Labs   Lab Test 11/12/19  1044 11/07/19  1545 01/10/18  0808 03/01/17  1013 10/26/16  0804 10/11/16  0937 10/10/16  0744   BILITOTAL 0.8  --  1.0 1.2 0.7 1.0 0.7   ALKPHOS 105 103 90 117 119 178* 196*   ALBUMIN 3.4  --  4.4 4.2 3.2* 3.0* 2.8*   AST 16 13 20 21 20 86* 115*   ALT 10 7 23 28 36 117* 112*     Microbiology:  Culture Micro   Date Value Ref Range Status   11/15/2019 Culture negative monitoring continues  Preliminary   11/15/2019 Light growth  Staphylococcus aureus   (A)  Final   11/15/2019 Susceptibility testing done on previous specimen  Final   11/15/2019 Culture negative monitoring continues  Preliminary   11/15/2019 (A)  Preliminary    Light growth  Staphylococcus aureus  Susceptibility testing done on previous specimen     11/15/2019 (A)  Preliminary    On day 3, isolated in broth only:  Gram positive cocci in clusters  Possible aerobic organism, aerotolerance testing in progress. Await final report.     11/15/2019 Culture negative monitoring continues  Preliminary   11/15/2019 Culture negative monitoring continues  Preliminary   11/15/2019 Culture negative monitoring continues  Preliminary   11/15/2019 Culture negative monitoring continues  Preliminary   11/15/2019 Light growth  Staphylococcus aureus   (A)  Final   11/15/2019 Susceptibility testing done on previous specimen  Final   11/13/2019 No growth after 5 days  Preliminary   11/13/2019 No growth after 5 days  Preliminary   11/13/2019 Light  growth  Staphylococcus aureus   (A)  Final   11/13/2019   Final    Critical Value/Significant Value, preliminary result only, called to and read back by  Cyndee Burroughs RN at 1410 11.14.19 [id number]     11/13/2019 Culture negative monitoring continues  Preliminary   11/12/2019 No growth  Final   07/12/2016 No growth  Final   04/06/2016 No growth  Final   03/04/2016 No growth  Final   01/28/2016 No growth  Final   09/30/2014 No growth  Final   04/02/2014 No growth  Final   02/24/2014 No growth  Final   02/24/2014 No growth  Final   02/11/2014 No growth  Final   08/03/2011 No growth  Final     Urine Studies    Recent Labs   Lab Test 09/15/16  1234 04/06/16  0955 03/04/16  0950 01/28/16  0729 08/21/14  1128   LEUKEST Negative Negative Negative Negative Negative   WBCU 1 0* 1* 4* <1     Hepatitis C Antibody   Date Value Ref Range Status   10/11/2016 (A) NR Final    Reactive   A reactive result indicates one of the following 1) current HCV infection 2)   past HCV infection that has resolved or 3) false positivity. The CDC recommends   that a reactive result should be followed by Nucleic acid testing for HCV RNA.  If HCV RNA is detected, that indicates current HCV infection. If HCV RNA is not   detected, that indicates either past, resolved HCV infection, or false HCV   antibody positivity.   Assay performance characteristics have not been established for newborns,   infants, and children

## 2019-11-19 NOTE — PROGRESS NOTES
"Orthopedic Surgery Progress Note    Subjective: PICC placement pending. Patient understandably frustrated with delays. Pain generally controlled at this time. Evaluated by inpatient pain service again yesterday with improved pain control overnight.  Denies cp, sob, calf pain, fevers, chills, sweats. Denies new onset numbness/tingling in operative extremity.    Exam:  BP (!) 158/59 (BP Location: Left leg)   Pulse 73   Temp 96.3  F (35.7  C) (Oral)   Resp 16   Ht 1.778 m (5' 10\")   Wt 87 kg (191 lb 12.8 oz)   SpO2 93%   BMI 27.52 kg/m    Gen: Awake, alert, NAD  Resp: breathing equal and non-labored  Extremities:    LUE: Dressing c/d/i. Drain in place, patent. SILT m/r/u nerves of hand. Fires epl, fpl, io muscles. 2+ radial pulse. Fingers WWP    Labs:    Recent Labs   Lab 11/18/19  0623 11/15/19  1517 11/14/19  0709 11/13/19  0648 11/12/19  1044   WBC 9.5  --  7.1 9.6 9.2   HGB 9.5* 9.1* 9.2* 9.5* 9.6*     --  260 290 267     Recent Labs   Lab 11/18/19  0623 11/14/19  0709 11/12/19  1044    136 134   POTASSIUM 4.5 4.2 4.8   CHLORIDE 101 103 100   CO2 24 26 30   BUN 15 13 16   CR 0.89 0.91 1.08   * 99 117*     Recent Labs   Lab 11/15/19  1517 11/12/19  1044   INR 1.22* 1.16*       IR image guided L shoulder aspiration (11/13) - 33,500 WBCs, 80% PMNs  Gram stain - GPC  Cultures - staph aureus    Intraop Cxs - staph aureus      Assessment:   59 year old male with PMH of 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 Completed abx course on 11/10/19 now with acute development with left shoulder pain just 2 days later.     Procedure:  Left TSA explantation, placement abx spacer on 11/15 with Dr. Aceves    Plan:  [facility] - inpatient status  Activity: Up with assist.  Weight bearing status: NWB BUE  Antibiotics: IV ancef per ID recs. Outpatient abx plan pending, anticipate PICC placement 11/19  Diet: OK for diet "   Drain: removed 11/18  Pain management: transition from IV to orals as tolerated.   Cultures: pending, follow closely - ID managing Abx tailoring   Consults: infectious disease, hospitalist, IR  Follow-up: clinic with Dr. Aceves care team in 2 weeks for wound check    Disposition: pending intraop cultures, ID recs for Abx tailoring, anticipated PICC placement today. Anticipate discharge with home infusion today vs. Tomorrow.     Future Appointments   Date Time Provider Department Center   12/24/2019  1:30 PM Enriqueta Zhou MD [facility]   12/27/2019  9:45 AM Analilia Aceves MD [facility]   1/22/2020  9:00 AM Analilia Aceves MD [facility]        Juan Carlos Hernandez, PGY4  Orthopedic Surgery Resident  Pager [phone]

## 2019-11-20 LAB
BACTERIA SPEC CULT: ABNORMAL
BACTERIA SPEC CULT: NO GROWTH
BACTERIA SPEC CULT: NO GROWTH
SPECIMEN SOURCE: ABNORMAL
SPECIMEN SOURCE: NORMAL
SPECIMEN SOURCE: NORMAL

## 2019-11-20 NOTE — DISCHARGE SUMMARY
ORTHOPAEDIC DISCHARGE SUMMARY     Date of Admission: 11/12/2019  Date of Discharge: 11/19/2019  2:50 PM  Disposition: Home  Staff Physician: No att. 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 Completed abx course on 11/10/19 now with acute development with left shoulder pain just 2 days later. He is now s/p the above surgery. The patient elected to pursue surgical management after thorough discussion of risks, benefits, and alternatives to surgery.     HOSPITAL COURSE:    Surgery was uncomplicated. Kobe Buchanan has done well post-operatively. Medicine and Infectious disease was consulted post operatively to aid in management of medical comorbidities. See final recommendations below. The patient received routine nursing cares and is medically stable. Vital signs are stable. The patient is tolerating a regular diet without GI distress/nausea or vomiting. Voiding spontaneously. All PT/OT goals have been met for safe mobility. Pain is now controlled on oral medications which will be available on discharge. Stool softeners have been used while taking pain medications to help prevent constipation. Kobe Buchanan is deemed medically safe to discharge.     Antibiotics:  Ancef 2g IV q8hr x6 weeks from 11/15 per ID recs  DVT prophylaxis:  mechanical  PT Progress: Has met PT/OT goals for safe mobility.    Pain Meds:  Weaned off all IV pain meds by discharge.  Inpatient Events: No significant events or complications.     Discharge orders and instructions as below.    FOLLOWUP:    Future Appointments   Date Time  Provider Department Center   12/24/2019  1:30 PM Enriqueta Zhou MD [facility]   12/27/2019  9:45 AM Analilia Aceves MD [facility]   1/22/2020  9:00 AM Analilia Aceves MD [facility]       Appointments at [facility] and Surgery Center: [address] [place] [id number]  Please call [phone] if you haven't heard regarding these appointments within 7 days of discharge.    PLANNED DISCHARGE ORDERS:      Discharge Medication List as of 11/19/2019  2:34 PM      START taking these medications    Details   acetaminophen (TYLENOL) 325 MG tablet Take 2 tablets (650 mg) by mouth every 4 hours as needed for mild pain, Disp-100 tablet, R-0, E-Prescribe      ceFAZolin (ANCEF) intermittent infusion 2 g in 100 mL dextrose PRE-MIX Inject 100 mLs (2 g) into the vein every 8 hours, Disp-65446 mL, R-0, Local Print         CONTINUE these medications which have CHANGED    Details   oxyCODONE IR (ROXICODONE) 10 MG tablet Take 1-2 tablets (10-20 mg) by mouth every 4 hours as needed for moderate to severe pain, Disp-50 tablet, R-0, E-Prescribe         CONTINUE these medications which have NOT CHANGED    Details   amLODIPine-benazepril (LOTREL) 5-20 MG capsule Take 1 capsule by mouth 2 times daily, Disp-180 capsule, R-3, E-Prescribe      buPROPion (WELLBUTRIN SR) 200 MG 12 hr tablet TAKE 1 TABLET BY MOUTH 2 TIMES DAILY, Disp-180 tablet, R-3, E-Prescribe      carvedilol (COREG) 25 MG tablet Take 1 tablet (25 mg) by mouth 2 times daily (with meals), Disp-180 tablet, R-3, E-Prescribe      clonazePAM (KLONOPIN) 0.5 MG tablet Take 1 tablet (0.5 mg) by mouth 3 times daily as needed for anxiety, Disp-90 tablet, R-5, E-Prescribe      cyclobenzaprine (FLEXERIL) 10 MG tablet Take 1 tablet (10 mg) by mouth 3 times daily as needed for muscle spasms, Disp-30 tablet, R-1, E-Prescribe      docusate sodium (COLACE) 100 MG capsule Take 1 capsule in the AM and 2 capsules in the PM, Historical      Fe  Heme Polypeptide-folic acid (PROFERRIN-FORTE) 12-1 MG TABS Take 1 tablet by mouth daily, Disp-30 tablet, R-3, E-Prescribe      fentaNYL (DURAGESIC) 75 mcg/hr 72 hr patch Place 1 patch onto the skin every 48 hours , Historical      fluocinonide (LIDEX) 0.05 % external ointment Apply twice daily to itchy skin nodules for 1-2 weeks at a time.Disp-30 g, [id number]      losartan (COZAAR) 25 MG tablet Take 1 tablet (25 mg) by mouth daily, Disp-90 tablet, R-1, E-Prescribe      naloxone (NARCAN) nasal spray Spray 1 spray (4 mg) into one nostril alternating nostrils as needed for opioid reversal every 2-3 minutes until assistance arrives, Disp-0.2 mL, R-0, Historical      neomycin-polymyxin-hydrocortisone (CORTISPORIN) otic solution Place 3 drops in ear(s) 4 times daily, Disp-10 mL, R-3, E-Prescribe      order for DME Walker for cardiac rehab with 4 wheels, brakes and seatDisp-1 Device, R-0, Local Print      simvastatin (ZOCOR) 10 MG tablet Take 1 tablet (10 mg) by mouth At Bedtime, Disp-90 tablet, R-3, E-Prescribe      tacrolimus (PROTOPIC) 0.1 % ointment Apply topically as needed Apply to affected areas on body.Disp-120 g, [id number]      triamcinolone (KENALOG) 0.1 % external cream Apply topically 2 times daily As needed to affected areaDisp-80 g, [id number]         STOP taking these medications       senna-docusate (SENOKOT-S/PERICOLACE) 8.6-50 MG tablet Comments:   Reason for Stopping:         sennosides (SENOKOT) 8.6 MG tablet Comments:   Reason for Stopping:                 Discharge Procedure Orders   Home infusion referral   Referral Priority: Routine   Number of Visits Requested: 1     Reason for your hospital stay   Order Comments: Shoulder explantation for infection, postoperative pain control.     Activity   Order Comments: Your activity upon discharge: as directed by therapy. No weight bearing with operative extremity.     Order Specific Question Answer Comments   Is discharge order? Yes      Wound  care and dressings   Order Comments: Instructions to care for your wound at home: keep incision covered, clean, dry for at least 7 days following surgery. Then OK to change dressings as needed. Keep covered for protection. OK to let water run over incision, but do not submerge in tub.     [facility] Follow-up and recommended labs and tests   Order Comments: Infusion Center Referral  Clinics and Surgery Center    INTRAVENOUS ACCESS:  PICC    Central line dressing: Change weekly     Follow Up and recommended labs and tests   Order Comments: Follow up with Dr Aceves in two weeks as outlined in this document. For questions call [phone].    Follow up with Dr Hilton on 12-. Time to be determined. Call [phone] for any questions regarding this.     Diet   Order Comments: Follow this diet upon discharge: Regular     Order Specific Question Answer Comments   Is discharge order? Yes        Juan Carlos Hernandez MD  Orthopedic Surgery PGY4  [phone]

## 2019-11-24 ENCOUNTER — MEDICAL CORRESPONDENCE (OUTPATIENT)
Dept: HEALTH INFORMATION MANAGEMENT | Facility: CLINIC | Age: 59
End: 2019-11-24

## 2019-11-25 ENCOUNTER — COMMUNICATION - HEALTHEAST (OUTPATIENT)
Dept: INFECTIOUS DISEASES | Facility: CLINIC | Age: 59
End: 2019-11-25

## 2019-11-26 ENCOUNTER — RECORDS - HEALTHEAST (OUTPATIENT)
Dept: LAB | Facility: HOSPITAL | Age: 59
End: 2019-11-26

## 2019-11-26 ENCOUNTER — TRANSFERRED RECORDS (OUTPATIENT)
Dept: HEALTH INFORMATION MANAGEMENT | Facility: CLINIC | Age: 59
End: 2019-11-26

## 2019-11-26 LAB
ALBUMIN SERPL-MCNC: 3.7 G/DL (ref 3.5–5)
ALP SERPL-CCNC: 96 U/L (ref 45–120)
ALT SERPL W P-5'-P-CCNC: <9 U/L (ref 0–45)
ALT SERPL-CCNC: <9 U/L (ref 0–45)
ANION GAP SERPL CALCULATED.3IONS-SCNC: 9 MMOL/L (ref 5–18)
AST SERPL W P-5'-P-CCNC: 20 U/L (ref 0–40)
AST SERPL-CCNC: 20 U/L (ref 0–40)
BASOPHILS # BLD AUTO: 0.1 THOU/UL (ref 0–0.2)
BASOPHILS NFR BLD AUTO: 1 % (ref 0–2)
BILIRUB SERPL-MCNC: 0.4 MG/DL (ref 0–1)
BUN SERPL-MCNC: 17 MG/DL (ref 8–22)
C REACTIVE PROTEIN LHE: 1.6 MG/DL (ref 0–0.8)
CALCIUM SERPL-MCNC: 9.5 MG/DL (ref 8.5–10.5)
CHLORIDE BLD-SCNC: 105 MMOL/L (ref 98–107)
CO2 SERPL-SCNC: 22 MMOL/L (ref 22–31)
CREAT SERPL-MCNC: 1.11 MG/DL (ref 0.7–1.3)
EOSINOPHIL # BLD AUTO: 0.2 THOU/UL (ref 0–0.4)
EOSINOPHIL NFR BLD AUTO: 3 % (ref 0–6)
ERYTHROCYTE [DISTWIDTH] IN BLOOD BY AUTOMATED COUNT: 13.2 % (ref 11–14.5)
ERYTHROCYTE [SEDIMENTATION RATE] IN BLOOD BY WESTERGREN METHOD: 77 MM/HR (ref 0–15)
GFR SERPL CREATININE-BSD FRML MDRD: >60 ML/MIN/1.73M2
GLUCOSE BLD-MCNC: 96 MG/DL (ref 70–125)
GLUCOSE SERPL-MCNC: 96 MG/DL (ref 70–125)
HCT VFR BLD AUTO: 27.5 % (ref 40–54)
HGB BLD-MCNC: 9.1 G/DL (ref 14–18)
LYMPHOCYTES # BLD AUTO: 1.2 THOU/UL (ref 0.8–4.4)
LYMPHOCYTES NFR BLD AUTO: 18 % (ref 20–40)
MCH RBC QN AUTO: 29.6 PG (ref 27–34)
MCHC RBC AUTO-ENTMCNC: 33.1 G/DL (ref 32–36)
MCV RBC AUTO: 90 FL (ref 80–100)
MONOCYTES # BLD AUTO: 0.6 THOU/UL (ref 0–0.9)
MONOCYTES NFR BLD AUTO: 8 % (ref 2–10)
NEUTROPHILS # BLD AUTO: 4.9 THOU/UL (ref 2–7.7)
NEUTROPHILS NFR BLD AUTO: 70 % (ref 50–70)
PLATELET # BLD AUTO: 331 THOU/UL (ref 140–440)
PMV BLD AUTO: 9.3 FL (ref 8.5–12.5)
POTASSIUM BLD-SCNC: 5.3 MMOL/L (ref 3.5–5)
POTASSIUM SERPL-SCNC: 5.3 MMOL/L (ref 3.5–5)
PROT SERPL-MCNC: 7.8 G/DL (ref 6–8)
RBC # BLD AUTO: 3.07 MILL/UL (ref 4.4–6.2)
SODIUM SERPL-SCNC: 136 MMOL/L (ref 136–145)
WBC: 7 THOU/UL (ref 4–11)

## 2019-11-27 LAB
BACTERIA SPEC CULT: NORMAL
Lab: NORMAL
SPECIMEN SOURCE: NORMAL

## 2019-11-29 DIAGNOSIS — M54.2 NECK PAIN: ICD-10-CM

## 2019-11-29 LAB
BACTERIA SPEC CULT: ABNORMAL
BACTERIA SPEC CULT: NORMAL
Lab: ABNORMAL
Lab: NORMAL
SPECIMEN SOURCE: ABNORMAL
SPECIMEN SOURCE: NORMAL

## 2019-11-30 NOTE — OP NOTE
DATE OF PROCEDURE: 11/15/19    PREOPERATIVE DIAGNOSIS:   1. Infected left total shoulder arthroplasty    POSTOPERATIVE DIAGNOSIS:   1. Infected left total shoulder arthroplasty    PROCEDURE:   1. Explant of left total shoulder arthroplasty  2. Irrigation and excisional debridement left shoulder  3. Placement of antibiotic spacer    STAFF SURGEON: Analilia Aceves MD.     ANESTHESIA: General endotracheal anesthesia.   ESTIMATED BLOOD LOSS: 150 mL.   COMPLICATIONS: None.   DRAINS: Hemovac x1.     IMPLANTS REMOVED: Reshma BF total shoulder arthroplasty components    IMPLANT PLACED: Biomet StageOne antibiotic spacer with 2 grams Vanco powder per batch (two batches used)    BRIEF PATIENT HISTORY: The patient developed bacteremia and sepsis and was admitted to an outside hospital approximately two weeks ago. He seeded his right total shoulder which was explanted at that hospital. He noted some left shoulder pain and that worsened after his antibiotics were stopped. His left shoulder aspiration is positive for infection.  We discussed removal of the left total shoulder arthroplasty including the risks and benefits. The patient had the opportunity for questions to be answered and wished to proceed with surgery. Informed consent was completed.     DESCRIPTION OF PROCEDURE: The patient was identified in the preoperative area and the correct left shoulder was marked for surgery. The patient was provided an interscalene block by our anesthesia colleagues. He was taken to the operating room where he was surrendered to general endotracheal anesthesia. He was moved to the operating table in the beachchair position with all bony prominences well padded. The head was placed in a head rain with the neck in neutral position. The left upper extremity was prepped and draped in the usual sterile fashion. A timeout was held in accordance with hospital policy, confirming correct patient, site, side, procedure and administration of IV  antibiotics prior to incision. I completed a deltopectoral incision and approach through the prior incision. Upon entering the joint through a subscap tenotomy I found cloudy fluid. I completely released the subscap and dislocated the humerus anteriorly. I removed the trial head. Using flexible osteotomes I cleared the space between the stem and canal. I then removed the stem. I sent cultures from around the stem and shaft. I debrided the shaft in excisional fashion with reamers and curettes. I then exposed the glenoid and the component was grossly loose. I sent cultures from around the glenoid. I debrided the glenoid vault. There was a large central but contained defect. I irrigated the wound with antibiotic saline and then with 1L Synovasure. I then copiously irrigated the wound with another 2 liters of antibiotic saline. I placed the antibiotic spacer in the canal and reduced the joint. The wound was then closed in layered fashion with absorbable suture and staples. A soft sterile dressing were repaired. The arm was placed into an abduction sling and the patient was extubated and transported to recovery in stable condition. There were no apparent intraoperative complications.     POSTOPERATIVE PLAN:   1. The patient will be admitted to the Orthopedic Service and will continue on IV antibiotics under the guidance of the Infectious Disease team.     VICENTE MAYS MD

## 2019-12-02 ENCOUNTER — OFFICE VISIT (OUTPATIENT)
Dept: ORTHOPEDICS | Facility: CLINIC | Age: 59
End: 2019-12-02
Payer: COMMERCIAL

## 2019-12-02 ENCOUNTER — INFUSION THERAPY VISIT (OUTPATIENT)
Dept: INFUSION THERAPY | Facility: CLINIC | Age: 59
End: 2019-12-02
Attending: INTERNAL MEDICINE
Payer: COMMERCIAL

## 2019-12-02 DIAGNOSIS — Z96.619 INFECTION OF PROSTHETIC SHOULDER JOINT, SUBSEQUENT ENCOUNTER: Primary | ICD-10-CM

## 2019-12-02 DIAGNOSIS — T84.59XD INFECTION OF PROSTHETIC SHOULDER JOINT, SUBSEQUENT ENCOUNTER: Primary | ICD-10-CM

## 2019-12-02 DIAGNOSIS — M00.9: Primary | ICD-10-CM

## 2019-12-02 LAB
ALBUMIN SERPL-MCNC: 3.3 G/DL (ref 3.4–5)
ALP SERPL-CCNC: 83 U/L (ref 40–150)
ALT SERPL W P-5'-P-CCNC: 8 U/L (ref 0–70)
ANION GAP SERPL CALCULATED.3IONS-SCNC: 5 MMOL/L (ref 3–14)
AST SERPL W P-5'-P-CCNC: 13 U/L (ref 0–45)
BASOPHILS # BLD AUTO: 0 10E9/L (ref 0–0.2)
BASOPHILS NFR BLD AUTO: 0.5 %
BILIRUB SERPL-MCNC: 0.4 MG/DL (ref 0.2–1.3)
BUN SERPL-MCNC: 20 MG/DL (ref 7–30)
CALCIUM SERPL-MCNC: 8.9 MG/DL (ref 8.5–10.1)
CHLORIDE SERPL-SCNC: 108 MMOL/L (ref 94–109)
CO2 SERPL-SCNC: 23 MMOL/L (ref 20–32)
CREAT SERPL-MCNC: 0.92 MG/DL (ref 0.66–1.25)
CRP SERPL-MCNC: 3.4 MG/L (ref 0–8)
DIFFERENTIAL METHOD BLD: ABNORMAL
EOSINOPHIL # BLD AUTO: 0.2 10E9/L (ref 0–0.7)
EOSINOPHIL NFR BLD AUTO: 2 %
ERYTHROCYTE [DISTWIDTH] IN BLOOD BY AUTOMATED COUNT: 14.1 % (ref 10–15)
ERYTHROCYTE [SEDIMENTATION RATE] IN BLOOD BY WESTERGREN METHOD: 57 MM/H (ref 0–20)
GFR SERPL CREATININE-BSD FRML MDRD: >90 ML/MIN/{1.73_M2}
GLUCOSE SERPL-MCNC: 80 MG/DL (ref 70–99)
HCT VFR BLD AUTO: 28.4 % (ref 40–53)
HGB BLD-MCNC: 9.1 G/DL (ref 13.3–17.7)
IMM GRANULOCYTES # BLD: 0 10E9/L (ref 0–0.4)
IMM GRANULOCYTES NFR BLD: 0.1 %
LYMPHOCYTES # BLD AUTO: 1.5 10E9/L (ref 0.8–5.3)
LYMPHOCYTES NFR BLD AUTO: 19.1 %
MCH RBC QN AUTO: 29.6 PG (ref 26.5–33)
MCHC RBC AUTO-ENTMCNC: 32 G/DL (ref 31.5–36.5)
MCV RBC AUTO: 93 FL (ref 78–100)
MONOCYTES # BLD AUTO: 0.6 10E9/L (ref 0–1.3)
MONOCYTES NFR BLD AUTO: 6.9 %
NEUTROPHILS # BLD AUTO: 5.7 10E9/L (ref 1.6–8.3)
NEUTROPHILS NFR BLD AUTO: 71.4 %
NRBC # BLD AUTO: 0 10*3/UL
NRBC BLD AUTO-RTO: 0 /100
PLATELET # BLD AUTO: 266 10E9/L (ref 150–450)
POTASSIUM SERPL-SCNC: 5 MMOL/L (ref 3.4–5.3)
PROT SERPL-MCNC: 7.1 G/DL (ref 6.8–8.8)
RBC # BLD AUTO: 3.07 10E12/L (ref 4.4–5.9)
SODIUM SERPL-SCNC: 136 MMOL/L (ref 133–144)
WBC # BLD AUTO: 8 10E9/L (ref 4–11)

## 2019-12-02 PROCEDURE — 36592 COLLECT BLOOD FROM PICC: CPT

## 2019-12-02 PROCEDURE — 86140 C-REACTIVE PROTEIN: CPT | Performed by: INTERNAL MEDICINE

## 2019-12-02 PROCEDURE — 80053 COMPREHEN METABOLIC PANEL: CPT | Performed by: INTERNAL MEDICINE

## 2019-12-02 PROCEDURE — 25000128 H RX IP 250 OP 636: Performed by: INTERNAL MEDICINE

## 2019-12-02 PROCEDURE — 85652 RBC SED RATE AUTOMATED: CPT | Performed by: INTERNAL MEDICINE

## 2019-12-02 PROCEDURE — 85025 COMPLETE CBC W/AUTO DIFF WBC: CPT | Performed by: INTERNAL MEDICINE

## 2019-12-02 RX ORDER — HEPARIN SODIUM,PORCINE 10 UNIT/ML
5 VIAL (ML) INTRAVENOUS ONCE
Status: COMPLETED | OUTPATIENT
Start: 2019-12-02 | End: 2019-12-02

## 2019-12-02 RX ADMIN — Medication 5 ML: at 12:45

## 2019-12-02 NOTE — PROGRESS NOTES
Patient came to the clinic for a wound check and staple removal.  He is status post left total shoulder arthroplasty implant removal, irrigation and debridement, antibiotic spacer 11/15/19.  He is not wearing a sling.    Assessment:  The incision is clean, dry, and intact with no signs of infection.  Staples were removed without difficulty.  Patient requested no steri-strips or dressing on the incision.  He reports pain is under control with medications prescribed by the pain clinic.  He states he is unable to wear a sling because of neck and right shoulder pain.  His main concern today is the patency of his PICC.  He reports he has not been able to infuse all 3 doses of the antibiotic the past few days. .  He was told he may need TPA by his [place] contact and also informed this is not covered by [employer].     Plan: An appointment is scheduled with the infusion center today on the [facility] to check the PICC.  Patient has an appointment with Dr Hilton 12/11/19.  Follow-up with Dr Aceves will be determined after this appointment.

## 2019-12-02 NOTE — PROGRESS NOTES
Patient was at Dr. Renae's office, states he was having some difficulty with his picc line, states he was able to flush with the NS but he was not able to infuse his antibiotic.  Picc line was flushed with good blood return.  Patient did not bring his antibiotic with him, so unable to infuse while here.  Dressing, stat lock and cap were all changed.  Labs drawn and line flushed again with NS and heparin.  [facility] was notified of this.  Patient states he will try to infuse his antibiotic when he gets home.

## 2019-12-05 RX ORDER — CYCLOBENZAPRINE HCL 10 MG
10 TABLET ORAL 3 TIMES DAILY PRN
Qty: 45 TABLET | Refills: 0 | Status: SHIPPED | OUTPATIENT
Start: 2019-12-05 | End: 2020-01-20

## 2019-12-10 ENCOUNTER — RECORDS - HEALTHEAST (OUTPATIENT)
Dept: LAB | Facility: HOSPITAL | Age: 59
End: 2019-12-10

## 2019-12-10 ENCOUNTER — TRANSFERRED RECORDS (OUTPATIENT)
Dept: HEALTH INFORMATION MANAGEMENT | Facility: CLINIC | Age: 59
End: 2019-12-10

## 2019-12-10 LAB
ALBUMIN SERPL-MCNC: 3.6 G/DL (ref 3.5–5)
ALP SERPL-CCNC: 76 U/L (ref 45–120)
ALT SERPL W P-5'-P-CCNC: <9 U/L (ref 0–45)
ALT SERPL-CCNC: <9 U/L (ref 0–45)
ANION GAP SERPL CALCULATED.3IONS-SCNC: 11 MMOL/L (ref 5–18)
AST SERPL W P-5'-P-CCNC: 17 U/L (ref 0–40)
AST SERPL-CCNC: 17 U/L (ref 0–40)
BASOPHILS # BLD AUTO: 0 THOU/UL (ref 0–0.2)
BASOPHILS NFR BLD AUTO: 1 % (ref 0–2)
BILIRUB SERPL-MCNC: 0.4 MG/DL (ref 0–1)
BUN SERPL-MCNC: 18 MG/DL (ref 8–22)
C REACTIVE PROTEIN LHE: 0.8 MG/DL (ref 0–0.8)
CALCIUM SERPL-MCNC: 9.3 MG/DL (ref 8.5–10.5)
CHLORIDE BLD-SCNC: 105 MMOL/L (ref 98–107)
CO2 SERPL-SCNC: 23 MMOL/L (ref 22–31)
CREAT SERPL-MCNC: 1.13 MG/DL (ref 0.7–1.3)
CREAT SERPL-MCNC: 1.13 MG/DL (ref 0.7–1.3)
EOSINOPHIL # BLD AUTO: 0.2 THOU/UL (ref 0–0.4)
EOSINOPHIL NFR BLD AUTO: 3 % (ref 0–6)
ERYTHROCYTE [DISTWIDTH] IN BLOOD BY AUTOMATED COUNT: 13.4 % (ref 11–14.5)
ERYTHROCYTE [SEDIMENTATION RATE] IN BLOOD BY WESTERGREN METHOD: 37 MM/HR (ref 0–15)
GFR SERPL CREATININE-BSD FRML MDRD: >60 ML/MIN/1.73M2
GFR SERPL CREATININE-BSD FRML MDRD: >60 ML/MIN/1.73M2
GLUCOSE BLD-MCNC: 80 MG/DL (ref 70–125)
GLUCOSE SERPL-MCNC: 80 MG/DL (ref 70–125)
HCT VFR BLD AUTO: 28.2 % (ref 40–54)
HGB BLD-MCNC: 9 G/DL (ref 14–18)
LYMPHOCYTES # BLD AUTO: 1.4 THOU/UL (ref 0.8–4.4)
LYMPHOCYTES NFR BLD AUTO: 23 % (ref 20–40)
MCH RBC QN AUTO: 29.3 PG (ref 27–34)
MCHC RBC AUTO-ENTMCNC: 31.9 G/DL (ref 32–36)
MCV RBC AUTO: 92 FL (ref 80–100)
MONOCYTES # BLD AUTO: 0.4 THOU/UL (ref 0–0.9)
MONOCYTES NFR BLD AUTO: 7 % (ref 2–10)
NEUTROPHILS # BLD AUTO: 3.9 THOU/UL (ref 2–7.7)
NEUTROPHILS NFR BLD AUTO: 66 % (ref 50–70)
PLATELET # BLD AUTO: 236 THOU/UL (ref 140–440)
PMV BLD AUTO: 9.9 FL (ref 8.5–12.5)
POTASSIUM BLD-SCNC: 5.2 MMOL/L (ref 3.5–5)
POTASSIUM SERPL-SCNC: 5.2 MMOL/L (ref 3.5–5)
PROT SERPL-MCNC: 7.1 G/DL (ref 6–8)
RBC # BLD AUTO: 3.07 MILL/UL (ref 4.4–6.2)
SODIUM SERPL-SCNC: 139 MMOL/L (ref 136–145)
WBC: 5.9 THOU/UL (ref 4–11)

## 2019-12-11 ENCOUNTER — OFFICE VISIT (OUTPATIENT)
Dept: CT IMAGING | Facility: CLINIC | Age: 59
End: 2019-12-11
Attending: INTERNAL MEDICINE
Payer: COMMERCIAL

## 2019-12-11 VITALS
OXYGEN SATURATION: 100 % | BODY MASS INDEX: 29 KG/M2 | DIASTOLIC BLOOD PRESSURE: 71 MMHG | TEMPERATURE: 97.6 F | HEART RATE: 61 BPM | SYSTOLIC BLOOD PRESSURE: 156 MMHG | WEIGHT: 202.1 LBS

## 2019-12-11 DIAGNOSIS — Z96.619 INFECTION OF PROSTHETIC SHOULDER JOINT, SUBSEQUENT ENCOUNTER: ICD-10-CM

## 2019-12-11 DIAGNOSIS — T84.59XD INFECTION OF PROSTHETIC SHOULDER JOINT, SUBSEQUENT ENCOUNTER: ICD-10-CM

## 2019-12-11 DIAGNOSIS — M00.012 STAPHYLOCOCCAL ARTHRITIS OF LEFT SHOULDER (H): Primary | ICD-10-CM

## 2019-12-11 PROCEDURE — G0463 HOSPITAL OUTPT CLINIC VISIT: HCPCS | Mod: ZF

## 2019-12-11 ASSESSMENT — PAIN SCALES - GENERAL: PAINLEVEL: NO PAIN (1)

## 2019-12-11 NOTE — Clinical Note
Kobe seems to be doing well. I reiterated his high risk for reinfection and let him know that longer is better with regard to the length of his antibiotic holiday prior to surveillance biopsies for culture.Courtney

## 2019-12-11 NOTE — PROGRESS NOTES
ID Clinic follow-up visit note:    1. Left shoulder prosthetic joint infection. Notably refractory to cefazolin for #2 with relapsed sx 2 days after cefazolin was discontinued. S/p L TSA explant 11/15 with cx showing MSSA. Currently on cefazolin, CRP normalized, ESR trending down.    2. Hx Right shoulder prostheric joint infection   - s/p irrigation and debridement , explant and replacement of spacer on 9/23/19.   - Intra-op cultures grew MSSA .   - s/p 6 weeks of Cefazolin and  completed antibotic on 11/10/19.       3. History of MSSA bacteremia/septicemia (9/20/19 -9/22/19) with secondary R and now L TSA PJI (see above)  - XIOMY neg for thrombus/vegetation 9/24/19  - blood cx - neg x 2 on 11/13/19   - may have been preceded by skin picking/dermatitis    4. Bicuspid aortic valve  5. Ascending aortic aneurysm  6. Prior gastric bypass  7. Chronic cervical spine pain    RECOMMENDATION:  - continue Cefazolin 2 gram IV q8hr. He will need a minimum of 6 weeks from 11/15 which translates to a stop date of 12/27. He will require weekly CMP, CBC with diff, ESR and CRP.   - repeat blood cultures 1 week after cefazolin stops, (ie, around 1/3) for surveillance given burden of infection and TAA repair  - would suggest at least 4 week drug holiday with arthrocentesis/arthroscopic biopsies prior to hardware re-insertion given high risk for relapsed infection in this case  - RTC on 2/12/20    It is a pleasure to participate in Mr. Buchanan's care. Visit length 25 min, >50% clinical counseling/care coordination.    Ivory Hilton MD   of Medicine, Division of Infectious Diseases  [facility] [phone]             History of Present Illness: 11/13/19     Kobe Buchanan is a 59 year old male, right handed, with history significant for  hypertension, hyperlipidemia, chronic back pain, TAA repair 2016, bipolar disorder, atrial fibrillation (not on anticoagulation ), obstructive sleep apnea, obesity,  and type 2  diabetes mellitus (diet control),hx of gastric bypass 2005,  hx of C.diff, skin picking habit, chronic neck pain,  DJD, osteoarthritis,  s/p  Left total shoulder arthroplasty on 4/15/14  and right  total shoulder arthroplasty on 8/26/2014.     He presented to [facility] on 8/22/19 with right shoulder sharp and burning pain. He was hospitalized at [facility] 9/20/19-10/4/19 for confusion, weakness , acute bilateral shoulder pain and after a mechanical fall. found to have acute kidney injury requiring hemodialysis,  and MSSA bacteremia  with positive blood culture from 9/20/19 -9/22/19 . EEG was negative. XIOMY was negative for thrombus / vegetation .     He had  right shoulder irrigation and debridement , explant and replacement of spacer on 9/23/19. Intra-op cultures grew MSSA. On 10/23/19 visit to Ortho clinic, he had draining right shoulder wound. drainage has stopped. He was ultimately treated with 6 weeks of Cefazolin and  completed antibotic on 11/10/19. PICC was removed.       He was admitted on 11/12/19 for acute on chronic left shoulder pain x 2 days described as 10/10 burning/aching pain, worse with movements. 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      I last saw him while he was hospitalized on the [facility]. He has been doing wel. He denies pain in his shoulders, pain in cervical neck is at baseline. He is nervous because he perceives that his ability to pay for pain regimen may be reduced so he may be dealing with more pain in the future. 1BM/day.            Physical Exam:   Vitals were reviewed  Patient Vitals for the past 24 hrs:   BP Temp Pulse SpO2 Weight   12/11/19 0930 (!) 156/71 97.6  F (36.4  C) 61 100 % 91.7 kg (202 lb 1.6 oz)     Physical Examination:  GENERAL:  well-developed, well-nourished, in bed in no acute distress.   HEENT:  Head is normocephalic, atraumatic   EYES:  Eyes have anicteric sclerae   LUNGS:  Clear to auscultation bilateral.   CARDIOVASCULAR:   RRR. I appreciate a 2/6 systolic murmur and the patient tells me this is chronic.  ABDOMEN:  Soft  SKIN:  No acute rashes.  many scars from skin picking            Laboratory Data:     Inflammatory Markers    Recent Labs   Lab Test 12/02/19  1248 11/12/19  1044 11/07/19  1545   SED 57* 91*  --    CRP 3.4 98.0* 17.5*     Hematology Studies    Recent Labs   Lab Test 12/02/19  1248 11/18/19  0623 11/15/19  1517 11/14/19  0709 11/13/19  0648 11/12/19  1044 11/07/19  1545 01/10/18  0808 03/01/17  1013   WBC 8.0 9.5  --  7.1 9.6 9.2 6.6 6.8 6.4   ANEU 5.7  --   --   --  6.5 6.8 4.1 4.4 3.7   AEOS 0.2  --   --   --  0.2 0.1 0.3 0.2 0.2   HGB 9.1* 9.5* 9.1* 9.2* 9.5* 9.6* 8.7* 15.5 14.3   MCV 93 89  --  90 90 93 91 88 82    346  --  260 290 267 245 250 234     Metabolic Studies     Recent Labs   Lab Test 12/02/19  1248 11/18/19  0623 11/14/19  0709 11/12/19  1044 11/07/19  1545 05/06/19  0939    133 136 134  --  136   POTASSIUM 5.0 4.5 4.2 4.8 4.3 4.4   CHLORIDE 108 101 103 100  --  103   CO2 23 24 26 30  --  26   BUN 20 15 13 16 21 17   CR 0.92 0.89 0.91 1.08 1.06 1.09   GFRESTIMATED >90 >90 >90 75 77 74     Hepatic Studies    Recent Labs   Lab Test 12/02/19  1248 11/12/19  1044 11/07/19  1545 01/10/18  0808 03/01/17  1013 10/26/16  0804 10/11/16  0937   BILITOTAL 0.4 0.8  --  1.0 1.2 0.7 1.0   ALKPHOS 83 105 103 90 117 119 178*   ALBUMIN 3.3* 3.4  --  4.4 4.2 3.2* 3.0*   AST 13 16 13 20 21 20 86*   ALT 8 10 7 23 28 36 117*     Microbiology:  Culture Micro   Date Value Ref Range Status   11/15/2019 No anaerobes isolated  Final   11/15/2019 Light growth  Staphylococcus aureus   (A)  Final   11/15/2019 Susceptibility testing done on previous specimen  Final   11/15/2019 No anaerobes isolated  Final   11/15/2019 (A)  Final    Light growth  Staphylococcus aureus  Susceptibility testing done on previous specimen     11/15/2019 (A)  Final    On day 3, isolated in broth only:  Staphylococcus aureus  not isolated or  reported on routine culture  Susceptibility testing done on previous specimen     11/15/2019 No growth  Final   11/15/2019 No anaerobes isolated  Final   11/15/2019 No growth  Final   11/15/2019 No anaerobes isolated  Final   11/15/2019 Light growth  Staphylococcus aureus   (A)  Final   11/15/2019 Susceptibility testing done on previous specimen  Final   11/13/2019 No growth  Final   11/13/2019 No growth  Final   11/13/2019 Light growth  Staphylococcus aureus   (A)  Final   11/13/2019   Final    Critical Value/Significant Value, preliminary result only, called to and read back by  Cyndee Burroughs RN at 1410 11.14.19 [id number]     11/13/2019 No anaerobes isolated  Final   11/12/2019 No growth  Final   07/12/2016 No growth  Final   04/06/2016 No growth  Final   03/04/2016 No growth  Final   01/28/2016 No growth  Final   09/30/2014 No growth  Final   04/02/2014 No growth  Final   02/24/2014 No growth  Final   02/24/2014 No growth  Final   02/11/2014 No growth  Final   08/03/2011 No growth  Final     Urine Studies    Recent Labs   Lab Test 09/15/16  1234 04/06/16  0955 03/04/16  0950 01/28/16  0729 08/21/14  1128   LEUKEST Negative Negative Negative Negative Negative   WBCU 1 0* 1* 4* <1     Hepatitis C Antibody   Date Value Ref Range Status   10/11/2016 (A) NR Final    Reactive   A reactive result indicates one of the following 1) current HCV infection 2)   past HCV infection that has resolved or 3) false positivity. The CDC recommends   that a reactive result should be followed by Nucleic acid testing for HCV RNA.  If HCV RNA is detected, that indicates current HCV infection. If HCV RNA is not   detected, that indicates either past, resolved HCV infection, or false HCV   antibody positivity.   Assay performance characteristics have not been established for newborns,   infants, and children

## 2019-12-11 NOTE — NURSING NOTE
Visit Reason: New; referral from Dr Aceves in ortho     Evaluation / Assessment:   Patient c/o of only neck pain 1-2/10 on the pain scale.  His bilateral shoulders get fatigued but no pain.  Vitals were taken, meds and allergies were reviewed.      Labs: n/a    Procedure ordered? n/a    Dressing Change: Arm sleeve for PICC was given     Vaccine ordered / administered: n/a     Other: patient was scheduled for follow up on 02/12/20  Chris R. Kahler, CMA on 12/11/2019 at 10:02 AM

## 2019-12-13 DIAGNOSIS — D50.0 IRON DEFICIENCY ANEMIA DUE TO CHRONIC BLOOD LOSS: ICD-10-CM

## 2019-12-13 NOTE — TELEPHONE ENCOUNTER
[facility] phone call message- patient requesting a refill:    Full Medication Name: Fe Heme Polypeptide-folic acid (PROFERRIN-FORTE)      Dose: 12-1 MG TABS       Pharmacy confirmed as   [id number] - [place], [place] - [address]  [address]  [place] [address]  Phone: [phone] Fax: [phone]    [id number] [[address] [address]  Phone: [phone] Fax: [phone]  : Yes    Additional Comments: Patient would like to request 90 day supply per refills instead of 30 days per refill.      OK to leave a message on voice mail? Yes    Primary language: English      needed? No    Call taken on December 13, 2019 at 8:15 AM by Ryan Graham

## 2019-12-15 ENCOUNTER — HEALTH MAINTENANCE LETTER (OUTPATIENT)
Age: 59
End: 2019-12-15

## 2019-12-16 ENCOUNTER — MEDICAL CORRESPONDENCE (OUTPATIENT)
Dept: HEALTH INFORMATION MANAGEMENT | Facility: CLINIC | Age: 59
End: 2019-12-16

## 2019-12-17 ENCOUNTER — RECORDS - HEALTHEAST (OUTPATIENT)
Dept: LAB | Facility: HOSPITAL | Age: 59
End: 2019-12-17

## 2019-12-17 LAB
ALBUMIN SERPL-MCNC: 3.7 G/DL (ref 3.5–5)
ALP SERPL-CCNC: 75 U/L (ref 45–120)
ALT SERPL W P-5'-P-CCNC: <9 U/L (ref 0–45)
ANION GAP SERPL CALCULATED.3IONS-SCNC: 11 MMOL/L (ref 5–18)
AST SERPL W P-5'-P-CCNC: 19 U/L (ref 0–40)
BASOPHILS # BLD AUTO: 0.1 THOU/UL (ref 0–0.2)
BASOPHILS NFR BLD AUTO: 1 % (ref 0–2)
BILIRUB SERPL-MCNC: 0.3 MG/DL (ref 0–1)
BUN SERPL-MCNC: 19 MG/DL (ref 8–22)
C REACTIVE PROTEIN LHE: 0.2 MG/DL (ref 0–0.8)
CALCIUM SERPL-MCNC: 9.2 MG/DL (ref 8.5–10.5)
CHLORIDE BLD-SCNC: 108 MMOL/L (ref 98–107)
CO2 SERPL-SCNC: 19 MMOL/L (ref 22–31)
CREAT SERPL-MCNC: 1.34 MG/DL (ref 0.7–1.3)
EOSINOPHIL # BLD AUTO: 0.2 THOU/UL (ref 0–0.4)
EOSINOPHIL NFR BLD AUTO: 4 % (ref 0–6)
ERYTHROCYTE [DISTWIDTH] IN BLOOD BY AUTOMATED COUNT: 13.2 % (ref 11–14.5)
ERYTHROCYTE [SEDIMENTATION RATE] IN BLOOD BY WESTERGREN METHOD: 19 MM/HR (ref 0–15)
GFR SERPL CREATININE-BSD FRML MDRD: 55 ML/MIN/1.73M2
GLUCOSE BLD-MCNC: 135 MG/DL (ref 70–125)
HCT VFR BLD AUTO: 30.3 % (ref 40–54)
HGB BLD-MCNC: 9.9 G/DL (ref 14–18)
LYMPHOCYTES # BLD AUTO: 1.3 THOU/UL (ref 0.8–4.4)
LYMPHOCYTES NFR BLD AUTO: 25 % (ref 20–40)
MCH RBC QN AUTO: 30 PG (ref 27–34)
MCHC RBC AUTO-ENTMCNC: 32.7 G/DL (ref 32–36)
MCV RBC AUTO: 92 FL (ref 80–100)
MONOCYTES # BLD AUTO: 0.4 THOU/UL (ref 0–0.9)
MONOCYTES NFR BLD AUTO: 8 % (ref 2–10)
NEUTROPHILS # BLD AUTO: 3.2 THOU/UL (ref 2–7.7)
NEUTROPHILS NFR BLD AUTO: 62 % (ref 50–70)
PLATELET # BLD AUTO: 237 THOU/UL (ref 140–440)
PMV BLD AUTO: 10.1 FL (ref 8.5–12.5)
POTASSIUM BLD-SCNC: 4.6 MMOL/L (ref 3.5–5)
PROT SERPL-MCNC: 6.9 G/DL (ref 6–8)
RBC # BLD AUTO: 3.3 MILL/UL (ref 4.4–6.2)
SODIUM SERPL-SCNC: 138 MMOL/L (ref 136–145)
WBC: 5.3 THOU/UL (ref 4–11)

## 2019-12-20 ENCOUNTER — TRANSFERRED RECORDS (OUTPATIENT)
Dept: HEALTH INFORMATION MANAGEMENT | Facility: CLINIC | Age: 59
End: 2019-12-20

## 2019-12-23 ASSESSMENT — ENCOUNTER SYMPTOMS
MUSCLE WEAKNESS: 0
NECK PAIN: 1
STIFFNESS: 0
ARTHRALGIAS: 1
MUSCLE CRAMPS: 0
BACK PAIN: 0
JOINT SWELLING: 0
MYALGIAS: 1

## 2019-12-26 ENCOUNTER — TRANSFERRED RECORDS (OUTPATIENT)
Dept: HEALTH INFORMATION MANAGEMENT | Facility: CLINIC | Age: 59
End: 2019-12-26

## 2019-12-26 ENCOUNTER — RECORDS - HEALTHEAST (OUTPATIENT)
Dept: LAB | Facility: HOSPITAL | Age: 59
End: 2019-12-26

## 2019-12-26 LAB
ALBUMIN SERPL-MCNC: 3.9 G/DL (ref 3.5–5)
ALP SERPL-CCNC: 73 U/L (ref 45–120)
ALT SERPL W P-5'-P-CCNC: <9 U/L (ref 0–45)
ANION GAP SERPL CALCULATED.3IONS-SCNC: 8 MMOL/L (ref 5–18)
AST SERPL W P-5'-P-CCNC: 16 U/L (ref 0–40)
BASOPHILS # BLD AUTO: 0.1 THOU/UL (ref 0–0.2)
BASOPHILS NFR BLD AUTO: 1 % (ref 0–2)
BILIRUB SERPL-MCNC: 0.3 MG/DL (ref 0–1)
BUN SERPL-MCNC: 21 MG/DL (ref 8–22)
C REACTIVE PROTEIN LHE: 0.1 MG/DL (ref 0–0.8)
CALCIUM SERPL-MCNC: 8.9 MG/DL (ref 8.5–10.5)
CHLORIDE BLD-SCNC: 105 MMOL/L (ref 98–107)
CO2 SERPL-SCNC: 21 MMOL/L (ref 22–31)
CREAT SERPL-MCNC: 1.14 MG/DL (ref 0.7–1.3)
EOSINOPHIL # BLD AUTO: 0.4 THOU/UL (ref 0–0.4)
EOSINOPHIL NFR BLD AUTO: 6 % (ref 0–6)
ERYTHROCYTE [DISTWIDTH] IN BLOOD BY AUTOMATED COUNT: 13.2 % (ref 11–14.5)
ERYTHROCYTE [SEDIMENTATION RATE] IN BLOOD BY WESTERGREN METHOD: 9 MM/HR (ref 0–15)
GFR SERPL CREATININE-BSD FRML MDRD: >60 ML/MIN/1.73M2
GLUCOSE BLD-MCNC: 86 MG/DL (ref 70–125)
HCT VFR BLD AUTO: 29.8 % (ref 40–54)
HGB BLD-MCNC: 9.8 G/DL (ref 14–18)
LYMPHOCYTES # BLD AUTO: 1.7 THOU/UL (ref 0.8–4.4)
LYMPHOCYTES NFR BLD AUTO: 27 % (ref 20–40)
MCH RBC QN AUTO: 30 PG (ref 27–34)
MCHC RBC AUTO-ENTMCNC: 32.9 G/DL (ref 32–36)
MCV RBC AUTO: 91 FL (ref 80–100)
MONOCYTES # BLD AUTO: 0.6 THOU/UL (ref 0–0.9)
MONOCYTES NFR BLD AUTO: 10 % (ref 2–10)
NEUTROPHILS # BLD AUTO: 3.5 THOU/UL (ref 2–7.7)
NEUTROPHILS NFR BLD AUTO: 56 % (ref 50–70)
PLATELET # BLD AUTO: 205 THOU/UL (ref 140–440)
PMV BLD AUTO: 9.9 FL (ref 8.5–12.5)
POTASSIUM BLD-SCNC: 4.5 MMOL/L (ref 3.5–5)
PROT SERPL-MCNC: 6.7 G/DL (ref 6–8)
RBC # BLD AUTO: 3.27 MILL/UL (ref 4.4–6.2)
SODIUM SERPL-SCNC: 134 MMOL/L (ref 136–145)
WBC: 6.2 THOU/UL (ref 4–11)

## 2019-12-27 ENCOUNTER — ANCILLARY PROCEDURE (OUTPATIENT)
Dept: CT IMAGING | Facility: CLINIC | Age: 59
End: 2019-12-27
Attending: ORTHOPAEDIC SURGERY
Payer: COMMERCIAL

## 2019-12-27 ENCOUNTER — MEDICAL CORRESPONDENCE (OUTPATIENT)
Dept: HEALTH INFORMATION MANAGEMENT | Facility: CLINIC | Age: 59
End: 2019-12-27

## 2019-12-27 ENCOUNTER — OFFICE VISIT (OUTPATIENT)
Dept: ORTHOPEDICS | Facility: CLINIC | Age: 59
End: 2019-12-27
Payer: COMMERCIAL

## 2019-12-27 DIAGNOSIS — Z96.619 INFECTION OF PROSTHETIC SHOULDER JOINT, SUBSEQUENT ENCOUNTER: Primary | ICD-10-CM

## 2019-12-27 DIAGNOSIS — T84.59XD INFECTION OF PROSTHETIC SHOULDER JOINT, SUBSEQUENT ENCOUNTER: ICD-10-CM

## 2019-12-27 DIAGNOSIS — T84.59XD INFECTION OF PROSTHETIC SHOULDER JOINT, SUBSEQUENT ENCOUNTER: Primary | ICD-10-CM

## 2019-12-27 DIAGNOSIS — Z96.619 INFECTION OF PROSTHETIC SHOULDER JOINT, SUBSEQUENT ENCOUNTER: ICD-10-CM

## 2019-12-27 RX ORDER — PYRIDOXINE HCL (VITAMIN B6) 50 MG
TABLET ORAL
COMMUNITY
End: 2020-01-28

## 2019-12-27 NOTE — NURSING NOTE
Reason For Visit:   Chief Complaint   Patient presents with     Surgical Followup     follow up Bilateral shoulder pain Left explant TSA, I&D, and placement of antibiotic spacer.  DOS: 11/15/19     PCP: Michael Styles  Ref: Self     ?  No  Occupation Retired.  Currently working? No.  Work status?  Retired.  Date of injury: ongoing     Date of surgery: R TSA 8/26/2014  L TSA 04/15/2014     Date of surgery: 9/23/19  Type of surgery: I and D Right shoulder at [facility]    Date of surgery: 11/15/19  Type of surgery:   1. Explant of left total shoulder arthroplasty  2. Irrigation and excisional debridement left shoulder  3. Placement of antibiotic spacer     Smoker: No  Request smoking cessation information: No     Right hand dominant    SANE score  Affected shoulder: Bilateral   Right shoulder SANE: 15  Left shoulder SANE: 15    There were no vitals taken for this visit.      Pain Assessment  Patient Currently in Pain: Snow [place], ATC

## 2019-12-27 NOTE — PROGRESS NOTES
CHIEF COMPLAINT: Status post Explant of left total shoulder arthroplasty, Irrigation and excisional debridement left shoulder, Placement of antibiotic spacer  DATE OF SURGERY: 11/15/19    HISTORY OF PRESENT ILLNESS: Kobe Buchanan is an extremely pleasant 59 year-old male who is 6 weeks status post the above procedure. He had a right shoulder irrigation and debridement, explant and replacement of spacer on 9/23/19. Intra-op cultures grew MSSA. He was ultimately treated with 6 weeks of Cefazolin and completed antibiotic on 11/10/19 under the direction of Dr. Hilton. PICC was removed. He was admitted on 11/12/19 for acute on chronic left shoulder pain x 2 days described as 10/10 burning/aching pain, worse with movements. 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 He was last seen by Dr. Hilton on 12/11/19 who recommended he continue on Cefazolin 2 gram IV q8hr until 12/27. She recommended at least 4 week drug holiday with arthrocentesis/arthroscopic biopsies prior to hardware re-insertion given high risk for relapsed infection in this case. Today, he continues to endorse weakness in both shoulders. He notes that his left shoulder feels more loose than his right. He is unable to drive due to his symptoms.     EXAM:  Pleasant adult male in no distress.  Respirations even and unlabored.  Left upper extremity: Incisions clean, dry, and intact. No erythema. No drainage. Sens intact Ax/Musc/Med/Rad/Uln nerves. Motor intact EPL, FPL, and Intrinsics. Shoulder range of motion not tested due to postop restrictions.     ASSESSMENT:  1. 6 weeks status post above procedure. Continued on Cefazolin.    PLAN:    Obtain CT scan of the right shoulder today to determine whether custom implant is warranted. Will work with pre-op planning software for necessary implants for R reverse TSA. Will need scheduled for right arthroscopic biopsy in 4 weeks (after 4 week drug holiday). If culture negative will move  forward with revision to reverse arthroplasty    In regards to activity restrictions, I recommend he avoid driving while feeling unable to control the wheel.       Scribe Disclosure:  I, Wilfrid Hair, am serving as a scribe to document services personally performed by Analilia Aceves MD at this visit, based upon the provider's statements to me. All documentation has been reviewed by the aforementioned provider prior to being entered into the official medical record.        Answers for HPI/ROS submitted by the patient on 12/23/2019   General Symptoms: No  Skin Symptoms: No  HENT Symptoms: No  EYE SYMPTOMS: No  HEART SYMPTOMS: No  LUNG SYMPTOMS: No  INTESTINAL SYMPTOMS: No  URINARY SYMPTOMS: No  REPRODUCTIVE SYMPTOMS: No  SKELETAL SYMPTOMS: Yes  BLOOD SYMPTOMS: No  NERVOUS SYSTEM SYMPTOMS: No  MENTAL HEALTH SYMPTOMS: No  Back pain: No  Muscle aches: Yes  Neck pain: Yes  Swollen joints: No  Joint pain: Yes  Bone pain: No  Muscle cramps: No  Muscle weakness: No  Joint stiffness: No  Bone fracture: No

## 2019-12-27 NOTE — LETTER
12/27/2019       RE: Kobe Buchanan  [address] [place] [address]  [address] [place] [address]     Dear Colleague,    Thank you for referring your patient, Kobe Buchanan, to the [facility] at [facility]. Please see a copy of my visit note below.    CHIEF COMPLAINT: Status post Explant of left total shoulder arthroplasty, Irrigation and excisional debridement left shoulder, Placement of antibiotic spacer  DATE OF SURGERY: 11/15/19    HISTORY OF PRESENT ILLNESS: Kobe Buchanan is an extremely pleasant 59 year-old male who is 6 weeks status post the above procedure. He had a right shoulder irrigation and debridement, explant and replacement of spacer on 9/23/19. Intra-op cultures grew MSSA. He was ultimately treated with 6 weeks of Cefazolin and completed antibiotic on 11/10/19 under the direction of Dr. Hilton. PICC was removed. He was admitted on 11/12/19 for acute on chronic left shoulder pain x 2 days described as 10/10 burning/aching pain, worse with movements. 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  He was last seen by Dr. Hilton on 12/11/19 who recommended he continue on Cefazolin 2 gram IV q8hr until 12/27. She recommended at least 4 week drug holiday with arthrocentesis/arthroscopic biopsies prior to hardware re-insertion given high risk for relapsed infection in this case. Today, he continues to endorse weakness in both shoulders. He notes that his left shoulder feels more loose than his right. He is unable to drive due to his symptoms.     EXAM:  Pleasant adult male in no distress.  Respirations even and unlabored.  Left upper extremity: Incisions clean, dry, and intact. No erythema. No drainage. Sens intact Ax/Musc/Med/Rad/Uln nerves. Motor intact EPL, FPL, and Intrinsics. Shoulder range of motion not tested due to postop restrictions.     ASSESSMENT:  1. 6 weeks status post above procedure. Continued on  Cefazolin.    PLAN:    Obtain CT scan of the right shoulder today to determine whether custom implant is warranted. Will work with pre-op planning software for necessary implants for R reverse TSA. Will need scheduled for right arthroscopic biopsy in 4 weeks (after 4 week drug holiday). If culture negative will move forward with revision to reverse arthroplasty    In regards to activity restrictions, I recommend he avoid driving while feeling unable to control the wheel.     Scribe Disclosure:  I, Wilfrid Hair, am serving as a scribe to document services personally performed by Analilia Aceves MD at this visit, based upon the provider's statements to me. All documentation has been reviewed by the aforementioned provider prior to being entered into the official medical record.    Again, thank you for allowing me to participate in the care of your patient.      Sincerely,    Analilia Aceves MD

## 2019-12-28 ENCOUNTER — TRANSFERRED RECORDS (OUTPATIENT)
Dept: HEALTH INFORMATION MANAGEMENT | Facility: CLINIC | Age: 59
End: 2019-12-28

## 2019-12-30 ENCOUNTER — TELEPHONE (OUTPATIENT)
Dept: ORTHOPEDICS | Facility: CLINIC | Age: 59
End: 2019-12-30

## 2019-12-30 DIAGNOSIS — M86.9 BONE INFECTION, SHOULDER REGION (H): Primary | ICD-10-CM

## 2019-12-30 NOTE — TELEPHONE ENCOUNTER
[facility] Call Center    Phone Message    May a detailed message be left on voicemail: yes    Reason for Call: Other: Pt is requesting a call back from Krupa TRUJILLO please. He would like to discuss having surgery on his Lt shoulder before the Rt shoulder. Please reach out to discuss.      Action Taken: Message routed to:  [facility] Ortho

## 2020-01-03 ENCOUNTER — PREP FOR PROCEDURE (OUTPATIENT)
Dept: ORTHOPEDICS | Facility: CLINIC | Age: 60
End: 2020-01-03

## 2020-01-03 ENCOUNTER — TELEPHONE (OUTPATIENT)
Dept: ORTHOPEDICS | Facility: CLINIC | Age: 60
End: 2020-01-03

## 2020-01-03 DIAGNOSIS — T84.59XD INFECTION OF PROSTHETIC SHOULDER JOINT, SUBSEQUENT ENCOUNTER: Primary | ICD-10-CM

## 2020-01-03 DIAGNOSIS — Z96.619 INFECTION OF PROSTHETIC SHOULDER JOINT, SUBSEQUENT ENCOUNTER: Primary | ICD-10-CM

## 2020-01-03 NOTE — TELEPHONE ENCOUNTER
Patient is scheduled for surgery with Dr. Aceves      Spoke or left message with: Josiah with Kobe    Date of Surgery: 1/28/20    Location: [facility]    Informed patient they will need an adult  Yes    H&P: Patient to schedule with PCP    Additional imaging/appointments: N/A    Surgery packet: Given in clinic    Additional comments: Patient will await pre op phone call 1-2 days prior to surgery for arrival time

## 2020-01-03 NOTE — TELEPHONE ENCOUNTER
Dr Aceves is agreeable with surgery on the left shoulder first.  Patient was informed.  CT order for left shoulder entered in Epic.  Patient will call to schedule.

## 2020-01-05 ENCOUNTER — MYC MEDICAL ADVICE (OUTPATIENT)
Dept: FAMILY MEDICINE | Facility: CLINIC | Age: 60
End: 2020-01-05

## 2020-01-06 NOTE — TELEPHONE ENCOUNTER
Phone contact with patient, an appt has been scheduled with Dr Bassam Balderas for a preop- Left shoulder arthroscopy and biopsy for culture with Dr Aceves,Orthopedic. Baylee RIGGS

## 2020-01-07 ENCOUNTER — TELEPHONE (OUTPATIENT)
Dept: ORTHOPEDICS | Facility: CLINIC | Age: 60
End: 2020-01-07

## 2020-01-07 ENCOUNTER — ANCILLARY PROCEDURE (OUTPATIENT)
Dept: CT IMAGING | Facility: CLINIC | Age: 60
End: 2020-01-07
Attending: ORTHOPAEDIC SURGERY
Payer: COMMERCIAL

## 2020-01-07 DIAGNOSIS — T84.59XD INFECTION OF PROSTHETIC SHOULDER JOINT, SUBSEQUENT ENCOUNTER: ICD-10-CM

## 2020-01-07 DIAGNOSIS — Z96.619 INFECTION OF PROSTHETIC SHOULDER JOINT, SUBSEQUENT ENCOUNTER: ICD-10-CM

## 2020-01-07 NOTE — TELEPHONE ENCOUNTER
[facility] Call [facility]    Phone Message    May a detailed message be left on voicemail: yes    Reason for Call: Symptoms or Concerns     Current symptom or concern: right elbow 1.5 inch bubble of fluid    Symptoms have been present for: 6 day(s)    Has patient previously been seen for this? No    By : NA    Date: NA    Are there any new or worsening symptoms? Yes: new.   Less than a week.   Is not growing, not painful.        Please call Kobe back to discuss.  He thinks the dr should look at it asap.     *Kobe will be at CT today at 11:25am.         Action Taken: Message routed to:  [facility] Ortho

## 2020-01-07 NOTE — TELEPHONE ENCOUNTER
"Spoke with pt. He is not sure if the \"bubble\" on his right elbow is a bruise from being in a recliner and putting pressure on his elbow to keep stress and tension off of shoulders. He has no pain with it and says it is \"squishy\". He spoke to a family member who is a Primary provider who said he may want to get it aspirated to look for infection. Krupa, 's RN, said no due to not wanting to risk expsure an dpossible infection with the current state of both of his shoulders. I informed him that Krupa is currently in clinic with another provider but will be calling him to follow-up on any remaining questions this afternoon. Pt understood and agreed to this plan.   "

## 2020-01-07 NOTE — TELEPHONE ENCOUNTER
Patient walked in to the clinic with a request to have someone look at his right elbow swelling.  Soft mass noted.  No erythema present.  No complaints of pain at the site or fever.  Tubigrip was applied to the arm to provide compression.    Dr Aceves will be informed.  Patient will be contacted with her recommendation for further treatment if neededt.

## 2020-01-08 ENCOUNTER — TELEPHONE (OUTPATIENT)
Dept: ORTHOPEDICS | Facility: CLINIC | Age: 60
End: 2020-01-08

## 2020-01-08 ENCOUNTER — TELEPHONE (OUTPATIENT)
Dept: FAMILY MEDICINE | Facility: CLINIC | Age: 60
End: 2020-01-08

## 2020-01-08 NOTE — TELEPHONE ENCOUNTER
Called patient to discuss. Patient requesting nasal culture of MSSA to see if he is a carrier r/t upcoming procedure and biopsy. Advised patient will speak with lab staff and call back. Lab staff stated they will look into it and get back to me. Awaiting lab staff advisement. Alli RIGGS

## 2020-01-08 NOTE — TELEPHONE ENCOUNTER
[facility] phone call message- general phone call:    Reason for call:  Patient states he wants to clarify that the provider will be able to do a swab for MRSA/MSSA at his future appointment. Please call and advise.     Return call needed: Yes    OK to leave a message on voice mail? Yes    Primary language: English      needed? No    Call taken on January 8, 2020 at 11:13 AM by Amberly De La Cruz

## 2020-01-08 NOTE — TELEPHONE ENCOUNTER
Patient was informed Dr Aceves is agreeable with current home treatment for his elbow bursa.  He was also informed the left shoulder CT was reviewed.  He will probably need a custom implant.  He verbalized understanding.

## 2020-01-08 NOTE — TELEPHONE ENCOUNTER
Was informed by lab staff, able to swab for culture and send specimen out. Called patient and updated, patient verbalized understanding. Appt notes updated. Alli RIGGS

## 2020-01-13 ENCOUNTER — AMBULATORY - HEALTHEAST (OUTPATIENT)
Dept: GERIATRICS | Facility: CLINIC | Age: 60
End: 2020-01-13

## 2020-01-14 ENCOUNTER — MEDICAL CORRESPONDENCE (OUTPATIENT)
Dept: HEALTH INFORMATION MANAGEMENT | Facility: CLINIC | Age: 60
End: 2020-01-14

## 2020-01-15 ENCOUNTER — OFFICE VISIT (OUTPATIENT)
Dept: FAMILY MEDICINE | Facility: CLINIC | Age: 60
End: 2020-01-15
Payer: COMMERCIAL

## 2020-01-15 VITALS
RESPIRATION RATE: 16 BRPM | WEIGHT: 185.8 LBS | HEIGHT: 70 IN | TEMPERATURE: 97.4 F | HEART RATE: 61 BPM | SYSTOLIC BLOOD PRESSURE: 153 MMHG | OXYGEN SATURATION: 100 % | BODY MASS INDEX: 26.6 KG/M2 | DIASTOLIC BLOOD PRESSURE: 91 MMHG

## 2020-01-15 DIAGNOSIS — Z01.818 PREOP GENERAL PHYSICAL EXAM: Primary | ICD-10-CM

## 2020-01-15 DIAGNOSIS — M86.9 BONE INFECTION, SHOULDER REGION (H): ICD-10-CM

## 2020-01-15 LAB
BASOPHILS # BLD AUTO: 0.1 10E9/L (ref 0–0.2)
BASOPHILS NFR BLD AUTO: 1 %
CRP SERPL-MCNC: <2.9 MG/L (ref 0–8)
DIFFERENTIAL METHOD BLD: ABNORMAL
EOSINOPHIL # BLD AUTO: 0.1 10E9/L (ref 0–0.7)
EOSINOPHIL NFR BLD AUTO: 1.8 %
ERYTHROCYTE [DISTWIDTH] IN BLOOD BY AUTOMATED COUNT: 12.5 % (ref 10–15)
ERYTHROCYTE [SEDIMENTATION RATE] IN BLOOD BY WESTERGREN METHOD: 25 MM/H (ref 0–20)
HCT VFR BLD AUTO: 38.5 % (ref 40–53)
HGB BLD-MCNC: 12.5 G/DL (ref 13.3–17.7)
IMM GRANULOCYTES # BLD: 0 10E9/L (ref 0–0.4)
IMM GRANULOCYTES NFR BLD: 0.3 %
LYMPHOCYTES # BLD AUTO: 1.5 10E9/L (ref 0.8–5.3)
LYMPHOCYTES NFR BLD AUTO: 19.7 %
MCH RBC QN AUTO: 28.9 PG (ref 26.5–33)
MCHC RBC AUTO-ENTMCNC: 32.5 G/DL (ref 31.5–36.5)
MCV RBC AUTO: 89 FL (ref 78–100)
MONOCYTES # BLD AUTO: 0.4 10E9/L (ref 0–1.3)
MONOCYTES NFR BLD AUTO: 4.8 %
NEUTROPHILS # BLD AUTO: 5.6 10E9/L (ref 1.6–8.3)
NEUTROPHILS NFR BLD AUTO: 72.4 %
NRBC # BLD AUTO: 0 10*3/UL
NRBC BLD AUTO-RTO: 0 /100
PLATELET # BLD AUTO: 301 10E9/L (ref 150–450)
RBC # BLD AUTO: 4.33 10E12/L (ref 4.4–5.9)
WBC # BLD AUTO: 7.8 10E9/L (ref 4–11)

## 2020-01-15 PROCEDURE — 85652 RBC SED RATE AUTOMATED: CPT | Performed by: INTERNAL MEDICINE

## 2020-01-15 PROCEDURE — 85025 COMPLETE CBC W/AUTO DIFF WBC: CPT | Performed by: INTERNAL MEDICINE

## 2020-01-15 PROCEDURE — 86140 C-REACTIVE PROTEIN: CPT | Performed by: INTERNAL MEDICINE

## 2020-01-15 ASSESSMENT — MIFFLIN-ST. JEOR: SCORE: 1664.03

## 2020-01-15 NOTE — PATIENT INSTRUCTIONS
Presurgery Checklist  You are scheduled to have surgery. The healthcare staff will try to make your stay comfortable. Use the guidelines below to remind yourself what to do before surgery. Be sure to follow any specific pre-op instructions from your surgeon or nurse.   Preparing for Surgery  Ask your surgeon if you ll need a blood transfusion during surgery and if so, how to prepare for it. In some cases, you can donate blood before surgery. If needed, this blood can be given back (transfused) to you during or after surgery.  If you are having abdominal surgery, ask what you need to do to clear your bowel.  Tell your surgeon if you have allergies to any medications or foods.  Arrange for an adult family member or friend to drive you home after surgery. If possible, have someone ready to help you at home as you recover.  Call the surgeon if you get a cold, fever, sore throat, diarrhea, or other health problem just before surgery. Your surgeon can decide whether or not to postpone the surgery.  Medications  Tell your surgeon about all medications you take, including prescription and over-the-counter products such as herbal remedies and vitamins. Ask if you should continue taking them.  If you take ibuprofen, naproxen, or  blood thinners  such as aspirin, clopidogrel (Plavix), or warfarin (Coumadin), ask your surgeon whether you should stop taking them and how long before surgery you should stop.  You may be told to take antibiotics just before surgery to prevent infection. If so, follow instructions carefully on how to take them.  If you are told to take medications called anticoagulants to prevent blood clots after surgery, be sure to follow the instructions on how to take them.  Stop Smoking  If you smoke, healing may take longer. So at least 2 week(s) before surgery, stop smoking.  Bathing or Showering Before Surgery  If instructed, wash with antibacterial soap. Afterward, do not use lotions or powders.  If you  are having surgery on the head, you may be asked to shampoo with antibacterial soap. Follow instructions for doing so.  Do Not Remove Hair from the Surgery Site  Do not shave hair from the incision site, unless you are given specific instructions to do so. Usually, if hair needs to be removed, it will be done at the hospital right before surgery.  Don t Eat or Drink  Your doctor will tell you when to stop eating and drinking. If you do not follow your doctor's instructions, your procedure may be postponed or rescheduled for another day.  If your surgeon tells you to continue any medications, take them with small sips of water.  You can brush your teeth and rinse your mouth, but don t swallow any water.  Day of Surgery  Do not wear makeup. Do not use perfume, deodorant, or hairspray. Remove nail polish and artificial nails.  Leave jewelry (including rings), watches, and other valuables at home.  Be sure to bring health insurance cards or forms and a photo ID.  Bring a list of your medications (include the name, dose, how often you take them, and the time last dose was taken).  Arrive on time at the hospital or surgery facility.    Pre-op faxed to fax number :  [phone]  Location :  [facility]  Date of Surgery :  [phone]  By/Date :  ANASTASIIA Merino 1/15/2020 @ 1:49pm

## 2020-01-15 NOTE — PROGRESS NOTES
[facility] [address]  [place] [address]  Phone: [phone]  Fax: [phone]    1/15/2020    Adult PRE-OP Evaluation:    Kobe Buchanan, 1960 presents for pre-operative evaluation and assessment as requested by Dr. Aceves, prior to undergoing surgery/procedure for treatment of  Shoulder infection and hardware explantation.    Proposed procedure: shoulder arthroscopy and culture    Date of Surgery/ Procedure: 28 Jan 2020  Hospital/Surgical Facility: [facility]     Primary Physician: Ankush Dahl  Type of Anesthesia Anticipated: to be determined  History of anesthesia complications: NONE  History of  abnormal bleeding: NONE   History of blood transfusions: YES.  No complications.  Patient has a Health Care Directive or Living Will:  NO    Preoperative Questions   1. NO - Do you have a history of heart attack, stroke, stent, bypass or surgery on an artery in the head, neck, heart or legs?  2. NO - Do you ever have any pain or discomfort in your chest?  3. NO - Have you ever had a severe pain across the front of your chest lasting for half an hour or more?  4. NO - Do you have a history of Congestive Heart Failure?  5. NO - Are you troubled by shortness of breath when: walking on the level/ up a slight hill/ at night?  6. NO - Does your chest ever sound wheezy or whistling?  7. NO - Do you currently have a cold, bronchitis or other respiratory infection?  8. NO - Have you had a cold, bronchitis or other respiratory infection within the last 2 weeks?  9. NO - Do you usually have a cough?  10. NO - Do you sometimes get pains in the calves of your legs when you walk?  11. NO - Do you or anyone in your family have previous history of blood clots?  12. NO - Do you or does anyone in your family have a serious bleeding problem such as prolonged bleeding following surgeries or cuts?  13. NO - Have you ever had problems with anemia or been told to take iron  pills?  14. YES - Have you had any abnormal blood loss such as black, tarry or bloody stools, or abnormal vaginal bleeding? Upper GI bleed from NSAIDs  15. YES - Have you ever had a blood transfusion? Associated with the GI bleed  16. NO - Have you or any of your relatives ever had problems with anesthesia?  17. NO - Do you have sleep apnea, excessive snoring or daytime drowsiness?  18. NO - Do you have any prosthetic heart valves?  19. NO - Do you have prosthetic joints?  20. NO - Is there any chance that you may be pregnant?    Patient Active Problem List   Diagnosis     Essential hypertension     Hyperlipidemia     Abdominal pain, unspecified abdominal location     Ascending aortic aneurysm (H)     Medication refill- do not delete      Pain medication agreement signed     S/P shoulder replacement     BPPV (benign paroxysmal positional vertigo)     Shoulder joint pain     Obstructive sleep apnea     Obesity     Rhinitis     Right knee pain     Status post coronary angiogram     Atrial fibrillation (H) [I48.91]     Trigger finger of right thumb     Dizzy     Spondylosis of cervical region without myelopathy or radiculopathy     Bone infection, shoulder region (H)     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)     Acute blood loss anemia     Septic joint of right shoulder region (H)     MSSA bacteremia     Moderate protein-calorie malnutrition (H)     Left shoulder pain     Septic joint of left shoulder region (H)     S/P shoulder replacement, left     Infection of prosthetic shoulder joint, subsequent encounter       acetaminophen (TYLENOL) 325 MG tablet, Take 2 tablets (650 mg) by mouth every 4 hours as needed for mild pain  amLODIPine-benazepril (LOTREL) 5-20 MG capsule, Take 1 capsule by mouth 2 times daily  buPROPion (WELLBUTRIN SR) 200 MG 12 hr tablet, TAKE 1 TABLET BY MOUTH 2 TIMES DAILY  carvedilol (COREG) 25 MG tablet, Take 1 tablet (25 mg) by mouth 2 times daily (with meals)  [] ceFAZolin (ANCEF)  intermittent infusion 2 g in 100 mL dextrose PRE-MIX, Inject 100 mLs (2 g) into the vein every 8 hours  clonazePAM (KLONOPIN) 0.5 MG tablet, Take 1 tablet (0.5 mg) by mouth 3 times daily as needed for anxiety  Cyanocobalamin (B-12) 50 MCG TABS,   cyclobenzaprine (FLEXERIL) 10 MG tablet, Take 1 tablet (10 mg) by mouth 3 times daily as needed for muscle spasms  docusate sodium (COLACE) 100 MG capsule, Take 1 capsule in the AM and 2 capsules in the PM  Fe Heme Polypeptide-folic acid (PROFERRIN-FORTE) 12-1 MG TABS, Take 1 tablet by mouth daily  fentaNYL (DURAGESIC) 75 mcg/hr 72 hr patch, Place 1 patch onto the skin every 48 hours   fluocinonide (LIDEX) 0.05 % external ointment, Apply twice daily to itchy skin nodules for 1-2 weeks at a time.  losartan (COZAAR) 25 MG tablet, Take 1 tablet (25 mg) by mouth daily  naloxone (NARCAN) nasal spray, Spray 1 spray (4 mg) into one nostril alternating nostrils as needed for opioid reversal every 2-3 minutes until assistance arrives  neomycin-polymyxin-hydrocortisone (CORTISPORIN) otic solution, Place 3 drops in ear(s) 4 times daily  order for DME, Walker for cardiac rehab with 4 wheels, brakes and seat (Patient not taking: Reported on 12/11/2019)  oxyCODONE IR (ROXICODONE) 10 MG tablet, Take 1-2 tablets (10-20 mg) by mouth every 4 hours as needed for moderate to severe pain  simvastatin (ZOCOR) 10 MG tablet, Take 1 tablet (10 mg) by mouth At Bedtime  tacrolimus (PROTOPIC) 0.1 % ointment, Apply topically as needed Apply to affected areas on body.  triamcinolone (KENALOG) 0.1 % external cream, Apply topically 2 times daily As needed to affected area    No current facility-administered medications on file prior to visit.       OTC products: None, except as noted above    Allergies   Allergen Reactions     Ciprofloxacin      History of aortic aneurysms     Liquid Adhesive Other (See Comments)     Blistering of skin     Latex Allergy: NO    Social History     Socioeconomic History      "Marital status: Single     Spouse name: None     Number of children: None     Years of education: None     Highest education level: None   Occupational History     Occupation: Disabled   Social Needs     Financial resource strain: None     Food insecurity:     Worry: None     Inability: None     Transportation needs:     Medical: None     Non-medical: None   Tobacco Use     Smoking status: Former Smoker     Packs/day: 0.50     Years: 6.00     Pack years: 3.00     Types: Cigarettes     Start date: 1977     Last attempt to quit: 1983     Years since quittin.3     Smokeless tobacco: Never Used     Tobacco comment: quit 35 years ago   Substance and Sexual Activity     Alcohol use: No     Alcohol/week: 0.0 standard drinks     Drug use: No     Sexual activity: Not Currently     Partners: Female     Birth control/protection: Abstinence   Lifestyle     Physical activity:     Days per week: None     Minutes per session: None     Stress: None   Relationships     Social connections:     Talks on phone: None     Gets together: None     Attends [employer] service: None     Active member of club or organization: None     Attends meetings of clubs or organizations: None     Relationship status: None     Intimate partner violence:     Fear of current or ex partner: None     Emotionally abused: None     Physically abused: None     Forced sexual activity: None   Other Topics Concern     Parent/sibling w/ CABG, MI or angioplasty before 65F 55M? Not Asked   Social History Narrative     None       REVIEW OF SYSTEMS:   Constitutional, HEENT, cardiovascular, pulmonary, gi and gu systems are negative, except as otherwise noted.    EXAM:     Patient Vitals for the past 24 hrs:   BP Temp Temp src Pulse Resp SpO2 Height Weight   01/15/20 1006 (!) 153/91 97.4  F (36.3  C) Oral 61 16 100 % 1.77 m (5' 9.69\") 84.3 kg (185 lb 12.8 oz)     Body mass index is 26.9 kg/m .  GENERAL: healthy, alert and no distress  EYES: Eyes grossly normal " to inspection, extraocular movements - intact, and PERRL  HENT: ear canals- normal; TMs- normal; Nose- normal; Mouth- no ulcers, no lesions  NECK: no tenderness, no adenopathy, no asymmetry, no masses, no stiffness; thyroid- normal to palpation  RESP: lungs clear to auscultation - no rales, no rhonchi, no wheezes  CV: regular rates and rhythm and grade 1/6 systolic murmur heard best over the RUSB  ABDOMEN: soft, no tenderness, no  hepatosplenomegaly, no masses, normal bowel sounds  MS: extremities- no gross deformities noted, no edema  SKIN: no suspicious lesions, no rashes  NEURO: strength and tone- normal, sensory exam- grossly normal, mentation- intact, speech- normal, reflexes- symmetric  BACK: no CVA tenderness, no paralumbar tenderness  PSYCH: Alert and oriented times 3; speech- coherent , normal rate and volume; able to articulate logical thoughts  LYMPHATICS: ant. cervical- normal, post. cervical- normal    DIAGNOSTICS:      EKG: Normal Sinus Rhythm, normal R-wave progression, normal axis, normal intervals, no acute ST/T changes c/w ischemia    RISK ASSESSMENT:     Cardiovascular Risk:  -Patient is able to walk up a flight of stairs without chest pain.  -The patient does not have chest pain with exertion.  -Patient does not have a history of congestive heart failure.    -The patient does not have a history of stroke and does have a history of valvular disease (bicuspid aortic valve).    Pulmonary Risk:  -In terms of risk factors for pulmonary complication, the patient has no risk factors    Perioperative Complications:  -The patient does not have a history of bleeding or clotting problems in the past.    -The patient has not had complications from surgeries.    -The patient does not have a family history of any anesthesia or surgical complications.      IMPRESSION:   Reason for surgery/procedure: shoulder infection    The proposed surgical procedure is considered INTERMEDIATE risk.    For above listed  surgery and anesthesia:   Patient is at low risk for surgery/procedure and perioperative/procedure complications.    RECOMMENDATIONS:     Labs:  Previously ordered, drawn today in clinic    Fasting:  Must be NPO for 6 hours preoperatively.    Preop Plan:  --Approval given to proceed with proposed procedure, without further diagnostic evaluation  --Patient is taking an  Ace inhibitor or Angiotensin Receptor Blocker (ARB) and should HOLD this medication for the 24 hours prior to surgery.    Medications:  Patient should take their regular medications the morning of surgery unless otherwise instructed.    Hold aspirin 7 days prior to surgery.  Hold ACE inhibitors and ARB's the day of surgery.    Ankush Dahl III, MD, FAAFP  [facility]  01/15/20 12:03 PM    Please contact our office if there are any further questions or information required about this patient.

## 2020-01-17 ENCOUNTER — TRANSFERRED RECORDS (OUTPATIENT)
Dept: HEALTH INFORMATION MANAGEMENT | Facility: CLINIC | Age: 60
End: 2020-01-17

## 2020-01-18 DIAGNOSIS — M54.2 NECK PAIN: ICD-10-CM

## 2020-01-20 RX ORDER — CYCLOBENZAPRINE HCL 10 MG
10 TABLET ORAL 3 TIMES DAILY PRN
Qty: 45 TABLET | Refills: 1 | Status: SHIPPED | OUTPATIENT
Start: 2020-01-20 | End: 2020-03-06

## 2020-01-22 DIAGNOSIS — K00.9 DENTAL ANOMALY: ICD-10-CM

## 2020-01-22 NOTE — TELEPHONE ENCOUNTER
[facility] phone call message- patient requesting a refill:    Full Medication Name: Amoxicillin    Dose:     Pharmacy confirmed as   [id number] [[address] [address]  Phone: [phone] Fax: [phone]  : Yes    Additional Comments: Patient is wanting to know if  can refill his amoxicillin for a dental procedure that he's getting done. Patient states he has 4 left and he takes 4 and is needing medication refill. Call and advise if needed.      OK to leave a message on voice mail? Yes    Primary language: English      needed? No    Call taken on January 22, 2020 at 2:03 PM by Krupa Fu

## 2020-01-23 RX ORDER — AMOXICILLIN 500 MG/1
CAPSULE ORAL
Qty: 24 CAPSULE | Refills: 4 | Status: SHIPPED | OUTPATIENT
Start: 2020-01-23 | End: 2021-03-16

## 2020-01-27 ENCOUNTER — ANESTHESIA EVENT (OUTPATIENT)
Dept: SURGERY | Facility: AMBULATORY SURGERY CENTER | Age: 60
End: 2020-01-27

## 2020-01-28 ENCOUNTER — HOSPITAL ENCOUNTER (INPATIENT)
Facility: CLINIC | Age: 60
LOS: 2 days | Discharge: HOME | DRG: 287 | End: 2020-01-31
Attending: EMERGENCY MEDICINE | Admitting: INTERNAL MEDICINE
Payer: COMMERCIAL

## 2020-01-28 ENCOUNTER — TELEPHONE (OUTPATIENT)
Dept: ORTHOPEDICS | Facility: CLINIC | Age: 60
End: 2020-01-28

## 2020-01-28 ENCOUNTER — HOSPITAL ENCOUNTER (OUTPATIENT)
Facility: AMBULATORY SURGERY CENTER | Age: 60
End: 2020-01-28
Attending: ORTHOPAEDIC SURGERY
Payer: COMMERCIAL

## 2020-01-28 ENCOUNTER — APPOINTMENT (OUTPATIENT)
Dept: CARDIOLOGY | Facility: CLINIC | Age: 60
DRG: 287 | End: 2020-01-28
Attending: PHYSICIAN ASSISTANT
Payer: COMMERCIAL

## 2020-01-28 ENCOUNTER — ANESTHESIA (OUTPATIENT)
Dept: SURGERY | Facility: AMBULATORY SURGERY CENTER | Age: 60
End: 2020-01-28

## 2020-01-28 VITALS
BODY MASS INDEX: 26.48 KG/M2 | SYSTOLIC BLOOD PRESSURE: 109 MMHG | OXYGEN SATURATION: 100 % | WEIGHT: 185 LBS | TEMPERATURE: 97.3 F | RESPIRATION RATE: 14 BRPM | DIASTOLIC BLOOD PRESSURE: 72 MMHG | HEIGHT: 70 IN | HEART RATE: 71 BPM

## 2020-01-28 DIAGNOSIS — I47.29 NON-SUSTAINED VENTRICULAR TACHYCARDIA (H): ICD-10-CM

## 2020-01-28 DIAGNOSIS — Z96.619 INFECTION OF PROSTHETIC SHOULDER JOINT, SUBSEQUENT ENCOUNTER: ICD-10-CM

## 2020-01-28 DIAGNOSIS — T84.59XD INFECTION OF PROSTHETIC SHOULDER JOINT, SUBSEQUENT ENCOUNTER: ICD-10-CM

## 2020-01-28 DIAGNOSIS — Z95.1 HISTORY OF CORONARY ARTERY BYPASS GRAFT: ICD-10-CM

## 2020-01-28 DIAGNOSIS — R79.89 ELEVATED TROPONIN: ICD-10-CM

## 2020-01-28 DIAGNOSIS — I21.4 NSTEMI (NON-ST ELEVATED MYOCARDIAL INFARCTION) (H): ICD-10-CM

## 2020-01-28 DIAGNOSIS — I50.21 ACUTE SYSTOLIC HEART FAILURE (H): Primary | ICD-10-CM

## 2020-01-28 DIAGNOSIS — I50.21 ACUTE SYSTOLIC HEART FAILURE (H): ICD-10-CM

## 2020-01-28 DIAGNOSIS — Z98.890 POSTOPERATIVE STATE: ICD-10-CM

## 2020-01-28 DIAGNOSIS — R79.89 ELEVATED TROPONIN LEVEL: ICD-10-CM

## 2020-01-28 LAB
ANION GAP SERPL CALCULATED.3IONS-SCNC: 3 MMOL/L (ref 3–14)
BUN SERPL-MCNC: 22 MG/DL (ref 7–30)
CALCIUM SERPL-MCNC: 9.1 MG/DL (ref 8.5–10.1)
CHLORIDE SERPL-SCNC: 108 MMOL/L (ref 94–109)
CO2 SERPL-SCNC: 26 MMOL/L (ref 20–32)
CREAT SERPL-MCNC: 1.18 MG/DL (ref 0.66–1.25)
GFR SERPL CREATININE-BSD FRML MDRD: 67 ML/MIN/{1.73_M2}
GLUCOSE SERPL-MCNC: 106 MG/DL (ref 70–99)
GRAM STN SPEC: NORMAL
INTERPRETATION ECG - MUSE: NORMAL
MAGNESIUM SERPL-MCNC: 2.1 MG/DL (ref 1.6–2.3)
POTASSIUM SERPL-SCNC: 4.4 MMOL/L (ref 3.4–5.3)
SODIUM SERPL-SCNC: 136 MMOL/L (ref 133–144)
SPECIMEN SOURCE: NORMAL
TROPONIN I BLD-MCNC: 0.31 UG/L (ref 0–0.08)
TROPONIN I SERPL-MCNC: 1.64 UG/L (ref 0–0.04)
TROPONIN I SERPL-MCNC: 2.97 UG/L (ref 0–0.04)

## 2020-01-28 PROCEDURE — 25000132 ZZH RX MED GY IP 250 OP 250 PS 637: Performed by: STUDENT IN AN ORGANIZED HEALTH CARE EDUCATION/TRAINING PROGRAM

## 2020-01-28 PROCEDURE — 93005 ELECTROCARDIOGRAM TRACING: CPT

## 2020-01-28 PROCEDURE — 96374 THER/PROPH/DIAG INJ IV PUSH: CPT | Performed by: EMERGENCY MEDICINE

## 2020-01-28 PROCEDURE — 84484 ASSAY OF TROPONIN QUANT: CPT | Performed by: PHYSICIAN ASSISTANT

## 2020-01-28 PROCEDURE — 80048 BASIC METABOLIC PNL TOTAL CA: CPT | Performed by: EMERGENCY MEDICINE

## 2020-01-28 PROCEDURE — 93010 ELECTROCARDIOGRAM REPORT: CPT | Mod: Z6 | Performed by: EMERGENCY MEDICINE

## 2020-01-28 PROCEDURE — 99223 1ST HOSP IP/OBS HIGH 75: CPT | Mod: AI | Performed by: INTERNAL MEDICINE

## 2020-01-28 PROCEDURE — 99285 EMERGENCY DEPT VISIT HI MDM: CPT | Mod: 25 | Performed by: EMERGENCY MEDICINE

## 2020-01-28 PROCEDURE — 83735 ASSAY OF MAGNESIUM: CPT | Performed by: EMERGENCY MEDICINE

## 2020-01-28 PROCEDURE — 93005 ELECTROCARDIOGRAM TRACING: CPT | Performed by: EMERGENCY MEDICINE

## 2020-01-28 PROCEDURE — G0378 HOSPITAL OBSERVATION PER HR: HCPCS

## 2020-01-28 PROCEDURE — 93010 ELECTROCARDIOGRAM REPORT: CPT | Mod: 76 | Performed by: INTERNAL MEDICINE

## 2020-01-28 PROCEDURE — 93325 DOPPLER ECHO COLOR FLOW MAPG: CPT | Mod: 26 | Performed by: INTERNAL MEDICINE

## 2020-01-28 PROCEDURE — 93325 DOPPLER ECHO COLOR FLOW MAPG: CPT

## 2020-01-28 PROCEDURE — 25000132 ZZH RX MED GY IP 250 OP 250 PS 637: Performed by: EMERGENCY MEDICINE

## 2020-01-28 PROCEDURE — 25500064 ZZH RX 255 OP 636: Performed by: INTERNAL MEDICINE

## 2020-01-28 PROCEDURE — 93308 TTE F-UP OR LMTD: CPT | Mod: 26 | Performed by: INTERNAL MEDICINE

## 2020-01-28 PROCEDURE — 84484 ASSAY OF TROPONIN QUANT: CPT | Performed by: EMERGENCY MEDICINE

## 2020-01-28 PROCEDURE — 93321 DOPPLER ECHO F-UP/LMTD STD: CPT | Mod: 26 | Performed by: INTERNAL MEDICINE

## 2020-01-28 PROCEDURE — 25000132 ZZH RX MED GY IP 250 OP 250 PS 637

## 2020-01-28 PROCEDURE — 25000132 ZZH RX MED GY IP 250 OP 250 PS 637: Performed by: PHYSICIAN ASSISTANT

## 2020-01-28 PROCEDURE — 84484 ASSAY OF TROPONIN QUANT: CPT

## 2020-01-28 PROCEDURE — 93005 ELECTROCARDIOGRAM TRACING: CPT | Mod: 76 | Performed by: EMERGENCY MEDICINE

## 2020-01-28 RX ORDER — HYDROMORPHONE HYDROCHLORIDE 2 MG/1
4 TABLET ORAL ONCE
Status: COMPLETED | OUTPATIENT
Start: 2020-01-28 | End: 2020-01-28

## 2020-01-28 RX ORDER — NITROGLYCERIN 0.4 MG/1
0.4 TABLET SUBLINGUAL EVERY 5 MIN PRN
Status: DISCONTINUED | OUTPATIENT
Start: 2020-01-28 | End: 2020-01-31 | Stop reason: HOSPADM

## 2020-01-28 RX ORDER — KETAMINE HYDROCHLORIDE 10 MG/ML
INJECTION, SOLUTION INTRAMUSCULAR; INTRAVENOUS PRN
Status: DISCONTINUED | OUTPATIENT
Start: 2020-01-28 | End: 2020-01-28

## 2020-01-28 RX ORDER — CEFAZOLIN SODIUM 1 G/50ML
1 SOLUTION INTRAVENOUS SEE ADMIN INSTRUCTIONS
Status: DISCONTINUED | OUTPATIENT
Start: 2020-01-28 | End: 2020-01-29 | Stop reason: HOSPADM

## 2020-01-28 RX ORDER — FENTANYL 75 UG/1
75 PATCH TRANSDERMAL
Status: DISCONTINUED | OUTPATIENT
Start: 2020-01-30 | End: 2020-01-29

## 2020-01-28 RX ORDER — SIMVASTATIN 10 MG
10 TABLET ORAL EVERY EVENING
Status: DISCONTINUED | OUTPATIENT
Start: 2020-01-28 | End: 2020-01-29

## 2020-01-28 RX ORDER — CYCLOBENZAPRINE HCL 10 MG
10 TABLET ORAL 3 TIMES DAILY PRN
Status: DISCONTINUED | OUTPATIENT
Start: 2020-01-28 | End: 2020-01-31 | Stop reason: HOSPADM

## 2020-01-28 RX ORDER — PROPOFOL 10 MG/ML
INJECTION, EMULSION INTRAVENOUS PRN
Status: DISCONTINUED | OUTPATIENT
Start: 2020-01-28 | End: 2020-01-28

## 2020-01-28 RX ORDER — OXYCODONE HYDROCHLORIDE 5 MG/1
5-10 TABLET ORAL EVERY 4 HOURS PRN
Qty: 10 TABLET | Refills: 0 | Status: SHIPPED | OUTPATIENT
Start: 2020-01-28 | End: 2020-01-28

## 2020-01-28 RX ORDER — AMLODIPINE AND BENAZEPRIL HYDROCHLORIDE 5; 20 MG/1; MG/1
1 CAPSULE ORAL 2 TIMES DAILY
Status: DISCONTINUED | OUTPATIENT
Start: 2020-01-28 | End: 2020-01-31 | Stop reason: HOSPADM

## 2020-01-28 RX ORDER — HYDROCODONE BITARTRATE AND ACETAMINOPHEN 5; 325 MG/1; MG/1
1 TABLET ORAL
Status: DISCONTINUED | OUTPATIENT
Start: 2020-01-28 | End: 2020-01-29 | Stop reason: HOSPADM

## 2020-01-28 RX ORDER — OXYCODONE HYDROCHLORIDE 10 MG/1
10 TABLET ORAL EVERY 4 HOURS PRN
COMMUNITY
End: 2020-06-09

## 2020-01-28 RX ORDER — FENTANYL CITRATE 50 UG/ML
25-50 INJECTION, SOLUTION INTRAMUSCULAR; INTRAVENOUS
Status: DISCONTINUED | OUTPATIENT
Start: 2020-01-28 | End: 2020-01-29 | Stop reason: HOSPADM

## 2020-01-28 RX ORDER — LIDOCAINE 40 MG/G
CREAM TOPICAL
Status: DISCONTINUED | OUTPATIENT
Start: 2020-01-28 | End: 2020-01-31 | Stop reason: HOSPADM

## 2020-01-28 RX ORDER — OXYCODONE HYDROCHLORIDE 5 MG/1
5 TABLET ORAL EVERY 4 HOURS PRN
Status: DISCONTINUED | OUTPATIENT
Start: 2020-01-28 | End: 2020-01-29 | Stop reason: HOSPADM

## 2020-01-28 RX ORDER — SODIUM CHLORIDE, SODIUM LACTATE, POTASSIUM CHLORIDE, CALCIUM CHLORIDE 600; 310; 30; 20 MG/100ML; MG/100ML; MG/100ML; MG/100ML
INJECTION, SOLUTION INTRAVENOUS CONTINUOUS
Status: DISCONTINUED | OUTPATIENT
Start: 2020-01-28 | End: 2020-01-29 | Stop reason: HOSPADM

## 2020-01-28 RX ORDER — ONDANSETRON 4 MG/1
4 TABLET, ORALLY DISINTEGRATING ORAL EVERY 30 MIN PRN
Status: DISCONTINUED | OUTPATIENT
Start: 2020-01-28 | End: 2020-01-29 | Stop reason: HOSPADM

## 2020-01-28 RX ORDER — ALUMINA, MAGNESIA, AND SIMETHICONE 2400; 2400; 240 MG/30ML; MG/30ML; MG/30ML
30 SUSPENSION ORAL EVERY 4 HOURS PRN
Status: DISCONTINUED | OUTPATIENT
Start: 2020-01-28 | End: 2020-01-31 | Stop reason: HOSPADM

## 2020-01-28 RX ORDER — ACETAMINOPHEN 325 MG/1
650 TABLET ORAL
Status: DISCONTINUED | OUTPATIENT
Start: 2020-01-28 | End: 2020-01-29 | Stop reason: HOSPADM

## 2020-01-28 RX ORDER — NALOXONE HYDROCHLORIDE 0.4 MG/ML
.1-.4 INJECTION, SOLUTION INTRAMUSCULAR; INTRAVENOUS; SUBCUTANEOUS
Status: DISCONTINUED | OUTPATIENT
Start: 2020-01-28 | End: 2020-01-29 | Stop reason: HOSPADM

## 2020-01-28 RX ORDER — ACETAMINOPHEN 325 MG/1
975 TABLET ORAL ONCE
Status: DISCONTINUED | OUTPATIENT
Start: 2020-01-28 | End: 2020-01-29 | Stop reason: HOSPADM

## 2020-01-28 RX ORDER — BUPROPION HYDROCHLORIDE 200 MG/1
200 TABLET, EXTENDED RELEASE ORAL 2 TIMES DAILY
Status: DISCONTINUED | OUTPATIENT
Start: 2020-01-28 | End: 2020-01-31 | Stop reason: HOSPADM

## 2020-01-28 RX ORDER — LIDOCAINE HYDROCHLORIDE 20 MG/ML
INJECTION, SOLUTION INFILTRATION; PERINEURAL PRN
Status: DISCONTINUED | OUTPATIENT
Start: 2020-01-28 | End: 2020-01-28

## 2020-01-28 RX ORDER — ONDANSETRON 2 MG/ML
4 INJECTION INTRAMUSCULAR; INTRAVENOUS EVERY 30 MIN PRN
Status: DISCONTINUED | OUTPATIENT
Start: 2020-01-28 | End: 2020-01-29 | Stop reason: HOSPADM

## 2020-01-28 RX ORDER — FENTANYL CITRATE 50 UG/ML
25-50 INJECTION, SOLUTION INTRAMUSCULAR; INTRAVENOUS EVERY 5 MIN PRN
Status: DISCONTINUED | OUTPATIENT
Start: 2020-01-28 | End: 2020-01-29 | Stop reason: HOSPADM

## 2020-01-28 RX ORDER — FENTANYL 75 UG/1
75 PATCH TRANSDERMAL
Status: DISCONTINUED | OUTPATIENT
Start: 2020-01-29 | End: 2020-01-28

## 2020-01-28 RX ORDER — LIDOCAINE 40 MG/G
CREAM TOPICAL
Status: DISCONTINUED | OUTPATIENT
Start: 2020-01-28 | End: 2020-01-29 | Stop reason: HOSPADM

## 2020-01-28 RX ORDER — PANTOPRAZOLE SODIUM 40 MG/1
40 TABLET, DELAYED RELEASE ORAL DAILY PRN
COMMUNITY
End: 2020-08-21

## 2020-01-28 RX ORDER — ACETAMINOPHEN 325 MG/1
650 TABLET ORAL EVERY 4 HOURS PRN
Status: DISCONTINUED | OUTPATIENT
Start: 2020-01-28 | End: 2020-01-31 | Stop reason: HOSPADM

## 2020-01-28 RX ORDER — POLYETHYLENE GLYCOL 3350 17 G/17G
17 POWDER, FOR SOLUTION ORAL DAILY
Status: DISCONTINUED | OUTPATIENT
Start: 2020-01-28 | End: 2020-01-31 | Stop reason: HOSPADM

## 2020-01-28 RX ORDER — SENNOSIDES 8.6 MG
4 TABLET ORAL EVERY EVENING
Status: DISCONTINUED | OUTPATIENT
Start: 2020-01-28 | End: 2020-01-31 | Stop reason: HOSPADM

## 2020-01-28 RX ORDER — NAPROXEN 500 MG/1
500 TABLET ORAL 2 TIMES DAILY PRN
Status: ON HOLD | COMMUNITY
End: 2020-01-31

## 2020-01-28 RX ORDER — PROPOFOL 10 MG/ML
INJECTION, EMULSION INTRAVENOUS CONTINUOUS PRN
Status: DISCONTINUED | OUTPATIENT
Start: 2020-01-28 | End: 2020-01-28

## 2020-01-28 RX ORDER — MULTIPLE VITAMINS W/ MINERALS TAB 9MG-400MCG
1 TAB ORAL DAILY
COMMUNITY
End: 2023-11-02

## 2020-01-28 RX ORDER — GABAPENTIN 300 MG/1
300 CAPSULE ORAL ONCE
Status: DISCONTINUED | OUTPATIENT
Start: 2020-01-28 | End: 2020-01-29 | Stop reason: HOSPADM

## 2020-01-28 RX ORDER — SENNOSIDES 8.6 MG
3 TABLET ORAL DAILY
Status: DISCONTINUED | OUTPATIENT
Start: 2020-01-29 | End: 2020-01-29

## 2020-01-28 RX ORDER — CLONAZEPAM 0.5 MG/1
0.5 TABLET ORAL 3 TIMES DAILY PRN
Status: DISCONTINUED | OUTPATIENT
Start: 2020-01-28 | End: 2020-01-31 | Stop reason: HOSPADM

## 2020-01-28 RX ORDER — CARVEDILOL 25 MG/1
25 TABLET ORAL 2 TIMES DAILY WITH MEALS
Status: DISCONTINUED | OUTPATIENT
Start: 2020-01-28 | End: 2020-01-31 | Stop reason: HOSPADM

## 2020-01-28 RX ORDER — CEFAZOLIN SODIUM 2 G/50ML
2 SOLUTION INTRAVENOUS
Status: COMPLETED | OUTPATIENT
Start: 2020-01-28 | End: 2020-01-28

## 2020-01-28 RX ORDER — MORPHINE SULFATE 4 MG/ML
4 INJECTION, SOLUTION INTRAMUSCULAR; INTRAVENOUS
Status: DISCONTINUED | OUTPATIENT
Start: 2020-01-28 | End: 2020-01-28

## 2020-01-28 RX ORDER — SENNOSIDES A AND B 8.6 MG/1
3-4 TABLET, FILM COATED ORAL 2 TIMES DAILY
COMMUNITY
End: 2020-02-14

## 2020-01-28 RX ORDER — BISACODYL 5 MG
5 TABLET, DELAYED RELEASE (ENTERIC COATED) ORAL DAILY PRN
Status: DISCONTINUED | OUTPATIENT
Start: 2020-01-28 | End: 2020-01-31 | Stop reason: HOSPADM

## 2020-01-28 RX ORDER — FLUMAZENIL 0.1 MG/ML
0.2 INJECTION, SOLUTION INTRAVENOUS
Status: DISCONTINUED | OUTPATIENT
Start: 2020-01-28 | End: 2020-01-29 | Stop reason: HOSPADM

## 2020-01-28 RX ORDER — MEPERIDINE HYDROCHLORIDE 25 MG/ML
12.5 INJECTION INTRAMUSCULAR; INTRAVENOUS; SUBCUTANEOUS
Status: DISCONTINUED | OUTPATIENT
Start: 2020-01-28 | End: 2020-01-29 | Stop reason: HOSPADM

## 2020-01-28 RX ORDER — ONDANSETRON 2 MG/ML
INJECTION INTRAMUSCULAR; INTRAVENOUS PRN
Status: DISCONTINUED | OUTPATIENT
Start: 2020-01-28 | End: 2020-01-28

## 2020-01-28 RX ORDER — OXYCODONE HYDROCHLORIDE 5 MG/1
5-10 TABLET ORAL EVERY 4 HOURS PRN
Status: DISCONTINUED | OUTPATIENT
Start: 2020-01-28 | End: 2020-01-31 | Stop reason: HOSPADM

## 2020-01-28 RX ORDER — ACETAMINOPHEN 650 MG/1
650 SUPPOSITORY RECTAL EVERY 4 HOURS PRN
Status: DISCONTINUED | OUTPATIENT
Start: 2020-01-28 | End: 2020-01-31 | Stop reason: HOSPADM

## 2020-01-28 RX ORDER — FENTANYL CITRATE 50 UG/ML
INJECTION, SOLUTION INTRAMUSCULAR; INTRAVENOUS PRN
Status: DISCONTINUED | OUTPATIENT
Start: 2020-01-28 | End: 2020-01-28

## 2020-01-28 RX ADMIN — ACETAMINOPHEN 650 MG: 325 TABLET, FILM COATED ORAL at 18:04

## 2020-01-28 RX ADMIN — ONDANSETRON 4 MG: 2 INJECTION INTRAMUSCULAR; INTRAVENOUS at 09:54

## 2020-01-28 RX ADMIN — AMLODIPINE BESYLATE AND BENAZEPRIL HYDROCHLORIDE 1 CAPSULE: 5; 20 CAPSULE ORAL at 21:39

## 2020-01-28 RX ADMIN — BISACODYL 5 MG: 5 TABLET, COATED ORAL at 20:35

## 2020-01-28 RX ADMIN — FENTANYL CITRATE 50 MCG: 50 INJECTION, SOLUTION INTRAMUSCULAR; INTRAVENOUS at 10:14

## 2020-01-28 RX ADMIN — OXYCODONE HYDROCHLORIDE 10 MG: 5 TABLET ORAL at 22:08

## 2020-01-28 RX ADMIN — PROPOFOL 150 MCG/KG/MIN: 10 INJECTION, EMULSION INTRAVENOUS at 10:01

## 2020-01-28 RX ADMIN — BUPROPION HYDROCHLORIDE 200 MG: 200 TABLET, EXTENDED RELEASE ORAL at 20:35

## 2020-01-28 RX ADMIN — SIMVASTATIN 10 MG: 10 TABLET, FILM COATED ORAL at 20:35

## 2020-01-28 RX ADMIN — FENTANYL CITRATE 50 MCG: 50 INJECTION, SOLUTION INTRAMUSCULAR; INTRAVENOUS at 10:29

## 2020-01-28 RX ADMIN — CARVEDILOL 25 MG: 25 TABLET, FILM COATED ORAL at 17:57

## 2020-01-28 RX ADMIN — Medication 100 MCG: at 10:43

## 2020-01-28 RX ADMIN — OXYCODONE HYDROCHLORIDE 5 MG: 5 TABLET ORAL at 11:16

## 2020-01-28 RX ADMIN — PROPOFOL 140 MG: 10 INJECTION, EMULSION INTRAVENOUS at 10:01

## 2020-01-28 RX ADMIN — Medication 50 MCG: at 10:41

## 2020-01-28 RX ADMIN — HYDROMORPHONE HYDROCHLORIDE 4 MG: 2 TABLET ORAL at 15:49

## 2020-01-28 RX ADMIN — SODIUM CHLORIDE, SODIUM LACTATE, POTASSIUM CHLORIDE, CALCIUM CHLORIDE: 600; 310; 30; 20 INJECTION, SOLUTION INTRAVENOUS at 08:56

## 2020-01-28 RX ADMIN — LIDOCAINE HYDROCHLORIDE 60 MG: 20 INJECTION, SOLUTION INFILTRATION; PERINEURAL at 10:01

## 2020-01-28 RX ADMIN — CLONAZEPAM 0.5 MG: 0.5 TABLET ORAL at 19:55

## 2020-01-28 RX ADMIN — CEFAZOLIN SODIUM 2 G: 2 SOLUTION INTRAVENOUS at 10:05

## 2020-01-28 RX ADMIN — SENNOSIDES 4 TABLET: 8.6 TABLET, FILM COATED ORAL at 19:55

## 2020-01-28 RX ADMIN — KETAMINE HYDROCHLORIDE 20 MG: 10 INJECTION, SOLUTION INTRAMUSCULAR; INTRAVENOUS at 10:06

## 2020-01-28 RX ADMIN — OXYCODONE HYDROCHLORIDE 10 MG: 5 TABLET ORAL at 18:04

## 2020-01-28 RX ADMIN — HUMAN ALBUMIN MICROSPHERES AND PERFLUTREN 5 ML: 10; .22 INJECTION, SOLUTION INTRAVENOUS at 16:34

## 2020-01-28 ASSESSMENT — ENCOUNTER SYMPTOMS
CHEST TIGHTNESS: 0
DYSRHYTHMIAS: 0
SEIZURES: 0
SHORTNESS OF BREATH: 0
PALPITATIONS: 0
GASTROINTESTINAL NEGATIVE: 1
MUSCULOSKELETAL NEGATIVE: 1
CONSTITUTIONAL NEGATIVE: 1

## 2020-01-28 ASSESSMENT — COPD QUESTIONNAIRES: COPD: 0

## 2020-01-28 ASSESSMENT — MIFFLIN-ST. JEOR: SCORE: 1652.46

## 2020-01-28 NOTE — SIGNIFICANT EVENT
This note is for informational purposes to delineate the significant hemodynamic event that Kobe experienced during his surgery.     Intraoperative events:  1001: Anesthesia induction  1003: LMA placement  1020: Surgical incision  1022: Blood pressure 157/92  1025: Apparent 10+ beat run of ventricular ectopy, pulses strong, placed on 100% FiO2. I was called to the room at this time.  7077-3339: Blood pressures of 214/131, 226/152, 206/134 during which time his primary QRS complex appeared to be of ventricular origin though his heart rate was ~80bpm. Fentanyl 50mcg was administered for presumed pain precipitating hypertension.   5016-4693: Severe range hypertension resolved, /85. QRS still ectopic, rate <100bpm.  1038: P waves returned with his normal conduction pattern. Systolic blood pressures then ran low for the following 6 minutes, ranging from 69-85mmHg. Phenylephrine given with appropriate response.   1046: Emergence from anesthesia, taken to PACU. Awake, alert, without complaint of chest pain or difficulty breathing.

## 2020-01-28 NOTE — ED PROVIDER NOTES
[place] EMERGENCY DEPARTMENT ([facility])  1/28/20    History     Chief Complaint   Patient presents with     Palpitations     The history is provided by the patient, the spouse and medical records.     Kobe Buchanan is a 60 year old male with a past medical history significant for HTN, HLD, chronic neck pain, TAA repair, bipolar disorder, atrial fibrillation, SAVITA. DM2 (Diet managed), s/p bilateral shoulder replacements (2014) c/b recent MSSA bacteremia of right septic shoulder (9/2019) s/p washout/explant of shoulder arthoplasty and long-term IV antibiotics (IV cefazolin x6 weeks with PICC removal 11/10/2019) c/b NEMESIO requiring 2 rounds of dialysis (las on 9/24) and s/p left total shoulder arthroplasty, irrigation, debridement and placement of antibiotic spacer (11/15/2019) who presents here to the Emergency Department due to a run of V-tach during a left shoulder biopsy today.     Per chart review aound 10:22 AM (21 seconds after anesthesia induction), patient had his BP at 157/92. At 10:25 AM patient had a 10+ beat run of ventricular tachycardia, pulses strong and was placed on 100% FiO2. From 10:26-10:30 patients BPs were 214/131, 226/152 and 206/134. At that time patients primary QRS complex appeared to be of ventricular origin though his HR was ~80 bpm. Patient was given Fentanyl 50mcg at that time for presumed pain precipitating his HTN. From 10:30-10:37 AM patients severe range of HTN resolved with a BP of 143/85, however, QRS still ectopic with HR <100 bpm. At 10:38 AM patients p waves returned with his normal conduction pattern, with his systolic BP running low for the following 6 mins (69-85 mmHg) and was given phenylephrine when appropriate. At 10:46 AM patient was awoken emergently from anesthesia and was taken to the PACU. Denied chest pain or shortness of breath at that time.    Patient was transferred here to the ED for further evaluation. Denies current pain or shortness of breath  and states that he is feeling fine. Patients BP is currently 109/67. Denies ever being in V-tach in the past.     I have reviewed the Medications, Allergies, Past Medical and Surgical History, and Social History in the [facility] system.    Past Medical History:   Diagnosis Date     Anxiety      Ascending aortic aneurysm (H)      Bicuspid aortic valve      BPPV (benign paroxysmal positional vertigo) 7/11/2014     Chronic narcotic use      Chronic neck pain      Chronic osteoarthritis      Degenerative joint disease      Depression      Hyperlipidemia 4/10/2012     Hypertension      Multiple stiff joints      Neck injuries      Obesity 2/9/2015     Skin picking habit      Sleep apnea     does not use cpap       Past Surgical History:   Procedure Laterality Date     ARTHROPLASTY SHOULDER  4/15/2014    Procedure: Left Total Shoulder Arthroplasty ;  Surgeon: Analilia Aceves MD;  Location: [facility] OR     ARTHROPLASTY SHOULDER Right 8/26/2014    Procedure: ARTHROPLASTY SHOULDER;  Surgeon: Analilia Aceves MD;  Location:  OR     BYPASS GASTRIC TAVO-EN-Y, LIVER BIOPSY, COMBINED  8/8/2005     C HAND/FINGER SURGERY UNLISTED       C SHOULDER SURG PROC UNLISTED       COLONOSCOPY  6/30/2014    Procedure: COMBINED COLONOSCOPY, SINGLE BIOPSY/POLYPECTOMY BY BIOPSY;  Surgeon: Chester Patton MD;  [facility]: [facility] GI     CYSTOSCOPY, BLADDER NECK CUTS, COMBINED N/A 7/18/2016    Procedure: COMBINED CYSTOSCOPY, BLADDER NECK CUTS;  Surgeon: Ritu Leslie MD;  [facility]: [facility] OR     ESOPHAGOSCOPY, GASTROSCOPY, DUODENOSCOPY (EGD), COMBINED  6/30/2014    Procedure: COMBINED ESOPHAGOSCOPY, GASTROSCOPY, DUODENOSCOPY (EGD), BIOPSY SINGLE OR MULTIPLE;  Surgeon: Chester Patton MD;  Location: [facility] GI     EXCISE MASS FINGER  6/14/2011    Procedure:EXCISE MASS FINGER; Middle Flexor Cyst; Surgeon:SHAYY RUSSELL; Location:[facility] OR     HAND SURGERY      excision of tendon cyst on left hand     HC VASCULAR SURGERY PROCEDURE  UNLIST       IR FINE NEEDLE ASPIRATION W ULTRASOUND  11/13/2019     IR PICC PLACEMENT > 5 YRS OF AGE  11/19/2019     LASER HOLMIUM LITHOTRIPSY BLADDER N/A 10/15/2014    Procedure: LASER HOLMIUM LITHOTRIPSY BLADDER;  Surgeon: Sahil Taveras MD;  Location: [facility] OR     LASER KTP GREEN LIGHT PHOTOSELECTIVE VAPORIZATION PROSTATE  1/23/2014    Procedure: LASER KTP GREEN LIGHT PHOTOSELECTIVE VAPORIZATION PROSTATE;  Greenlight Photovaporization Of Prostate  ;  Surgeon: Sahil Taveras MD;  Location: [facility] OR     RELEASE TRIGGER FINGER Right 3/31/2017    Procedure: RELEASE TRIGGER FINGER;  Surgeon: Juan Carlos Blunt MD;  Location: [facility] OR     REMOVE HARDWARE ARTHROPLASTY SHOULDER. I&D, PLACE ANTIBIOTIC CEMENT BE Left 11/15/2019    Procedure: Explantation of left total shoulder arthroplasty, irrigation and debridement, and placement of antibiotic spacer;  Surgeon: Analilia Aceves MD;  Location: [facility] OR     REPAIR ANEURYSM ASCENDING AORTA N/A 10/4/2016    Procedure: REPAIR ANEURYSM ASCENDING AORTA;  Surgeon: Mckenzie Townsend MD;  Location: [facility] OR     TURP  2014       Family History   Problem Relation Age of Onset     Arthritis Other      Gastrointestinal Disease Other      Cardiovascular Father         aortic aneurysm     Arrhythmia Father      Nephrolithiasis Father      Sleep Apnea Father      Anxiety Disorder Father      Depression Father      Hypertension Father      Obesity Father      Hyperlipidemia Father      Coronary Artery Disease Father         history of MI and stent     Low Back Problems Father      Spine Problems Father      Anxiety Disorder Mother      Hypertension Mother      Osteoporosis Mother      Obesity Mother      Hyperlipidemia Mother      Low Back Problems Mother      Anxiety Disorder Sister      Hypertension Sister      Osteoporosis Sister      Obesity Sister      Hyperlipidemia Sister      Low Back Problems Sister      Spine Problems Sister      Anxiety Disorder Sister       Depression Sister      Hypertension Sister      Osteoporosis Sister      Obesity Sister      Hyperlipidemia Sister      Low Back Problems Sister        Social History     Tobacco Use     Smoking status: Former Smoker     Packs/day: 0.50     Years: 6.00     Pack years: 3.00     Types: Cigarettes     Start date: 1977     Last attempt to quit: 1983     Years since quittin.4     Smokeless tobacco: Never Used     Tobacco comment: quit 35 years ago   Substance Use Topics     Alcohol use: No     Alcohol/week: 0.0 standard drinks       No current facility-administered medications for this encounter.      Current Outpatient Medications   Medication     acetaminophen (TYLENOL) 325 MG tablet     amLODIPine-benazepril (LOTREL) 5-20 MG capsule     amoxicillin (AMOXIL) 500 MG capsule     buPROPion (WELLBUTRIN SR) 200 MG 12 hr tablet     carvedilol (COREG) 25 MG tablet     clonazePAM (KLONOPIN) 0.5 MG tablet     Cyanocobalamin (B-12) 50 MCG TABS     cyclobenzaprine (FLEXERIL) 10 MG tablet     docusate sodium (COLACE) 100 MG capsule     Fe Heme Polypeptide-folic acid (PROFERRIN-FORTE) 12-1 MG TABS     fentaNYL (DURAGESIC) 75 mcg/hr 72 hr patch     fluocinonide (LIDEX) 0.05 % external ointment     losartan (COZAAR) 25 MG tablet     naloxone (NARCAN) nasal spray     order for DME     oxyCODONE (ROXICODONE) 5 MG tablet     oxyCODONE IR (ROXICODONE) 10 MG tablet     simvastatin (ZOCOR) 10 MG tablet     tacrolimus (PROTOPIC) 0.1 % ointment     Facility-Administered Medications Ordered in Other Encounters   Medication     acetaminophen (TYLENOL) tablet 650 mg     acetaminophen (TYLENOL) tablet 975 mg     ceFAZolin (ANCEF) intermittent infusion 1 g (pre-mix)     fentaNYL (PF) (SUBLIMAZE) injection 25-50 mcg     fentaNYL (PF) (SUBLIMAZE) injection 25-50 mcg     flumazenil (ROMAZICON) injection 0.2 mg     gabapentin (NEURONTIN) capsule 300 mg     HYDROcodone-acetaminophen (NORCO) 5-325 MG per tablet 1 tablet     lactated  ringers infusion     lactated ringers infusion     lidocaine (LMX4) kit     lidocaine 1 % 0.1-1 mL     meperidine (DEMEROL) injection 12.5 mg     midazolam (VERSED) injection 1-2 mg     naloxone (NARCAN) injection 0.1-0.4 mg     naloxone (NARCAN) injection 0.1-0.4 mg     ondansetron (ZOFRAN-ODT) ODT tab 4 mg    Or     ondansetron (ZOFRAN) injection 4 mg     ORAL Pain Medications -  may administer as ordered by surgeon for take home use     oxyCODONE (ROXICODONE) tablet 5 mg     prochlorperazine (COMPAZINE) injection 10 mg     sodium chloride (PF) 0.9% PF flush 3 mL        Allergies   Allergen Reactions     Ciprofloxacin      History of aortic aneurysms     Tape [Adhesive Tape] Blisters     Blistering - please use paper tape         Review of Systems   Constitutional: Negative.    Respiratory: Negative for chest tightness and shortness of breath.    Cardiovascular: Negative for chest pain and palpitations.   Gastrointestinal: Negative.    Genitourinary: Negative.    Musculoskeletal: Negative.    All other systems reviewed and are negative.      Physical Exam   BP: 109/67  Heart Rate: 74  Temp: 97.7  F (36.5  C)  Resp: 16  Weight: 90.7 kg (200 lb)  SpO2: 100 %      Physical Exam  Vitals signs and nursing note reviewed.   Constitutional:       General: He is not in acute distress.     Appearance: He is well-developed.   HENT:      Head: Normocephalic and atraumatic.   Eyes:      General: No scleral icterus.     Conjunctiva/sclera: Conjunctivae normal.      Pupils: Pupils are equal, round, and reactive to light.   Neck:      Musculoskeletal: Neck supple.   Cardiovascular:      Rate and Rhythm: Normal rate and regular rhythm.      Heart sounds: Normal heart sounds.   Pulmonary:      Effort: Pulmonary effort is normal. No respiratory distress.      Breath sounds: Normal breath sounds. No wheezing.   Abdominal:      Palpations: Abdomen is soft.   Skin:     General: Skin is warm and dry.      Coloration: Skin is pale.    Neurological:      Mental Status: He is alert and oriented to person, place, and time.      Cranial Nerves: No cranial nerve deficit.   Psychiatric:         Behavior: Behavior normal.         ED Course   12:41 PM  The patient was seen and examined by Sahil Dillon MD in Room ED14.        Procedures             EKG Interpretation:      Interpreted by Sahil Dillon MD  Time reviewed: 12:42  Symptoms at time of EKG: V-tach during shoulder biopsy   Rhythm: normal sinus   Rate: 80 bpm  Axis: Normal  Ectopy: none  Conduction: normal  ST Segments/ T Waves: No ST-T wave changes and No acute ischemic changes  Q Waves: none  Comparison to prior: unchanged    Clinical Impression: normal EKG             Labs Ordered and Resulted from Time of ED Arrival Up to the Time of Departure from the ED - No data to display         Assessments & Plan (with Medical Decision Making)   60-year-old gentleman who was having his left shoulder operated under deep sedation but not intubation in the clinic today by orthopedics.  During that episode he had a spell of severe systolic hypertension up to 220 and this is associated with nonsustained ventricular tachycardia which subsequently resolved but was accompanied by frequent PVCs.  He does have a history of coronary disease including stenting and bypass currently his EKG is back to normal there is no ischemic changes and he is asymptomatic but his troponin is 1.3.  Case discussed with cardiology recommend admission overnight for monitoring and observation, this was explained to the patient he is currently in stable condition and will go to telemetry unit.      This part of the medical record was transcribed by Jaden Neil, Medical Scribe, from a dictation done by Sahil Dillon MD.     I have reviewed the nursing notes.    I have reviewed the findings, diagnosis, plan and need for follow up with the patient.    New Prescriptions    No medications on file       Final diagnoses:    None     I, Jaden Neil, am serving as a trained medical scribe to document services personally performed by Sahil Dillon MD, based on the provider's statements to me.   I, Sahil Dillon MD, was physically present and have reviewed and verified the accuracy of this note documented by Jaden Neil.    1/28/2020   [facility], [place], EMERGENCY DEPARTMENT     Sahil Dillon MD  01/28/20 1420

## 2020-01-28 NOTE — ANESTHESIA POSTPROCEDURE EVALUATION
"Anesthesia POST Procedure Evaluation    Patient: Kobe Buchanan   MRN:     [id number] Gender:   male   Age:    60 year old :      1960        Preoperative Diagnosis: Infection of prosthetic shoulder joint, subsequent encounter [T84.59XD, Z96.619]   Procedure(s):  Left shoulder arthroscopy and biopsy for culture   Postop Comments: No value filed.       Anesthesia Type:  Not documented  General    Reportable Event: YES     PAIN: Uncomplicated   Sign Out status: Comfortable, Well controlled pain     PONV: No PONV   Sign Out status:  No Nausea or Vomiting     Neuro/Psych: Uneventful perioperative course   Sign Out Status: Preoperative baseline; Age appropriate mentation     Airway/Resp.: Uneventful perioperative course   Sign Out Status: Non labored breathing, age appropriate RR; Resp. Status within EXPECTED Parameters     CV: Uneventful perioperative course   Sign Out status: Appropriate BP and perfusion indices; Appropriate HR/Rhythm     Disposition:   Sign Out in:  PACU  Disposition: ER.  Recovery Course: Uneventful  Follow-Up: Not required     Comments/Narrative:  Event described per intraoperative record and postop \"significant event\" note. Severe hypertension intraoperatively and prolonged runs of ectopy. Sent to ER for evaluation.           Last Anesthesia Record Vitals:  CRNA VITALS  2020 1019 - 2020 1119      2020             Pulse:  78    Ht Rate:  77    SpO2:  100 %    Resp Rate (observed):  (!) 2    Resp Rate (set):  10    EKG:  NSR          Last PACU Vitals:  Vitals Value Taken Time   /67 2020 12:41 PM   Temp 36.5  C (97.7  F) 2020 12:41 PM   Pulse 71 2020 11:45 AM   Resp 16 2020 12:41 PM   SpO2 100 % 2020 12:41 PM   Temp src     NIBP     Pulse     SpO2     Resp     Temp     Ht Rate     Temp 2           Electronically Signed By: Ankush Sierra MD, 2020, 1:06 PM  "

## 2020-01-28 NOTE — PHARMACY-ADMISSION MEDICATION HISTORY
Admission medication history interview status for the 1/28/2020 admission is complete. See Epic admission navigator for allergy information, pharmacy, prior to admission medications and immunization status.     Medication history interview sources:  Patient - he was very knowledgeable in regards to his medications and readily knew doses/frequencies     Changes made to PTA medication list (reason)  Added:   - B complex vitamin - 1 tablet daily  - Current oxycodone Rx - 10 mg po Q4H PRN (max 6 tabs/day)  - Naproxen - 500 mg po BID PRN  - Protonix - 40 mg po daily PRN  - Senna 8.6 mg tabs - 3 tablets every morning and 4 tablets every evening  - Multivitamin - 1 tablet po daily    Deleted:   - Multiple duplicate oxycodone Rx    Changed:   - B12 - changed to subligual tablet - 5000 mcg daily  - Docusate 100 mg capsules - changed from 1 cap in am / 2 caps in pm to 200 mg BID    Additional medication history information (including reliability of information, actions taken by pharmacist):  - Not currently using fluocinonide or tacrolimus topicals but would like to leave on med list as needed      Prior to Admission medications    Medication Sig Last Dose Taking? Auth Provider   acetaminophen (TYLENOL) 325 MG tablet Take 2 tablets (650 mg) by mouth every 4 hours as needed for mild pain 1/27/2020 Yes Juan Carlos Hernandez MD   amLODIPine-benazepril (LOTREL) 5-20 MG capsule Take 1 capsule by mouth 2 times daily 1/28/2020 Yes Demi Hardin MD   buPROPion (WELLBUTRIN SR) 200 MG 12 hr tablet TAKE 1 TABLET BY MOUTH 2 TIMES DAILY 1/28/2020 Yes Demi Hardin MD   carvedilol (COREG) 25 MG tablet Take 1 tablet (25 mg) by mouth 2 times daily (with meals) 1/28/2020 Yes Demi Hardin MD   clonazePAM (KLONOPIN) 0.5 MG tablet Take 1 tablet (0.5 mg) by mouth 3 times daily as needed for anxiety Past Week Yes Demi Hardin MD   Cyanocobalamin 5000 MCG SUBL Place 5,000 mcg under the tongue daily 1/28/2020 Yes  Unknown, Entered By History   cyclobenzaprine (FLEXERIL) 10 MG tablet Take 1 tablet (10 mg) by mouth 3 times daily as needed for muscle spasms  Past Week Yes Ankush Dahl MD   docusate sodium (COLACE) 100 MG capsule Take 200 mg by mouth 2 times daily  1/27/2020 Yes Reported, Patient   Fe Heme Polypeptide-folic acid (PROFERRIN-FORTE) 12-1 MG TABS Take 1 tablet by mouth daily 1/28/2020 Yes Ankush Dahl MD   fentaNYL (DURAGESIC) 75 mcg/hr 72 hr patch Place 1 patch onto the skin every 48 hours  1/27/2020 at 3 am Yes Reported, Patient   losartan (COZAAR) 25 MG tablet Take 1 tablet (25 mg) by mouth daily 1/27/2020 Yes Demi Hardin MD   multivitamin w/minerals (THERA-VIT-M) tablet Take 1 tablet by mouth daily 1/28/2020 Yes Unknown, Entered By History   naproxen (NAPROSYN) 500 MG tablet Take 500 mg by mouth 2 times daily as needed for moderate pain Past Week Yes Unknown, Entered By History   oxyCODONE IR (ROXICODONE) 10 MG tablet Take 10 mg by mouth every 4 hours as needed for severe pain Max 6 tablets per day Past Week Yes Unknown, Entered By History   pantoprazole (PROTONIX) 40 MG EC tablet Take 40 mg by mouth daily as needed for heartburn Past Month Yes Unknown, Entered By History   senna (SENOKOT) 8.6 MG tablet Take 3-4 tablets by mouth 2 times daily Take 3 tablets every morning and 4 tablets every evening 1/27/2020 Yes Unknown, Entered By History   simvastatin (ZOCOR) 10 MG tablet Take 1 tablet (10 mg) by mouth At Bedtime 1/27/2020 Yes Demi Hardin MD   vitamin B-Complex Take 1 tablet by mouth daily 1/28/2020 Yes Unknown, Entered By History   amoxicillin (AMOXIL) 500 MG capsule Take 4 tablets 1 hour before dental procedure   Demi Hardin MD   fluocinonide (LIDEX) 0.05 % external ointment Apply twice daily to itchy skin nodules for 1-2 weeks at a time.  Patient taking differently: as needed Apply twice daily to itchy skin nodules for 1-2 weeks at a time.   Riley Fernandez MD   naloxone  (NARCAN) nasal spray Spray 1 spray (4 mg) into one nostril alternating nostrils as needed for opioid reversal every 2-3 minutes until assistance arrives   Angely Larose DO   order for DME Walker for cardiac rehab with 4 wheels, brakes and seat  Patient not taking: Reported on 12/11/2019   Michael Styles MD   tacrolimus (PROTOPIC) 0.1 % ointment Apply topically as needed Apply to affected areas on body.   Rose Marie Sheldon MD         Medication history completed by:   Yaz Kincaid, PharmD, BCPS  Pager [phone]

## 2020-01-28 NOTE — ED TRIAGE NOTES
Pt arrives via EMS from [facility] - run of [facility] while having arthroscopic surgery left shoulder. Then HTN. Normotensive in route. Awake and alert.

## 2020-01-28 NOTE — TELEPHONE ENCOUNTER
Called Sarai charles and let her know that this prescription is due to the fact that the patient is having surgery today. She will fax a prior auth to our clinic because the patient is going over the amount that is authorized by insurance.

## 2020-01-28 NOTE — ANESTHESIA CARE TRANSFER NOTE
Patient: Kobe Buchanan    Procedure(s):  Left shoulder arthroscopy and biopsy for culture    Diagnosis: Infection of prosthetic shoulder joint, subsequent encounter [T84.59XD, Z96.619]  Diagnosis Additional Information: No value filed.    Anesthesia Type:   General     Note:  Airway :Face Mask  Patient transferred to:PACU  Comments: Uneventful transport   Report to RN  Exchanging well; color natl  Pt responds appropriately to command  IV patent  Lips/teeth/dentition as preop status  Questions answered  BP 90/61  HR 75 sr - compared single lead 2  to 12 lead ekg - comparable; 12 lead to be obtained  TAT 97.3  RR 14  Sat 97-98%  Handoff Report: Identifed the Patient, Identified the Reponsible Provider, Reviewed the pertinent medical history, Discussed the surgical course, Reviewed Intra-OP anesthesia mangement and issues during anesthesia, Set expectations for post-procedure period and Allowed opportunity for questions and acknowledgement of understanding      Vitals: (Last set prior to Anesthesia Care Transfer)    CRNA VITALS  1/28/2020 1019 - 1/28/2020 1057      1/28/2020             Pulse:  78    Ht Rate:  77    SpO2:  100 %    Resp Rate (observed):  (!) 2    Resp Rate (set):  10                Electronically Signed By: ALAYNA BOWIE CRNA  January 28, 2020  10:57 AM

## 2020-01-28 NOTE — BRIEF OP NOTE
[facility]    Brief Operative Note    Pre-operative diagnosis: Infection of prosthetic shoulder joint, subsequent encounter [T84.59XD, Z96.619]  Post-operative diagnosis Same as pre-operative diagnosis    Procedure: Procedure(s):  Left shoulder arthroscopy and biopsy for culture  Surgeon: Surgeon(s) and Role:     * Analilia Aceves MD - Primary     * Caleb Vazquez PA-JENNIFER - Assisting     * Kavon Feldman MD - Resident - Assisting  Anesthesia: General   Estimated blood loss: Minimal  Drains: None  Specimens:   ID Type Source Tests Collected by Time Destination   1 : Shoulder #1 aerobic, anaerobic, gram stain  Hold anaerobic for 2 weeks to rule out C. Acnes and do sensitivities Tissue Shoulder ANAEROBIC BACTERIAL CULTURE, AEROBIC BACTERIAL CULTURE ([facility]) Analilia Aceves MD 1/28/2020 10:26 AM    2 : Shoulder #2 aerobic, anaerobic, gram stain  Hold anaerobic for 2 weeks to rule out C. Acnes and do sensitivities Tissue Shoulder ANAEROBIC BACTERIAL CULTURE, AEROBIC BACTERIAL CULTURE ([facility]) Analilia Aceves MD 1/28/2020 10:26 AM    3 : Shoulder #3 aerobic, anaerobic, gram stain  Hold anaerobic for 2 weeks to rule out C. Acnes and do sensitivities Tissue Shoulder ANAEROBIC BACTERIAL CULTURE, AEROBIC BACTERIAL CULTURE ([facility]) Analilia Aceves MD 1/28/2020 10:26 AM    4 : Shoulder #4 aerobic, anaerobic, gram stain  Hold anaerobic for 2 weeks to rule out C. Acnes and do sensitivities Tissue Shoulder ANAEROBIC BACTERIAL CULTURE, AEROBIC BACTERIAL CULTURE ([facility]) Analilia Aceves MD 1/28/2020 10:28 AM    5 : Shoulder #5 aerobic, anaerobic, gram stain  Hold anaerobic for 2 weeks to rule out C. Acnes and do sensitivities Tissue Shoulder ANAEROBIC BACTERIAL CULTURE, AEROBIC BACTERIAL CULTURE ([facility]) Analilia Aceves MD 1/28/2020  8:36 AM      Findings:   None.  Complications: None.  Implants: * No implants in log *      PLAN:  -  Discharge home in stable condition  - Oxycodone for pain   - Shower/dressing change POD3  - WBAT, lifting as tolerate, ROM as tolerated  - Follow up within 2 weeks

## 2020-01-28 NOTE — ANESTHESIA PREPROCEDURE EVALUATION
Anesthesia Pre-Procedure Evaluation    Patient: Koeb Buchanan   MRN:     [id number] Gender:   male   Age:    59 year old :      1960        Preoperative Diagnosis: S/P shoulder replacement, left [Z96.612]   Procedure(s):  Explantation of left total shoulder arthroplasty, irrigation and debridement, and placement of antibiotic spacer     Past Medical History:   Diagnosis Date     Anxiety      Ascending aortic aneurysm (H)      Bicuspid aortic valve      BPPV (benign paroxysmal positional vertigo) 2014     Chronic narcotic use      Chronic neck pain      Chronic osteoarthritis      Degenerative joint disease      Depression      Hyperlipidemia 4/10/2012     Hypertension      Multiple stiff joints      Neck injuries      Obesity 2015     Skin picking habit      Sleep apnea     does not use cpap      Past Surgical History:   Procedure Laterality Date     ARTHROPLASTY SHOULDER  4/15/2014    Procedure: Left Total Shoulder Arthroplasty ;  Surgeon: Analilia Aceves MD;  [facility]     ARTHROPLASTY SHOULDER Right 2014    Procedure: ARTHROPLASTY SHOULDER;  Surgeon: Analilia Aceves MD;  [facility]     BYPASS GASTRIC TAVO-EN-Y, LIVER BIOPSY, COMBINED  2005     C HAND/FINGER SURGERY UNLISTED       C SHOULDER SURG PROC UNLISTED       COLONOSCOPY  2014    Procedure: COMBINED COLONOSCOPY, SINGLE BIOPSY/POLYPECTOMY BY BIOPSY;  Surgeon: Chester Patton MD;  [facility]     CYSTOSCOPY, BLADDER NECK CUTS, COMBINED N/A 2016    Procedure: COMBINED CYSTOSCOPY, BLADDER NECK CUTS;  Surgeon: Ritu Leslie MD;  [facility]     ESOPHAGOSCOPY, GASTROSCOPY, DUODENOSCOPY (EGD), COMBINED  2014    Procedure: COMBINED ESOPHAGOSCOPY, GASTROSCOPY, DUODENOSCOPY (EGD), BIOPSY SINGLE OR MULTIPLE;  Surgeon: Chester Patton MD;  [facility]: [facility]     EXCISE MASS FINGER  2011    Procedure:EXCISE MASS FINGER; Middle Flexor Cyst; Surgeon:SHAYY RUSSELL  ARTI; [facility]     HAND SURGERY      excision of tendon cyst on left hand     HC VASCULAR SURGERY PROCEDURE UNLIST       IR FINE NEEDLE ASPIRATION W ULTRASOUND  11/13/2019     IR PICC PLACEMENT > 5 YRS OF AGE  11/19/2019     LASER HOLMIUM LITHOTRIPSY BLADDER N/A 10/15/2014    Procedure: LASER HOLMIUM LITHOTRIPSY BLADDER;  Surgeon: Sahil Taveras MD;  [facility]     LASER KTP GREEN LIGHT PHOTOSELECTIVE VAPORIZATION PROSTATE  1/23/2014    Procedure: LASER KTP GREEN LIGHT PHOTOSELECTIVE VAPORIZATION PROSTATE;  Greenlight Photovaporization Of Prostate  ;  Surgeon: Sahil Taveras MD;  [facility]     RELEASE TRIGGER FINGER Right 3/31/2017    Procedure: RELEASE TRIGGER FINGER;  Surgeon: Juan Carlos Blunt MD;  [facility]     REMOVE HARDWARE ARTHROPLASTY SHOULDER. I&D, PLACE ANTIBIOTIC CEMENT BE Left 11/15/2019    Procedure: Explantation of left total shoulder arthroplasty, irrigation and debridement, and placement of antibiotic spacer;  Surgeon: Analilia Aceves MD;  [facility]     REPAIR ANEURYSM ASCENDING AORTA N/A 10/4/2016    Procedure: REPAIR ANEURYSM ASCENDING AORTA;  Surgeon: Mckenzie Townsend MD;  [facility]     TURP  2014          Anesthesia Evaluation     . Pt has had prior anesthetic. Type: General    No history of anesthetic complications          ROS/MED HX    ENT/Pulmonary:     (+)sleep apnea, uses CPAP , . .   (-) asthma, COPD and recent URI   Neurologic:  - neg neurologic ROS    (-) seizures   Cardiovascular:     (+) hypertension-range: 140/, ---. : . . . :. . Previous cardiac testing Echodate:2019results:Global and regional left ventricular function is normal with an EF of 60-65%.  Aortic valve is bicuspid with fusion of the right and left coronary cusps,  Lucy Type 1.  There is aortic sclerosis not meeting criteria for stenosis, Vmax 2.3 m/s,  mean gradient 11mmHg, VINCE (VTI) 2cmÂ . There is trace to mild central  aortic  insufficiency.date: results: date: results: date: results:         (-) CAD, taking anticoagulants/antiplatelets and arrhythmias   METS/Exercise Tolerance:  4 - Raking leaves, gardening   Hematologic:  - neg hematologic  ROS      (-) history of blood clots and anemia   Musculoskeletal:   (+) arthritis,  other musculoskeletal- Chronic Left shoulder OA; s/p cervical fusion      GI/Hepatic:  - neg GI/hepatic ROS      (-) GERD   Renal/Genitourinary:     (+) BPH,       Endo:     (+) Obesity, .      Psychiatric:  - neg psychiatric ROS       Infectious Disease: Comment: Patient with infected left total shoulder        Malignancy:      - no malignancy   Other:    (+) No chance of pregnancy C-spine cleared: N/A, H/O Chronic Pain,H/O chronic opiod use , no other significant disability                        PHYSICAL EXAM:   Mental Status/Neuro: A/A/O; Age Appropriate   Airway: Facies: Feasible   Respiratory: Auscultation: CTAB      CV: Rhythm: Regular   Comments:                      LABS:  CBC:   Lab Results   Component Value Date    WBC 7.8 01/15/2020    WBC 8.0 12/02/2019    HGB 12.5 (L) 01/15/2020    HGB 9.1 (L) 12/02/2019    HCT 38.5 (L) 01/15/2020    HCT 28.4 (L) 12/02/2019     01/15/2020     12/02/2019     BMP:   Lab Results   Component Value Date     12/02/2019     11/18/2019    POTASSIUM 5.2 (A) 12/10/2019    POTASSIUM 5.0 12/02/2019    CHLORIDE 108 12/02/2019    CHLORIDE 101 11/18/2019    CO2 23 12/02/2019    CO2 24 11/18/2019    BUN 20 12/02/2019    BUN 15 11/18/2019    CR 1.13 12/10/2019    CR 0.92 12/02/2019    GLC 80 12/10/2019    GLC 80 12/02/2019     COAGS:   Lab Results   Component Value Date    PTT 39 (H) 10/04/2016    INR 1.22 (H) 11/15/2019     POC:   Lab Results   Component Value Date     (H) 10/10/2016     OTHER:   Lab Results   Component Value Date    PH 7.39 10/04/2016    LACT 0.6 (L) 11/12/2019    A1C 5.3 10/05/2016    NIA 8.9 12/02/2019    PHOS 3.1 10/05/2016  "   MAG 2.5 (H) 10/07/2016    ALBUMIN 3.3 (L) 12/02/2019    PROTTOTAL 7.1 12/02/2019    ALT <9 12/10/2019    AST 17 12/10/2019    ALKPHOS 83 12/02/2019    BILITOTAL 0.4 12/02/2019    TSH 2.96 01/10/2018    T4 1.45 11/02/2016    CRP <2.9 01/15/2020    SED 25 (H) 01/15/2020        Preop Vitals    BP Readings from Last 3 Encounters:   01/28/20 (!) 142/81   01/15/20 (!) 153/91   12/11/19 (!) 156/71    Pulse Readings from Last 3 Encounters:   01/28/20 59   01/15/20 61   12/11/19 61      Resp Readings from Last 3 Encounters:   01/28/20 14   01/15/20 16   11/19/19 16    SpO2 Readings from Last 3 Encounters:   01/28/20 98%   01/15/20 100%   12/11/19 100%      Temp Readings from Last 1 Encounters:   01/28/20 36.4  C (97.5  F) (Oral)    Ht Readings from Last 1 Encounters:   01/28/20 1.765 m (5' 9.5\")      Wt Readings from Last 1 Encounters:   01/28/20 83.9 kg (185 lb)    Estimated body mass index is 26.93 kg/m  as calculated from the following:    Height as of an earlier encounter on 1/28/20: 1.765 m (5' 9.5\").    Weight as of an earlier encounter on 1/28/20: 83.9 kg (185 lb).     LDA:  PICC Single Lumen 11/19/19 Left Basilic (Active)   Number of days: 70       Urethral Catheter Latex 22 fr (Active)   Number of days: 1931       Urethral Catheter Non-latex;Silver coated;Double-lumen;Straight-tip 24 fr (Active)   Number of days: 1289        Assessment:   ASA SCORE: 3    H&P: History and physical reviewed and following examination; no interval change.    NPO Status: NPO Appropriate     Plan:   Anes. Type:  General   Pre-Medication: Gabapentin; Acetaminophen   Induction:  IV (Standard)   Airway: LMA   Access/Monitoring: PIV   Maintenance: TIVA     Postop Plan:   Postop Pain: Opioids  Postop Sedation/Airway: Not planned  Disposition: Outpatient     PONV Management:   Adult Risk Factors:, Postop Opioids   Prevention: Ondansetron, Dexamethasone, No Volatiles     CONSENT: Direct conversation   Plan and risks discussed with: Patient    "       Comments for Plan/Consent:  Patient prefers to avoid nerve block as he does not want to wear a sling. Will perform postop only if needed.                      Ankush Sierra MD

## 2020-01-28 NOTE — DISCHARGE INSTRUCTIONS
"[facility]  Home Care Following Anesthesia  For 24 hours after surgery:  1. Get plenty of rest.  A responsible adult must stay with you for at least 24 hours after you leave the surgery center.  2. Do not drive or use heavy equipment.  If you have weakness or tingling, don't drive or use heavy equipment until this feeling goes away.   3. Do not drink alcohol.   4. Avoid strenuous or risky activities.  Ask for help when climbing stairs.  5. You may feel lightheaded.  IF so, sit for a few minutes before standing.  Have someone help you get up.   6. If you have nausea (feel sick to your stomach): Drink only clear liquids such as apple juice, ginger ale, broth or 7-Up.  Rest may also help.  Be sure to drink enough fluids.  Move to a regular diet as you feel able.   7. You may have a slight fever.  Call the doctor if your fever is over 100 F (37.7 C) (taken under the tongue) or lasts longer than 24 hours.  8. You may have a dry mouth, a sore throat, muscle aches or trouble sleeping. These should go away after 24 hours.  9. Do not make important or legal decisions.        Today you received a Marcaine or bupivacaine block to numb the nerves near your surgery site.  This is a block using local anesthetic or \"numbing\" medication injected around the nerves to anesthetize or \"numb\" the area supplied by those nerves.  This block is injected into the muscle layer near your surgical site.  The medication may numb the location where you had surgery for 6-18 hours, but may last up to 24 hours.  If your surgical site is an arm or leg you should be careful with your affected limb, since it is possible to injure your limb without being aware of it due to the numbing.  Until full feeling returns, you should guard against bumping or hitting your limb, and avoid extreme hot or cold temperatures on the skin.  As the block wears off, the feeling will return as a tingling or prickly sensation near your " surgical site.  You will experience more discomfort from your incision as the feeling returns.  You may want to take a pain pill (a narcotic or Tylenol if this was prescribed by your surgeon) when you start to experience mild pain before the pain beccomes more severe.  If your pain medications do not control your pain you should notifiy your surgeon.    Tips for taking pain medications  To get the best pain relief possible, remember these points:    Take pain medications as directed, before pain becomes severe.    Pain medication can upset your stomach: taking it with food may help.    Constipation is a common side effect of pain medication. Drink plenty of  fluids.    Eat foods high in fiber. Take a stool softener if recommended by your doctor or pharmacist.    Do not drink alcohol, drive or operate machinery while taking pain medications.    Ask about other ways to control pain, such as with heat, ice or relaxation.    Tylenol/Acetaminophen Consumption  To help encourage the safe use of acetaminophen, the makers of TYLENOL  have lowered the maximum daily dose for single-ingredient Extra Strength TYLENOL  (acetaminophen) products sold in the [place] from 8 pills per day (4,000 mg) to 6 pills per day (3,000 mg). The dosing interval has also changed from 2 pills every 4-6 hours to 2 pills every 6 hours.    If you feel your pain relief is insufficient, you may take Tylenol/Acetaminophen in addition to your narcotic pain medication.     Be careful not to exceed 3,000 mg of Tylenol/Acetaminophen in a 24 hour period from all sources.    If you are taking extra strength Tylenol/acetaminophen (500 mg), the maximum dose is 6 tablets in 24 hours.    If you are taking regular strength acetaminophen (325 mg), the maximum dose is 9 tablets in 24 hours.    Call a doctor for any of the followin. Signs of infection (fever, growing tenderness at the surgery site, a large amount of drainage or bleeding, severe pain, foul-smelling  drainage, redness, swelling).  2. It has been over 8 to 10 hours since surgery and you are still not able to urinate (pass water).  3. Headache for over 24 hours.  4. Numbness, tingling or weakness the day after surgery (if you had spinal anesthesia).  Your doctor is:  Dr. Analilia Aceves, Orthopaedics: [phone]                    Or dial [phone] and ask for the resident on call for:  Orthopaedics  For emergency care, call the:  [facility] [place] Emergency Department: [phone] (TTY for hearing impaired: [phone])

## 2020-01-28 NOTE — TELEPHONE ENCOUNTER
[facility] Call Center    Phone Message    May a detailed message be left on voicemail: yes    Reason for Call: Medication Question or concern regarding medication   Prescription Clarification  Name of Medication: oxyCODONE (ROXICODONE) tablet 5 mg   Prescribing Provider: DERREK Darby   Pharmacy: [facility]   What on the order needs clarification? Caller questioning if this order was intended or not? Patient had a refill for a 30 day supply 1/27/2020 for 10mg from provider Crys Talamantes already. Please advise.    Contact Sarai: [phone]          Action Taken: Message routed to:  [facility] ([facility] Ortho

## 2020-01-28 NOTE — H&P
[facility]      Cards 1 History and Physicial  Kobe Henriquez MRN: [id number]  1960  Date of Admission:1/28/2020  Primary care provider: Demi Hardin         Chief Complaint:   Elevated troponin          History of Present Illness:   Kobe Henriquez is a 60 year old male with a past medical history significant for HTN, HLD, prior smoking history, bicuspid AV valve s/p valve sparing repair with Gelweave graft 2016, and bilateral shoulder replacement c/b MSSA septic joint of the right requiring washout, now s/p 6 weeks of cefazolin. Patient states that he was in clinic having an outpatient biopsy of the left shoulder to interrogate the joint for infection. He was hoping to drive home same day, and requested that he receive sedation without analgesia. He does not recall any of the events as he had received propofol, but thinks that he must have been experiencing a lot of pain with the procedure. Per review of records, he was noted by anesthesia to have a 10 beat run of VT and profound hypertension between 206/134-226/152. This period of hypertensive emergency lasted ~4 minutes per records. Anaesthesia referred him to the ED due to the NSVT run.    Patient feels well in the ED, and is at his baseline. Denies any remote or current chest pains, dyspnea, nausea, exertional dyspnea, PND, orthopnea, back pain, or other acute symptoms.            Review of Systems:    10 point review of systems negative except for stated above in HPI.          Past Medical History:   Medical History reviewed.   Past Medical History:   Diagnosis Date     Anxiety      Ascending aortic aneurysm (H)      Bicuspid aortic valve      BPPV (benign paroxysmal positional vertigo) 7/11/2014     Chronic narcotic use      Chronic neck pain      Chronic osteoarthritis      Degenerative joint disease      Depression      Hyperlipidemia 4/10/2012     Hypertension      Multiple stiff joints      Neck injuries      Obesity  2/9/2015     Skin picking habit      Sleep apnea     does not use cpap             Past Surgical History:   Surgical History reviewed.   Past Surgical History:   Procedure Laterality Date     ARTHROPLASTY SHOULDER  4/15/2014    Procedure: Left Total Shoulder Arthroplasty ;  Surgeon: Analilia Aceves MD;  [facility]     ARTHROPLASTY SHOULDER Right 8/26/2014    Procedure: ARTHROPLASTY SHOULDER;  Surgeon: Analilia Aceves MD;  [facility]     BYPASS GASTRIC TAVO-EN-Y, LIVER BIOPSY, COMBINED  8/8/2005     C HAND/FINGER SURGERY UNLISTED       C SHOULDER SURG PROC UNLISTED       COLONOSCOPY  6/30/2014    Procedure: COMBINED COLONOSCOPY, SINGLE BIOPSY/POLYPECTOMY BY BIOPSY;  Surgeon: Chester Patton MD;  [facility]     CYSTOSCOPY, BLADDER NECK CUTS, COMBINED N/A 7/18/2016    Procedure: COMBINED CYSTOSCOPY, BLADDER NECK CUTS;  Surgeon: Ritu Leslie MD;  [facility]     ESOPHAGOSCOPY, GASTROSCOPY, DUODENOSCOPY (EGD), COMBINED  6/30/2014    Procedure: COMBINED ESOPHAGOSCOPY, GASTROSCOPY, DUODENOSCOPY (EGD), BIOPSY SINGLE OR MULTIPLE;  Surgeon: Chester Patton MD;  Location: [facility]     EXCISE MASS FINGER  6/14/2011    Procedure:EXCISE MASS FINGER; Middle Flexor Cyst; Surgeon:SHAYY RUSSELL; Location:[facility] OR     HAND SURGERY      excision of tendon cyst on left hand     HC VASCULAR SURGERY PROCEDURE UNLIST       IR FINE NEEDLE ASPIRATION W ULTRASOUND  11/13/2019     IR PICC PLACEMENT > 5 YRS OF AGE  11/19/2019     LASER HOLMIUM LITHOTRIPSY BLADDER N/A 10/15/2014    Procedure: LASER HOLMIUM LITHOTRIPSY BLADDER;  Surgeon: Sahil Taveras MD;  [facility]: [facility]     LASER KTP GREEN LIGHT PHOTOSELECTIVE VAPORIZATION PROSTATE  1/23/2014    Procedure: LASER KTP GREEN LIGHT PHOTOSELECTIVE VAPORIZATION PROSTATE;  Greenlight Photovaporization Of Prostate  ;  Surgeon: Sahil Taveras MD;  Location: [facility]     RELEASE TRIGGER FINGER Right 3/31/2017    Procedure:  RELEASE TRIGGER FINGER;  Surgeon: Juan Carlos Blunt MD;  [facility]     REMOVE HARDWARE ARTHROPLASTY SHOULDER. I&D, PLACE ANTIBIOTIC CEMENT BE Left 11/15/2019    Procedure: Explantation of left total shoulder arthroplasty, irrigation and debridement, and placement of antibiotic spacer;  Surgeon: Analilia Aceves MD;  [facility]     REPAIR ANEURYSM ASCENDING AORTA N/A 10/4/2016    Procedure: REPAIR ANEURYSM ASCENDING AORTA;  Surgeon: Mckenzie Townsend MD;  Location: [facility]     TURP               Social History:   Social History reviewed.  Social History     Tobacco Use     Smoking status: Former Smoker     Packs/day: 0.50     Years: 6.00     Pack years: 3.00     Types: Cigarettes     Start date: 1977     Last attempt to quit: 1983     Years since quittin.4     Smokeless tobacco: Never Used     Tobacco comment: quit 35 years ago   Substance Use Topics     Alcohol use: No     Alcohol/week: 0.0 standard drinks             Family History:   Family History reviewed.   Family History   Problem Relation Age of Onset     Arthritis Other      Gastrointestinal Disease Other      Cardiovascular Father         aortic aneurysm     Arrhythmia Father      Nephrolithiasis Father      Sleep Apnea Father      Anxiety Disorder Father      Depression Father      Hypertension Father      Obesity Father      Hyperlipidemia Father      Coronary Artery Disease Father         history of MI and stent     Low Back Problems Father      Spine Problems Father      Anxiety Disorder Mother      Hypertension Mother      Osteoporosis Mother      Obesity Mother      Hyperlipidemia Mother      Low Back Problems Mother      Anxiety Disorder Sister      Hypertension Sister      Osteoporosis Sister      Obesity Sister      Hyperlipidemia Sister      Low Back Problems Sister      Spine Problems Sister      Anxiety Disorder Sister      Depression Sister      Hypertension Sister      Osteoporosis Sister      Obesity  Sister      Hyperlipidemia Sister      Low Back Problems Sister              Allergies:     Allergies   Allergen Reactions     Ciprofloxacin      History of aortic aneurysms     Tape [Adhesive Tape] Blisters     Blistering - please use paper tape             Medications:   Medications Reviewed.   Current Facility-Administered Medications   Medication     acetaminophen (TYLENOL) Suppository 650 mg     acetaminophen (TYLENOL) tablet 650 mg     alum & mag hydroxide-simethicone (MYLANTA ES/MAALOX  ES) suspension 30 mL     amLODIPine-benazepril (LOTREL) 5-20 MG per capsule 1 capsule     buPROPion (WELLBUTRIN SR) 12 hr tablet 200 mg     carvedilol (COREG) tablet 25 mg     clonazePAM (klonoPIN) tablet 0.5 mg     cyclobenzaprine (FLEXERIL) tablet 10 mg     [START ON 1/29/2020] fentaNYL (DURAGESIC) 75 mcg/hr 72 hr patch 1 patch     lidocaine (LMX4) cream     lidocaine 1 % 0.1-1 mL     medication instruction     naloxone (NARCAN) nasal spray 4 mg     nitroGLYcerin (NITROSTAT) sublingual tablet 0.4 mg     oxyCODONE (ROXICODONE) tablet 5-10 mg     simvastatin (ZOCOR) tablet 10 mg     sodium chloride (PF) 0.9% PF flush 3 mL     sodium chloride (PF) 0.9% PF flush 3 mL     Current Outpatient Medications   Medication Sig     acetaminophen (TYLENOL) 325 MG tablet Take 2 tablets (650 mg) by mouth every 4 hours as needed for mild pain     amLODIPine-benazepril (LOTREL) 5-20 MG capsule Take 1 capsule by mouth 2 times daily     amoxicillin (AMOXIL) 500 MG capsule Take 4 tablets 1 hour before dental procedure     buPROPion (WELLBUTRIN SR) 200 MG 12 hr tablet TAKE 1 TABLET BY MOUTH 2 TIMES DAILY     carvedilol (COREG) 25 MG tablet Take 1 tablet (25 mg) by mouth 2 times daily (with meals)     clonazePAM (KLONOPIN) 0.5 MG tablet Take 1 tablet (0.5 mg) by mouth 3 times daily as needed for anxiety     Cyanocobalamin (B-12) 50 MCG TABS      cyclobenzaprine (FLEXERIL) 10 MG tablet Take 1 tablet (10 mg) by mouth 3 times daily as needed for  muscle spasms      docusate sodium (COLACE) 100 MG capsule Take 1 capsule in the AM and 2 capsules in the PM     Fe Heme Polypeptide-folic acid (PROFERRIN-FORTE) 12-1 MG TABS Take 1 tablet by mouth daily     fentaNYL (DURAGESIC) 75 mcg/hr 72 hr patch Place 1 patch onto the skin every 48 hours      fluocinonide (LIDEX) 0.05 % external ointment Apply twice daily to itchy skin nodules for 1-2 weeks at a time.     losartan (COZAAR) 25 MG tablet Take 1 tablet (25 mg) by mouth daily     naloxone (NARCAN) nasal spray Spray 1 spray (4 mg) into one nostril alternating nostrils as needed for opioid reversal every 2-3 minutes until assistance arrives     order for DME Walker for cardiac rehab with 4 wheels, brakes and seat (Patient not taking: Reported on 12/11/2019)     oxyCODONE (ROXICODONE) 5 MG tablet Take 1-2 tablets (5-10 mg) by mouth every 4 hours as needed for moderate to severe pain     oxyCODONE IR (ROXICODONE) 10 MG tablet Take 1-2 tablets (10-20 mg) by mouth every 4 hours as needed for moderate to severe pain     simvastatin (ZOCOR) 10 MG tablet Take 1 tablet (10 mg) by mouth At Bedtime     tacrolimus (PROTOPIC) 0.1 % ointment Apply topically as needed Apply to affected areas on body.     Facility-Administered Medications Ordered in Other Encounters   Medication     acetaminophen (TYLENOL) tablet 650 mg     acetaminophen (TYLENOL) tablet 975 mg     ceFAZolin (ANCEF) intermittent infusion 1 g (pre-mix)     fentaNYL (PF) (SUBLIMAZE) injection 25-50 mcg     fentaNYL (PF) (SUBLIMAZE) injection 25-50 mcg     flumazenil (ROMAZICON) injection 0.2 mg     gabapentin (NEURONTIN) capsule 300 mg     HYDROcodone-acetaminophen (NORCO) 5-325 MG per tablet 1 tablet     lactated ringers infusion     lactated ringers infusion     lidocaine (LMX4) kit     lidocaine 1 % 0.1-1 mL     meperidine (DEMEROL) injection 12.5 mg     midazolam (VERSED) injection 1-2 mg     naloxone (NARCAN) injection 0.1-0.4 mg     naloxone (NARCAN) injection  0.1-0.4 mg     ondansetron (ZOFRAN-ODT) ODT tab 4 mg    Or     ondansetron (ZOFRAN) injection 4 mg     ORAL Pain Medications -  may administer as ordered by surgeon for take home use     oxyCODONE (ROXICODONE) tablet 5 mg     prochlorperazine (COMPAZINE) injection 10 mg     sodium chloride (PF) 0.9% PF flush 3 mL             Physical Exam:   Vitals were reviewed.  Blood pressure (!) 150/73, pulse 86, temperature 97.7  F (36.5  C), temperature source Oral, resp. rate 25, weight 90.7 kg (200 lb), SpO2 96 %.    General: AAOx3, NAD  Skin: Not jaundiced, no rash, no ecchymoses  HEENT: MMM, EOM intact  CV: RRR, normal S1S2, no murmur, clicks, rubs  Resp: Clear to auscultation bilaterally, no wheezes, rhonchi  Abd: Soft, non-tender, BS+, no masses appreciated  Extremities: warm and well perfused, palpable pulses, no edema. Left shoulder with bandage, c/d/i  Neuro: No lateralizing symptoms or focal neurologic deficits        Labs:   Routine Labs:  Lab Results   Component Value Date    TROPI 1.642 () 01/28/2020    TROPONIN 0.31 () 01/28/2020     CMP  Recent Labs   Lab 01/28/20  1300      POTASSIUM 4.4   CHLORIDE 108   CO2 26   ANIONGAP 3   *   BUN 22   CR 1.18   GFRESTIMATED 67   GFRESTBLACK 78   NIA 9.1   MAG 2.1     CBCNo lab results found in last 7 days.  INRNo lab results found in last 7 days.        Diagnostics:      EKG 1/28/2020: NSR      Assessment and Plan:     Kobe Henriquez is a 60 year old male with a past medical history significant for HTN, HLD, prior smoking history, bicuspid AV valve s/p valve sparing repair with Gelweave graft 2016, and bilateral shoulder replacement c/b MSSA s/p washout and 6 weeks of cefazolin who is admitted for treatment of hypertensive emergency and evaluation of NSVT.    #Hypertensive emergency  ##NSVT  ##History of ascending aortic aneurysm s/p repair 2016  RF: HLD, HTN, brief history of smoking  Noted to have ~4 minutes of profound hypertension during shoulder  biopsy. EKG revealed NSR. Troponin was checked and found to be elevated at 1.64. Patient had a coronary angiogram prior to his aorta graft surgery in 2016 which demonstrated minimal CAD (25% stenosis of LCx, 25% D1). Echocardiogram 4/2019 demonstrated normal LVEF with known bicuspid AV valve. Given his lack of ACS symptoms, suspect this is demand in the setting of hypertensive emergency likely triggered by severe pain. He is on a significant amount of narcotics at baseline due to chronic pain from the above surgeries. Will obtain a TTE and monitor on telemetry overnight. If ongoing runs of NSVT, may warrant ischemic evaluation.  - monitor on telemetry  - TTE complete today  - trend trop to peak  - resume carvedilol 25mg bid  - resume amlodipine-benazapril 5-20mg daily  - resume simvastatin 10mg  - hold PTA losartan 25mg for now given he is on a combination medication with benazapril. Will discuss     #History of b/l total shoulders  ##History of right shoulder MSSA septic arthritis   ##Chronic pain  Not currently on antibiotics. No signs/sx of infection at this time. CTM  -PTA oxycodone 5-10mg q4h prn  - PTA fentanyl patch 75 mcg q72h    #History of NEMESIO requiring dialysis x2  NEMESIO in setting of prolonged cefazolin treatment. Creatinine 1.18 today.   - avoid nephrotoxic agents as able    #Anxiety  - PTA klonipin 0.1mg tid prn    FEN: Cardiac diet  Prophylaxis:  DVT: PCDs.   Disposition: Obs admission to [facility] 1.     Christopher Orellana PA-C  [facility] Cardiology Team

## 2020-01-28 NOTE — ED NOTES
Bed: ED14  Expected date:   Expected time:   Means of arrival:   Comments:  Kobe Buchanan    Arthroscopic surgery on L biopsy. Had run of V-tach. In NSR.   /130, due to pain. Was given 100mcg Fentanyl.  Phenylephrine given and now BP 90/60's. 98% on Lpm O2. A/Ox4. Hx of hypertension. Sleep apnea. Shoulder surgeries. IV R hand.   5mg oxycodone given around 11:30.

## 2020-01-29 ENCOUNTER — APPOINTMENT (OUTPATIENT)
Dept: CT IMAGING | Facility: CLINIC | Age: 60
DRG: 287 | End: 2020-01-29
Payer: COMMERCIAL

## 2020-01-29 PROBLEM — I50.21 ACUTE SYSTOLIC HEART FAILURE (H): Status: ACTIVE | Noted: 2020-01-28

## 2020-01-29 PROBLEM — I21.4 NSTEMI (NON-ST ELEVATED MYOCARDIAL INFARCTION) (H): Status: ACTIVE | Noted: 2020-01-29

## 2020-01-29 LAB
ANION GAP SERPL CALCULATED.3IONS-SCNC: 6 MMOL/L (ref 3–14)
BUN SERPL-MCNC: 21 MG/DL (ref 7–30)
CALCIUM SERPL-MCNC: 9.2 MG/DL (ref 8.5–10.1)
CHLORIDE SERPL-SCNC: 106 MMOL/L (ref 94–109)
CO2 SERPL-SCNC: 25 MMOL/L (ref 20–32)
CREAT SERPL-MCNC: 1.01 MG/DL (ref 0.66–1.25)
GFR SERPL CREATININE-BSD FRML MDRD: 81 ML/MIN/{1.73_M2}
GLUCOSE SERPL-MCNC: 105 MG/DL (ref 70–99)
INR PPP: 1.12 (ref 0.86–1.14)
INTERPRETATION ECG - MUSE: NORMAL
INTERPRETATION ECG - MUSE: NORMAL
POTASSIUM SERPL-SCNC: 4.3 MMOL/L (ref 3.4–5.3)
SODIUM SERPL-SCNC: 136 MMOL/L (ref 133–144)
TROPONIN I SERPL-MCNC: 2.54 UG/L (ref 0–0.04)
TROPONIN I SERPL-MCNC: 3.12 UG/L (ref 0–0.04)

## 2020-01-29 PROCEDURE — 21400000 ZZH R&B CCU UMMC

## 2020-01-29 PROCEDURE — 36415 COLL VENOUS BLD VENIPUNCTURE: CPT | Performed by: PHYSICIAN ASSISTANT

## 2020-01-29 PROCEDURE — 36415 COLL VENOUS BLD VENIPUNCTURE: CPT

## 2020-01-29 PROCEDURE — 25000132 ZZH RX MED GY IP 250 OP 250 PS 637: Performed by: INTERNAL MEDICINE

## 2020-01-29 PROCEDURE — 25000132 ZZH RX MED GY IP 250 OP 250 PS 637: Performed by: PHYSICIAN ASSISTANT

## 2020-01-29 PROCEDURE — 36415 COLL VENOUS BLD VENIPUNCTURE: CPT | Performed by: INTERNAL MEDICINE

## 2020-01-29 PROCEDURE — 25000132 ZZH RX MED GY IP 250 OP 250 PS 637: Performed by: STUDENT IN AN ORGANIZED HEALTH CARE EDUCATION/TRAINING PROGRAM

## 2020-01-29 PROCEDURE — 87040 BLOOD CULTURE FOR BACTERIA: CPT | Performed by: STUDENT IN AN ORGANIZED HEALTH CARE EDUCATION/TRAINING PROGRAM

## 2020-01-29 PROCEDURE — 84484 ASSAY OF TROPONIN QUANT: CPT

## 2020-01-29 PROCEDURE — 99207 ZZC NO CHARGE LOS: CPT | Performed by: INTERNAL MEDICINE

## 2020-01-29 PROCEDURE — G0378 HOSPITAL OBSERVATION PER HR: HCPCS

## 2020-01-29 PROCEDURE — 36415 COLL VENOUS BLD VENIPUNCTURE: CPT | Performed by: STUDENT IN AN ORGANIZED HEALTH CARE EDUCATION/TRAINING PROGRAM

## 2020-01-29 PROCEDURE — 80048 BASIC METABOLIC PNL TOTAL CA: CPT | Performed by: PHYSICIAN ASSISTANT

## 2020-01-29 PROCEDURE — 99232 SBSQ HOSP IP/OBS MODERATE 35: CPT | Performed by: INTERNAL MEDICINE

## 2020-01-29 PROCEDURE — 70486 CT MAXILLOFACIAL W/O DYE: CPT

## 2020-01-29 PROCEDURE — 84484 ASSAY OF TROPONIN QUANT: CPT | Performed by: PHYSICIAN ASSISTANT

## 2020-01-29 PROCEDURE — 85610 PROTHROMBIN TIME: CPT | Performed by: INTERNAL MEDICINE

## 2020-01-29 RX ORDER — ATORVASTATIN CALCIUM 80 MG/1
80 TABLET, FILM COATED ORAL EVERY EVENING
Status: DISCONTINUED | OUTPATIENT
Start: 2020-01-29 | End: 2020-01-31

## 2020-01-29 RX ORDER — POLYETHYLENE GLYCOL 3350 17 G/17G
17 POWDER, FOR SOLUTION ORAL DAILY PRN
Status: DISCONTINUED | OUTPATIENT
Start: 2020-01-29 | End: 2020-01-31 | Stop reason: HOSPADM

## 2020-01-29 RX ORDER — DOCUSATE SODIUM 100 MG/1
200 CAPSULE, LIQUID FILLED ORAL 2 TIMES DAILY
Status: DISCONTINUED | OUTPATIENT
Start: 2020-01-29 | End: 2020-01-31 | Stop reason: HOSPADM

## 2020-01-29 RX ORDER — FENTANYL 75 UG/1
75 PATCH TRANSDERMAL
Status: DISCONTINUED | OUTPATIENT
Start: 2020-01-29 | End: 2020-01-31 | Stop reason: HOSPADM

## 2020-01-29 RX ORDER — SENNOSIDES 8.6 MG
4 TABLET ORAL DAILY
Status: DISCONTINUED | OUTPATIENT
Start: 2020-01-30 | End: 2020-01-31 | Stop reason: HOSPADM

## 2020-01-29 RX ORDER — ASPIRIN 325 MG
325 TABLET ORAL ONCE
Status: COMPLETED | OUTPATIENT
Start: 2020-01-29 | End: 2020-01-29

## 2020-01-29 RX ORDER — DOCUSATE SODIUM 100 MG/1
100 CAPSULE, LIQUID FILLED ORAL 2 TIMES DAILY
Status: DISCONTINUED | OUTPATIENT
Start: 2020-01-29 | End: 2020-01-31 | Stop reason: HOSPADM

## 2020-01-29 RX ORDER — MAGNESIUM CARB/ALUMINUM HYDROX 105-160MG
148 TABLET,CHEWABLE ORAL
Status: DISCONTINUED | OUTPATIENT
Start: 2020-01-29 | End: 2020-01-31 | Stop reason: HOSPADM

## 2020-01-29 RX ADMIN — BUPROPION HYDROCHLORIDE 200 MG: 200 TABLET, EXTENDED RELEASE ORAL at 08:17

## 2020-01-29 RX ADMIN — OXYCODONE HYDROCHLORIDE 10 MG: 5 TABLET ORAL at 17:47

## 2020-01-29 RX ADMIN — ACETAMINOPHEN 650 MG: 325 TABLET, FILM COATED ORAL at 22:10

## 2020-01-29 RX ADMIN — CLONAZEPAM 0.5 MG: 0.5 TABLET ORAL at 08:24

## 2020-01-29 RX ADMIN — BUPROPION HYDROCHLORIDE 200 MG: 200 TABLET, EXTENDED RELEASE ORAL at 17:47

## 2020-01-29 RX ADMIN — AMLODIPINE BESYLATE AND BENAZEPRIL HYDROCHLORIDE 1 CAPSULE: 5; 20 CAPSULE ORAL at 17:55

## 2020-01-29 RX ADMIN — ACETAMINOPHEN 650 MG: 325 TABLET, FILM COATED ORAL at 10:44

## 2020-01-29 RX ADMIN — DOCUSATE SODIUM 200 MG: 100 CAPSULE, LIQUID FILLED ORAL at 08:18

## 2020-01-29 RX ADMIN — CLONAZEPAM 0.5 MG: 0.5 TABLET ORAL at 18:01

## 2020-01-29 RX ADMIN — OXYCODONE HYDROCHLORIDE 10 MG: 5 TABLET ORAL at 03:45

## 2020-01-29 RX ADMIN — ASPIRIN 325 MG ORAL TABLET 325 MG: 325 PILL ORAL at 08:24

## 2020-01-29 RX ADMIN — OXYCODONE HYDROCHLORIDE 10 MG: 5 TABLET ORAL at 22:10

## 2020-01-29 RX ADMIN — CARVEDILOL 25 MG: 25 TABLET, FILM COATED ORAL at 17:47

## 2020-01-29 RX ADMIN — ACETAMINOPHEN 650 MG: 325 TABLET, FILM COATED ORAL at 17:47

## 2020-01-29 RX ADMIN — OXYCODONE HYDROCHLORIDE 10 MG: 5 TABLET ORAL at 08:24

## 2020-01-29 RX ADMIN — CARVEDILOL 25 MG: 25 TABLET, FILM COATED ORAL at 08:18

## 2020-01-29 RX ADMIN — OXYCODONE HYDROCHLORIDE 10 MG: 5 TABLET ORAL at 12:32

## 2020-01-29 RX ADMIN — SENNOSIDES 4 TABLET: 8.6 TABLET, FILM COATED ORAL at 17:48

## 2020-01-29 RX ADMIN — FENTANYL 1 PATCH: 75 PATCH, EXTENDED RELEASE TRANSDERMAL at 11:50

## 2020-01-29 RX ADMIN — SENNOSIDES 4 TABLET: 8.6 TABLET, FILM COATED ORAL at 08:17

## 2020-01-29 RX ADMIN — ATORVASTATIN CALCIUM 80 MG: 80 TABLET, FILM COATED ORAL at 17:48

## 2020-01-29 RX ADMIN — AMLODIPINE BESYLATE AND BENAZEPRIL HYDROCHLORIDE 1 CAPSULE: 5; 20 CAPSULE ORAL at 08:17

## 2020-01-29 RX ADMIN — DOCUSATE SODIUM 200 MG: 100 CAPSULE, LIQUID FILLED ORAL at 17:48

## 2020-01-29 ASSESSMENT — PAIN DESCRIPTION - DESCRIPTORS
DESCRIPTORS: ACHING
DESCRIPTORS: HEADACHE

## 2020-01-29 ASSESSMENT — ACTIVITIES OF DAILY LIVING (ADL): ADLS_ACUITY_SCORE: 12

## 2020-01-29 NOTE — PLAN OF CARE
Observation Goals     Will be monitored on telemetry overnight: Met  Will have blood pressure under better control SBP <140, DBP <90: Met    D: Pt admitted 1/28 after VT run while undergoing routine shoulder biopsy. Hx of HTN, HLD, prior smoking history, bicuspid AV valve s/p repair 2016 & bilateral shoulder replacement c/b MSSA septic right joint s/p washout.    I/A: Pt A/Ox4. VSS on RA. SR. PRN oxy given for chronic back pain & L. shoulder discomfort. L. shoulder drsg CDI. Trending troponin, last result 3.120. No SOB or chest pain reported. 2g Na diet. Independent. Obs handout given. 2 RN skin assessment completed with Travis Dumont present on RLE.    P: Continue monitoring & notify Cards 1 of changes/concerns.

## 2020-01-29 NOTE — PROGRESS NOTES
[facility]   Cardiology Consults  Progress Note     Addendum:  Will defer angiogram until we discuss patient with ID, dental. Per chart review, saw ID 11/2019. Complaining of tooth infection now.     Interval History:  - no chest pain, dyspnea, nausea, diaphoresis overnight. Remains asymptomatic this AM  - troponin elevated to 3.1. TTE with reduced LVEF, septal thinning c/w prior infarct    Physical Exam:  Temp:  [97.3  F (36.3  C)-98.7  F (37.1  C)] 98.7  F (37.1  C)  Pulse:  [70-91] 81  Heart Rate:  [69-93] 74  Resp:  [8-25] 16  BP: ()/(61-83) 130/76  SpO2:  [94 %-100 %] 98 %      Intake/Output Summary (Last 24 hours) at 1/29/2020 0855  Last data filed at 1/29/2020 0610  Gross per 24 hour   Intake 890 ml   Output --   Net 890 ml       Wt:   Wt Readings from Last 5 Encounters:   01/28/20 85.2 kg (187 lb 12.8 oz)   01/28/20 83.9 kg (185 lb)   01/15/20 84.3 kg (185 lb 12.8 oz)   12/11/19 91.7 kg (202 lb 1.6 oz)   11/19/19 87 kg (191 lb 12.8 oz)       GEN: NAD, awake, alert  Pulm: CTAB, no wheeze, no rhonchi  Cardiac: JVP not appreciably elevated, no murmurs, RRR  Vascular: no lower extremity edema and palpable pulses  GI: soft, non distended  Neuro: CN II-XII grossly intact    Medications:    amLODIPine-benazepril  1 capsule Oral BID     buPROPion  200 mg Oral BID     carvedilol  25 mg Oral BID w/meals     docusate sodium  200 mg Oral BID     [START ON 1/30/2020] fentaNYL  75 mcg Transdermal Q72H     fentaNYL   Transdermal Q8H     polyethylene glycol  17 g Oral Daily     [START ON 1/30/2020] sennosides  4 tablet Oral Daily     sennosides  4 tablet Oral QPM     simvastatin  10 mg Oral QPM     sodium chloride (PF)  10 mL Intracatheter Once     sodium chloride (PF)  3 mL Intracatheter Q8H       - MEDICATION INSTRUCTIONS -         Labs:   [facility] [address] [facility]  Recent Labs   Lab 01/29/20  0547 01/28/20  1300    136   POTASSIUM 4.3 4.4   CHLORIDE 106 108   CO2 25 26   ANIONGAP 6 3   * 106*    BUN 21 22   CR 1.01 1.18   GFRESTIMATED 81 67   GFRESTBLACK >90 78   NIA 9.2 9.1   MAG  --  2.1     CBCNo lab results found in last 7 days.  INRNo lab results found in last 7 days.  Arterial Blood GasNo lab results found in last 7 days.    Diagnostics:  Echocardiogram 1/28/2020  Interpretation Summary  Moderately (EF 30-35%) reduced left ventricular function is present.There is  wall thinning and severe hypokinesis of the mid-distal anteroseptal, basal-mid  anterior and distal inferior segments concerning for coronary disease in the  LAD territory. There is no thrombus.  Global right ventricular function is mildly reduced.  No pericardial effusion is present.  _____________________________________________________________________________     Left Ventricle  Mild left ventricular dilation is present. Moderately (EF 30-35%) reduced left  ventricular function is present. Moderate diffuse hypokinesis is present.  There is wall thinning and severe hypokinesis of the mid-distal anteroseptal,  basal-mid anterior and distal inferior segments concerning for coronary  disease in the LAD territory. There is no thrombus.     Right Ventricle  The right ventricle is normal size. Global right ventricular function is  mildly reduced.     Mitral Valve  The mitral valve is normal.     Aortic Valve  The aortic valve is bicuspid. Mild aortic valve calcification is present. No  significant stenosis or regurgitation.     Tricuspid Valve  The tricuspid valve is normal.        Vessels  The inferior vena cava cannot be assessed.     Pericardium  No pericardial effusion is present.     Compared to Previous Study  This study was compared with the study from 4.10.19 . LV function has declined  and the wall motion abnormalities are new.      EKG 1/28/2020: NSR      ASSESSMENT/PLAN:  Kobe Henriquez is a 60 year old male with a past medical history significant for HTN, HLD, prior smoking history, bicuspid AV valve s/p valve sparing repair  with Gelweave graft 2016, and bilateral shoulder replacement c/b MSSA s/p washout and 6 weeks of cefazolin who is admitted for treatment of hypertensive emergency and evaluation of NSVT.     #Hypertensive emergency  ##NSVT  ##History of ascending aortic aneurysm s/p repair 2016  RF: HLD, HTN, brief history of smoking  Noted to have ~4 minutes of profound hypertension during shoulder biopsy day of admission. EKG revealed NSR. Troponin was checked and found to be elevated at 1.64. Patient had a coronary angiogram prior to his aorta graft surgery in 2016 which demonstrated minimal CAD (25% stenosis of LCx, 25% D1). Echocardiogram 4/2019 demonstrated normal LVEF with known bicuspid AV valve. Given his lack of ACS symptoms, suspect this troponin elevation is demand in the setting of hypertensive emergency likely triggered by severe pain. Interestingly his TTE this admission with newly reduced LVEF and wall thinning of the LAD territory - if ischemic, unclear when this event would have occured. Does think that he has been more dyspneic since his admission in September for MSSA bacteremia. XIOMY during that admission was noted to have an LVEF of 60%. May require EP consultation given this appears to be a chronic ischemic injury.   - NPO, angiogram today  - trop peak 3.1  - 325 ASA now for angiogram  - resume carvedilol 25mg bid  - resume amlodipine-benazapril 5-20mg bid  - resume simvastatin 10mg  - hold PTA losartan 25mg for now given he is on a combination medication with benazapril.     #History of b/l total shoulders  ##History of right shoulder MSSA septic arthritis   ##Chronic pain  Not currently on antibiotics. No signs/sx of infection at this time. CTM  -PTA oxycodone 5-10mg q4h prn  - PTA fentanyl patch 75 mcg q72h     #History of NEMESIO requiring dialysis x2  NEMESIO in setting of prolonged cefazolin treatment. Creatinine 1.18 today.   - avoid nephrotoxic agents as able     #Anxiety  - PTA klonipin 0.1mg tid prn     FEN:  Cardiac diet  Prophylaxis:  DVT: PCDs.   Disposition: Obs admission to Cards 1.   Patient seen and discussed with Dr. Stafford, who agrees with above plan.      Christopher Orellana PA-C  [facility] Cardiology Team

## 2020-01-29 NOTE — ED NOTES
[facility], [place]   ED Nurse to Floor Handoff     Kobe Buchanan is a 60 year old male who speaks English and lives alone,  in a home  They arrived in the ED by wheelchair from surgery    ED Chief Complaint: Palpitations    ED Dx;   Final diagnoses:   Non-sustained ventricular tachycardia (H)   Elevated troponin         Needed?: No    Allergies:   Allergies   Allergen Reactions     Ciprofloxacin      History of aortic aneurysms     Tape [Adhesive Tape] Blisters     Blistering - please use paper tape   .  Past Medical Hx:   Past Medical History:   Diagnosis Date     Anxiety      Ascending aortic aneurysm (H)      Bicuspid aortic valve      BPPV (benign paroxysmal positional vertigo) 7/11/2014     Chronic narcotic use      Chronic neck pain      Chronic osteoarthritis      Degenerative joint disease      Depression      Hyperlipidemia 4/10/2012     Hypertension      Multiple stiff joints      Neck injuries      Obesity 2/9/2015     Skin picking habit      Sleep apnea     does not use cpap      Baseline Mental status: WDL  Current Mental Status changes: at basesline    Infection present or suspected this encounter: no  Sepsis suspected: No  Isolation type: No active isolations     Activity level - Baseline/Home:  Independent  Activity Level - Current:   Independent    Bariatric equipment needed?: No    In the ED these meds were given:   Medications   lidocaine 1 % 0.1-1 mL (has no administration in time range)   lidocaine (LMX4) cream (has no administration in time range)   sodium chloride (PF) 0.9% PF flush 3 mL (has no administration in time range)   sodium chloride (PF) 0.9% PF flush 3 mL (has no administration in time range)   medication instruction (has no administration in time range)   nitroGLYcerin (NITROSTAT) sublingual tablet 0.4 mg (has no administration in time range)   alum & mag hydroxide-simethicone (MYLANTA ES/MAALOX  ES) suspension 30 mL (has no  administration in time range)   acetaminophen (TYLENOL) tablet 650 mg (650 mg Oral Given 1/28/20 1804)   acetaminophen (TYLENOL) Suppository 650 mg (has no administration in time range)   amLODIPine-benazepril (LOTREL) 5-20 MG per capsule 1 capsule (has no administration in time range)   buPROPion (WELLBUTRIN SR) 12 hr tablet 200 mg (has no administration in time range)   carvedilol (COREG) tablet 25 mg (25 mg Oral Given 1/28/20 1757)   clonazePAM (klonoPIN) tablet 0.5 mg (has no administration in time range)   cyclobenzaprine (FLEXERIL) tablet 10 mg (has no administration in time range)   oxyCODONE (ROXICODONE) tablet 5-10 mg (10 mg Oral Given 1/28/20 1804)   simvastatin (ZOCOR) tablet 10 mg (has no administration in time range)   fentaNYL (DURAGESIC) 75 mcg/hr 72 hr patch 1 patch (has no administration in time range)   sodium chloride (PF) 0.9% PF flush 10 mL (has no administration in time range)   sennosides (SENOKOT) tablet 4 tablet (has no administration in time range)   sennosides (SENOKOT) tablet 3 tablet (has no administration in time range)   fentaNYL (DURAGESIC) Patch in Place (has no administration in time range)   HYDROmorphone (DILAUDID) tablet 4 mg (4 mg Oral Given 1/28/20 1549)   perflutren diluted 1mL to 2mL with saline (OPTISON) diluted injection 5 mL (5 mLs Intravenous Given 1/28/20 1634)       Drips running?  No    Home pump  No    Current LDAs  Peripheral IV 01/28/20 Right Hand (Active)   Site Assessment WDL 1/28/2020 12:39 PM   Number of days: 0       PICC Single Lumen 11/19/19 Left Basilic (Active)   Number of days: 70       Urethral Catheter Latex 22 fr (Active)   Number of days: 1931       Urethral Catheter Non-latex;Silver coated;Double-lumen;Straight-tip 24 fr (Active)   Number of days: 1289       Incision/Surgical Site 10/15/14 Penis (Active)   Number of days: 1931       Incision/Surgical Site 10/04/16 Chest (Active)   Number of days: 1211       Incision/Surgical Site 03/31/17 Right Hand  (Active)   Number of days: 1033       Incision/Surgical Site 11/15/19 Left Shoulder (Active)   Number of days: 74       Incision/Surgical Site 20 Left Shoulder (Active)   Number of days: 0       Labs results:   Labs Ordered and Resulted from Time of ED Arrival Up to the Time of Departure from the ED   TROPONIN I - Abnormal; Notable for the following components:       Result Value    Troponin I ES 1.642 (*)     All other components within normal limits   BASIC METABOLIC PANEL - Abnormal; Notable for the following components:    Glucose 106 (*)     All other components within normal limits   TROPONIN POCT - Abnormal; Notable for the following components:    Troponin I 0.31 (*)     All other components within normal limits   MAGNESIUM   TROPONIN I   IP ASSIGN PROVIDER TEAM TO TREATMENT TEAM   DAILY WEIGHTS   INTAKE AND OUTPUT   OBTAIN MEDICAL RECORDS   DOCUMENT   PATIENT EDUCATION   TELEMETRY MONITORING CARDIOLOGY ADULT   VITAL SIGNS AND PAIN ASSESSMENT   PULSE OXIMETRY NURSING   PERIPHERAL IV CATHETER   ACTIVITY   NOTIFY PROVIDER   APPLY PNEUMATIC COMPRESSION DEVICE (PCD)   OBSERVATION GOALS       Imaging Studies:   Recent Results (from the past 24 hour(s))   Echo Limited    Narrative    [id number]  [id number]  [id number]  [id number]^CHENTE^WICHO^MARCELL           [facility],[place]  Echocardiography Laboratory  [address] [place] [id number]     Name: RAZA SHEPPARD  MRN: [id number]  : 1960  Study Date: 2020 04:13 PM  Age: 60 yrs  Gender: Male  Patient Location: [facility]  Reason For Study: Chest Discomfort  Ordering Physician: WICHO ELDRIDGE  Performed By: Jorge Luis Box RDCS     Weight: 200 lb  BP: 120/78 mmHg  _____________________________________________________________________________  __        Procedure  Limited Portable Echo Adult. Contrast Optison.  _____________________________________________________________________________  __        Interpretation  Summary  Moderately (EF 30-35%) reduced left ventricular function is present.There is  wall thinning and severe hypokinesis of the mid-distal anteroseptal, basal-mid  anterior and distal inferior segments concerning for coronary disease in the  LAD territory. There is no thrombus.  Global right ventricular function is mildly reduced.  No pericardial effusion is present.  _____________________________________________________________________________  __        Left Ventricle  Mild left ventricular dilation is present. Moderately (EF 30-35%) reduced left  ventricular function is present. Moderate diffuse hypokinesis is present.  There is wall thinning and severe hypokinesis of the mid-distal anteroseptal,  basal-mid anterior and distal inferior segments concerning for coronary  disease in the LAD territory. There is no thrombus.     Right Ventricle  The right ventricle is normal size. Global right ventricular function is  mildly reduced.     Mitral Valve  The mitral valve is normal.     Aortic Valve  The aortic valve is bicuspid. Mild aortic valve calcification is present. No  significant stenosis or regurgitation.     Tricuspid Valve  The tricuspid valve is normal.        Vessels  The inferior vena cava cannot be assessed.     Pericardium  No pericardial effusion is present.     Compared to Previous Study  This study was compared with the study from 4.10.19 . LV function has declined  and the wall motion abnormalities are new.  _____________________________________________________________________________  __     MMode/2D Measurements & Calculations  IVSd: 0.77 cm  LVIDd: 5.8 cm  LVIDs: 3.6 cm  LVPWd: 0.88 cm  FS: 38.7 %  LV mass(C)d: 184.8 grams  RWT: 0.30              _____________________________________________________________________________  __        Report approved by: Cristo Kendall 01/28/2020 04:48 PM          Recent vital signs:   /72   Pulse 89   Temp 97.7  F (36.5  C) (Oral)   Resp 12   Wt  90.7 kg (200 lb)   SpO2 95%   BMI 29.11 kg/m      Isaias Coma Scale Score: 15 (01/28/20 1241)       Cardiac Rhythm: Normal Sinus  Pt needs tele? Yes  Skin/wound Issues: None    Code Status: Full Code    Pain control: poor    Nausea control: pt had none    Abnormal labs/tests/findings requiring intervention: trop elevated    Family present during ED course? No   Family Comments/Social Situation comments: n/a    Tasks needing completion: serial trops    Tracy Muniz RN  [phone] [facility]

## 2020-01-29 NOTE — PLAN OF CARE
Admission          1/28/2020 12:34 PM  -----------------------------------------------------------  Diagnosis: VT run    Admitted from: ED  Report given from: Kimmy RN  Via: stretcher  Accompanied by: alone  Family Aware of Admission: Yes   Belongings: Placed in closet  Admission Profile: complete  Teaching: orientation to unit, call don't fall, use of console, meal times, visiting hours, when to call for the RN (angina/sob/dizziness, etc.), and enforced importance of safety   Access: PIV  Telemetry: Placed on pt  Ht./Wt.: complete   2 RN Skin Check: Completed w/ Travis WATTERS No PU identified.    Temp:  [97.3  F (36.3  C)-97.8  F (36.6  C)] 97.8  F (36.6  C)  Pulse:  [59-91] 81  Heart Rate:  [69-93] 77  Resp:  [8-25] 16  BP: ()/(61-83) 131/77  SpO2:  [94 %-100 %] 98 %

## 2020-01-29 NOTE — PROGRESS NOTES
Care Coordinator - Discharge Planning    Admission Date/Time:  1/28/2020  Attending MD:  Ismael Stafford MD   Data  Chart reviewed, discussed with interdisciplinary team.   Patient was admitted for:   1. Acute systolic heart failure (H)    2. Non-sustained ventricular tachycardia (H)    3. Elevated troponin    4. Elevated troponin level    5. History of coronary artery bypass graft    6. Postoperative state    7. Acute systolic heart failure (H)    Pt is Observation Status with cardiac monitoring, pt is aware of his status, pt given MOON Info sheet; signed copy placed in pt's chart.   Assessment   Concerns with insurance coverage for discharge needs: OBS status.   Current Living Situation: Patient lives alone. Pt said that he plans to stay with his parents upon discharge.   Support System: Supportive and Involved parents.   Services Involved: none currently.   Transportation at Discharge: Pt told this writer that he will use [employer] for his ride home upon discharge.   Transportation to Medical Appointments:    - Name of caregiver: self or parents.   Barriers to Discharge: medical plan of care.     Coordination of Care and Referrals: No home care needs per pt.   Per MD, pt was to have angiogram today but procedure delayed until Dental and ID consult completed.      Plan  Anticipated Discharge Date:  TBD  Anticipated Discharge Plan:  Discharge to home anticipated.   RN CC will continue to monitor pt's medical condition and progress toward discharge.   COOPER CASTRO RN BSN  Care Coordinator Unit   [phone]

## 2020-01-29 NOTE — PROGRESS NOTES
Observation Goals    Will be monitored on telemetry overnight: NSR, troponin at 2.967    Will have blood pressure under better control SBP <140, DBP <90: met, 96/70, MAP 77

## 2020-01-29 NOTE — CONSULTS
[facility] ID SERVICE CONSULTATION     Patient:  Kobe Buchanan   Date of birth 1960, Medical record number [id number]  Date of Visit:  01/29/2020  Date of Admission: 1/28/2020  Consult Requester:Ismael Stafford MD          Assessment and Recommendations:   ID problem list  1. History of left shoulder prosthetic joint infection.              -S/p L TSA explant 11/15 with cx showing MSSA.               - s/p 6 week course of IV cefazolin completed 12/27/20    2. Hx Right shoulder prostheric joint infection                - s/p I&D ,explant and replacement of spacer on 9/23/19.                - Intra-op cultures grew MSSA .                -s/p 6 week course of cefazolin completed 11/10/19.        3. History of MSSA bacteremia with bilateral TSA PJI (see above)              - Blood Cx- MSSA isolated from 9/20,9/21 & 9/22                -XIMOY neg for thrombus/vegetation 9/24/19              - Blood cx - neg x 2 on 11/13/19                -preceded by skin picking/dermatitis    4. Bicuspid aortic valve s/p valve sparing repair with Gelweave graft 2016   5. Ascending aortic aneurysm      RECOMMENDATION:  1. Monitor off antibiotics.  2. Await on tissue culture result, plan to wait until cultures are finalized.  3. Follow up with  as scheduled, 2/14/2020 for further plans.  4. Please check CRP prior to discharge.    DISCUSSION:   58 y/o gentleman with history of bilateral shoulder PJI due to MSSA, who has been on 4 week drug holiday after completing 6 week course of Cefazolin( end date 12/27) for left TSA PJI. The patient is afebrile, hemodynamically stable, last CRP was within normal limits. On exam shoulder incision site appears C/D/I. No overlying erythema, tenderness or induration noted. He has no pain on ROM of the shoulder bilaterally. He is undergoing cardiovascular work-up with coronary angiography. Infectious disease was consulted to comment and review prior to cardiovascular work-up.  Recommend to monitor off antibiotics, discussed with the patient plan regarding re-insertion of shoulder hardware which will be decided after tissue cultures are finalized. Plan to wait atleast 2 weeks and will follow up with  in the outpatient setting.       Thank you for this consult. ID will sign off.    Patient was discussed with Dr. Chandler, ID Staff.      Roseanna Joseph, PGY-6  ID fellow  Pager [phone]       History of Present Illness:     Kobe Buchanan is a 59 year old male, with osteoarthritis, s/p  Left total shoulder arthroplasty on 4/15/14  and right  total shoulder arthroplasty on 8/26/2014,  bilateral shoulder replacement c/b MSSA septic joint of the right requiring washout, bicuspid AV valve s/p valve sparing repair with Gelweave graft 2016 , DM Type II and hx of C.diff who is admitted for evaluation of hypertensive urgency, elevated troponin and NSVT.    Infectious disease service consulted for management of prosthetic joint infection. The patient has complicated history of Prosthetic joint infections which developed following MSSA bacteremia which was isolated from Blood Cx 9/20,9/21 & 9/22 during hospitalization at [facility] 9/20/19-10/4/19, the patient was treated for his bacteremia. XIOMY 9/24/19 was negative for thrombus / vegetation. He was found to have right shoulder PJI at the time with Tissue Cx from  irrigation and debridement isolated  MSSA from tissue Cxs. He had explant and replacement of spacer on 9/23/19. He completed 6 weeks of Cefazolin on 11/10/19.  He was re-admitted on 11/12/19 for acute on chronic left shoulder pain x 2 days, found to have left TSA PJI due to MSSA s/p explant of hardware on 11/15.  Blood Cx 11/13/19 w/o growth, he was restarted on Cefazolin completed 12/27.     The patient had 4 week drug holiday, underwent arthrocentesis/arthroscopic biopsies prior to hardware re-insertion given high risk for relapsed infection in this case. Biopsy of the left  shoulder to interrogate the joint for infection. Tissue biopsy and aerobic, anaerobic tissue cultures were collected (1/28), also 2 sets of blood Cx (1/29) were collected. He was noted by anesthesia to have a 10 beat run of VT and profound hypertension between 206/134-226/152. This period of hypertensive emergency lasted ~4 minutes per records. Anaesthesia referred him to the ED due to the NSVT run.          Review of Systems:   CONSTITUTIONAL:  No fevers or chills  EYES: negative for icterus  ENT:  negative for hearing loss, tinnitus and sore throat  RESPIRATORY:  negative for cough with sputum and dyspnea  CARDIOVASCULAR:  negative for chest pain, dyspnea  GASTROINTESTINAL:  negative for nausea, vomiting, diarrhea and constipation  GENITOURINARY:  negative for dysuria  HEME:  No easy bruising  INTEGUMENT:  negative for rash and pruritus  NEURO:  Negative for headache         Past Medical History:     Past Medical History:   Diagnosis Date     Anxiety      Ascending aortic aneurysm (H)      Bicuspid aortic valve      BPPV (benign paroxysmal positional vertigo) 7/11/2014     Chronic narcotic use      Chronic neck pain      Chronic osteoarthritis      Degenerative joint disease      Depression      Hyperlipidemia 4/10/2012     Hypertension      Multiple stiff joints      Neck injuries      Obesity 2/9/2015     Skin picking habit      Sleep apnea     does not use cpap            Past Surgical History:     Past Surgical History:   Procedure Laterality Date     ARTHROPLASTY SHOULDER  4/15/2014    Procedure: Left Total Shoulder Arthroplasty ;  Surgeon: Analilia Aceves MD;  [facility]     ARTHROPLASTY SHOULDER Right 8/26/2014    Procedure: ARTHROPLASTY SHOULDER;  Surgeon: Analilia Aceves MD;  [facility]     ARTHROSCOPY SHOULDER WITH BIOPSY(IES) Left 1/28/2020    Procedure: Left shoulder arthroscopy and biopsy for culture;  Surgeon: Analilia Aceves MD;  [facility]     BYPASS GASTRIC  TAVO-EN-Y, LIVER BIOPSY, COMBINED  8/8/2005     C HAND/FINGER SURGERY UNLISTED       C SHOULDER SURG PROC UNLISTED       COLONOSCOPY  6/30/2014    Procedure: COMBINED COLONOSCOPY, SINGLE BIOPSY/POLYPECTOMY BY BIOPSY;  Surgeon: Chester Patton MD;  [facility]     CYSTOSCOPY, BLADDER NECK CUTS, COMBINED N/A 7/18/2016    Procedure: COMBINED CYSTOSCOPY, BLADDER NECK CUTS;  Surgeon: Ritu Leslie MD;  [facility]     ESOPHAGOSCOPY, GASTROSCOPY, DUODENOSCOPY (EGD), COMBINED  6/30/2014    Procedure: COMBINED ESOPHAGOSCOPY, GASTROSCOPY, DUODENOSCOPY (EGD), BIOPSY SINGLE OR MULTIPLE;  Surgeon: Chester Patton MD;  Location: [facility]     EXCISE MASS FINGER  6/14/2011    Procedure:EXCISE MASS FINGER; Middle Flexor Cyst; Surgeon:SHAYY RUSSELL; Location:[facility] OR     HAND SURGERY      excision of tendon cyst on left hand     [facility] VASCULAR SURGERY PROCEDURE UNLIST       IR FINE NEEDLE ASPIRATION W ULTRASOUND  11/13/2019     IR PICC PLACEMENT > 5 YRS OF AGE  11/19/2019     LASER HOLMIUM LITHOTRIPSY BLADDER N/A 10/15/2014    Procedure: LASER HOLMIUM LITHOTRIPSY BLADDER;  Surgeon: Sahil Taveras MD;  [facility]: [facility]     LASER KTP GREEN LIGHT PHOTOSELECTIVE VAPORIZATION PROSTATE  1/23/2014    Procedure: LASER KTP GREEN LIGHT PHOTOSELECTIVE VAPORIZATION PROSTATE;  Greenlight Photovaporization Of Prostate  ;  Surgeon: Sahil Taveras MD;  Location: [facility]     RELEASE TRIGGER FINGER Right 3/31/2017    Procedure: RELEASE TRIGGER FINGER;  Surgeon: Juan Carlos Blunt MD;  [facility]     REMOVE HARDWARE ARTHROPLASTY SHOULDER. I&D, PLACE ANTIBIOTIC CEMENT BE Left 11/15/2019    Procedure: Explantation of left total shoulder arthroplasty, irrigation and debridement, and placement of antibiotic spacer;  Surgeon: Analilia Aceves MD;  [facility]: [facility]     REPAIR ANEURYSM ASCENDING AORTA N/A 10/4/2016    Procedure: REPAIR ANEURYSM ASCENDING AORTA;  Surgeon: Mckenzie Townsend MD;   Location:  OR     [facility]              Family History:   Reviewed and non-contributory.   Family History   Problem Relation Age of Onset     Arthritis Other      Gastrointestinal Disease Other      Cardiovascular Father         aortic aneurysm     Arrhythmia Father      Nephrolithiasis Father      Sleep Apnea Father      Anxiety Disorder Father      Depression Father      Hypertension Father      Obesity Father      Hyperlipidemia Father      Coronary Artery Disease Father         history of MI and stent     Low Back Problems Father      Spine Problems Father      Anxiety Disorder Mother      Hypertension Mother      Osteoporosis Mother      Obesity Mother      Hyperlipidemia Mother      Low Back Problems Mother      Anxiety Disorder Sister      Hypertension Sister      Osteoporosis Sister      Obesity Sister      Hyperlipidemia Sister      Low Back Problems Sister      Spine Problems Sister      Anxiety Disorder Sister      Depression Sister      Hypertension Sister      Osteoporosis Sister      Obesity Sister      Hyperlipidemia Sister      Low Back Problems Sister             Social History:     Social History     Tobacco Use     Smoking status: Former Smoker     Packs/day: 0.50     Years: 6.00     Pack years: 3.00     Types: Cigarettes     Start date: 1977     Last attempt to quit: 1983     Years since quittin.4     Smokeless tobacco: Never Used     Tobacco comment: quit 35 years ago   Substance Use Topics     Alcohol use: No     Alcohol/week: 0.0 standard drinks     History   Sexual Activity     Sexual activity: Not Currently     Partners: Female     Birth control/ protection: Abstinence            Current Medications:       amLODIPine-benazepril  1 capsule Oral BID     atorvastatin  80 mg Oral QPM     buPROPion  200 mg Oral BID     carvedilol  25 mg Oral BID w/meals     docusate sodium  200 mg Oral BID     fentaNYL  75 mcg Transdermal Q48H     fentaNYL   Transdermal Q8H     polyethylene glycol   17 g Oral Daily     [START ON 1/30/2020] sennosides  4 tablet Oral Daily     sennosides  4 tablet Oral QPM     sodium chloride (PF)  10 mL Intracatheter Once     sodium chloride (PF)  3 mL Intracatheter Q8H            Allergies:     Allergies   Allergen Reactions     Ciprofloxacin      History of aortic aneurysms     Tape [Adhesive Tape] Blisters     Blistering - please use paper tape            Physical Exam:   Vitals were reviewed  Patient Vitals for the past 24 hrs:   BP Temp Temp src Pulse Heart Rate Resp SpO2 Weight   01/29/20 1112 101/64 98.6  F (37  C) Oral -- 67 16 94 % --   01/29/20 0750 130/76 98.7  F (37.1  C) Oral -- 74 16 98 % --   01/29/20 0349 121/67 98.4  F (36.9  C) Oral -- 80 16 96 % --   01/29/20 0017 96/70 98.1  F (36.7  C) Oral -- 72 16 94 % --   01/28/20 2209 -- -- -- -- -- -- -- 85.2 kg (187 lb 12.8 oz)   01/28/20 2100 131/77 97.8  F (36.6  C) Oral -- 77 16 98 % --   01/28/20 2015 111/69 -- -- 81 82 15 95 % --   01/28/20 2000 113/69 -- -- 82 81 12 97 % --   01/28/20 1945 119/70 -- -- 84 84 12 94 % --   01/28/20 1930 115/66 -- -- 85 86 14 95 % --   01/28/20 1915 118/71 -- -- 85 83 11 96 % --   01/28/20 1900 113/69 -- -- 85 86 14 95 % --   01/28/20 1845 115/70 -- -- 87 87 13 94 % --   01/28/20 1830 115/69 -- -- 90 93 12 96 % --   01/28/20 1815 115/70 -- -- 90 91 14 98 % --   01/28/20 1800 116/72 -- -- 89 88 12 95 % --   01/28/20 1700 (!) 148/82 -- -- 91 -- -- -- --   01/28/20 1600 120/78 -- -- 87 91 -- -- --   01/28/20 1500 126/82 -- -- 90 82 -- -- --   01/28/20 1430 (!) 150/73 -- -- 86 86 25 -- --   01/28/20 1415 (!) 141/83 -- -- 82 84 14 -- --   01/28/20 1400 125/69 -- -- 83 82 9 -- --   01/28/20 1345 126/74 -- -- 83 80 12 -- --   01/28/20 1315 107/70 -- -- 77 78 8 96 % --   01/28/20 1300 108/65 -- -- 77 78 12 96 % --   01/28/20 1245 108/75 -- -- 74 76 15 100 % --       Physical Examination:  GENERAL:  well-developed, well-nourished, in bed in no acute distress.   HEENT:  Head is  normocephalic, atraumatic   EYES:  Eyes have anicteric sclerae without conjunctival injection or stigmata of endocarditis.    ENT:  Oropharynx is moist without exudates or ulcers. Tongue is midline  NECK:  Supple. No cervical lymphadenopathy  LUNGS:  Clear to auscultation bilateral.   CARDIOVASCULAR:  Regular rate and rhythm with no murmurs, gallops or rubs.  ABDOMEN:  Normal bowel sounds, soft, nontender. No appreciable hepatosplenomegaly.  MSK: Bilateral shoulder incisions appear clean, dry and intact. He has no evidence of helena cellulitis, erythema or induration. No joint effusions noted on exam. He has good ROM w/o pain or significant restrictions.   SKIN:  No acute rashes.  Line(s) are in place without any surrounding erythema or exudate. No stigmata of endocarditis.  NEUROLOGIC:  Grossly nonfocal. Active x4 extremities         Laboratory Data:     Inflammatory Markers    Recent Labs   Lab Test 01/15/20  1028 12/02/19  1248 11/12/19  1044 11/07/19  1545   SED 25* 57* 91*  --    CRP <2.9 3.4 98.0* 17.5*       Hematology Studies    Recent Labs   Lab Test 01/15/20  1028 12/02/19  1248 11/18/19  0623 11/15/19  1517 11/14/19  0709 11/13/19  0648 11/12/19  1044 11/07/19  1545 01/10/18  0808   WBC 7.8 8.0 9.5  --  7.1 9.6 9.2 6.6 6.8   ANEU 5.6 5.7  --   --   --  6.5 6.8 4.1 4.4   AEOS 0.1 0.2  --   --   --  0.2 0.1 0.3 0.2   HGB 12.5* 9.1* 9.5* 9.1* 9.2* 9.5* 9.6* 8.7* 15.5   MCV 89 93 89  --  90 90 93 91 88    266 346  --  260 290 267 245 250       Metabolic Studies     Recent Labs   Lab Test 01/29/20  0547 01/28/20  1300 12/10/19 12/02/19  1248 11/18/19  0623 11/14/19  0709    136  --  136 133 136   POTASSIUM 4.3 4.4 5.2* 5.0 4.5 4.2   CHLORIDE 106 108  --  108 101 103   CO2 25 26  --  23 24 26   BUN 21 22  --  20 15 13   CR 1.01 1.18 1.13 0.92 0.89 0.91   GFRESTIMATED 81 67 >60 >90 >90 >90       Hepatic Studies    Recent Labs   Lab Test 12/10/19 12/02/19  1248 11/12/19  1044 11/07/19  1543  01/10/18  0808 03/01/17  1013 10/26/16  0804 10/11/16  0937   BILITOTAL  --  0.4 0.8  --  1.0 1.2 0.7 1.0   ALKPHOS  --  83 105 103 90 117 119 178*   ALBUMIN  --  3.3* 3.4  --  4.4 4.2 3.2* 3.0*   AST 17 13 16 13 20 21 20 86*   ALT <9 8 10 7 23 28 36 117*       Microbiology:  Reviewed previous Culture data    Tissue Cultures (1/28)  Aerobic Tissue Cx specimen 1  Aerobic Tissue Cx specimen 2  Aerobic Tissue Cx specimen 3  Aerobic Tissue Cx specimen 4  Aerobic Tissue Cx specimen 5    Anaerobic Tissue Cx specimen 1  Anaerobic Tissue Cx specimen 2  Anaerobic Tissue Cx specimen 3  Anaerobic Tissue Cx specimen 4  Anaerobic Tissue Cx specimen 5    Blood Cultures:  1/29 NGTD  11/13 NG  9/20--MSSA isolated from BCx-  9/21--MSSA isolated from BCx-  9/22 --MSSA isolated from BCx-

## 2020-01-29 NOTE — PLAN OF CARE
Observation Goals     Will be monitored on telemetry overnight: NSR, troponin trending up at 3.120    Will have blood pressure under better control SBP <140, DBP <90: met, 121/67, MAP 82

## 2020-01-30 LAB
ANION GAP SERPL CALCULATED.3IONS-SCNC: 5 MMOL/L (ref 3–14)
BUN SERPL-MCNC: 23 MG/DL (ref 7–30)
CALCIUM SERPL-MCNC: 9.5 MG/DL (ref 8.5–10.1)
CHLORIDE SERPL-SCNC: 105 MMOL/L (ref 94–109)
CO2 SERPL-SCNC: 27 MMOL/L (ref 20–32)
CREAT SERPL-MCNC: 1.05 MG/DL (ref 0.66–1.25)
CRP SERPL-MCNC: <2.9 MG/L (ref 0–8)
ERYTHROCYTE [DISTWIDTH] IN BLOOD BY AUTOMATED COUNT: 12.8 % (ref 10–15)
GFR SERPL CREATININE-BSD FRML MDRD: 77 ML/MIN/{1.73_M2}
GLUCOSE SERPL-MCNC: 93 MG/DL (ref 70–99)
HCT VFR BLD AUTO: 38.7 % (ref 40–53)
HGB BLD-MCNC: 12.7 G/DL (ref 13.3–17.7)
MCH RBC QN AUTO: 28.5 PG (ref 26.5–33)
MCHC RBC AUTO-ENTMCNC: 32.8 G/DL (ref 31.5–36.5)
MCV RBC AUTO: 87 FL (ref 78–100)
PLATELET # BLD AUTO: 200 10E9/L (ref 150–450)
POTASSIUM SERPL-SCNC: 4.1 MMOL/L (ref 3.4–5.3)
RBC # BLD AUTO: 4.46 10E12/L (ref 4.4–5.9)
SODIUM SERPL-SCNC: 137 MMOL/L (ref 133–144)
WBC # BLD AUTO: 6.4 10E9/L (ref 4–11)

## 2020-01-30 PROCEDURE — 40000802 ZZH SITE CHECK

## 2020-01-30 PROCEDURE — 99207 ZZC NO CHARGE LOS: CPT | Performed by: INTERNAL MEDICINE

## 2020-01-30 PROCEDURE — 25000128 H RX IP 250 OP 636: Performed by: INTERNAL MEDICINE

## 2020-01-30 PROCEDURE — 99222 1ST HOSP IP/OBS MODERATE 55: CPT | Performed by: INTERNAL MEDICINE

## 2020-01-30 PROCEDURE — 99152 MOD SED SAME PHYS/QHP 5/>YRS: CPT | Performed by: INTERNAL MEDICINE

## 2020-01-30 PROCEDURE — 99233 SBSQ HOSP IP/OBS HIGH 50: CPT | Performed by: INTERNAL MEDICINE

## 2020-01-30 PROCEDURE — 93454 CORONARY ARTERY ANGIO S&I: CPT | Mod: 26 | Performed by: INTERNAL MEDICINE

## 2020-01-30 PROCEDURE — 25000132 ZZH RX MED GY IP 250 OP 250 PS 637: Performed by: PHYSICIAN ASSISTANT

## 2020-01-30 PROCEDURE — C1887 CATHETER, GUIDING: HCPCS | Performed by: INTERNAL MEDICINE

## 2020-01-30 PROCEDURE — 25000132 ZZH RX MED GY IP 250 OP 250 PS 637: Performed by: STUDENT IN AN ORGANIZED HEALTH CARE EDUCATION/TRAINING PROGRAM

## 2020-01-30 PROCEDURE — C1769 GUIDE WIRE: HCPCS | Performed by: INTERNAL MEDICINE

## 2020-01-30 PROCEDURE — 25000125 ZZHC RX 250: Performed by: INTERNAL MEDICINE

## 2020-01-30 PROCEDURE — C1894 INTRO/SHEATH, NON-LASER: HCPCS | Performed by: INTERNAL MEDICINE

## 2020-01-30 PROCEDURE — 85027 COMPLETE CBC AUTOMATED: CPT | Performed by: PHYSICIAN ASSISTANT

## 2020-01-30 PROCEDURE — 93454 CORONARY ARTERY ANGIO S&I: CPT | Performed by: INTERNAL MEDICINE

## 2020-01-30 PROCEDURE — 27210794 ZZH OR GENERAL SUPPLY STERILE: Performed by: INTERNAL MEDICINE

## 2020-01-30 PROCEDURE — 27210762 ZZH DEVICE SUTURELESS SECUREMENT EA CR2: Performed by: INTERNAL MEDICINE

## 2020-01-30 PROCEDURE — 36415 COLL VENOUS BLD VENIPUNCTURE: CPT | Performed by: PHYSICIAN ASSISTANT

## 2020-01-30 PROCEDURE — 40000141 ZZH STATISTIC PERIPHERAL IV START W/O US GUIDANCE

## 2020-01-30 PROCEDURE — 99153 MOD SED SAME PHYS/QHP EA: CPT | Performed by: INTERNAL MEDICINE

## 2020-01-30 PROCEDURE — 86140 C-REACTIVE PROTEIN: CPT | Performed by: PHYSICIAN ASSISTANT

## 2020-01-30 PROCEDURE — B2111ZZ FLUOROSCOPY OF MULTIPLE CORONARY ARTERIES USING LOW OSMOLAR CONTRAST: ICD-10-PCS | Performed by: INTERNAL MEDICINE

## 2020-01-30 PROCEDURE — 80048 BASIC METABOLIC PNL TOTAL CA: CPT | Performed by: PHYSICIAN ASSISTANT

## 2020-01-30 PROCEDURE — 21400000 ZZH R&B CCU UMMC

## 2020-01-30 PROCEDURE — 25000132 ZZH RX MED GY IP 250 OP 250 PS 637: Performed by: INTERNAL MEDICINE

## 2020-01-30 RX ORDER — ASPIRIN 325 MG
325 TABLET ORAL ONCE
Status: DISCONTINUED | OUTPATIENT
Start: 2020-01-30 | End: 2020-01-30

## 2020-01-30 RX ORDER — ASPIRIN 81 MG/1
81 TABLET, CHEWABLE ORAL DAILY
Status: DISCONTINUED | OUTPATIENT
Start: 2020-01-31 | End: 2020-01-31 | Stop reason: HOSPADM

## 2020-01-30 RX ORDER — FLUMAZENIL 0.1 MG/ML
0.2 INJECTION, SOLUTION INTRAVENOUS
Status: ACTIVE | OUTPATIENT
Start: 2020-01-30 | End: 2020-01-31

## 2020-01-30 RX ORDER — ASPIRIN 325 MG
325 TABLET ORAL ONCE
Status: COMPLETED | OUTPATIENT
Start: 2020-01-30 | End: 2020-01-30

## 2020-01-30 RX ORDER — LOSARTAN POTASSIUM 25 MG/1
25 TABLET ORAL DAILY
Status: DISCONTINUED | OUTPATIENT
Start: 2020-01-30 | End: 2020-01-31

## 2020-01-30 RX ORDER — HEPARIN SODIUM 1000 [USP'U]/ML
INJECTION, SOLUTION INTRAVENOUS; SUBCUTANEOUS
Status: DISCONTINUED | OUTPATIENT
Start: 2020-01-30 | End: 2020-01-30 | Stop reason: HOSPADM

## 2020-01-30 RX ORDER — FENTANYL CITRATE 50 UG/ML
25-50 INJECTION, SOLUTION INTRAMUSCULAR; INTRAVENOUS
Status: ACTIVE | OUTPATIENT
Start: 2020-01-30 | End: 2020-01-31

## 2020-01-30 RX ORDER — SPIRONOLACTONE 25 MG
12.5 TABLET ORAL DAILY
Status: DISCONTINUED | OUTPATIENT
Start: 2020-01-30 | End: 2020-01-31

## 2020-01-30 RX ORDER — NITROGLYCERIN 5 MG/ML
VIAL (ML) INTRAVENOUS
Status: DISCONTINUED | OUTPATIENT
Start: 2020-01-30 | End: 2020-01-30 | Stop reason: HOSPADM

## 2020-01-30 RX ORDER — ATROPINE SULFATE 0.1 MG/ML
0.5 INJECTION INTRAVENOUS EVERY 5 MIN PRN
Status: ACTIVE | OUTPATIENT
Start: 2020-01-30 | End: 2020-01-31

## 2020-01-30 RX ORDER — NALOXONE HYDROCHLORIDE 0.4 MG/ML
.1-.4 INJECTION, SOLUTION INTRAMUSCULAR; INTRAVENOUS; SUBCUTANEOUS
Status: DISCONTINUED | OUTPATIENT
Start: 2020-01-30 | End: 2020-01-31 | Stop reason: HOSPADM

## 2020-01-30 RX ORDER — NALOXONE HYDROCHLORIDE 0.4 MG/ML
.2-.4 INJECTION, SOLUTION INTRAMUSCULAR; INTRAVENOUS; SUBCUTANEOUS
Status: ACTIVE | OUTPATIENT
Start: 2020-01-30 | End: 2020-01-31

## 2020-01-30 RX ORDER — IOPAMIDOL 755 MG/ML
INJECTION, SOLUTION INTRAVASCULAR
Status: DISCONTINUED | OUTPATIENT
Start: 2020-01-30 | End: 2020-01-30 | Stop reason: HOSPADM

## 2020-01-30 RX ORDER — FENTANYL CITRATE 50 UG/ML
INJECTION, SOLUTION INTRAMUSCULAR; INTRAVENOUS
Status: DISCONTINUED | OUTPATIENT
Start: 2020-01-30 | End: 2020-01-30 | Stop reason: HOSPADM

## 2020-01-30 RX ORDER — NICARDIPINE HYDROCHLORIDE 2.5 MG/ML
INJECTION INTRAVENOUS
Status: DISCONTINUED | OUTPATIENT
Start: 2020-01-30 | End: 2020-01-30 | Stop reason: HOSPADM

## 2020-01-30 RX ADMIN — AMLODIPINE BESYLATE AND BENAZEPRIL HYDROCHLORIDE 1 CAPSULE: 5; 20 CAPSULE ORAL at 05:46

## 2020-01-30 RX ADMIN — CARVEDILOL 25 MG: 25 TABLET, FILM COATED ORAL at 05:47

## 2020-01-30 RX ADMIN — CARVEDILOL 25 MG: 25 TABLET, FILM COATED ORAL at 16:12

## 2020-01-30 RX ADMIN — OXYCODONE HYDROCHLORIDE 10 MG: 5 TABLET ORAL at 11:52

## 2020-01-30 RX ADMIN — ASPIRIN 325 MG ORAL TABLET 325 MG: 325 PILL ORAL at 13:59

## 2020-01-30 RX ADMIN — ACETAMINOPHEN 650 MG: 325 TABLET, FILM COATED ORAL at 21:02

## 2020-01-30 RX ADMIN — ATORVASTATIN CALCIUM 80 MG: 80 TABLET, FILM COATED ORAL at 16:12

## 2020-01-30 RX ADMIN — ACETAMINOPHEN 650 MG: 325 TABLET, FILM COATED ORAL at 16:09

## 2020-01-30 RX ADMIN — SENNOSIDES 4 TABLET: 8.6 TABLET, FILM COATED ORAL at 05:47

## 2020-01-30 RX ADMIN — CYCLOBENZAPRINE HYDROCHLORIDE 10 MG: 10 TABLET, FILM COATED ORAL at 11:53

## 2020-01-30 RX ADMIN — Medication 12.5 MG: at 07:44

## 2020-01-30 RX ADMIN — DOCUSATE SODIUM 200 MG: 100 CAPSULE, LIQUID FILLED ORAL at 05:47

## 2020-01-30 RX ADMIN — CLONAZEPAM 0.5 MG: 0.5 TABLET ORAL at 21:28

## 2020-01-30 RX ADMIN — OXYCODONE HYDROCHLORIDE 10 MG: 5 TABLET ORAL at 21:02

## 2020-01-30 RX ADMIN — ACETAMINOPHEN 650 MG: 325 TABLET, FILM COATED ORAL at 02:43

## 2020-01-30 RX ADMIN — BUPROPION HYDROCHLORIDE 200 MG: 200 TABLET, EXTENDED RELEASE ORAL at 16:12

## 2020-01-30 RX ADMIN — SENNOSIDES 4 TABLET: 8.6 TABLET, FILM COATED ORAL at 16:12

## 2020-01-30 RX ADMIN — OXYCODONE HYDROCHLORIDE 10 MG: 5 TABLET ORAL at 02:43

## 2020-01-30 RX ADMIN — OXYCODONE HYDROCHLORIDE 10 MG: 5 TABLET ORAL at 16:09

## 2020-01-30 RX ADMIN — LOSARTAN POTASSIUM 25 MG: 25 TABLET, FILM COATED ORAL at 19:08

## 2020-01-30 RX ADMIN — OXYCODONE HYDROCHLORIDE 10 MG: 5 TABLET ORAL at 06:45

## 2020-01-30 RX ADMIN — AMLODIPINE BESYLATE AND BENAZEPRIL HYDROCHLORIDE 1 CAPSULE: 5; 20 CAPSULE ORAL at 16:12

## 2020-01-30 RX ADMIN — CYCLOBENZAPRINE HYDROCHLORIDE 10 MG: 10 TABLET, FILM COATED ORAL at 16:10

## 2020-01-30 RX ADMIN — ACETAMINOPHEN 650 MG: 325 TABLET, FILM COATED ORAL at 11:52

## 2020-01-30 RX ADMIN — BUPROPION HYDROCHLORIDE 200 MG: 200 TABLET, EXTENDED RELEASE ORAL at 05:47

## 2020-01-30 RX ADMIN — DOCUSATE SODIUM 200 MG: 100 CAPSULE, LIQUID FILLED ORAL at 16:12

## 2020-01-30 RX ADMIN — ACETAMINOPHEN 650 MG: 325 TABLET, FILM COATED ORAL at 06:45

## 2020-01-30 RX ADMIN — CYCLOBENZAPRINE HYDROCHLORIDE 10 MG: 10 TABLET, FILM COATED ORAL at 07:44

## 2020-01-30 ASSESSMENT — ACTIVITIES OF DAILY LIVING (ADL)
ADLS_ACUITY_SCORE: 12

## 2020-01-30 ASSESSMENT — PAIN DESCRIPTION - DESCRIPTORS
DESCRIPTORS: ACHING
DESCRIPTORS: ACHING

## 2020-01-30 NOTE — PROGRESS NOTES
[facility]   Cardiology Consults  Progress Note        Interval History:  - no chest pain, dyspnea, nausea, diaphoresis overnight. Remains asymptomatic this AM  - discussed case with ID and dentistry. No immediate concerns regarding angiogram. Will need to follow up in clinic with both specialties after discharge    Physical Exam:  Temp:  [97.3  F (36.3  C)-98.8  F (37.1  C)] 98.8  F (37.1  C)  Heart Rate:  [57-67] 67  Resp:  [16] 16  BP: ()/(61-81) 100/62  SpO2:  [94 %-99 %] 96 %      Intake/Output Summary (Last 24 hours) at 1/30/2020 0927  Last data filed at 1/29/2020 2300  Gross per 24 hour   Intake 1000 ml   Output 1000 ml   Net 0 ml         Wt:   Wt Readings from Last 5 Encounters:   01/30/20 82.7 kg (182 lb 4.8 oz)   01/28/20 83.9 kg (185 lb)   01/15/20 84.3 kg (185 lb 12.8 oz)   12/11/19 91.7 kg (202 lb 1.6 oz)   11/19/19 87 kg (191 lb 12.8 oz)       GEN: NAD, awake, alert  Pulm: CTAB, no wheeze, no rhonchi  Cardiac: JVP not appreciably elevated, no murmurs, RRR  Vascular: no lower extremity edema and palpable pulses  GI: soft, non distended  Neuro: CN II-XII grossly intact    Medications:    amLODIPine-benazepril  1 capsule Oral BID     atorvastatin  80 mg Oral QPM     buPROPion  200 mg Oral BID     carvedilol  25 mg Oral BID w/meals     docusate sodium  100 mg Oral BID     docusate sodium  200 mg Oral BID     fentaNYL  75 mcg Transdermal Q48H     fentaNYL   Transdermal Q8H     polyethylene glycol  17 g Oral Daily     sennosides  4 tablet Oral Daily     sennosides  4 tablet Oral QPM     sodium chloride (PF)  10 mL Intracatheter Once     sodium chloride (PF)  3 mL Intracatheter Q8H     spironolactone  12.5 mg Oral Daily       - MEDICATION INSTRUCTIONS -         Labs:   [facility]  Recent Labs   Lab 01/30/20  0557 01/29/20  0547 01/28/20  1300    136 136   POTASSIUM 4.1 4.3 4.4   CHLORIDE 105 106 108   CO2 27 25 26   ANIONGAP 5 6 3   GLC 93 105* 106*   BUN 23 21 22   CR  1.05 1.01 1.18   GFRESTIMATED 77 81 67   GFRESTBLACK 90 >90 78   NIA 9.5 9.2 9.1   MAG  --   --  2.1     CBC  Recent Labs   Lab 01/30/20  0557   WBC 6.4   RBC 4.46   HGB 12.7*   HCT 38.7*   MCV 87   MCH 28.5   MCHC 32.8   RDW 12.8        INR  Recent Labs   Lab 01/29/20  1025   INR 1.12     Arterial Blood GasNo lab results found in last 7 days.    Diagnostics:  Echocardiogram 1/28/2020  Interpretation Summary  Moderately (EF 30-35%) reduced left ventricular function is present.There is  wall thinning and severe hypokinesis of the mid-distal anteroseptal, basal-mid  anterior and distal inferior segments concerning for coronary disease in the  LAD territory. There is no thrombus.  Global right ventricular function is mildly reduced.  No pericardial effusion is present.  _____________________________________________________________________________     Left Ventricle  Mild left ventricular dilation is present. Moderately (EF 30-35%) reduced left  ventricular function is present. Moderate diffuse hypokinesis is present.  There is wall thinning and severe hypokinesis of the mid-distal anteroseptal,  basal-mid anterior and distal inferior segments concerning for coronary  disease in the LAD territory. There is no thrombus.     Right Ventricle  The right ventricle is normal size. Global right ventricular function is  mildly reduced.     Mitral Valve  The mitral valve is normal.     Aortic Valve  The aortic valve is bicuspid. Mild aortic valve calcification is present. No  significant stenosis or regurgitation.     Tricuspid Valve  The tricuspid valve is normal.        Vessels  The inferior vena cava cannot be assessed.     Pericardium  No pericardial effusion is present.     Compared to Previous Study  This study was compared with the study from 4.10.19 . LV function has declined  and the wall motion abnormalities are new.      EKG 1/28/2020: NSR      ASSESSMENT/PLAN:  Kobe Henriquez is a 60 year old male with a  past medical history significant for HTN, HLD, prior smoking history, bicuspid AV valve s/p valve sparing repair with Gelweave graft 2016, and bilateral shoulder replacement c/b MSSA s/p washout and 6 weeks of cefazolin who is admitted for treatment of hypertensive emergency and evaluation of NSVT.     #Hypertensive emergency  ##NSVT  ##History of ascending aortic aneurysm s/p repair 2016  RF: HLD, HTN, brief history of smoking  Noted to have ~4 minutes of profound hypertension during shoulder biopsy day of admission. EKG revealed NSR. Troponin was checked and found to be elevated at 1.64. Patient had a coronary angiogram prior to his aorta graft surgery in 2016 which demonstrated minimal CAD (25% stenosis of LCx, 25% D1). Echocardiogram 4/2019 demonstrated normal LVEF with known bicuspid AV valve. Given his lack of ACS symptoms, suspect this troponin elevation is demand in the setting of hypertensive emergency likely triggered by severe pain. Interestingly his TTE this admission with newly reduced LVEF and wall thinning of the LAD territory - if ischemic, unclear when this event would have occured. Does think that he has been more dyspneic since his admission in September for MSSA bacteremia. XIOMY during that admission was noted to have an LVEF of 60%. Suspect this is an old injury, and may benefit from ICD given he is less likely to have substantial recovery of EF with revascularization. ID and dentistry have no immediate concerns regarding angiogram.  - NPO, angiogram today  - trop peak 3.1  - 325 ASA now for angiogram  - resume carvedilol 25mg bid  - resume amlodipine-benazapril 5-20mg bid  - resume simvastatin 10mg  - start spironolactone 12.5mg daily. Increase as tolerated  - EP consulted, appreciate assistance     #Multiple tooth abscesses   Dental consulted due to history of bacteremia and impending angiogram. CT dental with Small periapical abscesses associated with tooth #19 and tooth #29. Additionally,  caries of tooth #19. D/w dental team, and recommend he follow up in clinic when he is out of hospital.   - dental referral at discharge    #History of b/l total shoulders  ##History of right shoulder MSSA septic arthritis c/b MSSA bacteremia  ##Chronic pain  Not currently on antibiotics. No signs/sx of infection at this time. CTM  -PTA oxycodone 5-10mg q4h prn  - PTA fentanyl patch 75 mcg q72h  - ID consulted, appreciate input. No concern for active infection at this time, no indication to hold off on angiogram. Patient already scheduled to see them in clinic       #History of NEMESIO requiring dialysis x2  NEMESIO in setting of prolonged cefazolin treatment. Creatinine 1.18 today.   - avoid nephrotoxic agents as able     #Anxiety  - PTA klonipin 0.1mg tid prn     FEN: NPO for procedure, then 2g sodium  Prophylaxis:  DVT: PCDs.   Disposition: Admission to [facility] 1.   Patient seen and discussed with Dr. Stafford, who agrees with above plan.      Christopher Orellana PA-C  [facility] Cardiology Team

## 2020-01-30 NOTE — CONSULTS
"Dental Service Consultation        Kobe Buchanan MRN# [id number]   YOB: 1960 Age: 60 year old   Date of Admission: 1/28/2020     Reason for consult: I was asked by Dr. Jonathan Sanchez to evaluate this patient for Oral issues prior to cardiovascular sx.           Assessment and Plan:   Assessment: Patient has high dental IQ and is well aware of his oral condition. Pt presents with #19 severely broken down and decayed with radiographic infection/PARL. Pt presents with #29-31 bridge with #29 having evidence of chronic infection/PARL - patient says lesion has been present since the bridge got put on 5 years ago, but has never bothered him. Complete denture on maxilla is fitting well and not causing problems. No other apparent issues with remaining mandibular dentition. Pt says he has appointment to visit [facility] next Tuesday for extraction of bridge + #19.     Plan: No plan necessary as patient has appointment at provider to get necessary treatment.              Chief Complaint:   \"I know I have a problem with #19 and my bridge. I have an appointment next Tuesday at [facility].\"    History is obtained from the patient         History of Present Illness:   This patient is a 60 year old male who presents to [address]            Past Medical History:     Past Medical History:   Diagnosis Date     Anxiety      Ascending aortic aneurysm (H)      Bicuspid aortic valve      BPPV (benign paroxysmal positional vertigo) 7/11/2014     Chronic narcotic use      Chronic neck pain      Chronic osteoarthritis      Degenerative joint disease      Depression      Hyperlipidemia 4/10/2012     Hypertension      Multiple stiff joints      Neck injuries      Obesity 2/9/2015     Skin picking habit      Sleep apnea     does not use cpap             Past Surgical History:     Past Surgical History:   Procedure Laterality Date     ARTHROPLASTY SHOULDER  4/15/2014    Procedure: Left Total Shoulder Arthroplasty ; "  Surgeon: Analilia Aceves MD;  [facility]     ARTHROPLASTY SHOULDER Right 8/26/2014    Procedure: ARTHROPLASTY SHOULDER;  Surgeon: Analilia Aceves MD;  [facility]     ARTHROSCOPY SHOULDER WITH BIOPSY(IES) Left 1/28/2020    Procedure: Left shoulder arthroscopy and biopsy for culture;  Surgeon: Analilia Aceves MD;  [facility]     BYPASS GASTRIC TAVO-EN-Y, LIVER BIOPSY, COMBINED  8/8/2005     C HAND/FINGER SURGERY UNLISTED       C SHOULDER SURG PROC UNLISTED       COLONOSCOPY  6/30/2014    Procedure: COMBINED COLONOSCOPY, SINGLE BIOPSY/POLYPECTOMY BY BIOPSY;  Surgeon: Chester Patton MD;  [facility]     CYSTOSCOPY, BLADDER NECK CUTS, COMBINED N/A 7/18/2016    Procedure: COMBINED CYSTOSCOPY, BLADDER NECK CUTS;  Surgeon: Ritu Leslie MD;  [facility]     ESOPHAGOSCOPY, GASTROSCOPY, DUODENOSCOPY (EGD), COMBINED  6/30/2014    Procedure: COMBINED ESOPHAGOSCOPY, GASTROSCOPY, DUODENOSCOPY (EGD), BIOPSY SINGLE OR MULTIPLE;  Surgeon: Chester Patton MD;  Location: [facility]     EXCISE MASS FINGER  6/14/2011    Procedure:EXCISE MASS FINGER; Middle Flexor Cyst; Surgeon:SHAYY RUSSELL; Location:[facility]     HAND SURGERY      excision of tendon cyst on left hand     [facility] VASCULAR SURGERY PROCEDURE UNLIST       IR FINE NEEDLE ASPIRATION W ULTRASOUND  11/13/2019     IR PICC PLACEMENT > 5 YRS OF AGE  11/19/2019     LASER HOLMIUM LITHOTRIPSY BLADDER N/A 10/15/2014    Procedure: LASER HOLMIUM LITHOTRIPSY BLADDER;  Surgeon: Sahil Taveras MD;  [facility]     LASER KTP GREEN LIGHT PHOTOSELECTIVE VAPORIZATION PROSTATE  1/23/2014    Procedure: LASER KTP GREEN LIGHT PHOTOSELECTIVE VAPORIZATION PROSTATE;  Greenlight Photovaporization Of Prostate  ;  Surgeon: Sahil Taveras MD;  [facility]: [facility]     RELEASE TRIGGER FINGER Right 3/31/2017    Procedure: RELEASE TRIGGER FINGER;  Surgeon: Juan Carlos Blunt MD;  [facility]     REMOVE HARDWARE  ARTHROPLASTY SHOULDER. I&D, PLACE ANTIBIOTIC CEMENT BE Left 11/15/2019    Procedure: Explantation of left total shoulder arthroplasty, irrigation and debridement, and placement of antibiotic spacer;  Surgeon: Analilia Aceves MD;  [facility]     REPAIR ANEURYSM ASCENDING AORTA N/A 10/4/2016    Procedure: REPAIR ANEURYSM ASCENDING AORTA;  Surgeon: Mckenzie Townsend MD;  Location: [facility]     TURP                 Social History:     Social History     Tobacco Use     Smoking status: Former Smoker     Packs/day: 0.50     Years: 6.00     Pack years: 3.00     Types: Cigarettes     Start date: 1977     Last attempt to quit: 1983     Years since quittin.4     Smokeless tobacco: Never Used     Tobacco comment: quit 35 years ago   Substance Use Topics     Alcohol use: No     Alcohol/week: 0.0 standard drinks             Family History:     Family History   Problem Relation Age of Onset     Arthritis Other      Gastrointestinal Disease Other      Cardiovascular Father         aortic aneurysm     Arrhythmia Father      Nephrolithiasis Father      Sleep Apnea Father      Anxiety Disorder Father      Depression Father      Hypertension Father      Obesity Father      Hyperlipidemia Father      Coronary Artery Disease Father         history of MI and stent     Low Back Problems Father      Spine Problems Father      Anxiety Disorder Mother      Hypertension Mother      Osteoporosis Mother      Obesity Mother      Hyperlipidemia Mother      Low Back Problems Mother      Anxiety Disorder Sister      Hypertension Sister      Osteoporosis Sister      Obesity Sister      Hyperlipidemia Sister      Low Back Problems Sister      Spine Problems Sister      Anxiety Disorder Sister      Depression Sister      Hypertension Sister      Osteoporosis Sister      Obesity Sister      Hyperlipidemia Sister      Low Back Problems Sister              Immunizations:     Immunization History   Administered Date(s)  Administered     DT (PEDS <7y) 02/04/2004     Influenza (H1N1) 12/29/2009     Influenza (IIV3) PF 10/25/1993, 10/20/1998, 10/05/1999, 11/26/2001, 10/20/2003, 10/20/2004, 11/05/2005, 09/01/2010, 10/18/2011, 10/02/2012, 10/01/2013, 10/06/2014, 10/26/2016, 10/02/2017     Influenza Vaccine IM > 6 months Valent IIV4 11/05/2019     MMR 08/30/1994     Pneumococcal 23 valent 10/25/1993     TD (ADULT, 7+) 10/25/1993, 02/04/2004     TDAP Vaccine (Boostrix) 04/02/2014             Allergies:     Allergies   Allergen Reactions     Ciprofloxacin      History of aortic aneurysms     Tape [Adhesive Tape] Blisters     Blistering - please use paper tape             Medications:     Current Facility-Administered Medications Ordered in Epic   Medication Dose Route Frequency Last Rate Last Dose     acetaminophen (TYLENOL) Suppository 650 mg  650 mg Rectal Q4H PRN         acetaminophen (TYLENOL) tablet 650 mg  650 mg Oral Q4H PRN   650 mg at 01/30/20 1152     alum & mag hydroxide-simethicone (MYLANTA ES/MAALOX  ES) suspension 30 mL  30 mL Oral Q4H PRN         amLODIPine-benazepril (LOTREL) 5-20 MG per capsule 1 capsule  1 capsule Oral BID   1 capsule at 01/30/20 0546     atorvastatin (LIPITOR) tablet 80 mg  80 mg Oral QPM   80 mg at 01/29/20 1748     bisacodyl (DULCOLAX) EC tablet 5 mg  5 mg Oral Daily PRN   5 mg at 01/28/20 2035     buPROPion (WELLBUTRIN SR) 12 hr tablet 200 mg  200 mg Oral BID   200 mg at 01/30/20 0547     carvedilol (COREG) tablet 25 mg  25 mg Oral BID w/meals   25 mg at 01/30/20 0547     clonazePAM (klonoPIN) tablet 0.5 mg  0.5 mg Oral TID PRN   0.5 mg at 01/29/20 1801     cyclobenzaprine (FLEXERIL) tablet 10 mg  10 mg Oral TID PRN   10 mg at 01/30/20 1153     docusate sodium (COLACE) capsule 100 mg  100 mg Oral BID         docusate sodium (COLACE) capsule 200 mg  200 mg Oral BID   200 mg at 01/30/20 0547     fentaNYL (DURAGESIC) 75 mcg/hr 72 hr patch 1 patch  75 mcg Transdermal Q48H   1 patch at 01/29/20 1150      fentaNYL (DURAGESIC) Patch in Place   Transdermal Q8H         lidocaine (LMX4) cream   Topical Q1H PRN         lidocaine 1 % 0.1-1 mL  0.1-1 mL Other Q1H PRN         magnesium citrate solution 148 mL  148 mL Oral Once PRN         medication instruction   Does not apply Continuous PRN         nitroGLYcerin (NITROSTAT) sublingual tablet 0.4 mg  0.4 mg Sublingual Q5 Min PRN         oxyCODONE (ROXICODONE) tablet 5-10 mg  5-10 mg Oral Q4H PRN   10 mg at 01/30/20 1152     polyethylene glycol (MIRALAX/GLYCOLAX) Packet 17 g  17 g Oral Daily PRN         polyethylene glycol (MIRALAX/GLYCOLAX) Packet 17 g  17 g Oral Daily         sennosides (SENOKOT) tablet 4 tablet  4 tablet Oral Daily   4 tablet at 01/30/20 0547     sennosides (SENOKOT) tablet 4 tablet  4 tablet Oral QPM   4 tablet at 01/29/20 1748     sodium chloride (PF) 0.9% PF flush 10 mL  10 mL Intracatheter Once         sodium chloride (PF) 0.9% PF flush 3 mL  3 mL Intracatheter q1 min prn         sodium chloride (PF) 0.9% PF flush 3 mL  3 mL Intracatheter Q8H   3 mL at 01/30/20 0746     spironolactone (ALDACTONE) half-tab 12.5 mg  12.5 mg Oral Daily   12.5 mg at 01/30/20 0744     No current [facility]-ordered outpatient medications on file.             Review of Systems:   The 10 point Review of Systems is negative other than noted in the HPI            Physical Exam:   Vitals were reviewed  Temp: 98.8  F (37.1  C) Temp src: Oral BP: 109/66   Heart Rate: 65 Resp: 16 SpO2: 93 % O2 Device: None (Room air)      Head and neck exam: WNL    Intraoral exam: WNL       Data:   Radiographic interpretation: Orthopantomogram taken on 1/29/20 and interpreted on 1/30/20  Osseous pathology: None apparent.  Pulpal Pathology: #19 and #29 infection/PARL  Periodontal Pathology: Mild-moderate gingivitis.  Caries: Active on #19 and #29.  Odontogenic pathology: None apparent.    The patient was discussed with: STEVEN Tellez DDS PGY1  Pager: [phone]

## 2020-01-30 NOTE — PROGRESS NOTES
"S: \"Kobe\" (he/him/his pronouns) was admitted 1/28 after experiencing 10+ beat run of V-tach and hypertensive emergency during L shoulder biopsy at [facility].      B: medical history of HTN, HLD, chronic neck pain, TAA repair, bipolar disorder, atrial fibrillation, SAVITA. DM2 (Diet managed), s/p bilateral shoulder replacements (2014) c/b recent MSSA bacteremia of right septic shoulder (9/2019) s/p washout/explant of shoulder arthoplasty and long-term IV antibiotics (IV cefazolin x6 weeks with PICC removal 11/10/2019) c/b NEMESIO requiring 2 rounds of dialysis (las on 9/24) and s/p left total shoulder arthroplasty, irrigation, debridement and placement of antibiotic spacer (11/15/2019)      A: Monitored vitals and assessed pt status.   Changed: coronary angiogram today, did not require PCI; started daily spironolactone and losartan   Running: none  PRN: oxycodone; tylenol; flexeril  Tele: SR  O2: RA  Mobility: independent     Neuro: A&O x 4, irritable   Cardiac: SR  Respiratory: lung sounds clear and equal  GI/: voiding adequately; last BM  Diet/appetite: NPO  Activity: up independently  Pain: chronic neck pain; L & R shoulder pain; pain somewhat controlled with fentanyl patch and PRNs but pt frustrated with PRN timing (would like oxycodone to be Q3H, team notified again)  Skin: L shoulder dressings CDI  LDAs: R PIV     R: EP consult pending. Continue to monitor Pt status and report changes to treatment team - cards 1.  "

## 2020-01-30 NOTE — CONSULTS
Electrophysiology Consultation Note   EP Attending: .   Reason for consultation: ? ICD.   Provider requesting consultation:  [facility].  Date of Service: 1/30/2020      HPI:   Mr. Buchanan is a 60 year old male who has a past medical history significant for HTN, HLD, BAV with ascending aortic aneurysm s/p valve sparing repair with Gelweave graft 2016, SAVITA (does not use CPAP), BPPV, prior tobacco use, anxiety, and bilateral shoulder replacement c/b MSSA right septic joint requiring washout and IV abx (6 weeks of cefazolin). He was in clinic to have a biopsy of his shoulder to interrogate the joint for infection. He was found to have a 10 beat NSVT run and extreme hypertension up to 226/152. The period of hypertension lasted around 4 minutes. He was sent to the ER for evaluation. His trop came back at 3.1 and echo showed newly reduced LVEF 30-35% with wal thinning and severe hypokinesis of the mid-distal anteroseptal, basal-mid anterior and distal inferior segments. Subsequent coronary angiogram showed normal coronaries. Prior echo from 4/2019 showed normal LVEF and no WMA. Renal function and electrolytes stable. VSS. Current cardiac medications include: Lotrel, Coreg, Lipitor, and Spironolactone.     Past Medical History:   Past Medical History:   Diagnosis Date     Anxiety      Ascending aortic aneurysm (H)      Bicuspid aortic valve      BPPV (benign paroxysmal positional vertigo) 7/11/2014     Chronic narcotic use      Chronic neck pain      Chronic osteoarthritis      Degenerative joint disease      Depression      Hyperlipidemia 4/10/2012     Hypertension      Multiple stiff joints      Neck injuries      Obesity 2/9/2015     Skin picking habit      Sleep apnea     does not use cpap     Past Surgical History:   Past Surgical History:   Procedure Laterality Date     ARTHROPLASTY SHOULDER  4/15/2014    Procedure: Left Total Shoulder Arthroplasty ;  Surgeon: Ketan  Analilia Vaz MD;  [facility]     ARTHROPLASTY SHOULDER Right 8/26/2014    Procedure: ARTHROPLASTY SHOULDER;  Surgeon: Analilia Aceves MD;  [facility]     ARTHROSCOPY SHOULDER WITH BIOPSY(IES) Left 1/28/2020    Procedure: Left shoulder arthroscopy and biopsy for culture;  Surgeon: Analilia Aceves MD;  [facility]     BYPASS GASTRIC TAVO-EN-Y, LIVER BIOPSY, COMBINED  8/8/2005     C HAND/FINGER SURGERY UNLISTED       C SHOULDER SURG PROC UNLISTED       COLONOSCOPY  6/30/2014    Procedure: COMBINED COLONOSCOPY, SINGLE BIOPSY/POLYPECTOMY BY BIOPSY;  Surgeon: Chester Patton MD;  [facility]     CYSTOSCOPY, BLADDER NECK CUTS, COMBINED N/A 7/18/2016    Procedure: COMBINED CYSTOSCOPY, BLADDER NECK CUTS;  Surgeon: Ritu Leslie MD;  [facility]     ESOPHAGOSCOPY, GASTROSCOPY, DUODENOSCOPY (EGD), COMBINED  6/30/2014    Procedure: COMBINED ESOPHAGOSCOPY, GASTROSCOPY, DUODENOSCOPY (EGD), BIOPSY SINGLE OR MULTIPLE;  Surgeon: Chester Patton MD;  [facility]: [facility]     EXCISE MASS FINGER  6/14/2011    Procedure:EXCISE MASS FINGER; Middle Flexor Cyst; Surgeon:SHAYY RUSSELL; [facility]     HAND SURGERY      excision of tendon cyst on left hand     HC VASCULAR SURGERY PROCEDURE UNLIST       IR FINE NEEDLE ASPIRATION W ULTRASOUND  11/13/2019     IR PICC PLACEMENT > 5 YRS OF AGE  11/19/2019     LASER HOLMIUM LITHOTRIPSY BLADDER N/A 10/15/2014    Procedure: LASER HOLMIUM LITHOTRIPSY BLADDER;  Surgeon: Sahil Taveras MD;  [facility]     LASER KTP GREEN LIGHT PHOTOSELECTIVE VAPORIZATION PROSTATE  1/23/2014    Procedure: LASER KTP GREEN LIGHT PHOTOSELECTIVE VAPORIZATION PROSTATE;  Greenlight Photovaporization Of Prostate  ;  Surgeon: Sahil Taveras MD;  [facility]     RELEASE TRIGGER FINGER Right 3/31/2017    Procedure: RELEASE TRIGGER FINGER;  Surgeon: Juan Carlos Blunt MD;  [facility]     REMOVE HARDWARE ARTHROPLASTY SHOULDER.  I&D, PLACE ANTIBIOTIC CEMENT BE Left 11/15/2019    Procedure: Explantation of left total shoulder arthroplasty, irrigation and debridement, and placement of antibiotic spacer;  Surgeon: Analilia Aceves MD;  [facility]     REPAIR ANEURYSM ASCENDING AORTA N/A 10/4/2016    Procedure: REPAIR ANEURYSM ASCENDING AORTA;  Surgeon: Mckenzie Townsend MD;  [facility]     TURP  2014     Allergies: Per MAR     Allergies   Allergen Reactions     Ciprofloxacin      History of aortic aneurysms     Tape [Adhesive Tape] Blisters     Blistering - please use paper tape     Medications:   Per MAR current outpatient cardiovascular medications include:   Medications Prior to Admission   Medication Sig Dispense Refill Last Dose     acetaminophen (TYLENOL) 325 MG tablet Take 2 tablets (650 mg) by mouth every 4 hours as needed for mild pain 100 tablet 0 1/27/2020     amLODIPine-benazepril (LOTREL) 5-20 MG capsule Take 1 capsule by mouth 2 times daily 180 capsule 3 1/28/2020     buPROPion (WELLBUTRIN SR) 200 MG 12 hr tablet TAKE 1 TABLET BY MOUTH 2 TIMES DAILY 180 tablet 3 1/28/2020     carvedilol (COREG) 25 MG tablet Take 1 tablet (25 mg) by mouth 2 times daily (with meals) 180 tablet 3 1/28/2020     clonazePAM (KLONOPIN) 0.5 MG tablet Take 1 tablet (0.5 mg) by mouth 3 times daily as needed for anxiety 90 tablet 5 Past Week     Cyanocobalamin 5000 MCG SUBL Place 5,000 mcg under the tongue daily   1/28/2020     cyclobenzaprine (FLEXERIL) 10 MG tablet Take 1 tablet (10 mg) by mouth 3 times daily as needed for muscle spasms  45 tablet 1 Past Week     docusate sodium (COLACE) 100 MG capsule Take 200 mg by mouth 2 times daily    1/27/2020     Fe Heme Polypeptide-folic acid (PROFERRIN-FORTE) 12-1 MG TABS Take 1 tablet by mouth daily 90 tablet 0 1/28/2020     fentaNYL (DURAGESIC) 75 mcg/hr 72 hr patch Place 1 patch onto the skin every 48 hours    1/27/2020 at 3 am     losartan (COZAAR) 25 MG tablet Take 1 tablet (25 mg) by mouth  daily 90 tablet 1 1/27/2020     multivitamin w/minerals (THERA-VIT-M) tablet Take 1 tablet by mouth daily   1/28/2020     naproxen (NAPROSYN) 500 MG tablet Take 500 mg by mouth 2 times daily as needed for moderate pain   Past Week     oxyCODONE IR (ROXICODONE) 10 MG tablet Take 10 mg by mouth every 4 hours as needed for severe pain Max 6 tablets per day   Past Week     pantoprazole (PROTONIX) 40 MG EC tablet Take 40 mg by mouth daily as needed for heartburn   Past Month     senna (SENOKOT) 8.6 MG tablet Take 3-4 tablets by mouth 2 times daily Take 3 tablets every morning and 4 tablets every evening   1/27/2020     simvastatin (ZOCOR) 10 MG tablet Take 1 tablet (10 mg) by mouth At Bedtime 90 tablet 3 1/27/2020     vitamin B-Complex Take 1 tablet by mouth daily   1/28/2020     amoxicillin (AMOXIL) 500 MG capsule Take 4 tablets 1 hour before dental procedure 24 capsule 4 Past Week at Unknown time     fluocinonide (LIDEX) 0.05 % external ointment Apply twice daily to itchy skin nodules for 1-2 weeks at a time. (Patient taking differently: as needed Apply twice daily to itchy skin nodules for 1-2 weeks at a time.) 30 g 3 More than a month at Unknown time     naloxone (NARCAN) nasal spray Spray 1 spray (4 mg) into one nostril alternating nostrils as needed for opioid reversal every 2-3 minutes until assistance arrives 0.2 mL 0 never     order for DME Walker for cardiac rehab with 4 wheels, brakes and seat (Patient not taking: Reported on 12/11/2019) 1 Device 0 Unknown at Unknown time     tacrolimus (PROTOPIC) 0.1 % ointment Apply topically as needed Apply to affected areas on body. 120 g 11 More than a month at Unknown time     No current outpatient medications on file.     Current Facility-Administered Medications   Medication Dose Route Frequency     amLODIPine-benazepril  1 capsule Oral BID     atorvastatin  80 mg Oral QPM     buPROPion  200 mg Oral BID     carvedilol  25 mg Oral BID w/meals     docusate sodium  100 mg  Oral BID     docusate sodium  200 mg Oral BID     fentaNYL  75 mcg Transdermal Q48H     fentaNYL   Transdermal Q8H     polyethylene glycol  17 g Oral Daily     sennosides  4 tablet Oral Daily     sennosides  4 tablet Oral QPM     sodium chloride (PF)  10 mL Intracatheter Once     sodium chloride (PF)  3 mL Intracatheter Q8H     spironolactone  12.5 mg Oral Daily     Family History:   Family History   Problem Relation Age of Onset     Arthritis Other      Gastrointestinal Disease Other      Cardiovascular Father         aortic aneurysm     Arrhythmia Father      Nephrolithiasis Father      Sleep Apnea Father      Anxiety Disorder Father      Depression Father      Hypertension Father      Obesity Father      Hyperlipidemia Father      Coronary Artery Disease Father         history of MI and stent     Low Back Problems Father      Spine Problems Father      Anxiety Disorder Mother      Hypertension Mother      Osteoporosis Mother      Obesity Mother      Hyperlipidemia Mother      Low Back Problems Mother      Anxiety Disorder Sister      Hypertension Sister      Osteoporosis Sister      Obesity Sister      Hyperlipidemia Sister      Low Back Problems Sister      Spine Problems Sister      Anxiety Disorder Sister      Depression Sister      Hypertension Sister      Osteoporosis Sister      Obesity Sister      Hyperlipidemia Sister      Low Back Problems Sister      Social History:   Social History     Tobacco Use     Smoking status: Former Smoker     Packs/day: 0.50     Years: 6.00     Pack years: 3.00     Types: Cigarettes     Start date: 1977     Last attempt to quit: 1983     Years since quittin.4     Smokeless tobacco: Never Used     Tobacco comment: quit 35 years ago   Substance Use Topics     Alcohol use: No     Alcohol/week: 0.0 standard drinks       ROS:   A comprehensive 10 point ROS was negative other than as mentioned in HPI.    Physical Examination:   VITALS: /62 (BP Location: Left arm)    Pulse 81   Temp 98.8  F (37.1  C) (Oral)   Resp 16   Wt 82.7 kg (182 lb 4.8 oz)   SpO2 96%   BMI 26.54 kg/m    GENERAL APPEARANCE: AxO, NAD   HEENT: NCAT, EOMI, MMM. PERRLA.   NECK: Supple.   CHEST: CTAB   CARDIOVASCULAR: S1S2, Reg, No m/r/g.   ABDOMEN: BS+, soft, NT, ND.   EXTREMITIES: No pedal edema. Distal pulses intact.   NEURO: Grossly nonfocal.   PSYCH: Normal affect.  SKIN: Warm and dry.   Data:   Labs:  BMP  Recent Labs   Lab 20  0557 20  0547 20  1300    136 136   POTASSIUM 4.1 4.3 4.4   CHLORIDE 105 106 108   NIA 9.5 9.2 9.1   CO2 27 25 26   BUN 23 21 22   CR 1.05 1.01 1.18   GLC 93 105* 106*     CBC  Recent Labs   Lab 20  0557   WBC 6.4   RBC 4.46   HGB 12.7*   HCT 38.7*   MCV 87   MCH 28.5   MCHC 32.8   RDW 12.8        INR  Recent Labs   Lab 20  1025   INR 1.12     No results found for: CKTOTAL, CKMB, TROPN  Cholesterol (mg/dL)   Date Value   2018 179   2016 157   2015 138   10/01/2013 162     Cholesterol/HDL Ratio (no units)   Date Value   2015 3.7   10/01/2013 3.4   10/02/2012 3.4   2012 3.4     HDL Cholesterol (mg/dL)   Date Value   2018 39 (L)   2016 45   2015 38 (L)   10/01/2013 48     LDL Cholesterol Calculated (mg/dL)   Date Value   2018 89   2016 84   2015 58   10/01/2013 90     EK/2019 ECHO:  Interpretation Summary  Global and regional left ventricular function is normal with an EF of 60-65%.  Aortic valve is bicuspid with fusion of the right and left coronary cusps,  Lucy Type 1.  There is aortic sclerosis not meeting criteria for stenosis, Vmax 2.3 m/s,  mean gradient 11mmHg, VINCE (VTI) 2cmÂ . There is trace to mild central aortic  insufficiency.  Normal size aortic root, max diameter 3.8 cm at the sinuses of Valsalva,  indexed 1.7 cm/mÂ . History of ascending aorta replacement with a 32 mm  Gelweave interposition graft. The visualized ascending aorta measures 34  mm.  There has been no change.    1/29/20 ECHO:   Interpretation Summary  Moderately (EF 30-35%) reduced left ventricular function is present.There is  wall thinning and severe hypokinesis of the mid-distal anteroseptal, basal-mid  anterior and distal inferior segments concerning for coronary disease in the  LAD territory. There is no thrombus.  Global right ventricular function is mildly reduced.  No pericardial effusion is present.  Assessment:   Mr. Buchanan is a 60 year old male who has a past medical history significant for HTN, HLD, BAV with ascending aortic aneurysm s/p valve sparing repair with Gelweave graft 2016, SAVITA (does not use CPAP), BPPV, prior tobacco use, anxiety, and bilateral shoulder replacement c/b MSSA right septic joint requiring washout and IV abx (6 weeks of cefazolin). He was in clinic to have a biopsy of his shoulder to interrogate the joint for infection. He was found to have a 10 beat NSVT run and extreme hypertension up to 226/152. The period of hypertension lasted around 4 minutes. He was sent to the ER for evaluation. His trop came back at 3.1 and echo showed newly reduced LVEF 30-35% with wal thinning and severe hypokinesis of the mid-distal anteroseptal, basal-mid anterior and distal inferior segments. Subsequent coronary angiogram showed normal coronaries. Prior echo from 4/2019 showed normal LVEF and no WMA. Renal function and electrolytes stable. VSS. Current cardiac medications include: Lotrel, Coreg, Lipitor, and Spironolactone.   EP Recommendations:  In the setting of normal coronaries his echo is c/w stress cardiomyopathy or newly unmasked NICM. Will need to be on GDMT and follow this up with repeat echo as outpatient. If LVEF remains reduced <35% despite GDMT, then he would need to be considered for ICD. LifeVest can be considered as a bridge to decision for ICD if primary team feels needed.     The patient states understanding and is agreeable with plan.   Thank you for  allowing us to participate in the care of this patient.     The patient was discussed w/ Dr. Villavicencio.  The above note reflects our joint plan.    ALAYNA Eid CNP  Electrophysiology Consult Service  Pager: [phone]    EP STAFF NOTE  I have seen and examined the patient as part of a shared visit with VIRA Eid NP.  I agree with the note above. I reviewed today's vital signs and medications. I have reviewed and discussed with the advanced practice provider their physical examination, assessment, and plan   Briefly, patient with newly reduced ejection fraction and normal angiogram, small troponin leak.  Events are surrounding shoulder procedure with significant pain.  My key history/exam findings are: RRR.   The key management decisions made by me: His echocardiogram suggest either stress-induced cardiomyopathy or less likely potentially embolic or spastic event of the coronary artery in the distal LAD lesion.  The patient has 1 run of nonsustained VT during extreme pain and severe hypertension due to the pain.  Patient needs optimization of his medical therapy and recheck of his echocardiogram in 2 to 3 months to see whether his ejection fraction is improving.  If no improvement with optimal medical therapy, then would consider a defibrillator as primary prevention.     Harjit Villavicencio MD [facility]  Cardiology - Electrophysiology

## 2020-01-30 NOTE — PLAN OF CARE
D: Pt admitted 1/28 after VT run while undergoing routine shoulder biopsy. Hx of HTN, HLD, prior smoking history, bicuspid AV valve s/p repair 2016 & bilateral shoulder replacement c/b MSSA septic right joint s/p washout.    I: Monitored and assessed pt status. PRN oxycodone and tylenol given q4h for chronic neck and back pain. Fentanyl patch in place on abdomen. PIV saline locked.     A: Frustrated. AOx4. Afebrile. VSS on roon air. Sinus rhythm/kimmy on monitor. BP WNL. Denies sob, chest pain/palpitations, n/v/d. NPO since midnight. Voiding adequately. Dressing over left shoulder surgical site. Appeared to sleep well overnight. Independent in room.      P: Anticipate angiogram today. Continue to monitor and notify cards 1 with changes/concerns.

## 2020-01-31 ENCOUNTER — CARE COORDINATION (OUTPATIENT)
Dept: CARDIOLOGY | Facility: CLINIC | Age: 60
End: 2020-01-31

## 2020-01-31 VITALS
DIASTOLIC BLOOD PRESSURE: 77 MMHG | BODY MASS INDEX: 26.35 KG/M2 | RESPIRATION RATE: 18 BRPM | TEMPERATURE: 98.4 F | WEIGHT: 181 LBS | HEART RATE: 72 BPM | SYSTOLIC BLOOD PRESSURE: 114 MMHG | OXYGEN SATURATION: 95 %

## 2020-01-31 DIAGNOSIS — I71.21 ASCENDING AORTIC ANEURYSM (H): Primary | ICD-10-CM

## 2020-01-31 DIAGNOSIS — I50.21 ACUTE SYSTOLIC HEART FAILURE (H): Primary | ICD-10-CM

## 2020-01-31 DIAGNOSIS — I42.9 CARDIOMYOPATHY, UNSPECIFIED TYPE (H): ICD-10-CM

## 2020-01-31 LAB
ANION GAP SERPL CALCULATED.3IONS-SCNC: 7 MMOL/L (ref 3–14)
BUN SERPL-MCNC: 24 MG/DL (ref 7–30)
CALCIUM SERPL-MCNC: 9.3 MG/DL (ref 8.5–10.1)
CHLORIDE SERPL-SCNC: 106 MMOL/L (ref 94–109)
CO2 SERPL-SCNC: 25 MMOL/L (ref 20–32)
CREAT SERPL-MCNC: 1.11 MG/DL (ref 0.66–1.25)
ERYTHROCYTE [DISTWIDTH] IN BLOOD BY AUTOMATED COUNT: 12.7 % (ref 10–15)
GFR SERPL CREATININE-BSD FRML MDRD: 72 ML/MIN/{1.73_M2}
GLUCOSE SERPL-MCNC: 96 MG/DL (ref 70–99)
HCT VFR BLD AUTO: 37.3 % (ref 40–53)
HGB BLD-MCNC: 12.3 G/DL (ref 13.3–17.7)
MCH RBC QN AUTO: 28.3 PG (ref 26.5–33)
MCHC RBC AUTO-ENTMCNC: 33 G/DL (ref 31.5–36.5)
MCV RBC AUTO: 86 FL (ref 78–100)
PLATELET # BLD AUTO: 198 10E9/L (ref 150–450)
POTASSIUM SERPL-SCNC: 4.5 MMOL/L (ref 3.4–5.3)
RBC # BLD AUTO: 4.35 10E12/L (ref 4.4–5.9)
SODIUM SERPL-SCNC: 138 MMOL/L (ref 133–144)
WBC # BLD AUTO: 7.6 10E9/L (ref 4–11)

## 2020-01-31 PROCEDURE — 85027 COMPLETE CBC AUTOMATED: CPT | Performed by: PHYSICIAN ASSISTANT

## 2020-01-31 PROCEDURE — 36415 COLL VENOUS BLD VENIPUNCTURE: CPT | Performed by: PHYSICIAN ASSISTANT

## 2020-01-31 PROCEDURE — 99238 HOSP IP/OBS DSCHRG MGMT 30/<: CPT | Performed by: INTERNAL MEDICINE

## 2020-01-31 PROCEDURE — 80048 BASIC METABOLIC PNL TOTAL CA: CPT | Performed by: PHYSICIAN ASSISTANT

## 2020-01-31 PROCEDURE — 25000132 ZZH RX MED GY IP 250 OP 250 PS 637: Performed by: PHYSICIAN ASSISTANT

## 2020-01-31 PROCEDURE — 99207 ZZC NO CHARGE LOS: CPT | Performed by: INTERNAL MEDICINE

## 2020-01-31 PROCEDURE — 25000132 ZZH RX MED GY IP 250 OP 250 PS 637: Performed by: STUDENT IN AN ORGANIZED HEALTH CARE EDUCATION/TRAINING PROGRAM

## 2020-01-31 RX ORDER — SPIRONOLACTONE 25 MG/1
25 TABLET ORAL DAILY
Qty: 30 TABLET | Refills: 3 | Status: ON HOLD | OUTPATIENT
Start: 2020-02-01 | End: 2020-02-16

## 2020-01-31 RX ORDER — ASPIRIN 81 MG/1
81 TABLET, CHEWABLE ORAL DAILY
Qty: 30 TABLET | Refills: 11 | Status: SHIPPED | OUTPATIENT
Start: 2020-02-01 | End: 2020-05-06

## 2020-01-31 RX ORDER — SPIRONOLACTONE 25 MG/1
25 TABLET ORAL DAILY
Status: DISCONTINUED | OUTPATIENT
Start: 2020-02-01 | End: 2020-01-31 | Stop reason: HOSPADM

## 2020-01-31 RX ORDER — ATORVASTATIN CALCIUM 40 MG/1
40 TABLET, FILM COATED ORAL EVERY EVENING
Status: DISCONTINUED | OUTPATIENT
Start: 2020-01-31 | End: 2020-01-31 | Stop reason: HOSPADM

## 2020-01-31 RX ORDER — SPIRONOLACTONE 25 MG
12.5 TABLET ORAL ONCE
Status: COMPLETED | OUTPATIENT
Start: 2020-01-31 | End: 2020-01-31

## 2020-01-31 RX ORDER — ATORVASTATIN CALCIUM 40 MG/1
40 TABLET, FILM COATED ORAL EVERY EVENING
Qty: 30 TABLET | Refills: 11 | Status: ON HOLD | OUTPATIENT
Start: 2020-01-31 | End: 2020-02-16

## 2020-01-31 RX ADMIN — BUPROPION HYDROCHLORIDE 200 MG: 200 TABLET, EXTENDED RELEASE ORAL at 06:11

## 2020-01-31 RX ADMIN — OXYCODONE HYDROCHLORIDE 10 MG: 5 TABLET ORAL at 03:45

## 2020-01-31 RX ADMIN — Medication 12.5 MG: at 06:11

## 2020-01-31 RX ADMIN — CYCLOBENZAPRINE HYDROCHLORIDE 10 MG: 10 TABLET, FILM COATED ORAL at 06:13

## 2020-01-31 RX ADMIN — Medication 12.5 MG: at 08:53

## 2020-01-31 RX ADMIN — CARVEDILOL 25 MG: 25 TABLET, FILM COATED ORAL at 06:13

## 2020-01-31 RX ADMIN — OXYCODONE HYDROCHLORIDE 10 MG: 5 TABLET ORAL at 08:52

## 2020-01-31 RX ADMIN — SENNOSIDES 3 TABLET: 8.6 TABLET, FILM COATED ORAL at 06:11

## 2020-01-31 RX ADMIN — AMLODIPINE BESYLATE AND BENAZEPRIL HYDROCHLORIDE 1 CAPSULE: 5; 20 CAPSULE ORAL at 06:10

## 2020-01-31 RX ADMIN — ACETAMINOPHEN 650 MG: 325 TABLET, FILM COATED ORAL at 08:52

## 2020-01-31 RX ADMIN — FENTANYL 1 PATCH: 75 PATCH, EXTENDED RELEASE TRANSDERMAL at 11:26

## 2020-01-31 RX ADMIN — ASPIRIN 81 MG CHEWABLE TABLET 81 MG: 81 TABLET CHEWABLE at 08:53

## 2020-01-31 RX ADMIN — DOCUSATE SODIUM 100 MG: 100 CAPSULE, LIQUID FILLED ORAL at 06:12

## 2020-01-31 RX ADMIN — ACETAMINOPHEN 650 MG: 325 TABLET, FILM COATED ORAL at 03:45

## 2020-01-31 ASSESSMENT — ACTIVITIES OF DAILY LIVING (ADL)
ADLS_ACUITY_SCORE: 12

## 2020-01-31 ASSESSMENT — PAIN DESCRIPTION - DESCRIPTORS: DESCRIPTORS: ACHING

## 2020-01-31 NOTE — PLAN OF CARE
D: Pt admitted 1/28 after VT run while undergoing routine shoulder biopsy. Hx of HTN, HLD, prior smoking history, bicuspid AV valve s/p repair 2016 & bilateral shoulder replacement c/b MSSA septic right joint s/p washout.     I: Monitored and assessed pt status. TR band removed per protocol. CMS intact, no hematoma. Dressing placed over puncture site CDI. PRN oxycodone and tylenol given q4h for chronic neck and back pain. PRN klonopin given x1.  Fentanyl patch in place on abdomen. PIV saline locked.      A: Frustrated. AOx4. Afebrile. VSS on roon air. Sinus rhythm/kimmy on monitor. SBP running 90s-100s. Denies sob, chest pain/palpitations, n/v/d. Voiding adequately. Left shoulder arthroscopic sites, with steri-strips EMELI. Appeared to sleep well overnight. Independent in room.       P: Awaiting EP consult. Continue to monitor and notify cards 1 with changes/concerns.

## 2020-01-31 NOTE — PROGRESS NOTES
My Chart Message received from patient:     Good Morning:   I need to know schedule an appointment with you ASAP.  I'm thinking we need to order a Cardiac MRA? MRI?  I've had some very significant changes in my cardiac ejection fraction.   September 20th I became acutely aware of what turned out to be MSSA infection of my two shoulder prosthetics.  At that time I had an echo with a 64% ejection at [facility].  After MSSA clearance I had some shoulder biopsies and experienced 4sec of VTAC. Subsequent echo was 34%.  Subsequent angiogram was clear. We need to look at my medications and further investigate the cardiac event I'm thinking happened at [facility] while I was clearing toxic encephalitis.  I never felt a thing.  Presently I have antibiotic spacers bilaterally.  I'm in a hurry to get at least one arm with a total reverse revision done.  I have elderly parents that are both dependent on my driving.  Please review my extensive recent work ups and let's schedule a consult ASAP.  It is my understanding that you are not practicing on the main [facility]   campus'.  Where should I look for you?     Regards,   Kobe Vance OV note from 4/2/19:      Assessment and Plan:      59-year-old male with hypertension, hyperlipidemia, right left fusion of his aortic valve with bicuspid aortopathy.  No known coarctation.  No other peripheral aneurysmal disease.  He is status post valve sparing aortic repair with Gelweave interposition graft.  Blood pressure overall well controlled.  He will need ongoing long-term surveillance of both of his aortic repair and his bicuspid valve given he has some dysfunction within the native valve.  He only has mild aortic insufficiency but will need to follow this over time.  He also has hypertension requiring multidrug regimen.  There is some data suggestive of losartan as aorto protective.  We will prefer he be on losartan over clonidine.     Recommendations:     -Will review MRA from  today  -Echocardiogram for aortic valve function  -Start losartan 25 mg daily, stop clonidine  -Will have patient report BPs to us and obtain labs (chemistry panel) in one week after starting  -Follow up 6 months, will order surveillance MRA at that time (likely q1-2 years depending on stability)          KEELY Mendoza January 31, 2020 11:52 AM

## 2020-01-31 NOTE — DISCHARGE SUMMARY
[address] [address]  p: [phone]    Discharge Summary: Cardiology Service    Kobe Buchanan MRN# [id number]   YOB: 1960 Age: 60 year old       Admission Date: 1/28/2020  Discharge Date: 01/31/20      Discharge Diagnoses:  1. NICM (LVEF 30-35%)  2. History of aortic dilation s/p repair 2016  3. Bicuspid aortic valve  4. History of b/l total shoulder c/b septic joint  5. History of MSSA bacteremia   6. Chronic pain   7. Periapical abscesses of tooth #19, #29  8. Anxiety    Pertinent Procedures:  1. Coronary angiogram    Consults:  Infectious disease  Dentistry   Electrophysiology     Imaging with results:  Echocardiogram 1/28/2020  Interpretation Summary  Moderately (EF 30-35%) reduced left ventricular function is present.There is  wall thinning and severe hypokinesis of the mid-distal anteroseptal, basal-mid  anterior and distal inferior segments concerning for coronary disease in the  LAD territory. There is no thrombus.  Global right ventricular function is mildly reduced.  No pericardial effusion is present.  _____________________________________________________________________________     Left Ventricle  Mild left ventricular dilation is present. Moderately (EF 30-35%) reduced left  ventricular function is present. Moderate diffuse hypokinesis is present.  There is wall thinning and severe hypokinesis of the mid-distal anteroseptal,  basal-mid anterior and distal inferior segments concerning for coronary  disease in the LAD territory. There is no thrombus.     Right Ventricle  The right ventricle is normal size. Global right ventricular function is  mildly reduced.     Mitral Valve  The mitral valve is normal.     Aortic Valve  The aortic valve is bicuspid. Mild aortic valve calcification is present. No  significant stenosis or regurgitation.     Tricuspid Valve  The tricuspid valve is normal.        Vessels  The  inferior vena cava cannot be assessed.     Pericardium  No pericardial effusion is present.     Compared to Previous Study  This study was compared with the study from 4.10.19 . LV function has declined  and the wall motion abnormalities are new.        EKG 1/28/2020: NSR       Coronary angiogram 1/30/2020  Diagnostic   Dominance: Right   Left Main   The vessel is large.   Left Anterior Descending   The vessel is large. Luminal irregularities present   Prox LAD lesion is 35% stenosed.   First Diagonal Branch   The vessel is small.   Ramus Intermedius   The vessel is small.   Left Circumflex   The vessel is large. The proximal left circumflex starts as a large vessel until the exit of the first obtuse marginal, at which it comes a small vessel when entering the true AV groove.   Ost Cx to Prox Cx lesion is 30% stenosed.   First Obtuse Marginal Branch   The vessel is large. The vessel exhibits minimal luminal irregularities.   Right Coronary Artery   The vessel is large.   Right Posterior Descending Artery   The vessel is large. The vessel exhibits minimal luminal irregularities.   Right Posterior Atrioventricular Artery   The vessel is large. The vessel exhibits minimal luminal irregularities.   First Right Posterolateral Branch   The vessel is small and is angiographically normal.         Brief HPI:  Kobe Henriquez is a 60 year old male with a past medical history significant for HTN, HLD, prior smoking history, bicuspid AV valve s/p valve sparing repair with Gelweave graft 2016, and bilateral shoulder replacement c/b MSSA s/p washout and 6 weeks of cefazolin who is admitted for treatment of hypertensive emergency and evaluation of NSVT.    Hospital Course by Diagnosis:  #Hypertensive emergency  ##NSVT  ##History of ascending aortic aneurysm s/p repair 2016  RF: HLD, HTN, brief history of smoking  Noted to have ~4 minutes of profound hypertension during shoulder biopsy day of admission. In the ED, EKG revealed  NSR. Troponin was checked and found to be elevated at 1.64. Patient had a coronary angiogram prior to his aorta graft surgery in 2016 which demonstrated minimal CAD (25% stenosis of LCx, 25% D1). Echocardiogram 4/2019 demonstrated normal LVEF with known bicuspid AV valve. Interestingly his TTE this admission with newly reduced LVEF and wall thinning of the LAD territory c/w ischemic injury of the LAD territory. Does think that he has been more dyspneic since his admission in September for MSSA bacteremia. XIOMY during that admission was noted to have an LVEF of 60%. Coronary angiogram this admission with non-obstructive CAD. Unclear what the inciting event was. Could consider cardiac MRI as outpatient. Will need to follow up with electrophysiology. Offered lifevest for primary prophylaxis, but patient declined. Patient already on maximum dose ACE inhibitor, so discontinued Losartan. May benefit from entresto, but patient not interested in trying at this time.  - trop peak 3.1  - ASA 81mg   - resume carvedilol 25mg bid  - resume amlodipine-benazapril 5-20mg bid  - resume simvastatin 10mg  - start spironolactone 25mg daily     #Multiple tooth abscesses   Dental consulted due to history of bacteremia and impending angiogram. CT dental with Small periapical abscesses associated with tooth #19 and tooth #29. Additionally, caries of tooth #19. D/w dental team, and recommend he follow up in clinic when he is out of hospital.   - follow up with dentistry as soon as able     #History of b/l total shoulders  ##History of right shoulder MSSA septic arthritis c/b MSSA bacteremia  ##Chronic pain  Not currently on antibiotics. No signs/sx of infection at this time. CTM  -PTA oxycodone 5-10mg q4h prn  - PTA fentanyl patch 75 mcg q72h  - ID consulted, appreciate input. No concern for active infection at this time, no indication to hold off on angiogram. Patient already scheduled to see them in clinic     #History of NEMESIO requiring  dialysis x2  NEMESIO in setting of prolonged cefazolin treatment. Creatinine 1.18 today.   - avoid nephrotoxic agents as able     #Anxiety  - PTA klonipin 0.1mg tid prn    Condition on discharge  Temp:  [97.5  F (36.4  C)-98.6  F (37  C)] 98.4  F (36.9  C)  Pulse:  [67-72] 72  Heart Rate:  [61-75] 72  Resp:  [16-18] 18  BP: ()/(58-85) 114/77  SpO2:  [93 %-98 %] 95 %  General: Alert, interactive, NAD  Eyes: sclera anicteric, EOMI  Neck: JVP not appreciably elevated  Cardiovascular: regular rate and rhythm, normal S1 and S2, no murmurs, gallops, or rubs  Resp: clear to auscultation bilaterally, no rales, wheezes, or rhonchi  GI: Soft, nontender, nondistended. +BS.  No HSM or masses, no rebound or guarding.  Extremities: no edema, no cyanosis or clubbing, dorsalis pedis and posterior tibialis pulses 2+ bilaterally. Right radial incision c/d/i, no hematoma.  Skin: Warm and dry, no jaundice or rash  Neuro: CN 2-12 intact, moves all extremities equally  Psych: Alert & oriented x 3      Medication Changes:  STOP losartan  START Spironolactone 25mg daily  START Aspirin 81mg daily    Discharge medications:   Discharge Medication List as of 1/31/2020 12:46 PM      START taking these medications    Details   aspirin (ASA) 81 MG chewable tablet Take 1 tablet (81 mg) by mouth daily, Disp-30 tablet, R-11, E-Prescribe      atorvastatin (LIPITOR) 40 MG tablet Take 1 tablet (40 mg) by mouth every evening, Disp-30 tablet, R-11, E-Prescribe      spironolactone (ALDACTONE) 25 MG tablet Take 1 tablet (25 mg) by mouth daily, Disp-30 tablet, R-3, E-Prescribe         CONTINUE these medications which have NOT CHANGED    Details   acetaminophen (TYLENOL) 325 MG tablet Take 2 tablets (650 mg) by mouth every 4 hours as needed for mild pain, Disp-100 tablet, R-0, E-Prescribe      amLODIPine-benazepril (LOTREL) 5-20 MG capsule Take 1 capsule by mouth 2 times daily, Disp-180 capsule, R-3, E-Prescribe      buPROPion (WELLBUTRIN SR) 200 MG 12 hr  tablet TAKE 1 TABLET BY MOUTH 2 TIMES DAILY, Disp-180 tablet, R-3, E-Prescribe      carvedilol (COREG) 25 MG tablet Take 1 tablet (25 mg) by mouth 2 times daily (with meals), Disp-180 tablet, R-3, E-Prescribe      clonazePAM (KLONOPIN) 0.5 MG tablet Take 1 tablet (0.5 mg) by mouth 3 times daily as needed for anxiety, Disp-90 tablet, R-5, E-Prescribe      Cyanocobalamin 5000 MCG SUBL Place 5,000 mcg under the tongue daily, Historical      cyclobenzaprine (FLEXERIL) 10 MG tablet Take 1 tablet (10 mg) by mouth 3 times daily as needed for muscle spasms , Disp-45 tablet, R-1, E-Prescribe      docusate sodium (COLACE) 100 MG capsule Take 200 mg by mouth 2 times daily , Historical      Fe Heme Polypeptide-folic acid (PROFERRIN-FORTE) 12-1 MG TABS Take 1 tablet by mouth daily, Disp-90 tablet, R-0, E-Prescribe      fentaNYL (DURAGESIC) 75 mcg/hr 72 hr patch Place 1 patch onto the skin every 48 hours , Historical      multivitamin w/minerals (THERA-VIT-M) tablet Take 1 tablet by mouth daily, Historical      oxyCODONE IR (ROXICODONE) 10 MG tablet Take 10 mg by mouth every 4 hours as needed for severe pain Max 6 tablets per day, Historical      pantoprazole (PROTONIX) 40 MG EC tablet Take 40 mg by mouth daily as needed for heartburn, Historical      senna (SENOKOT) 8.6 MG tablet Take 3-4 tablets by mouth 2 times daily Take 3 tablets every morning and 4 tablets every evening, Historical      simvastatin (ZOCOR) 10 MG tablet Take 1 tablet (10 mg) by mouth At Bedtime, Disp-90 tablet, R-3, E-Prescribe      vitamin B-Complex Take 1 tablet by mouth daily, Historical      amoxicillin (AMOXIL) 500 MG capsule Take 4 tablets 1 hour before dental procedure, Disp-24 capsule, R-4, E-Prescribe      fluocinonide (LIDEX) 0.05 % external ointment Apply twice daily to itchy skin nodules for 1-2 weeks at a time.Disp-30 g, [id number]      naloxone (NARCAN) nasal spray Spray 1 spray (4 mg) into one nostril alternating nostrils as needed for  opioid reversal every 2-3 minutes until assistance arrives, Disp-0.2 mL, R-0, Historical      order for DME Walker for cardiac rehab with 4 wheels, brakes and seatDisp-1 Device, R-0, Local Print      tacrolimus (PROTOPIC) 0.1 % ointment Apply topically as needed Apply to affected areas on body.Disp-120 g, [id number]         STOP taking these medications       losartan (COZAAR) 25 MG tablet Comments:   Reason for Stopping:         naproxen (NAPROSYN) 500 MG tablet Comments:   Reason for Stopping:               Labs or imaging requiring follow-up after discharge:  CMP within one week with PCP. CMP ordered      Follow-up:  PCP within one week  Infectious disease as previously arranged  Cardiology clinic in next 3-4 weeks  Electrophysiology in 2-3 months  Dentistry as soon as able    Code status:  FULL      Christopher Orellana PA-C  [facility] Cardiology Team

## 2020-02-01 NOTE — PLAN OF CARE
D: Admitted 1/28 for hypertensive emergency and evaluation of VT. Hx of HTN, HLD, prior smoking history, AVR with graft in 2016, b/l shoulder replacement c/b MSSA s/p washout and abx.    I: Gave medications as scheduled. Gave PRN oxycodone and tylenol for chronic pain in shoulders, back and neck.     A: AOx4. VSS. NSR. RA. Independent. Right PIV saline locked. Voiding. Last BM 1/30.     P: Discharge today. Notify Cards 1 team of any changes.

## 2020-02-01 NOTE — PROGRESS NOTES
Patient discharged. All belongings sent home with patient. Removed PIV. Went through discharge summary with patient and answered any questions he had.

## 2020-02-02 LAB
BACTERIA SPEC CULT: NO GROWTH
SPECIMEN SOURCE: NORMAL

## 2020-02-03 ENCOUNTER — PATIENT OUTREACH (OUTPATIENT)
Dept: CARE COORDINATION | Facility: CLINIC | Age: 60
End: 2020-02-03

## 2020-02-03 NOTE — PROGRESS NOTES
Patient does have questions about medications  He is taking Simvastatin 10mg daily  And was prescribe Lipitor 40mg daily  He does not think he is suppose to take both of these medications  Also his Cardiologist went to [facility] and he needs to get an appointment with a cardiologist

## 2020-02-03 NOTE — PROGRESS NOTES
Enriqueta Zhou MD Cowling, Elizabeth, RN 3 days ago      Sure, cardiac MRI and chest MRA and follow up with me    Routing comment      Orders entered for chest MRA and cardiac MRI, as well as order for follow up with Dr. Zhou. Responded to patient via [facility] with Dr. Zhou's recommendations and instructed patient to contact scheduling to set up testing and office visit.

## 2020-02-03 NOTE — PROGRESS NOTES
Date: 2/4/2020 Status: Rajinder   Time: 2:40 PM Length: 20   Visit Type: [facility] HOSPITAL FOLLOW UP [[id number]] QUINCY:     Provider: Ankush Dahl MD Department: [facility]

## 2020-02-04 LAB
BACTERIA SPEC CULT: NO GROWTH
BACTERIA SPEC CULT: NO GROWTH
SPECIMEN SOURCE: NORMAL
SPECIMEN SOURCE: NORMAL

## 2020-02-04 NOTE — OP NOTE
"DATE OF PROCEDURE: 1/28/20       PREOPERATIVE DIAGNOSES:   1. Left shoulder infection  2. Status post resection of left shoulder reverse total shoulder arthroplasty and placement of antibiotic spacer      POSTOPERATIVE DIAGNOSES:   1. Left shoulder infection  2. Status post resection of left shoulder reverse total shoulder arthroplasty and placement of antibiotic spacer      PROCEDURES:   1. Left shoulder arthroscopy  2. Left shoulder arthroscopic biopsy/culture        STAFF SURGEON: Analilia Aceves MD        ANESTHESIA: General endotracheal anesthesia with supplementary interscalene block.   ESTIMATED BLOOD LOSS: 5 mL.        IMPLANTS: None    SPECIMENS:   1. Shoulder #1 (Posterior superior capsule).   2. Shoulder #2 (Superior capsule).   3. Shoulder #3 (Anterior superior capsule).   4. Shoulder #4 (Anterior interval/labrum).   5. Shoulder #5 (Posterior interval/labrum).   All specimens were sent for aerobic and anaerobic cultures. The anaerobic were labeled \"please keep 2 weeks to rule out P. acnes-induced sensitivities if P. acnes is found.\"       COMPLICATIONS: None.        BRIEF PATIENT HISTORY: Mr. Buchanan is a gentleman I know well as I have seen recently in clinic. Please refer to that documentation. I discussed a recommendation for obtaining cultures to confirm whether there has been resolution of his infection. We discussed the risks of surgery including risk of being no better or even worse if he became stiff, infected, had an injury to the nerves or arteries which power the arm or hand or had a reaction to anesthesia. We discussed the postoperative rehabilitation course. The patient wished to proceed with surgery and informed consent was completed.      DESCRIPTION OF PROCEDURE: The patient was identified in the preoperative area and the correct left shoulder was marked for surgery. The patient was provided an interscalene block by our Anesthesia colleagues and was taken to the operating room " where he was surrendered to general endotracheal anesthesia. The patient was moved to the operating table in the beachchair position with all bony prominences well padded. The head was placed in a head rain with the neck in neutral position. The right upper extremity was prepped and draped in the usual sterile fashion. A timeout was held in accordance with hospital policy, confirming correct patient, side, site, procedure, and plan to hold antibiotics until cultures were obtained.   I initiated shoulder arthroscopy through a posterior viewing portal. I created an anterior working portal under direct visualization. I performed a diagnostic arthroscopy. There was fibrotic tissue in the joint and the antibiotic cement spacer was located on the glenoid.   I took specimens as labeled above. Antibiotics were then administered. All instruments were removed from the shoulder and then portals were closed with interrupted 3-0 Monocryl. Steri-Strips and a soft sterile dressing were applied. The arm was placed into a sling and the patient was extubated and transported to the recovery room in stable condition. There were no apparent intraoperative surgical complications.  Please refer to the Anesthesia record as patient had EKG changes (V-tach) with hypertension during the middle of the procedure. The patient was stable in transfer to the PACU and was then transported to the [facility] ED for further cardiac evaluation.       POSTOPERATIVE PLAN:   1. The patient will be monitored in the ED as above.  2. We will monitor all cultures until final. Patient will have to have complete medical optimization  Prior to pursuing reimplantation of another reverse TSA given his cardiac events of this date.       VICENTE MAYS MD

## 2020-02-06 LAB
BACTERIA SPEC CULT: ABNORMAL
SPECIMEN SOURCE: ABNORMAL

## 2020-02-12 ENCOUNTER — OFFICE VISIT (OUTPATIENT)
Dept: CT IMAGING | Facility: CLINIC | Age: 60
End: 2020-02-12
Attending: INTERNAL MEDICINE
Payer: COMMERCIAL

## 2020-02-12 ENCOUNTER — OFFICE VISIT (OUTPATIENT)
Dept: ORTHOPEDICS | Facility: CLINIC | Age: 60
End: 2020-02-12
Payer: COMMERCIAL

## 2020-02-12 VITALS
TEMPERATURE: 97.4 F | DIASTOLIC BLOOD PRESSURE: 72 MMHG | SYSTOLIC BLOOD PRESSURE: 122 MMHG | HEART RATE: 64 BPM | OXYGEN SATURATION: 98 %

## 2020-02-12 DIAGNOSIS — Z96.619 INFECTION OF PROSTHETIC SHOULDER JOINT, SUBSEQUENT ENCOUNTER: Primary | ICD-10-CM

## 2020-02-12 DIAGNOSIS — T84.59XD INFECTION OF PROSTHETIC SHOULDER JOINT, SUBSEQUENT ENCOUNTER: Primary | ICD-10-CM

## 2020-02-12 DIAGNOSIS — M00.012 STAPHYLOCOCCAL ARTHRITIS OF LEFT SHOULDER (H): ICD-10-CM

## 2020-02-12 PROCEDURE — G0463 HOSPITAL OUTPT CLINIC VISIT: HCPCS | Mod: ZF

## 2020-02-12 RX ORDER — LOSARTAN POTASSIUM 25 MG/1
1 TABLET ORAL EVERY 24 HOURS
Status: ON HOLD | COMMUNITY
End: 2020-02-16

## 2020-02-12 ASSESSMENT — ENCOUNTER SYMPTOMS
MYALGIAS: 1
ARTHRALGIAS: 1
STIFFNESS: 1
MUSCLE CRAMPS: 0
JOINT SWELLING: 0
MUSCLE WEAKNESS: 1
BACK PAIN: 0
NECK PAIN: 1

## 2020-02-12 ASSESSMENT — PAIN SCALES - GENERAL: PAINLEVEL: MILD PAIN (3)

## 2020-02-12 NOTE — NURSING NOTE
Visit Reason: Follow up    Evaluation / Assessment: Patient states pain at anterior left shoulder and biopsy site, stating minimal pain and neck pain level 3/10. He states he lives in pain varying from 3/10 - 7/10 level daily.     Labs: NA    Procedure ordered? NA    Dressing Change: NA    Vaccine ordered / administered: NA    Other: 3 sleeves for right arm given to help alleviate pain on elbow.

## 2020-02-12 NOTE — NURSING NOTE
Reason For Visit:   Chief Complaint   Patient presents with     Surgical Followup     2 week pop DOS 1/28/20 Left shoulder arthroscopy and biopsy for culture         PCP: Michael Styles  Ref: Self     ?  No  Occupation Retired.  Currently working? No.  Work status?  Retired.  Date of injury: ongoing     Date of surgery: R TSA 8/26/2014  L TSA 04/15/2014     Date of surgery: 9/23/19  Type of surgery: I and D Right shoulder at [facility]     Date of surgery: 11/15/19  Type of surgery:   1. Explant of left total shoulder arthroplasty  2. Irrigation and excisional debridement left shoulder  3. Placement of antibiotic spacer    Date of surgery: 1/28/20  Type of surgery:   1. Left shoulder arthroscopy  2. Left shoulder arthroscopic biopsy/culture      Smoker: No  Request smoking cessation information: No     Right hand dominant    SANE score  Affected shoulder: Bilateral  Right shoulder SANE: 15-20  Left shoulder SANE: 10    There were no vitals taken for this visit.      Pain Assessment  Patient Currently in Pain: Denies(Pain is located in the neck)    Roberta Obregon, ATC

## 2020-02-12 NOTE — Clinical Note
Kobe looks pretty good today. See my note - could he get R shoulder biopsies simultaneously with a L shoulder implant re-insertion? I'm trying to figure out how to get him a drug holiday in the context of this C acnes, which I don't think should preclude his getting surgery on the L...Thanks-Ivory

## 2020-02-12 NOTE — PROGRESS NOTES
CHIEF CONCERN:   1. Status post: Left shoulder arthroscopic biopsy/culture  1/28/2020 (spacer in place on both Left and Right)    HISTORY OF PRESENT ILLNESS: Mr. Buchanan is a pleasant 60 year-old male who is 2 weeks status post the above procedure. He is here today to review the results of his biopsy during his last procedure and decide on a future course of treatment. He is going to see his cardiologist on 2/24/2020 to evaluate his candidacy for surgical intervention to address his symptoms. He notes that he has some pain deep in his shoulder in a different location than his recent arthroscopic procedure.   He saw Dr. Hilton today who reviewed his latest cultures (C acnes on 1 of 5 cultures).   His intraoperative run of V-tach led to postop eval in the ED and admission to Cardiology. His EF is lower on latest eval. A Chest MRA and cardiac MRI have been ordered after which time Cardiology follow up in clinic is already scheduled (2/24/2020).    EXAM:  Pleasant adult male in no distress.  Respirations even and unlabored.  Left upper extremity: Incisions clean, dry, and intact. No erythema. No drainage. Sens intact Ax/Musc/Med/Rad/Uln nerves. Motor intact EPL, FPL, and Intrinsics. Shoulder range of motion not tested due to postop restrictions.    ASSESSMENT:  1. 2 weeks status post above procedure.    PLAN:  1. Reviewed the intraoperative findings.  2. See cardiologist to evaluate candidacy for surgery.  3. I will approve and order his VRS implant for his left shoulder and proceed with surgery once he has Cardiology approval.     Scribe Disclosure:  I, Alexander Vicente, am serving as a scribe to document services personally performed by Analilia Aceves MD at this visit, based upon the provider's statements to me. All documentation has been reviewed by the aforementioned provider prior to being entered into the official medical record.       Answers for HPI/ROS submitted by the patient on 2/12/2020   General  Symptoms: No  Skin Symptoms: No  HENT Symptoms: No  EYE SYMPTOMS: No  HEART SYMPTOMS: No  LUNG SYMPTOMS: No  INTESTINAL SYMPTOMS: No  URINARY SYMPTOMS: No  REPRODUCTIVE SYMPTOMS: No  SKELETAL SYMPTOMS: Yes  BLOOD SYMPTOMS: No  NERVOUS SYSTEM SYMPTOMS: No  MENTAL HEALTH SYMPTOMS: No  Back pain: No  Muscle aches: Yes  Neck pain: Yes  Swollen joints: No  Joint pain: Yes  Bone pain: No  Muscle cramps: No  Muscle weakness: Yes  Joint stiffness: Yes  Bone fracture: No

## 2020-02-12 NOTE — PROGRESS NOTES
[facility] Clinic follow-up visit note:    1. Left shoulder prosthetic joint infection. Notably treated with implant retention and antibiotics, and was refractory to cefazolin with relapsed sx 2 days after cefazolin was discontinued. S/p L TSA explant 11/15 with cx showing MSSA. Treated with cefazolin for 6 weeks, until 12/27/19, CRP normalized.  Now a candidate for future reconstructive surgery. Surveillance biopsy was complicated by cardiac event. Biopsies show 1/5 with C acnes. While his CRP is normal and his cultures are only 1/5, infection cannot be excluded, though it seems unlikely.    2. Hx Right shoulder prostheric joint infection   - s/p irrigation and debridement , explant and replacement of spacer on 9/23/19.   - Intra-op cultures grew MSSA .   - s/p 6 weeks of Cefazolin and  completed antibotic on 11/10/19 (as above, had emergence of L shoulder PJI infection symptoms immediately upon cessation - prosthesis had been retained at the onset of symptoms and is now removed)    3. History of MSSA bacteremia/septicemia (9/20/19 -9/22/19) with secondary R and L TSA PJI (see above)  - XIOMY neg for thrombus/vegetation 9/24/19  - blood cx - neg x 2 on 11/13/19   - may have been preceded by skin picking/dermatitis - he was strongly encouraged to discontinue this practice    4. Bicuspid aortic valve  5. Ascending aortic aneurysm  6. Prior gastric bypass  7. Chronic cervical spine pain  8. HCV Ab+ and RNA negative 10/2019  9. Tachyarrhythmia during recent L shoulder surgery for biopsies    RECOMMENDATION:  - I would not object to proceeding with joint re-implantation on L because I don't view that 1/5 cx with C acnes is a contraindication. Would suggest repeat biopsies for anaerobic and aerobic culture at the time of surgery.   Future antibiotic therapy will depend on what grows at surgery and also the plans for his R shoulder - he will need a drug holiday prior to any future surgery on the R.  If it would be possible to  perform R-sided biopsies at the time of a L implant re-insertion, this would be ideal, but unsure if this is feasible.  - agree with close cardiology f/u as he is at high risk for complications  - no further testing as recent CRP and blood cultures were normal/negative.    It is a pleasure to participate in Mr. Buchanan's care. Visit length 25 min, >50% clinical counseling/care coordination.    Ivory Hilton MD   of Medicine, Division of Infectious Diseases  [facility] [phone]             History of Present Illness: 11/13/19     Kobe Buchanan is a 59 year old male, right handed, with history significant for  hypertension, hyperlipidemia, chronic back pain, TAA repair 2016, bipolar disorder, atrial fibrillation (not on anticoagulation ), obstructive sleep apnea, obesity,  and type 2 diabetes mellitus (diet control),hx of gastric bypass 2005,  hx of C.diff, skin picking habit, chronic neck pain,  DJD, osteoarthritis,  s/p  Left total shoulder arthroplasty on 4/15/14  and right  total shoulder arthroplasty on 8/26/2014.     He presented to [facility] on 8/22/19 with right shoulder sharp and burning pain. He was hospitalized at [facility] 9/20/19-10/4/19 for confusion, weakness , acute bilateral shoulder pain and after a mechanical fall. found to have acute kidney injury requiring hemodialysis,  and MSSA bacteremia  with positive blood culture from 9/20/19 -9/22/19 . EEG was negative. XIOMY was negative for thrombus / vegetation .     He had  right shoulder irrigation and debridement , explant and replacement of spacer on 9/23/19. Intra-op cultures grew MSSA. On 10/23/19 visit to Ortho clinic, he had draining right shoulder wound. drainage has stopped. He was ultimately treated with 6 weeks of Cefazolin and  completed antibotic on 11/10/19. PICC was removed.     He was admitted on 11/12/19 for acute on chronic left shoulder pain x 2 days described as 10/10 burning/aching pain, worse with  movements. 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      I last saw him in clinic in 12/2019 prior to completion of cefazolin on 12/27. He continues to have discomfort in his L shoulder. ROM is very limited in L shoulder, less so in R. No angina. No dyspnea. No more skin picking.            Physical Exam:   Vitals were reviewed  Patient Vitals for the past 24 hrs:   BP Temp Temp src Pulse SpO2   02/12/20 0843 122/72 97.4  F (36.3  C) Oral 64 98 %     Physical Examination:  GENERAL:  well-developed, well-nourished, in bed in no acute distress.   HEENT:  Head is normocephalic, atraumatic   EYES:  Eyes have anicteric sclerae   LUNGS:  Clear to auscultation bilateral.   CARDIOVASCULAR:  RRR. I appreciate a 2/6 systolic murmur and the patient tells me this is chronic.  ABDOMEN:  Soft  SKIN: Biopsy site at L shoulder well-healed; bilateral anterior shoulder incisions are well-healed.           Laboratory Data:     Inflammatory Markers    Recent Labs   Lab Test 01/30/20  0557 01/15/20  1028 12/02/19  1248 11/12/19  1044 11/07/19  1545   SED  --  25* 57* 91*  --    CRP <2.9 <2.9 3.4 98.0* 17.5*     Hematology Studies    Recent Labs   Lab Test 01/31/20  0602 01/30/20  0557 01/15/20  1028 12/02/19  1248 11/18/19  0623 11/15/19  1517 11/14/19  0709 11/13/19  0648 11/12/19  1044 11/07/19  1545 01/10/18  0808   WBC 7.6 6.4 7.8 8.0 9.5  --  7.1 9.6 9.2 6.6 6.8   ANEU  --   --  5.6 5.7  --   --   --  6.5 6.8 4.1 4.4   AEOS  --   --  0.1 0.2  --   --   --  0.2 0.1 0.3 0.2   HGB 12.3* 12.7* 12.5* 9.1* 9.5* 9.1* 9.2* 9.5* 9.6* 8.7* 15.5   MCV 86 87 89 93 89  --  90 90 93 91 88    200 301 266 346  --  260 290 267 245 250     Metabolic Studies     Recent Labs   Lab Test 01/31/20  0602 01/30/20  0557 01/29/20  0547 01/28/20  1300 12/10/19 12/02/19  1248    137 136 136  --  136   POTASSIUM 4.5 4.1 4.3 4.4 5.2* 5.0   CHLORIDE 106 105 106 108  --  108   CO2 25 27 25 26  --  23   BUN 24 23 21 22  --   20   CR 1.11 1.05 1.01 1.18 1.13 0.92   GFRESTIMATED 72 77 81 67 >60 >90     Hepatic Studies    Recent Labs   Lab Test 12/10/19 12/02/19  1248 11/12/19  1044 11/07/19  1545 01/10/18  0808 03/01/17  1013 10/26/16  0804 10/11/16  0937   BILITOTAL  --  0.4 0.8  --  1.0 1.2 0.7 1.0   ALKPHOS  --  83 105 103 90 117 119 178*   ALBUMIN  --  3.3* 3.4  --  4.4 4.2 3.2* 3.0*   AST 17 13 16 13 20 21 20 86*   ALT <9 8 10 7 23 28 36 117*     Microbiology:  Culture Micro   Date Value Ref Range Status   01/29/2020 No growth  Final   01/29/2020 No growth  Final   01/28/2020 No anaerobes isolated  Final   01/28/2020 No growth  Final   01/28/2020 No anaerobes isolated  Final   01/28/2020 (A)  Final    Isolated in the broth only:    on day 10  Gram positive bacilli resembling diphtheroids  Organism failed to thrive for identification     01/28/2020 No anaerobes isolated  Final   01/28/2020 No growth  Final   01/28/2020 No growth  Final   01/28/2020 (A)  Final    On day 5, isolated in broth only:  Cutibacterium (Propionibacterium) acnes     01/28/2020 No anaerobes isolated  Final   01/28/2020 No growth  Final   11/15/2019 No anaerobes isolated  Final   11/15/2019 Light growth  Staphylococcus aureus   (A)  Final   11/15/2019 Susceptibility testing done on previous specimen  Final   11/15/2019 No anaerobes isolated  Final   11/15/2019 (A)  Final    Light growth  Staphylococcus aureus  Susceptibility testing done on previous specimen     11/15/2019 (A)  Final    On day 3, isolated in broth only:  Staphylococcus aureus  not isolated or reported on routine culture  Susceptibility testing done on previous specimen     11/15/2019 No growth  Final   11/15/2019 No anaerobes isolated  Final   11/15/2019 No growth  Final   11/15/2019 No anaerobes isolated  Final   11/15/2019 Light growth  Staphylococcus aureus   (A)  Final   11/15/2019 Susceptibility testing done on previous specimen  Final   11/13/2019 No growth  Final   11/13/2019 No growth   Final   11/13/2019 Light growth  Staphylococcus aureus   (A)  Final   11/13/2019   Final    Critical Value/Significant Value, preliminary result only, called to and read back by  Cyndee Burroughs RN at 1410 11.14.19 [id number]     11/13/2019 No anaerobes isolated  Final   11/12/2019 No growth  Final   07/12/2016 No growth  Final   04/06/2016 No growth  Final   03/04/2016 No growth  Final   01/28/2016 No growth  Final   09/30/2014 No growth  Final   04/02/2014 No growth  Final   02/24/2014 No growth  Final   02/24/2014 No growth  Final   02/11/2014 No growth  Final   08/03/2011 No growth  Final     Urine Studies    Recent Labs   Lab Test 09/15/16  1234 04/06/16  0955 03/04/16  0950 01/28/16  0729 08/21/14  1128   LEUKEST Negative Negative Negative Negative Negative   WBCU 1 0* 1* 4* <1     Hepatitis C Antibody   Date Value Ref Range Status   10/11/2016 (A) NR Final    Reactive   A reactive result indicates one of the following 1) current HCV infection 2)   past HCV infection that has resolved or 3) false positivity. The CDC recommends   that a reactive result should be followed by Nucleic acid testing for HCV RNA.  If HCV RNA is detected, that indicates current HCV infection. If HCV RNA is not   detected, that indicates either past, resolved HCV infection, or false HCV   antibody positivity.   Assay performance characteristics have not been established for newborns,   infants, and children

## 2020-02-12 NOTE — LETTER
2/12/2020       RE: Kobe Buchanan  [address]  [place] [address]     Dear Colleague,    Thank you for referring your patient, Kobe Buchanan, to the [facility] at [facility]. Please see a copy of my visit note below.    CHIEF CONCERN:   1.  Status post: Left shoulder arthroscopic biopsy/culture  1/28/2020 (spacer in place on both Left and Right)    HISTORY OF PRESENT ILLNESS: Mr. Buchanan is a pleasant 60 year-old male who is 2 weeks status post the above procedure. He is here today to review the results of his biopsy during his last procedure and decide on a future course of treatment. He is going to see his cardiologist on 2/24/2020 to evaluate his candidacy for surgical intervention to address his symptoms. He notes that he has some pain deep in his shoulder in a different location than his recent arthroscopic procedure.   He saw Dr. Hilton today who reviewed his latest cultures (C acnes on 1 of 5 cultures).   His intraoperative run of V-tach led to postop eval in the ED and admission to Cardiology. His EF is lower on latest eval. A Chest MRA and cardiac MRI have been ordered after which time Cardiology follow up in clinic is already scheduled (2/24/2020).    EXAM:  Pleasant adult male in no distress.  Respirations even and unlabored.  Left upper extremity: Incisions clean, dry, and intact. No erythema. No drainage. Sens intact Ax/Musc/Med/Rad/Uln nerves. Motor intact EPL, FPL, and Intrinsics. Shoulder range of motion not tested due to postop restrictions.    ASSESSMENT:  1. 2 weeks status post above procedure.    PLAN:  1. Reviewed the intraoperative findings.  2. See cardiologist to evaluate candidacy for surgery.  3. I will approve and order his VRS implant for his left shoulder and proceed with surgery once he has Cardiology approval.     Scribe Disclosure:  I, Alexander Vicente, am serving as a scribe to document services  personally performed by Analilia Aceves MD at this visit, based upon the provider's statements to me. All documentation has been reviewed by the aforementioned provider prior to being entered into the official medical record.     Again, thank you for allowing me to participate in the care of your patient.      Sincerely,    Analilia Aceves MD

## 2020-02-14 ENCOUNTER — TELEPHONE (OUTPATIENT)
Dept: FAMILY MEDICINE | Facility: CLINIC | Age: 60
End: 2020-02-14

## 2020-02-14 ENCOUNTER — OFFICE VISIT (OUTPATIENT)
Dept: PHARMACY | Facility: PHYSICIAN GROUP | Age: 60
End: 2020-02-14
Payer: COMMERCIAL

## 2020-02-14 ENCOUNTER — OFFICE VISIT (OUTPATIENT)
Dept: FAMILY MEDICINE | Facility: CLINIC | Age: 60
End: 2020-02-14
Payer: COMMERCIAL

## 2020-02-14 VITALS
SYSTOLIC BLOOD PRESSURE: 128 MMHG | WEIGHT: 190 LBS | OXYGEN SATURATION: 100 % | DIASTOLIC BLOOD PRESSURE: 77 MMHG | HEART RATE: 65 BPM | TEMPERATURE: 98.3 F | BODY MASS INDEX: 27.66 KG/M2 | RESPIRATION RATE: 18 BRPM

## 2020-02-14 DIAGNOSIS — I71.21 ASCENDING AORTIC ANEURYSM (H): ICD-10-CM

## 2020-02-14 DIAGNOSIS — I10 ESSENTIAL HYPERTENSION WITH GOAL BLOOD PRESSURE LESS THAN 130/85: ICD-10-CM

## 2020-02-14 DIAGNOSIS — E87.5 HYPERKALEMIA: ICD-10-CM

## 2020-02-14 DIAGNOSIS — M47.812 SPONDYLOSIS OF CERVICAL REGION WITHOUT MYELOPATHY OR RADICULOPATHY: ICD-10-CM

## 2020-02-14 DIAGNOSIS — I10 ESSENTIAL HYPERTENSION: ICD-10-CM

## 2020-02-14 DIAGNOSIS — F34.1 DYSTHYMIC DISORDER: ICD-10-CM

## 2020-02-14 DIAGNOSIS — I25.5 ISCHEMIC CARDIOMYOPATHY: ICD-10-CM

## 2020-02-14 DIAGNOSIS — I42.8 NICM (NONISCHEMIC CARDIOMYOPATHY) (H): Primary | ICD-10-CM

## 2020-02-14 DIAGNOSIS — D50.0 IRON DEFICIENCY ANEMIA DUE TO CHRONIC BLOOD LOSS: Primary | ICD-10-CM

## 2020-02-14 DIAGNOSIS — G89.4 CHRONIC PAIN SYNDROME: ICD-10-CM

## 2020-02-14 DIAGNOSIS — I48.91 ATRIAL FIBRILLATION, UNSPECIFIED TYPE (H): ICD-10-CM

## 2020-02-14 LAB
BUN SERPL-MCNC: 34 MG/DL (ref 7–30)
CALCIUM SERPL-MCNC: 9.7 MG/DL (ref 8.5–10.4)
CHLORIDE SERPLBLD-SCNC: 102 MMOL/L (ref 94–109)
CO2 SERPL-SCNC: 23 MMOL/L (ref 20–32)
CREAT SERPL-MCNC: 1.4 MG/DL (ref 0.8–1.5)
EGFR CALCULATED (BLACK REFERENCE): 66.7 ML/MIN
EGFR CALCULATED (NON BLACK REFERENCE): 55.1 ML/MIN
FERRITIN SERPL-MCNC: 54 NG/ML (ref 27–300)
GLUCOSE SERPL-MCNC: 109 MG/DL (ref 60–109)
HCT VFR BLD AUTO: 37.8 % (ref 40–53)
HEMOGLOBIN: 12.6 G/DL (ref 13.3–17.7)
MCH RBC QN AUTO: 29.4 PG (ref 26.5–35)
MCHC RBC AUTO-ENTMCNC: 33.3 G/DL (ref 32–36)
MCV RBC AUTO: 88.4 FL (ref 78–100)
PLATELET # BLD AUTO: 284 K/UL (ref 150–450)
POTASSIUM SERPL-SCNC: 6 MMOL/L (ref 3.4–5.3)
RBC # BLD AUTO: 4.28 M/UL (ref 4.4–5.9)
SODIUM SERPL-SCNC: 140 MMOL/L (ref 133–144)
WBC # BLD AUTO: 7.4 K/UL (ref 4–11)

## 2020-02-14 PROCEDURE — 99607 MTMS BY PHARM ADDL 15 MIN: CPT | Performed by: PHARMACIST

## 2020-02-14 PROCEDURE — 99606 MTMS BY PHARM EST 15 MIN: CPT | Performed by: PHARMACIST

## 2020-02-14 RX ORDER — FUROSEMIDE 40 MG
40 TABLET ORAL DAILY
Qty: 2 TABLET | Refills: 0 | Status: SHIPPED | OUTPATIENT
Start: 2020-02-14 | End: 2020-05-06

## 2020-02-14 RX ORDER — FUROSEMIDE 20 MG
40 TABLET ORAL ONCE
Status: DISCONTINUED | OUTPATIENT
Start: 2020-02-14 | End: 2020-02-14

## 2020-02-14 RX ORDER — AMLODIPINE AND BENAZEPRIL HYDROCHLORIDE 5; 20 MG/1; MG/1
1 CAPSULE ORAL DAILY
Qty: 180 CAPSULE | Refills: 3 | Status: SHIPPED | OUTPATIENT
Start: 2020-02-14 | End: 2020-08-05

## 2020-02-14 RX ORDER — NAPROXEN 500 MG/1
500 TABLET ORAL EVERY MORNING
COMMUNITY
Start: 2020-02-14 | End: 2020-03-23

## 2020-02-14 RX ORDER — SENNOSIDES A AND B 8.6 MG/1
TABLET, FILM COATED ORAL
Status: ON HOLD | COMMUNITY
Start: 2020-02-14 | End: 2020-08-18

## 2020-02-14 NOTE — Clinical Note
Future considerations: As likely will continue on Aspirin for secondary prophylaxis, consider addition of daily PPI to prevent marginal ulcers given gastric bypass history. For this reason and CV history, should try to limit / discontinue Naproxen, however given the patient report of benefit, I can imagine this is unlikely. Has clear indication for high intensity statin, he was not interested in making change when I talked to him but it has been prescribed.

## 2020-02-14 NOTE — PROGRESS NOTES
"SUBJECTIVE/OBJECTIVE:                Kobe Buchanan is a 60 year old male coming in for a transitions of care visit.  He was discharged from [facility] on 1/31/2020 for hypertensive urgency during shoulder biopsy. Was found to have NICM with reduced EF of 30-35%. Angiogram 1/30/2020.       Chief Complaint: ***.  Personal Healthcare Goals: ***    Medication Adherence/Access:     CV: Amlodipine-Benazepril BID, continues on Losartan. Appt on 2/24 w/ Dr. Zhou. Some dizziness with positional changes Spironolactone daily and Coreg BID. No bothersome side effects. Not taking Aspirin. Naproxen in the morning (history of gastric bypass) - 500 mg.   ***: ***  ***: ***  ***: ***  ***: ***    Today's Vitals: /77   Pulse 65   Temp 98.3  F (36.8  C)   Resp 18   Wt 86.2 kg (190 lb)   SpO2 100%   BMI 27.66 kg/m    ***    ASSESSMENT:                 {mtmpartdquestion:508646}    Medication Adherence/Access:     ***: ***  ***: ***  ***: ***  ***: ***  ***: ***    PLAN:              {Remind patient about MTM survey:080379}    Post Discharge Medication Reconciliation Status: {[facility] Med Rec (Provider):800024}.    ***    I spent {time:860571} with this patient today{MTMpartdbillingquestion:894768}. { :724575}. A copy of the visit note was provided to the patient's {[facility] chart:516138} provider.    Will follow up in ***.    The patient {GIVEN/NOT GIVEN:229110::\"was given\"} a summary of these recommendations as an after visit summary.    ***  "

## 2020-02-14 NOTE — NURSING NOTE
Chief Complaint   Patient presents with     Hospital F/U     follow up.      Medication Reconciliation     pharm D. Patient did not bring in meds with today.        /77   Pulse 65   Temp 98.3  F (36.8  C)   Resp 18   Wt 86.2 kg (190 lb)   SpO2 100%   BMI 27.66 kg/m        ~ Oc ACUNA (Chrissi) CMA [facility]  [phone]

## 2020-02-14 NOTE — PROGRESS NOTES
Preceptor Attestation:   Patient seen, evaluated and discussed with the resident. I personally viewed the EKG and agree with the interpretation documented by the resident. I have verified the content of the note, which accurately reflects my assessment of the patient and the plan of care.    Discussed options of repeat labs today either at [facility] or [facility].  Significant concerns of elevated potassium and importance of close f/u and response to lasix.    Supervising Physician:Demi Hardin MD  [facility]

## 2020-02-14 NOTE — TELEPHONE ENCOUNTER
Called [facility], I was informed they have already received clarification. No further action is needed at this time. Baylee RIGGS

## 2020-02-14 NOTE — TELEPHONE ENCOUNTER
[facility] phone call message- medication clarification/question:    Full Medication Name: Furosemide   Strength: 40 mg    Have you contacted your pharmacy about this refill request?     If  Yes,  which pharmacy?    When did you contact the pharmacy?    Additional comments/concerns from call to pharmacy:    Reason for call to clinic: Calling to clarify quantity for medication above he states he got an Rx for quantity of 2. Please call and advise.       Pharmacy confirmed as [id number] [[address] Yes    OK to leave a message on voice mail?     Primary language: English      needed? No    Call taken on February 14, 2020 at 9:57 AM by Tavo Fu

## 2020-02-14 NOTE — PROGRESS NOTES
"SUBJECTIVE/OBJECTIVE:                Kobe Buchanan is a 60 year old male coming in for a transitions of care visit.  He was discharged from [facility] on 1/31/2020 for hypertensive urgency during shoulder biopsy procedure.     NICM: Amlodipine-Benazepril BID, continues on Losartan. Reports long history of this combination, prescribed per specialist. Has upcoming appt on 2/24 w/ Dr. Zhou. Some dizziness with positional changes.Started Spironolactone daily and continues on Coreg BID. Not taking Aspirin, concern for interaction(?) with Naproxen which he takes every morning 500 mg. Reports takes 500 mg EC formulation due to history of gastric bypass. Continues on Simvastatin, has not yet picked up Atorvastatin. Did not understand reason for change.   Pain: No concerns for constipation, regimen of senna + docusate sufficient. Reported use of Fentanyl Q48 h + Oxycodone (60 mg/day) + Naproxen + Cyclobenzaprine PRN. Acetaminophen sparingly, as concerned for adverse effects of medicine.   Supplements: Vit B12 + Vit B complex + MVI + iron.   Depression / anxiety: Bupropion+ Clonazepam PRN    Today's Vitals:   BP Readings from Last 1 Encounters:   02/14/20 128/77     Pulse Readings from Last 1 Encounters:   02/14/20 65     Wt Readings from Last 1 Encounters:   02/14/20 190 lb (86.2 kg)     Ht Readings from Last 1 Encounters:   01/28/20 5' 9.5\" (1.765 m)     Estimated body mass index is 27.66 kg/m  as calculated from the following:    Height as of 1/28/20: 5' 9.5\" (1.765 m).    Weight as of an earlier encounter on 2/14/20: 190 lb (86.2 kg).    Temp Readings from Last 1 Encounters:   02/14/20 98.3  F (36.8  C)      Ref. Range 7/29/2014 12:21 11/2/2016 10:25 1/27/2017 16:36 3/1/2017 10:13 1/10/2018 08:08   Vitamin B12 Latest Ref Range: 193 - 986 pg/mL 467 349 513 526 567       ASSESSMENT:                 NICM: Atypical use of ACE-inhibitor + ARB, unclear if intentional or not. Started 4/2019. Not taking Aspirin as instructed " at discharge, started due to concern for ischemic injury. Same rationale for changing from Simvastatin to high intensity statin. NSAID use likely should be avoided given increased risk of CV disease; Naproxen with possibly better safety profile however concern for interaction with Aspirin.   Pain: Controlled per patient.   Supplements: Unclear gastric bypass history. Controlled as continues on MVI and last B12 values WNL. Consider future recheck as last >1 year ago.  Depression / anxiety: Controlled per patient report.     PLAN:                  Post Discharge Medication Reconciliation Status: discharge medications reconciled and changed, per note/orders (see AVS).    1. After education on rationale for change, patient not interested in changing to Atorvastatin at this time, will discuss with specialist at upcoming appointment.   2. Patient agreeable to start Aspirin. To help lessen drug interaction, recommend taking Aspirin 1-2 hours prior to Naproxen. Future should try to discontinue Naproxen, for both CV health and given history of gastric bypass. If continued, could consider addition of daily PPI (rather than PRN) due to increased risk of marginal ulcers in patients with history of gastric bypass.   3. BMP today with hyperkalemia - recommend to stop ACE-I or ARB and continue single agent + Spironolactone.     I spent 30 minutes with this patient today. Dr. Khan was provided the recommendations above  in clinic today and Dr. Hardin was available for supervision during this visit and is the authorizing prescriber for this visit through the pharmacist collaborative practice agreement. A copy of the visit note was provided to the patient's primary care provider.    Will follow up PRN per patient or provider request.    The patient was given a summary of these recommendations as an after visit summary.    Ashia Rivero, PharmD, CDE  [facility]  Phone: [phone]  February 14,  2020 at 10:35 AM

## 2020-02-15 ENCOUNTER — COMMUNICATION - HEALTHEAST (OUTPATIENT)
Dept: TELEHEALTH | Facility: CLINIC | Age: 60
End: 2020-02-15

## 2020-02-15 ENCOUNTER — AMBULATORY - HEALTHEAST (OUTPATIENT)
Dept: FAMILY MEDICINE | Facility: CLINIC | Age: 60
End: 2020-02-15

## 2020-02-15 ENCOUNTER — AMBULATORY - HEALTHEAST (OUTPATIENT)
Dept: LAB | Facility: HOSPITAL | Age: 60
End: 2020-02-15

## 2020-02-15 ENCOUNTER — HOSPITAL ENCOUNTER (INPATIENT)
Facility: CLINIC | Age: 60
LOS: 1 days | Discharge: HOME | DRG: 683 | End: 2020-02-16
Attending: EMERGENCY MEDICINE | Admitting: INTERNAL MEDICINE
Payer: COMMERCIAL

## 2020-02-15 DIAGNOSIS — E87.5 HYPERKALEMIA: ICD-10-CM

## 2020-02-15 DIAGNOSIS — I48.91 ATRIAL FIBRILLATION, UNSPECIFIED TYPE (H): ICD-10-CM

## 2020-02-15 DIAGNOSIS — I50.9 HEART FAILURE, UNSPECIFIED HF CHRONICITY, UNSPECIFIED HEART FAILURE TYPE (H): ICD-10-CM

## 2020-02-15 DIAGNOSIS — N17.9 ACUTE KIDNEY INJURY (H): ICD-10-CM

## 2020-02-15 DIAGNOSIS — N17.9 AKI (ACUTE KIDNEY INJURY) (H): Primary | ICD-10-CM

## 2020-02-15 LAB
ALBUMIN SERPL-MCNC: 5 G/DL (ref 3.4–5)
ALP SERPL-CCNC: 98 U/L (ref 40–150)
ALT SERPL W P-5'-P-CCNC: 19 U/L (ref 0–70)
ANION GAP SERPL CALCULATED.3IONS-SCNC: 6 MMOL/L (ref 5–18)
ANION GAP SERPL CALCULATED.3IONS-SCNC: 7 MMOL/L (ref 3–14)
AST SERPL W P-5'-P-CCNC: 18 U/L (ref 0–45)
BASOPHILS # BLD AUTO: 0.1 10E9/L (ref 0–0.2)
BASOPHILS NFR BLD AUTO: 0.7 %
BILIRUB SERPL-MCNC: 0.7 MG/DL (ref 0.2–1.3)
BUN SERPL-MCNC: 36 MG/DL (ref 8–22)
BUN SERPL-MCNC: 39 MG/DL (ref 7–30)
CALCIUM SERPL-MCNC: 10 MG/DL (ref 8.5–10.5)
CALCIUM SERPL-MCNC: 9.9 MG/DL (ref 8.5–10.1)
CHLORIDE BLD-SCNC: 101 MMOL/L (ref 98–107)
CHLORIDE SERPL-SCNC: 104 MMOL/L (ref 94–109)
CO2 SERPL-SCNC: 20 MMOL/L (ref 20–32)
CO2 SERPL-SCNC: 24 MMOL/L (ref 22–31)
CREAT SERPL-MCNC: 1.47 MG/DL (ref 0.66–1.25)
CREAT SERPL-MCNC: 1.69 MG/DL (ref 0.7–1.3)
DIFFERENTIAL METHOD BLD: NORMAL
EOSINOPHIL # BLD AUTO: 0.1 10E9/L (ref 0–0.7)
EOSINOPHIL NFR BLD AUTO: 1.6 %
ERYTHROCYTE [DISTWIDTH] IN BLOOD BY AUTOMATED COUNT: 13 % (ref 10–15)
GFR SERPL CREATININE-BSD FRML MDRD: 42 ML/MIN/1.73M2
GFR SERPL CREATININE-BSD FRML MDRD: 51 ML/MIN/{1.73_M2}
GLUCOSE BLD-MCNC: 93 MG/DL (ref 70–125)
GLUCOSE SERPL-MCNC: 100 MG/DL (ref 70–99)
HCT VFR BLD AUTO: 40.6 % (ref 40–53)
HGB BLD-MCNC: 13.5 G/DL (ref 13.3–17.7)
IMM GRANULOCYTES # BLD: 0 10E9/L (ref 0–0.4)
IMM GRANULOCYTES NFR BLD: 0.1 %
INR PPP: 1.1 (ref 0.86–1.14)
INTERPRETATION ECG - MUSE: NORMAL
LACTATE BLD-SCNC: 0.8 MMOL/L (ref 0.7–2)
LYMPHOCYTES # BLD AUTO: 1.4 10E9/L (ref 0.8–5.3)
LYMPHOCYTES NFR BLD AUTO: 20.7 %
MCH RBC QN AUTO: 28.7 PG (ref 26.5–33)
MCHC RBC AUTO-ENTMCNC: 33.3 G/DL (ref 31.5–36.5)
MCV RBC AUTO: 86 FL (ref 78–100)
MONOCYTES # BLD AUTO: 0.5 10E9/L (ref 0–1.3)
MONOCYTES NFR BLD AUTO: 7 %
NEUTROPHILS # BLD AUTO: 4.7 10E9/L (ref 1.6–8.3)
NEUTROPHILS NFR BLD AUTO: 69.9 %
NRBC # BLD AUTO: 0 10*3/UL
NRBC BLD AUTO-RTO: 0 /100
PLATELET # BLD AUTO: 275 10E9/L (ref 150–450)
POTASSIUM BLD-SCNC: 6.2 MMOL/L (ref 3.5–5)
POTASSIUM SERPL-SCNC: 5 MMOL/L (ref 3.4–5.3)
PROT SERPL-MCNC: 8.7 G/DL (ref 6.8–8.8)
RBC # BLD AUTO: 4.7 10E12/L (ref 4.4–5.9)
SODIUM SERPL-SCNC: 130 MMOL/L (ref 133–144)
SODIUM SERPL-SCNC: 131 MMOL/L (ref 136–145)
WBC # BLD AUTO: 6.7 10E9/L (ref 4–11)

## 2020-02-15 PROCEDURE — 93005 ELECTROCARDIOGRAM TRACING: CPT | Performed by: EMERGENCY MEDICINE

## 2020-02-15 PROCEDURE — 85610 PROTHROMBIN TIME: CPT | Performed by: EMERGENCY MEDICINE

## 2020-02-15 PROCEDURE — 25000128 H RX IP 250 OP 636: Performed by: EMERGENCY MEDICINE

## 2020-02-15 PROCEDURE — 80053 COMPREHEN METABOLIC PANEL: CPT | Performed by: EMERGENCY MEDICINE

## 2020-02-15 PROCEDURE — 25800030 ZZH RX IP 258 OP 636: Performed by: EMERGENCY MEDICINE

## 2020-02-15 PROCEDURE — 99285 EMERGENCY DEPT VISIT HI MDM: CPT | Mod: Z6 | Performed by: EMERGENCY MEDICINE

## 2020-02-15 PROCEDURE — 96376 TX/PRO/DX INJ SAME DRUG ADON: CPT | Performed by: EMERGENCY MEDICINE

## 2020-02-15 PROCEDURE — 99285 EMERGENCY DEPT VISIT HI MDM: CPT | Mod: 25 | Performed by: EMERGENCY MEDICINE

## 2020-02-15 PROCEDURE — 96374 THER/PROPH/DIAG INJ IV PUSH: CPT | Performed by: EMERGENCY MEDICINE

## 2020-02-15 PROCEDURE — 25000132 ZZH RX MED GY IP 250 OP 250 PS 637: Performed by: EMERGENCY MEDICINE

## 2020-02-15 PROCEDURE — 85025 COMPLETE CBC W/AUTO DIFF WBC: CPT | Performed by: EMERGENCY MEDICINE

## 2020-02-15 PROCEDURE — 96361 HYDRATE IV INFUSION ADD-ON: CPT | Performed by: EMERGENCY MEDICINE

## 2020-02-15 PROCEDURE — 83605 ASSAY OF LACTIC ACID: CPT | Performed by: EMERGENCY MEDICINE

## 2020-02-15 RX ORDER — OXYCODONE HYDROCHLORIDE 5 MG/1
10 TABLET ORAL ONCE
Status: COMPLETED | OUTPATIENT
Start: 2020-02-15 | End: 2020-02-15

## 2020-02-15 RX ORDER — HYDROMORPHONE HYDROCHLORIDE 1 MG/ML
0.5 INJECTION, SOLUTION INTRAMUSCULAR; INTRAVENOUS; SUBCUTANEOUS ONCE
Status: COMPLETED | OUTPATIENT
Start: 2020-02-15 | End: 2020-02-15

## 2020-02-15 RX ORDER — ACETAMINOPHEN 500 MG
1000 TABLET ORAL ONCE
Status: COMPLETED | OUTPATIENT
Start: 2020-02-15 | End: 2020-02-15

## 2020-02-15 RX ORDER — SODIUM CHLORIDE 9 MG/ML
INJECTION, SOLUTION INTRAVENOUS ONCE
Status: COMPLETED | OUTPATIENT
Start: 2020-02-15 | End: 2020-02-15

## 2020-02-15 RX ADMIN — HYDROMORPHONE HYDROCHLORIDE 0.5 MG: 1 INJECTION, SOLUTION INTRAMUSCULAR; INTRAVENOUS; SUBCUTANEOUS at 23:31

## 2020-02-15 RX ADMIN — OXYCODONE HYDROCHLORIDE 10 MG: 5 TABLET ORAL at 15:17

## 2020-02-15 RX ADMIN — ACETAMINOPHEN 1000 MG: 500 TABLET, FILM COATED ORAL at 19:39

## 2020-02-15 RX ADMIN — HYDROMORPHONE HYDROCHLORIDE 0.5 MG: 1 INJECTION, SOLUTION INTRAMUSCULAR; INTRAVENOUS; SUBCUTANEOUS at 19:38

## 2020-02-15 RX ADMIN — SODIUM CHLORIDE: 9 INJECTION, SOLUTION INTRAVENOUS at 17:17

## 2020-02-15 ASSESSMENT — MIFFLIN-ST. JEOR: SCORE: 1692.16

## 2020-02-15 NOTE — ED PROVIDER NOTES
[facility]ERG[facility]Y DEPARTMENT ([facility])  February 15, 2020    History     Chief Complaint   Patient presents with     Abnormal Labs     HPI  Kobe Buchanan is a 60 year old male with a history of NSTEMI, atrial fibrillation, NICM/heart failure (EF 30-35%), previous aortic dilation s/p repair 2016, bicuspid aortic valve, Hx of b/l total shoulder c/b septic joint, Hx of MSSA bacteremia, chronic pain, anxiety, et al., who presents to ED tonight for recheck/evaluation of abnormal labs (Cr and K).     Patient was admitted relatively recently (1/28/20-1/31/20) to the Cardiology service for NICM, continues management with diuretics. They have recently been increasing the diuretic, and even today had double dose. Feels great from a chest and breathing stand point (much improved from before), but is concerned he got too much diuretic now that he was told had increasing Cr. Was up to the 1.6's at clinic and was told K was also elevated to the 6's. He is symptomatically well w/ exception of chronic neck and shoulder pain, but he reports that this is absolutely chronic. Exactly like usual/previous (has chronic oxycodone Rx for such). No new features or components. No traumas or falls. No fevers, chills, myalgias or malaise. No CP or breathing symptoms. No LH, dizziness, syncope or near syncope. Normal UOP.No change to bowel/bladder. No abdominal, back or flank pain. No hematuria. No GI/ symptoms. No other symptoms or complaints at this time. Please see ROS for further details.    I have reviewed the Medications, Allergies, Past Medical and Surgical History, and Social History in the [employer] system.    PAST MEDICAL HISTORY  Past Medical History:   Diagnosis Date     Anxiety      Ascending aortic aneurysm (H)      Bicuspid aortic valve      BPPV (benign paroxysmal positional vertigo) 7/11/2014     Chronic narcotic use      Chronic neck pain      Chronic osteoarthritis      Degenerative joint disease       Depression      Hyperlipidemia 4/10/2012     Hypertension      Multiple stiff joints      Neck injuries      Obesity 2/9/2015     Skin picking habit      Sleep apnea     does not use cpap     PAST SURGICAL HISTORY  Past Surgical History:   Procedure Laterality Date     ARTHROPLASTY SHOULDER  4/15/2014    Procedure: Left Total Shoulder Arthroplasty ;  Surgeon: Analilia Aceves MD;  [facility]     ARTHROPLASTY SHOULDER Right 8/26/2014    Procedure: ARTHROPLASTY SHOULDER;  Surgeon: Analilia Aceves MD;  [facility]     ARTHROSCOPY SHOULDER WITH BIOPSY(IES) Left 1/28/2020    Procedure: Left shoulder arthroscopy and biopsy for culture;  Surgeon: Analilia Aceves MD;  [facility]     BYPASS GASTRIC [id number], LIVER BIOPSY, COMBINED  8/8/2005     C HAND/FINGER SURGERY UNLISTED       C SHOULDER SURG PROC UNLISTED       COLONOSCOPY  6/30/2014    Procedure: COMBINED COLONOSCOPY, SINGLE BIOPSY/POLYPECTOMY BY BIOPSY;  Surgeon: Chester Patton MD;  [facility]     CV CORONARY ANGIOGRAM  1/30/2020    Procedure: CV CORONARY ANGIOGRAM;  Surgeon: Néstor Walls MD;  [facility]     CYSTOSCOPY, BLADDER NECK CUTS, COMBINED N/A 7/18/2016    Procedure: COMBINED CYSTOSCOPY, BLADDER NECK CUTS;  Surgeon: Ritu Leslie MD;  [facility]     ESOPHAGOSCOPY, GASTROSCOPY, DUODENOSCOPY (EGD), COMBINED  6/30/2014    Procedure: COMBINED ESOPHAGOSCOPY, GASTROSCOPY, DUODENOSCOPY (EGD), BIOPSY SINGLE OR MULTIPLE;  Surgeon: Chester Patton MD;  [facility]: [facility]     EXCISE MASS FINGER  6/14/2011    Procedure:EXCISE MASS FINGER; Middle Flexor Cyst; Surgeon:SHAYY RUSSELL; Location:[facility] OR     HAND SURGERY      excision of tendon cyst on left hand     [facility] VASCULAR SURGERY PROCEDURE UNLIST       IR FINE NEEDLE ASPIRATION W ULTRASOUND  11/13/2019     IR PICC PLACEMENT > 5 YRS OF AGE  11/19/2019     LASER HOLMIUM LITHOTRIPSY BLADDER N/A 10/15/2014     Procedure: LASER HOLMIUM LITHOTRIPSY BLADDER;  Surgeon: Sahil Taveras MD;  [facility]: [facility]     LASER KTP GREEN LIGHT PHOTOSELECTIVE VAPORIZATION PROSTATE  1/23/2014    Procedure: LASER KTP GREEN LIGHT PHOTOSELECTIVE VAPORIZATION PROSTATE;  Greenlight Photovaporization Of Prostate  ;  Surgeon: Sahil Taveras MD;  Location: [facility]     RELEASE TRIGGER FINGER Right 3/31/2017    Procedure: RELEASE TRIGGER FINGER;  Surgeon: Juan Carlos Blunt MD;  [facility]     REMOVE HARDWARE ARTHROPLASTY SHOULDER. I&D, PLACE ANTIBIOTIC CEMENT BE Left 11/15/2019    Procedure: Explantation of left total shoulder arthroplasty, irrigation and debridement, and placement of antibiotic spacer;  Surgeon: Analilia Aceves MD;  [facility]     REPAIR ANEURYSM ASCENDING AORTA N/A 10/4/2016    Procedure: REPAIR ANEURYSM ASCENDING AORTA;  Surgeon: Mckenzie Townsend MD;  [facility]     TURP  2014     FAMILY HISTORY  Family History   Problem Relation Age of Onset     Arthritis Other      Gastrointestinal Disease Other      Cardiovascular Father         aortic aneurysm     Arrhythmia Father      Nephrolithiasis Father      Sleep Apnea Father      Anxiety Disorder Father      Depression Father      Hypertension Father      Obesity Father      Hyperlipidemia Father      Coronary Artery Disease Father         history of MI and stent     Low Back Problems Father      Spine Problems Father      Anxiety Disorder Mother      Hypertension Mother      Osteoporosis Mother      Obesity Mother      Hyperlipidemia Mother      Low Back Problems Mother      Anxiety Disorder Sister      Hypertension Sister      Osteoporosis Sister      Obesity Sister      Hyperlipidemia Sister      Low Back Problems Sister      Spine Problems Sister      Anxiety Disorder Sister      Depression Sister      Hypertension Sister      Osteoporosis Sister      Obesity Sister      Hyperlipidemia Sister      Low Back Problems Sister      SOCIAL  HISTORY  Social History     Tobacco Use     Smoking status: Former Smoker     Packs/day: 0.50     Years: 6.00     Pack years: 3.00     Types: Cigarettes     Start date: 1977     Last attempt to quit: 1983     Years since quittin.4     Smokeless tobacco: Never Used     Tobacco comment: quit 35 years ago   Substance Use Topics     Alcohol use: No     Alcohol/week: 0.0 standard drinks     MEDICATIONS  No current facility-administered medications for this encounter.      Current Outpatient Medications   Medication     acetaminophen (TYLENOL) 325 MG tablet     amLODIPine-benazepril (LOTREL) 5-20 MG capsule     amoxicillin (AMOXIL) 500 MG capsule     aspirin (ASA) 81 MG chewable tablet     atorvastatin (LIPITOR) 40 MG tablet     buPROPion (WELLBUTRIN SR) 200 MG 12 hr tablet     carvedilol (COREG) 25 MG tablet     clonazePAM (KLONOPIN) 0.5 MG tablet     Cyanocobalamin 5000 MCG SUBL     cyclobenzaprine (FLEXERIL) 10 MG tablet     docusate sodium (COLACE) 100 MG capsule     Fe Heme Polypeptide-folic acid (PROFERRIN-FORTE) 12-1 MG TABS     fentaNYL (DURAGESIC) 75 mcg/hr 72 hr patch     fluocinonide (LIDEX) 0.05 % external ointment     furosemide (LASIX) 40 MG tablet     losartan (COZAAR) 25 MG tablet     Multiple Minerals-Vitamins (CALCIUM CITRATE-MAG-MINERALS) TABS     multivitamin w/minerals (THERA-VIT-M) tablet     naloxone (NARCAN) nasal spray     naproxen (NAPROSYN DR) 500 MG EC tablet     oxyCODONE IR (ROXICODONE) 10 MG tablet     pantoprazole (PROTONIX) 40 MG EC tablet     senna (SENOKOT) 8.6 MG tablet     simvastatin (ZOCOR) 10 MG tablet     spironolactone (ALDACTONE) 25 MG tablet     tacrolimus (PROTOPIC) 0.1 % ointment     vitamin B-Complex     ALLERGIES  Allergies   Allergen Reactions     Ciprofloxacin      History of aortic aneurysms     Tape [Adhesive Tape] Blisters     Blistering - please use paper tape         Review of Systems   Constitutional: Negative for fatigue and fever.   Respiratory:  "Negative for cough and shortness of breath.    Cardiovascular: Negative for chest pain and palpitations.   Gastrointestinal: Negative for abdominal pain, nausea and vomiting.   Musculoskeletal: Positive for neck pain (chronic, unchanged). Negative for joint swelling and neck stiffness.        Chronic shoulders   Skin: Negative for color change, pallor, rash and wound.   Neurological: Negative for seizures, syncope, weakness, light-headedness, numbness and headaches.   All other systems reviewed and are negative.      Physical Exam   BP: (!) 147/73  Heart Rate: 74  Temp: 98  F (36.7  C)  Resp: 16  Height: 177.8 cm (5' 10\")  Weight: 87.1 kg (192 lb)  SpO2: 100 %      Physical Exam     CONSTITUTIONAL: Well-developed and well-nourished. Awake and alert. Non-toxic appearance. No acute distress.   HENT:   - Head: Normocephalic and atraumatic.   - Ears: Hearing and external ear grossly normal.   - Nose: Nose normal. No rhinorrhea. No epistaxis.   - Mouth/Throat: MMM  EYES: Conjunctivae and lids are normal. No scleral icterus.   NECK: Normal range of motion and phonation normal. Neck supple.  No tracheal deviation, no stridor. No edema or erythema noted.  CARDIOVASCULAR: Normal rate, regular rhythm, systolic murmur heard best at RUSB. No other abnormal heart sounds appreciated.   PULMONARY/CHEST: Normal work of breathing. No accessory muscle usage or stridor. No respiratory distress.  No appreciable abnormal breath sounds.  ABDOMEN: Soft, non-distended. No tenderness. No rigidity, rebound or guarding.   MUSCULOSKELETAL: Extremities warm and seemingly well perfused. No edema or calf tenderness.  NEUROLOGIC: Awake, alert. Not disoriented. He displays no atrophy and no tremor. Normal tone. No seizure activity. GCS 15  SKIN: Skin is warm and dry. No rash noted. No diaphoresis. No pallor.   PSYCHIATRIC: Normal mood and affect. Speech and behavior normal. Thought processes linear. Cognition and memory are normal.     Assessments " & Plan (with Medical Decision Making)   IMPRESSION: 60 year old male w/ PMH notable for NSTEMI, atrial fibrillation, NICM/heart failure (EF 30-35%), previous aortic dilation s/p repair 2016, bicuspid aortic valve, Hx of b/l total shoulder c/b septic joint, Hx of MSSA bacteremia, chronic pain, anxiety, et al., who presents to ED tonight for recheck/evaluation of abnormal labs (Cr and K).  Clinically, patient appears nontoxic, NAD . Vitals WNL. Otherwise on examination, no acute findings. Has systolic murmur but not new.     Ddx includes, but not limited to, Increasing Cr in setting of overdiuresis (pre-renal), less likely direct kidney injury of urologic obstructive process. No other sx's for infection, stone, et al.     PLAN: Labs,   - Risks/benefits of pursuing imaging review and accepted.     RESULTS:  - Labs: Cr 1.47 (up from 1.01, prior to yesterday), Na 130, normal lactate, no acute findings on CBC    INTERVENTIONS:   - PO Oxycodone (home dose)  - IV dilaudid  - PO Tylenol    RE-EVALUATION:  - The patient's symptoms were somewhat improved during ED stay.   - Pt otherwise continues to do well here in the ED, no acute issues or apparent concerning changes in vitals or clinical appearance.    DISCUSSIONS:  - w/ Cards 1: Reviewed case with the Cards 1 attending (had discussed w/ EDOU who requested we speak with a Cards service to see if they'd be willing to admit and were unable to reach the Cards 2 attenging). Reviewed w/ Cards 1 attending, Dr. Jack who accepted the patient. Unable to reach Cards resident with initial page, awaiting call back  - w/ Patient: I have reviewed the available findings, plan with the patient. He expressed understanding and agreement with this plan. All questions answered to the best of our ability at this time.     DISPOSITION/PLANNING:  - IMPRESSION: Mild NEMESIO in setting of recent HF admission and increased diuresis  - DISPOSITION:  Admit to Cardiology (Likely start on Obs status)  -  RECOMMENDATIONS: Serial Cr, monitor fluid status/renal function, likely discharge tomorrow if labs improving.     ______________________________________________________________________            2/15/2020   [facility]     Mckenzie Stevenson MD  02/17/20 0557

## 2020-02-15 NOTE — ED TRIAGE NOTES
Pt arrived via car from [facility] lab area with an elevated creatinine of 1/69 and K 6.2. Per pt he had elevated labs yesterday as well. Pt reports he was on antibiotics recently for a staph infection in bliateral shoulders.

## 2020-02-16 ENCOUNTER — PATIENT OUTREACH (OUTPATIENT)
Dept: CARE COORDINATION | Facility: CLINIC | Age: 60
End: 2020-02-16

## 2020-02-16 VITALS
OXYGEN SATURATION: 97 % | TEMPERATURE: 97.6 F | SYSTOLIC BLOOD PRESSURE: 146 MMHG | RESPIRATION RATE: 14 BRPM | DIASTOLIC BLOOD PRESSURE: 79 MMHG | HEART RATE: 76 BPM | HEIGHT: 70 IN | WEIGHT: 179.2 LBS | BODY MASS INDEX: 25.65 KG/M2

## 2020-02-16 PROBLEM — N17.9 AKI (ACUTE KIDNEY INJURY) (H): Status: ACTIVE | Noted: 2020-02-16

## 2020-02-16 LAB
ANION GAP SERPL CALCULATED.3IONS-SCNC: 8 MMOL/L (ref 3–14)
BUN SERPL-MCNC: 30 MG/DL (ref 7–30)
CALCIUM SERPL-MCNC: 9.3 MG/DL (ref 8.5–10.1)
CHLORIDE SERPL-SCNC: 106 MMOL/L (ref 94–109)
CO2 SERPL-SCNC: 22 MMOL/L (ref 20–32)
CREAT SERPL-MCNC: 1.21 MG/DL (ref 0.66–1.25)
ERYTHROCYTE [DISTWIDTH] IN BLOOD BY AUTOMATED COUNT: 13 % (ref 10–15)
GFR SERPL CREATININE-BSD FRML MDRD: 65 ML/MIN/{1.73_M2}
GLUCOSE SERPL-MCNC: 91 MG/DL (ref 70–99)
HCT VFR BLD AUTO: 39.1 % (ref 40–53)
HGB BLD-MCNC: 13 G/DL (ref 13.3–17.7)
MCH RBC QN AUTO: 28.3 PG (ref 26.5–33)
MCHC RBC AUTO-ENTMCNC: 33.2 G/DL (ref 31.5–36.5)
MCV RBC AUTO: 85 FL (ref 78–100)
PLATELET # BLD AUTO: 256 10E9/L (ref 150–450)
POTASSIUM SERPL-SCNC: 4.4 MMOL/L (ref 3.4–5.3)
RBC # BLD AUTO: 4.59 10E12/L (ref 4.4–5.9)
SODIUM SERPL-SCNC: 136 MMOL/L (ref 133–144)
WBC # BLD AUTO: 6.7 10E9/L (ref 4–11)

## 2020-02-16 PROCEDURE — 25000132 ZZH RX MED GY IP 250 OP 250 PS 637: Performed by: INTERNAL MEDICINE

## 2020-02-16 PROCEDURE — 36415 COLL VENOUS BLD VENIPUNCTURE: CPT | Performed by: INTERNAL MEDICINE

## 2020-02-16 PROCEDURE — 80048 BASIC METABOLIC PNL TOTAL CA: CPT | Performed by: INTERNAL MEDICINE

## 2020-02-16 PROCEDURE — 25000132 ZZH RX MED GY IP 250 OP 250 PS 637: Performed by: STUDENT IN AN ORGANIZED HEALTH CARE EDUCATION/TRAINING PROGRAM

## 2020-02-16 PROCEDURE — 12000004 ZZH R&B IMCU UMMC

## 2020-02-16 PROCEDURE — 85027 COMPLETE CBC AUTOMATED: CPT | Performed by: INTERNAL MEDICINE

## 2020-02-16 PROCEDURE — 99220 ZZC INITIAL OBSERVATION CARE,LEVL III: CPT | Mod: AI | Performed by: INTERNAL MEDICINE

## 2020-02-16 RX ORDER — DOCUSATE SODIUM 100 MG/1
100 CAPSULE, LIQUID FILLED ORAL 2 TIMES DAILY PRN
Status: DISCONTINUED | OUTPATIENT
Start: 2020-02-16 | End: 2020-02-16 | Stop reason: HOSPADM

## 2020-02-16 RX ORDER — AMOXICILLIN 250 MG
1 CAPSULE ORAL 2 TIMES DAILY
Status: DISCONTINUED | OUTPATIENT
Start: 2020-02-16 | End: 2020-02-16 | Stop reason: HOSPADM

## 2020-02-16 RX ORDER — OXYCODONE HYDROCHLORIDE 10 MG/1
10 TABLET ORAL EVERY 4 HOURS PRN
Status: DISCONTINUED | OUTPATIENT
Start: 2020-02-16 | End: 2020-02-16 | Stop reason: HOSPADM

## 2020-02-16 RX ORDER — OXYCODONE HYDROCHLORIDE 10 MG/1
10 TABLET ORAL EVERY 6 HOURS PRN
Status: DISCONTINUED | OUTPATIENT
Start: 2020-02-16 | End: 2020-02-16

## 2020-02-16 RX ORDER — BUPROPION HYDROCHLORIDE 200 MG/1
200 TABLET, EXTENDED RELEASE ORAL 2 TIMES DAILY
Status: DISCONTINUED | OUTPATIENT
Start: 2020-02-16 | End: 2020-02-16

## 2020-02-16 RX ORDER — AMOXICILLIN 250 MG
2 CAPSULE ORAL 2 TIMES DAILY
Status: DISCONTINUED | OUTPATIENT
Start: 2020-02-16 | End: 2020-02-16 | Stop reason: HOSPADM

## 2020-02-16 RX ORDER — LIDOCAINE 40 MG/G
CREAM TOPICAL
Status: DISCONTINUED | OUTPATIENT
Start: 2020-02-16 | End: 2020-02-16 | Stop reason: HOSPADM

## 2020-02-16 RX ORDER — AMOXICILLIN 250 MG
2 CAPSULE ORAL 2 TIMES DAILY
Status: DISCONTINUED | OUTPATIENT
Start: 2020-02-16 | End: 2020-02-16

## 2020-02-16 RX ORDER — SIMVASTATIN 10 MG
10 TABLET ORAL AT BEDTIME
Status: DISCONTINUED | OUTPATIENT
Start: 2020-02-16 | End: 2020-02-16

## 2020-02-16 RX ORDER — CARVEDILOL 25 MG/1
25 TABLET ORAL 2 TIMES DAILY WITH MEALS
Status: DISCONTINUED | OUTPATIENT
Start: 2020-02-16 | End: 2020-02-16 | Stop reason: HOSPADM

## 2020-02-16 RX ORDER — BUPROPION HYDROCHLORIDE 200 MG/1
200 TABLET, EXTENDED RELEASE ORAL 2 TIMES DAILY
Status: DISCONTINUED | OUTPATIENT
Start: 2020-02-16 | End: 2020-02-16 | Stop reason: HOSPADM

## 2020-02-16 RX ORDER — CLONAZEPAM 0.5 MG/1
0.5 TABLET ORAL 3 TIMES DAILY PRN
Status: DISCONTINUED | OUTPATIENT
Start: 2020-02-16 | End: 2020-02-16 | Stop reason: HOSPADM

## 2020-02-16 RX ORDER — PANTOPRAZOLE SODIUM 40 MG/1
40 TABLET, DELAYED RELEASE ORAL DAILY PRN
Status: DISCONTINUED | OUTPATIENT
Start: 2020-02-16 | End: 2020-02-16 | Stop reason: HOSPADM

## 2020-02-16 RX ORDER — AMOXICILLIN 250 MG
1 CAPSULE ORAL 2 TIMES DAILY
Status: DISCONTINUED | OUTPATIENT
Start: 2020-02-16 | End: 2020-02-16

## 2020-02-16 RX ORDER — CYCLOBENZAPRINE HCL 10 MG
10 TABLET ORAL 3 TIMES DAILY PRN
Status: DISCONTINUED | OUTPATIENT
Start: 2020-02-16 | End: 2020-02-16 | Stop reason: HOSPADM

## 2020-02-16 RX ORDER — ATORVASTATIN CALCIUM 40 MG/1
40 TABLET, FILM COATED ORAL EVERY EVENING
Status: DISCONTINUED | OUTPATIENT
Start: 2020-02-16 | End: 2020-02-16 | Stop reason: HOSPADM

## 2020-02-16 RX ORDER — CARVEDILOL 25 MG/1
25 TABLET ORAL 2 TIMES DAILY WITH MEALS
Status: DISCONTINUED | OUTPATIENT
Start: 2020-02-16 | End: 2020-02-16

## 2020-02-16 RX ORDER — ASPIRIN 81 MG/1
81 TABLET ORAL DAILY
Status: DISCONTINUED | OUTPATIENT
Start: 2020-02-16 | End: 2020-02-16 | Stop reason: HOSPADM

## 2020-02-16 RX ORDER — ACETAMINOPHEN 325 MG/1
650 TABLET ORAL EVERY 4 HOURS PRN
Status: DISCONTINUED | OUTPATIENT
Start: 2020-02-16 | End: 2020-02-16 | Stop reason: HOSPADM

## 2020-02-16 RX ORDER — OXYCODONE HYDROCHLORIDE 10 MG/1
10 TABLET ORAL EVERY 4 HOURS PRN
Status: DISCONTINUED | OUTPATIENT
Start: 2020-02-16 | End: 2020-02-16 | Stop reason: DRUGHIGH

## 2020-02-16 RX ORDER — NALOXONE HYDROCHLORIDE 0.4 MG/ML
.1-.4 INJECTION, SOLUTION INTRAMUSCULAR; INTRAVENOUS; SUBCUTANEOUS
Status: DISCONTINUED | OUTPATIENT
Start: 2020-02-16 | End: 2020-02-16 | Stop reason: HOSPADM

## 2020-02-16 RX ORDER — ASPIRIN 81 MG/1
81 TABLET, CHEWABLE ORAL DAILY
Status: DISCONTINUED | OUTPATIENT
Start: 2020-02-16 | End: 2020-02-16

## 2020-02-16 RX ORDER — FENTANYL 75 UG/1
75 PATCH TRANSDERMAL
Status: DISCONTINUED | OUTPATIENT
Start: 2020-02-16 | End: 2020-02-16 | Stop reason: HOSPADM

## 2020-02-16 RX ADMIN — ATORVASTATIN CALCIUM 40 MG: 40 TABLET, FILM COATED ORAL at 02:43

## 2020-02-16 RX ADMIN — ASPIRIN 81 MG CHEWABLE TABLET 81 MG: 81 TABLET CHEWABLE at 05:14

## 2020-02-16 RX ADMIN — DOCUSATE SODIUM 100 MG: 100 CAPSULE, LIQUID FILLED ORAL at 04:41

## 2020-02-16 RX ADMIN — CARVEDILOL 25 MG: 25 TABLET, FILM COATED ORAL at 02:43

## 2020-02-16 RX ADMIN — OXYCODONE HYDROCHLORIDE 10 MG: 10 TABLET ORAL at 04:41

## 2020-02-16 RX ADMIN — BUPROPION HYDROCHLORIDE 200 MG: 200 TABLET, EXTENDED RELEASE ORAL at 02:43

## 2020-02-16 RX ADMIN — SENNOSIDES AND DOCUSATE SODIUM 2 TABLET: 8.6; 5 TABLET ORAL at 02:43

## 2020-02-16 RX ADMIN — CYCLOBENZAPRINE HYDROCHLORIDE 10 MG: 10 TABLET, FILM COATED ORAL at 09:10

## 2020-02-16 RX ADMIN — OXYCODONE HYDROCHLORIDE 10 MG: 10 TABLET ORAL at 09:39

## 2020-02-16 RX ADMIN — FENTANYL 1 PATCH: 75 PATCH, EXTENDED RELEASE TRANSDERMAL at 05:15

## 2020-02-16 RX ADMIN — CLONAZEPAM 0.5 MG: 0.5 TABLET ORAL at 00:39

## 2020-02-16 RX ADMIN — OXYCODONE HYDROCHLORIDE 10 MG: 5 TABLET ORAL at 00:40

## 2020-02-16 ASSESSMENT — ACTIVITIES OF DAILY LIVING (ADL)
ADLS_ACUITY_SCORE: 12
ADLS_ACUITY_SCORE: 12

## 2020-02-16 ASSESSMENT — MIFFLIN-ST. JEOR: SCORE: 1634.1

## 2020-02-16 ASSESSMENT — PAIN DESCRIPTION - DESCRIPTORS: DESCRIPTORS: CONSTANT

## 2020-02-16 NOTE — ED NOTES
[facility], [place]   ED Nurse to Floor Handoff     Kobe Buchanan is a 60 year old male who speaks English and lives alone,  in a home  They arrived in the ED by ambulance from home    ED Chief Complaint: Abnormal Labs    ED Dx;   Final diagnoses:   Acute kidney injury (H)         Needed?: No    Allergies:   Allergies   Allergen Reactions     Ciprofloxacin      History of aortic aneurysms     Tape [Adhesive Tape] Blisters     Blistering - please use paper tape   .  Past Medical Hx:   Past Medical History:   Diagnosis Date     Anxiety      Ascending aortic aneurysm (H)      Bicuspid aortic valve      BPPV (benign paroxysmal positional vertigo) 7/11/2014     Chronic narcotic use      Chronic neck pain      Chronic osteoarthritis      Degenerative joint disease      Depression      Hyperlipidemia 4/10/2012     Hypertension      Multiple stiff joints      Neck injuries      Obesity 2/9/2015     Skin picking habit      Sleep apnea     does not use cpap      Baseline Mental status: WDL  Current Mental Status changes: at basesline    Infection present or suspected this encounter: no  Sepsis suspected: No  Isolation type: No active isolations     Activity level - Baseline/Home:  Independent  Activity Level - Current:   Independent    Bariatric equipment needed?: No    In the ED these meds were given:   Medications   HYDROmorphone (PF) (DILAUDID) injection 0.5 mg (has no administration in time range)   acetaminophen (TYLENOL) tablet 1,000 mg (has no administration in time range)   oxyCODONE (ROXICODONE) tablet 10 mg (10 mg Oral Given 2/15/20 1517)   sodium chloride 0.9% infusion ( Intravenous New Bag 2/15/20 1717)       Drips running?  No    Home pump  No    Current LDAs  Peripheral IV 02/15/20 Right Lower forearm (Active)   Number of days: 0       Right Radial Interventional Procedure Access (Active)   Number of days: 16       Urethral Catheter Latex 22 fr (Active)   Number of  "days: 1949       Urethral Catheter Non-latex;Silver coated;Double-lumen;Straight-tip 24 fr (Active)   Number of days: 1307       Incision/Surgical Site 10/15/14 Penis (Active)   Number of days: 1949       Incision/Surgical Site 10/04/16 Chest (Active)   Number of days: 1229       Incision/Surgical Site 03/31/17 Right Hand (Active)   Number of days: 1051       Incision/Surgical Site 11/15/19 Left Shoulder (Active)   Number of days: 92       Incision/Surgical Site 01/28/20 Left Shoulder (Active)   Number of days: 18       Labs results:   Labs Ordered and Resulted from Time of ED Arrival Up to the Time of Departure from the ED   COMPREHENSIVE METABOLIC PANEL - Abnormal; Notable for the following components:       Result Value    Sodium 130 (*)     Glucose 100 (*)     Urea Nitrogen 39 (*)     Creatinine 1.47 (*)     GFR Estimate 51 (*)     GFR Estimate If Black 60 (*)     All other components within normal limits   CBC WITH PLATELETS DIFFERENTIAL   INR   LACTIC ACID WHOLE BLOOD   NURSING DRAW AND HOLD       Imaging Studies: No results found for this or any previous visit (from the past 24 hour(s)).    Recent vital signs:   /82   Pulse 72   Temp 98  F (36.7  C) (Oral)   Resp 15   Ht 1.778 m (5' 10\")   Wt 87.1 kg (192 lb)   SpO2 98%   BMI 27.55 kg/m      Douglas Coma Scale Score: 15 (02/15/20 1407)       Cardiac Rhythm: Normal Sinus  Pt needs tele? See epic  Skin/wound Issues: None    Code Status: Full Code    Pain control: fair    Nausea control: good    Abnormal labs/tests/findings requiring intervention: see epic    Family present during ED course? No   Family Comments/Social Situation comments: none    Tasks needing completion: None    Fauzia Moore, RN  asc -- [facility] ED  [phone] [facility]  [phone] [facility]      "

## 2020-02-16 NOTE — DISCHARGE SUMMARY
[facility]    Discharge Summary: Cardiology Service    Admission Date: 2/15/2020  Discharge Date: 02/16/20    Discharge Diagnoses:  1. Acute kidney injury   2. Hyperkalemia   3. Non-ischemic cardiomyopathy (EF 30-35%)  4. History of right shoulder MSSA septic arthritis c/b MSSA bacteremia  5. Chronic pain   6. 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    Hospital Course by Diagnosis:  #Acute Kidney Injury  #Hyperkalemia  Presented to the emergency department with Cr 1.6 (baseline 1.0) and K 6.2. Likely secondary to medication side effect as he was taking Benazapril, Losartan and Spironolactone after his recent discharge from the hospital. Also received IV lasix on the day prior to admission, which may have contributed to his NEMESIO. No EKG changes. Held above-mentioned medications during this admission with resolution of hyperkalemia and NEMESIO.    - Discontinue losartan and spironolactone on discharge  - Resume amlodipine-benazepril   - PCP follow-up with repeat BMP in 2-3 days   - Follow up with cardiologist Dr. Zhou as already scheduled on 02/24/20. May consider restarting spironolactone at that time if potassium wnl.      #Non-ischemic cardiomyopathy (EF 30-35%)  Appears well compensated currently. Wt 192. No edema, orthopnea. Ischemic workup unremarkable during previous hospitalization. Plan to get an CMR as an outpatient.   - Continue ASA 81mg, carvedilol 25mg bid, simvastatin 10 mg  - As above, will discontinue losartan and spironolactone. Continue amlodipine-benazepril.      #History of b/l  total shoulders  #History of right shoulder MSSA septic arthritis c/b MSSA bacteremia  #Chronic pain  Not currently on antibiotics. No signs/sx of infection at this time. CTM  - PTA oxycodone 10mg q4h prn, fentanyl patch 75 mcg q72h, Tylenol prn     #Anxiety  - PTA klonipin 0.5mg tid prn  - PTA Wellbutrin      Condition on discharge  Temp:  [97.7  F (36.5  C)-98  F (36.7  C)] 97.8  F (36.6  C)  Pulse:  [72-76] 76  Heart Rate:  [72-79] 78  Resp:  [9-18] 16  BP: (109-166)/(67-86) 109/67  SpO2:  [98 %-100 %] 98 %  GEN: pleasant, no acute distress  HEENT: no icterus  CV: RRR, normal s1/s2, II/VI SCOTT, no heave. JVP not elevated.   CHEST: clear to ausculation bilaterally, no rales or wheezing  ABD: soft, non-tender, normal active bowel sounds  EXTR: pulses 2+. No clubbing, cyanosis or edema.   NEURO: alert oriented, speech fluent/appropriate, motor grossly nonfocal    Medication Changes:  - STOP losartan and spironolactone  - Continue amlodipine-benazepril     Discharge medications:   Current Discharge Medication List      CONTINUE these medications which have NOT CHANGED    Details   acetaminophen (TYLENOL) 325 MG tablet Take 2 tablets (650 mg) by mouth every 4 hours as needed for mild pain  Qty: 100 tablet, Refills: 0    Associated Diagnoses: S/P shoulder replacement, left      amLODIPine-benazepril (LOTREL) 5-20 MG capsule Take 1 capsule by mouth daily  Qty: 180 capsule, Refills: 3    Associated Diagnoses: Essential hypertension with goal blood pressure less than 130/85; Essential hypertension; Ascending aortic aneurysm (H); Atrial fibrillation, unspecified type (H)      buPROPion (WELLBUTRIN SR) 200 MG 12 hr tablet TAKE 1 TABLET BY MOUTH 2 TIMES DAILY  Qty: 180 tablet, Refills: 3    Associated Diagnoses: Dysthymic disorder      carvedilol (COREG) 25 MG tablet Take 1 tablet (25 mg) by mouth 2 times daily (with meals)  Qty: 180 tablet, Refills: 3    Associated Diagnoses: Essential hypertension with goal blood pressure  less than 130/85      clonazePAM (KLONOPIN) 0.5 MG tablet Take 1 tablet (0.5 mg) by mouth 3 times daily as needed for anxiety  Qty: 90 tablet, Refills: 5    Associated Diagnoses: Stress      Cyanocobalamin 5000 MCG SUBL Place 5,000 mcg under the tongue daily      cyclobenzaprine (FLEXERIL) 10 MG tablet Take 1 tablet (10 mg) by mouth 3 times daily as needed for muscle spasms   Qty: 45 tablet, Refills: 1    Associated Diagnoses: Neck pain      docusate sodium (COLACE) 100 MG capsule Take 200 mg by mouth 2 times daily       Fe Heme Polypeptide-folic acid (PROFERRIN-FORTE) 12-1 MG TABS Take 1 tablet by mouth daily  Qty: 90 tablet, Refills: 0    Associated Diagnoses: Iron deficiency anemia due to chronic blood loss      fentaNYL (DURAGESIC) 75 mcg/hr 72 hr patch Place 1 patch onto the skin every 48 hours       fluocinonide (LIDEX) 0.05 % external ointment Apply twice daily to itchy skin nodules for 1-2 weeks at a time.  Qty: 30 g, Refills: 3    Associated Diagnoses: Prurigo nodularis      furosemide (LASIX) 40 MG tablet Take 1 tablet (40 mg) by mouth daily  Qty: 2 tablet, Refills: 0    Associated Diagnoses: Hyperkalemia      Multiple Minerals-Vitamins (CALCIUM CITRATE-MAG-MINERALS) TABS Take 1 tablet by mouth daily      multivitamin w/minerals (THERA-VIT-M) tablet Take 1 tablet by mouth daily      naproxen (NAPROSYN DR) 500 MG EC tablet Take 500 mg by mouth every morning    Associated Diagnoses: Spondylosis of cervical region without myelopathy or radiculopathy      oxyCODONE IR (ROXICODONE) 10 MG tablet Take 10 mg by mouth every 4 hours as needed for severe pain Max 6 tablets per day      senna (SENOKOT) 8.6 MG tablet Take 3 tablets by mouth every morning and 4 tablets every evening      simvastatin (ZOCOR) 10 MG tablet Take 1 tablet (10 mg) by mouth At Bedtime  Qty: 90 tablet, Refills: 3    Associated Diagnoses: Hypercholesteremia      vitamin B-Complex Take 1 tablet by mouth daily      amoxicillin (AMOXIL) 500 MG  capsule Take 4 tablets 1 hour before dental procedure  Qty: 24 capsule, Refills: 4    Associated Diagnoses: Dental anomaly      aspirin (ASA) 81 MG chewable tablet Take 1 tablet (81 mg) by mouth daily  Qty: 30 tablet, Refills: 11    Associated Diagnoses: Acute systolic heart failure (H)      naloxone (NARCAN) nasal spray Spray 1 spray (4 mg) into one nostril alternating nostrils as needed for opioid reversal every 2-3 minutes until assistance arrives  Qty: 0.2 mL, Refills: 0    Associated Diagnoses: Chronic, continuous use of opioids      pantoprazole (PROTONIX) 40 MG EC tablet Take 40 mg by mouth daily as needed for heartburn      tacrolimus (PROTOPIC) 0.1 % ointment Apply topically as needed Apply to affected areas on body.  Qty: 120 g, Refills: 11    Associated Diagnoses: Other eczema; Prurigo nodularis         STOP taking these medications       atorvastatin (LIPITOR) 40 MG tablet Comments:   Reason for Stopping:         losartan (COZAAR) 25 MG tablet Comments:   Reason for Stopping:         spironolactone (ALDACTONE) 25 MG tablet Comments:   Reason for Stopping:               Follow-up:  - Repeat BMP in 2-3 days     Code status:  Full code     Patient Care Team:  Demi Hardin MD as PCP - General (Family Practice)  Michael Styles MD as Referring Physician (Internal Medicine)  Juan Carlos Blunt MD as MD (Hand Surgery)  Brenda Espinal MD as MD (Ophthalmology)  Analilia Aceves MD as MD (Orthopedics)    Ian Stephen MD  Internal Medicine PGY2  [phone]

## 2020-02-16 NOTE — PROGRESS NOTES
DISCHARGE                         2/16/2020 11:08 AM  ----------------------------------------------------------------------------  Discharged to: Home  Via: private transportation  Accompanied by: Family  Discharge Instructions: low fat 2L FR diet, as tolerated activity, medications, follow up appointments, when to call the MD, aftercare instructions.  Prescriptions: medication list reviewed & sent with pt  Follow Up Appointments: arranged; information given  Belongings: All sent with pt  IV: d/c'd  Telemetry: d/c'd  Pt exhibits understanding of above discharge instructions; all questions answered.    Discharge Paperwork: Signed, copied, and sent home with patient.     A&OX4, VSS. Encouraged to discharge via WC but refused and ambulated independently to private transportation.

## 2020-02-16 NOTE — PLAN OF CARE
"BP (!) 166/86 (BP Location: Right arm)   Pulse 73   Temp 97.7  F (36.5  C) (Oral)   Resp 14   Ht 1.778 m (5' 10\")   Wt 81.3 kg (179 lb 3.2 oz)   SpO2 100%   BMI 25.71 kg/m    Neuro: A&Ox4.   Cardiac: Afebrile, VSS. Telemetry NSR   Respiratory: RA   GI/: Voiding spontaneously. No BM this shift.   Diet/appetite: Tolerating low sat/fat diet with fluid restriction. Denies nausea   Activity: Up independently.    Pain: prn oxycodone admin x1. Fentanyl patch replaced.  Skin: Bilat healed incisions on shoulders. Skin otherwise intact.  Lines: piv with ns infusion maintained at 50 ml/hr.  Rested btwn cares. Able to make needs known. Continue to monitor electrolyte and kidney function.   Admission completed. Continue POC.    "

## 2020-02-16 NOTE — PROGRESS NOTES
Arrived from ED. Ambulated too bed independently. 2 RN skin inpectipn completed. Pt belongings: clothing, shoes, cell phone, tablet and coat remained with pt. No meds brought. Signed and held orders released.

## 2020-02-16 NOTE — H&P
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     He was recently admitted to the cardiology service with NSVT and hypertension in the setting of shoulder biopsy , he had an elevated troponin. He was found to have a newly reduced EF (30-35%) and non-obstructive coronary arterial disease. He was discharged with Amlodipine-Benazapril and Spironolactone. He continued to take his previously prescribed losartan (it appears on the discharge summary that the intention was for it to be stopped).  He presented to his primary care provider yesterday and was found to have a potassium of 6.0. He was given a dose of lasix and instructed to follow up for repeat laboratory workup. Today his labs showed a persistently elevated potassium (6.2) and Cr 1.69 (baseline ~1.0). He was instructed to present to the emergency department. In the emergency department his potassium was 5.0 and Cr 1.47.    At the time of interview, the patient denies chest pain, dyspnea at rest or with exertion, orthopnea, PND, palpitations, lightheadedness, or syncope.     Review of Systems:    Complete review of systems was performed and negative except per HPI.    PMH:  Past Medical History:   Diagnosis Date     Anxiety      Ascending aortic aneurysm (H)      Bicuspid aortic valve      BPPV (benign paroxysmal positional vertigo) 7/11/2014     Chronic narcotic use      Chronic neck pain      Chronic osteoarthritis      Degenerative joint disease      Depression      Hyperlipidemia 4/10/2012     Hypertension      Multiple stiff joints      Neck injuries      Obesity 2/9/2015     Skin picking habit      Sleep  apnea     does not use cpap     Active Problems:  Patient Active Problem List    Diagnosis Date Noted     Dysthymic disorder 02/14/2020     Priority: Medium     NSTEMI (non-ST elevated myocardial infarction) (H) 01/29/2020     Priority: Medium     Elevated troponin 01/28/2020     Priority: Medium     Acute systolic heart failure (H) 01/28/2020     Priority: Medium     Added automatically from request for surgery [id number]       Infection of prosthetic shoulder joint, subsequent encounter 01/03/2020     Priority: Medium     Added automatically from request for surgery [id number]       Septic joint of left shoulder region (H) 11/13/2019     Priority: Medium     Left shoulder pain 11/12/2019     Priority: Medium     S/P shoulder replacement, left 11/12/2019     Priority: Medium     Added automatically from request for surgery [id number]       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) 10/30/2019     Priority: Medium     Added automatically from request for surgery [id number]       Bone infection, shoulder region (H) 10/23/2019     Priority: Medium     Acute blood loss anemia 10/13/2019     Priority: Medium     Septic joint of right shoulder region (H) 10/13/2019     Priority: Medium     s/p washout       MSSA bacteremia 10/13/2019     Priority: Medium     Moderate protein-calorie malnutrition (H) 10/13/2019     Priority: Medium     Spondylosis of cervical region without myelopathy or radiculopathy 10/12/2018     Priority: Medium     Dizzy 09/15/2017     Priority: Medium     Trigger finger of right thumb 12/19/2016     Priority: Medium     Atrial fibrillation (H) [I48.91] 10/10/2016     Priority: Medium     Status post coronary angiogram 08/30/2016     Priority: Medium     Right knee pain 11/09/2015     Priority: Medium     Obstructive sleep apnea 02/09/2015     Priority: Medium     Problem list name updated by automated process. Provider to review       Obesity 02/09/2015     Priority: Medium     Rhinitis 02/09/2015     Priority:  Medium     Shoulder joint pain 2014     Priority: Medium     BPPV (benign paroxysmal positional vertigo) 2014     Priority: Medium     S/P shoulder replacement 04/15/2014     Priority: Medium     Medication refill- do not delete  2014     Priority: Medium     Pain medication agreement signed 2014     Priority: Medium     Ascending aortic aneurysm (H)      Priority: Medium     Abdominal pain, unspecified abdominal location 10/08/2012     Priority: Medium     Per radiology order  Problem list name updated by automated process. Provider to review       Essential hypertension 04/10/2012     Priority: Medium     Problem list name updated by automated process. Provider to review       Hyperlipidemia 04/10/2012     Priority: Medium     Problem list name updated by automated process. Provider to review       Social History:  Social History     Tobacco Use     Smoking status: Former Smoker     Packs/day: 0.50     Years: 6.00     Pack years: 3.00     Types: Cigarettes     Start date: 1977     Last attempt to quit: 1983     Years since quittin.4     Smokeless tobacco: Never Used     Tobacco comment: quit 35 years ago   Substance Use Topics     Alcohol use: No     Alcohol/week: 0.0 standard drinks     Drug use: No     Family History:  Family History   Problem Relation Age of Onset     Arthritis Other      Gastrointestinal Disease Other      Cardiovascular Father         aortic aneurysm     Arrhythmia Father      Nephrolithiasis Father      Sleep Apnea Father      Anxiety Disorder Father      Depression Father      Hypertension Father      Obesity Father      Hyperlipidemia Father      Coronary Artery Disease Father         history of MI and stent     Low Back Problems Father      Spine Problems Father      Anxiety Disorder Mother      Hypertension Mother      Osteoporosis Mother      Obesity Mother      Hyperlipidemia Mother      Low Back Problems Mother      Anxiety Disorder Sister       Hypertension Sister      Osteoporosis Sister      Obesity Sister      Hyperlipidemia Sister      Low Back Problems Sister      Spine Problems Sister      Anxiety Disorder Sister      Depression Sister      Hypertension Sister      Osteoporosis Sister      Obesity Sister      Hyperlipidemia Sister      Low Back Problems Sister        Medications:  Current Facility-Administered Medications   Medication     clonazePAM (klonoPIN) tablet 0.5 mg     oxyCODONE (ROXICODONE) tablet 10 mg     Current Outpatient Medications   Medication     acetaminophen (TYLENOL) 325 MG tablet     amLODIPine-benazepril (LOTREL) 5-20 MG capsule     amoxicillin (AMOXIL) 500 MG capsule     aspirin (ASA) 81 MG chewable tablet     atorvastatin (LIPITOR) 40 MG tablet     buPROPion (WELLBUTRIN SR) 200 MG 12 hr tablet     carvedilol (COREG) 25 MG tablet     clonazePAM (KLONOPIN) 0.5 MG tablet     Cyanocobalamin 5000 MCG SUBL     cyclobenzaprine (FLEXERIL) 10 MG tablet     docusate sodium (COLACE) 100 MG capsule     Fe Heme Polypeptide-folic acid (PROFERRIN-FORTE) 12-1 MG TABS     fentaNYL (DURAGESIC) 75 mcg/hr 72 hr patch     fluocinonide (LIDEX) 0.05 % external ointment     furosemide (LASIX) 40 MG tablet     losartan (COZAAR) 25 MG tablet     Multiple Minerals-Vitamins (CALCIUM CITRATE-MAG-MINERALS) TABS     multivitamin w/minerals (THERA-VIT-M) tablet     naloxone (NARCAN) nasal spray     naproxen (NAPROSYN DR) 500 MG EC tablet     oxyCODONE IR (ROXICODONE) 10 MG tablet     pantoprazole (PROTONIX) 40 MG EC tablet     senna (SENOKOT) 8.6 MG tablet     simvastatin (ZOCOR) 10 MG tablet     spironolactone (ALDACTONE) 25 MG tablet     tacrolimus (PROTOPIC) 0.1 % ointment     vitamin B-Complex             Physical Exam:  Temp:  [97.9  F (36.6  C)-98  F (36.7  C)] 97.9  F (36.6  C)  Pulse:  [72] 72  Heart Rate:  [72-79] 78  Resp:  [9-18] 18  BP: (118-147)/(73-83) 138/83  SpO2:  [98 %-100 %] 99 %  No intake or output data in the 24 hours ending 02/15/20  2347  GEN: pleasant, no acute distress  HEENT: no icterus  CV: RRR, normal s1/s2, II/VI SCOTT, no heave. JVP not elevated.   CHEST: clear to ausculation bilaterally, no rales or wheezing  ABD: soft, non-tender, normal active bowel sounds  EXTR: pulses 2+. No clubbing, cyanosis or edema.   NEURO: alert oriented, speech fluent/appropriate, motor grossly nonfocal    Diagnostics:  All labs and imaging were reviewed, of note:    Lab Results   Component Value Date    WBC 6.7 02/15/2020    HGB 13.5 02/15/2020    HCT 40.6 02/15/2020     02/15/2020     (L) 02/15/2020    POTASSIUM 5.0 02/15/2020    CHLORIDE 104 02/15/2020    CO2 20 02/15/2020    BUN 39 (H) 02/15/2020    CR 1.47 (H) 02/15/2020     (H) 02/15/2020    SED 25 (H) 01/15/2020    TROPONIN 0.31 () 01/28/2020    TROPI 2.542 () 01/29/2020    AST 18 02/15/2020    ALT 19 02/15/2020    ALKPHOS 98 02/15/2020    BILITOTAL 0.7 02/15/2020    INR 1.10 02/15/2020       Lab Results   Component Value Date    TROPI 2.542 () 01/29/2020    TROPI 3.120 () 01/29/2020    TROPI 2.967 () 01/28/2020    TROPI 1.642 () 01/28/2020    TROPONIN 0.31 () 01/28/2020       EKG:      Clinical Impression: NSR, TWI in V1/V2 old. No peaked T waves or other sings of hyperkalemia     Transthoracic echocardiogram:   1/28/2020  Interpretation Summary  Moderately (EF 30-35%) reduced left ventricular function is present.There is  wall thinning and severe hypokinesis of the mid-distal anteroseptal, basal-mid  anterior and distal inferior segments concerning for coronary disease in the  LAD territory. There is no thrombus.  Global right ventricular function is mildly reduced.  No pericardial effusion is present.    Coronary Angiogram:  1/30/2020  Left Main   The vessel is large.   Left Anterior Descending   The vessel is large. Luminal irregularities present   Prox LAD lesion is 35% stenosed.   First Diagonal Branch   The vessel is small.   Ramus Intermedius   The vessel is  small.   Left Circumflex   The vessel is large. The proximal left circumflex starts as a large vessel until the exit of the first obtuse marginal, at which it comes a small vessel when entering the true AV groove.   Ost Cx to Prox Cx lesion is 30% stenosed.   First Obtuse Marginal Branch   The vessel is large. The vessel exhibits minimal luminal irregularities.   Right Coronary Artery   The vessel is large.   Right Posterior Descending Artery   The vessel is large. The vessel exhibits minimal luminal irregularities.   Right Posterior Atrioventricular Artery   The vessel is large. The vessel exhibits minimal luminal irregularities.   First Right Posterolateral Branch   The vessel is small and is angiographically normal.       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    #Acute Kidney Injury  #Hyperkalemia  Presenting to the emergency department with Cr 1.6 (baseline 1.0) and K 6.2. Likely secondary to medication side effect as he was taking Benazapril, Losartan and Spironolactone after his recent discharge from the hospital. Also received IV lasix yesterday which may have contributed to his NEMESIO. No EKG changes. Currently his potassium is 5.0. Will hold medications and monitor renal function and electrolytes  - Hold Benazapril, Spironolactone  - Discontinue Losartan  - Received IVF in the ED  - Recheck BMP in the AM    #Non-ischemic cardiomyopathy (EF 30-35%)  Appears well compensated currently. Wt 192. No edema, orthopnea. Ischemic workup unremarkable during previous hospitalization. Plan to get an CMR as an outpatient.   - ASA 81mg   - resume carvedilol  25mg bid  - Hold benazapril, spironolactone  - Atorvastatin 40mg qHS    #History of b/l total shoulders  #History of right shoulder MSSA septic arthritis c/b MSSA bacteremia  #Chronic pain  Not currently on antibiotics. No signs/sx of infection at this time. CTM  -PTA oxycodone 10mg q4h prn  - PTA fentanyl patch 75 mcg q72h  - Tylenol prn    #Anxiety  - PTA klonipin 0.5mg tid prn  - PTA Wellbutrin    FEN: Cardiac Diet  PPx: SCDs  Code: Full    Disposition Plan   Expected discharge in 1-2 days to prior living arrangement once electrolytes, renal function at baseline.     Entered: Kobe Velasquez 02/15/2020, 11:47 PM       Discussed with fellow, to be formally staffed in the AM     Kobe Monahan MD  Internal Medicine, PGY 3  Pager: [phone]

## 2020-02-17 ENCOUNTER — TELEPHONE (OUTPATIENT)
Dept: FAMILY MEDICINE | Facility: CLINIC | Age: 60
End: 2020-02-17

## 2020-02-17 DIAGNOSIS — E87.5 HYPERKALEMIA: Primary | ICD-10-CM

## 2020-02-17 ASSESSMENT — ENCOUNTER SYMPTOMS
FATIGUE: 0
LIGHT-HEADEDNESS: 0
ABDOMINAL PAIN: 0
NAUSEA: 0
HEADACHES: 0
NUMBNESS: 0
JOINT SWELLING: 0
WOUND: 0
COLOR CHANGE: 0
WEAKNESS: 0
COUGH: 0
FEVER: 0
NECK PAIN: 1
VOMITING: 0
NECK STIFFNESS: 0
SHORTNESS OF BREATH: 0
PALPITATIONS: 0
SEIZURES: 0

## 2020-02-17 NOTE — TELEPHONE ENCOUNTER
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    ASSESSMENT      Discharge Assessment  Patient reports symptoms are: Improved  Does the patient have all of their medications?: Yes  Does patient know what their new medications are for?: Not applicable  Does patient have a follow-up appointment scheduled?: Yes  Does patient have any other questions or concerns?: No    Post-op  Did the patient have surgery or a procedure: No  Fever: No  Chills: No  Eating & Drinking: eating and drinking without complaints/concerns  PO Intake: regular diet  Bowel Function: normal  Urinary Status: voiding without complaint/concerns    PLAN                                                      Outpatient Plan:      Repeat BMP in 2-3 days     Future Appointments   Date Time Provider Department Center   2/20/2020  8:00 AM [email or web address]   2/20/2020  9:00 AM [facility]   2/24/2020  8:15 AM Enriqueta Zhou MD [facility]   3/11/2020  9:45 AM Analilia Aceves MD [facility]   4/8/2020  8:30 AM Ivory Hilton MD [facility]           Elise Vasquez, [facility]

## 2020-02-17 NOTE — TELEPHONE ENCOUNTER
[facility] phone call message- general phone call:    Reason for call: Patient is requesting to get labs done on Wednesday before his Appointment on Thursday with another clinic. Patient is looking to get his Creatinine and Pancreatic counts.     Return call needed: Yes    OK to leave a message on voice mail? Yes      Primary language: English      needed? No    Call taken on February 17, 2020 at 10:47 AM by Ryan Graham

## 2020-02-17 NOTE — TELEPHONE ENCOUNTER
Ok for kidney and potassium (includes creatinine).  There are no pancreatic counts that we do.  I don't see any history of pancreatic labs or concerns.

## 2020-02-18 DIAGNOSIS — E87.5 HYPERKALEMIA: ICD-10-CM

## 2020-02-18 LAB
BUN SERPL-MCNC: 22 MG/DL (ref 7–30)
CALCIUM SERPL-MCNC: 9.7 MG/DL (ref 8.5–10.4)
CHLORIDE SERPLBLD-SCNC: 102 MMOL/L (ref 94–109)
CO2 SERPL-SCNC: 24 MMOL/L (ref 20–32)
CREAT SERPL-MCNC: 1.3 MG/DL (ref 0.8–1.5)
EGFR CALCULATED (BLACK REFERENCE): 72.7 ML/MIN
EGFR CALCULATED (NON BLACK REFERENCE): 60.1 ML/MIN
GLUCOSE SERPL-MCNC: 124 MG/DL (ref 60–109)
POTASSIUM SERPL-SCNC: 4.8 MMOL/L (ref 3.4–5.3)
SODIUM SERPL-SCNC: 141 MMOL/L (ref 133–144)

## 2020-02-18 NOTE — PROGRESS NOTES
BMP drawn today.       ANASTASIIA Dumont (Ivette) Gladys [facility] [address] [place] [id number] [phone]

## 2020-02-18 NOTE — TELEPHONE ENCOUNTER
Patient came in today for lab work. BMP drawn    Oc Graham, ANASTASIIA Renae (Ivette) Gladys LAURENT   [facility] [address] [place] [address]  [phone]

## 2020-02-19 NOTE — RESULT ENCOUNTER NOTE
Please call if you have questions regarding your results. But per our conversation in lab yesterday, you will review on you mychart. Thanks    ANASTASIIA Dumont ([facility]) Gladys [facility] [address] [place] [address] [phone]

## 2020-02-25 ENCOUNTER — HOSPITAL ENCOUNTER (OUTPATIENT)
Dept: MRI IMAGING | Facility: CLINIC | Age: 60
Discharge: HOME | End: 2020-02-25
Attending: INTERNAL MEDICINE | Admitting: INTERNAL MEDICINE
Payer: COMMERCIAL

## 2020-02-25 ENCOUNTER — HOSPITAL ENCOUNTER (OUTPATIENT)
Dept: MRI IMAGING | Facility: CLINIC | Age: 60
End: 2020-02-25
Attending: INTERNAL MEDICINE
Payer: COMMERCIAL

## 2020-02-25 PROCEDURE — 75561 CARDIAC MRI FOR MORPH W/DYE: CPT | Mod: 26 | Performed by: INTERNAL MEDICINE

## 2020-02-25 PROCEDURE — A9585 GADOBUTROL INJECTION: HCPCS | Performed by: INTERNAL MEDICINE

## 2020-02-25 PROCEDURE — 75561 CARDIAC MRI FOR MORPH W/DYE: CPT

## 2020-02-25 PROCEDURE — 71555 MRI ANGIO CHEST W OR W/O DYE: CPT | Mod: 26 | Performed by: INTERNAL MEDICINE

## 2020-02-25 PROCEDURE — 71555 MRI ANGIO CHEST W OR W/O DYE: CPT

## 2020-02-25 PROCEDURE — 25500064 ZZH RX 255 OP 636: Performed by: INTERNAL MEDICINE

## 2020-02-25 RX ORDER — GADOBUTROL 604.72 MG/ML
10 INJECTION INTRAVENOUS ONCE
Status: COMPLETED | OUTPATIENT
Start: 2020-02-25 | End: 2020-02-25

## 2020-02-25 RX ADMIN — GADOBUTROL 10 ML: 604.72 INJECTION INTRAVENOUS at 13:55

## 2020-02-26 ENCOUNTER — OFFICE VISIT (OUTPATIENT)
Dept: CARDIOLOGY | Facility: CLINIC | Age: 60
End: 2020-02-26
Attending: INTERNAL MEDICINE
Payer: COMMERCIAL

## 2020-02-26 VITALS
HEART RATE: 60 BPM | WEIGHT: 190 LBS | SYSTOLIC BLOOD PRESSURE: 121 MMHG | HEIGHT: 70 IN | BODY MASS INDEX: 27.2 KG/M2 | DIASTOLIC BLOOD PRESSURE: 75 MMHG

## 2020-02-26 DIAGNOSIS — I71.21 ASCENDING AORTIC ANEURYSM (H): ICD-10-CM

## 2020-02-26 DIAGNOSIS — I50.21 ACUTE SYSTOLIC HEART FAILURE (H): Primary | ICD-10-CM

## 2020-02-26 DIAGNOSIS — I21.4 NSTEMI (NON-ST ELEVATED MYOCARDIAL INFARCTION) (H): ICD-10-CM

## 2020-02-26 DIAGNOSIS — I10 ESSENTIAL HYPERTENSION: ICD-10-CM

## 2020-02-26 LAB
ALBUMIN SERPL-MCNC: 4.2 G/DL (ref 3.4–5)
ANION GAP SERPL CALCULATED.3IONS-SCNC: 5 MMOL/L (ref 3–14)
BUN SERPL-MCNC: 22 MG/DL (ref 7–30)
CALCIUM SERPL-MCNC: 9.3 MG/DL (ref 8.5–10.1)
CHLORIDE SERPL-SCNC: 106 MMOL/L (ref 94–109)
CO2 SERPL-SCNC: 26 MMOL/L (ref 20–32)
CREAT SERPL-MCNC: 1.14 MG/DL (ref 0.66–1.25)
GFR SERPL CREATININE-BSD FRML MDRD: 70 ML/MIN/{1.73_M2}
GLUCOSE SERPL-MCNC: 95 MG/DL (ref 70–99)
PHOSPHATE SERPL-MCNC: 3.7 MG/DL (ref 2.5–4.5)
POTASSIUM SERPL-SCNC: 4.7 MMOL/L (ref 3.4–5.3)
SODIUM SERPL-SCNC: 137 MMOL/L (ref 133–144)

## 2020-02-26 PROCEDURE — 36415 COLL VENOUS BLD VENIPUNCTURE: CPT | Performed by: INTERNAL MEDICINE

## 2020-02-26 PROCEDURE — 80069 RENAL FUNCTION PANEL: CPT | Performed by: INTERNAL MEDICINE

## 2020-02-26 PROCEDURE — 99214 OFFICE O/P EST MOD 30 MIN: CPT | Performed by: INTERNAL MEDICINE

## 2020-02-26 ASSESSMENT — MIFFLIN-ST. JEOR: SCORE: 1675.14

## 2020-02-26 NOTE — LETTER
2/26/2020    Demi Hardin MD  [address] [facility] [address]    RE: Kobe Buchanan       Dear Colleague,    I had the pleasure of seeing Kobe Buchanan in the [facility].             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 Patient reports his nephew at 28 years old has a known bicuspid aortic valve without aortopathy.  He has been seen in [place] and followed. His father is 91 years old and has a known aneurysm of 4.9 cm in bicuspid valve. Patient reports no family history of sudden death or known dissection. No history of strokes. Blood pressure has been well controlled on multidrug regimen.      Interval history:    Discharged on 2/20 for hyperkalemia at the [facility], thought to be due to medication effect. Presented to the emergency department with Cr 1.6 (baseline 1.0) and K 6.2. Likely secondary to medication side effect as he was taking Benazapril, Losartan and Spironolactone after his recent discharge from the hospital. Also received IV lasix on the day prior to admission, which may have contributed to his NEMESIO. No EKG changes. Held above-mentioned medications during this admission with resolution of hyperkalemia and NEMESIO. Now off losartan and spironolactone. Back on amlodipine and ACEI. BP controlled today. On carvedilol and aspirin. Needs cardiac clearance for shoulder surgery.     ROS negative for chest pain, SOB, LE edema, PND, orthopnea. Renal panel is stabilized, Cr 1.3, K 4.8.     He partakes in no heavy lifting and overall adherence to a healthy  lifestyle.     MRA performed today and results are pending, LVEF improved on wet read. Last echocardiogram performed last year 3/2018.      PAST MEDICAL HISTORY  Past Medical History:   Diagnosis Date     Anxiety      Ascending aortic aneurysm (H)      Bicuspid aortic valve      BPPV (benign paroxysmal positional vertigo) 7/11/2014     Chronic narcotic use      Chronic neck pain      Chronic osteoarthritis      Degenerative joint disease      Depression      Hyperlipidemia 4/10/2012     Hypertension      Multiple stiff joints      Neck injuries      Obesity 2/9/2015     Skin picking habit      Sleep apnea     does not use cpap       CURRENT MEDICATIONS  Current Outpatient Medications   Medication Sig Dispense Refill     acetaminophen (TYLENOL) 325 MG tablet Take 2 tablets (650 mg) by mouth every 4 hours as needed for mild pain 100 tablet 0     amLODIPine-benazepril (LOTREL) 5-20 MG capsule Take 1 capsule by mouth daily (Patient taking differently: Take 1 capsule by mouth 2 times daily ) 180 capsule 3     amoxicillin (AMOXIL) 500 MG capsule Take 4 tablets 1 hour before dental procedure 24 capsule 4     aspirin (ASA) 81 MG chewable tablet Take 1 tablet (81 mg) by mouth daily 30 tablet 11     buPROPion (WELLBUTRIN SR) 200 MG 12 hr tablet TAKE 1 TABLET BY MOUTH 2 TIMES DAILY 180 tablet 3     carvedilol (COREG) 25 MG tablet Take 1 tablet (25 mg) by mouth 2 times daily (with meals) 180 tablet 3     clonazePAM (KLONOPIN) 0.5 MG tablet Take 1 tablet (0.5 mg) by mouth 3 times daily as needed for anxiety 90 tablet 5     Cyanocobalamin 5000 MCG SUBL Place 5,000 mcg under the tongue daily       cyclobenzaprine (FLEXERIL) 10 MG tablet Take 1 tablet (10 mg) by mouth 3 times daily as needed for muscle spasms  45 tablet 1     docusate sodium (COLACE) 100 MG capsule Take 100 mg by mouth 1 capsule in the morning and 2 capsules in the evening       Fe Heme Polypeptide-folic acid (PROFERRIN-FORTE) 12-1 MG TABS Take 1 tablet by mouth  daily 90 tablet 0     fentaNYL (DURAGESIC) 75 mcg/hr 72 hr patch Place 1 patch onto the skin every 48 hours        fluocinonide (LIDEX) 0.05 % external ointment Apply twice daily to itchy skin nodules for 1-2 weeks at a time. (Patient taking differently: as needed Apply twice daily to itchy skin nodules for 1-2 weeks at a time.) 30 g 3     Multiple Minerals-Vitamins (CALCIUM CITRATE-MAG-MINERALS) TABS Take 1 tablet by mouth daily       multivitamin w/minerals (THERA-VIT-M) tablet Take 1 tablet by mouth daily       naloxone (NARCAN) nasal spray Spray 1 spray (4 mg) into one nostril alternating nostrils as needed for opioid reversal every 2-3 minutes until assistance arrives 0.2 mL 0     naproxen (NAPROSYN DR) 500 MG EC tablet Take 500 mg by mouth every morning       oxyCODONE IR (ROXICODONE) 10 MG tablet Take 10 mg by mouth every 4 hours as needed for severe pain Max 6 tablets per day       pantoprazole (PROTONIX) 40 MG EC tablet Take 40 mg by mouth daily as needed for heartburn       senna (SENOKOT) 8.6 MG tablet Take 3 tablets by mouth every morning and 4 tablets every evening       simvastatin (ZOCOR) 10 MG tablet Take 1 tablet (10 mg) by mouth At Bedtime 90 tablet 3     vitamin B-Complex Take 1 tablet by mouth daily       furosemide (LASIX) 40 MG tablet Take 1 tablet (40 mg) by mouth daily (Patient not taking: Reported on 2/26/2020) 2 tablet 0     tacrolimus (PROTOPIC) 0.1 % ointment Apply topically as needed Apply to affected areas on body. (Patient not taking: Reported on 2/14/2020) 120 g 11       PAST SURGICAL HISTORY:  Past Surgical History:   Procedure Laterality Date     ARTHROPLASTY SHOULDER  4/15/2014    Procedure: Left Total Shoulder Arthroplasty ;  Surgeon: Analilia Aceves MD;  [facility]     ARTHROPLASTY SHOULDER Right 8/26/2014    Procedure: ARTHROPLASTY SHOULDER;  Surgeon: Analilia Aceves MD;  [facility]     ARTHROSCOPY SHOULDER WITH BIOPSY(IES) Left 1/28/2020    Procedure:  Left shoulder arthroscopy and biopsy for culture;  Surgeon: Analilia Aceves MD;  [facility]     BYPASS GASTRIC TAVO-EN-Y, LIVER BIOPSY, COMBINED  8/8/2005     C HAND/FINGER SURGERY UNLISTED       C SHOULDER SURG PROC UNLISTED       COLONOSCOPY  6/30/2014    Procedure: COMBINED COLONOSCOPY, SINGLE BIOPSY/POLYPECTOMY BY BIOPSY;  Surgeon: Chester Patton MD;  [facility]     CV CORONARY ANGIOGRAM  1/30/2020    Procedure: CV CORONARY ANGIOGRAM;  Surgeon: Néstor Walls MD;  [facility]     CYSTOSCOPY, BLADDER NECK CUTS, COMBINED N/A 7/18/2016    Procedure: COMBINED CYSTOSCOPY, BLADDER NECK CUTS;  Surgeon: Ritu Leslie MD;  [facility]     ESOPHAGOSCOPY, GASTROSCOPY, DUODENOSCOPY (EGD), COMBINED  6/30/2014    Procedure: COMBINED ESOPHAGOSCOPY, GASTROSCOPY, DUODENOSCOPY (EGD), BIOPSY SINGLE OR MULTIPLE;  Surgeon: Chester Patton MD;  [facility]     EXCISE MASS FINGER  6/14/2011    Procedure:EXCISE MASS FINGER; Middle Flexor Cyst; Surgeon:SHAYY RUSSELL; Location:[facility]     HAND SURGERY      excision of tendon cyst on left hand     [facility] VASCULAR SURGERY PROCEDURE UNLIST       IR FINE NEEDLE ASPIRATION W ULTRASOUND  11/13/2019     IR PICC PLACEMENT > 5 YRS OF AGE  11/19/2019     LASER HOLMIUM LITHOTRIPSY BLADDER N/A 10/15/2014    Procedure: LASER HOLMIUM LITHOTRIPSY BLADDER;  Surgeon: Sahil Taveras MD;  [facility]     LASER KTP GREEN LIGHT PHOTOSELECTIVE VAPORIZATION PROSTATE  1/23/2014    Procedure: LASER KTP GREEN LIGHT PHOTOSELECTIVE VAPORIZATION PROSTATE;  Greenlight Photovaporization Of Prostate  ;  Surgeon: Sahil Taveras MD;  [facility]: [facility]     RELEASE TRIGGER FINGER Right 3/31/2017    Procedure: RELEASE TRIGGER FINGER;  Surgeon: Juan Carlos Blunt MD;  [facility]     REMOVE HARDWARE ARTHROPLASTY SHOULDER. I&D, PLACE ANTIBIOTIC CEMENT BE Left 11/15/2019    Procedure: Explantation of left  total shoulder arthroplasty, irrigation and debridement, and placement of antibiotic spacer;  Surgeon: Analilia Aceves MD;  [facility]     REPAIR ANEURYSM ASCENDING AORTA N/A 10/4/2016    Procedure: REPAIR ANEURYSM ASCENDING AORTA;  Surgeon: Mckenzie Townsend MD;  [facility]     TURP  2014       ALLERGIES     Allergies   Allergen Reactions     Ciprofloxacin      History of aortic aneurysms     Tape [Adhesive Tape] Blisters     Blistering - please use paper tape       FAMILY HISTORY  Family History   Problem Relation Age of Onset     Arthritis Other      Gastrointestinal Disease Other      Cardiovascular Father         aortic aneurysm     Arrhythmia Father      Nephrolithiasis Father      Sleep Apnea Father      Anxiety Disorder Father      Depression Father      Hypertension Father      Obesity Father      Hyperlipidemia Father      Coronary Artery Disease Father         history of MI and stent     Low Back Problems Father      Spine Problems Father      Anxiety Disorder Mother      Hypertension Mother      Osteoporosis Mother      Obesity Mother      Hyperlipidemia Mother      Low Back Problems Mother      Anxiety Disorder Sister      Hypertension Sister      Osteoporosis Sister      Obesity Sister      Hyperlipidemia Sister      Low Back Problems Sister      Spine Problems Sister      Anxiety Disorder Sister      Depression Sister      Hypertension Sister      Osteoporosis Sister      Obesity Sister      Hyperlipidemia Sister      Low Back Problems Sister        SOCIAL HISTORY  Social History     Socioeconomic History     Marital status: Single     Spouse name: Not on file     Number of children: Not on file     Years of education: Not on file     Highest education level: Not on file   Occupational History     Occupation: Disabled   Social Needs     Financial resource strain: Not on file     Food insecurity:     Worry: Not on file     Inability: Not on file     Transportation needs:      "Medical: Not on file     Non-medical: Not on file   Tobacco Use     Smoking status: Former Smoker     Packs/day: 0.50     Years: 6.00     Pack years: 3.00     Types: Cigarettes     Start date: 1977     Last attempt to quit: 1983     Years since quittin.5     Smokeless tobacco: Never Used   Substance and Sexual Activity     Alcohol use: No     Alcohol/week: 0.0 standard drinks     Drug use: No     Sexual activity: Not Currently     Partners: Female     Birth control/protection: Abstinence   Lifestyle     Physical activity:     Days per week: Not on file     Minutes per session: Not on file     Stress: Not on file   Relationships     Social connections:     Talks on phone: Not on file     Gets together: Not on file     Attends [employer] service: Not on file     Active member of club or organization: Not on file     Attends meetings of clubs or organizations: Not on file     Relationship status: Not on file     Intimate partner violence:     Fear of current or ex partner: Not on file     Emotionally abused: Not on file     Physically abused: Not on file     Forced sexual activity: Not on file   Other Topics Concern     Parent/sibling w/ CABG, MI or angioplasty before 65F 55M? Not Asked   Social History Narrative     Not on file       ROS:    Constitutional: No fever, chills, or sweats. No weight gain/loss   ENT: No visual disturbance, ear ache, epistaxis, sore throat  Allergies/Immunologic: Negative  Respiratory: No cough, hemoptysia  Cardiovascular: As per HPI  GI: No nausea, vomiting, hematemesis, melena, or hematochezia  : No urinary frequency, dysuria, or hematuria  Integument: Negative  Psychiatric: Negative  Neuro: Negative  Endocrinology: Negative   Musculoskeletal: Negative  Vascular: No walking impairment, claudication, ischemic rest pain or nonhealing wounds    EXAM:  /75   Pulse 60   Ht 1.765 m (5' 9.5\")   Wt 86.2 kg (190 lb)   BMI 27.66 kg/m     In general, the patient is a pleasant " male in no apparent distress.    HEENT: NC/AT.  PERRLA.  EOMI.  Sclerae white, not injected.  Nares clear.  Pharynx without erythema or exudate.  Dentition intact.    Neck: No adenopathy.  No thyromegaly. Carotids +2/2 bilaterally without bruits.  No jugular venous distension.   Heart: RRR. Normal S1, S2 splits physiologically. No murmur, rub, click, or gallop. The PMI is in the 5th ICS in the midclavicular line. There is no heave.    Lungs: CTA.  No ronchi, wheezes, rales.  No dullness to percussion.   Abdomen: Soft, nontender, nondistended. No organomegaly. No AAA.  No bruits.   Extremities: No clubbing, cyanosis, or edema.  No wounds. No varicose veins signs of chronic venous insufficiency.   Vascular: No bruits are noted. 2+ DP and PT pulses bilaterally. 2+ radial and ulnar. 2+ carotid bilaterally.     Labs:  LIPID RESULTS:  Lab Results   Component Value Date    CHOL 179 03/22/2018    HDL 39 (L) 03/22/2018    LDL 89 03/22/2018    TRIG 255 (H) 03/22/2018    CHOLHDLRATIO 3.7 06/08/2015    NHDL 140 (H) 03/22/2018       LIVER ENZYME RESULTS:  Lab Results   Component Value Date    AST 18 02/15/2020    ALT 19 02/15/2020       CBC RESULTS:  Lab Results   Component Value Date    WBC 6.7 02/16/2020    RBC 4.59 02/16/2020    HGB 13.0 (L) 02/16/2020    HCT 39.1 (L) 02/16/2020    MCV 85 02/16/2020    MCH 28.3 02/16/2020    MCHC 33.2 02/16/2020    RDW 13.0 02/16/2020     02/16/2020       BMP RESULTS:  Lab Results   Component Value Date    .0 02/18/2020    POTASSIUM 4.8 02/18/2020    CHLORIDE 102.0 02/18/2020    CO2 24.0 02/18/2020    ANIONGAP 8 02/16/2020    .0 (H) 02/18/2020    BUN 22.0 02/18/2020    CR 1.3 02/18/2020    GFRESTIMATED 65 02/16/2020    GFRESTBLACK 75 02/16/2020    NIA 9.7 02/18/2020        A1C RESULTS:  Lab Results   Component Value Date    A1C 5.3 10/05/2016       Procedures:      Echo 3/12/2018  S/P valve-sparing ascending aorta replacement with 32mm Gelweave interposition  graft 10/4/16  by history.     Normal biventricular size, thickness, global, and regional systolic function, LVEF=60-65%.  Bicsupid aortic valve (Lucy type 1 L-R) with mild aortic insufficiency and no aortic stenosis.  Normal appearance of the aortic root and ascending aortic graft. Sinuses of Valsalva 3.8 cm, ascending aortic graft 3.2 cm.  No pericardial effusion is present.  This study was compared with the study from 8/30/2016 (XIOMY) : There has been interval valve-sparing ascending aortic replacement. There has otherwise been no significant change.         MRA 1/4/17:     1. Patient is status post repair of a ascending aortic aneurysm. The ascending aorta, arch, and descending aorta are now normal in diameter. There is no dissection seen.   2. The aortic arch is left sided. There is a bovine variant branching of the arch vessels.   3. The main and proximal branch pulmonary arteries are normal in size.   4. The systemic venous connections are normal.       Bi-orthogonal luminal aortic dimensions are:     1.  Aortic root at the sinuses of Valsalva =  3.6 cm x 3.5 cm    2.  Sinotubular junction =  2.8 cm x 2.6 cm    3.  Mid-ascending aorta (midpoint in length between Nos. 2 and 4) =  3.4 cm x 3.3 cm    4.  Proximal aortic arch (aorta at the origin of the innominateartery) =  3.0 cm x 2.8 cm    5.  Mid-aortic arch (between left common carotid and subclavian arteries) =  2.2 cm x 1.9 cm    6.  Proximal descending thoracic aorta (begins at the isthmus) =  2.1 cm --x 2.1 cm  7.  Mid-descending aorta (midpoint in length between Nos. 6 and 8) =  2.1 cm x 2.0 cm    8.  Aorta at diaphragm (2 cm above the celiac axis origin) =  2.1 cm x2.0 cm  9.  Abdominal aorta at the celiac axis origin =  2.0 cm x 1.9 cm        Assessment and Plan:     60-year-old male with hypertension, hyperlipidemia, right left fusion of his aortic valve with bicuspid aortopathy.  No known coarctation.  No other peripheral aneurysmal disease.  He is status post  valve sparing aortic repair with Gelweave interposition graft.  Blood pressure overall well controlled.  He will need ongoing long-term surveillance of both of his aortic repair and his bicuspid valve given he has some dysfunction within the native valve.  He only has mild aortic insufficiency but will need to follow this over time.  He also has hypertension requiring multidrug regimen. Was inadvertantly started on both losartan and lisinopril leading to hyperkalemia. NEMESIO from lasix probable.     Summary of Recommendations:    1. Clearance for surgery: no further cardiac testing needed  2. Continue amlodipine-benzapril and carvedilol, patient to report blood pressure readings   3. Labs today for renal function   4. Follow up in 6 month   5. Will send Cardiac MRI results by de - wet read is normalized LVEF       Enriqueta Zhou MD [facility]        Thank you for allowing me to participate in the care of your patient.    Sincerely,     Enriqueta Zhou MD     [facility]

## 2020-02-26 NOTE — PATIENT INSTRUCTIONS
1. Clearance for surgery: no further cardiac testing needed  2. Continue amlodipine-benzapril and carvedilol   3. Labs today for renal function   4. Follow up in 6 month with Dr. Zhou   5. Will send Cardiac MRI results by [facility]

## 2020-02-26 NOTE — PROGRESS NOTES
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 Patient reports his nephew at 28 years old has a known bicuspid aortic valve without aortopathy.  He has been seen in [place] and followed. His father is 91 years old and has a known aneurysm of 4.9 cm in bicuspid valve. Patient reports no family history of sudden death or known dissection. No history of strokes. Blood pressure has been well controlled on multidrug regimen.      Interval history:    Discharged on 2/20 for hyperkalemia at the [facility], thought to be due to medication effect. Presented to the emergency department with Cr 1.6 (baseline 1.0) and K 6.2. Likely secondary to medication side effect as he was taking Benazapril, Losartan and Spironolactone after his recent discharge from the hospital. Also received IV lasix on the day prior to admission, which may have contributed to his NEMESIO. No EKG changes. Held above-mentioned medications during this admission with resolution of hyperkalemia and NEMESIO. Now off losartan and spironolactone. Back on amlodipine and ACEI. BP controlled today. On carvedilol and aspirin. Needs cardiac clearance for shoulder surgery.     ROS negative for chest pain, SOB, LE edema, PND, orthopnea. Renal panel is stabilized, Cr 1.3, K 4.8.     He partakes in no heavy lifting and overall adherence to a healthy lifestyle.     MRA performed today and results are pending, LVEF improved on wet read. Last echocardiogram performed last year 3/2018.      PAST MEDICAL HISTORY  Past Medical History:   Diagnosis Date     Anxiety      Ascending aortic  aneurysm (H)      Bicuspid aortic valve      BPPV (benign paroxysmal positional vertigo) 7/11/2014     Chronic narcotic use      Chronic neck pain      Chronic osteoarthritis      Degenerative joint disease      Depression      Hyperlipidemia 4/10/2012     Hypertension      Multiple stiff joints      Neck injuries      Obesity 2/9/2015     Skin picking habit      Sleep apnea     does not use cpap       CURRENT MEDICATIONS  Current Outpatient Medications   Medication Sig Dispense Refill     acetaminophen (TYLENOL) 325 MG tablet Take 2 tablets (650 mg) by mouth every 4 hours as needed for mild pain 100 tablet 0     amLODIPine-benazepril (LOTREL) 5-20 MG capsule Take 1 capsule by mouth daily (Patient taking differently: Take 1 capsule by mouth 2 times daily ) 180 capsule 3     amoxicillin (AMOXIL) 500 MG capsule Take 4 tablets 1 hour before dental procedure 24 capsule 4     aspirin (ASA) 81 MG chewable tablet Take 1 tablet (81 mg) by mouth daily 30 tablet 11     buPROPion (WELLBUTRIN SR) 200 MG 12 hr tablet TAKE 1 TABLET BY MOUTH 2 TIMES DAILY 180 tablet 3     carvedilol (COREG) 25 MG tablet Take 1 tablet (25 mg) by mouth 2 times daily (with meals) 180 tablet 3     clonazePAM (KLONOPIN) 0.5 MG tablet Take 1 tablet (0.5 mg) by mouth 3 times daily as needed for anxiety 90 tablet 5     Cyanocobalamin 5000 MCG SUBL Place 5,000 mcg under the tongue daily       cyclobenzaprine (FLEXERIL) 10 MG tablet Take 1 tablet (10 mg) by mouth 3 times daily as needed for muscle spasms  45 tablet 1     docusate sodium (COLACE) 100 MG capsule Take 100 mg by mouth 1 capsule in the morning and 2 capsules in the evening       Fe Heme Polypeptide-folic acid (PROFERRIN-FORTE) 12-1 MG TABS Take 1 tablet by mouth daily 90 tablet 0     fentaNYL (DURAGESIC) 75 mcg/hr 72 hr patch Place 1 patch onto the skin every 48 hours        fluocinonide (LIDEX) 0.05 % external ointment Apply twice daily to itchy skin nodules for 1-2 weeks at a time. (Patient  taking differently: as needed Apply twice daily to itchy skin nodules for 1-2 weeks at a time.) 30 g 3     Multiple Minerals-Vitamins (CALCIUM CITRATE-MAG-MINERALS) TABS Take 1 tablet by mouth daily       multivitamin w/minerals (THERA-VIT-M) tablet Take 1 tablet by mouth daily       naloxone (NARCAN) nasal spray Spray 1 spray (4 mg) into one nostril alternating nostrils as needed for opioid reversal every 2-3 minutes until assistance arrives 0.2 mL 0     naproxen (NAPROSYN DR) 500 MG EC tablet Take 500 mg by mouth every morning       oxyCODONE IR (ROXICODONE) 10 MG tablet Take 10 mg by mouth every 4 hours as needed for severe pain Max 6 tablets per day       pantoprazole (PROTONIX) 40 MG EC tablet Take 40 mg by mouth daily as needed for heartburn       senna (SENOKOT) 8.6 MG tablet Take 3 tablets by mouth every morning and 4 tablets every evening       simvastatin (ZOCOR) 10 MG tablet Take 1 tablet (10 mg) by mouth At Bedtime 90 tablet 3     vitamin B-Complex Take 1 tablet by mouth daily       furosemide (LASIX) 40 MG tablet Take 1 tablet (40 mg) by mouth daily (Patient not taking: Reported on 2/26/2020) 2 tablet 0     tacrolimus (PROTOPIC) 0.1 % ointment Apply topically as needed Apply to affected areas on body. (Patient not taking: Reported on 2/14/2020) 120 g 11       PAST SURGICAL HISTORY:  Past Surgical History:   Procedure Laterality Date     ARTHROPLASTY SHOULDER  4/15/2014    Procedure: Left Total Shoulder Arthroplasty ;  Surgeon: Analilia Aceves MD;  [facility]     ARTHROPLASTY SHOULDER Right 8/26/2014    Procedure: ARTHROPLASTY SHOULDER;  Surgeon: Analilia Aceves MD;  [facility]     ARTHROSCOPY SHOULDER WITH BIOPSY(IES) Left 1/28/2020    Procedure: Left shoulder arthroscopy and biopsy for culture;  Surgeon: Analilia Aceves MD;  [facility]     BYPASS GASTRIC TAVO-EN-Y, LIVER BIOPSY, COMBINED  8/8/2005     C HAND/FINGER SURGERY UNLISTED       C SHOULDER SURG PROC  UNLISTED       COLONOSCOPY  6/30/2014    Procedure: COMBINED COLONOSCOPY, SINGLE BIOPSY/POLYPECTOMY BY BIOPSY;  Surgeon: Chester Patton MD;  [facility]     CV CORONARY ANGIOGRAM  1/30/2020    Procedure: CV CORONARY ANGIOGRAM;  Surgeon: Néstor Walls MD;  [facility]     CYSTOSCOPY, BLADDER NECK CUTS, COMBINED N/A 7/18/2016    Procedure: COMBINED CYSTOSCOPY, BLADDER NECK CUTS;  Surgeon: Ritu Leslie MD;  [facility]     ESOPHAGOSCOPY, GASTROSCOPY, DUODENOSCOPY (EGD), COMBINED  6/30/2014    Procedure: COMBINED ESOPHAGOSCOPY, GASTROSCOPY, DUODENOSCOPY (EGD), BIOPSY SINGLE OR MULTIPLE;  Surgeon: Chester Patton MD;  [facility]     EXCISE MASS FINGER  6/14/2011    Procedure:EXCISE MASS FINGER; Middle Flexor Cyst; Surgeon:SHAYY RUSSELL; [facility]     HAND SURGERY      excision of tendon cyst on left hand     [facility] VASCULAR SURGERY PROCEDURE UNLIST       IR FINE NEEDLE ASPIRATION W ULTRASOUND  11/13/2019     IR PICC PLACEMENT > 5 YRS OF AGE  11/19/2019     LASER HOLMIUM LITHOTRIPSY BLADDER N/A 10/15/2014    Procedure: LASER HOLMIUM LITHOTRIPSY BLADDER;  Surgeon: Sahil Taveras MD;  [facility]     LASER KTP GREEN LIGHT PHOTOSELECTIVE VAPORIZATION PROSTATE  1/23/2014    Procedure: LASER KTP GREEN LIGHT PHOTOSELECTIVE VAPORIZATION PROSTATE;  Greenlight Photovaporization Of Prostate  ;  Surgeon: Sahil Taveras MD;  [facility] OR     RELEASE TRIGGER FINGER Right 3/31/2017    Procedure: RELEASE TRIGGER FINGER;  Surgeon: Juan Carlos Blunt MD;  [facility]     REMOVE HARDWARE ARTHROPLASTY SHOULDER. I&D, PLACE ANTIBIOTIC CEMENT BE Left 11/15/2019    Procedure: Explantation of left total shoulder arthroplasty, irrigation and debridement, and placement of antibiotic spacer;  Surgeon: Analilia Aceves MD;  [facility]     REPAIR ANEURYSM ASCENDING AORTA N/A 10/4/2016    Procedure: REPAIR ANEURYSM  ASCENDING AORTA;  Surgeon: Mckenzie Townsend MD;  Location:  OR     [facility]  2014       ALLERGIES     Allergies   Allergen Reactions     Ciprofloxacin      History of aortic aneurysms     Tape [Adhesive Tape] Blisters     Blistering - please use paper tape       FAMILY HISTORY  Family History   Problem Relation Age of Onset     Arthritis Other      Gastrointestinal Disease Other      Cardiovascular Father         aortic aneurysm     Arrhythmia Father      Nephrolithiasis Father      Sleep Apnea Father      Anxiety Disorder Father      Depression Father      Hypertension Father      Obesity Father      Hyperlipidemia Father      Coronary Artery Disease Father         history of MI and stent     Low Back Problems Father      Spine Problems Father      Anxiety Disorder Mother      Hypertension Mother      Osteoporosis Mother      Obesity Mother      Hyperlipidemia Mother      Low Back Problems Mother      Anxiety Disorder Sister      Hypertension Sister      Osteoporosis Sister      Obesity Sister      Hyperlipidemia Sister      Low Back Problems Sister      Spine Problems Sister      Anxiety Disorder Sister      Depression Sister      Hypertension Sister      Osteoporosis Sister      Obesity Sister      Hyperlipidemia Sister      Low Back Problems Sister        SOCIAL HISTORY  Social History     Socioeconomic History     Marital status: Single     Spouse name: Not on file     Number of children: Not on file     Years of education: Not on file     Highest education level: Not on file   Occupational History     Occupation: Disabled   Social Needs     Financial resource strain: Not on file     Food insecurity:     Worry: Not on file     Inability: Not on file     Transportation needs:     Medical: Not on file     Non-medical: Not on file   Tobacco Use     Smoking status: Former Smoker     Packs/day: 0.50     Years: 6.00     Pack years: 3.00     Types: Cigarettes     Start date: 2/1/1977     Last attempt to quit:  "1983     Years since quittin.5     Smokeless tobacco: Never Used   Substance and Sexual Activity     Alcohol use: No     Alcohol/week: 0.0 standard drinks     Drug use: No     Sexual activity: Not Currently     Partners: Female     Birth control/protection: Abstinence   Lifestyle     Physical activity:     Days per week: Not on file     Minutes per session: Not on file     Stress: Not on file   Relationships     Social connections:     Talks on phone: Not on file     Gets together: Not on file     Attends Christianity service: Not on file     Active member of club or organization: Not on file     Attends meetings of clubs or organizations: Not on file     Relationship status: Not on file     Intimate partner violence:     Fear of current or ex partner: Not on file     Emotionally abused: Not on file     Physically abused: Not on file     Forced sexual activity: Not on file   Other Topics Concern     Parent/sibling w/ CABG, MI or angioplasty before 65F 55M? Not Asked   Social History Narrative     Not on file       ROS:    Constitutional: No fever, chills, or sweats. No weight gain/loss   ENT: No visual disturbance, ear ache, epistaxis, sore throat  Allergies/Immunologic: Negative  Respiratory: No cough, hemoptysia  Cardiovascular: As per HPI  GI: No nausea, vomiting, hematemesis, melena, or hematochezia  : No urinary frequency, dysuria, or hematuria  Integument: Negative  Psychiatric: Negative  Neuro: Negative  Endocrinology: Negative   Musculoskeletal: Negative  Vascular: No walking impairment, claudication, ischemic rest pain or nonhealing wounds    EXAM:  /75   Pulse 60   Ht 1.765 m (5' 9.5\")   Wt 86.2 kg (190 lb)   BMI 27.66 kg/m    In general, the patient is a pleasant male in no apparent distress.    HEENT: NC/AT.  PERRLA.  EOMI.  Sclerae white, not injected.  Nares clear.  Pharynx without erythema or exudate.  Dentition intact.    Neck: No adenopathy.  No thyromegaly. Carotids +2/2 " bilaterally without bruits.  No jugular venous distension.   Heart: RRR. Normal S1, S2 splits physiologically. No murmur, rub, click, or gallop. The PMI is in the 5th ICS in the midclavicular line. There is no heave.    Lungs: CTA.  No ronchi, wheezes, rales.  No dullness to percussion.   Abdomen: Soft, nontender, nondistended. No organomegaly. No AAA.  No bruits.   Extremities: No clubbing, cyanosis, or edema.  No wounds. No varicose veins signs of chronic venous insufficiency.   Vascular: No bruits are noted. 2+ DP and PT pulses bilaterally. 2+ radial and ulnar. 2+ carotid bilaterally.     Labs:  LIPID RESULTS:  Lab Results   Component Value Date    CHOL 179 03/22/2018    HDL 39 (L) 03/22/2018    LDL 89 03/22/2018    TRIG 255 (H) 03/22/2018    CHOLHDLRATIO 3.7 06/08/2015    NHDL 140 (H) 03/22/2018       LIVER ENZYME RESULTS:  Lab Results   Component Value Date    AST 18 02/15/2020    ALT 19 02/15/2020       CBC RESULTS:  Lab Results   Component Value Date    WBC 6.7 02/16/2020    RBC 4.59 02/16/2020    HGB 13.0 (L) 02/16/2020    HCT 39.1 (L) 02/16/2020    MCV 85 02/16/2020    MCH 28.3 02/16/2020    MCHC 33.2 02/16/2020    RDW 13.0 02/16/2020     02/16/2020       BMP RESULTS:  Lab Results   Component Value Date    .0 02/18/2020    POTASSIUM 4.8 02/18/2020    CHLORIDE 102.0 02/18/2020    CO2 24.0 02/18/2020    ANIONGAP 8 02/16/2020    .0 (H) 02/18/2020    BUN 22.0 02/18/2020    CR 1.3 02/18/2020    GFRESTIMATED 65 02/16/2020    GFRESTBLACK 75 02/16/2020    NIA 9.7 02/18/2020        A1C RESULTS:  Lab Results   Component Value Date    A1C 5.3 10/05/2016       Procedures:      Echo 3/12/2018  S/P valve-sparing ascending aorta replacement with 32mm Gelweave interposition  graft 10/4/16 by history.     Normal biventricular size, thickness, global, and regional systolic function, LVEF=60-65%.  Bicsupid aortic valve (Lucy type 1 L-R) with mild aortic insufficiency and no aortic stenosis.  Normal  appearance of the aortic root and ascending aortic graft. Sinuses of Valsalva 3.8 cm, ascending aortic graft 3.2 cm.  No pericardial effusion is present.  This study was compared with the study from 8/30/2016 (XIOMY) : There has been interval valve-sparing ascending aortic replacement. There has otherwise been no significant change.         MRA 1/4/17:     1. Patient is status post repair of a ascending aortic aneurysm. The ascending aorta, arch, and descending aorta are now normal in diameter. There is no dissection seen.   2. The aortic arch is left sided. There is a bovine variant branching of the arch vessels.   3. The main and proximal branch pulmonary arteries are normal in size.   4. The systemic venous connections are normal.       Bi-orthogonal luminal aortic dimensions are:     1.  Aortic root at the sinuses of Valsalva =  3.6 cm x 3.5 cm    2.  Sinotubular junction =  2.8 cm x 2.6 cm    3.  Mid-ascending aorta (midpoint in length between Nos. 2 and 4) =  3.4 cm x 3.3 cm    4.  Proximal aortic arch (aorta at the origin of the innominateartery) =  3.0 cm x 2.8 cm    5.  Mid-aortic arch (between left common carotid and subclavian arteries) =  2.2 cm x 1.9 cm    6.  Proximal descending thoracic aorta (begins at the isthmus) =  2.1 cm --x 2.1 cm  7.  Mid-descending aorta (midpoint in length between Nos. 6 and 8) =  2.1 cm x 2.0 cm    8.  Aorta at diaphragm (2 cm above the celiac axis origin) =  2.1 cm x2.0 cm  9.  Abdominal aorta at the celiac axis origin =  2.0 cm x 1.9 cm        Assessment and Plan:     60-year-old male with hypertension, hyperlipidemia, right left fusion of his aortic valve with bicuspid aortopathy.  No known coarctation.  No other peripheral aneurysmal disease.  He is status post valve sparing aortic repair with Gelweave interposition graft.  Blood pressure overall well controlled.  He will need ongoing long-term surveillance of both of his aortic repair and his bicuspid valve given he has  some dysfunction within the native valve.  He only has mild aortic insufficiency but will need to follow this over time.  He also has hypertension requiring multidrug regimen. Was inadvertantly started on both losartan and lisinopril leading to hyperkalemia. NEMESIO from lasix probable.     Summary of Recommendations:    1. Clearance for surgery: no further cardiac testing needed  2. Continue amlodipine-benzapril and carvedilol, patient to report blood pressure readings   3. Labs today for renal function   4. Follow up in 6 month   5. Will send Cardiac MRI results by mychart - wet read is normalized LVEF       Enriqueta Zhou MD MSc  [facility]

## 2020-03-03 ENCOUNTER — HOSPITAL ENCOUNTER (INPATIENT)
Facility: CLINIC | Age: 60
Setting detail: SURGERY ADMIT
End: 2020-03-03
Attending: ORTHOPAEDIC SURGERY | Admitting: ORTHOPAEDIC SURGERY
Payer: COMMERCIAL

## 2020-03-03 ENCOUNTER — TELEPHONE (OUTPATIENT)
Dept: ORTHOPEDICS | Facility: CLINIC | Age: 60
End: 2020-03-03

## 2020-03-03 DIAGNOSIS — Z96.612 STATUS POST TOTAL SHOULDER ARTHROPLASTY, LEFT: Primary | ICD-10-CM

## 2020-03-03 DIAGNOSIS — Z96.612 STATUS POST TOTAL SHOULDER ARTHROPLASTY, LEFT: ICD-10-CM

## 2020-03-06 DIAGNOSIS — M54.2 NECK PAIN: ICD-10-CM

## 2020-03-06 RX ORDER — CYCLOBENZAPRINE HCL 10 MG
10 TABLET ORAL 3 TIMES DAILY PRN
Qty: 90 TABLET | Refills: 3 | Status: SHIPPED | OUTPATIENT
Start: 2020-03-06 | End: 2020-08-02

## 2020-03-06 NOTE — TELEPHONE ENCOUNTER
[facility] Medicine phone call message- medication clarification/question:    Full Medication Name: Cyclobenzaprine   Strength: 10 mg    Have you contacted your pharmacy about this refill request?     If  Yes,  which pharmacy?    When did you contact the pharmacy?    Additional comments/concerns from call to pharmacy:    Reason for call to clinic: Patient called left message on voice mail that he is needing a new Rx to be sent to the pharmacy as he is taking medication above 3 times a day and only got 45 pills so he would need a 60 or 90 day because if the pharmacy sent in a refill it would be only for 45 pills. He states that Dr. Hardin is aware of this. Please call and advise.       Pharmacy confirmed as [facility] [address] [place] [address] [[address] Yes    OK to leave a message on voice mail?     Primary language: English      needed? No    Call taken on March 6, 2020 at 10:06 AM by Tavo Fu

## 2020-03-20 ENCOUNTER — TELEPHONE (OUTPATIENT)
Dept: ORTHOPEDICS | Facility: CLINIC | Age: 60
End: 2020-03-20

## 2020-03-20 NOTE — TELEPHONE ENCOUNTER
Patient is scheduled for shoulder surgery 04/21/2020.  He has concerns his increase in pain may be a sign of infection and wants to know if any tests should be done.  He reports he recently moved his parents to a different facility and now is moving them back home.    Discussed recent activity as possible cause of increase in pain.  Patient requests Dr Aceves's recommendation as to whether testing needs to be done at this time.  Message will be sent.

## 2020-03-20 NOTE — TELEPHONE ENCOUNTER
[facility] Call Center    Phone Message    May a detailed message be left on voicemail: yes     Reason for Call: Other: Pt is having more pain in his shoulders then normal and he would like a call back to discuss his options and a few questions about getting staff infection back. PLease contact pt to discuss     Action Taken: Message routed to:  [facility] Ortho    Travel Screening: Not Applicable

## 2020-03-23 DIAGNOSIS — M47.812 SPONDYLOSIS OF CERVICAL REGION WITHOUT MYELOPATHY OR RADICULOPATHY: ICD-10-CM

## 2020-03-23 RX ORDER — NAPROXEN 500 MG/1
500 TABLET ORAL 2 TIMES DAILY PRN
Qty: 60 TABLET | Refills: 3 | Status: SHIPPED | OUTPATIENT
Start: 2020-03-23 | End: 2020-10-03

## 2020-04-08 ENCOUNTER — VIRTUAL VISIT (OUTPATIENT)
Dept: INFECTIOUS DISEASES | Facility: CLINIC | Age: 60
End: 2020-04-08
Attending: INTERNAL MEDICINE
Payer: COMMERCIAL

## 2020-04-08 DIAGNOSIS — M86.9 BONE INFECTION, SHOULDER REGION (H): Primary | ICD-10-CM

## 2020-04-08 NOTE — PROGRESS NOTES
"Kobe Buchanan is a 60 year old male who is being evaluated via a billable telephone visit.      The patient has been notified of following:     \"This telephone visit will be conducted via a call between you and your physician/provider. We have found that certain health care needs can be provided without the need for a physical exam.  This service lets us provide the care you need with a short phone conversation.  If a prescription is necessary we can send it directly to your pharmacy.  If lab work is needed we can place an order for that and you can then stop by our lab to have the test done at a later time.    Telephone visits are billed at different rates depending on your insurance coverage. During this emergency period, for some insurers they may be billed the same as an in-person visit.  Please reach out to your insurance provider with any questions.    If during the course of the call the physician/provider feels a telephone visit is not appropriate, you will not be charged for this service.\"    Patient has given verbal consent for Telephone visit?  Yes    Kobe Buchanan complains of: follow up of prior L shoulder arthroplasty infection    I have reviewed and updated the patient's Past Medical History, Social History, Family History and Medication List.    ALLERGIES  Ciprofloxacin and Tape [adhesive tape]    Additional provider notes: Kobe is currently doing well after I last saw him in 2/2020. At that time he had undergone surveillance biopsies of his L shoulder that was complicated by tachyarrhythmia.   His cultures grew 1/5 C acnes which was regarded as a likely contaminant and was not treated. His biopsy sites healed well.  He followed up with cardiology. His EF has decreased from >50 to 30% suzi-op, but is now normal (per Dr. Enriqueta Zhou's reference to cardiac MRI in 2/2020 visit).  He is very anxious to proceed with surgery.    Assessment:    1. Left shoulder prosthetic joint infection " with complicating MSSA bacteremia. Notably treated with implant retention and antibiotics, and was refractory to cefazolin with relapsed sx 2 days after cefazolin was discontinued. S/p L TSA explant 11/15 with cx showing MSSA. Treated with cefazolin for 6 weeks, until 12/27/19, CRP normalized.  Now a candidate for future reconstructive surgery. Surveillance biopsy was complicated by cardiac event. Biopsies show 1/5 with C acnes. While his CRP is normal and his cultures are only 1/5, so this was regarded as a contaminant and not treated.     2. Hx Right shoulder prostheric joint infection   - s/p irrigation and debridement , explant and replacement of spacer on 9/23/19.   - Intra-op cultures grew MSSA .   - s/p 6 weeks of Cefazolin and  completed antibotic on 11/10/19 (as above, had emergence of L shoulder PJI infection symptoms immediately upon cessation - L prosthesis had been retained at the onset of symptoms and is now removed)     3. History of MSSA bacteremia/septicemia (9/20/19 -9/22/19) with secondary R and L TSA PJI (see above)  - XIOMY neg for thrombus/vegetation 9/24/19  - blood cx - neg x 2 on 11/13/19   - may have been preceded by skin picking/dermatitis - he was strongly encouraged to discontinue this practice     4. Bicuspid aortic valve  5. Ascending aortic aneurysm  6. Prior gastric bypass  7. Chronic cervical spine pain  8. HCV Ab+ and RNA negative 10/2019  9. Tachyarrhythmia during recent L shoulder surgery for biopsies, had transiently reduced EF but is now normalized per last visit (in 2/2020) with cardiology.     RECOMMENDATION:  - I would not object to proceeding with joint re-implantation on L because I don't view that 1/5 cx with C acnes is a contraindication. Would suggest repeat biopsies for anaerobic and aerobic culture at the time of surgery.   Future antibiotic therapy will depend on what grows at surgery and also the plans for his R shoulder - he will need a drug holiday prior to any  future surgery on the R.    If it would be possible to perform R-sided biopsies at the time of a L implant re-insertion, this would be ideal, but unsure if this is feasible.       It is a pleasure to participate in Mr. Buchanan's care.      Ivory Hilton MD   of Medicine, Division of Infectious Diseases  [facility] [phone]    Phone call duration: 25 minutes

## 2020-04-14 ENCOUNTER — TELEPHONE (OUTPATIENT)
Dept: FAMILY MEDICINE | Facility: CLINIC | Age: 60
End: 2020-04-14

## 2020-04-14 DIAGNOSIS — I25.5 ISCHEMIC CARDIOMYOPATHY: Primary | ICD-10-CM

## 2020-04-14 DIAGNOSIS — F43.9 STRESS: ICD-10-CM

## 2020-04-14 RX ORDER — ATORVASTATIN CALCIUM 40 MG/1
40 TABLET, FILM COATED ORAL DAILY
Qty: 90 TABLET | Refills: 1 | Status: SHIPPED | OUTPATIENT
Start: 2020-04-14 | End: 2020-10-02

## 2020-04-14 RX ORDER — CLONAZEPAM 0.5 MG/1
0.5 TABLET ORAL 3 TIMES DAILY PRN
Qty: 90 TABLET | Refills: 1 | Status: SHIPPED | OUTPATIENT
Start: 2020-04-14 | End: 2020-06-08

## 2020-04-14 RX ORDER — ATORVASTATIN CALCIUM 40 MG/1
40 TABLET, FILM COATED ORAL DAILY
COMMUNITY
Start: 2020-03-24 | End: 2020-04-14

## 2020-04-14 NOTE — TELEPHONE ENCOUNTER
[facility] Family Medicine phone call message- medication clarification/question:    Full Medication Name:  Atorvastatin  Dose: 40mg    Question: Patient is requesting the medication listed to be filled for a 90 day supply. Please call and advise.      Pharmacy confirmed as [facility] [id number] [facility] [[address] Yes    OK to leave a message on voice mail? Yes    Primary language: English      needed? No    Call taken on April 14, 2020 at 1:53 PM by Amberly Clark

## 2020-05-04 ENCOUNTER — TELEPHONE (OUTPATIENT)
Dept: ORTHOPEDICS | Facility: CLINIC | Age: 60
End: 2020-05-04

## 2020-05-04 DIAGNOSIS — Z11.59 ENCOUNTER FOR SCREENING FOR OTHER VIRAL DISEASES: Primary | ICD-10-CM

## 2020-05-04 NOTE — TELEPHONE ENCOUNTER
FUTURE VISIT INFORMATION      SURGERY INFORMATION:    Date: 20    Location: [facility]    Surgeon:  Analilia Aceves MD    Anesthesia Type:  choice    Procedure: Left shoulder removal of spacer and placement of reverse total shoulder arthroplasty    Consult: ov     RECORDS REQUESTED FROM:       Primary Care Provider: Demi Hardin MD [facility]    Pertinent Medical History: SAVITA, hypertension, atrial fibrillation, acute systolic heart failure    Most recent EKG+ Tracin/15/20    Most recent ECHO: 20    Most recent Coronary Angiogram: 20    Most recent Sleep Study:  14

## 2020-05-04 NOTE — TELEPHONE ENCOUNTER
Patient is scheduled for surgery with Dr. Aceves      Spoke or left message with: Spoke with Kobe    Date of Surgery: 5/8/20    Location: [place]    Informed patient they will need an adult  Yes    H&P: Scheduled with Sahil Asencio    Additional imaging/appointments: 2 and 6 week post ops    Surgery packet: Given in clinic     Additional comments: patient will await pre op phone call 1-2 days prior to surgery for arrival time

## 2020-05-06 ENCOUNTER — ANESTHESIA EVENT (OUTPATIENT)
Dept: SURGERY | Facility: CLINIC | Age: 60
DRG: 483 | End: 2020-05-06
Payer: COMMERCIAL

## 2020-05-06 ENCOUNTER — PRE VISIT (OUTPATIENT)
Dept: SURGERY | Facility: CLINIC | Age: 60
End: 2020-05-06

## 2020-05-06 ENCOUNTER — OFFICE VISIT (OUTPATIENT)
Dept: SURGERY | Facility: CLINIC | Age: 60
End: 2020-05-06
Payer: COMMERCIAL

## 2020-05-06 VITALS
HEART RATE: 61 BPM | OXYGEN SATURATION: 99 % | WEIGHT: 195.5 LBS | TEMPERATURE: 97.8 F | HEIGHT: 70 IN | SYSTOLIC BLOOD PRESSURE: 132 MMHG | DIASTOLIC BLOOD PRESSURE: 71 MMHG | BODY MASS INDEX: 27.99 KG/M2 | RESPIRATION RATE: 19 BRPM

## 2020-05-06 DIAGNOSIS — Z96.612 HISTORY OF LEFT SHOULDER REPLACEMENT: ICD-10-CM

## 2020-05-06 DIAGNOSIS — Z01.818 PREOP EXAMINATION: ICD-10-CM

## 2020-05-06 DIAGNOSIS — Z96.612 HISTORY OF LEFT SHOULDER REPLACEMENT: Primary | ICD-10-CM

## 2020-05-06 LAB
ALBUMIN SERPL-MCNC: 4 G/DL (ref 3.4–5)
ALP SERPL-CCNC: 100 U/L (ref 40–150)
ALT SERPL W P-5'-P-CCNC: 42 U/L (ref 0–70)
ANION GAP SERPL CALCULATED.3IONS-SCNC: 2 MMOL/L (ref 3–14)
AST SERPL W P-5'-P-CCNC: 29 U/L (ref 0–45)
BILIRUB DIRECT SERPL-MCNC: 0.1 MG/DL (ref 0–0.2)
BILIRUB SERPL-MCNC: 0.6 MG/DL (ref 0.2–1.3)
BUN SERPL-MCNC: 28 MG/DL (ref 7–30)
CALCIUM SERPL-MCNC: 9.1 MG/DL (ref 8.5–10.1)
CHLORIDE SERPL-SCNC: 108 MMOL/L (ref 94–109)
CO2 SERPL-SCNC: 28 MMOL/L (ref 20–32)
CREAT SERPL-MCNC: 0.85 MG/DL (ref 0.66–1.25)
ERYTHROCYTE [DISTWIDTH] IN BLOOD BY AUTOMATED COUNT: 13.1 % (ref 10–15)
GFR SERPL CREATININE-BSD FRML MDRD: >90 ML/MIN/{1.73_M2}
GLUCOSE SERPL-MCNC: 90 MG/DL (ref 70–99)
HCT VFR BLD AUTO: 36.4 % (ref 40–53)
HGB BLD-MCNC: 12 G/DL (ref 13.3–17.7)
MCH RBC QN AUTO: 28.6 PG (ref 26.5–33)
MCHC RBC AUTO-ENTMCNC: 33 G/DL (ref 31.5–36.5)
MCV RBC AUTO: 87 FL (ref 78–100)
PLATELET # BLD AUTO: 282 10E9/L (ref 150–450)
POTASSIUM SERPL-SCNC: 4.7 MMOL/L (ref 3.4–5.3)
PROT SERPL-MCNC: 7.6 G/DL (ref 6.8–8.8)
RBC # BLD AUTO: 4.2 10E12/L (ref 4.4–5.9)
SODIUM SERPL-SCNC: 138 MMOL/L (ref 133–144)
WBC # BLD AUTO: 6.8 10E9/L (ref 4–11)

## 2020-05-06 RX ORDER — GABAPENTIN 300 MG/1
300 CAPSULE ORAL ONCE
Status: CANCELLED | OUTPATIENT
Start: 2020-05-06 | End: 2020-05-06

## 2020-05-06 RX ORDER — ACETAMINOPHEN 325 MG/1
975 TABLET ORAL ONCE
Status: CANCELLED | OUTPATIENT
Start: 2020-05-06 | End: 2020-05-06

## 2020-05-06 ASSESSMENT — LIFESTYLE VARIABLES: TOBACCO_USE: 1

## 2020-05-06 ASSESSMENT — COPD QUESTIONNAIRES: COPD: 0

## 2020-05-06 ASSESSMENT — PAIN SCALES - GENERAL: PAINLEVEL: NO PAIN (0)

## 2020-05-06 ASSESSMENT — MIFFLIN-ST. JEOR: SCORE: 1708.03

## 2020-05-06 ASSESSMENT — ENCOUNTER SYMPTOMS
SEIZURES: 0
DYSRHYTHMIAS: 1

## 2020-05-06 ASSESSMENT — PATIENT HEALTH QUESTIONNAIRE - PHQ9: SUM OF ALL RESPONSES TO PHQ QUESTIONS 1-9: 0

## 2020-05-06 NOTE — PATIENT INSTRUCTIONS
Preparing for Your Surgery      Name:  Kobe Buchanan   MRN:  [id number]   :  1960   Today's Date:  2020     Arriving for surgery:  Surgery date:  2020  Arrival time:  8:30AM  Due to the COVID 19 crisis, we are trying to keep our patients safe from others who might have respiratory illnesses so the hospital is implementing a no visitor policy.  Please come to:     [facility] [address]  [address] [place]  [address]    - parking is NOT available.    -Proceed to the 3rd floor, check in at the Adult Surgery Waiting Lounge. [phone]    If an escort is needed stop at the Information Desk in the lobby. Inform the information person that you are here for surgery. An escort to the Adult Surgery Waiting Lounge will be provided.        What can I eat or drink?  -  You may have solid food or milk products until 8 hours prior to your surgery.  2020, 3AM  -  You may have water, apple juice or 7up/Sprite until 2 1/2 hours prior to your surgery. 2020, 8:30AM    Which medicines can I take?  Hold Aspirin, Multivitamins and supplements one week prior to surgery.  Hold Ibuprofen for 24 hours and/or Naproxen for 4 days prior to surgery.   -  Do NOT take these medications in the morning, the day of surgery:    Vitamin B12   Proferrin-Forte    Senna    Vitamin B complex    Amlodipine-Benazepril(Lotrel)      -  Please take these medications the day of surgery:    Bupropion(Wellbutrin)   Carvedilol(Coreg)    Fentanyl patch      -As needed:    Acetaminophen   Clonazepam(Klonopin)    Cyclobenazapril(Flexeril)  Oxycodone(Roxicodone)    Pantoprazole(Protonix)        How do I prepare myself?  -  Take two showers: one the night before surgery; and one the morning of surgery.         Use Scrubcare or Hibiclens to wash from neck down, leave soap on your skin for up to one minute.  Do not get soap in your eyes or ears.  You may use your own shampoo and conditioner; no  other hair products.   -  Do NOT use lotion, powder, deodorant, or antiperspirant the day of your surgery.  -  Do NOT wear jewelry.  - Do not bring your own medications to the hospital, except for inhalers and eye   drops.  -  Bring your ID and insurance card.    Questions or Concerns:  -If you are scheduled [facility] the [facility] or [facility] and have questions or concerns regarding the day of surgery, please call [facility] at [phone].     -If you have health changes between today and your surgery please call your surgeon. For questions after surgery please call your surgeons office.           AFTER YOUR SURGERY  Breathing exercises   Breathing exercises help you recover faster. Take deep breaths and let the air out slowly. This will:     Help you wake up after surgery.    Help prevent complications like pneumonia.  Preventing complications will help you go home sooner.   We may give you a breathing device (incentive spirometer) to encourage you to breathe deeply.   Nausea and vomiting   You may feel sick to your stomach after surgery; if so, let your nurse know.    Pain control:  After surgery, you may have pain. Our goal is to help you manage your pain. Pain medicine will help you feel comfortable enough to do activities that will help you heal.  These activities may include breathing exercises, walking and physical therapy.   To help your health care team treat your pain we will ask: 1) If you have pain  2) where it is located 3) describe your pain in your words  Methods of pain control include medications given by mouth, vein or by nerve block for some surgeries.  Sequential Compression Device (SCD):  You may need to wear SCD S (also called pneumo boots)on your legs or feet. These are wraps connected to a machine that pumps in air and releases it. The repeated pumping helps prevent blood clots from forming.

## 2020-05-06 NOTE — H&P
Pre-Operative H & P     CC:  Preoperative exam to assess for increased cardiopulmonary risk while undergoing surgery and anesthesia.    Date of Encounter: 5/6/2020  Primary Care Physician:  Demi Hardin  Reason for visit: Status post total shoulder arthroplasty, left [Z96.612]  HPI  Kobe Buchanan is a 60 year old male who presents for pre-operative H & P in preparation for Left shoulder removal of spacer, and placement of reverse total shoulder arthroplasty with Dr. Aceves on 5/8/20 at [facility]. History is obtained from the patient and medical records.     Patient with complex medical and surgical history to include bilateral shoulder replacement with subsequent bilateral prosthetic joint infection with subsequent removal and placement of spacer bilaterally, chronic cervical spine pain, SAVITA, anxiety, depression, HTN, HLD, prior smoking history, gastric bypass, right left fusion of his aortic valve with bicuspid aortopathy, ascending aortic aneurysm. No known coarctation. He is s/p valve sparing repair with Gelweave graft 2016, NICM (EF 30-35% but now normalized per cardiac MRI). A surveillance biopsy on 1/28/20 was complicated by ventricular ectopy and severe BP elevation 200s/130-150s. This was followed by postop evaluation and admission to Cardiology. He has since been followed by Cardiology, last seen on 4/26/20 where he was approved to proceed with above surgery.     In addition he has been followed by ID in treatment of his infections, last seen 4/8/20 and recommended to proceed to surgery.     Today patient denies chest pain, irregular HR, cough, DYSPNEA ON EXERTION or ankle edema.     Past Medical History  Past Medical History:   Diagnosis Date     Anxiety      Ascending aortic aneurysm (H)      Bicuspid aortic valve      BPPV (benign paroxysmal positional vertigo) 7/11/2014     Chronic narcotic use      Chronic neck pain      Chronic  osteoarthritis      Degenerative joint disease      Depression      Hyperlipidemia 4/10/2012     Hypertension      Multiple stiff joints      Neck injuries      Obesity 2/9/2015     Pain in shoulder      Skin picking habit      Sleep apnea     does not use cpap       Past Surgical History  Past Surgical History:   Procedure Laterality Date     ARTHROPLASTY SHOULDER  4/15/2014    Procedure: Left Total Shoulder Arthroplasty ;  Surgeon: Analilia Aceves MD;  [facility]     ARTHROPLASTY SHOULDER Right 8/26/2014    Procedure: ARTHROPLASTY SHOULDER;  Surgeon: Analilia Aceves MD;  [facility]     ARTHROSCOPY SHOULDER WITH BIOPSY(IES) Left 1/28/2020    Procedure: Left shoulder arthroscopy and biopsy for culture;  Surgeon: Analilia Aceves MD;  [facility]     BYPASS GASTRIC TAVO-EN-Y, LIVER BIOPSY, COMBINED  8/8/2005     C HAND/FINGER SURGERY UNLISTED       C SHOULDER SURG PROC UNLISTED       COLONOSCOPY  6/30/2014    Procedure: COMBINED COLONOSCOPY, SINGLE BIOPSY/POLYPECTOMY BY BIOPSY;  Surgeon: Chester Patton MD;  [facility]     CV CORONARY ANGIOGRAM  1/30/2020    Procedure: CV CORONARY ANGIOGRAM;  Surgeon: Néstor Walls MD;  [facility]     CYSTOSCOPY, BLADDER NECK CUTS, COMBINED N/A 7/18/2016    Procedure: COMBINED CYSTOSCOPY, BLADDER NECK CUTS;  Surgeon: Ritu Leslie MD;  [facility]     ESOPHAGOSCOPY, GASTROSCOPY, DUODENOSCOPY (EGD), COMBINED  6/30/2014    Procedure: COMBINED ESOPHAGOSCOPY, GASTROSCOPY, DUODENOSCOPY (EGD), BIOPSY SINGLE OR MULTIPLE;  Surgeon: Chester Patton MD;  [facility]: [facility]     EXCISE MASS FINGER  6/14/2011    Procedure:EXCISE MASS FINGER; Middle Flexor Cyst; Surgeon:SHAYY RUSSELL; [facility]     HAND SURGERY      excision of tendon cyst on left hand     [facility] VASCULAR SURGERY PROCEDURE UNLIST       IR FINE NEEDLE ASPIRATION W ULTRASOUND  11/13/2019     IR PICC PLACEMENT > 5  YRS OF AGE  11/19/2019     LASER HOLMIUM LITHOTRIPSY BLADDER N/A 10/15/2014    Procedure: LASER HOLMIUM LITHOTRIPSY BLADDER;  Surgeon: Sahil Taveras MD;  [facility]     LASER KTP GREEN LIGHT PHOTOSELECTIVE VAPORIZATION PROSTATE  1/23/2014    Procedure: LASER KTP GREEN LIGHT PHOTOSELECTIVE VAPORIZATION PROSTATE;  Greenlight Photovaporization Of Prostate  ;  Surgeon: Sahil Taveras MD;  [facility]     RELEASE TRIGGER FINGER Right 3/31/2017    Procedure: RELEASE TRIGGER FINGER;  Surgeon: Juan Carlos Blunt MD;  [facility]     REMOVE HARDWARE ARTHROPLASTY SHOULDER. I&D, PLACE ANTIBIOTIC CEMENT BE Left 11/15/2019    Procedure: Explantation of left total shoulder arthroplasty, irrigation and debridement, and placement of antibiotic spacer;  Surgeon: Analilia Aceves MD;  [facility]     REPAIR ANEURYSM ASCENDING AORTA N/A 10/4/2016    Procedure: REPAIR ANEURYSM ASCENDING AORTA;  Surgeon: Mckenzie Townsend MD;  [facility]     TURP  2014       Hx of Blood transfusions/reactions: Yes in past. No known reactions.     Hx of abnormal bleeding or anti-platelet use: ASA 81 mg daily.     Menstrual history: No LMP for male patient.    Steroid use in the last year: Denies.     Personal or FH with difficulty with Anesthesia:  ventricular ectopy and severe BP elevation 200s/130-150s during last surgery    Prior to Admission Medications  Current Outpatient Medications   Medication Sig Dispense Refill     acetaminophen (TYLENOL) 325 MG tablet Take 2 tablets (650 mg) by mouth every 4 hours as needed for mild pain 100 tablet 0     amLODIPine-benazepril (LOTREL) 5-20 MG capsule Take 1 capsule by mouth daily (Patient taking differently: Take 1 capsule by mouth 2 times daily ) 180 capsule 3     amoxicillin (AMOXIL) 500 MG capsule Take 4 tablets 1 hour before dental procedure 24 capsule 4     aspirin 81 MG EC tablet Take 81 mg by mouth daily before breakfast       atorvastatin (LIPITOR) 40  MG tablet Take 1 tablet (40 mg) by mouth daily (Patient taking differently: Take 40 mg by mouth every evening ) 90 tablet 1     buPROPion (WELLBUTRIN SR) 200 MG 12 hr tablet TAKE 1 TABLET BY MOUTH 2 TIMES DAILY 180 tablet 3     carvedilol (COREG) 25 MG tablet Take 1 tablet (25 mg) by mouth 2 times daily (with meals) 180 tablet 3     clonazePAM (KLONOPIN) 0.5 MG tablet Take 1 tablet (0.5 mg) by mouth 3 times daily as needed for anxiety 90 tablet 1     Cyanocobalamin 5000 MCG SUBL Place 5,000 mcg under the tongue daily       cyclobenzaprine (FLEXERIL) 10 MG tablet Take 1 tablet (10 mg) by mouth 3 times daily as needed for muscle spasms 90 tablet 3     desoximetasone (TOPICORT) 0.25 % external cream Apply topically daily as needed for inflammation or itching       docusate sodium (COLACE) 100 MG capsule Take 1 capsule in the morning and 2 capsules in the evening       Fe Heme Polypeptide-folic acid (PROFERRIN-FORTE) 12-1 MG TABS Take 1 tablet by mouth daily 90 tablet 0     fentaNYL (DURAGESIC) 75 mcg/hr 72 hr patch Place 1 patch onto the skin every 48 hours        fluocinonide (LIDEX) 0.05 % external ointment Apply twice daily to itchy skin nodules for 1-2 weeks at a time. (Patient taking differently: as needed Apply twice daily to itchy skin nodules for 1-2 weeks at a time.) 30 g 3     Multiple Minerals-Vitamins (CALCIUM CITRATE-MAG-MINERALS) TABS Take 1 tablet by mouth daily       multivitamin w/minerals (THERA-VIT-M) tablet Take 1 tablet by mouth daily       naloxone (NARCAN) nasal spray Spray 1 spray (4 mg) into one nostril alternating nostrils as needed for opioid reversal every 2-3 minutes until assistance arrives 0.2 mL 0     naproxen (NAPROSYN DR) 500 MG EC tablet Take 500 mg by mouth 2 times daily as needed (pain) 60 tablet 3     oxyCODONE IR (ROXICODONE) 10 MG tablet Take 10 mg by mouth every 4 hours as needed for severe pain Max 6 tablets per day       pantoprazole (PROTONIX) 40 MG EC tablet Take 40 mg by  mouth daily as needed for heartburn       senna (SENOKOT) 8.6 MG tablet Take 2 tablets by mouth every morning and 3 tablets every evening       tacrolimus (PROTOPIC) 0.1 % ointment Apply topically as needed Apply to affected areas on body. (Patient not taking: Reported on 2020) 120 g 11     vitamin B-Complex Take 1 tablet by mouth daily         Allergies  Allergies   Allergen Reactions     Ciprofloxacin      History of aortic aneurysms     Tape [Adhesive Tape] Blisters     Blistering - please use paper tape       Social History  Social History     Socioeconomic History     Marital status: Single     Spouse name: Not on file     Number of children: Not on file     Years of education: Not on file     Highest education level: Not on file   Occupational History     Occupation: Disabled   Social Needs     Financial resource strain: Not on file     Food insecurity     Worry: Not on file     Inability: Not on file     Transportation needs     Medical: Not on file     Non-medical: Not on file   Tobacco Use     Smoking status: Former Smoker     Packs/day: 0.50     Years: 6.00     Pack years: 3.00     Types: Cigarettes     Start date: 1977     Last attempt to quit: 1983     Years since quittin.7     Smokeless tobacco: Never Used   Substance and Sexual Activity     Alcohol use: No     Alcohol/week: 0.0 standard drinks     Drug use: No     Sexual activity: Not Currently     Partners: Female     Birth control/protection: Abstinence   Lifestyle     Physical activity     Days per week: Not on file     Minutes per session: Not on file     Stress: Not on file   Relationships     Social connections     Talks on phone: Not on file     Gets together: Not on file     Attends Rastafarian service: Not on file     Active member of club or organization: Not on file     Attends meetings of clubs or organizations: Not on file     Relationship status: Not on file     Intimate partner violence     Fear of current or ex partner:  "Not on file     Emotionally abused: Not on file     Physically abused: Not on file     Forced sexual activity: Not on file   Other Topics Concern     Parent/sibling w/ CABG, MI or angioplasty before 65F 55M? Not Asked   Social History Narrative     Not on file       Family History  Family History   Problem Relation Age of Onset     Arthritis Other      Gastrointestinal Disease Other      Cardiovascular Father         aortic aneurysm     Arrhythmia Father      Nephrolithiasis Father      Sleep Apnea Father      Anxiety Disorder Father      Depression Father      Hypertension Father      Obesity Father      Hyperlipidemia Father      Coronary Artery Disease Father         history of MI and stent     Low Back Problems Father      Spine Problems Father      Anxiety Disorder Mother      Hypertension Mother      Osteoporosis Mother      Obesity Mother      Hyperlipidemia Mother      Low Back Problems Mother      Anxiety Disorder Sister      Hypertension Sister      Osteoporosis Sister      Obesity Sister      Hyperlipidemia Sister      Low Back Problems Sister      Spine Problems Sister      Anxiety Disorder Sister      Depression Sister      Hypertension Sister      Osteoporosis Sister      Obesity Sister      Hyperlipidemia Sister      Low Back Problems Sister        Preop Vitals    BP Readings from Last 3 Encounters:   05/06/20 132/71   02/26/20 121/75   02/16/20 (!) 146/79    Pulse Readings from Last 3 Encounters:   05/06/20 61   02/26/20 60   02/16/20 76      Resp Readings from Last 3 Encounters:   05/06/20 19   02/16/20 14   02/14/20 18    SpO2 Readings from Last 3 Encounters:   05/06/20 99%   02/16/20 97%   02/14/20 100%      Temp Readings from Last 1 Encounters:   05/06/20 97.8  F (36.6  C) (Oral)    Ht Readings from Last 1 Encounters:   05/06/20 1.778 m (5' 10\")      Wt Readings from Last 1 Encounters:   05/06/20 88.7 kg (195 lb 8 oz)    Estimated body mass index is 28.05 kg/m  as calculated from the following:   " " Height as of this encounter: 1.778 m (5' 10\").    Weight as of this encounter: 88.7 kg (195 lb 8 oz).       ROS/MED HX      The complete review of systems is negative other than noted in the HPI or here.     ENT/Pulmonary:     (+)sleep apnea, tobacco use, Past use uses CPAP , . .   (-) asthma, COPD and recent URI   Neurologic:  - neg neurologic ROS    (-) seizures   Cardiovascular:     (+) hypertension-range: 140/, ---. : . CHF Last EF: 63% date: 2/24/20 . . :. dysrhythmias . Previous cardiac testing Echodate:1/18/20  METS/Exercise Tolerance:  4 - Raking leaves, gardening   Hematologic:  - neg hematologic  ROS      (-) history of blood clots and anemia   Musculoskeletal: Comment: Bilateral shoulder replacement  (+) arthritis,  other musculoskeletal- Chronic Left shoulder OA; s/p cervical fusion      GI/Hepatic:  - neg GI/hepatic ROS      (-) GERD   Renal/Genitourinary:     (+) chronic renal disease, type: ARF, Pt does not require dialysis, Pt has no history of transplant, BPH,       Endo:     (+) Obesity, .      Psychiatric:  - neg psychiatric ROS       Infectious Disease: Comment: Patient with infected left total shoulder        Malignancy:      - no malignancy   Other:    (+) No chance of pregnancy C-spine cleared: N/A, H/O Chronic Pain,H/O chronic opiod use , no other significant disability              PHYSICAL EXAM:   Mental Status/Neuro: A/A/O; Age Appropriate   Airway: Facies: Feasible  Mallampati: I  Mouth/Opening: Full  TM distance: > 6 cm  Neck ROM: Limited   Respiratory: Auscultation: CTAB     Resp. Rate: Normal     Resp. Effort: Normal      CV: Rhythm: Regular  Heart: Normal Sounds, murmur  Edema: None   Comments:        Temp: 97.8  F (36.6  C) Temp src: Oral BP: 132/71 Pulse: 61   Resp: 19 SpO2: 99 %         195 lbs 8 oz  5' 10\"   Body mass index is 28.05 kg/m .       Physical Exam  Constitutional: Awake, alert, cooperative, no apparent distress, and appears stated age.  Eyes: Pupils equal, round " and reactive to light, extra ocular muscles intact, sclera clear, conjunctiva normal. Glasses on.  HENT: Normocephalic, oral pharynx with moist mucus membranes, good dentition. No goiter appreciated.   Respiratory: Clear to auscultation bilaterally, no crackles or wheezing. No cough or obvious dyspnea.  Cardiovascular: Regular rate and rhythm. systolic murmur noted.  Carotids +2, no bruits. No edema. Palpable pulses to radial  DP and PT arteries.   GI: Normal bowel sounds, soft, non-distended, non-tender, no masses palpated.   Lymph/Hematologic: No cervical lymphadenopathy and no supraclavicular lymphadenopathy.  Genitourinary:  Deferred.   Skin: Warm and dry.  No rashes at anticipated surgical site. Well healed median sternotomy incision. There is a large patch of a pink, dry skin lesion back of lower right leg. Possible eczema. No open areas.   Musculoskeletal: Limited ROM of neck. There is no redness, warmth, or swelling of the joints. Limited mobility of left shoulder. Gross motor strength is normal.    Neurologic: Awake, alert, oriented to name, place and time. Cranial nerves II-XII are grossly intact. Gait is normal.   Neuropsychiatric: Calm, cooperative. Normal affect.     Labs: (personally reviewed)  Lab Results   Component Value Date    WBC 6.8 05/06/2020     Lab Results   Component Value Date    RBC 4.20 05/06/2020     Lab Results   Component Value Date    HGB 12.0 05/06/2020     Lab Results   Component Value Date    HCT 36.4 05/06/2020     Lab Results   Component Value Date    MCV 87 05/06/2020     Lab Results   Component Value Date    MCH 28.6 05/06/2020     Lab Results   Component Value Date    MCHC 33.0 05/06/2020     Lab Results   Component Value Date    RDW 13.1 05/06/2020     Lab Results   Component Value Date     05/06/2020     Last Comprehensive Metabolic Panel:  Sodium   Date Value Ref Range Status   05/06/2020 138 133 - 144 mmol/L Final     Potassium   Date Value Ref Range Status    05/06/2020 4.7 3.4 - 5.3 mmol/L Final     Chloride   Date Value Ref Range Status   05/06/2020 108 94 - 109 mmol/L Final     Carbon Dioxide   Date Value Ref Range Status   05/06/2020 28 20 - 32 mmol/L Final     Anion Gap   Date Value Ref Range Status   05/06/2020 2 (L) 3 - 14 mmol/L Final     Glucose   Date Value Ref Range Status   05/06/2020 90 70 - 99 mg/dL Final     Urea Nitrogen   Date Value Ref Range Status   05/06/2020 28 7 - 30 mg/dL Final     Creatinine   Date Value Ref Range Status   05/06/2020 0.85 0.66 - 1.25 mg/dL Final     GFR Estimate   Date Value Ref Range Status   05/06/2020 >90 >60 mL/min/[1.73_m2] Final     Comment:     Non  GFR Calc  Starting 12/18/2018, serum creatinine based estimated GFR (eGFR) will be   calculated using the Chronic Kidney Disease Epidemiology Collaboration   (CKD-EPI) equation.       Calcium   Date Value Ref Range Status   05/06/2020 9.1 8.5 - 10.1 mg/dL Final     Lab Results   Component Value Date    AST 29 05/06/2020     Lab Results   Component Value Date    ALT 42 05/06/2020     Lab Results   Component Value Date    BILICONJ 0.0 06/11/2014      Lab Results   Component Value Date    BILITOTAL 0.6 05/06/2020     Lab Results   Component Value Date    ALBUMIN 4.0 05/06/2020     Lab Results   Component Value Date    PROTTOTAL 7.6 05/06/2020      Lab Results   Component Value Date    ALKPHOS 100 05/06/2020     EKG: Personally reviewed 2/15/20 Sinus rhythm  Cardiac echo:  1/28/20  Interpretation Summary  Moderately (EF 30-35%) reduced left ventricular function is present.There is  wall thinning and severe hypokinesis of the mid-distal anteroseptal, basal-mid  anterior and distal inferior segments concerning for coronary disease in the  LAD territory. There is no thrombus.  Global right ventricular function is mildly reduced.  No pericardial effusion is present.  XIOMY 9/24/19  CONCLUSION:  XIOMY 2019-09-24 14:38.      Normal LV size, wall thickness, and systolic  function.    Normal RV size and function.    Mild LA dilatation.    No evidence of thrombus in the LA appendage.    Mild mitral regurgitation.    Aortic valve is bicuspid with fusion of the right and left coronary    cusps. There is aortic sclerosis. There is trace aortic insufficiency.  Normal size aortic root (history of ascending aorta replacement with a    32 mm Gelweave interposition graft)    Negative bubble study.    No obvious/gross vegetation noted.    Patient tolerated procedure well with no complications.    MRI angiogram/MRI cardiac chest 2/24/20  Clinical history: 60-year old male with a history of ascending aortic aneurysm (s/p repair with 32 mm  supracoronary interpositional tube graft in 2016) and newly diagnosed cardiomyopathy with mild  non-obstructive CAD on angiogram. CMR to evaluate etiology of cardiomyopathy and MRA to evaluate ascending  aortic aneurysm repair.    Comparison CMR: 02/25/2019 (only MRA)     1. The LV is normal in cavity size and wall thickness. The global systolic function is normal. The LVEF is  63%. There are no regional wall motion abnormalities.     2. The RV is normal in cavity size. The global systolic function is hyperdynamic. The RVEF is 70%.     3. Both atria are normal in size.     4. There is no significant valvular disease. The aortic valve is known to be bicuspid, but was not well  seen on this study. There is no aortic stenosis or leak.     5. Late gadolinium enhancement imaging shows no MI, fibrosis or infiltrative disease.     6. There is no pericardial effusion or thickening.     7. There is no intracardiac thrombus.    Angiogram 1/30/20  mild non obstructive coronary disease    1/7/20 CT left shoulder                                                                   IMPRESSION: Interval postsurgical changes of total left shoulder  arthroplasty explantation with humeral antibiotic impregnated cement  spacer placement. Explantation deformity of the glenoid. No  acute  fracture.    Imaging and cardiac testing reviewed by this provider      Outside records reviewed from: Care Everywhere    ASSESSMENT and PLAN  Kobe Buchanan is a 60 year old male scheduled to undergo Left shoulder removal of spacer, and placement of reverse total shoulder arthroplasty with Dr. Aceves on 5/8/20. He has the following specific operative considerations:   - RCRI : 0.9%  risk of major adverse cardiac event.   - Anesthesia considerations:  Refer to PAC assessment in anesthesia records  - VTE risk: 0.5%  - Risk of PONV score = 2.  If > 2, anti-emetic intervention recommended.    --History of bilateral shoulder replacement with subsequent bilateral prosthetic joint infection with subsequent removal and placement of spacer bilaterally. Above procedure now planned. Limited mobility of left shoulder.   --Chronic pain in joints and cervical spine. Limited neck extension. Chronic opioids. Duragesic patch 75 mcg every 48 hours. Oxycodone prn up to six tabs daily. Will take Flexeril on DOS.   --HLD. atorvastatin. Complex cardiac history as above with bicuspid aortic valve s/p valve sparing repair with Gelweave graft 2016, NICM (EF 30-35%) but now normalized per cardiac MRI. Surveillance biopsy on 1/28/20 was complicated by ventricular ectopy and severe BP elevation 200s/130-150s. Has been followed by Cardiology and approved to proceed to surgery. All testing above. Activity. Will hold amlodipine-benazepril on DOS but will take Coreg.  ASA 81 mg daily and will hold beginning today.   --Former smoker. Denies pulmonary symptoms. SAVITA, borderline but does have CPAP and will bring on DOS.   --GERD. Protonix daily prn. History gastric bypass surgery.   --NEMSEIO Cr 0.85  --Pain management. Discussed possibility of nerve block if appropriate for patient's procedure. Final counseling and decisions by regional team on DOS.  --Anxiety/depression. Will take bupropion and clonazepam DOS. Regular use of  clonazepam.  --History of blood transfusion. Type and screen drawn today.   --Difficult IV stick.     Arrival time, NPO, shower and medication instructions provided by nursing staff today.    Patient was discussed with Dr Callahan.    ALAYNA Phillips CNS  Preoperative Assessment Center  [facility]  Phone: [phone]  Fax: [phone]

## 2020-05-06 NOTE — PHARMACY - PREOPERATIVE ASSESSMENT CENTER
PREOPERATIVE PAIN CONSULT FOR POSTOPERATIVE PAIN MANAGEMENT  Kobe Buchanan was interviewed via phone on May 6, 2020 prior to PAC Clinic appointment. Patient is preparing for the planned procedure with Dr. Aceves for L shoulder removal of spacer and placement of reverse TSA at the [facility] on 5/8/20.  These recommendations are intended for patients admitted to the hospital after a procedure and are only valid for 30 days from the date of service. If there are significant changes in opioid dosing between today and day of procedure the below recommendations may have to be adjusted.      RECOMMENDATIONS:   The following pain management recommendations are made based on information from today's visit and should not replace medical decision-making based on patient condition at the time of procedure or postoperatively.      - PREOPERATIVE:  + Long acting opioid - fentanyl patch 75 mcg/hr - replace 1 patch every 48 hours. Recommend continuing usual dose and wearing patch on morning of surgery. Recommend placing as far away from surgical site as possible  +  Before surgery recommend gabapentin 300 mg PO x 1 dose in pre-op area (Written in pre-op by PAC MAKAYLA)   + Before procedure recommend acetaminophen 975 mg PO in pre-op (Written in pre-op by PAC MAKAYLA)  + Before procedure consider celecoxib 200 mg PO in pre-op (Defer to surgery team - will need to be written on day of surgery in pre-op area)    - INTRAOPERATIVE (Anesthesiologist/CRNA to consider):   + Regional anesthesia - Defer to RAPS team.  Patient agreeable and preference for a block for this procedure.   + Avoid remifentanil - to reduce risk of developing hyperalgesia    - POSTOPERATIVE MANAGEMENT:  Opioid analgesic:  + Note: if neuraxial opioid or other long acting opioid (eg. Methadone) is given during procedure contact on-call pain provider for adjustment in opioid plan  + Note if regional approach includes an opioid via the  epidural then defer opioid management to RAPS team.     + Continue patient's home dose of fentanyl patch 75 mcg/hr - replace 1 patch every 48 hours.   + Start oxycodone 10-15 mg PO q3hr PRN moderate-severe pain  + Start hydromorphone IV 0.3-0.5 mg IV every 2 hr PRN breakthrough pain not controlled by PO medication or prior to significant activity (eg. PT/OT sessions).       Nonopioid analgesics:   + ongoing regional anesthesia management per RAPS team  + Defer use of NSAID to surgeon - when appropriate resume home dosing of naproxen  mg PO BID   + Start acetaminophen 975 mg PO every 6 hr scheduled if no concern about masking fevers  + Ice/heat PRN  + hold off on lidocaine patches due to likely long acting regional blockade.     Muscle Relaxant:   + Start cyclobenzaprine 10 mg PO TID PRN muscle spasms (patient's home regimen).     Stool softeners/Laxatives:   + When appropriate start senna-docusate 2 tabs QAM and 3 tabs QPM and Miralax 17 g daily to prevent opioid induced constipation.     Other:  + Recommend close monitoring of respiratory status postoperatively with capnography and continuous SpO2 monitoring. Would recommend continuing capnography beyond the usual 24 hr due to patient's opioid tolerance.     -------------------------------------------------------------------------------------------------------------------  - OUTPATIENT MEDICATIONS (related to pain management):  -- Long-acting opioid: fentanyl patch 75 mcg/hr - replace 1 patch every 48 hours   -- Short-acting opioid: oxycodone 10 mg PO q4hr PRN.  Pt often takes 20 mg PO TID (per report)  -- Oral adjuvant(s): naproxen  BID, flexeril 10 mg PO TID PRN, acetaminophen PRN   -- Topicals: none  -- Bowel regimen: docusate 100mg QAM, 200 mg QPM and Senna 2 tabs QAM, 3 tabs QPM.   -- Other relevant medications: clonazepam 0.5 mg PO TID PRN, bupropion  mg PO BID,     Outpatient opioids prescribed by JUAN Horton ([facility])   Outpatient opioid oral morphine equivalent (OME): 165mg OME/day    ASSESSMENT:    Kobe Buchanan has a longstanding history of chronic neck pain that travels up his skull and into his eye. This pain is sharp and constant.  He also has BL shoulder pain and is in the process of having both shoulders re-done (Left shoulder now then right shoulder later this year).  He states his pain is really all in his neck and is the reason he has been on pain medications for years.  He has seen a spine surgeon who said he would need occiput to C7 fusion and didn't recommend the surgery.  He has continued to work with his pain clinic to manage his pain medications and manages his pain this way throughout the day. He has had multiple previous surgeries on his shoulders and is pretty specific about what helps with his pain (having oxycodone 10 mg every 3 hr PRN).  He is eager to have a nerve block with this procedure to help with the shoulder pain.  He is also open to a multimodal approach (similar to what he is already doing at home).  He denies any issues with sedation from his opioids and he has some issues with postoperative constipation, but with the regimen he uses at home he has Daily BMs.  He denies alcohol use, substance abuse (did use marijuana >40 yrs ago, none recently), and denies any history of opioid misuse.  He is not a smoker.  He does have a history of SAVITA and does not use a CPAP device. Patient's questions were answered and he was in agreement with plan as outlined above.      Other pain therapies tried in the past (not an all inclusive list):   Dilaudid IV - helps w/ pain but does get a headache when first receiving. Does want available for pain postop   Oxycodone PO - works the best for his pain, did not want to do an opioid rotation right after surgery.    Acetaminophen - uses rarely, open to using for postop pain  Robaxin - made him feel bad, doesn't want to use this  Flexeril - works well, has  been on for years.      If pain control remains an issue please consult the inpatient pain management service for further recommendations for pain management.  If immediate assistance is needed please contact the pain service at the number below.     Nehemiah Oh [facility]  May 6, 2020  9:05 AM    If questions or concerns, please contact the Inpatient Pain Management Service:  Call [phone] after hours, weekends and holidays.   Page [phone] from 8 AM - 3 PM Mon - Fri.

## 2020-05-06 NOTE — ANESTHESIA PREPROCEDURE EVALUATION
Anesthesia Pre-Procedure Evaluation    Patient: Kobe Buchanan   MRN:     [id number] Gender:   male   Age:    60 year old :      1960        Preoperative Diagnosis: Status post total shoulder arthroplasty, left [Z96.612]   Procedure(s):  Left shoulder removal of spacer  and placement of reverse total shoulder arthroplasty     LABS:  CBC:   Lab Results   Component Value Date    WBC 6.7 2020    WBC 6.7 02/15/2020    HGB 13.0 (L) 2020    HGB 13.5 02/15/2020    HCT 39.1 (L) 2020    HCT 40.6 02/15/2020     2020     02/15/2020     BMP:   Lab Results   Component Value Date     2020    .0 2020    POTASSIUM 4.7 2020    POTASSIUM 4.8 2020    CHLORIDE 106 2020    CHLORIDE 102.0 2020    CO2 26 2020    CO2 24.0 2020    BUN 22 2020    BUN 22.0 2020    CR 1.14 2020    CR 1.3 2020    GLC 95 2020    .0 (H) 2020     COAGS:   Lab Results   Component Value Date    PTT 39 (H) 10/04/2016    INR 1.10 02/15/2020     POC:   Lab Results   Component Value Date     (H) 10/10/2016     OTHER:   Lab Results   Component Value Date    PH 7.39 10/04/2016    LACT 0.8 02/15/2020    A1C 5.3 10/05/2016    NIA 9.3 2020    PHOS 3.7 2020    MAG 2.1 2020    ALBUMIN 4.2 2020    PROTTOTAL 8.7 02/15/2020    ALT 19 02/15/2020    AST 18 02/15/2020    ALKPHOS 98 02/15/2020    BILITOTAL 0.7 02/15/2020    TSH 2.96 01/10/2018    T4 1.45 2016    CRP <2.9 2020    SED 25 (H) 01/15/2020        Preop Vitals    BP Readings from Last 3 Encounters:   20 132/71   20 121/75   20 (!) 146/79    Pulse Readings from Last 3 Encounters:   20 61   20 60   20 76      Resp Readings from Last 3 Encounters:   20 19   20 14   20 18    SpO2 Readings from Last 3 Encounters:   20 99%   20 97%   20 100%      Temp Readings from  "Last 1 Encounters:   05/06/20 97.8  F (36.6  C) (Oral)    Ht Readings from Last 1 Encounters:   05/06/20 1.778 m (5' 10\")      Wt Readings from Last 1 Encounters:   05/06/20 88.7 kg (195 lb 8 oz)    Estimated body mass index is 28.05 kg/m  as calculated from the following:    Height as of this encounter: 1.778 m (5' 10\").    Weight as of this encounter: 88.7 kg (195 lb 8 oz).     LDA:        Past Medical History:   Diagnosis Date     Anxiety      Ascending aortic aneurysm (H)      Bicuspid aortic valve      BPPV (benign paroxysmal positional vertigo) 7/11/2014     Chronic narcotic use      Chronic neck pain      Chronic osteoarthritis      Degenerative joint disease      Depression      Hyperlipidemia 4/10/2012     Hypertension      Multiple stiff joints      Neck injuries      Obesity 2/9/2015     Pain in shoulder      Skin picking habit      Sleep apnea     does not use cpap      Past Surgical History:   Procedure Laterality Date     ARTHROPLASTY SHOULDER  4/15/2014    Procedure: Left Total Shoulder Arthroplasty ;  Surgeon: Analilia Aceves MD;  [facility]     ARTHROPLASTY SHOULDER Right 8/26/2014    Procedure: ARTHROPLASTY SHOULDER;  Surgeon: Analilia Aceves MD;  [facility]     ARTHROSCOPY SHOULDER WITH BIOPSY(IES) Left 1/28/2020    Procedure: Left shoulder arthroscopy and biopsy for culture;  Surgeon: Analilia Aceves MD;  [facility]     BYPASS GASTRIC TAVO-EN-Y, LIVER BIOPSY, COMBINED  8/8/2005     C HAND/FINGER SURGERY UNLISTED       C SHOULDER SURG PROC UNLISTED       COLONOSCOPY  6/30/2014    Procedure: COMBINED COLONOSCOPY, SINGLE BIOPSY/POLYPECTOMY BY BIOPSY;  Surgeon: Chester Patton MD;  [facility]     CV CORONARY ANGIOGRAM  1/30/2020    Procedure: CV CORONARY ANGIOGRAM;  Surgeon: Néstor Walls MD;  Location:  [facility]     CYSTOSCOPY, BLADDER NECK CUTS, COMBINED N/A 7/18/2016    Procedure: COMBINED CYSTOSCOPY, BLADDER NECK " CUTS;  Surgeon: Ritu Leslie MD;  [facility]     ESOPHAGOSCOPY, GASTROSCOPY, DUODENOSCOPY (EGD), COMBINED  6/30/2014    Procedure: COMBINED ESOPHAGOSCOPY, GASTROSCOPY, DUODENOSCOPY (EGD), BIOPSY SINGLE OR MULTIPLE;  Surgeon: Chester Patton MD;  [facility]     EXCISE MASS FINGER  6/14/2011    Procedure:EXCISE MASS FINGER; Middle Flexor Cyst; Surgeon:SHAYY RUSSELL; [facility]     HAND SURGERY      excision of tendon cyst on left hand     HC VASCULAR SURGERY PROCEDURE UNLIST       IR FINE NEEDLE ASPIRATION W ULTRASOUND  11/13/2019     IR PICC PLACEMENT > 5 YRS OF AGE  11/19/2019     LASER HOLMIUM LITHOTRIPSY BLADDER N/A 10/15/2014    Procedure: LASER HOLMIUM LITHOTRIPSY BLADDER;  Surgeon: Sahil Taveras MD;  [facility]     LASER KTP GREEN LIGHT PHOTOSELECTIVE VAPORIZATION PROSTATE  1/23/2014    Procedure: LASER KTP GREEN LIGHT PHOTOSELECTIVE VAPORIZATION PROSTATE;  Greenlight Photovaporization Of Prostate  ;  Surgeon: Sahil Taveras MD;  [facility]     RELEASE TRIGGER FINGER Right 3/31/2017    Procedure: RELEASE TRIGGER FINGER;  Surgeon: Juan Carlos Blunt MD;  [facility]     REMOVE HARDWARE ARTHROPLASTY SHOULDER. I&D, PLACE ANTIBIOTIC CEMENT BE Left 11/15/2019    Procedure: Explantation of left total shoulder arthroplasty, irrigation and debridement, and placement of antibiotic spacer;  Surgeon: Analilia Aceves MD;  [facility]     REPAIR ANEURYSM ASCENDING AORTA N/A 10/4/2016    Procedure: REPAIR ANEURYSM ASCENDING AORTA;  Surgeon: Mckenzie Townsend MD;  [facility]     TURP  2014      Allergies   Allergen Reactions     Ciprofloxacin      History of aortic aneurysms     Tape [Adhesive Tape] Blisters     Blistering - please use paper tape        Anesthesia Evaluation     . Pt has had prior anesthetic. Type: General (ventricular ectopy and severe BP elevation 200s/130-150s during last procedure)    No history of anesthetic  complications          ROS/MED HX    ENT/Pulmonary: Comment: Does not use CPAP regularly. Is borderline SAVITA    (+)sleep apnea, tobacco use, Past use doesn't use CPAP , . .   (-) asthma, COPD and recent URI   Neurologic:  - neg neurologic ROS    (-) seizures   Cardiovascular:     (+) hypertension-range: 140/, ---. : . CHF Last EF: 63% date: 2/24/20 . . :. dysrhythmias . Previous cardiac testing Echodate:1/18/20results:Global and regional left ventricular function is normal with an EF of 60-65%.  Aortic valve is bicuspid with fusion of the right and left coronary cusps,  Lucy Type 1.  There is aortic sclerosis not meeting criteria for stenosis, Vmax 2.3 m/s,  mean gradient 11mmHg, VINCE (VTI) 2cmÂ . There is trace to mild central aortic  insufficiency.date: results:ECG reviewed date:2/15/20 results:SRCath date: 1/30/20 results:         (-) CAD and taking anticoagulants/antiplatelets   METS/Exercise Tolerance:  4 - Raking leaves, gardening   Hematologic:  - neg hematologic  ROS      (-) history of blood clots and anemia   Musculoskeletal: Comment: Bilateral shoulder replacement. Chronic neck pain.  (+) arthritis,  other musculoskeletal- Chronic Left shoulder OA; s/p cervical fusion      GI/Hepatic:  - neg GI/hepatic ROS      (-) GERD   Renal/Genitourinary:     (+) chronic renal disease, type: ARF, Pt does not require dialysis, Pt has no history of transplant, BPH,       Endo:     (+) Obesity, .      Psychiatric:  - neg psychiatric ROS       Infectious Disease: Comment: Patient with infected left total shoulder        Malignancy:      - no malignancy   Other:    (+) No chance of pregnancy C-spine cleared: N/A, H/O Chronic Pain,H/O chronic opiod use , no other significant disability                        PHYSICAL EXAM:   Mental Status/Neuro: A/A/O; Age Appropriate   Airway: Facies: Feasible  Mallampati: I  Mouth/Opening: Full  TM distance: > 6 cm  Neck ROM: Limited   Respiratory: Auscultation: CTAB     Resp.  Rate: Normal     Resp. Effort: Normal      CV: Rhythm: Regular  Heart: Normal Sounds  Edema: None   Comments:      Dental: Dentures  Dentures: Upper                Assessment:   ASA SCORE: 3    H&P: History and physical reviewed and following examination; no interval change.    NPO Status: NPO Appropriate     Plan:   Anes. Type:  General; Peripheral Nerve Block; For Post-op pain in coordination with surgeon     Block Details: Single Shot; Exparel   Pre-Medication: None   Induction:  IV (Standard)   Airway: LMA   Access/Monitoring: PIV; A-Line   Maintenance: Balanced     Postop Plan:   Postop Pain: Opioids  Postop Sedation/Airway: Not planned  Disposition: Outpatient     PONV Management:   Adult Risk Factors:, Postop Opioids   Prevention: Ondansetron, Dexamethasone     CONSENT: Direct conversation   Plan and risks discussed with: Patient          Comments for Plan/Consent:  Appreciate PAC visit and recommendations, challenging anesthetics on review  Pre op block  LMA  Eliza for tight bp management  Phenylephrine gtt available              PAC Discussion and Assessment    ASA Classification: 3  [place] [facility] [place]  Anesthetic techniques and relevant risks discussed: GA, Regional and MAC with GA as backup  Invasive monitoring and risk discussed: No  Types:   Possibility and Risk of blood transfusion discussed: Yes  NPO instructions given:   Additional anesthetic preparation and risks discussed:   Needs early admission to pre-op area:   Other:     PAC Resident/NP Anesthesia Assessment:  Kobe Buchanan is a 60 year old male scheduled to undergo Left shoulder removal of spacer, and placement of reverse total shoulder arthroplasty with Dr. Aceves on 5/8/20. He has the following specific operative considerations:   - RCRI : 0.9%  risk of major adverse cardiac event.   - VTE risk: 0.5%  - Risk of PONV score = 2.  If > 2, anti-emetic intervention recommended.    --History of bilateral shoulder replacement  with subsequent bilateral prosthetic joint infection with subsequent removal and placement of spacer bilaterally. Above procedure now planned. Very limited mobility of left shoulder.  --Chronic pain in joints and cervical spine. Limited neck extension. Chronic opioids. Duragesic patch 75 mcg every 48 hours. Oxycodone prn up to six tabs daily. Will take Flexeril on DOS. Was evaluated by Pharm D today.   --HLD. atorvastatin. Complex cardiac history as above with bicuspid aortic valve s/p valve sparing repair with Gelweave graft 2016, NICM (EF 30-35%) but now normalized per cardiac MRI. Surveillance biopsy on 1/28/20 was complicated by ventricular ectopy and severe BP elevation 200s/130-150s. Has been followed by Cardiology and approved to proceed to surgery. All testing above. Able to walk distance. Will hold amlodipine-benazepril on DOS but will take Coreg.  ASA 81 mg daily and will hold beginning today.   --Former smoker. Denies pulmonary symptoms. SAVITA, borderline but does have CPAP and will bring on DOS.   --GERD. Protonix daily prn. History gastric bypass surgery.   --NEMESIO Cr 0.85  --Pain management. Discussed possibility of nerve block if appropriate for patient's procedure. Final counseling and decisions by regional team on DOS.  --Anxiety/depression. Will take bupropion and clonazepam DOS. Regular use of clonazepam.  --History of blood transfusion. Type and screen drawn today.   --Difficult IV stick.     Patient was discussed with Dr Callahan.        Reviewed and Signed by PAC Mid-Level Provider/Resident  Mid-Level Provider/Resident: ALAYNA Benito, CNS  Date: 5/6/20  Time: 9:00am    Attending Anesthesiologist Anesthesia Assessment:  I have reviewed the medical record and discussed the patient with the MAKAYLA.  The patient is scheduled for removal of spacer and reverse total shoulder arthroplasty on May 8.  The patient has had 2 previous shoulder surgeries in the last 6 months.  In November 2019 the patient had  shoulder surgery notable for persistent need for vasopressors to maintain blood pressure.  In January 2028 the patient had an episode of marked hypertension and possible SVT followed by hypotension.  He was admitted to the hospital and found to have reduced ejection fraction of 35%. (His normal ejection fraction is 60 to 65% following treatment of bicuspid aortic valve in 2017) his troponins were also elevated at that time.  A coronary angiogram revealed no obstructive lesions at that time.  A follow-up cardiac MRA in February revealed return to normal ejection fraction and no wall motion abnormalities.  Cardiology has evaluated him and cleared him for surgery.  They feel the previous admission was due to stress cardiomyopathy.  Of note, the patient was admitted with acute kidney injury in February following initial admission but this has now resolved.    I have discussed this patient with the medical director of the [place] operating room.  She and I feel that given there is no obstructive coronary artery disease there is not an imminent need to have the surgery done on the [facility] were cardiology in the Cath Lab are immediately available.  Invasive monitoring and tight blood pressure control are available on both campuses.    The officer of the day and the attending anesthesiologists at the [facility] have been notified of the patient.    Final plan per attending anesthesiologist the day of surgery.        Reviewed and Signed by PAC Anesthesiologist  Anesthesiologist: Brady Callahan  Date: 5/6/2020  Time:   Pass/Fail:   Disposition:     PAC Pharmacist Assessment:  PREOPERATIVE PAIN CONSULT FOR POSTOPERATIVE PAIN MANAGEMENT  Kobe Buchanan was interviewed via phone on May 6, 2020 prior to PAC Clinic appointment. Patient is preparing for the planned procedure with Dr. Aceves for L shoulder removal of spacer and placement of reverse TSA at the [facility] on 5/8/20.   These recommendations are intended for patients admitted to the hospital after a procedure and are only valid for 30 days from the date of service. If there are significant changes in opioid dosing between today and day of procedure the below recommendations may have to be adjusted.      RECOMMENDATIONS:   The following pain management recommendations are made based on information from today's visit and should not replace medical decision-making based on patient condition at the time of procedure or postoperatively.      - PREOPERATIVE:  + Long acting opioid - fentanyl patch 75 mcg/hr - replace 1 patch every 48 hours. Recommend continuing usual dose and wearing patch on morning of surgery. Recommend placing as far away from surgical site as possible  +  Before surgery recommend gabapentin 300 mg PO x 1 dose in pre-op area (Written in pre-op by PAC MAKAYLA)   + Before procedure recommend acetaminophen 975 mg PO in pre-op (Written in pre-op by PAC MAKAYLA)  + Before procedure consider celecoxib 200 mg PO in pre-op (Defer to surgery team - will need to be written on day of surgery in pre-op area)    - INTRAOPERATIVE (Anesthesiologist/CRNA to consider):   + Regional anesthesia - Defer to RAPS team.  Patient agreeable and preference for a block for this procedure.   + Avoid remifentanil - to reduce risk of developing hyperalgesia    - POSTOPERATIVE MANAGEMENT:  Opioid analgesic:  + Note: if neuraxial opioid or other long acting opioid (eg. Methadone) is given during procedure contact on-call pain provider for adjustment in opioid plan  + Note if regional approach includes an opioid via the epidural then defer opioid management to RAPS team.     + Continue patient's home dose of fentanyl patch 75 mcg/hr - replace 1 patch every 48 hours.   + Start oxycodone 10-15 mg PO q3hr PRN moderate-severe pain  + Start hydromorphone IV 0.3-0.5 mg IV every 2 hr PRN breakthrough pain not controlled by PO medication or prior to significant  activity (eg. PT/OT sessions).       Nonopioid analgesics:   + ongoing regional anesthesia management per RAPS team  + Defer use of NSAID to surgeon - when appropriate resume home dosing of naproxen  mg PO BID   + Start acetaminophen 975 mg PO every 6 hr scheduled if no concern about masking fevers  + Ice/heat PRN  + hold off on lidocaine patches due to likely long acting regional blockade.     Muscle Relaxant:   + Start cyclobenzaprine 10 mg PO TID PRN muscle spasms (patient's home regimen).     Stool softeners/Laxatives:   + When appropriate start senna-docusate 2 tabs QAM and 3 tabs QPM and Miralax 17 g daily to prevent opioid induced constipation.     Other:  + Recommend close monitoring of respiratory status postoperatively with capnography and continuous SpO2 monitoring. Would recommend continuing capnography beyond the usual 24 hr due to patient's opioid tolerance.     -------------------------------------------------------------------------------------------------------------------  - OUTPATIENT MEDICATIONS (related to pain management):  -- Long-acting opioid: fentanyl patch 75 mcg/hr - replace 1 patch every 48 hours   -- Short-acting opioid: oxycodone 10 mg PO q4hr PRN.  Pt often takes 20 mg PO TID (per report)  -- Oral adjuvant(s): naproxen  BID, flexeril 10 mg PO TID PRN, acetaminophen PRN   -- Topicals: none  -- Bowel regimen: docusate 100mg QAM, 200 mg QPM and Senna 2 tabs QAM, 3 tabs QPM.   -- Other relevant medications: clonazepam 0.5 mg PO TID PRN, bupropion  mg PO BID,     Outpatient opioids prescribed by JUAN Horton ([facility])   Outpatient opioid oral morphine equivalent (OME): 165mg OME/day    ASSESSMENT:    Kobe ZHANNA Buchanan has a longstanding history of chronic neck pain that travels up his skull and into his eye. This pain is sharp and constant.  He also has BL shoulder pain and is in the process of having both shoulders re-done (Left shoulder now then  right shoulder later this year).  He states his pain is really all in his neck and is the reason he has been on pain medications for years.  He has seen a spine surgeon who said he would need occiput to C7 fusion and didn't recommend the surgery.  He has continued to work with his pain clinic to manage his pain medications and manages his pain this way throughout the day. He has had multiple previous surgeries on his shoulders and is pretty specific about what helps with his pain (having oxycodone 10 mg every 3 hr PRN).  He is eager to have a nerve block with this procedure to help with the shoulder pain.  He is also open to a multimodal approach (similar to what he is already doing at home).  He denies any issues with sedation from his opioids and he has some issues with postoperative constipation, but with the regimen he uses at home he has Daily BMs.  He denies alcohol use, substance abuse (did use marijuana >40 yrs ago, none recently), and denies any history of opioid misuse.  He is not a smoker.  He does have a history of SAVITA and does not use a CPAP device. Patient's questions were answered and he was in agreement with plan as outlined above.      Other pain therapies tried in the past (not an all inclusive list):   Dilaudid IV - helps w/ pain but does get a headache when first receiving. Does want available for pain postop   Oxycodone PO - works the best for his pain, did not want to do an opioid rotation right after surgery.    Acetaminophen - uses rarely, open to using for postop pain  Robaxin - made him feel bad, doesn't want to use this  Flexeril - works well, has been on for years.      If pain control remains an issue please consult the inpatient pain management service for further recommendations for pain management.  If immediate assistance is needed please contact the pain service at the number below.     Nehemiah Oh [facility]  May 6, 2020  9:05 AM    If questions or concerns, please contact the  Inpatient Pain Management Service:  Call [phone] after hours, weekends and holidays.   Page [phone] from 8 AM - 3 PM Mon - Fri.      Reviewed and Signed by PAC Pharmacist  Pharmacist: NINA  Date: 5/6/20  Time:0928    ALAYNA Phillips CNS

## 2020-05-07 LAB
SARS-COV-2 RNA SPEC QL NAA+PROBE: NOT DETECTED
SPECIMEN SOURCE: NORMAL

## 2020-05-08 ENCOUNTER — HOSPITAL ENCOUNTER (INPATIENT)
Facility: CLINIC | Age: 60
LOS: 1 days | Discharge: HOME | DRG: 483 | End: 2020-05-09
Attending: ORTHOPAEDIC SURGERY | Admitting: ORTHOPAEDIC SURGERY
Payer: COMMERCIAL

## 2020-05-08 ENCOUNTER — ANESTHESIA (OUTPATIENT)
Dept: SURGERY | Facility: CLINIC | Age: 60
DRG: 483 | End: 2020-05-08
Payer: COMMERCIAL

## 2020-05-08 ENCOUNTER — APPOINTMENT (OUTPATIENT)
Dept: GENERAL RADIOLOGY | Facility: CLINIC | Age: 60
DRG: 483 | End: 2020-05-08
Attending: ORTHOPAEDIC SURGERY
Payer: COMMERCIAL

## 2020-05-08 DIAGNOSIS — Z96.612 STATUS POST TOTAL REPLACEMENT OF LEFT SHOULDER: Primary | ICD-10-CM

## 2020-05-08 DIAGNOSIS — M86.9 BONE INFECTION, SHOULDER REGION (H): ICD-10-CM

## 2020-05-08 PROBLEM — Z96.619 STATUS POST TOTAL SHOULDER ARTHROPLASTY: Status: ACTIVE | Noted: 2020-05-08

## 2020-05-08 LAB
ABO + RH BLD: NORMAL
ABO + RH BLD: NORMAL
BLD GP AB SCN SERPL QL: NORMAL
BLOOD BANK CMNT PATIENT-IMP: NORMAL
BLOOD BANK CMNT PATIENT-IMP: NORMAL
GLUCOSE BLDC GLUCOMTR-MCNC: 78 MG/DL (ref 70–99)
SPECIMEN EXP DATE BLD: NORMAL

## 2020-05-08 PROCEDURE — 12000001 ZZH R&B MED SURG/OB UMMC

## 2020-05-08 PROCEDURE — 40000275 ZZH STATISTIC RCP TIME EA 10 MIN

## 2020-05-08 PROCEDURE — 99207 ZZC CDG-HISTORY COMP: MEETS EXP. PROBLEM FOCUSED - DOWN CODED LACK OF HPI: CPT | Performed by: HOSPITALIST

## 2020-05-08 PROCEDURE — 0RRK00Z REPLACEMENT OF LEFT SHOULDER JOINT WITH REVERSE BALL AND SOCKET SYNTHETIC SUBSTITUTE, OPEN APPROACH: ICD-10-PCS | Performed by: ORTHOPAEDIC SURGERY

## 2020-05-08 PROCEDURE — C1713 ANCHOR/SCREW BN/BN,TIS/BN: HCPCS | Performed by: ORTHOPAEDIC SURGERY

## 2020-05-08 PROCEDURE — 25000125 ZZHC RX 250: Performed by: ORTHOPAEDIC SURGERY

## 2020-05-08 PROCEDURE — 99232 SBSQ HOSP IP/OBS MODERATE 35: CPT | Performed by: HOSPITALIST

## 2020-05-08 PROCEDURE — 25000128 H RX IP 250 OP 636: Performed by: ORTHOPAEDIC SURGERY

## 2020-05-08 PROCEDURE — 27210136 ZZH KIT CATH ARTERIAL EXT SUPPLY

## 2020-05-08 PROCEDURE — 37000009 ZZH ANESTHESIA TECHNICAL FEE, EACH ADDTL 15 MIN: Performed by: ORTHOPAEDIC SURGERY

## 2020-05-08 PROCEDURE — 40000170 ZZH STATISTIC PRE-PROCEDURE ASSESSMENT II: Performed by: ORTHOPAEDIC SURGERY

## 2020-05-08 PROCEDURE — 25000566 ZZH SEVOFLURANE, EA 15 MIN: Performed by: ORTHOPAEDIC SURGERY

## 2020-05-08 PROCEDURE — 25000128 H RX IP 250 OP 636: Performed by: ANESTHESIOLOGY

## 2020-05-08 PROCEDURE — 25000128 H RX IP 250 OP 636: Performed by: NURSE ANESTHETIST, CERTIFIED REGISTERED

## 2020-05-08 PROCEDURE — 27211024 ZZHC OR SUPPLY OTHER OPNP: Performed by: ORTHOPAEDIC SURGERY

## 2020-05-08 PROCEDURE — 87075 CULTR BACTERIA EXCEPT BLOOD: CPT | Performed by: ORTHOPAEDIC SURGERY

## 2020-05-08 PROCEDURE — 25800030 ZZH RX IP 258 OP 636: Performed by: ORTHOPAEDIC SURGERY

## 2020-05-08 PROCEDURE — 27210794 ZZH OR GENERAL SUPPLY STERILE: Performed by: ORTHOPAEDIC SURGERY

## 2020-05-08 PROCEDURE — 00000146 ZZHCL STATISTIC GLUCOSE BY METER IP

## 2020-05-08 PROCEDURE — 25000132 ZZH RX MED GY IP 250 OP 250 PS 637: Performed by: STUDENT IN AN ORGANIZED HEALTH CARE EDUCATION/TRAINING PROGRAM

## 2020-05-08 PROCEDURE — 25800030 ZZH RX IP 258 OP 636: Performed by: NURSE ANESTHETIST, CERTIFIED REGISTERED

## 2020-05-08 PROCEDURE — 71000015 ZZH RECOVERY PHASE 1 LEVEL 2 EA ADDTL HR: Performed by: ORTHOPAEDIC SURGERY

## 2020-05-08 PROCEDURE — 37000008 ZZH ANESTHESIA TECHNICAL FEE, 1ST 30 MIN: Performed by: ORTHOPAEDIC SURGERY

## 2020-05-08 PROCEDURE — 87176 TISSUE HOMOGENIZATION CULTR: CPT | Performed by: ORTHOPAEDIC SURGERY

## 2020-05-08 PROCEDURE — 25000128 H RX IP 250 OP 636: Performed by: STUDENT IN AN ORGANIZED HEALTH CARE EDUCATION/TRAINING PROGRAM

## 2020-05-08 PROCEDURE — 36000070 ZZH SURGERY LEVEL 5 EA 15 ADDTL MIN - UMMC: Performed by: ORTHOPAEDIC SURGERY

## 2020-05-08 PROCEDURE — 25000132 ZZH RX MED GY IP 250 OP 250 PS 637: Performed by: ORTHOPAEDIC SURGERY

## 2020-05-08 PROCEDURE — 40000014 ZZH STATISTIC ARTERIAL MONITORING DAILY

## 2020-05-08 PROCEDURE — 87070 CULTURE OTHR SPECIMN AEROBIC: CPT | Performed by: ORTHOPAEDIC SURGERY

## 2020-05-08 PROCEDURE — 40000986 XR SHOULDER LT PORT G/E 2 VW: Mod: LT

## 2020-05-08 PROCEDURE — 0RPK08Z REMOVAL OF SPACER FROM LEFT SHOULDER JOINT, OPEN APPROACH: ICD-10-PCS | Performed by: ORTHOPAEDIC SURGERY

## 2020-05-08 PROCEDURE — 27110028 ZZH OR GENERAL SUPPLY NON-STERILE: Performed by: ORTHOPAEDIC SURGERY

## 2020-05-08 PROCEDURE — 71000014 ZZH RECOVERY PHASE 1 LEVEL 2 FIRST HR: Performed by: ORTHOPAEDIC SURGERY

## 2020-05-08 PROCEDURE — 25000125 ZZHC RX 250: Performed by: NURSE ANESTHETIST, CERTIFIED REGISTERED

## 2020-05-08 PROCEDURE — 36000068 ZZH SURGERY LEVEL 5 1ST 30 MIN - UMMC: Performed by: ORTHOPAEDIC SURGERY

## 2020-05-08 DEVICE — IMPLANTABLE DEVICE
Type: IMPLANTABLE DEVICE | Site: SHOULDER | Status: FUNCTIONAL
Brand: COMPREHENSIVE® REVERSE SHOULDER

## 2020-05-08 RX ORDER — CEFAZOLIN SODIUM 2 G/100ML
2 INJECTION, SOLUTION INTRAVENOUS
Status: COMPLETED | OUTPATIENT
Start: 2020-05-08 | End: 2020-05-08

## 2020-05-08 RX ORDER — SENNOSIDES 8.6 MG
2 TABLET ORAL 2 TIMES DAILY
Status: DISCONTINUED | OUTPATIENT
Start: 2020-05-08 | End: 2020-05-09 | Stop reason: HOSPADM

## 2020-05-08 RX ORDER — NALOXONE HYDROCHLORIDE 0.4 MG/ML
.1-.4 INJECTION, SOLUTION INTRAMUSCULAR; INTRAVENOUS; SUBCUTANEOUS
Status: DISCONTINUED | OUTPATIENT
Start: 2020-05-08 | End: 2020-05-09 | Stop reason: HOSPADM

## 2020-05-08 RX ORDER — CARVEDILOL 25 MG/1
25 TABLET ORAL 2 TIMES DAILY WITH MEALS
Status: DISCONTINUED | OUTPATIENT
Start: 2020-05-08 | End: 2020-05-09 | Stop reason: HOSPADM

## 2020-05-08 RX ORDER — FENTANYL CITRATE 50 UG/ML
25-50 INJECTION, SOLUTION INTRAMUSCULAR; INTRAVENOUS
Status: DISCONTINUED | OUTPATIENT
Start: 2020-05-08 | End: 2020-05-08 | Stop reason: HOSPADM

## 2020-05-08 RX ORDER — BUPROPION HYDROCHLORIDE 200 MG/1
200 TABLET, EXTENDED RELEASE ORAL 2 TIMES DAILY
Status: DISCONTINUED | OUTPATIENT
Start: 2020-05-08 | End: 2020-05-09 | Stop reason: HOSPADM

## 2020-05-08 RX ORDER — ROPIVACAINE HYDROCHLORIDE 5 MG/ML
INJECTION, SOLUTION EPIDURAL; INFILTRATION; PERINEURAL PRN
Status: DISCONTINUED | OUTPATIENT
Start: 2020-05-08 | End: 2020-05-08

## 2020-05-08 RX ORDER — DOCUSATE SODIUM 100 MG/1
100 CAPSULE, LIQUID FILLED ORAL 2 TIMES DAILY
Status: DISCONTINUED | OUTPATIENT
Start: 2020-05-08 | End: 2020-05-09 | Stop reason: HOSPADM

## 2020-05-08 RX ORDER — ATORVASTATIN CALCIUM 40 MG/1
40 TABLET, FILM COATED ORAL DAILY
Status: DISCONTINUED | OUTPATIENT
Start: 2020-05-08 | End: 2020-05-09 | Stop reason: HOSPADM

## 2020-05-08 RX ORDER — OXYCODONE HYDROCHLORIDE 10 MG/1
10-20 TABLET ORAL EVERY 4 HOURS PRN
Status: DISCONTINUED | OUTPATIENT
Start: 2020-05-08 | End: 2020-05-09 | Stop reason: HOSPADM

## 2020-05-08 RX ORDER — PANTOPRAZOLE SODIUM 40 MG/1
40 TABLET, DELAYED RELEASE ORAL
Status: DISCONTINUED | OUTPATIENT
Start: 2020-05-08 | End: 2020-05-09 | Stop reason: HOSPADM

## 2020-05-08 RX ORDER — FENTANYL CITRATE 50 UG/ML
INJECTION, SOLUTION INTRAMUSCULAR; INTRAVENOUS PRN
Status: DISCONTINUED | OUTPATIENT
Start: 2020-05-08 | End: 2020-05-08

## 2020-05-08 RX ORDER — NAPROXEN 500 MG/1
500 TABLET ORAL 2 TIMES DAILY PRN
Status: DISCONTINUED | OUTPATIENT
Start: 2020-05-08 | End: 2020-05-09 | Stop reason: HOSPADM

## 2020-05-08 RX ORDER — ONDANSETRON 2 MG/ML
4 INJECTION INTRAMUSCULAR; INTRAVENOUS EVERY 30 MIN PRN
Status: DISCONTINUED | OUTPATIENT
Start: 2020-05-08 | End: 2020-05-08 | Stop reason: HOSPADM

## 2020-05-08 RX ORDER — ACETAMINOPHEN 160 MG
TABLET,DISINTEGRATING ORAL PRN
Status: DISCONTINUED | OUTPATIENT
Start: 2020-05-08 | End: 2020-05-08 | Stop reason: HOSPADM

## 2020-05-08 RX ORDER — LIDOCAINE 40 MG/G
CREAM TOPICAL
Status: DISCONTINUED | OUTPATIENT
Start: 2020-05-08 | End: 2020-05-09 | Stop reason: HOSPADM

## 2020-05-08 RX ORDER — AMLODIPINE AND BENAZEPRIL HYDROCHLORIDE 5; 20 MG/1; MG/1
1 CAPSULE ORAL 2 TIMES DAILY
Status: DISCONTINUED | OUTPATIENT
Start: 2020-05-08 | End: 2020-05-08

## 2020-05-08 RX ORDER — NALOXONE HYDROCHLORIDE 0.4 MG/ML
.1-.4 INJECTION, SOLUTION INTRAMUSCULAR; INTRAVENOUS; SUBCUTANEOUS
Status: DISCONTINUED | OUTPATIENT
Start: 2020-05-08 | End: 2020-05-08 | Stop reason: HOSPADM

## 2020-05-08 RX ORDER — ONDANSETRON 2 MG/ML
4 INJECTION INTRAMUSCULAR; INTRAVENOUS EVERY 6 HOURS PRN
Status: DISCONTINUED | OUTPATIENT
Start: 2020-05-08 | End: 2020-05-09 | Stop reason: HOSPADM

## 2020-05-08 RX ORDER — SODIUM CHLORIDE, SODIUM LACTATE, POTASSIUM CHLORIDE, CALCIUM CHLORIDE 600; 310; 30; 20 MG/100ML; MG/100ML; MG/100ML; MG/100ML
INJECTION, SOLUTION INTRAVENOUS CONTINUOUS
Status: DISCONTINUED | OUTPATIENT
Start: 2020-05-08 | End: 2020-05-08 | Stop reason: HOSPADM

## 2020-05-08 RX ORDER — PHENYLEPHRINE HCL IN 0.9% NACL 1 MG/10 ML
SYRINGE (ML) INTRAVENOUS PRN
Status: DISCONTINUED | OUTPATIENT
Start: 2020-05-08 | End: 2020-05-08

## 2020-05-08 RX ORDER — CYCLOBENZAPRINE HCL 10 MG
10 TABLET ORAL 3 TIMES DAILY PRN
Status: DISCONTINUED | OUTPATIENT
Start: 2020-05-08 | End: 2020-05-09 | Stop reason: HOSPADM

## 2020-05-08 RX ORDER — ACETAMINOPHEN 325 MG/1
975 TABLET ORAL ONCE
Status: COMPLETED | OUTPATIENT
Start: 2020-05-08 | End: 2020-05-08

## 2020-05-08 RX ORDER — OXYCODONE HYDROCHLORIDE 5 MG/1
5 TABLET ORAL EVERY 4 HOURS PRN
Status: DISCONTINUED | OUTPATIENT
Start: 2020-05-08 | End: 2020-05-08

## 2020-05-08 RX ORDER — SODIUM CHLORIDE, SODIUM LACTATE, POTASSIUM CHLORIDE, CALCIUM CHLORIDE 600; 310; 30; 20 MG/100ML; MG/100ML; MG/100ML; MG/100ML
INJECTION, SOLUTION INTRAVENOUS CONTINUOUS PRN
Status: DISCONTINUED | OUTPATIENT
Start: 2020-05-08 | End: 2020-05-08

## 2020-05-08 RX ORDER — SODIUM CHLORIDE 9 MG/ML
INJECTION, SOLUTION INTRAVENOUS CONTINUOUS
Status: DISCONTINUED | OUTPATIENT
Start: 2020-05-08 | End: 2020-05-09 | Stop reason: HOSPADM

## 2020-05-08 RX ORDER — ASPIRIN 81 MG/1
81 TABLET ORAL
Status: DISCONTINUED | OUTPATIENT
Start: 2020-05-09 | End: 2020-05-09 | Stop reason: HOSPADM

## 2020-05-08 RX ORDER — HYDROMORPHONE HYDROCHLORIDE 1 MG/ML
.3-.5 INJECTION, SOLUTION INTRAMUSCULAR; INTRAVENOUS; SUBCUTANEOUS EVERY 10 MIN PRN
Status: DISCONTINUED | OUTPATIENT
Start: 2020-05-08 | End: 2020-05-08 | Stop reason: HOSPADM

## 2020-05-08 RX ORDER — FOLIC ACID 1 MG/1
1 TABLET ORAL DAILY
Status: DISCONTINUED | OUTPATIENT
Start: 2020-05-09 | End: 2020-05-09 | Stop reason: HOSPADM

## 2020-05-08 RX ORDER — GABAPENTIN 300 MG/1
300 CAPSULE ORAL ONCE
Status: COMPLETED | OUTPATIENT
Start: 2020-05-08 | End: 2020-05-08

## 2020-05-08 RX ORDER — LIDOCAINE HYDROCHLORIDE 20 MG/ML
INJECTION, SOLUTION INFILTRATION; PERINEURAL PRN
Status: DISCONTINUED | OUTPATIENT
Start: 2020-05-08 | End: 2020-05-08

## 2020-05-08 RX ORDER — ETOMIDATE 2 MG/ML
INJECTION INTRAVENOUS PRN
Status: DISCONTINUED | OUTPATIENT
Start: 2020-05-08 | End: 2020-05-08

## 2020-05-08 RX ORDER — CLONAZEPAM 0.5 MG/1
0.5 TABLET ORAL 3 TIMES DAILY PRN
Status: DISCONTINUED | OUTPATIENT
Start: 2020-05-08 | End: 2020-05-09 | Stop reason: HOSPADM

## 2020-05-08 RX ORDER — MULTIPLE VITAMINS W/ MINERALS TAB 9MG-400MCG
1 TAB ORAL DAILY
Status: DISCONTINUED | OUTPATIENT
Start: 2020-05-09 | End: 2020-05-09 | Stop reason: HOSPADM

## 2020-05-08 RX ORDER — VANCOMYCIN HYDROCHLORIDE 1 G/20ML
INJECTION, POWDER, LYOPHILIZED, FOR SOLUTION INTRAVENOUS PRN
Status: DISCONTINUED | OUTPATIENT
Start: 2020-05-08 | End: 2020-05-08 | Stop reason: HOSPADM

## 2020-05-08 RX ORDER — CEFAZOLIN SODIUM 2 G/100ML
2 INJECTION, SOLUTION INTRAVENOUS EVERY 8 HOURS
Status: COMPLETED | OUTPATIENT
Start: 2020-05-08 | End: 2020-05-09

## 2020-05-08 RX ORDER — ONDANSETRON 4 MG/1
4 TABLET, ORALLY DISINTEGRATING ORAL EVERY 30 MIN PRN
Status: DISCONTINUED | OUTPATIENT
Start: 2020-05-08 | End: 2020-05-08 | Stop reason: HOSPADM

## 2020-05-08 RX ORDER — ONDANSETRON 2 MG/ML
INJECTION INTRAMUSCULAR; INTRAVENOUS PRN
Status: DISCONTINUED | OUTPATIENT
Start: 2020-05-08 | End: 2020-05-08

## 2020-05-08 RX ORDER — ONDANSETRON 4 MG/1
4 TABLET, ORALLY DISINTEGRATING ORAL EVERY 6 HOURS PRN
Status: DISCONTINUED | OUTPATIENT
Start: 2020-05-08 | End: 2020-05-09 | Stop reason: HOSPADM

## 2020-05-08 RX ORDER — CEFAZOLIN SODIUM 1 G/3ML
1 INJECTION, POWDER, FOR SOLUTION INTRAMUSCULAR; INTRAVENOUS SEE ADMIN INSTRUCTIONS
Status: DISCONTINUED | OUTPATIENT
Start: 2020-05-08 | End: 2020-05-08 | Stop reason: HOSPADM

## 2020-05-08 RX ORDER — MEPERIDINE HYDROCHLORIDE 25 MG/ML
12.5 INJECTION INTRAMUSCULAR; INTRAVENOUS; SUBCUTANEOUS
Status: DISCONTINUED | OUTPATIENT
Start: 2020-05-08 | End: 2020-05-08 | Stop reason: HOSPADM

## 2020-05-08 RX ORDER — FENTANYL 75 UG/1
75 PATCH TRANSDERMAL
Status: DISCONTINUED | OUTPATIENT
Start: 2020-05-10 | End: 2020-05-09 | Stop reason: HOSPADM

## 2020-05-08 RX ORDER — HYDROMORPHONE HYDROCHLORIDE 1 MG/ML
.5-1 INJECTION, SOLUTION INTRAMUSCULAR; INTRAVENOUS; SUBCUTANEOUS
Status: DISCONTINUED | OUTPATIENT
Start: 2020-05-08 | End: 2020-05-09 | Stop reason: HOSPADM

## 2020-05-08 RX ORDER — ACETAMINOPHEN 325 MG/1
650 TABLET ORAL EVERY 4 HOURS PRN
Status: DISCONTINUED | OUTPATIENT
Start: 2020-05-08 | End: 2020-05-09 | Stop reason: HOSPADM

## 2020-05-08 RX ORDER — PANTOPRAZOLE SODIUM 40 MG/1
40 TABLET, DELAYED RELEASE ORAL DAILY PRN
Status: DISCONTINUED | OUTPATIENT
Start: 2020-05-08 | End: 2020-05-08

## 2020-05-08 RX ADMIN — FENTANYL CITRATE 100 MCG: 50 INJECTION, SOLUTION INTRAMUSCULAR; INTRAVENOUS at 10:36

## 2020-05-08 RX ADMIN — OXYCODONE HYDROCHLORIDE 20 MG: 10 TABLET ORAL at 19:02

## 2020-05-08 RX ADMIN — CARVEDILOL 25 MG: 25 TABLET, FILM COATED ORAL at 18:51

## 2020-05-08 RX ADMIN — ATORVASTATIN CALCIUM 40 MG: 40 TABLET, FILM COATED ORAL at 18:51

## 2020-05-08 RX ADMIN — ETOMIDATE 30 MG: 2 INJECTION INTRAVENOUS at 10:36

## 2020-05-08 RX ADMIN — MIDAZOLAM 2 MG: 1 INJECTION INTRAMUSCULAR; INTRAVENOUS at 09:52

## 2020-05-08 RX ADMIN — SODIUM CHLORIDE: 9 INJECTION, SOLUTION INTRAVENOUS at 20:04

## 2020-05-08 RX ADMIN — ACETAMINOPHEN 975 MG: 325 TABLET, FILM COATED ORAL at 09:35

## 2020-05-08 RX ADMIN — PHENYLEPHRINE HYDROCHLORIDE: 10 INJECTION INTRAVENOUS at 13:00

## 2020-05-08 RX ADMIN — OXYCODONE HYDROCHLORIDE 10 MG: 10 TABLET ORAL at 17:24

## 2020-05-08 RX ADMIN — Medication 50 MCG: at 11:05

## 2020-05-08 RX ADMIN — DOCUSATE SODIUM 100 MG: 100 CAPSULE, LIQUID FILLED ORAL at 19:00

## 2020-05-08 RX ADMIN — GABAPENTIN 300 MG: 300 CAPSULE ORAL at 09:35

## 2020-05-08 RX ADMIN — CEFAZOLIN 1 G: 1 INJECTION, POWDER, FOR SOLUTION INTRAMUSCULAR; INTRAVENOUS at 12:34

## 2020-05-08 RX ADMIN — DEXMEDETOMIDINE HYDROCHLORIDE 4 MCG: 100 INJECTION, SOLUTION INTRAVENOUS at 11:25

## 2020-05-08 RX ADMIN — Medication 50 MCG: at 11:13

## 2020-05-08 RX ADMIN — Medication 2 G: at 10:42

## 2020-05-08 RX ADMIN — LIDOCAINE HYDROCHLORIDE 80 MG: 20 INJECTION, SOLUTION INFILTRATION; PERINEURAL at 10:36

## 2020-05-08 RX ADMIN — ROPIVACAINE HYDROCHLORIDE 20 ML: 5 INJECTION, SOLUTION EPIDURAL; INFILTRATION; PERINEURAL at 10:00

## 2020-05-08 RX ADMIN — Medication 50 MCG: at 11:18

## 2020-05-08 RX ADMIN — OXYCODONE HYDROCHLORIDE 10 MG: 10 TABLET ORAL at 14:44

## 2020-05-08 RX ADMIN — SODIUM CHLORIDE, POTASSIUM CHLORIDE, SODIUM LACTATE AND CALCIUM CHLORIDE: 600; 310; 30; 20 INJECTION, SOLUTION INTRAVENOUS at 10:32

## 2020-05-08 RX ADMIN — SENNOSIDES 2 TABLET: 8.6 TABLET, FILM COATED ORAL at 19:00

## 2020-05-08 RX ADMIN — BUPROPION HYDROCHLORIDE 200 MG: 200 TABLET, EXTENDED RELEASE ORAL at 20:05

## 2020-05-08 RX ADMIN — PHENYLEPHRINE HYDROCHLORIDE 0.2 MCG/KG/MIN: 10 INJECTION INTRAVENOUS at 11:18

## 2020-05-08 RX ADMIN — FENTANYL CITRATE 50 MCG: 0.05 INJECTION, SOLUTION INTRAMUSCULAR; INTRAVENOUS at 14:43

## 2020-05-08 RX ADMIN — ONDANSETRON 4 MG: 2 INJECTION INTRAMUSCULAR; INTRAVENOUS at 13:22

## 2020-05-08 RX ADMIN — CEFAZOLIN SODIUM 2 G: 2 INJECTION, SOLUTION INTRAVENOUS at 20:04

## 2020-05-08 RX ADMIN — CLONAZEPAM 0.5 MG: 0.5 TABLET ORAL at 22:37

## 2020-05-08 RX ADMIN — ACETAMINOPHEN 650 MG: 325 TABLET, FILM COATED ORAL at 22:37

## 2020-05-08 RX ADMIN — MIDAZOLAM 1 MG: 1 INJECTION INTRAMUSCULAR; INTRAVENOUS at 10:30

## 2020-05-08 RX ADMIN — OXYCODONE HYDROCHLORIDE 20 MG: 10 TABLET ORAL at 23:16

## 2020-05-08 ASSESSMENT — MIFFLIN-ST. JEOR: SCORE: 1708.25

## 2020-05-08 NOTE — OR NURSING
PACU to Inpatient Nursing Handoff    Patient Kobe Buchanan is a 60 year old male who speaks English.   Procedure Procedure(s):  Left shoulder removal of spacer  and placement of reverse total shoulder arthroplasty   Surgeon(s) Primary: Analilia Aceves MD  Resident - Assisting: Nam Hanson MD     Allergies   Allergen Reactions     Ciprofloxacin      History of aortic aneurysms     Tape [Adhesive Tape] Blisters     Blistering - please use paper tape       Isolation  [unfilled]     Past Medical History   has a past medical history of Anxiety, Ascending aortic aneurysm (H), Bicuspid aortic valve, BPPV (benign paroxysmal positional vertigo) (7/11/2014), Chronic narcotic use, Chronic neck pain, Chronic osteoarthritis, Degenerative joint disease, Depression, Hyperlipidemia (4/10/2012), Hypertension, Multiple stiff joints, Neck injuries, Obesity (2/9/2015), Pain in shoulder, Skin picking habit, and Sleep apnea.    Anesthesia Choice   Dermatome Level     Preop Meds acetaminophen (Tylenol) - time given: 0935  gabapentin (Neurontin) - time given: 0935   Nerve block Interscalene.  Location:left. Med:ropivacaine. Time given: 1000   Intraop Meds dexmedetomidine (Precedex): 4 mcg total  fentanyl (Sublimaze): 100 mcg total  ondansetron (Zofran): last given at 4   Local Meds No   Antibiotics cefazolin (Ancef) - last given at 1234     Pain Patient Currently in Pain: yes  Comfort: tolerable with discomfort  Pain Control: partially effective   PACU meds  fentanyl (Sublimaze): 50 mcg (total dose) last given at 1340   oxycodone (Roxicodone): 10 mg (total dose) last given at 1345    PCA / epidural No   Capnography     Telemetry ECG Rhythm: Normal sinus rhythm   Inpatient Telemetry Monitor Ordered? No        Labs Glucose Lab Results   Component Value Date    GLC 90 05/06/2020       Hgb Lab Results   Component Value Date    HGB 12.0 05/06/2020       INR Lab Results   Component Value Date    INR 1.10 02/15/2020       PACU Imaging Completed     Wound/Incision Incision/Surgical Site 10/15/14 Penis (Active)   Number of days: 2032       Incision/Surgical Site 10/04/16 Chest (Active)   Number of days: 1312       Incision/Surgical Site 03/31/17 Right Hand (Active)   Number of days: 1134       Incision/Surgical Site 11/15/19 Left Shoulder (Active)   Number of days: 175       Incision/Surgical Site 01/28/20 Left Shoulder (Active)   Number of days: 101       Incision/Surgical Site 05/08/20 Left Shoulder (Active)   Incision Assessment UTV 05/08/20 1457   Incision Drainage Amount None 05/08/20 1400   Dressing Intervention Clean, dry, intact 05/08/20 1457   Number of days: 0      CMS        Equipment ice pack and shoulder sling   Other LDA       IV Access Peripheral IV 05/08/20 Hand (Active)   Site Assessment [facility] 05/08/20 1456   Line Status Infusing 05/08/20 1456   Phlebitis Scale 0-->no symptoms 05/08/20 1456   Infiltration Scale 0 05/08/20 1400   Extravasation? No 05/08/20 0934   Dressing Intervention New dressing  05/08/20 0934   Number of days: 0       Peripheral IV 05/08/20 Right Foot (Active)   Site Assessment [facility] 05/08/20 1456   Line Status Saline locked 05/08/20 1400   Phlebitis Scale 0-->no symptoms 05/08/20 1400   Infiltration Scale 0 05/08/20 1400   Number of days: 0      Blood Products Not applicable  mL   Intake/Output Date 05/08/20 0700 - 05/09/20 0659   Shift 9805-8150 1147-2978 1396-1648 24 Hour Total   INTAKE   P.O. 120   120   I.V. 950   950   Shift Total(mL/kg) 1070(12.06)   1070(12.06)   OUTPUT   Drains 80   80   Blood 300   300   Shift Total(mL/kg) 380(4.28)   380(4.28)   Weight (kg) 88.7 88.7 88.7 88.7      Drains / Serrano Closed/Suction Drain 1 Left Shoulder Accordion 10 Serbian (Active)   Site Description UTV 05/08/20 1456   Dressing Status Normal: Clean, Dry & Intact 05/08/20 1456   Drainage Appearance Bloody/Bright Red 05/08/20 1456   Status To bulb suction 05/08/20 1400   Output (ml) 80 ml 05/08/20 3541    Number of days: 0      Time of void PreOp Void Prior to Procedure: 0850 (05/08/20 0909)    PostOp      Diapered? No   Bladder Scan      mL(water) (05/08/20 1428)  crackers and water     Vitals    B/P: 137/68  T: 98.2  F (36.8  C)    Temp src: Oral  P:  Pulse: 67 (05/08/20 1400)    Heart Rate: 78 (05/08/20 1445)     R: 18  O2:  SpO2: 98 %    O2 Device: None (Room air) (05/08/20 1415)    Oxygen Delivery: 6 LPM (05/08/20 1355)         Family/support present sister called by surgeon   Patient belongings     Patient transported on cart   DC meds/scripts (obs/outpt) Not applicable   Inpatient Pain Meds Released? Yes       Special needs/considerations None   Tasks needing completion None       Mary Aguirre, RN  ASCOM [id number]

## 2020-05-08 NOTE — BRIEF OP NOTE
[facility] Brief Operative Note    Pre-operative diagnosis: Status post total shoulder arthroplasty, left [Z96.612]   Post-operative diagnosis Same   Procedure: Procedure(s):  Left shoulder removal of spacer  and placement of reverse total shoulder arthroplasty   Surgeon(s): Surgeon(s) and Role:     * Analilia Aceves MD - Primary     * Nam Hanson MD - Resident - Assisting   Estimated blood loss: 300 mL    Specimens: ID Type Source Tests Collected by Time Destination   1 : Left Shoulder #1 Tissue Shoulder ANAEROBIC BACTERIAL CULTURE, TISSUE CULTURE AEROBIC BACTERIAL Analilia Aceves MD 5/8/2020 11:40 AM    2 : Left Shoulder #2 Tissue Shoulder ANAEROBIC BACTERIAL CULTURE, TISSUE CULTURE AEROBIC BACTERIAL Analilia Aceves MD 5/8/2020 11:40 AM    3 : Left Shoulder #3 Tissue Shoulder ANAEROBIC BACTERIAL CULTURE, TISSUE CULTURE NATE BACTERIAL Analilia Aceves MD 5/8/2020 11:41 AM    4 : Left Shoulder #4 Tissue Shoulder ANAEROBIC BACTERIAL CULTURE, TISSUE CULTURE AEROBIC BACTERIAL Analilia Aceves MD 5/8/2020 11:55 AM    5 : Left Shoulder #5 Tissue Shoulder ANAEROBIC BACTERIAL CULTURE, TISSUE CULTURE AEROBIC BACTERIAL Analilia Aceves MD 5/8/2020 11:55 AM       Findings: Glenoid erosion and central defect - accomodated by custom VRS baseplate

## 2020-05-08 NOTE — ANESTHESIA PROCEDURE NOTES
Arterial Line Procedure Note  Staff -   Anesthesiologist:  Jeff Wood MD      Performed By: anesthesiologist          Location: In OR After Induction  Procedure Start/Stop Times:     patient identified, IV checked, site marked, risks and benefits discussed, informed consent, monitors and equipment checked, pre-op evaluation and at physician/surgeon's request      Correct Patient: Yes      Correct Position: Yes      Correct Site: Yes      Correct Procedure: Yes      Correct Laterality:  Yes    Site Marked:  Yes  Line Placement:     Procedure:  Arterial Line    Insertion Site:  Radial    Insertion laterality:  Right    Skin Prep: Chloraprep      Patient Prep: patient draped, mask, sterile gloves, hat and hand hygiene      Local skin infiltration:  None    Ultrasound Guided?: No      Catheter size:  20 gauge, 12 cm    Cath secured with: suture      Dressing:  Tegaderm    Complications:  None obvious    Arterial waveform: Yes      IBP within 10% of NIBP: Yes

## 2020-05-08 NOTE — ANESTHESIA POSTPROCEDURE EVALUATION
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     Anesthesia Type: General, Peripheral Nerve Block, For Post-op pain in coordination with surgeon       Disposition: Admission   Postop Pain Control: Uneventful            Sign Out: Well controlled pain   PONV: No   Neuro/Psych: Uneventful            Sign Out: Acceptable/Baseline neuro status   Airway/Respiratory: Uneventful            Sign Out: Acceptable/Baseline resp. status   CV/Hemodynamics: Uneventful            Sign Out: Acceptable CV status   Other NRE: NONE   DID A NON-ROUTINE EVENT OCCUR? No    Event details/Postop Comments:  Good block  Vitals stable  Alert  Ok to remove arterial line and discharge to floor         Last Anesthesia Record Vitals:  CRNA VITALS  2020 1322 - 2020 1422      2020             Resp Rate (observed):  (!) 1          Last PACU Vitals:  Vitals Value Taken Time   /68 2020  2:00 PM   Temp 36.8  C (98.2  F) 2020  2:15 PM   Pulse 67 2020  2:00 PM   Resp 20 2020  2:46 PM   SpO2 97 % 2020  2:46 PM   Temp src     NIBP     Pulse     SpO2     Resp     Temp     Ht Rate     Temp 2     Vitals shown include unvalidated device data.      Electronically Signed By: Jeff Wood MD, May 8, 2020, 2:48 PM

## 2020-05-08 NOTE — ANESTHESIA CARE TRANSFER NOTE
Patient: Kobe Buchanan    Procedure(s):  Left shoulder removal of spacer  and placement of reverse total shoulder arthroplasty    Diagnosis: Status post total shoulder arthroplasty, left [Z96.612]  Diagnosis Additional Information: No value filed.    Anesthesia Type:   General, Peripheral Nerve Block, For Post-op pain in coordination with surgeon     Note:  Airway :Face Mask  Patient transferred to:PACU  Comments: 150/79, HR 67, sat 100%, RR 16, T 98.2Handoff Report: Identifed the Patient, Identified the Reponsible Provider, Reviewed the pertinent medical history, Discussed the surgical course, Reviewed Intra-OP anesthesia mangement and issues during anesthesia, Set expectations for post-procedure period and Allowed opportunity for questions and acknowledgement of understanding      Vitals: (Last set prior to Anesthesia Care Transfer)    CRNA VITALS  5/8/2020 1322 - 5/8/2020 1411      5/8/2020             Resp Rate (observed):  (!) 1                Electronically Signed By: ALAYNA Ansari CRNA  May 8, 2020  2:11 PM

## 2020-05-08 NOTE — ANESTHESIA PROCEDURE NOTES
Peripheral Nerve Block Procedure Note  Staff -   Anesthesiologist:  Max Ring MD      Performed By: anesthesiologist        Location: Pre-op  Procedure Start/Stop TImes:      5/8/2020 9:50 AM     5/8/2020 10:00 AM    patient identified, IV checked, site marked, risks and benefits discussed, informed consent, monitors and equipment checked, pre-op evaluation, at physician/surgeon's request and post-op pain management      Correct Patient: Yes      Correct Position: Yes      Correct Site: Yes      Correct Procedure: Yes      Correct Laterality:  Yes    Site Marked:  Yes  Procedure details:     Procedure:  Interscalene    ASA:  3    Laterality:  Left    Position:  Sitting    Sterile Prep: chloraprep      Local skin infiltration:  None    Needle:  Short bevel    Needle gauge:  21    Needle length (inches):  4    Ultrasound: Yes      Ultrasound used to identify targeted nerve, plexus, or vascular structure and placed a needle adjacent to it      Permanent Image entered into patiient's record      Abnormal pain on injection: No      Blood Aspirated: No      Paresthesias:  Yes and Resolved    Bleeding at site: No      Bolus via:  Needle    Infusion Method:  Single Shot    Complications:  None  Assessment/Narrative:     Injection made incrementally with aspirations every (mL):  3

## 2020-05-09 ENCOUNTER — APPOINTMENT (OUTPATIENT)
Dept: OCCUPATIONAL THERAPY | Facility: CLINIC | Age: 60
DRG: 483 | End: 2020-05-09
Attending: ORTHOPAEDIC SURGERY
Payer: COMMERCIAL

## 2020-05-09 VITALS
WEIGHT: 195.55 LBS | RESPIRATION RATE: 14 BRPM | SYSTOLIC BLOOD PRESSURE: 135 MMHG | HEART RATE: 73 BPM | OXYGEN SATURATION: 99 % | BODY MASS INDEX: 28 KG/M2 | HEIGHT: 70 IN | DIASTOLIC BLOOD PRESSURE: 73 MMHG | TEMPERATURE: 99.1 F

## 2020-05-09 LAB
ANION GAP SERPL CALCULATED.3IONS-SCNC: 4 MMOL/L (ref 3–14)
BUN SERPL-MCNC: 19 MG/DL (ref 7–30)
CALCIUM SERPL-MCNC: 8.1 MG/DL (ref 8.5–10.1)
CHLORIDE SERPL-SCNC: 104 MMOL/L (ref 94–109)
CO2 SERPL-SCNC: 28 MMOL/L (ref 20–32)
CREAT SERPL-MCNC: 0.94 MG/DL (ref 0.66–1.25)
ERYTHROCYTE [DISTWIDTH] IN BLOOD BY AUTOMATED COUNT: 13.3 % (ref 10–15)
GFR SERPL CREATININE-BSD FRML MDRD: 89 ML/MIN/{1.73_M2}
GLUCOSE SERPL-MCNC: 128 MG/DL (ref 70–99)
HCT VFR BLD AUTO: 29 % (ref 40–53)
HGB BLD-MCNC: 9.7 G/DL (ref 13.3–17.7)
MAGNESIUM SERPL-MCNC: 2.4 MG/DL (ref 1.6–2.3)
MCH RBC QN AUTO: 27.8 PG (ref 26.5–33)
MCHC RBC AUTO-ENTMCNC: 33.4 G/DL (ref 31.5–36.5)
MCV RBC AUTO: 83 FL (ref 78–100)
PLATELET # BLD AUTO: 248 10E9/L (ref 150–450)
POTASSIUM SERPL-SCNC: 3.9 MMOL/L (ref 3.4–5.3)
RBC # BLD AUTO: 3.49 10E12/L (ref 4.4–5.9)
SODIUM SERPL-SCNC: 136 MMOL/L (ref 133–144)
WBC # BLD AUTO: 12.4 10E9/L (ref 4–11)

## 2020-05-09 PROCEDURE — 36415 COLL VENOUS BLD VENIPUNCTURE: CPT | Performed by: HOSPITALIST

## 2020-05-09 PROCEDURE — 25000132 ZZH RX MED GY IP 250 OP 250 PS 637: Performed by: ORTHOPAEDIC SURGERY

## 2020-05-09 PROCEDURE — 97110 THERAPEUTIC EXERCISES: CPT | Mod: GO | Performed by: OCCUPATIONAL THERAPIST

## 2020-05-09 PROCEDURE — 97535 SELF CARE MNGMENT TRAINING: CPT | Mod: GO | Performed by: OCCUPATIONAL THERAPIST

## 2020-05-09 PROCEDURE — 25000132 ZZH RX MED GY IP 250 OP 250 PS 637: Performed by: HOSPITALIST

## 2020-05-09 PROCEDURE — 85027 COMPLETE CBC AUTOMATED: CPT | Performed by: HOSPITALIST

## 2020-05-09 PROCEDURE — 83735 ASSAY OF MAGNESIUM: CPT | Performed by: HOSPITALIST

## 2020-05-09 PROCEDURE — 99232 SBSQ HOSP IP/OBS MODERATE 35: CPT | Performed by: INTERNAL MEDICINE

## 2020-05-09 PROCEDURE — 25000132 ZZH RX MED GY IP 250 OP 250 PS 637: Performed by: INTERNAL MEDICINE

## 2020-05-09 PROCEDURE — 97165 OT EVAL LOW COMPLEX 30 MIN: CPT | Mod: GO | Performed by: OCCUPATIONAL THERAPIST

## 2020-05-09 PROCEDURE — 80048 BASIC METABOLIC PNL TOTAL CA: CPT | Performed by: HOSPITALIST

## 2020-05-09 PROCEDURE — 25000128 H RX IP 250 OP 636: Performed by: ORTHOPAEDIC SURGERY

## 2020-05-09 PROCEDURE — 99207 ZZC CDG-MDM COMPONENT: MEETS LOW - DOWN CODED: CPT | Performed by: INTERNAL MEDICINE

## 2020-05-09 RX ORDER — AMLODIPINE AND BENAZEPRIL HYDROCHLORIDE 5; 20 MG/1; MG/1
1 CAPSULE ORAL 2 TIMES DAILY
Status: DISCONTINUED | OUTPATIENT
Start: 2020-05-09 | End: 2020-05-09 | Stop reason: HOSPADM

## 2020-05-09 RX ORDER — OXYCODONE HYDROCHLORIDE 10 MG/1
10-20 TABLET ORAL EVERY 8 HOURS PRN
Qty: 30 TABLET | Refills: 0 | Status: ON HOLD | OUTPATIENT
Start: 2020-05-09 | End: 2020-08-18

## 2020-05-09 RX ADMIN — ASPIRIN 81 MG: 81 TABLET ORAL at 05:42

## 2020-05-09 RX ADMIN — AMLODIPINE AND BENAZEPRIL HYDROCHLORIDE 1 CAPSULE: 5; 20 CAPSULE ORAL at 09:43

## 2020-05-09 RX ADMIN — B-COMPLEX W/ C & FOLIC ACID TAB 1 TABLET: TAB at 05:45

## 2020-05-09 RX ADMIN — OXYCODONE HYDROCHLORIDE 20 MG: 10 TABLET ORAL at 07:14

## 2020-05-09 RX ADMIN — OXYCODONE HYDROCHLORIDE 20 MG: 10 TABLET ORAL at 03:08

## 2020-05-09 RX ADMIN — MULTIPLE VITAMINS W/ MINERALS TAB 1 TABLET: TAB at 05:45

## 2020-05-09 RX ADMIN — DOCUSATE SODIUM 100 MG: 100 CAPSULE, LIQUID FILLED ORAL at 05:44

## 2020-05-09 RX ADMIN — FOLIC ACID 1 MG: 1 TABLET ORAL at 05:46

## 2020-05-09 RX ADMIN — BUPROPION HYDROCHLORIDE 200 MG: 200 TABLET, EXTENDED RELEASE ORAL at 05:45

## 2020-05-09 RX ADMIN — Medication 5000 MCG: at 05:45

## 2020-05-09 RX ADMIN — SENNOSIDES 2 TABLET: 8.6 TABLET, FILM COATED ORAL at 05:45

## 2020-05-09 RX ADMIN — CEFAZOLIN SODIUM 2 G: 2 INJECTION, SOLUTION INTRAVENOUS at 03:09

## 2020-05-09 RX ADMIN — PANTOPRAZOLE SODIUM 40 MG: 40 TABLET, DELAYED RELEASE ORAL at 05:45

## 2020-05-09 RX ADMIN — HYDROMORPHONE HYDROCHLORIDE 0.5 MG: 1 INJECTION, SOLUTION INTRAMUSCULAR; INTRAVENOUS; SUBCUTANEOUS at 08:06

## 2020-05-09 RX ADMIN — CARVEDILOL 25 MG: 25 TABLET, FILM COATED ORAL at 05:45

## 2020-05-09 NOTE — PLAN OF CARE
Discharge Planner OT   Patient plan for discharge: Home with assist  Current status: Pt educated on role of OT, POC, and shoulder restrictions. Education provided for donning/doffing shoulder immobilizer- pt performed CGA w/ vc's and extra time for sequencing. One handed dressing education also provided. Pt able to complete FB dressing, SBA for LB, CGA w/ vc's for UB due to limited R UE ROM. Pt ambulating independently in room and hallway. Pt educated on elbow/wrist/hand exercises with handout provided. Pt completed 10 reps of each exercise at EOB. Pt eagerly waiting to discharge home. Pt has no further concerns with self-cares. Has DME at home.   Barriers to return to prior living situation: limited R UE ROM  Recommendations for discharge: Home with assist  Rationale for recommendations: OT goals met, no further skilled OT needs at this time.        Entered by: Fortino Smyth 05/09/2020 10:55 AM       Occupational Therapy Discharge Summary    Reason for therapy discharge:    All goals and outcomes met, no further needs identified.    Progress towards therapy goal(s). See goals on Care Plan in [facility] electronic health record for goal details.  Goals met    Therapy recommendation(s):    No further therapy is recommended. Home with assist.

## 2020-05-09 NOTE — PROGRESS NOTES
Pt anxious to leave for home. States that he has all of his needed analgesics. Pt given discharge instructions- states that he understands and has no questions. Surgical shoulder dressings C/D/I. All teachings completed. Will discharge as ordered.

## 2020-05-09 NOTE — DISCHARGE SUMMARY
ORTHOPAEDIC DISCHARGE SUMMARY     Date of Admission: 5/8/2020  Date of Discharge: 5/9/2020  Disposition: Home  Staff Physician: Analilia Aceves, *  Primary Care Provider: Demi Hardin    DISCHARGE DIAGNOSIS:  Status post total shoulder arthroplasty, left [Z96.612]    PROCEDURES: Procedure(s):  Left shoulder removal of spacer  and placement of reverse total shoulder arthroplasty on 5/8/2020    HOSPITAL COURSE:    Surgery was without unexpected event. Kobe CHAO Hadleywily has done well post-operatively. Medicine was consulted post operatively to aid in management of medical comorbidities.  Using the pre-surgery plan for pain control, we were able to maintain tolerable levels in the post operative period. The patient received routine nursing cares and has remained medically stable. Vital signs have remained stable throughout the hospital stay. The patient is tolerating a regular diet without GI distress/nausea or vomiting. Voiding spontaneously in the post-operative period. PT & OT were consulted for evaluation, and all PT/OT goals have been met for safe mobility. The patient's post-operative pain is now controlled on oral medications, which will be available on discharge for ~5 days worth. The patient may then resume his baseline chronic pain regimen as is monitored by his pain doctor. Stool softeners have been used while taking pain medications to help prevent constipation. Kobe Buchanan is deemed medically safe to discharge.     Antibiotics:  Ancef given periop and 24 hours postop.  DVT prophylaxis:  Mechanical  PT Progress:  Has met PT/OT goals for safe mobility.    Pain Meds:  Weaned off all IV pain meds by discharge.  Inpatient Events: No significant events or complications.     Discharge orders and instructions as below.    FOLLOWUP:    Follow up with Dr. Aceves at 2 & 6 weeks postoperatively.  Future Appointments   Date Time Provider Department Center   5/9/2020  1:00 PM Fortino Smyth  OT UROT [place]   5/9/2020  7:00 PM UR PT OVERFLOW URPT [place]   5/27/2020 10:45 AM Analilia Aceves MD [facility]   6/17/2020  9:45 AM Analilia Aceves MD [facility] ([address] [place] [address]). Call [phone] to schedule a follow-up appointment at this location with your provider if you have not heard confirmation of your appointment in the next 3-5 business days    PLANNED DISCHARGE ORDERS:           Current Discharge Medication List      CONTINUE these medications which have NOT CHANGED    Details   acetaminophen (TYLENOL) 325 MG tablet Take 2 tablets (650 mg) by mouth every 4 hours as needed for mild pain  Qty: 100 tablet, Refills: 0    Associated Diagnoses: S/P shoulder replacement, left      amLODIPine-benazepril (LOTREL) 5-20 MG capsule Take 1 capsule by mouth daily  Qty: 180 capsule, Refills: 3    Associated Diagnoses: Essential hypertension with goal blood pressure less than 130/85; Essential hypertension; Ascending aortic aneurysm (H); Atrial fibrillation, unspecified type (H)      aspirin 81 MG EC tablet Take 81 mg by mouth daily before breakfast      atorvastatin (LIPITOR) 40 MG tablet Take 1 tablet (40 mg) by mouth daily  Qty: 90 tablet, Refills: 1    Associated Diagnoses: Ischemic cardiomyopathy      buPROPion (WELLBUTRIN SR) 200 MG 12 hr tablet TAKE 1 TABLET BY MOUTH 2 TIMES DAILY  Qty: 180 tablet, Refills: 3    Associated Diagnoses: Dysthymic disorder      carvedilol (COREG) 25 MG tablet Take 1 tablet (25 mg) by mouth 2 times daily (with meals)  Qty: 180 tablet, Refills: 3    Associated Diagnoses: Essential hypertension with goal blood pressure less than 130/85      clonazePAM (KLONOPIN) 0.5 MG tablet Take 1 tablet (0.5 mg) by mouth 3 times daily as needed for anxiety  Qty: 90 tablet, Refills: 1    Associated Diagnoses: Stress      Cyanocobalamin 5000 MCG SUBL Place 5,000 mcg under the tongue daily Vitamin B12       cyclobenzaprine (FLEXERIL) 10 MG tablet Take 1 tablet (10 mg) by mouth 3 times daily as needed for muscle spasms  Qty: 90 tablet, Refills: 3    Associated Diagnoses: Neck pain      docusate sodium (COLACE) 100 MG capsule Take 1 capsule in the morning and 2 capsules in the evening      Fe Heme Polypeptide-folic acid (PROFERRIN-FORTE) 12-1 MG TABS Take 1 tablet by mouth daily  Qty: 90 tablet, Refills: 0    Associated Diagnoses: Iron deficiency anemia due to chronic blood loss      fentaNYL (DURAGESIC) 75 mcg/hr 72 hr patch Place 1 patch onto the skin every 48 hours       Multiple Minerals-Vitamins (CALCIUM CITRATE-MAG-MINERALS) TABS Take 1 tablet by mouth daily      multivitamin w/minerals (THERA-VIT-M) tablet Take 1 tablet by mouth daily      naproxen (NAPROSYN DR) 500 MG EC tablet Take 500 mg by mouth 2 times daily as needed (pain)  Qty: 60 tablet, Refills: 3    Associated Diagnoses: Spondylosis of cervical region without myelopathy or radiculopathy      oxyCODONE IR (ROXICODONE) 10 MG tablet Take 10 mg by mouth every 4 hours as needed for severe pain Max 6 tablets per day      senna (SENOKOT) 8.6 MG tablet Take 2 tablets by mouth every morning and 3 tablets every evening      vitamin B-Complex Take 1 tablet by mouth daily      amoxicillin (AMOXIL) 500 MG capsule Take 4 tablets 1 hour before dental procedure  Qty: 24 capsule, Refills: 4    Associated Diagnoses: Dental anomaly      desoximetasone (TOPICORT) 0.25 % external cream Apply topically daily as needed for inflammation or itching      fluocinonide (LIDEX) 0.05 % external ointment Apply twice daily to itchy skin nodules for 1-2 weeks at a time.  Qty: 30 g, Refills: 3    Associated Diagnoses: Prurigo nodularis      naloxone (NARCAN) nasal spray Spray 1 spray (4 mg) into one nostril alternating nostrils as needed for opioid reversal every 2-3 minutes until assistance arrives  Qty: 0.2 mL, Refills: 0    Associated Diagnoses: Chronic, continuous use of opioids       pantoprazole (PROTONIX) 40 MG EC tablet Take 40 mg by mouth daily as needed for heartburn      tacrolimus (PROTOPIC) 0.1 % ointment Apply topically as needed Apply to affected areas on body.  Qty: 120 g, Refills: 11    Associated Diagnoses: Other eczema; Prurigo nodularis               Discharge Procedure Orders   Reason for your hospital stay   Order Comments: Revision left reverse total shoulder arthroplasty and removal of antibiotic spacer     [facility] Follow-up and recommended labs and tests   Order Comments: 2 weeks and 6 weeks post operatively with Dr. Aceves.    [facility] ([address] [place] [id number]). Call [phone] to schedule a follow-up appointment at this location with your provider if you do not have confirmation in the next 3-5 business days     Activity   Order Comments: Your activity upon discharge:   No Weight Bearing through your operative limb  NO Range of Motion for at least 4 weeks post op; no exceptions; sling at all times except for hygiene  We will allow hand, wrist and elbow range of motion.                Future Timeline:          The patient will start passive and active assisted range of motion at 4 weeks, progress to active range of motion at 6 weeks and wean out of the sling at that time.     Order Specific Question Answer Comments   Is discharge order? Yes      When to contact your care team   Order Comments: CALL YOUR PHYSICIAN IF:  1.  Your pain begins to worsen and is unable to be controlled with your medications.  2.  Excessive redness or drainage of cloudy or bloody material from the wounds (Clear red tinted fluid and some mild drainage should be expected). Drainage of any kind 5 days after surgery should be reported to the doctor.  3.  You have a temperature elevation greater than 101.5    4.  You have pain, swelling or redness in your calf. You have numbness or weakness in your leg or foot.     Wound care and dressings    Order Comments: Instructions to care for your wound at home:   -Your dressing is to remain in place until post operative day 7 after which you may remove  -If there are white steri-strips in place, they will fall off on their own about 7-10 days after your dressing is removed (or 10-14 days post-operatively).   -Your stitches will dissolve on their own and do not need to be removed.    -You may shower once your dressing is off; let water run over the incision and dab dry   -Do NOT submerge in pool or tub for at least 2 weeks.   -After the dressing is off, you may leave it open to the air.   -Please contact us if there is any increasing drainage, swelling, pain, or redness stemming from your incision; this may be a sign of infection and would need to be looked at immediately.     Discharge Instructions   Order Comments: Pain Control: you may resume your baseline pain control routine as agreed upon with your pain doctor. We will provide you with a prescription appropriate for new post-operative pain that should cover ~5 days. If there are concerns about your prescription or pain control, please contact our office.     Diet   Order Comments: Follow this diet upon discharge: Regular     Order Specific Question Answer Comments   Is discharge order? Yes        Juan Carlos Andersen MD  Orthopaedic Surgery Resident

## 2020-05-09 NOTE — PROVIDER NOTIFICATION
Pt c/o sore throat and headache, and pt is concerned that it might be covid. Page moonlighter and notified and waiting for response.

## 2020-05-09 NOTE — PROGRESS NOTES
05/09/20 0855   Quick Adds   Type of Visit Initial Occupational Therapy Evaluation   Living Environment   Lives With alone   Living Arrangements apartment   Home Accessibility no concerns   Transportation Anticipated family or friend will provide   Living Environment Comment Tub shower with grab bars, shower chair, raised toilet with grab bars.   Self-Care   Usual Activity Tolerance good   Current Activity Tolerance good   Regular Exercise No   Equipment Currently Used at Home other (see comments);raised toilet;shower chair  (reacher)   Activity/Exercise/Self-Care Comment Pt reports IND w/ all self-cares prior to surgery.   Functional Level   Ambulation 0-->independent   Transferring 0-->independent   Toileting 0-->independent   Bathing 0-->independent   Dressing 0-->independent   Eating 0-->independent   Communication 0-->understands/communicates without difficulty   Swallowing 0-->swallows foods/liquids without difficulty   Cognition 0 - no cognition issues reported   Fall history within last six months no   Which of the above functional risks had a recent onset or change? dressing;bathing   General Information   Onset of Illness/Injury or Date of Surgery - Date 05/08/20   Referring Physician Dr. 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 Reports no pain.   Cognitive Status Examination   Orientation orientation to person, place and time   Visual  Perception   Visual Perception Wears glasses   Pain Assessment   Patient Currently in Pain No   Range of Motion (ROM)   ROM Comment No L shoulder ROM; R shoulder limited due to spacer   Strength   Strength Comments BUE WFL   Muscle Tone Assessment   Muscle Tone Quick Adds No deficits were identified   Coordination   Upper Extremity Coordination No deficits were identified   Mobility   Bed Mobility Bed mobility skill: Sit to supine;Bed mobility skill: Supine to sit   Bed Mobility Skill: Sit to Supine   Level of [place]: Sit/Supine independent   Bed Mobility Skill: Supine to Sit   Level of [place]: Supine/Sit independent   Transfer Skills   Transfer Transfer Skill: Stand to Sit   Transfer Skill: Bed to Chair/Chair to Bed   Level of [place]: Bed to Chair independent   Transfer Skill: Sit to Stand   Level of [place]: Sit/Stand independent   Transfer Skill: Toilet Transfer   Level of [place]: Toilet independent   Tub/Shower Transfer   Tub/Shower Transfer Comments Not assessed, however no concerns as pt ambulating IND.   Balance   Balance Quick Add No deficits identified   Bathing   Level of [place] stand-by assist   Upper Body Dressing   Level of [place]: Dress Upper Body contact guard   Physical Assist/Nonphysical Assist: Dress Upper Body verbal cues   Lower Body Dressing   Level of [place]: Dress Lower Body stand-by assist   Toileting   Level of [place]: Toilet stand-by assist   Grooming   Level of [place]: Grooming stand-by assist   Eating/Self Feeding   Level of [place]: Eating independent   Activities of Daily Living Analysis   Impairments Contributing to Impaired Activities of Daily Living pain;post surgical precautions;ROM decreased   General Therapy Interventions   Planned Therapy Interventions ADL retraining;home program guidelines   Clinical Impression   Criteria for Skilled Therapeutic Interventions Met yes, treatment indicated   OT Diagnosis Impaired ADLs  "and functional mobility   Influenced by the following impairments decreased ROM and strength   Assessment of Occupational Performance 1-3 Performance Deficits   Identified Performance Deficits dressing, bathing   Clinical Decision Making (Complexity) Low complexity   Therapy Frequency Daily   Predicted Duration of Therapy Intervention (days/wks) 1x eval and treat   Anticipated Equipment Needs at Discharge bathing equipment   Anticipated Discharge Disposition Home with Assist   Risks and Benefits of Treatment have been explained. Yes   Patient, Family & other staff in agreement with plan of care Yes   [facility] \"6 Clicks\"   2016, Trustees of [facility], under license to [employer].  All rights reserved.   6 Clicks Short Forms Daily Activity Inpatient Short Form   [facility]  \"6 Clicks\" Daily Activity Inpatient Short Form   1. Putting on and taking off regular lower body clothing? 4 - None   2. Bathing (including washing, rinsing, drying)? 3 - A Little   3. Toileting, which includes using toilet, bedpan or urinal? 4 - None   4. Putting on and taking off regular upper body clothing? 3 - A Little   5. Taking care of personal grooming such as brushing teeth? 4 - None   6. Eating meals? 4 - None   Daily Activity Raw Score (Score out of 24.Lower scores equate to lower levels of function) 22   Total Evaluation Time   Total Evaluation Time (Minutes) 8     "

## 2020-05-09 NOTE — PLAN OF CARE
Per OT reports pt is up and mobilizing independently, no PT needs at this time. Will complete orders.

## 2020-05-09 NOTE — PLAN OF CARE
Pt is A&Ox4. VSS. LS clear, on RA. BS active, LBM on 5/7/2020. Voiding well. Pain managed with 20mg oxycodone. L shoulder dressing is c/d/I. Pt up SBA. R PIV is patent and infusing TKO. Continue to monitor.

## 2020-05-09 NOTE — PROGRESS NOTES
Orthopaedic Surgery Progress Note:       Subjective:   NAEO. Patient reports doing ok. Had a headache last night. Says he feels comfortable in his sling and is ready to go home today. He would like to work with PT to go over how to get a shirt on/off safely. He has no tingling/numbness in the LUE.     Objective:   Temp:  [96.9  F (36.1  C)-99.1  F (37.3  C)] 99.1  F (37.3  C)  Pulse:  [58-80] 73  Heart Rate:  [57-78] 73  Resp:  [8-18] 14  BP: (111-157)/(57-87) 135/73  MAP:  [97 mmHg-101 mmHg] 101 mmHg  Arterial Line BP: (146-161)/(65-70) 161/70  SpO2:  [93 %-100 %] 99 %      Gen: NAD. 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 Aceves    Activity: NWB LUE; NO ROM for at least 4 weeks post op; no exceptions; sling at all times except hygiene   We will allow hand, wrist and elbow range of motion.    The patient will start passive and active assisted range of motion at 4 weeks, progress to active range of motion at 6 weeks and wean out of the sling at that time.   Drain: removed POD 1  Dressings: Aquacel to remain in place until POD 7  Diet: ADAT  Pain: PO/IV meds. Transition to PO meds only as patient tolerates   Patient to follow chronic pain plan periop  DVT ppx: mechanical  Imaging: completed  Labs: Daily Hgb  PT/OT: Mobility, ADLs  Consults: Appreciate medicine co-management.  Dispo: Pending pain control and PT/OT recs. Expect patient to d/c to home with family today 5/9  Follow up: 2 weeks post  op w/ Dr. Aceves via video appt and then 6 weeks post op in clinic    Juan Carlos Andersen MD   Orthopaedic Surgery Resident  Pager: [phone]

## 2020-05-09 NOTE — PLAN OF CARE
Patient arrived to the unit at 1600 form PACU. A&O X4. Able to transfer to bed independently.    VS: Stable.   O2: Sats >90% on RA. Capnography stable.   Output: Voiding adequate amount to urinal. Voiding 500-700CC and bladder scanned for 405 ml.    Last BM: LBM 5/7.   Activity: Up with SBA.    Skin: Intact except left shoulder incision and dry scaly skin in BLE's    Pain: Pain comfortably manageable.     CMS: Intact.   Dressing: Left shoulder Aquacel dressing CDI. Ice applied.   Diet: Tolerating regular diet.   LDA: PIV infusing NS at 100 ml/hr into right forearm.   Equipment: IV pole, capnography. Patient belongings.   Plan: TBD.   Additional Info:

## 2020-05-09 NOTE — PROGRESS NOTES
"Seen and examined patient this morning  No new complaints  Patient did well postoperatively  Looking forward to discharge    /73 (BP Location: Right arm)   Pulse 73   Temp 99.1  F (37.3  C) (Oral)   Resp 14   Ht 1.778 m (5' 10\")   Wt 88.7 kg (195 lb 8.8 oz)   SpO2 99%   BMI 28.06 kg/m      CVS: Normal Heart sounds   RS: Clear breath sounds bilaterally   Abdomen: Soft and non tender. Normal bowel sounds.  Neuro: Alert and orientated X 3   Left shoulder with dressings. No distal neurovascular deficit     Labs reviewed. Stable    # Left Removal of spacers and Left Shoulder arthroplasty, POD #1  - ml, Post op hgb at 9.7  - Pain management per pain team  - medically cleared for discharge     # Chronic Cervical pain issues, Limited neck extension:   Outpatient pain control regimen  # SAVITA:   Use Home CPAP  # Hx of Bicuspid Aortic valve s/p valve sparing repair with Gelweave graft 2016  # Non Ischemic Cardiomyopathy (EF 30-35%), Now normalized on cardiac MRI Feb 2020.   # Surveillance biopsy on 1/28/20 was complicated by ventricular ectopy and severe BP elevation 200s/130-150s  Was cleared by cardiology for Sx. PTA Coreg, Amlodipine Benezapril, ASA. Has been off lasix.    Had cardiac MRI done on 02/25/20. It showed:    Normal cardiac function, LVEF 63% and RVEF 70%, without myocardial fibrosis. Rapid resolution of cardiomyopathy with absence of fibrosis suggests a resolved stress induced CM. Normal appearance of ascending aortic graft, with normal caliber of thoracic aorta. No change from prior MRA in 2019.    Resume home medications at discharge      # HTN:   -Resume home amlodipine- Benazepril   -Ct coreg with hold parameters.     # Dyslipidemia:   PTA Atorvastatin, Ct that     # Hx of Gastric Bypass, GERD:   Controlled.  PTA Protonix prn daily  -Avoid NSAIDs if possible.   -Schedule PPI    # Anxiety and Depression:   PTA Wellbutrin and clonazepam.   -Anxiety is controlled.   -Ct Wellbutrin and " clonazepma.         Dr MARCELL Brown MD, [facility], [place]  Hospitalist ( Internal medicine)  Pager: [phone]

## 2020-05-10 ENCOUNTER — PATIENT OUTREACH (OUTPATIENT)
Dept: CARE COORDINATION | Facility: CLINIC | Age: 60
End: 2020-05-10

## 2020-05-11 ENCOUNTER — TELEPHONE (OUTPATIENT)
Dept: ORTHOPEDICS | Facility: CLINIC | Age: 60
End: 2020-05-11

## 2020-05-11 NOTE — TELEPHONE ENCOUNTER
[facility] Call Center    Phone Message    May a detailed message be left on voicemail: yes     Reason for Call: Other: Pt stated that he was returning a call checking in following his surgery with Dr. Aceves and he would like to note that he is doing  well.     Action Taken: Message routed to:  [facility] ([facility] Ortho    Travel Screening: Not Applicable

## 2020-05-13 LAB
BACTERIA SPEC CULT: NO GROWTH
Lab: NORMAL
SPECIMEN SOURCE: NORMAL

## 2020-05-13 NOTE — OP NOTE
"DATE OF PROCEDURE: 5/8/2020    PREOPERATIVE DIAGNOSIS:   1. 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 Removal of left shoulder antibiotic spacer  2. Left shoulder excisional I&D  3. Placement of custom left reverse total shoulder arthroplasty (custom baseplate, Vault Reconstruction System)    NOTE ON COMPLEXITY:   Modifier 22 is requested given the increased time (over 50% standard) and expertise necessary to address a tight joint due to joint contractures from scar, obtain appropriate stability in a multiply operated shoulder, and adequately expose the bone deficient glenoid and appropriately implant a custom baseplate.     STAFF SURGEON: Analilia Aceves MD.   ASSISTANT: Nam Hanson DO    ANESTHESIA: General endotracheal anesthesia.   ESTIMATED BLOOD LOSS: 300 mL.   COMPLICATIONS: None.   DRAINS: Hemovac x1.     IMPLANTS:   1. [employer] Comprehensive Shoulder VRS custom baseplate (central 6.5 screw and peripheral planned locking screws)  2. Biomet Comprehensive Shoulder 40,, Glenosphere with offset at \"C\"  3. Biomet Comprehensive Shoulder stem size 9  4. Biomet Comprehensive Shoulder offset humeral tray and Standard bearing    BRIEF PATIENT HISTORY: This patient is very well known to me over the years and most recently for management of his infected bilateral TSAs. Due to his infection on the left with associated glenoid bone loss (extensive central deficit) he was indicated for a custom baseplate as the off-the-shelf components would not accommodate the bone defect. The patient has completed his IV antibiotic course and desired the next stage of implanting the reverse. Through discussions with his ID consult " physician we feel he is appropriate for this now. I discussed reverse total shoulder arthroplasty including the risks and benefits and perioperative rehabilitation plan. I discussed the increased risks of morbidity and mortality if he were to contract Covid-19 in the periop period.The patient had the opportunity for questions to be answered and wished to proceed with surgery. Informed consent was completed.     DESCRIPTION OF PROCEDURE: The patient was identified in the preoperative area and the correct left shoulder was marked for surgery. The patient was provided an interscalene block by our anesthesia colleagues. He was taken to the operating room where he was surrendered to general endotracheal anesthesia. He was moved to the operating table in the beachchair position with all bony prominences well padded. The head was placed in a head rain with the neck in neutral position. The left upper extremity was prepped and draped in the usual sterile fashion. A timeout was held in accordance with hospital policy, confirming correct patient, site, side, procedure and administration of IV antibiotics prior to incision. I completed a deltopectoral incision and approach through the previous scar and interval. I came underneath the deltoid and freed the subdeltoid adhesions. I then released the capsule off the anterior inferior humerus and dislocated the humerus anteriorly. There was no subscapularis remaining. I then dislocated the humerus anteriorly after adequate capsular releases were done as the joint was rather tight. I freed the tissue around the spacer and then removed the spacer en total without difficulty. I then prepared the proximal humerus for a size 9 reverse stem. I then turned my attention to the glenoid. I performed anterior inferior capsular releases and sharply removed all fibrous tissue in an excisional fashion overlying the glenoid bone, including into the central bony defect.There was significant  "fibrous tissue over and in the glenoid. The capsule inferiorly was also carefully dissected and released to address the joint contracture. I irrigated the glenoid, joint, and humeral canal with 1L Bactisure and then 3L saline. I prepared the glenoid for the baseplate which was impacted and screws were placed. I then applied the 40mm glenosphere with offset at \"C\" and trialed a polyethylene liner. An offset tray with standard bearing provided forward elevation to 165 degrees with external rotation at the side to 50 without booking anteriorly. There was also internal rotation to the abdomen and contralateral shoulder without impingement. I removed all trial humeral components. I impacted the stem in the appropriate version. I impacted the final polyethylene liner and tray with the same range of motion as above. I then copiously irrigated the wound. One gram Vanco powder was placed in the wound and a deep hemovac drain placed. The wound was then closed in layered fashion with absorbable suture. Steri-Strips and soft sterile dressing were repaired. The arm was placed into an abduction sling and the patient was extubated and transported to recovery in stable condition. There were no apparent intraoperative complications.     POSTOPERATIVE PLAN:   1. The patient will be admitted to the Orthopedic Service and will begin physical therapy to mobilize out of bed.There will be no shoulder range of motion for 4-6 weeks but the patient can begin immediate hand, wrist and elbow range of motion. We will use aspirin for deep venous thrombosis prophylaxis.   2. The patient will be seen by me in the clinic for wound check at 2 weeks.     VICENTE MAYS MD     "

## 2020-05-22 LAB
BACTERIA SPEC CULT: NORMAL
Lab: NORMAL
SPECIMEN SOURCE: NORMAL

## 2020-05-27 ENCOUNTER — OFFICE VISIT (OUTPATIENT)
Dept: ORTHOPEDICS | Facility: CLINIC | Age: 60
End: 2020-05-27
Payer: COMMERCIAL

## 2020-05-27 DIAGNOSIS — Z96.612 STATUS POST TOTAL SHOULDER ARTHROPLASTY, LEFT: Primary | ICD-10-CM

## 2020-05-27 NOTE — PROGRESS NOTES
CHIEF CONCERN:   1. Status post: Left shoulder removal of antibiotic spacer and placement of custom VRS reverse TSA (5/8/2020); Right shoulder antibiotic spacer still in place.    HISTORY OF PRESENT ILLNESS: Mr. Buchanan is a 60 year-old male who is 2 weeks status post the above procedure. His cultures have all been negative. He notes he is anxious to be doing more and more with both shoulders. Wants to go back to swimming at the pool.    EXAM:  Pleasant adult male in no distress.  Respirations even and unlabored.  Left upper extremity: Incision clean, dry, and intact. No erythema. No drainage. Suture ends trimmed a skin edge. Sens intact Ax/Musc/Med/Rad/Uln nerves. Motor intact EPL, FPL, and Intrinsics. Shoulder range of motion not tested due to postop restrictions.    ASSESSMENT:  1. 2 weeks status post above procedure.    PLAN:    Range of Motion: No ROM of the shoulder per operative note.     Sling: Continue sling except for bathing/dressing    Pain medication: Has ongoing pain management plan from outside clinic    Follow up: in 4 weeks.  Will discontinue sling at that time and initiate gentle AROM.

## 2020-05-27 NOTE — LETTER
5/27/2020         RE: Kobe Buchanan  [address]  [place] [address]        Dear Colleague,    Thank you for referring your patient, Kobe Buchanan, to the [facility]. Please see a copy of my visit note below.    CHIEF CONCERN:   1. Status post: Left shoulder removal of antibiotic spacer and placement of custom VRS reverse TSA (5/8/2020); Right shoulder antibiotic spacer still in place.    HISTORY OF PRESENT ILLNESS: Mr. Buchanan is a 60 year-old male who is 2 weeks status post the above procedure. His cultures have all been negative. He notes he is anxious to be doing more and more with both shoulders. Wants to go back to swimming at the pool.    EXAM:  Pleasant adult male in no distress.  Respirations even and unlabored.  Left upper extremity: Incision clean, dry, and intact. No erythema. No drainage. Suture ends trimmed a skin edge. Sens intact Ax/Musc/Med/Rad/Uln nerves. Motor intact EPL, FPL, and Intrinsics. Shoulder range of motion not tested due to postop restrictions.    ASSESSMENT:  1. 2 weeks status post above procedure.    PLAN:    Range of Motion: No ROM of the shoulder per operative note.     Sling: Continue sling except for bathing/dressing    Pain medication: Has ongoing pain management plan from outside clinic    Follow up: in 4 weeks.  Will discontinue sling at that time and initiate gentle AROM.        Again, thank you for allowing me to participate in the care of your patient.        Sincerely,        Analilia Aceves MD

## 2020-05-27 NOTE — NURSING NOTE
Reason For Visit:   Chief Complaint   Patient presents with     Surgical Followup      2 week pop DOS 5/8/20 Left shoulder removal of spacer and placement of reverse total shoulder arthroplasty (Left)          PCP: Michael Styles  Ref: Self     ?  No  Occupation Retired.  Currently working? No.  Work status?  Retired.  Date of injury: ongoing     Date of surgery: R TSA 8/26/2014  L TSA 04/15/2014     Date of surgery: 9/23/19  Type of surgery: I and D Right shoulder at [facility]     Date of surgery: 11/15/19  Type of surgery:   1. Explant of left total shoulder arthroplasty  2. Irrigation and excisional debridement left shoulder  3. Placement of antibiotic spacer     Date of surgery: 1/28/20  Type of surgery:   1. Left shoulder arthroscopy  2. Left shoulder arthroscopic biopsy/culture      Date of surgery:5/8/20  Type of surgery:  1. Removal of left shoulder antibiotic spacer  2. Left shoulder excisional I&D  3. Placement of custom left reverse total shoulder arthroplasty (custom baseplate, Vault Reconstruction System)      Smoker: No  Request smoking cessation information: No     Right hand dominant    SANE score  Affected shoulder: Bilateral  Right shoulder SANE: 40  Left shoulder SANE: 10    There were no vitals taken for this visit.      Pain Assessment  Patient Currently in Pain: [place], ATC

## 2020-06-05 DIAGNOSIS — F43.9 STRESS: ICD-10-CM

## 2020-06-08 DIAGNOSIS — F43.9 STRESS: ICD-10-CM

## 2020-06-08 RX ORDER — CLONAZEPAM 0.5 MG/1
0.5 TABLET ORAL 3 TIMES DAILY PRN
Qty: 90 TABLET | Status: CANCELLED | OUTPATIENT
Start: 2020-06-08

## 2020-06-08 RX ORDER — CLONAZEPAM 0.5 MG/1
0.5 TABLET ORAL 3 TIMES DAILY PRN
Qty: 90 TABLET | Refills: 1 | Status: SHIPPED | OUTPATIENT
Start: 2020-06-08 | End: 2020-08-02

## 2020-06-08 NOTE — TELEPHONE ENCOUNTER
Message to physician: clonazepam    Date of last visit: 2/14/2020     Date of next visit if scheduled: Visit date not found       Last Comprehensive Metabolic Panel:  Sodium   Date Value Ref Range Status   05/09/2020 136 133 - 144 mmol/L Final     Potassium   Date Value Ref Range Status   05/09/2020 3.9 3.4 - 5.3 mmol/L Final     Chloride   Date Value Ref Range Status   05/09/2020 104 94 - 109 mmol/L Final     Carbon Dioxide   Date Value Ref Range Status   05/09/2020 28 20 - 32 mmol/L Final     Anion Gap   Date Value Ref Range Status   05/09/2020 4 3 - 14 mmol/L Final     Glucose   Date Value Ref Range Status   05/09/2020 128 (H) 70 - 99 mg/dL Final     Urea Nitrogen   Date Value Ref Range Status   05/09/2020 19 7 - 30 mg/dL Final     Creatinine   Date Value Ref Range Status   05/09/2020 0.94 0.66 - 1.25 mg/dL Final     GFR Estimate   Date Value Ref Range Status   05/09/2020 89 >60 mL/min/[1.73_m2] Final     Comment:     Non  GFR Calc  Starting 12/18/2018, serum creatinine based estimated GFR (eGFR) will be   calculated using the Chronic Kidney Disease Epidemiology Collaboration   (CKD-EPI) equation.       Calcium   Date Value Ref Range Status   05/09/2020 8.1 (L) 8.5 - 10.1 mg/dL Final       BP Readings from Last 3 Encounters:   05/09/20 135/73   05/06/20 132/71   02/26/20 121/75       Lab Results   Component Value Date    A1C 5.3 10/05/2016    A1C 5.4 07/12/2016    A1C 5.0 10/31/2011    A1C 4.9 11/02/2010                Please complete refill and CLOSE ENCOUNTER.  Closing the encounter signifies the refill is complete.

## 2020-06-08 NOTE — PROGRESS NOTES
"SUBJECTIVE:            Kobe Buchanan is a 60 year old male, here  in today for:    Chief Complaint   Patient presents with     Follow Up     touch base on shoulder.      Blood Draw     requests for Hemoblobin and Ferritin     Medication Reconciliation     Needs attention     1. Hypertension:  (readings at home 110/60s-- but afternoons 145-150/70s)  Take both lotrel and coreg BID  Wonders if he needs to restart the losartan and that would require stopping the amlodipine/lisinopril combination.  -understands not to be on both ACE and ARB   -feels he always has (decades) of BP control that hasn't been great \"no matter what I take, it lowers BP initially but never 24 hours, 8 hours after a dose of any pill my bp is back to 150s/70s upper 140s/70s\"  -recalls someone saying the ARB would be better for heart and vasculature so wants to start now if able or wait until after second shoulder surgery    Now normal renal function, normal cardiac EF, wonders if now he should return to losartan    2. Postop shoulders: last antibiotics in Dec-January brian, wanting to be especially careful,   Questions about probiotic role  Questions about anemia, thinks he's better but not normal    Wt Readings from Last 5 Encounters:   06/09/20 85.3 kg (188 lb)   05/08/20 88.7 kg (195 lb 8.8 oz)   05/06/20 88.7 kg (195 lb 8 oz)   02/26/20 86.2 kg (190 lb)   02/16/20 81.3 kg (179 lb 3.2 oz)       ---------------------------------------------------------------------------------------------------------------------  Patient Active Problem List   Diagnosis     Essential hypertension     Hyperlipidemia     Abdominal pain, unspecified abdominal location     Ascending aortic aneurysm (H)     Medication refill- do not delete      Pain medication agreement signed     S/P shoulder replacement     BPPV (benign paroxysmal positional vertigo)     Shoulder joint pain     Obstructive sleep apnea     Obesity     Rhinitis     Right knee pain     Status " post coronary angiogram     Atrial fibrillation (H) [I48.91]     Trigger finger of right thumb     Dizzy     Spondylosis of cervical region without myelopathy or radiculopathy     Bone infection, shoulder region (H)     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)     Acute blood loss anemia     Septic joint of right shoulder region (H)     MSSA bacteremia     Moderate protein-calorie malnutrition (H)     Left shoulder pain     Septic joint of left shoulder region (H)     S/P shoulder replacement, left     Infection of prosthetic shoulder joint, subsequent encounter     Elevated troponin     Acute systolic heart failure (H)     NSTEMI (non-ST elevated myocardial infarction) (H)     Dysthymic disorder     NEMESIO (acute kidney injury) (H)     Status post total shoulder arthroplasty, left     Status post total shoulder arthroplasty     Past Surgical History:   Procedure Laterality Date     ARTHROPLASTY SHOULDER  4/15/2014    Procedure: Left Total Shoulder Arthroplasty ;  Surgeon: Analilia Aceves MD;  [facility]     ARTHROPLASTY SHOULDER Right 8/26/2014    Procedure: ARTHROPLASTY SHOULDER;  Surgeon: Analilia Aceves MD;  [facility]     ARTHROSCOPY SHOULDER WITH BIOPSY(IES) Left 1/28/2020    Procedure: Left shoulder arthroscopy and biopsy for culture;  Surgeon: Analilia Aceves MD;  [facility]     BYPASS GASTRIC TAVO-EN-Y, LIVER BIOPSY, COMBINED  8/8/2005     C HAND/FINGER SURGERY UNLISTED       C SHOULDER SURG PROC UNLISTED       COLONOSCOPY  6/30/2014    Procedure: COMBINED COLONOSCOPY, SINGLE BIOPSY/POLYPECTOMY BY BIOPSY;  Surgeon: Chester Patton MD;  Location:  GI     CV CORONARY ANGIOGRAM  1/30/2020    Procedure: CV CORONARY ANGIOGRAM;  Surgeon: Néstor Walls MD;  Location:  [facility]     CYSTOSCOPY, BLADDER NECK CUTS, COMBINED N/A 7/18/2016    Procedure: COMBINED CYSTOSCOPY, BLADDER NECK CUTS;  Surgeon: Ritu Leslie MD;  [facility]     ESOPHAGOSCOPY, GASTROSCOPY, DUODENOSCOPY (EGD), COMBINED  6/30/2014    Procedure: COMBINED ESOPHAGOSCOPY, GASTROSCOPY, DUODENOSCOPY (EGD), BIOPSY SINGLE OR MULTIPLE;  Surgeon: Chester Patton MD;  Location: [facility]     EXCISE MASS FINGER  6/14/2011    Procedure:EXCISE MASS FINGER; Middle Flexor Cyst; Surgeon:SHAYY RUSSELL; Location:[facility]     HAND SURGERY      excision of tendon cyst on left hand     HC VASCULAR SURGERY PROCEDURE UNLIST       IR FINE NEEDLE ASPIRATION W ULTRASOUND  11/13/2019     IR PICC PLACEMENT > 5 YRS OF AGE  11/19/2019     LASER HOLMIUM LITHOTRIPSY BLADDER N/A 10/15/2014    Procedure: LASER HOLMIUM LITHOTRIPSY BLADDER;  Surgeon: Sahil Taveras MD;  [facility]     LASER KTP GREEN LIGHT PHOTOSELECTIVE VAPORIZATION PROSTATE  1/23/2014    Procedure: LASER KTP GREEN LIGHT PHOTOSELECTIVE VAPORIZATION PROSTATE;  Greenlight Photovaporization Of Prostate  ;  Surgeon: Sahil Taveras MD;  [facility]     RELEASE TRIGGER FINGER Right 3/31/2017    Procedure: RELEASE TRIGGER FINGER;  Surgeon: Juan Carlos Blunt MD;  [facility]     REMOVE ANTIBIOTIC CEMENT BEADS / SPACER SHOULDER Left 5/8/2020    Procedure: Left shoulder removal of spacer;  Surgeon: Analilia Aceves MD;  [facility]     REMOVE HARDWARE ARTHROPLASTY SHOULDER. I&D, PLACE ANTIBIOTIC CEMENT BE Left 11/15/2019    Procedure: Explantation of left total shoulder arthroplasty, irrigation and debridement, and placement of antibiotic spacer;  Surgeon: Analilia Aceves MD;  [facility]     REPAIR ANEURYSM ASCENDING AORTA N/A 10/4/2016    Procedure: REPAIR ANEURYSM ASCENDING AORTA;  Surgeon: Mckenzie Townsend MD;  [facility]     TURP  2014       Social History     Tobacco Use     Smoking status: Former Smoker     Packs/day: 0.50     Years: 6.00     Pack years: 3.00     Types: Cigarettes     Start date: 2/1/1977     Last attempt to quit: 9/1/1983     Years since quitting:  36.7     Smokeless tobacco: Never Used   Substance Use Topics     Alcohol use: No     Alcohol/week: 0.0 standard drinks     Family History   Problem Relation Age of Onset     Arthritis Other      Gastrointestinal Disease Other      Cardiovascular Father         aortic aneurysm     Arrhythmia Father      Nephrolithiasis Father      Sleep Apnea Father      Anxiety Disorder Father      Depression Father      Hypertension Father      Obesity Father      Hyperlipidemia Father      Coronary Artery Disease Father         history of MI and stent     Low Back Problems Father      Spine Problems Father      Anxiety Disorder Mother      Hypertension Mother      Osteoporosis Mother      Obesity Mother      Hyperlipidemia Mother      Low Back Problems Mother      Anxiety Disorder Sister      Hypertension Sister      Osteoporosis Sister      Obesity Sister      Hyperlipidemia Sister      Low Back Problems Sister      Spine Problems Sister      Anxiety Disorder Sister      Depression Sister      Hypertension Sister      Osteoporosis Sister      Obesity Sister      Hyperlipidemia Sister      Low Back Problems Sister          ROS:  Denies dyspnea, denies chest pain, denies palpitations, no GI upset    Problem list, Medication list, Allergies, and Medical/Social/Surgical histories reviewed in EPIC and updated as appropriate.    OBJECTIVE:                          BP (!) 149/77   Pulse 70   Temp 98.9  F (37.2  C) (Oral)   Resp 20   Wt 85.3 kg (188 lb)   SpO2 98%   BMI 26.98 kg/m     GENERAL: healthy, alert, well nourished, well hydrated, no distress, pale  RESP: lungs clear to auscultation - no rales, no rhonchi, no wheezes  COR: RRR no murmur or extra sounds, no edema     Diagnostic testing:(labs, x-rays, EKG) -   Results from the last 24 hours   Results for orders placed or performed in visit on 06/09/20 (from the past 24 hour(s))   Hemoglobin (HGB) (P )   Result Value Ref Range    Hemoglobin 9.8 (L) 13.3 - 17.7 g/dL     Narrative    Result verified by repeat analysis. cxx   Ferritin ([facility])   Result Value Ref Range    Ferritin 11 (L) 27 - 300 ng/mL    Narrative    Test performed by:  [facility]  [address] [place] [address]         ASSESSMENT/PLAN:            (I10) Essential hypertension  (primary encounter diagnosis)  Comment: fair bp control but still slightly elevated  Plan: Will touch base with cardiology and determine if another BP change is advised, requesting some prompt change if needed to not hinder right shoulder surgery plans    (D50.9) Iron deficiency anemia, unspecified iron deficiency anemia type  Comment: ongoing  Plan: Hemoglobin (HGB) (P FM), Ferritin         ([facility])        Restart iron supplementation      Risks, benefits and alternatives of treatments discussed. Plan agreed on.      Followup:1 month hgb recheck    Will call, return to clinic, or go to ED if worsening or symptoms not improving as discussed.    See patient instructions.     Health Maintenance Due   Topic Date Due     ADVANCE CARE PLANNING  1960     DEPRESSION ACTION PLAN  1960     HIV SCREENING  01/28/1976     MEDICARE ANNUAL WELLNESS VISIT  01/28/1979     ZOSTER IMMUNIZATION (1 of 2) 01/28/2011     URINE DRUG SCREEN  11/28/2015     Health maintenance reviewed/updated? Yes    Demi Hardin MD  [facility]

## 2020-06-09 ENCOUNTER — OFFICE VISIT (OUTPATIENT)
Dept: FAMILY MEDICINE | Facility: CLINIC | Age: 60
End: 2020-06-09
Payer: COMMERCIAL

## 2020-06-09 VITALS
SYSTOLIC BLOOD PRESSURE: 149 MMHG | HEART RATE: 70 BPM | OXYGEN SATURATION: 98 % | BODY MASS INDEX: 26.98 KG/M2 | WEIGHT: 188 LBS | RESPIRATION RATE: 20 BRPM | TEMPERATURE: 98.9 F | DIASTOLIC BLOOD PRESSURE: 77 MMHG

## 2020-06-09 DIAGNOSIS — D50.0 IRON DEFICIENCY ANEMIA DUE TO CHRONIC BLOOD LOSS: ICD-10-CM

## 2020-06-09 DIAGNOSIS — I10 ESSENTIAL HYPERTENSION: Primary | ICD-10-CM

## 2020-06-09 DIAGNOSIS — D50.9 IRON DEFICIENCY ANEMIA, UNSPECIFIED IRON DEFICIENCY ANEMIA TYPE: ICD-10-CM

## 2020-06-09 LAB
FERRITIN SERPL-MCNC: 11 NG/ML (ref 27–300)
HEMOGLOBIN: 9.8 G/DL (ref 13.3–17.7)

## 2020-06-09 RX ORDER — IRON HEME POLYPEPTIDE/FOLIC AC 12-1MG
1 TABLET ORAL 2 TIMES DAILY
Qty: 90 TABLET | Refills: 0 | Status: SHIPPED | OUTPATIENT
Start: 2020-06-09

## 2020-06-09 NOTE — NURSING NOTE
Chief Complaint   Patient presents with     Follow Up     touch base on shoulder.      Blood Draw     requests for Hemoblobin and Ferritin     Medication Reconciliation     Needs attention       BP (!) 149/77   Pulse 70   Temp 98.9  F (37.2  C) (Oral)   Resp 20   Wt 85.3 kg (188 lb)   SpO2 98%   BMI 26.98 kg/m        ~ Oc Sommers) ANASTASIIA Graham  [facility]  Phone: [phone]

## 2020-06-10 NOTE — PATIENT INSTRUCTIONS
Home care  Follow these guidelines when caring for yourself at home with anemia:    Eat foods high in iron. This will boost the amount of iron stored in your body. It is a natural way to build up the number of blood cells. Good sources of iron include beef, liver, spinach and other dark green leafy vegetables, whole grains, beans, and nuts.    Don't overexert yourself.    Talk with your healthcare provider before traveling by air or traveling to high altitudes.  Follow-up care    Please schedule a one month recheck

## 2020-06-17 ENCOUNTER — OFFICE VISIT (OUTPATIENT)
Dept: ORTHOPEDICS | Facility: CLINIC | Age: 60
End: 2020-06-17
Payer: COMMERCIAL

## 2020-06-17 DIAGNOSIS — Z96.619 INFECTION OF PROSTHETIC SHOULDER JOINT, SUBSEQUENT ENCOUNTER: Primary | ICD-10-CM

## 2020-06-17 DIAGNOSIS — T84.59XD INFECTION OF PROSTHETIC SHOULDER JOINT, SUBSEQUENT ENCOUNTER: Primary | ICD-10-CM

## 2020-06-17 NOTE — PROGRESS NOTES
CHIEF CONCERN:   1. Status post: Left shoulder removal of antibiotic spacer and placement of custom VRS reverse TSA (5/8/2020); Right shoulder antibiotic spacer still in place.    HISTORY OF PRESENT ILLNESS: Mr. Buchanan is a 60 year-old male who is 6 weeks status post the above procedure. He has been getting out of his sling. He would like to do some pool therapy and has explored his options for that. His right shoulder is doing OK but he is bothered by the sense of crepitus or rubbing from the spacer.    EXAM:  Pleasant adult male in no distress.  Respirations even and unlabored.  Left upper extremity: Incision clean, dry, and intact. No erythema. No drainage. Suture ends trimmed a skin edge. Sens intact Ax/Musc/Med/Rad/Uln nerves. Motor intact EPL, FPL, and Intrinsics. Shoulder range of motion  - demonstrates active FE to 80.    ASSESSMENT:  1. Six weeks status post above procedure.    PLAN:    Range of Motion: Progress to active ROM of the shoulder and light strengthening per operative note.     Sling: Discontinue sling    Pain medication:  NSAIDs and Tylenol PRN        Follow up: in 6 weeks with new shoulder xrays for left side. New CT for right shoulder to determine if custom implant or augment is necessary for R side.

## 2020-06-17 NOTE — LETTER
6/17/2020       RE: Kobe Buchanan  [address]  [place] [address]      Dear Colleague,    Thank you for referring your patient, Kobe Buchanan, to the [facility]. Please see a copy of my visit note below.    CHIEF CONCERN:   1. Status post: Left shoulder removal of antibiotic spacer and placement of custom VRS reverse TSA (5/8/2020); Right shoulder antibiotic spacer still in place.    HISTORY OF PRESENT ILLNESS: Mr. Buchanan is a 60 year-old male who is 6 weeks status post the above procedure. He has been getting out of his sling. He would like to do some pool therapy and has explored his options for that. His right shoulder is doing OK but he is bothered by the sense of crepitus or rubbing from the spacer.    EXAM:  Pleasant adult male in no distress.  Respirations even and unlabored.  Left upper extremity: Incision clean, dry, and intact. No erythema. No drainage. Suture ends trimmed a skin edge. Sens intact Ax/Musc/Med/Rad/Uln nerves. Motor intact EPL, FPL, and Intrinsics. Shoulder range of motion  - demonstrates active FE to 80.    ASSESSMENT:  1. Six weeks status post above procedure.    PLAN:    Range of Motion: Progress to active ROM of the shoulder and light strengthening per operative note.     Sling: Discontinue sling    Pain medication:  NSAIDs and Tylenol PRN        Follow up: in 6 weeks with new shoulder xrays for left side. New CT for right shoulder to determine if custom implant or augment is necessary for R side.    Again, thank you for allowing me to participate in the care of your patient.      Sincerely,      Analilia Aceves MD

## 2020-06-17 NOTE — NURSING NOTE
Reason For Visit:   Chief Complaint   Patient presents with     Surgical Followup     6 week pop DOS 5/8/20 Left shoulder removal of spacer and placement of reverse total shoulder arthroplasty (Left)        PCP: Michael Styles  Ref: Self     ?  No  Occupation Retired.  Currently working? No.  Work status?  Retired.  Date of injury: ongoing     Date of surgery: R TSA 8/26/2014  L TSA 04/15/2014     Date of surgery: 9/23/19  Type of surgery: I and D Right shoulder at [facility]     Date of surgery: 11/15/19  Type of surgery:   1. Explant of left total shoulder arthroplasty  2. Irrigation and excisional debridement left shoulder  3. Placement of antibiotic spacer     Date of surgery: 1/28/20  Type of surgery:   1. Left shoulder arthroscopy  2. Left shoulder arthroscopic biopsy/culture       Date of surgery:5/8/20  Type of surgery:  1. Removal of left shoulder antibiotic spacer  2. Left shoulder excisional I&D  3. Placement of custom left reverse total shoulder arthroplasty (custom baseplate, Vault Reconstruction System)      Smoker: No  Request smoking cessation information: No     Right hand dominant    SANE score  Affected shoulder: Bilateral  Right shoulder SANE: 15  Left shoulder SANE: 33    There were no vitals taken for this visit.      Pain Assessment  Patient Currently in Pain: Snow Obregon, ATC

## 2020-06-18 ENCOUNTER — ANCILLARY PROCEDURE (OUTPATIENT)
Dept: CT IMAGING | Facility: CLINIC | Age: 60
End: 2020-06-18
Attending: ORTHOPAEDIC SURGERY
Payer: COMMERCIAL

## 2020-06-18 ENCOUNTER — TRANSFERRED RECORDS (OUTPATIENT)
Dept: HEALTH INFORMATION MANAGEMENT | Facility: CLINIC | Age: 60
End: 2020-06-18

## 2020-06-18 DIAGNOSIS — Z96.619 INFECTION OF PROSTHETIC SHOULDER JOINT, SUBSEQUENT ENCOUNTER: ICD-10-CM

## 2020-06-18 DIAGNOSIS — T84.59XD INFECTION OF PROSTHETIC SHOULDER JOINT, SUBSEQUENT ENCOUNTER: ICD-10-CM

## 2020-06-30 ENCOUNTER — TELEPHONE (OUTPATIENT)
Dept: ORTHOPEDICS | Facility: CLINIC | Age: 60
End: 2020-06-30

## 2020-06-30 NOTE — TELEPHONE ENCOUNTER
[facility]    Phone Message    May a detailed message be left on voicemail: yes     Reason for Call: Other: Steven, Physical Therapist at [facility], called [facility] for Pool Therapy for Pt. Per Steven Harris wants to make sure of protocols and any restrictions for Pt. Please fax PT RX to:  [phone]. Please call Steven concerning her questions.  [phone]    Action Taken: Message routed to:  [facility]o    Travel Screening: Not Applicable

## 2020-07-07 ENCOUNTER — TELEPHONE (OUTPATIENT)
Dept: ORTHOPEDICS | Facility: CLINIC | Age: 60
End: 2020-07-07

## 2020-07-07 NOTE — TELEPHONE ENCOUNTER
[facility]    Phone Message    May a detailed message be left on voicemail: yes     Reason for Call: Other: Pt needing Krupa B to contact his Physical therapist Steven Doherty to update the pool therapy orders. Phone for Steven is [phone], fax number is [phone] and her email is [email or web address]      Action Taken: Message routed to:  [facility] ortho

## 2020-07-08 ENCOUNTER — TELEPHONE (OUTPATIENT)
Dept: ORTHOPEDICS | Facility: CLINIC | Age: 60
End: 2020-07-08

## 2020-07-08 NOTE — TELEPHONE ENCOUNTER
Patient's PT was called back.  All of her questions were answered.  He is to be gentle in his ROM and no lifting or pushing greater than 10 pounds.  He will be seen in 3 weeks and will get updated orders at that time.  He is only 9 weeks post op at this point.  She was understanding and will call back with any other questions.

## 2020-07-08 NOTE — TELEPHONE ENCOUNTER
[facility] Call Center    Phone Message    May a detailed message be left on voicemail: yes     Reason for Call: Other:   Pt is 12 wks post op and Maria E, PT is trying to get a specific protocol as far as what the patient can do and what he can't do.     Please call Maria E back.     Action Taken: Other:  ortho    Travel Screening: Not Applicable

## 2020-07-16 ENCOUNTER — MYC MEDICAL ADVICE (OUTPATIENT)
Dept: FAMILY MEDICINE | Facility: CLINIC | Age: 60
End: 2020-07-16

## 2020-07-16 DIAGNOSIS — R10.13 ABDOMINAL PAIN, EPIGASTRIC: Primary | ICD-10-CM

## 2020-07-18 RX ORDER — SUCRALFATE 1 G/1
1 TABLET ORAL 2 TIMES DAILY PRN
Qty: 180 TABLET | Refills: 1 | Status: SHIPPED | OUTPATIENT
Start: 2020-07-18 | End: 2020-08-21

## 2020-07-28 DIAGNOSIS — Z96.612 STATUS POST TOTAL SHOULDER ARTHROPLASTY, LEFT: Primary | ICD-10-CM

## 2020-07-29 ENCOUNTER — ANCILLARY PROCEDURE (OUTPATIENT)
Dept: GENERAL RADIOLOGY | Facility: CLINIC | Age: 60
End: 2020-07-29
Attending: ORTHOPAEDIC SURGERY
Payer: COMMERCIAL

## 2020-07-29 ENCOUNTER — OFFICE VISIT (OUTPATIENT)
Dept: ORTHOPEDICS | Facility: CLINIC | Age: 60
End: 2020-07-29
Payer: COMMERCIAL

## 2020-07-29 DIAGNOSIS — M86.9: Primary | ICD-10-CM

## 2020-07-29 DIAGNOSIS — Z96.612 STATUS POST TOTAL SHOULDER ARTHROPLASTY, LEFT: ICD-10-CM

## 2020-07-29 NOTE — NURSING NOTE
Reason For Visit:   Chief Complaint   Patient presents with     Surgical Followup     DOS 5/8/20 Left shoulder removal of spacer and placement of reverse total shoulder arthroplasty (Left)        RECHECK     Disscuss right shoulder surgery       PCP: Michael Styles  Ref: Self     ?  No  Occupation Retired.  Currently working? No.  Work status?  Retired.  Date of injury: ongoing     Date of surgery: R TSA 8/26/2014  L TSA 04/15/2014     Date of surgery: 9/23/19  Type of surgery: I and D Right shoulder at [facility]     Date of surgery: 11/15/19  Type of surgery:   1. Explant of left total shoulder arthroplasty  2. Irrigation and excisional debridement left shoulder  3. Placement of antibiotic spacer     Date of surgery: 1/28/20  Type of surgery:   1. Left shoulder arthroscopy  2. Left shoulder arthroscopic biopsy/culture       Date of surgery:5/8/20  Type of surgery:  1. Removal of left shoulder antibiotic spacer  2. Left shoulder excisional I&D  3. Placement of custom left reverse total shoulder arthroplasty (custom baseplate, Vault Reconstruction System)      Smoker: No  Request smoking cessation information: No     Right hand dominant    SANE score  Affected shoulder: Bilateral  Right shoulder SANE: 50  Left shoulder SANE: 80    There were no vitals taken for this visit.      Pain Assessment  Patient Currently in Pain: Denies(pain only in neck and head)    Roberta Obregon, ATC

## 2020-07-29 NOTE — NURSING NOTE
Teaching Flowsheet   Relevant Diagnosis: Right shoulder pain  Teaching Topic: Pre-op for reverse total shoulder arthroplasty - surgery coordinator will call to schedule     Person(s) involved in teaching:   Patient  Mother     Motivation Level:  Asks Questions: Yes  Cooperative: Yes  Receptive (willing/able to accept information): Yes  Any cultural factors/Anglican beliefs that may influence understanding or compliance? No     Patient demonstrates understanding of the following:  Reason for the appointment, diagnosis and treatment plan: Yes  Knowledge of proper use of medications and conditions for which they are ordered (with special attention to potential side effects or drug interactions): Yes  Which situations necessitate calling provider and whom to contact: Yes     Teaching Concerns Addressed:   Patient has had shoulder replacements.  Verbalized understanding of pre and post-op expectations.  PAC for H&P  Parents will assist with cares after surgery  Pain medications (Fentanyl and Oxycodone) are prescribed by [facility] ([phone]).  The clinic has managed post-op pain medications in the past.     Proper use and care of sling x 6 weeks (medical equip, care aids, etc.): Yes  Nutritional needs and diet plan: Yes  Pain management techniques: Yes  Wound Care: Yes  How and/when to access community resources: Yes     Instructional Materials Used/Given: Pre-op packet, surgical soap, written guidelines for shoulder replacement for patient to review

## 2020-07-29 NOTE — PROGRESS NOTES
CHIEF CONCERN:   1. Status post: Left shoulder removal of antibiotic spacer and placement of custom VRS reverse TSA (5/8/2020); Right shoulder antibiotic spacer still in place.    HISTORY OF PRESENT ILLNESS: Mr. Buchanan is a 60 year-old male who is 3 months status post the above procedure. He is pleased with his left shoulder function. Has wanted to be in the pool more. Has been pushing his left shoulder restrictions. Wants to move on his right shoulder arthroplasty.    EXAM:  Pleasant adult male in no distress.  Respirations even and unlabored.  Left upper extremity:  Sens intact Ax/Musc/Med/Rad/Uln nerves. Motor intact EPL, FPL, and Intrinsics. Shoulder range of motion  - demonstrates active FE to 160/ER to 30.    ASSESSMENT:  1. Three months status post above left shoulder procedure.    PLAN:    Range of Motion: active ROM of the left shoulder.    Pain medication:  NSAIDs and Tylenol PRN    Right shoulder: will look for surgical date after PAC appt    Follow up:  Planning for R side Reverse TSA with augment (not VRS) baseplate.

## 2020-07-29 NOTE — LETTER
7/29/2020       RE: Kobe Buchanan  [address]  [place] [address]    Dear Colleague,    Thank you for referring your patient, Kobe Buchanan, to the [facility]. Please see a copy of my visit note below.    CHIEF CONCERN:   1. Status post: Left shoulder removal of antibiotic spacer and placement of custom VRS reverse TSA (5/8/2020); Right shoulder antibiotic spacer still in place.  HISTORY OF PRESENT ILLNESS: Mr. Buchanan is a 60 year-old male who is 3 months status post the above procedure. He is pleased with his left shoulder function. Has wanted to be in the pool more. Has been pushing his left shoulder restrictions. Wants to move on his right shoulder arthroplasty.  EXAM:  Pleasant adult male in no distress.  Respirations even and unlabored.  Left upper extremity:  Sens intact Ax/Musc/Med/Rad/Uln nerves. Motor intact EPL, FPL, and Intrinsics. Shoulder range of motion  - demonstrates active FE to 160/ER to 30.  ASSESSMENT:  1. Three months status post above left shoulder procedure.  PLAN:    Range of Motion: active ROM of the left shoulder.    Pain medication:  NSAIDs and Tylenol PRN    Right shoulder: will look for surgical date after PAC appt    Follow up:  Planning for R side Reverse TSA with augment (not VRS) baseplate.    Sincerely,    Analilia Aceves MD

## 2020-07-31 ENCOUNTER — TELEPHONE (OUTPATIENT)
Dept: ORTHOPEDICS | Facility: CLINIC | Age: 60
End: 2020-07-31

## 2020-07-31 DIAGNOSIS — Z11.59 ENCOUNTER FOR SCREENING FOR OTHER VIRAL DISEASES: Primary | ICD-10-CM

## 2020-07-31 PROBLEM — M86.9: Status: ACTIVE | Noted: 2019-10-30

## 2020-07-31 NOTE — TELEPHONE ENCOUNTER
Patient is scheduled for surgery with Dr. Aceves      Spoke or left message with: Spoke with Kobe via [employer]    Date of Surgery: 8/18/20    Location: [facility]    Informed patient they will need an adult  Yes    Pre-op with surgeon (if applicable): n/a    H&P: Scheduled with PAC    Additional imaging/appointments: Patient will await call from covid team to schedule a covid test 4 days prior to surgery    Surgery packet: Given in clinic     Additional comments: patient will receive arrival time at PAC appointment

## 2020-08-02 ENCOUNTER — MYC REFILL (OUTPATIENT)
Dept: FAMILY MEDICINE | Facility: CLINIC | Age: 60
End: 2020-08-02

## 2020-08-02 DIAGNOSIS — M54.2 NECK PAIN: ICD-10-CM

## 2020-08-02 DIAGNOSIS — F43.9 STRESS: ICD-10-CM

## 2020-08-03 RX ORDER — CLONAZEPAM 0.5 MG/1
0.5 TABLET ORAL 3 TIMES DAILY PRN
Qty: 90 TABLET | Refills: 1 | Status: SHIPPED | OUTPATIENT
Start: 2020-08-03 | End: 2020-09-25

## 2020-08-03 RX ORDER — CYCLOBENZAPRINE HCL 10 MG
10 TABLET ORAL 3 TIMES DAILY PRN
Qty: 90 TABLET | Refills: 3 | Status: SHIPPED | OUTPATIENT
Start: 2020-08-03 | End: 2020-12-30

## 2020-08-03 NOTE — TELEPHONE ENCOUNTER
FUTURE VISIT INFORMATION      SURGERY INFORMATION:    Date: 20    [facility]    Surgeon:  Analilia Aceves MD     Anesthesia Type:  choice    Procedure: Removal of right shoulder antibiotic spacer and conversion to reverse total shoulder arthroplasty and conversion to reverse total shoulder arthroplasty     Consult: ov     RECORDS REQUESTED FROM:       Primary Care Provider:    Demi Hardin MD   [facility]    Pertinent Medical History: SAVITA, hypertension, ascending aortic aneurysm, atrial fibrillation    Most recent EKG+ Tracin/15/20    Most recent ECHO: 20    Most recent Coronary Angiogram: 20    Most recent Sleep Study:  14

## 2020-08-04 PROBLEM — Z96.612 S/P REVERSE TOTAL SHOULDER ARTHROPLASTY, LEFT: Status: ACTIVE | Noted: 2020-07-07

## 2020-08-05 ENCOUNTER — OFFICE VISIT (OUTPATIENT)
Dept: SURGERY | Facility: CLINIC | Age: 60
End: 2020-08-05
Payer: COMMERCIAL

## 2020-08-05 ENCOUNTER — PRE VISIT (OUTPATIENT)
Dept: SURGERY | Facility: CLINIC | Age: 60
End: 2020-08-05

## 2020-08-05 ENCOUNTER — ANESTHESIA EVENT (OUTPATIENT)
Dept: SURGERY | Facility: CLINIC | Age: 60
DRG: 483 | End: 2020-08-05
Payer: COMMERCIAL

## 2020-08-05 VITALS
HEIGHT: 71 IN | HEART RATE: 59 BPM | TEMPERATURE: 98.1 F | OXYGEN SATURATION: 100 % | DIASTOLIC BLOOD PRESSURE: 62 MMHG | WEIGHT: 198 LBS | BODY MASS INDEX: 27.72 KG/M2 | SYSTOLIC BLOOD PRESSURE: 119 MMHG | RESPIRATION RATE: 12 BRPM

## 2020-08-05 DIAGNOSIS — M86.9: ICD-10-CM

## 2020-08-05 DIAGNOSIS — Z01.818 PREOP EXAMINATION: ICD-10-CM

## 2020-08-05 DIAGNOSIS — M86.9: Primary | ICD-10-CM

## 2020-08-05 DIAGNOSIS — D62 ACUTE BLOOD LOSS ANEMIA: Primary | ICD-10-CM

## 2020-08-05 LAB
ANION GAP SERPL CALCULATED.3IONS-SCNC: 4 MMOL/L (ref 3–14)
BUN SERPL-MCNC: 20 MG/DL (ref 7–30)
CALCIUM SERPL-MCNC: 8.8 MG/DL (ref 8.5–10.1)
CHLORIDE SERPL-SCNC: 107 MMOL/L (ref 94–109)
CO2 SERPL-SCNC: 26 MMOL/L (ref 20–32)
CREAT SERPL-MCNC: 1.24 MG/DL (ref 0.66–1.25)
ERYTHROCYTE [DISTWIDTH] IN BLOOD BY AUTOMATED COUNT: 17 % (ref 10–15)
FERRITIN SERPL-MCNC: 5 NG/ML (ref 26–388)
GFR SERPL CREATININE-BSD FRML MDRD: 63 ML/MIN/{1.73_M2}
GLUCOSE SERPL-MCNC: 89 MG/DL (ref 70–99)
HCT VFR BLD AUTO: 31.2 % (ref 40–53)
HGB BLD-MCNC: 9.3 G/DL (ref 13.3–17.7)
MCH RBC QN AUTO: 21.8 PG (ref 26.5–33)
MCHC RBC AUTO-ENTMCNC: 29.8 G/DL (ref 31.5–36.5)
MCV RBC AUTO: 73 FL (ref 78–100)
PLATELET # BLD AUTO: 295 10E9/L (ref 150–450)
POTASSIUM SERPL-SCNC: 4.8 MMOL/L (ref 3.4–5.3)
RBC # BLD AUTO: 4.27 10E12/L (ref 4.4–5.9)
SODIUM SERPL-SCNC: 137 MMOL/L (ref 133–144)
WBC # BLD AUTO: 6.4 10E9/L (ref 4–11)

## 2020-08-05 RX ORDER — AMLODIPINE AND BENAZEPRIL HYDROCHLORIDE 5; 20 MG/1; MG/1
1 CAPSULE ORAL 2 TIMES DAILY
COMMUNITY
End: 2020-10-02

## 2020-08-05 RX ORDER — HEPARIN SODIUM (PORCINE) LOCK FLUSH IV SOLN 100 UNIT/ML 100 UNIT/ML
5 SOLUTION INTRAVENOUS
Status: CANCELLED | OUTPATIENT
Start: 2020-08-07

## 2020-08-05 RX ORDER — HEPARIN SODIUM,PORCINE 10 UNIT/ML
5 VIAL (ML) INTRAVENOUS
Status: CANCELLED | OUTPATIENT
Start: 2020-08-07

## 2020-08-05 ASSESSMENT — MIFFLIN-ST. JEOR: SCORE: 1727.31

## 2020-08-05 ASSESSMENT — PAIN SCALES - GENERAL: PAINLEVEL: NO PAIN (0)

## 2020-08-05 ASSESSMENT — ENCOUNTER SYMPTOMS
SEIZURES: 0
DYSRHYTHMIAS: 1

## 2020-08-05 ASSESSMENT — COPD QUESTIONNAIRES: COPD: 0

## 2020-08-05 ASSESSMENT — LIFESTYLE VARIABLES: TOBACCO_USE: 1

## 2020-08-05 NOTE — PHARMACY - PREOPERATIVE ASSESSMENT CENTER
PREOPERATIVE PAIN CONSULT FOR POSTOPERATIVE PAIN MANAGEMENT  Kobe CHAO MelindaLito was interviewed via phone on August 5, 2020 prior to PAC Clinic appointment. Patient is preparing for the planned procedure with Dr. Aceves on 8/18/20 at the [facility] for Removal of right shoulder antibiotic spacer and conversion to reverse total shoulder arthroplasty.  These recommendations are intended for patients admitted to the hospital after a procedure and are only valid for 30 days from the date of service. If there are significant changes in opioid dosing between today and day of procedure the below recommendations may have to be adjusted.      RECOMMENDATIONS:   The following pain management recommendations are made based on information from today's visit and should not replace medical decision-making based on patient condition at the time of procedure or postoperatively.      - PREOPERATIVE:  + Long acting opioid - Fentanyl 75mcg/hr patch, 1 patch TD every 48 hours. He is due for a Fentanyl patch change 8/17/20  +  Before surgery recommend gabapentin 300 mg PO x 1 dose in pre-op area (Written in pre-op by PAC MAKAYLA)   + Before procedure recommend acetaminophen 975 mg PO in pre-op (Written in pre-op by PAC MAKAYLA)    - INTRAOPERATIVE (Anesthesiologist/CRNA to consider):   + Regional anesthesia - Defer to RAPS team   + Avoid remifentanil - to reduce risk of developing hyperalgesia    - POSTOPERATIVE MANAGEMENT:  Opioid analgesic:  + Note: if neuraxial opioid or other long acting opioid (eg. Methadone) is given during procedure contact on-call pain provider for adjustment in opioid plan  + Note if regional approach includes an opioid via the epidural then defer opioid management to RAPS team.   + If able to take oral medications (preferred):           --  Initiate oxyCODONE 10-15mg by mouth every 3 hour as needed moderate-severe pain  (hold or reduce dose as needed if patient has s/sx sedation or  respiratory depression)          -- Initiate IV HYDROmorphone 0.3-0.5mg IV every 2 hour as needed pain not managed by orals            -- Continue his Fentanyl 75mcg/hr patch, 1 patch TD every 48 hours.     Nonopioid analgesics:   + Defer use of NSAID to surgeon  + Start acetaminophen 975 mg PO every 6 hr scheduled if no concern about masking fevers    Muscle Relaxant:   + Continue cyclobenzaprine 10 mg PO three times daily as needed muscle spasm     Stool softeners/Laxatives:   + When appropriate start senna-docusate 1-2 tabs PO BID and Miralax 17 g daily to prevent opioid induced constipation.     Other:  + Recommend close monitoring of respiratory status postoperatively with capnography and continuous SpO2 monitoring. Would recommend continuing capnography beyond the usual 24 hr due to patient's opioid tolerance.     -------------------------------------------------------------------------------------------------------------------  - OUTPATIENT MEDICATIONS (related to pain management):  -- Long-acting opioid: Fentanyl 75mcg/hr, 1 patch TD every 48 hours  -- Short-acting opioid: oxyCODONE 10-20mg by mouth every 8 hour as needed (patient usually is taking 30mg by mouth twice daily at home)   -- Intrathecal pump: None  -- Oral adjuvant(s): naproxen  BID, flexeril 10 mg PO TID PRN, acetaminophen PRN   -- Topicals: None  -- Bowel regimen: docusate 100mg by mouth every morning and 200mg by mouth every evening and senna 2 tablets by mouth every morning and 3 tablets by mouth every evening.    -- Other relevant medications: clonazepam 0.5mg by mouth three times daily as needed, bupropion SR 200mg by mouth twice daily     Verbal consent was given by patient to access pharmacy records and [place] Prescription Monitoring Profile: Yes  Outpatient opioids prescribed by Kym Carrasco ([facility])  Outpatient opioid oral morphine equivalent (OME): 165mg OME/day    ASSESSMENT:    Kobe Buchanan has a  longstanding history of chronic neck pain that travels up his skull and into his eye. This pain is sharp and constant. He states his pain is really all in his neck and is the reason he has been on pain medications for years.  He has seen a spine surgeon who said he would need occiput to C7 fusion and didn't recommend the surgery.  He has continued to work with his pain clinic to manage his pain medications and manages his pain this way throughout the day. He has had multiple previous surgeries on his shoulders and is pretty specific about what helps with his pain (having oxycodone 10 mg every 3 hr PRN). He reports that last May, his recommend opioid regimen did manage his pain well. He is eager to have a nerve block with this procedure to help with the shoulder pain.  He is also open to a multimodal approach (similar to what he is already doing at home).  He denies any issues with sedation from his opioids and he has some issues with postoperative constipation, but with the regimen he uses at home he has Daily BMs.  He denies alcohol use, substance abuse (did use marijuana >40 yrs ago, none recently), and denies any history of opioid misuse.  He is not a smoker.  He does have a history of SAVITA and does not use a CPAP device. Patient's questions were answered and he was in agreement with plan as outlined above.       Other pain therapies tried in the past (not an all inclusive list):   Dilaudid IV - helps w/ pain but does get a headache when first receiving. Does want available for pain postop   Oxycodone PO - works the best for his pain, did not want to do an opioid rotation right after surgery.    Acetaminophen - uses rarely, open to using for postop pain  Robaxin - made him feel bad, doesn't want to use this  Flexeril - works well, has been on for years.       If pain control remains an issue please consult the inpatient pain management service for further recommendations for pain management.  If immediate  assistance is needed please contact the pain service at the number below.   Roe Guzman RPH  August 5, 2020  8:45 AM    If questions or concerns, please contact the Inpatient Pain Management Service:  Call [phone] after hours, weekends and holidays.   Page [phone] from 8 AM - 3 PM Mon - Fri.

## 2020-08-05 NOTE — ANESTHESIA PREPROCEDURE EVALUATION
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]   Procedure(s):  Removal of right shoulder antibiotic spacer and conversion to reverse total shoulder arthroplasty  and conversion to reverse total shoulder arthroplasty     LABS:  CBC:   Lab Results   Component Value Date    WBC 12.4 (H) 2020    WBC 6.8 2020    HGB 9.8 (L) 2020    HGB 9.7 (L) 2020    HCT 29.0 (L) 2020    HCT 36.4 (L) 2020     2020     2020     BMP:   Lab Results   Component Value Date     2020     2020    POTASSIUM 3.9 2020    POTASSIUM 4.7 2020    CHLORIDE 104 2020    CHLORIDE 108 2020    CO2 28 2020    CO2 28 2020    BUN 19 2020    BUN 28 2020    CR 0.94 2020    CR 0.85 2020     (H) 2020    GLC 90 2020     COAGS:   Lab Results   Component Value Date    PTT 39 (H) 10/04/2016    INR 1.10 02/15/2020     POC:   Lab Results   Component Value Date    BGM 78 2020     OTHER:   Lab Results   Component Value Date    PH 7.39 10/04/2016    LACT 0.8 02/15/2020    A1C 5.3 10/05/2016    NIA 8.1 (L) 2020    PHOS 3.7 2020    MAG 2.4 (H) 2020    ALBUMIN 4.0 2020    PROTTOTAL 7.6 2020    ALT 42 2020    AST 29 2020    ALKPHOS 100 2020    BILITOTAL 0.6 2020    TSH 2.96 01/10/2018    T4 1.45 2016    CRP <2.9 2020    SED 25 (H) 01/15/2020        Preop Vitals    BP Readings from Last 3 Encounters:   20 119/62   20 (!) 149/77   20 135/73    Pulse Readings from Last 3 Encounters:   20 59   20 70   20 73      Resp Readings from Last 3 Encounters:   20 12   20 20   20 14    SpO2 Readings from Last 3 Encounters:  "  08/05/20 100%   06/09/20 98%   05/09/20 99%      Temp Readings from Last 1 Encounters:   08/05/20 98.1  F (36.7  C) (Oral)    Ht Readings from Last 1 Encounters:   08/05/20 1.791 m (5' 10.5\")      Wt Readings from Last 1 Encounters:   08/05/20 89.8 kg (198 lb)    Estimated body mass index is 28.01 kg/m  as calculated from the following:    Height as of this encounter: 1.791 m (5' 10.5\").    Weight as of this encounter: 89.8 kg (198 lb).     LDA:  Closed/Suction Drain 1 Left Shoulder Accordion 10 [place] (Active)   Number of days: 89        Past Medical History:   Diagnosis Date     Anxiety      Ascending aortic aneurysm (H)      Bicuspid aortic valve      BPPV (benign paroxysmal positional vertigo) 7/11/2014     Chronic narcotic use      Chronic neck pain      Chronic osteoarthritis      Degenerative joint disease      Depression      Hyperlipidemia 4/10/2012     Hypertension      Multiple stiff joints      Neck injuries      Obesity 2/9/2015     Pain in shoulder      Skin picking habit      Sleep apnea     does not use cpap      Past Surgical History:   Procedure Laterality Date     ARTHROPLASTY SHOULDER  4/15/2014    Procedure: Left Total Shoulder Arthroplasty ;  Surgeon: Analilia Aceves MD;  [facility]     ARTHROPLASTY SHOULDER Right 8/26/2014    Procedure: ARTHROPLASTY SHOULDER;  Surgeon: Analilia Aceves MD;  [facility]     ARTHROSCOPY SHOULDER WITH BIOPSY(IES) Left 1/28/2020    Procedure: Left shoulder arthroscopy and biopsy for culture;  Surgeon: Analilia Aceves MD;  [facility]     BYPASS GASTRIC TAVO-EN-Y, LIVER BIOPSY, COMBINED  8/8/2005     C HAND/FINGER SURGERY UNLISTED       C SHOULDER SURG PROC UNLISTED       COLONOSCOPY  6/30/2014    Procedure: COMBINED COLONOSCOPY, SINGLE BIOPSY/POLYPECTOMY BY BIOPSY;  Surgeon: Chester Patton MD;  [facility]     CV CORONARY ANGIOGRAM  1/30/2020    Procedure: CV CORONARY ANGIOGRAM;  Surgeon: Néstor Walls " MD Erica;  [facility]     CYSTOSCOPY, BLADDER NECK CUTS, COMBINED N/A 7/18/2016    Procedure: COMBINED CYSTOSCOPY, BLADDER NECK CUTS;  Surgeon: Ritu Leslie MD;  [facility]     ESOPHAGOSCOPY, GASTROSCOPY, DUODENOSCOPY (EGD), COMBINED  6/30/2014    Procedure: COMBINED ESOPHAGOSCOPY, GASTROSCOPY, DUODENOSCOPY (EGD), BIOPSY SINGLE OR MULTIPLE;  Surgeon: Chester Patton MD;  [facility]     EXCISE MASS FINGER  6/14/2011    Procedure:EXCISE MASS FINGER; Middle Flexor Cyst; Surgeon:SHAYY RUSSELL; [facility]     HAND SURGERY      excision of tendon cyst on left hand     HC VASCULAR SURGERY PROCEDURE UNLIST       IR FINE NEEDLE ASPIRATION W ULTRASOUND  11/13/2019     IR PICC PLACEMENT > 5 YRS OF AGE  11/19/2019     LASER HOLMIUM LITHOTRIPSY BLADDER N/A 10/15/2014    Procedure: LASER HOLMIUM LITHOTRIPSY BLADDER;  Surgeon: Sahil Taveras MD;  [facility]     LASER KTP GREEN LIGHT PHOTOSELECTIVE VAPORIZATION PROSTATE  1/23/2014    Procedure: LASER KTP GREEN LIGHT PHOTOSELECTIVE VAPORIZATION PROSTATE;  Greenlight Photovaporization Of Prostate  ;  Surgeon: Sahil Taveras MD;  [facility]     RELEASE TRIGGER FINGER Right 3/31/2017    Procedure: RELEASE TRIGGER FINGER;  Surgeon: Juan Calros Blunt MD;  [facility]     REMOVE ANTIBIOTIC CEMENT BEADS / SPACER SHOULDER Left 5/8/2020    Procedure: Left shoulder removal of spacer;  Surgeon: Analilia Aceves MD;  [facility]     REMOVE HARDWARE ARTHROPLASTY SHOULDER. I&D, PLACE ANTIBIOTIC CEMENT BE Left 11/15/2019    Procedure: Explantation of left total shoulder arthroplasty, irrigation and debridement, and placement of antibiotic spacer;  Surgeon: Analilia Aceves MD;  [facility]     REPAIR ANEURYSM ASCENDING AORTA N/A 10/4/2016    Procedure: REPAIR ANEURYSM ASCENDING AORTA;  Surgeon: Mckenzie Townsend MD;  [facility]     TURP  2014      Allergies    Allergen Reactions     Ciprofloxacin      History of aortic aneurysms     Tape [Adhesive Tape] Blisters     Blistering - please use paper tape        Anesthesia Evaluation     . Pt has had prior anesthetic. Type: General (ventricular ectopy and severe BP elevation 200s/130-150s during last procedure)    No history of anesthetic complications          ROS/MED HX    ENT/Pulmonary: Comment: Does not use CPAP regularly. Is borderline SAVITA    (+)sleep apnea, tobacco use, Past use doesn't use CPAP , . .   (-) asthma, COPD and recent URI   Neurologic:  - neg neurologic ROS    (-) seizures   Cardiovascular:     (+) hypertension-range: 140-120/70s, ---. : . CHF etiology: nonischemic Last EF: 63% date: 2/24/20 . . :. dysrhythmias . Previous cardiac testing Echodate:1/18/20results:Global and regional left ventricular function is normal with an EF of 60-65%.  Aortic valve is bicuspid with fusion of the right and left coronary cusps,  Lucy Type 1.  There is aortic sclerosis not meeting criteria for stenosis, Vmax 2.3 m/s,  mean gradient 11mmHg, VINCE (VTI) 2cmÂ . There is trace to mild central aortic  insufficiency.date: results:ECG reviewed date:2/15/20 results:SRCath date: 1/30/20 results:         (-) CAD and taking anticoagulants/antiplatelets   METS/Exercise Tolerance:  4 - Raking leaves, gardening   Hematologic:     (+) Anemia, History of Transfusion no previous transfusion reaction -     (-) history of blood clots   Musculoskeletal: Comment: Bilateral shoulder replacement. Chronic neck pain.  (+) arthritis,  other musculoskeletal- Chronic Left shoulder OA; s/p cervical fusion      GI/Hepatic:  - neg GI/hepatic ROS      (-) GERD   Renal/Genitourinary:     (+) chronic renal disease, type: ARF, Pt does not require dialysis, Pt has no history of transplant, BPH,       Endo:  - neg endo ROS       Psychiatric:     (+) psychiatric history anxiety and depression      Infectious Disease: Comment: Patient with history of infected  bilateral total shoulders        Malignancy:      - no malignancy   Other:    (+) No chance of pregnancy C-spine cleared: N/A, H/O Chronic Pain,H/O chronic opiod use , no other significant disability                        PHYSICAL EXAM:   Mental Status/Neuro: A/A/O; Age Appropriate   Airway: Facies: Feasible  Mallampati: I  Mouth/Opening: Full  TM distance: > 6 cm  Neck ROM: Limited   Respiratory: Auscultation: CTAB     Resp. Rate: Normal     Resp. Effort: Normal      CV: Rhythm: Regular  Heart: Normal Sounds  Edema: None   Comments:      Dental: Dentures  Dentures: Upper                Assessment:   ASA SCORE: 3    H&P: History and physical reviewed and following examination; no interval change.         Plan:   Anes. Type:  General; Peripheral Nerve Block; For Post-op pain in coordination with surgeon     Block Details: Single Shot; Exparel; Interscalene   Pre-Medication: None   Induction:  IV (Standard)   Airway: LMA   Access/Monitoring: PIV   Maintenance: Balanced     Postop Plan:   Postop Pain: Opioids  Postop Sedation/Airway: Not planned  Disposition: Inpatient/Admit     PONV Management:   Adult Risk Factors:, Postop Opioids   Prevention: Ondansetron     CONSENT: Direct conversation   Plan and risks discussed with: Patient   Blood Products: Consented (ALL Blood Products)       Comments for Plan/Consent:  59 yo for  Removal of right shoulder antibiotic spacer (Right Shoulder) and conversion to reverse total shoulder arthroplasty under GETA/RA. Anesthesia risks and benefits discussed. Questions answered. Patient understands and agrees to proceed with anesthesia plan.                 PAC Discussion and Assessment    ASA Classification: 3  Case is suitable for: [facility]  Anesthetic techniques and relevant risks discussed: GA and GA with regional block for post-op pain control  Invasive monitoring and risk discussed: No  Types:   Possibility and Risk of blood transfusion discussed: Yes  NPO instructions given:    Additional anesthetic preparation and risks discussed:   Needs early admission to pre-op area:   Other:     PAC Resident/NP Anesthesia Assessment:  Kobe Buchanan is a 60 year old male scheduled to undergo Removal of right shoulder antibiotic spacer and conversion to reverse total shoulder arthroplasty with Dr. Aceves on 8/18/20. He has the following specific operative considerations:   - RCRI : 0.9% risk of major adverse cardiac event.   - VTE risk: 0.5%  - Risk of PONV score = 2.  If > 2, anti-emetic intervention recommended.    --History of bilateral shoulder replacement with subsequent bilateral prosthetic joint infection with subsequent removal and placement of spacer bilaterally. S/p left removal of spacer and conversion to reverse total shoulder on 5/8/20 with no complications. Above procedure on right side now planned.   --Chronic pain in joints and cervical spine. Limited neck extension. Chronic opioids. Duragesic patch 75 mcg every 48 hours. Oxycodone prn up to six tabs daily. Will take Flexeril on DOS. Was evaluated by Pharm D today with pain plan established.   --HLD. atorvastatin. Complex cardiac history as above with bicuspid aortic valve s/p valve sparing repair with Gelweave graft 2016, NICM (EF 30-35%) but now normalized per cardiac MRI. Surveillance biopsy on 1/28/20 was complicated by ventricular ectopy and severe BP elevation 200s/130-150s. Has been followed by Cardiology and approved to proceed to surgery. All testing above. Able to walk distance and swim. Will hold amlodipine-benazepril on DOS but will take Coreg. ASA 81 mg daily with planned 7 day hold for surgery.   --Former smoker. Denies pulmonary symptoms. SAVITA, borderline but does not use CPAP.   --GERD. Protonix daily prn. History gastric bypass surgery.   --Anemia after surgery. Hgb today: 9.3, Ferritin 5, down from 11. Taking oral iron supplement. Patient's PCP was contacted to request iron infusions prior to surgery.   --NEMESIO  Cr 1.24.  --Pain management. Discussed possibility of nerve block if appropriate for patient's procedure. Final counseling and decisions by regional team on DOS.  --Anxiety/depression. Will take bupropion and clonazepam DOS. Regular use of clonazepam.  --History of blood transfusion. Type and screen drawn today.   --Difficult IV stick.         Patient was discussed with Dr Callahan who also spoke with patient today.     ADDENDUM: After discussing iron infusions with PCP, arranged for patient to have 2 iron infusions at the [facility] infusion center. He agreed to 7am on 8/6/20, and 9am on 8/13/20. Orders provided.         Reviewed and Signed by PAC Mid-Level Provider/Resident  Mid-Level Provider/Resident: ALAYNA Benito CNS  Date: 8/5/20  Time: 7:43am    Attending Anesthesiologist Anesthesia Assessment:  I have examined the patient and reviewed the medical record.  I have discussed the patient with the MAKAYLA and concur with her assessment  The patient is scheduled for repeat total shoulder arthroplasty following removal of previous total shoulder arthroplasty for infection.  Patient has had multiple shoulder surgeries including a most recent total shoulder arthroplasty May 2020.  The anesthetic May 2020 was uneventful.  Patient had a interscalene nerve block and required low-dose phenylephrine infusion during the case to maintain stable blood pressure.  Notably January 2020 the patient had an aborted biopsy of his shoulder after having profound hypotension followed by hypotension.  He was admitted and found to have elevated troponins and decreased ejection fraction at 35%.  Cardiology evaluated him with an angiogram demonstrating no coronary artery obstruction and a subsequent cardiac MRA which demonstrated return to normal cardiac function.  They feel he had a stress cardiomyopathy which has resolved and have cleared him for further surgeries without further intervention.  As noted above the surgery May 2020 was  uneventful.  The patient has developed no new symptoms and remains active since the previous surgery.  The only other active issue is anemia.  The patient is status post gastric bypass and tends to have iron deficiency anemia.  This is being followed by his primary care physician a ferritin was markedly decreased in June and he was placed on chelated iron supplement at that time.  We will repeat his hemoglobin today with a repeat ferritin.  If his hemoglobin remains less than 12 we would recommend to his primary care physician and iron infusion.  . Patient is suitable candidate for general with regional block on [facility].      Reviewed and Signed by PAC Anesthesiologist  Anesthesiologist: Brady Callahan MD  Date: 8/5/2020  Time:   Pass/Fail:   Disposition:     PAC Pharmacist Assessment:        Pharmacist:   Date:   Time:    ALAYNA Phillips

## 2020-08-05 NOTE — H&P
Pre-Operative H & P     CC:  Preoperative exam to assess for increased cardiopulmonary risk while undergoing surgery and anesthesia.    Date of Encounter: 8/5/2020  Primary Care Physician:  Demi Hardin  Reason for visit: 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) [M86.9]    HPI  Kobe Buchanan is a 60 year old male who presents for pre-operative H & P in preparation for Removal of right shoulder antibiotic spacer and conversion to reverse total shoulder arthroplasty with Dr. Aceves on 8/18/20 at [facility]. History is obtained from the patient and medical records.    Patient with history of bilateral shoulder replacement with subsequent bilateral prosthetic joint infection with subsequent removal and placement of spacer bilaterally. He has been followed by ID in treatment of his infections, last seen 4/8/20, and recommended to proceed to surgery. He is s/p removal of antibiotic cement beads of left shoulder and conversion to reverse total shoulder on 5/8/20. Patient has recovered well and has good ROM of left shoulder. He has returned in follow up to Dr. Aceves and has been counseled for the same procedure now on the right side.     In the interim he has been following with his PCP. He has had some anemia after surgery, last Hgb 9.8, and ferritin low. He has been taking a chelated iron supplement as he has done in the past. History of past blood transfusions.    His history is otherwise complex with chronic cervical spine pain, SAVITA, anxiety, depression, HTN, HLD, prior smoking history, gastric bypass, right left fusion of his aortic valve with bicuspid aortopathy, ascending aortic aneurysm. No known coarctation. He is s/p valve sparing repair with Gelweave graft 2016, NICM (EF 30-35% but now normalized per cardiac MRI). A surveillance biopsy on 1/28/20 was complicated by ventricular ectopy and severe BP elevation 200s/130-150s. This was followed by postop  evaluation and admission to Cardiology. He has since been followed by Cardiology, last seen on 2/26/20 where he was approved to proceed with previous surgery.      Today patient denies fever, chest pain, irregular HR, cough, DYSPNEA ON EXERTION or ankle edema.     Past Medical History  Past Medical History:   Diagnosis Date     Anxiety      Ascending aortic aneurysm (H)      Bicuspid aortic valve      BPPV (benign paroxysmal positional vertigo) 7/11/2014     Chronic narcotic use      Chronic neck pain      Chronic osteoarthritis      Degenerative joint disease      Depression      Hyperlipidemia 4/10/2012     Hypertension      Multiple stiff joints      Neck injuries      Obesity 2/9/2015     Pain in shoulder      Skin picking habit      Sleep apnea     does not use cpap       Past Surgical History  Past Surgical History:   Procedure Laterality Date     ARTHROPLASTY SHOULDER  4/15/2014    Procedure: Left Total Shoulder Arthroplasty ;  Surgeon: Analilia Aceves MD;  [facility]     ARTHROPLASTY SHOULDER Right 8/26/2014    Procedure: ARTHROPLASTY SHOULDER;  Surgeon: Analilia Aceves MD;  [facility]     ARTHROSCOPY SHOULDER WITH BIOPSY(IES) Left 1/28/2020    Procedure: Left shoulder arthroscopy and biopsy for culture;  Surgeon: Analilia Aceves MD;  [facility]     BYPASS GASTRIC TAVO-EN-Y, LIVER BIOPSY, COMBINED  8/8/2005     C HAND/FINGER SURGERY UNLISTED       C SHOULDER SURG PROC UNLISTED       COLONOSCOPY  6/30/2014    Procedure: COMBINED COLONOSCOPY, SINGLE BIOPSY/POLYPECTOMY BY BIOPSY;  Surgeon: Chester Patton MD;  [facility]     CV CORONARY ANGIOGRAM  1/30/2020    Procedure: CV CORONARY ANGIOGRAM;  Surgeon: Néstor Walls MD;  [facility]     CYSTOSCOPY, BLADDER NECK CUTS, COMBINED N/A 7/18/2016    Procedure: COMBINED CYSTOSCOPY, BLADDER NECK CUTS;  Surgeon: Ritu Leslie MD;  [facility]     ESOPHAGOSCOPY,  GASTROSCOPY, DUODENOSCOPY (EGD), COMBINED  6/30/2014    Procedure: COMBINED ESOPHAGOSCOPY, GASTROSCOPY, DUODENOSCOPY (EGD), BIOPSY SINGLE OR MULTIPLE;  Surgeon: Chester Patton MD;  [facility]     EXCISE MASS FINGER  6/14/2011    Procedure:EXCISE MASS FINGER; Middle Flexor Cyst; Surgeon:SHAYY RUSSELL; [facility]     HAND SURGERY      excision of tendon cyst on left hand     HC VASCULAR SURGERY PROCEDURE UNLIST       IR FINE NEEDLE ASPIRATION W ULTRASOUND  11/13/2019     IR PICC PLACEMENT > 5 YRS OF AGE  11/19/2019     LASER HOLMIUM LITHOTRIPSY BLADDER N/A 10/15/2014    Procedure: LASER HOLMIUM LITHOTRIPSY BLADDER;  Surgeon: Sahil Taveras MD;  [facility]     LASER KTP GREEN LIGHT PHOTOSELECTIVE VAPORIZATION PROSTATE  1/23/2014    Procedure: LASER KTP GREEN LIGHT PHOTOSELECTIVE VAPORIZATION PROSTATE;  Greenlight Photovaporization Of Prostate  ;  Surgeon: Sahil Taveras MD;  [facility]     RELEASE TRIGGER FINGER Right 3/31/2017    Procedure: RELEASE TRIGGER FINGER;  Surgeon: Juan Carlos Blunt MD;  [facility]     REMOVE ANTIBIOTIC CEMENT BEADS / SPACER SHOULDER Left 5/8/2020    Procedure: Left shoulder removal of spacer;  Surgeon: Analilia Aceves MD;  [facility]     REMOVE HARDWARE ARTHROPLASTY SHOULDER. I&D, PLACE ANTIBIOTIC CEMENT BE Left 11/15/2019    Procedure: Explantation of left total shoulder arthroplasty, irrigation and debridement, and placement of antibiotic spacer;  Surgeon: Analilia Aceves MD;  [facility]     REPAIR ANEURYSM ASCENDING AORTA N/A 10/4/2016    Procedure: REPAIR ANEURYSM ASCENDING AORTA;  Surgeon: Mckenzie Townsend MD;  [facility]     TURP  2014       Hx of Blood transfusions/reactions: Yes in past, no known reactions.     Hx of abnormal bleeding or anti-platelet use: ASA 81 mg daily.     Menstrual history: No LMP for male patient.    Steroid use in the last year: Denies.     Personal or FH with  difficulty with Anesthesia: ventricular ectopy and severe BP elevation 200s/130-150s during surgery 1/28/20        Prior to Admission Medications  Current Outpatient Medications   Medication Sig Dispense Refill     aspirin 81 MG EC tablet Take 81 mg by mouth daily before breakfast       atorvastatin (LIPITOR) 40 MG tablet Take 1 tablet (40 mg) by mouth daily (Patient taking differently: Take 40 mg by mouth every evening ) 90 tablet 1     buPROPion (WELLBUTRIN SR) 200 MG 12 hr tablet TAKE 1 TABLET BY MOUTH 2 TIMES DAILY 180 tablet 3     carvedilol (COREG) 25 MG tablet Take 1 tablet (25 mg) by mouth 2 times daily (with meals) 180 tablet 3     clonazePAM (KLONOPIN) 0.5 MG tablet Take 1 tablet (0.5 mg) by mouth 3 times daily as needed for anxiety 90 tablet 1     Cyanocobalamin 5000 MCG SUBL Place 5,000 mcg under the tongue daily Vitamin B12       cyclobenzaprine (FLEXERIL) 10 MG tablet Take 1 tablet (10 mg) by mouth 3 times daily as needed for muscle spasms 90 tablet 3     docusate sodium (COLACE) 100 MG capsule Take 1 capsule in the morning and 2 capsules in the evening       Fe Heme Polypeptide-folic acid (PROFERRIN-FORTE) 12-1 MG TABS Take 1 tablet by mouth 2 times daily 90 tablet 0     fentaNYL (DURAGESIC) 75 mcg/hr 72 hr patch Place 1 patch onto the skin every 48 hours        Multiple Minerals-Vitamins (CALCIUM CITRATE-MAG-MINERALS) TABS Take 1 tablet by mouth daily       multivitamin w/minerals (THERA-VIT-M) tablet Take 1 tablet by mouth daily       naproxen (NAPROSYN DR) 500 MG EC tablet Take 500 mg by mouth 2 times daily as needed (pain) 60 tablet 3     oxyCODONE IR (ROXICODONE) 10 MG tablet Take 1-2 tablets (10-20 mg) by mouth every 8 hours as needed for severe pain 30 tablet 0     senna (SENOKOT) 8.6 MG tablet Take 3 tablets by mouth every morning and 4 tablets every evening       vitamin B-Complex Take 1 tablet by mouth daily       acetaminophen (TYLENOL) 325 MG tablet Take 2 tablets (650 mg) by mouth every 4  hours as needed for mild pain 100 tablet 0     amLODIPine-benazepril (LOTREL) 5-20 MG capsule Take 1 capsule by mouth 2 times daily       amoxicillin (AMOXIL) 500 MG capsule Take 4 tablets 1 hour before dental procedure 24 capsule 4     desoximetasone (TOPICORT) 0.25 % external cream Apply topically daily as needed for inflammation or itching       fluocinonide (LIDEX) 0.05 % external ointment Apply twice daily to itchy skin nodules for 1-2 weeks at a time. (Patient taking differently: as needed Apply twice daily to itchy skin nodules for 1-2 weeks at a time.) 30 g 3     naloxone (NARCAN) nasal spray Spray 1 spray (4 mg) into one nostril alternating nostrils as needed for opioid reversal every 2-3 minutes until assistance arrives 0.2 mL 0     pantoprazole (PROTONIX) 40 MG EC tablet Take 40 mg by mouth daily as needed for heartburn       sucralfate (CARAFATE) 1 GM tablet Take 1 tablet (1 g) by mouth 2 times daily as needed (Reflux) 180 tablet 1     tacrolimus (PROTOPIC) 0.1 % ointment Apply topically as needed Apply to affected areas on body. (Patient not taking: Reported on 5/6/2020) 120 g 11       Allergies  Allergies   Allergen Reactions     Ciprofloxacin      History of aortic aneurysms     Tape [Adhesive Tape] Blisters     Blistering - please use paper tape       Social History  Social History     Socioeconomic History     Marital status: Single     Spouse name: Not on file     Number of children: Not on file     Years of education: Not on file     Highest education level: Not on file   Occupational History     Occupation: Disabled   Social Needs     Financial resource strain: Not on file     Food insecurity     Worry: Not on file     Inability: Not on file     Transportation needs     Medical: Not on file     Non-medical: Not on file   Tobacco Use     Smoking status: Former Smoker     Packs/day: 0.50     Years: 6.00     Pack years: 3.00     Types: Cigarettes     Start date: 2/1/1977     Last attempt to quit:  1983     Years since quittin.9     Smokeless tobacco: Never Used   Substance and Sexual Activity     Alcohol use: No     Alcohol/week: 0.0 standard drinks     Drug use: No     Sexual activity: Not Currently     Partners: Female     Birth control/protection: Abstinence   Lifestyle     Physical activity     Days per week: Not on file     Minutes per session: Not on file     Stress: Not on file   Relationships     Social connections     Talks on phone: Not on file     Gets together: Not on file     Attends Sikh service: Not on file     Active member of club or organization: Not on file     Attends meetings of clubs or organizations: Not on file     Relationship status: Not on file     Intimate partner violence     Fear of current or ex partner: Not on file     Emotionally abused: Not on file     Physically abused: Not on file     Forced sexual activity: Not on file   Other Topics Concern     Parent/sibling w/ CABG, MI or angioplasty before 65F 55M? Not Asked   Social History Narrative     Not on file       Family History  Family History   Problem Relation Age of Onset     Arthritis Other      Gastrointestinal Disease Other      Cardiovascular Father         aortic aneurysm     Arrhythmia Father      Nephrolithiasis Father      Sleep Apnea Father      Anxiety Disorder Father      Depression Father      Hypertension Father      Obesity Father      Hyperlipidemia Father      Coronary Artery Disease Father         history of MI and stent     Low Back Problems Father      Spine Problems Father      Anxiety Disorder Mother      Hypertension Mother      Osteoporosis Mother      Obesity Mother      Hyperlipidemia Mother      Low Back Problems Mother      Anxiety Disorder Sister      Hypertension Sister      Osteoporosis Sister      Obesity Sister      Hyperlipidemia Sister      Low Back Problems Sister      Spine Problems Sister      Anxiety Disorder Sister      Depression Sister      Hypertension Sister       "Osteoporosis Sister      Obesity Sister      Hyperlipidemia Sister      Low Back Problems Sister        Preop Vitals    BP Readings from Last 3 Encounters:   08/05/20 119/62   06/09/20 (!) 149/77   05/09/20 135/73    Pulse Readings from Last 3 Encounters:   08/05/20 59   06/09/20 70   05/08/20 73      Resp Readings from Last 3 Encounters:   08/05/20 12   06/09/20 20   05/09/20 14    SpO2 Readings from Last 3 Encounters:   08/05/20 100%   06/09/20 98%   05/09/20 99%      Temp Readings from Last 1 Encounters:   08/05/20 98.1  F (36.7  C) (Oral)    Ht Readings from Last 1 Encounters:   08/05/20 1.791 m (5' 10.5\")      Wt Readings from Last 1 Encounters:   08/05/20 89.8 kg (198 lb)    Estimated body mass index is 28.01 kg/m  as calculated from the following:    Height as of this encounter: 1.791 m (5' 10.5\").    Weight as of this encounter: 89.8 kg (198 lb).       ROS/MED HX    The complete review of systems is negative other than noted in the HPI or here.     ENT/Pulmonary: Comment: Does not use CPAP regularly. Is borderline SAVTIA    (+)sleep apnea, tobacco use, Past use doesn't use CPAP , . .   (-) asthma, COPD and recent URI   Neurologic:  - neg neurologic ROS    (-) seizures   Cardiovascular:     (+) hypertension-range: 140/, ---. : . CHF Last EF: 63% date: 2/24/20 . . :. dysrhythmias . Previous cardiac testing Echodate:1/18/20  METS/Exercise Tolerance:  4 - Raking leaves, gardening   Hematologic:     (+) Anemia, -     (-) history of blood clots   Musculoskeletal: Comment: Bilateral shoulder replacement. Chronic neck pain.  (+) arthritis,  other musculoskeletal- Chronic Left shoulder OA; s/p cervical fusion      GI/Hepatic:  - neg GI/hepatic ROS      (-) GERD   Renal/Genitourinary:     (+) chronic renal disease, type: ARF, Pt does not require dialysis, Pt has no history of transplant, BPH,       Endo:     (+) Obesity, .      Psychiatric:  - neg psychiatric ROS       Infectious Disease: Comment: Patient with " "infected left total shoulder        Malignancy:      - no malignancy   Other:    (+) No chance of pregnancy C-spine cleared: N/A, H/O Chronic Pain,H/O chronic opiod use , no other significant disability            PHYSICAL EXAM:   Mental Status/Neuro: A/A/O; Age Appropriate   Airway: Facies: Feasible  Mallampati: I  Mouth/Opening: Full  TM distance: > 6 cm  Neck ROM: Limited   Respiratory: Auscultation: CTAB     Resp. Rate: Normal     Resp. Effort: Normal      CV: Rhythm: Regular  Heart: Normal Sounds   Comments:          Temp: 98.1  F (36.7  C) Temp src: Oral BP: 119/62 Pulse: 59   Resp: 12 SpO2: 100 %         198 lbs 0 oz  5' 10.5\"[pt reported[   Body mass index is 28.01 kg/m .       Physical Exam  Constitutional: Awake, alert, cooperative, no apparent distress, and appears stated age.  Eyes: Pupils equal, round and reactive to light, extra ocular muscles intact, sclera clear, conjunctiva normal. Glasses on.  HENT: Normocephalic, oral pharynx with moist mucus membranes, good dentition. No goiter appreciated.   Respiratory: Clear to auscultation bilaterally, no crackles or wheezing. No cough or obvious dyspnea.  Cardiovascular: Regular rate and rhythm, normal S1 and S2, and no murmur noted.  Carotids +2, no bruits. No edema. Palpable pulses to radial  DP and PT arteries.   GI: Normal bowel sounds, soft, non-distended, non-tender, no masses palpated. Surgical scars: well healed.   Lymph/Hematologic: No cervical lymphadenopathy and no supraclavicular lymphadenopathy.  Genitourinary:  Deferred.   Skin: Warm and dry.  No rashes at anticipated surgical site. Three small red crusting areas along right shoulder incision. He reports that Dr. Aceves is aware of this. No signs of infection. Well healed median sternotomy incision. Chronic dry patchy redness along both shins. Reportedly used a file to remove rough skin and then used a steroid cream. No signs of infection.   Musculoskeletal: Limited ROM of neck. There is no " redness, warmth, or swelling of the joints. Gross motor strength is normal.    Neurologic: Awake, alert, oriented to name, place and time. Cranial nerves II-XII are grossly intact. Gait is normal.   Neuropsychiatric: Calm, cooperative. Normal affect.     Labs: (personally reviewed)  Lab Results   Component Value Date    WBC 6.4 08/05/2020     Lab Results   Component Value Date    RBC 4.27 08/05/2020     Lab Results   Component Value Date    HGB 9.3 08/05/2020     Lab Results   Component Value Date    HCT 31.2 08/05/2020     Lab Results   Component Value Date    MCV 73 08/05/2020     Lab Results   Component Value Date    MCH 21.8 08/05/2020     Lab Results   Component Value Date    MCHC 29.8 08/05/2020     Lab Results   Component Value Date    RDW 17.0 08/05/2020     Lab Results   Component Value Date     08/05/2020     Last Comprehensive Metabolic Panel:  Sodium   Date Value Ref Range Status   08/05/2020 137 133 - 144 mmol/L Final     Potassium   Date Value Ref Range Status   08/05/2020 4.8 3.4 - 5.3 mmol/L Final     Chloride   Date Value Ref Range Status   08/05/2020 107 94 - 109 mmol/L Final     Carbon Dioxide   Date Value Ref Range Status   08/05/2020 26 20 - 32 mmol/L Final     Anion Gap   Date Value Ref Range Status   08/05/2020 4 3 - 14 mmol/L Final     Glucose   Date Value Ref Range Status   08/05/2020 89 70 - 99 mg/dL Final     Urea Nitrogen   Date Value Ref Range Status   08/05/2020 20 7 - 30 mg/dL Final     Creatinine   Date Value Ref Range Status   08/05/2020 1.24 0.66 - 1.25 mg/dL Final     GFR Estimate   Date Value Ref Range Status   08/05/2020 63 >60 mL/min/[1.73_m2] Final     Comment:     Non  GFR Calc  Starting 12/18/2018, serum creatinine based estimated GFR (eGFR) will be   calculated using the Chronic Kidney Disease Epidemiology Collaboration   (CKD-EPI) equation.       Calcium   Date Value Ref Range Status   08/05/2020 8.8 8.5 - 10.1 mg/dL Final     Lab Results   Component  Value Date    AST 29 2020     Lab Results   Component Value Date    ALT 42 2020     Lab Results   Component Value Date    BILICONJ 0.0 2014      Lab Results   Component Value Date    BILITOTAL 0.6 2020     Lab Results   Component Value Date    ALBUMIN 4.0 2020     Lab Results   Component Value Date    PROTTOTAL 7.6 2020      Lab Results   Component Value Date    ALKPHOS 100 2020     Ferritin 5  EKG: Personally reviewed but formal cardiology read pendin/15/20 Sinus rhythm  Cardiac echo:  20  Interpretation Summary  Moderately (EF 30-35%) reduced left ventricular function is present.There is wall thinning and severe hypokinesis of the mid-distal anteroseptal, basal-mid anterior and distal inferior segments concerning for coronary disease in the  LAD territory. There is no thrombus.  Global right ventricular function is mildly reduced.  No pericardial effusion is present.  XIOMY 19  CONCLUSION:  XIOMY 2019 14:38.       Normal LV size, wall thickness, and systolic function.    Normal RV size and function.    Mild LA dilatation.    No evidence of thrombus in the LA appendage.    Mild mitral regurgitation.    Aortic valve is bicuspid with fusion of the right and left coronary    cusps. There is aortic sclerosis. There is trace aortic insufficiency.  Normal size aortic root (history of ascending aorta replacement with a    32 mm Gelweave interposition graft)    Negative bubble study.    No obvious/gross vegetation noted.    MRI angiogram/MRI cardiac chest 20  Clinical history: 60-year old male with a history of ascending aortic aneurysm (s/p repair with 32 mm supracoronary interpositional tube graft in 2016) and newly diagnosed cardiomyopathy with mild  non-obstructive CAD on angiogram. CMR to evaluate etiology of cardiomyopathy and MRA to evaluate ascending aortic aneurysm repair.     Comparison CMR: 2019 (only MRA)     1. The LV is normal in cavity size  and wall thickness. The global systolic function is normal. The LVEF is  63%. There are no regional wall motion abnormalities.     2. The RV is normal in cavity size. The global systolic function is hyperdynamic. The RVEF is 70%.     3. Both atria are normal in size.     4. There is no significant valvular disease. The aortic valve is known to be bicuspid, but was not well  seen on this study. There is no aortic stenosis or leak.     5. Late gadolinium enhancement imaging shows no MI, fibrosis or infiltrative disease.     6. There is no pericardial effusion or thickening.     7. There is no intracardiac thrombus.     Angiogram 1/30/20  mild non obstructive coronary disease    1/7/20 CT left shoulder                                                                   IMPRESSION: Interval postsurgical changes of total left shoulder  arthroplasty explantation with humeral antibiotic impregnated cement spacer placement. Explantation deformity of the glenoid. No acute fracture.   7/29/20 Xray left shoulder   FINDINGS: Neutral, Grashey, axillary, and Y views of the left shoulder were obtained. Postsurgical changes of a reverse left total shoulder arthroplasty. The hardware appears intact. Acromioclavicular joint is well aligned. Sternotomy wires are noted.                                                               IMPRESSION: Post surgical changes of a reverse left total shoulder arthroplasty, without complication.    CT right shoulder 6/18/20                                                               Impression:  1.  Redemonstrated antibiotics impregnated spacer device in the  proximal humerus with cement native bone interface lucency.  2.  Interval bone formation of the glenoid defect, with persistent  subchondral bone plate irregularity.    Imaging and cardiac testing reviewed by this provider    Outside records reviewed from: Care Everywhere    ASSESSMENT and PLAN  Kobe Buchanan is a 60 year old male  scheduled to undergo Removal of right shoulder antibiotic spacer and conversion to reverse total shoulder arthroplasty with Dr. Aceves on 8/18/20. He has the following specific operative considerations:   - RCRI : 0.9% risk of major adverse cardiac event.   - Anesthesia considerations:  Refer to PAC assessment in anesthesia records  - VTE risk: 0.5%  - Risk of PONV score = 2.  If > 2, anti-emetic intervention recommended.    --History of bilateral shoulder replacement with subsequent bilateral prosthetic joint infection with subsequent removal and placement of spacer bilaterally. S/p left removal of spacer and conversion to reverse total shoulder on 5/8/20 with no complications. Above procedure on right side now planned.   --Chronic pain in joints and cervical spine. Limited neck extension. Chronic opioids. Duragesic patch 75 mcg every 48 hours. Oxycodone prn up to six tabs daily. Will take Flexeril on DOS. Was evaluated by Pharm D today with pain plan established.   --HLD. atorvastatin. Complex cardiac history as above with bicuspid aortic valve s/p valve sparing repair with Gelweave graft 2016, NICM (EF 30-35%) but now normalized per cardiac MRI. Surveillance biopsy on 1/28/20 was complicated by ventricular ectopy and severe BP elevation 200s/130-150s. Has been followed by Cardiology and approved to proceed to surgery. All testing above. Able to walk distance and swim. Will hold amlodipine-benazepril on DOS but will take Coreg. ASA 81 mg daily with planned 7 day hold for surgery.   --Former smoker. Denies pulmonary symptoms. SAVITA, borderline but does not use CPAP.   --GERD. Protonix daily prn. History gastric bypass surgery.   --Anemia after surgery. Hgb today: 9.3, Ferritin 5, down from 11. Taking oral iron supplement. Patient's PCP was contacted to request iron infusions prior to surgery.   --NEMESIO Cr 1.24.  --Pain management. Discussed possibility of nerve block if appropriate for patient's procedure. Final  counseling and decisions by regional team on DOS.  --Anxiety/depression. Will take bupropion and clonazepam DOS. Regular use of clonazepam.  --History of blood transfusion. Type and screen drawn today.   --Difficult IV stick.     Arrival time, NPO, shower and medication instructions provided by nursing staff today. Preparing For Your Surgery handout given.      Patient was discussed with Dr Callahan who also spoke with patient today.     ADDENDUM: After discussing iron infusions with PCP, arranged for patient to have 2 iron infusions at the [facility] infusion center. He agreed to 7am on 8/6/20, and 9am on 8/13/20. Orders provided.     ALAYNA Phillips CNS  Preoperative Assessment Center  [facility]  Phone: [phone]  Fax: [phone]

## 2020-08-05 NOTE — PATIENT INSTRUCTIONS
Preparing for Your Surgery      Name:  Kobe Buchanan   MRN:  [id number]   :  1960   Today's Date:  2020       Arriving for surgery:  Surgery date:  2020  Arrival time:  6AM       Please come to:     [facility] [address]  [address] [place]  [address]    -Proceed to the 3rd floor, check in at the Adult Surgery Waiting Lounge. [phone]    If an escort is needed stop at the Information Desk in the lobby. Inform the information person that you are here for surgery. An escort to the Adult Surgery Waiting Lounge will be provided.     Restrictions due to COVID 19:  Patients are allowed one visitor in the pre-op period  All visitors must wear a mask  No visitors under 18  No ill visitors   parking is not available    What can I eat or drink?  -  You may eat and drink normally for up to 8 hours before your surgery. (Until Midnight)  -  You may have clear liquids until 2 hours before surgery. (Until 2020, 6AM)  Examples of clear liquids:  Water  Clear broth  Juices (apple, white grape, white cranberry  and cider) without pulp  Noncarbonated, powder based beverages  (lemonade and Shawn-Aid)  Sodas (Sprite, 7-Up, ginger ale and seltzer)  Coffee or tea (without milk or cream)  Gatorade    -  No Alcohol for at least 24 hours before surgery     Which medicines can I take?    Hold Aspirin for 7 days before surgery.   Hold Multivitamins for 7 days before surgery.  Hold Supplements for 7 days before surgery.  Hold Ibuprofen (Advil, Motrin) for 1 day before surgery--unless otherwise directed by surgeon.  Hold Naproxen (Aleve) for 4 days before surgery.    -  DO NOT take these medications the day of surgery:    Amlodipine-benazepril   Sucralfate(Carafate)    Docusate    Senna    -  PLEASE TAKE these medications the day of surgery:    Acetaminophen(Tylenol) as  needed    Bupropion(Wellbutrin)   Carvedilol(Coreg)    Clonazepam(Klonopin)  Cyclobenzaprine(Flexeril)    Fentanyl(Duragesic) patch  Oxycodone(Roxicodone) as needed    Pantoprazole(Protonix) as needed     How do I prepare myself?  - Please take 2 showers, the evening before and the morning of surgery using Scrubcare or Hibiclens soap.    Use this soap only from the neck to your toes.     Leave the soap on your skin for one minute--then rinse thoroughly.      You may use your own shampoo and conditioner; no other hair products.   - Please remove all jewelry and body piercings.  - No lotions, deodorants or fragrance.  - Bring your ID and insurance card.    - All patients are required to have a Covid-19 test within 4 days of surgery/procedure.      -Patients will be contacted by the [facility] scheduling team within 1 week of surgery to make an appointment.      - Patients may call the Scheduling team at [phone] if they have not been scheduled within 4 days of  surgery.         Questions or Concerns:    - For any questions regarding the day of surgery or your hospital stay, please contact the Pre Admission Nursing Office at [phone].       - If you have health changes between today and your surgery please call your surgeon.       For questions after surgery please call your surgeons office.

## 2020-08-06 ENCOUNTER — INFUSION THERAPY VISIT (OUTPATIENT)
Dept: INFUSION THERAPY | Facility: CLINIC | Age: 60
End: 2020-08-06
Attending: CLINICAL NURSE SPECIALIST
Payer: COMMERCIAL

## 2020-08-06 VITALS
TEMPERATURE: 98.2 F | DIASTOLIC BLOOD PRESSURE: 66 MMHG | RESPIRATION RATE: 16 BRPM | HEART RATE: 62 BPM | SYSTOLIC BLOOD PRESSURE: 114 MMHG

## 2020-08-06 DIAGNOSIS — D62 ACUTE BLOOD LOSS ANEMIA: Primary | ICD-10-CM

## 2020-08-06 PROCEDURE — 96365 THER/PROPH/DIAG IV INF INIT: CPT

## 2020-08-06 PROCEDURE — 25800030 ZZH RX IP 258 OP 636: Mod: ZF | Performed by: CLINICAL NURSE SPECIALIST

## 2020-08-06 PROCEDURE — 25000128 H RX IP 250 OP 636: Mod: ZF | Performed by: CLINICAL NURSE SPECIALIST

## 2020-08-06 RX ORDER — HEPARIN SODIUM,PORCINE 10 UNIT/ML
5 VIAL (ML) INTRAVENOUS
Status: CANCELLED | OUTPATIENT
Start: 2020-08-13

## 2020-08-06 RX ORDER — CEFAZOLIN SODIUM 2 G/50ML
2 SOLUTION INTRAVENOUS EVERY 8 HOURS
Status: CANCELLED
Start: 2020-08-07

## 2020-08-06 RX ORDER — HEPARIN SODIUM (PORCINE) LOCK FLUSH IV SOLN 100 UNIT/ML 100 UNIT/ML
5 SOLUTION INTRAVENOUS
Status: CANCELLED | OUTPATIENT
Start: 2020-08-07

## 2020-08-06 RX ORDER — HEPARIN SODIUM (PORCINE) LOCK FLUSH IV SOLN 100 UNIT/ML 100 UNIT/ML
5 SOLUTION INTRAVENOUS
Status: CANCELLED | OUTPATIENT
Start: 2020-08-13

## 2020-08-06 RX ORDER — HEPARIN SODIUM,PORCINE 10 UNIT/ML
5 VIAL (ML) INTRAVENOUS
Status: CANCELLED | OUTPATIENT
Start: 2020-08-07

## 2020-08-06 RX ADMIN — FERRIC CARBOXYMALTOSE INJECTION 750 MG: 50 INJECTION, SOLUTION INTRAVENOUS at 07:45

## 2020-08-06 NOTE — LETTER
8/6/2020         RE: Kobe Buchanan  [address]  [place] [address]        Dear Colleague,    Thank you for referring your patient, Kobe Buchanan, to the [facility] TREATMENT [place] SPECIALTY AND PROCEDURE. Please see a copy of my visit note below.    Nursing Note  [facility] Buch[facility] for:   Chief Complaint   Patient presents with     Infusion     Injectafer     During today's [facility] appointment, orders from Dixie Leiva were completed.  Frequency: today is dose 1 of 2 total    Progress note:  Patient identification verified by name and date of birth.  Assessment completed.  Vitals recorded in Doc Flowsheets.  Patient was provided with education regarding medication/procedure and possible side effects.  Patient verbalized understanding.     present during visit today: Not Applicable.    Treatment Conditions: Non-applicable.    Premedications: were not ordered.    Drug Waste Record: No    Infusion length and rate:  infusion given over approximately 15 minutes, followed by 30 minute observation    Labs: were not ordered for this appointment.    Vascular access: peripheral IV placed today.    Post Infusion Assessment:  Patient tolerated infusion without incident.     Discharge Plan:   Follow up plan of care with: ongoing infusions at [facility].  Discharge instructions were reviewed with patient.  Patient/representative verbalized understanding of discharge instructions and all questions answered.  Patient discharged from [facility] in stable condition.    Rachael Roberts RN    Administrations This Visit     ferric carboxymaltose (INJECTAFER) 750 mg in sodium chloride 0.9 % 100 mL intermittent infusion     Admin Date  08/06/2020 Action  New Bag Dose  750 mg Rate  500 mL/hr Route  Intravenous Administered  By  Rachael Roberts RN                Again, thank you for allowing me to participate in the care of your patient.        Sincerely,        [facility]

## 2020-08-06 NOTE — PROGRESS NOTES
Nursing Note  Kobe Buchanan presents [facility] for:   Chief Complaint   Patient presents with     Infusion     Injectafer     During today's [facility] appointment, orders from Dixie Leiva were completed.  Frequency: today is dose 1 of 2 total    Progress note:  Patient identification verified by name and date of birth.  Assessment completed.  Vitals recorded in Doc Flowsheets.  Patient was provided with education regarding medication/procedure and possible side effects.  Patient verbalized understanding.     present during visit today: Not Applicable.    Treatment Conditions: Non-applicable.    Premedications: were not ordered.    Drug Waste Record: No    Infusion length and rate:  infusion given over approximately 15 minutes, followed by 30 minute observation    Labs: were not ordered for this appointment.    Vascular access: peripheral IV placed today.    Post Infusion Assessment:  Patient tolerated infusion without incident.     Discharge Plan:   Follow up plan of care with: ongoing infusions at [facility].  Discharge instructions were reviewed with patient.  Patient/representative verbalized understanding of discharge instructions and all questions answered.  Patient discharged from [facility] in stable condition.    Rachael Roberts RN    Administrations This Visit     ferric carboxymaltose (INJECTAFER) 750 mg in sodium chloride 0.9 % 100 mL intermittent infusion     Admin Date  08/06/2020 Action  New Bag Dose  750 mg Rate  500 mL/hr Route  Intravenous Administered By  Rachael Roberts RN

## 2020-08-13 ENCOUNTER — INFUSION THERAPY VISIT (OUTPATIENT)
Dept: INFUSION THERAPY | Facility: CLINIC | Age: 60
End: 2020-08-13
Attending: CLINICAL NURSE SPECIALIST
Payer: COMMERCIAL

## 2020-08-13 ENCOUNTER — TELEPHONE (OUTPATIENT)
Dept: ORTHOPEDICS | Facility: CLINIC | Age: 60
End: 2020-08-13

## 2020-08-13 VITALS
OXYGEN SATURATION: 98 % | DIASTOLIC BLOOD PRESSURE: 68 MMHG | SYSTOLIC BLOOD PRESSURE: 138 MMHG | RESPIRATION RATE: 18 BRPM | TEMPERATURE: 98.1 F | HEART RATE: 63 BPM

## 2020-08-13 DIAGNOSIS — D62 ACUTE BLOOD LOSS ANEMIA: Primary | ICD-10-CM

## 2020-08-13 PROCEDURE — 96365 THER/PROPH/DIAG IV INF INIT: CPT

## 2020-08-13 PROCEDURE — 25000128 H RX IP 250 OP 636: Mod: ZF | Performed by: CLINICAL NURSE SPECIALIST

## 2020-08-13 PROCEDURE — 25800030 ZZH RX IP 258 OP 636: Mod: ZF | Performed by: CLINICAL NURSE SPECIALIST

## 2020-08-13 RX ORDER — HEPARIN SODIUM,PORCINE 10 UNIT/ML
5 VIAL (ML) INTRAVENOUS
Status: CANCELLED | OUTPATIENT
Start: 2020-08-13

## 2020-08-13 RX ORDER — HEPARIN SODIUM (PORCINE) LOCK FLUSH IV SOLN 100 UNIT/ML 100 UNIT/ML
5 SOLUTION INTRAVENOUS
Status: CANCELLED | OUTPATIENT
Start: 2020-08-13

## 2020-08-13 RX ADMIN — FERRIC CARBOXYMALTOSE INJECTION 750 MG: 50 INJECTION, SOLUTION INTRAVENOUS at 07:48

## 2020-08-13 NOTE — PROGRESS NOTES
Nursing Note  Kobe Buchanan [facility] for:   Chief Complaint   Patient presents with     Infusion     Injectafer     During today's [facility] appointment, orders from Dixie Leiva were completed.  Frequency: today is dose 2 of 2 total    Progress note:  Patient identification verified by name and date of birth.  Assessment completed.  Vitals recorded in Doc Flowsheets.  Patient was provided with education regarding medication/procedure and possible side effects.  Patient verbalized understanding.     present during visit today: Not Applicable.    Treatment Conditions: Non-applicable.    Premedications: were not ordered.    Drug Waste Record: No    Infusion length and rate:  infusion given over approximately 15 minutes, followed by 30 minute observation    Labs: were not ordered for this appointment.    Vascular access: peripheral IV placed today.    Post Infusion Assessment:  Patient tolerated infusion without incident.     Discharge Plan:   Follow up plan of care with: ongoing infusions at [facility].  Discharge instructions were reviewed with patient.  Patient/representative verbalized understanding of discharge instructions and all questions answered.  Patient discharged from [facility] in stable condition.    Yasmine Ortez RN    Administrations This Visit     ferric carboxymaltose (INJECTAFER) 750 mg in sodium chloride 0.9 % 100 mL intermittent infusion     Admin Date  08/13/2020 Action  New Bag Dose  750 mg Rate  500 mL/hr Route  Intravenous Administered By  Yasmine Ortez, RN

## 2020-08-13 NOTE — TELEPHONE ENCOUNTER
RN called and spoke with patient. By the time.RN got this message, patient has returned home. Patient is pleasant. He wants to know if he needs another blood work to check his Hemoglobin levels as it was low a few weeks ago. Also patient would like to get the COVID 19 testing done ideally at the [facility] and not in [place]. Patient indicated he has talked to Krupa about this before.  RN told patient Krupa is out of clinic but will return tomorrow and RN will notify her to follow up with patient regarding this. Patient expressed understanding.    Naye Melton RN      [facility]    Phone Message    May a detailed message be left on voicemail: yes     Reason for Call: Other:   Pt is at the [facility] now for something else. It occurred to pt that at some point before surgery next wk, we're going to want a hemoglobin. Pt wonders if he could get it done today?    Pt states that covid scheduling also called pt and tried to schedule him in [place]. Pt would prefer to schedule it at the [facility] if possible. .    Please call pt back asap to advise.     Action Taken: Other:  ortho    Travel Screening: Not Applicable

## 2020-08-13 NOTE — LETTER
8/13/2020         RE: Kobe Buchanan  [address]  [place] [address]        Dear Colleague,    Thank you for referring your patient, Kobe Buchanan, to the [facility] TREATMENT [place] SPECIALTY AND PROCEDURE. Please see a copy of my visit note below.    Nursing Note  Kobe Buchanan [facility] for:   Chief Complaint   Patient presents with     Infusion     Injectafer     During today's [facility] appointment, orders from Dixie Leiva were completed.  Frequency: today is dose 2 of 2 total    Progress note:  Patient identification verified by name and date of birth.  Assessment completed.  Vitals recorded in Doc Flowsheets.  Patient was provided with education regarding medication/procedure and possible side effects.  Patient verbalized understanding.     present during visit today: Not Applicable.    Treatment Conditions: Non-applicable.    Premedications: were not ordered.    Drug Waste Record: No    Infusion length and rate:  infusion given over approximately 15 minutes, followed by 30 minute observation    Labs: were not ordered for this appointment.    Vascular access: peripheral IV placed today.    Post Infusion Assessment:  Patient tolerated infusion without incident.     Discharge Plan:   Follow up plan of care with: ongoing infusions at [facility].  Discharge instructions were reviewed with patient.  Patient/representative verbalized understanding of discharge instructions and all questions answered.  Patient discharged from [facility] in stable condition.    Yasmine Brady RN    Administrations This Visit     ferric carboxymaltose (INJECTAFER) 750 mg in sodium chloride 0.9 % 100 mL intermittent infusion     Admin Date  08/13/2020 Action  New Bag Dose  750 mg Rate  500 mL/hr Route  Intravenous Administered  By  Yasmine Ortez RN                Again, thank you for allowing me to participate in the care of your patient.        Sincerely,        [facility]

## 2020-08-14 ENCOUNTER — COMMUNICATION - HEALTHEAST (OUTPATIENT)
Dept: HEALTH INFORMATION MANAGEMENT | Facility: CLINIC | Age: 60
End: 2020-08-14

## 2020-08-14 NOTE — TELEPHONE ENCOUNTER
COVID test is scheduled at the [facility] today.  Dr Aceves was informed the Hgb was 9.3 on 08/05/20.  She stated the anesthesiologist will be informed.  Repeat hgb can be drawn on the day of surgery if needed.  Patient was given the above information.  He verbalized understanding.

## 2020-08-15 ENCOUNTER — APPOINTMENT (OUTPATIENT)
Dept: LAB | Facility: CLINIC | Age: 60
End: 2020-08-15
Payer: COMMERCIAL

## 2020-08-15 DIAGNOSIS — Z11.59 ENCOUNTER FOR SCREENING FOR OTHER VIRAL DISEASES: ICD-10-CM

## 2020-08-16 LAB
SARS-COV-2 RNA SPEC QL NAA+PROBE: NOT DETECTED
SPECIMEN SOURCE: NORMAL

## 2020-08-18 ENCOUNTER — APPOINTMENT (OUTPATIENT)
Dept: GENERAL RADIOLOGY | Facility: CLINIC | Age: 60
DRG: 483 | End: 2020-08-18
Attending: PHYSICIAN ASSISTANT
Payer: COMMERCIAL

## 2020-08-18 ENCOUNTER — SURGERY (OUTPATIENT)
Age: 60
End: 2020-08-18
Payer: COMMERCIAL

## 2020-08-18 ENCOUNTER — HOSPITAL ENCOUNTER (INPATIENT)
Facility: CLINIC | Age: 60
LOS: 1 days | Discharge: HOME | DRG: 483 | End: 2020-08-18
Attending: ORTHOPAEDIC SURGERY | Admitting: ORTHOPAEDIC SURGERY
Payer: COMMERCIAL

## 2020-08-18 ENCOUNTER — ANESTHESIA (OUTPATIENT)
Dept: SURGERY | Facility: CLINIC | Age: 60
DRG: 483 | End: 2020-08-18
Payer: COMMERCIAL

## 2020-08-18 VITALS
BODY MASS INDEX: 27.75 KG/M2 | HEART RATE: 58 BPM | OXYGEN SATURATION: 96 % | SYSTOLIC BLOOD PRESSURE: 140 MMHG | HEIGHT: 71 IN | TEMPERATURE: 98 F | WEIGHT: 198.19 LBS | DIASTOLIC BLOOD PRESSURE: 70 MMHG | RESPIRATION RATE: 14 BRPM

## 2020-08-18 DIAGNOSIS — M86.9: Primary | ICD-10-CM

## 2020-08-18 LAB
ABO + RH BLD: NORMAL
ABO + RH BLD: NORMAL
BLD GP AB SCN SERPL QL: NORMAL
BLOOD BANK CMNT PATIENT-IMP: NORMAL
BLOOD BANK CMNT PATIENT-IMP: NORMAL
GLUCOSE SERPL-MCNC: 117 MG/DL (ref 70–99)
HGB BLD-MCNC: 11.3 G/DL (ref 13.3–17.7)
INTERPRETATION ECG - MUSE: NORMAL
POTASSIUM SERPL-SCNC: 4.3 MMOL/L (ref 3.4–5.3)
SPECIMEN EXP DATE BLD: NORMAL

## 2020-08-18 PROCEDURE — C1713 ANCHOR/SCREW BN/BN,TIS/BN: HCPCS | Performed by: ORTHOPAEDIC SURGERY

## 2020-08-18 PROCEDURE — 85018 HEMOGLOBIN: CPT | Performed by: ANESTHESIOLOGY

## 2020-08-18 PROCEDURE — 27110028 ZZH OR GENERAL SUPPLY NON-STERILE: Performed by: ORTHOPAEDIC SURGERY

## 2020-08-18 PROCEDURE — 71000027 ZZH RECOVERY PHASE 2 EACH 15 MINS: Performed by: ORTHOPAEDIC SURGERY

## 2020-08-18 PROCEDURE — 25000566 ZZH SEVOFLURANE, EA 15 MIN: Performed by: ORTHOPAEDIC SURGERY

## 2020-08-18 PROCEDURE — 25000128 H RX IP 250 OP 636: Performed by: ORTHOPAEDIC SURGERY

## 2020-08-18 PROCEDURE — 25000125 ZZHC RX 250: Performed by: ORTHOPAEDIC SURGERY

## 2020-08-18 PROCEDURE — 25000125 ZZHC RX 250

## 2020-08-18 PROCEDURE — 25800030 ZZH RX IP 258 OP 636: Performed by: ANESTHESIOLOGY

## 2020-08-18 PROCEDURE — 25000128 H RX IP 250 OP 636

## 2020-08-18 PROCEDURE — 36000062 ZZH SURGERY LEVEL 4 1ST 30 MIN - UMMC: Performed by: ORTHOPAEDIC SURGERY

## 2020-08-18 PROCEDURE — 36000064 ZZH SURGERY LEVEL 4 EA 15 ADDTL MIN - UMMC: Performed by: ORTHOPAEDIC SURGERY

## 2020-08-18 PROCEDURE — 87075 CULTR BACTERIA EXCEPT BLOOD: CPT | Performed by: ORTHOPAEDIC SURGERY

## 2020-08-18 PROCEDURE — 12000001 ZZH R&B MED SURG/OB UMMC

## 2020-08-18 PROCEDURE — 25800030 ZZH RX IP 258 OP 636: Performed by: STUDENT IN AN ORGANIZED HEALTH CARE EDUCATION/TRAINING PROGRAM

## 2020-08-18 PROCEDURE — C1776 JOINT DEVICE (IMPLANTABLE): HCPCS | Performed by: ORTHOPAEDIC SURGERY

## 2020-08-18 PROCEDURE — 87176 TISSUE HOMOGENIZATION CULTR: CPT | Performed by: ORTHOPAEDIC SURGERY

## 2020-08-18 PROCEDURE — 25800025 ZZH RX 258: Performed by: ORTHOPAEDIC SURGERY

## 2020-08-18 PROCEDURE — 37000008 ZZH ANESTHESIA TECHNICAL FEE, 1ST 30 MIN: Performed by: ORTHOPAEDIC SURGERY

## 2020-08-18 PROCEDURE — 71000014 ZZH RECOVERY PHASE 1 LEVEL 2 FIRST HR: Performed by: ORTHOPAEDIC SURGERY

## 2020-08-18 PROCEDURE — 40000986 XR SHOULDER RT PORT G/E 2 VW: Mod: RT

## 2020-08-18 PROCEDURE — 27211024 ZZHC OR SUPPLY OTHER OPNP: Performed by: ORTHOPAEDIC SURGERY

## 2020-08-18 PROCEDURE — 25000128 H RX IP 250 OP 636: Performed by: ANESTHESIOLOGY

## 2020-08-18 PROCEDURE — 36415 COLL VENOUS BLD VENIPUNCTURE: CPT | Performed by: ANESTHESIOLOGY

## 2020-08-18 PROCEDURE — 27210794 ZZH OR GENERAL SUPPLY STERILE: Performed by: ORTHOPAEDIC SURGERY

## 2020-08-18 PROCEDURE — 40000275 ZZH STATISTIC RCP TIME EA 10 MIN

## 2020-08-18 PROCEDURE — 40000065 ZZH STATISTIC EKG NON-CHARGEABLE

## 2020-08-18 PROCEDURE — 0RPJ08Z REMOVAL OF SPACER FROM RIGHT SHOULDER JOINT, OPEN APPROACH: ICD-10-PCS | Performed by: ORTHOPAEDIC SURGERY

## 2020-08-18 PROCEDURE — 82947 ASSAY GLUCOSE BLOOD QUANT: CPT | Performed by: ANESTHESIOLOGY

## 2020-08-18 PROCEDURE — 84132 ASSAY OF SERUM POTASSIUM: CPT | Performed by: ANESTHESIOLOGY

## 2020-08-18 PROCEDURE — 0RRJ00Z REPLACEMENT OF RIGHT SHOULDER JOINT WITH REVERSE BALL AND SOCKET SYNTHETIC SUBSTITUTE, OPEN APPROACH: ICD-10-PCS | Performed by: ORTHOPAEDIC SURGERY

## 2020-08-18 PROCEDURE — 87070 CULTURE OTHR SPECIMN AEROBIC: CPT | Performed by: ORTHOPAEDIC SURGERY

## 2020-08-18 PROCEDURE — 25000125 ZZHC RX 250: Performed by: ANESTHESIOLOGY

## 2020-08-18 PROCEDURE — 37000009 ZZH ANESTHESIA TECHNICAL FEE, EACH ADDTL 15 MIN: Performed by: ORTHOPAEDIC SURGERY

## 2020-08-18 PROCEDURE — 40000014 ZZH STATISTIC ARTERIAL MONITORING DAILY

## 2020-08-18 PROCEDURE — 40000170 ZZH STATISTIC PRE-PROCEDURE ASSESSMENT II: Performed by: ORTHOPAEDIC SURGERY

## 2020-08-18 DEVICE — IMPLANTABLE DEVICE
Type: IMPLANTABLE DEVICE | Site: SHOULDER | Status: FUNCTIONAL
Brand: COMPREHENSIVE® PROLONG®

## 2020-08-18 DEVICE — IMP GLENOSPHERE BIOM REV SHLDR 36MM STD 115310: Type: IMPLANTABLE DEVICE | Site: SHOULDER | Status: FUNCTIONAL

## 2020-08-18 DEVICE — IMP SCR LOCKING BIOM REV SHLDR 3.5 HEX 4.75X30MM 180553: Type: IMPLANTABLE DEVICE | Site: SHOULDER | Status: FUNCTIONAL

## 2020-08-18 DEVICE — IMP SCR LOCKING BIOM REV SHLDR 3.5 HEX 4.75X20MM 180551: Type: IMPLANTABLE DEVICE | Site: SHOULDER | Status: FUNCTIONAL

## 2020-08-18 DEVICE — IMP SCR LOCKING BIOM REV SHLDR 3.5 HEX 4.75X15MM 180550: Type: IMPLANTABLE DEVICE | Site: SHOULDER | Status: FUNCTIONAL

## 2020-08-18 RX ORDER — DEXAMETHASONE SODIUM PHOSPHATE 4 MG/ML
INJECTION, SOLUTION INTRA-ARTICULAR; INTRALESIONAL; INTRAMUSCULAR; INTRAVENOUS; SOFT TISSUE PRN
Status: DISCONTINUED | OUTPATIENT
Start: 2020-08-18 | End: 2020-08-18

## 2020-08-18 RX ORDER — LIDOCAINE HYDROCHLORIDE 20 MG/ML
INJECTION, SOLUTION INFILTRATION; PERINEURAL PRN
Status: DISCONTINUED | OUTPATIENT
Start: 2020-08-18 | End: 2020-08-18

## 2020-08-18 RX ORDER — BUPIVACAINE HYDROCHLORIDE 5 MG/ML
INJECTION, SOLUTION EPIDURAL; INTRACAUDAL PRN
Status: DISCONTINUED | OUTPATIENT
Start: 2020-08-18 | End: 2020-08-18

## 2020-08-18 RX ORDER — FENTANYL CITRATE 50 UG/ML
25-50 INJECTION, SOLUTION INTRAMUSCULAR; INTRAVENOUS
Status: DISCONTINUED | OUTPATIENT
Start: 2020-08-18 | End: 2020-08-18 | Stop reason: HOSPADM

## 2020-08-18 RX ORDER — FENTANYL CITRATE 50 UG/ML
25-50 INJECTION, SOLUTION INTRAMUSCULAR; INTRAVENOUS EVERY 5 MIN PRN
Status: DISCONTINUED | OUTPATIENT
Start: 2020-08-18 | End: 2020-08-18 | Stop reason: HOSPADM

## 2020-08-18 RX ORDER — EPHEDRINE SULFATE 50 MG/ML
INJECTION, SOLUTION INTRAMUSCULAR; INTRAVENOUS; SUBCUTANEOUS PRN
Status: DISCONTINUED | OUTPATIENT
Start: 2020-08-18 | End: 2020-08-18

## 2020-08-18 RX ORDER — CEFAZOLIN SODIUM 1 G/3ML
1 INJECTION, POWDER, FOR SOLUTION INTRAMUSCULAR; INTRAVENOUS SEE ADMIN INSTRUCTIONS
Status: DISCONTINUED | OUTPATIENT
Start: 2020-08-18 | End: 2020-08-18 | Stop reason: HOSPADM

## 2020-08-18 RX ORDER — DEXAMETHASONE SODIUM PHOSPHATE 10 MG/ML
INJECTION, SOLUTION INTRAMUSCULAR; INTRAVENOUS PRN
Status: DISCONTINUED | OUTPATIENT
Start: 2020-08-18 | End: 2020-08-18

## 2020-08-18 RX ORDER — PROPOFOL 10 MG/ML
INJECTION, EMULSION INTRAVENOUS PRN
Status: DISCONTINUED | OUTPATIENT
Start: 2020-08-18 | End: 2020-08-18

## 2020-08-18 RX ORDER — OXYCODONE HYDROCHLORIDE 5 MG/1
5 TABLET ORAL
Status: DISCONTINUED | OUTPATIENT
Start: 2020-08-18 | End: 2020-08-18 | Stop reason: HOSPADM

## 2020-08-18 RX ORDER — ACETAMINOPHEN 325 MG/1
975 TABLET ORAL ONCE
Status: DISCONTINUED | OUTPATIENT
Start: 2020-08-18 | End: 2020-08-18 | Stop reason: HOSPADM

## 2020-08-18 RX ORDER — SODIUM CHLORIDE, SODIUM LACTATE, POTASSIUM CHLORIDE, CALCIUM CHLORIDE 600; 310; 30; 20 MG/100ML; MG/100ML; MG/100ML; MG/100ML
INJECTION, SOLUTION INTRAVENOUS CONTINUOUS
Status: DISCONTINUED | OUTPATIENT
Start: 2020-08-18 | End: 2020-08-18 | Stop reason: HOSPADM

## 2020-08-18 RX ORDER — VANCOMYCIN HYDROCHLORIDE 1 G/20ML
INJECTION, POWDER, LYOPHILIZED, FOR SOLUTION INTRAVENOUS PRN
Status: DISCONTINUED | OUTPATIENT
Start: 2020-08-18 | End: 2020-08-18 | Stop reason: HOSPADM

## 2020-08-18 RX ORDER — ASPIRIN 81 MG/1
162 TABLET ORAL
Qty: 60 TABLET | Refills: 0 | Status: SHIPPED | OUTPATIENT
Start: 2020-08-19 | End: 2020-10-27

## 2020-08-18 RX ORDER — MAGNESIUM HYDROXIDE 1200 MG/15ML
LIQUID ORAL PRN
Status: DISCONTINUED | OUTPATIENT
Start: 2020-08-18 | End: 2020-08-18 | Stop reason: HOSPADM

## 2020-08-18 RX ORDER — ONDANSETRON 4 MG/1
4 TABLET, ORALLY DISINTEGRATING ORAL EVERY 30 MIN PRN
Status: DISCONTINUED | OUTPATIENT
Start: 2020-08-18 | End: 2020-08-18 | Stop reason: HOSPADM

## 2020-08-18 RX ORDER — NALOXONE HYDROCHLORIDE 0.4 MG/ML
.1-.4 INJECTION, SOLUTION INTRAMUSCULAR; INTRAVENOUS; SUBCUTANEOUS
Status: DISCONTINUED | OUTPATIENT
Start: 2020-08-18 | End: 2020-08-18 | Stop reason: HOSPADM

## 2020-08-18 RX ORDER — LIDOCAINE 40 MG/G
CREAM TOPICAL
Status: DISCONTINUED | OUTPATIENT
Start: 2020-08-18 | End: 2020-08-18

## 2020-08-18 RX ORDER — AMOXICILLIN 250 MG
1-2 CAPSULE ORAL 2 TIMES DAILY
Qty: 30 TABLET | Refills: 0 | Status: ON HOLD | OUTPATIENT
Start: 2020-08-18 | End: 2024-09-08

## 2020-08-18 RX ORDER — ACETAMINOPHEN 325 MG/1
975 TABLET ORAL EVERY 6 HOURS PRN
Qty: 50 TABLET | Refills: 1 | Status: SHIPPED | OUTPATIENT
Start: 2020-08-18 | End: 2020-08-21

## 2020-08-18 RX ORDER — FLUMAZENIL 0.1 MG/ML
0.2 INJECTION, SOLUTION INTRAVENOUS
Status: DISCONTINUED | OUTPATIENT
Start: 2020-08-18 | End: 2020-08-18 | Stop reason: HOSPADM

## 2020-08-18 RX ORDER — OXYCODONE HYDROCHLORIDE 5 MG/1
10-15 TABLET ORAL
Qty: 30 TABLET | Refills: 0 | Status: SHIPPED | OUTPATIENT
Start: 2020-08-18 | End: 2021-03-04

## 2020-08-18 RX ORDER — LIDOCAINE 40 MG/G
CREAM TOPICAL
Status: DISCONTINUED | OUTPATIENT
Start: 2020-08-18 | End: 2020-08-18 | Stop reason: HOSPADM

## 2020-08-18 RX ORDER — GLYCOPYRROLATE 0.2 MG/ML
INJECTION, SOLUTION INTRAMUSCULAR; INTRAVENOUS PRN
Status: DISCONTINUED | OUTPATIENT
Start: 2020-08-18 | End: 2020-08-18

## 2020-08-18 RX ORDER — FENTANYL CITRATE 50 UG/ML
25-50 INJECTION, SOLUTION INTRAMUSCULAR; INTRAVENOUS
Status: DISCONTINUED | OUTPATIENT
Start: 2020-08-18 | End: 2020-08-18

## 2020-08-18 RX ORDER — MEPERIDINE HYDROCHLORIDE 25 MG/ML
12.5 INJECTION INTRAMUSCULAR; INTRAVENOUS; SUBCUTANEOUS
Status: DISCONTINUED | OUTPATIENT
Start: 2020-08-18 | End: 2020-08-18 | Stop reason: HOSPADM

## 2020-08-18 RX ORDER — ACETAMINOPHEN 325 MG/1
975 TABLET ORAL ONCE
Status: DISCONTINUED | OUTPATIENT
Start: 2020-08-18 | End: 2020-08-18

## 2020-08-18 RX ORDER — CEFAZOLIN SODIUM 2 G/100ML
2 INJECTION, SOLUTION INTRAVENOUS
Status: COMPLETED | OUTPATIENT
Start: 2020-08-18 | End: 2020-08-18

## 2020-08-18 RX ORDER — IBUPROFEN 600 MG/1
600 TABLET, FILM COATED ORAL EVERY 6 HOURS PRN
Qty: 30 TABLET | Refills: 1 | Status: SHIPPED | OUTPATIENT
Start: 2020-08-18 | End: 2021-02-09

## 2020-08-18 RX ORDER — GABAPENTIN 100 MG/1
300 CAPSULE ORAL ONCE
Status: DISCONTINUED | OUTPATIENT
Start: 2020-08-18 | End: 2020-08-18 | Stop reason: HOSPADM

## 2020-08-18 RX ORDER — ACETAMINOPHEN 325 MG/1
650 TABLET ORAL
Status: DISCONTINUED | OUTPATIENT
Start: 2020-08-18 | End: 2020-08-18 | Stop reason: HOSPADM

## 2020-08-18 RX ORDER — FENTANYL CITRATE-0.9 % NACL/PF 10 MCG/ML
PLASTIC BAG, INJECTION (ML) INTRAVENOUS PRN
Status: DISCONTINUED | OUTPATIENT
Start: 2020-08-18 | End: 2020-08-18

## 2020-08-18 RX ORDER — ONDANSETRON 2 MG/ML
4 INJECTION INTRAMUSCULAR; INTRAVENOUS EVERY 30 MIN PRN
Status: DISCONTINUED | OUTPATIENT
Start: 2020-08-18 | End: 2020-08-18 | Stop reason: HOSPADM

## 2020-08-18 RX ADMIN — FENTANYL CITRATE 50 MCG: 50 INJECTION, SOLUTION INTRAMUSCULAR; INTRAVENOUS at 07:40

## 2020-08-18 RX ADMIN — VANCOMYCIN HYDROCHLORIDE 1 G: 1 INJECTION, POWDER, LYOPHILIZED, FOR SOLUTION INTRAVENOUS at 10:14

## 2020-08-18 RX ADMIN — MIDAZOLAM 1 MG: 1 INJECTION INTRAMUSCULAR; INTRAVENOUS at 07:40

## 2020-08-18 RX ADMIN — Medication 1 G: at 10:07

## 2020-08-18 RX ADMIN — Medication 100 MCG: at 08:26

## 2020-08-18 RX ADMIN — GLYCOPYRROLATE 0.2 MG: 0.2 INJECTION, SOLUTION INTRAMUSCULAR; INTRAVENOUS at 08:18

## 2020-08-18 RX ADMIN — FENTANYL CITRATE 100 MCG: 50 INJECTION, SOLUTION INTRAMUSCULAR; INTRAVENOUS at 08:00

## 2020-08-18 RX ADMIN — SODIUM CHLORIDE 500 ML: 900 IRRIGANT IRRIGATION at 10:14

## 2020-08-18 RX ADMIN — SODIUM CHLORIDE, POTASSIUM CHLORIDE, SODIUM LACTATE AND CALCIUM CHLORIDE: 600; 310; 30; 20 INJECTION, SOLUTION INTRAVENOUS at 07:40

## 2020-08-18 RX ADMIN — Medication 2 G: at 08:07

## 2020-08-18 RX ADMIN — Medication 100 MCG: at 10:10

## 2020-08-18 RX ADMIN — Medication 100 MCG: at 08:00

## 2020-08-18 RX ADMIN — DEXMEDETOMIDINE HYDROCHLORIDE 20 MCG: 100 INJECTION, SOLUTION INTRAVENOUS at 07:45

## 2020-08-18 RX ADMIN — Medication 100 MCG: at 08:20

## 2020-08-18 RX ADMIN — Medication 100 MCG: at 08:10

## 2020-08-18 RX ADMIN — DEXAMETHASONE SODIUM PHOSPHATE 4 MG: 4 INJECTION, SOLUTION INTRAMUSCULAR; INTRAVENOUS at 09:52

## 2020-08-18 RX ADMIN — Medication 100 MCG: at 08:41

## 2020-08-18 RX ADMIN — BUPIVACAINE HYDROCHLORIDE 20 ML: 5 INJECTION, SOLUTION EPIDURAL; INTRACAUDAL at 07:45

## 2020-08-18 RX ADMIN — Medication 100 MCG: at 08:35

## 2020-08-18 RX ADMIN — PROPOFOL 180 MG: 10 INJECTION, EMULSION INTRAVENOUS at 08:00

## 2020-08-18 RX ADMIN — Medication 5 MG: at 10:05

## 2020-08-18 RX ADMIN — Medication 100 MCG: at 08:05

## 2020-08-18 RX ADMIN — GLYCOPYRROLATE 0.2 MG: 0.2 INJECTION, SOLUTION INTRAMUSCULAR; INTRAVENOUS at 10:13

## 2020-08-18 RX ADMIN — Medication 100 MCG: at 09:56

## 2020-08-18 RX ADMIN — DEXAMETHASONE SODIUM PHOSPHATE 2 MG: 10 INJECTION, SOLUTION INTRAMUSCULAR; INTRAVENOUS at 07:45

## 2020-08-18 RX ADMIN — SODIUM CHLORIDE 500 ML: 900 IRRIGANT IRRIGATION at 10:15

## 2020-08-18 RX ADMIN — SODIUM CHLORIDE 1500 ML: 900 IRRIGANT IRRIGATION at 09:45

## 2020-08-18 RX ADMIN — Medication 5 MG: at 08:37

## 2020-08-18 RX ADMIN — LIDOCAINE HYDROCHLORIDE 60 MG: 20 INJECTION, SOLUTION INFILTRATION; PERINEURAL at 08:00

## 2020-08-18 ASSESSMENT — MIFFLIN-ST. JEOR: SCORE: 1728.19

## 2020-08-18 NOTE — ANESTHESIA CARE TRANSFER NOTE
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    Anesthesia Type:   General, Peripheral Nerve Block, For Post-op pain in coordination with surgeon     Note:  Airway :LMA  Patient transferred to:PACU  Comments: Airway :Face Mask  Patient transferred to:PACU  Comments: Prior to LMA removal, observed with spontaneous breathing with regular pattern and proper tidal volumes. Patient woke up, opened their eyes to verbal stimuli and followed basic commands. Patient was suctioned and LMA was removed   Transported to PACU on 6L O2 via face mask.   VSS upon arrival to PACU.  Patient denies nausea or pain at this time.   Care transfer plan communicated, appropriate time for questions was given and patient care transferred to PACU RN       Jerel Renee MD    Handoff Report: Identifed the Patient, Identified the Reponsible Provider, Reviewed the pertinent medical history, Discussed the surgical course, Reviewed Intra-OP anesthesia mangement and issues during anesthesia, Set expectations for post-procedure period and Allowed opportunity for questions and acknowledgement of understanding      Vitals: (Last set prior to Anesthesia Care Transfer)    CRNA VITALS  8/18/2020 1024 - 8/18/2020 1103      8/18/2020             NIBP:  112/58    Ht Rate:  51                Electronically Signed By: Jerel Renee MD  August 18, 2020  11:03 AM

## 2020-08-18 NOTE — ANESTHESIA POSTPROCEDURE EVALUATION
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     Anesthesia Type: General, Peripheral Nerve Block, For Post-op pain in coordination with surgeon          Postop Pain Control: Uneventful            Sign Out: Well controlled pain   PONV: No   Neuro/Psych: Uneventful            Sign Out: Acceptable/Baseline neuro status   Airway/Respiratory: Uneventful            Sign Out: Acceptable/Baseline resp. status   CV/Hemodynamics: Uneventful            Sign Out: Acceptable CV status   Other NRE: NONE   DID A NON-ROUTINE EVENT OCCUR? No    Event details/Postop Comments:  Patient tolerated procedure well. Ready for discharge home with .         Last Anesthesia Record Vitals:  CRNA VITALS  2020 1024 - 2020 1124      2020             NIBP:  112/58    Ht Rate:  51          Last PACU Vitals:  Vitals Value Taken Time   /95 2020 11:45 AM   Temp 36.5  C (97.7  F) 2020 11:30 AM   Pulse 55 2020 11:45 AM   Resp 26 2020 11:46 AM   SpO2 98 % 2020 11:46 AM   Temp src     NIBP 112/58 2020 10:58 AM   Pulse     SpO2     Resp     Temp     Ht Rate 51 2020 10:58 AM   Temp 2     Vitals shown include unvalidated device data.      Electronically Signed By: Julien Yoder MD, 2020, 12:18 PM

## 2020-08-18 NOTE — BRIEF OP NOTE
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]  Post-operative diagnosis Same as pre-operative diagnosis    Procedure: Procedure(s):  Removal of right shoulder antibiotic spacer  and conversion to reverse total shoulder arthroplasty  Surgeon: Surgeon(s) and Role:     * Analilia Aceves MD - Primary     * Caleb Vazquez PA-C - Assisting  Anesthesia: Choice   Estimated blood loss: 200 ml  Drains: Hemovac  Specimens:   ID Type Source Tests Collected by Time Destination   1 : Right Shoulder #1 Tissue Shoulder ANAEROBIC BACTERIAL CULTURE, TISSUE CULTURE AEROBIC BACTERIAL Analilia Aceves MD 8/18/2020  8:41 AM    2 : Right Shoulder #2 Tissue Shoulder ANAEROBIC BACTERIAL CULTURE, TISSUE CULTURE AEROBIC BACTERIAL Analilia Aceves MD 8/18/2020  8:42 AM    3 : Right Shoulder #3 Tissue Shoulder ANAEROBIC BACTERIAL CULTURE, TISSUE CULTURE AEROBIC BACTERIAL Analilia Aceves MD 8/18/2020  9:09 AM      Findings:   None.  Complications: None.  Implants:   Implant Name Type Inv. Item Serial No.  Lot No. LRB No. Used Action   COMPREHENSIVE REVERSE SHOULDER LARGE AUGMENTED BASEPLATE WITH MINI TAPER ADAPTER POROUS PLASMA UNCEMENTED    BI[employer]ET [id number] Right 1 Implanted   COMPREHENSIVE REVERSE SHOULDER CENTRAL SCREW 6.5 MM , 20 MM LENGTH    BIOMET [id number] Right 1 Implanted   IMP SCR LOCKING BIOM REV SHLDR 3.5 HEX [id number] Total Joint Component/Insert IMP SCR LOCKING BIOM REV SHLDR 3.5 HEX [id number]  [employer] [id number] Right 1 Implanted   IMP SCR LOCKING BIOM REV SHLDR 3.5 HEX [id number] Metallic Hardware/[place] IMP SCR LOCKING BIOM REV SHLDR 3.5 HEX [id number]  [employer] [id number] Right 1 Implanted   IMP SCR LOCKING BIOM REV SHLDR 3.5 HEX [id number] Total Joint Component/Insert IMP SCR LOCKING BIOM REV SHLDR 3.5 HEX [id number] [id number]  [employer] [id number]  Right 1 Implanted   IMP SCR LOCKING BIOM REV SHLDR 3.5 HEX [id number] Metallic Hardware/[place] IMP SCR LOCKING BIOM REV SHLDR 3.5 HEX [id number] 046249  [employer] [id number] Right 1 Implanted   IMP GLENOSPHERE BIOM REV SHLDR 36MM STD [id number] Total Joint Component/Insert IMP GLENOSPHERE BIOM REV SHLDR 36MM STD [id number]  [employer] [id number] Right 1 Implanted   COMPREHENSIVE SHOULDER SYSTEM PRIMARY SHOULDER STEM MICRO LENGTH POROUS PLASMA 11 MM ; 55 MM LONG    BIOMET [id number] Right 1 Implanted   COMPREHENSIVE REVERSE SHOULDER SYSTEM PROLONG HIGHLY CROSSLINKED POLYETHYLENE BEARING STANDARD 36 MM DIAMETER     MAICOL [id number] Right 1 Implanted   COMPREHENSIVE REVERSE SHOULDER SYSTEM MINI HUMERAL TRAY STANDARD THICKNESS  +3 MM TAPER OFFSET 40 MM MATI    MAICOL [id number] Right 1 Implanted     PLAN:      Orthopedics Primary  Activity: Up with assist until independent.   Weight bearing status: NWB RUE  Pain management: Per pre-op pain consult recs  Antibiotics: None  Diet: Begin with clear fluids and progress diet as tolerated.   DVT prophylaxis: 162 mg asa daily x 1 month starting POD1          Imaging: AP & Grashey in PACU  Labs: None  Bracing/Splinting: Shoulder immobilizer on at all times except PT, hygiene  Dressings: Keep c/d/i.  Remove Aquacel POD5-7  Drains: remove prior to discharge  Physical Therapy/Occupational Therapy: Eval and treat.  Follow TSA protocol  Consults: None  Follow-up: Clinic in 2 weeks for wound check and xrays with Dr. Aceves  Disposition: Discharge home pending medical stability, pain tolerance on oral meds

## 2020-08-18 NOTE — DISCHARGE INSTRUCTIONS
Same-Day Surgery   Adult Discharge Orders & Instructions     For 24 hours after surgery:  1. Get plenty of rest.  A responsible adult must stay with you for at least 24 hours after you leave the hospital.   2. Pain medication can slow your reflexes. Do not drive or use heavy equipment.  If you have weakness or tingling, don't drive or use heavy equipment until this feeling goes away.  3. Mixing alcohol and pain medication can cause dizziness and slow your breathing. It can even be fatal. Do not drink alcohol while taking pain medication.  4. Avoid strenuous or risky activities.  Ask for help when climbing stairs.   5. You may feel lightheaded.  If so, sit for a few minutes before standing.  Have someone help you get up.   6. If you have nausea (feel sick to your stomach), drink only clear liquids such as apple juice, ginger ale, broth or 7-Up.  Rest may also help.  Be sure to drink enough fluids.  Move to a regular diet as you feel able. Take pain medications with a small amount of solid food, such as toast or crackers, to avoid nausea.   7. A slight fever is normal. Call the doctor if your fever is over 100 F (37.7 C) (taken under the tongue) or lasts longer than 24 hours.  8. You may have a dry mouth, muscle aches, trouble sleeping or a sore throat.  These symptoms should go away after 24 hours.  9. Do not make important or legal decisions.   Pain Management:      1. Take pain medication (if prescribed) for pain as directed by your physician.        2. WARNING: If the pain medication you have been prescribed contains Tylenol  (acetaminophen), DO NOT take additional doses of Tylenol (acetaminophen).     Call your doctor for any of the followin.  Signs of infection (fever, growing tenderness at the surgery site, severe pain, a large amount of drainage or bleeding, foul-smelling drainage, redness, swelling).    2.  It has been over 8 to 10 hours since surgery and you are still not able to urinate (pee).    3.   Headache for over 24 hours.    4.  Numbness, tingling or weakness the day after surgery (if you had spinal anesthesia).  To contact a doctor, call Dr. Aceves's clinic at [phone]  or:      [phone] and ask for the Resident On Call for: Orthopedics (answered 24 hours a day)       Emergency Department:    [place] [facility]: [phone]  [place] [facility]: [phone]

## 2020-08-18 NOTE — ANESTHESIA PROCEDURE NOTES
Peripheral Nerve Block Procedure Note    Date/Time: 8/18/2020 7:45 AM  Staff -   Anesthesiologist:  Julien Yoder MD      Performed By: anesthesiologist        Location: Pre-op  Procedure Start/Stop TImes:      8/18/2020 7:35 AM     8/18/2020 7:45 AM    patient identified, IV checked, site marked, risks and benefits discussed, informed consent, monitors and equipment checked, pre-op evaluation, at physician/surgeon's request and post-op pain management      Correct Patient: Yes      Correct Position: Yes      Correct Site: Yes      Correct Procedure: Yes      Correct Laterality:  Yes    Site Marked:  Yes  Procedure details:     Procedure:  Interscalene    ASA:  3    Laterality:  Right    Position:  Sitting    Sterile Prep: chloraprep, mask and sterile gloves      Needle:  Insulated    Needle gauge:  21    Needle length (inches):  4    Ultrasound: Yes      Ultrasound used to identify targeted nerve, plexus, or vascular structure and placed a needle adjacent to it      Permanent Image entered into patiient's record      Abnormal pain on injection: No      Blood Aspirated: No      Paresthesias:  No    Bleeding at site: No      Bolus via:  Needle    Infusion Method:  Single Shot  Assessment/Narrative:     Injection made incrementally with aspirations every (mL):  5

## 2020-08-18 NOTE — OR NURSING
Dr. Aceves present to patient's bedside. Right shoulder drain was removed per Dr. Aceves. Verbal ok to discharge when patient's sister arrives.

## 2020-08-19 ENCOUNTER — TELEPHONE (OUTPATIENT)
Dept: ORTHOPEDICS | Facility: CLINIC | Age: 60
End: 2020-08-19

## 2020-08-19 NOTE — TELEPHONE ENCOUNTER
Dr Aceves was consulted concerning patient's request for iron infusions.  The recommendation is to follow-up with his primary provider.  Patient was informed and is agreeable with this.  He was told he can come to the clinic to get a thumb strap.  He is aware Dr Aceves's ATC and RN will be in clinic Friday.

## 2020-08-19 NOTE — TELEPHONE ENCOUNTER
Health Call Center    Phone Message    May a detailed message be left on voicemail: yes     Reason for Call: Other: D.O.S. 8/18/2020 Right Shoulder, Pt. called indicating he thinks his hemoglobin is down, states he had iron drips prior to surgery.  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  Pt. was put through to [facility] Triage Nurse, Marva.  Please follow up with Pt.  Pt. also says his sling is missing a small piece of velcro strap for the thumb, and would like to know if there is something to do to fix this. Please call Pt. back at [phone].     Action Taken: Message routed to:  [facility] ([facility] Ortho     Travel Screening: Not Applicable

## 2020-08-20 ENCOUNTER — OFFICE VISIT (OUTPATIENT)
Dept: FAMILY MEDICINE | Facility: CLINIC | Age: 60
End: 2020-08-20
Payer: COMMERCIAL

## 2020-08-20 ENCOUNTER — RECORDS - HEALTHEAST (OUTPATIENT)
Dept: ADMINISTRATIVE | Facility: OTHER | Age: 60
End: 2020-08-20

## 2020-08-20 VITALS
BODY MASS INDEX: 28.14 KG/M2 | OXYGEN SATURATION: 99 % | HEIGHT: 71 IN | DIASTOLIC BLOOD PRESSURE: 76 MMHG | SYSTOLIC BLOOD PRESSURE: 136 MMHG | RESPIRATION RATE: 16 BRPM | WEIGHT: 201 LBS | HEART RATE: 72 BPM | TEMPERATURE: 98.2 F

## 2020-08-20 DIAGNOSIS — K91.2 POSTSURGICAL MALABSORPTION: Primary | ICD-10-CM

## 2020-08-20 DIAGNOSIS — D50.0 IRON DEFICIENCY ANEMIA SECONDARY TO BLOOD LOSS (CHRONIC): ICD-10-CM

## 2020-08-20 PROBLEM — Z96.611 S/P REVERSE TOTAL SHOULDER ARTHROPLASTY, RIGHT: Status: ACTIVE | Noted: 2020-08-18

## 2020-08-20 LAB
HCT VFR BLD AUTO: 31.2 % (ref 40–53)
HEMOGLOBIN: 9.9 G/DL (ref 13.3–17.7)
MCH RBC QN AUTO: 23.6 PG (ref 26.5–35)
MCHC RBC AUTO-ENTMCNC: 31.7 G/DL (ref 32–36)
MCV RBC AUTO: 74.3 FL (ref 78–100)
PLATELET # BLD AUTO: 223 K/UL (ref 150–450)
RBC # BLD AUTO: 4.2 M/UL (ref 4.4–5.9)
WBC # BLD AUTO: 8 K/UL (ref 4–11)

## 2020-08-20 ASSESSMENT — MIFFLIN-ST. JEOR: SCORE: 1743.6

## 2020-08-20 NOTE — PROGRESS NOTES
HPI:       Kobe Buchanan is a 60 year old with PMH significant for gastric bypass, multiple shoulder surgeries and iron deficiency anemia who presents for follow up of iron deficiency anemia. Started having iron deficiency anemia after gastric bypass. Takes iron supplements.     Patient was found to have a hemoglobin of 9.3 on 8/5/20 and received an infusion on 8/6/20 and 8/13/20. Was admitted on 8/18 for surgery for infection of bone of shoulder girdle that has been ongoing. This procedure was a removal of right shoulder antibiotic spacer and conversion to reverse total shoulder arthroplasty. Hemoglobin was measured before surgery and noted to be 11.3.    Patient has been feeling run down following his surgery, and wonders if his hemoglobin has dropped again. He denies lightheadedness. He would like his hemoglobin to be checked again, and for an infusion to be performed if it is low.          History:     Patient Active Problem List   Diagnosis     Essential hypertension     Hyperlipidemia     Abdominal pain, unspecified abdominal location     Ascending aortic aneurysm (H)     Medication refill- do not delete      Pain medication agreement signed     S/P shoulder replacement     BPPV (benign paroxysmal positional vertigo)     Shoulder joint pain     Obstructive sleep apnea     Obesity     Rhinitis     Right knee pain     Status post coronary angiogram     Atrial fibrillation (H) [I48.91]     Trigger finger of right thumb     Dizzy     Spondylosis of cervical region without myelopathy or radiculopathy     Bone infection, shoulder region (H)     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)     Acute blood loss anemia     Septic joint of right shoulder region (H)     MSSA bacteremia     Moderate protein-calorie malnutrition (H)     Left shoulder pain     Septic joint of left shoulder region (H)     S/P shoulder replacement, left     Infection of prosthetic shoulder joint, subsequent encounter      Elevated troponin     Acute systolic heart failure (H)     NSTEMI (non-ST elevated myocardial infarction) (H)     Dysthymic disorder     NEMESIO (acute kidney injury) (H)     Status post total shoulder arthroplasty, left     Status post total shoulder arthroplasty     S/P reverse total shoulder arthroplasty, left     S/P reverse total shoulder arthroplasty, right       Current Outpatient Medications   Medication Sig Dispense Refill     acetaminophen (TYLENOL) 325 MG tablet Take 3 tablets (975 mg) by mouth every 6 hours as needed for mild pain 50 tablet 1     acetaminophen (TYLENOL) 325 MG tablet Take 2 tablets (650 mg) by mouth every 4 hours as needed for mild pain 100 tablet 0     amLODIPine-benazepril (LOTREL) 5-20 MG capsule Take 1 capsule by mouth 2 times daily       amoxicillin (AMOXIL) 500 MG capsule Take 4 tablets 1 hour before dental procedure 24 capsule 4     aspirin 81 MG EC tablet Take 2 tablets (162 mg) by mouth daily before breakfast 60 tablet 0     atorvastatin (LIPITOR) 40 MG tablet Take 1 tablet (40 mg) by mouth daily (Patient taking differently: Take 40 mg by mouth every evening ) 90 tablet 1     buPROPion (WELLBUTRIN SR) 200 MG 12 hr tablet TAKE 1 TABLET BY MOUTH 2 TIMES DAILY 180 tablet 3     carvedilol (COREG) 25 MG tablet Take 1 tablet (25 mg) by mouth 2 times daily (with meals) 180 tablet 3     clonazePAM (KLONOPIN) 0.5 MG tablet Take 1 tablet (0.5 mg) by mouth 3 times daily as needed for anxiety 90 tablet 1     Cyanocobalamin 5000 MCG SUBL Place 5,000 mcg under the tongue daily Vitamin B12       cyclobenzaprine (FLEXERIL) 10 MG tablet Take 1 tablet (10 mg) by mouth 3 times daily as needed for muscle spasms 90 tablet 3     desoximetasone (TOPICORT) 0.25 % external cream Apply topically daily as needed for inflammation or itching       docusate sodium (COLACE) 100 MG capsule Take 1 capsule in the morning and 2 capsules in the evening       Fe Heme Polypeptide-folic acid (PROFERRIN-FORTE) 12-1 MG  TABS Take 1 tablet by mouth 2 times daily 90 tablet 0     fentaNYL (DURAGESIC) 75 mcg/hr 72 hr patch Place 1 patch onto the skin every 48 hours        fluocinonide (LIDEX) 0.05 % external ointment Apply twice daily to itchy skin nodules for 1-2 weeks at a time. (Patient taking differently: as needed Apply twice daily to itchy skin nodules for 1-2 weeks at a time.) 30 g 3     ibuprofen (ADVIL/MOTRIN) 600 MG tablet Take 1 tablet (600 mg) by mouth every 6 hours as needed for other (mild and/or inflammatory pain) 30 tablet 1     Multiple Minerals-Vitamins (CALCIUM CITRATE-MAG-MINERALS) TABS Take 1 tablet by mouth daily       multivitamin w/minerals (THERA-VIT-M) tablet Take 1 tablet by mouth daily       naloxone (NARCAN) nasal spray Spray 1 spray (4 mg) into one nostril alternating nostrils as needed for opioid reversal every 2-3 minutes until assistance arrives 0.2 mL 0     naproxen (NAPROSYN DR) 500 MG EC tablet Take 500 mg by mouth 2 times daily as needed (pain) 60 tablet 3     oxyCODONE (ROXICODONE) 5 MG tablet Take 2-3 tablets (10-15 mg) by mouth every 3 hours as needed for moderate to severe pain 30 tablet 0     pantoprazole (PROTONIX) 40 MG EC tablet Take 40 mg by mouth daily as needed for heartburn       senna-docusate (SENOKOT-S/PERICOLACE) 8.6-50 MG tablet Take 1-2 tablets by mouth 2 times daily 30 tablet 0     sucralfate (CARAFATE) 1 GM tablet Take 1 tablet (1 g) by mouth 2 times daily as needed (Reflux) 180 tablet 1     tacrolimus (PROTOPIC) 0.1 % ointment Apply topically as needed Apply to affected areas on body. (Patient not taking: Reported on 5/6/2020) 120 g 11     vitamin B-Complex Take 1 tablet by mouth daily         Social History     Social History Narrative     Not on file      Allergies   Allergen Reactions     Ciprofloxacin      History of aortic aneurysms     Tape [Adhesive Tape] Blisters     Blistering - please use paper tape            Review of Systems:     ROS neg other than the symptoms  "noted above in the HPI.          Physical Exam:     /76 (BP Location: Left arm, Cuff Size: Adult Regular)   Pulse 72   Temp 98.2  F (36.8  C) (Oral)   Resp 16   Ht 1.795 m (5' 10.67\")   Wt 91.2 kg (201 lb)   SpO2 99%   BMI 28.30 kg/m      GENERAL APPEARANCE: healthy, alert and no distress,  EYES: Eyes grossly normal to inspection,  PERRL  RESP: lungs clear to auscultation - no rales, rhonchi or wheezes  CV: regular rates and rhythm, holosystolic murmur noted at lower right sternal border  MS: right shoulder in sling  SKIN: no suspicious lesions or rashes  NEURO: Normal strength and tone, sensory exam grossly normal, mentation appears intact and speech normal  PSYCH: mood and affect normal/bright    CBC RESULTS:   Recent Labs   Lab Test 08/20/20  1447  08/05/20  0918   WBC  --   --  6.4   RBC  --   --  4.27*   HGB 9.9*   < > 9.3*   HCT 31.2*  --  31.2*   MCV 74.3*  --  73*   MCH 23.6*  --  21.8*   MCHC 31.7*  --  29.8*   RDW  --   --  17.0*   PLT  --   --  295    < > = values in this interval not displayed.          Assessment and Plan:     Patient is a 60 year old male seen for:     1. Postsurgical malabsorption  2. Iron deficiency anemia secondary to blood loss (chronic)  Patient has been experiencing iron deficiency anemia since his gastric bypass surgery in 2005, and takes iron supplements for this issue and has received multiple iron infusions. His chronic anemia is often exacerbated by surgical procedures that result in blood loss. His preoperative hemoglobin was 11.3 two days ago, now down to 9.9 today. Given symptomatic anemia with hemoglobin <10, will plan for outpatient iron infusion. Patient encouraged to continue oral iron supplements.   - CBC with Pl[facility] ([facility])    Options for treatment and follow-up care were reviewed with the patient and/or guardian. Kobe Buchanan and/or guardian engaged in the decision making process and verbalized understanding of the options discussed and agreed " with the final plan.    Tarah Calderon, MS4    I was present with the medical student who participated in the service and in the documentation of this note. I have verified the history and personally performed the physical exam and medical decision making, and have verified the content of the note, which accurately reflects my assessment of the patient and the plan of care.    Eva Gaitan MD  [facility] Resident  P: [phone]    Precepted with: Angely Larose, DO

## 2020-08-21 NOTE — OP NOTE
"DATE OF PROCEDURE: 8/18/2020    PREOPERATIVE DIAGNOSIS:  1. History of infected right total shoulder arthroplasty  2. Status post explant of infected left total shoulder arthroplasty and placement of antibiotic spacer    POSTOPERATIVE DIAGNOSIS:   1. History of infected right total shoulder arthroplasty  2. Status post explant of infected left total shoulder arthroplasty and placement of antibiotic spacer     PROCEDURE:   1. Removal of right shoulder antibiotic spacer  2. Revision right reverse total shoulder arthroplasty.     STAFF SURGEON: Analilia Aceves MD.   ASSISTANT: Caleb Vazquez PA-C; Kaila Delaney and Wu Hurtado MD  The assistance of Caleb Vazquez was needed for retraction, patient positioning and due to the absence of a suitably qualified available orthopedic resident to assist.     NOTE ON COMPLEXITY: Modifier 22 is requested given the revision nature of the procedure and additional time (50%) required to address scar/adhesions as well as the glenoid bone loss due to previous glenoid loosening.    ANESTHESIA: General endotracheal anesthesia.   ESTIMATED BLOOD LOSS: 200 mL.   COMPLICATIONS: None.   DRAINS: Hemovac x1.     IMPLANTS:   1. Biomet Comprehensive Shoulder humeral stem, 11x55  2. Biomet Comprehensive Shoulder large augment glenoid baseplate (central 6.5 screw and 4 peripheral locking screws)  3. Biomet Comprehensive Shoulder 36mm Glenosphere with \"B\" offset inferiorly  4. Biomet Comprehensive Shoulder +3 offset tray and standard bearing    BRIEF PATIENT HISTORY: This patient is known to me from clinic where we have discussed the patient's history and desire for spacer removal and revision to reverse total shoulder. We discussed revision reverse total shoulder arthroplasty including the risks and benefits and perioperative rehabilitation plan. The patient had the opportunity for questions to be answered and wished to proceed with surgery. Informed consent was completed.     DESCRIPTION OF " PROCEDURE: The patient was identified in the preoperative area and the correct right shoulder was marked for surgery. The patient was provided an interscalene block by our anesthesia colleagues. He was taken to the operating room where he was surrendered to general endotracheal anesthesia. He was moved to the operating table in the beachchair position with all bony prominences well padded. The head was placed in a head rain with the neck in neutral position. The right upper extremity was prepped and draped in the usual sterile fashion. A timeout was held in accordance with hospital policy, confirming correct patient, site, side, procedure and administration of IV antibiotics prior to incision. I completed a deltopectoral incision and approach through the previous incision. I removed the prolene sutures in the subQ tissue which have been bothering the patient. I identified and entered the deltopec interval. I came underneath the deltoid and freed the significant subdeltoid adhesions. I dissected around the proximal humerus subperiosteally so as to mobilize the humerus. I then dislocated the humerus anteriorly. I removed some of the cement collar medially and freed the spacer and was able to remove it completely. I then used a currette and rongeur to debride the canal and proximal humerus. Tissue was sent for culture. I sized the humeral stem to an 11. I then turned my attention to the glenoid. I performed inferior capsular releases and removed all fibrous tissue over the glenoid surface. I prepared the glenoid for the large augment baseplate using the patient specific Signature Guide. This directed the central pin and reaming depth.   Before placing any new implants I irrigated the shoulder with 1L Bactisure and then 1L saline. I then impacted the augmented baseplate and screws were placed. I then applied the 36 glenosphere and trialed a polyethylene liner. A +3 offset with standard liner provided forward elevation  to 165 degrees with external rotation at the side to 50 without booking anteriorly. There was also internal rotation to the abdomen and contralateral shoulder without impingement. I removed all trial humeral components. I impacted the stem in the appropriate version. I impacted the final polyethylene liner with the same range of motion as above. I then copiously irrigated the wound. I irrigated the wound then with 15 mL a sterile Betadine and 500 mL of saline. This was then irrigated out with antibiotic saline. A Hemovac drain was placed. The wound was then closed in layered fashion with absorbable suture. Steri-Strips and soft sterile dressing were repaired. The arm was placed into an abduction sling and the patient was extubated and transported to recovery in stable condition. There were no apparent intraoperative complications.     POSTOPERATIVE PLAN:   1. The patient will be admitted to the Orthopedic Service if needed for pain. If he is comfortable he will be allowed to discharge to home same day. There will be no shoulder range of motion for 6 weeks but the patient can begin immediate hand, wrist and elbow range of motion.   2. The patient will be seen by me in the clinic for wound check at 2 weeks.     VICENTE MAYS MD

## 2020-08-21 NOTE — PROGRESS NOTES
Completed visit note, IV Iron completed order, demographic information and labs faxed to [facility] [phone] @0845am this morning. [facility] to contact patient to coordinate infusion appointments.  Baylee RIGGS

## 2020-08-23 LAB
BACTERIA SPEC CULT: NO GROWTH
SPECIMEN SOURCE: NORMAL

## 2020-08-24 ENCOUNTER — TELEPHONE (OUTPATIENT)
Dept: FAMILY MEDICINE | Facility: CLINIC | Age: 60
End: 2020-08-24

## 2020-08-24 ENCOUNTER — RECORDS - HEALTHEAST (OUTPATIENT)
Dept: ADMINISTRATIVE | Facility: OTHER | Age: 60
End: 2020-08-24

## 2020-08-24 NOTE — PROGRESS NOTES
Preceptor Attestation:   Patient seen, evaluated and discussed with the resident. I have verified the content of the note, which accurately reflects my assessment of the patient and the plan of care.  Supervising Physician:Angely Larose DO [facility]

## 2020-08-24 NOTE — TELEPHONE ENCOUNTER
I called [facility] to verify they have received my faxed order and pertinent information. Spoke with Infusion nurse who verifies they have received all information is in process for billing approval(goes to pharmacy to process).  Have patient allow 48 hours from today  Per infusion nurse they should receive response from pharmacy, if approved/covered they will contact Kobe within 48 hours from today.  Kobe informed of above information. RLtony RIGGS

## 2020-08-24 NOTE — TELEPHONE ENCOUNTER
[facility] phone call message- general phone call:    Reason for call: Patient called stating he was seen on 8/20/20 by  and was mentioned at appointment that his hemoglobin was low and states he would need a iron infusion appointment but has not heard a call back for that appointment from the [facility]. Unsure what patient meant but please call and advise      Return call needed: Yes    OK to leave a message on voice mail? Yes    Primary language: English      needed? No    Call taken on August 24, 2020 at 12:12 PM by Krupa Fu

## 2020-08-25 ENCOUNTER — AMBULATORY - HEALTHEAST (OUTPATIENT)
Dept: PHARMACY | Facility: HOSPITAL | Age: 60
End: 2020-08-25

## 2020-08-25 DIAGNOSIS — K91.2 MALNUTRITION FOLLOWING GASTROINTESTINAL SURGERY: ICD-10-CM

## 2020-08-25 DIAGNOSIS — D50.0 IRON DEFICIENCY ANEMIA SECONDARY TO BLOOD LOSS (CHRONIC): ICD-10-CM

## 2020-09-01 LAB
BACTERIA SPEC CULT: NORMAL
Lab: NORMAL
SPECIMEN SOURCE: NORMAL

## 2020-09-02 ENCOUNTER — OFFICE VISIT (OUTPATIENT)
Dept: ORTHOPEDICS | Facility: CLINIC | Age: 60
End: 2020-09-02
Payer: COMMERCIAL

## 2020-09-02 DIAGNOSIS — Z96.611 STATUS POST TOTAL SHOULDER ARTHROPLASTY, RIGHT: Primary | ICD-10-CM

## 2020-09-02 NOTE — NURSING NOTE
Reason For Visit:   Chief Complaint   Patient presents with     Surgical Followup     2 week pop DOS 8/18/20 Removal of right shoulder antibiotic spacer and conversion to reverse total shoulder arthroplasty (Right)         PCP: Michael Styles  Ref: Self     ?  No  Occupation Retired.  Currently working? No.  Work status?  Retired.  Date of injury: ongoing     Date of surgery: R TSA 8/26/2014  L TSA 04/15/2014     Date of surgery: 9/23/19  Type of surgery: I and D Right shoulder at [facility]     Date of surgery: 11/15/19  Type of surgery:   1. Explant of left total shoulder arthroplasty  2. Irrigation and excisional debridement left shoulder  3. Placement of antibiotic spacer     Date of surgery: 1/28/20  Type of surgery:   1. Left shoulder arthroscopy  2. Left shoulder arthroscopic biopsy/culture       Date of surgery:5/8/20  Type of surgery:  1. Removal of left shoulder antibiotic spacer  2. Left shoulder excisional I&D  3. Placement of custom left reverse total shoulder arthroplasty (custom baseplate, Vault Reconstruction System)     Date of surgery: 8/18/20  Type of surgery:   1. Removal of right shoulder antibiotic spacer  2. Revision right reverse total shoulder arthroplasty.   Smoker: No  Request smoking cessation information: No     Right hand dominant    SANE score  Affected shoulder: Right  Right shoulder SANE: 10  Left shoulder SANE: 50    There were no vitals taken for this visit.      Pain Assessment  Patient Currently in Pain: Snow Obregon, ATC

## 2020-09-02 NOTE — LETTER
9/2/2020         RE: Kobe Buchanan  [address]  [place] [address]        Dear Colleague,    Thank you for referring your patient, Kobe Buchanan, to the [facility]. Please see a copy of my visit note below.    CHIEF CONCERN:   1. Status post right shoulder removal of antibiotic spacer and placement of reverseTSA (8/18/20)  2. Status post left shoulder removal of antibiotic spacer and placement of custom VRS reverse TSA (5/8/2020)  HISTORY OF PRESENT ILLNESS: Mr. Buchanan is a 60 year-old male who is 2 weeks status post the above right shoulder procedure. He is doing well from his latest surgery (he discharged home POD0 and pain has been controlled). He did try to push hard on something with his left shoulder and was reminded of his restrictions and the limitation of his arthroplasties.  EXAM:  Pleasant adult male in no distress.  Respirations even and unlabored.  Right upper extremity:  Incision well healing. No erythema. Sens intact Ax/Musc/Med/Rad/Uln nerves. Motor intact EPL, FPL, and Intrinsics. Shoulder range of motion not tested on the right.   Left shoulder AROM is FE to 160, ER at side to 45.  ASSESSMENT:  1. Two weeks status post above right shoulder procedure.    PLAN:    Range of Motion:No shoulder ROM on right per operative note.     Sling: Continue sling except for bathing/dressing/rehab    Follow up: in 4 weeks.  Will discontinue sling at that time and initiate AROM.      Again, thank you for allowing me to participate in the care of your patient.        Sincerely,        Analilia Aceves MD

## 2020-09-02 NOTE — PROGRESS NOTES
CHIEF CONCERN:   1. Status post right shoulder removal of antibiotic spacer and placement of reverseTSA (8/18/20)  2. Status post left shoulder removal of antibiotic spacer and placement of custom VRS reverse TSA (5/8/2020)    HISTORY OF PRESENT ILLNESS: Mr. Buchanan is a 60 year-old male who is 2 weeks status post the above right shoulder procedure. He is doing well from his latest surgery (he discharged home POD0 and pain has been controlled). He did try to push hard on something with his left shoulder and was reminded of his restrictions and the limitation of his arthroplasties.    EXAM:  Pleasant adult male in no distress.  Respirations even and unlabored.  Right upper extremity:  Incision well healing. No erythema. Sens intact Ax/Musc/Med/Rad/Uln nerves. Motor intact EPL, FPL, and Intrinsics. Shoulder range of motion not tested on the right.   Left shoulder AROM is FE to 160, ER at side to 45.    ASSESSMENT:  1. Two weeks status post above right shoulder procedure.    PLAN:    Range of Motion:No shoulder ROM on right per operative note.     Sling: Continue sling except for bathing/dressing/rehab    Follow up: in 4 weeks.  Will discontinue sling at that time and initiate AROM.

## 2020-09-25 DIAGNOSIS — F43.9 STRESS: ICD-10-CM

## 2020-09-25 RX ORDER — CLONAZEPAM 0.5 MG/1
0.5 TABLET ORAL 3 TIMES DAILY PRN
Qty: 90 TABLET | Refills: 1 | Status: SHIPPED | OUTPATIENT
Start: 2020-09-25 | End: 2020-11-23

## 2020-09-30 ENCOUNTER — OFFICE VISIT (OUTPATIENT)
Dept: ORTHOPEDICS | Facility: CLINIC | Age: 60
End: 2020-09-30
Payer: COMMERCIAL

## 2020-09-30 DIAGNOSIS — T84.59XD INFECTION OF PROSTHETIC SHOULDER JOINT, SUBSEQUENT ENCOUNTER: Primary | ICD-10-CM

## 2020-09-30 DIAGNOSIS — Z96.619 INFECTION OF PROSTHETIC SHOULDER JOINT, SUBSEQUENT ENCOUNTER: Primary | ICD-10-CM

## 2020-09-30 NOTE — LETTER
9/30/2020         RE: Kobe Buchanan  [address]  [place] [address]        Dear Colleague,    Thank you for referring your patient, Kobe Buchanan, to the [facility]. Please see a copy of my visit note below.    CHIEF CONCERN:   1. Status post right shoulder removal of antibiotic spacer and placement of reverseTSA (8/18/20)  2. Status post left shoulder removal of antibiotic spacer and placement of custom VRS reverse TSA (5/8/2020)    HISTORY OF PRESENT ILLNESS: Mr. Buchanan is a 60 year-old male who is 6 weeks status post the above right shoulder procedure. He has been doing more motion and activity with both shoulders than I would perhaps like. He states he was doing stretches with the right shoulder nearly as soon as he got home.    EXAM:  Pleasant adult male in no distress.  Respirations even and unlabored.  Right upper extremity:  Incision well healing. No erythema. Sens intact Ax/Musc/Med/Rad/Uln nerves. Motor intact EPL, FPL, and Intrinsics. Shoulder range of motion is active FE to 165, ER at side to 50, and IR to back pocket.  Left shoulder AROM is FE to 160, ER at side to 45.    ASSESSMENT:  1. Six weeks status post above right shoulder procedure.    PLAN:    Range of Motion:Gentle ROM as tolerated. Reminded patient of restrictions for reverse TSA in general.     Follow up: in 6 weeks with XR of R shoulder.       Again, thank you for allowing me to participate in the care of your patient.      Sincerely,        Analilia Aceves MD

## 2020-09-30 NOTE — NURSING NOTE
Reason For Visit:   Chief Complaint   Patient presents with     Surgical Followup     6 week pop DOS 8/18/20 Removal of right shoulder antibiotic spacer and conversion to reverse total shoulder arthroplasty (Right)        PCP: Michael Styles  Ref: Self     ?  No  Occupation Retired.  Currently working? No.  Work status?  Retired.  Date of injury: ongoing     Date of surgery: R TSA 8/26/2014  L TSA 04/15/2014     Date of surgery: 9/23/19  Type of surgery: I and D Right shoulder at [facility]     Date of surgery: 11/15/19  Type of surgery:   1. Explant of left total shoulder arthroplasty  2. Irrigation and excisional debridement left shoulder  3. Placement of antibiotic spacer     Date of surgery: 1/28/20  Type of surgery:   1. Left shoulder arthroscopy  2. Left shoulder arthroscopic biopsy/culture       Date of surgery:5/8/20  Type of surgery:  1. Removal of left shoulder antibiotic spacer  2. Left shoulder excisional I&D  3. Placement of custom left reverse total shoulder arthroplasty (custom baseplate, Vault Reconstruction System)      Date of surgery: 8/18/20  Type of surgery:   1. Removal of right shoulder antibiotic spacer  2. Revision right reverse total shoulder arthroplasty.   Smoker: No  Request smoking cessation information: No     Right hand dominant    SANE score  Affected shoulder: Bilateral  Right shoulder SANE: 25  Left shoulder SANE: 30    There were no vitals taken for this visit.      Pain Assessment  Patient Currently in Pain: Snow Obregon, ATC

## 2020-10-01 ENCOUNTER — TELEPHONE (OUTPATIENT)
Dept: DERMATOLOGY | Facility: CLINIC | Age: 60
End: 2020-10-01

## 2020-10-01 ENCOUNTER — OFFICE VISIT (OUTPATIENT)
Dept: DERMATOLOGY | Facility: CLINIC | Age: 60
End: 2020-10-01
Payer: COMMERCIAL

## 2020-10-01 DIAGNOSIS — L40.9 PSORIASIS: Primary | ICD-10-CM

## 2020-10-01 DIAGNOSIS — L28.1 PRURIGO NODULARIS: ICD-10-CM

## 2020-10-01 PROCEDURE — 99202 OFFICE O/P NEW SF 15 MIN: CPT | Mod: GC | Performed by: STUDENT IN AN ORGANIZED HEALTH CARE EDUCATION/TRAINING PROGRAM

## 2020-10-01 RX ORDER — TRIAMCINOLONE ACETONIDE 1 MG/G
OINTMENT TOPICAL 2 TIMES DAILY
Qty: 454 G | Refills: 0 | Status: SHIPPED | OUTPATIENT
Start: 2020-10-01 | End: 2021-03-09

## 2020-10-01 ASSESSMENT — PAIN SCALES - GENERAL: PAINLEVEL: NO PAIN (0)

## 2020-10-01 NOTE — LETTER
10/1/2020       RE: Kobe Buchanan  [address]  [place] [address]     Dear Colleague,    Thank you for referring your patient, Kobe Buchanan, to the [facility] [place] at [facility]. Please see a copy of my visit note below.    Formerly [facility] Dermatology Note      Dermatology Problem List:  1. Well demarcated, symmetric erosions on LEs, ddx. Favor psoriasis  2. Prurigo nodularis    Encounter Date: Oct 1, 2020    CC:   Chief Complaint   Patient presents with     Derm Problem     Eczema on legs. Currently using DermaZinc on his legs.      Derm Problem     Would like to discuss scars on arms     History of Present Illness:  Mr. Kobe Buchanan is a 60 year old male who presents in self referral for concerns regarding his lower legs. He states he is worried that he could get superficial bacteria from his skin to his bloodstream. He states this has happened in the past and he had to have both of his shoulder joints replaced and he cannot let this happen again. In the past he has been using an ladan board to the lower legs, he has tried triamcinolone ointment, mometasone ointment, and desoximetasone ointment. Currently he uses dermazinc cream that he gets from his sister. He has a history of picking the skin on his arms from bug bites in his youth which has left scars. He also notes very itchy ears that he itches with his finger nail and 3 times yearly treats with hydrogen peroxide and polymyxin ear drops. The patient is otherwise well today in their baseline state of health, with no additional skin concerns.     Past Medical History:   Patient Active Problem List   Diagnosis     Essential hypertension     Hyperlipidemia     Abdominal pain, unspecified abdominal location     Ascending aortic aneurysm (H)     Medication refill- do not delete      Pain medication agreement signed     S/P shoulder  replacement     BPPV (benign paroxysmal positional vertigo)     Shoulder joint pain     Obstructive sleep apnea     Obesity     Rhinitis     Right knee pain     Status post coronary angiogram     Atrial fibrillation (H) [I48.91]     Trigger finger of right thumb     Dizzy     Spondylosis of cervical region without myelopathy or radiculopathy     Bone infection, shoulder region (H)     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)     Acute blood loss anemia     Septic joint of right shoulder region (H)     MSSA bacteremia     Moderate protein-calorie malnutrition (H)     Left shoulder pain     Septic joint of left shoulder region (H)     S/P shoulder replacement, left     Infection of prosthetic shoulder joint, subsequent encounter     Elevated troponin     Acute systolic heart failure (H)     NSTEMI (non-ST elevated myocardial infarction) (H)     Dysthymic disorder     NEMESIO (acute kidney injury) (H)     Status post total shoulder arthroplasty, left     Status post total shoulder arthroplasty     S/P reverse total shoulder arthroplasty, left     S/P reverse total shoulder arthroplasty, right     Past Medical History:   Diagnosis Date     Anxiety      Ascending aortic aneurysm (H)      Bicuspid aortic valve      BPPV (benign paroxysmal positional vertigo) 7/11/2014     Chronic narcotic use      Chronic neck pain      Chronic osteoarthritis      Degenerative joint disease      Depression      Hyperlipidemia 4/10/2012     Hypertension      Multiple stiff joints      Neck injuries      Obesity 2/9/2015     Pain in shoulder      Skin picking habit      Sleep apnea     does not use cpap     Past Surgical History:   Procedure Laterality Date     ARTHROPLASTY SHOULDER  4/15/2014    Procedure: Left Total Shoulder Arthroplasty ;  Surgeon: Analilia Aceves MD;  [facility]     ARTHROPLASTY SHOULDER Right 8/26/2014    Procedure: ARTHROPLASTY SHOULDER;  Surgeon: Analilia Aceves MD;  [facility]     ARTHROSCOPY  SHOULDER WITH BIOPSY(IES) Left 1/28/2020    Procedure: Left shoulder arthroscopy and biopsy for culture;  Surgeon: Analilia Aceves MD;  [facility]     BYPASS GASTRIC TAVO-EN-Y, LIVER BIOPSY, COMBINED  8/8/2005     C HAND/FINGER SURGERY UNLISTED       C SHOULDER SURG PROC UNLISTED       COLONOSCOPY  6/30/2014    Procedure: COMBINED COLONOSCOPY, SINGLE BIOPSY/POLYPECTOMY BY BIOPSY;  Surgeon: Chester Patton MD;  [facility]     CV CORONARY ANGIOGRAM  1/30/2020    Procedure: CV CORONARY ANGIOGRAM;  Surgeon: Nétsor Walls MD;  [facility]     CYSTOSCOPY, BLADDER NECK CUTS, COMBINED N/A 7/18/2016    Procedure: COMBINED CYSTOSCOPY, BLADDER NECK CUTS;  Surgeon: Ritu Leslie MD;  [facility]     ESOPHAGOSCOPY, GASTROSCOPY, DUODENOSCOPY (EGD), COMBINED  6/30/2014    Procedure: COMBINED ESOPHAGOSCOPY, GASTROSCOPY, DUODENOSCOPY (EGD), BIOPSY SINGLE OR MULTIPLE;  Surgeon: Chester Patton MD;  [facility]     EXCISE MASS FINGER  6/14/2011    Procedure:EXCISE MASS FINGER; Middle Flexor Cyst; Surgeon:SHAYY RUSSELL; [facility]     HAND SURGERY      excision of tendon cyst on left hand     [facility] VASCULAR SURGERY PROCEDURE UNLIST       IR FINE NEEDLE ASPIRATION W ULTRASOUND  11/13/2019     IR PICC PLACEMENT > 5 YRS OF AGE  11/19/2019     LASER HOLMIUM LITHOTRIPSY BLADDER N/A 10/15/2014    Procedure: LASER HOLMIUM LITHOTRIPSY BLADDER;  Surgeon: Sahil Taveras MD;  [facility]     LASER KTP GREEN LIGHT PHOTOSELECTIVE VAPORIZATION PROSTATE  1/23/2014    Procedure: LASER KTP GREEN LIGHT PHOTOSELECTIVE VAPORIZATION PROSTATE;  Greenlight Photovaporization Of Prostate  ;  Surgeon: Sahil Taveras MD;  [facility]     RELEASE TRIGGER FINGER Right 3/31/2017    Procedure: RELEASE TRIGGER FINGER;  Surgeon: Juan Carlos Blunt MD;  [facility]     REMOVE ANTIBIOTIC CEMENT BEADS / SPACER SHOULDER Left 5/8/2020     Procedure: Left shoulder removal of spacer;  Surgeon: Analilia Aceves MD;  [facility]     REMOVE ANTIBIOTIC CEMENT BEADS / SPACER SHOULDER Right 8/18/2020    Procedure: Removal of right shoulder antibiotic spacer;  Surgeon: Analilia Aceves MD;  [facility]     REMOVE HARDWARE ARTHROPLASTY SHOULDER. I&D, PLACE ANTIBIOTIC CEMENT BE Left 11/15/2019    Procedure: Explantation of left total shoulder arthroplasty, irrigation and debridement, and placement of antibiotic spacer;  Surgeon: Analilia Aceves MD;  [facility]     REPAIR ANEURYSM ASCENDING AORTA N/A 10/4/2016    Procedure: REPAIR ANEURYSM ASCENDING AORTA;  Surgeon: Mckenzie Townsend MD;  [facility]     REVERSE ARTHROPLASTY SHOULDER Right 8/18/2020    Procedure: and conversion to reverse total shoulder arthroplasty;  Surgeon: Analilia Aceves MD;  [facility]     TURP  2014       Social History:  Patient reports that he quit smoking about 37 years ago. His smoking use included cigarettes. He started smoking about 43 years ago. He has a 3.00 pack-year smoking history. He has never used smokeless tobacco. He reports that he does not drink alcohol or use drugs.    Family History:  Family History   Problem Relation Age of Onset     Arthritis Other      Gastrointestinal Disease Other      Cardiovascular Father         aortic aneurysm     Arrhythmia Father      Nephrolithiasis Father      Sleep Apnea Father      Anxiety Disorder Father      Depression Father      Hypertension Father      Obesity Father      Hyperlipidemia Father      Coronary Artery Disease Father         history of MI and stent     Low Back Problems Father      Spine Problems Father      Anxiety Disorder Mother      Hypertension Mother      Osteoporosis Mother      Obesity Mother      Hyperlipidemia Mother      Low Back Problems Mother      Anxiety Disorder Sister      Hypertension Sister      Osteoporosis Sister      Obesity Sister       Hyperlipidemia Sister      Low Back Problems Sister      Spine Problems Sister      Anxiety Disorder Sister      Depression Sister      Hypertension Sister      Osteoporosis Sister      Obesity Sister      Hyperlipidemia Sister      Low Back Problems Sister      Melanoma No family hx of      Skin Cancer No family hx of        Medications:  Current Outpatient Medications   Medication Sig Dispense Refill     amLODIPine-benazepril (LOTREL) 5-20 MG capsule Take 1 capsule by mouth 2 times daily       amoxicillin (AMOXIL) 500 MG capsule Take 4 tablets 1 hour before dental procedure 24 capsule 4     aspirin 81 MG EC tablet Take 2 tablets (162 mg) by mouth daily before breakfast 60 tablet 0     buPROPion (WELLBUTRIN SR) 200 MG 12 hr tablet TAKE 1 TABLET BY MOUTH 2 TIMES DAILY 180 tablet 3     carvedilol (COREG) 25 MG tablet Take 1 tablet (25 mg) by mouth 2 times daily (with meals) 180 tablet 3     clonazePAM (KLONOPIN) 0.5 MG tablet Take 1 tablet (0.5 mg) by mouth 3 times daily as needed for anxiety 90 tablet 1     Cyanocobalamin 5000 MCG SUBL Place 5,000 mcg under the tongue daily Vitamin B12       cyclobenzaprine (FLEXERIL) 10 MG tablet Take 1 tablet (10 mg) by mouth 3 times daily as needed for muscle spasms 90 tablet 3     desoximetasone (TOPICORT) 0.25 % external cream Apply topically daily as needed for inflammation or itching       docusate sodium (COLACE) 100 MG capsule Take 1 capsule in the morning and 2 capsules in the evening       Fe Heme Polypeptide-folic acid (PROFERRIN-FORTE) 12-1 MG TABS Take 1 tablet by mouth 2 times daily 90 tablet 0     fentaNYL (DURAGESIC) 75 mcg/hr 72 hr patch Place 1 patch onto the skin every 48 hours        ibuprofen (ADVIL/MOTRIN) 600 MG tablet Take 1 tablet (600 mg) by mouth every 6 hours as needed for other (mild and/or inflammatory pain) 30 tablet 1     Multiple Minerals-Vitamins (CALCIUM CITRATE-MAG-MINERALS) TABS Take 1 tablet by mouth daily       multivitamin w/minerals  (THERA-VIT-M) tablet Take 1 tablet by mouth daily       naloxone (NARCAN) nasal spray Spray 1 spray (4 mg) into one nostril alternating nostrils as needed for opioid reversal every 2-3 minutes until assistance arrives 0.2 mL 0     naproxen (NAPROSYN DR) 500 MG EC tablet Take 500 mg by mouth 2 times daily as needed (pain) 60 tablet 3     oxyCODONE (ROXICODONE) 5 MG tablet Take 2-3 tablets (10-15 mg) by mouth every 3 hours as needed for moderate to severe pain 30 tablet 0     senna-docusate (SENOKOT-S/PERICOLACE) 8.6-50 MG tablet Take 1-2 tablets by mouth 2 times daily 30 tablet 0     vitamin B-Complex Take 1 tablet by mouth daily       atorvastatin (LIPITOR) 40 MG tablet Take 1 tablet (40 mg) by mouth daily (Patient not taking: Reported on 10/1/2020) 90 tablet 1     fluocinonide (LIDEX) 0.05 % external ointment Apply twice daily to itchy skin nodules for 1-2 weeks at a time. (Patient not taking: Reported on 10/1/2020) 30 g 3     tacrolimus (PROTOPIC) 0.1 % ointment Apply topically as needed Apply to affected areas on body. (Patient not taking: Reported on 5/6/2020) 120 g 11        Allergies   Allergen Reactions     Ciprofloxacin      History of aortic aneurysms     Tape [Adhesive Tape] Blisters     Blistering - please use paper tape     Review of Systems:  -As per HPI  -Constitutional: Otherwise feeling well today, in usual state of health.  -Skin: As above in HPI. No additional skin concerns.    Physical exam:  Vitals: There were no vitals taken for this visit.  GEN: This is a well developed, well-nourished male in no acute distress, in a pleasant mood.    SKIN: Focused examination of the bilateral arms and legs in addition to scalp and ears was performed.  -Boudreaux skin type: II  -Well demarcated symmetric erosions on the bilateral lower extremities with no e/o impetiginization, no significant nail findings, no significant dermatitis involving visible ear canal   - multiple angulated depressed scars on  bilateral arms covering >85% SA of the arms  - superficial erosions on the scalp without perifollicular scale or erythema   -No other lesions of concern on areas examined.     Impression/Plan:  1. Well demarcated, symmetric erosions on LEs, ddx. Favor psoriasis  Discussed etiology, pathogenesis, and treatment strategies for this common skin condition.   For the next two weeks: Bleach compresses using a rag that you ring out and place directly on the skin, follow with triamcinolone 0.1% ointment, follow with Vaseline ointment, you can wrap with saran wrap and then follow with knee high socks if you would like. After two weeks then taper the topical steroid as able to once daily and then as needed but always keep with moisturizer twice a day.     2. Prurigo nodularis  Discussed etiology, pathogenesis, and treatment strategies for this common skin condition.   Today just presents with chronic scarring and no active nodules.    CC Referred Self, MD  No address on file on close of this encounter.  Follow-up in 4-6 weeks, earlier for new or changing lesions.     Dr. Jacob staffed the patient.    Staff Involved:  Resident(Marianela Johnson)/Staff    I have personally examined this patient and agree with 's documentation and plan of care. I have reviewed and amended the resident's note above. The documentation accurately reflects my clinical observations, diagnoses, treatment and follow-up plans.     Enriqueta Jacob MD  Dermatology Staff

## 2020-10-01 NOTE — PROGRESS NOTES
CHIEF CONCERN:   1. Status post right shoulder removal of antibiotic spacer and placement of reverseTSA (8/18/20)  2. Status post left shoulder removal of antibiotic spacer and placement of custom VRS reverse TSA (5/8/2020)    HISTORY OF PRESENT ILLNESS: Mr. Buchanan is a 60 year-old male who is 6 weeks status post the above right shoulder procedure. He has been doing more motion and activity with both shoulders than I would perhaps like. He states he was doing stretches with the right shoulder nearly as soon as he got home.    EXAM:  Pleasant adult male in no distress.  Respirations even and unlabored.  Right upper extremity:  Incision well healing. No erythema. Sens intact Ax/Musc/Med/Rad/Uln nerves. Motor intact EPL, FPL, and Intrinsics. Shoulder range of motion is active FE to 165, ER at side to 50, and IR to back pocket.  Left shoulder AROM is FE to 160, ER at side to 45.    ASSESSMENT:  1. Six weeks status post above right shoulder procedure.    PLAN:    Range of Motion:Gentle ROM as tolerated. Reminded patient of restrictions for reverse TSA in general.     Follow up: in 6 weeks with XR of R shoulder.

## 2020-10-01 NOTE — PATIENT INSTRUCTIONS
"Please do the following twice a day for the next two weeks:    Bleach compresses using a rag that you ring out and place directly on the skin, follow with triamcinolone 0.1% ointment, follow with Vaseline ointment, you can wrap with saran wrap and then follow with knee high socks if you would like.     You may also use the triamcinolone in a thin layer to the ear canal.       Dilute Bleach Bath Instructions    What are dilute bleach baths?  Dilute bleach baths are used to help fight bacteria that is commonly found on the skin; this bacteria may be preventing your skin from healing. If is also used to calm inflammation in skin, even if infection is not present. The dilution ratio we recommend is the same concentration that is in a swimming pool. This technique is safe and can help prevent your infant or child from needing oral antibiotics for basic staph bacteria on the skin.      Type of bleach:    Regular, plain, household bleach used for cleaning clothing. Brand or Generic is okay.     Make sure this is plain or concentrated bleach. The bleach bottle should not contain any of the following words \"pour safe, with fabric protection, with cloromax technology, splash free, splash less, gentle or color safe.\"     There should not be any added fragrance to the bleach; such a lavender.    How do I make a dilute bleach bath?    Pour 1/3 of concentrated bleach or 1/2 cup of plain of bleach into an adult size bath tub of 4-6 inches of lukewarm water.     For smaller tubs (infant size tubs), add two tablespoons of bleach to the tub water.     Repeat bleach baths as recommended by your provider.    Other information:    Do not pour bleach directly onto the skin.    If is safe to get the bleach mixture on your face and scalp.    Do not drink the bleach mixture.    Keep bleach bottle out of reach of children.      "

## 2020-10-01 NOTE — NURSING NOTE
Dermatology Rooming Note    Kobe Buchanan's goals for this visit include:   Chief Complaint   Patient presents with     Derm Problem     Eczema on legs. Currently using DermaZinc on his legs.      Derm Problem     Would like to discuss scars on arms     Dannielle Hernández CMA

## 2020-10-01 NOTE — TELEPHONE ENCOUNTER
Spoke with pharmacy and informed them the patient will be applying the medication to his lower extremities     Donna Solano CMA

## 2020-10-01 NOTE — PROGRESS NOTES
[facility] Dermatology Note      Dermatology Problem List:  1. Well demarcated, symmetric erosions on LEs, ddx. Favor psoriasis  2. Prurigo nodularis    Encounter Date: Oct 1, 2020    CC:   Chief Complaint   Patient presents with     Derm Problem     Eczema on legs. Currently using DermaZinc on his legs.      Derm Problem     Would like to discuss scars on arms     History of Present Illness:  Mr. Kobe Buchanan is a 60 year old male who presents in self referral for concerns regarding his lower legs. He states he is worried that he could get superficial bacteria from his skin to his bloodstream. He states this has happened in the past and he had to have both of his shoulder joints replaced and he cannot let this happen again. In the past he has been using an ladan board to the lower legs, he has tried triamcinolone ointment, mometasone ointment, and desoximetasone ointment. Currently he uses dermazinc cream that he gets from his sister. He has a history of picking the skin on his arms from bug bites in his youth which has left scars. He also notes very itchy ears that he itches with his finger nail and 3 times yearly treats with hydrogen peroxide and polymyxin ear drops. The patient is otherwise well today in their baseline state of health, with no additional skin concerns.     Past Medical History:   Patient Active Problem List   Diagnosis     Essential hypertension     Hyperlipidemia     Abdominal pain, unspecified abdominal location     Ascending aortic aneurysm (H)     Medication refill- do not delete      Pain medication agreement signed     S/P shoulder replacement     BPPV (benign paroxysmal positional vertigo)     Shoulder joint pain     Obstructive sleep apnea     Obesity     Rhinitis     Right knee pain     Status post coronary angiogram     Atrial fibrillation (H) [I48.91]     Trigger finger of right thumb     Dizzy     Spondylosis of cervical region without myelopathy or  radiculopathy     Bone infection, shoulder region (H)     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)     Acute blood loss anemia     Septic joint of right shoulder region (H)     MSSA bacteremia     Moderate protein-calorie malnutrition (H)     Left shoulder pain     Septic joint of left shoulder region (H)     S/P shoulder replacement, left     Infection of prosthetic shoulder joint, subsequent encounter     Elevated troponin     Acute systolic heart failure (H)     NSTEMI (non-ST elevated myocardial infarction) (H)     Dysthymic disorder     NEMESIO (acute kidney injury) (H)     Status post total shoulder arthroplasty, left     Status post total shoulder arthroplasty     S/P reverse total shoulder arthroplasty, left     S/P reverse total shoulder arthroplasty, right     Past Medical History:   Diagnosis Date     Anxiety      Ascending aortic aneurysm (H)      Bicuspid aortic valve      BPPV (benign paroxysmal positional vertigo) 7/11/2014     Chronic narcotic use      Chronic neck pain      Chronic osteoarthritis      Degenerative joint disease      Depression      Hyperlipidemia 4/10/2012     Hypertension      Multiple stiff joints      Neck injuries      Obesity 2/9/2015     Pain in shoulder      Skin picking habit      Sleep apnea     does not use cpap     Past Surgical History:   Procedure Laterality Date     ARTHROPLASTY SHOULDER  4/15/2014    Procedure: Left Total Shoulder Arthroplasty ;  Surgeon: Analilia Aceves MD;  [facility]     ARTHROPLASTY SHOULDER Right 8/26/2014    Procedure: ARTHROPLASTY SHOULDER;  Surgeon: Analilia Aceves MD;  [facility]     ARTHROSCOPY SHOULDER WITH BIOPSY(IES) Left 1/28/2020    Procedure: Left shoulder arthroscopy and biopsy for culture;  Surgeon: Analilia Aceves MD;  [facility]     BYPASS GASTRIC TAVO-EN-Y, LIVER BIOPSY, COMBINED  8/8/2005     C HAND/FINGER SURGERY UNLISTED       C SHOULDER SURG PROC UNLISTED       COLONOSCOPY  6/30/2014     Procedure: COMBINED COLONOSCOPY, SINGLE BIOPSY/POLYPECTOMY BY BIOPSY;  Surgeon: Chester Patton MD;  [facility]     CV CORONARY ANGIOGRAM  1/30/2020    Procedure: CV CORONARY ANGIOGRAM;  Surgeon: Néstor Walls MD;  [facility]     CYSTOSCOPY, BLADDER NECK CUTS, COMBINED N/A 7/18/2016    Procedure: COMBINED CYSTOSCOPY, BLADDER NECK CUTS;  Surgeon: Ritu Leslie MD;  [facility]     ESOPHAGOSCOPY, GASTROSCOPY, DUODENOSCOPY (EGD), COMBINED  6/30/2014    Procedure: COMBINED ESOPHAGOSCOPY, GASTROSCOPY, DUODENOSCOPY (EGD), BIOPSY SINGLE OR MULTIPLE;  Surgeon: Chester Patton MD;  [facility]: [facility]     EXCISE MASS FINGER  6/14/2011    Procedure:EXCISE MASS FINGER; Middle Flexor Cyst; Surgeon:SHAYY RUSSELL; [facility]     HAND SURGERY      excision of tendon cyst on left hand     [facility] VASCULAR SURGERY PROCEDURE UNLIST       IR FINE NEEDLE ASPIRATION W ULTRASOUND  11/13/2019     IR PICC PLACEMENT > 5 YRS OF AGE  11/19/2019     LASER HOLMIUM LITHOTRIPSY BLADDER N/A 10/15/2014    Procedure: LASER HOLMIUM LITHOTRIPSY BLADDER;  Surgeon: Sahil Taveras MD;  [facility]     LASER KTP GREEN LIGHT PHOTOSELECTIVE VAPORIZATION PROSTATE  1/23/2014    Procedure: LASER KTP GREEN LIGHT PHOTOSELECTIVE VAPORIZATION PROSTATE;  Greenlight Photovaporization Of Prostate  ;  Surgeon: Sahil Taveras MD;  [facility]     RELEASE TRIGGER FINGER Right 3/31/2017    Procedure: RELEASE TRIGGER FINGER;  Surgeon: Juan Carlos Blunt MD;  [facility]     REMOVE ANTIBIOTIC CEMENT BEADS / SPACER SHOULDER Left 5/8/2020    Procedure: Left shoulder removal of spacer;  Surgeon: Analilia Aceves MD;  [facility]     REMOVE ANTIBIOTIC CEMENT BEADS / SPACER SHOULDER Right 8/18/2020    Procedure: Removal of right shoulder antibiotic spacer;  Surgeon: Analilia Aceves MD;  [facility]     REMOVE HARDWARE ARTHROPLASTY SHOULDER. I&D,  PLACE ANTIBIOTIC CEMENT BE Left 11/15/2019    Procedure: Explantation of left total shoulder arthroplasty, irrigation and debridement, and placement of antibiotic spacer;  Surgeon: Analilia Aceves MD;  [facility]     REPAIR ANEURYSM ASCENDING AORTA N/A 10/4/2016    Procedure: REPAIR ANEURYSM ASCENDING AORTA;  Surgeon: Mckenzie Townsend MD;  [facility]     REVERSE ARTHROPLASTY SHOULDER Right 8/18/2020    Procedure: and conversion to reverse total shoulder arthroplasty;  Surgeon: Analilia Aceves MD;  [facility]     TURP  2014       Social History:  Patient reports that he quit smoking about 37 years ago. His smoking use included cigarettes. He started smoking about 43 years ago. He has a 3.00 pack-year smoking history. He has never used smokeless tobacco. He reports that he does not drink alcohol or use drugs.    Family History:  Family History   Problem Relation Age of Onset     Arthritis Other      Gastrointestinal Disease Other      Cardiovascular Father         aortic aneurysm     Arrhythmia Father      Nephrolithiasis Father      Sleep Apnea Father      Anxiety Disorder Father      Depression Father      Hypertension Father      Obesity Father      Hyperlipidemia Father      Coronary Artery Disease Father         history of MI and stent     Low Back Problems Father      Spine Problems Father      Anxiety Disorder Mother      Hypertension Mother      Osteoporosis Mother      Obesity Mother      Hyperlipidemia Mother      Low Back Problems Mother      Anxiety Disorder Sister      Hypertension Sister      Osteoporosis Sister      Obesity Sister      Hyperlipidemia Sister      Low Back Problems Sister      Spine Problems Sister      Anxiety Disorder Sister      Depression Sister      Hypertension Sister      Osteoporosis Sister      Obesity Sister      Hyperlipidemia Sister      Low Back Problems Sister      Melanoma No family hx of      Skin Cancer No family hx of         Medications:  Current Outpatient Medications   Medication Sig Dispense Refill     amLODIPine-benazepril (LOTREL) 5-20 MG capsule Take 1 capsule by mouth 2 times daily       amoxicillin (AMOXIL) 500 MG capsule Take 4 tablets 1 hour before dental procedure 24 capsule 4     aspirin 81 MG EC tablet Take 2 tablets (162 mg) by mouth daily before breakfast 60 tablet 0     buPROPion (WELLBUTRIN SR) 200 MG 12 hr tablet TAKE 1 TABLET BY MOUTH 2 TIMES DAILY 180 tablet 3     carvedilol (COREG) 25 MG tablet Take 1 tablet (25 mg) by mouth 2 times daily (with meals) 180 tablet 3     clonazePAM (KLONOPIN) 0.5 MG tablet Take 1 tablet (0.5 mg) by mouth 3 times daily as needed for anxiety 90 tablet 1     Cyanocobalamin 5000 MCG SUBL Place 5,000 mcg under the tongue daily Vitamin B12       cyclobenzaprine (FLEXERIL) 10 MG tablet Take 1 tablet (10 mg) by mouth 3 times daily as needed for muscle spasms 90 tablet 3     desoximetasone (TOPICORT) 0.25 % external cream Apply topically daily as needed for inflammation or itching       docusate sodium (COLACE) 100 MG capsule Take 1 capsule in the morning and 2 capsules in the evening       Fe Heme Polypeptide-folic acid (PROFERRIN-FORTE) 12-1 MG TABS Take 1 tablet by mouth 2 times daily 90 tablet 0     fentaNYL (DURAGESIC) 75 mcg/hr 72 hr patch Place 1 patch onto the skin every 48 hours        ibuprofen (ADVIL/MOTRIN) 600 MG tablet Take 1 tablet (600 mg) by mouth every 6 hours as needed for other (mild and/or inflammatory pain) 30 tablet 1     Multiple Minerals-Vitamins (CALCIUM CITRATE-MAG-MINERALS) TABS Take 1 tablet by mouth daily       multivitamin w/minerals (THERA-VIT-M) tablet Take 1 tablet by mouth daily       naloxone (NARCAN) nasal spray Spray 1 spray (4 mg) into one nostril alternating nostrils as needed for opioid reversal every 2-3 minutes until assistance arrives 0.2 mL 0     naproxen (NAPROSYN DR) 500 MG EC tablet Take 500 mg by mouth 2 times daily as needed (pain) 60  tablet 3     oxyCODONE (ROXICODONE) 5 MG tablet Take 2-3 tablets (10-15 mg) by mouth every 3 hours as needed for moderate to severe pain 30 tablet 0     senna-docusate (SENOKOT-S/PERICOLACE) 8.6-50 MG tablet Take 1-2 tablets by mouth 2 times daily 30 tablet 0     vitamin B-Complex Take 1 tablet by mouth daily       atorvastatin (LIPITOR) 40 MG tablet Take 1 tablet (40 mg) by mouth daily (Patient not taking: Reported on 10/1/2020) 90 tablet 1     fluocinonide (LIDEX) 0.05 % external ointment Apply twice daily to itchy skin nodules for 1-2 weeks at a time. (Patient not taking: Reported on 10/1/2020) 30 g 3     tacrolimus (PROTOPIC) 0.1 % ointment Apply topically as needed Apply to affected areas on body. (Patient not taking: Reported on 5/6/2020) 120 g 11        Allergies   Allergen Reactions     Ciprofloxacin      History of aortic aneurysms     Tape [Adhesive Tape] Blisters     Blistering - please use paper tape     Review of Systems:  -As per HPI  -Constitutional: Otherwise feeling well today, in usual state of health.  -Skin: As above in HPI. No additional skin concerns.    Physical exam:  Vitals: There were no vitals taken for this visit.  GEN: This is a well developed, well-nourished male in no acute distress, in a pleasant mood.    SKIN: Focused examination of the bilateral arms and legs in addition to scalp and ears was performed.  -Boudreaux skin type: II  -Well demarcated symmetric erosions on the bilateral lower extremities with no e/o impetiginization, no significant nail findings, no significant dermatitis involving visible ear canal   - multiple angulated depressed scars on bilateral arms covering >85% SA of the arms  - superficial erosions on the scalp without perifollicular scale or erythema   -No other lesions of concern on areas examined.     Impression/Plan:  1. Well demarcated, symmetric erosions on LEs, ddx. Favor psoriasis  Discussed etiology, pathogenesis, and treatment strategies for this  common skin condition.   For the next two weeks: Bleach compresses using a rag that you ring out and place directly on the skin, follow with triamcinolone 0.1% ointment, follow with Vaseline ointment, you can wrap with saran wrap and then follow with knee high socks if you would like. After two weeks then taper the topical steroid as able to once daily and then as needed but always keep with moisturizer twice a day.     2. Prurigo nodularis  Discussed etiology, pathogenesis, and treatment strategies for this common skin condition.   Today just presents with chronic scarring and no active nodules.    CC Referred Self, MD  No address on file on close of this encounter.  Follow-up in 4-6 weeks, earlier for new or changing lesions.     Dr. Jacob staffed the patient.    Staff Involved:  Resident(Marianela Johnson)/Staff    I have personally examined this patient and agree with 's documentation and plan of care. I have reviewed and amended the resident's note above. The documentation accurately reflects my clinical observations, diagnoses, treatment and follow-up plans.     Enriqueta Jacob MD  Dermatology Staff

## 2020-10-01 NOTE — TELEPHONE ENCOUNTER
[facility] Call Center    Phone Message    May a detailed message be left on voicemail: yes     Reason for Call: Medication Question or concern regarding medication   Prescription Clarification  Name of Medication: triamcinolone (KENALOG) 0.1 % external ointment  Prescribing Provider: Dr. Jacob    Pharmacy: [facility]   What on the order needs clarification? Pharm called and wanted to know where will the pt be applying the ointment. They need to know for ins purposes. Thanks          Action Taken: Message routed to:  [facility] ([facility] DERM    Travel Screening: Not Applicable

## 2020-10-02 DIAGNOSIS — I25.5 ISCHEMIC CARDIOMYOPATHY: ICD-10-CM

## 2020-10-02 DIAGNOSIS — I10 ESSENTIAL HYPERTENSION: Primary | ICD-10-CM

## 2020-10-03 ENCOUNTER — MYC REFILL (OUTPATIENT)
Dept: FAMILY MEDICINE | Facility: CLINIC | Age: 60
End: 2020-10-03

## 2020-10-03 DIAGNOSIS — M47.812 SPONDYLOSIS OF CERVICAL REGION WITHOUT MYELOPATHY OR RADICULOPATHY: ICD-10-CM

## 2020-10-04 RX ORDER — AMLODIPINE AND BENAZEPRIL HYDROCHLORIDE 5; 20 MG/1; MG/1
1 CAPSULE ORAL 2 TIMES DAILY
Qty: 180 CAPSULE | Refills: 3 | Status: SHIPPED | OUTPATIENT
Start: 2020-10-04 | End: 2021-11-11

## 2020-10-04 RX ORDER — ATORVASTATIN CALCIUM 40 MG/1
40 TABLET, FILM COATED ORAL DAILY
Qty: 90 TABLET | Refills: 3 | Status: SHIPPED | OUTPATIENT
Start: 2020-10-04 | End: 2021-10-27

## 2020-10-05 RX ORDER — NAPROXEN 500 MG/1
500 TABLET ORAL 2 TIMES DAILY PRN
Qty: 60 TABLET | Refills: 3 | Status: SHIPPED | OUTPATIENT
Start: 2020-10-05 | End: 2021-02-01

## 2020-10-05 NOTE — TELEPHONE ENCOUNTER
Message to physician: [facility]    Date of last visit: 8/20/2020     Date of next visit if scheduled: none    Last Comprehensive Metabolic Panel:  Sodium   Date Value Ref Range Status   08/05/2020 137 133 - 144 mmol/L Final     Potassium   Date Value Ref Range Status   08/18/2020 4.3 3.4 - 5.3 mmol/L Final     Chloride   Date Value Ref Range Status   08/05/2020 107 94 - 109 mmol/L Final     Carbon Dioxide   Date Value Ref Range Status   08/05/2020 26 20 - 32 mmol/L Final     Anion Gap   Date Value Ref Range Status   08/05/2020 4 3 - 14 mmol/L Final     Glucose   Date Value Ref Range Status   08/18/2020 117 (H) 70 - 99 mg/dL Final     Urea Nitrogen   Date Value Ref Range Status   08/05/2020 20 7 - 30 mg/dL Final     Creatinine   Date Value Ref Range Status   08/05/2020 1.24 0.66 - 1.25 mg/dL Final     GFR Estimate   Date Value Ref Range Status   08/05/2020 63 >60 mL/min/[1.73_m2] Final     Comment:     Non  GFR Calc  Starting 12/18/2018, serum creatinine based estimated GFR (eGFR) will be   calculated using the Chronic Kidney Disease Epidemiology Collaboration   (CKD-EPI) equation.       Calcium   Date Value Ref Range Status   08/05/2020 8.8 8.5 - 10.1 mg/dL Final       BP Readings from Last 3 Encounters:   08/20/20 136/76   08/18/20 (!) 140/70   08/13/20 138/68       Lab Results   Component Value Date    A1C 5.3 10/05/2016    A1C 5.4 07/12/2016    A1C 5.0 10/31/2011    A1C 4.9 11/02/2010                Please complete refill and CLOSE ENCOUNTER.  Closing the encounter signifies the refill is complete.

## 2020-10-08 ENCOUNTER — TELEPHONE (OUTPATIENT)
Dept: FAMILY MEDICINE | Facility: CLINIC | Age: 60
End: 2020-10-08

## 2020-10-23 DIAGNOSIS — Z96.611 STATUS POST TOTAL SHOULDER ARTHROPLASTY, RIGHT: Primary | ICD-10-CM

## 2020-10-25 NOTE — TELEPHONE ENCOUNTER
Reviewed and approved     Assessment:  75y L-handed M with h/o HLD and HTN who presented to [facility] ED on 10/22/20 for sudden episode of impaired awareness preceded by apneic gasps with concern of possible seizure.  CT head unremarkable for any overt provoking factors.  CTA H&N notable for enhancing R. 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 On exam, he is somnolent but arouses easily, has impaired short term memory or memory of events, has a left anterior tip tongue bite and mild dysarthria.     1)Semiology/Description of event: Sudden gasps/agonal breathing followed by unresponsive episode, tongue bite and post-event confusion.     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  Though HSV encephalitis very unlikely given PT afebrile with supple neck and no recent confusion, given one dose of valacyclovir empirically to cover for HSV on admission.     Plan  [x] vascular surgery consult appreciated  [x] CSF HSV negative  [x] Urine metanephrine serum catecholamine levels  [X] MRI brain w/wo contrast with thin sections through temporal lobes. 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 (No preganglioma seen)   [x] Keppra 500 BID  [] 24 hr vEEG      Rescue med: [] Ativan 2mg IVP for GTC and/or for seizure >3 min.  Please call provider first.

## 2020-10-27 ENCOUNTER — OFFICE VISIT (OUTPATIENT)
Dept: CARDIOLOGY | Facility: CLINIC | Age: 60
End: 2020-10-27
Attending: INTERNAL MEDICINE
Payer: COMMERCIAL

## 2020-10-27 VITALS
SYSTOLIC BLOOD PRESSURE: 148 MMHG | WEIGHT: 206.3 LBS | DIASTOLIC BLOOD PRESSURE: 91 MMHG | HEART RATE: 60 BPM | HEIGHT: 71 IN | BODY MASS INDEX: 28.88 KG/M2

## 2020-10-27 DIAGNOSIS — I50.21 ACUTE SYSTOLIC HEART FAILURE (H): ICD-10-CM

## 2020-10-27 DIAGNOSIS — I71.21 ASCENDING AORTIC ANEURYSM (H): ICD-10-CM

## 2020-10-27 DIAGNOSIS — I10 ESSENTIAL HYPERTENSION WITH GOAL BLOOD PRESSURE LESS THAN 130/85: ICD-10-CM

## 2020-10-27 PROCEDURE — 99214 OFFICE O/P EST MOD 30 MIN: CPT | Performed by: INTERNAL MEDICINE

## 2020-10-27 RX ORDER — ASPIRIN 325 MG
325 TABLET ORAL DAILY
COMMUNITY
End: 2022-07-29 | Stop reason: DRUGHIGH

## 2020-10-27 RX ORDER — CARVEDILOL 25 MG/1
37.5 TABLET ORAL 2 TIMES DAILY WITH MEALS
Qty: 180 TABLET | Refills: 3 | Status: SHIPPED | OUTPATIENT
Start: 2020-10-27 | End: 2021-08-23

## 2020-10-27 ASSESSMENT — MIFFLIN-ST. JEOR: SCORE: 1767.78

## 2020-10-27 NOTE — LETTER
10/27/2020    Demi Hardin MD  [address] [facility] [address]    RE: Kobe Buchanan       Dear Colleague,    I had the pleasure of seeing Kobe Buchanan in the [facility].    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     Prior history:      Patient was referred by Atul Martínez and [place] to establish care into congenital vascular cardiology clinic. Patient reports his nephew at 28 years old has a known bicuspid aortic valve without aortopathy.  He has been seen in [place] and followed. His father is 91 years old and has a known aneurysm of 4.9 cm in bicuspid valve. Patient reports no family history of sudden death or known dissection. No history of strokes. Blood pressure has been well controlled on multidrug regimen. Has been stable on current drug regimen with coreg and benazpril-amlodipine. In the past has tried hydrochlorothiazide and he thinks metoprolol which didn't seem to work for him he reports.  Has history of NEMESIO on lasix, and also had hyperkalemia on losartan. He does report 3-4 bananas a day. His blood pressure has been labile, with BP spiking in the mid morning but being controlled early AM. He reports then in afternoon it is controlled. He takes his twice a day meds at 5am and 5pm. He denies any chest pain, sob, n/v/d, fevers, chills, ns. No LE edema. MRA chest and cardiac MRI that was performed in October demonstrated stable graft, normal aortic root size. His CMR demonstrated normalization of his LVEF suggestive that prior reduction in function was due to stress CMY.        ASSESSMENT/PLAN:      60-year-old male with hypertension, hyperlipidemia, right left fusion of his aortic valve with bicuspid aortopathy.  No known coarctation.  No other peripheral aneurysmal disease.  He is status post valve sparing aortic repair with Gelweave interposition graft.  Blood pressure overall well controlled but tends to have a mid day spikie.  He will need ongoing long-term surveillance of both of his aortic repair and his bicuspid valve given he has some dysfunction within the native valve (mild sclerosis and mild AI).      Summary of Recommendations:     1. Echocardiogram and MRA chest in 4-6 months   2. Continue amlodipine-benzapril and increase (37.5 mg bid) carvedilol, patient to report blood pressure readings and try staggering medications to avoid lability  3. Would check renal function and electrolytes once a year at least given issues with potassium in the past  4. Follow up in 6 months  5. Normalized LVEF - yearly echocardiogram    Enriqueta Zhou MD MSc  [facility]     PAST MEDICAL HISTORY  Past Medical History:   Diagnosis Date     Anxiety      Ascending aortic aneurysm (H)      Bicuspid aortic valve      BPPV (benign paroxysmal positional vertigo) 7/11/2014     Chronic narcotic use      Chronic neck pain      Chronic osteoarthritis      Degenerative joint disease      Depression      Hyperlipidemia 4/10/2012     Hypertension      Multiple stiff joints      Neck injuries      Obesity 2/9/2015     Pain in shoulder      Skin picking habit      Sleep apnea     does not use cpap       CURRENT MEDICATIONS  Current Outpatient Medications   Medication Sig Dispense Refill     amLODIPine-benazepril (LOTREL) 5-20 MG capsule Take 1 capsule by mouth 2 times daily 180 capsule 3     amoxicillin (AMOXIL) 500 MG capsule Take 4 tablets 1 hour before dental procedure 24 capsule 4     aspirin (ASA) 325 MG tablet Take 325 mg by mouth daily       atorvastatin (LIPITOR) 40 MG tablet Take 1 tablet (40 mg) by mouth daily  90 tablet 3     buPROPion (WELLBUTRIN SR) 200 MG 12 hr tablet TAKE 1 TABLET BY MOUTH 2 TIMES DAILY 180 tablet 3     carvedilol (COREG) 25 MG tablet Take 1 tablet (25 mg) by mouth 2 times daily (with meals) 180 tablet 3     clonazePAM (KLONOPIN) 0.5 MG tablet Take 1 tablet (0.5 mg) by mouth 3 times daily as needed for anxiety 90 tablet 1     Cyanocobalamin 5000 MCG SUBL Place 5,000 mcg under the tongue daily Vitamin B12       cyclobenzaprine (FLEXERIL) 10 MG tablet Take 1 tablet (10 mg) by mouth 3 times daily as needed for muscle spasms 90 tablet 3     desoximetasone (TOPICORT) 0.25 % external cream Apply topically daily as needed for inflammation or itching       docusate sodium (COLACE) 100 MG capsule Take 1 capsule in the morning and 2 capsules in the evening       Fe Heme Polypeptide-folic acid (PROFERRIN-FORTE) 12-1 MG TABS Take 1 tablet by mouth 2 times daily 90 tablet 0     fentaNYL (DURAGESIC) 75 mcg/hr 72 hr patch Place 1 patch onto the skin every 48 hours        fluocinonide (LIDEX) 0.05 % external ointment Apply twice daily to itchy skin nodules for 1-2 weeks at a time. 30 g 3     Multiple Minerals-Vitamins (CALCIUM CITRATE-MAG-MINERALS) TABS Take 1 tablet by mouth daily       multivitamin w/minerals (THERA-VIT-M) tablet Take 1 tablet by mouth daily       naloxone (NARCAN) nasal spray Spray 1 spray (4 mg) into one nostril alternating nostrils as needed for opioid reversal every 2-3 minutes until assistance arrives 0.2 mL 0     naproxen (NAPROSYN DR) 500 MG EC tablet Take 500 mg by mouth 2 times daily as needed (pain) 60 tablet 3     oxyCODONE (ROXICODONE) 5 MG tablet Take 2-3 tablets (10-15 mg) by mouth every 3 hours as needed for moderate to severe pain 30 tablet 0     senna-docusate (SENOKOT-S/PERICOLACE) 8.6-50 MG tablet Take 1-2 tablets by mouth 2 times daily 30 tablet 0     tacrolimus (PROTOPIC) 0.1 % ointment Apply topically as needed Apply to affected areas on body. 120 g 11     triamcinolone  (KENALOG) 0.1 % external ointment Apply topically 2 times daily 454 g 0     vitamin B-Complex Take 1 tablet by mouth daily       aspirin 81 MG EC tablet Take 2 tablets (162 mg) by mouth daily before breakfast (Patient taking differently: Take 325 mg by mouth daily before breakfast ) 60 tablet 0     ibuprofen (ADVIL/MOTRIN) 600 MG tablet Take 1 tablet (600 mg) by mouth every 6 hours as needed for other (mild and/or inflammatory pain) 30 tablet 1       PAST SURGICAL HISTORY:  Past Surgical History:   Procedure Laterality Date     ARTHROPLASTY SHOULDER  4/15/2014    Procedure: Left Total Shoulder Arthroplasty ;  Surgeon: Analilia Aceves MD;  [facility]     ARTHROPLASTY SHOULDER Right 8/26/2014    Procedure: ARTHROPLASTY SHOULDER;  Surgeon: Analilia Aceves MD;  [facility]     ARTHROSCOPY SHOULDER WITH BIOPSY(IES) Left 1/28/2020    Procedure: Left shoulder arthroscopy and biopsy for culture;  Surgeon: Analilia Aceves MD;  [facility]     BYPASS GASTRIC TAVO-EN-Y, LIVER BIOPSY, COMBINED  8/8/2005     C HAND/FINGER SURGERY UNLISTED       C SHOULDER SURG PROC UNLISTED       COLONOSCOPY  6/30/2014    Procedure: COMBINED COLONOSCOPY, SINGLE BIOPSY/POLYPECTOMY BY BIOPSY;  Surgeon: Chester Patton MD;  [facility]     CV CORONARY ANGIOGRAM  1/30/2020    Procedure: CV CORONARY ANGIOGRAM;  Surgeon: Néstor Walls MD;  Location:  HEART CARDIAC CATH LAB     CYSTOSCOPY, BLADDER NECK CUTS, COMBINED N/A 7/18/2016    Procedure: COMBINED CYSTOSCOPY, BLADDER NECK CUTS;  Surgeon: Ritu Leslie MD;  Location:  OR     ESOPHAGOSCOPY, GASTROSCOPY, DUODENOSCOPY (EGD), COMBINED  6/30/2014    Procedure: COMBINED ESOPHAGOSCOPY, GASTROSCOPY, DUODENOSCOPY (EGD), BIOPSY SINGLE OR MULTIPLE;  Surgeon: Chester Patton MD;  [facility]     EXCISE MASS FINGER  6/14/2011    Procedure:EXCISE MASS FINGER; Middle Flexor Cyst; Surgeon:SHAYY RUSSELL; [facility]     HAND SURGERY      excision of tendon cyst on left hand     HC VASCULAR SURGERY PROCEDURE UNLIST       IR FINE NEEDLE ASPIRATION W ULTRASOUND  11/13/2019     IR PICC PLACEMENT > 5 YRS OF AGE  11/19/2019     LASER HOLMIUM LITHOTRIPSY BLADDER N/A 10/15/2014    Procedure: LASER HOLMIUM LITHOTRIPSY BLADDER;  Surgeon: Sahil Taveras MD;  [facility]     LASER KTP GREEN LIGHT PHOTOSELECTIVE VAPORIZATION PROSTATE  1/23/2014    Procedure: LASER KTP GREEN LIGHT PHOTOSELECTIVE VAPORIZATION PROSTATE;  Greenlight Photovaporization Of Prostate  ;  Surgeon: Sahil Taveras MD;  [facility]     RELEASE TRIGGER FINGER Right 3/31/2017    Procedure: RELEASE TRIGGER FINGER;  Surgeon: Juan Carlos Blunt MD;  [facility]     REMOVE ANTIBIOTIC CEMENT BEADS / SPACER SHOULDER Left 5/8/2020    Procedure: Left shoulder removal of spacer;  Surgeon: Analilia Aceves MD;  [facility]     REMOVE ANTIBIOTIC CEMENT BEADS / SPACER SHOULDER Right 8/18/2020    Procedure: Removal of right shoulder antibiotic spacer;  Surgeon: Analilia Aceves MD;  [facility]     REMOVE HARDWARE ARTHROPLASTY SHOULDER. I&D, PLACE ANTIBIOTIC CEMENT BE Left 11/15/2019    Procedure: Explantation of left total shoulder arthroplasty, irrigation and debridement, and placement of antibiotic spacer;  Surgeon: Analilia Aceves MD;  [facility]     REPAIR ANEURYSM ASCENDING AORTA N/A 10/4/2016    Procedure: REPAIR ANEURYSM ASCENDING AORTA;  Surgeon: Mckenzie Townsend MD;  [facility]     REVERSE ARTHROPLASTY SHOULDER Right 8/18/2020    Procedure: and conversion to reverse total shoulder arthroplasty;  Surgeon: Analilia Aceves MD;  [facility]     TURP  2014       ALLERGIES     Allergies   Allergen Reactions     Ciprofloxacin      History of aortic aneurysms     Tape [Adhesive Tape] Blisters     Blistering - please use paper tape       FAMILY HISTORY  Family History   Problem Relation Age of  Onset     Arthritis Other      Gastrointestinal Disease Other      Cardiovascular Father         aortic aneurysm     Arrhythmia Father      Nephrolithiasis Father      Sleep Apnea Father      Anxiety Disorder Father      Depression Father      Hypertension Father      Obesity Father      Hyperlipidemia Father      Coronary Artery Disease Father         history of MI and stent     Low Back Problems Father      Spine Problems Father      Anxiety Disorder Mother      Hypertension Mother      Osteoporosis Mother      Obesity Mother      Hyperlipidemia Mother      Low Back Problems Mother      Anxiety Disorder Sister      Hypertension Sister      Osteoporosis Sister      Obesity Sister      Hyperlipidemia Sister      Low Back Problems Sister      Spine Problems Sister      Anxiety Disorder Sister      Depression Sister      Hypertension Sister      Osteoporosis Sister      Obesity Sister      Hyperlipidemia Sister      Low Back Problems Sister      Melanoma No family hx of      Skin Cancer No family hx of          SOCIAL HISTORY  Social History     Socioeconomic History     Marital status: Single     Spouse name: Not on file     Number of children: Not on file     Years of education: Not on file     Highest education level: Not on file   Occupational History     Occupation: Disabled   Social Needs     Financial resource strain: Not on file     Food insecurity     Worry: Not on file     Inability: Not on file     Transportation needs     Medical: Not on file     Non-medical: Not on file   Tobacco Use     Smoking status: Former Smoker     Packs/day: 0.50     Years: 6.00     Pack years: 3.00     Types: Cigarettes     Start date: 1977     Quit date: 1983     Years since quittin.1     Smokeless tobacco: Never Used   Substance and Sexual Activity     Alcohol use: No     Alcohol/week: 0.0 standard drinks     Drug use: No     Sexual activity: Not Currently     Partners: Female     Birth control/protection:  "Abstinence   Lifestyle     Physical activity     Days per week: Not on file     Minutes per session: Not on file     Stress: Not on file   Relationships     Social connections     Talks on phone: Not on file     Gets together: Not on file     Attends [employer]ical service: Not on file     Active member of club or organization: Not on file     Attends meetings of clubs or organizations: Not on file     Relationship status: Not on file     Intimate partner violence     Fear of current or ex partner: Not on file     Emotionally abused: Not on file     Physically abused: Not on file     Forced sexual activity: Not on file   Other Topics Concern     Parent/sibling w/ CABG, MI or angioplasty before 65F 55M? Not Asked   Social History Narrative     Not on file       ROS:   Constitutional: No fever, chills, or sweats. No weight gain/loss   ENT: No visual disturbance, ear ache, epistaxis, sore throat  Allergies/Immunologic: Negative  Respiratory: No cough, hemoptysia  Cardiovascular: As per HPI  GI: No nausea, vomiting, hematemesis, melena, or hematochezia  : No urinary frequency, dysuria, or hematuria  Integument: Negative  Psychiatric: Negative  Neuro: Negative  Endocrinology: Negative   Musculoskeletal: Negative  Vascular: No walking impairment, claudication, ischemic rest pain or nonhealing wounds    EXAM:  BP (!) 148/91   Pulse 60   Ht 1.795 m (5' 10.68\")   Wt 93.6 kg (206 lb 4.8 oz)   BMI 29.04 kg/m    In general, the patient is a pleasant male in no apparent distress.    HEENT: NC/AT.  PERRLA.  EOMI.  Sclerae white, not injected.  Nares clear.  Pharynx without erythema or exudate.  Dentition intact.    Neck: No adenopathy.  No thyromegaly. Carotids +2/2 bilaterally without bruits.  No jugular venous distension.   Heart: RRR. Normal S1, S2 splits physiologically. +SCOTT, rub, click, or gallop. The PMI is in the 5th ICS in the midclavicular line. There is no heave.    Lungs: CTA.  No ronchi, wheezes, rales.  No dullness " to percussion.   Abdomen: Soft, nontender, nondistended. No organomegaly. No AAA.  No bruits.   Extremities: No clubbing, cyanosis, or edema.  No wounds. No varicose veins signs of chronic venous insufficiency.   Vascular: No bruits are noted.    Labs:  LIPID RESULTS:  Lab Results   Component Value Date    CHOL 179 03/22/2018    HDL 39 (L) 03/22/2018    LDL 89 03/22/2018    TRIG 255 (H) 03/22/2018    CHOLHDLRATIO 3.7 06/08/2015    NHDL 140 (H) 03/22/2018       LIVER ENZYME RESULTS:  Lab Results   Component Value Date    AST 29 05/06/2020    ALT 42 05/06/2020       CBC RESULTS:  Lab Results   Component Value Date    WBC 6.4 08/05/2020    RBC 4.27 (L) 08/05/2020    HGB 9.9 (L) 08/20/2020    HCT 31.2 (L) 08/20/2020    MCV 74.3 (L) 08/20/2020    MCH 23.6 (L) 08/20/2020    MCHC 31.7 (L) 08/20/2020    RDW 17.0 (H) 08/05/2020     08/05/2020       BMP RESULTS:  Lab Results   Component Value Date     08/05/2020    POTASSIUM 4.3 08/18/2020    CHLORIDE 107 08/05/2020    CO2 26 08/05/2020    ANIONGAP 4 08/05/2020     (H) 08/18/2020    BUN 20 08/05/2020    CR 1.24 08/05/2020    GFRESTIMATED 63 08/05/2020    GFRESTBLACK 73 08/05/2020    NIA 8.8 08/05/2020        A1C RESULTS:  Lab Results   Component Value Date    A1C 5.3 10/05/2016       Thank you for allowing me to participate in the care of your patient.    Sincerely,     Enriqueta Zhou MD     [facility]

## 2020-10-27 NOTE — PROGRESS NOTES
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     Prior history:      Patient was referred by Atul Martínez and [place] to establish care into congenital vascular cardiology clinic. Patient reports his nephew at 28 years old has a known bicuspid aortic valve without aortopathy.  He has been seen in [place] and followed. His father is 91 years old and has a known aneurysm of 4.9 cm in bicuspid valve. Patient reports no family history of sudden death or known dissection. No history of strokes. Blood pressure has been well controlled on multidrug regimen. Has been stable on current drug regimen with coreg and benazpril-amlodipine. In the past has tried hydrochlorothiazide and he thinks metoprolol which didn't seem to work for him he reports.  Has history of NEMESIO on lasix, and also had hyperkalemia on losartan. He does report 3-4 bananas a day. His blood pressure has been labile, with BP spiking in the mid morning but being controlled early AM. He reports then in afternoon it is controlled. He takes his twice a day meds at 5am and 5pm. He denies any chest pain, sob, n/v/d, fevers, chills, ns. No LE edema. MRA chest and cardiac MRI that was performed in October demonstrated stable graft, normal aortic root size. His CMR demonstrated normalization of his LVEF suggestive that prior reduction in function was due to stress CMY.        ASSESSMENT/PLAN:     60-year-old male with hypertension, hyperlipidemia, right left fusion of his aortic valve with bicuspid aortopathy.  No known coarctation.  No other peripheral aneurysmal disease.  He is status post valve sparing aortic  repair with Gelweave interposition graft.  Blood pressure overall well controlled but tends to have a mid day spikie.  He will need ongoing long-term surveillance of both of his aortic repair and his bicuspid valve given he has some dysfunction within the native valve (mild sclerosis and mild AI).      Summary of Recommendations:     1. Echocardiogram and MRA chest in 4-6 months   2. Continue amlodipine-benzapril and increase (37.5 mg bid) carvedilol, patient to report blood pressure readings and try staggering medications to avoid lability  3. Would check renal function and electrolytes once a year at least given issues with potassium in the past  4. Follow up in 6 months  5. Normalized LVEF - yearly echocardiogram    Enriqueta Zhou MD MSc  [facility]     PAST MEDICAL HISTORY  Past Medical History:   Diagnosis Date     Anxiety      Ascending aortic aneurysm (H)      Bicuspid aortic valve      BPPV (benign paroxysmal positional vertigo) 7/11/2014     Chronic narcotic use      Chronic neck pain      Chronic osteoarthritis      Degenerative joint disease      Depression      Hyperlipidemia 4/10/2012     Hypertension      Multiple stiff joints      Neck injuries      Obesity 2/9/2015     Pain in shoulder      Skin picking habit      Sleep apnea     does not use cpap       CURRENT MEDICATIONS  Current Outpatient Medications   Medication Sig Dispense Refill     amLODIPine-benazepril (LOTREL) 5-20 MG capsule Take 1 capsule by mouth 2 times daily 180 capsule 3     amoxicillin (AMOXIL) 500 MG capsule Take 4 tablets 1 hour before dental procedure 24 capsule 4     aspirin (ASA) 325 MG tablet Take 325 mg by mouth daily       atorvastatin (LIPITOR) 40 MG tablet Take 1 tablet (40 mg) by mouth daily 90 tablet 3     buPROPion (WELLBUTRIN SR) 200 MG 12 hr tablet TAKE 1 TABLET BY MOUTH 2 TIMES DAILY 180 tablet 3     carvedilol (COREG) 25 MG tablet Take 1 tablet (25 mg) by mouth 2 times daily (with meals) 180 tablet 3      clonazePAM (KLONOPIN) 0.5 MG tablet Take 1 tablet (0.5 mg) by mouth 3 times daily as needed for anxiety 90 tablet 1     Cyanocobalamin 5000 MCG SUBL Place 5,000 mcg under the tongue daily Vitamin B12       cyclobenzaprine (FLEXERIL) 10 MG tablet Take 1 tablet (10 mg) by mouth 3 times daily as needed for muscle spasms 90 tablet 3     desoximetasone (TOPICORT) 0.25 % external cream Apply topically daily as needed for inflammation or itching       docusate sodium (COLACE) 100 MG capsule Take 1 capsule in the morning and 2 capsules in the evening       Fe Heme Polypeptide-folic acid (PROFERRIN-FORTE) 12-1 MG TABS Take 1 tablet by mouth 2 times daily 90 tablet 0     fentaNYL (DURAGESIC) 75 mcg/hr 72 hr patch Place 1 patch onto the skin every 48 hours        fluocinonide (LIDEX) 0.05 % external ointment Apply twice daily to itchy skin nodules for 1-2 weeks at a time. 30 g 3     Multiple Minerals-Vitamins (CALCIUM CITRATE-MAG-MINERALS) TABS Take 1 tablet by mouth daily       multivitamin w/minerals (THERA-VIT-M) tablet Take 1 tablet by mouth daily       naloxone (NARCAN) nasal spray Spray 1 spray (4 mg) into one nostril alternating nostrils as needed for opioid reversal every 2-3 minutes until assistance arrives 0.2 mL 0     naproxen (NAPROSYN DR) 500 MG EC tablet Take 500 mg by mouth 2 times daily as needed (pain) 60 tablet 3     oxyCODONE (ROXICODONE) 5 MG tablet Take 2-3 tablets (10-15 mg) by mouth every 3 hours as needed for moderate to severe pain 30 tablet 0     senna-docusate (SENOKOT-S/PERICOLACE) 8.6-50 MG tablet Take 1-2 tablets by mouth 2 times daily 30 tablet 0     tacrolimus (PROTOPIC) 0.1 % ointment Apply topically as needed Apply to affected areas on body. 120 g 11     triamcinolone (KENALOG) 0.1 % external ointment Apply topically 2 times daily 454 g 0     vitamin B-Complex Take 1 tablet by mouth daily       aspirin 81 MG EC tablet Take 2 tablets (162 mg) by mouth daily before breakfast (Patient taking  differently: Take 325 mg by mouth daily before breakfast ) 60 tablet 0     ibuprofen (ADVIL/MOTRIN) 600 MG tablet Take 1 tablet (600 mg) by mouth every 6 hours as needed for other (mild and/or inflammatory pain) 30 tablet 1       PAST SURGICAL HISTORY:  Past Surgical History:   Procedure Laterality Date     ARTHROPLASTY SHOULDER  4/15/2014    Procedure: Left Total Shoulder Arthroplasty ;  Surgeon: Analilia Aceves MD;  [facility]     ARTHROPLASTY SHOULDER Right 8/26/2014    Procedure: ARTHROPLASTY SHOULDER;  Surgeon: Analilia Aceves MD;  [facility]     ARTHROSCOPY SHOULDER WITH BIOPSY(IES) Left 1/28/2020    Procedure: Left shoulder arthroscopy and biopsy for culture;  Surgeon: Analilia Aceves MD;  [facility]     BYPASS GASTRIC TAVO-EN-Y, LIVER BIOPSY, COMBINED  8/8/2005     C HAND/FINGER SURGERY UNLISTED       C SHOULDER SURG PROC UNLISTED       COLONOSCOPY  6/30/2014    Procedure: COMBINED COLONOSCOPY, SINGLE BIOPSY/POLYPECTOMY BY BIOPSY;  Surgeon: Chester Patton MD;  [facility]     CV CORONARY ANGIOGRAM  1/30/2020    Procedure: CV CORONARY ANGIOGRAM;  Surgeon: Néstor Walls MD;  [facility]     CYSTOSCOPY, BLADDER NECK CUTS, COMBINED N/A 7/18/2016    Procedure: COMBINED CYSTOSCOPY, BLADDER NECK CUTS;  Surgeon: Ritu Leslie MD;  [facility]     ESOPHAGOSCOPY, GASTROSCOPY, DUODENOSCOPY (EGD), COMBINED  6/30/2014    Procedure: COMBINED ESOPHAGOSCOPY, GASTROSCOPY, DUODENOSCOPY (EGD), BIOPSY SINGLE OR MULTIPLE;  Surgeon: Chester Patton MD;  [facility]     EXCISE MASS FINGER  6/14/2011    Procedure:EXCISE MASS FINGER; Middle Flexor Cyst; Surgeon:SHAYY RUSSELL; [facility]     HAND SURGERY      excision of tendon cyst on left hand     HC VASCULAR SURGERY PROCEDURE UNLIST       IR FINE NEEDLE ASPIRATION W ULTRASOUND  11/13/2019     IR PICC PLACEMENT > 5 YRS OF AGE  11/19/2019     LASER  HOLMIUM LITHOTRIPSY BLADDER N/A 10/15/2014    Procedure: LASER HOLMIUM LITHOTRIPSY BLADDER;  Surgeon: Sahil Taveras MD;  [facility]     LASER KTP GREEN LIGHT PHOTOSELECTIVE VAPORIZATION PROSTATE  1/23/2014    Procedure: LASER KTP GREEN LIGHT PHOTOSELECTIVE VAPORIZATION PROSTATE;  Greenlight Photovaporization Of Prostate  ;  Surgeon: Sahil Taveras MD;  [facility]     RELEASE TRIGGER FINGER Right 3/31/2017    Procedure: RELEASE TRIGGER FINGER;  Surgeon: Juan Carlos Blunt MD;  [facility]     REMOVE ANTIBIOTIC CEMENT BEADS / SPACER SHOULDER Left 5/8/2020    Procedure: Left shoulder removal of spacer;  Surgeon: Analilia Aceves MD;  [facility]     REMOVE ANTIBIOTIC CEMENT BEADS / SPACER SHOULDER Right 8/18/2020    Procedure: Removal of right shoulder antibiotic spacer;  Surgeon: Analilia Aceves MD;  [facility]     REMOVE HARDWARE ARTHROPLASTY SHOULDER. I&D, PLACE ANTIBIOTIC CEMENT BE Left 11/15/2019    Procedure: Explantation of left total shoulder arthroplasty, irrigation and debridement, and placement of antibiotic spacer;  Surgeon: Analilia Aceves MD;  [facility]     REPAIR ANEURYSM ASCENDING AORTA N/A 10/4/2016    Procedure: REPAIR ANEURYSM ASCENDING AORTA;  Surgeon: Mckenzie Townsend MD;  [facility]     REVERSE ARTHROPLASTY SHOULDER Right 8/18/2020    Procedure: and conversion to reverse total shoulder arthroplasty;  Surgeon: Analilia Aceves MD;  [facility]     TURP  2014       ALLERGIES     Allergies   Allergen Reactions     Ciprofloxacin      History of aortic aneurysms     Tape [Adhesive Tape] Blisters     Blistering - please use paper tape       FAMILY HISTORY  Family History   Problem Relation Age of Onset     Arthritis Other      Gastrointestinal Disease Other      Cardiovascular Father         aortic aneurysm     Arrhythmia Father      Nephrolithiasis Father      Sleep Apnea Father      Anxiety Disorder Father       Depression Father      Hypertension Father      Obesity Father      Hyperlipidemia Father      Coronary Artery Disease Father         history of MI and stent     Low Back Problems Father      Spine Problems Father      Anxiety Disorder Mother      Hypertension Mother      Osteoporosis Mother      Obesity Mother      Hyperlipidemia Mother      Low Back Problems Mother      Anxiety Disorder Sister      Hypertension Sister      Osteoporosis Sister      Obesity Sister      Hyperlipidemia Sister      Low Back Problems Sister      Spine Problems Sister      Anxiety Disorder Sister      Depression Sister      Hypertension Sister      Osteoporosis Sister      Obesity Sister      Hyperlipidemia Sister      Low Back Problems Sister      Melanoma No family hx of      Skin Cancer No family hx of          SOCIAL HISTORY  Social History     Socioeconomic History     Marital status: Single     Spouse name: Not on file     Number of children: Not on file     Years of education: Not on file     Highest education level: Not on file   Occupational History     Occupation: Disabled   Social Needs     Financial resource strain: Not on file     Food insecurity     Worry: Not on file     Inability: Not on file     Transportation needs     Medical: Not on file     Non-medical: Not on file   Tobacco Use     Smoking status: Former Smoker     Packs/day: 0.50     Years: 6.00     Pack years: 3.00     Types: Cigarettes     Start date: 1977     Quit date: 1983     Years since quittin.1     Smokeless tobacco: Never Used   Substance and Sexual Activity     Alcohol use: No     Alcohol/week: 0.0 standard drinks     Drug use: No     Sexual activity: Not Currently     Partners: Female     Birth control/protection: Abstinence   Lifestyle     Physical activity     Days per week: Not on file     Minutes per session: Not on file     Stress: Not on file   Relationships     Social connections     Talks on phone: Not on file     Gets together: Not  "on file     Attends [employer] service: Not on file     Active member of club or organization: Not on file     Attends meetings of clubs or organizations: Not on file     Relationship status: Not on file     Intimate partner violence     Fear of current or ex partner: Not on file     Emotionally abused: Not on file     Physically abused: Not on file     Forced sexual activity: Not on file   Other Topics Concern     Parent/sibling w/ CABG, MI or angioplasty before 65F 55M? Not Asked   Social History Narrative     Not on file       ROS:   Constitutional: No fever, chills, or sweats. No weight gain/loss   ENT: No visual disturbance, ear ache, epistaxis, sore throat  Allergies/Immunologic: Negative  Respiratory: No cough, hemoptysia  Cardiovascular: As per HPI  GI: No nausea, vomiting, hematemesis, melena, or hematochezia  : No urinary frequency, dysuria, or hematuria  Integument: Negative  Psychiatric: Negative  Neuro: Negative  Endocrinology: Negative   Musculoskeletal: Negative  Vascular: No walking impairment, claudication, ischemic rest pain or nonhealing wounds    EXAM:  BP (!) 148/91   Pulse 60   Ht 1.795 m (5' 10.68\")   Wt 93.6 kg (206 lb 4.8 oz)   BMI 29.04 kg/m    In general, the patient is a pleasant male in no apparent distress.    HEENT: NC/AT.  PERRLA.  EOMI.  Sclerae white, not injected.  Nares clear.  Pharynx without erythema or exudate.  Dentition intact.    Neck: No adenopathy.  No thyromegaly. Carotids +2/2 bilaterally without bruits.  No jugular venous distension.   Heart: RRR. Normal S1, S2 splits physiologically. +SCOTT, rub, click, or gallop. The PMI is in the 5th ICS in the midclavicular line. There is no heave.    Lungs: CTA.  No ronchi, wheezes, rales.  No dullness to percussion.   Abdomen: Soft, nontender, nondistended. No organomegaly. No AAA.  No bruits.   Extremities: No clubbing, cyanosis, or edema.  No wounds. No varicose veins signs of chronic venous insufficiency.   Vascular: No " bruits are noted.    Labs:  LIPID RESULTS:  Lab Results   Component Value Date    CHOL 179 03/22/2018    HDL 39 (L) 03/22/2018    LDL 89 03/22/2018    TRIG 255 (H) 03/22/2018    CHOLHDLRATIO 3.7 06/08/2015    NHDL 140 (H) 03/22/2018       LIVER ENZYME RESULTS:  Lab Results   Component Value Date    AST 29 05/06/2020    ALT 42 05/06/2020       CBC RESULTS:  Lab Results   Component Value Date    WBC 6.4 08/05/2020    RBC 4.27 (L) 08/05/2020    HGB 9.9 (L) 08/20/2020    HCT 31.2 (L) 08/20/2020    MCV 74.3 (L) 08/20/2020    MCH 23.6 (L) 08/20/2020    MCHC 31.7 (L) 08/20/2020    RDW 17.0 (H) 08/05/2020     08/05/2020       BMP RESULTS:  Lab Results   Component Value Date     08/05/2020    POTASSIUM 4.3 08/18/2020    CHLORIDE 107 08/05/2020    CO2 26 08/05/2020    ANIONGAP 4 08/05/2020     (H) 08/18/2020    BUN 20 08/05/2020    CR 1.24 08/05/2020    GFRESTIMATED 63 08/05/2020    GFRESTBLACK 73 08/05/2020    NIA 8.8 08/05/2020        A1C RESULTS:  Lab Results   Component Value Date    A1C 5.3 10/05/2016

## 2020-10-27 NOTE — PATIENT INSTRUCTIONS
1. Start taking coreg 37.5 mg twice a day (try staggering pills with your other twice a day medication)  2. Post winter follow up MR angiogram chest and echocardiogram   3. 6 month follow up with Dr. Zhou      confused

## 2020-11-10 ENCOUNTER — MYC MEDICAL ADVICE (OUTPATIENT)
Dept: FAMILY MEDICINE | Facility: CLINIC | Age: 60
End: 2020-11-10

## 2020-11-10 DIAGNOSIS — K91.2 POSTSURGICAL MALABSORPTION: ICD-10-CM

## 2020-11-10 DIAGNOSIS — D50.9 IRON DEFICIENCY ANEMIA, UNSPECIFIED IRON DEFICIENCY ANEMIA TYPE: Primary | ICD-10-CM

## 2020-11-11 ENCOUNTER — ANCILLARY PROCEDURE (OUTPATIENT)
Dept: GENERAL RADIOLOGY | Facility: CLINIC | Age: 60
End: 2020-11-11
Attending: ORTHOPAEDIC SURGERY
Payer: COMMERCIAL

## 2020-11-11 ENCOUNTER — AMBULATORY - HEALTHEAST (OUTPATIENT)
Dept: LAB | Facility: CLINIC | Age: 60
End: 2020-11-11

## 2020-11-11 DIAGNOSIS — Z96.611 STATUS POST TOTAL SHOULDER ARTHROPLASTY, RIGHT: ICD-10-CM

## 2020-11-11 DIAGNOSIS — K91.2 POSTSURGICAL MALABSORPTION: ICD-10-CM

## 2020-11-11 LAB
FERRITIN SERPL-MCNC: 19 NG/ML (ref 27–300)
FOLATE SERPL-MCNC: 19.3 NG/ML
HGB BLD-MCNC: 13.8 G/DL (ref 14–18)
IRON SATN MFR SERPL: 17 % (ref 20–50)
IRON SERPL-MCNC: 56 UG/DL (ref 42–175)
TIBC SERPL-MCNC: 330 UG/DL (ref 313–563)
TRANSFERRIN SERPL-MCNC: 264 MG/DL (ref 212–360)
VIT B12 SERPL-MCNC: >2000 PG/ML (ref 213–816)

## 2020-11-11 PROCEDURE — 73030 X-RAY EXAM OF SHOULDER: CPT | Mod: RT | Performed by: RADIOLOGY

## 2020-11-11 NOTE — TELEPHONE ENCOUNTER
Called and informed patient this morning.     ANASTASIIA Dumont ([facility] [address] [place] [id number] [phone]

## 2020-11-19 ENCOUNTER — OFFICE VISIT (OUTPATIENT)
Dept: DERMATOLOGY | Facility: CLINIC | Age: 60
End: 2020-11-19
Payer: COMMERCIAL

## 2020-11-19 ENCOUNTER — TELEPHONE (OUTPATIENT)
Dept: DERMATOLOGY | Facility: CLINIC | Age: 60
End: 2020-11-19

## 2020-11-19 DIAGNOSIS — L40.9 PSORIASIS: ICD-10-CM

## 2020-11-19 DIAGNOSIS — L21.9 DERMATITIS, SEBORRHEIC: Primary | ICD-10-CM

## 2020-11-19 PROCEDURE — 99213 OFFICE O/P EST LOW 20 MIN: CPT | Mod: GC | Performed by: STUDENT IN AN ORGANIZED HEALTH CARE EDUCATION/TRAINING PROGRAM

## 2020-11-19 ASSESSMENT — PAIN SCALES - GENERAL: PAINLEVEL: NO PAIN (0)

## 2020-11-19 NOTE — NURSING NOTE
"Chief Complaint   Patient presents with     Derm Problem     Kobe is here today for psosoriasis. He states \" my legs are in static condition\"     Ritu Marshall, RMA  "

## 2020-11-19 NOTE — LETTER
"11/19/2020       RE: Kobe Buchanan  [address]  [place] [address]     Dear Colleague,    Thank you for referring your patient, Kobe Buchanan, to the [facility] [place] at [facility]. Please see a copy of my visit note below.    [facility] Dermatology Note      Dermatology Problem List:  1. Well demarcated, symmetric erosions on LEs, ddx. Favor psoriasis  2. Prurigo nodularis    Encounter Date: Nov 19, 2020    CC:   Chief Complaint   Patient presents with     Derm Problem     Kobe is here today for psosoriasis. He states \" my legs are in static condition\"     History of Present Illness:  Mr. Kobe Buchanan is a 60 year old male who presents as a follow-up for symmetric plaques on the bilateral lower legs. The patient was last seen 10/1/2020 when we recommended bleach compresses, triamcinolone 0.1% ointment BID PRN (but regularly until the rash goes away), and Vaseline ointment. He states the most he had been doing triamcinolone 0.1% ointment in a week was three times a week and he has never gotten the rash to go completely away. He states that he is still getting some benefit from occasional use of topical steroids. \"when I use it, it seems to get better.\" he notes some dandruff on the face and is interested in topical zinc shampoo to use as a prescription. The patient is well today in their baseline state of health, with no additional skin concerns.     Past Medical History:   Patient Active Problem List   Diagnosis     Essential hypertension     Hyperlipidemia     Abdominal pain, unspecified abdominal location     Ascending aortic aneurysm (H)     Medication refill- do not delete      Pain medication agreement signed     S/P shoulder replacement     BPPV (benign paroxysmal positional vertigo)     Shoulder joint pain     Obstructive sleep apnea     Obesity     Rhinitis     Right knee pain "     Status post coronary angiogram     Atrial fibrillation (H) [I48.91]     Trigger finger of right thumb     Dizzy     Spondylosis of cervical region without myelopathy or radiculopathy     Bone infection, shoulder region (H)     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)     Acute blood loss anemia     Septic joint of right shoulder region (H)     MSSA bacteremia     Moderate protein-calorie malnutrition (H)     Left shoulder pain     Septic joint of left shoulder region (H)     S/P shoulder replacement, left     Infection of prosthetic shoulder joint, subsequent encounter     Elevated troponin     Acute systolic heart failure (H)     NSTEMI (non-ST elevated myocardial infarction) (H)     Dysthymic disorder     NEMESIO (acute kidney injury) (H)     Status post total shoulder arthroplasty, left     Status post total shoulder arthroplasty     S/P reverse total shoulder arthroplasty, left     S/P reverse total shoulder arthroplasty, right     Past Medical History:   Diagnosis Date     Anxiety      Ascending aortic aneurysm (H)      Bicuspid aortic valve      BPPV (benign paroxysmal positional vertigo) 7/11/2014     Chronic narcotic use      Chronic neck pain      Chronic osteoarthritis      Degenerative joint disease      Depression      Hyperlipidemia 4/10/2012     Hypertension      Multiple stiff joints      Neck injuries      Obesity 2/9/2015     Pain in shoulder      Skin picking habit      Sleep apnea     does not use cpap     Past Surgical History:   Procedure Laterality Date     ARTHROPLASTY SHOULDER  4/15/2014    Procedure: Left Total Shoulder Arthroplasty ;  Surgeon: Analilia Aceves MD;  [facility]     ARTHROPLASTY SHOULDER Right 8/26/2014    Procedure: ARTHROPLASTY SHOULDER;  Surgeon: Analilia Aceves MD;  Location:  OR     ARTHROSCOPY SHOULDER WITH BIOPSY(IES) Left 1/28/2020    Procedure: Left shoulder arthroscopy and biopsy for culture;  Surgeon: Analilia Aceves MD;  Location:   OR     BYPASS GASTRIC TAVO-EN-Y, LIVER BIOPSY, COMBINED  8/8/2005     C HAND/FINGER SURGERY UNLISTED       C SHOULDER SURG PROC UNLISTED       COLONOSCOPY  6/30/2014    Procedure: COMBINED COLONOSCOPY, SINGLE BIOPSY/POLYPECTOMY BY BIOPSY;  Surgeon: Chester Patton MD;  [facility]: [facility]     CV CORONARY ANGIOGRAM  1/30/2020    Procedure: CV CORONARY ANGIOGRAM;  Surgeon: Néstor Walls MD;  [facility]     CYSTOSCOPY, BLADDER NECK CUTS, COMBINED N/A 7/18/2016    Procedure: COMBINED CYSTOSCOPY, BLADDER NECK CUTS;  Surgeon: Ritu Leslie MD;  [facility]     ESOPHAGOSCOPY, GASTROSCOPY, DUODENOSCOPY (EGD), COMBINED  6/30/2014    Procedure: COMBINED ESOPHAGOSCOPY, GASTROSCOPY, DUODENOSCOPY (EGD), BIOPSY SINGLE OR MULTIPLE;  Surgeon: Chester Patton MD;  Location: [facility]     EXCISE MASS FINGER  6/14/2011    Procedure:EXCISE MASS FINGER; Middle Flexor Cyst; Surgeon:SHAYY RUSSELL; Location:[facility] OR     HAND SURGERY      excision of tendon cyst on left hand     HC VASCULAR SURGERY PROCEDURE UNLIST       IR FINE NEEDLE ASPIRATION W ULTRASOUND  11/13/2019     IR PICC PLACEMENT > 5 YRS OF AGE  11/19/2019     LASER HOLMIUM LITHOTRIPSY BLADDER N/A 10/15/2014    Procedure: LASER HOLMIUM LITHOTRIPSY BLADDER;  Surgeon: Sahil Taveras MD;  [facility]: [facility]     LASER KTP GREEN LIGHT PHOTOSELECTIVE VAPORIZATION PROSTATE  1/23/2014    Procedure: LASER KTP GREEN LIGHT PHOTOSELECTIVE VAPORIZATION PROSTATE;  Greenlight Photovaporization Of Prostate  ;  Surgeon: Sahil Taveras MD;  [facility]: [facility]     RELEASE TRIGGER FINGER Right 3/31/2017    Procedure: RELEASE TRIGGER FINGER;  Surgeon: Juan Carlos Blunt MD;  [facility]     REMOVE ANTIBIOTIC CEMENT BEADS / SPACER SHOULDER Left 5/8/2020    Procedure: Left shoulder removal of spacer;  Surgeon: Analilia Aceves MD;  [facility]     REMOVE ANTIBIOTIC CEMENT BEADS / SPACER SHOULDER Right  8/18/2020    Procedure: Removal of right shoulder antibiotic spacer;  Surgeon: Analilia Aceves MD;  [facility]     REMOVE HARDWARE ARTHROPLASTY SHOULDER. I&D, PLACE ANTIBIOTIC CEMENT BE Left 11/15/2019    Procedure: Explantation of left total shoulder arthroplasty, irrigation and debridement, and placement of antibiotic spacer;  Surgeon: Analilia Aceves MD;  [facility]     REPAIR ANEURYSM ASCENDING AORTA N/A 10/4/2016    Procedure: REPAIR ANEURYSM ASCENDING AORTA;  Surgeon: Mckenzie Townsend MD;  [facility]     REVERSE ARTHROPLASTY SHOULDER Right 8/18/2020    Procedure: and conversion to reverse total shoulder arthroplasty;  Surgeon: Analilia Aceves MD;  [facility]: [facility]     TURP  2014       Social History:  Patient reports that he quit smoking about 37 years ago. His smoking use included cigarettes. He started smoking about 43 years ago. He has a 3.00 pack-year smoking history. He has never used smokeless tobacco. He reports that he does not drink alcohol or use drugs.    Family History:  Family History   Problem Relation Age of Onset     Arthritis Other      Gastrointestinal Disease Other      Cardiovascular Father         aortic aneurysm     Arrhythmia Father      Nephrolithiasis Father      Sleep Apnea Father      Anxiety Disorder Father      Depression Father      Hypertension Father      Obesity Father      Hyperlipidemia Father      Coronary Artery Disease Father         history of MI and stent     Low Back Problems Father      Spine Problems Father      Anxiety Disorder Mother      Hypertension Mother      Osteoporosis Mother      Obesity Mother      Hyperlipidemia Mother      Low Back Problems Mother      Anxiety Disorder Sister      Hypertension Sister      Osteoporosis Sister      Obesity Sister      Hyperlipidemia Sister      Low Back Problems Sister      Spine Problems Sister      Anxiety Disorder Sister      Depression Sister      Hypertension Sister       Osteoporosis Sister      Obesity Sister      Hyperlipidemia Sister      Low Back Problems Sister      Melanoma No family hx of      Skin Cancer No family hx of        Medications:  Current Outpatient Medications   Medication Sig Dispense Refill     amLODIPine-benazepril (LOTREL) 5-20 MG capsule Take 1 capsule by mouth 2 times daily 180 capsule 3     amoxicillin (AMOXIL) 500 MG capsule Take 4 tablets 1 hour before dental procedure 24 capsule 4     aspirin (ASA) 325 MG tablet Take 325 mg by mouth daily       atorvastatin (LIPITOR) 40 MG tablet Take 1 tablet (40 mg) by mouth daily 90 tablet 3     buPROPion (WELLBUTRIN SR) 200 MG 12 hr tablet TAKE 1 TABLET BY MOUTH 2 TIMES DAILY 180 tablet 3     carvedilol (COREG) 25 MG tablet Take 1.5 tablets (37.5 mg) by mouth 2 times daily (with meals) 180 tablet 3     clonazePAM (KLONOPIN) 0.5 MG tablet Take 1 tablet (0.5 mg) by mouth 3 times daily as needed for anxiety 90 tablet 1     Cyanocobalamin 5000 MCG SUBL Place 5,000 mcg under the tongue daily Vitamin B12       cyclobenzaprine (FLEXERIL) 10 MG tablet Take 1 tablet (10 mg) by mouth 3 times daily as needed for muscle spasms 90 tablet 3     desoximetasone (TOPICORT) 0.25 % external cream Apply topically daily as needed for inflammation or itching       docusate sodium (COLACE) 100 MG capsule Take 1 capsule in the morning and 2 capsules in the evening       Fe Heme Polypeptide-folic acid (PROFERRIN-FORTE) 12-1 MG TABS Take 1 tablet by mouth 2 times daily 90 tablet 0     fentaNYL (DURAGESIC) 75 mcg/hr 72 hr patch Place 1 patch onto the skin every 48 hours        fluocinonide (LIDEX) 0.05 % external ointment Apply twice daily to itchy skin nodules for 1-2 weeks at a time. 30 g 3     Multiple Minerals-Vitamins (CALCIUM CITRATE-MAG-MINERALS) TABS Take 1 tablet by mouth daily       multivitamin w/minerals (THERA-VIT-M) tablet Take 1 tablet by mouth daily       naloxone (NARCAN) nasal spray Spray 1 spray (4 mg) into one nostril  alternating nostrils as needed for opioid reversal every 2-3 minutes until assistance arrives 0.2 mL 0     naproxen (NAPROSYN DR) 500 MG EC tablet Take 500 mg by mouth 2 times daily as needed (pain) 60 tablet 3     oxyCODONE (ROXICODONE) 5 MG tablet Take 2-3 tablets (10-15 mg) by mouth every 3 hours as needed for moderate to severe pain 30 tablet 0     senna-docusate (SENOKOT-S/PERICOLACE) 8.6-50 MG tablet Take 1-2 tablets by mouth 2 times daily 30 tablet 0     tacrolimus (PROTOPIC) 0.1 % ointment Apply topically as needed Apply to affected areas on body. 120 g 11     triamcinolone (KENALOG) 0.1 % external ointment Apply topically 2 times daily 454 g 0     vitamin B-Complex Take 1 tablet by mouth daily       ibuprofen (ADVIL/MOTRIN) 600 MG tablet Take 1 tablet (600 mg) by mouth every 6 hours as needed for other (mild and/or inflammatory pain) 30 tablet 1        Allergies   Allergen Reactions     Ciprofloxacin      History of aortic aneurysms     Tape [Adhesive Tape] Blisters     Blistering - please use paper tape     Review of Systems:  -As per HPI  -Constitutional: Otherwise feeling well today, in usual state of health.  -Skin: As above in HPI. No additional skin concerns.    Physical exam:  Vitals: There were no vitals taken for this visit.  GEN: This is a well developed, well-nourished male in no acute distress, in a pleasant mood.    SKIN: Focused examination of the bilateral lower legs, face, and scalp was performed.  -Boudreaux skin type: II  - bilateral symmetric erythematous plaques with only faint scale on the anterior shins  - faint perifollicular prominence and scale on the scalp and within eyebrows  -No other lesions of concern on areas examined.     Labs:  NA    Impression/Plan:  1. Well demarcated, symmetric erosions on LEs, ddx. Favor psoriasis  Continue: Bleach compresses to prevent superficial infection, triamcinolone 0.1% ointment BID PRN, Vaseline ointment BID  Start: [facility] zinc shampoo up to  daily to face and scalp       CC No referring provider defined for this encounter. on close of this encounter.  Follow-up in 6 months, earlier for new or changing lesions.       Dr. Jacob staffed the patient.    Staff Involved:  Resident(Elizabeth)/Staff  I have personally examined this patient and agree with Dr. Johnson's documentation and plan of care. I have reviewed and amended the resident's note above. The documentation accurately reflects my clinical observations, diagnoses, treatment and follow-up plans.     Enriqueta Jacob MD  Dermatology Staff

## 2020-11-19 NOTE — TELEPHONE ENCOUNTER
[facility] Call Center    Phone Message    May a detailed message be left on voicemail: yes     Reason for Call: Medication Question or concern regarding medication   Prescription Clarification  Name of Medication: Pyrithione Zinc 2 % SHAM  Prescribing Provider: Dr. Jacob   Pharmacy: [facility]   What on the order needs clarification? Pharm called and needs some clarification on this medication. They need to know where is the pt applying this shampoo. Please call them back and ask for a pharmacist. Thanks      Action Taken: Message routed to:  [facility] ([facility] DERM    Travel Screening: Not Applicable

## 2020-11-19 NOTE — PROGRESS NOTES
"[facility] Dermatology Note      Dermatology Problem List:  1. Well demarcated, symmetric erosions on LEs, ddx. Favor psoriasis  2. Prurigo nodularis    Encounter Date: Nov 19, 2020    CC:   Chief Complaint   Patient presents with     Derm Problem     Kobe is here today for psosoriasis. He states \" my legs are in static condition\"     History of Present Illness:  Mr. Kobe Buchanan is a 60 year old male who presents as a follow-up for symmetric plaques on the bilateral lower legs. The patient was last seen 10/1/2020 when we recommended bleach compresses, triamcinolone 0.1% ointment BID PRN (but regularly until the rash goes away), and Vaseline ointment. He states the most he had been doing triamcinolone 0.1% ointment in a week was three times a week and he has never gotten the rash to go completely away. He states that he is still getting some benefit from occasional use of topical steroids. \"when I use it, it seems to get better.\" he notes some dandruff on the face and is interested in topical zinc shampoo to use as a prescription. The patient is well today in their baseline state of health, with no additional skin concerns.     Past Medical History:   Patient Active Problem List   Diagnosis     Essential hypertension     Hyperlipidemia     Abdominal pain, unspecified abdominal location     Ascending aortic aneurysm (H)     Medication refill- do not delete      Pain medication agreement signed     S/P shoulder replacement     BPPV (benign paroxysmal positional vertigo)     Shoulder joint pain     Obstructive sleep apnea     Obesity     Rhinitis     Right knee pain     Status post coronary angiogram     Atrial fibrillation (H) [I48.91]     Trigger finger of right thumb     Dizzy     Spondylosis of cervical region without myelopathy or radiculopathy     Bone infection, shoulder region (H)     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)     Acute blood loss anemia     Septic joint of right " shoulder region (H)     MSSA bacteremia     Moderate protein-calorie malnutrition (H)     Left shoulder pain     Septic joint of left shoulder region (H)     S/P shoulder replacement, left     Infection of prosthetic shoulder joint, subsequent encounter     Elevated troponin     Acute systolic heart failure (H)     NSTEMI (non-ST elevated myocardial infarction) (H)     Dysthymic disorder     NEMESIO (acute kidney injury) (H)     Status post total shoulder arthroplasty, left     Status post total shoulder arthroplasty     S/P reverse total shoulder arthroplasty, left     S/P reverse total shoulder arthroplasty, right     Past Medical History:   Diagnosis Date     Anxiety      Ascending aortic aneurysm (H)      Bicuspid aortic valve      BPPV (benign paroxysmal positional vertigo) 7/11/2014     Chronic narcotic use      Chronic neck pain      Chronic osteoarthritis      Degenerative joint disease      Depression      Hyperlipidemia 4/10/2012     Hypertension      Multiple stiff joints      Neck injuries      Obesity 2/9/2015     Pain in shoulder      Skin picking habit      Sleep apnea     does not use cpap     Past Surgical History:   Procedure Laterality Date     ARTHROPLASTY SHOULDER  4/15/2014    Procedure: Left Total Shoulder Arthroplasty ;  Surgeon: Analilia Aceves MD;  [facility]     ARTHROPLASTY SHOULDER Right 8/26/2014    Procedure: ARTHROPLASTY SHOULDER;  Surgeon: Analilia Aceves MD;  [facility]     ARTHROSCOPY SHOULDER WITH BIOPSY(IES) Left 1/28/2020    Procedure: Left shoulder arthroscopy and biopsy for culture;  Surgeon: Analilia Aceves MD;  [facility]     BYPASS GASTRIC TAVO-EN-Y, LIVER BIOPSY, COMBINED  8/8/2005     C HAND/FINGER SURGERY UNLISTED       C SHOULDER SURG PROC UNLISTED       COLONOSCOPY  6/30/2014    Procedure: COMBINED COLONOSCOPY, SINGLE BIOPSY/POLYPECTOMY BY BIOPSY;  Surgeon: Chester Patton MD;  [facility]     CV CORONARY ANGIOGRAM   1/30/2020    Procedure: CV CORONARY ANGIOGRAM;  Surgeon: Néstor Walls MD;  [facility]     CYSTOSCOPY, BLADDER NECK CUTS, COMBINED N/A 7/18/2016    Procedure: COMBINED CYSTOSCOPY, BLADDER NECK CUTS;  Surgeon: Ritu Leslie MD;  [facility]     ESOPHAGOSCOPY, GASTROSCOPY, DUODENOSCOPY (EGD), COMBINED  6/30/2014    Procedure: COMBINED ESOPHAGOSCOPY, GASTROSCOPY, DUODENOSCOPY (EGD), BIOPSY SINGLE OR MULTIPLE;  Surgeon: Chester Patton MD;  [facility]     EXCISE MASS FINGER  6/14/2011    Procedure:EXCISE MASS FINGER; Middle Flexor Cyst; Surgeon:SHAYY RUSSELL; [facility]     HAND SURGERY      excision of tendon cyst on left hand     [facility] VASCULAR SURGERY PROCEDURE UNLIST       IR FINE NEEDLE ASPIRATION W ULTRASOUND  11/13/2019     IR PICC PLACEMENT > 5 YRS OF AGE  11/19/2019     LASER HOLMIUM LITHOTRIPSY BLADDER N/A 10/15/2014    Procedure: LASER HOLMIUM LITHOTRIPSY BLADDER;  Surgeon: Sahil Taveras MD;  [facility]     LASER KTP GREEN LIGHT PHOTOSELECTIVE VAPORIZATION PROSTATE  1/23/2014    Procedure: LASER KTP GREEN LIGHT PHOTOSELECTIVE VAPORIZATION PROSTATE;  Greenlight Photovaporization Of Prostate  ;  Surgeon: Sahil Taveras MD;  [facility]     RELEASE TRIGGER FINGER Right 3/31/2017    Procedure: RELEASE TRIGGER FINGER;  Surgeon: Juan Carlos Blunt MD;  [facility]     REMOVE ANTIBIOTIC CEMENT BEADS / SPACER SHOULDER Left 5/8/2020    Procedure: Left shoulder removal of spacer;  Surgeon: Analilia Aceves MD;  [facility]     REMOVE ANTIBIOTIC CEMENT BEADS / SPACER SHOULDER Right 8/18/2020    Procedure: Removal of right shoulder antibiotic spacer;  Surgeon: Analilia Aceves MD;  [facility]     REMOVE HARDWARE ARTHROPLASTY SHOULDER. I&D, PLACE ANTIBIOTIC CEMENT BE Left 11/15/2019    Procedure: Explantation of left total shoulder arthroplasty, irrigation and debridement, and placement of  antibiotic spacer;  Surgeon: Analilia Aceves MD;  [facility]     REPAIR ANEURYSM ASCENDING AORTA N/A 10/4/2016    Procedure: REPAIR ANEURYSM ASCENDING AORTA;  Surgeon: Mckenzie Townsend MD;  [facility]     REVERSE ARTHROPLASTY SHOULDER Right 8/18/2020    Procedure: and conversion to reverse total shoulder arthroplasty;  Surgeon: Analilia Aceves MD;  [facility]     TURP  2014       Social History:  Patient reports that he quit smoking about 37 years ago. His smoking use included cigarettes. He started smoking about 43 years ago. He has a 3.00 pack-year smoking history. He has never used smokeless tobacco. He reports that he does not drink alcohol or use drugs.    Family History:  Family History   Problem Relation Age of Onset     Arthritis Other      Gastrointestinal Disease Other      Cardiovascular Father         aortic aneurysm     Arrhythmia Father      Nephrolithiasis Father      Sleep Apnea Father      Anxiety Disorder Father      Depression Father      Hypertension Father      Obesity Father      Hyperlipidemia Father      Coronary Artery Disease Father         history of MI and stent     Low Back Problems Father      Spine Problems Father      Anxiety Disorder Mother      Hypertension Mother      Osteoporosis Mother      Obesity Mother      Hyperlipidemia Mother      Low Back Problems Mother      Anxiety Disorder Sister      Hypertension Sister      Osteoporosis Sister      Obesity Sister      Hyperlipidemia Sister      Low Back Problems Sister      Spine Problems Sister      Anxiety Disorder Sister      Depression Sister      Hypertension Sister      Osteoporosis Sister      Obesity Sister      Hyperlipidemia Sister      Low Back Problems Sister      Melanoma No family hx of      Skin Cancer No family hx of        Medications:  Current Outpatient Medications   Medication Sig Dispense Refill     amLODIPine-benazepril (LOTREL) 5-20 MG capsule Take 1 capsule by mouth 2 times  daily 180 capsule 3     amoxicillin (AMOXIL) 500 MG capsule Take 4 tablets 1 hour before dental procedure 24 capsule 4     aspirin (ASA) 325 MG tablet Take 325 mg by mouth daily       atorvastatin (LIPITOR) 40 MG tablet Take 1 tablet (40 mg) by mouth daily 90 tablet 3     buPROPion (WELLBUTRIN SR) 200 MG 12 hr tablet TAKE 1 TABLET BY MOUTH 2 TIMES DAILY 180 tablet 3     carvedilol (COREG) 25 MG tablet Take 1.5 tablets (37.5 mg) by mouth 2 times daily (with meals) 180 tablet 3     clonazePAM (KLONOPIN) 0.5 MG tablet Take 1 tablet (0.5 mg) by mouth 3 times daily as needed for anxiety 90 tablet 1     Cyanocobalamin 5000 MCG SUBL Place 5,000 mcg under the tongue daily Vitamin B12       cyclobenzaprine (FLEXERIL) 10 MG tablet Take 1 tablet (10 mg) by mouth 3 times daily as needed for muscle spasms 90 tablet 3     desoximetasone (TOPICORT) 0.25 % external cream Apply topically daily as needed for inflammation or itching       docusate sodium (COLACE) 100 MG capsule Take 1 capsule in the morning and 2 capsules in the evening       Fe Heme Polypeptide-folic acid (PROFERRIN-FORTE) 12-1 MG TABS Take 1 tablet by mouth 2 times daily 90 tablet 0     fentaNYL (DURAGESIC) 75 mcg/hr 72 hr patch Place 1 patch onto the skin every 48 hours        fluocinonide (LIDEX) 0.05 % external ointment Apply twice daily to itchy skin nodules for 1-2 weeks at a time. 30 g 3     Multiple Minerals-Vitamins (CALCIUM CITRATE-MAG-MINERALS) TABS Take 1 tablet by mouth daily       multivitamin w/minerals (THERA-VIT-M) tablet Take 1 tablet by mouth daily       naloxone (NARCAN) nasal spray Spray 1 spray (4 mg) into one nostril alternating nostrils as needed for opioid reversal every 2-3 minutes until assistance arrives 0.2 mL 0     naproxen (NAPROSYN DR) 500 MG EC tablet Take 500 mg by mouth 2 times daily as needed (pain) 60 tablet 3     oxyCODONE (ROXICODONE) 5 MG tablet Take 2-3 tablets (10-15 mg) by mouth every 3 hours as needed for moderate to severe  pain 30 tablet 0     senna-docusate (SENOKOT-S/PERICOLACE) 8.6-50 MG tablet Take 1-2 tablets by mouth 2 times daily 30 tablet 0     tacrolimus (PROTOPIC) 0.1 % ointment Apply topically as needed Apply to affected areas on body. 120 g 11     triamcinolone (KENALOG) 0.1 % external ointment Apply topically 2 times daily 454 g 0     vitamin B-Complex Take 1 tablet by mouth daily       ibuprofen (ADVIL/MOTRIN) 600 MG tablet Take 1 tablet (600 mg) by mouth every 6 hours as needed for other (mild and/or inflammatory pain) 30 tablet 1        Allergies   Allergen Reactions     Ciprofloxacin      History of aortic aneurysms     Tape [Adhesive Tape] Blisters     Blistering - please use paper tape     Review of Systems:  -As per HPI  -Constitutional: Otherwise feeling well today, in usual state of health.  -Skin: As above in HPI. No additional skin concerns.    Physical exam:  Vitals: There were no vitals taken for this visit.  GEN: This is a well developed, well-nourished male in no acute distress, in a pleasant mood.    SKIN: Focused examination of the bilateral lower legs, face, and scalp was performed.  -Boudreaux skin type: II  - bilateral symmetric erythematous plaques with only faint scale on the anterior shins  - faint perifollicular prominence and scale on the scalp and within eyebrows  -No other lesions of concern on areas examined.     Labs:  NA    Impression/Plan:  1. Well demarcated, symmetric erosions on LEs, ddx. Favor psoriasis  Continue: Bleach compresses to prevent superficial infection, triamcinolone 0.1% ointment BID PRN, Vaseline ointment BID  Start: DHS zinc shampoo up to daily to face and scalp       CC No referring provider defined for this encounter. on close of this encounter.  Follow-up in 6 months, earlier for new or changing lesions.       Dr. Jacob staffed the patient.    Staff Involved:  Resident(Elizabeth)/Staff  I have personally examined this patient and agree with Dr. Johnson's documentation  and plan of care. I have reviewed and amended the resident's note above. The documentation accurately reflects my clinical observations, diagnoses, treatment and follow-up plans.     Enriqueta Jacob MD  Dermatology Staff

## 2020-11-20 NOTE — TELEPHONE ENCOUNTER
Impression/Plan:  1. Well demarcated, symmetric erosions on LEs, ddx. Favor psoriasis  Continue: Bleach compresses to prevent superficial infection, triamcinolone 0.1% ointment BID PRN, Vaseline ointment BID  Start: DHS zinc shampoo up to daily to face and scalp

## 2020-11-23 ENCOUNTER — MYC REFILL (OUTPATIENT)
Dept: FAMILY MEDICINE | Facility: CLINIC | Age: 60
End: 2020-11-23

## 2020-11-23 DIAGNOSIS — F43.9 STRESS: ICD-10-CM

## 2020-11-23 RX ORDER — CLONAZEPAM 0.5 MG/1
0.5 TABLET ORAL 3 TIMES DAILY PRN
Qty: 90 TABLET | Refills: 3 | Status: SHIPPED | OUTPATIENT
Start: 2020-11-23 | End: 2021-03-15

## 2020-12-07 DIAGNOSIS — F34.1 DYSTHYMIC DISORDER: ICD-10-CM

## 2020-12-07 RX ORDER — BUPROPION HYDROCHLORIDE 200 MG/1
TABLET, EXTENDED RELEASE ORAL
Qty: 180 TABLET | Refills: 3 | Status: SHIPPED | OUTPATIENT
Start: 2020-12-07 | End: 2022-01-12

## 2020-12-30 DIAGNOSIS — M54.2 NECK PAIN: ICD-10-CM

## 2020-12-31 RX ORDER — CYCLOBENZAPRINE HCL 10 MG
10 TABLET ORAL 3 TIMES DAILY PRN
Qty: 90 TABLET | Refills: 3 | Status: SHIPPED | OUTPATIENT
Start: 2020-12-31 | End: 2021-08-31

## 2021-01-15 ENCOUNTER — HEALTH MAINTENANCE LETTER (OUTPATIENT)
Age: 61
End: 2021-01-15

## 2021-02-01 DIAGNOSIS — M47.812 SPONDYLOSIS OF CERVICAL REGION WITHOUT MYELOPATHY OR RADICULOPATHY: ICD-10-CM

## 2021-02-01 NOTE — TELEPHONE ENCOUNTER
Message to physician:     Date of last visit: 8/20/20    Date of next visit if scheduled: none    Last Comprehensive Metabolic Panel:  Sodium   Date Value Ref Range Status   08/05/2020 137 133 - 144 mmol/L Final     Potassium   Date Value Ref Range Status   08/18/2020 4.3 3.4 - 5.3 mmol/L Final     Chloride   Date Value Ref Range Status   08/05/2020 107 94 - 109 mmol/L Final     Carbon Dioxide   Date Value Ref Range Status   08/05/2020 26 20 - 32 mmol/L Final     Anion Gap   Date Value Ref Range Status   08/05/2020 4 3 - 14 mmol/L Final     Glucose   Date Value Ref Range Status   08/18/2020 117 (H) 70 - 99 mg/dL Final     Urea Nitrogen   Date Value Ref Range Status   08/05/2020 20 7 - 30 mg/dL Final     Creatinine   Date Value Ref Range Status   08/05/2020 1.24 0.66 - 1.25 mg/dL Final     GFR Estimate   Date Value Ref Range Status   08/05/2020 63 >60 mL/min/[1.73_m2] Final     Comment:     Non  GFR Calc  Starting 12/18/2018, serum creatinine based estimated GFR (eGFR) will be   calculated using the Chronic Kidney Disease Epidemiology Collaboration   (CKD-EPI) equation.       Calcium   Date Value Ref Range Status   08/05/2020 8.8 8.5 - 10.1 mg/dL Final       BP Readings from Last 3 Encounters:   10/27/20 (!) 148/91   08/20/20 136/76   08/18/20 (!) 140/70       Lab Results   Component Value Date    A1C 5.3 10/05/2016    A1C 5.4 07/12/2016    A1C 5.0 10/31/2011    A1C 4.9 11/02/2010                Please complete refill and CLOSE ENCOUNTER.  Closing the encounter signifies the refill is complete.

## 2021-02-02 RX ORDER — NAPROXEN 500 MG/1
500 TABLET ORAL 2 TIMES DAILY PRN
Qty: 180 TABLET | Refills: 1 | Status: SHIPPED | OUTPATIENT
Start: 2021-02-02 | End: 2021-02-09

## 2021-02-09 ENCOUNTER — OFFICE VISIT (OUTPATIENT)
Dept: FAMILY MEDICINE | Facility: CLINIC | Age: 61
End: 2021-02-09
Payer: COMMERCIAL

## 2021-02-09 VITALS
DIASTOLIC BLOOD PRESSURE: 76 MMHG | SYSTOLIC BLOOD PRESSURE: 135 MMHG | WEIGHT: 220 LBS | OXYGEN SATURATION: 95 % | TEMPERATURE: 97.6 F | RESPIRATION RATE: 16 BRPM | BODY MASS INDEX: 31.5 KG/M2 | HEART RATE: 57 BPM | HEIGHT: 70 IN

## 2021-02-09 DIAGNOSIS — Z00.00 MEDICARE ANNUAL WELLNESS VISIT, INITIAL: Primary | ICD-10-CM

## 2021-02-09 DIAGNOSIS — M47.812 SPONDYLOSIS OF CERVICAL REGION WITHOUT MYELOPATHY OR RADICULOPATHY: ICD-10-CM

## 2021-02-09 DIAGNOSIS — Z00.00 WELLNESS EXAMINATION: Primary | ICD-10-CM

## 2021-02-09 PROBLEM — D50.0 IRON DEFICIENCY ANEMIA SECONDARY TO BLOOD LOSS (CHRONIC): Status: ACTIVE | Noted: 2020-08-25

## 2021-02-09 PROCEDURE — 99207 PR NO BILLABLE SERVICE THIS VISIT: CPT

## 2021-02-09 PROCEDURE — G0438 PPPS, INITIAL VISIT: HCPCS | Performed by: FAMILY MEDICINE

## 2021-02-09 RX ORDER — NAPROXEN 500 MG/1
500 TABLET ORAL 2 TIMES DAILY PRN
Qty: 186 TABLET | Refills: 1 | Status: SHIPPED | OUTPATIENT
Start: 2021-02-09 | End: 2022-04-05

## 2021-02-09 ASSESSMENT — MIFFLIN-ST. JEOR: SCORE: 1814.16

## 2021-02-09 NOTE — PROGRESS NOTES
Medicare Wellness Visit  Health Risk Assessment           Health Risk Assessment / Review of Systems     Constitutional: Any fevers or night sweats? No     Eyes:  Vision problems   No, progressing astigmatism.    Hearing Do you feel you have hearing loss?  No     Cardiovascular: Any chest pain, fast or irregular heart beat, calf pain with walking?     No           Respiratory:   Any breathing problems or cough?   No     Gastrointestinal: Any stomach or stool problems?   No      Genitourinary: Do you have difficulty controlling urination?    YES -has had 2 previous TURPS, reoccurring bladder neck issues.    Muscles and Joints: Any joint stiffness or soreness?    YES     Skin: Any concerning lesions or moles?   No     Nervous System: Any loss of strength or feeling, numbness or tingling, shaking, dizziness, or headache?   YES -experience headaches daily secondary to cervical issues.    Mental Health: Any depression, anxiety or problems sleeping?     YES -depression is well controlled, anxiety is ongoing as well as problems with sleeping.    Cognition: Do you have any problems with your memory?   YES -noticed after Staph infection 9/20/2019, short term memory has decreased.           Medical Care     What other specialists or organizations are involved in your medical care?  [facility], [facility]  Patient Care Team       Relationship Specialty Notifications Start End    Demi Hardin MD PCP - General Family Practice  1/22/20     Phone: [phone] Fax: [phone]         [facility] [address]    Michael Styles MD Referring Physician Internal Medicine  4/12/16     Phone: [phone] Fax: [phone]         [address] [address]    Juan Carlos Blunt MD MD Hand Surgery  2/3/17     Phone: [phone] Fax: [phone]         [address] [facility] [address]    Brenda Espinal MD MD Ophthalmology  2/26/18     Phone:  [phone] Fax: [phone]         [address] [place] [address]    Analilia Aceves MD MD Orthopedics  10/7/19     Phone: [phone] Fax: [phone]         [address] [facility] [address]    Demi Hardin MD Assigned PCP   20     Phone: [phone] Fax: [phone]         [address] [facility] [id number]    Analilia Aceves MD Assigned Musculoskeletal Provider   10/23/20     Phone: [phone] Fax: [phone]         [address] [facility] [address]    Ivory Hilton MD Assigned Infectious Disease Provider   10/23/20     Phone: [phone] Fax: [phone]         [address] [facility] [address]    Enriqueta Zhou MD Assigned Heart and Vascular Provider   11/15/20     Phone: [phone] Pager: [phone] Fax: [phone]        [address] [facility] [address]          Have you been to the ER or overnight in the hospital in the last year?   YES -2020, May 2020 and 2020         Social History / Home Safety       Marital Status:Single  Who lives in your household? self    Do you feel threatened or controlled by a partner, ex-partner or anyone in your life? No     Has anyone hurt you physically, for example by pushing, hitting, slapping or kicking you   or forcing you to have sex? No          Does your home have any of the following safety concerns; loose rugs in the hallway,  bathrooms with no grab bars by the tub or toilet, stairs with no handrails or poorly lit areas?  No     Do you need help with dressing yourself, bathing, eating or getting around your home?  No     Do you need help with the phone, transportation, shopping, preparing meals, housework, laundry, medications or managing money?No, uses medical transport if hospitalized, [employer] will  this year.      Risk Behaviors and Healthy Habits     History   Smoking Status     Former Smoker     Packs/day: 0.50     Years:  6.00     Types: Cigarettes     Start date: 2/1/1977     Quit date: 9/1/1983   Smokeless Tobacco     Never Used     How many servings of fruits and vegetables do you eat a day? 2 servings a day, given daily recommendation of 5 servings a day.    Exercise: walk daily, swim when pool opens up after COVID.    Do you frequently drive without a seatbelt? No     Do you use tobacco?  No     Do you use any other drugs? No         Do you use alcohol?No      Frailty Assessment            Have you lost 10 or more pounds unintentionally in the previous year? No     How difficult is walking from one room to the other on the same level?not       Is it difficult to lift or carry something as heavy as 10 pounds? Under Orthopedic instructions, can only lift 8 lbs and under. Not to exceed 8 lbs.      Compared with most (men/women) your age, would you say  that you are more active, less active, or about the same? less        FALL RISK ASSESSMENT 2/9/2021   Fallen 2 or more times in the past year? No   Any fall with injury in the past year? No   Timed Up and Go Test/Seconds (13.5 is a fall risk; contact physician) 8         Advance Directives:   Discussed with patient and family as appropriate. Has patient  completed advance directives and/or a living will? No, Kobe declined blank copy of an advance directive, states he has one at home.      Claudia Paz RN

## 2021-02-09 NOTE — PROGRESS NOTES
Annual Wellness Visit for 65 years and older    HPI  This 61 year old male presents as an established patient  Demi Hardin who presents for an annual wellness visit.    Med issue: tizanidine helps sleep only, flexeril use seems better for muscle relaxation of the shoulders and neck with naproxen, patient kind of uses both for different reasons if ok.  Naproxen has gotten higher in cost (1 AM, random 1 PM)    Anxiety: restless and itchy episodes, can't calm down at all, clonazepam doesn't work as well and wonders about mirtazapine ??  Other issues patient wants to be addressed today:  Chief Complaint   Patient presents with     Wellness Visit       Patient Active Problem List   Diagnosis     Essential hypertension     Hyperlipidemia     Abdominal pain, unspecified abdominal location     Ascending aortic aneurysm (H)     Medication refill- do not delete      Pain medication agreement signed     S/P shoulder replacement     BPPV (benign paroxysmal positional vertigo)     Shoulder joint pain     Obstructive sleep apnea     Obesity     Rhinitis     Right knee pain     Status post coronary angiogram     Atrial fibrillation (H) [I48.91]     Trigger finger of right thumb     Dizzy     Spondylosis of cervical region without myelopathy or radiculopathy     Bone infection, shoulder region (H)     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)     Septic joint of right shoulder region (H)     MSSA bacteremia     Moderate protein-calorie malnutrition (H)     Left shoulder pain     Septic joint of left shoulder region (H)     S/P shoulder replacement, left     Infection of prosthetic shoulder joint, subsequent encounter     Elevated troponin     Acute systolic heart failure (H)     NSTEMI (non-ST elevated myocardial infarction) (H)     Dysthymic disorder     NEMESIO (acute kidney injury) (H)     Status post total shoulder arthroplasty, left     Status post total shoulder arthroplasty     S/P reverse total shoulder arthroplasty,  left     S/P reverse total shoulder arthroplasty, right     Iron deficiency anemia secondary to blood loss (chronic)     Past Medical History:   Diagnosis Date     Anxiety      Ascending aortic aneurysm (H)      Bicuspid aortic valve      BPPV (benign paroxysmal positional vertigo) 7/11/2014     Chronic narcotic use      Chronic neck pain      Chronic osteoarthritis      Degenerative joint disease      Depression      Hyperlipidemia 4/10/2012     Hypertension      Multiple stiff joints      Neck injuries      Obesity 2/9/2015     Pain in shoulder      Skin picking habit      Sleep apnea     does not use cpap       Family History   Problem Relation Age of Onset     Arthritis Other      Gastrointestinal Disease Other      Cardiovascular Father         aortic aneurysm     Arrhythmia Father      Nephrolithiasis Father      Sleep Apnea Father      Anxiety Disorder Father      Depression Father      Hypertension Father      Obesity Father      Hyperlipidemia Father      Coronary Artery Disease Father         history of MI and stent     Low Back Problems Father      Spine Problems Father      Anxiety Disorder Mother      Hypertension Mother      Osteoporosis Mother      Obesity Mother      Hyperlipidemia Mother      Low Back Problems Mother      Anxiety Disorder Sister      Hypertension Sister      Osteoporosis Sister      Obesity Sister      Hyperlipidemia Sister      Low Back Problems Sister      Spine Problems Sister      Anxiety Disorder Sister      Depression Sister      Hypertension Sister      Osteoporosis Sister      Obesity Sister      Hyperlipidemia Sister      Low Back Problems Sister      Melanoma No family hx of      Skin Cancer No family hx of      Problem List, Family History and past Medical History reviewed and  unchanged/updated.    There are no exam notes on file for this visit.    Are you sexually active?  No   If yes, with men, women, or both? Female  If yes, do you more than one current  partner?No    Frailty Assessment    1. Weight loss (>5% in year)  No  Wt Readings from Last 5 Encounters:   02/09/21 99.8 kg (220 lb)   10/27/20 93.6 kg (206 lb 4.8 oz)   08/20/20 91.2 kg (201 lb)   08/18/20 89.9 kg (198 lb 3.1 oz)   08/05/20 89.8 kg (198 lb)       2. Exhaustion (perceived effort for a given activity)   How difficult is walking from one room to the other on the same level?not   No     3. Weakness (handgrip strength)   How difficult is lifting or carrying something as heavy as 10 pounds? With shoulders restricted to 8#       4. Decreased physical activity    Compared with most (men/women) your age, would you say  that you are more active, less active, or about the same? less   Yes    5. Slow gait speed (timed up and go > 12 sec.)  No  FALL RISK ASSESSMENT 2/9/2021   Fallen 2 or more times in the past year? No   Any fall with injury in the past year? No   Timed Up and Go Test/Seconds (13.5 is a fall risk; contact physician) 8         Frailty screen score: 1    Frail Assessment:1-2 Prefrail         EVALUATION OF COGNITIVE FUNCTION  Mood/affect:Normal  Appearance:Normal  Family member/caregiver input: alone, has one sister in [place], one sister in [facility]    PHQ-2 Score:   PHQ-2 ( 1999 [employer]) 8/5/2020 11/8/2018   Q1: Little interest or pleasure in doing things 0 2   Q2: Feeling down, depressed or hopeless 0 2   PHQ-2 Score 0 4       PHQ-9 Score:   [facility] Follow-up to PHQ 8/22/2019 5/6/2020   PHQ-9 9. Suicide Ideation past 2 weeks Not at all Not at all       Mini Cog Test:      Recall result:  3 points   Clock Draw Test result: Normal    Cognitive screen is:Negative      Other Assessments:  CV Risk based on Pooled Cohort Risk   The ASCVD Risk score ([place] HANLEY Jr., et al., 2013) failed to calculate for the following reasons:    The patient has a prior MI or stroke diagnosis          ECG (if done)not performed    Advance Directives: Discussed with patient and family as appropriate. Has patient  completed  "advance directives and/or a living will? yes      Immunization History   Administered Date(s) Administered     DT (PEDS <7y) 02/04/2004     Influenza (H1N1) 12/29/2009     Influenza (IIV3) PF 10/25/1993, 10/20/1998, 10/05/1999, 11/26/2001, 10/20/2003, 10/20/2004, 11/05/2005, 09/01/2010, 10/18/2011, 10/02/2012, 10/01/2013, 10/06/2014, 10/26/2016, 10/02/2017     Influenza Vaccine IM > 6 months Valent IIV4 11/05/2019     MMR 08/30/1994     Pneumococcal 23 valent 10/25/1993     TD (ADULT, 7+) 10/25/1993, 02/04/2004     TDAP Vaccine (Boostrix) 04/02/2014     Reviewed Immunization Record Today  Physical Exam    Vitals: /76 (BP Location: Right arm)   Pulse 57   Temp 97.6  F (36.4  C) (Oral)   Resp 16   Ht 1.778 m (5' 10\")   Wt 99.8 kg (220 lb)   SpO2 95%   BMI 31.57 kg/m    BMI= Body mass index is 31.57 kg/m .  EXAM:  Gen: alert, oriented X 3, no acute distress, no sign of discomfort  MENTAL STATUS EXAM:  Appearance/Behavior:No apparent distress  Speech:Normal  Mood/Affect:normal affect  Insight:Adequate        Assessment and Plan:    Reviewed Preventive Services and Plan form with patient as specified in  Patient Instructions.  Positive findings on assessment: normal cognitive testing,    Kobe was seen today for wellness visit.    Diagnoses and all orders for this visit:    Medicare annual wellness visit, initial    Spondylosis of cervical region without myelopathy or radiculopathy  -     naproxen (EC-NAPROSYN) 500 MG EC tablet; Take 1 tablet (500 mg) by mouth 2 times daily as needed (pain)        Options for treatment and follow-up care were reviewed with the Kobe ZHANNA Buchanan  and/or guardian engaged in the decision making process and verbalized  understanding of the options discussed and agreed with the final plan.  Demi Hardin MD    "

## 2021-02-09 NOTE — PATIENT INSTRUCTIONS
PERSONAL PREVENTIVE SERVICES PLAN - SERVICES     Review these tests with your medical staff then decide which ones you want and take this page home for your reference      SCREENING TESTS     Description   Year of Last Screening   Recommended Today?   Heart disease screening blood tests    Cholesterol level Reducing cholesterol can reduce your risk of heart attacks by 25%.  Screening is recommended yearly if you are at risk of heart disease otherwise every 4-5 years 2015 Yes; Recommended and ordered.   Diabetes screening tests    Hemoglobin A1c blood test   Finding and treating diabetes early can reduce complications.  Screening recommended/covered yearly if you have high blood pressure, high cholesterol, obesity (BMI >30), or a history of high blood glucose tests; or 2 of the following: family history of diabetes, overweight (BMI >25 but <30), age 65 years or older, and a history of diabetes of pregnancy or gave birth to baby weighing more than 9 lbs. - Yes; Recommended and ordered.   Hepatitis B screening Finding hepatitis B early can reduce complications.  Screening is recommended for persons with selected risk factors. - No: is not indicated today.   Hepatitis C screening Finding hepatitis C early can reduce complications.  Screening is recommended for all persons born from 1945 through 1965 and for those with selected other risk factors.  - Yes; Recommended and ordered.   HIV screening Finding HIV early can reduce complications.  Screening is recommended for persons with risk factors for HIV infection. - No: is not indicated today.   Glaucoma screening Early detection of glaucoma can prevent blindness.   Please talk to your eye doctor about this.       SCREENING TESTS     Description   Year of Last Screening   Recommended Today?   Colorectal cancer screening    Fecal occult blood test     Screening colonoscopy Screening for colon cancer has been shown to reduce death from colon cancer by 25-30%. Screening  recommended to start at 50 years and continuing until age 75 years.   06/2014 No; is up to date.   Breast Cancer Screening (women)    Mammogram Mammogram screening for breast cancer has been shown to reduce the risk of dying from breast cancer and prolong life. Screening is recommended every 1-2 years for women aged 50 to 74 years.  - No: is not indicated today.   Cervical Cancer screening (women)    Pap Cervical pap smears can reduce cervical cancer. Screening is recommended annually if high risk (history of abnormal pap smears) otherwise every 2-3 years, stop screening at 65 years of age if history of normal paps. - No: is not indicated today.   Screening for Osteoporosis:  Bone mass measurements (women)    Dexa Scan Screening and treating Osteoporosis can reduce the risk of hip and spine fractures. Screening is recommended in women 65 years or older and in women and men at risk of osteoporosis. - No: is not indicated today.   Screening for Lung Cancer     Low-dose CT scanning Screening can reduce mortality in persons aged 55-80 who have smoked at least 30 pack-years and who are either still smoking or have quit in the past 15 years. - No: is not indicated today.   Abdominal Aortic Aneurysm (AAA) screening    Ultrasound (US)   An aneurysm treated before rupture is very safe -a ruptured aneurysm can be fatal.  Screening  by US for AAA is limited to patients who meet one of the following criteria:    Men who are 65-75 years old and have smoked more than 100 cigarettes in their lifetime    Anyone with a family history of abdominal aortic aneurysm - Patient has history of AAA and is being managed by cardiology     Here are your recommended immunizations.  Take this home for your reference.                                                    IMMUNIZATIONS Description Recommend today?     Influenza (Flu shot) Prevents flu; should get every year Yes; Recommended    PCV 13 Pneumonia vaccination; you get it once No: is not  indicated today.   PPSV 23 Second pneumonia vaccination; usually get it 1 year after PCV 13 No; is up to date.   Zoster (Shingles) Prevents shingles; you get it once  (Check with Part D insurance for coverage, must receive at a pharmacy, not clinic) Yes; Recommended    Tetanus Prevents tetanus; once every 10 years No; is up to date.     Hepatitis B  If you have any of the following risk factors you should be immunized for hepatitis B: severe kidney disease, people who live in the same house as a carrier of Hepatitis B virus, people who live in  institutions (e.g. nursing homes or group homes), homosexual men, patients with hemophilia who received Factor VIII or IX concentrates, abusers of illicit injectable drugs No: is not indicated today.      PATIENT INSTRUCTIONS    Yearly exam:     See your health care provider every year in order to review changes in your health, review medicines that you take, and discuss preventive care needs such as immunizations and cancer screening.    Get a flu shot each year.     Advance Directives:    If you have not done so, you are encouraged to complete advance directives and/or a living will.   More information about advance directives can be found at: http://www.[place]med.org/advocacy/Key-Issues/Advance-Directives    Nutrition:     Eat at least 5 servings of fruits and vegetables each day.     Eat whole-grain bread, whole-wheat pasta and brown rice instead of white grains and rice.     Talk to your doctor about Calcium and Vitamin D.     Lifestyle:    Exercise for at least 150 minutes a week (30 minutes a day, 5 days a week). This will help you control your weight and prevent disease.     Limit alcohol to one drink per day.     If you smoke, try to quit - your doctor will be happy to help.     Wear sunscreen to prevent skin cancer.     See your dentist every six months for an exam and cleaning.     See your eye doctor every 1 to 2 years to screen for conditions such as glaucoma,  macular degeneration and cataracts.

## 2021-02-10 ENCOUNTER — TELEPHONE (OUTPATIENT)
Dept: CARDIOLOGY | Facility: CLINIC | Age: 61
End: 2021-02-10

## 2021-02-10 NOTE — TELEPHONE ENCOUNTER
Kobe called to review his genetic test results from 2014.  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    Explained that the FBN1 gene is typically thought to be associated with Marfan syndrome and MASS phenotype.  Other genes associated with connective tissue disorder are more frequently associated with autoimmune disorders, such as SMAD3.    Since Kobe's testing was performed in 2014, I looked on ClinVar for new information about this variant. Since 2014, several labs have reported this variant as a benign or likely benign variant.  Most recently in  2020, the [facility] [place] [facility] and [facility] reported that this variant is benign due to the frequency of this allele in the general population (0.1281% ([id number]/[id number]) of  (non-Tunisian) chromosomes).  In addition, it does not always segregate with disease in families that have been tested and it occurs in family members without Marfan syndrome.    Looked to see if additional testing should be offered to Kobe.  Explained that his testing in 2014 looked at 16 associated genes.  Although testing panels are a bit larger now, they are unlikely to increase detection rate beyond the original 20-30%.      Recommend that if any other family member is diagnosed with an aortic aneurysm they should pursue full TAAD genetic panel. Kobe reports that his nephew has had pneumothorax, BCAV, and other physical characteristics of Marfan syndrome. He does not have an aortic aneurysm.    Kobe will relay this information to his cousin and other family members.  Kobe believes that he has all of the original test results but will contact us if he would like additional copies sent.    Rachael Herron MS, CGC  Licensed, Certified Genetic Counselor  [facility]  [facility]

## 2021-02-12 ENCOUNTER — OFFICE VISIT (OUTPATIENT)
Dept: ORTHOPEDICS | Facility: CLINIC | Age: 61
End: 2021-02-12
Payer: COMMERCIAL

## 2021-02-12 ENCOUNTER — ANCILLARY PROCEDURE (OUTPATIENT)
Dept: GENERAL RADIOLOGY | Facility: CLINIC | Age: 61
End: 2021-02-12
Attending: ORTHOPAEDIC SURGERY
Payer: COMMERCIAL

## 2021-02-12 DIAGNOSIS — T84.59XD INFECTION OF PROSTHETIC SHOULDER JOINT, SUBSEQUENT ENCOUNTER: Primary | ICD-10-CM

## 2021-02-12 DIAGNOSIS — T84.59XD INFECTION OF PROSTHETIC SHOULDER JOINT, SUBSEQUENT ENCOUNTER: ICD-10-CM

## 2021-02-12 DIAGNOSIS — Z96.619 INFECTION OF PROSTHETIC SHOULDER JOINT, SUBSEQUENT ENCOUNTER: ICD-10-CM

## 2021-02-12 DIAGNOSIS — Z96.619 INFECTION OF PROSTHETIC SHOULDER JOINT, SUBSEQUENT ENCOUNTER: Primary | ICD-10-CM

## 2021-02-12 PROCEDURE — 99213 OFFICE O/P EST LOW 20 MIN: CPT | Performed by: ORTHOPAEDIC SURGERY

## 2021-02-12 PROCEDURE — 73030 X-RAY EXAM OF SHOULDER: CPT | Mod: LT | Performed by: RADIOLOGY

## 2021-02-12 NOTE — PROGRESS NOTES
CHIEF CONCERN:   1. Status post right shoulder removal of antibiotic spacer and placement of reverseTSA (8/18/20)  2. Status post left shoulder removal of antibiotic spacer and placement of custom VRS reverse TSA (5/8/2020)    HISTORY OF PRESENT ILLNESS: Mr. Buchanan is a 60 year-old male who is 6 months status post the above right shoulder procedure. He has been unable to get back to swimming as the pool has been closed. He is asking about his activities and is worried about loosening his implants. He has been changing his tires (each weighs about 50#).     EXAM:  Pleasant adult male in no distress.  Respirations even and unlabored.  Right upper extremity: Sens intact Ax/Musc/Med/Rad/Uln nerves. Motor intact EPL, FPL, and Intrinsics. Shoulder range of motion is active FE to 165, ER at side to 60, and IR to L5.  Left shoulder AROM is FE to 160, ER at side to 55, IR to L5.    IMAGING:  Left shoulder XR today reviewed by me and demonstrates VRS baseplate and reverse components in good position.     ASSESSMENT:  1. Six month status post above right shoulder procedure.  2. Nine month status post left shoulder procedure.    PLAN:     Range of Motion: ROM as tolerated.     Activity: Reminded patient of restrictions for reverse TSA in general. Discussed that he should likely not be changing his own tires due to that weight amount.    Follow up: in 1 year with XR of B shoulder.

## 2021-02-12 NOTE — LETTER
2/12/2021         RE: Kobe Buchanan  [address]  [place] [address]        Dear Colleague,    Thank you for referring your patient, Kobe Buchanan, to the [facility] [place]. Please see a copy of my visit note below.    CHIEF CONCERN:   1. Status post right shoulder removal of antibiotic spacer and placement of reverseTSA (8/18/20)  2. Status post left shoulder removal of antibiotic spacer and placement of custom VRS reverse TSA (5/8/2020)    HISTORY OF PRESENT ILLNESS: Mr. Buchanan is a 60 year-old male who is 6 months status post the above right shoulder procedure. He has been unable to get back to swimming as the pool has been closed. He is asking about his activities and is worried about loosening his implants. He has been changing his tires (each weighs about 50#).     EXAM:  Pleasant adult male in no distress.  Respirations even and unlabored.  Right upper extremity: Sens intact Ax/Musc/Med/Rad/Uln nerves. Motor intact EPL, FPL, and Intrinsics. Shoulder range of motion is active FE to 165, ER at side to 60, and IR to L5.  Left shoulder AROM is FE to 160, ER at side to 55, IR to L5.    IMAGING:  Left shoulder XR today reviewed by me and demonstrates VRS baseplate and reverse components in good position.     ASSESSMENT:  1. Six month status post above right shoulder procedure.  2. Nine month status post left shoulder procedure.    PLAN:     Range of Motion: ROM as tolerated.     Activity: Reminded patient of restrictions for reverse TSA in general. Discussed that he should likely not be changing his own tires due to that weight amount.    Follow up: in 1 year with XR of B shoulder.         Analilia Aceves MD

## 2021-02-12 NOTE — NURSING NOTE
Reason For Visit:   Chief Complaint   Patient presents with     Surgical Followup     6 month pop Right rTSA.  DOS: 8/18/20        PCP: Michael Styles  Ref: Self     ?  No  Occupation Retired.  Currently working? No.  Work status?  Retired.  Date of injury: ongoing     Date of surgery: R TSA 8/26/2014  L TSA 04/15/2014     Date of surgery: 9/23/19  Type of surgery: I and D Right shoulder at [facility]     Date of surgery: 11/15/19  Type of surgery:   1. Explant of left total shoulder arthroplasty  2. Irrigation and excisional debridement left shoulder  3. Placement of antibiotic spacer     Date of surgery: 1/28/20  Type of surgery:   1. Left shoulder arthroscopy  2. Left shoulder arthroscopic biopsy/culture       Date of surgery:5/8/20  Type of surgery:  1. Removal of left shoulder antibiotic spacer  2. Left shoulder excisional I&D  3. Placement of custom left reverse total shoulder arthroplasty (custom baseplate, Vault Reconstruction System)      Date of surgery: 8/18/20  Type of surgery:   1. Removal of right shoulder antibiotic spacer  2. Revision right reverse total shoulder arthroplasty.   Smoker: No  Request smoking cessation information: No     Right hand dominant    SANE score  Affected shoulder: Bilateral  Right shoulder SANE: 20  Left shoulder SANE: 15    There were no vitals taken for this visit.      Pain Assessment  Patient Currently in Pain: [place], ATC

## 2021-02-17 ENCOUNTER — OFFICE VISIT (OUTPATIENT)
Dept: OPHTHALMOLOGY | Facility: CLINIC | Age: 61
End: 2021-02-17
Attending: OPHTHALMOLOGY
Payer: COMMERCIAL

## 2021-02-17 ENCOUNTER — TRANSCRIBE ORDERS (OUTPATIENT)
Dept: OPHTHALMOLOGY | Facility: CLINIC | Age: 61
End: 2021-02-17

## 2021-02-17 ENCOUNTER — AMBULATORY - HEALTHEAST (OUTPATIENT)
Dept: PHARMACY | Facility: HOSPITAL | Age: 61
End: 2021-02-17

## 2021-02-17 DIAGNOSIS — H40.003 GLAUCOMA SUSPECT OF BOTH EYES: ICD-10-CM

## 2021-02-17 DIAGNOSIS — H35.30 ARMD (AGE RELATED MACULAR DEGENERATION): ICD-10-CM

## 2021-02-17 DIAGNOSIS — H40.003 GLAUCOMA SUSPECT OF BOTH EYES: Primary | ICD-10-CM

## 2021-02-17 PROCEDURE — 99207 OCT OPTIC NERVE RNFL SPECTRALIS OU (BOTH EYES): CPT | Performed by: OPHTHALMOLOGY

## 2021-02-17 PROCEDURE — 92250 FUNDUS PHOTOGRAPHY W/I&R: CPT | Performed by: OPHTHALMOLOGY

## 2021-02-17 PROCEDURE — 92133 CPTRZD OPH DX IMG PST SGM ON: CPT | Performed by: OPHTHALMOLOGY

## 2021-02-17 PROCEDURE — 99214 OFFICE O/P EST MOD 30 MIN: CPT | Mod: GC | Performed by: OPHTHALMOLOGY

## 2021-02-17 PROCEDURE — G0463 HOSPITAL OUTPT CLINIC VISIT: HCPCS

## 2021-02-17 PROCEDURE — 92015 DETERMINE REFRACTIVE STATE: CPT

## 2021-02-17 PROCEDURE — 99207 FUNDUS AUTOFLUORESCENCE IMAGE (FAF) OU (BOTH EYES): CPT | Performed by: OPHTHALMOLOGY

## 2021-02-17 PROCEDURE — 92134 CPTRZ OPH DX IMG PST SGM RTA: CPT | Performed by: OPHTHALMOLOGY

## 2021-02-17 PROCEDURE — 92083 EXTENDED VISUAL FIELD XM: CPT | Performed by: OPHTHALMOLOGY

## 2021-02-17 ASSESSMENT — CUP TO DISC RATIO
OD_RATIO: 0.6
OS_RATIO: 0.6

## 2021-02-17 ASSESSMENT — REFRACTION_MANIFEST
OD_SPHERE: +3.75
OS_ADD: +2.25
OD_AXIS: 007
OD_SPHERE: +2.75
OS_SPHERE: +4.00
OD_SPHERE: +1.25
OD_CYLINDER: +1.00
OD_AXIS: 007
OS_CYLINDER: +1.25
OS_AXIS: 180
OD_CYLINDER: +1.00
OS_CYLINDER: +1.25
OS_CYLINDER: +1.25
OD_ADD: +2.25
OS_AXIS: 180
OS_AXIS: 180
OS_SPHERE: +3.00
OS_SPHERE: +1.50
OD_AXIS: 007
OD_CYLINDER: +1.00

## 2021-02-17 ASSESSMENT — REFRACTION_WEARINGRX
OS_ADD: +2.25
OS_CYLINDER: +0.50
OS_AXIS: 180
OD_SPHERE: +1.00
OD_AXIS: 005
OD_ADD: +2.25
OD_CYLINDER: +1.00
OS_SPHERE: +1.25

## 2021-02-17 ASSESSMENT — VISUAL ACUITY
OD_CC: 20/20
OD_CC+: -1
OS_CC: 20/30
OS_CC+: +1
METHOD: SNELLEN - LINEAR
OS_PH_CC: 20/25

## 2021-02-17 ASSESSMENT — EXTERNAL EXAM - LEFT EYE: OS_EXAM: NORMAL

## 2021-02-17 ASSESSMENT — CONF VISUAL FIELD
METHOD: COUNTING FINGERS
OD_NORMAL: 1
OS_NORMAL: 1

## 2021-02-17 ASSESSMENT — TONOMETRY
IOP_METHOD: TONOPEN
OD_IOP_MMHG: 16
OS_IOP_MMHG: 16

## 2021-02-17 ASSESSMENT — SLIT LAMP EXAM - LIDS
COMMENTS: NORMAL
COMMENTS: NORMAL

## 2021-02-17 ASSESSMENT — EXTERNAL EXAM - RIGHT EYE: OD_EXAM: NORMAL

## 2021-02-17 NOTE — PROGRESS NOTES
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 Has family history of Age related macular degeneration vs adult onset vitelliform dystrophy    PMH: Kobe Buchanan is a  61 year old year-old patient with a family history for Age related macular degeneration and would like a screening for this.    His mother is patient of Dr. Espinal and was diagnosed with pattern dystrophy    Retinal Imaging:  OCT 2-17-+21  RE: superior outer retinal disruption-stable  LE: normal    Autofluorescence 2-17-21  right eye- hypoautoflurescent lesion in the superior macula and inferonasal macula  OS normal    OCT RNFL 2-17-21  OD Normal, no thinning  OS Mild temporal thinning that has progressed since last test.  could be from artifact    OVF 24-2  2-17-21  OD: Mild inferior loss  OS: non-specific defects    optos pic consistent with exam    Assessment & Plan:  1. Glaucoma suspect   - increased high disc/ ratio   - Mother with glaucoma   - IOP normal   - normal VF today but OCT shows thinning of the left RNFL compared to 2019 but that could be from tracing artifact   - patient does not like drops. Discussed SLT vs monitor. Given no changes on VF, will recheck in 6 months    2. 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 Increased choroidal thickness   -  Monitor    4. Vitreous syneresis    Return to Clinic 6 months, RNFL OCT    Huong Hong MD  Ophthalmology Resident, PGY-3      ~~~~~~~~~~~~~~~~~~~~~~~~~~~~~~~~~~   Complete documentation of historical and exam elements from today's encounter can be found in the full encounter summary report (not reduplicated in this progress note).  I personally obtained the chief complaint(s) and history of present illness.  I confirmed and edited as necessary the review of systems, past medical/surgical history, family history, social history, and  examination findings as documented by others; and I examined the patient myself.  I personally reviewed the relevant tests, images, and reports as documented above.  I personally reviewed the ophthalmic test(s) associated with this encounter, agree with the interpretation(s) as documented by the resident/fellow, and have edited the corresponding report(s) as necessary.   I formulated and edited as necessary the assessment and plan and discussed the findings and management plan with the patient and family    Brenda Espinal MD  .  Retina Service   Department of Ophthalmology and Visual Neurosciences   [facility]  Phone: [phone]   Fax: [phone]

## 2021-02-17 NOTE — NURSING NOTE
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  The patient requests refraction today.  Smitha Blackwood, COA, COA 8:32 AM 02/17/2021

## 2021-03-03 NOTE — PROGRESS NOTES
"    Assessment & Plan     Psoriasis  Discussed that it looks good now, he's keeping his skin smooth, just noticing a few new plaques higher on legs, unsure if this was the route in for his staph sepsis.  Encouraged him to follow up with dermatology as other meds beyond cortisone topical might provide better control    Asymptomatic varicose veins of right lower extremity  No need to treat if asymptomatic, he's unsure if his chronic pain meds are also covering any discomfort, encouraged just observation now    Neck pain  Discussed use of flexeril as being helpful    Insomnia due to medical condition  Will notify me when due for refill  - tiZANidine (ZANAFLEX) 4 MG tablet  Dispense: 90 tablet; Refill: 3    Hs of Type 2 DM:  Discussed referral back to podiatry to see if he has a qualifying diagnosis still for replacement orthotic  (a1c now normal post gastric bypass)   He finds the orthotics helpful for plantar fasciitis, but thinks his insurance wouldn't cover that cost well    Review of prior external note(s) from - Reviewed podiatry notes 2009         BMI:   Estimated body mass index is 31.91 kg/m  as calculated from the following:    Height as of 2/9/21: 1.778 m (5' 10\").    Weight as of this encounter: 100.9 kg (222 lb 6.4 oz).   Weight management plan: Discussed healthy diet and exercise guidelines    MEDICATIONS:   Orders Placed This Encounter   Medications     oxyCODONE IR (ROXICODONE) 10 MG tablet     Sig: Take 1 tablet by mouth every 4 hours as needed for pain, max 4 tablet(s) per day     tiZANidine (ZANAFLEX) 4 MG tablet     Sig: Take 1 tablet (4 mg) by mouth nightly as needed for other (insomnia related to muscle pain)     Dispense:  90 tablet     Refill:  3          - Continue other medications without change  CONSULTATION/REFERRAL to Podiatry for consultation on getting new orthotics    No follow-ups on file.    Demi Hardin MD  [facility]    Xu Bullock is a 61 " year old who presents for the following health issues     HPI       Chief Complaint   Patient presents with     Recheck Medication     follow up medication     Medication Reconciliation     not complete     1. Preventing further staph sepsis and joint infections: won't get another shoulder surgery and wants to ensure current artificial joints don't get infected    2. Varicose veins: wonders when to treat   -present for years at left medial knee area, parents had these    3. Shoe orthotics  -hx of gastric bypass with DM and now no longer classified as diabetic but needs new orthotics    4. Muscle relaxer:  -using flexeril and tizanidine and finds the use of this medicine different  -both meds are used prn and not frequent,  -pain clinic reducing fentanyl and so thinks these will increase  (flexeril 1 or 2 with naproxen really helps the necks)  (tizanidine doesn't really help muscles but helps as a sleep aid)  -would like tizanidine 1 tab at bedtime prn    Review of Systems   Denies palpitations, respiratory issues  Chronic pain unchanged      Objective    BP (!) 152/84   Pulse 57   Resp 16   Wt 100.9 kg (222 lb 6.4 oz)   SpO2 100%   BMI 31.91 kg/m    Body mass index is 31.91 kg/m .  Physical Exam     Shins: classic pink/salmon plaques on bilateral shins, no significant scaling    Diabetic Foot Screen:  Any complaints of increased pain or numbness ? No  Is there a foot ulcer now or a history of foot ulcer? No  Does the foot have an abnormal shape? No  Are the nails thick, too long or ingrown? No  Are there any redness or open areas? No         Sensation Testing done at all points on the diagram with monofilament     Right Foot: Sensation Normal at all points  Left Foot: Sensation Normal at all points     Risk Category: 0- No loss of protective sensation  Performed by Demi Hardin MD

## 2021-03-04 ENCOUNTER — OFFICE VISIT (OUTPATIENT)
Dept: FAMILY MEDICINE | Facility: CLINIC | Age: 61
End: 2021-03-04
Payer: COMMERCIAL

## 2021-03-04 VITALS
SYSTOLIC BLOOD PRESSURE: 152 MMHG | WEIGHT: 222.4 LBS | OXYGEN SATURATION: 100 % | RESPIRATION RATE: 16 BRPM | HEART RATE: 57 BPM | DIASTOLIC BLOOD PRESSURE: 84 MMHG | BODY MASS INDEX: 31.91 KG/M2

## 2021-03-04 DIAGNOSIS — I83.91 ASYMPTOMATIC VARICOSE VEINS OF RIGHT LOWER EXTREMITY: Primary | ICD-10-CM

## 2021-03-04 DIAGNOSIS — L40.9 PSORIASIS: ICD-10-CM

## 2021-03-04 DIAGNOSIS — Z86.39 HX OF TYPE 2 DIABETES MELLITUS: ICD-10-CM

## 2021-03-04 DIAGNOSIS — M54.2 NECK PAIN: ICD-10-CM

## 2021-03-04 DIAGNOSIS — G47.01 INSOMNIA DUE TO MEDICAL CONDITION: ICD-10-CM

## 2021-03-04 DIAGNOSIS — M72.2 PLANTAR FASCIITIS: ICD-10-CM

## 2021-03-04 PROCEDURE — 99214 OFFICE O/P EST MOD 30 MIN: CPT | Performed by: FAMILY MEDICINE

## 2021-03-04 RX ORDER — OXYCODONE HYDROCHLORIDE 10 MG/1
30 TABLET ORAL 2 TIMES DAILY PRN
Status: ON HOLD | COMMUNITY
Start: 2021-02-20 | End: 2024-09-12

## 2021-03-07 DIAGNOSIS — L40.9 PSORIASIS: ICD-10-CM

## 2021-03-09 RX ORDER — TRIAMCINOLONE ACETONIDE 1 MG/G
OINTMENT TOPICAL 2 TIMES DAILY PRN
Qty: 454 G | Refills: 0 | Status: SHIPPED | OUTPATIENT
Start: 2021-03-09 | End: 2023-11-02

## 2021-03-09 NOTE — TELEPHONE ENCOUNTER
"   triamcinolone (KENALOG) 0.1 % external ointment  Last Written Prescription Date:  10/1/20  Last Fill Quantity: 454g,   # refills: 0  Last Office Visit :11/19/20  Future Office visit: none    Follow-up in 6 months    Process 4    \"continue.....triamcinolone 0.1% ointment BID PRN,....  \"      Routed because: per note prn, per med list scheduled daily, please enter correct instructions thanks.      "

## 2021-03-09 NOTE — TELEPHONE ENCOUNTER
Patient last seen 11/2020 by Dr. Jacob. Will refill triamcinolone to be used twice daily as needed for likely psoriasis.

## 2021-03-15 DIAGNOSIS — F43.9 STRESS: ICD-10-CM

## 2021-03-15 RX ORDER — CLONAZEPAM 0.5 MG/1
0.5 TABLET ORAL 3 TIMES DAILY PRN
Qty: 90 TABLET | Refills: 3 | Status: SHIPPED | OUTPATIENT
Start: 2021-03-15 | End: 2021-06-29

## 2021-03-16 ENCOUNTER — TELEPHONE (OUTPATIENT)
Dept: FAMILY MEDICINE | Facility: CLINIC | Age: 61
End: 2021-03-16

## 2021-03-16 DIAGNOSIS — K00.9 DENTAL ANOMALY: ICD-10-CM

## 2021-03-16 NOTE — TELEPHONE ENCOUNTER
Pharmacy also requesting amoxicillin as well beside patient request. Provider please be advise on patient request as well

## 2021-03-16 NOTE — TELEPHONE ENCOUNTER
[facility] [[email or web address]] Family Medicine phone call message- medication clarification/question:    Full Medication Name: Clindamycin   Strength:     Have you contacted your pharmacy about this refill request?     If  Yes,  which pharmacy?    When did you contact the pharmacy?    Additional comments/concerns from call to pharmacy:    Reason for call to clinic: Patient is calling to see if Dr. Hardin will write him Rx above. He states he is getting dental implants in lower jaw next Friday and was told that he would have to go to the [place] [facility] which has no openings or to go to the ER at the [facility] which he doesn't want pay the bill since he almost couldn't even afford his implant appt. He states he doesn't have an infection but he is afraid he might get it because he has a stafne infection a year ago. He states Dr. Hardin knows him and his family well so he wanted to see if Dr. Hardin can write him the Rx. He states his sister Leah told him to get on Clindamycin before his appt next week. Told him it could take up to two business days for a response. Please call and advise.       Pharmacy confirmed as [id number] - [place], [place] - [address]: Yes    OK to leave a message on voice mail?     Primary language: English      needed? No    Call taken on March 16, 2021 at 9:40 AM by Tavo Fu

## 2021-03-18 RX ORDER — AMOXICILLIN 500 MG/1
CAPSULE ORAL
Qty: 24 CAPSULE | Refills: 0 | Status: SHIPPED | OUTPATIENT
Start: 2021-03-18 | End: 2021-10-11

## 2021-03-21 DIAGNOSIS — L40.9 PSORIASIS: ICD-10-CM

## 2021-03-25 RX ORDER — TRIAMCINOLONE ACETONIDE 1 MG/G
OINTMENT TOPICAL
Qty: 454 G | Refills: 0 | OUTPATIENT
Start: 2021-03-25

## 2021-03-25 RX ORDER — TRIAMCINOLONE ACETONIDE 1 MG/G
OINTMENT TOPICAL 2 TIMES DAILY
Qty: 454 G | Refills: 0 | Status: SHIPPED | OUTPATIENT
Start: 2021-03-25

## 2021-03-25 NOTE — TELEPHONE ENCOUNTER
Pt's initial rx did not specify location of application. New Rx sent to patient's pharmacy with locations of application.    DUNIA Lam  PGY-3 Dermatology Resident  Pager: [phone]

## 2021-03-25 NOTE — TELEPHONE ENCOUNTER
triamcinolone (KENALOG) 0.1 % external ointment  Last Written Prescription Date:  3/9/21  Last Fill Quantity: 454g,   # refills: 0  Last Office Visit : 11/19/20  Future Office visit: none    Routing refill request to provider for review/approval because: per pharmacy needs areas for application of ointment per insurance. Thanks.

## 2021-03-27 ENCOUNTER — IMMUNIZATION (OUTPATIENT)
Dept: NURSING | Facility: CLINIC | Age: 61
End: 2021-03-27
Payer: COMMERCIAL

## 2021-03-27 PROCEDURE — 0011A PR COVID VAC MODERNA 100 MCG/0.5 ML IM: CPT

## 2021-03-27 PROCEDURE — 91301 PR COVID VAC MODERNA 100 MCG/0.5 ML IM: CPT

## 2021-04-13 ENCOUNTER — TRANSFERRED RECORDS (OUTPATIENT)
Dept: HEALTH INFORMATION MANAGEMENT | Facility: CLINIC | Age: 61
End: 2021-04-13

## 2021-04-20 ENCOUNTER — HOSPITAL ENCOUNTER (OUTPATIENT)
Dept: MRI IMAGING | Facility: CLINIC | Age: 61
End: 2021-04-20
Attending: INTERNAL MEDICINE
Payer: COMMERCIAL

## 2021-04-20 ENCOUNTER — HOSPITAL ENCOUNTER (OUTPATIENT)
Dept: CARDIOLOGY | Facility: CLINIC | Age: 61
End: 2021-04-20
Attending: INTERNAL MEDICINE
Payer: COMMERCIAL

## 2021-04-20 DIAGNOSIS — I71.21 ASCENDING AORTIC ANEURYSM (H): ICD-10-CM

## 2021-04-20 DIAGNOSIS — I50.21 ACUTE SYSTOLIC HEART FAILURE (H): ICD-10-CM

## 2021-04-20 DIAGNOSIS — I10 ESSENTIAL HYPERTENSION WITH GOAL BLOOD PRESSURE LESS THAN 130/85: ICD-10-CM

## 2021-04-20 PROCEDURE — 255N000002 HC RX 255 OP 636: Performed by: INTERNAL MEDICINE

## 2021-04-20 PROCEDURE — 71555 MRI ANGIO CHEST W OR W/O DYE: CPT

## 2021-04-20 PROCEDURE — 71555 MRI ANGIO CHEST W OR W/O DYE: CPT | Mod: 26 | Performed by: INTERNAL MEDICINE

## 2021-04-20 PROCEDURE — 999N000208 ECHOCARDIOGRAM COMPLETE

## 2021-04-20 PROCEDURE — A9585 GADOBUTROL INJECTION: HCPCS | Performed by: INTERNAL MEDICINE

## 2021-04-20 PROCEDURE — 93306 TTE W/DOPPLER COMPLETE: CPT | Mod: 26 | Performed by: STUDENT IN AN ORGANIZED HEALTH CARE EDUCATION/TRAINING PROGRAM

## 2021-04-20 RX ORDER — GADOBUTROL 604.72 MG/ML
10 INJECTION INTRAVENOUS ONCE
Status: COMPLETED | OUTPATIENT
Start: 2021-04-20 | End: 2021-04-20

## 2021-04-20 RX ADMIN — GADOBUTROL 10 ML: 604.72 INJECTION INTRAVENOUS at 07:29

## 2021-04-20 RX ADMIN — HUMAN ALBUMIN MICROSPHERES AND PERFLUTREN 5 ML: 10; .22 INJECTION, SOLUTION INTRAVENOUS at 10:02

## 2021-04-22 ENCOUNTER — MYC MEDICAL ADVICE (OUTPATIENT)
Dept: FAMILY MEDICINE | Facility: CLINIC | Age: 61
End: 2021-04-22

## 2021-04-24 ENCOUNTER — IMMUNIZATION (OUTPATIENT)
Dept: NURSING | Facility: CLINIC | Age: 61
End: 2021-04-24
Attending: INTERNAL MEDICINE
Payer: COMMERCIAL

## 2021-04-24 PROCEDURE — 0012A PR COVID VAC MODERNA 100 MCG/0.5 ML IM: CPT

## 2021-04-24 PROCEDURE — 91301 PR COVID VAC MODERNA 100 MCG/0.5 ML IM: CPT

## 2021-05-05 NOTE — TELEPHONE ENCOUNTER
Yes if [facility] or anyone has an opening that would be great. Let him know that they work directly with me, and run all plans by me and we will still keep our July appointment.     Alternatively have scheduling move a new non vascular or non aortic patient from my clinic to make room for him. -      [employer] message sent back to patient regarding Dr. Zhou's response. Message sent to scheduling to get OV scheduled.       KEELY Mendoza May 5, 2021 3:16 PM

## 2021-05-12 ENCOUNTER — TRANSFERRED RECORDS (OUTPATIENT)
Dept: HEALTH INFORMATION MANAGEMENT | Facility: CLINIC | Age: 61
End: 2021-05-12

## 2021-05-15 ENCOUNTER — HEALTH MAINTENANCE LETTER (OUTPATIENT)
Age: 61
End: 2021-05-15

## 2021-05-24 DIAGNOSIS — G47.01 INSOMNIA DUE TO MEDICAL CONDITION: ICD-10-CM

## 2021-05-26 VITALS
TEMPERATURE: 98 F | DIASTOLIC BLOOD PRESSURE: 82 MMHG | RESPIRATION RATE: 20 BRPM | SYSTOLIC BLOOD PRESSURE: 136 MMHG | OXYGEN SATURATION: 98 % | HEART RATE: 74 BPM

## 2021-05-26 VITALS
HEART RATE: 68 BPM | SYSTOLIC BLOOD PRESSURE: 138 MMHG | RESPIRATION RATE: 20 BRPM | OXYGEN SATURATION: 96 % | TEMPERATURE: 96.7 F | DIASTOLIC BLOOD PRESSURE: 72 MMHG

## 2021-05-26 VITALS
DIASTOLIC BLOOD PRESSURE: 68 MMHG | SYSTOLIC BLOOD PRESSURE: 160 MMHG | OXYGEN SATURATION: 96 % | HEART RATE: 72 BPM | TEMPERATURE: 99.9 F | RESPIRATION RATE: 20 BRPM

## 2021-05-26 VITALS
HEART RATE: 82 BPM | OXYGEN SATURATION: 96 % | RESPIRATION RATE: 20 BRPM | DIASTOLIC BLOOD PRESSURE: 86 MMHG | TEMPERATURE: 96.9 F | SYSTOLIC BLOOD PRESSURE: 186 MMHG

## 2021-05-26 VITALS
OXYGEN SATURATION: 99 % | SYSTOLIC BLOOD PRESSURE: 130 MMHG | RESPIRATION RATE: 20 BRPM | TEMPERATURE: 97.9 F | DIASTOLIC BLOOD PRESSURE: 76 MMHG | HEART RATE: 68 BPM

## 2021-05-26 VITALS
DIASTOLIC BLOOD PRESSURE: 82 MMHG | SYSTOLIC BLOOD PRESSURE: 122 MMHG | OXYGEN SATURATION: 97 % | RESPIRATION RATE: 16 BRPM | TEMPERATURE: 97.4 F | HEART RATE: 71 BPM

## 2021-05-28 ENCOUNTER — RECORDS - HEALTHEAST (OUTPATIENT)
Dept: ADMINISTRATIVE | Facility: CLINIC | Age: 61
End: 2021-05-28

## 2021-06-02 NOTE — PROGRESS NOTES
Code Status:  FULL CODE  Visit Type: Discharge Summary     Facility:  [facility] [[id number]]          PCP:  Michael Styles MD  [phone]       Admission Date to our Facility: 10/4/2019 discharge Date from our Facility: 10/24/2019    Discharge Diagnosis:    1. Staphylococcal arthritis of right shoulder (H)     2. Acute blood loss anemia     3. Essential hypertension     4. Chronic pain syndrome     5. Bipolar 1 disorder (H)          History of Present Illness: Kobe Henriquez is a 59 y.o. male who has a past medical history for hypertension, HLD, chronic neck pain, TAA repair, bipolar disorder, atrial fibrillation not on anticoagulation, sleep apnea, type 2 diabetes diet managed, with bilateral shoulder replacements in the past.  He was admitted to [facility] for ongoing weakness with a mechanical fall over the course of 2 weeks.  He was found to have MSSA bacteremia and a right septic shoulder.  He had his right shoulder washed out and was put on long-term IV antibiotics.  His hospitalization was complicated by NEMESIO in which he needed 2 rounds of dialysis with the last one being 9/24.  He also had acute toxic encephalopathy so there was a question of septic emboli however this did resolve.  EEG showed no signs of seizure.  He also had low hemoglobin in which he underwent an endoscopy and did not find any lesions or lacerations.  He was given 1 unit of packed red blood cells and his anemia improved.    Skilled Nursing Facility Course: While at the TCU did progress with therapy to his baseline of ambulating safely without a device.  He had thought that he had an orthopedic surgery scheduled today however when he went to the orthopedic follow-up appointment they felt that it was not necessary to do a secondary washout of his right shoulder.  He has had ongoing serosanguineous drainage of the distal aspect of his incision.  The incision is well approximated with sutures.  He will need to  continue on cefazolin IV via PICC line in his upper left extremity until 11/4/2019.  He did have issues with postop anemia in which his hemoglobin did bottom out at 7.0.  It did start to rebound on its own last week at 7.6 however in order to optimize him for potential surgery I did transfuse 1 unit of packed red blood cells and repeat hemoglobin on Monday of this week was 9.2.  For his pain I did return him to his previous pain medications as his encephalopathy had cleared and he was tolerating his pain medications.  He returned to fentanyl 75 mcg every 48 hours and oxycodone 10 mg every 4 hours as needed.  He will need to follow-up with the pain Clinic regarding ongoing pain management.  His blood pressures were variable with some SBP's in the 170s and most in the 130s.  I did increase his carvedilol at the beginning of his stay to 50 mg twice daily with slight improvement.  I also  out his benazepril/amlodipine.  It is likely that he be able to go back to his home hypertensive medications with close follow-up with his primary provider.    Discharge Medications:    Current Outpatient Medications   Medication Sig Dispense Refill     acetaminophen (TYLENOL) 500 MG tablet Take 1,000 mg by mouth 3 (three) times a day.              amLODIPine (NORVASC) 10 MG tablet Take 10 mg by mouth daily.       benazepril (LOTENSIN) 20 MG tablet Take 20 mg by mouth 2 (two) times a day.       bisacodyl (DULCOLAX) 10 mg suppository Insert 10 mg into the rectum daily as needed.       buPROPion (WELLBUTRIN SR) 200 MG 12 hr tablet Take 200 mg by mouth 2 (two) times a day.       carvedilol (COREG) 25 MG tablet Take 50 mg by mouth 2 (two) times a day with meals.       cefazolin sodium in 0.9 % NaCl (CEFAZOLIN IN 0.9% SOD CHLORIDE) 2 gram/100 mL Soln Infuse 2 g into a venous catheter 3 (three) times a day.       clonazePAM (KLONOPIN) 0.5 MG tablet Take 0.5 mg by mouth 3 (three) times a day as needed for anxiety.        cyanocobalamin (VITAMIN B-12) 50 mcg tablet Take 50 mcg by mouth 2 (two) times a day.       docusate sodium (COLACE) 100 MG capsule Take 100-200 mg by mouth daily.       fentaNYL (DURAGESIC) 75 mcg/hr Place 1 patch on the skin every other day.              ferrous gluconate 324 mg (37.5 mg iron) Tab Take 324 mg by mouth.       Lactobacillus rhamnosus GG (CULTURELLE) 10-15 Billion cell capsule Take 1 capsule by mouth daily.       melatonin 3 mg Tab tablet Take 6 mg by mouth at bedtime.       multivitamin capsule Take 1 capsule by mouth daily.       oxyCODONE (ROXICODONE) 5 MG immediate release tablet Take 10 mg by mouth every 4 (four) hours as needed for pain.       pantoprazole (PROTONIX) 40 MG tablet Take 40 mg by mouth 2 (two) times a day.              polyethylene glycol (MIRALAX) 17 gram packet Take 17 g by mouth daily as needed.       senna (SENOKOT) 8.6 mg tablet Take by mouth daily. 2 tabs in am and 3 tabs @@ hs       senna (SENOKOT) 8.6 mg tablet Take 1 tablet by mouth 2 (two) times a day as needed for constipation.       simethicone (MYLICON) 40 mg/0.6 mL drops Take 40 mg by mouth 4 (four) times a day as needed.       simvastatin (ZOCOR) 10 MG tablet Take 10 mg by mouth at bedtime.       No current facility-administered medications for this visit.        For most current and accurate medication list, please contact the skilled nursing facility that this patient visit took place at.      Discharge Plan: Patient to discharge to home with family support and home infusion therapy for his cefazolin infusions.  He will need to follow-up with his primary provider regarding his hypertension and medications.  He will also need to follow-up with the pain clinic regarding his chronic pain management.  He will follow-up with orthopedics and infectious disease for ongoing treatment of his right shoulder infection.  He will need ongoing follow-up for anemia and a positive guaiac of stool while at the  facility.      Review of Systems   Patient denies fever, chills, headache, lightheadedness, dizziness, rhinorrhea, cough, congestion, shortness of breath, chest pain, palpitations, abdominal pain, n/v, diarrhea, constipation, change in appetite, dysuria, frequency, burning or pain with urination.  Other than stated in HPI all other review of systems is negative.     Physical Exam   Vital signs: /54, heart rate 70, respiratory 18, temp 98.1.  GENERAL APPEARANCE: Well developed, well nourished, in no acute distress.  HEENT: normocephalic, atraumatic  PERRL, sclerae anicteric, conjunctivae clear and moist, EOM intact  External inspection of ears and nose showed no scars, lesions or masses.  LUNGS: Lung sounds CTA, no adventitious sounds, respiratory effort normal.  CARD: RRR, S1, S2, without murmurs, gallops, rubs,   ABD: Soft and nontender with normal bowel sounds.   MSK: Muscle strength and tone were normal.  EXTREMITIES: No cyanosis, clubbing or edema.  NEURO: Alert and oriented x 3.  Face is symmetric.  SKIN: Right shoulder incision is well approximated with sutures without surrounding edema or erythema.  PSYCH: euthymic            Labs:    Recent Results (from the past 240 hour(s))   ALT (SGPT)   Result Value Ref Range    ALT <9 0 - 45 U/L   Basic Metabolic Panel   Result Value Ref Range    Sodium 134 (L) 136 - 145 mmol/L    Potassium 4.5 3.5 - 5.0 mmol/L    Chloride 100 98 - 107 mmol/L    CO2 25 22 - 31 mmol/L    Anion Gap, Calculation 9 5 - 18 mmol/L    Glucose 201 (H) 70 - 125 mg/dL    Calcium 8.4 (L) 8.5 - 10.5 mg/dL    BUN 11 8 - 22 mg/dL    Creatinine 1.20 0.70 - 1.30 mg/dL    GFR MDRD Af Amer >60 >60 mL/min/1.73m2    GFR MDRD Non Af Amer >60 >60 mL/min/1.73m2   Bilirubin, Total   Result Value Ref Range    Bilirubin, Total 0.4 0.0 - 1.0 mg/dL   Blood Urea Nitrogen (BUN)   Result Value Ref Range    BUN 11 8 - 22 mg/dL   Creatinine   Result Value Ref Range    Creatinine 1.20 0.70 - 1.30 mg/dL    GFR MDRD  Af Amer >60 >60 mL/min/1.73m2    GFR MDRD Non Af Amer >60 >60 mL/min/1.73m2   C-Reactive Protein (CRP)   Result Value Ref Range    CRP 4.1 (H) 0.0 - 0.8 mg/dL   Alkaline Phosphatase   Result Value Ref Range    Alkaline Phosphatase 138 (H) 45 - 120 U/L   HM1 (CBC with Diff)   Result Value Ref Range    WBC 7.2 4.0 - 11.0 thou/uL    RBC 2.57 (L) 4.40 - 6.20 mill/uL    Hemoglobin 7.6 (L) 14.0 - 18.0 g/dL    Hematocrit 24.3 (L) 40.0 - 54.0 %    MCV 95 80 - 100 fL    MCH 29.6 27.0 - 34.0 pg    MCHC 31.3 (L) 32.0 - 36.0 g/dL    RDW 13.2 11.0 - 14.5 %    Platelets 269 140 - 440 thou/uL    MPV 8.9 8.5 - 12.5 fL    Neutrophils % 81 (H) 50 - 70 %    Lymphocytes % 10 (L) 20 - 40 %    Monocytes % 7 2 - 10 %    Eosinophils % 1 0 - 6 %    Basophils % 0 0 - 2 %    Neutrophils Absolute 5.9 2.0 - 7.7 thou/uL    Lymphocytes Absolute 0.7 (L) 0.8 - 4.4 thou/uL    Monocytes Absolute 0.5 0.0 - 0.9 thou/uL    Eosinophils Absolute 0.1 0.0 - 0.4 thou/uL    Basophils Absolute 0.0 0.0 - 0.2 thou/uL   Basic Metabolic Panel   Result Value Ref Range    Sodium 135 (L) 136 - 145 mmol/L    Potassium 5.1 (H) 3.5 - 5.0 mmol/L    Chloride 101 98 - 107 mmol/L    CO2 24 22 - 31 mmol/L    Anion Gap, Calculation 10 5 - 18 mmol/L    Glucose 125 70 - 125 mg/dL    Calcium 9.1 8.5 - 10.5 mg/dL    BUN 13 8 - 22 mg/dL    Creatinine 1.23 0.70 - 1.30 mg/dL    GFR MDRD Af Amer >60 >60 mL/min/1.73m2    GFR MDRD Non Af Amer 60 (L) >60 mL/min/1.73m2   Hemoglobin   Result Value Ref Range    Hemoglobin 8.4 (L) 14.0 - 18.0 g/dL   Potassium   Result Value Ref Range    Potassium 4.5 3.5 - 5.0 mmol/L   Sodium   Result Value Ref Range    Sodium 136 136 - 145 mmol/L   Creatinine   Result Value Ref Range    Creatinine 1.21 0.70 - 1.30 mg/dL    GFR MDRD Af Amer >60 >60 mL/min/1.73m2    GFR MDRD Non Af Amer >60 >60 mL/min/1.73m2   C-Reactive Protein (CRP)   Result Value Ref Range    CRP 2.3 (H) 0.0 - 0.8 mg/dL   Blood Urea Nitrogen (BUN)   Result Value Ref Range    BUN 15 8 - 22  mg/dL   Bilirubin, Total   Result Value Ref Range    Bilirubin, Total 0.5 0.0 - 1.0 mg/dL   ALT (SGPT)   Result Value Ref Range    ALT <9 0 - 45 U/L   Alkaline Phosphatase   Result Value Ref Range    Alkaline Phosphatase 140 (H) 45 - 120 U/L   HM1 (CBC with Diff)   Result Value Ref Range    WBC 9.1 4.0 - 11.0 thou/uL    RBC 3.11 (L) 4.40 - 6.20 mill/uL    Hemoglobin 9.2 (L) 14.0 - 18.0 g/dL    Hematocrit 28.8 (L) 40.0 - 54.0 %    MCV 93 80 - 100 fL    MCH 29.6 27.0 - 34.0 pg    MCHC 31.9 (L) 32.0 - 36.0 g/dL    RDW 14.1 11.0 - 14.5 %    Platelets 316 140 - 440 thou/uL    MPV 8.9 8.5 - 12.5 fL    Neutrophils % 72 (H) 50 - 70 %    Lymphocytes % 16 (L) 20 - 40 %    Monocytes % 9 2 - 10 %    Eosinophils % 2 0 - 6 %    Basophils % 1 0 - 2 %    Neutrophils Absolute 6.6 2.0 - 7.7 thou/uL    Lymphocytes Absolute 1.4 0.8 - 4.4 thou/uL    Monocytes Absolute 0.8 0.0 - 0.9 thou/uL    Eosinophils Absolute 0.2 0.0 - 0.4 thou/uL    Basophils Absolute 0.1 0.0 - 0.2 thou/uL         Assessment:  1. Staphylococcal arthritis of right shoulder (H)     2. Acute blood loss anemia     3. Essential hypertension     4. Chronic pain syndrome     5. Bipolar 1 disorder (H)         MEDICAL EQUIPMENT NEEDS:  NA      DISCHARGE PLAN/FACE TO FACE:  I certify that services are/were furnished while this patient was under the care of a physician and that a physician or an allowed non-physician practitioner (NPP), had a face-to-face encounter that meets the physician face-to-face encounter requirements. The encounter was in whole, or in part, related to the primary reason for home health. The patient is confined to his/her home and needs intermittent skilled nursing, physical therapy, speech-language pathology, or the continued need for occupational therapy. A plan of care has been established by a physician and is periodically reviewed by a physician.    I certify that this patient is under my care and that I, or a nurse practitioner or physician's  assistant working with me, had a face-to-face encounter that meets the physician face-to-face encounter requirements with this patient.   Date of Face-to-Face Encounter: 10/23/2019    I certify that, based on my findings, the following services are medically necessary home health services: RN    My clinical findings support the need for the above skilled services because: (Please write a brief narrative summary that describes what the RN, PT, SLP, or other services will be doing in the home. A list of diagnoses in this section does not meet the CMS requirements.)  RN to provide picc dressing changes, lab draws and medication teaching and monitoring.    This patient is homebound because: (Please write a brief narrative summary describing the functional limitations as to why this patient is homebound and specifically what makes this patient homebound.)  Patient requires assistance in order to get out of the community on a regular basis for ongoing appointments.    The patient is, or has been, under my care and I have initiated the establishment of the plan of care. This patient will be followed by a physician who will periodically review the plan of care.      Electronically signed by: Fallon Wall CNP

## 2021-06-02 NOTE — TELEPHONE ENCOUNTER
[facility] Nurse Triage Telephone Note      Provider: CATHRYN Birch  Facility: [facility]    Facility Type: TCU    Caller: Bre  Call Back Number:  [phone]    Allergies: Adhesive tape-silicones    Reason for call: Hgb today 7.6, yesterday 8.1, 10/7 7.3, 10/4 8.3. Guiac #1 positive. Is on FeSol 300mg two times a day. Is on PPI. Had GI consult in hospital & UGI 10/1 that was negative.     Verbal Order/Direction given by Provider: Recheck Hgb 10/14.    Provider giving order: YARA Campa    Verbal order given to: Deirdre Garcia RN

## 2021-06-02 NOTE — TELEPHONE ENCOUNTER
Approve the SN weekly visits for 2 additional weeks.  Removal of PICC line to be determined by Infectious disease.  I can sign the orders on PICC line, but would confirm if plan ok with ID.

## 2021-06-02 NOTE — TELEPHONE ENCOUNTER
Recs in care everywhere under [facility]. Ok to schedule first available. Pt is already on IV abx, per note, IV abx to end 11/4.

## 2021-06-02 NOTE — TELEPHONE ENCOUNTER
[facility] Nurse Triage Telephone Note      Provider: CATHRYN Birch  Facility: [facility]    Facility Type: TCU    Caller: nurse    Allergies: Adhesive tape-silicones    Reason for call: Lab her to draw hgb & pt is requesting to have his Ferritin level checked also. Last checked 1/10/18-48.  10/7/19 Hgb 7.3 (10/4 in hospital was 8.3)     Verbal Order/Direction given by Provider: Jeffrey Steele.    Provider giving order: YARA Campa    Verbal order given to: nurse      Brittney Garcia RN

## 2021-06-02 NOTE — PROGRESS NOTES
Code Status:  FULL CODE  Visit Type: Follow Up     Facility:  [facility] [[id number]]      Facility Type: SNF (Skilled Nursing Facility, TCU)    History of Present Illness:   Hospital Admission Date: 9/20/2019 Hospital Discharge Date: 10/4/2019      Kobe Henriquez is a 59 y.o. male who has a past medical history for hypertension, HLD, chronic neck pain, TAA repair, bipolar disorder, atrial fibrillation not on anticoagulation, sleep apnea, type 2 diabetes diet managed, with bilateral shoulder replacements in the past.  He was admitted to [facility] for ongoing weakness with a mechanical fall over the course of 2 weeks.  He was found to have MSSA bacteremia and a right septic shoulder.  He had his right shoulder washed out and was put on long-term IV antibiotics.  His hospitalization was complicated by NEMESIO in which he needed 2 rounds of dialysis with the last one being 9/24.  He also had acute toxic encephalopathy so there was a question of septic emboli however this did resolve.  EEG showed no signs of seizure.  He also had low hemoglobin in which he underwent an endoscopy and did not find any lesions or lacerations.  He was given 1 unit of packed red blood cells and his anemia improved.    Today, he reports that his dizziness and feeling unstable has gotten a little better since Monday.  His hemoglobin today is 8.6 however I did speak with [facility] and she states that the orthopedic surgeon Dr. Jake Gomez would recommend that he have a transfusion to optimize him for potential surgery.  Apparently his previous surgeon Dr. Aceves is planning to see him in clinic next week and plan for further washout of his shoulder.  His incision is well approximated with sutures however there is purulent drainage from the distal end of the incision.  There is no surrounding erythema.  He continues to complain of pain in his left shoulder also.  He reports that his chronic pain is about  back to baseline and is requesting that his fentanyl patches be increased to 75 mcg every 48 hours as per the pain clinic.  Been tolerating the current pain regimen and does not appear to have any delirium or encephalopathy.  His blood pressures continue to be slightly elevated with SBP's in the 150s. I did increase his benazepril earlier this week.    Current Outpatient Medications   Medication Sig Dispense Refill     acetaminophen (TYLENOL) 500 MG tablet Take 500 mg by mouth 3 (three) times a day.       amLODIPine (NORVASC) 10 MG tablet Take 10 mg by mouth daily.       benazepril (LOTENSIN) 20 MG tablet Take 20 mg by mouth 2 (two) times a day.       bisacodyl (DULCOLAX) 10 mg suppository Insert 10 mg into the rectum daily as needed.       buPROPion (WELLBUTRIN SR) 200 MG 12 hr tablet Take 200 mg by mouth 2 (two) times a day.       carvedilol (COREG) 25 MG tablet Take 50 mg by mouth 2 (two) times a day with meals.       cefazolin sodium in 0.9 % NaCl (CEFAZOLIN IN 0.9% SOD CHLORIDE) 2 gram/100 mL Soln Infuse 2 g into a venous catheter 3 (three) times a day.       clonazePAM (KLONOPIN) 0.5 MG tablet Take 0.5 mg by mouth 3 (three) times a day as needed for anxiety.       cyanocobalamin (VITAMIN B-12) 50 mcg tablet Take 50 mcg by mouth 2 (two) times a day.       docusate sodium (COLACE) 100 MG capsule Take 100-200 mg by mouth daily.       fentaNYL (DURAGESIC) 75 mcg/hr Place 1 patch on the skin every third day.       ferrous sulfate (FEOSOL ORAL) Take 300 mg by mouth 3 (three) times a day.              Lactobacillus rhamnosus GG (CULTURELLE) 10-15 Billion cell capsule Take 1 capsule by mouth daily.       melatonin 3 mg Tab tablet Take 6 mg by mouth at bedtime.       multivitamin capsule Take 1 capsule by mouth daily.       oxyCODONE (ROXICODONE) 5 MG immediate release tablet Take 10 mg by mouth every 4 (four) hours as needed for pain.       pantoprazole (PROTONIX) 40 MG tablet Take 40 mg by mouth 2 (two) times a day.               polyethylene glycol (MIRALAX) 17 gram packet Take 17 g by mouth daily as needed.       senna (SENOKOT) 8.6 mg tablet Take by mouth daily. 2 tabs in am and 3 tabs @@ hs       senna (SENOKOT) 8.6 mg tablet Take 1 tablet by mouth 2 (two) times a day as needed for constipation.       simethicone (MYLICON) 40 mg/0.6 mL drops Take 40 mg by mouth 4 (four) times a day as needed.       simvastatin (ZOCOR) 10 MG tablet Take 10 mg by mouth at bedtime.       UNABLE TO FIND Take 300 mg by mouth 2 (two) times a day. Med Name: FeoSol 300mg two times a day ok to use med from home       No current facility-administered medications for this visit.      Allergies   Allergen Reactions     Adhesive Tape-Silicones      Blistering of skin     Immunization History   Administered Date(s) Administered     DT (pediatric) 02/04/2004     Td,adult,historic,unspecified 02/04/2004     Varicella 01/01/1900         Review of Systems   Patient denies fever, chills, headache, rhinorrhea, cough, congestion, shortness of breath, chest pain, palpitations, abdominal pain, n/v, diarrhea, constipation, change in appetite, dysuria, frequency, burning or pain with urination. Does have intermittent urinary incontinence at baseline.  Other than stated in HPI all other review of systems is negative.         Physical Exam   Vital signs: /65, heart rate 78, respiratory 18, temp 98.1, 94% on room air.  GENERAL APPEARANCE: Well-nourished, well-developed in no acute distress  HEENT: normocephalic, atraumatic  PERRL, sclerae anicteric, conjunctivae clear and moist, EOM intact  LUNGS: Lung sounds CTA, no adventitious sounds, respiratory effort normal.  CARD: RRR, S1, S2, 2/6 distant murmur, no gallops, rubs,  ABD: Soft and nondistended, nontender with normal bowel sounds.   MSK: Muscle strength and tone were normal.  EXTREMITIES: No cyanosis, clubbing or edema.  Localized edema of right shoulder incision.  NEURO: Alert and oriented x 3.  Face is  symmetric.  SKIN: Older incision is well approximated with sutures, edema localized to his shoulder.  There is small amounts of purulent drainage at the distal and of his incision.  His skin in general is pale, weight  PSYCH: euthymic            Labs:   Recent Results (from the past 240 hour(s))   Ferritin   Result Value Ref Range    Ferritin 277 27 - 300 ng/mL   Iron and Transferrin Iron Binding Capacity   Result Value Ref Range    Iron 31 (L) 42 - 175 ug/dL    Transferrin 139 (L) 212 - 360 mg/dL    Transferrin Saturation, Calculated 18 (L) 20 - 50 %    Transferrin IBC, Calculated 174 (L) 313 - 563 ug/dL   HM1 (CBC with Diff)   Result Value Ref Range    WBC 5.6 4.0 - 11.0 thou/uL    RBC 2.68 (L) 4.40 - 6.20 mill/uL    Hemoglobin 8.1 (L) 14.0 - 18.0 g/dL    Hematocrit 24.7 (L) 40.0 - 54.0 %    MCV 92 80 - 100 fL    MCH 30.2 27.0 - 34.0 pg    MCHC 32.8 32.0 - 36.0 g/dL    RDW 13.2 11.0 - 14.5 %    Platelets 249 140 - 440 thou/uL    MPV 9.2 8.5 - 12.5 fL    Neutrophils % 65 50 - 70 %    Lymphocytes % 22 20 - 40 %    Monocytes % 11 (H) 2 - 10 %    Eosinophils % 2 0 - 6 %    Basophils % 0 0 - 2 %    Neutrophils Absolute 3.6 2.0 - 7.7 thou/uL    Lymphocytes Absolute 1.2 0.8 - 4.4 thou/uL    Monocytes Absolute 0.6 0.0 - 0.9 thou/uL    Eosinophils Absolute 0.1 0.0 - 0.4 thou/uL    Basophils Absolute 0.0 0.0 - 0.2 thou/uL   Hemoglobin   Result Value Ref Range    Hemoglobin 7.6 (L) 14.0 - 18.0 g/dL   HM2(CBC w/o Differential)   Result Value Ref Range    WBC 5.2 4.0 - 11.0 thou/uL    RBC 2.38 (L) 4.40 - 6.20 mill/uL    Hemoglobin 7.0 (L) 14.0 - 18.0 g/dL    Hematocrit 21.9 (L) 40.0 - 54.0 %    MCV 92 80 - 100 fL    MCH 29.4 27.0 - 34.0 pg    MCHC 32.0 32.0 - 36.0 g/dL    RDW 13.2 11.0 - 14.5 %    Platelets 219 140 - 440 thou/uL    MPV 9.0 8.5 - 12.5 fL   ALT (SGPT)   Result Value Ref Range    ALT <9 0 - 45 U/L   Basic Metabolic Panel   Result Value Ref Range    Sodium 134 (L) 136 - 145 mmol/L    Potassium 4.5 3.5 - 5.0 mmol/L     Chloride 100 98 - 107 mmol/L    CO2 25 22 - 31 mmol/L    Anion Gap, Calculation 9 5 - 18 mmol/L    Glucose 201 (H) 70 - 125 mg/dL    Calcium 8.4 (L) 8.5 - 10.5 mg/dL    BUN 11 8 - 22 mg/dL    Creatinine 1.20 0.70 - 1.30 mg/dL    GFR MDRD Af Amer >60 >60 mL/min/1.73m2    GFR MDRD Non Af Amer >60 >60 mL/min/1.73m2   Bilirubin, Total   Result Value Ref Range    Bilirubin, Total 0.4 0.0 - 1.0 mg/dL   Blood Urea Nitrogen (BUN)   Result Value Ref Range    BUN 11 8 - 22 mg/dL   Creatinine   Result Value Ref Range    Creatinine 1.20 0.70 - 1.30 mg/dL    GFR MDRD Af Amer >60 >60 mL/min/1.73m2    GFR MDRD Non Af Amer >60 >60 mL/min/1.73m2   C-Reactive Protein (CRP)   Result Value Ref Range    CRP 4.1 (H) 0.0 - 0.8 mg/dL   Alkaline Phosphatase   Result Value Ref Range    Alkaline Phosphatase 138 (H) 45 - 120 U/L   HM1 (CBC with Diff)   Result Value Ref Range    WBC 7.2 4.0 - 11.0 thou/uL    RBC 2.57 (L) 4.40 - 6.20 mill/uL    Hemoglobin 7.6 (L) 14.0 - 18.0 g/dL    Hematocrit 24.3 (L) 40.0 - 54.0 %    MCV 95 80 - 100 fL    MCH 29.6 27.0 - 34.0 pg    MCHC 31.3 (L) 32.0 - 36.0 g/dL    RDW 13.2 11.0 - 14.5 %    Platelets 269 140 - 440 thou/uL    MPV 8.9 8.5 - 12.5 fL    Neutrophils % 81 (H) 50 - 70 %    Lymphocytes % 10 (L) 20 - 40 %    Monocytes % 7 2 - 10 %    Eosinophils % 1 0 - 6 %    Basophils % 0 0 - 2 %    Neutrophils Absolute 5.9 2.0 - 7.7 thou/uL    Lymphocytes Absolute 0.7 (L) 0.8 - 4.4 thou/uL    Monocytes Absolute 0.5 0.0 - 0.9 thou/uL    Eosinophils Absolute 0.1 0.0 - 0.4 thou/uL    Basophils Absolute 0.0 0.0 - 0.2 thou/uL   Basic Metabolic Panel   Result Value Ref Range    Sodium 135 (L) 136 - 145 mmol/L    Potassium 5.1 (H) 3.5 - 5.0 mmol/L    Chloride 101 98 - 107 mmol/L    CO2 24 22 - 31 mmol/L    Anion Gap, Calculation 10 5 - 18 mmol/L    Glucose 125 70 - 125 mg/dL    Calcium 9.1 8.5 - 10.5 mg/dL    BUN 13 8 - 22 mg/dL    Creatinine 1.23 0.70 - 1.30 mg/dL    GFR MDRD Af Amer >60 >60 mL/min/1.73m2    GFR MDRD  Non Af Amer 60 (L) >60 mL/min/1.73m2   Hemoglobin   Result Value Ref Range    Hemoglobin 8.4 (L) 14.0 - 18.0 g/dL         Assessment:  1. MSSA bacteremia     2. Staphylococcal arthritis of right shoulder (H)     3. Acute blood loss anemia     4. Electrolyte imbalance     5. Essential hypertension     6. Chronic pain syndrome         Plan:  MSSA bacteremia: Continue vancomycin dosed per pharmacy.  His Vanco trough has been therapeutic.  These however this may be changed if he has not another surgery.    Acute blood loss anemia: This is improving however to optimize him for further surgery we will schedule him to have a transfusion of 1 unit of packed red blood cells at [facility].  This will occur tomorrow.    Electrolyte imbalance: Today's sodium continues to be on the lower end however calcium is starting to creep up over normal limits.  His potassium is 5.1 today and so we will recheck his sodium and potassium tomorrow to assure good balance going into the weekend.    hypertension: Systolic blood pressures continue to be elevated however better than earlier this week.  We will continue with carvedilol 50 mg 2 times a day, benazepril 20 mg 2 times a day, and amlodipine 10 mg daily.  We do need to continue to monitor his creatinine as it did increase slightly after increasing the benazepril.    Neck pain syndrome: He appears to be tolerating returning to his previous medications and so I do feel comfortable on increasing his fentanyl patch to 75 mg every 48 hours.  We will continue to monitor for encephalopathy.  Continue oxycodone 10 mg every 4 hours as needed.    35 total minutes spent with 20 minutes spent face-to-face in coordination and counseling with the patient and nursing staff regarding his blood transfusion and electrolyte imbalance.    Electronically signed by: Fallon Wall CNP

## 2021-06-02 NOTE — PROGRESS NOTES
Code Status:  FULL CODE  Visit Type: Follow Up     Facility:  [facility] [[id number]]      Facility Type: SNF (Skilled Nursing Facility, TCU)    History of Present Illness:   Hospital Admission Date: 9/20/2019 Hospital Discharge Date: 10/4/2019      Kobe Henriquez is a 59 y.o. male who has a past medical history for hypertension, HLD, chronic neck pain, TAA repair, bipolar disorder, atrial fibrillation not on anticoagulation, sleep apnea, type 2 diabetes diet managed, with bilateral shoulder replacements in the past.  He was admitted to [facility] for ongoing weakness with a mechanical fall over the course of 2 weeks.  He was found to have MSSA bacteremia and a right septic shoulder.  He had his right shoulder washed out and was put on long-term IV antibiotics.  His hospitalization was complicated by NEMESIO in which he needed 2 rounds of dialysis with the last one being 9/24.  He also had acute toxic encephalopathy so there was a question of septic emboli however this did resolve.  EEG showed no signs of seizure.  He also had low hemoglobin in which he underwent an endoscopy and did not find any lesions or lacerations.  He was given 1 unit of packed red blood cells and his anemia improved.    Last week, he did have a significant drop in his hemoglobin from 8.1-7.0 last Friday.  That he feels his balance is off today.  Conjunctive is pink and vital signs are okay.  He does have trending SBP's of 160s to 170s.  Last week Dr. Miller had  his amlodipine and increased it to 10 mg from the benazepril at 20 mg.  He continues to have serosanguineous drainage from his right shoulder incision which is well approximated with sutures and also noted to have some purulent drainage at the distal end of the incision.  Does have good CMS to his right arm with good radial pulse.  He also has improved open areas on his right forearm.  He reports his pain is well managed with the current fentanyl and  oxycodone combination.  He reports his bowels are moving adequately and denies any urinary issues.      Past Medical History:   Diagnosis Date     A-fib (H)      NEMESIO (acute kidney injury) (H)      Anemia     acute on chronic, with episode of melena, resolved s/p transfusion     Anxiety      Ascending aortic aneurysm (H)      Bicuspid aortic valve      Bipolar disorder (H)      BPPV (benign paroxysmal positional vertigo)      Chronic narcotic use      Chronic neck pain      Chronic osteoarthritis      Degenerative joint disease      Depression      DMII (diabetes mellitus, type 2) (H)      HLD (hyperlipidemia)      HTN (hypertension)      Hypermetropia      Moderate malnutrition (H)      MSSA bacteremia      Multiple stiff joints      Neck injuries      Obesity      Presbyopia      Septic joint of right shoulder region (H)     s/p washout     Skin picking habit      Sleep apnea      Past Surgical History:   Procedure Laterality Date     BYPASS GASTRIC TAVO-EN-Y, LIVER BIOPSY, COMBINED  08/08/2005     COLONOSCOPY  06/30/2014     CYSTOSCOPY, BLADDER NECK CUTS, COMBINED  07/18/2016     ESOPHAGOGASTRODUODENOSCOPY  06/30/2014     EXCISE MASS FINGER Right 06/14/2011     HAND SURGERY Left     excision of tendon cyst on left hand       IRRIGATION AND DEBRIDEMENT UPPER EXTREMITY Right 09/23/2019    shoulder     LASER HOLMIUM LITHOTRIPSY BLADDER  10/15/2014     LASER KTP GREEN LIGHT PHOTOSELECTIVE VAPORIZATION PROSTATE  01/23/2014     PERIPHERALLY INSERTED CENTRAL CATHETER INSERTION       RELEASE TRIGGER FINGER Right 03/31/2017     REPAIR ANEURYSM ASCENDING AORTA  10/04/2016     TOTAL SHOULDER REPLACEMENT Left 04/15/2014     TOTAL SHOULDER REPLACEMENT Right 08/26/2014     TRANSURETHRAL RESECTION OF PROSTATE  2014     Family History   Problem Relation Age of Onset     Cataracts Mother      Macular degeneration Mother      Anxiety disorder Mother      Hyperlipidemia Mother      Hypertension Mother      Other Mother         low  back problems     Osteoporosis Mother      Arrhythmia Father      Cardiovascular Father      Coronary artery disease Father      Depression Father      Hyperlipidemia Father      Hypertension Father      Other Father         low back problems, spine problems     Nephrolithiasis Father      Obesity Father      Sleep apnea Father      Anxiety disorder Sister      Hyperlipidemia Sister      Hypertension Sister      Obesity Sister      Other Sister         low back problems, spine problems     Osteoporosis Sister      Anxiety disorder Sister      Depression Sister      Hyperlipidemia Sister      Hypertension Sister      Other Sister         low back problems     Obesity Sister      Osteoporosis Sister      Social History     Socioeconomic History     Marital status: Single     Spouse name: Not on file     Number of children: Not on file     Years of education: Not on file     Highest education level: Not on file   Occupational History     Not on file   Social Needs     Financial resource strain: Not on file     Food insecurity:     Worry: Not on file     Inability: Not on file     Transportation needs:     Medical: Not on file     Non-medical: Not on file   Tobacco Use     Smoking status: Former Smoker     Packs/day: 0.00     Start date:      Last attempt to quit:      Years since quittin.8     Smokeless tobacco: Never Used   Substance and Sexual Activity     Alcohol use: Not Currently     Drug use: Not on file     Sexual activity: Not on file   Lifestyle     Physical activity:     Days per week: Not on file     Minutes per session: Not on file     Stress: Not on file   Relationships     Social connections:     Talks on phone: Not on file     Gets together: Not on file     Attends Anabaptism service: Not on file     Active member of club or organization: Not on file     Attends meetings of clubs or organizations: Not on file     Relationship status: Not on file     Intimate partner violence:     Fear of  current or ex partner: Not on file     Emotionally abused: Not on file     Physically abused: Not on file     Forced sexual activity: Not on file   Other Topics Concern     Not on file   Social History Narrative     Not on file       Current Outpatient Medications   Medication Sig Dispense Refill     amLODIPine (NORVASC) 10 MG tablet Take 10 mg by mouth daily.       benazepril (LOTENSIN) 20 MG tablet Take 20 mg by mouth 2 (two) times a day.       acetaminophen (TYLENOL) 500 MG tablet Take 500 mg by mouth 3 (three) times a day.       bisacodyl (DULCOLAX) 10 mg suppository Insert 10 mg into the rectum daily as needed.       buPROPion (WELLBUTRIN SR) 200 MG 12 hr tablet Take 200 mg by mouth 2 (two) times a day.       carvedilol (COREG) 25 MG tablet Take 50 mg by mouth 2 (two) times a day with meals.       cefazolin sodium in 0.9 % NaCl (CEFAZOLIN IN 0.9% SOD CHLORIDE) 2 gram/100 mL Soln Infuse 2 g into a venous catheter 3 (three) times a day.       clonazePAM (KLONOPIN) 0.5 MG tablet Take 0.5 mg by mouth 3 (three) times a day as needed for anxiety.       cyanocobalamin (VITAMIN B-12) 50 mcg tablet Take 50 mcg by mouth 2 (two) times a day.       docusate sodium (COLACE) 100 MG capsule Take 100-200 mg by mouth daily.       fentaNYL (DURAGESIC) 75 mcg/hr Place 1 patch on the skin every third day.       ferrous sulfate (FEOSOL ORAL) Take 300 mg by mouth 3 (three) times a day.              Lactobacillus rhamnosus GG (CULTURELLE) 10-15 Billion cell capsule Take 1 capsule by mouth daily.       melatonin 3 mg Tab tablet Take 6 mg by mouth at bedtime.       multivitamin capsule Take 1 capsule by mouth daily.       oxyCODONE (ROXICODONE) 5 MG immediate release tablet Take 10 mg by mouth every 4 (four) hours as needed for pain.       pantoprazole (PROTONIX) 40 MG tablet Take 40 mg by mouth 2 (two) times a day.              polyethylene glycol (MIRALAX) 17 gram packet Take 17 g by mouth daily as needed.       senna (SENOKOT) 8.6  mg tablet Take by mouth daily. 2 tabs in am and 3 tabs @@ hs       senna (SENOKOT) 8.6 mg tablet Take 1 tablet by mouth 2 (two) times a day as needed for constipation.       simethicone (MYLICON) 40 mg/0.6 mL drops Take 40 mg by mouth 4 (four) times a day as needed.       simvastatin (ZOCOR) 10 MG tablet Take 10 mg by mouth at bedtime.       UNABLE TO FIND Take 300 mg by mouth 2 (two) times a day. Med Name: FeoSol 300mg two times a day ok to use med from home       No current facility-administered medications for this visit.      Allergies   Allergen Reactions     Adhesive Tape-Silicones      Blistering of skin     Immunization History   Administered Date(s) Administered     DT (pediatric) 02/04/2004     Td,adult,historic,unspecified 02/04/2004     Varicella 01/01/1900         Review of Systems   Patient denies fever, chills, headache, lightheadedness, dizziness, rhinorrhea, cough, congestion, shortness of breath, chest pain, palpitations, abdominal pain, n/v, diarrhea, constipation, change in appetite, dysuria, frequency, burning or pain with urination. Does have intermittent urinary incontinence at baseline.  Other than stated in HPI all other review of systems is negative.         Physical Exam   Vital signs: /74, heart rate 78, respiratory 18, temp 98.6.  GENERAL APPEARANCE: Rest, in no acute distress  HEENT: normocephalic, atraumatic  PERRL, sclerae anicteric, conjunctivae clear and moist, EOM intact  LUNGS: Lung sounds CTA, no adventitious sounds, respiratory effort normal.  CARD: RRR, S1, S2, 2/6 distant lack murmur, no gallops, rubs,  ABD: Soft and nondistended, nontender with normal bowel sounds.   MSK: Muscle strength and tone were normal.  EXTREMITIES: No cyanosis, clubbing or edema.  Localized edema of right shoulder incision.  NEURO: Alert and oriented x 3.  Face is symmetric.  SKIN: Right forearm with open areas without scabbing, no signs and symptoms of infection.  Right shoulder incision is  well approximated with sutures without surrounding erythema.  Does have serosanguineous drainage with purulent drainage on the distal end of the incision.    PSYCH: euthymic            Labs:   Recent Results (from the past 240 hour(s))   ALT (SGPT)   Result Value Ref Range    ALT 9 0 - 45 U/L   AST (SGOT)   Result Value Ref Range    AST 27 0 - 40 U/L   Bilirubin, Total   Result Value Ref Range    Bilirubin, Total 0.6 0.0 - 1.0 mg/dL   Blood Urea Nitrogen (BUN)   Result Value Ref Range    BUN 12 8 - 22 mg/dL   C-Reactive Protein (CRP)   Result Value Ref Range    CRP 8.0 (H) 0.0 - 0.8 mg/dL   Alkaline Phosphatase   Result Value Ref Range    Alkaline Phosphatase 111 45 - 120 U/L   Creatinine   Result Value Ref Range    Creatinine 1.16 0.70 - 1.30 mg/dL    GFR MDRD Af Amer >60 >60 mL/min/1.73m2    GFR MDRD Non Af Amer >60 >60 mL/min/1.73m2   Bilirubin, Direct   Result Value Ref Range    Bilirubin, Direct 0.3 <=0.5 mg/dL   HM1 (CBC with Diff)   Result Value Ref Range    WBC 6.1 4.0 - 11.0 thou/uL    RBC 2.43 (L) 4.40 - 6.20 mill/uL    Hemoglobin 7.3 (L) 14.0 - 18.0 g/dL    Hematocrit 22.2 (L) 40.0 - 54.0 %    MCV 91 80 - 100 fL    MCH 30.0 27.0 - 34.0 pg    MCHC 32.9 32.0 - 36.0 g/dL    RDW 13.2 11.0 - 14.5 %    Platelets 288 140 - 440 thou/uL    MPV 9.6 8.5 - 12.5 fL    Neutrophils % 73 (H) 50 - 70 %    Lymphocytes % 15 (L) 20 - 40 %    Monocytes % 10 2 - 10 %    Eosinophils % 2 0 - 6 %    Basophils % 0 0 - 2 %    Neutrophils Absolute 4.4 2.0 - 7.7 thou/uL    Lymphocytes Absolute 0.9 0.8 - 4.4 thou/uL    Monocytes Absolute 0.6 0.0 - 0.9 thou/uL    Eosinophils Absolute 0.1 0.0 - 0.4 thou/uL    Basophils Absolute 0.0 0.0 - 0.2 thou/uL   Ferritin   Result Value Ref Range    Ferritin 277 27 - 300 ng/mL   Iron and Transferrin Iron Binding Capacity   Result Value Ref Range    Iron 31 (L) 42 - 175 ug/dL    Transferrin 139 (L) 212 - 360 mg/dL    Transferrin Saturation, Calculated 18 (L) 20 - 50 %    Transferrin IBC, Calculated  174 (L) 313 - 563 ug/dL   HM1 (CBC with Diff)   Result Value Ref Range    WBC 5.6 4.0 - 11.0 thou/uL    RBC 2.68 (L) 4.40 - 6.20 mill/uL    Hemoglobin 8.1 (L) 14.0 - 18.0 g/dL    Hematocrit 24.7 (L) 40.0 - 54.0 %    MCV 92 80 - 100 fL    MCH 30.2 27.0 - 34.0 pg    MCHC 32.8 32.0 - 36.0 g/dL    RDW 13.2 11.0 - 14.5 %    Platelets 249 140 - 440 thou/uL    MPV 9.2 8.5 - 12.5 fL    Neutrophils % 65 50 - 70 %    Lymphocytes % 22 20 - 40 %    Monocytes % 11 (H) 2 - 10 %    Eosinophils % 2 0 - 6 %    Basophils % 0 0 - 2 %    Neutrophils Absolute 3.6 2.0 - 7.7 thou/uL    Lymphocytes Absolute 1.2 0.8 - 4.4 thou/uL    Monocytes Absolute 0.6 0.0 - 0.9 thou/uL    Eosinophils Absolute 0.1 0.0 - 0.4 thou/uL    Basophils Absolute 0.0 0.0 - 0.2 thou/uL   Hemoglobin   Result Value Ref Range    Hemoglobin 7.6 (L) 14.0 - 18.0 g/dL   HM2(CBC w/o Differential)   Result Value Ref Range    WBC 5.2 4.0 - 11.0 thou/uL    RBC 2.38 (L) 4.40 - 6.20 mill/uL    Hemoglobin 7.0 (L) 14.0 - 18.0 g/dL    Hematocrit 21.9 (L) 40.0 - 54.0 %    MCV 92 80 - 100 fL    MCH 29.4 27.0 - 34.0 pg    MCHC 32.0 32.0 - 36.0 g/dL    RDW 13.2 11.0 - 14.5 %    Platelets 219 140 - 440 thou/uL    MPV 9.0 8.5 - 12.5 fL   ALT (SGPT)   Result Value Ref Range    ALT <9 0 - 45 U/L   Basic Metabolic Panel   Result Value Ref Range    Sodium 134 (L) 136 - 145 mmol/L    Potassium 4.5 3.5 - 5.0 mmol/L    Chloride 100 98 - 107 mmol/L    CO2 25 22 - 31 mmol/L    Anion Gap, Calculation 9 5 - 18 mmol/L    Glucose 201 (H) 70 - 125 mg/dL    Calcium 8.4 (L) 8.5 - 10.5 mg/dL    BUN 11 8 - 22 mg/dL    Creatinine 1.20 0.70 - 1.30 mg/dL    GFR MDRD Af Amer >60 >60 mL/min/1.73m2    GFR MDRD Non Af Amer >60 >60 mL/min/1.73m2   Bilirubin, Total   Result Value Ref Range    Bilirubin, Total 0.4 0.0 - 1.0 mg/dL   Blood Urea Nitrogen (BUN)   Result Value Ref Range    BUN 11 8 - 22 mg/dL   Creatinine   Result Value Ref Range    Creatinine 1.20 0.70 - 1.30 mg/dL    GFR MDRD Af Amer >60 >60  mL/min/1.73m2    GFR MDRD Non Af Amer >60 >60 mL/min/1.73m2   Alkaline Phosphatase   Result Value Ref Range    Alkaline Phosphatase 138 (H) 45 - 120 U/L   HM1 (CBC with Diff)   Result Value Ref Range    WBC 7.2 4.0 - 11.0 thou/uL    RBC 2.57 (L) 4.40 - 6.20 mill/uL    Hemoglobin 7.6 (L) 14.0 - 18.0 g/dL    Hematocrit 24.3 (L) 40.0 - 54.0 %    MCV 95 80 - 100 fL    MCH 29.6 27.0 - 34.0 pg    MCHC 31.3 (L) 32.0 - 36.0 g/dL    RDW 13.2 11.0 - 14.5 %    Platelets 269 140 - 440 thou/uL    MPV 8.9 8.5 - 12.5 fL    Neutrophils % 81 (H) 50 - 70 %    Lymphocytes % 10 (L) 20 - 40 %    Monocytes % 7 2 - 10 %    Eosinophils % 1 0 - 6 %    Basophils % 0 0 - 2 %    Neutrophils Absolute 5.9 2.0 - 7.7 thou/uL    Lymphocytes Absolute 0.7 (L) 0.8 - 4.4 thou/uL    Monocytes Absolute 0.5 0.0 - 0.9 thou/uL    Eosinophils Absolute 0.1 0.0 - 0.4 thou/uL    Basophils Absolute 0.0 0.0 - 0.2 thou/uL         Assessment:  1. MSSA bacteremia     2. Staphylococcal arthritis of right shoulder (H)     3. Acute blood loss anemia     4. Essential hypertension         Plan:  MSSA bacteremia: Continue with vancomycin and labs monitoring by pharmacy.  Follow-up with infectious disease.  PICC line is intact.    Staphylococcal arthritis of right shoulder: I did discuss his drainage with orthopedic NP and she has assessed him today.  She states it is likely that he will need another washout and he is requesting that be done at the [facility].  Will await surgeon recommendations which should appear in the next 24 hours.  Counseled patient on the likelihood of a future surgery and he is agreeable.    Anemia: Hemoglobin returned at 7.6.  Continue to monitor.  Continue ferrous sulfate 325 daily.    Hypertension: We will increase benazepril to 20 mg twice daily and check a BMP at the end of the week.  Did  patient on needing to have benazepril and amlodipine  at this time as so we can make quick adjustments due to his recent NEMESIO.  He is agreeable  to that plan.  Creatinine today was 1.20.    35 total minutes spent with 20 minutes spent face-to-face with patient and orthopedic NP in counseling and coordination of the above plan of care.    Electronically signed by: Fallon Wall CNP

## 2021-06-02 NOTE — TELEPHONE ENCOUNTER
[facility] Nurse Triage Telephone Note      Provider: CATHRYN Birch  Facility: [facility]    Facility Type: TCU    Caller:   Call Back Number:  [phone]    Allergies: Patient has no known allergies.    Reason for call: Iron 300mg ordered, pharmacy says comes 324 or 325 (both are house stock).     Verbal Order/Direction given by Provider: Pt states doesn't tolerate either of those formulations (GI). Takes FeoSol 300mg two times a day, may bring supply from home.    Provider giving order: CATHRYN Birch    Verbal order given to: .      Brittney Garcia RN

## 2021-06-02 NOTE — PROGRESS NOTES
[facility]        Visit Type: H & P (Confusion with weakness and fall)    Code Status:  FULL CODE  Facility:  [facility] [[id number]]          PCP: Michael Styles MD       PHONE: [phone]     FAX:[phone]        ASSESSMENT/PLAN:  1. Staphylococcal arthritis of right shoulder (H)   status post I&D of right shoulder with explantation.  Now with mild distal surgical site discharge, concerning for nonhealing, will need to make sure orthopedics is informed and may need to see orthopedic this week for further evaluation.  Patient will continue with cefazolin 2 g 3 times daily until 11/4/2019,  infectious disease following, Ortho following   2. Essential hypertension   uncontrolled, discontinue amlodipine/benazepril.  Start amlodipine 10 mg every afternoon and benazepril 20 mg every morning   3. Chronic pain syndrome   not controlled, fentanyl patch already increased/7 to 75 mcg every 72 hours therefore we will monitor for now before any other changes to current narcotic doses are made   4. Acute blood loss anemia   no evidence for bleeding at this time, continue Feosol twice daily, check Hemoccult x3, recheck hemoglobin today.  Consider blood transfusion if less than 7   5. NEMESIO (acute kidney injury) (H)   resolved, most recent creatinine at 1.16 with GFR greater than 60   6. Atrial fibrillation, unspecified type (H)   rate controlled with carvedilol at 50 mg twice daily which was increased on 10/7.  Currently not on any anticoagulation due to very low hemoglobin         HISTORY OF PRESENT ILLNESS:   Kobe Magallaneswily is a 59 y.o. male with a history of hypertension, paroxysmal atrial fibrillation not on anticoagulation, chronic pain on chronic opiates, obesity with SAVITA, chronic anemia, history of TAA repair, type 2 diabetes, bipolar disorder who was admitted for a mechanical fall with associated weakness and confusion.  He was noted to be  hypertensive with associated acute kidney injury.  He was seen by nephrology, given IV hydration, given temporary dialysis with last dialysis on 9/24 with improvement in creatinine.  He also was found to have a right septic total shoulder arthroplasty with MSSA bacteremia and he was seen by orthopedics, underwent I&D and explantation.  Per ID, he was started on IV cefazolin via PICC line until 11/4/2019.  He also was noted to have worsening anemia in the hospital which initially was concerning for seizures vs septic emboli vs CVA but improved after starting prn clonazepam and scheduled bupropion.  He also developed melena and GI was consulted.  He underwent endoscopy which was negative.  He was transfused 1 unit of packed RBC for a hemoglobin of 6.6 with slight improvement to 8.3 after transfusion.  He continued on fentanyl, Roxicodone, Tylenol for pain management.    Currently, the patient states that he feels slightly better although he still has a lot of pain especially on his neck and upper shoulders/neck.  He states that he also has a lot of pain on his right shoulder where he had recent surgery.  He has been on narcotics over the last 15 years for cervical spinal stenosis and he is being followed by pain clinic.  He complains that he is not receiving enough of his Roxicodone and that he also is not receiving enough of fentanyl patch which he takes at 75 mcg every 48 hours, he is only receiving 50 mcg every 72 hours from the hospital.  He also states that his appetite is still poor and he only eats and able to tolerate small meals throughout the day.  He is sleeping okay although he still complains of pain that wakes him up from sleep.  He denies any abdominal pain, nausea or vomiting.  He denies any urinary or bowel problems.  He denies any fevers or chills, chest pains or shortness of breath.  He does not have any dizziness or lightheadedness.  He still feels weak.  His blood pressures have been high  especially in the mornings with range 150-165/58-78.    Other review of systems are negative.      PAST MEDICAL/SURGICAL HISTORY:  Past Medical History:   Diagnosis Date     A-fib (H)      NEMESIO (acute kidney injury) (H)      Anemia     acute on chronic, with episode of melena, resolved s/p transfusion     Anxiety      Ascending aortic aneurysm (H)      Bicuspid aortic valve      Bipolar disorder (H)      BPPV (benign paroxysmal positional vertigo)      Chronic narcotic use      Chronic neck pain      Chronic osteoarthritis      Degenerative joint disease      Depression      DMII (diabetes mellitus, type 2) (H)      HLD (hyperlipidemia)      HTN (hypertension)      Hypermetropia      Moderate malnutrition (H)      MSSA bacteremia      Multiple stiff joints      Neck injuries      Obesity      Presbyopia      Septic joint of right shoulder region (H)     s/p washout     Skin picking habit      Sleep apnea      Past Surgical History:   Procedure Laterality Date     BYPASS GASTRIC TAVO-EN-Y, LIVER BIOPSY, COMBINED  08/08/2005     COLONOSCOPY  06/30/2014     CYSTOSCOPY, BLADDER NECK CUTS, COMBINED  07/18/2016     ESOPHAGOGASTRODUODENOSCOPY  06/30/2014     EXCISE MASS FINGER Right 06/14/2011     HAND SURGERY Left     excision of tendon cyst on left hand       IRRIGATION AND DEBRIDEMENT UPPER EXTREMITY Right 09/23/2019    shoulder     LASER HOLMIUM LITHOTRIPSY BLADDER  10/15/2014     LASER KTP GREEN LIGHT PHOTOSELECTIVE VAPORIZATION PROSTATE  01/23/2014     PERIPHERALLY INSERTED CENTRAL CATHETER INSERTION       RELEASE TRIGGER FINGER Right 03/31/2017     REPAIR ANEURYSM ASCENDING AORTA  10/04/2016     TOTAL SHOULDER REPLACEMENT Left 04/15/2014     TOTAL SHOULDER REPLACEMENT Right 08/26/2014     TRANSURETHRAL RESECTION OF PROSTATE  2014       SOCIAL HISTORY:  Social History     Socioeconomic History     Marital status: Single     Spouse name: Not on file     Number of children: Not on file     Years of education: Not on  file     Highest education level: Not on file   Occupational History     Not on file   Social Needs     Financial resource strain: Not on file     Food insecurity:     Worry: Not on file     Inability: Not on file     Transportation needs:     Medical: Not on file     Non-medical: Not on file   Tobacco Use     Smoking status: Former Smoker     Packs/day: 0.00     Start date:      Last attempt to quit:      Years since quittin.8     Smokeless tobacco: Never Used   Substance and Sexual Activity     Alcohol use: Not Currently     Drug use: Not on file     Sexual activity: Not on file   Lifestyle     Physical activity:     Days per week: Not on file     Minutes per session: Not on file     Stress: Not on file   Relationships     Social connections:     Talks on phone: Not on file     Gets together: Not on file     Attends Restoration service: Not on file     Active member of club or organization: Not on file     Attends meetings of clubs or organizations: Not on file     Relationship status: Not on file     Intimate partner violence:     Fear of current or ex partner: Not on file     Emotionally abused: Not on file     Physically abused: Not on file     Forced sexual activity: Not on file   Other Topics Concern     Not on file   Social History Narrative     Not on file       FAMILY HISTORY:  Family History   Problem Relation Age of Onset     Cataracts Mother      Macular degeneration Mother      Anxiety disorder Mother      Hyperlipidemia Mother      Hypertension Mother      Other Mother         low back problems     Osteoporosis Mother      Arrhythmia Father      Cardiovascular Father      Coronary artery disease Father      Depression Father      Hyperlipidemia Father      Hypertension Father      Other Father         low back problems, spine problems     Nephrolithiasis Father      Obesity Father      Sleep apnea Father      Anxiety disorder Sister      Hyperlipidemia Sister      Hypertension Sister       Obesity Sister      Other Sister         low back problems, spine problems     Osteoporosis Sister      Anxiety disorder Sister      Depression Sister      Hyperlipidemia Sister      Hypertension Sister      Other Sister         low back problems     Obesity Sister      Osteoporosis Sister        MEDICATIONS:  Current Outpatient Medications on File Prior to Visit   Medication Sig     acetaminophen (TYLENOL) 500 MG tablet Take 500 mg by mouth 3 (three) times a day.     amLODIPine-benazepril (LOTREL) 10-20 mg per capsule Take 1 capsule by mouth daily.     bisacodyl (DULCOLAX) 10 mg suppository Insert 10 mg into the rectum daily as needed.     buPROPion (WELLBUTRIN SR) 200 MG 12 hr tablet Take 200 mg by mouth 2 (two) times a day.     carvedilol (COREG) 25 MG tablet Take 50 mg by mouth 2 (two) times a day with meals.     cefazolin sodium in 0.9 % NaCl (CEFAZOLIN IN 0.9% SOD CHLORIDE) 2 gram/100 mL Soln Infuse 2 g into a venous catheter 3 (three) times a day.     clonazePAM (KLONOPIN) 0.5 MG tablet Take 0.5 mg by mouth 3 (three) times a day as needed for anxiety.     cyanocobalamin (VITAMIN B-12) 50 mcg tablet Take 50 mcg by mouth 2 (two) times a day.     docusate sodium (COLACE) 100 MG capsule Take 100-200 mg by mouth daily.     fentaNYL (DURAGESIC) 75 mcg/hr Place 1 patch on the skin every third day.     ferrous sulfate (FEOSOL ORAL) Take 300 mg by mouth 3 (three) times a day.            Lactobacillus rhamnosus GG (CULTURELLE) 10-15 Billion cell capsule Take 1 capsule by mouth daily.     melatonin 3 mg Tab tablet Take 6 mg by mouth at bedtime.     multivitamin capsule Take 1 capsule by mouth daily.     oxyCODONE (ROXICODONE) 5 MG immediate release tablet Take 10 mg by mouth every 4 (four) hours as needed for pain.     pantoprazole (PROTONIX) 40 MG tablet Take 40 mg by mouth 2 (two) times a day.            polyethylene glycol (MIRALAX) 17 gram packet Take 17 g by mouth daily as needed.     senna (SENOKOT) 8.6 mg tablet  Take by mouth daily. 2 tabs in am and 3 tabs @@ hs     senna (SENOKOT) 8.6 mg tablet Take 1 tablet by mouth 2 (two) times a day as needed for constipation.     simethicone (MYLICON) 40 mg/0.6 mL drops Take 40 mg by mouth 4 (four) times a day as needed.     simvastatin (ZOCOR) 10 MG tablet Take 10 mg by mouth at bedtime.     UNABLE TO FIND Take 300 mg by mouth 2 (two) times a day. Med Name: FeoSol 300mg two times a day ok to use med from home     No current facility-administered medications on file prior to visit.        ALLERGIES:  Allergies   Allergen Reactions     Adhesive Tape-Silicones      Blistering of skin         PHYSICAL EXAMINATION:  Vital signs 157/63, 98.4, 80, 204 pounds, 18, 97% room air  General: Awake, Alert, oriented x3, not in any form of acute distress, answers questions appropriately, follows simple commands, conversant, obese slightly anxious, pale looking  HEENT: Pale conjunctiva, anicteric sclerae, oral mucosa is moist  NECK: Refuses to move neck, without any lymphadenopathy, thyromegaly or any masses  LUNG: Clear to auscultation, good chest expansion. There are no crackles, no wheezes, normal AP diameter  BACK: No kyphosis of the thoracic spine  CVS: There is good S1  S2, there is 2/6 SCOTT with skip and premature beats, no heaves, rhythm is regular sternotomy scar, gastric bypass scar noted  ABDOMEN: Globular and soft, nontender to palpation, no organomegaly, good bowel sounds  EXTREMITIES: Good range of motion on both upper and lower extremities except on the right shoulder where there is decreased range of motion with tenderness on palpation, some erythema noted, not healing well, minimal serosanguineous discharge on the distal aspect of surgical site with mild erythema along the length of suture site, no pedal edema, no cyanosis or clubbing, no calf tenderness  SKIN: Warm and dry, no rashes or erythema noted    LABS:  Reviewed        I reviewed the patient's available medical records, labs  and medications.    >45 minutes of total time spent, greater than 55% of the time spent in coordination of care and counseling regarding the above medical issues and plan of care including discussion on hypertension and treatment options, chronic pain and use of opioids, anemia.      Electronically signed by:Sonal Miller MD

## 2021-06-02 NOTE — PROGRESS NOTES
Code Status:  FULL CODE  Visit Type: Follow Up     Facility:  [facility] [[id number]]      Facility Type: SNF (Skilled Nursing Facility, TCU)    History of Present Illness:   Hospital Admission Date: 9/20/2019 Hospital Discharge Date: 10/4/2019      Kobe Henriquez is a 59 y.o. male who has a past medical history for hypertension, HLD, chronic neck pain, TAA repair, bipolar disorder, atrial fibrillation not on anticoagulation, sleep apnea, type 2 diabetes diet managed, with bilateral shoulder replacements in the past.  He was admitted to [facility] for ongoing weakness with a mechanical fall over the course of 2 weeks.  He was found to have MSSA bacteremia and a right septic shoulder.  He had his right shoulder washed out and was put on long-term IV antibiotics.  His hospitalization was complicated by NEMESIO in which he needed 2 rounds of dialysis with the last one being 9/24.  He also had acute toxic encephalopathy so there was a question of septic emboli however this did resolve.  EEG showed no signs of seizure.  He also had low hemoglobin in which he underwent an endoscopy and did not find any lesions or lacerations.  He was given 1 unit of packed red blood cells and his anemia improved.    Today, his biggest complaint is poo pain management.  I did review his prior to hospitalization medications and he was on a fentanyl patch 75 mcg every 48 hours and morphine immediate release 30 mg every 6 hours along with Flexeril which was managed by [facility] pain clinic.  Upon discharge hospital restarted his fentanyl patch at 50 mics every 3 days and changed his morphine to oxycodone 5 mg every 6 hours as needed.  He continues to have significant neck and shoulder pain.  His right shoulder incision is well approximated with sutures and he is to follow-up with orthopedics next week for suture removal.  There is no signs and symptoms of infection.  He does have plaque-like spots on his right forearm  that have been scratched open.  He reports these are old mosquito bites that he compulsively scratches.  He does have significant vitiligo-like marks on his right forearm which appear to be related to scarring from scratches.  He also reports that he is unable to correctly absorb with ferrous sulfate due to his Simone-en-Y surgery he had years ago.  He reports that there saw is the best iron supplement for him.  He also has a fair amount of muscle wasting especially in his lower extremities however he states he is eating adequately now that he is out of the hospital.  His PICC line in his right upper extremity is free of signs and symptoms of infection.  He is requesting a probiotic to decrease his risk for C. difficile.  He is also concerned about elevated blood pressures while at this facility.  I did explain that it is likely related to his unmanaged pain.  He tells me that he usually takes amlodipine/benazepril 5/20 twice daily with good management.  The benazepril was likely reduced due to his kidney function.  His baseline kidney function appears to be a creatinine of 1.1.  He also is requesting changes to his bowel medications.  He states he takes docusate 100 mg 1 tab in the morning and 2 tabs in the evening.  Also takes senna 3 tabs in the morning and 4 tabs in the evening until he is able to have good bowel movements and then he will back the senna down to 2 tabs in the morning and 3 tabs in the evening.  For his bipolar disorder he is on bupropion 200 mg twice daily and Klonopin 0.5 mg 3 times daily for anxiety.      No past medical history on file.  No past surgical history on file.  No family history on file.  Social History     Socioeconomic History     Marital status: Single     Spouse name: Not on file     Number of children: Not on file     Years of education: Not on file     Highest education level: Not on file   Occupational History     Not on file   Social Needs     Financial resource strain: Not  on file     Food insecurity:     Worry: Not on file     Inability: Not on file     Transportation needs:     Medical: Not on file     Non-medical: Not on file   Tobacco Use     Smoking status: Not on file   Substance and Sexual Activity     Alcohol use: Not on file     Drug use: Not on file     Sexual activity: Not on file   Lifestyle     Physical activity:     Days per week: Not on file     Minutes per session: Not on file     Stress: Not on file   Relationships     Social connections:     Talks on phone: Not on file     Gets together: Not on file     Attends Taoism service: Not on file     Active member of club or organization: Not on file     Attends meetings of clubs or organizations: Not on file     Relationship status: Not on file     Intimate partner violence:     Fear of current or ex partner: Not on file     Emotionally abused: Not on file     Physically abused: Not on file     Forced sexual activity: Not on file   Other Topics Concern     Not on file   Social History Narrative     Not on file       No current outpatient medications on file.     No current facility-administered medications for this visit.      No Known Allergies  Immunization History   Administered Date(s) Administered     DT (pediatric) 02/04/2004     Td,adult,historic,unspecified 02/04/2004     Varicella 01/01/1900         Review of Systems   Patient denies fever, chills, headache, lightheadedness, dizziness, rhinorrhea, cough, congestion, shortness of breath, chest pain, palpitations, abdominal pain, n/v, diarrhea, constipation, change in appetite, dysuria, frequency, burning or pain with urination. Does have intermittent urinary incontinence at baseline.  Other than stated in HPI all other review of systems is negative.         Physical Exam   Vital signs: /67, heart rate 75, respiratory 18, temp 98.4, 97% on room air.  GENERAL APPEARANCE: Male, with leg muscle wasting, in no acute distress.  HEENT: normocephalic,  atraumatic  PERRL, sclerae anicteric, conjunctivae clear and moist, EOM intact  LUNGS: Lung sounds CTA, no adventitious sounds, respiratory effort normal.  CARD: RRR, S1, S2, 2/6 colic murmur, no gallops, rubs, no JVD, peripheral pulses 2+ and symmetric  ABD: Soft and nondistended, nontender with normal bowel sounds.   MSK: Muscle strength and tone were normal.  EXTREMITIES: No cyanosis, clubbing or edema.  Shoulders have decreased range of motion with significant pain  NEURO: Alert and oriented x 3.  Face is symmetric.  SKIN: Right forearm with open areas and plaque-like scabbing, no signs and symptoms of infection.  Right shoulder incision is well approximated with sutures with minimal serous drainage and no surrounding erythema.  PSYCH: euthymic            Labs:   Recent Results (from the past 240 hour(s))   ALT (SGPT)   Result Value Ref Range    ALT 9 0 - 45 U/L   AST (SGOT)   Result Value Ref Range    AST 27 0 - 40 U/L   Bilirubin, Total   Result Value Ref Range    Bilirubin, Total 0.6 0.0 - 1.0 mg/dL   Blood Urea Nitrogen (BUN)   Result Value Ref Range    BUN 12 8 - 22 mg/dL   Alkaline Phosphatase   Result Value Ref Range    Alkaline Phosphatase 111 45 - 120 U/L   Creatinine   Result Value Ref Range    Creatinine 1.16 0.70 - 1.30 mg/dL    GFR MDRD Af Amer >60 >60 mL/min/1.73m2    GFR MDRD Non Af Amer >60 >60 mL/min/1.73m2   Bilirubin, Direct   Result Value Ref Range    Bilirubin, Direct 0.3 <=0.5 mg/dL   HM1 (CBC with Diff)   Result Value Ref Range    WBC 6.1 4.0 - 11.0 thou/uL    RBC 2.43 (L) 4.40 - 6.20 mill/uL    Hemoglobin 7.3 (L) 14.0 - 18.0 g/dL    Hematocrit 22.2 (L) 40.0 - 54.0 %    MCV 91 80 - 100 fL    MCH 30.0 27.0 - 34.0 pg    MCHC 32.9 32.0 - 36.0 g/dL    RDW 13.2 11.0 - 14.5 %    Platelets 288 140 - 440 thou/uL    MPV 9.6 8.5 - 12.5 fL    Neutrophils % 73 (H) 50 - 70 %    Lymphocytes % 15 (L) 20 - 40 %    Monocytes % 10 2 - 10 %    Eosinophils % 2 0 - 6 %    Basophils % 0 0 - 2 %    Neutrophils  Absolute 4.4 2.0 - 7.7 thou/uL    Lymphocytes Absolute 0.9 0.8 - 4.4 thou/uL    Monocytes Absolute 0.6 0.0 - 0.9 thou/uL    Eosinophils Absolute 0.1 0.0 - 0.4 thou/uL    Basophils Absolute 0.0 0.0 - 0.2 thou/uL         Assessment:  1. Staphylococcal arthritis of right shoulder (H)     2. Acute pain     3. Other chronic pain     4. NEMESIO (acute kidney injury) (H)     5. Anemia, unspecified type     6. Essential hypertension     7. Atrial fibrillation, unspecified type (H)     8. Hyperlipidemia, unspecified hyperlipidemia type     9. Bipolar 1 disorder (H)     10. Type 2 diabetes mellitus without complication, without long-term current use of insulin (H)     11. Open wound of multiple sites of right upper extremity without complication, subsequent encounter         Plan:  MSSA of right shoulder: Continue with cefazolin antibiotics.  PICC line management.  Follow-up with orthopedic surgery next week for suture removal.  Follow-up with infectious disease in order to get an date for antibiotics.  Start on Culturelle for preventative measures.  Not on any blood thinners due to risk for falls and recent melena in stool.    Acute pain: Currently at this time he is not even at 50% of his morphine milliequivalents of his home dose of medications.  He continues to have ongoing pain that is decreasing his mobility and participation in therapy.  I will increase his fentanyl patch at 75 mcg every 3 days.  I did  him that at this time I feel comfortable with 75 mcg every 3 days and if he tolerates well without any delirium or encephalopathy we could change it to every 2 days per his pain clinic.  He is agreeable to that plan.  I will also increase his oxycodone to 10 mg every 4 hours as needed for better breakthrough pain coverage.  At this time he would not like to use a muscle relaxer as he used at home.  Continue with Tylenol thousand milligrams 3 times daily.  For his bowels I will start him on docusate 100 mg in the  morning and 200 mg at night.  I will also start him on senna 2 tabs in the morning 3 tabs at night and then the option to have 1 tab as needed in the morning and p.m. additionally.  Continue with MiraLAX as needed.    For his acute kidney injury: I am still awaiting for his renal function labs to return.  I did explain to him that I would not like to make any adjustments to his ACE inhibitor medications until I know that his kidney function is back to baseline.    Anemia: Hemoglobin is 7.3 today will switch over ferrous sulfate to ferrous all per patient's request.  Will monitor hemoglobin and recheck it on Wednesday of this week.  Would recommend monitoring stools for any melena and transfuse for hemoglobin under 7.    Hypertension: Blood pressure trending up he has had an increase in his amlodipine/benazepril combo pill.  I did again  him that I would not want to adjust that and tell we are sure that his kidney function is back to normal.  I will increase his carvedilol to 50 mg twice daily and hold if heart rate is below 60.  He is agreeable to that plan.  I will have Dr. Miller revisit his blood pressures and blood pressure medication options with him on Wednesday of this week.    Atrial fibrillation: This is controlled at this time with carvedilol.  Not on any anticoagulation due to bleeding risk.    Hyperlipidemia: Continue simvastatin 10 mg daily.    Bipolar disorder: Stable continue with bupropion 200 mg twice daily and Klonopin 0.5 mg 3 times daily as needed.    Diabetes: Stable diet controlled we will continue to monitor.  No blood sugar checks at this time.    Wounds on arm: I did explain the patient that this was likely related to psoriasis however he has never been diagnosed.  At this time will just add bacitracin to wound bed to improve moisture and cover with nonadherent on a daily basis.    50 total minutes spent with 40 minutes spent face-to-face with patient in counseling and coordination  of the above plan of care.    Electronically signed by: Fallon Wall, ALEXIS

## 2021-06-02 NOTE — PROGRESS NOTES
[facility]    Facility:   [facility] [[id number]]   Code Status: FULL CODE  PCP: Michael Styles MD   Phone: [phone]   Fax: [phone]      Assessment/Plan:        Visit for Preoperative Exam.     Patient approved for surgery with general or local anesthesia. Patient tells me that he will be having preoperative labs and EKG done at orthopedic appointment scheduled for Wednesday morning prior to surgery.      Kobe Henriquez is a 59 y.o. male who has a past medical history for hypertension, HLD, chronic neck pain, TAA repair, bipolar disorder, atrial fibrillation not on anticoagulation, sleep apnea, type 2 diabetes diet managed, with bilateral shoulder replacements in the past.  He was admitted to [facility] for ongoing weakness with a mechanical fall over the course of 2 weeks.  He was found to have MSSA bacteremia and a right septic shoulder.  He had his right shoulder washed out and was put on long-term IV antibiotics.  His hospitalization was complicated by NEMESIO in which he needed 2 rounds of dialysis with the last one being 9/24.  He also had acute toxic encephalopathy so there was a question of septic emboli however this did resolve.  EEG showed no signs of seizure.  He also had low hemoglobin in which he underwent an endoscopy and did not find any lesions or lacerations.  He was given 1 unit of packed red blood cells and his anemia improved.    While at the TCU his hgb did drift down to 7.0 however went back up to 7.6 as of last week.  I did transfuse 1 unit of RBC in efforts to optimize him for surgery.  His hgb today is 9.2.  He denies any symptoms of dizziness headaches or feeling unbalanced.  His incision continues to drain serosanguineous and some purulent drainage.  However, it is well approximated with sutures and shows no surrounding erythema.  He continues to have issues with chronic pain and I did return him to his previous pain medication  regimen of fentanyl patch 75 mcg every 48 hours and 10 mg of oxycodone every 4 hours as needed.  For his hypertension he continues to have variable blood pressures with most SBP's in the 130s to 170s.  2 weeks ago I did increase his carvedilol to 50 mg twice daily.  Also I discontinued his combination pill of amlodipine/benazepril as so that we could adjust these without further acute kidney injury.  I did increase his benazepril to 20 mg twice daily and his amlodipine to 10 mg daily with the thought to continue to monitor his renal function.         Subjective:     Scheduled Procedure: right shoulder washout  Surgery Date: 10/22/2019      Current Outpatient Medications   Medication Sig Dispense Refill     acetaminophen (TYLENOL) 500 MG tablet Take 1,000 mg by mouth 3 (three) times a day.              amLODIPine (NORVASC) 10 MG tablet Take 10 mg by mouth daily.       benazepril (LOTENSIN) 20 MG tablet Take 20 mg by mouth 2 (two) times a day.       bisacodyl (DULCOLAX) 10 mg suppository Insert 10 mg into the rectum daily as needed.       buPROPion (WELLBUTRIN SR) 200 MG 12 hr tablet Take 200 mg by mouth 2 (two) times a day.       carvedilol (COREG) 25 MG tablet Take 50 mg by mouth 2 (two) times a day with meals.       cefazolin sodium in 0.9 % NaCl (CEFAZOLIN IN 0.9% SOD CHLORIDE) 2 gram/100 mL Soln Infuse 2 g into a venous catheter 3 (three) times a day.       clonazePAM (KLONOPIN) 0.5 MG tablet Take 0.5 mg by mouth 3 (three) times a day as needed for anxiety.       cyanocobalamin (VITAMIN B-12) 50 mcg tablet Take 50 mcg by mouth 2 (two) times a day.       docusate sodium (COLACE) 100 MG capsule Take 100-200 mg by mouth daily.       fentaNYL (DURAGESIC) 75 mcg/hr Place 1 patch on the skin every other day.              ferrous gluconate 324 mg (37.5 mg iron) Tab Take 324 mg by mouth.       Lactobacillus rhamnosus GG (CULTURELLE) 10-15 Billion cell capsule Take 1 capsule by mouth daily.       melatonin 3 mg Tab tablet  Take 6 mg by mouth at bedtime.       oxyCODONE (ROXICODONE) 5 MG immediate release tablet Take 10 mg by mouth every 4 (four) hours as needed for pain.       multivitamin capsule Take 1 capsule by mouth daily.       pantoprazole (PROTONIX) 40 MG tablet Take 40 mg by mouth 2 (two) times a day.              polyethylene glycol (MIRALAX) 17 gram packet Take 17 g by mouth daily as needed.       senna (SENOKOT) 8.6 mg tablet Take by mouth daily. 2 tabs in am and 3 tabs @@ hs       senna (SENOKOT) 8.6 mg tablet Take 1 tablet by mouth 2 (two) times a day as needed for constipation.       simethicone (MYLICON) 40 mg/0.6 mL drops Take 40 mg by mouth 4 (four) times a day as needed.       simvastatin (ZOCOR) 10 MG tablet Take 10 mg by mouth at bedtime.       No current facility-administered medications for this visit.        Allergies   Allergen Reactions     Adhesive Tape-Silicones      Blistering of skin       Immunization History   Administered Date(s) Administered     DT (pediatric) 02/04/2004     Td,adult,historic,unspecified 02/04/2004     Varicella 01/01/1900       Patient Active Problem List   Diagnosis     Atrial fibrillation (H)     Essential hypertension     Hyperlipidemia     Obesity     Obstructive sleep apnea     S/P shoulder replacement     MSSA bacteremia     GI bleed     Acute blood loss anemia     Moderate protein-calorie malnutrition (H)     Type 2 diabetes mellitus without complication, without long-term current use of insulin (H)     Chronic pain syndrome     Acute pain     Bipolar 1 disorder (H)     Septic joint of right shoulder region (H)     HTN (hypertension)     Bipolar disorder (H)       Past Medical History:   Diagnosis Date     A-fib (H)      NEMESIO (acute kidney injury) (H)      Anemia     acute on chronic, with episode of melena, resolved s/p transfusion     Anxiety      Ascending aortic aneurysm (H)      Bicuspid aortic valve      Bipolar disorder (H)      BPPV (benign paroxysmal positional  vertigo)      Chronic narcotic use      Chronic neck pain      Chronic osteoarthritis      Degenerative joint disease      Depression      DMII (diabetes mellitus, type 2) (H)      HLD (hyperlipidemia)      HTN (hypertension)      Hypermetropia      Moderate malnutrition (H)      MSSA bacteremia      Multiple stiff joints      Neck injuries      Obesity      Presbyopia      Septic joint of right shoulder region (H)     s/p washout     Skin picking habit      Sleep apnea        Social History     Socioeconomic History     Marital status: Single     Spouse name: Not on file     Number of children: Not on file     Years of education: Not on file     Highest education level: Not on file   Occupational History     Not on file   Social Needs     Financial resource strain: Not on file     Food insecurity:     Worry: Not on file     Inability: Not on file     Transportation needs:     Medical: Not on file     Non-medical: Not on file   Tobacco Use     Smoking status: Former Smoker     Packs/day: 0.00     Start date:      Last attempt to quit:      Years since quittin.8     Smokeless tobacco: Never Used   Substance and Sexual Activity     Alcohol use: Not Currently     Drug use: Not on file     Sexual activity: Not on file   Lifestyle     Physical activity:     Days per week: Not on file     Minutes per session: Not on file     Stress: Not on file   Relationships     Social connections:     Talks on phone: Not on file     Gets together: Not on file     Attends Shinto service: Not on file     Active member of club or organization: Not on file     Attends meetings of clubs or organizations: Not on file     Relationship status: Not on file     Intimate partner violence:     Fear of current or ex partner: Not on file     Emotionally abused: Not on file     Physically abused: Not on file     Forced sexual activity: Not on file   Other Topics Concern     Not on file   Social History Narrative     Not on file        Past Surgical History:   Procedure Laterality Date     BYPASS GASTRIC TAVO-EN-Y, LIVER BIOPSY, COMBINED  08/08/2005     COLONOSCOPY  06/30/2014     CYSTOSCOPY, BLADDER NECK CUTS, COMBINED  07/18/2016     ESOPHAGOGASTRODUODENOSCOPY  06/30/2014     EXCISE MASS FINGER Right 06/14/2011     HAND SURGERY Left     excision of tendon cyst on left hand       IRRIGATION AND DEBRIDEMENT UPPER EXTREMITY Right 09/23/2019    shoulder     LASER HOLMIUM LITHOTRIPSY BLADDER  10/15/2014     LASER KTP GREEN LIGHT PHOTOSELECTIVE VAPORIZATION PROSTATE  01/23/2014     PERIPHERALLY INSERTED CENTRAL CATHETER INSERTION       RELEASE TRIGGER FINGER Right 03/31/2017     REPAIR ANEURYSM ASCENDING AORTA  10/04/2016     TOTAL SHOULDER REPLACEMENT Left 04/15/2014     TOTAL SHOULDER REPLACEMENT Right 08/26/2014     TRANSURETHRAL RESECTION OF PROSTATE  2014       History of Present Illness  Recent Health  Fever: no  Chills: no  Fatigue: no  Chest Pain: no  Cough: no  Dyspnea: no  Urinary Frequency: no  Nausea: no  Vomiting: no  Diarrhea: no  Abdominal Pain: no  Easy Bruising: no  Lower Extremity Swelling: no  Poor Exercise Tolerance: no    Most recent Health Maintenance Visit:  1 week(s) ago    Pertinent History  Prior Anesthesia: yes  Previous Anesthesia Reaction:  no  Diabetes: yes  Cardiovascular Disease: no  Pulmonary Disease: no  Renal Disease: no  GI Disease: yes, hx of gastric bypass  Sleep Apnea: yes  Thromboembolic Problems: no  Clotting Disorder: no  Bleeding Disorder: no  Transfusion Reaction: no  Impaired Immunity: no  Steroid use in the last 6 months: no  Frequent Aspirin use: no    Family history of no issues    Social history of there are no concerns regarding care after surgery    After surgery, the patient plans to recover at home with family.    Review of Systems  Review of Systems     Patient denies fever, chills, headache, lightheadedness, dizziness, rhinorrhea, cough, congestion, shortness of breath, chest pain,  palpitations, abdominal pain, n/v, diarrhea, constipation, change in appetite, dysuria, frequency, burning or pain with urination.  Other than stated in HPI all other review of systems is negative.           Objective:         Physical Exam   Vital signs: /54, heart rate 70, respiratory 18, temp 98.1.  GENERAL APPEARANCE: Well developed, well nourished, in no acute distress.  HEENT: normocephalic, atraumatic  PERRL, sclerae anicteric, conjunctivae clear and moist, EOM intact  External inspection of ears and nose showed no scars, lesions or masses.  Lips, teeth, and gums showed normal mucosa. Throat showed no erythema or edema.  NECK: Supple and symmetric. Trachea is midline, no thyromegaly, no adenopathy, and no tenderness  LUNGS: Lung sounds CTA, no adventitious sounds, respiratory effort normal.  CARD: RRR, S1, S2, without murmurs, gallops, rubs,   ABD: Soft and nontender with normal bowel sounds.   MSK: Muscle strength and tone were normal.  EXTREMITIES: No cyanosis, clubbing or edema.  NEURO: Alert and oriented x 3.  Face is symmetric.  SKIN: Inspection of the skin reveals no rashes, ulcerations or petechiae.  PSYCH: euthymic             Electronically signed by: Fallon Wall CNP

## 2021-06-02 NOTE — TELEPHONE ENCOUNTER
I have orders until 11/3/19 and would like to extend orders for SN for weekly for 2 more weeks after that. Also pt would like to keep PICC line in for 2 weeks after IV push abx is completed on 11/4/19 to see if he needs to have continued abx or needs surgery. Im not sure if you will sign those orders or not. Pt in process off getting HE ID MD. Please let me know if you approve the frequency for SN visits and if you will sign orders on PICC line. Thank you.

## 2021-06-02 NOTE — TELEPHONE ENCOUNTER
External - [place] -   Referring Provider: Demi Hardin  DX: MSSA Bacteremia, Staphylococcal arthritis of RT shoulder     Order comments: For dr astorga    Ref./rec. Were received on... 10.30 @ 11:59am (inside ID consult fax folder)

## 2021-06-02 NOTE — PROGRESS NOTES
[facility]    DATE: 10/11/2019    NAME:  Kobe Henriquez             :  1960  MRN: [id number]  CODE STATUS:  FULL CODE    VISIT TYPE: Problem Visit (anemia, hypertension)     FACILITY:  [facility] [[id number]]       CHIEF COMPLAIN/REASON FOR VISIT:    Chief Complaint   Patient presents with     Problem Visit     anemia, hypertension               HISTORY OF PRESENT ILLNESS: Kobe Henriquez is a 59 y.o. male who was admitted to [facility] -10/4 for fall, confusion, weakness. He was hypertensive and had NEMESIO in ED. Lactate 2.3 but afebrile. He was found to have MSSA bacteramia source Right total shoulder arthroplasty. He underwent I&D and removal of hardware. He also had questionable septic pulmonary emobli. HE was treated with IV cefazolin and ID was consulted for antibiotic management. He did have acute encephalopathy that resolved during stay. There was some questionable episodes of possible seizures but EEG was negative. He was restarted on prn clonazepam and wellbutrin. He did have some anemia with melena during stay but endoscopy was performed and no source of bleeding identified. He was placed on PPI and given 1uPRBC. HE did have moderate malnutrition, hypernatremia that improved by discharge. Nephrology followed during stay and he did require some hemodialysis during stay. He had thrombocytopenia that resolved by discharge. He was discharged to TCU as he would need IV antibiotic therapy and conditioning. He has PMH Of Chronic A fib ( no anticoagulation due to high risk of bleeding), HTN, Chronic pain on fentanyl, morphine previously, HTN, obesity, HLD, SAVITA, TAA s/p repair, bipolar disorder, type 2 dm managed with diet, bilateral shoulder replacements.     Today Mr. Henriquez is seen for follow up on anemia. He had hg yesterday of 7.6 but apparently a repeat was ordered for today. Hg today still pending at time of exam. Nursing is  very concerned as he did have a positive guiaic stool last evening. On exam he was first noted to be working with physical therapy and was denying dizziness while working with them. He had many questions regarding iv antibiotic therapy for discharge and about [facility] infusion pharmacy. Discussed will come back after completing therapy but that he needed to discuss concerns regarding atnibiotic regimen for home with . He says he did disucss yesterday but had more questions. Later returned for exam and he states he had therapy and then lunch and now is very exhausted and wants to take a nap. He says he is not having dizziness or obvious blood in stools. He doesn't think they appear dark but not exactly sure because it is hard for him to see. He then proceeds to go off on tangents speaking about his parents (who are also patients in facility) and needing [employer] mobility forms completed. We discussed this should be done by regular provider in facility as do not know his parents. He says he needs new forms too because his sister took them home with her. Discussed he can speak to  about this. Redirected back to his conditions and he feels his pain is controlled and bowels are moving ok. He says they can be constipated at times but uses the stool softeners when needed. He goes off on another tangent about not being able to tolerate certain iron supplements but wants to make sure he is on feosol now. We discussed increasing this if his hg is low again today. Discussed if dropped further from yesterday will recheck on Monday. He denies shortness of breath or cough worse than baseline for him. He feels his appetite is ok and no recent nausea, stomach upset, or abdominal pain. We discussed his vitals are currently stable and not symptomatic with anemia. Appears this is not new as had this issue during stay and that if he becomes symptomatic or vitals become unstable would warrant transfer to  ED. He verbalized understanding and appreciated visit. He had other concerns regarding mediations and deferred these to be discussed with his attending provider. Per therapy and  he does not have a discharge date currently but will discuss his questions regarding discharge. Nursing did change dressing to right shoulder while visiting with patient and does have some sanguinous to serosanguinous drainage but no signs of infection at this time.     REVIEW OF SYSTEMS:  PROBLEMS AND REVIEW OF SYSTEMS:   Today on ROS:   Currently, no fever, chills, or rigors. Decreased vision, adequate hearing. Denies any chest pain, headaches, palpitations, lightheadedness, dizziness. Appetite is good. Denies any GERD symptoms. Denies any difficulty with swallowing, nausea, or vomiting.   No insomnia. No active bleeding. No rash. Positive for weakness, right shoulder incision, pain controlled, constipation at times, itching at times, picc line, shortness of breath at times with exertion at baseline, intermittent dry cough, urinary incontinence intermittent      Allergies   Allergen Reactions     Adhesive Tape-Silicones      Blistering of skin     Current Outpatient Medications   Medication Sig     acetaminophen (TYLENOL) 500 MG tablet Take 500 mg by mouth 3 (three) times a day.     amLODIPine-benazepril (LOTREL) 10-20 mg per capsule Take 1 capsule by mouth daily.     bisacodyl (DULCOLAX) 10 mg suppository Insert 10 mg into the rectum daily as needed.     buPROPion (WELLBUTRIN SR) 200 MG 12 hr tablet Take 200 mg by mouth 2 (two) times a day.     carvedilol (COREG) 25 MG tablet Take 50 mg by mouth 2 (two) times a day with meals.     cefazolin sodium in 0.9 % NaCl (CEFAZOLIN IN 0.9% SOD CHLORIDE) 2 gram/100 mL Soln Infuse 2 g into a venous catheter 3 (three) times a day.     clonazePAM (KLONOPIN) 0.5 MG tablet Take 0.5 mg by mouth 3 (three) times a day as needed for anxiety.     cyanocobalamin (VITAMIN B-12) 50 mcg tablet  Take 50 mcg by mouth 2 (two) times a day.     docusate sodium (COLACE) 100 MG capsule Take 100-200 mg by mouth daily.     fentaNYL (DURAGESIC) 75 mcg/hr Place 1 patch on the skin every third day.     ferrous sulfate (FEOSOL ORAL) Take 300 mg by mouth 3 (three) times a day.            Lactobacillus rhamnosus GG (CULTURELLE) 10-15 Billion cell capsule Take 1 capsule by mouth daily.     melatonin 3 mg Tab tablet Take 6 mg by mouth at bedtime.     multivitamin capsule Take 1 capsule by mouth daily.     oxyCODONE (ROXICODONE) 5 MG immediate release tablet Take 10 mg by mouth every 4 (four) hours as needed for pain.     pantoprazole (PROTONIX) 40 MG tablet Take 40 mg by mouth 2 (two) times a day.            polyethylene glycol (MIRALAX) 17 gram packet Take 17 g by mouth daily as needed.     senna (SENOKOT) 8.6 mg tablet Take by mouth daily. 2 tabs in am and 3 tabs @@ hs     senna (SENOKOT) 8.6 mg tablet Take 1 tablet by mouth 2 (two) times a day as needed for constipation.     simethicone (MYLICON) 40 mg/0.6 mL drops Take 40 mg by mouth 4 (four) times a day as needed.     simvastatin (ZOCOR) 10 MG tablet Take 10 mg by mouth at bedtime.     UNABLE TO FIND Take 300 mg by mouth 2 (two) times a day. Med Name: FeoSol 300mg two times a day ok to use med from home     Past Medical History:    Past Medical History:   Diagnosis Date     A-fib (H)      NEMESIO (acute kidney injury) (H)      Anemia     acute on chronic, with episode of melena, resolved s/p transfusion     Anxiety      Ascending aortic aneurysm (H)      Bicuspid aortic valve      Bipolar disorder (H)      BPPV (benign paroxysmal positional vertigo)      Chronic narcotic use      Chronic neck pain      Chronic osteoarthritis      Degenerative joint disease      Depression      DMII (diabetes mellitus, type 2) (H)      HLD (hyperlipidemia)      HTN (hypertension)      Hypermetropia      Moderate malnutrition (H)      MSSA bacteremia      Multiple stiff joints      Neck  injuries      Obesity      Presbyopia      Septic joint of right shoulder region (H)     s/p washout     Skin picking habit      Sleep apnea            PHYSICAL EXAMINATION  Vitals:    10/11/19 0700   BP: 135/66   Pulse: 69   Resp: 16   Temp: 97.9  F (36.6  C)   SpO2: 97%   Weight: 203 lb (92.1 kg)           GENERAL: Awake, Alert, obese, oriented x3, not in any form of acute distress, answers questions appropriately, follows simple commands, conversant  HEENT: Head is normocephalic with normal hair distribution. No evidence of trauma. Ears: No acute purulent discharge. Eyes: Conjunctivae pink with no scleral jaundice. Nose: Normal mucosa and septum. NECK: Supple with no cervical or supraclavicular lymphadenopathy. Trachea is midline. glasses  CHEST: No tenderness or deformity, no crepitus  LUNG: dim to auscultation with good chest expansion. There are no crackles or wheezes, normal AP diameter. Shortness of breath with exertion at times  BACK: No kyphosis of the thoracic spine. Symmetric, no curvature, ROM normal, no CVA tenderness, no spinal tenderness   CVS: irregularly irregular rhythm, 2/6 systolic murmur, 2+ pulses symmetric in all extremities.  ABDOMEN: obese and soft, nontender to palpation, non distended, no masses, no organomegaly, good bowel sounds, no rebound or guarding, no peritoneal signs.   EXTREMITIES: RUE incision c/d/i, some sanguineous to serosanguinous drainage, mild erythema but no warmth, 2+ edema RUE, limited ROM RUE, trace edema ble, picc line lue c/d/i  SKIN: Warm and dry, scattered excorations  NEUROLOGICAL: The patient is oriented to person, place and time. Weakness, muscle wasting bilateral legs            LABS:   Recent Results (from the past 168 hour(s))   ALT (SGPT)   Result Value Ref Range    ALT 9 0 - 45 U/L   AST (SGOT)   Result Value Ref Range    AST 27 0 - 40 U/L   Bilirubin, Total   Result Value Ref Range    Bilirubin, Total 0.6 0.0 - 1.0 mg/dL   Blood Urea Nitrogen (BUN)    Result Value Ref Range    BUN 12 8 - 22 mg/dL   C-Reactive Protein (CRP)   Result Value Ref Range    CRP 8.0 (H) 0.0 - 0.8 mg/dL   Alkaline Phosphatase   Result Value Ref Range    Alkaline Phosphatase 111 45 - 120 U/L   Creatinine   Result Value Ref Range    Creatinine 1.16 0.70 - 1.30 mg/dL    GFR MDRD Af Amer >60 >60 mL/min/1.73m2    GFR MDRD Non Af Amer >60 >60 mL/min/1.73m2   Bilirubin, Direct   Result Value Ref Range    Bilirubin, Direct 0.3 <=0.5 mg/dL   HM1 (CBC with Diff)   Result Value Ref Range    WBC 6.1 4.0 - 11.0 thou/uL    RBC 2.43 (L) 4.40 - 6.20 mill/uL    Hemoglobin 7.3 (L) 14.0 - 18.0 g/dL    Hematocrit 22.2 (L) 40.0 - 54.0 %    MCV 91 80 - 100 fL    MCH 30.0 27.0 - 34.0 pg    MCHC 32.9 32.0 - 36.0 g/dL    RDW 13.2 11.0 - 14.5 %    Platelets 288 140 - 440 thou/uL    MPV 9.6 8.5 - 12.5 fL    Neutrophils % 73 (H) 50 - 70 %    Lymphocytes % 15 (L) 20 - 40 %    Monocytes % 10 2 - 10 %    Eosinophils % 2 0 - 6 %    Basophils % 0 0 - 2 %    Neutrophils Absolute 4.4 2.0 - 7.7 thou/uL    Lymphocytes Absolute 0.9 0.8 - 4.4 thou/uL    Monocytes Absolute 0.6 0.0 - 0.9 thou/uL    Eosinophils Absolute 0.1 0.0 - 0.4 thou/uL    Basophils Absolute 0.0 0.0 - 0.2 thou/uL   Ferritin   Result Value Ref Range    Ferritin 277 27 - 300 ng/mL   Iron and Transferrin Iron Binding Capacity   Result Value Ref Range    Iron 31 (L) 42 - 175 ug/dL    Transferrin 139 (L) 212 - 360 mg/dL    Transferrin Saturation, Calculated 18 (L) 20 - 50 %    Transferrin IBC, Calculated 174 (L) 313 - 563 ug/dL   HM1 (CBC with Diff)   Result Value Ref Range    WBC 5.6 4.0 - 11.0 thou/uL    RBC 2.68 (L) 4.40 - 6.20 mill/uL    Hemoglobin 8.1 (L) 14.0 - 18.0 g/dL    Hematocrit 24.7 (L) 40.0 - 54.0 %    MCV 92 80 - 100 fL    MCH 30.2 27.0 - 34.0 pg    MCHC 32.8 32.0 - 36.0 g/dL    RDW 13.2 11.0 - 14.5 %    Platelets 249 140 - 440 thou/uL    MPV 9.2 8.5 - 12.5 fL    Neutrophils % 65 50 - 70 %    Lymphocytes % 22 20 - 40 %    Monocytes % 11 (H) 2 -  10 %    Eosinophils % 2 0 - 6 %    Basophils % 0 0 - 2 %    Neutrophils Absolute 3.6 2.0 - 7.7 thou/uL    Lymphocytes Absolute 1.2 0.8 - 4.4 thou/uL    Monocytes Absolute 0.6 0.0 - 0.9 thou/uL    Eosinophils Absolute 0.1 0.0 - 0.4 thou/uL    Basophils Absolute 0.0 0.0 - 0.2 thou/uL   Hemoglobin   Result Value Ref Range    Hemoglobin 7.6 (L) 14.0 - 18.0 g/dL   HM2(CBC w/o Differential)   Result Value Ref Range    WBC 5.2 4.0 - 11.0 thou/uL    RBC 2.38 (L) 4.40 - 6.20 mill/uL    Hemoglobin 7.0 (L) 14.0 - 18.0 g/dL    Hematocrit 21.9 (L) 40.0 - 54.0 %    MCV 92 80 - 100 fL    MCH 29.4 27.0 - 34.0 pg    MCHC 32.0 32.0 - 36.0 g/dL    RDW 13.2 11.0 - 14.5 %    Platelets 219 140 - 440 thou/uL    MPV 9.0 8.5 - 12.5 fL     No results found for this or any previous visit.      Lab Results   Component Value Date    WBC 5.2 10/11/2019    HGB 7.0 (L) 10/11/2019    HCT 21.9 (L) 10/11/2019    MCV 92 10/11/2019     10/11/2019       No results found for: [id number]  No results found for: HGBA1C  No results found for: INR, PROTIME  No results found for: [id number]  No results found for: TSH        ASSESSMENT/PLAN:    1. MSSA right shoulder total arthroplasty, s/p I&D with extrication of hardware: Incision c/d/i, some drainage and mild erythema but well approximated, healing, no signs of infection. Pain controlled on current regimen. On IV cefazolin three times a day x 6 weeks. F/u with ortho surgery next week, f/u with ID. On culturelle. No fevers, chills recently. picc line c/d/i.   2. GI bleeding, Acute blood loss anemia: Hg trending down 8-7.6 recently. 1uPRBC inpatient. EGD in hospital was negative for source of bleeding. On protonix daily will increase to two times a day. One positive guaiac stool, two more pending. Hg today resulted at 7.0. will recheck on 10/14. Instructions given to staff to send to ED if becomes hypotensive, tachycardic, unstable. Will also increase feosol supplement to three times a day. Monitor  for hematemesis, further melena. No abdominal pain, nausea, stomach upset at this time. Transfuse prn <7.   3. Atrial fibrillation: Rate controlled 60s. No anticoagulation at this time due to bleeding risk. On coreg.   4. Bipolar 1 disorder: Controlled on wellbutrin, klonopin. Feels mood stable recently.   5. Type 2 DM: Controlled with diet.   6. Acute on chronic pain: adequately controlled today on fentanyl patch, oxycodone. Fentanyl patch and oxycodone recently increased. Tylenol three times a day as well.   7. NEMESIO: HD treatments inpatient. Cr 1.16 GFR>60 on 10/7. Stable.   8. HLD: stable on current regimen.   9. Constipation, opioid induced: On colace, senna. miralax prn.  Constipated at times but using prn stool softeners.   10. HTN: SBP 130s. Stable on current regmien.   11. SAVITA, OHS, obesity: counseled on healthier eating habits, importance of physical activity. Encouraged to use IS. However also muscle wasting and malnutrition recently with prolonged illness.   12. Malnutrition: weights 203lbs, encourage high protein intake. Monitor balance between malnutrition and obesity management. Muscle wasting in lower extremities.     Electronically signed by: Pat Villegas NP    Total floor/unit time spent 35 with 25 time spent on counseling and coordination of care. Counseling was done regarding anemia management, lab results, treatment options, monitoring of melena and anemia, further lab monitoring, and when would need transfer to ed versus outpatient transfusion. Counseling regarding antibiotic treatments and follow up with ID and surgeon. Coordinated care with nursing for management of anemia, lab monitoring, when to send to ED or notify provider.

## 2021-06-02 NOTE — PROGRESS NOTES
Code Status:  FULL CODE  Visit Type: Problem Visit (lab follow up)     Facility:  [facility] [[id number]]      Facility Type: SNF (Skilled Nursing Facility, TCU)    History of Present Illness:   Hospital Admission Date: 9/20/2019 Hospital Discharge Date: 10/4/2019      Kobe Henriquze is a 59 y.o. male who has a past medical history for hypertension, HLD, chronic neck pain, TAA repair, bipolar disorder, atrial fibrillation not on anticoagulation, sleep apnea, type 2 diabetes diet managed, with bilateral shoulder replacements in the past.  He was admitted to [facility] for ongoing weakness with a mechanical fall over the course of 2 weeks.  He was found to have MSSA bacteremia and a right septic shoulder.  He had his right shoulder washed out and was put on long-term IV antibiotics.  His hospitalization was complicated by NEMESIO in which he needed 2 rounds of dialysis with the last one being 9/24.  He also had acute toxic encephalopathy so there was a question of septic emboli however this did resolve.  EEG showed no signs of seizure.  He also had low hemoglobin in which he underwent an endoscopy and did not find any lesions or lacerations.  He was given 1 unit of packed red blood cells and his anemia improved.    Today: Kobe was seen regarding lab follow for hemoglobin which has improved 7.3 --> 8.1 in 2 days as well as ferritin lab; ferritin is well within limits however tsat at 18% suggests a mild degree of iron deficiency. He is already taking his iron tablet Ferso (Ferrous sulfate dried, 300mg tab) twice daily. He says this specific tablet is better for absorption given his past mark en y procedure. He is hemodynamically stable and denying dizziness or shortness of breath. He reports a history of anemia and as had multiple blood transfusions in the past.   Has had melena in stools and 1 positive guaiac per nursing. This should be worked up by primary provider in the building as he his  currently stable.     He also wants to discuss his antihypertensives; says that Dr. Miller restarted his ace inhibitor which he was worried about. Bps are monitored and have been generally <150/90.   He is also in need of clonazepam order which I will complete today.   We spent quite some time discussing medications, his past history as well as his therapy. He seemed to benefit from simply venting and being heard.       Past Medical History:   Diagnosis Date     A-fib (H)      NEMESIO (acute kidney injury) (H)      Anemia     acute on chronic, with episode of melena, resolved s/p transfusion     Anxiety      Ascending aortic aneurysm (H)      Bicuspid aortic valve      Bipolar disorder (H)      BPPV (benign paroxysmal positional vertigo)      Chronic narcotic use      Chronic neck pain      Chronic osteoarthritis      Degenerative joint disease      Depression      DMII (diabetes mellitus, type 2) (H)      HLD (hyperlipidemia)      HTN (hypertension)      Hypermetropia      Moderate malnutrition (H)      MSSA bacteremia      Multiple stiff joints      Neck injuries      Obesity      Presbyopia      Septic joint of right shoulder region (H)     s/p washout     Skin picking habit      Sleep apnea      Past Surgical History:   Procedure Laterality Date     BYPASS GASTRIC TAVO-EN-Y, LIVER BIOPSY, COMBINED  08/08/2005     COLONOSCOPY  06/30/2014     CYSTOSCOPY, BLADDER NECK CUTS, COMBINED  07/18/2016     ESOPHAGOGASTRODUODENOSCOPY  06/30/2014     EXCISE MASS FINGER Right 06/14/2011     HAND SURGERY Left     excision of tendon cyst on left hand       IRRIGATION AND DEBRIDEMENT UPPER EXTREMITY Right 09/23/2019    shoulder     LASER HOLMIUM LITHOTRIPSY BLADDER  10/15/2014     LASER KTP GREEN LIGHT PHOTOSELECTIVE VAPORIZATION PROSTATE  01/23/2014     PERIPHERALLY INSERTED CENTRAL CATHETER INSERTION       RELEASE TRIGGER FINGER Right 03/31/2017     REPAIR ANEURYSM ASCENDING AORTA  10/04/2016     TOTAL SHOULDER REPLACEMENT Left  04/15/2014     TOTAL SHOULDER REPLACEMENT Right 2014     TRANSURETHRAL RESECTION OF PROSTATE  2014     Family History   Problem Relation Age of Onset     Cataracts Mother      Macular degeneration Mother      Anxiety disorder Mother      Hyperlipidemia Mother      Hypertension Mother      Other Mother         low back problems     Osteoporosis Mother      Arrhythmia Father      Cardiovascular Father      Coronary artery disease Father      Depression Father      Hyperlipidemia Father      Hypertension Father      Other Father         low back problems, spine problems     Nephrolithiasis Father      Obesity Father      Sleep apnea Father      Anxiety disorder Sister      Hyperlipidemia Sister      Hypertension Sister      Obesity Sister      Other Sister         low back problems, spine problems     Osteoporosis Sister      Anxiety disorder Sister      Depression Sister      Hyperlipidemia Sister      Hypertension Sister      Other Sister         low back problems     Obesity Sister      Osteoporosis Sister      Social History     Socioeconomic History     Marital status: Single     Spouse name: Not on file     Number of children: Not on file     Years of education: Not on file     Highest education level: Not on file   Occupational History     Not on file   Social Needs     Financial resource strain: Not on file     Food insecurity:     Worry: Not on file     Inability: Not on file     Transportation needs:     Medical: Not on file     Non-medical: Not on file   Tobacco Use     Smoking status: Former Smoker     Packs/day: 0.00     Start date:      Last attempt to quit:      Years since quittin.8     Smokeless tobacco: Never Used   Substance and Sexual Activity     Alcohol use: Not Currently     Drug use: Not on file     Sexual activity: Not on file   Lifestyle     Physical activity:     Days per week: Not on file     Minutes per session: Not on file     Stress: Not on file   Relationships      Social connections:     Talks on phone: Not on file     Gets together: Not on file     Attends [employer] service: Not on file     Active member of club or organization: Not on file     Attends meetings of clubs or organizations: Not on file     Relationship status: Not on file     Intimate partner violence:     Fear of current or ex partner: Not on file     Emotionally abused: Not on file     Physically abused: Not on file     Forced sexual activity: Not on file   Other Topics Concern     Not on file   Social History Narrative     Not on file       Current Outpatient Medications   Medication Sig Dispense Refill     acetaminophen (TYLENOL) 500 MG tablet Take 500 mg by mouth 3 (three) times a day.       amLODIPine-benazepril (LOTREL) 10-20 mg per capsule Take 1 capsule by mouth daily.       bisacodyl (DULCOLAX) 10 mg suppository Insert 10 mg into the rectum daily as needed.       buPROPion (WELLBUTRIN SR) 200 MG 12 hr tablet Take 200 mg by mouth 2 (two) times a day.       carvedilol (COREG) 25 MG tablet Take 50 mg by mouth 2 (two) times a day with meals.       cefazolin sodium in 0.9 % NaCl (CEFAZOLIN IN 0.9% SOD CHLORIDE) 2 gram/100 mL Soln Infuse 2 g into a venous catheter 3 (three) times a day.       clonazePAM (KLONOPIN) 0.5 MG tablet Take 0.5 mg by mouth 3 (three) times a day as needed for anxiety.       cyanocobalamin (VITAMIN B-12) 50 mcg tablet Take 50 mcg by mouth 2 (two) times a day.       docusate sodium (COLACE) 100 MG capsule Take 100-200 mg by mouth daily.       fentaNYL (DURAGESIC) 75 mcg/hr Place 1 patch on the skin every third day.       ferrous sulfate (FEOSOL ORAL) Take 300 mg by mouth 3 (three) times a day.              Lactobacillus rhamnosus GG (CULTURELLE) 10-15 Billion cell capsule Take 1 capsule by mouth daily.       melatonin 3 mg Tab tablet Take 6 mg by mouth at bedtime.       multivitamin capsule Take 1 capsule by mouth daily.       oxyCODONE (ROXICODONE) 5 MG immediate release tablet Take  10 mg by mouth every 4 (four) hours as needed for pain.       pantoprazole (PROTONIX) 40 MG tablet Take 40 mg by mouth 2 (two) times a day.              polyethylene glycol (MIRALAX) 17 gram packet Take 17 g by mouth daily as needed.       senna (SENOKOT) 8.6 mg tablet Take by mouth daily. 2 tabs in am and 3 tabs @@ hs       senna (SENOKOT) 8.6 mg tablet Take 1 tablet by mouth 2 (two) times a day as needed for constipation.       simethicone (MYLICON) 40 mg/0.6 mL drops Take 40 mg by mouth 4 (four) times a day as needed.       simvastatin (ZOCOR) 10 MG tablet Take 10 mg by mouth at bedtime.       UNABLE TO FIND Take 300 mg by mouth 2 (two) times a day. Med Name: FeoSol 300mg two times a day ok to use med from home       No current facility-administered medications for this visit.      Allergies   Allergen Reactions     Adhesive Tape-Silicones      Blistering of skin     Immunization History   Administered Date(s) Administered     DT (pediatric) 02/04/2004     Td,adult,historic,unspecified 02/04/2004     Varicella 01/01/1900         Review of Systems   Constitutional: Negative.    HENT: Negative.    Eyes: Negative.    Respiratory: Negative.    Cardiovascular: Negative.    Gastrointestinal: Positive for constipation.   Genitourinary: Negative.    Musculoskeletal: Positive for arthralgias.   Psychiatric/Behavioral: Negative.      Does have intermittent urinary incontinence at baseline.  Other than stated in HPI all other review of systems is negative.       Physical Exam     /72   Pulse 84   Temp 98  F (36.7  C)   Wt 204 lb (92.5 kg)   SpO2 98%     GENERAL APPEARANCE: Male, with leg muscle wasting, in no acute distress.  HEENT: normocephalic, atraumatic  PERRL, sclerae anicteric, conjunctivae clear and moist, EOM intact  LUNGS: Lung sounds CTA, no adventitious sounds, respiratory effort normal.  CARD: RRR, S1, S2, 2/6 colic murmur, no gallops, rubs, no JVD, peripheral pulses 2+ and symmetric  ABD: Soft and  nondistended, nontender with normal bowel sounds.   MSK: Muscle strength and tone were normal.  EXTREMITIES: No cyanosis, clubbing or edema.  Shoulders have decreased range of motion with significant pain  NEURO: Alert and oriented x 3.  Face is symmetric.  SKIN: Right forearm with open areas and plaque-like scabbing, no signs and symptoms of infection.  Right shoulder incision is well approximated with sutures with minimal serous drainage and no surrounding erythema.  PSYCH: euthymic      Labs:   Recent Results (from the past 240 hour(s))   ALT (SGPT)   Result Value Ref Range    ALT 9 0 - 45 U/L   AST (SGOT)   Result Value Ref Range    AST 27 0 - 40 U/L   Bilirubin, Total   Result Value Ref Range    Bilirubin, Total 0.6 0.0 - 1.0 mg/dL   Blood Urea Nitrogen (BUN)   Result Value Ref Range    BUN 12 8 - 22 mg/dL   C-Reactive Protein (CRP)   Result Value Ref Range    CRP 8.0 (H) 0.0 - 0.8 mg/dL   Alkaline Phosphatase   Result Value Ref Range    Alkaline Phosphatase 111 45 - 120 U/L   Creatinine   Result Value Ref Range    Creatinine 1.16 0.70 - 1.30 mg/dL    GFR MDRD Af Amer >60 >60 mL/min/1.73m2    GFR MDRD Non Af Amer >60 >60 mL/min/1.73m2   Bilirubin, Direct   Result Value Ref Range    Bilirubin, Direct 0.3 <=0.5 mg/dL   HM1 (CBC with Diff)   Result Value Ref Range    WBC 6.1 4.0 - 11.0 thou/uL    RBC 2.43 (L) 4.40 - 6.20 mill/uL    Hemoglobin 7.3 (L) 14.0 - 18.0 g/dL    Hematocrit 22.2 (L) 40.0 - 54.0 %    MCV 91 80 - 100 fL    MCH 30.0 27.0 - 34.0 pg    MCHC 32.9 32.0 - 36.0 g/dL    RDW 13.2 11.0 - 14.5 %    Platelets 288 140 - 440 thou/uL    MPV 9.6 8.5 - 12.5 fL    Neutrophils % 73 (H) 50 - 70 %    Lymphocytes % 15 (L) 20 - 40 %    Monocytes % 10 2 - 10 %    Eosinophils % 2 0 - 6 %    Basophils % 0 0 - 2 %    Neutrophils Absolute 4.4 2.0 - 7.7 thou/uL    Lymphocytes Absolute 0.9 0.8 - 4.4 thou/uL    Monocytes Absolute 0.6 0.0 - 0.9 thou/uL    Eosinophils Absolute 0.1 0.0 - 0.4 thou/uL    Basophils Absolute 0.0  0.0 - 0.2 thou/uL   Ferritin   Result Value Ref Range    Ferritin 277 27 - 300 ng/mL   Iron and Transferrin Iron Binding Capacity   Result Value Ref Range    Iron 31 (L) 42 - 175 ug/dL    Transferrin 139 (L) 212 - 360 mg/dL    Transferrin Saturation, Calculated 18 (L) 20 - 50 %    Transferrin IBC, Calculated 174 (L) 313 - 563 ug/dL   HM1 (CBC with Diff)   Result Value Ref Range    WBC 5.6 4.0 - 11.0 thou/uL    RBC 2.68 (L) 4.40 - 6.20 mill/uL    Hemoglobin 8.1 (L) 14.0 - 18.0 g/dL    Hematocrit 24.7 (L) 40.0 - 54.0 %    MCV 92 80 - 100 fL    MCH 30.2 27.0 - 34.0 pg    MCHC 32.8 32.0 - 36.0 g/dL    RDW 13.2 11.0 - 14.5 %    Platelets 249 140 - 440 thou/uL    MPV 9.2 8.5 - 12.5 fL    Neutrophils % 65 50 - 70 %    Lymphocytes % 22 20 - 40 %    Monocytes % 11 (H) 2 - 10 %    Eosinophils % 2 0 - 6 %    Basophils % 0 0 - 2 %    Neutrophils Absolute 3.6 2.0 - 7.7 thou/uL    Lymphocytes Absolute 1.2 0.8 - 4.4 thou/uL    Monocytes Absolute 0.6 0.0 - 0.9 thou/uL    Eosinophils Absolute 0.1 0.0 - 0.4 thou/uL    Basophils Absolute 0.0 0.0 - 0.2 thou/uL   Hemoglobin   Result Value Ref Range    Hemoglobin 7.6 (L) 14.0 - 18.0 g/dL   HM2(CBC w/o Differential)   Result Value Ref Range    WBC 5.2 4.0 - 11.0 thou/uL    RBC 2.38 (L) 4.40 - 6.20 mill/uL    Hemoglobin 7.0 (L) 14.0 - 18.0 g/dL    Hematocrit 21.9 (L) 40.0 - 54.0 %    MCV 92 80 - 100 fL    MCH 29.4 27.0 - 34.0 pg    MCHC 32.0 32.0 - 36.0 g/dL    RDW 13.2 11.0 - 14.5 %    Platelets 219 140 - 440 thou/uL    MPV 9.0 8.5 - 12.5 fL         Assessment:  1. Acute blood loss anemia     2. Pyogenic arthritis of right shoulder region, due to unspecified organism (H)     3. Hypertension, unspecified type     4. Bipolar affective disorder, remission status unspecified (H)         Plan:    Anemia: Follow up on hemoglobin which as been followed closely while he was hospitalized. He continues on ferrous sulfate dried 300mg tablet. He reports taking this due to difficulty with absorption  "secondary to rouy en y surgery in the past.   Recommend follow up hgb on Monday. Improved to 8.1 from 7.3 2 days ago.   FOllow up regarding positive guaic. Has history of GI bleed so need to consider this as well if hgb continues to trend down.     Hypertension: Per patient, his ace inhibitor was recently restarted. Bps have been generally stable.     Bipolar disorder: Stable with some underlying anxiety. I will reorder Klonopin 0.5 mg 3 times daily as needed.    Constipation: I clarified timing of senna to be given at 4am per his request. He reports that his current medications are working \"like a well oiled machine\".     35 minutes of floor time with 20 minutes face to face spent counseling and coordinating with nursing staff and patient regarding anemia, constipation, hypertension, medication clarification, lab monitoring,     Electronically signed by: Shawn Morrow CNP     "

## 2021-06-03 VITALS
RESPIRATION RATE: 16 BRPM | SYSTOLIC BLOOD PRESSURE: 135 MMHG | DIASTOLIC BLOOD PRESSURE: 66 MMHG | BODY MASS INDEX: 28.72 KG/M2 | HEART RATE: 69 BPM | WEIGHT: 203 LBS | OXYGEN SATURATION: 97 % | TEMPERATURE: 97.9 F

## 2021-06-03 VITALS
DIASTOLIC BLOOD PRESSURE: 70 MMHG | BODY MASS INDEX: 29 KG/M2 | WEIGHT: 205 LBS | HEART RATE: 72 BPM | TEMPERATURE: 97.9 F | SYSTOLIC BLOOD PRESSURE: 118 MMHG

## 2021-06-03 VITALS
BODY MASS INDEX: 28.86 KG/M2 | WEIGHT: 204 LBS | DIASTOLIC BLOOD PRESSURE: 72 MMHG | SYSTOLIC BLOOD PRESSURE: 148 MMHG | OXYGEN SATURATION: 98 % | TEMPERATURE: 98 F | HEART RATE: 84 BPM

## 2021-06-03 NOTE — TELEPHONE ENCOUNTER
Spoke w pt and learned he had been admitted to [facility].    Surgery is planned.    Paco Lal MD

## 2021-06-03 NOTE — TELEPHONE ENCOUNTER
Lukasz    Date: 12/9/2019 Status: Can   Time: 9:20 AM Length: 20   Visit Type: OFFICE VISIT [[id number]] Copay: $30.00   Provider: Pcao Lal MD Department: INF INFECTIOUS DISEASE   Referring Provider: SHE BLANCHARD CSN: [id number]   Notes: follow up 4 weeks   Made On:  Canceled: 11/11/2019 11:55 AM  11/25/2019 9:13 AM By:  By: JOEY ANDREWS STEPHANIE Cancel Rsn: Patient Initiated (will be getting treated somewhere else, @ [facility] for easier transportation)

## 2021-06-03 NOTE — PROGRESS NOTES
Assessment:      Impression: Recent episode of MSSA bacteremia and infected right total shoulder arthroplasty.  The shoulder arthroplasty was removed and a patient finished antibiotics 2 days ago.       Anemia, multifactorial, likely related to combination of iron deficiency, chronic disease related to infection, possibly antimicrobial chemotherapy administration.    I have added Cipro to his list of drug allergies as a proxy for fluoroquinolones which are relatively contraindicated in patients who have a history of aortic aneurysms.    Plan:     Orders Placed This Encounter   Procedures     C-reactive protein     Iron        As there are plans to replace the arthroplasty in the right shoulder, I would recommend no antibiotics at this time but following his hemogram and CRP weekly until his planned surgery in early December.     Subjective:      This is an initial Infectious Disease visit for Kobe Henriquez, who is a 59 y.o.  referred for evaluation of MSSA bacteremia and right total shoulder arthroplasty infection.     Patient is a pleasant 59-year-old gentleman with complex past medical history.    Briefly, patient was admitted to [facility] in September of this year with MSSA arthroplasty.  The arthroplasty was removed and antibiotic spacer was placed.    Patient recently completed 6 weeks of IV cefazolin under the direction of [facility] infectious disease clinic.  Patient now desires transfer of care to have [facility] infectious disease in anticipation of upcoming care at [facility] [place].    Patient complains of pain in his left shoulder since the episode of bacteremia, and thinks his left shoulder is infected, but he does have fairly good range of motion.  He has not had any fluid aspirated from the left shoulder.    Patient also has a history of chronic neck pain.    He has a past history of ascending aortic aneurysm repair in 2016.    He has a history of C. difficile  colitis in the more distant past.  Is a history of cardiac bypass surgery.      He gets his primary orthopedic care at [facility] with Dr. Aceves.  He is transferring his primary care to [facility], Dr. Hardin.    He cares for his aging parents and is deferred some of his health care so that he is able to take care of his parents.    The following portions of the patient's history were reviewed and updated as appropriate: allergies, current medications, past family history, past medical history, past social history, past surgical history and problem list.      Review of Systems  Performed and all negative except as mentioned above.      Objective:      /70 (Patient Site: Left Arm, Patient Position: Sitting, Cuff Size: Adult Regular) Comment (Patient Site): forearm  Pulse 72   Temp 97.9  F (36.6  C)   Wt 205 lb (93 kg)   General:   alert, appears stated age and cooperative   Oropharynx:  lips, mucosa, and tongue normal; teeth and gums normal    Eyes:   Extraocular muscles intact, no icterus.    Ears:   Deferred   Neck:  No lymphadenopathy appreciated   Thyroid:   Deferred   Lung:  clear to auscultation bilaterally   Heart:   regular rate and rhythm, S1, S2 normal, no murmur, click, rub or gallop   Abdomen:  soft, non-tender; bowel sounds normal; no masses,  no organomegaly   Extremities:  extremities normal, atraumatic, no cyanosis or edema and Surgical scars noted on the right shoulder which is clean dry and intact.  Very limited range of motion of the right shoulder, fairly good range of motion of the left shoulder.   Skin:  warm and dry, no hyperpigmentation, vitiligo, or suspicious lesions   CVA:   Deferred   Genitourinary:  defer exam   Neurological:   Grossly normal   Psychiatric:   normal mood, behavior, speech, dress, and thought processes         Paco Lal MD

## 2021-06-06 NOTE — PROGRESS NOTES
ADDENDUM - HOUSE OFFICER PAGE NOTE 2/15/2020  13:30 PM    Refer to my previous note regarding what has been done thus far today.    Informed by the lab of critical potassium 6.2.    Per chart review, creatinine has been increasing for the past 30 days: 1.01 --> 1.05 --> 1.40 (yesterday) --> 1.69 (today)    Called the patient at 1222 pm at [phone] and informed him of these results. He informed me that he STOPPED taking his lorsatan but is still on amLODIPine-benazepril (LOTREL) 5-20 MG capsule and sprinolactone 25 mg.    He admits to feeling weak. Denies chest pain, shortness of breath or palpitations.    Assessment:  Kobe Buchanan is a 60 year-old male with complex PMH including HF with reduced ejection fraction presenting to clinic yesterday with Hyperkalemia. BMP remarkable for NEMESIO: with worsening creatinine and potassium. EKG done yesterday in clinic unremarkable. Despite that, patient reports over the phone he does feel week; no palpitations or chest pain currently.    Plan:  -Strongly recommended that the patient go to the nearest emergency department to be assessed with repeat EKG (to r/o arrhthymias)  and management with IVF +/- insulin, D50, kayexalate (for the NEMESIO).  - Patient verbalized understanding. Her prefers to go to the [facility] since that facility has his records.  - He promises he will go to the ED today  - Encouraged him to call us if he needs further assistance.      Analy Metzger MD, MPH (PGY 2)  [facility] Family Medicine Resident  Pager: [phone]  2/15/2020

## 2021-06-06 NOTE — PROGRESS NOTES
HOUSE OFFICER PAGE NOTE 2/15/2020  10:30 AM    Situation: House Officer was paged by the [facility] at 1004 hrs about Kobe Henriquez,  1960, MRN [id number] regarding the following significant event (s):     -Patient instructed to come to the lab for repeat potassium level.  -Lab says NO order has been placed  -Patient waiting in the Lab    Background: Briefly per chart review, Kobe Henriquez 60 y.o. old male was seen yesterday (2020) at [facility]. The following was noted:      Kobe Henriquez is a 60 year-old male with complex PMH including bilateral shoulder joint replacement with subsequent bilateral prosthetic joint infection and removal with likely perioperative MI with reduced ejection fraction presenting to clinic for the following concern: potassium/medication check.    Hyperkalemia: Patient has hyperkalemia to 6.0 in clinic today. Asymptomatic and EKG is within normal limits. Discussed with patient that this is likely a result of his medications including spironolactone, ACEI, ARB.   - Lasix 40 mg x1  - Patient will return to clinic later this afternoon to recheck potassium  - Will discuss medications with cardiologist at his next follow up visitDemi Hardin MDtions discussed and agreed with the final plan.    Subjectively:  Spoke with the patient and he has no new concerns    Assessment/Plan:    1. Will order BMP  2. Will update note when results are in    Analy Metzger MD, MPH (PGY 2)  [facility] Family Medicine Resident  Pager: [phone]  2/15/2020  10:30 AM      This note was created with the help of Dragon dictation system. Grammatical and typing errors are not intentional.

## 2021-06-09 ENCOUNTER — TRANSFERRED RECORDS (OUTPATIENT)
Dept: HEALTH INFORMATION MANAGEMENT | Facility: CLINIC | Age: 61
End: 2021-06-09

## 2021-06-19 NOTE — LETTER
Letter by Fallon Wall CNP at      Author: Fallon Wall CNP Service: -- Author Type: --    Filed:  Encounter Date: 10/22/2019 Status: Signed         Patient: Kobe Henriquez   MR Number: [id number]   YOB: 1960   Date of Visit: 10/22/2019     [facility]    Facility:   [facility] [[id number]]   Code Status: FULL CODE  PCP: Michael Styles MD   Phone: [phone]   Fax: [phone]      Assessment/Plan:        Visit for Preoperative Exam.     Patient approved for surgery with general or local anesthesia. Patient tells me that he will be having preoperative labs and EKG done at orthopedic appointment scheduled for Wednesday morning prior to surgery.      Kobe Henriquez is a 59 y.o. male who has a past medical history for hypertension, HLD, chronic neck pain, TAA repair, bipolar disorder, atrial fibrillation not on anticoagulation, sleep apnea, type 2 diabetes diet managed, with bilateral shoulder replacements in the past.  He was admitted to [facility] for ongoing weakness with a mechanical fall over the course of 2 weeks.  He was found to have MSSA bacteremia and a right septic shoulder.  He had his right shoulder washed out and was put on long-term IV antibiotics.  His hospitalization was complicated by NEMESIO in which he needed 2 rounds of dialysis with the last one being 9/24.  He also had acute toxic encephalopathy so there was a question of septic emboli however this did resolve.  EEG showed no signs of seizure.  He also had low hemoglobin in which he underwent an endoscopy and did not find any lesions or lacerations.  He was given 1 unit of packed red blood cells and his anemia improved.    While at the TCU his hgb did drift down to 7.0 however went back up to 7.6 as of last week.  I did transfuse 1 unit of RBC in efforts to optimize him for surgery.  His hgb today is 9.2.  He denies any symptoms of dizziness headaches or feeling  unbalanced.  His incision continues to drain serosanguineous and some purulent drainage.  However, it is well approximated with sutures and shows no surrounding erythema.  He continues to have issues with chronic pain and I did return him to his previous pain medication regimen of fentanyl patch 75 mcg every 48 hours and 10 mg of oxycodone every 4 hours as needed.  For his hypertension he continues to have variable blood pressures with most SBP's in the 130s to 170s.  2 weeks ago I did increase his carvedilol to 50 mg twice daily.  Also I discontinued his combination pill of amlodipine/benazepril as so that we could adjust these without further acute kidney injury.  I did increase his benazepril to 20 mg twice daily and his amlodipine to 10 mg daily with the thought to continue to monitor his renal function.         Subjective:     Scheduled Procedure: right shoulder washout  Surgery Date: 10/22/2019      Current Outpatient Medications   Medication Sig Dispense Refill   ? acetaminophen (TYLENOL) 500 MG tablet Take 1,000 mg by mouth 3 (three) times a day.            ? amLODIPine (NORVASC) 10 MG tablet Take 10 mg by mouth daily.     ? benazepril (LOTENSIN) 20 MG tablet Take 20 mg by mouth 2 (two) times a day.     ? bisacodyl (DULCOLAX) 10 mg suppository Insert 10 mg into the rectum daily as needed.     ? buPROPion (WELLBUTRIN SR) 200 MG 12 hr tablet Take 200 mg by mouth 2 (two) times a day.     ? carvedilol (COREG) 25 MG tablet Take 50 mg by mouth 2 (two) times a day with meals.     ? cefazolin sodium in 0.9 % NaCl (CEFAZOLIN IN 0.9% SOD CHLORIDE) 2 gram/100 mL Soln Infuse 2 g into a venous catheter 3 (three) times a day.     ? clonazePAM (KLONOPIN) 0.5 MG tablet Take 0.5 mg by mouth 3 (three) times a day as needed for anxiety.     ? cyanocobalamin (VITAMIN B-12) 50 mcg tablet Take 50 mcg by mouth 2 (two) times a day.     ? docusate sodium (COLACE) 100 MG capsule Take 100-200 mg by mouth daily.     ? fentaNYL  (DURAGESIC) 75 mcg/hr Place 1 patch on the skin every other day.            ? ferrous gluconate 324 mg (37.5 mg iron) Tab Take 324 mg by mouth.     ? Lactobacillus rhamnosus GG (CULTURELLE) 10-15 Billion cell capsule Take 1 capsule by mouth daily.     ? melatonin 3 mg Tab tablet Take 6 mg by mouth at bedtime.     ? oxyCODONE (ROXICODONE) 5 MG immediate release tablet Take 10 mg by mouth every 4 (four) hours as needed for pain.     ? multivitamin capsule Take 1 capsule by mouth daily.     ? pantoprazole (PROTONIX) 40 MG tablet Take 40 mg by mouth 2 (two) times a day.            ? polyethylene glycol (MIRALAX) 17 gram packet Take 17 g by mouth daily as needed.     ? senna (SENOKOT) 8.6 mg tablet Take by mouth daily. 2 tabs in am and 3 tabs @@ hs     ? senna (SENOKOT) 8.6 mg tablet Take 1 tablet by mouth 2 (two) times a day as needed for constipation.     ? simethicone (MYLICON) 40 mg/0.6 mL drops Take 40 mg by mouth 4 (four) times a day as needed.     ? simvastatin (ZOCOR) 10 MG tablet Take 10 mg by mouth at bedtime.       No current facility-administered medications for this visit.        Allergies   Allergen Reactions   ? Adhesive Tape-Silicones      Blistering of skin       Immunization History   Administered Date(s) Administered   ? DT (pediatric) 02/04/2004   ? Td,adult,historic,unspecified 02/04/2004   ? Varicella 01/01/1900       Patient Active Problem List   Diagnosis   ? Atrial fibrillation (H)   ? Essential hypertension   ? Hyperlipidemia   ? Obesity   ? Obstructive sleep apnea   ? S/P shoulder replacement   ? MSSA bacteremia   ? GI bleed   ? Acute blood loss anemia   ? Moderate protein-calorie malnutrition (H)   ? Type 2 diabetes mellitus without complication, without long-term current use of insulin (H)   ? Chronic pain syndrome   ? Acute pain   ? Bipolar 1 disorder (H)   ? Septic joint of right shoulder region (H)   ? HTN (hypertension)   ? Bipolar disorder (H)       Past Medical History:   Diagnosis Date    ? A-fib (H)    ? NEMESIO (acute kidney injury) (H)    ? Anemia     acute on chronic, with episode of melena, resolved s/p transfusion   ? Anxiety    ? Ascending aortic aneurysm (H)    ? Bicuspid aortic valve    ? Bipolar disorder (H)    ? BPPV (benign paroxysmal positional vertigo)    ? Chronic narcotic use    ? Chronic neck pain    ? Chronic osteoarthritis    ? Degenerative joint disease    ? Depression    ? DMII (diabetes mellitus, type 2) (H)    ? HLD (hyperlipidemia)    ? HTN (hypertension)    ? Hypermetropia    ? Moderate malnutrition (H)    ? MSSA bacteremia    ? Multiple stiff joints    ? Neck injuries    ? Obesity    ? Presbyopia    ? Septic joint of right shoulder region (H)     s/p washout   ? Skin picking habit    ? Sleep apnea        Social History     Socioeconomic History   ? Marital status: Single     Spouse name: Not on file   ? Number of children: Not on file   ? Years of education: Not on file   ? Highest education level: Not on file   Occupational History   ? Not on file   Social Needs   ? Financial resource strain: Not on file   ? Food insecurity:     Worry: Not on file     Inability: Not on file   ? Transportation needs:     Medical: Not on file     Non-medical: Not on file   Tobacco Use   ? Smoking status: Former Smoker     Packs/day: 0.00     Start date:      Last attempt to quit:      Years since quittin.8   ? Smokeless tobacco: Never Used   Substance and Sexual Activity   ? Alcohol use: Not Currently   ? Drug use: Not on file   ? Sexual activity: Not on file   Lifestyle   ? Physical activity:     Days per week: Not on file     Minutes per session: Not on file   ? Stress: Not on file   Relationships   ? Social connections:     Talks on phone: Not on file     Gets together: Not on file     Attends [employer] witness service: Not on file     Active member of club or organization: Not on file     Attends meetings of clubs or organizations: Not on file     Relationship status: Not on file   ?  Intimate partner violence:     Fear of current or ex partner: Not on file     Emotionally abused: Not on file     Physically abused: Not on file     Forced sexual activity: Not on file   Other Topics Concern   ? Not on file   Social History Narrative   ? Not on file       Past Surgical History:   Procedure Laterality Date   ? BYPASS GASTRIC TAVO-EN-Y, LIVER BIOPSY, COMBINED  08/08/2005   ? COLONOSCOPY  06/30/2014   ? CYSTOSCOPY, BLADDER NECK CUTS, COMBINED  07/18/2016   ? ESOPHAGOGASTRODUODENOSCOPY  06/30/2014   ? EXCISE MASS FINGER Right 06/14/2011   ? HAND SURGERY Left     excision of tendon cyst on left hand     ? IRRIGATION AND DEBRIDEMENT UPPER EXTREMITY Right 09/23/2019    shoulder   ? LASER HOLMIUM LITHOTRIPSY BLADDER  10/15/2014   ? LASER KTP GREEN LIGHT PHOTOSELECTIVE VAPORIZATION PROSTATE  01/23/2014   ? PERIPHERALLY INSERTED CENTRAL CATHETER INSERTION     ? RELEASE TRIGGER FINGER Right 03/31/2017   ? REPAIR ANEURYSM ASCENDING AORTA  10/04/2016   ? TOTAL SHOULDER REPLACEMENT Left 04/15/2014   ? TOTAL SHOULDER REPLACEMENT Right 08/26/2014   ? TRANSURETHRAL RESECTION OF PROSTATE  2014       History of Present Illness  Recent Health  Fever: no  Chills: no  Fatigue: no  Chest Pain: no  Cough: no  Dyspnea: no  Urinary Frequency: no  Nausea: no  Vomiting: no  Diarrhea: no  Abdominal Pain: no  Easy Bruising: no  Lower Extremity Swelling: no  Poor Exercise Tolerance: no    Most recent Health Maintenance Visit:  1 week(s) ago    Pertinent History  Prior Anesthesia: yes  Previous Anesthesia Reaction:  no  Diabetes: yes  Cardiovascular Disease: no  Pulmonary Disease: no  Renal Disease: no  GI Disease: yes, hx of gastric bypass  Sleep Apnea: yes  Thromboembolic Problems: no  Clotting Disorder: no  Bleeding Disorder: no  Transfusion Reaction: no  Impaired Immunity: no  Steroid use in the last 6 months: no  Frequent Aspirin use: no    Family history of no issues    Social history of there are no concerns regarding care  after surgery    After surgery, the patient plans to recover at home with family.    Review of Systems  Review of Systems     Patient denies fever, chills, headache, lightheadedness, dizziness, rhinorrhea, cough, congestion, shortness of breath, chest pain, palpitations, abdominal pain, n/v, diarrhea, constipation, change in appetite, dysuria, frequency, burning or pain with urination.  Other than stated in HPI all other review of systems is negative.           Objective:         Physical Exam   Vital signs: /54, heart rate 70, respiratory 18, temp 98.1.  GENERAL APPEARANCE: Well developed, well nourished, in no acute distress.  HEENT: normocephalic, atraumatic  PERRL, sclerae anicteric, conjunctivae clear and moist, EOM intact  External inspection of ears and nose showed no scars, lesions or masses.  Lips, teeth, and gums showed normal mucosa. Throat showed no erythema or edema.  NECK: Supple and symmetric. Trachea is midline, no thyromegaly, no adenopathy, and no tenderness  LUNGS: Lung sounds CTA, no adventitious sounds, respiratory effort normal.  CARD: RRR, S1, S2, without murmurs, gallops, rubs,   ABD: Soft and nontender with normal bowel sounds.   MSK: Muscle strength and tone were normal.  EXTREMITIES: No cyanosis, clubbing or edema.  NEURO: Alert and oriented x 3.  Face is symmetric.  SKIN: Inspection of the skin reveals no rashes, ulcerations or petechiae.  PSYCH: euthymic             Electronically signed by: Fallon Wall CNP

## 2021-06-19 NOTE — LETTER
Letter by Fallon Wall CNP at      Author: Fallon Wall CNP Service: -- Author Type: --    Filed:  Encounter Date: 10/23/2019 Status: Signed         Patient: Kobe Henriquez   MR Number: [id number]   YOB: 1960   Date of Visit: 10/23/2019     Code Status:  FULL CODE  Visit Type: Discharge Summary     Facility:  [facility] [[id number]]          PCP:  Michael Styles MD  [phone]       Admission Date to our Facility: 10/4/2019 discharge Date from our Facility: 10/24/2019    Discharge Diagnosis:    1. Staphylococcal arthritis of right shoulder (H)     2. Acute blood loss anemia     3. Essential hypertension     4. Chronic pain syndrome     5. Bipolar 1 disorder (H)          History of Present Illness: Kobe Henriquez is a 59 y.o. male who has a past medical history for hypertension, HLD, chronic neck pain, TAA repair, bipolar disorder, atrial fibrillation not on anticoagulation, sleep apnea, type 2 diabetes diet managed, with bilateral shoulder replacements in the past.  He was admitted to [facility] for ongoing weakness with a mechanical fall over the course of 2 weeks.  He was found to have MSSA bacteremia and a right septic shoulder.  He had his right shoulder washed out and was put on long-term IV antibiotics.  His hospitalization was complicated by NEMESIO in which he needed 2 rounds of dialysis with the last one being 9/24.  He also had acute toxic encephalopathy so there was a question of septic emboli however this did resolve.  EEG showed no signs of seizure.  He also had low hemoglobin in which he underwent an endoscopy and did not find any lesions or lacerations.  He was given 1 unit of packed red blood cells and his anemia improved.    Skilled Nursing Facility Course: While at the TCU did progress with therapy to his baseline of ambulating safely without a device.  He had thought that he had an orthopedic surgery scheduled today however when he went to  the orthopedic follow-up appointment they felt that it was not necessary to do a secondary washout of his right shoulder.  He has had ongoing serosanguineous drainage of the distal aspect of his incision.  The incision is well approximated with sutures.  He will need to continue on cefazolin IV via PICC line in his upper left extremity until 11/4/2019.  He did have issues with postop anemia in which his hemoglobin did bottom out at 7.0.  It did start to rebound on its own last week at 7.6 however in order to optimize him for potential surgery I did transfuse 1 unit of packed red blood cells and repeat hemoglobin on Monday of this week was 9.2.  For his pain I did return him to his previous pain medications as his encephalopathy had cleared and he was tolerating his pain medications.  He returned to fentanyl 75 mcg every 48 hours and oxycodone 10 mg every 4 hours as needed.  He will need to follow-up with the pain Clinic regarding ongoing pain management.  His blood pressures were variable with some SBP's in the 170s and most in the 130s.  I did increase his carvedilol at the beginning of his stay to 50 mg twice daily with slight improvement.  I also  out his benazepril/amlodipine.  It is likely that he be able to go back to his home hypertensive medications with close follow-up with his primary provider.    Discharge Medications:    Current Outpatient Medications   Medication Sig Dispense Refill   ? acetaminophen (TYLENOL) 500 MG tablet Take 1,000 mg by mouth 3 (three) times a day.            ? amLODIPine (NORVASC) 10 MG tablet Take 10 mg by mouth daily.     ? benazepril (LOTENSIN) 20 MG tablet Take 20 mg by mouth 2 (two) times a day.     ? bisacodyl (DULCOLAX) 10 mg suppository Insert 10 mg into the rectum daily as needed.     ? buPROPion (WELLBUTRIN SR) 200 MG 12 hr tablet Take 200 mg by mouth 2 (two) times a day.     ? carvedilol (COREG) 25 MG tablet Take 50 mg by mouth 2 (two) times a day with meals.      ? cefazolin sodium in 0.9 % NaCl (CEFAZOLIN IN 0.9% SOD CHLORIDE) 2 gram/100 mL Soln Infuse 2 g into a venous catheter 3 (three) times a day.     ? clonazePAM (KLONOPIN) 0.5 MG tablet Take 0.5 mg by mouth 3 (three) times a day as needed for anxiety.     ? cyanocobalamin (VITAMIN B-12) 50 mcg tablet Take 50 mcg by mouth 2 (two) times a day.     ? docusate sodium (COLACE) 100 MG capsule Take 100-200 mg by mouth daily.     ? fentaNYL (DURAGESIC) 75 mcg/hr Place 1 patch on the skin every other day.            ? ferrous gluconate 324 mg (37.5 mg iron) Tab Take 324 mg by mouth.     ? Lactobacillus rhamnosus GG (CULTURELLE) 10-15 Billion cell capsule Take 1 capsule by mouth daily.     ? melatonin 3 mg Tab tablet Take 6 mg by mouth at bedtime.     ? multivitamin capsule Take 1 capsule by mouth daily.     ? oxyCODONE (ROXICODONE) 5 MG immediate release tablet Take 10 mg by mouth every 4 (four) hours as needed for pain.     ? pantoprazole (PROTONIX) 40 MG tablet Take 40 mg by mouth 2 (two) times a day.            ? polyethylene glycol (MIRALAX) 17 gram packet Take 17 g by mouth daily as needed.     ? senna (SENOKOT) 8.6 mg tablet Take by mouth daily. 2 tabs in am and 3 tabs @@ hs     ? senna (SENOKOT) 8.6 mg tablet Take 1 tablet by mouth 2 (two) times a day as needed for constipation.     ? simethicone (MYLICON) 40 mg/0.6 mL drops Take 40 mg by mouth 4 (four) times a day as needed.     ? simvastatin (ZOCOR) 10 MG tablet Take 10 mg by mouth at bedtime.       No current facility-administered medications for this visit.        For most current and accurate medication list, please contact the skilled nursing facility that this patient visit took place at.      Discharge Plan: Patient to discharge to home with family support and home infusion therapy for his cefazolin infusions.  He will need to follow-up with his primary provider regarding his hypertension and medications.  He will also need to follow-up with the pain clinic  regarding his chronic pain management.  He will follow-up with orthopedics and infectious disease for ongoing treatment of his right shoulder infection.  He will need ongoing follow-up for anemia and a positive guaiac of stool while at the facility.      Review of Systems   Patient denies fever, chills, headache, lightheadedness, dizziness, rhinorrhea, cough, congestion, shortness of breath, chest pain, palpitations, abdominal pain, n/v, diarrhea, constipation, change in appetite, dysuria, frequency, burning or pain with urination.  Other than stated in HPI all other review of systems is negative.     Physical Exam   Vital signs: /54, heart rate 70, respiratory 18, temp 98.1.  GENERAL APPEARANCE: Well developed, well nourished, in no acute distress.  HEENT: normocephalic, atraumatic  PERRL, sclerae anicteric, conjunctivae clear and moist, EOM intact  External inspection of ears and nose showed no scars, lesions or masses.  LUNGS: Lung sounds CTA, no adventitious sounds, respiratory effort normal.  CARD: RRR, S1, S2, without murmurs, gallops, rubs,   ABD: Soft and nontender with normal bowel sounds.   MSK: Muscle strength and tone were normal.  EXTREMITIES: No cyanosis, clubbing or edema.  NEURO: Alert and oriented x 3.  Face is symmetric.  SKIN: Right shoulder incision is well approximated with sutures without surrounding edema or erythema.  PSYCH: euthymic            Labs:    Recent Results (from the past 240 hour(s))   ALT (SGPT)   Result Value Ref Range    ALT <9 0 - 45 U/L   Basic Metabolic Panel   Result Value Ref Range    Sodium 134 (L) 136 - 145 mmol/L    Potassium 4.5 3.5 - 5.0 mmol/L    Chloride 100 98 - 107 mmol/L    CO2 25 22 - 31 mmol/L    Anion Gap, Calculation 9 5 - 18 mmol/L    Glucose 201 (H) 70 - 125 mg/dL    Calcium 8.4 (L) 8.5 - 10.5 mg/dL    BUN 11 8 - 22 mg/dL    Creatinine 1.20 0.70 - 1.30 mg/dL    GFR MDRD Af Amer >60 >60 mL/min/1.73m2    GFR MDRD Non Af Amer >60 >60 mL/min/1.73m2    Bilirubin, Total   Result Value Ref Range    Bilirubin, Total 0.4 0.0 - 1.0 mg/dL   Blood Urea Nitrogen (BUN)   Result Value Ref Range    BUN 11 8 - 22 mg/dL   Creatinine   Result Value Ref Range    Creatinine 1.20 0.70 - 1.30 mg/dL    GFR MDRD Af Amer >60 >60 mL/min/1.73m2    GFR MDRD Non Af Amer >60 >60 mL/min/1.73m2   C-Reactive Protein (CRP)   Result Value Ref Range    CRP 4.1 (H) 0.0 - 0.8 mg/dL   Alkaline Phosphatase   Result Value Ref Range    Alkaline Phosphatase 138 (H) 45 - 120 U/L   HM1 (CBC with Diff)   Result Value Ref Range    WBC 7.2 4.0 - 11.0 thou/uL    RBC 2.57 (L) 4.40 - 6.20 mill/uL    Hemoglobin 7.6 (L) 14.0 - 18.0 g/dL    Hematocrit 24.3 (L) 40.0 - 54.0 %    MCV 95 80 - 100 fL    MCH 29.6 27.0 - 34.0 pg    MCHC 31.3 (L) 32.0 - 36.0 g/dL    RDW 13.2 11.0 - 14.5 %    Platelets 269 140 - 440 thou/uL    MPV 8.9 8.5 - 12.5 fL    Neutrophils % 81 (H) 50 - 70 %    Lymphocytes % 10 (L) 20 - 40 %    Monocytes % 7 2 - 10 %    Eosinophils % 1 0 - 6 %    Basophils % 0 0 - 2 %    Neutrophils Absolute 5.9 2.0 - 7.7 thou/uL    Lymphocytes Absolute 0.7 (L) 0.8 - 4.4 thou/uL    Monocytes Absolute 0.5 0.0 - 0.9 thou/uL    Eosinophils Absolute 0.1 0.0 - 0.4 thou/uL    Basophils Absolute 0.0 0.0 - 0.2 thou/uL   Basic Metabolic Panel   Result Value Ref Range    Sodium 135 (L) 136 - 145 mmol/L    Potassium 5.1 (H) 3.5 - 5.0 mmol/L    Chloride 101 98 - 107 mmol/L    CO2 24 22 - 31 mmol/L    Anion Gap, Calculation 10 5 - 18 mmol/L    Glucose 125 70 - 125 mg/dL    Calcium 9.1 8.5 - 10.5 mg/dL    BUN 13 8 - 22 mg/dL    Creatinine 1.23 0.70 - 1.30 mg/dL    GFR MDRD Af Amer >60 >60 mL/min/1.73m2    GFR MDRD Non Af Amer 60 (L) >60 mL/min/1.73m2   Hemoglobin   Result Value Ref Range    Hemoglobin 8.4 (L) 14.0 - 18.0 g/dL   Potassium   Result Value Ref Range    Potassium 4.5 3.5 - 5.0 mmol/L   Sodium   Result Value Ref Range    Sodium 136 136 - 145 mmol/L   Creatinine   Result Value Ref Range    Creatinine 1.21 0.70 - 1.30  mg/dL    GFR MDRD Af Amer >60 >60 mL/min/1.73m2    GFR MDRD Non Af Amer >60 >60 mL/min/1.73m2   C-Reactive Protein (CRP)   Result Value Ref Range    CRP 2.3 (H) 0.0 - 0.8 mg/dL   Blood Urea Nitrogen (BUN)   Result Value Ref Range    BUN 15 8 - 22 mg/dL   Bilirubin, Total   Result Value Ref Range    Bilirubin, Total 0.5 0.0 - 1.0 mg/dL   ALT (SGPT)   Result Value Ref Range    ALT <9 0 - 45 U/L   Alkaline Phosphatase   Result Value Ref Range    Alkaline Phosphatase 140 (H) 45 - 120 U/L   HM1 (CBC with Diff)   Result Value Ref Range    WBC 9.1 4.0 - 11.0 thou/uL    RBC 3.11 (L) 4.40 - 6.20 mill/uL    Hemoglobin 9.2 (L) 14.0 - 18.0 g/dL    Hematocrit 28.8 (L) 40.0 - 54.0 %    MCV 93 80 - 100 fL    MCH 29.6 27.0 - 34.0 pg    MCHC 31.9 (L) 32.0 - 36.0 g/dL    RDW 14.1 11.0 - 14.5 %    Platelets 316 140 - 440 thou/uL    MPV 8.9 8.5 - 12.5 fL    Neutrophils % 72 (H) 50 - 70 %    Lymphocytes % 16 (L) 20 - 40 %    Monocytes % 9 2 - 10 %    Eosinophils % 2 0 - 6 %    Basophils % 1 0 - 2 %    Neutrophils Absolute 6.6 2.0 - 7.7 thou/uL    Lymphocytes Absolute 1.4 0.8 - 4.4 thou/uL    Monocytes Absolute 0.8 0.0 - 0.9 thou/uL    Eosinophils Absolute 0.2 0.0 - 0.4 thou/uL    Basophils Absolute 0.1 0.0 - 0.2 thou/uL         Assessment:  1. Staphylococcal arthritis of right shoulder (H)     2. Acute blood loss anemia     3. Essential hypertension     4. Chronic pain syndrome     5. Bipolar 1 disorder (H)         MEDICAL EQUIPMENT NEEDS:  NA      DISCHARGE PLAN/FACE TO FACE:  I certify that services are/were furnished while this patient was under the care of a physician and that a physician or an allowed non-physician practitioner (NPP), had a face-to-face encounter that meets the physician face-to-face encounter requirements. The encounter was in whole, or in part, related to the primary reason for home health. The patient is confined to his/her home and needs intermittent skilled nursing, physical therapy, speech-language pathology,  or the continued need for occupational therapy. A plan of care has been established by a physician and is periodically reviewed by a physician.    I certify that this patient is under my care and that I, or a nurse practitioner or physician's assistant working with me, had a face-to-face encounter that meets the physician face-to-face encounter requirements with this patient.   Date of Face-to-Face Encounter: 10/23/2019    I certify that, based on my findings, the following services are medically necessary home health services: RN    My clinical findings support the need for the above skilled services because: (Please write a brief narrative summary that describes what the RN, PT, SLP, or other services will be doing in the home. A list of diagnoses in this section does not meet the CMS requirements.)  RN to provide picc dressing changes, lab draws and medication teaching and monitoring.    This patient is homebound because: (Please write a brief narrative summary describing the functional limitations as to why this patient is homebound and specifically what makes this patient homebound.)  Patient requires assistance in order to get out of the community on a regular basis for ongoing appointments.    The patient is, or has been, under my care and I have initiated the establishment of the plan of care. This patient will be followed by a physician who will periodically review the plan of care.      Electronically signed by: Fallon Wall CNP

## 2021-06-19 NOTE — LETTER
Letter by Fallon Wall CNP at      Author: Fallon Wall CNP Service: -- Author Type: --    Filed:  Encounter Date: 10/17/2019 Status: Signed         Patient: Kobe Henriquez   MR Number: [id number]   YOB: 1960   Date of Visit: 10/17/2019     Code Status:  FULL CODE  Visit Type: Follow Up     Facility:  [facility] [[id number]]      Facility Type: SNF (Skilled Nursing Facility, TCU)    History of Present Illness:   Hospital Admission Date: 9/20/2019 Hospital Discharge Date: 10/4/2019      Kobe Henriquez is a 59 y.o. male who has a past medical history for hypertension, HLD, chronic neck pain, TAA repair, bipolar disorder, atrial fibrillation not on anticoagulation, sleep apnea, type 2 diabetes diet managed, with bilateral shoulder replacements in the past.  He was admitted to [facility] for ongoing weakness with a mechanical fall over the course of 2 weeks.  He was found to have MSSA bacteremia and a right septic shoulder.  He had his right shoulder washed out and was put on long-term IV antibiotics.  His hospitalization was complicated by NEMESIO in which he needed 2 rounds of dialysis with the last one being 9/24.  He also had acute toxic encephalopathy so there was a question of septic emboli however this did resolve.  EEG showed no signs of seizure.  He also had low hemoglobin in which he underwent an endoscopy and did not find any lesions or lacerations.  He was given 1 unit of packed red blood cells and his anemia improved.    Today, he reports that his dizziness and feeling unstable has gotten a little better since Monday.  His hemoglobin today is 8.6 however I did speak with [facility] and she states that the orthopedic surgeon Dr. Jake Gomez would recommend that he have a transfusion to optimize him for potential surgery.  Apparently his previous surgeon Dr. Aceves is planning to see him in clinic next week and plan for further washout of  his shoulder.  His incision is well approximated with sutures however there is purulent drainage from the distal end of the incision.  There is no surrounding erythema.  He continues to complain of pain in his left shoulder also.  He reports that his chronic pain is about back to baseline and is requesting that his fentanyl patches be increased to 75 mcg every 48 hours as per the pain clinic.  Been tolerating the current pain regimen and does not appear to have any delirium or encephalopathy.  His blood pressures continue to be slightly elevated with SBP's in the 150s. I did increase his benazepril earlier this week.    Current Outpatient Medications   Medication Sig Dispense Refill   ? acetaminophen (TYLENOL) 500 MG tablet Take 500 mg by mouth 3 (three) times a day.     ? amLODIPine (NORVASC) 10 MG tablet Take 10 mg by mouth daily.     ? benazepril (LOTENSIN) 20 MG tablet Take 20 mg by mouth 2 (two) times a day.     ? bisacodyl (DULCOLAX) 10 mg suppository Insert 10 mg into the rectum daily as needed.     ? buPROPion (WELLBUTRIN SR) 200 MG 12 hr tablet Take 200 mg by mouth 2 (two) times a day.     ? carvedilol (COREG) 25 MG tablet Take 50 mg by mouth 2 (two) times a day with meals.     ? cefazolin sodium in 0.9 % NaCl (CEFAZOLIN IN 0.9% SOD CHLORIDE) 2 gram/100 mL Soln Infuse 2 g into a venous catheter 3 (three) times a day.     ? clonazePAM (KLONOPIN) 0.5 MG tablet Take 0.5 mg by mouth 3 (three) times a day as needed for anxiety.     ? cyanocobalamin (VITAMIN B-12) 50 mcg tablet Take 50 mcg by mouth 2 (two) times a day.     ? docusate sodium (COLACE) 100 MG capsule Take 100-200 mg by mouth daily.     ? fentaNYL (DURAGESIC) 75 mcg/hr Place 1 patch on the skin every third day.     ? ferrous sulfate (FEOSOL ORAL) Take 300 mg by mouth 3 (three) times a day.            ? Lactobacillus rhamnosus GG (CULTURELLE) 10-15 Billion cell capsule Take 1 capsule by mouth daily.     ? melatonin 3 mg Tab tablet Take 6 mg by mouth  at bedtime.     ? multivitamin capsule Take 1 capsule by mouth daily.     ? oxyCODONE (ROXICODONE) 5 MG immediate release tablet Take 10 mg by mouth every 4 (four) hours as needed for pain.     ? pantoprazole (PROTONIX) 40 MG tablet Take 40 mg by mouth 2 (two) times a day.            ? polyethylene glycol (MIRALAX) 17 gram packet Take 17 g by mouth daily as needed.     ? senna (SENOKOT) 8.6 mg tablet Take by mouth daily. 2 tabs in am and 3 tabs @@ hs     ? senna (SENOKOT) 8.6 mg tablet Take 1 tablet by mouth 2 (two) times a day as needed for constipation.     ? simethicone (MYLICON) 40 mg/0.6 mL drops Take 40 mg by mouth 4 (four) times a day as needed.     ? simvastatin (ZOCOR) 10 MG tablet Take 10 mg by mouth at bedtime.     ? UNABLE TO FIND Take 300 mg by mouth 2 (two) times a day. Med Name: FeoSol 300mg two times a day ok to use med from home       No current facility-administered medications for this visit.      Allergies   Allergen Reactions   ? Adhesive Tape-Silicones      Blistering of skin     Immunization History   Administered Date(s) Administered   ? DT (pediatric) 02/04/2004   ? Td,adult,historic,unspecified 02/04/2004   ? Varicella 01/01/1900         Review of Systems   Patient denies fever, chills, headache, rhinorrhea, cough, congestion, shortness of breath, chest pain, palpitations, abdominal pain, n/v, diarrhea, constipation, change in appetite, dysuria, frequency, burning or pain with urination. Does have intermittent urinary incontinence at baseline.  Other than stated in HPI all other review of systems is negative.         Physical Exam   Vital signs: /65, heart rate 78, respiratory 18, temp 98.1, 94% on room air.  GENERAL APPEARANCE: Well-nourished, well-developed in no acute distress  HEENT: normocephalic, atraumatic  PERRL, sclerae anicteric, conjunctivae clear and moist, EOM intact  LUNGS: Lung sounds CTA, no adventitious sounds, respiratory effort normal.  CARD: RRR, S1, S2, 2/6  distant murmur, no gallops, rubs,  ABD: Soft and nondistended, nontender with normal bowel sounds.   MSK: Muscle strength and tone were normal.  EXTREMITIES: No cyanosis, clubbing or edema.  Localized edema of right shoulder incision.  NEURO: Alert and oriented x 3.  Face is symmetric.  SKIN: Older incision is well approximated with sutures, edema localized to his shoulder.  There is small amounts of purulent drainage at the distal and of his incision.  His skin in general is pale, weight  PSYCH: euthymic            Labs:   Recent Results (from the past 240 hour(s))   Ferritin   Result Value Ref Range    Ferritin 277 27 - 300 ng/mL   Iron and Transferrin Iron Binding Capacity   Result Value Ref Range    Iron 31 (L) 42 - 175 ug/dL    Transferrin 139 (L) 212 - 360 mg/dL    Transferrin Saturation, Calculated 18 (L) 20 - 50 %    Transferrin IBC, Calculated 174 (L) 313 - 563 ug/dL   HM1 (CBC with Diff)   Result Value Ref Range    WBC 5.6 4.0 - 11.0 thou/uL    RBC 2.68 (L) 4.40 - 6.20 mill/uL    Hemoglobin 8.1 (L) 14.0 - 18.0 g/dL    Hematocrit 24.7 (L) 40.0 - 54.0 %    MCV 92 80 - 100 fL    MCH 30.2 27.0 - 34.0 pg    MCHC 32.8 32.0 - 36.0 g/dL    RDW 13.2 11.0 - 14.5 %    Platelets 249 140 - 440 thou/uL    MPV 9.2 8.5 - 12.5 fL    Neutrophils % 65 50 - 70 %    Lymphocytes % 22 20 - 40 %    Monocytes % 11 (H) 2 - 10 %    Eosinophils % 2 0 - 6 %    Basophils % 0 0 - 2 %    Neutrophils Absolute 3.6 2.0 - 7.7 thou/uL    Lymphocytes Absolute 1.2 0.8 - 4.4 thou/uL    Monocytes Absolute 0.6 0.0 - 0.9 thou/uL    Eosinophils Absolute 0.1 0.0 - 0.4 thou/uL    Basophils Absolute 0.0 0.0 - 0.2 thou/uL   Hemoglobin   Result Value Ref Range    Hemoglobin 7.6 (L) 14.0 - 18.0 g/dL   HM2(CBC w/o Differential)   Result Value Ref Range    WBC 5.2 4.0 - 11.0 thou/uL    RBC 2.38 (L) 4.40 - 6.20 mill/uL    Hemoglobin 7.0 (L) 14.0 - 18.0 g/dL    Hematocrit 21.9 (L) 40.0 - 54.0 %    MCV 92 80 - 100 fL    MCH 29.4 27.0 - 34.0 pg    MCHC 32.0  32.0 - 36.0 g/dL    RDW 13.2 11.0 - 14.5 %    Platelets 219 140 - 440 thou/uL    MPV 9.0 8.5 - 12.5 fL   ALT (SGPT)   Result Value Ref Range    ALT <9 0 - 45 U/L   Basic Metabolic Panel   Result Value Ref Range    Sodium 134 (L) 136 - 145 mmol/L    Potassium 4.5 3.5 - 5.0 mmol/L    Chloride 100 98 - 107 mmol/L    CO2 25 22 - 31 mmol/L    Anion Gap, Calculation 9 5 - 18 mmol/L    Glucose 201 (H) 70 - 125 mg/dL    Calcium 8.4 (L) 8.5 - 10.5 mg/dL    BUN 11 8 - 22 mg/dL    Creatinine 1.20 0.70 - 1.30 mg/dL    GFR MDRD Af Amer >60 >60 mL/min/1.73m2    GFR MDRD Non Af Amer >60 >60 mL/min/1.73m2   Bilirubin, Total   Result Value Ref Range    Bilirubin, Total 0.4 0.0 - 1.0 mg/dL   Blood Urea Nitrogen (BUN)   Result Value Ref Range    BUN 11 8 - 22 mg/dL   Creatinine   Result Value Ref Range    Creatinine 1.20 0.70 - 1.30 mg/dL    GFR MDRD Af Amer >60 >60 mL/min/1.73m2    GFR MDRD Non Af Amer >60 >60 mL/min/1.73m2   C-Reactive Protein (CRP)   Result Value Ref Range    CRP 4.1 (H) 0.0 - 0.8 mg/dL   Alkaline Phosphatase   Result Value Ref Range    Alkaline Phosphatase 138 (H) 45 - 120 U/L   HM1 (CBC with Diff)   Result Value Ref Range    WBC 7.2 4.0 - 11.0 thou/uL    RBC 2.57 (L) 4.40 - 6.20 mill/uL    Hemoglobin 7.6 (L) 14.0 - 18.0 g/dL    Hematocrit 24.3 (L) 40.0 - 54.0 %    MCV 95 80 - 100 fL    MCH 29.6 27.0 - 34.0 pg    MCHC 31.3 (L) 32.0 - 36.0 g/dL    RDW 13.2 11.0 - 14.5 %    Platelets 269 140 - 440 thou/uL    MPV 8.9 8.5 - 12.5 fL    Neutrophils % 81 (H) 50 - 70 %    Lymphocytes % 10 (L) 20 - 40 %    Monocytes % 7 2 - 10 %    Eosinophils % 1 0 - 6 %    Basophils % 0 0 - 2 %    Neutrophils Absolute 5.9 2.0 - 7.7 thou/uL    Lymphocytes Absolute 0.7 (L) 0.8 - 4.4 thou/uL    Monocytes Absolute 0.5 0.0 - 0.9 thou/uL    Eosinophils Absolute 0.1 0.0 - 0.4 thou/uL    Basophils Absolute 0.0 0.0 - 0.2 thou/uL   Basic Metabolic Panel   Result Value Ref Range    Sodium 135 (L) 136 - 145 mmol/L    Potassium 5.1 (H) 3.5 - 5.0  mmol/L    Chloride 101 98 - 107 mmol/L    CO2 24 22 - 31 mmol/L    Anion Gap, Calculation 10 5 - 18 mmol/L    Glucose 125 70 - 125 mg/dL    Calcium 9.1 8.5 - 10.5 mg/dL    BUN 13 8 - 22 mg/dL    Creatinine 1.23 0.70 - 1.30 mg/dL    GFR MDRD Af Amer >60 >60 mL/min/1.73m2    GFR MDRD Non Af Amer 60 (L) >60 mL/min/1.73m2   Hemoglobin   Result Value Ref Range    Hemoglobin 8.4 (L) 14.0 - 18.0 g/dL         Assessment:  1. MSSA bacteremia     2. Staphylococcal arthritis of right shoulder (H)     3. Acute blood loss anemia     4. Electrolyte imbalance     5. Essential hypertension     6. Chronic pain syndrome         Plan:  MSSA bacteremia: Continue vancomycin dosed per pharmacy.  His Vanco trough has been therapeutic.  These however this may be changed if he has not another surgery.    Acute blood loss anemia: This is improving however to optimize him for further surgery we will schedule him to have a transfusion of 1 unit of packed red blood cells at [facility].  This will occur tomorrow.    Electrolyte imbalance: Today's sodium continues to be on the lower end however calcium is starting to creep up over normal limits.  His potassium is 5.1 today and so we will recheck his sodium and potassium tomorrow to assure good balance going into the weekend.    hypertension: Systolic blood pressures continue to be elevated however better than earlier this week.  We will continue with carvedilol 50 mg 2 times a day, benazepril 20 mg 2 times a day, and amlodipine 10 mg daily.  We do need to continue to monitor his creatinine as it did increase slightly after increasing the benazepril.    Neck pain syndrome: He appears to be tolerating returning to his previous medications and so I do feel comfortable on increasing his fentanyl patch to 75 mg every 48 hours.  We will continue to monitor for encephalopathy.  Continue oxycodone 10 mg every 4 hours as needed.    35 total minutes spent with 20 minutes spent face-to-face in  coordination and counseling with the patient and nursing staff regarding his blood transfusion and electrolyte imbalance.    Electronically signed by: Fallon Wall CNP

## 2021-06-19 NOTE — LETTER
Letter by Sonal Miller MD at      Author: Sonal Miller MD Service: -- Author Type: --    Filed:  Encounter Date: 10/9/2019 Status: Signed         Patient: Kobe Henriquez   MR Number: [id number]   YOB: 1960   Date of Visit: 10/9/2019                               [facility]        Visit Type: H & P (Confusion with weakness and fall)    Code Status:  FULL CODE  Facility:  [facility] [[id number]]          PCP: Michael Styles MD       PHONE: [phone]     FAX:[phone]        ASSESSMENT/PLAN:  1. Staphylococcal arthritis of right shoulder (H)   status post I&D of right shoulder with explantation.  Now with mild distal surgical site discharge, concerning for nonhealing, will need to make sure orthopedics is informed and may need to see orthopedic this week for further evaluation.  Patient will continue with cefazolin 2 g 3 times daily until 11/4/2019,  infectious disease following, Ortho following   2. Essential hypertension   uncontrolled, discontinue amlodipine/benazepril.  Start amlodipine 10 mg every afternoon and benazepril 20 mg every morning   3. Chronic pain syndrome   not controlled, fentanyl patch already increased/7 to 75 mcg every 72 hours therefore we will monitor for now before any other changes to current narcotic doses are made   4. Acute blood loss anemia   no evidence for bleeding at this time, continue Feosol twice daily, check Hemoccult x3, recheck hemoglobin today.  Consider blood transfusion if less than 7   5. NEMESIO (acute kidney injury) (H)   resolved, most recent creatinine at 1.16 with GFR greater than 60   6. Atrial fibrillation, unspecified type (H)   rate controlled with carvedilol at 50 mg twice daily which was increased on 10/7.  Currently not on any anticoagulation due to very low hemoglobin         HISTORY OF PRESENT ILLNESS:   Kobe Henriquez is a 59 y.o. male with a history of  hypertension, paroxysmal atrial fibrillation not on anticoagulation, chronic pain on chronic opiates, obesity with SAVITA, chronic anemia, history of TAA repair, type 2 diabetes, bipolar disorder who was admitted for a mechanical fall with associated weakness and confusion.  He was noted to be hypertensive with associated acute kidney injury.  He was seen by nephrology, given IV hydration, given temporary dialysis with last dialysis on 9/24 with improvement in creatinine.  He also was found to have a right septic total shoulder arthroplasty with MSSA bacteremia and he was seen by orthopedics, underwent I&D and explantation.  Per ID, he was started on IV cefazolin via PICC line until 11/4/2019.  He also was noted to have worsening anemia in the hospital which initially was concerning for seizures vs septic emboli vs CVA but improved after starting prn clonazepam and scheduled bupropion.  He also developed melena and GI was consulted.  He underwent endoscopy which was negative.  He was transfused 1 unit of packed RBC for a hemoglobin of 6.6 with slight improvement to 8.3 after transfusion.  He continued on fentanyl, Roxicodone, Tylenol for pain management.    Currently, the patient states that he feels slightly better although he still has a lot of pain especially on his neck and upper shoulders/neck.  He states that he also has a lot of pain on his right shoulder where he had recent surgery.  He has been on narcotics over the last 15 years for cervical spinal stenosis and he is being followed by pain clinic.  He complains that he is not receiving enough of his Roxicodone and that he also is not receiving enough of fentanyl patch which he takes at 75 mcg every 48 hours, he is only receiving 50 mcg every 72 hours from the hospital.  He also states that his appetite is still poor and he only eats and able to tolerate small meals throughout the day.  He is sleeping okay although he still complains of pain that wakes him up  from sleep.  He denies any abdominal pain, nausea or vomiting.  He denies any urinary or bowel problems.  He denies any fevers or chills, chest pains or shortness of breath.  He does not have any dizziness or lightheadedness.  He still feels weak.  His blood pressures have been high especially in the mornings with range 150-165/58-78.    Other review of systems are negative.      PAST MEDICAL/SURGICAL HISTORY:  Past Medical History:   Diagnosis Date   ? A-fib (H)    ? NEMESIO (acute kidney injury) (H)    ? Anemia     acute on chronic, with episode of melena, resolved s/p transfusion   ? Anxiety    ? Ascending aortic aneurysm (H)    ? Bicuspid aortic valve    ? Bipolar disorder (H)    ? BPPV (benign paroxysmal positional vertigo)    ? Chronic narcotic use    ? Chronic neck pain    ? Chronic osteoarthritis    ? Degenerative joint disease    ? Depression    ? DMII (diabetes mellitus, type 2) (H)    ? HLD (hyperlipidemia)    ? HTN (hypertension)    ? Hypermetropia    ? Moderate malnutrition (H)    ? MSSA bacteremia    ? Multiple stiff joints    ? Neck injuries    ? Obesity    ? Presbyopia    ? Septic joint of right shoulder region (H)     s/p washout   ? Skin picking habit    ? Sleep apnea      Past Surgical History:   Procedure Laterality Date   ? BYPASS GASTRIC TAVO-EN-Y, LIVER BIOPSY, COMBINED  08/08/2005   ? COLONOSCOPY  06/30/2014   ? CYSTOSCOPY, BLADDER NECK CUTS, COMBINED  07/18/2016   ? ESOPHAGOGASTRODUODENOSCOPY  06/30/2014   ? EXCISE MASS FINGER Right 06/14/2011   ? HAND SURGERY Left     excision of tendon cyst on left hand     ? IRRIGATION AND DEBRIDEMENT UPPER EXTREMITY Right 09/23/2019    shoulder   ? LASER HOLMIUM LITHOTRIPSY BLADDER  10/15/2014   ? LASER KTP GREEN LIGHT PHOTOSELECTIVE VAPORIZATION PROSTATE  01/23/2014   ? PERIPHERALLY INSERTED CENTRAL CATHETER INSERTION     ? RELEASE TRIGGER FINGER Right 03/31/2017   ? REPAIR ANEURYSM ASCENDING AORTA  10/04/2016   ? TOTAL SHOULDER REPLACEMENT Left 04/15/2014    ? TOTAL SHOULDER REPLACEMENT Right 2014   ? TRANSURETHRAL RESECTION OF PROSTATE         SOCIAL HISTORY:  Social History     Socioeconomic History   ? Marital status: Single     Spouse name: Not on file   ? Number of children: Not on file   ? Years of education: Not on file   ? Highest education level: Not on file   Occupational History   ? Not on file   Social Needs   ? Financial resource strain: Not on file   ? Food insecurity:     Worry: Not on file     Inability: Not on file   ? Transportation needs:     Medical: Not on file     Non-medical: Not on file   Tobacco Use   ? Smoking status: Former Smoker     Packs/day: 0.00     Start date:      Last attempt to quit:      Years since quittin.8   ? Smokeless tobacco: Never Used   Substance and Sexual Activity   ? Alcohol use: Not Currently   ? Drug use: Not on file   ? Sexual activity: Not on file   Lifestyle   ? Physical activity:     Days per week: Not on file     Minutes per session: Not on file   ? Stress: Not on file   Relationships   ? Social connections:     Talks on phone: Not on file     Gets together: Not on file     Attends L[employer] service: Not on file     Active member of club or organization: Not on file     Attends meetings of clubs or organizations: Not on file     Relationship status: Not on file   ? Intimate partner violence:     Fear of current or ex partner: Not on file     Emotionally abused: Not on file     Physically abused: Not on file     Forced sexual activity: Not on file   Other Topics Concern   ? Not on file   Social History Narrative   ? Not on file       FAMILY HISTORY:  Family History   Problem Relation Age of Onset   ? Cataracts Mother    ? Macular degeneration Mother    ? Anxiety disorder Mother    ? Hyperlipidemia Mother    ? Hypertension Mother    ? Other Mother         low back problems   ? Osteoporosis Mother    ? Arrhythmia Father    ? Cardiovascular Father    ? Coronary artery disease Father    ?  Depression Father    ? Hyperlipidemia Father    ? Hypertension Father    ? Other Father         low back problems, spine problems   ? Nephrolithiasis Father    ? Obesity Father    ? Sleep apnea Father    ? Anxiety disorder Sister    ? Hyperlipidemia Sister    ? Hypertension Sister    ? Obesity Sister    ? Other Sister         low back problems, spine problems   ? Osteoporosis Sister    ? Anxiety disorder Sister    ? Depression Sister    ? Hyperlipidemia Sister    ? Hypertension Sister    ? Other Sister         low back problems   ? Obesity Sister    ? Osteoporosis Sister        MEDICATIONS:  Current Outpatient Medications on File Prior to Visit   Medication Sig   ? acetaminophen (TYLENOL) 500 MG tablet Take 500 mg by mouth 3 (three) times a day.   ? amLODIPine-benazepril (LOTREL) 10-20 mg per capsule Take 1 capsule by mouth daily.   ? bisacodyl (DULCOLAX) 10 mg suppository Insert 10 mg into the rectum daily as needed.   ? buPROPion (WELLBUTRIN SR) 200 MG 12 hr tablet Take 200 mg by mouth 2 (two) times a day.   ? carvedilol (COREG) 25 MG tablet Take 50 mg by mouth 2 (two) times a day with meals.   ? cefazolin sodium in 0.9 % NaCl (CEFAZOLIN IN 0.9% SOD CHLORIDE) 2 gram/100 mL Soln Infuse 2 g into a venous catheter 3 (three) times a day.   ? clonazePAM (KLONOPIN) 0.5 MG tablet Take 0.5 mg by mouth 3 (three) times a day as needed for anxiety.   ? cyanocobalamin (VITAMIN B-12) 50 mcg tablet Take 50 mcg by mouth 2 (two) times a day.   ? docusate sodium (COLACE) 100 MG capsule Take 100-200 mg by mouth daily.   ? fentaNYL (DURAGESIC) 75 mcg/hr Place 1 patch on the skin every third day.   ? ferrous sulfate (FEOSOL ORAL) Take 300 mg by mouth 3 (three) times a day.          ? Lactobacillus rhamnosus GG (CULTURELLE) 10-15 Billion cell capsule Take 1 capsule by mouth daily.   ? melatonin 3 mg Tab tablet Take 6 mg by mouth at bedtime.   ? multivitamin capsule Take 1 capsule by mouth daily.   ? oxyCODONE (ROXICODONE) 5 MG  immediate release tablet Take 10 mg by mouth every 4 (four) hours as needed for pain.   ? pantoprazole (PROTONIX) 40 MG tablet Take 40 mg by mouth 2 (two) times a day.          ? polyethylene glycol (MIRALAX) 17 gram packet Take 17 g by mouth daily as needed.   ? senna (SENOKOT) 8.6 mg tablet Take by mouth daily. 2 tabs in am and 3 tabs @@ hs   ? senna (SENOKOT) 8.6 mg tablet Take 1 tablet by mouth 2 (two) times a day as needed for constipation.   ? simethicone (MYLICON) 40 mg/0.6 mL drops Take 40 mg by mouth 4 (four) times a day as needed.   ? simvastatin (ZOCOR) 10 MG tablet Take 10 mg by mouth at bedtime.   ? UNABLE TO FIND Take 300 mg by mouth 2 (two) times a day. Med Name: FeoSol 300mg two times a day ok to use med from home     No current facility-administered medications on file prior to visit.        ALLERGIES:  Allergies   Allergen Reactions   ? Adhesive Tape-Silicones      Blistering of skin         PHYSICAL EXAMINATION:  Vital signs 157/63, 98.4, 80, 204 pounds, 18, 97% room air  General: Awake, Alert, oriented x3, not in any form of acute distress, answers questions appropriately, follows simple commands, conversant, obese slightly anxious, pale looking  HEENT: Pale conjunctiva, anicteric sclerae, oral mucosa is moist  NECK: Refuses to move neck, without any lymphadenopathy, thyromegaly or any masses  LUNG: Clear to auscultation, good chest expansion. There are no crackles, no wheezes, normal AP diameter  BACK: No kyphosis of the thoracic spine  CVS: There is good S1  S2, there is 2/6 SCOTT with skip and premature beats, no heaves, rhythm is regular sternotomy scar, gastric bypass scar noted  ABDOMEN: Globular and soft, nontender to palpation, no organomegaly, good bowel sounds  EXTREMITIES: Good range of motion on both upper and lower extremities except on the right shoulder where there is decreased range of motion with tenderness on palpation, some erythema noted, not healing well, minimal  serosanguineous discharge on the distal aspect of surgical site with mild erythema along the length of suture site, no pedal edema, no cyanosis or clubbing, no calf tenderness  SKIN: Warm and dry, no rashes or erythema noted    LABS:  Reviewed        I reviewed the patient's available medical records, labs and medications.    >45 minutes of total time spent, greater than 55% of the time spent in coordination of care and counseling regarding the above medical issues and plan of care including discussion on hypertension and treatment options, chronic pain and use of opioids, anemia.      Electronically signed by:Sonal Miller MD

## 2021-06-19 NOTE — LETTER
Letter by Pat Villegas NP at      Author: Pat Villegas NP Service: -- Author Type: --    Filed:  Encounter Date: 10/11/2019 Status: Signed         Patient: Kobe Henriquez   MR Number: [id number]   YOB: 1960   Date of Visit: 10/11/2019                 [facility]    DATE: 10/11/2019    NAME:  Kobe Henriquez             :  1960  MRN: [id number]  CODE STATUS:  FULL CODE    VISIT TYPE: Problem Visit (anemia, hypertension)     FACILITY:  [facility] [[id number]]       CHIEF COMPLAIN/REASON FOR VISIT:    Chief Complaint   Patient presents with   ? Problem Visit     anemia, hypertension               HISTORY OF PRESENT ILLNESS: Kobe Henriquez is a 59 y.o. male who was admitted to [facility] -10/4 for fall, confusion, weakness. He was hypertensive and had NEMESIO in ED. Lactate 2.3 but afebrile. He was found to have MSSA bacteramia source Right total shoulder arthroplasty. He underwent I&D and removal of hardware. He also had questionable septic pulmonary emobli. HE was treated with IV cefazolin and ID was consulted for antibiotic management. He did have acute encephalopathy that resolved during stay. There was some questionable episodes of possible seizures but EEG was negative. He was restarted on prn clonazepam and wellbutrin. He did have some anemia with melena during stay but endoscopy was performed and no source of bleeding identified. He was placed on PPI and given 1uPRBC. HE did have moderate malnutrition, hypernatremia that improved by discharge. Nephrology followed during stay and he did require some hemodialysis during stay. He had thrombocytopenia that resolved by discharge. He was discharged to TCU as he would need IV antibiotic therapy and conditioning. He has PMH Of Chronic A fib ( no anticoagulation due to high risk of bleeding), HTN, Chronic pain on fentanyl, morphine previously, HTN, obesity, HLD, SAVITA, TAA s/p repair,  bipolar disorder, type 2 dm managed with diet, bilateral shoulder replacements.     Today Mr. Henriquez is seen for follow up on anemia. He had hg yesterday of 7.6 but apparently a repeat was ordered for today. Hg today still pending at time of exam. Nursing is very concerned as he did have a positive guiaic stool last evening. On exam he was first noted to be working with physical therapy and was denying dizziness while working with them. He had many questions regarding iv antibiotic therapy for discharge and about [facility] infusion pharmacy. Discussed will come back after completing therapy but that he needed to discuss concerns regarding atnibiotic regimen for home with . He says he did disucss yesterday but had more questions. Later returned for exam and he states he had therapy and then lunch and now is very exhausted and wants to take a nap. He says he is not having dizziness or obvious blood in stools. He doesn't think they appear dark but not exactly sure because it is hard for him to see. He then proceeds to go off on tangents speaking about his parents (who are also patients in facility) and needing [employer] mobility forms completed. We discussed this should be done by regular provider in facility as do not know his parents. He says he needs new forms too because his sister took them home with her. Discussed he can speak to  about this. Redirected back to his conditions and he feels his pain is controlled and bowels are moving ok. He says they can be constipated at times but uses the stool softeners when needed. He goes off on another tangent about not being able to tolerate certain iron supplements but wants to make sure he is on feosol now. We discussed increasing this if his hg is low again today. Discussed if dropped further from yesterday will recheck on Monday. He denies shortness of breath or cough worse than baseline for him. He feels his appetite is ok and no  recent nausea, stomach upset, or abdominal pain. We discussed his vitals are currently stable and not symptomatic with anemia. Appears this is not new as had this issue during stay and that if he becomes symptomatic or vitals become unstable would warrant transfer to ED. He verbalized understanding and appreciated visit. He had other concerns regarding mediations and deferred these to be discussed with his attending provider. Per therapy and  he does not have a discharge date currently but will discuss his questions regarding discharge. Nursing did change dressing to right shoulder while visiting with patient and does have some sanguinous to serosanguinous drainage but no signs of infection at this time.     REVIEW OF SYSTEMS:  PROBLEMS AND REVIEW OF SYSTEMS:   Today on ROS:   Currently, no fever, chills, or rigors. Decreased vision, adequate hearing. Denies any chest pain, headaches, palpitations, lightheadedness, dizziness. Appetite is good. Denies any GERD symptoms. Denies any difficulty with swallowing, nausea, or vomiting.   No insomnia. No active bleeding. No rash. Positive for weakness, right shoulder incision, pain controlled, constipation at times, itching at times, picc line, shortness of breath at times with exertion at baseline, intermittent dry cough, urinary incontinence intermittent      Allergies   Allergen Reactions   ? Adhesive Tape-Silicones      Blistering of skin     Current Outpatient Medications   Medication Sig   ? acetaminophen (TYLENOL) 500 MG tablet Take 500 mg by mouth 3 (three) times a day.   ? amLODIPine-benazepril (LOTREL) 10-20 mg per capsule Take 1 capsule by mouth daily.   ? bisacodyl (DULCOLAX) 10 mg suppository Insert 10 mg into the rectum daily as needed.   ? buPROPion (WELLBUTRIN SR) 200 MG 12 hr tablet Take 200 mg by mouth 2 (two) times a day.   ? carvedilol (COREG) 25 MG tablet Take 50 mg by mouth 2 (two) times a day with meals.   ? cefazolin sodium in 0.9 %  NaCl (CEFAZOLIN IN 0.9% SOD CHLORIDE) 2 gram/100 mL Soln Infuse 2 g into a venous catheter 3 (three) times a day.   ? clonazePAM (KLONOPIN) 0.5 MG tablet Take 0.5 mg by mouth 3 (three) times a day as needed for anxiety.   ? cyanocobalamin (VITAMIN B-12) 50 mcg tablet Take 50 mcg by mouth 2 (two) times a day.   ? docusate sodium (COLACE) 100 MG capsule Take 100-200 mg by mouth daily.   ? fentaNYL (DURAGESIC) 75 mcg/hr Place 1 patch on the skin every third day.   ? ferrous sulfate (FEOSOL ORAL) Take 300 mg by mouth 3 (three) times a day.          ? Lactobacillus rhamnosus GG (CULTURELLE) 10-15 Billion cell capsule Take 1 capsule by mouth daily.   ? melatonin 3 mg Tab tablet Take 6 mg by mouth at bedtime.   ? multivitamin capsule Take 1 capsule by mouth daily.   ? oxyCODONE (ROXICODONE) 5 MG immediate release tablet Take 10 mg by mouth every 4 (four) hours as needed for pain.   ? pantoprazole (PROTONIX) 40 MG tablet Take 40 mg by mouth 2 (two) times a day.          ? polyethylene glycol (MIRALAX) 17 gram packet Take 17 g by mouth daily as needed.   ? senna (SENOKOT) 8.6 mg tablet Take by mouth daily. 2 tabs in am and 3 tabs @@ hs   ? senna (SENOKOT) 8.6 mg tablet Take 1 tablet by mouth 2 (two) times a day as needed for constipation.   ? simethicone (MYLICON) 40 mg/0.6 mL drops Take 40 mg by mouth 4 (four) times a day as needed.   ? simvastatin (ZOCOR) 10 MG tablet Take 10 mg by mouth at bedtime.   ? UNABLE TO FIND Take 300 mg by mouth 2 (two) times a day. Med Name: FeoSol 300mg two times a day ok to use med from home     Past Medical History:    Past Medical History:   Diagnosis Date   ? A-fib (H)    ? NEMESIO (acute kidney injury) (H)    ? Anemia     acute on chronic, with episode of melena, resolved s/p transfusion   ? Anxiety    ? Ascending aortic aneurysm (H)    ? Bicuspid aortic valve    ? Bipolar disorder (H)    ? BPPV (benign paroxysmal positional vertigo)    ? Chronic narcotic use    ? Chronic neck pain    ?  Chronic osteoarthritis    ? Degenerative joint disease    ? Depression    ? DMII (diabetes mellitus, type 2) (H)    ? HLD (hyperlipidemia)    ? HTN (hypertension)    ? Hypermetropia    ? Moderate malnutrition (H)    ? MSSA bacteremia    ? Multiple stiff joints    ? Neck injuries    ? Obesity    ? Presbyopia    ? Septic joint of right shoulder region (H)     s/p washout   ? Skin picking habit    ? Sleep apnea            PHYSICAL EXAMINATION  Vitals:    10/11/19 0700   BP: 135/66   Pulse: 69   Resp: 16   Temp: 97.9  F (36.6  C)   SpO2: 97%   Weight: 203 lb (92.1 kg)           GENERAL: Awake, Alert, obese, oriented x3, not in any form of acute distress, answers questions appropriately, follows simple commands, conversant  HEENT: Head is normocephalic with normal hair distribution. No evidence of trauma. Ears: No acute purulent discharge. Eyes: Conjunctivae pink with no scleral jaundice. Nose: Normal mucosa and septum. NECK: Supple with no cervical or supraclavicular lymphadenopathy. Trachea is midline. glasses  CHEST: No tenderness or deformity, no crepitus  LUNG: dim to auscultation with good chest expansion. There are no crackles or wheezes, normal AP diameter. Shortness of breath with exertion at times  BACK: No kyphosis of the thoracic spine. Symmetric, no curvature, ROM normal, no CVA tenderness, no spinal tenderness   CVS: irregularly irregular rhythm, 2/6 systolic murmur, 2+ pulses symmetric in all extremities.  ABDOMEN: obese and soft, nontender to palpation, non distended, no masses, no organomegaly, good bowel sounds, no rebound or guarding, no peritoneal signs.   EXTREMITIES: RUE incision c/d/i, some sanguineous to serosanguinous drainage, mild erythema but no warmth, 2+ edema RUE, limited ROM RUE, trace edema ble, picc line lue c/d/i  SKIN: Warm and dry, scattered excorations  NEUROLOGICAL: The patient is oriented to person, place and time. Weakness, muscle wasting bilateral legs            LABS:   Recent  Results (from the past 168 hour(s))   ALT (SGPT)   Result Value Ref Range    ALT 9 0 - 45 U/L   AST (SGOT)   Result Value Ref Range    AST 27 0 - 40 U/L   Bilirubin, Total   Result Value Ref Range    Bilirubin, Total 0.6 0.0 - 1.0 mg/dL   Blood Urea Nitrogen (BUN)   Result Value Ref Range    BUN 12 8 - 22 mg/dL   C-Reactive Protein (CRP)   Result Value Ref Range    CRP 8.0 (H) 0.0 - 0.8 mg/dL   Alkaline Phosphatase   Result Value Ref Range    Alkaline Phosphatase 111 45 - 120 U/L   Creatinine   Result Value Ref Range    Creatinine 1.16 0.70 - 1.30 mg/dL    GFR MDRD Af Amer >60 >60 mL/min/1.73m2    GFR MDRD Non Af Amer >60 >60 mL/min/1.73m2   Bilirubin, Direct   Result Value Ref Range    Bilirubin, Direct 0.3 <=0.5 mg/dL   HM1 (CBC with Diff)   Result Value Ref Range    WBC 6.1 4.0 - 11.0 thou/uL    RBC 2.43 (L) 4.40 - 6.20 mill/uL    Hemoglobin 7.3 (L) 14.0 - 18.0 g/dL    Hematocrit 22.2 (L) 40.0 - 54.0 %    MCV 91 80 - 100 fL    MCH 30.0 27.0 - 34.0 pg    MCHC 32.9 32.0 - 36.0 g/dL    RDW 13.2 11.0 - 14.5 %    Platelets 288 140 - 440 thou/uL    MPV 9.6 8.5 - 12.5 fL    Neutrophils % 73 (H) 50 - 70 %    Lymphocytes % 15 (L) 20 - 40 %    Monocytes % 10 2 - 10 %    Eosinophils % 2 0 - 6 %    Basophils % 0 0 - 2 %    Neutrophils Absolute 4.4 2.0 - 7.7 thou/uL    Lymphocytes Absolute 0.9 0.8 - 4.4 thou/uL    Monocytes Absolute 0.6 0.0 - 0.9 thou/uL    Eosinophils Absolute 0.1 0.0 - 0.4 thou/uL    Basophils Absolute 0.0 0.0 - 0.2 thou/uL   Ferritin   Result Value Ref Range    Ferritin 277 27 - 300 ng/mL   Iron and Transferrin Iron Binding Capacity   Result Value Ref Range    Iron 31 (L) 42 - 175 ug/dL    Transferrin 139 (L) 212 - 360 mg/dL    Transferrin Saturation, Calculated 18 (L) 20 - 50 %    Transferrin IBC, Calculated 174 (L) 313 - 563 ug/dL   HM1 (CBC with Diff)   Result Value Ref Range    WBC 5.6 4.0 - 11.0 thou/uL    RBC 2.68 (L) 4.40 - 6.20 mill/uL    Hemoglobin 8.1 (L) 14.0 - 18.0 g/dL    Hematocrit 24.7 (L)  40.0 - 54.0 %    MCV 92 80 - 100 fL    MCH 30.2 27.0 - 34.0 pg    MCHC 32.8 32.0 - 36.0 g/dL    RDW 13.2 11.0 - 14.5 %    Platelets 249 140 - 440 thou/uL    MPV 9.2 8.5 - 12.5 fL    Neutrophils % 65 50 - 70 %    Lymphocytes % 22 20 - 40 %    Monocytes % 11 (H) 2 - 10 %    Eosinophils % 2 0 - 6 %    Basophils % 0 0 - 2 %    Neutrophils Absolute 3.6 2.0 - 7.7 thou/uL    Lymphocytes Absolute 1.2 0.8 - 4.4 thou/uL    Monocytes Absolute 0.6 0.0 - 0.9 thou/uL    Eosinophils Absolute 0.1 0.0 - 0.4 thou/uL    Basophils Absolute 0.0 0.0 - 0.2 thou/uL   Hemoglobin   Result Value Ref Range    Hemoglobin 7.6 (L) 14.0 - 18.0 g/dL   HM2(CBC w/o Differential)   Result Value Ref Range    WBC 5.2 4.0 - 11.0 thou/uL    RBC 2.38 (L) 4.40 - 6.20 mill/uL    Hemoglobin 7.0 (L) 14.0 - 18.0 g/dL    Hematocrit 21.9 (L) 40.0 - 54.0 %    MCV 92 80 - 100 fL    MCH 29.4 27.0 - 34.0 pg    MCHC 32.0 32.0 - 36.0 g/dL    RDW 13.2 11.0 - 14.5 %    Platelets 219 140 - 440 thou/uL    MPV 9.0 8.5 - 12.5 fL     No results found for this or any previous visit.      Lab Results   Component Value Date    WBC 5.2 10/11/2019    HGB 7.0 (L) 10/11/2019    HCT 21.9 (L) 10/11/2019    MCV 92 10/11/2019     10/11/2019       No results found for: [id number]  No results found for: HGBA1C  No results found for: INR, PROTIME  No results found for: [id number]  No results found for: TSH        ASSESSMENT/PLAN:    1. MSSA right shoulder total arthroplasty, s/p I&D with extrication of hardware: Incision c/d/i, some drainage and mild erythema but well approximated, healing, no signs of infection. Pain controlled on current regimen. On IV cefazolin three times a day x 6 weeks. F/u with ortho surgery next week, f/u with ID. On culturelle. No fevers, chills recently. picc line c/d/i.   2. GI bleeding, Acute blood loss anemia: Hg trending down 8-7.6 recently. 1uPRBC inpatient. EGD in hospital was negative for source of bleeding. On protonix daily will increase to two  times a day. One positive guaiac stool, two more pending. Hg today resulted at 7.0. will recheck on 10/14. Instructions given to staff to send to ED if becomes hypotensive, tachycardic, unstable. Will also increase feosol supplement to three times a day. Monitor for hematemesis, further melena. No abdominal pain, nausea, stomach upset at this time. Transfuse prn <7.   3. Atrial fibrillation: Rate controlled 60s. No anticoagulation at this time due to bleeding risk. On coreg.   4. Bipolar 1 disorder: Controlled on wellbutrin, klonopin. Feels mood stable recently.   5. Type 2 DM: Controlled with diet.   6. Acute on chronic pain: adequately controlled today on fentanyl patch, oxycodone. Fentanyl patch and oxycodone recently increased. Tylenol three times a day as well.   7. NEMESIO: HD treatments inpatient. Cr 1.16 GFR>60 on 10/7. Stable.   8. HLD: stable on current regimen.   9. Constipation, opioid induced: On colace, senna. miralax prn.  Constipated at times but using prn stool softeners.   10. HTN: SBP 130s. Stable on current regmien.   11. SAVITA, OHS, obesity: counseled on healthier eating habits, importance of physical activity. Encouraged to use IS. However also muscle wasting and malnutrition recently with prolonged illness.   12. Malnutrition: weights 203lbs, encourage high protein intake. Monitor balance between malnutrition and obesity management. Muscle wasting in lower extremities.     Electronically signed by: Pat Villegas NP    Total floor/unit time spent 35 with 25 time spent on counseling and coordination of care. Counseling was done regarding anemia management, lab results, treatment options, monitoring of melena and anemia, further lab monitoring, and when would need transfer to ed versus outpatient transfusion. Counseling regarding antibiotic treatments and follow up with ID and surgeon. Coordinated care with nursing for management of anemia, lab monitoring, when to send to ED or notify provider.

## 2021-06-19 NOTE — LETTER
Letter by Fallon Wall CNP at      Author: Fallon Wall CNP Service: -- Author Type: --    Filed:  Encounter Date: 10/7/2019 Status: Signed         Patient: Kobe Henriquez   MR Number: [id number]   YOB: 1960   Date of Visit: 10/7/2019     Code Status:  FULL CODE  Visit Type: Follow Up     Facility:  [facility] [[id number]]      Facility Type: SNF (Skilled Nursing Facility, TCU)    History of Present Illness:   Hospital Admission Date: 9/20/2019 Hospital Discharge Date: 10/4/2019      Kobe Henriqeuz is a 59 y.o. male who has a past medical history for hypertension, HLD, chronic neck pain, TAA repair, bipolar disorder, atrial fibrillation not on anticoagulation, sleep apnea, type 2 diabetes diet managed, with bilateral shoulder replacements in the past.  He was admitted to [facility] for ongoing weakness with a mechanical fall over the course of 2 weeks.  He was found to have MSSA bacteremia and a right septic shoulder.  He had his right shoulder washed out and was put on long-term IV antibiotics.  His hospitalization was complicated by NEMESIO in which he needed 2 rounds of dialysis with the last one being 9/24.  He also had acute toxic encephalopathy so there was a question of septic emboli however this did resolve.  EEG showed no signs of seizure.  He also had low hemoglobin in which he underwent an endoscopy and did not find any lesions or lacerations.  He was given 1 unit of packed red blood cells and his anemia improved.    Today, his biggest complaint is poo pain management.  I did review his prior to hospitalization medications and he was on a fentanyl patch 75 mcg every 48 hours and morphine immediate release 30 mg every 6 hours along with Flexeril which was managed by [facility] pain clinic.  Upon discharge hospital restarted his fentanyl patch at 50 mics every 3 days and changed his morphine to oxycodone 5 mg every 6 hours as needed.  He continues to have  significant neck and shoulder pain.  His right shoulder incision is well approximated with sutures and he is to follow-up with orthopedics next week for suture removal.  There is no signs and symptoms of infection.  He does have plaque-like spots on his right forearm that have been scratched open.  He reports these are old mosquito bites that he compulsively scratches.  He does have significant vitiligo-like marks on his right forearm which appear to be related to scarring from scratches.  He also reports that he is unable to correctly absorb with ferrous sulfate due to his Simone-en-Y surgery he had years ago.  He reports that there saw is the best iron supplement for him.  He also has a fair amount of muscle wasting especially in his lower extremities however he states he is eating adequately now that he is out of the hospital.  His PICC line in his right upper extremity is free of signs and symptoms of infection.  He is requesting a probiotic to decrease his risk for C. difficile.  He is also concerned about elevated blood pressures while at this facility.  I did explain that it is likely related to his unmanaged pain.  He tells me that he usually takes amlodipine/benazepril 5/20 twice daily with good management.  The benazepril was likely reduced due to his kidney function.  His baseline kidney function appears to be a creatinine of 1.1.  He also is requesting changes to his bowel medications.  He states he takes docusate 100 mg 1 tab in the morning and 2 tabs in the evening.  Also takes senna 3 tabs in the morning and 4 tabs in the evening until he is able to have good bowel movements and then he will back the senna down to 2 tabs in the morning and 3 tabs in the evening.  For his bipolar disorder he is on bupropion 200 mg twice daily and Klonopin 0.5 mg 3 times daily for anxiety.      No past medical history on file.  No past surgical history on file.  No family history on file.  Social History     Socioeconomic  History   ? Marital status: Single     Spouse name: Not on file   ? Number of children: Not on file   ? Years of education: Not on file   ? Highest education level: Not on file   Occupational History   ? Not on file   Social Needs   ? Financial resource strain: Not on file   ? Food insecurity:     Worry: Not on file     Inability: Not on file   ? Transportation needs:     Medical: Not on file     Non-medical: Not on file   Tobacco Use   ? Smoking status: Not on file   Substance and Sexual Activity   ? Alcohol use: Not on file   ? Drug use: Not on file   ? Sexual activity: Not on file   Lifestyle   ? Physical activity:     Days per week: Not on file     Minutes per session: Not on file   ? Stress: Not on file   Relationships   ? Social connections:     Talks on phone: Not on file     Gets together: Not on file     Attends Mosque service: Not on file     Active member of club or organization: Not on file     Attends meetings of clubs or organizations: Not on file     Relationship status: Not on file   ? Intimate partner violence:     Fear of current or ex partner: Not on file     Emotionally abused: Not on file     Physically abused: Not on file     Forced sexual activity: Not on file   Other Topics Concern   ? Not on file   Social History Narrative   ? Not on file       No current outpatient medications on file.     No current facility-administered medications for this visit.      No Known Allergies  Immunization History   Administered Date(s) Administered   ? DT (pediatric) 02/04/2004   ? Td,adult,historic,unspecified 02/04/2004   ? Varicella 01/01/1900         Review of Systems   Patient denies fever, chills, headache, lightheadedness, dizziness, rhinorrhea, cough, congestion, shortness of breath, chest pain, palpitations, abdominal pain, n/v, diarrhea, constipation, change in appetite, dysuria, frequency, burning or pain with urination. Does have intermittent urinary incontinence at baseline.  Other than stated  in HPI all other review of systems is negative.         Physical Exam   Vital signs: /67, heart rate 75, respiratory 18, temp 98.4, 97% on room air.  GENERAL APPEARANCE: Male, with leg muscle wasting, in no acute distress.  HEENT: normocephalic, atraumatic  PERRL, sclerae anicteric, conjunctivae clear and moist, EOM intact  LUNGS: Lung sounds CTA, no adventitious sounds, respiratory effort normal.  CARD: RRR, S1, S2, 2/6 colic murmur, no gallops, rubs, no JVD, peripheral pulses 2+ and symmetric  ABD: Soft and nondistended, nontender with normal bowel sounds.   MSK: Muscle strength and tone were normal.  EXTREMITIES: No cyanosis, clubbing or edema.  Shoulders have decreased range of motion with significant pain  NEURO: Alert and oriented x 3.  Face is symmetric.  SKIN: Right forearm with open areas and plaque-like scabbing, no signs and symptoms of infection.  Right shoulder incision is well approximated with sutures with minimal serous drainage and no surrounding erythema.  PSYCH: euthymic            Labs:   Recent Results (from the past 240 hour(s))   ALT (SGPT)   Result Value Ref Range    ALT 9 0 - 45 U/L   AST (SGOT)   Result Value Ref Range    AST 27 0 - 40 U/L   Bilirubin, Total   Result Value Ref Range    Bilirubin, Total 0.6 0.0 - 1.0 mg/dL   Blood Urea Nitrogen (BUN)   Result Value Ref Range    BUN 12 8 - 22 mg/dL   Alkaline Phosphatase   Result Value Ref Range    Alkaline Phosphatase 111 45 - 120 U/L   Creatinine   Result Value Ref Range    Creatinine 1.16 0.70 - 1.30 mg/dL    GFR MDRD Af Amer >60 >60 mL/min/1.73m2    GFR MDRD Non Af Amer >60 >60 mL/min/1.73m2   Bilirubin, Direct   Result Value Ref Range    Bilirubin, Direct 0.3 <=0.5 mg/dL   HM1 (CBC with Diff)   Result Value Ref Range    WBC 6.1 4.0 - 11.0 thou/uL    RBC 2.43 (L) 4.40 - 6.20 mill/uL    Hemoglobin 7.3 (L) 14.0 - 18.0 g/dL    Hematocrit 22.2 (L) 40.0 - 54.0 %    MCV 91 80 - 100 fL    MCH 30.0 27.0 - 34.0 pg    MCHC 32.9 32.0 - 36.0  g/dL    RDW 13.2 11.0 - 14.5 %    Platelets 288 140 - 440 thou/uL    MPV 9.6 8.5 - 12.5 fL    Neutrophils % 73 (H) 50 - 70 %    Lymphocytes % 15 (L) 20 - 40 %    Monocytes % 10 2 - 10 %    Eosinophils % 2 0 - 6 %    Basophils % 0 0 - 2 %    Neutrophils Absolute 4.4 2.0 - 7.7 thou/uL    Lymphocytes Absolute 0.9 0.8 - 4.4 thou/uL    Monocytes Absolute 0.6 0.0 - 0.9 thou/uL    Eosinophils Absolute 0.1 0.0 - 0.4 thou/uL    Basophils Absolute 0.0 0.0 - 0.2 thou/uL         Assessment:  1. Staphylococcal arthritis of right shoulder (H)     2. Acute pain     3. Other chronic pain     4. NEMESIO (acute kidney injury) (H)     5. Anemia, unspecified type     6. Essential hypertension     7. Atrial fibrillation, unspecified type (H)     8. Hyperlipidemia, unspecified hyperlipidemia type     9. Bipolar 1 disorder (H)     10. Type 2 diabetes mellitus without complication, without long-term current use of insulin (H)     11. Open wound of multiple sites of right upper extremity without complication, subsequent encounter         Plan:  MSSA of right shoulder: Continue with cefazolin antibiotics.  PICC line management.  Follow-up with orthopedic surgery next week for suture removal.  Follow-up with infectious disease in order to get an date for antibiotics.  Start on Culturelle for preventative measures.  Not on any blood thinners due to risk for falls and recent melena in stool.    Acute pain: Currently at this time he is not even at 50% of his morphine milliequivalents of his home dose of medications.  He continues to have ongoing pain that is decreasing his mobility and participation in therapy.  I will increase his fentanyl patch at 75 mcg every 3 days.  I did  him that at this time I feel comfortable with 75 mcg every 3 days and if he tolerates well without any delirium or encephalopathy we could change it to every 2 days per his pain clinic.  He is agreeable to that plan.  I will also increase his oxycodone to 10 mg every 4  hours as needed for better breakthrough pain coverage.  At this time he would not like to use a muscle relaxer as he used at home.  Continue with Tylenol thousand milligrams 3 times daily.  For his bowels I will start him on docusate 100 mg in the morning and 200 mg at night.  I will also start him on senna 2 tabs in the morning 3 tabs at night and then the option to have 1 tab as needed in the morning and p.m. additionally.  Continue with MiraLAX as needed.    For his acute kidney injury: I am still awaiting for his renal function labs to return.  I did explain to him that I would not like to make any adjustments to his ACE inhibitor medications until I know that his kidney function is back to baseline.    Anemia: Hemoglobin is 7.3 today will switch over ferrous sulfate to ferrous all per patient's request.  Will monitor hemoglobin and recheck it on Wednesday of this week.  Would recommend monitoring stools for any melena and transfuse for hemoglobin under 7.    Hypertension: Blood pressure trending up he has had an increase in his amlodipine/benazepril combo pill.  I did again  him that I would not want to adjust that and tell we are sure that his kidney function is back to normal.  I will increase his carvedilol to 50 mg twice daily and hold if heart rate is below 60.  He is agreeable to that plan.  I will have Dr. Miller revisit his blood pressures and blood pressure medication options with him on Wednesday of this week.    Atrial fibrillation: This is controlled at this time with carvedilol.  Not on any anticoagulation due to bleeding risk.    Hyperlipidemia: Continue simvastatin 10 mg daily.    Bipolar disorder: Stable continue with bupropion 200 mg twice daily and Klonopin 0.5 mg 3 times daily as needed.    Diabetes: Stable diet controlled we will continue to monitor.  No blood sugar checks at this time.    Wounds on arm: I did explain the patient that this was likely related to psoriasis however he  has never been diagnosed.  At this time will just add bacitracin to wound bed to improve moisture and cover with nonadherent on a daily basis.    50 total minutes spent with 40 minutes spent face-to-face with patient in counseling and coordination of the above plan of care.    Electronically signed by: Fallon Wall CNP

## 2021-06-19 NOTE — LETTER
Letter by Fallon Wall CNP at      Author: Fallon Wall CNP Service: -- Author Type: --    Filed:  Encounter Date: 10/14/2019 Status: Signed         Patient: Kobe Henriquez   MR Number: [id number]   YOB: 1960   Date of Visit: 10/14/2019     Code Status:  FULL CODE  Visit Type: Follow Up     Facility:  [facility] [[id number]]      Facility Type: SNF (Skilled Nursing Facility, TCU)    History of Present Illness:   Hospital Admission Date: 9/20/2019 Hospital Discharge Date: 10/4/2019      Kobe Henriquez is a 59 y.o. male who has a past medical history for hypertension, HLD, chronic neck pain, TAA repair, bipolar disorder, atrial fibrillation not on anticoagulation, sleep apnea, type 2 diabetes diet managed, with bilateral shoulder replacements in the past.  He was admitted to [facility] for ongoing weakness with a mechanical fall over the course of 2 weeks.  He was found to have MSSA bacteremia and a right septic shoulder.  He had his right shoulder washed out and was put on long-term IV antibiotics.  His hospitalization was complicated by NEMESIO in which he needed 2 rounds of dialysis with the last one being 9/24.  He also had acute toxic encephalopathy so there was a question of septic emboli however this did resolve.  EEG showed no signs of seizure.  He also had low hemoglobin in which he underwent an endoscopy and did not find any lesions or lacerations.  He was given 1 unit of packed red blood cells and his anemia improved.    Last week, he did have a significant drop in his hemoglobin from 8.1-7.0 last Friday.  That he feels his balance is off today.  Conjunctive is pink and vital signs are okay.  He does have trending SBP's of 160s to 170s.  Last week Dr. Miller had  his amlodipine and increased it to 10 mg from the benazepril at 20 mg.  He continues to have serosanguineous drainage from his right shoulder incision which is well approximated with  sutures and also noted to have some purulent drainage at the distal end of the incision.  Does have good CMS to his right arm with good radial pulse.  He also has improved open areas on his right forearm.  He reports his pain is well managed with the current fentanyl and oxycodone combination.  He reports his bowels are moving adequately and denies any urinary issues.      Past Medical History:   Diagnosis Date   ? A-fib (H)    ? NEMESIO (acute kidney injury) (H)    ? Anemia     acute on chronic, with episode of melena, resolved s/p transfusion   ? Anxiety    ? Ascending aortic aneurysm (H)    ? Bicuspid aortic valve    ? Bipolar disorder (H)    ? BPPV (benign paroxysmal positional vertigo)    ? Chronic narcotic use    ? Chronic neck pain    ? Chronic osteoarthritis    ? Degenerative joint disease    ? Depression    ? DMII (diabetes mellitus, type 2) (H)    ? HLD (hyperlipidemia)    ? HTN (hypertension)    ? Hypermetropia    ? Moderate malnutrition (H)    ? MSSA bacteremia    ? Multiple stiff joints    ? Neck injuries    ? Obesity    ? Presbyopia    ? Septic joint of right shoulder region (H)     s/p washout   ? Skin picking habit    ? Sleep apnea      Past Surgical History:   Procedure Laterality Date   ? BYPASS GASTRIC TAVO-EN-Y, LIVER BIOPSY, COMBINED  08/08/2005   ? COLONOSCOPY  06/30/2014   ? CYSTOSCOPY, BLADDER NECK CUTS, COMBINED  07/18/2016   ? ESOPHAGOGASTRODUODENOSCOPY  06/30/2014   ? EXCISE MASS FINGER Right 06/14/2011   ? HAND SURGERY Left     excision of tendon cyst on left hand     ? IRRIGATION AND DEBRIDEMENT UPPER EXTREMITY Right 09/23/2019    shoulder   ? LASER HOLMIUM LITHOTRIPSY BLADDER  10/15/2014   ? LASER KTP GREEN LIGHT PHOTOSELECTIVE VAPORIZATION PROSTATE  01/23/2014   ? PERIPHERALLY INSERTED CENTRAL CATHETER INSERTION     ? RELEASE TRIGGER FINGER Right 03/31/2017   ? REPAIR ANEURYSM ASCENDING AORTA  10/04/2016   ? TOTAL SHOULDER REPLACEMENT Left 04/15/2014   ? TOTAL SHOULDER REPLACEMENT Right  2014   ? TRANSURETHRAL RESECTION OF PROSTATE  2014     Family History   Problem Relation Age of Onset   ? Cataracts Mother    ? Macular degeneration Mother    ? Anxiety disorder Mother    ? Hyperlipidemia Mother    ? Hypertension Mother    ? Other Mother         low back problems   ? Osteoporosis Mother    ? Arrhythmia Father    ? Cardiovascular Father    ? Coronary artery disease Father    ? Depression Father    ? Hyperlipidemia Father    ? Hypertension Father    ? Other Father         low back problems, spine problems   ? Nephrolithiasis Father    ? Obesity Father    ? Sleep apnea Father    ? Anxiety disorder Sister    ? Hyperlipidemia Sister    ? Hypertension Sister    ? Obesity Sister    ? Other Sister         low back problems, spine problems   ? Osteoporosis Sister    ? Anxiety disorder Sister    ? Depression Sister    ? Hyperlipidemia Sister    ? Hypertension Sister    ? Other Sister         low back problems   ? Obesity Sister    ? Osteoporosis Sister      Social History     Socioeconomic History   ? Marital status: Single     Spouse name: Not on file   ? Number of children: Not on file   ? Years of education: Not on file   ? Highest education level: Not on file   Occupational History   ? Not on file   Social Needs   ? Financial resource strain: Not on file   ? Food insecurity:     Worry: Not on file     Inability: Not on file   ? Transportation needs:     Medical: Not on file     Non-medical: Not on file   Tobacco Use   ? Smoking status: Former Smoker     Packs/day: 0.00     Start date:      Last attempt to quit:      Years since quittin.8   ? Smokeless tobacco: Never Used   Substance and Sexual Activity   ? Alcohol use: Not Currently   ? Drug use: Not on file   ? Sexual activity: Not on file   Lifestyle   ? Physical activity:     Days per week: Not on file     Minutes per session: Not on file   ? Stress: Not on file   Relationships   ? Social connections:     Talks on phone: Not on file      Gets together: Not on file     Attends Buddhist service: Not on file     Active member of club or organization: Not on file     Attends meetings of clubs or organizations: Not on file     Relationship status: Not on file   ? Intimate partner violence:     Fear of current or ex partner: Not on file     Emotionally abused: Not on file     Physically abused: Not on file     Forced sexual activity: Not on file   Other Topics Concern   ? Not on file   Social History Narrative   ? Not on file       Current Outpatient Medications   Medication Sig Dispense Refill   ? amLODIPine (NORVASC) 10 MG tablet Take 10 mg by mouth daily.     ? benazepril (LOTENSIN) 20 MG tablet Take 20 mg by mouth 2 (two) times a day.     ? acetaminophen (TYLENOL) 500 MG tablet Take 500 mg by mouth 3 (three) times a day.     ? bisacodyl (DULCOLAX) 10 mg suppository Insert 10 mg into the rectum daily as needed.     ? buPROPion (WELLBUTRIN SR) 200 MG 12 hr tablet Take 200 mg by mouth 2 (two) times a day.     ? carvedilol (COREG) 25 MG tablet Take 50 mg by mouth 2 (two) times a day with meals.     ? cefazolin sodium in 0.9 % NaCl (CEFAZOLIN IN 0.9% SOD CHLORIDE) 2 gram/100 mL Soln Infuse 2 g into a venous catheter 3 (three) times a day.     ? clonazePAM (KLONOPIN) 0.5 MG tablet Take 0.5 mg by mouth 3 (three) times a day as needed for anxiety.     ? cyanocobalamin (VITAMIN B-12) 50 mcg tablet Take 50 mcg by mouth 2 (two) times a day.     ? docusate sodium (COLACE) 100 MG capsule Take 100-200 mg by mouth daily.     ? fentaNYL (DURAGESIC) 75 mcg/hr Place 1 patch on the skin every third day.     ? ferrous sulfate (FEOSOL ORAL) Take 300 mg by mouth 3 (three) times a day.            ? Lactobacillus rhamnosus GG (CULTURELLE) 10-15 Billion cell capsule Take 1 capsule by mouth daily.     ? melatonin 3 mg Tab tablet Take 6 mg by mouth at bedtime.     ? multivitamin capsule Take 1 capsule by mouth daily.     ? oxyCODONE (ROXICODONE) 5 MG immediate release  tablet Take 10 mg by mouth every 4 (four) hours as needed for pain.     ? pantoprazole (PROTONIX) 40 MG tablet Take 40 mg by mouth 2 (two) times a day.            ? polyethylene glycol (MIRALAX) 17 gram packet Take 17 g by mouth daily as needed.     ? senna (SENOKOT) 8.6 mg tablet Take by mouth daily. 2 tabs in am and 3 tabs @@ hs     ? senna (SENOKOT) 8.6 mg tablet Take 1 tablet by mouth 2 (two) times a day as needed for constipation.     ? simethicone (MYLICON) 40 mg/0.6 mL drops Take 40 mg by mouth 4 (four) times a day as needed.     ? simvastatin (ZOCOR) 10 MG tablet Take 10 mg by mouth at bedtime.     ? UNABLE TO FIND Take 300 mg by mouth 2 (two) times a day. Med Name: FeoSol 300mg two times a day ok to use med from home       No current facility-administered medications for this visit.      Allergies   Allergen Reactions   ? Adhesive Tape-Silicones      Blistering of skin     Immunization History   Administered Date(s) Administered   ? DT (pediatric) 02/04/2004   ? Td,adult,historic,unspecified 02/04/2004   ? Varicella 01/01/1900         Review of Systems   Patient denies fever, chills, headache, lightheadedness, dizziness, rhinorrhea, cough, congestion, shortness of breath, chest pain, palpitations, abdominal pain, n/v, diarrhea, constipation, change in appetite, dysuria, frequency, burning or pain with urination. Does have intermittent urinary incontinence at baseline.  Other than stated in HPI all other review of systems is negative.         Physical Exam   Vital signs: /74, heart rate 78, respiratory 18, temp 98.6.  GENERAL APPEARANCE: Rest, in no acute distress  HEENT: normocephalic, atraumatic  PERRL, sclerae anicteric, conjunctivae clear and moist, EOM intact  LUNGS: Lung sounds CTA, no adventitious sounds, respiratory effort normal.  CARD: RRR, S1, S2, 2/6 distant lack murmur, no gallops, rubs,  ABD: Soft and nondistended, nontender with normal bowel sounds.   MSK: Muscle strength and tone were  normal.  EXTREMITIES: No cyanosis, clubbing or edema.  Localized edema of right shoulder incision.  NEURO: Alert and oriented x 3.  Face is symmetric.  SKIN: Right forearm with open areas without scabbing, no signs and symptoms of infection.  Right shoulder incision is well approximated with sutures without surrounding erythema.  Does have serosanguineous drainage with purulent drainage on the distal end of the incision.    PSYCH: euthymic            Labs:   Recent Results (from the past 240 hour(s))   ALT (SGPT)   Result Value Ref Range    ALT 9 0 - 45 U/L   AST (SGOT)   Result Value Ref Range    AST 27 0 - 40 U/L   Bilirubin, Total   Result Value Ref Range    Bilirubin, Total 0.6 0.0 - 1.0 mg/dL   Blood Urea Nitrogen (BUN)   Result Value Ref Range    BUN 12 8 - 22 mg/dL   C-Reactive Protein (CRP)   Result Value Ref Range    CRP 8.0 (H) 0.0 - 0.8 mg/dL   Alkaline Phosphatase   Result Value Ref Range    Alkaline Phosphatase 111 45 - 120 U/L   Creatinine   Result Value Ref Range    Creatinine 1.16 0.70 - 1.30 mg/dL    GFR MDRD Af Amer >60 >60 mL/min/1.73m2    GFR MDRD Non Af Amer >60 >60 mL/min/1.73m2   Bilirubin, Direct   Result Value Ref Range    Bilirubin, Direct 0.3 <=0.5 mg/dL   HM1 (CBC with Diff)   Result Value Ref Range    WBC 6.1 4.0 - 11.0 thou/uL    RBC 2.43 (L) 4.40 - 6.20 mill/uL    Hemoglobin 7.3 (L) 14.0 - 18.0 g/dL    Hematocrit 22.2 (L) 40.0 - 54.0 %    MCV 91 80 - 100 fL    MCH 30.0 27.0 - 34.0 pg    MCHC 32.9 32.0 - 36.0 g/dL    RDW 13.2 11.0 - 14.5 %    Platelets 288 140 - 440 thou/uL    MPV 9.6 8.5 - 12.5 fL    Neutrophils % 73 (H) 50 - 70 %    Lymphocytes % 15 (L) 20 - 40 %    Monocytes % 10 2 - 10 %    Eosinophils % 2 0 - 6 %    Basophils % 0 0 - 2 %    Neutrophils Absolute 4.4 2.0 - 7.7 thou/uL    Lymphocytes Absolute 0.9 0.8 - 4.4 thou/uL    Monocytes Absolute 0.6 0.0 - 0.9 thou/uL    Eosinophils Absolute 0.1 0.0 - 0.4 thou/uL    Basophils Absolute 0.0 0.0 - 0.2 thou/uL   Ferritin   Result  Value Ref Range    Ferritin 277 27 - 300 ng/mL   Iron and Transferrin Iron Binding Capacity   Result Value Ref Range    Iron 31 (L) 42 - 175 ug/dL    Transferrin 139 (L) 212 - 360 mg/dL    Transferrin Saturation, Calculated 18 (L) 20 - 50 %    Transferrin IBC, Calculated 174 (L) 313 - 563 ug/dL   HM1 (CBC with Diff)   Result Value Ref Range    WBC 5.6 4.0 - 11.0 thou/uL    RBC 2.68 (L) 4.40 - 6.20 mill/uL    Hemoglobin 8.1 (L) 14.0 - 18.0 g/dL    Hematocrit 24.7 (L) 40.0 - 54.0 %    MCV 92 80 - 100 fL    MCH 30.2 27.0 - 34.0 pg    MCHC 32.8 32.0 - 36.0 g/dL    RDW 13.2 11.0 - 14.5 %    Platelets 249 140 - 440 thou/uL    MPV 9.2 8.5 - 12.5 fL    Neutrophils % 65 50 - 70 %    Lymphocytes % 22 20 - 40 %    Monocytes % 11 (H) 2 - 10 %    Eosinophils % 2 0 - 6 %    Basophils % 0 0 - 2 %    Neutrophils Absolute 3.6 2.0 - 7.7 thou/uL    Lymphocytes Absolute 1.2 0.8 - 4.4 thou/uL    Monocytes Absolute 0.6 0.0 - 0.9 thou/uL    Eosinophils Absolute 0.1 0.0 - 0.4 thou/uL    Basophils Absolute 0.0 0.0 - 0.2 thou/uL   Hemoglobin   Result Value Ref Range    Hemoglobin 7.6 (L) 14.0 - 18.0 g/dL   HM2(CBC w/o Differential)   Result Value Ref Range    WBC 5.2 4.0 - 11.0 thou/uL    RBC 2.38 (L) 4.40 - 6.20 mill/uL    Hemoglobin 7.0 (L) 14.0 - 18.0 g/dL    Hematocrit 21.9 (L) 40.0 - 54.0 %    MCV 92 80 - 100 fL    MCH 29.4 27.0 - 34.0 pg    MCHC 32.0 32.0 - 36.0 g/dL    RDW 13.2 11.0 - 14.5 %    Platelets 219 140 - 440 thou/uL    MPV 9.0 8.5 - 12.5 fL   ALT (SGPT)   Result Value Ref Range    ALT <9 0 - 45 U/L   Basic Metabolic Panel   Result Value Ref Range    Sodium 134 (L) 136 - 145 mmol/L    Potassium 4.5 3.5 - 5.0 mmol/L    Chloride 100 98 - 107 mmol/L    CO2 25 22 - 31 mmol/L    Anion Gap, Calculation 9 5 - 18 mmol/L    Glucose 201 (H) 70 - 125 mg/dL    Calcium 8.4 (L) 8.5 - 10.5 mg/dL    BUN 11 8 - 22 mg/dL    Creatinine 1.20 0.70 - 1.30 mg/dL    GFR MDRD Af Amer >60 >60 mL/min/1.73m2    GFR MDRD Non Af Amer >60 >60 mL/min/1.73m2    Bilirubin, Total   Result Value Ref Range    Bilirubin, Total 0.4 0.0 - 1.0 mg/dL   Blood Urea Nitrogen (BUN)   Result Value Ref Range    BUN 11 8 - 22 mg/dL   Creatinine   Result Value Ref Range    Creatinine 1.20 0.70 - 1.30 mg/dL    GFR MDRD Af Amer >60 >60 mL/min/1.73m2    GFR MDRD Non Af Amer >60 >60 mL/min/1.73m2   Alkaline Phosphatase   Result Value Ref Range    Alkaline Phosphatase 138 (H) 45 - 120 U/L   HM1 (CBC with Diff)   Result Value Ref Range    WBC 7.2 4.0 - 11.0 thou/uL    RBC 2.57 (L) 4.40 - 6.20 mill/uL    Hemoglobin 7.6 (L) 14.0 - 18.0 g/dL    Hematocrit 24.3 (L) 40.0 - 54.0 %    MCV 95 80 - 100 fL    MCH 29.6 27.0 - 34.0 pg    MCHC 31.3 (L) 32.0 - 36.0 g/dL    RDW 13.2 11.0 - 14.5 %    Platelets 269 140 - 440 thou/uL    MPV 8.9 8.5 - 12.5 fL    Neutrophils % 81 (H) 50 - 70 %    Lymphocytes % 10 (L) 20 - 40 %    Monocytes % 7 2 - 10 %    Eosinophils % 1 0 - 6 %    Basophils % 0 0 - 2 %    Neutrophils Absolute 5.9 2.0 - 7.7 thou/uL    Lymphocytes Absolute 0.7 (L) 0.8 - 4.4 thou/uL    Monocytes Absolute 0.5 0.0 - 0.9 thou/uL    Eosinophils Absolute 0.1 0.0 - 0.4 thou/uL    Basophils Absolute 0.0 0.0 - 0.2 thou/uL         Assessment:  1. MSSA bacteremia     2. Staphylococcal arthritis of right shoulder (H)     3. Acute blood loss anemia     4. Essential hypertension         Plan:  MSSA bacteremia: Continue with vancomycin and labs monitoring by pharmacy.  Follow-up with infectious disease.  PICC line is intact.    Staphylococcal arthritis of right shoulder: I did discuss his drainage with orthopedic NP and she has assessed him today.  She states it is likely that he will need another washout and he is requesting that be done at the [facility].  Will await surgeon recommendations which should appear in the next 24 hours.  Counseled patient on the likelihood of a future surgery and he is agreeable.    Anemia: Hemoglobin returned at 7.6.  Continue to monitor.  Continue ferrous sulfate 325  daily.    Hypertension: We will increase benazepril to 20 mg twice daily and check a BMP at the end of the week.  Did  patient on needing to have benazepril and amlodipine  at this time as so we can make quick adjustments due to his recent NEMESIO.  He is agreeable to that plan.  Creatinine today was 1.20.    35 total minutes spent with 20 minutes spent face-to-face with patient and orthopedic NP in counseling and coordination of the above plan of care.    Electronically signed by: Fallon Wall, ALEXIS

## 2021-06-19 NOTE — LETTER
Letter by Shawn Morrow CNP at      Author: Shawn Morrow CNP Service: -- Author Type: --    Filed:  Encounter Date: 10/10/2019 Status: Signed         Patient: Kobe Henriquez   MR Number: [id number]   YOB: 1960   Date of Visit: 10/10/2019     Code Status:  FULL CODE  Visit Type: Problem Visit (lab follow up)     Facility:  [facility] [[id number]]      Facility Type: SNF (Skilled Nursing Facility, TCU)    History of Present Illness:   Hospital Admission Date: 9/20/2019 Hospital Discharge Date: 10/4/2019      Kobe Henriquez is a 59 y.o. male who has a past medical history for hypertension, HLD, chronic neck pain, TAA repair, bipolar disorder, atrial fibrillation not on anticoagulation, sleep apnea, type 2 diabetes diet managed, with bilateral shoulder replacements in the past.  He was admitted to [facility] for ongoing weakness with a mechanical fall over the course of 2 weeks.  He was found to have MSSA bacteremia and a right septic shoulder.  He had his right shoulder washed out and was put on long-term IV antibiotics.  His hospitalization was complicated by NEMESIO in which he needed 2 rounds of dialysis with the last one being 9/24.  He also had acute toxic encephalopathy so there was a question of septic emboli however this did resolve.  EEG showed no signs of seizure.  He also had low hemoglobin in which he underwent an endoscopy and did not find any lesions or lacerations.  He was given 1 unit of packed red blood cells and his anemia improved.    Today: Kobe was seen regarding lab follow for hemoglobin which has improved 7.3 --> 8.1 in 2 days as well as ferritin lab; ferritin is well within limits however tsat at 18% suggests a mild degree of iron deficiency. He is already taking his iron tablet Ferso (Ferrous sulfate dried, 300mg tab) twice daily. He says this specific tablet is better for absorption given his past mark en y procedure. He is  hemodynamically stable and denying dizziness or shortness of breath. He reports a history of anemia and as had multiple blood transfusions in the past.   Has had melena in stools and 1 positive guaiac per nursing. This should be worked up by primary provider in the building as he his currently stable.     He also wants to discuss his antihypertensives; says that Dr. Miller restarted his ace inhibitor which he was worried about. Bps are monitored and have been generally <150/90.   He is also in need of clonazepam order which I will complete today.   We spent quite some time discussing medications, his past history as well as his therapy. He seemed to benefit from simply venting and being heard.       Past Medical History:   Diagnosis Date   ? A-fib (H)    ? NEMESIO (acute kidney injury) (H)    ? Anemia     acute on chronic, with episode of melena, resolved s/p transfusion   ? Anxiety    ? Ascending aortic aneurysm (H)    ? Bicuspid aortic valve    ? Bipolar disorder (H)    ? BPPV (benign paroxysmal positional vertigo)    ? Chronic narcotic use    ? Chronic neck pain    ? Chronic osteoarthritis    ? Degenerative joint disease    ? Depression    ? DMII (diabetes mellitus, type 2) (H)    ? HLD (hyperlipidemia)    ? HTN (hypertension)    ? Hypermetropia    ? Moderate malnutrition (H)    ? MSSA bacteremia    ? Multiple stiff joints    ? Neck injuries    ? Obesity    ? Presbyopia    ? Septic joint of right shoulder region (H)     s/p washout   ? Skin picking habit    ? Sleep apnea      Past Surgical History:   Procedure Laterality Date   ? BYPASS GASTRIC TAVO-EN-Y, LIVER BIOPSY, COMBINED  08/08/2005   ? COLONOSCOPY  06/30/2014   ? CYSTOSCOPY, BLADDER NECK CUTS, COMBINED  07/18/2016   ? ESOPHAGOGASTRODUODENOSCOPY  06/30/2014   ? EXCISE MASS FINGER Right 06/14/2011   ? HAND SURGERY Left     excision of tendon cyst on left hand     ? IRRIGATION AND DEBRIDEMENT UPPER EXTREMITY Right 09/23/2019    shoulder   ? LASER HOLMIUM  LITHOTRIPSY BLADDER  10/15/2014   ? LASER KTP GREEN LIGHT PHOTOSELECTIVE VAPORIZATION PROSTATE  2014   ? PERIPHERALLY INSERTED CENTRAL CATHETER INSERTION     ? RELEASE TRIGGER FINGER Right 2017   ? REPAIR ANEURYSM ASCENDING AORTA  10/04/2016   ? TOTAL SHOULDER REPLACEMENT Left 04/15/2014   ? TOTAL SHOULDER REPLACEMENT Right 2014   ? TRANSURETHRAL RESECTION OF PROSTATE       Family History   Problem Relation Age of Onset   ? Cataracts Mother    ? Macular degeneration Mother    ? Anxiety disorder Mother    ? Hyperlipidemia Mother    ? Hypertension Mother    ? Other Mother         low back problems   ? Osteoporosis Mother    ? Arrhythmia Father    ? Cardiovascular Father    ? Coronary artery disease Father    ? Depression Father    ? Hyperlipidemia Father    ? Hypertension Father    ? Other Father         low back problems, spine problems   ? Nephrolithiasis Father    ? Obesity Father    ? Sleep apnea Father    ? Anxiety disorder Sister    ? Hyperlipidemia Sister    ? Hypertension Sister    ? Obesity Sister    ? Other Sister         low back problems, spine problems   ? Osteoporosis Sister    ? Anxiety disorder Sister    ? Depression Sister    ? Hyperlipidemia Sister    ? Hypertension Sister    ? Other Sister         low back problems   ? Obesity Sister    ? Osteoporosis Sister      Social History     Socioeconomic History   ? Marital status: Single     Spouse name: Not on file   ? Number of children: Not on file   ? Years of education: Not on file   ? Highest education level: Not on file   Occupational History   ? Not on file   Social Needs   ? Financial resource strain: Not on file   ? Food insecurity:     Worry: Not on file     Inability: Not on file   ? Transportation needs:     Medical: Not on file     Non-medical: Not on file   Tobacco Use   ? Smoking status: Former Smoker     Packs/day: 0.00     Start date:      Last attempt to quit:      Years since quittin.8   ? Smokeless  tobacco: Never Used   Substance and Sexual Activity   ? Alcohol use: Not Currently   ? Drug use: Not on file   ? Sexual activity: Not on file   Lifestyle   ? Physical activity:     Days per week: Not on file     Minutes per session: Not on file   ? Stress: Not on file   Relationships   ? Social connections:     Talks on phone: Not on file     Gets together: Not on file     Attends [employer] service: Not on file     Active member of club or organization: Not on file     Attends meetings of clubs or organizations: Not on file     Relationship status: Not on file   ? Intimate partner violence:     Fear of current or ex partner: Not on file     Emotionally abused: Not on file     Physically abused: Not on file     Forced sexual activity: Not on file   Other Topics Concern   ? Not on file   Social History Narrative   ? Not on file       Current Outpatient Medications   Medication Sig Dispense Refill   ? acetaminophen (TYLENOL) 500 MG tablet Take 500 mg by mouth 3 (three) times a day.     ? amLODIPine-benazepril (LOTREL) 10-20 mg per capsule Take 1 capsule by mouth daily.     ? bisacodyl (DULCOLAX) 10 mg suppository Insert 10 mg into the rectum daily as needed.     ? buPROPion (WELLBUTRIN SR) 200 MG 12 hr tablet Take 200 mg by mouth 2 (two) times a day.     ? carvedilol (COREG) 25 MG tablet Take 50 mg by mouth 2 (two) times a day with meals.     ? cefazolin sodium in 0.9 % NaCl (CEFAZOLIN IN 0.9% SOD CHLORIDE) 2 gram/100 mL Soln Infuse 2 g into a venous catheter 3 (three) times a day.     ? clonazePAM (KLONOPIN) 0.5 MG tablet Take 0.5 mg by mouth 3 (three) times a day as needed for anxiety.     ? cyanocobalamin (VITAMIN B-12) 50 mcg tablet Take 50 mcg by mouth 2 (two) times a day.     ? docusate sodium (COLACE) 100 MG capsule Take 100-200 mg by mouth daily.     ? fentaNYL (DURAGESIC) 75 mcg/hr Place 1 patch on the skin every third day.     ? ferrous sulfate (FEOSOL ORAL) Take 300 mg by mouth 3 (three) times a day.             ? Lactobacillus rhamnosus GG (CULTURELLE) 10-15 Billion cell capsule Take 1 capsule by mouth daily.     ? melatonin 3 mg Tab tablet Take 6 mg by mouth at bedtime.     ? multivitamin capsule Take 1 capsule by mouth daily.     ? oxyCODONE (ROXICODONE) 5 MG immediate release tablet Take 10 mg by mouth every 4 (four) hours as needed for pain.     ? pantoprazole (PROTONIX) 40 MG tablet Take 40 mg by mouth 2 (two) times a day.            ? polyethylene glycol (MIRALAX) 17 gram packet Take 17 g by mouth daily as needed.     ? senna (SENOKOT) 8.6 mg tablet Take by mouth daily. 2 tabs in am and 3 tabs @@ hs     ? senna (SENOKOT) 8.6 mg tablet Take 1 tablet by mouth 2 (two) times a day as needed for constipation.     ? simethicone (MYLICON) 40 mg/0.6 mL drops Take 40 mg by mouth 4 (four) times a day as needed.     ? simvastatin (ZOCOR) 10 MG tablet Take 10 mg by mouth at bedtime.     ? UNABLE TO FIND Take 300 mg by mouth 2 (two) times a day. Med Name: FeoSol 300mg two times a day ok to use med from home       No current facility-administered medications for this visit.      Allergies   Allergen Reactions   ? Adhesive Tape-Silicones      Blistering of skin     Immunization History   Administered Date(s) Administered   ? DT (pediatric) 02/04/2004   ? Td,adult,historic,unspecified 02/04/2004   ? Varicella 01/01/1900         Review of Systems   Constitutional: Negative.    HENT: Negative.    Eyes: Negative.    Respiratory: Negative.    Cardiovascular: Negative.    Gastrointestinal: Positive for constipation.   Genitourinary: Negative.    Musculoskeletal: Positive for arthralgias.   Psychiatric/Behavioral: Negative.      Does have intermittent urinary incontinence at baseline.  Other than stated in HPI all other review of systems is negative.       Physical Exam     /72   Pulse 84   Temp 98  F (36.7  C)   Wt 204 lb (92.5 kg)   SpO2 98%     GENERAL APPEARANCE: Male, with leg muscle wasting, in no acute  distress.  HEENT: normocephalic, atraumatic  PERRL, sclerae anicteric, conjunctivae clear and moist, EOM intact  LUNGS: Lung sounds CTA, no adventitious sounds, respiratory effort normal.  CARD: RRR, S1, S2, 2/6 colic murmur, no gallops, rubs, no JVD, peripheral pulses 2+ and symmetric  ABD: Soft and nondistended, nontender with normal bowel sounds.   MSK: Muscle strength and tone were normal.  EXTREMITIES: No cyanosis, clubbing or edema.  Shoulders have decreased range of motion with significant pain  NEURO: Alert and oriented x 3.  Face is symmetric.  SKIN: Right forearm with open areas and plaque-like scabbing, no signs and symptoms of infection.  Right shoulder incision is well approximated with sutures with minimal serous drainage and no surrounding erythema.  PSYCH: euthymic      Labs:   Recent Results (from the past 240 hour(s))   ALT (SGPT)   Result Value Ref Range    ALT 9 0 - 45 U/L   AST (SGOT)   Result Value Ref Range    AST 27 0 - 40 U/L   Bilirubin, Total   Result Value Ref Range    Bilirubin, Total 0.6 0.0 - 1.0 mg/dL   Blood Urea Nitrogen (BUN)   Result Value Ref Range    BUN 12 8 - 22 mg/dL   C-Reactive Protein (CRP)   Result Value Ref Range    CRP 8.0 (H) 0.0 - 0.8 mg/dL   Alkaline Phosphatase   Result Value Ref Range    Alkaline Phosphatase 111 45 - 120 U/L   Creatinine   Result Value Ref Range    Creatinine 1.16 0.70 - 1.30 mg/dL    GFR MDRD Af Amer >60 >60 mL/min/1.73m2    GFR MDRD Non Af Amer >60 >60 mL/min/1.73m2   Bilirubin, Direct   Result Value Ref Range    Bilirubin, Direct 0.3 <=0.5 mg/dL   HM1 (CBC with Diff)   Result Value Ref Range    WBC 6.1 4.0 - 11.0 thou/uL    RBC 2.43 (L) 4.40 - 6.20 mill/uL    Hemoglobin 7.3 (L) 14.0 - 18.0 g/dL    Hematocrit 22.2 (L) 40.0 - 54.0 %    MCV 91 80 - 100 fL    MCH 30.0 27.0 - 34.0 pg    MCHC 32.9 32.0 - 36.0 g/dL    RDW 13.2 11.0 - 14.5 %    Platelets 288 140 - 440 thou/uL    MPV 9.6 8.5 - 12.5 fL    Neutrophils % 73 (H) 50 - 70 %    Lymphocytes % 15  (L) 20 - 40 %    Monocytes % 10 2 - 10 %    Eosinophils % 2 0 - 6 %    Basophils % 0 0 - 2 %    Neutrophils Absolute 4.4 2.0 - 7.7 thou/uL    Lymphocytes Absolute 0.9 0.8 - 4.4 thou/uL    Monocytes Absolute 0.6 0.0 - 0.9 thou/uL    Eosinophils Absolute 0.1 0.0 - 0.4 thou/uL    Basophils Absolute 0.0 0.0 - 0.2 thou/uL   Ferritin   Result Value Ref Range    Ferritin 277 27 - 300 ng/mL   Iron and Transferrin Iron Binding Capacity   Result Value Ref Range    Iron 31 (L) 42 - 175 ug/dL    Transferrin 139 (L) 212 - 360 mg/dL    Transferrin Saturation, Calculated 18 (L) 20 - 50 %    Transferrin IBC, Calculated 174 (L) 313 - 563 ug/dL   HM1 (CBC with Diff)   Result Value Ref Range    WBC 5.6 4.0 - 11.0 thou/uL    RBC 2.68 (L) 4.40 - 6.20 mill/uL    Hemoglobin 8.1 (L) 14.0 - 18.0 g/dL    Hematocrit 24.7 (L) 40.0 - 54.0 %    MCV 92 80 - 100 fL    MCH 30.2 27.0 - 34.0 pg    MCHC 32.8 32.0 - 36.0 g/dL    RDW 13.2 11.0 - 14.5 %    Platelets 249 140 - 440 thou/uL    MPV 9.2 8.5 - 12.5 fL    Neutrophils % 65 50 - 70 %    Lymphocytes % 22 20 - 40 %    Monocytes % 11 (H) 2 - 10 %    Eosinophils % 2 0 - 6 %    Basophils % 0 0 - 2 %    Neutrophils Absolute 3.6 2.0 - 7.7 thou/uL    Lymphocytes Absolute 1.2 0.8 - 4.4 thou/uL    Monocytes Absolute 0.6 0.0 - 0.9 thou/uL    Eosinophils Absolute 0.1 0.0 - 0.4 thou/uL    Basophils Absolute 0.0 0.0 - 0.2 thou/uL   Hemoglobin   Result Value Ref Range    Hemoglobin 7.6 (L) 14.0 - 18.0 g/dL   HM2(CBC w/o Differential)   Result Value Ref Range    WBC 5.2 4.0 - 11.0 thou/uL    RBC 2.38 (L) 4.40 - 6.20 mill/uL    Hemoglobin 7.0 (L) 14.0 - 18.0 g/dL    Hematocrit 21.9 (L) 40.0 - 54.0 %    MCV 92 80 - 100 fL    MCH 29.4 27.0 - 34.0 pg    MCHC 32.0 32.0 - 36.0 g/dL    RDW 13.2 11.0 - 14.5 %    Platelets 219 140 - 440 thou/uL    MPV 9.0 8.5 - 12.5 fL         Assessment:  1. Acute blood loss anemia     2. Pyogenic arthritis of right shoulder region, due to unspecified organism (H)     3. Hypertension,  "unspecified type     4. Bipolar affective disorder, remission status unspecified (H)         Plan:    Anemia: Follow up on hemoglobin which as been followed closely while he was hospitalized. He continues on ferrous sulfate dried 300mg tablet. He reports taking this due to difficulty with absorption secondary to rouy en y surgery in the past.   Recommend follow up hgb on Monday. Improved to 8.1 from 7.3 2 days ago.   FOllow up regarding positive guaic. Has history of GI bleed so need to consider this as well if hgb continues to trend down.     Hypertension: Per patient, his ace inhibitor was recently restarted. Bps have been generally stable.     Bipolar disorder: Stable with some underlying anxiety. I will reorder Klonopin 0.5 mg 3 times daily as needed.    Constipation: I clarified timing of senna to be given at 4am per his request. He reports that his current medications are working \"like a well oiled machine\".     35 minutes of floor time with 20 minutes face to face spent counseling and coordinating with nursing staff and patient regarding anemia, constipation, hypertension, medication clarification, lab monitoring,     Electronically signed by: Shawn Morrow, CNP            "

## 2021-06-20 NOTE — LETTER
Letter by Analilia Trujillo at      Author: Analilia Trujillo Service: -- Author Type: --    Filed:  Encounter Date: 8/14/2020 Status: (Other)         August 14, 2020       Kobe Henriquez  [address]  [place] [address]    Dear Kobe Henriquez:    We are pleased to provide you with secure, online access to medical information for you and your family within [facility]. Per your request, we have expanded your account to allow access to the records of the following family members:              Shyam iSmonbrendan (privilege ends on 8/14/2025.)     How Do I Log In?  1. In your Internet browser, go to [email or web address]  2. Log into [facility] using your [facility] Username and Password.  3. Click Sign In.        How Do I Access a Family Member's Account?  4. Select the account you want to access by clicking the [employer] with the appropriate patient's name at the top of your screen.   5. You will see a disclaimer page letting you know that you will be viewing a family member's record. Review the disclaimer and then click Accept Proxy Access Disclaimer to proceed.  6. Once you switch to viewing a family member's record, you can navigate to [facility] pages the same way you would for yourself. You can return to your own account by clicking the [employer] at the top of the screen with your name on it.    7. To customize the colors and names of the linked accounts, you can select Personalize from the Profile dropdown menu at the top of the screen, then click the Edit button to make changes.     Additional Information  If you have questions, visit [email or web address], e-mail [email or web address] or call [phone] to talk to our [facility] staff. Remember, [facility] is NOT to be used for urgent needs. For medical emergencies, dial 911.

## 2021-06-29 DIAGNOSIS — F43.9 STRESS: ICD-10-CM

## 2021-06-30 PROBLEM — M25.511 RIGHT SHOULDER PAIN: Status: ACTIVE | Noted: 2019-09-20

## 2021-06-30 RX ORDER — CLONAZEPAM 0.5 MG/1
0.5 TABLET ORAL 3 TIMES DAILY PRN
Qty: 90 TABLET | Refills: 1 | Status: SHIPPED | OUTPATIENT
Start: 2021-06-30 | End: 2021-08-31

## 2021-06-30 RX ORDER — FENTANYL 50 UG/1
1 PATCH TRANSDERMAL
COMMUNITY
Start: 2021-03-22 | End: 2022-07-14

## 2021-06-30 RX ORDER — SUCRALFATE 1 G/1
TABLET ORAL
COMMUNITY
Start: 2021-06-21 | End: 2021-08-10

## 2021-06-30 NOTE — TELEPHONE ENCOUNTER
Authorized electronic refill.    Complex patient through [facility]    Confirmed and checked database today.  UTD and compliant with screening.

## 2021-07-01 NOTE — TELEPHONE ENCOUNTER
Post-Surgical Note    Subjective:   Patient said she was doing well however when she went for a walk today she felt like she had some chest discomfort and then right shoulder pain.  No nausea no emesis she said she is drinking fine she is ambulatory.  She said incentive spirometry is not as good as it was before but then she was able to demonstrate this.  She did not know how but then we went over torso twist she was able to perform this.  She denies significant abdominal pain or calf pain.    She said she does not have anyone to be with her for the next 48 hours she said she has a friend she will call to ask.  She said she could not urinate last night and that she had to be catheterized.    She stated she is not sure she is getting ready to go home but said she would see later on if she can urinate if she feels better and she could get someone at home    I/O's    Intake/Output Summary (Last 24 hours) at 7/1/2021 0837  Last data filed at 7/1/2021 0600  Gross per 24 hour   Intake 2930 ml   Output 2510 ml   Net 420 ml     Date 06/30/21 0700 - 07/01/21 0659 07/01/21 0700 - 07/02/21 0659   Shift 6966-1382 4440-2829 1395-5215 24 Hour Total 8641-4748 4062-3072 8333-9726 24 Hour Total   INTAKE   P.O.  360 420 780       I.V. 1450 700  2150       IV Piggyback  0  0       Shift Total 1450 5012 536 4052       OUTPUT   Urine  8264 340 5638       Blood 10   10       Shift Total 10 6326 128 3006       Weight (kg) 95.3 95.3 95.3 95.3 95.3 95.3 95.3 95.3          Last Recorded Vitals    Patient Vitals for the past 24 hrs:   BP Temp Temp src Pulse Resp SpO2   07/01/21 0700 132/74 98.8 °F (37.1 °C) Oral 63 16 94 %   07/01/21 0301 109/53 98.1 °F (36.7 °C) Oral 66 14 96 %   06/30/21 2326 130/81 98.4 °F (36.9 °C) Oral 69 14 94 %   06/30/21 1924 (!) 155/89 97.5 °F (36.4 °C) Oral 79 -- 96 %   06/30/21 1824 (!) 166/87 98.2 °F (36.8 °C) Oral 73 16 97 %   06/30/21 1539 -- -- -- 72 -- 95 %   06/30/21  Dear Gene STEINBERG for this     Thanks, AMEYA Styles   1521 (!) 174/92 -- -- 74 -- --   06/30/21 1452 (!) 161/70 -- -- 66 -- --   06/30/21 1351 -- 97.5 °F (36.4 °C) Oral 70 16 97 %   06/30/21 1351 (!) 156/92 -- -- 71 -- 97 %   06/30/21 1236 (!) 140/79 -- -- 63 13 94 %   06/30/21 1226 (!) 141/75 -- -- 61 14 92 %   06/30/21 1216 (!) 143/77 96.8 °F (36 °C) Temporal 65 19 94 %   06/30/21 1206 (!) 142/79 -- -- 63 12 94 %   06/30/21 1156 (!) 145/82 -- -- 62 12 98 %   06/30/21 1146 (!) 145/76 -- -- 69 20 93 %   06/30/21 1136 (!) 162/78 -- -- 71 16 93 %   06/30/21 1129 (!) 161/84 97 °F (36.1 °C) -- 76 18 94 %   06/30/21 1126 (!) 161/84 97 °F (36.1 °C) Temporal 76 18 93 %         Blood pressure 132/74, pulse 63, temperature 98.8 °F (37.1 °C), temperature source Oral, resp. rate 16, height 5' 4\" (1.626 m), weight 95.3 kg (210 lb), SpO2 94 %.  Body mass index is 36.05 kg/m².      Physical Exam  Constitutional:       General: She is not in acute distress.     Appearance: She is not diaphoretic.   Eyes:      Conjunctiva/sclera: Conjunctivae normal.   Cardiovascular:      Rate and Rhythm: Normal rate and regular rhythm.      Heart sounds: Normal heart sounds.   Pulmonary:      Effort: Pulmonary effort is normal.      Breath sounds: Normal breath sounds.   Abdominal:      General: Bowel sounds are normal. There is no distension.      Palpations: Abdomen is soft.      Tenderness: There is no abdominal tenderness. There is no guarding or rebound.      Comments: Incisions clean and dry   Musculoskeletal:         General: No tenderness. Normal range of motion.      Cervical back: Neck supple.      Comments: Right shoulder normal range of motion.  Neurovascular intact in the right arm    No calf tenderness   Skin:     General: Skin is warm.   Neurological:      Mental Status: She is alert and oriented to person, place, and time.   Psychiatric:         Judgment: Judgment normal.              Labs   Recent Labs   Lab 07/01/21  0747 06/24/21  1032   WBC 10.3 5.3   RBC 5.92* 6.04*   HGB 10.9*  11.1*   HCT 36.8 36.8    215     Recent Labs   Lab 07/01/21  0747 06/24/21  1032   SODIUM 143 141   CHLORIDE 110* 111*   CO2 29 26   BUN 11 18   CREATININE 0.69 0.61   CALCIUM 8.8 8.9   ALBUMIN 3.4* 3.6   BILIRUBIN 0.7 0.8   ALKPT 94 103   GPT 43 22   AST 30 11   GLUCOSE 110* 97     Lab Results   Component Value Date    MG 2.0 07/01/2021    PHOS 2.7 07/01/2021       Imaging  No results found.     Assessment/Plan:    Hernia, hiatal  Patient was seen preoperatively.  Planned hiatal hernia repair with TIF procedure.  Postoperative care per bariatric surgery.  Liquid diet and advance 6 to stage I bariatric diet.  Counseled the patient on need for ambulation spirometer use and pain control.      Essential (primary) hypertension  Patient did not take her antihypertensive meds this morning.  We will resume today.  As needed antihypertensives will be ordered including Vasotec.    COPD (chronic obstructive pulmonary disease) (CMS/[facility])  No signs of COPD exacerbation.  Nebs as needed.  Spirometer use.        Blood clot associated with vein wall inflammation  Patient not currently on full anticoagulation.  Lovenox 40 mg twice daily postop.  History of PE in 1990.    Sleep apnea  CPAP per home settings.    Hernia, hiatal  Postoperative day #1 status post laparoscopic hiatal hernia repair with mesh and Dr. Hartley with TIF procedure    Patient said she was doing well however when she went for a walk today she felt like she had some chest discomfort and then right shoulder pain.  No nausea no emesis she said she is drinking fine she is ambulatory.  She said incentive spirometry is not as good as it was before but then she was able to demonstrate this.  She did not know how but then we went over torso twist she was able to perform this.  She denies significant abdominal pain or calf pain.    She said she does not have anyone to be with her for the next 48 hours she said she has a friend she will call to ask.  She said she  could not urinate last night and that she had to be catheterized.    Overall she is advancing on the clinical pathway if she continues there is possibility for discharge later today.  Please note I have stopped her gabapentin.  Her cyclobenzaprine will be as needed.    I discussed the case with the RN if the patient is going home she will be able to go over more the discharge instructions at that time.  These instructions include the following:    I have gone over discharge instructions including the following: Pulmonary toilet, diet, the patient has a follow-up appointment, Lovenox use, pain medication, including muscle relaxants can cause lethargy  and do not drive while on these medications, activity level, driving and lifting instructions and showering instructions.  We have gone over torso twist as well.  We discussed drain site care.  The patient was also instructed if any issues to call our office one of us is always on call and in an absolute emergency to call 911.  RN is to go over discharge instructions.  Patient stated no further questions and understanding of the above we also went over getting 64 ounces of total fluids and 60 g of protein and not advancing diet until appropriate time or seen in clinic.  I have completed the pertinent medication reconciliation form.    I have gone over nausea and to walk first and then try Gas-X and if not helping Zofran.  I have discussed zofran can cause constipation.  I have also gone over if the narcotics are not being used to take them back to their pharmacy to get rid of the pills due to unintentional use by other family members.Patient stated no further questions and understanding of the above.    COPD (chronic obstructive pulmonary disease) (CMS/[facility])  Stable per primary care service         Álvaro Harrison MD   7/1/2021

## 2021-07-06 ENCOUNTER — TELEPHONE (OUTPATIENT)
Dept: CARDIOLOGY | Facility: CLINIC | Age: 61
End: 2021-07-06

## 2021-07-06 NOTE — TELEPHONE ENCOUNTER
Called and spoke with patient about MNCMN. He states his BP is always in the 140's and does not want to be called about it again. He has an appt with Dr. Zhou on 7/28/21      Aimee Martinez LPN

## 2021-07-13 ENCOUNTER — TRANSFERRED RECORDS (OUTPATIENT)
Dept: HEALTH INFORMATION MANAGEMENT | Facility: CLINIC | Age: 61
End: 2021-07-13

## 2021-07-27 DIAGNOSIS — H40.003 GLAUCOMA SUSPECT OF BOTH EYES: Primary | ICD-10-CM

## 2021-07-27 DIAGNOSIS — H35.30 ARMD (AGE RELATED MACULAR DEGENERATION): ICD-10-CM

## 2021-07-28 ENCOUNTER — OFFICE VISIT (OUTPATIENT)
Dept: CARDIOLOGY | Facility: CLINIC | Age: 61
End: 2021-07-28
Payer: COMMERCIAL

## 2021-07-28 VITALS
HEART RATE: 56 BPM | HEIGHT: 70 IN | SYSTOLIC BLOOD PRESSURE: 130 MMHG | BODY MASS INDEX: 33.04 KG/M2 | DIASTOLIC BLOOD PRESSURE: 77 MMHG | WEIGHT: 230.8 LBS

## 2021-07-28 DIAGNOSIS — R42 VERTIGO: ICD-10-CM

## 2021-07-28 DIAGNOSIS — I71.21 ASCENDING AORTIC ANEURYSM (H): Primary | ICD-10-CM

## 2021-07-28 PROCEDURE — 99214 OFFICE O/P EST MOD 30 MIN: CPT | Performed by: INTERNAL MEDICINE

## 2021-07-28 ASSESSMENT — MIFFLIN-ST. JEOR: SCORE: 1863.15

## 2021-07-28 NOTE — LETTER
7/28/2021    Demi Hardin MD  [address] [facility] [address]    RE: Kobe Buchanan       Dear Colleague,    I had the pleasure of seeing Kobe Buchanan in the [facility].         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     Prior history:      Patient was referred by Atul Martínez and [place] to establish care into congenital vascular cardiology clinic. Patient reports his nephew at 28 years old has a known bicuspid aortic valve without aortopathy.  He has been seen in [place] and followed. His father is 91 years old and has a known aneurysm of 4.9 cm in bicuspid valve. Patient reports no family history of sudden death or known dissection. No history of strokes. Blood pressure has been well controlled on multidrug regimen. Has been stable on current drug regimen with coreg and benazpril-amlodipine.       Has history of NEMESIO on lasix, and also had hyperkalemia on losartan. During a past visit His blood pressure has been labile, with BP spiking in the mid morning but being controlled early AM. He reports then in afternoon it is controlled. He takes his twice a day meds at 5am and 5pm. He denies any chest pain, sob, n/v/d, fevers, chills, ns. No LE edema. MRA chest in April 2021 demonstrated graft stability. Echocardiogram showed normal BAV function, with only mild aortic regurgitation. His CMR demonstrated normalization of his LVEF suggestive that prior reduction in function was due to stress CMY.     Today he is doing well. BP is well  controlled. We reviewed his studies from April which were all good. He reports some positional dizziness when he abruptly gets up. Wondering about carotid/vertebral disease. Has never had carotid imaging. No arm fatigue with use.        ASSESSMENT/PLAN:      61-year-old male with hypertension, hyperlipidemia, right left fusion of his aortic valve with bicuspid aortopathy.  No known coarctation.  No other peripheral aneurysmal disease.  He is status post valve sparing aortic repair with Gelweave interposition graft.      Blood pressure well controlled overall on current regimen. He will need ongoing long-term surveillance of both of his aortic repair and his bicuspid valve given he has some dysfunction within the native valve (mild sclerosis and mild AI).      Summary of Recommendations:     1. Echocardiogram once a year - can defer MRA for graft surveillance given stable   2. Continue amlodipine-benzapril and 37.5 mg bid carvedilol  3. Would check renal function and electrolytes once a year at least given issues with potassium in the past  4. Follow up in one year    Enriqueta Zhou MD MSc  [facility]     PAST MEDICAL HISTORY  Past Medical History:   Diagnosis Date     Anxiety      Ascending aortic aneurysm (H)      Bicuspid aortic valve      BPPV (benign paroxysmal positional vertigo) 7/11/2014     Chronic narcotic use      Chronic neck pain      Chronic osteoarthritis      Degenerative joint disease      Depression      Hyperlipidemia 4/10/2012     Hypertension      Multiple stiff joints      Neck injuries      Obesity 2/9/2015     Pain in shoulder      Skin picking habit      Sleep apnea     does not use cpap       CURRENT MEDICATIONS  Current Outpatient Medications   Medication Sig Dispense Refill     amLODIPine-benazepril (LOTREL) 5-20 MG capsule Take 1 capsule by mouth 2 times daily 180 capsule 3     aspirin (ASA) 325 MG tablet Take 325 mg by mouth daily       atorvastatin (LIPITOR) 40 MG tablet Take  1 tablet (40 mg) by mouth daily 90 tablet 3     buPROPion (WELLBUTRIN SR) 200 MG 12 hr tablet TAKE 1 TABLET BY MOUTH 2 TIMES DAILY 180 tablet 3     carvedilol (COREG) 25 MG tablet Take 1.5 tablets (37.5 mg) by mouth 2 times daily (with meals) 180 tablet 3     clonazePAM (KLONOPIN) 0.5 MG tablet Take 1 tablet (0.5 mg) by mouth 3 times daily as needed for anxiety 90 tablet 1     cyclobenzaprine (FLEXERIL) 10 MG tablet Take 1 tablet (10 mg) by mouth 3 times daily as needed for muscle spasms 90 tablet 3     desoximetasone (TOPICORT) 0.25 % external cream Apply topically daily as needed for inflammation or itching       docusate sodium (COLACE) 100 MG capsule Take 2 capsule in the morning and 3 capsules in the evening       fentaNYL (DURAGESIC) 50 mcg/hr 72 hr patch Place 1 patch onto the skin every 48 hours       fluocinonide (LIDEX) 0.05 % external ointment Apply twice daily to itchy skin nodules for 1-2 weeks at a time. 30 g 3     naproxen (EC-NAPROSYN) 500 MG EC tablet Take 1 tablet (500 mg) by mouth 2 times daily as needed (pain) 186 tablet 1     oxyCODONE IR (ROXICODONE) 10 MG tablet Take 1 tablet by mouth every 4 hours as needed for pain, max 4 tablet(s) per day       Pyrithione Zinc 2 % SHAM Externally apply topically daily 480 mL 4     senna-docusate (SENOKOT-S/PERICOLACE) 8.6-50 MG tablet Take 1-2 tablets by mouth 2 times daily 30 tablet 0     sucralfate (CARAFATE) 1 GM tablet Take 1 tablet (1 g) by mouth 2 times daily as needed (Reflux)       tacrolimus (PROTOPIC) 0.1 % ointment Apply topically as needed Apply to affected areas on body. 120 g 11     tiZANidine (ZANAFLEX) 4 MG tablet Take 1 tablet (4 mg) by mouth nightly as needed for other (insomnia related to muscle pain) 90 tablet 3     triamcinolone (KENALOG) 0.1 % external ointment Apply topically 2 times daily To affected areas of rash. Please avoid application to the face, groin or armpits as the medication is too strong for these areas. 454 g 0      triamcinolone (KENALOG) 0.1 % external ointment Apply topically 2 times daily as needed for irritation 454 g 0     vitamin B-Complex Take 1 tablet by mouth daily       amoxicillin (AMOXIL) 500 MG capsule Take 4 tablets 1 hour before dental procedure (Patient not taking: Reported on 7/28/2021) 24 capsule 0     Cyanocobalamin 5000 MCG SUBL Place 5,000 mcg under the tongue daily Vitamin B12 (Patient not taking: Reported on 7/28/2021)       Fe Heme Polypeptide-folic acid (PROFERRIN-FORTE) 12-1 MG TABS Take 1 tablet by mouth 2 times daily (Patient not taking: Reported on 7/28/2021) 90 tablet 0     Multiple Minerals-Vitamins (CALCIUM CITRATE-MAG-MINERALS) TABS Take 1 tablet by mouth daily (Patient not taking: Reported on 7/28/2021)       multivitamin w/minerals (THERA-VIT-M) tablet Take 1 tablet by mouth daily (Patient not taking: Reported on 7/28/2021)       naloxone (NARCAN) nasal spray Spray 1 spray (4 mg) into one nostril alternating nostrils as needed for opioid reversal every 2-3 minutes until assistance arrives (Patient not taking: Reported on 7/28/2021) 0.2 mL 0       PAST SURGICAL HISTORY:  Past Surgical History:   Procedure Laterality Date     ARTHROPLASTY SHOULDER  4/15/2014    Procedure: Left Total Shoulder Arthroplasty ;  Surgeon: Analilia Aceves MD;  [facility]     ARTHROPLASTY SHOULDER Right 8/26/2014    Procedure: ARTHROPLASTY SHOULDER;  Surgeon: Analilia Aceves MD;  [facility]     ARTHROSCOPY SHOULDER WITH BIOPSY(IES) Left 1/28/2020    Procedure: Left shoulder arthroscopy and biopsy for culture;  Surgeon: Analilia Aceves MD;  [facility]     BYPASS GASTRIC TAVO-EN-Y, LIVER BIOPSY, COMBINED  8/8/2005     C HAND/FINGER SURGERY UNLISTED       C SHOULDER SURG PROC UNLISTED       COLONOSCOPY  6/30/2014    Procedure: COMBINED COLONOSCOPY, SINGLE BIOPSY/POLYPECTOMY BY BIOPSY;  Surgeon: Chester Patton MD;  Location:  GI     COLONOSCOPY  06/30/2014     CV CORONARY  ANGIOGRAM  1/30/2020    Procedure: CV CORONARY ANGIOGRAM;  Surgeon: Néstor Walls MD;  [facility]     CYSTOSCOPY, BLADDER NECK CUTS, COMBINED N/A 7/18/2016    Procedure: COMBINED CYSTOSCOPY, BLADDER NECK CUTS;  Surgeon: Ritu Leslie MD;  [facility]     ESOPHAGOSCOPY, GASTROSCOPY, DUODENOSCOPY (EGD), COMBINED  6/30/2014    Procedure: COMBINED ESOPHAGOSCOPY, GASTROSCOPY, DUODENOSCOPY (EGD), BIOPSY SINGLE OR MULTIPLE;  Surgeon: Chester Patton MD;  [facility]: [facility]     ESOPHAGOSCOPY, GASTROSCOPY, DUODENOSCOPY (EGD), COMBINED  06/30/2014     EXCISE MASS FINGER  6/14/2011    Procedure:EXCISE MASS FINGER; Middle Flexor Cyst; Surgeon:SHAYY RUSSELL; [facility]     HAND SURGERY      excision of tendon cyst on left hand     HAND SURGERY Left     excision of tendon cyst on left hand       [facility] VASCULAR SURGERY PROCEDURE UNLIST       IR FINE NEEDLE ASPIRATION W ULTRASOUND  11/13/2019     IR PICC PLACEMENT > 5 YRS OF AGE  11/19/2019     LASER HOLMIUM LITHOTRIPSY BLADDER N/A 10/15/2014    Procedure: LASER HOLMIUM LITHOTRIPSY BLADDER;  Surgeon: Sahil Taveras MD;  [facility]     LASER KTP GREEN LIGHT PHOTOSELECTIVE VAPORIZATION PROSTATE  1/23/2014    Procedure: LASER KTP GREEN LIGHT PHOTOSELECTIVE VAPORIZATION PROSTATE;  Greenlight Photovaporization Of Prostate  ;  Surgeon: Sahil Taveras MD;  [facility]     OTHER SURGICAL HISTORY Right 06/14/2011    EXCISE MASS FINGER     OTHER SURGICAL HISTORY  08/08/2005    BYPASS GASTRIC TAVO-EN-Y, LIVER BIOPSY, COMBINED     OTHER SURGICAL HISTORY  01/23/2014    LASER KTP GREEN LIGHT PHOTOSELECTIVE VAPORIZATION PROSTATE     OTHER SURGICAL HISTORY  10/15/2014    LASER HOLMIUM LITHOTRIPSY BLADDER     OTHER SURGICAL HISTORY  07/18/2016    CYSTOSCOPY, BLADDER NECK CUTS, COMBINED     OTHER SURGICAL HISTORY  10/04/2016    REPAIR ANEURYSM ASCENDING AORTA     OTHER SURGICAL HISTORY Right 03/31/2017     RELEASE TRIGGER FINGER     OTHER SURGICAL HISTORY Right 09/23/2019    IRRIGATION AND DEBRIDEMENT UPPER EXTREMITYshoulder     PERIPHERALLY INSERTED CENTRAL CATHETER INSERTION       RELEASE TRIGGER FINGER Right 3/31/2017    Procedure: RELEASE TRIGGER FINGER;  Surgeon: Juan Carlos Blunt MD;  [facility]     REMOVE ANTIBIOTIC CEMENT BEADS / SPACER SHOULDER Left 5/8/2020    Procedure: Left shoulder removal of spacer;  Surgeon: Analilia Aceves MD;  [facility]     REMOVE ANTIBIOTIC CEMENT BEADS / SPACER SHOULDER Right 8/18/2020    Procedure: Removal of right shoulder antibiotic spacer;  Surgeon: Analilia Aceves MD;  [facility]     REMOVE HARDWARE ARTHROPLASTY SHOULDER. I&D, PLACE ANTIBIOTIC CEMENT BE Left 11/15/2019    Procedure: Explantation of left total shoulder arthroplasty, irrigation and debridement, and placement of antibiotic spacer;  Surgeon: Analilia Aceves MD;  [facility]     REPAIR ANEURYSM ASCENDING AORTA N/A 10/4/2016    Procedure: REPAIR ANEURYSM ASCENDING AORTA;  Surgeon: Mckenzie Townsend MD;  [facility]     REVERSE ARTHROPLASTY SHOULDER Right 8/18/2020    Procedure: and conversion to reverse total shoulder arthroplasty;  Surgeon: Analilia Aceves MD;  [facility]     TOTAL SHOULDER REPLACEMENT Left 04/15/2014     TOTAL SHOULDER REPLACEMENT Right 08/26/2014     TRANSRECTAL ULTRASONIC, TRANSURETHRAL RESECTION (TUR) OF PROSTATE CYST  2014     TURP  2014       ALLERGIES     Allergies   Allergen Reactions     Ciprofloxacin      History of aortic aneurysms     Tape [Adhesive Tape] Blisters     Blistering - please use paper tape       FAMILY HISTORY  Family History   Problem Relation Age of Onset     Arthritis Other      Gastrointestinal Disease Other      Cardiovascular Father         aortic aneurysm     Arrhythmia Father      Nephrolithiasis Father      Sleep Apnea Father      Anxiety Disorder Father      Depression Father      Hypertension  Father      Obesity Father      Hyperlipidemia Father      Coronary Artery Disease Father      Low Back Problems Father      Spine Problems Father      Anxiety Disorder Mother      Hypertension Mother      Osteoporosis Mother      Obesity Mother      Hyperlipidemia Mother      Low Back Problems Mother      Macular Degeneration Mother      Anxiety Disorder Sister      Hypertension Sister      Osteoporosis Sister      Obesity Sister      Hyperlipidemia Sister      Low Back Problems Sister      Spine Problems Sister      Anxiety Disorder Sister      Depression Sister      Hypertension Sister      Osteoporosis Sister      Obesity Sister      Hyperlipidemia Sister      Low Back Problems Sister      Melanoma No family hx of      Skin Cancer No family hx of      Cataracts Mother      Other - See Comments Mother         low back problems     Cardiovascular Father      Other - See Comments Father         low back problems, spine problems     Anxiety Disorder Sister      Hyperlipidemia Sister      Hypertension Sister      Obesity Sister      Other - See Comments Sister         low back problems, spine problems     Osteoporosis Sister      Anxiety Disorder Sister      Depression Sister      Hyperlipidemia Sister      Hypertension Sister      Other - See Comments Sister         low back problems     Obesity Sister      Osteoporosis Sister          SOCIAL HISTORY  Social History     Socioeconomic History     Marital status: Single     Spouse name: Not on file     Number of children: Not on file     Years of education: Not on file     Highest education level: Not on file   Occupational History     Occupation: Disabled   Tobacco Use     Smoking status: Former Smoker     Packs/day: 0.50     Years: 6.00     Pack years: 3.00     Types: Cigarettes     Start date: 1977     Quit date: 1983     Years since quittin.9     Smokeless tobacco: Never Used   Substance and Sexual Activity     Alcohol use: No     Alcohol/week: 0.0  "standard drinks     Drug use: No     Sexual activity: Not Currently     Partners: Female     Birth control/protection: Abstinence   Other Topics Concern     Parent/sibling w/ CABG, MI or angioplasty before 65F 55M? Not Asked   Social History Narrative     Not on file     Social Determinants of Health     Financial Resource Strain:      Difficulty of Paying Living Expenses:    Food Insecurity:      Worried About Running Out of Food in the Last Year:      Ran Out of Food in the Last Year:    Transportation Needs:      Lack of Transportation (Medical):      Lack of Transportation (Non-Medical):    Physical Activity:      Days of Exercise per Week:      Minutes of Exercise per Session:    Stress:      Feeling of Stress :    Social Connections:      Frequency of Communication with Friends and Family:      Frequency of Social Gatherings with Friends and Family:      Attends Oriental orthodox Services:      Active Member of Clubs or Organizations:      Attends Club or Organization Meetings:      Marital Status:    Intimate Partner Violence:      Fear of Current or Ex-Partner:      Emotionally Abused:      Physically Abused:      Sexually Abused:        ROS:   Constitutional: No fever, chills, or sweats. No weight gain/loss   ENT: No visual disturbance, ear ache, epistaxis, sore throat  Allergies/Immunologic: Negative  Respiratory: No cough, hemoptysia  Cardiovascular: As per HPI  GI: No nausea, vomiting, hematemesis, melena, or hematochezia  : No urinary frequency, dysuria, or hematuria  Integument: Negative  Psychiatric: Negative  Neuro: Negative  Endocrinology: Negative   Musculoskeletal: Negative  Vascular: No walking impairment, claudication, ischemic rest pain or nonhealing wounds    EXAM:  /77   Pulse 56   Ht 1.778 m (5' 10\")   Wt 104.7 kg (230 lb 12.8 oz)   BMI 33.12 kg/m    In general, the patient is a pleasant male in no apparent distress.    HEENT: NC/AT.  PERRLA.  EOMI.  Sclerae white, not injected.   Neck: " No adenopathy.  No thyromegaly. Carotids +2/2 bilaterally without bruits.  No jugular venous distension.   Heart: RRR. Normal S1, S2 splits physiologically. Soft SCOTT murmur, no rub, click, or gallop.  Lungs: CTA.  No ronchi, wheezes, rales  Abdomen: Soft, nontender, nondistended. No organomegaly.   Extremities: No clubbing, cyanosis, or edema.  No wounds. No varicose veins signs of chronic venous insufficiency.   Vascular: No bruits are noted.    Labs:  LIPID RESULTS:  Lab Results   Component Value Date    CHOL 179 03/22/2018    HDL 39 (L) 03/22/2018    LDL 89 03/22/2018    TRIG 255 (H) 03/22/2018    CHOLHDLRATIO 3.7 06/08/2015    NHDL 140 (H) 03/22/2018       LIVER ENZYME RESULTS:  Lab Results   Component Value Date    AST 29 05/06/2020    ALT 42 05/06/2020       CBC RESULTS:  Lab Results   Component Value Date    WBC 6.4 08/05/2020    RBC 4.27 (L) 08/05/2020    HGB 13.8 (L) 11/11/2020    HGB 9.9 (L) 08/20/2020    HCT 31.2 (L) 08/20/2020    MCV 74.3 (L) 08/20/2020    MCH 23.6 (L) 08/20/2020    MCHC 31.7 (L) 08/20/2020    RDW 17.0 (H) 08/05/2020     08/05/2020       BMP RESULTS:  Lab Results   Component Value Date     08/05/2020    POTASSIUM 4.3 08/18/2020    CHLORIDE 107 08/05/2020    CO2 26 08/05/2020    ANIONGAP 4 08/05/2020     (H) 08/18/2020    BUN 20 08/05/2020    CR 1.24 08/05/2020    GFRESTIMATED 63 08/05/2020    GFRESTBLACK 73 08/05/2020    NIA 8.8 08/05/2020        A1C RESULTS:  Lab Results   Component Value Date    A1C 5.3 10/05/2016           Thank you for allowing me to participate in the care of your patient.      Sincerely,     Enriqueta Zhou MD     [facility]  cc:   No referring provider defined for this encounter.

## 2021-07-28 NOTE — PROGRESS NOTES
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     Prior history:      Patient was referred by Atul Martínez and [place] to establish care into congenital vascular cardiology clinic. Patient reports his nephew at 28 years old has a known bicuspid aortic valve without aortopathy.  He has been seen in [place] and followed. His father is 91 years old and has a known aneurysm of 4.9 cm in bicuspid valve. Patient reports no family history of sudden death or known dissection. No history of strokes. Blood pressure has been well controlled on multidrug regimen. Has been stable on current drug regimen with coreg and benazpril-amlodipine.       Has history of NEMESIO on lasix, and also had hyperkalemia on losartan. During a past visit His blood pressure has been labile, with BP spiking in the mid morning but being controlled early AM. He reports then in afternoon it is controlled. He takes his twice a day meds at 5am and 5pm. He denies any chest pain, sob, n/v/d, fevers, chills, ns. No LE edema. MRA chest in April 2021 demonstrated graft stability. Echocardiogram showed normal BAV function, with only mild aortic regurgitation. His CMR demonstrated normalization of his LVEF suggestive that prior reduction in function was due to stress CMY.     Today he is doing well. BP is well controlled. We reviewed his studies from April which were all good. He reports some positional dizziness when he abruptly gets up. Wondering about carotid/vertebral disease. Has never had carotid imaging. No arm fatigue with use.        ASSESSMENT/PLAN:       61-year-old male with hypertension, hyperlipidemia, right left fusion of his aortic valve with bicuspid aortopathy.  No known coarctation.  No other peripheral aneurysmal disease.  He is status post valve sparing aortic repair with Gelweave interposition graft.      Blood pressure well controlled overall on current regimen. He will need ongoing long-term surveillance of both of his aortic repair and his bicuspid valve given he has some dysfunction within the native valve (mild sclerosis and mild AI).      Summary of Recommendations:     1. Echocardiogram once a year - can defer MRA for graft surveillance given stable   2. Continue amlodipine-benzapril and 37.5 mg bid carvedilol  3. Would check renal function and electrolytes once a year at least given issues with potassium in the past  4. Follow up in one year    Enriqueta Zhou MD MSc  [facility]     PAST MEDICAL HISTORY  Past Medical History:   Diagnosis Date     Anxiety      Ascending aortic aneurysm (H)      Bicuspid aortic valve      BPPV (benign paroxysmal positional vertigo) 7/11/2014     Chronic narcotic use      Chronic neck pain      Chronic osteoarthritis      Degenerative joint disease      Depression      Hyperlipidemia 4/10/2012     Hypertension      Multiple stiff joints      Neck injuries      Obesity 2/9/2015     Pain in shoulder      Skin picking habit      Sleep apnea     does not use cpap       CURRENT MEDICATIONS  Current Outpatient Medications   Medication Sig Dispense Refill     amLODIPine-benazepril (LOTREL) 5-20 MG capsule Take 1 capsule by mouth 2 times daily 180 capsule 3     aspirin (ASA) 325 MG tablet Take 325 mg by mouth daily       atorvastatin (LIPITOR) 40 MG tablet Take 1 tablet (40 mg) by mouth daily 90 tablet 3     buPROPion (WELLBUTRIN SR) 200 MG 12 hr tablet TAKE 1 TABLET BY MOUTH 2 TIMES DAILY 180 tablet 3     carvedilol (COREG) 25 MG tablet Take 1.5 tablets (37.5 mg) by mouth 2 times daily (with meals) 180 tablet  3     clonazePAM (KLONOPIN) 0.5 MG tablet Take 1 tablet (0.5 mg) by mouth 3 times daily as needed for anxiety 90 tablet 1     cyclobenzaprine (FLEXERIL) 10 MG tablet Take 1 tablet (10 mg) by mouth 3 times daily as needed for muscle spasms 90 tablet 3     desoximetasone (TOPICORT) 0.25 % external cream Apply topically daily as needed for inflammation or itching       docusate sodium (COLACE) 100 MG capsule Take 2 capsule in the morning and 3 capsules in the evening       fentaNYL (DURAGESIC) 50 mcg/hr 72 hr patch Place 1 patch onto the skin every 48 hours       fluocinonide (LIDEX) 0.05 % external ointment Apply twice daily to itchy skin nodules for 1-2 weeks at a time. 30 g 3     naproxen (EC-NAPROSYN) 500 MG EC tablet Take 1 tablet (500 mg) by mouth 2 times daily as needed (pain) 186 tablet 1     oxyCODONE IR (ROXICODONE) 10 MG tablet Take 1 tablet by mouth every 4 hours as needed for pain, max 4 tablet(s) per day       Pyrithione Zinc 2 % SHAM Externally apply topically daily 480 mL 4     senna-docusate (SENOKOT-S/PERICOLACE) 8.6-50 MG tablet Take 1-2 tablets by mouth 2 times daily 30 tablet 0     sucralfate (CARAFATE) 1 GM tablet Take 1 tablet (1 g) by mouth 2 times daily as needed (Reflux)       tacrolimus (PROTOPIC) 0.1 % ointment Apply topically as needed Apply to affected areas on body. 120 g 11     tiZANidine (ZANAFLEX) 4 MG tablet Take 1 tablet (4 mg) by mouth nightly as needed for other (insomnia related to muscle pain) 90 tablet 3     triamcinolone (KENALOG) 0.1 % external ointment Apply topically 2 times daily To affected areas of rash. Please avoid application to the face, groin or armpits as the medication is too strong for these areas. 454 g 0     triamcinolone (KENALOG) 0.1 % external ointment Apply topically 2 times daily as needed for irritation 454 g 0     vitamin B-Complex Take 1 tablet by mouth daily       amoxicillin (AMOXIL) 500 MG capsule Take 4 tablets 1 hour before dental procedure (Patient  not taking: Reported on 7/28/2021) 24 capsule 0     Cyanocobalamin 5000 MCG SUBL Place 5,000 mcg under the tongue daily Vitamin B12 (Patient not taking: Reported on 7/28/2021)       Fe Heme Polypeptide-folic acid (PROFERRIN-FORTE) 12-1 MG TABS Take 1 tablet by mouth 2 times daily (Patient not taking: Reported on 7/28/2021) 90 tablet 0     Multiple Minerals-Vitamins (CALCIUM CITRATE-MAG-MINERALS) TABS Take 1 tablet by mouth daily (Patient not taking: Reported on 7/28/2021)       multivitamin w/minerals (THERA-VIT-M) tablet Take 1 tablet by mouth daily (Patient not taking: Reported on 7/28/2021)       naloxone (NARCAN) nasal spray Spray 1 spray (4 mg) into one nostril alternating nostrils as needed for opioid reversal every 2-3 minutes until assistance arrives (Patient not taking: Reported on 7/28/2021) 0.2 mL 0       PAST SURGICAL HISTORY:  Past Surgical History:   Procedure Laterality Date     ARTHROPLASTY SHOULDER  4/15/2014    Procedure: Left Total Shoulder Arthroplasty ;  Surgeon: Analilia Aceves MD;  [facility]     ARTHROPLASTY SHOULDER Right 8/26/2014    Procedure: ARTHROPLASTY SHOULDER;  Surgeon: Analilia Aceves MD;  [facility]     ARTHROSCOPY SHOULDER WITH BIOPSY(IES) Left 1/28/2020    Procedure: Left shoulder arthroscopy and biopsy for culture;  Surgeon: Analilia Aceves MD;  [facility]     BYPASS GASTRIC TAVO-EN-Y, LIVER BIOPSY, COMBINED  8/8/2005     C HAND/FINGER SURGERY UNLISTED       C SHOULDER SURG PROC UNLISTED       COLONOSCOPY  6/30/2014    Procedure: COMBINED COLONOSCOPY, SINGLE BIOPSY/POLYPECTOMY BY BIOPSY;  Surgeon: Chester Patton MD;  Location:  GI     COLONOSCOPY  06/30/2014     CV CORONARY ANGIOGRAM  1/30/2020    Procedure: CV CORONARY ANGIOGRAM;  Surgeon: Néstor Walls MD;  Location:  [facility]ATH [facility]     CYSTOSCOPY, BLADDER NECK CUTS, COMBINED N/A 7/18/2016    Procedure: COMBINED CYSTOSCOPY, BLADDER NECK CUTS;  Surgeon:  Ritu Leslie MD;  [facility]     ESOPHAGOSCOPY, GASTROSCOPY, DUODENOSCOPY (EGD), COMBINED  6/30/2014    Procedure: COMBINED ESOPHAGOSCOPY, GASTROSCOPY, DUODENOSCOPY (EGD), BIOPSY SINGLE OR MULTIPLE;  Surgeon: Chester Patton MD;  [facility]     ESOPHAGOSCOPY, GASTROSCOPY, DUODENOSCOPY (EGD), COMBINED  06/30/2014     EXCISE MASS FINGER  6/14/2011    Procedure:EXCISE MASS FINGER; Middle Flexor Cyst; Surgeon:SHAYY RUSSELL; [facility]     HAND SURGERY      excision of tendon cyst on left hand     HAND SURGERY Left     excision of tendon cyst on left hand       HC VASCULAR SURGERY PROCEDURE UNLIST       IR FINE NEEDLE ASPIRATION W ULTRASOUND  11/13/2019     IR PICC PLACEMENT > 5 YRS OF AGE  11/19/2019     LASER HOLMIUM LITHOTRIPSY BLADDER N/A 10/15/2014    Procedure: LASER HOLMIUM LITHOTRIPSY BLADDER;  Surgeon: Sahil Taveras MD;  [facility]     LASER KTP GREEN LIGHT PHOTOSELECTIVE VAPORIZATION PROSTATE  1/23/2014    Procedure: LASER KTP GREEN LIGHT PHOTOSELECTIVE VAPORIZATION PROSTATE;  Greenlight Photovaporization Of Prostate  ;  Surgeon: Sahil Taveras MD;  [facility]     OTHER SURGICAL HISTORY Right 06/14/2011    EXCISE MASS FINGER     OTHER SURGICAL HISTORY  08/08/2005    BYPASS GASTRIC TAVO-EN-Y, LIVER BIOPSY, COMBINED     OTHER SURGICAL HISTORY  01/23/2014    LASER KTP GREEN LIGHT PHOTOSELECTIVE VAPORIZATION PROSTATE     OTHER SURGICAL HISTORY  10/15/2014    LASER HOLMIUM LITHOTRIPSY BLADDER     OTHER SURGICAL HISTORY  07/18/2016    CYSTOSCOPY, BLADDER NECK CUTS, COMBINED     OTHER SURGICAL HISTORY  10/04/2016    REPAIR ANEURYSM ASCENDING AORTA     OTHER SURGICAL HISTORY Right 03/31/2017    RELEASE TRIGGER FINGER     OTHER SURGICAL HISTORY Right 09/23/2019    IRRIGATION AND DEBRIDEMENT UPPER EXTREMITYshoulder     PERIPHERALLY INSERTED CENTRAL CATHETER INSERTION       RELEASE TRIGGER FINGER Right 3/31/2017    Procedure: RELEASE TRIGGER FINGER;   Surgeon: Juan Carlos Blunt MD;  [facility]     REMOVE ANTIBIOTIC CEMENT BEADS / SPACER SHOULDER Left 5/8/2020    Procedure: Left shoulder removal of spacer;  Surgeon: Analilia Aceves MD;  [facility]     REMOVE ANTIBIOTIC CEMENT BEADS / SPACER SHOULDER Right 8/18/2020    Procedure: Removal of right shoulder antibiotic spacer;  Surgeon: Analilia Aceves MD;  [facility]     REMOVE HARDWARE ARTHROPLASTY SHOULDER. I&D, PLACE ANTIBIOTIC CEMENT BE Left 11/15/2019    Procedure: Explantation of left total shoulder arthroplasty, irrigation and debridement, and placement of antibiotic spacer;  Surgeon: Analilia Aceves MD;  [facility]     REPAIR ANEURYSM ASCENDING AORTA N/A 10/4/2016    Procedure: REPAIR ANEURYSM ASCENDING AORTA;  Surgeon: Mckenzie Townsend MD;  [facility]     REVERSE ARTHROPLASTY SHOULDER Right 8/18/2020    Procedure: and conversion to reverse total shoulder arthroplasty;  Surgeon: Analilia Aceves MD;  [facility]     TOTAL SHOULDER REPLACEMENT Left 04/15/2014     TOTAL SHOULDER REPLACEMENT Right 08/26/2014     TRANSRECTAL ULTRASONIC, TRANSURETHRAL RESECTION (TUR) OF PROSTATE CYST  2014     TURP  2014       ALLERGIES     Allergies   Allergen Reactions     Ciprofloxacin      History of aortic aneurysms     Tape [Adhesive Tape] Blisters     Blistering - please use paper tape       FAMILY HISTORY  Family History   Problem Relation Age of Onset     Arthritis Other      Gastrointestinal Disease Other      Cardiovascular Father         aortic aneurysm     Arrhythmia Father      Nephrolithiasis Father      Sleep Apnea Father      Anxiety Disorder Father      Depression Father      Hypertension Father      Obesity Father      Hyperlipidemia Father      Coronary Artery Disease Father      Low Back Problems Father      Spine Problems Father      Anxiety Disorder Mother      Hypertension Mother      Osteoporosis Mother      Obesity Mother       Hyperlipidemia Mother      Low Back Problems Mother      Macular Degeneration Mother      Anxiety Disorder Sister      Hypertension Sister      Osteoporosis Sister      Obesity Sister      Hyperlipidemia Sister      Low Back Problems Sister      Spine Problems Sister      Anxiety Disorder Sister      Depression Sister      Hypertension Sister      Osteoporosis Sister      Obesity Sister      Hyperlipidemia Sister      Low Back Problems Sister      Melanoma No family hx of      Skin Cancer No family hx of      Cataracts Mother      Other - See Comments Mother         low back problems     Cardiovascular Father      Other - See Comments Father         low back problems, spine problems     Anxiety Disorder Sister      Hyperlipidemia Sister      Hypertension Sister      Obesity Sister      Other - See Comments Sister         low back problems, spine problems     Osteoporosis Sister      Anxiety Disorder Sister      Depression Sister      Hyperlipidemia Sister      Hypertension Sister      Other - See Comments Sister         low back problems     Obesity Sister      Osteoporosis Sister          SOCIAL HISTORY  Social History     Socioeconomic History     Marital status: Single     Spouse name: Not on file     Number of children: Not on file     Years of education: Not on file     Highest education level: Not on file   Occupational History     Occupation: Disabled   Tobacco Use     Smoking status: Former Smoker     Packs/day: 0.50     Years: 6.00     Pack years: 3.00     Types: Cigarettes     Start date: 1977     Quit date: 1983     Years since quittin.9     Smokeless tobacco: Never Used   Substance and Sexual Activity     Alcohol use: No     Alcohol/week: 0.0 standard drinks     Drug use: No     Sexual activity: Not Currently     Partners: Female     Birth control/protection: Abstinence   Other Topics Concern     Parent/sibling w/ CABG, MI or angioplasty before 65F 55M? Not Asked   Social History Narrative  "    Not on file     Social Determinants of Health     Financial Resource Strain:      Difficulty of Paying Living Expenses:    Food Insecurity:      Worried About Running Out of Food in the Last Year:      Ran Out of Food in the Last Year:    Transportation Needs:      Lack of Transportation (Medical):      Lack of Transportation (Non-Medical):    Physical Activity:      Days of Exercise per Week:      Minutes of Exercise per Session:    Stress:      Feeling of Stress :    Social Connections:      Frequency of Communication with Friends and Family:      Frequency of Social Gatherings with Friends and Family:      Attends [employer] Services:      Active Member of Clubs or Organizations:      Attends Club or Organization Meetings:      Marital Status:    Intimate Partner Violence:      Fear of Current or Ex-Partner:      Emotionally Abused:      Physically Abused:      Sexually Abused:        ROS:   Constitutional: No fever, chills, or sweats. No weight gain/loss   ENT: No visual disturbance, ear ache, epistaxis, sore throat  Allergies/Immunologic: Negative  Respiratory: No cough, hemoptysia  Cardiovascular: As per HPI  GI: No nausea, vomiting, hematemesis, melena, or hematochezia  : No urinary frequency, dysuria, or hematuria  Integument: Negative  Psychiatric: Negative  Neuro: Negative  Endocrinology: Negative   Musculoskeletal: Negative  Vascular: No walking impairment, claudication, ischemic rest pain or nonhealing wounds    EXAM:  /77   Pulse 56   Ht 1.778 m (5' 10\")   Wt 104.7 kg (230 lb 12.8 oz)   BMI 33.12 kg/m    In general, the patient is a pleasant male in no apparent distress.    HEENT: NC/AT.  PERRLA.  EOMI.  Sclerae white, not injected.   Neck: No adenopathy.  No thyromegaly. Carotids +2/2 bilaterally without bruits.  No jugular venous distension.   Heart: RRR. Normal S1, S2 splits physiologically. Soft SCOTT murmur, no rub, click, or gallop.  Lungs: CTA.  No ronchi, wheezes, rales  Abdomen: " Soft, nontender, nondistended. No organomegaly.   Extremities: No clubbing, cyanosis, or edema.  No wounds. No varicose veins signs of chronic venous insufficiency.   Vascular: No bruits are noted.    Labs:  LIPID RESULTS:  Lab Results   Component Value Date    CHOL 179 03/22/2018    HDL 39 (L) 03/22/2018    LDL 89 03/22/2018    TRIG 255 (H) 03/22/2018    CHOLHDLRATIO 3.7 06/08/2015    NHDL 140 (H) 03/22/2018       LIVER ENZYME RESULTS:  Lab Results   Component Value Date    AST 29 05/06/2020    ALT 42 05/06/2020       CBC RESULTS:  Lab Results   Component Value Date    WBC 6.4 08/05/2020    RBC 4.27 (L) 08/05/2020    HGB 13.8 (L) 11/11/2020    HGB 9.9 (L) 08/20/2020    HCT 31.2 (L) 08/20/2020    MCV 74.3 (L) 08/20/2020    MCH 23.6 (L) 08/20/2020    MCHC 31.7 (L) 08/20/2020    RDW 17.0 (H) 08/05/2020     08/05/2020       BMP RESULTS:  Lab Results   Component Value Date     08/05/2020    POTASSIUM 4.3 08/18/2020    CHLORIDE 107 08/05/2020    CO2 26 08/05/2020    ANIONGAP 4 08/05/2020     (H) 08/18/2020    BUN 20 08/05/2020    CR 1.24 08/05/2020    GFRESTIMATED 63 08/05/2020    GFRESTBLACK 73 08/05/2020    NIA 8.8 08/05/2020        A1C RESULTS:  Lab Results   Component Value Date    A1C 5.3 10/05/2016

## 2021-08-02 ENCOUNTER — LAB (OUTPATIENT)
Dept: LAB | Facility: CLINIC | Age: 61
End: 2021-08-02
Payer: COMMERCIAL

## 2021-08-02 ENCOUNTER — ANCILLARY PROCEDURE (OUTPATIENT)
Dept: ULTRASOUND IMAGING | Facility: CLINIC | Age: 61
End: 2021-08-02
Attending: INTERNAL MEDICINE
Payer: COMMERCIAL

## 2021-08-02 ENCOUNTER — CARE COORDINATION (OUTPATIENT)
Dept: CARDIOLOGY | Facility: CLINIC | Age: 61
End: 2021-08-02

## 2021-08-02 DIAGNOSIS — R42 VERTIGO: ICD-10-CM

## 2021-08-02 DIAGNOSIS — I71.21 ASCENDING AORTIC ANEURYSM (H): ICD-10-CM

## 2021-08-02 LAB
ANION GAP SERPL CALCULATED.3IONS-SCNC: 3 MMOL/L (ref 3–14)
BUN SERPL-MCNC: 33 MG/DL (ref 7–30)
CALCIUM SERPL-MCNC: 9.1 MG/DL (ref 8.5–10.1)
CHLORIDE BLD-SCNC: 107 MMOL/L (ref 94–109)
CO2 SERPL-SCNC: 27 MMOL/L (ref 20–32)
CREAT SERPL-MCNC: 1.36 MG/DL (ref 0.66–1.25)
ERYTHROCYTE [DISTWIDTH] IN BLOOD BY AUTOMATED COUNT: 13.1 % (ref 10–15)
GFR SERPL CREATININE-BSD FRML MDRD: 56 ML/MIN/1.73M2
GLUCOSE BLD-MCNC: 86 MG/DL (ref 70–99)
HCT VFR BLD AUTO: 45.4 % (ref 40–53)
HGB BLD-MCNC: 14.6 G/DL (ref 13.3–17.7)
MCH RBC QN AUTO: 27.7 PG (ref 26.5–33)
MCHC RBC AUTO-ENTMCNC: 32.2 G/DL (ref 31.5–36.5)
MCV RBC AUTO: 86 FL (ref 78–100)
PLATELET # BLD AUTO: 197 10E3/UL (ref 150–450)
POTASSIUM BLD-SCNC: 5.4 MMOL/L (ref 3.4–5.3)
RBC # BLD AUTO: 5.27 10E6/UL (ref 4.4–5.9)
SODIUM SERPL-SCNC: 137 MMOL/L (ref 133–144)
WBC # BLD AUTO: 7.8 10E3/UL (ref 4–11)

## 2021-08-02 PROCEDURE — 93880 EXTRACRANIAL BILAT STUDY: CPT | Performed by: RADIOLOGY

## 2021-08-02 PROCEDURE — 36415 COLL VENOUS BLD VENIPUNCTURE: CPT | Performed by: PATHOLOGY

## 2021-08-02 PROCEDURE — 85027 COMPLETE CBC AUTOMATED: CPT | Performed by: PATHOLOGY

## 2021-08-02 PROCEDURE — 80048 BASIC METABOLIC PNL TOTAL CA: CPT | Performed by: PATHOLOGY

## 2021-08-02 NOTE — PROGRESS NOTES
BMP 8/2/21:      Last OV 7/28/21 w/ Dr. Zhou:  ASSESSMENT/PLAN:      61-year-old male with hypertension, hyperlipidemia, right left fusion of his aortic valve with bicuspid aortopathy.  No known coarctation.  No other peripheral aneurysmal disease.  He is status post valve sparing aortic repair with Gelweave interposition graft.       Blood pressure well controlled overall on current regimen. He will need ongoing long-term surveillance of both of his aortic repair and his bicuspid valve given he has some dysfunction within the native valve (mild sclerosis and mild AI).      Summary of Recommendations:     1. Echocardiogram once a year - can defer MRA for graft surveillance given stable   2. Continue amlodipine-benzapril and 37.5 mg bid carvedilol  3. Would check renal function and electrolytes once a year at least given issues with potassium in the past  4. Follow up in one year    KEELY Mendoza August 2, 2021 4:41 PM

## 2021-08-04 ENCOUNTER — OFFICE VISIT (OUTPATIENT)
Dept: OPHTHALMOLOGY | Facility: CLINIC | Age: 61
End: 2021-08-04
Attending: OPHTHALMOLOGY
Payer: COMMERCIAL

## 2021-08-04 DIAGNOSIS — H35.30 ARMD (AGE RELATED MACULAR DEGENERATION): ICD-10-CM

## 2021-08-04 DIAGNOSIS — H40.003 GLAUCOMA SUSPECT OF BOTH EYES: ICD-10-CM

## 2021-08-04 PROCEDURE — 92133 CPTRZD OPH DX IMG PST SGM ON: CPT | Performed by: OPHTHALMOLOGY

## 2021-08-04 PROCEDURE — G0463 HOSPITAL OUTPT CLINIC VISIT: HCPCS

## 2021-08-04 PROCEDURE — 92012 INTRM OPH EXAM EST PATIENT: CPT | Performed by: OPHTHALMOLOGY

## 2021-08-04 PROCEDURE — 99207 OCT OPTIC NERVE RNFL SPECTRALIS OU (BOTH EYES): CPT | Mod: 26 | Performed by: OPHTHALMOLOGY

## 2021-08-04 PROCEDURE — 92134 CPTRZ OPH DX IMG PST SGM RTA: CPT | Performed by: OPHTHALMOLOGY

## 2021-08-04 RX ORDER — FENTANYL 50 UG/1
1 PATCH TRANSDERMAL
COMMUNITY
Start: 2021-07-21 | End: 2021-08-21

## 2021-08-04 RX ORDER — OXYCODONE HYDROCHLORIDE 10 MG/1
TABLET ORAL
COMMUNITY
Start: 2021-07-21 | End: 2021-08-21

## 2021-08-04 ASSESSMENT — REFRACTION_WEARINGRX
OD_SPHERE: +1.00
OD_AXIS: 005
OS_AXIS: 180
OS_CYLINDER: +0.50
OS_ADD: +2.25
OD_ADD: +2.25
OS_SPHERE: +1.25
OD_CYLINDER: +1.00

## 2021-08-04 ASSESSMENT — VISUAL ACUITY
OD_CC: 20/20
METHOD: SNELLEN - LINEAR
OS_CC: 20/25
OD_CC+: -1
OS_CC+: -2

## 2021-08-04 ASSESSMENT — CUP TO DISC RATIO
OS_RATIO: 0.75
OD_RATIO: 0.75

## 2021-08-04 ASSESSMENT — EXTERNAL EXAM - RIGHT EYE: OD_EXAM: NORMAL

## 2021-08-04 ASSESSMENT — TONOMETRY
OS_IOP_MMHG: 15
IOP_METHOD: TONOPEN
OD_IOP_MMHG: 15

## 2021-08-04 ASSESSMENT — CONF VISUAL FIELD
OS_NORMAL: 1
METHOD: COUNTING FINGERS
OD_NORMAL: 1

## 2021-08-04 ASSESSMENT — EXTERNAL EXAM - LEFT EYE: OS_EXAM: NORMAL

## 2021-08-04 ASSESSMENT — SLIT LAMP EXAM - LIDS
COMMENTS: NORMAL
COMMENTS: NORMAL

## 2021-08-04 NOTE — NURSING NOTE
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

## 2021-08-04 NOTE — PROGRESS NOTES
CC: glaucoma suspect and adult onset vitelliform dystrophy  Follow up     Interval history: no visual acuity changes noted. Has family history of Age related macular degeneration vs adult onset vitelliform dystrophy    PMH: Kobe Buchanan is a  61 year old year-old patient with a family history for Age related macular degeneration and would like a screening for this.    His mother is patient of Dr. Espinal and was diagnosed with pattern dystrophy    Retinal Imaging:  OCT 2-17-+21  RE: superior outer retinal disruption-stable  LE: normal    Autofluorescence 2-17-21  right eye- hypoautoflurescent lesion in the superior macula and inferonasal macula  OS normal    OCT RNFL 8-4-21  OD Normal, no thinning  OS Mild temporal thinning    OVF 24-2  2-17-21  OD: Mild inferior loss  OS: non-specific defects    optos pic consistent with exam    Assessment & Plan:  1. Glaucoma suspect OS>OD   - increased cup to disc/ ratio   - Mother with glaucoma   - IOP normal   - VF possible nasal step left eye, OCT today shows thinning of the left RNFL compared to 2019   - patient does not like drops. Discussed SLT vs latanoprost vs monitor. Will recheck in 6 months for VF.    2. 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 Increased choroidal thickness   -  Monitor    4. Vitreous syneresis    Return to Clinic 6 mth OVF 24-2, DFE, MRx    Anh Alba MD  PGY-3 Resident Physician  Department of Ophthalmology        ~~~~~~~~~~~~~~~~~~~~~~~~~~~~~~~~~~   Complete documentation of historical and exam elements from today's encounter can be found in the full encounter summary report (not reduplicated in this progress note).  I personally obtained the chief complaint(s) and history of present illness.  I confirmed and edited as necessary the review of systems, past medical/surgical history, family history, social history, and examination findings as documented  by others; and I examined the patient myself.  I personally reviewed the relevant tests, images, and reports as documented above.  I personally reviewed the ophthalmic test(s) associated with this encounter, agree with the interpretation(s) as documented by the resident/fellow, and have edited the corresponding report(s) as necessary.   I formulated and edited as necessary the assessment and plan and discussed the findings and management plan with the patient and family    Brenda Espinal MD  .  Retina Service   Department of Ophthalmology and Visual Neurosciences   [facility]  Phone: [phone]   Fax: [phone]

## 2021-08-05 NOTE — PROGRESS NOTES
Enriqueta Zhou MD Cowling, Elizabeth, RN 28 minutes ago (11:21 AM)   CF  I am unclear why he has persistent high potassium. I would recommend having him make an appointment in the next few weeks with primary care to also discuss his kidney function which is a little bit high and can be driving the potassium being elevated. In the meantime advise him against a lot of potassium rich foods. For now I will not change any of his medications until his PCP weighs in.     Dr. Zhou     Message text      Results and Dr. Zhou's comments released to patient via [facility].

## 2021-08-10 ENCOUNTER — TRANSFERRED RECORDS (OUTPATIENT)
Dept: HEALTH INFORMATION MANAGEMENT | Facility: CLINIC | Age: 61
End: 2021-08-10

## 2021-08-10 DIAGNOSIS — K21.00 GASTROESOPHAGEAL REFLUX DISEASE WITH ESOPHAGITIS, UNSPECIFIED WHETHER HEMORRHAGE: Primary | ICD-10-CM

## 2021-08-10 PROBLEM — B95.61 MSSA BACTEREMIA: Status: RESOLVED | Noted: 2019-10-13 | Resolved: 2021-08-10

## 2021-08-10 PROBLEM — M86.9: Status: RESOLVED | Noted: 2019-10-30 | Resolved: 2021-08-10

## 2021-08-10 PROBLEM — Z86.39 HX OF TYPE 2 DIABETES MELLITUS: Status: RESOLVED | Noted: 2021-03-04 | Resolved: 2021-08-10

## 2021-08-10 PROBLEM — Z96.611 S/P REVERSE TOTAL SHOULDER ARTHROPLASTY, RIGHT: Status: RESOLVED | Noted: 2020-08-18 | Resolved: 2021-08-10

## 2021-08-10 PROBLEM — M86.9: Status: RESOLVED | Noted: 2019-10-23 | Resolved: 2021-08-10

## 2021-08-10 PROBLEM — T84.59XD: Status: RESOLVED | Noted: 2020-01-03 | Resolved: 2021-08-10

## 2021-08-10 PROBLEM — Z96.612 S/P SHOULDER REPLACEMENT, LEFT: Status: RESOLVED | Noted: 2019-11-12 | Resolved: 2021-08-10

## 2021-08-10 PROBLEM — R78.81 MSSA BACTEREMIA: Status: RESOLVED | Noted: 2019-10-13 | Resolved: 2021-08-10

## 2021-08-10 PROBLEM — Z96.612 STATUS POST TOTAL SHOULDER ARTHROPLASTY, LEFT: Status: RESOLVED | Noted: 2020-03-03 | Resolved: 2021-08-10

## 2021-08-10 PROBLEM — M00.9: Status: RESOLVED | Noted: 2019-10-13 | Resolved: 2021-08-10

## 2021-08-10 PROBLEM — Z96.612 S/P REVERSE TOTAL SHOULDER ARTHROPLASTY, LEFT: Status: RESOLVED | Noted: 2020-07-07 | Resolved: 2021-08-10

## 2021-08-10 PROBLEM — Z96.619: Status: RESOLVED | Noted: 2020-01-03 | Resolved: 2021-08-10

## 2021-08-10 PROBLEM — M00.9 SEPTIC JOINT OF LEFT SHOULDER REGION (H): Status: RESOLVED | Noted: 2019-11-13 | Resolved: 2021-08-10

## 2021-08-10 PROBLEM — N17.9 AKI (ACUTE KIDNEY INJURY) (H): Status: RESOLVED | Noted: 2020-02-16 | Resolved: 2021-08-10

## 2021-08-10 PROBLEM — Z96.619 STATUS POST TOTAL SHOULDER ARTHROPLASTY: Status: RESOLVED | Noted: 2020-05-08 | Resolved: 2021-08-10

## 2021-08-10 LAB — PHQ9 SCORE: 4

## 2021-08-10 RX ORDER — SUCRALFATE 1 G/1
TABLET ORAL
Qty: 180 TABLET | Refills: 3 | Status: SHIPPED | OUTPATIENT
Start: 2021-08-10

## 2021-08-10 RX ORDER — SUCRALFATE 1 G/1
TABLET ORAL
Status: CANCELLED | OUTPATIENT
Start: 2021-08-10

## 2021-08-22 DIAGNOSIS — I10 ESSENTIAL HYPERTENSION WITH GOAL BLOOD PRESSURE LESS THAN 130/85: ICD-10-CM

## 2021-08-23 DIAGNOSIS — I10 ESSENTIAL HYPERTENSION WITH GOAL BLOOD PRESSURE LESS THAN 130/85: ICD-10-CM

## 2021-08-23 RX ORDER — CARVEDILOL 25 MG/1
37.5 TABLET ORAL 2 TIMES DAILY WITH MEALS
Qty: 180 TABLET | Refills: 0 | OUTPATIENT
Start: 2021-08-23

## 2021-08-23 RX ORDER — CARVEDILOL 25 MG/1
37.5 TABLET ORAL 2 TIMES DAILY WITH MEALS
Qty: 270 TABLET | Refills: 2 | Status: SHIPPED | OUTPATIENT
Start: 2021-08-23 | End: 2022-07-29

## 2021-08-30 PROBLEM — N18.30 CHRONIC KIDNEY DISEASE, STAGE 3 (H): Status: ACTIVE | Noted: 2021-08-30

## 2021-08-30 NOTE — PROGRESS NOTES
"    Assessment & Plan     (E87.5) Hyperkalemia  (primary encounter diagnosis)  Comment: resolved  Plan: Basic metabolic panel        Continue current habits    (G89.4) Pain syndrome, chronic  Comment: stable, gets all cares at pain clinic  Plan: updated our care gaps    (N18.31) Stage 3a chronic kidney disease  Comment: possibly now resolved  Plan: patient may have had more NEMESIO then CKD and appears stable onmeds    (K91.2) Postsurgical malabsorption  Comment: hx of elevated b12 and then some low hgb, relatively asymptomatic  Plan: Vitamin B12        Currently normal, no changes in dosing    (Z98.890) Status post coronary angiogram  Comment: sees cards, no anginal or heart failure symptoms  Plan: Lipid Profile        Adjust as needed    (M54.2) Neck pain  Comment: refilled chronic  Plan: cyclobenzaprine (FLEXERIL) 10 MG tablet            (F43.9) Stress  Comment: checked PDMP and stable use  Plan: clonazePAM (KLONOPIN) 0.5 MG tablet        No adjustment    (E78.2) Moderate mixed hyperlipidemia not requiring statin therapy  Comment: adjust if needed not at highest atorvastatin dose, but will defer to cardiology  Plan: Lipid Profile                       BMI:   Estimated body mass index is 34.01 kg/m  as calculated from the following:    Height as of this encounter: 1.778 m (5' 10\").    Weight as of this encounter: 107.5 kg (237 lb).           No follow-ups on file.    Demi Hardin MD  [facility] CAROLYN Bullock is a 61 year old who presents for the following health issues     HPI   Chief Complaint   Patient presents with     LAB REQUEST     bmp panel, lipid     Medication Reconciliation     complete     1. Long term planning concerns:  -wonders about his driving if he needs a : possibly metro mobility?  -wonders about how to prevent staph infection of his artificial joints  -wonders about how kidneys are doing  -multiple chronic conditions appear to be fairly stable, " "patient denies current symptom flares      Review of Systems         Objective    /76 (BP Location: Right arm)   Pulse 62   Temp 97.8  F (36.6  C) (Oral)   Resp 18   Ht 1.778 m (5' 10\")   Wt 107.5 kg (237 lb)   SpO2 98%   BMI 34.01 kg/m    Body mass index is 34.01 kg/m .  Physical Exam   GENERAL: healthy, alert and no distress  NECK: no adenopathy, no asymmetry, masses, or scars and thyroid normal to palpation  RESP: lungs clear to auscultation - no rales, rhonchi or wheezes  CV: regular rates and rhythm, normal S1 S2, no S3 or S4 and grade 3/6 systolic murmur heard best over the sternal borders  PSYCH: mentation appears normal, affect normal/bright    Results from last visit   Results for orders placed or performed in visit on 08/31/21   Basic metabolic panel     Status: Abnormal   Result Value Ref Range    Sodium 137 133 - 144 mmol/L    Potassium 4.4 3.4 - 5.3 mmol/L    Chloride 100 mmol/L    Carbon Dioxide (CO2) 28 20 - 32 mmol/L    Anion Gap 9 3 - 14 mmol/L    Urea Nitrogen 23 7 - 30 mg/dL    Creatinine 1.10 mg/dL    Calcium 9.0 8.5 - 10.1 mg/dL    Glucose 102 (H) 70 - 99 mg/dL    GFR Estimate 73 >60 mL/min/1.73m2   Vitamin B12     Status: Normal   Result Value Ref Range    Vitamin B12 729 213 - 816 pg/mL                 "

## 2021-08-31 ENCOUNTER — OFFICE VISIT (OUTPATIENT)
Dept: FAMILY MEDICINE | Facility: CLINIC | Age: 61
End: 2021-08-31
Payer: COMMERCIAL

## 2021-08-31 VITALS
SYSTOLIC BLOOD PRESSURE: 129 MMHG | DIASTOLIC BLOOD PRESSURE: 76 MMHG | OXYGEN SATURATION: 98 % | BODY MASS INDEX: 33.93 KG/M2 | HEIGHT: 70 IN | RESPIRATION RATE: 18 BRPM | HEART RATE: 62 BPM | WEIGHT: 237 LBS | TEMPERATURE: 97.8 F

## 2021-08-31 DIAGNOSIS — E87.5 HYPERKALEMIA: Primary | ICD-10-CM

## 2021-08-31 DIAGNOSIS — K91.2 POSTSURGICAL MALABSORPTION: ICD-10-CM

## 2021-08-31 DIAGNOSIS — N18.31 STAGE 3A CHRONIC KIDNEY DISEASE (H): ICD-10-CM

## 2021-08-31 DIAGNOSIS — G89.4 PAIN SYNDROME, CHRONIC: ICD-10-CM

## 2021-08-31 DIAGNOSIS — Z98.890 STATUS POST CORONARY ANGIOGRAM: ICD-10-CM

## 2021-08-31 DIAGNOSIS — E78.2 MODERATE MIXED HYPERLIPIDEMIA NOT REQUIRING STATIN THERAPY: ICD-10-CM

## 2021-08-31 DIAGNOSIS — F43.9 STRESS: ICD-10-CM

## 2021-08-31 DIAGNOSIS — M54.2 NECK PAIN: ICD-10-CM

## 2021-08-31 LAB
ANION GAP SERPL CALCULATED.3IONS-SCNC: 9 MMOL/L (ref 3–14)
BUN SERPL-MCNC: 23 MG/DL (ref 7–30)
CALCIUM SERPL-MCNC: 9 MG/DL (ref 8.5–10.1)
CHLORIDE BLD-SCNC: 100 MMOL/L
CO2 SERPL-SCNC: 28 MMOL/L (ref 20–32)
CREAT SERPL-MCNC: 1.1 MG/DL
GFR SERPL CREATININE-BSD FRML MDRD: 73 ML/MIN/1.73M2
GLUCOSE BLD-MCNC: 102 MG/DL (ref 70–99)
POTASSIUM BLD-SCNC: 4.4 MMOL/L (ref 3.4–5.3)
SODIUM SERPL-SCNC: 137 MMOL/L (ref 133–144)
VIT B12 SERPL-MCNC: 729 PG/ML (ref 213–816)

## 2021-08-31 PROCEDURE — 99214 OFFICE O/P EST MOD 30 MIN: CPT | Performed by: FAMILY MEDICINE

## 2021-08-31 PROCEDURE — 82607 VITAMIN B-12: CPT | Performed by: FAMILY MEDICINE

## 2021-08-31 PROCEDURE — 36415 COLL VENOUS BLD VENIPUNCTURE: CPT | Performed by: FAMILY MEDICINE

## 2021-08-31 PROCEDURE — 80048 BASIC METABOLIC PNL TOTAL CA: CPT | Performed by: FAMILY MEDICINE

## 2021-08-31 RX ORDER — CYCLOBENZAPRINE HCL 10 MG
10 TABLET ORAL 3 TIMES DAILY PRN
Qty: 90 TABLET | Refills: 3 | Status: SHIPPED | OUTPATIENT
Start: 2021-08-31 | End: 2022-12-06

## 2021-08-31 RX ORDER — CLONAZEPAM 0.5 MG/1
0.5 TABLET ORAL 3 TIMES DAILY PRN
Qty: 90 TABLET | Refills: 1 | Status: SHIPPED | OUTPATIENT
Start: 2021-08-31 | End: 2021-10-25

## 2021-08-31 SDOH — ECONOMIC STABILITY: TRANSPORTATION INSECURITY
IN THE PAST 12 MONTHS, HAS LACK OF TRANSPORTATION KEPT YOU FROM MEETINGS, WORK, OR FROM GETTING THINGS NEEDED FOR DAILY LIVING?: NO

## 2021-08-31 SDOH — ECONOMIC STABILITY: TRANSPORTATION INSECURITY
IN THE PAST 12 MONTHS, HAS THE LACK OF TRANSPORTATION KEPT YOU FROM MEDICAL APPOINTMENTS OR FROM GETTING MEDICATIONS?: YES

## 2021-08-31 SDOH — ECONOMIC STABILITY: INCOME INSECURITY: IN THE LAST 12 MONTHS, WAS THERE A TIME WHEN YOU WERE NOT ABLE TO PAY THE MORTGAGE OR RENT ON TIME?: NO

## 2021-08-31 ASSESSMENT — SOCIAL DETERMINANTS OF HEALTH (SDOH)
IN A TYPICAL WEEK, HOW MANY TIMES DO YOU TALK ON THE PHONE WITH FAMILY, FRIENDS, OR NEIGHBORS?: ONCE A WEEK
DO YOU BELONG TO ANY CLUBS OR ORGANIZATIONS SUCH AS CHURCH GROUPS UNIONS, FRATERNAL OR ATHLETIC GROUPS, OR SCHOOL GROUPS?: NO
HOW OFTEN DO YOU ATTEND CHURCH OR RELIGIOUS SERVICES?: NEVER
HOW OFTEN DO YOU GET TOGETHER WITH FRIENDS OR RELATIVES?: NEVER
ARE YOU MARRIED, WIDOWED, DIVORCED, SEPARATED, NEVER MARRIED, OR LIVING WITH A PARTNER?: NEVER MARRIED
HOW HARD IS IT FOR YOU TO PAY FOR THE VERY BASICS LIKE FOOD, HOUSING, MEDICAL CARE, AND HEATING?: NOT HARD AT ALL
HOW OFTEN DO YOU ATTENT MEETINGS OF THE CLUB OR ORGANIZATION YOU BELONG TO?: NEVER

## 2021-08-31 ASSESSMENT — MIFFLIN-ST. JEOR: SCORE: 1891.27

## 2021-08-31 NOTE — OUTPATIENT NURSE NOTE
Worries about driving.  Would not have a friend/colleague that would drive him if needed.  Wonders about metro mobility or something

## 2021-09-06 PROBLEM — Z98.890 HX OF TRANSURETHRAL RESECTION OF PROSTATE: Status: ACTIVE | Noted: 2021-09-06

## 2021-09-06 PROBLEM — Z90.79 HX OF TRANSURETHRAL RESECTION OF PROSTATE: Status: ACTIVE | Noted: 2021-09-06

## 2021-09-07 ENCOUNTER — TRANSFERRED RECORDS (OUTPATIENT)
Dept: HEALTH INFORMATION MANAGEMENT | Facility: CLINIC | Age: 61
End: 2021-09-07

## 2021-09-22 ENCOUNTER — LAB (OUTPATIENT)
Dept: LAB | Facility: CLINIC | Age: 61
End: 2021-09-22
Payer: COMMERCIAL

## 2021-09-22 DIAGNOSIS — Z90.79 HX OF TRANSURETHRAL RESECTION OF PROSTATE: ICD-10-CM

## 2021-09-22 DIAGNOSIS — Z98.890 HX OF TRANSURETHRAL RESECTION OF PROSTATE: ICD-10-CM

## 2021-09-22 DIAGNOSIS — R35.1 BPH ASSOCIATED WITH NOCTURIA: ICD-10-CM

## 2021-09-22 DIAGNOSIS — N40.1 BPH ASSOCIATED WITH NOCTURIA: ICD-10-CM

## 2021-09-22 LAB — PSA SERPL-MCNC: 0.17 UG/L (ref 0–4.5)

## 2021-09-22 PROCEDURE — 36415 COLL VENOUS BLD VENIPUNCTURE: CPT

## 2021-09-22 PROCEDURE — 84153 ASSAY OF PSA TOTAL: CPT

## 2021-10-05 ENCOUNTER — TRANSFERRED RECORDS (OUTPATIENT)
Dept: HEALTH INFORMATION MANAGEMENT | Facility: CLINIC | Age: 61
End: 2021-10-05

## 2021-10-05 LAB — PHQ9 SCORE: 5

## 2021-10-11 ENCOUNTER — MYC REFILL (OUTPATIENT)
Dept: FAMILY MEDICINE | Facility: CLINIC | Age: 61
End: 2021-10-11

## 2021-10-11 DIAGNOSIS — K00.9 DENTAL ANOMALY: ICD-10-CM

## 2021-10-11 RX ORDER — AMOXICILLIN 500 MG/1
CAPSULE ORAL
Qty: 24 CAPSULE | Refills: 0 | Status: SHIPPED | OUTPATIENT
Start: 2021-10-11 | End: 2022-05-02

## 2021-10-24 ENCOUNTER — HEALTH MAINTENANCE LETTER (OUTPATIENT)
Age: 61
End: 2021-10-24

## 2021-10-25 DIAGNOSIS — Z96.612 STATUS POST TOTAL SHOULDER ARTHROPLASTY, LEFT: Primary | ICD-10-CM

## 2021-10-25 DIAGNOSIS — F43.9 STRESS: ICD-10-CM

## 2021-10-25 DIAGNOSIS — Z96.611 STATUS POST TOTAL SHOULDER ARTHROPLASTY, RIGHT: ICD-10-CM

## 2021-10-25 RX ORDER — CLONAZEPAM 0.5 MG/1
0.5 TABLET ORAL 3 TIMES DAILY PRN
Qty: 90 TABLET | Refills: 5 | Status: SHIPPED | OUTPATIENT
Start: 2021-10-25 | End: 2022-04-05

## 2021-10-25 NOTE — TELEPHONE ENCOUNTER
Authorized electronic refill.  Confirmed and checked database today.  UTD and compliant with screening.

## 2021-10-27 ENCOUNTER — ANCILLARY PROCEDURE (OUTPATIENT)
Dept: GENERAL RADIOLOGY | Facility: CLINIC | Age: 61
End: 2021-10-27
Attending: ORTHOPAEDIC SURGERY
Payer: COMMERCIAL

## 2021-10-27 ENCOUNTER — OFFICE VISIT (OUTPATIENT)
Dept: ORTHOPEDICS | Facility: CLINIC | Age: 61
End: 2021-10-27
Payer: COMMERCIAL

## 2021-10-27 DIAGNOSIS — Z96.611 STATUS POST TOTAL SHOULDER ARTHROPLASTY, RIGHT: ICD-10-CM

## 2021-10-27 DIAGNOSIS — I25.5 ISCHEMIC CARDIOMYOPATHY: ICD-10-CM

## 2021-10-27 DIAGNOSIS — Z96.612 STATUS POST TOTAL SHOULDER ARTHROPLASTY, LEFT: Primary | ICD-10-CM

## 2021-10-27 DIAGNOSIS — Z96.612 STATUS POST TOTAL SHOULDER ARTHROPLASTY, LEFT: ICD-10-CM

## 2021-10-27 PROCEDURE — 73030 X-RAY EXAM OF SHOULDER: CPT | Mod: RT | Performed by: RADIOLOGY

## 2021-10-27 PROCEDURE — 99214 OFFICE O/P EST MOD 30 MIN: CPT | Mod: GC | Performed by: ORTHOPAEDIC SURGERY

## 2021-10-27 NOTE — PROGRESS NOTES
"CHIEF CONCERN: Bilateral reverse TSA  DATE OF SURGERY: Right rTSA.  DOS: 8/18/20; Left RTSA DOS: 5/8/20     HISTORY OF PRESENT ILLNESS:  Kobe is a 62 yo male s/p above procedures. He presents today for follow up of both shoulders. He reports doing well overall. He does note intermittent \"razor blades\" in both shoulders caused by certain movements, however he cannot pinpoint which exact movements cause this pain. He takes oxycodone and uses fentanyl patches for chronic pain. He denies fever, chest pain, or SOB.    PHYSICAL EXAM:  Adult male in no acute distress.  RESP: Non labored breathing  MUSCULOSKELETAL:    Bilateral UE:  Incisions well healed. No erythema. No shoulder asymmetry compared with contralateral shoulder. No muscle atrophy. No pain on palpation over SC joint, AC joint, posterior glenohumeral space or long head of biceps. SILT in axillary, median, ulnar musculocutaneous, and radial nerve distributions. 5/5 , EPL, FPL, intrinsics, wrist flexion/extension, bicep, tricep, deltoid.     Active motion is FE to 150 without discomfort, Abduction to 130 without discomfort.    IMAGING:  Bilateral shoulder x-rays:  - Implants are well-seated without evidence of loosening or fracture.    ASSESSMENT:  s/p bilateral reverse TSA, routine healing.    PLAN:  Patient is doing very well over 1 year s/p bilateral rTSA. His intermittent pain is likely soft tissue related, there is no evidence of hardware failure or concern for loosening.    - Return as needed    --  Mal Varela MD  Orthopedic Surgery PGY-1    3:04 PM  October 27, 2021    Patient was seen and discussed with Dr. Aceves    I have personally examined this patient and have reviewed the clinical presentation and progress note with the resident.  I agree with the treatment plan as outlined.  The plan was formulated with the resident on the day of the resident's note.     "

## 2021-10-27 NOTE — LETTER
"    10/27/2021         RE: Kobe Buchanan  [address]  [place] [address]        Dear Colleague,    Thank you for referring your patient, Kobe Buchanan, to the [facility]. Please see a copy of my visit note below.    CHIEF CONCERN: Bilateral reverse TSA  DATE OF SURGERY: Right rTSA.  DOS: 8/18/20; Left RTSA DOS: 5/8/20     HISTORY OF PRESENT ILLNESS:  Kobe is a 62 yo male s/p above procedures. He presents today for follow up of both shoulders. He reports doing well overall. He does note intermittent \"razor blades\" in both shoulders caused by certain movements, however he cannot pinpoint which exact movements cause this pain. He takes oxycodone and uses fentanyl patches for chronic pain. He denies fever, chest pain, or SOB.    PHYSICAL EXAM:  Adult male in no acute distress.  RESP: Non labored breathing  MUSCULOSKELETAL:    Bilateral UE:  Incisions well healed. No erythema. No shoulder asymmetry compared with contralateral shoulder. No muscle atrophy. No pain on palpation over SC joint, AC joint, posterior glenohumeral space or long head of biceps. SILT in axillary, median, ulnar musculocutaneous, and radial nerve distributions. 5/5 , EPL, FPL, intrinsics, wrist flexion/extension, bicep, tricep, deltoid.     Active motion is FE to 150 without discomfort, Abduction to 130 without discomfort.    IMAGING:  Bilateral shoulder x-rays:  - Implants are well-seated without evidence of loosening or fracture.    ASSESSMENT:  s/p bilateral reverse TSA, routine healing.    PLAN:  Patient is doing very well over 1 year s/p bilateral rTSA. His intermittent pain is likely soft tissue related, there is no evidence of hardware failure or concern for loosening.    - Return as needed    --  Mal Varela MD  Orthopedic Surgery PGY-1    3:04 PM  October 27, 2021    Patient was seen and discussed with Dr. Aceves    I have personally examined this patient and have " reviewed the clinical presentation and progress note with the resident.  I agree with the treatment plan as outlined.  The plan was formulated with the resident on the day of the resident's note.

## 2021-10-27 NOTE — NURSING NOTE
Reason For Visit:   Chief Complaint   Patient presents with     Surgical Followup     pop Right rTSA.  DOS: 8/18/20.  Pop Left RTSA DOS: 5/8/20        PCP: Michael Styles  Ref: Self     ?  No  Occupation Retired.  Currently working? No.  Work status?  Retired.  Date of injury: ongoing     Date of surgery: R TSA 8/26/2014  L TSA 04/15/2014     Date of surgery: 9/23/19  Type of surgery: I and D Right shoulder at [facility]     Date of surgery: 11/15/19  Type of surgery:   1. Explant of left total shoulder arthroplasty  2. Irrigation and excisional debridement left shoulder  3. Placement of antibiotic spacer     Date of surgery: 1/28/20  Type of surgery:   1. Left shoulder arthroscopy  2. Left shoulder arthroscopic biopsy/culture       Date of surgery:5/8/20  Type of surgery:  1. Removal of left shoulder antibiotic spacer  2. Left shoulder excisional I&D  3. Placement of custom left reverse total shoulder arthroplasty (custom baseplate, Vault Reconstruction System)      Date of surgery: 8/18/20  Type of surgery:   1. Removal of right shoulder antibiotic spacer  2. Revision right reverse total shoulder arthroplasty.   Smoker: No  Request smoking cessation information: No     Right hand dominant    SANE score  Affected shoulder: Bilateral  Right shoulder SANE: 25  Left shoulder SANE: 25    There were no vitals taken for this visit.      Pain Assessment  Patient Currently in Pain: Denies (Pain only when he does something that he shouldn't)    Roberta Obregon, ATC

## 2021-10-27 NOTE — TELEPHONE ENCOUNTER
Message to physician:     Date of last visit: 8/31/2021     Date of next visit if scheduled: none    Last Comprehensive Metabolic Panel:  Sodium   Date Value Ref Range Status   08/31/2021 137 133 - 144 mmol/L Final   08/05/2020 137 133 - 144 mmol/L Final     Potassium   Date Value Ref Range Status   08/31/2021 4.4 3.4 - 5.3 mmol/L Final   08/18/2020 4.3 3.4 - 5.3 mmol/L Final     Chloride   Date Value Ref Range Status   08/31/2021 100 mmol/L Final   08/05/2020 107 94 - 109 mmol/L Final     Carbon Dioxide   Date Value Ref Range Status   08/05/2020 26 20 - 32 mmol/L Final     Carbon Dioxide (CO2)   Date Value Ref Range Status   08/31/2021 28 20 - 32 mmol/L Final     Anion Gap   Date Value Ref Range Status   08/31/2021 9 3 - 14 mmol/L Final   08/05/2020 4 3 - 14 mmol/L Final     Glucose   Date Value Ref Range Status   08/31/2021 102 (H) 70 - 99 mg/dL Final   08/18/2020 117 (H) 70 - 99 mg/dL Final     Urea Nitrogen   Date Value Ref Range Status   08/31/2021 23 7 - 30 mg/dL Final   08/05/2020 20 7 - 30 mg/dL Final     Creatinine   Date Value Ref Range Status   08/31/2021 1.10 mg/dL Final   08/05/2020 1.24 0.66 - 1.25 mg/dL Final     GFR Estimate   Date Value Ref Range Status   08/31/2021 73 >60 mL/min/1.73m2 Final     Comment:     As of July 11, 2021, eGFR is calculated by the CKD-EPI creatinine equation, without race adjustment. eGFR can be influenced by muscle mass, exercise, and diet. The reported eGFR is an estimation only and is only applicable if the renal function is stable.   08/05/2020 63 >60 mL/min/[1.73_m2] Final     Comment:     Non  GFR Calc  Starting 12/18/2018, serum creatinine based estimated GFR (eGFR) will be   calculated using the Chronic Kidney Disease Epidemiology Collaboration   (CKD-EPI) equation.       Calcium   Date Value Ref Range Status   08/31/2021 9.0 8.5 - 10.1 mg/dL Final   08/05/2020 8.8 8.5 - 10.1 mg/dL Final       BP Readings from Last 3 Encounters:   08/31/21 129/76    07/28/21 130/77   03/04/21 (!) 152/84       Hemoglobin A1C   Date Value Ref Range Status   10/05/2016 5.3 4.3 - 6.0 % Final       Please complete refill and CLOSE ENCOUNTER.  Closing the encounter signifies the refill is complete.

## 2021-10-28 RX ORDER — ATORVASTATIN CALCIUM 40 MG/1
40 TABLET, FILM COATED ORAL DAILY
Qty: 90 TABLET | Refills: 3 | Status: SHIPPED | OUTPATIENT
Start: 2021-10-28 | End: 2022-12-29

## 2021-11-01 ENCOUNTER — TELEPHONE (OUTPATIENT)
Dept: ORTHOPEDICS | Facility: CLINIC | Age: 61
End: 2021-11-01

## 2021-11-01 DIAGNOSIS — M54.2 NECK PAIN: Primary | ICD-10-CM

## 2021-11-01 NOTE — TELEPHONE ENCOUNTER
[facility]    Phone Message    May a detailed message be left on voicemail: yes     Reason for Call: Other: Patient would like a referral for an MRI and a referral to see Dr. Prajapati for his neck.     Action Taken: Message routed to:  [facility] ([facility]): Orthopedics    Travel Screening: Not Applicable

## 2021-11-01 NOTE — TELEPHONE ENCOUNTER
Patient was called back to let him know that the writer will consult with Dr. Aceves and get back to him.  He was agreeable with this plan.  He also stated that he spoke with his PCP about the referral and the MRI as well.

## 2021-11-02 ENCOUNTER — TRANSFERRED RECORDS (OUTPATIENT)
Dept: HEALTH INFORMATION MANAGEMENT | Facility: CLINIC | Age: 61
End: 2021-11-02
Payer: COMMERCIAL

## 2021-11-04 NOTE — TELEPHONE ENCOUNTER
Patient was called back and told that Dr. Aceves was OK with placing the order for the MRI and the referral to Dr. Prajapati.  Both were placed in his chart.  He will call to schedule both and knows that his MRI needs to be before the appointment with Dr. Prajapati.      He stated that he has seen Dr. Puente both here at the  and at [facility].  His records and imaging are either there or at [facility].  He was told to reach out to [facility] to make sure that there is an ANDER on file for them to share his records with us.  He was agreeable with this plan and thankful for the call.

## 2021-11-11 DIAGNOSIS — I10 ESSENTIAL HYPERTENSION: ICD-10-CM

## 2021-11-11 DIAGNOSIS — M25.551 PAIN IN RIGHT HIP: Primary | ICD-10-CM

## 2021-11-11 RX ORDER — AMLODIPINE AND BENAZEPRIL HYDROCHLORIDE 5; 20 MG/1; MG/1
1 CAPSULE ORAL 2 TIMES DAILY
Qty: 180 CAPSULE | Refills: 3 | Status: SHIPPED | OUTPATIENT
Start: 2021-11-11 | End: 2022-12-19

## 2021-11-11 NOTE — PROGRESS NOTES
[facility]  Orthopaedic Surgery Consultation by Faizan Jo M.D.    Kobe Buchanan MRN# [id number]   Age: 61 year old YOB: 1960     Requesting physician: No ref. provider found  Demi Hardin     Background history:  DX:  1. Chronic kidney disease, stage 3   2. Iron deficiency anemia   3. NSTEMI (non-ST elevated myocardial infarction)  4. Atrial fibrillation   5. Obstructive sleep apnea  6. Obesity  7. Ascending aortic aneurysm  8. Essential hypertension  9. Hyperlipidemia    TREATMENTS:  1. 4/15/2014, left total shoulder replacement, Dr. Aceves  2. 8/26/2014, right total shoulder replacement, Dr. Aceves  3. 9/23/2019, OPEN IRRIGATION AND DEBRIDEMENT WITH EXPLANT OF TOTAL SHOULDER ARTHROPLASTY AND PLACEMENT OF ANTIBIOTIC SPACER, DEEP CULTURES, Barrie Quintero MD, [facility]  4. 11/15/2019, Explantation of left total shoulder arthroplasty, irrigation and debridement, and placement of antibiotic spacer, [facility]   5. 1/28/2020, Left shoulder arthroscopy and biopsy for culture, [facility]  6. 5/8/2020, Left shoulder removal of spacer and placement of reverse total shoulder arthroplasty, [facility]  7. 8/18/2020, Removal of right shoulder antibiotic spacer and conversion to reverse total shoulder arthroplasty, [facility]           History of Present Illness:   61 year old male who presents to our clinic because of an episode of right hip catching and pain.  Patient states that he has been aware of early osteoarthritic changes of the right hip for approximately 8 years that have not been bothering him until 2 months ago.  He then without any antecedent trauma started experiencing some catching and pain in the groin.  However, after a few weeks this spontaneously subsided.  Currently, patient has no complaints of his right hip, groin, buttock.  He does not report any  "night pain, initiation stiffness or soreness.  He has no limitations in his daily activities.  He is here with a question if and when he would need a total hip replacement surgery.  In regards to his right hip he is not using pain medication, he has not seen a physical therapist nor has had intra-articular injections.    Social:   Occupation: Retired  Living situation: Lives alone  Hobbies / Sports: Computer, dogs    Smoking: No  Alcohol: No  Illicit drug use: No         Physical Exam:     EXAMINATION pertinent findings:   PSYCH: Pleasant, healthy-appearing, alert, oriented x3, cooperative. Normal mood and affect.  VITAL SIGNS: Height 1.803 m (5' 11\"), weight 109.3 kg (241 lb).  Reviewed nursing intake notes.   Body mass index is 33.61 kg/m .  RESP: non labored breathing   ABD: benign, soft, non-tender, no acute peritoneal findings  SKIN: grossly normal   LYMPHATIC: grossly normal, no adenopathy, no extremity edema  NEURO: grossly normal , no motor deficits  VASCULAR: satisfactory perfusion of all extremities   MUSCULOSKELETAL:   Alignment: Neutral  Gait: Normal     The right hip exhibits a full range of motion.  No pain upon rotations.  Lasegue's test is negative.  No tenderness to palpation over greater trochanteric region.    Right LE:   Thigh and leg compartments soft and compressible   +Quad/TA/GSC/FHL/EHL   SILT DP/SP/Jagdish/Saph/Tib nerve distributions   Palpable dorsalis pedis pulse              Data:   All laboratory data reviewed  All imaging studies reviewed by me personally.    XR pelvis/hip right 11/24/2021:  My interpretation: Mild to moderate osteoarthritic changes of the right hip with some joint space narrowing, presence of sclerosis, marginal osteophytes and subchondral cyst.         Assessment and Plan:   Assessment:  61-year-old male presenting with an episode of pain and catching right hip most likely due to underlying mild to moderate osteoarthritic changes.  Complaints have spontaneously " subsided.  Currently no pain or limitations.     Plan:  We had a long discussion with the patient regarding ongoing management options.  Reviewed surgical and nonsurgical treatments.  The non-operative management options consist of activity modification, weight reduction, pain medication, PT and injection therapy.  We discussed that given his lack of symptoms and limitations at this point in time no further treatment is indicated.  If he were to sprains more symptoms nonsurgical treatment options would be my recommendation.  Patient understands and agrees to the treatment plan as set forth.    More information on joint replacements can be found on : [email or web address]    Thank you for your referral.    Faizan Jo MD, PhD     Adult Reconstruction  [facility] Department of Orthopaedic Surgery  Pager [phone]      DATA for DOCUMENTATION:         Past Medical History:     Patient Active Problem List   Diagnosis     Essential hypertension     Hyperlipidemia     Abdominal pain, unspecified abdominal location     Ascending aortic aneurysm (H)     Pain medication agreement signed     S/P shoulder replacement     BPPV (benign paroxysmal positional vertigo)     Shoulder joint pain     Obstructive sleep apnea     Obesity     Rhinitis     Right knee pain     Status post coronary angiogram     Atrial fibrillation (H) [I48.91]     Trigger finger of right thumb     Dizzy     Spondylosis of cervical region without myelopathy or radiculopathy     Moderate protein-calorie malnutrition (H)     Left shoulder pain     Acute systolic heart failure (H)     NSTEMI (non-ST elevated myocardial infarction) (H)     Dysthymic disorder     Iron deficiency anemia secondary to blood loss (chronic)     Psoriasis     Plantar fasciitis     Right shoulder pain     Chronic kidney disease, stage 3 (H)     Hx of transurethral resection of prostate     Past  Medical History:   Diagnosis Date     NEMESIO (acute kidney injury) (H) 2/16/2020     Anxiety      Ascending aortic aneurysm (H)      Bicuspid aortic valve      BPPV (benign paroxysmal positional vertigo) 7/11/2014     Chronic narcotic use      Chronic neck pain      Chronic osteoarthritis      Degenerative joint disease      Depression      Hx of type 2 diabetes mellitus 3/4/2021     Hyperlipidemia 4/10/2012     Hypertension      MSSA bacteremia 10/13/2019     Multiple stiff joints      Neck injuries      Obesity 2/9/2015     Pain in shoulder      S/P reverse total shoulder arthroplasty, 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     S/P reverse total shoulder arthroplasty, right 8/18/2020     Septic joint of left shoulder region (H) 11/13/2019     Septic joint of right shoulder region (H) 10/13/2019    s/p washout     Skin picking habit      Sleep apnea     does not use cpap     Status post total shoulder arthroplasty, left 3/3/2020    Added automatically from request for surgery [id number]       Also see scanned health assessment forms.       Past Surgical History:     Past Surgical History:   Procedure Laterality Date     ARTHROPLASTY SHOULDER  4/15/2014    Procedure: Left Total Shoulder Arthroplasty ;  Surgeon: Analilia Aceves MD;  [facility]     ARTHROPLASTY SHOULDER Right 8/26/2014    Procedure: ARTHROPLASTY SHOULDER;  Surgeon: Analilia Aceves MD;  [facility]     ARTHROSCOPY SHOULDER WITH BIOPSY(IES) Left 1/28/2020    Procedure: Left shoulder arthroscopy and biopsy for culture;  Surgeon: Analilia Aceves MD;  [facility]     BYPASS GASTRIC TAVO-EN-Y, LIVER BIOPSY, COMBINED  8/8/2005     C HAND/FINGER SURGERY UNLISTED       C SHOULDER SURG PROC UNLISTED       COLONOSCOPY  6/30/2014    Procedure: COMBINED COLONOSCOPY, SINGLE BIOPSY/POLYPECTOMY BY BIOPSY;  Surgeon: Chester Patton MD;  [facility]     COLONOSCOPY  06/30/2014     CV CORONARY ANGIOGRAM  1/30/2020    Procedure: CV CORONARY ANGIOGRAM;   Surgeon: Néstor Walls MD;  [facility]     CYSTOSCOPY, BLADDER NECK CUTS, COMBINED N/A 7/18/2016    Procedure: COMBINED CYSTOSCOPY, BLADDER NECK CUTS;  Surgeon: Ritu Leslie MD;  [facility]     ESOPHAGOSCOPY, GASTROSCOPY, DUODENOSCOPY (EGD), COMBINED  6/30/2014    Procedure: COMBINED ESOPHAGOSCOPY, GASTROSCOPY, DUODENOSCOPY (EGD), BIOPSY SINGLE OR MULTIPLE;  Surgeon: Chester Patton MD;  [facility]     ESOPHAGOSCOPY, GASTROSCOPY, DUODENOSCOPY (EGD), COMBINED  06/30/2014     EXCISE MASS FINGER  6/14/2011    Procedure:EXCISE MASS FINGER; Middle Flexor Cyst; Surgeon:SHAYY RUSSELL; [facility]     HAND SURGERY      excision of tendon cyst on left hand     HAND SURGERY Left     excision of tendon cyst on left hand       HC VASCULAR SURGERY PROCEDURE UNLIST       IR FINE NEEDLE ASPIRATION W ULTRASOUND  11/13/2019     IR PICC PLACEMENT > 5 YRS OF AGE  11/19/2019     LASER HOLMIUM LITHOTRIPSY BLADDER N/A 10/15/2014    Procedure: LASER HOLMIUM LITHOTRIPSY BLADDER;  Surgeon: Sahil Taveras MD;  [facility]     LASER KTP GREEN LIGHT PHOTOSELECTIVE VAPORIZATION PROSTATE  1/23/2014    Procedure: LASER KTP GREEN LIGHT PHOTOSELECTIVE VAPORIZATION PROSTATE;  Greenlight Photovaporization Of Prostate  ;  Surgeon: Sahil Taveras MD;  [facility]     OTHER SURGICAL HISTORY Right 06/14/2011    EXCISE MASS FINGER     OTHER SURGICAL HISTORY  08/08/2005    BYPASS GASTRIC TAVO-EN-Y, LIVER BIOPSY, COMBINED     OTHER SURGICAL HISTORY  01/23/2014    LASER KTP GREEN LIGHT PHOTOSELECTIVE VAPORIZATION PROSTATE     OTHER SURGICAL HISTORY  10/15/2014    LASER HOLMIUM LITHOTRIPSY BLADDER     OTHER SURGICAL HISTORY  07/18/2016    CYSTOSCOPY, BLADDER NECK CUTS, COMBINED     OTHER SURGICAL HISTORY  10/04/2016    REPAIR ANEURYSM ASCENDING AORTA     OTHER SURGICAL HISTORY Right 03/31/2017    RELEASE TRIGGER FINGER     OTHER SURGICAL HISTORY Right  2019    IRRIGATION AND DEBRIDEMENT UPPER EXTREMITYshoulder     PERIPHERALLY INSERTED CENTRAL CATHETER INSERTION       RELEASE TRIGGER FINGER Right 3/31/2017    Procedure: RELEASE TRIGGER FINGER;  Surgeon: Juan Carlos Blunt MD;  [facility]     REMOVE ANTIBIOTIC CEMENT BEADS / SPACER SHOULDER Left 2020    Procedure: Left shoulder removal of spacer;  Surgeon: Analilia Aceves MD;  [facility]     REMOVE ANTIBIOTIC CEMENT BEADS / SPACER SHOULDER Right 2020    Procedure: Removal of right shoulder antibiotic spacer;  Surgeon: Analilia Aceves MD;  [facility]     REMOVE HARDWARE ARTHROPLASTY SHOULDER. I&D, PLACE ANTIBIOTIC CEMENT BE Left 11/15/2019    Procedure: Explantation of left total shoulder arthroplasty, irrigation and debridement, and placement of antibiotic spacer;  Surgeon: Analilia Aceves MD;  [facility]     REPAIR ANEURYSM ASCENDING AORTA N/A 10/4/2016    Procedure: REPAIR ANEURYSM ASCENDING AORTA;  Surgeon: Mckenzie Townsend MD;  [facility]     REVERSE ARTHROPLASTY SHOULDER Right 2020    Procedure: and conversion to reverse total shoulder arthroplasty;  Surgeon: Analilia Aceves MD;  [facility]     TOTAL SHOULDER REPLACEMENT Left 04/15/2014     TOTAL SHOULDER REPLACEMENT Right 2014     TRANSRECTAL ULTRASONIC, TRANSURETHRAL RESECTION (TUR) OF PROSTATE CYST  2014     TURP  2014            Social History:     Social History     Socioeconomic History     Marital status: Single     Spouse name: Not on file     Number of children: Not on file     Years of education: Not on file     Highest education level: Not on file   Occupational History     Occupation: Disabled   Tobacco Use     Smoking status: Former Smoker     Packs/day: 0.50     Years: 6.00     Pack years: 3.00     Types: Cigarettes     Start date: 1977     Quit date: 1983     Years since quittin.2     Smokeless tobacco: Never Used   Substance and Sexual  Activity     Alcohol use: No     Alcohol/week: 0.0 standard drinks     Drug use: No     Sexual activity: Not Currently     Partners: Female     Birth control/protection: Abstinence   Other Topics Concern     Parent/sibling w/ CABG, MI or angioplasty before 65F 55M? Not Asked   Social History Narrative    Live independently, [place] one sister, Supriya in [place], [place].  Does live with 2 dogs.      Social Determinants of Health     Financial Resource Strain: Low Risk      Difficulty of Paying Living Expenses: Not hard at all   Food Insecurity: Not on file   Transportation Needs: Unmet Transportation Needs     Lack of Transportation (Medical): Yes     Lack of Transportation (Non-Medical): No   Physical Activity: Not on file   Stress: Not on file   Social Connections: Socially Isolated     Frequency of Communication with Friends and Family: Once a week     Frequency of Social Gatherings with Friends and Family: Never     Attends Yarsani Services: Never     Active Member of Clubs or Organizations: No     Attends Club or Organization Meetings: Never     Marital Status: Never    Intimate Partner Violence: Not on file   Housing Stability: Unknown     Unable to Pay for Housing in the Last Year: No     Number of Places Lived in the Last Year: Not on file     Unstable Housing in the Last Year: No            Family History:       Family History   Problem Relation Age of Onset     Arthritis Other      Gastrointestinal Disease Other      Cardiovascular Father         aortic aneurysm     Arrhythmia Father      Nephrolithiasis Father      Sleep Apnea Father      Anxiety Disorder Father      Depression Father      Hypertension Father      Obesity Father      Hyperlipidemia Father      Coronary Artery Disease Father      Low Back Problems Father      Spine Problems Father      Anxiety Disorder Mother      Hypertension Mother      Osteoporosis Mother      Obesity Mother      Hyperlipidemia Mother       Low Back Problems Mother      Macular Degeneration Mother      Anxiety Disorder Sister      Hypertension Sister      Osteoporosis Sister      Obesity Sister      Hyperlipidemia Sister      Low Back Problems Sister      Spine Problems Sister      Anxiety Disorder Sister      Depression Sister      Hypertension Sister      Osteoporosis Sister      Obesity Sister      Hyperlipidemia Sister      Low Back Problems Sister      Melanoma No family hx of      Skin Cancer No family hx of      Cataracts Mother      Other - See Comments Mother         low back problems     Cardiovascular Father      Other - See Comments Father         low back problems, spine problems     Anxiety Disorder Sister      Hyperlipidemia Sister      Hypertension Sister      Obesity Sister      Other - See Comments Sister         low back problems, spine problems     Osteoporosis Sister      Anxiety Disorder Sister      Depression Sister      Hyperlipidemia Sister      Hypertension Sister      Other - See Comments Sister         low back problems     Obesity Sister      Osteoporosis Sister             Medications:     Current Outpatient Medications   Medication Sig     amLODIPine-benazepril (LOTREL) 5-20 MG capsule Take 1 capsule by mouth 2 times daily     amoxicillin (AMOXIL) 500 MG capsule Take 4 tablets 1 hour before dental procedure     aspirin (ASA) 325 MG tablet Take 325 mg by mouth daily     atorvastatin (LIPITOR) 40 MG tablet Take 1 tablet (40 mg) by mouth daily     buPROPion (WELLBUTRIN SR) 200 MG 12 hr tablet TAKE 1 TABLET BY MOUTH 2 TIMES DAILY     carvedilol (COREG) 25 MG tablet Take 1.5 tablets (37.5 mg) by mouth 2 times daily (with meals)     clonazePAM (KLONOPIN) 0.5 MG tablet Take 1 tablet (0.5 mg) by mouth 3 times daily as needed for anxiety     Cyanocobalamin 5000 MCG SUBL Place 5,000 mcg under the tongue daily Vitamin B12     cyclobenzaprine (FLEXERIL) 10 MG tablet Take 1 tablet (10 mg) by mouth 3 times daily as needed for muscle  spasms     desoximetasone (TOPICORT) 0.25 % external cream Apply topically daily as needed for inflammation or itching     docusate sodium (COLACE) 100 MG capsule Take 2 capsule in the morning and 3 capsules in the evening     Fe Heme Polypeptide-folic acid (PROFERRIN-FORTE) 12-1 MG TABS Take 1 tablet by mouth 2 times daily     fentaNYL (DURAGESIC) 50 mcg/hr 72 hr patch Place 1 patch onto the skin every 48 hours     fluocinonide (LIDEX) 0.05 % external ointment Apply twice daily to itchy skin nodules for 1-2 weeks at a time.     Multiple Minerals-Vitamins (CALCIUM CITRATE-MAG-MINERALS) TABS Take 1 tablet by mouth daily     multivitamin w/minerals (THERA-VIT-M) tablet Take 1 tablet by mouth daily     naloxone (NARCAN) nasal spray Spray 1 spray (4 mg) into one nostril alternating nostrils as needed for opioid reversal every 2-3 minutes until assistance arrives     naproxen (EC-NAPROSYN) 500 MG EC tablet Take 1 tablet (500 mg) by mouth 2 times daily as needed (pain)     oxyCODONE IR (ROXICODONE) 10 MG tablet Take 1 tablet by mouth every 4 hours as needed for pain, max 4 tablet(s) per day     Pyrithione Zinc 2 % SHAM Externally apply topically daily     senna-docusate (SENOKOT-S/PERICOLACE) 8.6-50 MG tablet Take 1-2 tablets by mouth 2 times daily     sucralfate (CARAFATE) 1 GM tablet Take 1 tablet (1 g) by mouth 2 times daily as needed (Reflux)     tacrolimus (PROTOPIC) 0.1 % ointment Apply topically as needed Apply to affected areas on body.     tiZANidine (ZANAFLEX) 4 MG tablet Take 1 tablet (4 mg) by mouth nightly as needed for other (insomnia related to muscle pain)     triamcinolone (KENALOG) 0.1 % external ointment Apply topically 2 times daily To affected areas of rash. Please avoid application to the face, groin or armpits as the medication is too strong for these areas.     triamcinolone (KENALOG) 0.1 % external ointment Apply topically 2 times daily as needed for irritation     vitamin B-Complex Take 1 tablet  by mouth daily     No current facility-administered medications for this visit.              Review of Systems:   A comprehensive 10 point review of systems (constitutional, ENT, cardiac, peripheral vascular, lymphatic, respiratory, GI, , Musculoskeletal, skin, Neurological) was performed and found to be negative except as described in this note.     See intake form completed by patient

## 2021-11-16 ENCOUNTER — TELEPHONE (OUTPATIENT)
Dept: FAMILY MEDICINE | Facility: CLINIC | Age: 61
End: 2021-11-16
Payer: COMMERCIAL

## 2021-11-16 ENCOUNTER — TELEPHONE (OUTPATIENT)
Dept: ORTHOPEDICS | Facility: CLINIC | Age: 61
End: 2021-11-16
Payer: COMMERCIAL

## 2021-11-16 DIAGNOSIS — Z86.59 HISTORY OF CLAUSTROPHOBIA: Primary | ICD-10-CM

## 2021-11-16 RX ORDER — DIAZEPAM 5 MG
TABLET ORAL
Qty: 1 TABLET | Refills: 0 | Status: SHIPPED | OUTPATIENT
Start: 2021-11-16 | End: 2023-12-06

## 2021-11-16 NOTE — TELEPHONE ENCOUNTER
RN called and spoke with patient. See refill encounter.     Naye Melton RN        [facility]    Phone Message    May a detailed message be left on voicemail: yes     Reason for Call: Other: Patient was scheduled for MRI he had a panic attack. He was advised to schedule at the [facility] and to ask for a relaxant drug. Please reach out to patient. His appt to see Dr. Prajapati is scheduled for 11/23     Action Taken: Message routed to:  [facility] ([facility]): ortho    Travel Screening: Not Applicable

## 2021-11-16 NOTE — TELEPHONE ENCOUNTER
RN send one time valium med to [facility] to sign and authorize due to patient's claustrophobia prior to MRI exam.    RN called and spoke with patient and relayed this plan to him.   Patient expressed understanding.    Naye Melton RN

## 2021-11-16 NOTE — TELEPHONE ENCOUNTER
[facility] phone call message- medication clarification/question:    Full Medication Name: VALIUM?    Dose: NA    Question: Patient had a panic attach while in the MRI scanner and the provider who saw him suggested he get prescribed valium or some other medication that would help him while he's getting an MRI. Otherwise, he'd have to cancel his appt scheduled for 11/23.    Pharmacy confirmed as [id number] - [place], [place] - [address]: Yes    OK to leave a message on voice mail? Yes    Primary language: English      needed? No    Call taken on November 16, 2021 at 9:05 AM by Norma Arnold

## 2021-11-16 NOTE — TELEPHONE ENCOUNTER
Routing to PCP for review of prn dose of medication due to recent panic-attack during MRI. Alli RIGGS

## 2021-11-19 ENCOUNTER — ANCILLARY PROCEDURE (OUTPATIENT)
Dept: MRI IMAGING | Facility: CLINIC | Age: 61
End: 2021-11-19
Attending: ORTHOPAEDIC SURGERY
Payer: COMMERCIAL

## 2021-11-19 PROCEDURE — 72141 MRI NECK SPINE W/O DYE: CPT | Performed by: RADIOLOGY

## 2021-11-22 DIAGNOSIS — M54.2 NECK PAIN: Primary | ICD-10-CM

## 2021-11-22 NOTE — TELEPHONE ENCOUNTER
RECORDS RECEIVED FROM: Neck pain/ 2nd opinion/ 11/15/2021 MRI/ [facility]   DATE RECEIVED: Nov 23, 2021     NOTES STATUS DETAILS   OFFICE NOTE from referring provider Internal    MEDICATION LIST Internal    MRI Internal 11/19/21   XRAYS (IMAGES & REPORTS) Internal 11/23/21 ORDERED

## 2021-11-23 ENCOUNTER — ANCILLARY PROCEDURE (OUTPATIENT)
Dept: GENERAL RADIOLOGY | Facility: CLINIC | Age: 61
End: 2021-11-23
Attending: ORTHOPAEDIC SURGERY
Payer: COMMERCIAL

## 2021-11-23 ENCOUNTER — OFFICE VISIT (OUTPATIENT)
Dept: ORTHOPEDICS | Facility: CLINIC | Age: 61
End: 2021-11-23
Attending: ORTHOPAEDIC SURGERY
Payer: COMMERCIAL

## 2021-11-23 ENCOUNTER — PRE VISIT (OUTPATIENT)
Dept: ORTHOPEDICS | Facility: CLINIC | Age: 61
End: 2021-11-23

## 2021-11-23 VITALS — BODY MASS INDEX: 35.79 KG/M2 | WEIGHT: 250 LBS | HEIGHT: 70 IN

## 2021-11-23 DIAGNOSIS — M47.812 CERVICAL SPONDYLOSIS: Primary | ICD-10-CM

## 2021-11-23 DIAGNOSIS — M54.2 NECK PAIN: ICD-10-CM

## 2021-11-23 DIAGNOSIS — M48.02 CERVICAL STENOSIS OF SPINE: ICD-10-CM

## 2021-11-23 PROCEDURE — 72050 X-RAY EXAM NECK SPINE 4/5VWS: CPT | Mod: GC | Performed by: RADIOLOGY

## 2021-11-23 PROCEDURE — 99214 OFFICE O/P EST MOD 30 MIN: CPT | Mod: GC | Performed by: PHYSICIAN ASSISTANT

## 2021-11-23 ASSESSMENT — MIFFLIN-ST. JEOR: SCORE: 1950.24

## 2021-11-23 NOTE — NURSING NOTE
"Reason For Visit:   Chief Complaint   Patient presents with     Consult     neck pain, 2nd opinion. has been having neck symptoms for a long time. has discuss this with Dr. Peunte. has tried injections which has helped.        Primary MD: Demi Hardin  Ref. MD: Ketan     ?  No    Date of injury: for the past few years   Type of injury: chronic .  Date of surgery: none   Type of surgery: none .  Smoker: No  Request smoking cessation information: No    Ht 1.778 m (5' 10\")   Wt 113.4 kg (250 lb)   BMI 35.87 kg/m           Oswestry (CARLYLE) Questionnaire    OSWESTRY DISABILITY INDEX 11/20/2021   Count 6   Sum 16   Oswestry Score (%) 53.33   Some recent data might be hidden            Neck Disability Index (NDI) Questionnaire    Neck Disability Index (NDI) 11/20/2021   Neck Disability Index: Count 9   NDI: Total Score = SUM (points for all 10 findings) 26   Neck Disability in Percent = (Total Score) / 50 * 100 57.78 (%)   Some recent data might be hidden                   Promis 10 Assessment    PROMIS 10 11/20/2021   In general, would you say your health is: Fair   In general, would you say your quality of life is: Fair   In general, how would you rate your physical health? Fair   In general, how would you rate your mental health, including your mood and your ability to think? Good   In general, how would you rate your satisfaction with your social activities and relationships? Fair   In general, please rate how well you carry out your usual social activities and roles Fair   To what extent are you able to carry out your everyday physical activities such as walking, climbing stairs, carrying groceries, or moving a chair? Moderately   How often have you been bothered by emotional problems such as feeling anxious, depressed or irritable? Sometimes   How would you rate your fatigue on average? Moderate   How would you rate your pain on average?   0 = No Pain  to  10 = Worst Imaginable Pain 5   In " general, would you say your health is: 2   In general, would you say your quality of life is: 2   In general, how would you rate your physical health? 2   In general, how would you rate your mental health, including your mood and your ability to think? 3   In general, how would you rate your satisfaction with your social activities and relationships? 2   In general, please rate how well you carry out your usual social activities and roles. (This includes activities at home, at work and in your community, and responsibilities as a parent, child, spouse, employee, friend, etc.) 2   To what extent are you able to carry out your everyday physical activities such as walking, climbing stairs, carrying groceries, or moving a chair? 3   In the past 7 days, how often have you been bothered by emotional problems such as feeling anxious, depressed, or irritable? 3   In the past 7 days, how would you rate your fatigue on average? 3   In the past 7 days, how would you rate your pain on average, where 0 means no pain, and 10 means worst imaginable pain? 5   Global Mental Health Score 10   Global Physical Health Score 11   PROMIS TOTAL - SUBSCORES 21   Some recent data might be hidden                Jaden Mccauley ATC

## 2021-11-23 NOTE — PROGRESS NOTES
Spine Surgery Consultation    REFERRING PHYSICIAN: Analilia Aceves   PRIMARY CARE PHYSICIAN: Demi Hardin           Chief Complaint:   Consult (neck pain, 2nd opinion. has been having neck symptoms for a long time. has discuss this with Dr. Puente. has tried injections which has helped. )      History of Present Illness:     Kobe Buchanan is a 61 year old male with h/o AAA repair, HTN, HLD who presents today for evaluation of neck pain for >15 years. It starts ascend from the base of the neck to the skull and is associated left greater occipital neuralgia which can get very severe at the end of the day. No radiculopar pain. He has mild intermittent numbness of the 5th digit when typing too long, resolves with repositioing his hands. No hand weakness or difficulty with fine motor control. Symptoms are relieved with laying down.     He is 1 yr s/p bilateral rTSA, he is doing well from this surgery but has some sharp pains around the shoulder. She has h/o bilateral shoulder infections, he thinks from scratching a wound on his right lower leg.  He had a prolonged ICU stay and encephalopathy after that infection but completely recovered from it.    Past treatments tried:  - Physical therapy: PT in 2019, gave him relief for 1 hour   - Injections: has done many facet blocks, rhizotomies, MISHEL- has given him very short term relief in the past (less than a few weeks), also has had occipital blocks in the past  - Medications: On opioids since 2005, managed through [facility]. On fentanyl patches 50 mcg q48 hr and oxycodone 60 mg/day (180 MME/day per patient). Tizanidine for sleep. Naproxen. Flexeril can also help, but usually only uses about twice/week.    Oswestry (CARLYLE) Questionnaire    OSWESTRY DISABILITY INDEX 11/20/2021   Count 6   Sum 16   Oswestry Score (%) 53.33   Some recent data might be hidden            Past Medical History:     Past Medical History:   Diagnosis Date     NEMESIO (acute  kidney injury) (H) 2/16/2020     Anxiety      Ascending aortic aneurysm (H)      Bicuspid aortic valve      BPPV (benign paroxysmal positional vertigo) 7/11/2014     Chronic narcotic use      Chronic neck pain      Chronic osteoarthritis      Degenerative joint disease      Depression      Hx of type 2 diabetes mellitus 3/4/2021     Hyperlipidemia 4/10/2012     Hypertension      MSSA bacteremia 10/13/2019     Multiple stiff joints      Neck injuries      Obesity 2/9/2015     Pain in shoulder      S/P reverse total shoulder arthroplasty, 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     S/P reverse total shoulder arthroplasty, right 8/18/2020     Septic joint of left shoulder region (H) 11/13/2019     Septic joint of right shoulder region (H) 10/13/2019    s/p washout     Skin picking habit      Sleep apnea     does not use cpap     Status post total shoulder arthroplasty, left 3/3/2020    Added automatically from request for surgery [id number]   Resolved DM after gastric bypass.         Past Surgical History:     Past Surgical History:   Procedure Laterality Date     ARTHROPLASTY SHOULDER  4/15/2014    Procedure: Left Total Shoulder Arthroplasty ;  Surgeon: Analilia Aceves MD;  [facility]     ARTHROPLASTY SHOULDER Right 8/26/2014    Procedure: ARTHROPLASTY SHOULDER;  Surgeon: Analilia Aceves MD;  [facility]     ARTHROSCOPY SHOULDER WITH BIOPSY(IES) Left 1/28/2020    Procedure: Left shoulder arthroscopy and biopsy for culture;  Surgeon: Analilia Aceves MD;  [facility]     BYPASS GASTRIC TAVO-EN-Y, LIVER BIOPSY, COMBINED  8/8/2005     C HAND/FINGER SURGERY UNLISTED       C SHOULDER SURG PROC UNLISTED       COLONOSCOPY  6/30/2014    Procedure: COMBINED COLONOSCOPY, SINGLE BIOPSY/POLYPECTOMY BY BIOPSY;  Surgeon: Chester Patton MD;  Location:  GI     COLONOSCOPY  06/30/2014     CV CORONARY ANGIOGRAM  1/30/2020    Procedure: CV CORONARY ANGIOGRAM;  Surgeon: Néstor Walls MD;  Location:   [facility]     CYSTOSCOPY, BLADDER NECK CUTS, COMBINED N/A 7/18/2016    Procedure: COMBINED CYSTOSCOPY, BLADDER NECK CUTS;  Surgeon: Ritu Leslie MD;  [facility]     ESOPHAGOSCOPY, GASTROSCOPY, DUODENOSCOPY (EGD), COMBINED  6/30/2014    Procedure: COMBINED ESOPHAGOSCOPY, GASTROSCOPY, DUODENOSCOPY (EGD), BIOPSY SINGLE OR MULTIPLE;  Surgeon: Chester Patton MD;  Location: [facility]     ESOPHAGOSCOPY, GASTROSCOPY, DUODENOSCOPY (EGD), COMBINED  06/30/2014     EXCISE MASS FINGER  6/14/2011    Procedure:EXCISE MASS FINGER; Middle Flexor Cyst; Surgeon:SHAYY RUSSELL; [facility]     HAND SURGERY      excision of tendon cyst on left hand     HAND SURGERY Left     excision of tendon cyst on left hand       HC VASCULAR SURGERY PROCEDURE UNLIST       IR FINE NEEDLE ASPIRATION W ULTRASOUND  11/13/2019     IR PICC PLACEMENT > 5 YRS OF AGE  11/19/2019     LASER HOLMIUM LITHOTRIPSY BLADDER N/A 10/15/2014    Procedure: LASER HOLMIUM LITHOTRIPSY BLADDER;  Surgeon: Sahil Taveras MD;  [facility]     LASER KTP GREEN LIGHT PHOTOSELECTIVE VAPORIZATION PROSTATE  1/23/2014    Procedure: LASER KTP GREEN LIGHT PHOTOSELECTIVE VAPORIZATION PROSTATE;  Greenlight Photovaporization Of Prostate  ;  Surgeon: Sahil Taveras MD;  [facility]     OTHER SURGICAL HISTORY Right 06/14/2011    EXCISE MASS FINGER     OTHER SURGICAL HISTORY  08/08/2005    BYPASS GASTRIC TAVO-EN-Y, LIVER BIOPSY, COMBINED     OTHER SURGICAL HISTORY  01/23/2014    LASER KTP GREEN LIGHT PHOTOSELECTIVE VAPORIZATION PROSTATE     OTHER SURGICAL HISTORY  10/15/2014    LASER HOLMIUM LITHOTRIPSY BLADDER     OTHER SURGICAL HISTORY  07/18/2016    CYSTOSCOPY, BLADDER NECK CUTS, COMBINED     OTHER SURGICAL HISTORY  10/04/2016    REPAIR ANEURYSM ASCENDING AORTA     OTHER SURGICAL HISTORY Right 03/31/2017    RELEASE TRIGGER FINGER     OTHER SURGICAL HISTORY Right 09/23/2019    IRRIGATION AND DEBRIDEMENT UPPER  EXTREMITYshoulder     PERIPHERALLY INSERTED CENTRAL CATHETER INSERTION       RELEASE TRIGGER FINGER Right 3/31/2017    Procedure: RELEASE TRIGGER FINGER;  Surgeon: Juan Carlos Blunt MD;  [facility]     REMOVE ANTIBIOTIC CEMENT BEADS / SPACER SHOULDER Left 2020    Procedure: Left shoulder removal of spacer;  Surgeon: Analilia Aceves MD;  [facility]     REMOVE ANTIBIOTIC CEMENT BEADS / SPACER SHOULDER Right 2020    Procedure: Removal of right shoulder antibiotic spacer;  Surgeon: Analilia Aceves MD;  [facility]     REMOVE HARDWARE ARTHROPLASTY SHOULDER. I&D, PLACE ANTIBIOTIC CEMENT BE Left 11/15/2019    Procedure: Explantation of left total shoulder arthroplasty, irrigation and debridement, and placement of antibiotic spacer;  Surgeon: Analilia Aceves MD;  [facility]     REPAIR ANEURYSM ASCENDING AORTA N/A 10/4/2016    Procedure: REPAIR ANEURYSM ASCENDING AORTA;  Surgeon: Mckenzie Townsend MD;  [facility]     REVERSE ARTHROPLASTY SHOULDER Right 2020    Procedure: and conversion to reverse total shoulder arthroplasty;  Surgeon: Analilia Aceves MD;  [facility]     TOTAL SHOULDER REPLACEMENT Left 04/15/2014     TOTAL SHOULDER REPLACEMENT Right 2014     TRANSRECTAL ULTRASONIC, TRANSURETHRAL RESECTION (TUR) OF PROSTATE CYST  2014     TURP              Social History:     Social History     Tobacco Use     Smoking status: Former Smoker     Packs/day: 0.50     Years: 6.00     Pack years: 3.00     Types: Cigarettes     Start date: 1977     Quit date: 1983     Years since quittin.2     Smokeless tobacco: Never Used   Substance Use Topics     Alcohol use: No     Alcohol/week: 0.0 standard drinks   Sister is retired physician   Also has local sister locally who is not as involved.   Lives alone with two dogs.         Family History:     Family History   Problem Relation Age of Onset     Arthritis Other      Gastrointestinal  Disease Other      Cardiovascular Father         aortic aneurysm     Arrhythmia Father      Nephrolithiasis Father      Sleep Apnea Father      Anxiety Disorder Father      Depression Father      Hypertension Father      Obesity Father      Hyperlipidemia Father      Coronary Artery Disease Father      Low Back Problems Father      Spine Problems Father      Anxiety Disorder Mother      Hypertension Mother      Osteoporosis Mother      Obesity Mother      Hyperlipidemia Mother      Low Back Problems Mother      Macular Degeneration Mother      Anxiety Disorder Sister      Hypertension Sister      Osteoporosis Sister      Obesity Sister      Hyperlipidemia Sister      Low Back Problems Sister      Spine Problems Sister      Anxiety Disorder Sister      Depression Sister      Hypertension Sister      Osteoporosis Sister      Obesity Sister      Hyperlipidemia Sister      Low Back Problems Sister      Melanoma No family hx of      Skin Cancer No family hx of      Cataracts Mother      Other - See Comments Mother         low back problems     Cardiovascular Father      Other - See Comments Father         low back problems, spine problems     Anxiety Disorder Sister      Hyperlipidemia Sister      Hypertension Sister      Obesity Sister      Other - See Comments Sister         low back problems, spine problems     Osteoporosis Sister      Anxiety Disorder Sister      Depression Sister      Hyperlipidemia Sister      Hypertension Sister      Other - See Comments Sister         low back problems     Obesity Sister      Osteoporosis Sister             Allergies:     Allergies   Allergen Reactions     Ciprofloxacin      History of aortic aneurysms     Tape [Adhesive Tape] Blisters     Blistering - please use paper tape            Medications:     Current Outpatient Medications   Medication     amLODIPine-benazepril (LOTREL) 5-20 MG capsule     amoxicillin (AMOXIL) 500 MG capsule     aspirin (ASA) 325 MG tablet      "atorvastatin (LIPITOR) 40 MG tablet     buPROPion (WELLBUTRIN SR) 200 MG 12 hr tablet     carvedilol (COREG) 25 MG tablet     clonazePAM (KLONOPIN) 0.5 MG tablet     Cyanocobalamin 5000 MCG SUBL     cyclobenzaprine (FLEXERIL) 10 MG tablet     desoximetasone (TOPICORT) 0.25 % external cream     diazepam (VALIUM) 5 MG tablet     docusate sodium (COLACE) 100 MG capsule     Fe Heme Polypeptide-folic acid (PROFERRIN-FORTE) 12-1 MG TABS     fentaNYL (DURAGESIC) 50 mcg/hr 72 hr patch     fluocinonide (LIDEX) 0.05 % external ointment     Multiple Minerals-Vitamins (CALCIUM CITRATE-MAG-MINERALS) TABS     multivitamin w/minerals (THERA-VIT-M) tablet     naloxone (NARCAN) nasal spray     naproxen (EC-NAPROSYN) 500 MG EC tablet     oxyCODONE IR (ROXICODONE) 10 MG tablet     Pyrithione Zinc 2 % SHAM     senna-docusate (SENOKOT-S/PERICOLACE) 8.6-50 MG tablet     sucralfate (CARAFATE) 1 GM tablet     tacrolimus (PROTOPIC) 0.1 % ointment     tiZANidine (ZANAFLEX) 4 MG tablet     triamcinolone (KENALOG) 0.1 % external ointment     triamcinolone (KENALOG) 0.1 % external ointment     vitamin B-Complex     No current facility-administered medications for this visit.             Review of Systems:     A 10 point ROS was performed and reviewed.  See HPI and Epic intake form for pertinent positive.          Physical Exam:     Vitals: Ht 1.778 m (5' 10\")   Wt 113.4 kg (250 lb)   BMI 35.87 kg/m      Constitutional: Patient is healthy, well-nourished and appears stated age.    Respiratory: Patient is breathing normally and in no respiratory distress.    Skin: No suspicious rashes or lesions    Gait: Non-antalgic gait without use of assistive devices.      Neurologic - Sensation intact to light touch bilaterally. Deep tendon reflexes absent throughout UE and LE. Baxter negative. No ankle clonus.     Musculoskeletal: 5/5 deltoids, 5/5 biceps, 5/5 triceps, 5/5 wrist extensors, 5/5 wrist flexors, 5/5 interossei, 5/5 . Romberg negative. "   o Spine: overall good sagittal balance  - Cervical spine:    Straightened lordosis    Tenderness along the paraspinals and medial trapezius bilaterally         Imaging:   We independently reviewed and interpreted the following imaging at this clinic visit which were also reviewed with patient  MRI 11/19/21  Significant C1-C2 arthropathy with compression of the C2 nerves.  Left C2-C3 mild foraminal stenosis.  Left C4-C5 foraminal stenosis.  Disc degeneration and bulging at C5-C6, C6-C7 with bilateral foraminal stenosis and central stenosis.  Central stenosis is worse at C5-C6 and is moderate to severe.    Xrays AP, LAT, flex, ex cervical spine 11/23/2021   C1-C2 arthropathy, disc degeneration and spondylosis which is worst at C4-C5, C5-C6, C6-C7.  Significant facet arthropathy.  No instability with flexion compared to extension.     Assessment:     61 year old male with chronic axial neck pain and C1-C2 arthropathy and cervical spondylosis with foraminal and central stenosis at C4-C5, C5-C6, C6-C7.      Plan:     Kobe has tried most conservative measures for his neck pain.  The surgical options would be to treat the lower spondylosis with multilevel anterior cervical discectomy and fusion and then treat his upper neck pain and headaches with a C1-C2 fusion. We discussed that C1-C2 fusion is associated with significant loss of range of motion of the neck.  If he were to undergo surgery, Dr. Prajapati would recommend first proceeding with an ACDF, seeing how much pain improvement he has, and then deciding if he should undergo a C1-C2 fusion.  The patient states that he would like to hold off on any surgery at this time and will continue with his nonoperative management.  He will contact us in the future if he wishes to discuss surgery or if he develops any symptoms of myelopathy or radiculopathy such as worsening pain, numbness, tingling of the arms or leg.     Plan formulated with Dr. Prajapati who saw patient and  examined the patient and reviewed imaging. We used the patient's imaging and models to explain their pathophysiology and treatment options. All the patient's questions were answered to their full satisfaction.     Thank you for allowing me to be a part of this patient's care.    Jud Guardado PA-C   Orthopedic Spine Surgery    Attending MD (Dr. Jonathan Prajapati) :  I reviewed and verified the history and physical exam of the patient and discussed the patient's management with the other clinical providers involved in this patient's care including any involved residents or physicians assistants. I reviewed the above note and agree with the documented findings and plan of care, which were communicated to the patient.      Jonathan Prajapati MD

## 2021-11-23 NOTE — LETTER
11/23/2021         RE: Kobe Buchanan  [address]  [place] [address]        Dear Colleague,    Thank you for referring your patient, Kobe Buchanan, to the [facility] [place]. Please see a copy of my visit note below.    Spine Surgery Consultation    REFERRING PHYSICIAN: Analilia Aceves   PRIMARY CARE PHYSICIAN: Demi Hardin           Chief Complaint:   Consult (neck pain, 2nd opinion. has been having neck symptoms for a long time. has discuss this with Dr. Puente. has tried injections which has helped. )      History of Present Illness:     Kobe Buchanan is a 61 year old male with h/o AAA repair, HTN, HLD who presents today for evaluation of neck pain for >15 years. It starts ascend from the base of the neck to the skull and is associated left greater occipital neuralgia which can get very severe at the end of the day. No radiculopar pain. He has mild intermittent numbness of the 5th digit when typing too long, resolves with repositioing his hands. No hand weakness or difficulty with fine motor control. Symptoms are relieved with laying down.     He is 1 yr s/p bilateral rTSA, he is doing well from this surgery but has some sharp pains around the shoulder. She has h/o bilateral shoulder infections, he thinks from scratching a wound on his right lower leg.  He had a prolonged ICU stay and encephalopathy after that infection but completely recovered from it.    Past treatments tried:  - Physical therapy: PT in 2019, gave him relief for 1 hour   - Injections: has done many facet blocks, rhizotomies, MISHEL- has given him very short term relief in the past (less than a few weeks), also has had occipital blocks in the past  - Medications: On opioids since 2005, managed through [facility]. On fentanyl patches 50 mcg q48 hr and oxycodone 60 mg/day (180 MME/day per patient). Tizanidine for sleep. Naproxen. Flexeril can also help, but  usually only uses about twice/week.    Oswestry (CARLYLE) Questionnaire    OSWESTRY DISABILITY INDEX 11/20/2021   Count 6   Sum 16   Oswestry Score (%) 53.33   Some recent data might be hidden            Past Medical History:     Past Medical History:   Diagnosis Date     NEMESIO (acute kidney injury) (H) 2/16/2020     Anxiety      Ascending aortic aneurysm (H)      Bicuspid aortic valve      BPPV (benign paroxysmal positional vertigo) 7/11/2014     Chronic narcotic use      Chronic neck pain      Chronic osteoarthritis      Degenerative joint disease      Depression      Hx of type 2 diabetes mellitus 3/4/2021     Hyperlipidemia 4/10/2012     Hypertension      MSSA bacteremia 10/13/2019     Multiple stiff joints      Neck injuries      Obesity 2/9/2015     Pain in shoulder      S/P reverse total shoulder arthroplasty, 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     S/P reverse total shoulder arthroplasty, right 8/18/2020     Septic joint of left shoulder region (H) 11/13/2019     Septic joint of right shoulder region (H) 10/13/2019    s/p washout     Skin picking habit      Sleep apnea     does not use cpap     Status post total shoulder arthroplasty, left 3/3/2020    Added automatically from request for surgery [id number]   Resolved DM after gastric bypass.         Past Surgical History:     Past Surgical History:   Procedure Laterality Date     ARTHROPLASTY SHOULDER  4/15/2014    Procedure: Left Total Shoulder Arthroplasty ;  Surgeon: Analilia Aceves MD;  [facility]     ARTHROPLASTY SHOULDER Right 8/26/2014    Procedure: ARTHROPLASTY SHOULDER;  Surgeon: Analilia Aceves MD;  [facility]     ARTHROSCOPY SHOULDER WITH BIOPSY(IES) Left 1/28/2020    Procedure: Left shoulder arthroscopy and biopsy for culture;  Surgeon: Analilia Aceves MD;  [facility]     BYPASS GASTRIC TAVO-EN-Y, LIVER BIOPSY, COMBINED  8/8/2005     C HAND/FINGER SURGERY UNLISTED       C SHOULDER SURG PROC UNLISTED       COLONOSCOPY  6/30/2014     Procedure: COMBINED COLONOSCOPY, SINGLE BIOPSY/POLYPECTOMY BY BIOPSY;  Surgeon: Chester Patton MD;  [facility]     COLONOSCOPY  06/30/2014     CV CORONARY ANGIOGRAM  1/30/2020    Procedure: CV CORONARY ANGIOGRAM;  Surgeon: Néstor Walls MD;  [facility]     CYSTOSCOPY, BLADDER NECK CUTS, COMBINED N/A 7/18/2016    Procedure: COMBINED CYSTOSCOPY, BLADDER NECK CUTS;  Surgeon: Ritu Leslie MD;  [facility]     ESOPHAGOSCOPY, GASTROSCOPY, DUODENOSCOPY (EGD), COMBINED  6/30/2014    Procedure: COMBINED ESOPHAGOSCOPY, GASTROSCOPY, DUODENOSCOPY (EGD), BIOPSY SINGLE OR MULTIPLE;  Surgeon: Chester Patton MD;  [facility]     ESOPHAGOSCOPY, GASTROSCOPY, DUODENOSCOPY (EGD), COMBINED  06/30/2014     EXCISE MASS FINGER  6/14/2011    Procedure:EXCISE MASS FINGER; Middle Flexor Cyst; Surgeon:SHAYY RUSSELL; [facility]     HAND SURGERY      excision of tendon cyst on left hand     HAND SURGERY Left     excision of tendon cyst on left hand       [facility] VASCULAR SURGERY PROCEDURE UNLIST       IR FINE NEEDLE ASPIRATION W ULTRASOUND  11/13/2019     IR PICC PLACEMENT > 5 YRS OF AGE  11/19/2019     LASER HOLMIUM LITHOTRIPSY BLADDER N/A 10/15/2014    Procedure: LASER HOLMIUM LITHOTRIPSY BLADDER;  Surgeon: Sahil Taveras MD;  [facility]     LASER KTP GREEN LIGHT PHOTOSELECTIVE VAPORIZATION PROSTATE  1/23/2014    Procedure: LASER KTP GREEN LIGHT PHOTOSELECTIVE VAPORIZATION PROSTATE;  Greenlight Photovaporization Of Prostate  ;  Surgeon: Sahil Taveras MD;  [facility]     OTHER SURGICAL HISTORY Right 06/14/2011    EXCISE MASS FINGER     OTHER SURGICAL HISTORY  08/08/2005    BYPASS GASTRIC TAVO-EN-Y, LIVER BIOPSY, COMBINED     OTHER SURGICAL HISTORY  01/23/2014    LASER KTP GREEN LIGHT PHOTOSELECTIVE VAPORIZATION PROSTATE     OTHER SURGICAL HISTORY  10/15/2014    LASER HOLMIUM LITHOTRIPSY BLADDER     OTHER SURGICAL HISTORY   2016    CYSTOSCOPY, BLADDER NECK CUTS, COMBINED     OTHER SURGICAL HISTORY  10/04/2016    REPAIR ANEURYSM ASCENDING AORTA     OTHER SURGICAL HISTORY Right 2017    RELEASE TRIGGER FINGER     OTHER SURGICAL HISTORY Right 2019    IRRIGATION AND DEBRIDEMENT UPPER EXTREMITYshoulder     PERIPHERALLY INSERTED CENTRAL CATHETER INSERTION       RELEASE TRIGGER FINGER Right 3/31/2017    Procedure: RELEASE TRIGGER FINGER;  Surgeon: Juan Carlos Blunt MD;  [facility]     REMOVE ANTIBIOTIC CEMENT BEADS / SPACER SHOULDER Left 2020    Procedure: Left shoulder removal of spacer;  Surgeon: Analilia Aceves MD;  [facility]     REMOVE ANTIBIOTIC CEMENT BEADS / SPACER SHOULDER Right 2020    Procedure: Removal of right shoulder antibiotic spacer;  Surgeon: Analilia Aceves MD;  [facility]     REMOVE HARDWARE ARTHROPLASTY SHOULDER. I&D, PLACE ANTIBIOTIC CEMENT BE Left 11/15/2019    Procedure: Explantation of left total shoulder arthroplasty, irrigation and debridement, and placement of antibiotic spacer;  Surgeon: Analilia Aceves MD;  [facility]     REPAIR ANEURYSM ASCENDING AORTA N/A 10/4/2016    Procedure: REPAIR ANEURYSM ASCENDING AORTA;  Surgeon: Mckenzie Townsend MD;  [facility]     REVERSE ARTHROPLASTY SHOULDER Right 2020    Procedure: and conversion to reverse total shoulder arthroplasty;  Surgeon: Analilia Aceves MD;  [facility]     TOTAL SHOULDER REPLACEMENT Left 04/15/2014     TOTAL SHOULDER REPLACEMENT Right 2014     TRANSRECTAL ULTRASONIC, TRANSURETHRAL RESECTION (TUR) OF PROSTATE CYST       TURP              Social History:     Social History     Tobacco Use     Smoking status: Former Smoker     Packs/day: 0.50     Years: 6.00     Pack years: 3.00     Types: Cigarettes     Start date: 1977     Quit date: 1983     Years since quittin.2     Smokeless tobacco: Never Used   Substance Use Topics     Alcohol  use: No     Alcohol/week: 0.0 standard drinks   Sister is retired physician   Also has local sister locally who is not as involved.   Lives alone with two dogs.         Family History:     Family History   Problem Relation Age of Onset     Arthritis Other      Gastrointestinal Disease Other      Cardiovascular Father         aortic aneurysm     Arrhythmia Father      Nephrolithiasis Father      Sleep Apnea Father      Anxiety Disorder Father      Depression Father      Hypertension Father      Obesity Father      Hyperlipidemia Father      Coronary Artery Disease Father      Low Back Problems Father      Spine Problems Father      Anxiety Disorder Mother      Hypertension Mother      Osteoporosis Mother      Obesity Mother      Hyperlipidemia Mother      Low Back Problems Mother      Macular Degeneration Mother      Anxiety Disorder Sister      Hypertension Sister      Osteoporosis Sister      Obesity Sister      Hyperlipidemia Sister      Low Back Problems Sister      Spine Problems Sister      Anxiety Disorder Sister      Depression Sister      Hypertension Sister      Osteoporosis Sister      Obesity Sister      Hyperlipidemia Sister      Low Back Problems Sister      Melanoma No family hx of      Skin Cancer No family hx of      Cataracts Mother      Other - See Comments Mother         low back problems     Cardiovascular Father      Other - See Comments Father         low back problems, spine problems     Anxiety Disorder Sister      Hyperlipidemia Sister      Hypertension Sister      Obesity Sister      Other - See Comments Sister         low back problems, spine problems     Osteoporosis Sister      Anxiety Disorder Sister      Depression Sister      Hyperlipidemia Sister      Hypertension Sister      Other - See Comments Sister         low back problems     Obesity Sister      Osteoporosis Sister             Allergies:     Allergies   Allergen Reactions     Ciprofloxacin      History of aortic aneurysms      "Tape [Adhesive Tape] Blisters     Blistering - please use paper tape            Medications:     Current Outpatient Medications   Medication     amLODIPine-benazepril (LOTREL) 5-20 MG capsule     amoxicillin (AMOXIL) 500 MG capsule     aspirin (ASA) 325 MG tablet     atorvastatin (LIPITOR) 40 MG tablet     buPROPion (WELLBUTRIN SR) 200 MG 12 hr tablet     carvedilol (COREG) 25 MG tablet     clonazePAM (KLONOPIN) 0.5 MG tablet     Cyanocobalamin 5000 MCG SUBL     cyclobenzaprine (FLEXERIL) 10 MG tablet     desoximetasone (TOPICORT) 0.25 % external cream     diazepam (VALIUM) 5 MG tablet     docusate sodium (COLACE) 100 MG capsule     Fe Heme Polypeptide-folic acid (PROFERRIN-FORTE) 12-1 MG TABS     fentaNYL (DURAGESIC) 50 mcg/hr 72 hr patch     fluocinonide (LIDEX) 0.05 % external ointment     Multiple Minerals-Vitamins (CALCIUM CITRATE-MAG-MINERALS) TABS     multivitamin w/minerals (THERA-VIT-M) tablet     naloxone (NARCAN) nasal spray     naproxen (EC-NAPROSYN) 500 MG EC tablet     oxyCODONE IR (ROXICODONE) 10 MG tablet     Pyrithione Zinc 2 % SHAM     senna-docusate (SENOKOT-S/PERICOLACE) 8.6-50 MG tablet     sucralfate (CARAFATE) 1 GM tablet     tacrolimus (PROTOPIC) 0.1 % ointment     tiZANidine (ZANAFLEX) 4 MG tablet     triamcinolone (KENALOG) 0.1 % external ointment     triamcinolone (KENALOG) 0.1 % external ointment     vitamin B-Complex     No current facility-administered medications for this visit.             Review of Systems:     A 10 point ROS was performed and reviewed.  See HPI and Epic intake form for pertinent positive.          Physical Exam:     Vitals: Ht 1.778 m (5' 10\")   Wt 113.4 kg (250 lb)   BMI 35.87 kg/m      Constitutional: Patient is healthy, well-nourished and appears stated age.    Respiratory: Patient is breathing normally and in no respiratory distress.    Skin: No suspicious rashes or lesions    Gait: Non-antalgic gait without use of assistive devices.      Neurologic - " Sensation intact to light touch bilaterally. Deep tendon reflexes absent throughout UE and LE. Baxter negative. No ankle clonus.     Musculoskeletal: 5/5 deltoids, 5/5 biceps, 5/5 triceps, 5/5 wrist extensors, 5/5 wrist flexors, 5/5 interossei, 5/5 . Romberg negative.   o Spine: overall good sagittal balance  - Cervical spine:    Straightened lordosis    Tenderness along the paraspinals and medial trapezius bilaterally         Imaging:   We independently reviewed and interpreted the following imaging at this clinic visit which were also reviewed with patient  MRI 11/19/21  Significant C1-C2 arthropathy with compression of the C2 nerves.  Left C2-C3 mild foraminal stenosis.  Left C4-C5 foraminal stenosis.  Disc degeneration and bulging at C5-C6, C6-C7 with bilateral foraminal stenosis and central stenosis.  Central stenosis is worse at C5-C6 and is moderate to severe.    Xrays AP, LAT, flex, ex cervical spine 11/23/2021   C1-C2 arthropathy, disc degeneration and spondylosis which is worst at C4-C5, C5-C6, C6-C7.  Significant facet arthropathy.  No instability with flexion compared to extension.     Assessment:     61 year old male with chronic axial neck pain and C1-C2 arthropathy and cervical spondylosis with foraminal and central stenosis at C4-C5, C5-C6, C6-C7.      Plan:     Kobe has tried most conservative measures for his neck pain.  The surgical options would be to treat the lower spondylosis with multilevel anterior cervical discectomy and fusion and then treat his upper neck pain and headaches with a C1-C2 fusion. We discussed that C1-C2 fusion is associated with significant loss of range of motion of the neck.  If he were to undergo surgery, Dr. Prajapati would recommend first proceeding with an ACDF, seeing how much pain improvement he has, and then deciding if he should undergo a C1-C2 fusion.  The patient states that he would like to hold off on any surgery at this time and will continue with his  nonoperative management.  He will contact us in the future if he wishes to discuss surgery or if he develops any symptoms of myelopathy or radiculopathy such as worsening pain, numbness, tingling of the arms or leg.     Plan formulated with Dr. Prajapati who saw patient and examined the patient and reviewed imaging. We used the patient's imaging and models to explain their pathophysiology and treatment options. All the patient's questions were answered to their full satisfaction.     Thank you for allowing me to be a part of this patient's care.    Jud Guardado PA-C   Orthopedic Spine Surgery    Attending MD (Dr. Jonathan Prajapati) :  I reviewed and verified the history and physical exam of the patient and discussed the patient's management with the other clinical providers involved in this patient's care including any involved residents or physicians assistants. I reviewed the above note and agree with the documented findings and plan of care, which were communicated to the patient.      Jonathan Prajaptai MD

## 2021-11-24 ENCOUNTER — ANCILLARY PROCEDURE (OUTPATIENT)
Dept: GENERAL RADIOLOGY | Facility: CLINIC | Age: 61
End: 2021-11-24
Attending: STUDENT IN AN ORGANIZED HEALTH CARE EDUCATION/TRAINING PROGRAM
Payer: COMMERCIAL

## 2021-11-24 ENCOUNTER — OFFICE VISIT (OUTPATIENT)
Dept: ORTHOPEDICS | Facility: CLINIC | Age: 61
End: 2021-11-24
Payer: COMMERCIAL

## 2021-11-24 VITALS — BODY MASS INDEX: 33.74 KG/M2 | WEIGHT: 241 LBS | HEIGHT: 71 IN

## 2021-11-24 DIAGNOSIS — M25.551 PAIN IN RIGHT HIP: ICD-10-CM

## 2021-11-24 DIAGNOSIS — M16.11 PRIMARY OSTEOARTHRITIS OF RIGHT HIP: Primary | ICD-10-CM

## 2021-11-24 PROCEDURE — 99213 OFFICE O/P EST LOW 20 MIN: CPT | Performed by: STUDENT IN AN ORGANIZED HEALTH CARE EDUCATION/TRAINING PROGRAM

## 2021-11-24 PROCEDURE — 73502 X-RAY EXAM HIP UNI 2-3 VIEWS: CPT | Mod: RT | Performed by: RADIOLOGY

## 2021-11-24 ASSESSMENT — MIFFLIN-ST. JEOR: SCORE: 1925.3

## 2021-11-24 NOTE — LETTER
11/24/2021         RE: Kobe Buchanan  [address]  [place] [address]        Dear Colleague,    Thank you for referring your patient, Kobe Buchanan, to the [facility] [place]. Please see a copy of my visit note below.        [facility]  Orthopaedic Surgery Consultation by Faizan Jo M.D.    Kobe Buchanan MRN# [id number]   Age: 61 year old YOB: 1960     Requesting physician: No ref. provider found  Demi Hardin     Background history:  DX:  1. Chronic kidney disease, stage 3   2. Iron deficiency anemia   3. NSTEMI (non-ST elevated myocardial infarction)  4. Atrial fibrillation   5. Obstructive sleep apnea  6. Obesity  7. Ascending aortic aneurysm  8. Essential hypertension  9. Hyperlipidemia    TREATMENTS:  1. 4/15/2014, left total shoulder replacement, Dr. Aceves  2. 8/26/2014, right total shoulder replacement, Dr. Aceves  3. 9/23/2019, OPEN IRRIGATION AND DEBRIDEMENT WITH EXPLANT OF TOTAL SHOULDER ARTHROPLASTY AND PLACEMENT OF ANTIBIOTIC SPACER, DEEP CULTURES, Barrie Quintero MD, [facility]  4. 11/15/2019, Explantation of left total shoulder arthroplasty, irrigation and debridement, and placement of antibiotic spacer, [facility].   5. 1/28/2020, Left shoulder arthroscopy and biopsy for culture, [facility]  6. 5/8/2020, Left shoulder removal of spacer and placement of reverse total shoulder arthroplasty, [facility]  7. 8/18/2020, Removal of right shoulder antibiotic spacer and conversion to reverse total shoulder arthroplasty, [facility]           History of Present Illness:   61 year old male who presents to our clinic because of an episode of right hip catching and pain.  Patient states that he has been aware of early osteoarthritic changes of the right hip for approximately 8 years that have not been  "bothering him until 2 months ago.  He then without any antecedent trauma started experiencing some catching and pain in the groin.  However, after a few weeks this spontaneously subsided.  Currently, patient has no complaints of his right hip, groin, buttock.  He does not report any night pain, initiation stiffness or soreness.  He has no limitations in his daily activities.  He is here with a question if and when he would need a total hip replacement surgery.  In regards to his right hip he is not using pain medication, he has not seen a physical therapist nor has had intra-articular injections.    Social:   Occupation: Retired  Living situation: Lives alone  Hobbies / Sports: Computer, dogs    Smoking: No  Alcohol: No  Illicit drug use: No         Physical Exam:     EXAMINATION pertinent findings:   PSYCH: Pleasant, healthy-appearing, alert, oriented x3, cooperative. Normal mood and affect.  VITAL SIGNS: Height 1.803 m (5' 11\"), weight 109.3 kg (241 lb).  Reviewed nursing intake notes.   Body mass index is 33.61 kg/m .  RESP: non labored breathing   ABD: benign, soft, non-tender, no acute peritoneal findings  SKIN: grossly normal   LYMPHATIC: grossly normal, no adenopathy, no extremity edema  NEURO: grossly normal , no motor deficits  VASCULAR: satisfactory perfusion of all extremities   MUSCULOSKELETAL:   Alignment: Neutral  Gait: Normal     The right hip exhibits a full range of motion.  No pain upon rotations.  Lasegue's test is negative.  No tenderness to palpation over greater trochanteric region.    Right LE:   Thigh and leg compartments soft and compressible   +Quad/TA/GSC/FHL/EHL   SILT DP/SP/Jagdish/Saph/Tib nerve distributions   Palpable dorsalis pedis pulse              Data:   All laboratory data reviewed  All imaging studies reviewed by me personally.    XR pelvis/hip right 11/24/2021:  My interpretation: Mild to moderate osteoarthritic changes of the right hip with some joint space narrowing, presence of " sclerosis, marginal osteophytes and subchondral cyst.         Assessment and Plan:   Assessment:  61-year-old male presenting with an episode of pain and catching right hip most likely due to underlying mild to moderate osteoarthritic changes.  Complaints have spontaneously subsided.  Currently no pain or limitations.     Plan:  We had a long discussion with the patient regarding ongoing management options.  Reviewed surgical and nonsurgical treatments.  The non-operative management options consist of activity modification, weight reduction, pain medication, PT and injection therapy.  We discussed that given his lack of symptoms and limitations at this point in time no further treatment is indicated.  If he were to sprains more symptoms nonsurgical treatment options would be my recommendation.  Patient understands and agrees to the treatment plan as set forth.    More information on joint replacements can be found on : [email or web address]    Thank you for your referral.    Faizan Jo MD, PhD     Adult Reconstruction  [facility] Department of Orthopaedic Surgery  Pager [phone]      DATA for DOCUMENTATION:         Past Medical History:     Patient Active Problem List   Diagnosis     Essential hypertension     Hyperlipidemia     Abdominal pain, unspecified abdominal location     Ascending aortic aneurysm (H)     Pain medication agreement signed     S/P shoulder replacement     BPPV (benign paroxysmal positional vertigo)     Shoulder joint pain     Obstructive sleep apnea     Obesity     Rhinitis     Right knee pain     Status post coronary angiogram     Atrial fibrillation (H) [I48.91]     Trigger finger of right thumb     Dizzy     Spondylosis of cervical region without myelopathy or radiculopathy     Moderate protein-calorie malnutrition (H)     Left shoulder pain     Acute systolic heart failure (H)     NSTEMI (non-ST  elevated myocardial infarction) (H)     Dysthymic disorder     Iron deficiency anemia secondary to blood loss (chronic)     Psoriasis     Plantar fasciitis     Right shoulder pain     Chronic kidney disease, stage 3 (H)     Hx of transurethral resection of prostate     Past Medical History:   Diagnosis Date     NEMESIO (acute kidney injury) (H) 2/16/2020     Anxiety      Ascending aortic aneurysm (H)      Bicuspid aortic valve      BPPV (benign paroxysmal positional vertigo) 7/11/2014     Chronic narcotic use      Chronic neck pain      Chronic osteoarthritis      Degenerative joint disease      Depression      Hx of type 2 diabetes mellitus 3/4/2021     Hyperlipidemia 4/10/2012     Hypertension      MSSA bacteremia 10/13/2019     Multiple stiff joints      Neck injuries      Obesity 2/9/2015     Pain in shoulder      S/P reverse total shoulder arthroplasty, 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     S/P reverse total shoulder arthroplasty, right 8/18/2020     Septic joint of left shoulder region (H) 11/13/2019     Septic joint of right shoulder region (H) 10/13/2019    s/p washout     Skin picking habit      Sleep apnea     does not use cpap     Status post total shoulder arthroplasty, left 3/3/2020    Added automatically from request for surgery [id number]       Also see scanned health assessment forms.       Past Surgical History:     Past Surgical History:   Procedure Laterality Date     ARTHROPLASTY SHOULDER  4/15/2014    Procedure: Left Total Shoulder Arthroplasty ;  Surgeon: Analilia Aceves MD;  [facility]     ARTHROPLASTY SHOULDER Right 8/26/2014    Procedure: ARTHROPLASTY SHOULDER;  Surgeon: Analilia Aceves MD;  [facility]     ARTHROSCOPY SHOULDER WITH BIOPSY(IES) Left 1/28/2020    Procedure: Left shoulder arthroscopy and biopsy for culture;  Surgeon: Analilia Aceves MD;  [facility]     BYPASS GASTRIC TAVO-EN-Y, LIVER BIOPSY, COMBINED  8/8/2005     C HAND/FINGER SURGERY UNLISTED       C SHOULDER SURG  PROC UNLISTED       COLONOSCOPY  6/30/2014    Procedure: COMBINED COLONOSCOPY, SINGLE BIOPSY/POLYPECTOMY BY BIOPSY;  Surgeon: Chester Patton MD;  [facility]     COLONOSCOPY  06/30/2014     CV CORONARY ANGIOGRAM  1/30/2020    Procedure: CV CORONARY ANGIOGRAM;  Surgeon: Néstor Walls MD;  [facility]     CYSTOSCOPY, BLADDER NECK CUTS, COMBINED N/A 7/18/2016    Procedure: COMBINED CYSTOSCOPY, BLADDER NECK CUTS;  Surgeon: Ritu Leslie MD;  [facility]     ESOPHAGOSCOPY, GASTROSCOPY, DUODENOSCOPY (EGD), COMBINED  6/30/2014    Procedure: COMBINED ESOPHAGOSCOPY, GASTROSCOPY, DUODENOSCOPY (EGD), BIOPSY SINGLE OR MULTIPLE;  Surgeon: Chester Patton MD;  [facility]     ESOPHAGOSCOPY, GASTROSCOPY, DUODENOSCOPY (EGD), COMBINED  06/30/2014     EXCISE MASS FINGER  6/14/2011    Procedure:EXCISE MASS FINGER; Middle Flexor Cyst; Surgeon:SHAYY RUSSELL; [facility]     HAND SURGERY      excision of tendon cyst on left hand     HAND SURGERY Left     excision of tendon cyst on left hand       [facility] VASCULAR SURGERY PROCEDURE UNLIST       IR FINE NEEDLE ASPIRATION W ULTRASOUND  11/13/2019     IR PICC PLACEMENT > 5 YRS OF AGE  11/19/2019     LASER HOLMIUM LITHOTRIPSY BLADDER N/A 10/15/2014    Procedure: LASER HOLMIUM LITHOTRIPSY BLADDER;  Surgeon: Sahil Taveras MD;  [facility]     LASER KTP GREEN LIGHT PHOTOSELECTIVE VAPORIZATION PROSTATE  1/23/2014    Procedure: LASER KTP GREEN LIGHT PHOTOSELECTIVE VAPORIZATION PROSTATE;  Greenlight Photovaporization Of Prostate  ;  Surgeon: Sahil Taveras MD;  [facility]     OTHER SURGICAL HISTORY Right 06/14/2011    EXCISE MASS FINGER     OTHER SURGICAL HISTORY  08/08/2005    BYPASS GASTRIC TAVO-EN-Y, LIVER BIOPSY, COMBINED     OTHER SURGICAL HISTORY  01/23/2014    LASER KTP GREEN LIGHT PHOTOSELECTIVE VAPORIZATION PROSTATE     OTHER SURGICAL HISTORY  10/15/2014    LASER HOLMIUM  LITHOTRIPSY BLADDER     OTHER SURGICAL HISTORY  07/18/2016    CYSTOSCOPY, BLADDER NECK CUTS, COMBINED     OTHER SURGICAL HISTORY  10/04/2016    REPAIR ANEURYSM ASCENDING AORTA     OTHER SURGICAL HISTORY Right 03/31/2017    RELEASE TRIGGER FINGER     OTHER SURGICAL HISTORY Right 09/23/2019    IRRIGATION AND DEBRIDEMENT UPPER EXTREMITYshoulder     PERIPHERALLY INSERTED CENTRAL CATHETER INSERTION       RELEASE TRIGGER FINGER Right 3/31/2017    Procedure: RELEASE TRIGGER FINGER;  Surgeon: Juan Carlos Blunt MD;  [facility]     REMOVE ANTIBIOTIC CEMENT BEADS / SPACER SHOULDER Left 5/8/2020    Procedure: Left shoulder removal of spacer;  Surgeon: Analilia Aceves MD;  [facility]     REMOVE ANTIBIOTIC CEMENT BEADS / SPACER SHOULDER Right 8/18/2020    Procedure: Removal of right shoulder antibiotic spacer;  Surgeon: Analilia Aceves MD;  [facility]     REMOVE HARDWARE ARTHROPLASTY SHOULDER. I&D, PLACE ANTIBIOTIC CEMENT BE Left 11/15/2019    Procedure: Explantation of left total shoulder arthroplasty, irrigation and debridement, and placement of antibiotic spacer;  Surgeon: Analilia Aceves MD;  [facility]     REPAIR ANEURYSM ASCENDING AORTA N/A 10/4/2016    Procedure: REPAIR ANEURYSM ASCENDING AORTA;  Surgeon: Mckenzie Townsend MD;  [facility]     REVERSE ARTHROPLASTY SHOULDER Right 8/18/2020    Procedure: and conversion to reverse total shoulder arthroplasty;  Surgeon: Analilia Aceves MD;  [facility]     TOTAL SHOULDER REPLACEMENT Left 04/15/2014     TOTAL SHOULDER REPLACEMENT Right 08/26/2014     TRANSRECTAL ULTRASONIC, TRANSURETHRAL RESECTION (TUR) OF PROSTATE CYST  2014     TURP  2014            Social History:     Social History     Socioeconomic History     Marital status: Single     Spouse name: Not on file     Number of children: Not on file     Years of education: Not on file     Highest education level: Not on file   Occupational History      Occupation: Disabled   Tobacco Use     Smoking status: Former Smoker     Packs/day: 0.50     Years: 6.00     Pack years: 3.00     Types: Cigarettes     Start date: 1977     Quit date: 1983     Years since quittin.2     Smokeless tobacco: Never Used   Substance and Sexual Activity     Alcohol use: No     Alcohol/week: 0.0 standard drinks     Drug use: No     Sexual activity: Not Currently     Partners: Female     Birth control/protection: Abstinence   Other Topics Concern     Parent/sibling w/ CABG, MI or angioplasty before 65F 55M? Not Asked   Social History Narrative    Live independently[place] one sister, Supriya in [place], [place].  Does live with 2 dogs.      Social Determinants of Health     Financial Resource Strain: Low Risk      Difficulty of Paying Living Expenses: Not hard at all   Food Insecurity: Not on file   Transportation Needs: Unmet Transportation Needs     Lack of Transportation (Medical): Yes     Lack of Transportation (Non-Medical): No   Physical Activity: Not on file   Stress: Not on file   Social Connections: Socially Isolated     Frequency of Communication with Friends and Family: Once a week     Frequency of Social Gatherings with Friends and Family: Never     Attends Denominational Services: Never     Active Member of Clubs or Organizations: No     Attends Club or Organization Meetings: Never     Marital Status: Never    Intimate Partner Violence: Not on file   Housing Stability: Unknown     Unable to Pay for Housing in the Last Year: No     Number of Places Lived in the Last Year: Not on file     Unstable Housing in the Last Year: No            Family History:       Family History   Problem Relation Age of Onset     Arthritis Other      Gastrointestinal Disease Other      Cardiovascular Father         aortic aneurysm     Arrhythmia Father      Nephrolithiasis Father      Sleep Apnea Father      Anxiety Disorder Father      Depression Father       Hypertension Father      Obesity Father      Hyperlipidemia Father      Coronary Artery Disease Father      Low Back Problems Father      Spine Problems Father      Anxiety Disorder Mother      Hypertension Mother      Osteoporosis Mother      Obesity Mother      Hyperlipidemia Mother      Low Back Problems Mother      Macular Degeneration Mother      Anxiety Disorder Sister      Hypertension Sister      Osteoporosis Sister      Obesity Sister      Hyperlipidemia Sister      Low Back Problems Sister      Spine Problems Sister      Anxiety Disorder Sister      Depression Sister      Hypertension Sister      Osteoporosis Sister      Obesity Sister      Hyperlipidemia Sister      Low Back Problems Sister      Melanoma No family hx of      Skin Cancer No family hx of      Cataracts Mother      Other - See Comments Mother         low back problems     Cardiovascular Father      Other - See Comments Father         low back problems, spine problems     Anxiety Disorder Sister      Hyperlipidemia Sister      Hypertension Sister      Obesity Sister      Other - See Comments Sister         low back problems, spine problems     Osteoporosis Sister      Anxiety Disorder Sister      Depression Sister      Hyperlipidemia Sister      Hypertension Sister      Other - See Comments Sister         low back problems     Obesity Sister      Osteoporosis Sister             Medications:     Current Outpatient Medications   Medication Sig     amLODIPine-benazepril (LOTREL) 5-20 MG capsule Take 1 capsule by mouth 2 times daily     amoxicillin (AMOXIL) 500 MG capsule Take 4 tablets 1 hour before dental procedure     aspirin (ASA) 325 MG tablet Take 325 mg by mouth daily     atorvastatin (LIPITOR) 40 MG tablet Take 1 tablet (40 mg) by mouth daily     buPROPion (WELLBUTRIN SR) 200 MG 12 hr tablet TAKE 1 TABLET BY MOUTH 2 TIMES DAILY     carvedilol (COREG) 25 MG tablet Take 1.5 tablets (37.5 mg) by mouth 2 times daily (with meals)      clonazePAM (KLONOPIN) 0.5 MG tablet Take 1 tablet (0.5 mg) by mouth 3 times daily as needed for anxiety     Cyanocobalamin 5000 MCG SUBL Place 5,000 mcg under the tongue daily Vitamin B12     cyclobenzaprine (FLEXERIL) 10 MG tablet Take 1 tablet (10 mg) by mouth 3 times daily as needed for muscle spasms     desoximetasone (TOPICORT) 0.25 % external cream Apply topically daily as needed for inflammation or itching     docusate sodium (COLACE) 100 MG capsule Take 2 capsule in the morning and 3 capsules in the evening     Fe Heme Polypeptide-folic acid (PROFERRIN-FORTE) 12-1 MG TABS Take 1 tablet by mouth 2 times daily     fentaNYL (DURAGESIC) 50 mcg/hr 72 hr patch Place 1 patch onto the skin every 48 hours     fluocinonide (LIDEX) 0.05 % external ointment Apply twice daily to itchy skin nodules for 1-2 weeks at a time.     Multiple Minerals-Vitamins (CALCIUM CITRATE-MAG-MINERALS) TABS Take 1 tablet by mouth daily     multivitamin w/minerals (THERA-VIT-M) tablet Take 1 tablet by mouth daily     naloxone (NARCAN) nasal spray Spray 1 spray (4 mg) into one nostril alternating nostrils as needed for opioid reversal every 2-3 minutes until assistance arrives     naproxen (EC-NAPROSYN) 500 MG EC tablet Take 1 tablet (500 mg) by mouth 2 times daily as needed (pain)     oxyCODONE IR (ROXICODONE) 10 MG tablet Take 1 tablet by mouth every 4 hours as needed for pain, max 4 tablet(s) per day     Pyrithione Zinc 2 % SHAM Externally apply topically daily     senna-docusate (SENOKOT-S/PERICOLACE) 8.6-50 MG tablet Take 1-2 tablets by mouth 2 times daily     sucralfate (CARAFATE) 1 GM tablet Take 1 tablet (1 g) by mouth 2 times daily as needed (Reflux)     tacrolimus (PROTOPIC) 0.1 % ointment Apply topically as needed Apply to affected areas on body.     tiZANidine (ZANAFLEX) 4 MG tablet Take 1 tablet (4 mg) by mouth nightly as needed for other (insomnia related to muscle pain)     triamcinolone (KENALOG) 0.1 % external ointment Apply  topically 2 times daily To affected areas of rash. Please avoid application to the face, groin or armpits as the medication is too strong for these areas.     triamcinolone (KENALOG) 0.1 % external ointment Apply topically 2 times daily as needed for irritation     vitamin B-Complex Take 1 tablet by mouth daily     No current facility-administered medications for this visit.          Review of Systems:   A comprehensive 10 point review of systems (constitutional, ENT, cardiac, peripheral vascular, lymphatic, respiratory, GI, , Musculoskeletal, skin, Neurological) was performed and found to be negative except as described in this note.     See intake form completed by patient

## 2021-11-24 NOTE — NURSING NOTE
"Reason For Visit:   Chief Complaint   Patient presents with     Right Hip - Pain     Consult     Right hip pain       Ht 1.803 m (5' 11\")   Wt 109.3 kg (241 lb)   BMI 33.61 kg/m           Ligia Jorgensen ATC    " PATIENT CALLED STATING THAT HER AND , YUSUF DAN, HAVE APPOINTMENTS TOMORROW AFTERNOON, AROUND 3:45 AND 4:00 PM. THE PATIENT STATED THAT ELIZABETH LEDEZMA HAD REQUESTED THEY BE FASTING FOR THEIR VISITS WITH LAB-WORK NEEDING TO BE DONE.     PATIENT IS NOT COMFORTABLE FASTING FOR THAT TIME IN THE MORNING BEFORE THE APPOINTMENT, NEITHER IS . OFFICE STATED THAT THEY WERE ABLE TO COME IN UNFASTING AND HAVE LABS DRAWN LATER. PATIENT PREFERRED TO NOT DO THAT AND SPEAK WITH ELIZABETH ABOUT THE ISSUE.    PATIENT WOULD LIKE CALLBACK -[phone]

## 2021-12-07 ENCOUNTER — TRANSFERRED RECORDS (OUTPATIENT)
Dept: HEALTH INFORMATION MANAGEMENT | Facility: CLINIC | Age: 61
End: 2021-12-07
Payer: COMMERCIAL

## 2021-12-09 NOTE — CONSULTS
[facility], [place]    Hospitalist Consultation    Date of Admission:  5/8/2020  Date of Consult (When I saw the patient): 05/08/20    Assessment & Plan     # Left Removal of spacers and Left Shoulder arthroplasty:  ml: Per orthopedics.   -do good is  -pain team has laid out pain management plan  # Chronic Cervical pain issues, Limited neck extension:  # SAVITA: Use Home CPAP  # Hx of Bicuspid Aortic valve s/p valve sparing repair with Gelweave graft 2016  # Non Ischemic Cardiomyopathy (EF 30-35%), Now normalized on cardiac MRI Feb 2020.   # Surveillance biopsy on 1/28/20 was complicated by ventricular ectopy and severe BP elevation 200s/130-150s  Was cleared by cardiology for Sx. PTA Coreg, Amlodipine Benezapril, ASA. Has been off lasix.     Had cardiac MRI done on 02/25/20. It showed     Normal cardiac function, LVEF 63% and RVEF 70%, without myocardial fibrosis.  Rapid resolution   of cardiomyopathy with absence of fibrosis suggests a resolved stress induced  CM. Normal appearance of   ascending aortic graft, with normal caliber of thoracic aorta. No change from prior  MRA in 2019.     -stable clinically  -hold amlodipine-benazapril  -Ct coreg with hold parameters.   -Check Na, K and Mg and Ca in Am    # HT: BP is good  -hold amlodipine-benazapril  -Ct coreg with hold parameters.   # Dyslipidemia: PTA Atorvastatin, Ct that    # Former Smoker: NO ho COPD  # Hx of Gastric Bypass, GERD: Controlled.  PTA Protonix prn daily  -Avoid NSAIDs if possible.   -Schedule PPI daily here  # Anxiety and Depression: PTA Wellbutrin and clonazepam.   -Anxiety is controlled.   -Ct Wellbutrin and clonazepma.  -monitor for sedation with pain meds     DVT Prophylaxis: Defer to primary service  Code Status: Full Code    Disposition: Per priamary team.    Adam Gonzales MD     Reason for Consult   Reason for consult: I was asked by Dr Aceves to evaluate this patient for medical co management in post  op period    Primary Care Physician   Demi Hardin    Chief Complaint     BL shoulder Sx    History is obtained from the patient    History of Present Illness    60 M with Hx of bilateral shoulder replacement with subsequent bilateral prosthetic joint infection with subsequent removal and placement of spacer bilaterally, chronic cervical spine pain, SAVITA, anxiety, depression, HTN, HLD, prior smoking history, gastric bypass, right left fusion of his aortic valve with bicuspid aortopathy, ascending aortic aneurysm. No known coarctation. He is s/p valve sparing repair with Gelweave graft 2016, NICM (EF 30-35% but now normalized per cardiac MRI). A surveillance biopsy on 1/28/20 was complicated by ventricular ectopy and severe BP elevation 200s/130-150s. This was followed by postop evaluation and admission to Cardiology. He has since been followed by Cardiology, last seen on 4/26/20 where he was approved to proceed with above surgery.     Past Medical History    I have reviewed this patient's medical history and updated it with pertinent information if needed.   Past Medical History:   Diagnosis Date     Anxiety      Ascending aortic aneurysm (H)      Bicuspid aortic valve      BPPV (benign paroxysmal positional vertigo) 7/11/2014     Chronic narcotic use      Chronic neck pain      Chronic osteoarthritis      Degenerative joint disease      Depression      Hyperlipidemia 4/10/2012     Hypertension      Multiple stiff joints      Neck injuries      Obesity 2/9/2015     Pain in shoulder      Skin picking habit      Sleep apnea     does not use cpap       Past Surgical History   I have reviewed this patient's surgical history and updated it with pertinent information if needed.  Past Surgical History:   Procedure Laterality Date     ARTHROPLASTY SHOULDER  4/15/2014    Procedure: Left Total Shoulder Arthroplasty ;  Surgeon: Analilia Aceves MD;  [facility]     ARTHROPLASTY SHOULDER Right 8/26/2014     Procedure: ARTHROPLASTY SHOULDER;  Surgeon: Analilia Aceves MD;  [facility]     ARTHROSCOPY SHOULDER WITH BIOPSY(IES) Left 1/28/2020    Procedure: Left shoulder arthroscopy and biopsy for culture;  Surgeon: Analilia Aceves MD;  [facility]     BYPASS GASTRIC TAVO-EN-Y, LIVER BIOPSY, COMBINED  8/8/2005     C HAND/FINGER SURGERY UNLISTED       C SHOULDER SURG PROC UNLISTED       COLONOSCOPY  6/30/2014    Procedure: COMBINED COLONOSCOPY, SINGLE BIOPSY/POLYPECTOMY BY BIOPSY;  Surgeon: Chester Patton MD;  [facility]     CV CORONARY ANGIOGRAM  1/30/2020    Procedure: CV CORONARY ANGIOGRAM;  Surgeon: Néstor Walls MD;  [facility]     CYSTOSCOPY, BLADDER NECK CUTS, COMBINED N/A 7/18/2016    Procedure: COMBINED CYSTOSCOPY, BLADDER NECK CUTS;  Surgeon: Ritu Leslie MD;  [facility]     ESOPHAGOSCOPY, GASTROSCOPY, DUODENOSCOPY (EGD), COMBINED  6/30/2014    Procedure: COMBINED ESOPHAGOSCOPY, GASTROSCOPY, DUODENOSCOPY (EGD), BIOPSY SINGLE OR MULTIPLE;  Surgeon: Chester Patton MD;  [facility]: [facility]     EXCISE MASS FINGER  6/14/2011    Procedure:EXCISE MASS FINGER; Middle Flexor Cyst; Surgeon:SHAYY RUSSELL; [facility]     HAND SURGERY      excision of tendon cyst on left hand     HC VASCULAR SURGERY PROCEDURE UNLIST       IR FINE NEEDLE ASPIRATION W ULTRASOUND  11/13/2019     IR PICC PLACEMENT > 5 YRS OF AGE  11/19/2019     LASER HOLMIUM LITHOTRIPSY BLADDER N/A 10/15/2014    Procedure: LASER HOLMIUM LITHOTRIPSY BLADDER;  Surgeon: Sahil Taveras MD;  [facility]     LASER KTP GREEN LIGHT PHOTOSELECTIVE VAPORIZATION PROSTATE  1/23/2014    Procedure: LASER KTP GREEN LIGHT PHOTOSELECTIVE VAPORIZATION PROSTATE;  Greenlight Photovaporization Of Prostate  ;  Surgeon: Sahil Taveras MD;  [facility]: [facility]     RELEASE TRIGGER FINGER Right 3/31/2017    Procedure: RELEASE TRIGGER FINGER;  Surgeon: Merlene  Juan Carlos Howe MD;  [facility]     REMOVE HARDWARE ARTHROPLASTY SHOULDER. I&D, PLACE ANTIBIOTIC CEMENT BE Left 11/15/2019    Procedure: Explantation of left total shoulder arthroplasty, irrigation and debridement, and placement of antibiotic spacer;  Surgeon: Analilia Aceves MD;  [facility]     REPAIR ANEURYSM ASCENDING AORTA N/A 10/4/2016    Procedure: REPAIR ANEURYSM ASCENDING AORTA;  Surgeon: Mckenzie Townsend MD;  [facility]: [facility]     TURP  2014       Prior to Admission Medications   Prior to Admission Medications   Prescriptions Last Dose Informant Patient Reported? Taking?   Cyanocobalamin 5000 MCG SUBL 5/7/2020 at 0800  Yes Yes   Sig: Place 5,000 mcg under the tongue daily Vitamin B12   Fe Heme Polypeptide-folic acid (PROFERRIN-FORTE) 12-1 MG TABS 5/7/2020 at 0800  No Yes   Sig: Take 1 tablet by mouth daily   Multiple Minerals-Vitamins (CALCIUM CITRATE-MAG-MINERALS) TABS 5/7/2020 at 0800  Yes Yes   Sig: Take 1 tablet by mouth daily   acetaminophen (TYLENOL) 325 MG tablet Past Week at Unknown time  No Yes   Sig: Take 2 tablets (650 mg) by mouth every 4 hours as needed for mild pain   amLODIPine-benazepril (LOTREL) 5-20 MG capsule 5/7/2020 at 1730  No Yes   Sig: Take 1 capsule by mouth daily   Patient taking differently: Take 1 capsule by mouth 2 times daily    amoxicillin (AMOXIL) 500 MG capsule More than a month at Unknown time  No No   Sig: Take 4 tablets 1 hour before dental procedure   aspirin 81 MG EC tablet 5/7/2020 at 0800  Yes Yes   Sig: Take 81 mg by mouth daily before breakfast   atorvastatin (LIPITOR) 40 MG tablet 5/7/2020 at 1730  No Yes   Sig: Take 1 tablet (40 mg) by mouth daily   Patient taking differently: Take 40 mg by mouth every evening    buPROPion (WELLBUTRIN SR) 200 MG 12 hr tablet 5/8/2020 at 0530  No Yes   Sig: TAKE 1 TABLET BY MOUTH 2 TIMES DAILY   carvedilol (COREG) 25 MG tablet 5/8/2020 at 0530  No Yes   Sig: Take 1 tablet (25 mg) by mouth 2 times daily (with meals)    clonazePAM (KLONOPIN) 0.5 MG tablet 5/7/2020 at 2300  No Yes   Sig: Take 1 tablet (0.5 mg) by mouth 3 times daily as needed for anxiety   cyclobenzaprine (FLEXERIL) 10 MG tablet Past Month at Unknown time  No Yes   Sig: Take 1 tablet (10 mg) by mouth 3 times daily as needed for muscle spasms   desoximetasone (TOPICORT) 0.25 % external cream   Yes No   Sig: Apply topically daily as needed for inflammation or itching   docusate sodium (COLACE) 100 MG capsule 5/7/2020 at 1730  Yes Yes   Sig: Take 1 capsule in the morning and 2 capsules in the evening   fentaNYL (DURAGESIC) 75 mcg/hr 72 hr patch 5/8/2020 at Unknown time  Yes Yes   Sig: Place 1 patch onto the skin every 48 hours    fluocinonide (LIDEX) 0.05 % external ointment   No No   Sig: Apply twice daily to itchy skin nodules for 1-2 weeks at a time.   Patient taking differently: as needed Apply twice daily to itchy skin nodules for 1-2 weeks at a time.   multivitamin w/minerals (THERA-VIT-M) tablet 5/7/2020 at 0800  Yes Yes   Sig: Take 1 tablet by mouth daily   naloxone (NARCAN) nasal spray   Yes No   Sig: Spray 1 spray (4 mg) into one nostril alternating nostrils as needed for opioid reversal every 2-3 minutes until assistance arrives   naproxen (NAPROSYN DR) 500 MG EC tablet 5/6/2020 at Unknown time  No Yes   Sig: Take 500 mg by mouth 2 times daily as needed (pain)   oxyCODONE IR (ROXICODONE) 10 MG tablet 5/7/2020 at 2300  Yes Yes   Sig: Take 10 mg by mouth every 4 hours as needed for severe pain Max 6 tablets per day   pantoprazole (PROTONIX) 40 MG EC tablet More than a month at Unknown time  Yes No   Sig: Take 40 mg by mouth daily as needed for heartburn   senna (SENOKOT) 8.6 MG tablet 5/7/2020 at 1730  Yes Yes   Sig: Take 2 tablets by mouth every morning and 3 tablets every evening   tacrolimus (PROTOPIC) 0.1 % ointment More than a month at Unknown time  No No   Sig: Apply topically as needed Apply to affected areas on body.   Patient not taking: Reported on  5/6/2020   vitamin B-Complex 5/7/2020 at 0800  Yes Yes   Sig: Take 1 tablet by mouth daily      Facility-Administered Medications: None     Allergies   Allergies   Allergen Reactions     Ciprofloxacin      History of aortic aneurysms     Tape [Adhesive Tape] Blisters     Blistering - please use paper tape       Social History   I have reviewed this patient's social history and updated it with pertinent information if needed. Kobe CHAO Chanel  reports that he quit smoking about 36 years ago. His smoking use included cigarettes. He started smoking about 43 years ago. He has a 3.00 pack-year smoking history. He has never used smokeless tobacco. He reports that he does not drink alcohol or use drugs.    Family History   I have reviewed this patient's family history and updated it with pertinent information if needed.   Family History   Problem Relation Age of Onset     Arthritis Other      Gastrointestinal Disease Other      Cardiovascular Father         aortic aneurysm     Arrhythmia Father      Nephrolithiasis Father      Sleep Apnea Father      Anxiety Disorder Father      Depression Father      Hypertension Father      Obesity Father      Hyperlipidemia Father      Coronary Artery Disease Father         history of MI and stent     Low Back Problems Father      Spine Problems Father      Anxiety Disorder Mother      Hypertension Mother      Osteoporosis Mother      Obesity Mother      Hyperlipidemia Mother      Low Back Problems Mother      Anxiety Disorder Sister      Hypertension Sister      Osteoporosis Sister      Obesity Sister      Hyperlipidemia Sister      Low Back Problems Sister      Spine Problems Sister      Anxiety Disorder Sister      Depression Sister      Hypertension Sister      Osteoporosis Sister      Obesity Sister      Hyperlipidemia Sister      Low Back Problems Sister        Review of Systems   The 10 point Review of Systems is negative other than noted in the HPI or here.     Physical Exam  [Follow-Up Visit] : a follow-up visit for   Temp: 96.9  F (36.1  C) Temp src: Oral BP: (!) 145/72 Pulse: 80 Heart Rate: 75 Resp: 17 SpO2: 96 % O2 Device: None (Room air) Oxygen Delivery: 6 LPM  Vital Signs with Ranges  Temp:  [96.9  F (36.1  C)-98.3  F (36.8  C)] 96.9  F (36.1  C)  Pulse:  [58-80] 80  Heart Rate:  [57-78] 75  Resp:  [10-18] 17  BP: (120-153)/(67-85) 145/72  MAP:  [97 mmHg-101 mmHg] 101 mmHg  Arterial Line BP: (146-161)/(65-70) 161/70  SpO2:  [96 %-100 %] 96 %  195 lbs 8.77 oz    Constitutional: Awake, alert, cooperative, no apparent distress.  Eyes: Conjunctiva and pupils examined and normal.  HEENT: Moist mucous membranes, normal dentition.  Respiratory: Clear to auscultation bilaterally, no crackles or wheezing.  Cardiovascular: Regular rate and rhythm, normal S1 and S2, and no murmur noted.  GI: Soft, non-distended, non-tender, normal bowel sounds.  Lymph/Hematologic: No anterior cervical or supraclavicular adenopathy.  Skin: No rashes, no cyanosis, no edema.  Musculoskeletal: SP left shoulder Sx      Data   -Data reviewed today: All pertinent laboratory and imaging results from this encounter were reviewed. I personally reviewed no images or EKG's today.  Recent Labs   Lab 05/06/20  1036   WBC 6.8   HGB 12.0*   MCV 87         POTASSIUM 4.7   CHLORIDE 108   CO2 28   BUN 28   CR 0.85   ANIONGAP 2*   NIA 9.1   GLC 90   ALBUMIN 4.0   PROTTOTAL 7.6   BILITOTAL 0.6   ALKPHOS 100   ALT 42   AST 29       Recent Results (from the past 24 hour(s))   POC US GUIDANCE NEEDLE PLACEMENT    Narrative    Left interscalene brachial plexus block   XR Shoulder Left Port G/E 2 Views    Narrative    2 views left shoulder radiographs 5/8/2020 3:29 PM    History: s/p revision reverse TSA    Comparison: 11/12/2019 1/7/2020    Findings:    AP, Grashey views of the left shoulder were obtained.     New postsurgical change of reverse total shoulder arthroplasty with  explantation of previous spacer. Surgical drain. Soft tissue gas.    Median sternotomy  [Family Member] : family member [FreeTextEntry2] : ampullary cancer  wires partially visualized.      Impression    IMPRESSION: New revision reverse total shoulder arthroplasty.    YONAS BEY

## 2022-01-04 ENCOUNTER — TRANSFERRED RECORDS (OUTPATIENT)
Dept: HEALTH INFORMATION MANAGEMENT | Facility: CLINIC | Age: 62
End: 2022-01-04
Payer: COMMERCIAL

## 2022-01-07 ENCOUNTER — IMMUNIZATION (OUTPATIENT)
Dept: FAMILY MEDICINE | Facility: CLINIC | Age: 62
End: 2022-01-07
Payer: COMMERCIAL

## 2022-01-07 PROCEDURE — 91306 COVID-19,PF,MODERNA (18+ YRS BOOSTER .25ML): CPT

## 2022-01-07 PROCEDURE — 0064A COVID-19,PF,MODERNA (18+ YRS BOOSTER .25ML): CPT

## 2022-01-12 DIAGNOSIS — F34.1 DYSTHYMIC DISORDER: ICD-10-CM

## 2022-01-12 DIAGNOSIS — K21.00 GASTROESOPHAGEAL REFLUX DISEASE WITH ESOPHAGITIS, UNSPECIFIED WHETHER HEMORRHAGE: Primary | ICD-10-CM

## 2022-01-12 RX ORDER — BUPROPION HYDROCHLORIDE 200 MG/1
TABLET, EXTENDED RELEASE ORAL
Qty: 180 TABLET | Refills: 3 | Status: SHIPPED | OUTPATIENT
Start: 2022-01-12 | End: 2023-02-01

## 2022-01-12 RX ORDER — PANTOPRAZOLE SODIUM 40 MG/1
40 TABLET, DELAYED RELEASE ORAL DAILY PRN
Qty: 30 TABLET | Refills: 3 | Status: SHIPPED | OUTPATIENT
Start: 2022-01-12 | End: 2023-02-01

## 2022-02-01 DIAGNOSIS — H40.003 GLAUCOMA SUSPECT OF BOTH EYES: Primary | ICD-10-CM

## 2022-02-02 ENCOUNTER — OFFICE VISIT (OUTPATIENT)
Dept: OPHTHALMOLOGY | Facility: CLINIC | Age: 62
End: 2022-02-02
Attending: OPHTHALMOLOGY
Payer: COMMERCIAL

## 2022-02-02 DIAGNOSIS — H35.30 ARMD (AGE RELATED MACULAR DEGENERATION): Primary | ICD-10-CM

## 2022-02-02 DIAGNOSIS — H40.003 GLAUCOMA SUSPECT OF BOTH EYES: ICD-10-CM

## 2022-02-02 PROCEDURE — 92083 EXTENDED VISUAL FIELD XM: CPT | Performed by: OPHTHALMOLOGY

## 2022-02-02 PROCEDURE — G0463 HOSPITAL OUTPT CLINIC VISIT: HCPCS | Mod: 25

## 2022-02-02 PROCEDURE — 92133 CPTRZD OPH DX IMG PST SGM ON: CPT | Performed by: OPHTHALMOLOGY

## 2022-02-02 PROCEDURE — 92134 CPTRZ OPH DX IMG PST SGM RTA: CPT | Performed by: OPHTHALMOLOGY

## 2022-02-02 PROCEDURE — 92012 INTRM OPH EXAM EST PATIENT: CPT | Mod: GC | Performed by: OPHTHALMOLOGY

## 2022-02-02 PROCEDURE — 92015 DETERMINE REFRACTIVE STATE: CPT

## 2022-02-02 RX ORDER — LATANOPROST 50 UG/ML
1 SOLUTION/ DROPS OPHTHALMIC DAILY
Qty: 2.5 ML | Refills: 4 | Status: ON HOLD | OUTPATIENT
Start: 2022-02-02 | End: 2024-09-08

## 2022-02-02 ASSESSMENT — VISUAL ACUITY
OS_CC: 20/25
CORRECTION_TYPE: GLASSES
OS_CC+: -2
METHOD: SNELLEN - LINEAR
OD_CC+: -1
OD_CC: 20/25

## 2022-02-02 ASSESSMENT — SLIT LAMP EXAM - LIDS
COMMENTS: NORMAL
COMMENTS: NORMAL

## 2022-02-02 ASSESSMENT — REFRACTION_MANIFEST
OS_SPHERE: +1.00
OS_AXIS: 180
OD_CYLINDER: +1.25
OS_SPHERE: +3.75
OD_ADD: +2.75
OD_AXIS: 180
OD_SPHERE: +2.50
OD_CYLINDER: +1.25
OS_ADD: +2.75
OS_CYLINDER: +1.00
OD_AXIS: 180
OD_AXIS: 180
OS_SPHERE: +2.50
OD_CYLINDER: +1.25
OD_SPHERE: +1.00
OS_AXIS: 180
OS_CYLINDER: +1.00
OD_SPHERE: +3.75
OS_CYLINDER: +1.00
OS_AXIS: 180

## 2022-02-02 ASSESSMENT — REFRACTION_WEARINGRX
OD_SPHERE: +1.00
OS_AXIS: 180
OS_SPHERE: +1.25
OD_AXIS: 005
OS_ADD: +2.25
OD_CYLINDER: +1.00
OD_ADD: +2.25
OS_CYLINDER: +0.50

## 2022-02-02 ASSESSMENT — CUP TO DISC RATIO
OS_RATIO: 0.75
OD_RATIO: 0.75

## 2022-02-02 ASSESSMENT — CONF VISUAL FIELD
METHOD: COUNTING FINGERS
OS_NORMAL: 1
OD_NORMAL: 1

## 2022-02-02 ASSESSMENT — EXTERNAL EXAM - LEFT EYE: OS_EXAM: NORMAL

## 2022-02-02 ASSESSMENT — TONOMETRY
OD_IOP_MMHG: 11
IOP_METHOD: TONOPEN
OS_IOP_MMHG: 12

## 2022-02-02 ASSESSMENT — EXTERNAL EXAM - RIGHT EYE: OD_EXAM: NORMAL

## 2022-02-02 NOTE — PROGRESS NOTES
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 No new eye pain, f/f.     PMH: Kobe Buchanan is a  62 year old year-old patient with a family history for Age related macular degeneration and glaucoma suspect.    FH: His mother is patient of Dr. Espinal and was diagnosed with pattern dystrophy    Retinal Imaging:  OCT RNFL 2-2-22  OD Normal, no thinning  OS Mild temporal thinning, progression since 2019    OVF 24-2  2-2-22  OD: early inf arcuate  OS: inf arcuate, nasal step    OCT mac 2-2-22  RE: superior outer retinal disruption-stable  LE: normal    Autofluorescence 2-17-21  right eye- hypoautoflurescent lesion in the superior macula and inferonasal macula  OS normal      optos pic consistent with exam    Assessment & Plan:  # Glaucoma suspect OS>OD   - Increased cup to disc/ ratio   - Mother with glaucoma   - IOP: 11/12   - OCT and VF show progression left eye since 2019    - Discussed SLT vs latanoprost vs monitor   - recommend to start latanoprost left eye     # 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  I personally obtained the chief complaint(s) and history of present illness.  I confirmed and edited as necessary the review of systems, past medical/surgical history, family history, social history, and examination findings as documented by others; and I  examined the patient myself.  I personally reviewed the relevant tests, images, and reports as documented above.  I personally reviewed the ophthalmic test(s) associated with this encounter, agree with the interpretation(s) as documented by the resident/fellow, and have edited the corresponding report(s) as necessary.   I formulated and edited as necessary the assessment and plan and discussed the findings and management plan with the patient and family    Brenda Espinal MD  .  Retina Service   Department of Ophthalmology and Visual Neurosciences   [facility]  Phone: [phone]   Fax: [phone]

## 2022-02-02 NOTE — NURSING NOTE
Chief Complaints and History of Present Illnesses   Patient presents with     Glaucoma Suspect Follow Up     6 month follow up both eyes.     Chief Complaint(s) and History of Present Illness(es)     Glaucoma Suspect Follow Up     Comments: 6 month follow up both eyes.              Comments     Pt states vision is about the same as last visit. No eye pain today.   No new flashes or floaters. Pt would like MRx today. Pt would like separate reading glasses measured for 12 inches.  No DM.    FREIDA Hunt February 2, 2022 7:17 AM

## 2022-02-07 NOTE — PROGRESS NOTES
Annual Wellness Visit for 65 years and older    HPI  This 62 year old male presents as an established patient  Demi Hardin who presents for an annual wellness visit.    Other issues patient wants to be addressed today:  Chief Complaint   Patient presents with     Physical     annaul wellness     Medication Reconciliation     laxapro 10 mg, clonazepam .5mg     Vision changes: just recently had an eye appt, seeing some changes in visual field    Living in COVID, lost both parents, overall seeing a slow decline and challenges, pain meds getting ramped down was 180 MEQ  (goal of 90 MEQ) having more pains, having more cervical-thoracic pain, possibly related to cervical spinal stenosis    Meds and moods:  -used to take klonopin as needed, wonders if he's having more panic attacks  -has seen psychiatry in past years (about a decade) now just wants to increase klonopin,  -in the last year: having stronger panic attacks   -sister wonders about lexapro, she's on that    Patient Active Problem List   Diagnosis     Essential hypertension     Hyperlipidemia     Abdominal pain, unspecified abdominal location     Ascending aortic aneurysm (H)     Pain medication agreement signed     S/P shoulder replacement     BPPV (benign paroxysmal positional vertigo)     Shoulder joint pain     Obstructive sleep apnea     Obesity     Rhinitis     Right knee pain     Status post coronary angiogram     Atrial fibrillation (H) [I48.91]     Trigger finger of right thumb     Dizzy     Spondylosis of cervical region without myelopathy or radiculopathy     Moderate protein-calorie malnutrition (H)     Left shoulder pain     Acute systolic heart failure (H)     NSTEMI (non-ST elevated myocardial infarction) (H)     Dysthymic disorder     Iron deficiency anemia secondary to blood loss (chronic)     Psoriasis     Plantar fasciitis     Right shoulder pain     Chronic kidney disease, stage 3 (H)     Hx of transurethral resection of prostate      Morbid obesity (H)     Past Medical History:   Diagnosis Date     NEMESIO (acute kidney injury) (H) 2/16/2020     Anxiety      Ascending aortic aneurysm (H)      Bicuspid aortic valve      BPPV (benign paroxysmal positional vertigo) 7/11/2014     Chronic narcotic use      Chronic neck pain      Chronic osteoarthritis      Degenerative joint disease      Depression      Hx of type 2 diabetes mellitus 3/4/2021     Hyperlipidemia 4/10/2012     Hypertension      MSSA bacteremia 10/13/2019     Multiple stiff joints      Neck injuries      Obesity 2/9/2015     Pain in shoulder      S/P reverse total shoulder arthroplasty, 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     S/P reverse total shoulder arthroplasty, right 8/18/2020     Septic joint of left shoulder region (H) 11/13/2019     Septic joint of right shoulder region (H) 10/13/2019    s/p washout     Skin picking habit      Sleep apnea     does not use cpap     Status post total shoulder arthroplasty, left 3/3/2020    Added automatically from request for surgery [id number]       Family History   Problem Relation Age of Onset     Arthritis Other      Gastrointestinal Disease Other      Cardiovascular Father         aortic aneurysm     Arrhythmia Father      Nephrolithiasis Father      Sleep Apnea Father      Anxiety Disorder Father      Depression Father      Hypertension Father      Obesity Father      Hyperlipidemia Father      Coronary Artery Disease Father      Low Back Problems Father      Spine Problems Father      Cardiovascular Father      Other - See Comments Father         low back problems, spine problems     Anxiety Disorder Mother      Hypertension Mother      Osteoporosis Mother      Obesity Mother      Hyperlipidemia Mother      Low Back Problems Mother      Macular Degeneration Mother      Cataracts Mother      Other - See Comments Mother         low back problems     Anxiety Disorder Sister      Hypertension Sister      Osteoporosis Sister      Obesity Sister      Hyperlipidemia  Sister      Low Back Problems Sister      Spine Problems Sister      Anxiety Disorder Sister      Depression Sister      Hypertension Sister      Osteoporosis Sister      Obesity Sister      Hyperlipidemia Sister      Low Back Problems Sister      Anxiety Disorder Sister      Hyperlipidemia Sister      Hypertension Sister      Obesity Sister      Other - See Comments Sister         low back problems, spine problems     Osteoporosis Sister      Anxiety Disorder Sister      Depression Sister      Hyperlipidemia Sister      Hypertension Sister      Other - See Comments Sister         low back problems     Obesity Sister      Osteoporosis Sister      Melanoma No family hx of      Skin Cancer No family hx of      Glaucoma No family hx of      Problem List, Family History and past Medical History reviewed and  unchanged/updated.    There are no exam notes on file for this visit.      Frailty Assessment    1. Weight loss (>5% in year)  No  Wt Readings from Last 5 Encounters:   02/08/22 112 kg (247 lb)   11/24/21 109.3 kg (241 lb)   11/23/21 113.4 kg (250 lb)   08/31/21 107.5 kg (237 lb)   07/28/21 104.7 kg (230 lb 12.8 oz)       2. Exhaustion (perceived effort for a given activity)   How difficult is walking from one room to the other on the same level?not   No     3. Weakness (handgrip strength)   How difficult is lifting or carrying something as heavy as 10 pounds? not   No    4. Decreased physical activity    Compared with most (men/women) your age, would you say  that you are more active, less active, or about the same? same   No    5. Slow gait speed (timed up and go > 12 sec.)  No  FALL RISK ASSESSMENT 2/8/2022 2/9/2021   Fallen 2 or more times in the past year? No No   Any fall with injury in the past year? No No   Timed Up and Go Test/Seconds (13.5 is a fall risk; contact physician) 9 8         Frailty screen score: 0    Frail Assessment:0 Robust     EVALUATION OF COGNITIVE FUNCTION  Mood/affect: normal, a bit more  "anxious or frustrated by his anxiety issues  Appearance:Normal  Family member/caregiver input: none    PHQ-2 Score:   PHQ-2 ( 1999 Pfizer) 2/8/2022 2/8/2022   Q1: Little interest or pleasure in doing things 1 1   Q2: Feeling down, depressed or hopeless 1 1   PHQ-2 Score 2 2   PHQ-2 Total Score (12-17 Years)- Positive if 3 or more points; Administer PHQ-A if positive - -       PHQ-9 Score:   [facility] Follow-up to PHQ 8/22/2019 5/6/2020   PHQ-9 9. Suicide Ideation past 2 weeks Not at all Not at all       Mini Cog Test:      Recall result:  3 points   Clock Draw Test result: Normal    Cognitive screen is:Negative      Other Assessments:  CV Risk based on Pooled Cohort Risk   The ASCVD Risk score (Nika HANLEY Jr., et al., 2013) failed to calculate for the following reasons:    The patient has a prior MI or stroke diagnosis          ECG (if done)not performed      Immunization History   Administered Date(s) Administered     COVID-19,PF,Moderna 03/27/2021, 04/24/2021     COVID-19,PF,Moderna Booster 01/07/2022     DT (PEDS <7y) 02/04/2004     Influenza (H1N1) 12/29/2009     Influenza (IIV3) PF 10/25/1993, 10/20/1998, 10/05/1999, 11/26/2001, 10/20/2003, 10/20/2004, 11/05/2005, 09/01/2010, 10/18/2011, 10/02/2012, 10/01/2013, 10/06/2014, 10/26/2016, 10/02/2017     Influenza Quad, Recombinant, pf(RIV4) (Flublok) 10/02/2020     Influenza Vaccine IM > 6 months Valent IIV4 (Alfuria,Fluzone) 11/05/2019     MMR 08/30/1994     Pneumococcal 23 valent 10/25/1993     TD (ADULT, 7+) 10/25/1993, 02/04/2004     TDAP Vaccine (Boostrix) 04/02/2014     Reviewed Immunization Record Today  Physical Exam    Vitals: BP (!) 147/75 (BP Location: Right arm, Cuff Size: Adult Large)   Pulse 63   Temp 98.1  F (36.7  C) (Oral)   Resp 16   Ht 1.778 m (5' 10\")   Wt 112 kg (247 lb)   SpO2 98%   BMI 35.44 kg/m    BMI= Body mass index is 35.44 kg/m .  EXAM:  Gen: alert, oriented X 3, no acute distress, no sign of discomfort  COR: normal rate, regular rhythm " and 3/6 holosystolic high pitched  murmur aortic area  LUNGS: CTAB, no wheezing, no rales, no crackles, no accessory muscle use  MENTAL STATUS EXAM:  Appearance/Behavior:No apparent distress, Neatly groomed and Appears stated age  Speech:Normal  Mood/Affect:normal affect and more anxious when recalling the panic sensation  Insight:Adequate        Assessment and Plan:      Reviewed Preventive Services and Plan form with patient as specified in  Patient Instructions.  Positive findings on assessment: mild anxiety and cardiac murmur, unchanged   Kobe was seen today for physical and medication reconciliation.    Diagnoses and all orders for this visit:    Anxiety disorder due to general medical condition with panic attack  -     propranolol (INDERAL) 10 MG tablet; Take 1 tablet (10 mg) by mouth 3 times daily as needed (panic attack)  -     escitalopram (LEXAPRO) 10 MG tablet; Take 1 tablet (10 mg) by mouth daily    Morbid obesity (H)    Medicare annual wellness visit, subsequent    reviewed concern about increasing klonopin with current pain meds.  Patient feels his brain changed after his sepsis.  Did not want to see psychiatry as suggested.  Willing to try propranolol with lexapro.    Options for treatment and follow-up care were reviewed with the Kobe Buchanan  and/or guardian engaged in the decision making process and verbalized  understanding of the options discussed and agreed with the final plan.  Demi Hardin MD       supervision

## 2022-02-07 NOTE — PATIENT INSTRUCTIONS
PERSONAL PREVENTIVE SERVICES PLAN - SERVICES     Review these tests with your medical staff then decide which ones you want and take this page home for your reference      SCREENING TESTS     Description   Year of Last Screening   Recommended Today?   Heart disease screening blood tests    Cholesterol level Reducing cholesterol can reduce your risk of heart attacks by 25%.  Screening is recommended yearly if you are at risk of heart disease otherwise every 4-5 years 3/22/18 Yes; Recommended   Diabetes screening tests    Hemoglobin A1c blood test   Finding and treating diabetes early can reduce complications.  Screening recommended/covered yearly if you have high blood pressure, high cholesterol, obesity (BMI >30), or a history of high blood glucose tests; or 2 of the following: family history of diabetes, overweight (BMI >25 but <30), age 65 years or older, and a history of diabetes of pregnancy or gave birth to baby weighing more than 9 lbs. 8/31/21  BMP,glucose- 102 Yes; Recommended.   Hepatitis B screening Finding hepatitis B early can reduce complications.  Screening is recommended for persons with selected risk factors.  No: is not indicated today.   Hepatitis C screening Finding hepatitis C early can reduce complications.  Screening is recommended for all persons born from 1945 through 1965 and for those with selected other risk factors.   Yes; Recommended .   HIV screening Finding HIV early can reduce complications.  Screening is recommended for persons with risk factors for HIV infection.  No: is not indicated today.   Glaucoma screening Early detection of glaucoma can prevent blindness.   Please talk to your eye doctor about this.       SCREENING TESTS     Description   Year of Last Screening   Recommended Today?   Colorectal cancer screening    Fecal occult blood test     Screening colonoscopy Screening for colon cancer has been shown to reduce death from colon cancer by 25-30%. Screening recommended to  start at 50 years and continuing until age 75 years.    Yes; Recommended.   Breast Cancer Screening (women)    Mammogram Mammogram screening for breast cancer has been shown to reduce the risk of dying from breast cancer and prolong life. Screening is recommended every 1-2 years for women aged 50 to 74 years.   No: is not indicated today.   Cervical Cancer screening (women)    Pap Cervical pap smears can reduce cervical cancer. Screening is recommended annually if high risk (history of abnormal pap smears) otherwise every 2-3 years, stop screening at 65 years of age if history of normal paps.  No: is not indicated today.   Screening for Osteoporosis:  Bone mass measurements (women)    Dexa Scan Screening and treating Osteoporosis can reduce the risk of hip and spine fractures. Screening is recommended in women 65 years or older and in women and men at risk of osteoporosis.  No: is not indicated today.   Screening for Lung Cancer     Low-dose CT scanning Screening can reduce mortality in persons aged 55-80 who have smoked at least 30 pack-years and who are either still smoking or have quit in the past 15 years. 9/15/16 mutli nodules, see report for review Review report   Abdominal Aortic Aneurysm (AAA) screening    Ultrasound (US)   An aneurysm treated before rupture is very safe -a ruptured aneurysm can be fatal.  Screening  by US for AAA is limited to patients who meet one of the following criteria:    Men who are 65-75 years old and have smoked more than 100 cigarettes in their lifetime    Anyone with a family history of abdominal aortic aneurysm  No: is not indicated today.     Here are your recommended immunizations.  Take this home for your reference.                                                    IMMUNIZATIONS Description Recommend today?     Influenza (Flu shot) Prevents flu; should get every year No; is up to date.   PCV 13 Pneumonia vaccination; you get it once No; not indicated for otday.   PPSV 23  Second pneumonia vaccination; usually get it 1 year after PCV 13 No; is up to date.   Zoster (Shingles) Prevents shingles; you get it once  (Check with Part D insurance for coverage, must receive at a pharmacy, not clinic) No; is up to date.   Tetanus Prevents tetanus; once every 10 years No; is up to date.     Hepatitis B  If you have any of the following risk factors you should be immunized for hepatitis B: severe kidney disease, people who live in the same house as a carrier of Hepatitis B virus, people who live in  institutions (e.g. nursing homes or group homes), homosexual men, patients with hemophilia who received Factor VIII or IX concentrates, abusers of illicit injectable drugs No: is not indicated today.      PATIENT INSTRUCTIONS    Yearly exam:     See your health care provider every year in order to review changes in your health, review medicines that you take, and discuss preventive care needs such as immunizations and cancer screening.    Get a flu shot each year.     Advance Directives:    If you have not done so, you are encouraged to complete advance directives and/or a living will.   More information about advance directives can be found at: http://www.[place]med.org/advocacy/Key-Issues/Advance-Directives    Nutrition:     Eat at least 5 servings of fruits and vegetables each day.     Eat whole-grain bread, whole-wheat pasta and brown rice instead of white grains and rice.     Talk to your doctor about Calcium and Vitamin D.     Lifestyle:    Exercise for at least 150 minutes a week (30 minutes a day, 5 days a week). This will help you control your weight and prevent disease.     Limit alcohol to one drink per day.     If you smoke, try to quit - your doctor will be happy to help.     Wear sunscreen to prevent skin cancer.     See your dentist every six months for an exam and cleaning.     See your eye doctor every 1 to 2 years to screen for conditions such as glaucoma, macular degeneration and  cataracts.

## 2022-02-08 ENCOUNTER — TRANSFERRED RECORDS (OUTPATIENT)
Dept: HEALTH INFORMATION MANAGEMENT | Facility: CLINIC | Age: 62
End: 2022-02-08

## 2022-02-08 ENCOUNTER — OFFICE VISIT (OUTPATIENT)
Dept: FAMILY MEDICINE | Facility: CLINIC | Age: 62
End: 2022-02-08
Payer: COMMERCIAL

## 2022-02-08 VITALS
BODY MASS INDEX: 35.36 KG/M2 | HEIGHT: 70 IN | HEART RATE: 63 BPM | WEIGHT: 247 LBS | OXYGEN SATURATION: 98 % | SYSTOLIC BLOOD PRESSURE: 147 MMHG | DIASTOLIC BLOOD PRESSURE: 75 MMHG | TEMPERATURE: 98.1 F | RESPIRATION RATE: 16 BRPM

## 2022-02-08 DIAGNOSIS — F41.0 ANXIETY DISORDER DUE TO GENERAL MEDICAL CONDITION WITH PANIC ATTACK: Primary | ICD-10-CM

## 2022-02-08 DIAGNOSIS — Z00.00 MEDICARE ANNUAL WELLNESS VISIT, SUBSEQUENT: ICD-10-CM

## 2022-02-08 DIAGNOSIS — E66.01 MORBID OBESITY (H): ICD-10-CM

## 2022-02-08 DIAGNOSIS — Z00.00 WELLNESS EXAMINATION: Primary | ICD-10-CM

## 2022-02-08 DIAGNOSIS — F06.4 ANXIETY DISORDER DUE TO GENERAL MEDICAL CONDITION WITH PANIC ATTACK: Primary | ICD-10-CM

## 2022-02-08 PROCEDURE — G0439 PPPS, SUBSEQ VISIT: HCPCS | Performed by: FAMILY MEDICINE

## 2022-02-08 RX ORDER — PROPRANOLOL HYDROCHLORIDE 10 MG/1
10 TABLET ORAL 3 TIMES DAILY PRN
Qty: 30 TABLET | Refills: 1 | Status: SHIPPED | OUTPATIENT
Start: 2022-02-08 | End: 2022-04-13

## 2022-02-08 RX ORDER — ESCITALOPRAM OXALATE 10 MG/1
10 TABLET ORAL DAILY
Qty: 90 TABLET | Refills: 1 | Status: SHIPPED | OUTPATIENT
Start: 2022-02-08 | End: 2022-03-10

## 2022-02-08 ASSESSMENT — MIFFLIN-ST. JEOR
SCORE: 1931.63
SCORE: 1931.63

## 2022-02-08 NOTE — PROGRESS NOTES
Medicare Wellness Visit  Health Risk Assessment           Health Risk Assessment / Review of Systems     Constitutional: Any fevers or night sweats? No     Eyes:  Vision problems    YES -has seen Ophthalmology last week, reports having changes in peripheral vision field, within last 6 months.    Hearing Do you feel you have hearing loss?  No     Cardiovascular: Any chest pain, fast or irregular heart beat, calf pain with walking?     No           Respiratory:   Any breathing problems or cough?   No     Gastrointestinal: Any stomach or stool problems?   No      Genitourinary: Do you have difficulty controlling urination?   No     Muscles and Joints: Any joint stiffness or soreness?   No- known cervical issues.    Skin: Any concerning lesions or moles?   No - known psoriasis behind ears and ear canals.    Nervous System: Any loss of strength or feeling, numbness or tingling, shaking, dizziness, or headache?  No- known headaches secondary to cervical issue.    Mental Health: Any depression, anxiety or problems sleeping?    No, hx depression and anxiety.    Cognition: Do you have any problems with your memory?  No            Medical Care     What other specialists or organizations are involved in your medical care?  [facility]  Patient Care Team       Relationship Specialty Notifications Start End    Demi Hardin MD PCP - General Family Practice  1/22/20     Phone: [phone] Fax: [phone]         [facility] [place] [address]    Michael Styles MD Referring Physician Internal Medicine  4/12/16     Phone: [phone] Fax: [phone]         [address] [place] [address]    Juan Carlos Blunt MD MD Hand Surgery  2/3/17     Phone: [phone] Fax: [phone]         [address] [place] [address]    Brenda Espinal MD MD Ophthalmology  2/26/18     Phone: [phone] Fax: [phone]         [address] [place] [address]     Analilia Aceves MD MD Orthopedics  10/7/19     Phone: [phone] Fax: [phone]         [address] [place], [place] [id number] [facility] [address]    Analilia Aceves MD Assigned Musculoskeletal Provider   10/23/20     Phone: [phone] Fax: [phone]         [address] [place] [address]    Enriqueta Zhou MD Assigned Heart and Vascular Provider   10/23/20     Phone: [phone] Pager: [phone] Fax: [phone]        [address] [facility] [address]    Brenda Espinal MD Assigned Surgical Provider   2/21/21     Phone: [phone] Fax: [phone]         [address] [address]    Demi Hardin MD Assigned PCP   3/25/21     Phone: [phone] Fax: [phone]         [facility] [address]    Kayden Garcia Resident   9/1/21      [address] [facility] [address]          Have you been to the ER or overnight in the hospital in the last year?  No          Social History / Home Safety       Marital Status:Single  Who lives in your household? Self and 2 dogs    Do you feel threatened or controlled by a partner, ex-partner or anyone in your life? No     Has anyone hurt you physically, for example by pushing, hitting, slapping or kicking you   or forcing you to have sex? No          Does your home have any of the following safety concerns; loose rugs in the hallway,  bathrooms with no grab bars by the tub or toilet, stairs with no handrails or poorly lit areas?  No     Do you need help with dressing yourself, bathing, eating or getting around your home?  No     Do you need help with the phone, transportation, shopping, preparing meals, housework, laundry, medications or managing money?No       Risk Behaviors and Healthy Habits     History   Smoking Status     Former Smoker     Packs/day: 0.50     Years: 6.00     Types: Cigarettes     Start date: 2/1/1977     Quit date: 9/1/1983   Smokeless Tobacco     Never Used      How many servings of fruits and vegetables do you eat a day? Limited intake due to no teeth. Dentures are still pending.    Exercise: swim 2 days a week x 30 mins each time.     Do you frequently drive without a seatbelt? No     Do you use tobacco?  No     Do you use any other drugs? No         Do you use alcohol?No      Frailty Assessment            Have you lost 10 or more pounds unintentionally in the previous year? No     How difficult is walking from one room to the other on the same level?not       Is it difficult to lift or carry something as heavy as 10 pounds?severely,bilateral shoulders total replaced, limit of 7 lbs    Compared with most (men/women) your age, would you say  that you are more active, less active, or about the same? less      FALL RISK ASSESSMENT 2/8/2022 2/9/2021   Fallen 2 or more times in the past year? No No   Any fall with injury in the past year? No No   Timed Up and Go Test/Seconds (13.5 is a fall risk; contact physician) 9 8         Advance Directives:   Discussed with patient and family as appropriate. Has patient  completed advance directives and/or a living will? No- Blank copy of an advance directive has been given to patient to take home, review and complete it at his convenience.      Claudia Paz RN

## 2022-03-10 ENCOUNTER — OFFICE VISIT (OUTPATIENT)
Dept: FAMILY MEDICINE | Facility: CLINIC | Age: 62
End: 2022-03-10
Payer: COMMERCIAL

## 2022-03-10 VITALS
HEART RATE: 62 BPM | SYSTOLIC BLOOD PRESSURE: 160 MMHG | TEMPERATURE: 97 F | RESPIRATION RATE: 20 BRPM | OXYGEN SATURATION: 97 % | WEIGHT: 244 LBS | BODY MASS INDEX: 35.01 KG/M2 | DIASTOLIC BLOOD PRESSURE: 74 MMHG

## 2022-03-10 DIAGNOSIS — F06.4 ANXIETY DISORDER DUE TO GENERAL MEDICAL CONDITION WITH PANIC ATTACK: Primary | ICD-10-CM

## 2022-03-10 DIAGNOSIS — F41.0 ANXIETY DISORDER DUE TO GENERAL MEDICAL CONDITION WITH PANIC ATTACK: Primary | ICD-10-CM

## 2022-03-10 DIAGNOSIS — I25.5 ISCHEMIC CARDIOMYOPATHY: ICD-10-CM

## 2022-03-10 LAB
CHOLEST SERPL-MCNC: 130 MG/DL
FASTING STATUS PATIENT QL REPORTED: NORMAL
HDLC SERPL-MCNC: 50 MG/DL
LDLC SERPL CALC-MCNC: 67 MG/DL
TRIGL SERPL-MCNC: 66 MG/DL

## 2022-03-10 PROCEDURE — 99214 OFFICE O/P EST MOD 30 MIN: CPT | Performed by: FAMILY MEDICINE

## 2022-03-10 PROCEDURE — 36415 COLL VENOUS BLD VENIPUNCTURE: CPT | Performed by: FAMILY MEDICINE

## 2022-03-10 PROCEDURE — 80061 LIPID PANEL: CPT | Performed by: FAMILY MEDICINE

## 2022-03-10 RX ORDER — ESCITALOPRAM OXALATE 20 MG/1
20 TABLET ORAL DAILY
Qty: 90 TABLET | Refills: 3 | Status: SHIPPED | OUTPATIENT
Start: 2022-03-10 | End: 2022-09-15

## 2022-03-10 ASSESSMENT — ANXIETY QUESTIONNAIRES
3. WORRYING TOO MUCH ABOUT DIFFERENT THINGS: NOT AT ALL
1. FEELING NERVOUS, ANXIOUS, OR ON EDGE: SEVERAL DAYS
5. BEING SO RESTLESS THAT IT IS HARD TO SIT STILL: NOT AT ALL
GAD7 TOTAL SCORE: 2
2. NOT BEING ABLE TO STOP OR CONTROL WORRYING: NOT AT ALL
6. BECOMING EASILY ANNOYED OR IRRITABLE: NOT AT ALL
7. FEELING AFRAID AS IF SOMETHING AWFUL MIGHT HAPPEN: NOT AT ALL
IF YOU CHECKED OFF ANY PROBLEMS ON THIS QUESTIONNAIRE, HOW DIFFICULT HAVE THESE PROBLEMS MADE IT FOR YOU TO DO YOUR WORK, TAKE CARE OF THINGS AT HOME, OR GET ALONG WITH OTHER PEOPLE: SOMEWHAT DIFFICULT

## 2022-03-10 ASSESSMENT — PATIENT HEALTH QUESTIONNAIRE - PHQ9
5. POOR APPETITE OR OVEREATING: SEVERAL DAYS
SUM OF ALL RESPONSES TO PHQ QUESTIONS 1-9: 4

## 2022-03-10 NOTE — PROGRESS NOTES
"  Assessment & Plan     (F06.4,  F41.0) Anxiety disorder due to general medical condition with panic attack  (primary encounter diagnosis)  Comment: will increase lexapro and continue the inderal  Plan: escitalopram (LEXAPRO) 20 MG tablet            (I25.5) Ischemic cardiomyopathy  Comment: blood pressure discussed, not at goal and patient not wanting an additional med (spironolactone)   Plan: Lipid Profile        Focus on weight loss #5 or #10 and close f/u.  Patient thinks will happen after weather improves and outside exercising more               BMI:   Estimated body mass index is 35.01 kg/m  as calculated from the following:    Height as of 2/8/22: 1.778 m (5' 10\").    Weight as of this encounter: 110.7 kg (244 lb).           No follow-ups on file.    Demi Hardin MD  [facility]ics[facility]INJAL Bullock is a 62 year old who presents for the following health issues     HPI       Anxiety:  Anxiety disorder due to general medical condition with panic attack  -     propranolol (INDERAL) 10 MG tablet; Take 1 tablet (10 mg) by mouth 3 times daily as needed (panic attack)  -     escitalopram (LEXAPRO) 10 MG tablet; Take 1 tablet (10 mg) by mouth daily        Review of Systems   Constitutional, HEENT, cardiovascular, pulmonary, gi and gu systems are negative, except as otherwise noted.      Objective    BP (!) 160/74   Pulse 62   Temp 97  F (36.1  C) (Tympanic)   Resp 20   Wt 110.7 kg (244 lb)   SpO2 97%   BMI 35.01 kg/m    Body mass index is 35.01 kg/m .  Physical Exam   GENERAL: healthy, alert and no distress  RESP: lungs clear to auscultation - no rales, rhonchi or wheezes  CV: regular rate and rhythm, normal S1 S2, no S3 or S4, no murmur, click or rub, no peripheral edema and peripheral pulses strong    Results for orders placed or performed in visit on 03/10/22   Lipid Profile     Status: None   Result Value Ref Range    Cholesterol 130 <=199 mg/dL    Triglycerides 66 <=149 " mg/dL    Direct Measure HDL 50 >=40 mg/dL    LDL Cholesterol Calculated 67 <=129 mg/dL    Patient Fasting > 8hrs? Unknown

## 2022-03-11 ASSESSMENT — ANXIETY QUESTIONNAIRES: GAD7 TOTAL SCORE: 2

## 2022-04-05 ENCOUNTER — TRANSFERRED RECORDS (OUTPATIENT)
Dept: HEALTH INFORMATION MANAGEMENT | Facility: CLINIC | Age: 62
End: 2022-04-05

## 2022-04-05 DIAGNOSIS — M47.812 SPONDYLOSIS OF CERVICAL REGION WITHOUT MYELOPATHY OR RADICULOPATHY: ICD-10-CM

## 2022-04-05 DIAGNOSIS — F43.9 STRESS: ICD-10-CM

## 2022-04-05 RX ORDER — NAPROXEN 500 MG/1
500 TABLET ORAL 2 TIMES DAILY PRN
Qty: 186 TABLET | Refills: 3 | Status: SHIPPED | OUTPATIENT
Start: 2022-04-05 | End: 2022-07-14

## 2022-04-05 RX ORDER — CLONAZEPAM 0.5 MG/1
0.5 TABLET ORAL 3 TIMES DAILY PRN
Qty: 90 TABLET | Refills: 5 | Status: SHIPPED | OUTPATIENT
Start: 2022-04-12 | End: 2022-10-07

## 2022-04-05 NOTE — TELEPHONE ENCOUNTER
Patient on pain contract and aware as well of risks of nsaids.    Not due for clonazepam until April 13, checked pdmp database and will date that refill for 4/13.

## 2022-04-05 NOTE — TELEPHONE ENCOUNTER
Message to physician:     Date of last visit: 3/10/2022    Date of next visit if scheduled:    Potassium   Date Value Ref Range Status   08/31/2021 4.4 3.4 - 5.3 mmol/L Final   08/18/2020 4.3 3.4 - 5.3 mmol/L Final     Creatinine   Date Value Ref Range Status   08/31/2021 1.10 mg/dL Final   08/05/2020 1.24 0.66 - 1.25 mg/dL Final     GFR Estimate   Date Value Ref Range Status   08/31/2021 73 >60 mL/min/1.73m2 Final     Comment:     As of July 11, 2021, eGFR is calculated by the CKD-EPI creatinine equation, without race adjustment. eGFR can be influenced by muscle mass, exercise, and diet. The reported eGFR is an estimation only and is only applicable if the renal function is stable.   08/05/2020 63 >60 mL/min/[1.73_m2] Final     Comment:     Non  GFR Calc  Starting 12/18/2018, serum creatinine based estimated GFR (eGFR) will be   calculated using the Chronic Kidney Disease Epidemiology Collaboration   (CKD-EPI) equation.         BP Readings from Last 3 Encounters:   03/10/22 (!) 160/74   02/08/22 (!) 147/75   08/31/21 129/76       Hemoglobin A1C POCT   Date Value Ref Range Status   10/05/2016 5.3 4.3 - 6.0 % Final       Please complete refill and CLOSE ENCOUNTER.  Closing the encounter signifies the refill is complete.

## 2022-04-06 ENCOUNTER — VIRTUAL VISIT (OUTPATIENT)
Dept: FAMILY MEDICINE | Facility: CLINIC | Age: 62
End: 2022-04-06
Payer: COMMERCIAL

## 2022-04-06 VITALS — SYSTOLIC BLOOD PRESSURE: 129 MMHG | DIASTOLIC BLOOD PRESSURE: 76 MMHG

## 2022-04-06 DIAGNOSIS — F06.4 ANXIETY DISORDER DUE TO GENERAL MEDICAL CONDITION WITH PANIC ATTACK: Primary | ICD-10-CM

## 2022-04-06 DIAGNOSIS — I10 ESSENTIAL HYPERTENSION: ICD-10-CM

## 2022-04-06 DIAGNOSIS — F41.0 ANXIETY DISORDER DUE TO GENERAL MEDICAL CONDITION WITH PANIC ATTACK: Primary | ICD-10-CM

## 2022-04-06 PROCEDURE — 99214 OFFICE O/P EST MOD 30 MIN: CPT | Mod: 95 | Performed by: FAMILY MEDICINE

## 2022-04-06 NOTE — PROGRESS NOTES
"Kobe is a 62 year old who is being evaluated via a billable telephone visit.      What phone number would you like to be contacted at? ***  How would you like to obtain your AVS? {AVS Preference:325734}    {PROVIDER CHARTING PREFERENCE:226903}    Subjective   Kobe is a 62 year old who presents for the following health issues {ACCOMPANIED BY STATEMENT (Optional):842518}    HPI     {SUPERLIST (Optional):589938}  {additonal problems for provider to add (Optional):972564}    Review of Systems   {ROS COMP (Optional):494710}      Objective           Vitals:  No vitals were obtained today due to virtual visit.    Physical Exam   {GENERAL APPEARANCE:50::\"healthy\",\"alert\",\"no distress\"}  PSYCH: Alert and oriented times 3; coherent speech, normal   rate and volume, able to articulate logical thoughts, able   to abstract reason, no tangential thoughts, no hallucinations   or delusions  His affect is { :6328903::\"normal\"}  RESP: No cough, no audible wheezing, able to talk in full sentences  Remainder of exam unable to be completed due to telephone visits    {Diagnostic Test Results (Optional):035718}    {AMBULATORY ATTESTATION (Optional):883757}        Phone call duration: *** minutes  "

## 2022-04-06 NOTE — PROGRESS NOTES
Family Medicine Telephone Visit Note      Chief Complaint   Patient presents with     Hypertension     Refill Request     Klonopin and Naproxen                HPI   Patients name: Kobe  Appointment start time:  10:26 AM    1. Anxiety:  -patient just recently started the 20 mg of lexapro  -hasn't really noticed any different    2. Blood pressure: using home monitor and feels like BP is controlled  -had 153/91 before taking medications  -has appt with cardiology soon      Current Outpatient Medications   Medication Sig Dispense Refill     amLODIPine-benazepril (LOTREL) 5-20 MG capsule Take 1 capsule by mouth 2 times daily 180 capsule 3     amoxicillin (AMOXIL) 500 MG capsule Take 4 tablets 1 hour before dental procedure 24 capsule 0     aspirin (ASA) 325 MG tablet Take 325 mg by mouth daily       atorvastatin (LIPITOR) 40 MG tablet Take 1 tablet (40 mg) by mouth daily 90 tablet 3     buPROPion (WELLBUTRIN SR) 200 MG 12 hr tablet TAKE 1 TABLET BY MOUTH 2 TIMES DAILY 180 tablet 3     carvedilol (COREG) 25 MG tablet Take 1.5 tablets (37.5 mg) by mouth 2 times daily (with meals) 270 tablet 2     [START ON 4/12/2022] clonazePAM (KLONOPIN) 0.5 MG tablet Take 1 tablet (0.5 mg) by mouth 3 times daily as needed for anxiety 90 tablet 5     Cyanocobalamin 5000 MCG SUBL Place 5,000 mcg under the tongue daily Vitamin B12       cyclobenzaprine (FLEXERIL) 10 MG tablet Take 1 tablet (10 mg) by mouth 3 times daily as needed for muscle spasms 90 tablet 3     desoximetasone (TOPICORT) 0.25 % external cream Apply topically daily as needed for inflammation or itching       diazepam (VALIUM) 5 MG tablet Take on tablet 20 minutes prior to MRI due to claustrophobia. 1 tablet 0     docusate sodium (COLACE) 100 MG capsule Take 2 capsule in the morning and 3 capsules in the evening       escitalopram (LEXAPRO) 20 MG tablet Take 1 tablet (20 mg) by mouth daily 90 tablet 3     Fe Heme Polypeptide-folic acid (PROFERRIN-FORTE) 12-1 MG TABS  Take 1 tablet by mouth 2 times daily 90 tablet 0     fentaNYL (DURAGESIC) 50 mcg/hr 72 hr patch Place 1 patch onto the skin every 48 hours       fluocinonide (LIDEX) 0.05 % external ointment Apply twice daily to itchy skin nodules for 1-2 weeks at a time. 30 g 3     latanoprost (XALATAN) 0.005 % ophthalmic solution Place 1 drop Into the left eye daily 2.5 mL 4     Multiple Minerals-Vitamins (CALCIUM CITRATE-MAG-MINERALS) TABS Take 1 tablet by mouth daily       multivitamin w/minerals (THERA-VIT-M) tablet Take 1 tablet by mouth daily       naloxone (NARCAN) nasal spray Spray 1 spray (4 mg) into one nostril alternating nostrils as needed for opioid reversal every 2-3 minutes until assistance arrives 0.2 mL 0     naproxen (EC-NAPROSYN) 500 MG EC tablet Take 1 tablet (500 mg) by mouth 2 times daily as needed (pain) 186 tablet 3     oxyCODONE IR (ROXICODONE) 10 MG tablet Take 1 tablet by mouth every 4 hours as needed for pain, max 4 tablet(s) per day       pantoprazole (PROTONIX) 40 MG EC tablet Take 1 tablet (40 mg) by mouth daily as needed for heartburn 30 tablet 3     propranolol (INDERAL) 10 MG tablet Take 1 tablet (10 mg) by mouth 3 times daily as needed (panic attack) 30 tablet 1     Pyrithione Zinc 2 % SHAM Externally apply topically daily 480 mL 4     senna-docusate (SENOKOT-S/PERICOLACE) 8.6-50 MG tablet Take 1-2 tablets by mouth 2 times daily 30 tablet 0     sucralfate (CARAFATE) 1 GM tablet Take 1 tablet (1 g) by mouth 2 times daily as needed (Reflux) 180 tablet 3     tacrolimus (PROTOPIC) 0.1 % ointment Apply topically as needed Apply to affected areas on body. 120 g 11     tiZANidine (ZANAFLEX) 4 MG tablet Take 1 tablet (4 mg) by mouth nightly as needed for other (insomnia related to muscle pain) 90 tablet 3     triamcinolone (KENALOG) 0.1 % external ointment Apply topically 2 times daily To affected areas of rash. Please avoid application to the face, groin or armpits as the medication is too strong for  "these areas. 454 g 0     triamcinolone (KENALOG) 0.1 % external ointment Apply topically 2 times daily as needed for irritation 454 g 0     vitamin B-Complex Take 1 tablet by mouth daily       Allergies   Allergen Reactions     Ciprofloxacin      History of aortic aneurysms     Tape [Adhesive Tape] Blisters     Blistering - please use paper tape              Review of Systems:     No changes         Physical Exam:     /76   Estimated body mass index is 35.01 kg/m  as calculated from the following:    Height as of 2/8/22: 1.778 m (5' 10\").    Weight as of 3/10/22: 110.7 kg (244 lb).    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        Refilled medications that would be required in the next 3 months.     After Visit Information:  Patient chose to view AVS via G3t    No follow-ups on file.    Appointment end time: 10:32 AM  This is a telephone visit that took 5 minutes.      Clinician location:  [facility]     Demi Hardin MD    "

## 2022-04-10 ENCOUNTER — HEALTH MAINTENANCE LETTER (OUTPATIENT)
Age: 62
End: 2022-04-10

## 2022-04-13 DIAGNOSIS — F06.4 ANXIETY DISORDER DUE TO GENERAL MEDICAL CONDITION WITH PANIC ATTACK: ICD-10-CM

## 2022-04-13 DIAGNOSIS — F41.0 ANXIETY DISORDER DUE TO GENERAL MEDICAL CONDITION WITH PANIC ATTACK: ICD-10-CM

## 2022-04-13 RX ORDER — PROPRANOLOL HYDROCHLORIDE 10 MG/1
10 TABLET ORAL 3 TIMES DAILY PRN
Qty: 90 TABLET | Refills: 3 | Status: SHIPPED | OUTPATIENT
Start: 2022-04-13 | End: 2022-10-05

## 2022-04-14 ENCOUNTER — VIRTUAL VISIT (OUTPATIENT)
Dept: FAMILY MEDICINE | Facility: CLINIC | Age: 62
End: 2022-04-14
Payer: COMMERCIAL

## 2022-04-14 DIAGNOSIS — F41.0 ANXIETY DISORDER DUE TO GENERAL MEDICAL CONDITION WITH PANIC ATTACK: Primary | ICD-10-CM

## 2022-04-14 DIAGNOSIS — F06.4 ANXIETY DISORDER DUE TO GENERAL MEDICAL CONDITION WITH PANIC ATTACK: Primary | ICD-10-CM

## 2022-04-14 PROCEDURE — 99213 OFFICE O/P EST LOW 20 MIN: CPT | Mod: 95 | Performed by: FAMILY MEDICINE

## 2022-04-14 NOTE — LETTER
April 14, 2022      [facility]  [address]   [place] [address]        To whom it may concern,    Kobe is a long time patient of our clinic and under my care since 2019.  Patient does have chronic anxiety and depression and does have ongoing need for a  animal as part of his therapy.  He has improved control of his symptoms with this  animal.      Sincerely,    Demi Hardin MD

## 2022-04-14 NOTE — PROGRESS NOTES
"Kobe is a 62 year old who is being evaluated via a billable telephone visit.        What phone number would you like to be contacted at? [phone]  How would you like to obtain your AVS? 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             BMI:   Estimated body mass index is 35.01 kg/m  as calculated from the following:    Height as of 2/8/22: 1.778 m (5' 10\").    Weight as of 3/10/22: 110.7 kg (244 lb).           No follow-ups on file.    Meenu Gordon MD  [facility]    Xu Bullock is a 62 year old who presents for the following health issues     HPI     1. Has a  animal for past 20 years  -patient uses the dog pet to help with anxiety and depression  -patient is taking care of parents dog as they both passed away this year, but not needing anything to be documented about this  -Section 8 housing that he lives in is needing updated documentation that he has a medical indication for this  animal.  -both dogs are small (<15#)     Anxiety: states overall feeling quite good, panic is under control, mood is stable and not dealing with significant depression    Review of Systems         Objective           Vitals:  No vitals were obtained today due to virtual visit.    Physical Exam   alert and no distress  PSYCH: Alert and oriented times 3; coherent speech, normal   rate and volume, able to articulate logical thoughts, able   to abstract reason, no tangential thoughts, no hallucinations   or delusions  His affect is normal and pleasant  RESP: No cough, no audible wheezing, able to talk in full sentences  Remainder of exam unable to be completed due to telephone visits            "     Phone call duration: 5 minutes

## 2022-05-02 DIAGNOSIS — K00.9 DENTAL ANOMALY: ICD-10-CM

## 2022-05-02 RX ORDER — AMOXICILLIN 500 MG/1
CAPSULE ORAL
Qty: 24 CAPSULE | Refills: 0 | Status: SHIPPED | OUTPATIENT
Start: 2022-05-02 | End: 2022-12-02

## 2022-05-26 DIAGNOSIS — G47.01 INSOMNIA DUE TO MEDICAL CONDITION: ICD-10-CM

## 2022-06-09 ENCOUNTER — TRANSFERRED RECORDS (OUTPATIENT)
Dept: HEALTH INFORMATION MANAGEMENT | Facility: CLINIC | Age: 62
End: 2022-06-09
Payer: COMMERCIAL

## 2022-07-12 ENCOUNTER — TRANSFERRED RECORDS (OUTPATIENT)
Dept: HEALTH INFORMATION MANAGEMENT | Facility: CLINIC | Age: 62
End: 2022-07-12

## 2022-07-13 ENCOUNTER — TELEPHONE (OUTPATIENT)
Dept: FAMILY MEDICINE | Facility: CLINIC | Age: 62
End: 2022-07-13

## 2022-07-13 DIAGNOSIS — G89.4 CHRONIC PAIN SYNDROME: Primary | ICD-10-CM

## 2022-07-13 NOTE — TELEPHONE ENCOUNTER
[facility] phone call message- medication clarification/question:    Full Medication Name: Celebrex    Question: Patient called requesting if Dr. Hardin is okay with prescribing Celebrex and have him stop taking naxproxen. He wants to see if its affective and also because the naxproxen is so expensive. I informed him with medication change she may require him to be seen for an appointment to discuss further, declined would like message sent. Patient also stated if she requires him to be seen, he would like to see Dr. Hardin after seeing his cardiologist appointment at the end of July so they can discuss further about the echo as well but for the time being, if she's okay to prescribe celebrex. Please call and advise.    Pharmacy confirmed as [id number] - [place], [place] - [address]: Yes    OK to leave a message on voice mail? Yes    Primary language: English      needed? No    Call taken on July 13, 2022 at 3:25 PM by Anjali Colon

## 2022-07-14 RX ORDER — FENTANYL 12.5 UG/1
PATCH TRANSDERMAL
COMMUNITY
Start: 2022-06-23 | End: 2023-08-24

## 2022-07-14 RX ORDER — FENTANYL 25 UG/1
PATCH TRANSDERMAL
COMMUNITY
Start: 2022-06-23 | End: 2023-08-24

## 2022-07-14 RX ORDER — CELECOXIB 200 MG/1
200 CAPSULE ORAL DAILY
Qty: 30 CAPSULE | Refills: 1 | Status: SHIPPED | OUTPATIENT
Start: 2022-07-14 | End: 2022-09-15

## 2022-07-28 ENCOUNTER — ANCILLARY PROCEDURE (OUTPATIENT)
Dept: CARDIOLOGY | Facility: CLINIC | Age: 62
End: 2022-07-28
Attending: INTERNAL MEDICINE
Payer: COMMERCIAL

## 2022-07-28 DIAGNOSIS — I71.21 ASCENDING AORTIC ANEURYSM (H): ICD-10-CM

## 2022-07-28 LAB — LVEF ECHO: NORMAL

## 2022-07-28 PROCEDURE — 93306 TTE W/DOPPLER COMPLETE: CPT | Performed by: INTERNAL MEDICINE

## 2022-07-28 PROCEDURE — 99207 PR STATISTIC IV PUSH SINGLE INITIAL SUBSTANCE: CPT | Performed by: INTERNAL MEDICINE

## 2022-07-28 RX ADMIN — Medication 5 ML: at 08:16

## 2022-07-29 ENCOUNTER — LAB (OUTPATIENT)
Dept: LAB | Facility: CLINIC | Age: 62
End: 2022-07-29
Payer: COMMERCIAL

## 2022-07-29 ENCOUNTER — OFFICE VISIT (OUTPATIENT)
Dept: CARDIOLOGY | Facility: CLINIC | Age: 62
End: 2022-07-29

## 2022-07-29 VITALS
BODY MASS INDEX: 35.42 KG/M2 | HEART RATE: 72 BPM | OXYGEN SATURATION: 96 % | WEIGHT: 253 LBS | SYSTOLIC BLOOD PRESSURE: 137 MMHG | HEIGHT: 71 IN | DIASTOLIC BLOOD PRESSURE: 79 MMHG

## 2022-07-29 DIAGNOSIS — I71.21 ASCENDING AORTIC ANEURYSM (H): ICD-10-CM

## 2022-07-29 DIAGNOSIS — I10 ESSENTIAL HYPERTENSION WITH GOAL BLOOD PRESSURE LESS THAN 130/85: ICD-10-CM

## 2022-07-29 LAB
ANION GAP SERPL CALCULATED.3IONS-SCNC: 6 MMOL/L (ref 3–14)
BUN SERPL-MCNC: 17 MG/DL (ref 7–30)
CALCIUM SERPL-MCNC: 9 MG/DL (ref 8.5–10.1)
CHLORIDE BLD-SCNC: 105 MMOL/L (ref 94–109)
CO2 SERPL-SCNC: 26 MMOL/L (ref 20–32)
CREAT SERPL-MCNC: 1.15 MG/DL (ref 0.66–1.25)
GFR SERPL CREATININE-BSD FRML MDRD: 72 ML/MIN/1.73M2
GLUCOSE BLD-MCNC: 93 MG/DL (ref 70–99)
POTASSIUM BLD-SCNC: 4 MMOL/L (ref 3.4–5.3)
SODIUM SERPL-SCNC: 137 MMOL/L (ref 133–144)

## 2022-07-29 PROCEDURE — 80048 BASIC METABOLIC PNL TOTAL CA: CPT | Performed by: INTERNAL MEDICINE

## 2022-07-29 PROCEDURE — 36415 COLL VENOUS BLD VENIPUNCTURE: CPT | Performed by: INTERNAL MEDICINE

## 2022-07-29 PROCEDURE — 99214 OFFICE O/P EST MOD 30 MIN: CPT | Performed by: INTERNAL MEDICINE

## 2022-07-29 RX ORDER — CARVEDILOL 25 MG/1
50 TABLET ORAL 2 TIMES DAILY WITH MEALS
Qty: 360 TABLET | Refills: 3 | Status: SHIPPED | OUTPATIENT
Start: 2022-07-29 | End: 2023-10-06

## 2022-07-29 RX ORDER — CARVEDILOL 25 MG/1
50 TABLET ORAL 2 TIMES DAILY WITH MEALS
Qty: 270 TABLET | Refills: 3 | Status: SHIPPED | OUTPATIENT
Start: 2022-07-29 | End: 2022-07-29

## 2022-07-29 RX ORDER — ASPIRIN 81 MG/1
81 TABLET ORAL DAILY
COMMUNITY
End: 2023-11-02

## 2022-07-29 NOTE — LETTER
7/29/2022    Demi Hardin MD  [address] [facility] [address]    RE: Kobe Buchanan       Dear Colleague,     I had the pleasure of seeing Kobe Buchanan in the [facility].        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     Prior history:      Patient was referred by Atul Martínez and [place] to establish care into congenital vascular cardiology clinic. Patient reports his nephew at 28 years old has a known bicuspid aortic valve without aortopathy.  He has been seen in [place] and followed. His father is 91 years old and has a known aneurysm of 4.9 cm in bicuspid valve. Patient reports no family history of sudden death or known dissection. No history of strokes.     MRA chest in April 2021 demonstrated graft stability. Echocardiogram showed normal BAV function, with only mild aortic regurgitation. His CMR demonstrated normalization of his LVEF suggestive that prior reduction in function was due to stress CMY.      Today he is doing well. BP is well controlled. We reviewed his studies from April which were all good. He reports some positional dizziness when he abruptly gets up. Carotid US was unremarkable.     He is doing well this summer, walking his dog and getting activity levels up.        ASSESSMENT/PLAN:      This is a 62-year-old male with hypertension, hyperlipidemia, right left fusion of his aortic valve with bicuspid aortopathy.  No known coarctation.  No other peripheral aneurysmal disease.  He is status post valve sparing aortic repair with Gelweave interposition graft.      Blood pressure well controlled  overall on current regimen. He will need ongoing long-term surveillance of both of his aortic repair and his bicuspid valve given he has some dysfunction within the native valve (mild sclerosis and mild AI).      Summary of Recommendations:     1. Echocardiogram once a year - can defer MRA for graft surveillance given stable. Aortic valve is sclerotic without stenosis. Cont monitoring.  2. On amlodipine-benzapril and 37.5 mg bid carvedilol. Will increase coreg to 50 mg twice a day.  3. Would check renal function and electrolytes once a year at least given issues with potassium in the past  4. Follow up in one year     Enriqueta Zhou MD MSc  [facility]         PAST MEDICAL HISTORY  Past Medical History:   Diagnosis Date     NEMESIO (acute kidney injury) (H) 2/16/2020     Anxiety      Ascending aortic aneurysm (H)      Bicuspid aortic valve      BPPV (benign paroxysmal positional vertigo) 7/11/2014     Chronic narcotic use      Chronic neck pain      Chronic osteoarthritis      Degenerative joint disease      Depression      Hx of type 2 diabetes mellitus 3/4/2021     Hyperlipidemia 4/10/2012     Hypertension      MSSA bacteremia 10/13/2019     Multiple stiff joints      Neck injuries      Obesity 2/9/2015     Pain in shoulder      S/P reverse total shoulder arthroplasty, 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     S/P reverse total shoulder arthroplasty, right 8/18/2020     Septic joint of left shoulder region (H) 11/13/2019     Septic joint of right shoulder region (H) 10/13/2019    s/p washout     Skin picking habit      Sleep apnea     does not use cpap     Status post total shoulder arthroplasty, left 3/3/2020    Added automatically from request for surgery [id number]       CURRENT MEDICATIONS  Current Outpatient Medications   Medication Sig Dispense Refill     amLODIPine-benazepril (LOTREL) 5-20 MG capsule Take 1 capsule by mouth 2 times daily 180 capsule 3     amoxicillin (AMOXIL) 500 MG capsule Take 4 tablets 1 hour before dental  procedure 24 capsule 0     aspirin 81 MG EC tablet Take 81 mg by mouth daily       atorvastatin (LIPITOR) 40 MG tablet Take 1 tablet (40 mg) by mouth daily 90 tablet 3     buPROPion (WELLBUTRIN SR) 200 MG 12 hr tablet TAKE 1 TABLET BY MOUTH 2 TIMES DAILY 180 tablet 3     carvedilol (COREG) 25 MG tablet Take 1.5 tablets (37.5 mg) by mouth 2 times daily (with meals) 270 tablet 2     celecoxib (CELEBREX) 200 MG capsule Take 1 capsule (200 mg) by mouth daily 30 capsule 1     clonazePAM (KLONOPIN) 0.5 MG tablet Take 1 tablet (0.5 mg) by mouth 3 times daily as needed for anxiety 90 tablet 5     Cyanocobalamin 5000 MCG SUBL Place 5,000 mcg under the tongue daily Vitamin B12       cyclobenzaprine (FLEXERIL) 10 MG tablet Take 1 tablet (10 mg) by mouth 3 times daily as needed for muscle spasms 90 tablet 3     desoximetasone (TOPICORT) 0.25 % external cream Apply topically daily as needed for inflammation or itching       diazepam (VALIUM) 5 MG tablet Take on tablet 20 minutes prior to MRI due to claustrophobia. 1 tablet 0     docusate sodium (COLACE) 100 MG capsule Take 2 capsule in the morning and 3 capsules in the evening       escitalopram (LEXAPRO) 20 MG tablet Take 1 tablet (20 mg) by mouth daily 90 tablet 3     Fe Heme Polypeptide-folic acid (PROFERRIN-FORTE) 12-1 MG TABS Take 1 tablet by mouth 2 times daily 90 tablet 0     fentaNYL (DURAGESIC) 12 mcg/hr 72 hr patch Apply 1 patch by transdermal route every 48 hours       fentaNYL (DURAGESIC) 25 mcg/hr 72 hr patch Apply 1 patch by transdermal route every 48 hours       fluocinonide (LIDEX) 0.05 % external ointment Apply twice daily to itchy skin nodules for 1-2 weeks at a time. 30 g 3     latanoprost (XALATAN) 0.005 % ophthalmic solution Place 1 drop Into the left eye daily 2.5 mL 4     Multiple Minerals-Vitamins (CALCIUM CITRATE-MAG-MINERALS) TABS Take 1 tablet by mouth daily       multivitamin w/minerals (THERA-VIT-M) tablet Take 1 tablet by mouth daily       naloxone  (NARCAN) nasal spray Spray 1 spray (4 mg) into one nostril alternating nostrils as needed for opioid reversal every 2-3 minutes until assistance arrives 0.2 mL 0     oxyCODONE IR (ROXICODONE) 10 MG tablet Take 1 tablet by mouth every 4 hours as needed for pain, max 4 tablet(s) per day       pantoprazole (PROTONIX) 40 MG EC tablet Take 1 tablet (40 mg) by mouth daily as needed for heartburn 30 tablet 3     propranolol (INDERAL) 10 MG tablet Take 1 tablet (10 mg) by mouth 3 times daily as needed (panic attack) 90 tablet 3     Pyrithione Zinc 2 % SHAM Externally apply topically daily 480 mL 4     senna-docusate (SENOKOT-S/PERICOLACE) 8.6-50 MG tablet Take 1-2 tablets by mouth 2 times daily 30 tablet 0     sucralfate (CARAFATE) 1 GM tablet Take 1 tablet (1 g) by mouth 2 times daily as needed (Reflux) 180 tablet 3     tacrolimus (PROTOPIC) 0.1 % ointment Apply topically as needed Apply to affected areas on body. 120 g 11     tiZANidine (ZANAFLEX) 4 MG tablet Take 1 tablet (4 mg) by mouth nightly as needed for other (insomnia related to muscle pain) 90 tablet 3     triamcinolone (KENALOG) 0.1 % external ointment Apply topically 2 times daily To affected areas of rash. Please avoid application to the face, groin or armpits as the medication is too strong for these areas. 454 g 0     triamcinolone (KENALOG) 0.1 % external ointment Apply topically 2 times daily as needed for irritation 454 g 0     vitamin B-Complex Take 1 tablet by mouth daily         PAST SURGICAL HISTORY:  Past Surgical History:   Procedure Laterality Date     ARTHROPLASTY SHOULDER  4/15/2014    Procedure: Left Total Shoulder Arthroplasty ;  Surgeon: Analilia Aceves MD;  [facility]     ARTHROPLASTY SHOULDER Right 8/26/2014    Procedure: ARTHROPLASTY SHOULDER;  Surgeon: Analilia Aceves MD;  [facility]     ARTHROSCOPY SHOULDER WITH BIOPSY(IES) Left 1/28/2020    Procedure: Left shoulder arthroscopy and biopsy for culture;  Surgeon:  Analilia Aceves MD;  [facility]     BYPASS GASTRIC TAVO-EN-Y, LIVER BIOPSY, COMBINED  8/8/2005     COLONOSCOPY  6/30/2014    Procedure: COMBINED COLONOSCOPY, SINGLE BIOPSY/POLYPECTOMY BY BIOPSY;  Surgeon: Chester Patton MD;  [facility]     COLONOSCOPY  06/30/2014     CV CORONARY ANGIOGRAM  1/30/2020    Procedure: CV CORONARY ANGIOGRAM;  Surgeon: Néstor Walls MD;  [facility]     CYSTOSCOPY, BLADDER NECK CUTS, COMBINED N/A 7/18/2016    Procedure: COMBINED CYSTOSCOPY, BLADDER NECK CUTS;  Surgeon: Ritu Leslie MD;  [facility]     ESOPHAGOSCOPY, GASTROSCOPY, DUODENOSCOPY (EGD), COMBINED  6/30/2014    Procedure: COMBINED ESOPHAGOSCOPY, GASTROSCOPY, DUODENOSCOPY (EGD), BIOPSY SINGLE OR MULTIPLE;  Surgeon: Chester Patton MD;  [facility]     ESOPHAGOSCOPY, GASTROSCOPY, DUODENOSCOPY (EGD), COMBINED  06/30/2014     EXCISE MASS FINGER  6/14/2011    Procedure:EXCISE MASS FINGER; Middle Flexor Cyst; Surgeon:SHAYY RUSSELL; [facility]     HAND SURGERY      excision of tendon cyst on left hand     HAND SURGERY Left     excision of tendon cyst on left hand       [facility] VASCULAR SURGERY PROCEDURE UNLIST       IR FINE NEEDLE ASPIRATION W ULTRASOUND  11/13/2019     IR PICC PLACEMENT > 5 YRS OF AGE  11/19/2019     LASER HOLMIUM LITHOTRIPSY BLADDER N/A 10/15/2014    Procedure: LASER HOLMIUM LITHOTRIPSY BLADDER;  Surgeon: Sahil Taveras MD;  [facility]     LASER KTP GREEN LIGHT PHOTOSELECTIVE VAPORIZATION PROSTATE  1/23/2014    Procedure: LASER KTP GREEN LIGHT PHOTOSELECTIVE VAPORIZATION PROSTATE;  Greenlight Photovaporization Of Prostate  ;  Surgeon: Sahil Taveras MD;  [facility]     OTHER SURGICAL HISTORY Right 06/14/2011    EXCISE MASS FINGER     OTHER SURGICAL HISTORY  08/08/2005    BYPASS GASTRIC TAVO-EN-Y, LIVER BIOPSY, COMBINED     OTHER SURGICAL HISTORY  01/23/2014    LASER KTP GREEN LIGHT  PHOTOSELECTIVE VAPORIZATION PROSTATE     OTHER SURGICAL HISTORY  10/15/2014    LASER HOLMIUM LITHOTRIPSY BLADDER     OTHER SURGICAL HISTORY  07/18/2016    CYSTOSCOPY, BLADDER NECK CUTS, COMBINED     OTHER SURGICAL HISTORY  10/04/2016    REPAIR ANEURYSM ASCENDING AORTA     OTHER SURGICAL HISTORY Right 03/31/2017    RELEASE TRIGGER FINGER     OTHER SURGICAL HISTORY Right 09/23/2019    IRRIGATION AND DEBRIDEMENT UPPER EXTREMITYshoulder     PERIPHERALLY INSERTED CENTRAL CATHETER INSERTION       RELEASE TRIGGER FINGER Right 3/31/2017    Procedure: RELEASE TRIGGER FINGER;  Surgeon: Juan Carlos Blunt MD;  [facility]     REMOVE ANTIBIOTIC CEMENT BEADS / SPACER SHOULDER Left 5/8/2020    Procedure: Left shoulder removal of spacer;  Surgeon: Analilia Aceves MD;  [facility]     REMOVE ANTIBIOTIC CEMENT BEADS / SPACER SHOULDER Right 8/18/2020    Procedure: Removal of right shoulder antibiotic spacer;  Surgeon: Analilia Aceves MD;  [facility]     REMOVE HARDWARE ARTHROPLASTY SHOULDER. I&D, PLACE ANTIBIOTIC CEMENT BE Left 11/15/2019    Procedure: Explantation of left total shoulder arthroplasty, irrigation and debridement, and placement of antibiotic spacer;  Surgeon: Analilia Aceves MD;  [facility]     REPAIR ANEURYSM ASCENDING AORTA N/A 10/4/2016    Procedure: REPAIR ANEURYSM ASCENDING AORTA;  Surgeon: Mckenzie Townsend MD;  [facility]     REVERSE ARTHROPLASTY SHOULDER Right 8/18/2020    Procedure: and conversion to reverse total shoulder arthroplasty;  Surgeon: Analilia Aceves MD;  [facility]     TOTAL SHOULDER REPLACEMENT Left 04/15/2014     TOTAL SHOULDER REPLACEMENT Right 08/26/2014     TRANSRECTAL ULTRASONIC, TRANSURETHRAL RESECTION (TUR) OF PROSTATE CYST  2014     TURP  2014     [facility] HAND/FINGER SURGERY UNLISTED       [facility] SHOULDER SURG PROC UNLISTED         ALLERGIES     Allergies   Allergen Reactions     Ciprofloxacin      History of aortic aneurysms      Tape [Adhesive Tape] Blisters     Blistering - please use paper tape       FAMILY HISTORY  Family History   Problem Relation Age of Onset     Arthritis Other      Gastrointestinal Disease Other      Cardiovascular Father         aortic aneurysm     Arrhythmia Father      Nephrolithiasis Father      Sleep Apnea Father      Anxiety Disorder Father      Depression Father      Hypertension Father      Obesity Father      Hyperlipidemia Father      Coronary Artery Disease Father      Low Back Problems Father      Spine Problems Father      Cardiovascular Father      Other - See Comments Father         low back problems, spine problems     Anxiety Disorder Mother      Hypertension Mother      Osteoporosis Mother      Obesity Mother      Hyperlipidemia Mother      Low Back Problems Mother      Macular Degeneration Mother      Cataracts Mother      Other - See Comments Mother         low back problems     Anxiety Disorder Sister      Hypertension Sister      Osteoporosis Sister      Obesity Sister      Hyperlipidemia Sister      Low Back Problems Sister      Spine Problems Sister      Anxiety Disorder Sister      Depression Sister      Hypertension Sister      Osteoporosis Sister      Obesity Sister      Hyperlipidemia Sister      Low Back Problems Sister      Anxiety Disorder Sister      Hyperlipidemia Sister      Hypertension Sister      Obesity Sister      Other - See Comments Sister         low back problems, spine problems     Osteoporosis Sister      Anxiety Disorder Sister      Depression Sister      Hyperlipidemia Sister      Hypertension Sister      Other - See Comments Sister         low back problems     Obesity Sister      Osteoporosis Sister      Melanoma No family hx of      Skin Cancer No family hx of      Glaucoma No family hx of        SOCIAL HISTORY  Social History     Socioeconomic History     Marital status: Single     Spouse name: Not on file     Number of children: Not on file     Years of education:  Not on file     Highest education level: Not on file   Occupational History     Occupation: Disabled   Tobacco Use     Smoking status: Former Smoker     Packs/day: 0.50     Years: 6.00     Pack years: 3.00     Types: Cigarettes     Start date: 1977     Quit date: 1983     Years since quittin.9     Smokeless tobacco: Never Used   Substance and Sexual Activity     Alcohol use: No     Alcohol/week: 0.0 standard drinks     Drug use: No     Sexual activity: Not Currently     Partners: Female     Birth control/protection: Abstinence   Other Topics Concern     Parent/sibling w/ CABG, MI or angioplasty before 65F 55M? Not Asked   Social History Narrative    Live independently, [place] one sister, Supriya in [place], [place].  Does live with 2 dogs.      Social Determinants of Health     Financial Resource Strain: Low Risk      Difficulty of Paying Living Expenses: Not hard at all   Food Insecurity: Not on file   Transportation Needs: Unmet Transportation Needs     Lack of Transportation (Medical): Yes     Lack of Transportation (Non-Medical): No   Physical Activity: Not on file   Stress: Not on file   Social Connections: Socially Isolated     Frequency of Communication with Friends and Family: Once a week     Frequency of Social Gatherings with Friends and Family: Never     Attends Druze Services: Never     Active Member of Clubs or Organizations: No     Attends Club or Organization Meetings: Never     Marital Status: Never    Intimate Partner Violence: Not on file   Housing Stability: Unknown     Unable to Pay for Housing in the Last Year: No     Number of Places Lived in the Last Year: Not on file     Unstable Housing in the Last Year: No       ROS:   Constitutional: No fever, chills, or sweats. No weight gain/loss   ENT: No visual disturbance, ear ache, epistaxis, sore throat  Allergies/Immunologic: Negative  Respiratory: No cough, hemoptysia  Cardiovascular: As per HPI  GI: No  "nausea, vomiting, hematemesis, melena, or hematochezia  : No urinary frequency, dysuria, or hematuria  Integument: Negative  Psychiatric: Negative  Neuro: Negative  Endocrinology: Negative   Musculoskeletal: Negative  Vascular: No walking impairment, claudication, ischemic rest pain or nonhealing wounds    EXAM:  /79   Pulse 72   Ht 1.803 m (5' 11\")   Wt 114.8 kg (253 lb)   SpO2 96%   BMI 35.29 kg/m    In general, the patient is a pleasant male in no apparent distress.    HEENT: NC/AT.  PERRLA.  EOMI.  Sclerae white, not injected.    Neck: No adenopathy.  No thyromegaly. Carotids +2/2 bilaterally without bruits.  No jugular venous distension.   Heart: RRR. Normal S1, S2 splits physiologically. No murmur, rub, click, or gallop.  Lungs: CTA.  No ronchi, wheezes, rales.  No dullness to percussion.   Abdomen: Soft, nontender, nondistended. No organomegaly. No AAA.  No bruits.   Extremities: No clubbing, cyanosis, or edema.    Vascular: No bruits are noted.    Labs:  LIPID RESULTS:  Lab Results   Component Value Date    CHOL 130 03/10/2022    CHOL 179 03/22/2018    HDL 50 03/10/2022    HDL 39 (L) 03/22/2018    LDL 67 03/10/2022    LDL 89 03/22/2018    TRIG 66 03/10/2022    TRIG 255 (H) 03/22/2018    CHOLHDLRATIO 3.7 06/08/2015    NHDL 140 (H) 03/22/2018       LIVER ENZYME RESULTS:  Lab Results   Component Value Date    AST 29 05/06/2020    ALT 42 05/06/2020       CBC RESULTS:  Lab Results   Component Value Date    WBC 7.8 08/02/2021    WBC 6.4 08/05/2020    RBC 5.27 08/02/2021    RBC 4.27 (L) 08/05/2020    HGB 14.6 08/02/2021    HGB 9.9 (L) 08/20/2020    HCT 45.4 08/02/2021    HCT 31.2 (L) 08/20/2020    MCV 86 08/02/2021    MCV 74.3 (L) 08/20/2020    MCH 27.7 08/02/2021    MCH 23.6 (L) 08/20/2020    MCHC 32.2 08/02/2021    MCHC 31.7 (L) 08/20/2020    RDW 13.1 08/02/2021    RDW 17.0 (H) 08/05/2020     08/02/2021     08/05/2020       BMP RESULTS:  Lab Results   Component Value Date     " 08/31/2021     08/05/2020    POTASSIUM 4.4 08/31/2021    POTASSIUM 4.3 08/18/2020    CHLORIDE 100 08/31/2021    CHLORIDE 107 08/05/2020    CO2 28 08/31/2021    CO2 26 08/05/2020    ANIONGAP 9 08/31/2021    ANIONGAP 4 08/05/2020     (H) 08/31/2021     (H) 08/18/2020    BUN 23 08/31/2021    BUN 20 08/05/2020    CR 1.10 08/31/2021    CR 1.24 08/05/2020    GFRESTIMATED 73 08/31/2021    GFRESTIMATED 63 08/05/2020    GFRESTBLACK 73 08/05/2020    NIA 9.0 08/31/2021    NIA 8.8 08/05/2020        A1C RESULTS:  Lab Results   Component Value Date    A1C 5.3 10/05/2016     Thank you for allowing me to participate in the care of your patient.      Sincerely,     Enriqueta Zhou MD     [facility]  cc:   Enriqueta Zhou MD  [address] [place] [id number]

## 2022-07-29 NOTE — PATIENT INSTRUCTIONS
Chemistry panel today  Increase coreg to 50 mg twice a day  One year echo and follow up with Dr. Zhou

## 2022-07-29 NOTE — PROGRESS NOTES
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     Prior history:      Patient was referred by Atul Martínez and [place] to establish care into congenital vascular cardiology clinic. Patient reports his nephew at 28 years old has a known bicuspid aortic valve without aortopathy.  He has been seen in [place] and followed. His father is 91 years old and has a known aneurysm of 4.9 cm in bicuspid valve. Patient reports no family history of sudden death or known dissection. No history of strokes.     MRA chest in April 2021 demonstrated graft stability. Echocardiogram showed normal BAV function, with only mild aortic regurgitation. His CMR demonstrated normalization of his LVEF suggestive that prior reduction in function was due to stress CMY.      Today he is doing well. BP is well controlled. We reviewed his studies from April which were all good. He reports some positional dizziness when he abruptly gets up. Carotid US was unremarkable.     He is doing well this summer, walking his dog and getting activity levels up.        ASSESSMENT/PLAN:      This is a 62-year-old male with hypertension, hyperlipidemia, right left fusion of his aortic valve with bicuspid aortopathy.  No known coarctation.  No other peripheral aneurysmal disease.  He is status post valve sparing aortic repair with Gelweave interposition graft.      Blood pressure well controlled overall on current regimen. He will need ongoing long-term surveillance of both of his aortic repair and his bicuspid valve given he has some dysfunction within the native valve (mild sclerosis and mild AI).      Summary of  Recommendations:     1. Echocardiogram once a year - can defer MRA for graft surveillance given stable. Aortic valve is sclerotic without stenosis. Cont monitoring.  2. On amlodipine-benzapril and 37.5 mg bid carvedilol. Will increase coreg to 50 mg twice a day.  3. Would check renal function and electrolytes once a year at least given issues with potassium in the past  4. Follow up in one year     Enriqueta Zhou MD MSc  [facility] [place]         PAST MEDICAL HISTORY  Past Medical History:   Diagnosis Date     NEMESIO (acute kidney injury) (H) 2/16/2020     Anxiety      Ascending aortic aneurysm (H)      Bicuspid aortic valve      BPPV (benign paroxysmal positional vertigo) 7/11/2014     Chronic narcotic use      Chronic neck pain      Chronic osteoarthritis      Degenerative joint disease      Depression      Hx of type 2 diabetes mellitus 3/4/2021     Hyperlipidemia 4/10/2012     Hypertension      MSSA bacteremia 10/13/2019     Multiple stiff joints      Neck injuries      Obesity 2/9/2015     Pain in shoulder      S/P reverse total shoulder arthroplasty, 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     S/P reverse total shoulder arthroplasty, right 8/18/2020     Septic joint of left shoulder region (H) 11/13/2019     Septic joint of right shoulder region (H) 10/13/2019    s/p washout     Skin picking habit      Sleep apnea     does not use cpap     Status post total shoulder arthroplasty, left 3/3/2020    Added automatically from request for surgery [id number]       CURRENT MEDICATIONS  Current Outpatient Medications   Medication Sig Dispense Refill     amLODIPine-benazepril (LOTREL) 5-20 MG capsule Take 1 capsule by mouth 2 times daily 180 capsule 3     amoxicillin (AMOXIL) 500 MG capsule Take 4 tablets 1 hour before dental procedure 24 capsule 0     aspirin 81 MG EC tablet Take 81 mg by mouth daily       atorvastatin (LIPITOR) 40 MG tablet Take 1 tablet (40 mg) by mouth daily 90 tablet 3     buPROPion (WELLBUTRIN SR) 200 MG 12 hr tablet TAKE 1  TABLET BY MOUTH 2 TIMES DAILY 180 tablet 3     carvedilol (COREG) 25 MG tablet Take 1.5 tablets (37.5 mg) by mouth 2 times daily (with meals) 270 tablet 2     celecoxib (CELEBREX) 200 MG capsule Take 1 capsule (200 mg) by mouth daily 30 capsule 1     clonazePAM (KLONOPIN) 0.5 MG tablet Take 1 tablet (0.5 mg) by mouth 3 times daily as needed for anxiety 90 tablet 5     Cyanocobalamin 5000 MCG SUBL Place 5,000 mcg under the tongue daily Vitamin B12       cyclobenzaprine (FLEXERIL) 10 MG tablet Take 1 tablet (10 mg) by mouth 3 times daily as needed for muscle spasms 90 tablet 3     desoximetasone (TOPICORT) 0.25 % external cream Apply topically daily as needed for inflammation or itching       diazepam (VALIUM) 5 MG tablet Take on tablet 20 minutes prior to MRI due to claustrophobia. 1 tablet 0     docusate sodium (COLACE) 100 MG capsule Take 2 capsule in the morning and 3 capsules in the evening       escitalopram (LEXAPRO) 20 MG tablet Take 1 tablet (20 mg) by mouth daily 90 tablet 3     Fe Heme Polypeptide-folic acid (PROFERRIN-FORTE) 12-1 MG TABS Take 1 tablet by mouth 2 times daily 90 tablet 0     fentaNYL (DURAGESIC) 12 mcg/hr 72 hr patch Apply 1 patch by transdermal route every 48 hours       fentaNYL (DURAGESIC) 25 mcg/hr 72 hr patch Apply 1 patch by transdermal route every 48 hours       fluocinonide (LIDEX) 0.05 % external ointment Apply twice daily to itchy skin nodules for 1-2 weeks at a time. 30 g 3     latanoprost (XALATAN) 0.005 % ophthalmic solution Place 1 drop Into the left eye daily 2.5 mL 4     Multiple Minerals-Vitamins (CALCIUM CITRATE-MAG-MINERALS) TABS Take 1 tablet by mouth daily       multivitamin w/minerals (THERA-VIT-M) tablet Take 1 tablet by mouth daily       naloxone (NARCAN) nasal spray Spray 1 spray (4 mg) into one nostril alternating nostrils as needed for opioid reversal every 2-3 minutes until assistance arrives 0.2 mL 0     oxyCODONE IR (ROXICODONE) 10 MG tablet Take 1 tablet by  mouth every 4 hours as needed for pain, max 4 tablet(s) per day       pantoprazole (PROTONIX) 40 MG EC tablet Take 1 tablet (40 mg) by mouth daily as needed for heartburn 30 tablet 3     propranolol (INDERAL) 10 MG tablet Take 1 tablet (10 mg) by mouth 3 times daily as needed (panic attack) 90 tablet 3     Pyrithione Zinc 2 % SHAM Externally apply topically daily 480 mL 4     senna-docusate (SENOKOT-S/PERICOLACE) 8.6-50 MG tablet Take 1-2 tablets by mouth 2 times daily 30 tablet 0     sucralfate (CARAFATE) 1 GM tablet Take 1 tablet (1 g) by mouth 2 times daily as needed (Reflux) 180 tablet 3     tacrolimus (PROTOPIC) 0.1 % ointment Apply topically as needed Apply to affected areas on body. 120 g 11     tiZANidine (ZANAFLEX) 4 MG tablet Take 1 tablet (4 mg) by mouth nightly as needed for other (insomnia related to muscle pain) 90 tablet 3     triamcinolone (KENALOG) 0.1 % external ointment Apply topically 2 times daily To affected areas of rash. Please avoid application to the face, groin or armpits as the medication is too strong for these areas. 454 g 0     triamcinolone (KENALOG) 0.1 % external ointment Apply topically 2 times daily as needed for irritation 454 g 0     vitamin B-Complex Take 1 tablet by mouth daily         PAST SURGICAL HISTORY:  Past Surgical History:   Procedure Laterality Date     ARTHROPLASTY SHOULDER  4/15/2014    Procedure: Left Total Shoulder Arthroplasty ;  Surgeon: Analilia Aceves MD;  [facility]     ARTHROPLASTY SHOULDER Right 8/26/2014    Procedure: ARTHROPLASTY SHOULDER;  Surgeon: Analilia Aceves MD;  [facility]     ARTHROSCOPY SHOULDER WITH BIOPSY(IES) Left 1/28/2020    Procedure: Left shoulder arthroscopy and biopsy for culture;  Surgeon: Analilia Aceves MD;  [facility]     BYPASS GASTRIC TAVO-EN-Y, LIVER BIOPSY, COMBINED  8/8/2005     COLONOSCOPY  6/30/2014    Procedure: COMBINED COLONOSCOPY, SINGLE BIOPSY/POLYPECTOMY BY BIOPSY;  Surgeon: Jasper  Chester Hale MD;  [facility]     COLONOSCOPY  06/30/2014     CV CORONARY ANGIOGRAM  1/30/2020    Procedure: CV CORONARY ANGIOGRAM;  Surgeon: Néstor Walls MD;  [facility]     CYSTOSCOPY, BLADDER NECK CUTS, COMBINED N/A 7/18/2016    Procedure: COMBINED CYSTOSCOPY, BLADDER NECK CUTS;  Surgeon: Ritu Leslie MD;  [facility]     ESOPHAGOSCOPY, GASTROSCOPY, DUODENOSCOPY (EGD), COMBINED  6/30/2014    Procedure: COMBINED ESOPHAGOSCOPY, GASTROSCOPY, DUODENOSCOPY (EGD), BIOPSY SINGLE OR MULTIPLE;  Surgeon: Chester Patton MD;  [facility]     ESOPHAGOSCOPY, GASTROSCOPY, DUODENOSCOPY (EGD), COMBINED  06/30/2014     EXCISE MASS FINGER  6/14/2011    Procedure:EXCISE MASS FINGER; Middle Flexor Cyst; Surgeon:SHAYY RUSSELL; [facility]     HAND SURGERY      excision of tendon cyst on left hand     HAND SURGERY Left     excision of tendon cyst on left hand       [facility] VASCULAR SURGERY PROCEDURE UNLIST       IR FINE NEEDLE ASPIRATION W ULTRASOUND  11/13/2019     IR PICC PLACEMENT > 5 YRS OF AGE  11/19/2019     LASER HOLMIUM LITHOTRIPSY BLADDER N/A 10/15/2014    Procedure: LASER HOLMIUM LITHOTRIPSY BLADDER;  Surgeon: Sahil Taveras MD;  [facility]     LASER KTP GREEN LIGHT PHOTOSELECTIVE VAPORIZATION PROSTATE  1/23/2014    Procedure: LASER KTP GREEN LIGHT PHOTOSELECTIVE VAPORIZATION PROSTATE;  Greenlight Photovaporization Of Prostate  ;  Surgeon: Sahil Taveras MD;  [facility]     OTHER SURGICAL HISTORY Right 06/14/2011    EXCISE MASS FINGER     OTHER SURGICAL HISTORY  08/08/2005    BYPASS GASTRIC TAVO-EN-Y, LIVER BIOPSY, COMBINED     OTHER SURGICAL HISTORY  01/23/2014    LASER KTP GREEN LIGHT PHOTOSELECTIVE VAPORIZATION PROSTATE     OTHER SURGICAL HISTORY  10/15/2014    LASER HOLMIUM LITHOTRIPSY BLADDER     OTHER SURGICAL HISTORY  07/18/2016    CYSTOSCOPY, BLADDER NECK CUTS, COMBINED     OTHER SURGICAL HISTORY  10/04/2016     REPAIR ANEURYSM ASCENDING AORTA     OTHER SURGICAL HISTORY Right 03/31/2017    RELEASE TRIGGER FINGER     OTHER SURGICAL HISTORY Right 09/23/2019    IRRIGATION AND DEBRIDEMENT UPPER EXTREMITYshoulder     PERIPHERALLY INSERTED CENTRAL CATHETER INSERTION       RELEASE TRIGGER FINGER Right 3/31/2017    Procedure: RELEASE TRIGGER FINGER;  Surgeon: Juan Carlos Blunt MD;  [facility]     REMOVE ANTIBIOTIC CEMENT BEADS / SPACER SHOULDER Left 5/8/2020    Procedure: Left shoulder removal of spacer;  Surgeon: Analilia Aceves MD;  [facility]     REMOVE ANTIBIOTIC CEMENT BEADS / SPACER SHOULDER Right 8/18/2020    Procedure: Removal of right shoulder antibiotic spacer;  Surgeon: Aanlilia Aceves MD;  [facility]     REMOVE HARDWARE ARTHROPLASTY SHOULDER. I&D, PLACE ANTIBIOTIC CEMENT BE Left 11/15/2019    Procedure: Explantation of left total shoulder arthroplasty, irrigation and debridement, and placement of antibiotic spacer;  Surgeon: Analilia Aceves MD;  [facility]     REPAIR ANEURYSM ASCENDING AORTA N/A 10/4/2016    Procedure: REPAIR ANEURYSM ASCENDING AORTA;  Surgeon: Mckenzie Townsend MD;  [facility]     REVERSE ARTHROPLASTY SHOULDER Right 8/18/2020    Procedure: and conversion to reverse total shoulder arthroplasty;  Surgeon: Analilia Aceves MD;  [facility]     TOTAL SHOULDER REPLACEMENT Left 04/15/2014     TOTAL SHOULDER REPLACEMENT Right 08/26/2014     TRANSRECTAL ULTRASONIC, TRANSURETHRAL RESECTION (TUR) OF PROSTATE CYST  2014     TURP  2014     [facility] HAND/FINGER SURGERY UNLISTED       [facility] SHOULDER SURG PROC UNLISTED         ALLERGIES     Allergies   Allergen Reactions     Ciprofloxacin      History of aortic aneurysms     Tape [Adhesive Tape] Blisters     Blistering - please use paper tape       FAMILY HISTORY  Family History   Problem Relation Age of Onset     Arthritis Other      Gastrointestinal Disease Other      Cardiovascular Father          aortic aneurysm     Arrhythmia Father      Nephrolithiasis Father      Sleep Apnea Father      Anxiety Disorder Father      Depression Father      Hypertension Father      Obesity Father      Hyperlipidemia Father      Coronary Artery Disease Father      Low Back Problems Father      Spine Problems Father      Cardiovascular Father      Other - See Comments Father         low back problems, spine problems     Anxiety Disorder Mother      Hypertension Mother      Osteoporosis Mother      Obesity Mother      Hyperlipidemia Mother      Low Back Problems Mother      Macular Degeneration Mother      Cataracts Mother      Other - See Comments Mother         low back problems     Anxiety Disorder Sister      Hypertension Sister      Osteoporosis Sister      Obesity Sister      Hyperlipidemia Sister      Low Back Problems Sister      Spine Problems Sister      Anxiety Disorder Sister      Depression Sister      Hypertension Sister      Osteoporosis Sister      Obesity Sister      Hyperlipidemia Sister      Low Back Problems Sister      Anxiety Disorder Sister      Hyperlipidemia Sister      Hypertension Sister      Obesity Sister      Other - See Comments Sister         low back problems, spine problems     Osteoporosis Sister      Anxiety Disorder Sister      Depression Sister      Hyperlipidemia Sister      Hypertension Sister      Other - See Comments Sister         low back problems     Obesity Sister      Osteoporosis Sister      Melanoma No family hx of      Skin Cancer No family hx of      Glaucoma No family hx of        SOCIAL HISTORY  Social History     Socioeconomic History     Marital status: Single     Spouse name: Not on file     Number of children: Not on file     Years of education: Not on file     Highest education level: Not on file   Occupational History     Occupation: Disabled   Tobacco Use     Smoking status: Former Smoker     Packs/day: 0.50     Years: 6.00     Pack years: 3.00     Types: Cigarettes      Start date: 1977     Quit date: 1983     Years since quittin.9     Smokeless tobacco: Never Used   Substance and Sexual Activity     Alcohol use: No     Alcohol/week: 0.0 standard drinks     Drug use: No     Sexual activity: Not Currently     Partners: Female     Birth control/protection: Abstinence   Other Topics Concern     Parent/sibling w/ CABG, MI or angioplasty before 65F 55M? Not Asked   Social History Narrative    Live independently[place] one sister, Supriya in [place], [place].  Does live with 2 dogs.      Social Determinants of Health     Financial Resource Strain: Low Risk      Difficulty of Paying Living Expenses: Not hard at all   Food Insecurity: Not on file   Transportation Needs: Unmet Transportation Needs     Lack of Transportation (Medical): Yes     Lack of Transportation (Non-Medical): No   Physical Activity: Not on file   Stress: Not on file   Social Connections: Socially Isolated     Frequency of Communication with Friends and Family: Once a week     Frequency of Social Gatherings with Friends and Family: Never     Attends Faith Services: Never     Active Member of Clubs or Organizations: No     Attends Club or Organization Meetings: Never     Marital Status: Never    Intimate Partner Violence: Not on file   Housing Stability: Unknown     Unable to Pay for Housing in the Last Year: No     Number of Places Lived in the Last Year: Not on file     Unstable Housing in the Last Year: No       ROS:   Constitutional: No fever, chills, or sweats. No weight gain/loss   ENT: No visual disturbance, ear ache, epistaxis, sore throat  Allergies/Immunologic: Negative  Respiratory: No cough, hemoptysia  Cardiovascular: As per HPI  GI: No nausea, vomiting, hematemesis, melena, or hematochezia  : No urinary frequency, dysuria, or hematuria  Integument: Negative  Psychiatric: Negative  Neuro: Negative  Endocrinology: Negative   Musculoskeletal: Negative  Vascular:  "No walking impairment, claudication, ischemic rest pain or nonhealing wounds    EXAM:  /79   Pulse 72   Ht 1.803 m (5' 11\")   Wt 114.8 kg (253 lb)   SpO2 96%   BMI 35.29 kg/m    In general, the patient is a pleasant male in no apparent distress.    HEENT: NC/AT.  PERRLA.  EOMI.  Sclerae white, not injected.    Neck: No adenopathy.  No thyromegaly. Carotids +2/2 bilaterally without bruits.  No jugular venous distension.   Heart: RRR. Normal S1, S2 splits physiologically. No murmur, rub, click, or gallop.  Lungs: CTA.  No ronchi, wheezes, rales.  No dullness to percussion.   Abdomen: Soft, nontender, nondistended. No organomegaly. No AAA.  No bruits.   Extremities: No clubbing, cyanosis, or edema.    Vascular: No bruits are noted.    Labs:  LIPID RESULTS:  Lab Results   Component Value Date    CHOL 130 03/10/2022    CHOL 179 03/22/2018    HDL 50 03/10/2022    HDL 39 (L) 03/22/2018    LDL 67 03/10/2022    LDL 89 03/22/2018    TRIG 66 03/10/2022    TRIG 255 (H) 03/22/2018    CHOLHDLRATIO 3.7 06/08/2015    NHDL 140 (H) 03/22/2018       LIVER ENZYME RESULTS:  Lab Results   Component Value Date    AST 29 05/06/2020    ALT 42 05/06/2020       CBC RESULTS:  Lab Results   Component Value Date    WBC 7.8 08/02/2021    WBC 6.4 08/05/2020    RBC 5.27 08/02/2021    RBC 4.27 (L) 08/05/2020    HGB 14.6 08/02/2021    HGB 9.9 (L) 08/20/2020    HCT 45.4 08/02/2021    HCT 31.2 (L) 08/20/2020    MCV 86 08/02/2021    MCV 74.3 (L) 08/20/2020    MCH 27.7 08/02/2021    MCH 23.6 (L) 08/20/2020    MCHC 32.2 08/02/2021    MCHC 31.7 (L) 08/20/2020    RDW 13.1 08/02/2021    RDW 17.0 (H) 08/05/2020     08/02/2021     08/05/2020       BMP RESULTS:  Lab Results   Component Value Date     08/31/2021     08/05/2020    POTASSIUM 4.4 08/31/2021    POTASSIUM 4.3 08/18/2020    CHLORIDE 100 08/31/2021    CHLORIDE 107 08/05/2020    CO2 28 08/31/2021    CO2 26 08/05/2020    ANIONGAP 9 08/31/2021    ANIONGAP 4 08/05/2020 "     (H) 08/31/2021     (H) 08/18/2020    BUN 23 08/31/2021    BUN 20 08/05/2020    CR 1.10 08/31/2021    CR 1.24 08/05/2020    GFRESTIMATED 73 08/31/2021    GFRESTIMATED 63 08/05/2020    GFRESTBLACK 73 08/05/2020    NIA 9.0 08/31/2021    NIA 8.8 08/05/2020        A1C RESULTS:  Lab Results   Component Value Date    A1C 5.3 10/05/2016

## 2022-08-02 ENCOUNTER — OFFICE VISIT (OUTPATIENT)
Dept: FAMILY MEDICINE | Facility: CLINIC | Age: 62
End: 2022-08-02
Payer: COMMERCIAL

## 2022-08-02 VITALS
TEMPERATURE: 98.1 F | BODY MASS INDEX: 34.87 KG/M2 | RESPIRATION RATE: 20 BRPM | OXYGEN SATURATION: 97 % | HEART RATE: 62 BPM | SYSTOLIC BLOOD PRESSURE: 151 MMHG | WEIGHT: 250 LBS | DIASTOLIC BLOOD PRESSURE: 79 MMHG

## 2022-08-02 DIAGNOSIS — I10 ESSENTIAL HYPERTENSION: Primary | ICD-10-CM

## 2022-08-02 DIAGNOSIS — F41.0 ANXIETY DISORDER DUE TO GENERAL MEDICAL CONDITION WITH PANIC ATTACK: ICD-10-CM

## 2022-08-02 DIAGNOSIS — Z86.2 HX OF IRON DEFICIENCY ANEMIA: ICD-10-CM

## 2022-08-02 DIAGNOSIS — D50.9 IRON DEFICIENCY ANEMIA, UNSPECIFIED IRON DEFICIENCY ANEMIA TYPE: ICD-10-CM

## 2022-08-02 DIAGNOSIS — F06.4 ANXIETY DISORDER DUE TO GENERAL MEDICAL CONDITION WITH PANIC ATTACK: ICD-10-CM

## 2022-08-02 LAB — HGB BLD-MCNC: 11.8 G/DL (ref 13.3–17.7)

## 2022-08-02 PROCEDURE — 99214 OFFICE O/P EST MOD 30 MIN: CPT | Performed by: FAMILY MEDICINE

## 2022-08-02 PROCEDURE — 36415 COLL VENOUS BLD VENIPUNCTURE: CPT | Performed by: FAMILY MEDICINE

## 2022-08-02 PROCEDURE — 85018 HEMOGLOBIN: CPT | Performed by: FAMILY MEDICINE

## 2022-08-02 RX ORDER — CHLORHEXIDINE GLUCONATE ORAL RINSE 1.2 MG/ML
SOLUTION DENTAL
COMMUNITY
Start: 2022-08-01 | End: 2023-11-02

## 2022-08-02 RX ORDER — FERROUS SULFATE 325(65) MG
325 TABLET ORAL
Qty: 30 TABLET | Refills: 0 | Status: SHIPPED | OUTPATIENT
Start: 2022-08-02 | End: 2022-11-04

## 2022-08-02 NOTE — PROGRESS NOTES
"  Assessment & Plan     (I10) Essential hypertension  (primary encounter diagnosis)  Comment: not at goal  Plan: Hemoglobin        Follow up visit in 2 weeks via virtual visit, patient interested in weight loss and would consider weekly GLP1 analog    (F06.4,  F41.0) Anxiety disorder due to general medical condition with panic attack  Comment: stable  Plan: no changes    (Z86.2) Hx of iron deficiency anemia  Comment: recurrent: has Gastric bypass with weight gain, previous workup including 2014 full scoping including SBFT  Plan: restart iron pills, has had good response previously    (D50.9) Iron deficiency anemia, unspecified iron deficiency anemia type  Comment: see above  Plan: ferrous sulfate (FEROSUL) 325 (65 Fe) MG tablet                       BMI:   Estimated body mass index is 34.87 kg/m  as calculated from the following:    Height as of 7/29/22: 1.803 m (5' 11\").    Weight as of this encounter: 113.4 kg (250 lb).           No follow-ups on file.    Demi Hardin MD  [facility]    Xu Bullock is a 62 year old, presenting for the following health issues:  RECHECK      HPI     1. Follow up anxiety:  -as needed inderal works for the events    2. Always tired:  Not sure exactly the etiology, \"it's like when I'm anemic\"  ECHO unchanged from previous  Now on max dose coreg, blood pressure is more often controlled, per cardiology    3. Cough/expectorant  Not currently having issues, but would like this in the future:  Patient wonders about SSKI as an expectorant which he's used in the past for  -      Review of Systems   Denies cough, chest pain, frustrated by weight, pain in shoulders      Objective    BP (!) 151/79   Pulse 62   Temp 98.1  F (36.7  C) (Oral)   Resp 20   Wt 113.4 kg (250 lb)   SpO2 97%   BMI 34.87 kg/m    Body mass index is 34.87 kg/m .  Physical Exam   GENERAL: alert, no distress and obese  RESP: lungs clear to auscultation - no rales, rhonchi or " wheezes  CV: regular rates and rhythm, normal S1 S2, no S3 or S4, grade 3/6 systolic murmur heard best over the upper sternal border , peripheral pulses strong, no peripheral edema and sternotomy well healed    Results for orders placed or performed in visit on 08/02/22 (from the past 24 hour(s))   Hemoglobin   Result Value Ref Range    Hemoglobin 11.8 (L) 13.3 - 17.7 g/dL     *Note: Due to a large number of results and/or encounters for the requested time period, some results have not been displayed. A complete set of results can be found in Results Review.                   .  ..

## 2022-08-09 ENCOUNTER — TRANSFERRED RECORDS (OUTPATIENT)
Dept: HEALTH INFORMATION MANAGEMENT | Facility: CLINIC | Age: 62
End: 2022-08-09

## 2022-08-22 ENCOUNTER — TELEPHONE (OUTPATIENT)
Dept: CARDIOLOGY | Facility: CLINIC | Age: 62
End: 2022-08-22

## 2022-08-22 NOTE — TELEPHONE ENCOUNTER
Called patient to review recent elevated blood pressure reading.  Per [place] Community Me[place] guidelines, patients blood pressure is out of parameters and recheck blood pressure is recommended.    Last Blood Pressure: 148/79  Last Heart Rate: 62  Date: 8/2/22  Location: PCP    Today's Blood Pressure: 127/77  Today's Heart Rate: 54  Location: Home BP    Patient reported blood pressure updated in Epic. Blood pressure falls within [place] Community [place] guidelines.  Patient will follow up as previously advised.     CHANDLER Vyas

## 2022-09-07 ENCOUNTER — TRANSFERRED RECORDS (OUTPATIENT)
Dept: HEALTH INFORMATION MANAGEMENT | Facility: CLINIC | Age: 62
End: 2022-09-07

## 2022-09-14 NOTE — PROGRESS NOTES
Assessment & Plan     (E66.01) Morbid obesity (H)  (primary encounter diagnosis)  Comment: patient concerned about impact as he ages and wanting to ensure good health for independent living, agree with importance of weight loss  Plan: trial of semaglutide or other approved GLP-1 medication to see if provides the needed impetus.      (R73.03) Prediabetes  Comment: recent a1c of 6.0 increasing since his previous gastric bypass  Plan: will try semaglutide both for glycemic control and weight loss    (F06.4,  F41.0) Anxiety disorder due to general medical condition with panic attack  Comment: patient aware of concerns increasing to 40 mg with cardiac issues,  Plan: escitalopram (LEXAPRO) 20 MG tablet        Trial of increasing from 20 mg daily to 30 mg daily and reissued new with cutting pills.    (G89.4) Chronic pain syndrome  Comment: patient wanting a BID option and discussed concerns with bypass and with perhaps vascular dz and kidney disease  Plan: celecoxib (CELEBREX) 100 MG capsule        Will not increase total daily dose, but reduce to 100 mg BID from previous 200 mg Q day    (Z86.39) Hx of type 2 diabetes mellitus  Comment: see above issues  Plan: semaglutide    (Z23) Encounter for administration of vaccine  Comment: agrees  Plan: INFLUENZA QUAD, RECOMBINANT, P-FREE (RIV4)         (FLUBLOK)            (D50.0) Iron deficiency anemia due to chronic blood loss  Comment: dislikes ferrous sulfate  Plan: Iron Heme Polypeptide (PROFERRIN ES) 12 MG TABS        Refill and trial for one month and recheck    (Z98.84) History of Simone-en-Y gastric bypass  Comment: close monitoring of nutrition  Plan: consider referral to weight loss clinic, patient declined at this time but wanting to try with PCP and on his own if can get semaglutide covered and it works            60 minutes spent on the date of the encounter doing chart review, history and exam, documentation and further activities per the note       BMI:   Estimated  "body mass index is 34.87 kg/m  as calculated from the following:    Height as of 7/29/22: 1.803 m (5' 11\").    Weight as of 8/2/22: 113.4 kg (250 lb).   Weight management plan: see above, trial first, discussed referral and will reassess    Recheck one month    No follow-ups on file.    Demi Hardin MD  [facility] CAROLYN Bullock is a 62 year old, presenting for the following health issues:  Recheck Medication      HPI     DM and obesity:  -has had more interest in new weight loss meds that may help with hx of diabetes.  Semaglutide is one he's researched.    -interested in trying this to help with weight loss, has ongoing concerns about aging/mobility and has seen those issues in her parents        How many servings of fruits and vegetables do you eat daily?  Now with dental    How many days per week do you exercise enough to make your heart beat faster? Admits to walking dog, not vigorous    How many days per week do you miss taking your medication? no    Anxiety:  Had visit from sister and starting to think ahead and plan ahead about things, one sister out [facility], one sister in [place] but not at all helpful or involved  -feeling slight increase in anxiousness and wonders about having more directed energy to get things accomplished with increasing lexapro again    Advanced care directive  -wants to perhaps modify and needing some more information about all the different scenarios, discussed we'd have a meeting to review this in detail dedicated     Hx of anemia:  Doesn't tolerate routine iron supplementation, wanting to retry previous proferrin and willing to pay out of pocket for this    Hemoglobin   Date Value Ref Range Status   08/02/2022 11.8 (L) 13.3 - 17.7 g/dL Final   08/20/2020 9.9 (L) 13.3 - 17.7 g/dL Final   ]        Review of Systems   Full teeth: improved eating of crunchy vegetables      Objective    /67   Pulse 58   Temp 98  F (36.7  C) (Oral)   Resp " (!) 200   SpO2 97%   There is no height or weight on file to calculate BMI.  Physical Exam   GENERAL: healthy, alert and no distress  MS: no gross musculoskeletal defects noted, no edema  PSYCH: mentation appears normal, affect normal/bright

## 2022-09-15 ENCOUNTER — OFFICE VISIT (OUTPATIENT)
Dept: FAMILY MEDICINE | Facility: CLINIC | Age: 62
End: 2022-09-15
Payer: COMMERCIAL

## 2022-09-15 VITALS
DIASTOLIC BLOOD PRESSURE: 67 MMHG | OXYGEN SATURATION: 97 % | TEMPERATURE: 98 F | SYSTOLIC BLOOD PRESSURE: 118 MMHG | HEART RATE: 58 BPM | RESPIRATION RATE: 200 BRPM

## 2022-09-15 DIAGNOSIS — R73.03 PREDIABETES: ICD-10-CM

## 2022-09-15 DIAGNOSIS — D50.0 IRON DEFICIENCY ANEMIA DUE TO CHRONIC BLOOD LOSS: ICD-10-CM

## 2022-09-15 DIAGNOSIS — Z23 ENCOUNTER FOR ADMINISTRATION OF VACCINE: ICD-10-CM

## 2022-09-15 DIAGNOSIS — F41.0 ANXIETY DISORDER DUE TO GENERAL MEDICAL CONDITION WITH PANIC ATTACK: ICD-10-CM

## 2022-09-15 DIAGNOSIS — Z98.84 HISTORY OF ROUX-EN-Y GASTRIC BYPASS: ICD-10-CM

## 2022-09-15 DIAGNOSIS — G89.4 CHRONIC PAIN SYNDROME: ICD-10-CM

## 2022-09-15 DIAGNOSIS — Z86.39 HX OF TYPE 2 DIABETES MELLITUS: ICD-10-CM

## 2022-09-15 DIAGNOSIS — E66.01 MORBID OBESITY (H): Primary | ICD-10-CM

## 2022-09-15 DIAGNOSIS — F06.4 ANXIETY DISORDER DUE TO GENERAL MEDICAL CONDITION WITH PANIC ATTACK: ICD-10-CM

## 2022-09-15 PROCEDURE — G0008 ADMIN INFLUENZA VIRUS VAC: HCPCS | Performed by: FAMILY MEDICINE

## 2022-09-15 PROCEDURE — 90682 RIV4 VACC RECOMBINANT DNA IM: CPT | Performed by: FAMILY MEDICINE

## 2022-09-15 PROCEDURE — 99215 OFFICE O/P EST HI 40 MIN: CPT | Mod: 25 | Performed by: FAMILY MEDICINE

## 2022-09-15 RX ORDER — ESCITALOPRAM OXALATE 20 MG/1
30 TABLET ORAL DAILY
Qty: 135 TABLET | Refills: 3 | Status: SHIPPED | OUTPATIENT
Start: 2022-09-15 | End: 2023-12-21

## 2022-09-15 RX ORDER — IRON HEME POLYPEPTIDE 12 MG
12 TABLET ORAL DAILY
Qty: 30 TABLET | Refills: 1 | Status: SHIPPED | OUTPATIENT
Start: 2022-09-15 | End: 2023-11-02

## 2022-09-15 RX ORDER — CELECOXIB 100 MG/1
100 CAPSULE ORAL 2 TIMES DAILY
Qty: 180 CAPSULE | Refills: 3 | Status: SHIPPED | OUTPATIENT
Start: 2022-09-15 | End: 2022-11-04

## 2022-09-16 ENCOUNTER — TELEPHONE (OUTPATIENT)
Dept: FAMILY MEDICINE | Facility: CLINIC | Age: 62
End: 2022-09-16

## 2022-09-16 DIAGNOSIS — E66.01 MORBID OBESITY (H): Primary | ICD-10-CM

## 2022-09-16 DIAGNOSIS — R73.03 PREDIABETES: ICD-10-CM

## 2022-09-16 PROBLEM — Z98.84 HISTORY OF ROUX-EN-Y GASTRIC BYPASS: Status: ACTIVE | Noted: 2022-09-16

## 2022-09-16 PROBLEM — Z86.79 S/P THORACIC AORTIC ANEURYSM REPAIR: Status: ACTIVE | Noted: 2022-09-16

## 2022-09-16 PROBLEM — I21.4 NSTEMI (NON-ST ELEVATED MYOCARDIAL INFARCTION) (H): Status: RESOLVED | Noted: 2020-01-29 | Resolved: 2022-09-16

## 2022-09-16 PROBLEM — Z98.890 S/P THORACIC AORTIC ANEURYSM REPAIR: Status: ACTIVE | Noted: 2022-09-16

## 2022-09-16 PROBLEM — I50.21 ACUTE SYSTOLIC HEART FAILURE (H): Status: RESOLVED | Noted: 2020-01-28 | Resolved: 2022-09-16

## 2022-09-16 PROBLEM — E44.0 MODERATE PROTEIN-CALORIE MALNUTRITION (H): Status: RESOLVED | Noted: 2019-10-13 | Resolved: 2022-09-16

## 2022-09-16 NOTE — TELEPHONE ENCOUNTER
PA Initiation    Medication: Ozempic 0.25 or 0.5MG/Dose (2MG/1.5ML)  Insurance Company: [employer] - Phone [phone] Fax [phone]  Pharmacy Filling the Rx:    Filling Pharmacy Phone:    Filling Pharmacy Fax:    Start Date: 9/16/2022    [id number]

## 2022-09-19 RX ORDER — LIRAGLUTIDE 6 MG/ML
0.6 INJECTION SUBCUTANEOUS DAILY
Qty: 3 ML | Refills: 3 | Status: SHIPPED | OUTPATIENT
Start: 2022-09-19 | End: 2022-09-20

## 2022-09-19 NOTE — TELEPHONE ENCOUNTER
PRIOR AUTHORIZATION DENIED    Medication: Ozempic 0.25 or 0.5MG/Dose (2MG/1.5ML) Denied    Denial Date: 9/19/2022    Denial Rational: Not an FDA approved diagnosis pt must have type 2 diabetes    Appeal Information: [phone] fax [phone]    Would you like to try to appeal this or try a different medication for weight loss.

## 2022-09-20 ENCOUNTER — TELEPHONE (OUTPATIENT)
Dept: FAMILY MEDICINE | Facility: CLINIC | Age: 62
End: 2022-09-20

## 2022-09-20 DIAGNOSIS — E66.01 MORBID OBESITY (H): ICD-10-CM

## 2022-09-20 DIAGNOSIS — R73.03 PREDIABETES: ICD-10-CM

## 2022-09-20 RX ORDER — LIRAGLUTIDE 6 MG/ML
0.6 INJECTION SUBCUTANEOUS DAILY
Qty: 6 ML | Refills: 3 | Status: SHIPPED | OUTPATIENT
Start: 2022-09-20 | End: 2022-11-04

## 2022-09-20 NOTE — TELEPHONE ENCOUNTER
[facility] phone call message- medication clarification/question:    Full Medication Name: liraglutide (VICTOZA) 18 MG/3ML solution    Question: Pharmacy called stating prescription only comes in 2 pens or 3 pens, it does not come in single pens, 3 mL = one pen and they cannot open a box for one pen, if prescription can be fixed and resent to pharmacy.    Pharmacy confirmed as [id number] - [place], [place] - [address]: Yes    OK to leave a message on voice mail? Yes    Primary language: English      needed? No    Call taken on September 20, 2022 at 9:42 AM by Anjali Colon

## 2022-10-05 DIAGNOSIS — F41.0 ANXIETY DISORDER DUE TO GENERAL MEDICAL CONDITION WITH PANIC ATTACK: ICD-10-CM

## 2022-10-05 DIAGNOSIS — F06.4 ANXIETY DISORDER DUE TO GENERAL MEDICAL CONDITION WITH PANIC ATTACK: ICD-10-CM

## 2022-10-05 RX ORDER — PROPRANOLOL HYDROCHLORIDE 10 MG/1
10 TABLET ORAL 3 TIMES DAILY PRN
Qty: 90 TABLET | Refills: 3 | Status: SHIPPED | OUTPATIENT
Start: 2022-10-05 | End: 2023-08-15

## 2022-10-06 ENCOUNTER — TELEPHONE (OUTPATIENT)
Dept: FAMILY MEDICINE | Facility: CLINIC | Age: 62
End: 2022-10-06

## 2022-10-06 ENCOUNTER — TRANSFERRED RECORDS (OUTPATIENT)
Dept: HEALTH INFORMATION MANAGEMENT | Facility: CLINIC | Age: 62
End: 2022-10-06

## 2022-10-06 NOTE — TELEPHONE ENCOUNTER
Central Prior Authorization Team  Phone: [phone]    PA Initiation    Medication: Victoza 18MG/3ML  Insurance Company: [employer] - Phone [phone] Fax [phone]  Pharmacy Filling the Rx: [facility][id number] - [place], [place] - [address]  Filling Pharmacy Phone: [phone]  Filling Pharmacy Fax:    Start Date: 10/6/2022

## 2022-10-06 NOTE — TELEPHONE ENCOUNTER
Prior Authorization needed on:  10/6/22    Medication:  Victoza Dose:  18MG/3ML    Pharmacy confirmed as   [id number] - [place], [place] - [address]   [address] [place] [address]  Phone: [phone] Fax: [phone]  : Yes    Insurance Name:    Insurance Phone:   Insurance Patient ID:    CMM:   KEY: [id number]  PLN: SILVER  : 62    Alternatives Suggested:      Janice Roach MA 2022 at 9:51 AM

## 2022-10-07 DIAGNOSIS — F43.9 STRESS: ICD-10-CM

## 2022-10-10 RX ORDER — CLONAZEPAM 0.5 MG/1
0.5 TABLET ORAL 3 TIMES DAILY PRN
Qty: 90 TABLET | Refills: 0 | Status: SHIPPED | OUTPATIENT
Start: 2022-10-10 | End: 2022-11-04

## 2022-10-11 NOTE — TELEPHONE ENCOUNTER
Central Prior Authorization Team  Phone: [phone]    PRIOR AUTHORIZATION DENIED    Medication: Victoza 18MG/3ML    Denial Date: 10/11/2022    Denial Rational: FDA has not approved the use of the medication for the diagnosis provided.     Appeal Information:

## 2022-11-01 DIAGNOSIS — Z96.611 STATUS POST TOTAL SHOULDER ARTHROPLASTY, RIGHT: ICD-10-CM

## 2022-11-01 DIAGNOSIS — Z96.612 STATUS POST TOTAL SHOULDER ARTHROPLASTY, LEFT: Primary | ICD-10-CM

## 2022-11-02 ENCOUNTER — OFFICE VISIT (OUTPATIENT)
Dept: ORTHOPEDICS | Facility: CLINIC | Age: 62
End: 2022-11-02
Payer: COMMERCIAL

## 2022-11-02 ENCOUNTER — ANCILLARY PROCEDURE (OUTPATIENT)
Dept: GENERAL RADIOLOGY | Facility: CLINIC | Age: 62
End: 2022-11-02
Attending: ORTHOPAEDIC SURGERY
Payer: COMMERCIAL

## 2022-11-02 DIAGNOSIS — Z96.612 STATUS POST TOTAL SHOULDER ARTHROPLASTY, LEFT: ICD-10-CM

## 2022-11-02 DIAGNOSIS — Z96.611 STATUS POST TOTAL SHOULDER ARTHROPLASTY, RIGHT: ICD-10-CM

## 2022-11-02 DIAGNOSIS — T84.59XD INFECTION OF PROSTHETIC SHOULDER JOINT, SUBSEQUENT ENCOUNTER: Primary | ICD-10-CM

## 2022-11-02 DIAGNOSIS — Z96.619 INFECTION OF PROSTHETIC SHOULDER JOINT, SUBSEQUENT ENCOUNTER: Primary | ICD-10-CM

## 2022-11-02 PROCEDURE — 99213 OFFICE O/P EST LOW 20 MIN: CPT | Performed by: ORTHOPAEDIC SURGERY

## 2022-11-02 PROCEDURE — 73030 X-RAY EXAM OF SHOULDER: CPT | Mod: LT | Performed by: RADIOLOGY

## 2022-11-02 NOTE — NURSING NOTE
Reason For Visit:   Chief Complaint   Patient presents with     Surgical Followup     Follow up bilateral shoulders pop Right rTSA.  DOS: 8/18/20.  Pop Left RTSA DOS: 5/8/20        PCP: Michael Styles  Ref: Self     ?  No  Occupation Retired.  Currently working? No.  Work status?  Retired.  Date of injury: ongoing     Date of surgery: R TSA 8/26/2014  L TSA 04/15/2014     Date of surgery: 9/23/19  Type of surgery: I and D Right shoulder at [facility]     Date of surgery: 11/15/19  Type of surgery:   1. Explant of left total shoulder arthroplasty  2. Irrigation and excisional debridement left shoulder  3. Placement of antibiotic spacer     Date of surgery: 1/28/20  Type of surgery:   1. Left shoulder arthroscopy  2. Left shoulder arthroscopic biopsy/culture       Date of surgery:5/8/20  Type of surgery:  1. Removal of left shoulder antibiotic spacer  2. Left shoulder excisional I&D  3. Placement of custom left reverse total shoulder arthroplasty (custom baseplate, Vault Reconstruction System)      Date of surgery: 8/18/20  Type of surgery:   1. Removal of right shoulder antibiotic spacer  2. Revision right reverse total shoulder arthroplasty.   Smoker: No  Request smoking cessation information: No     Right hand dominant    SANE score  Affected shoulder: Bilateral  Right shoulder SANE: 25  Left shoulder SANE: 25    There were no vitals taken for this visit.      Pain Assessment  Patient Currently in Pain: Snow [place], ATC         neurology

## 2022-11-02 NOTE — LETTER
11/2/2022         RE: Kobe Buchanan  [address]  [place] [address]        Dear Colleague,    Thank you for referring your patient, Kobe Buchanan, to the [facility] [place]. Please see a copy of my visit note below.    CHIEF CONCERN: Bilateral reverse TSA  DATE OF SURGERY: Right rTSA.  DOS: 8/18/20; Left RTSA DOS: 5/8/20     HISTORY OF PRESENT ILLNESS:  Kobe is a 62 yo male s/p above procedures. He presents today for follow up of both shoulders. He is also asking about what a cervical fusion might affect his shoulders. With shoulders he reports he occasionally has sorness if he lifts too much (multiple heavy grocery bags).    PHYSICAL EXAM:  Adult male in no acute distress.  RESP: Non labored breathing  MUSCULOSKELETAL:    Bilateral UE:  No shoulder asymmetry. No muscle atrophy. No pain on palpation over AC joint, posterior glenohumeral space or long head of biceps. SILT in axillary, median, ulnar, musculocutaneous, and radial nerve distributions. 5/5 , EPL, FPL, intrinsics, wrist flexion/extension, bicep, tricep, deltoid.   Active motion bilaterally is FE to 150 without discomfort, Abduction to 130 without discomfort. ER to 30.  Today he points out a soft tissue prominence measuring 1.5cm by approximately 3 cm in length over the ulnar border at approximately the proximal third-middle third junction.  This is not firm and is mobile.  On palpation it feels somewhat similar to a lipoma.    IMAGING:  Bilateral shoulder x-rays today were reviewed:  - Implants are well-seated without evidence of loosening or fracture.    ASSESSMENT:  1. Status post bilateral revision to reverse TSA    PLAN:  I reviewed with Kobe the recommendation to continue returning and reporting if he has any concerning symptoms.  Given his history I would watch him closely.  He feels that his shoulders are relatively stable and unchanged at this point.  He continues to try to be as  active as possible.  With regards to the small soft tissue prominence on his right forearm he is not interested in an MRI at this point which I offered him.  I did encourage him that if it changes in any significant way such as enlarges, becomes red or is painful that he should return and I would obtain an MRI.  He will otherwise return every 2 to 3 years for x-rays of both shoulders.        Analilia Aceves MD

## 2022-11-02 NOTE — PROGRESS NOTES
CHIEF CONCERN: Bilateral reverse TSA  DATE OF SURGERY: Right rTSA.  DOS: 8/18/20; Left RTSA DOS: 5/8/20     HISTORY OF PRESENT ILLNESS:  Kobe is a 62 yo male s/p above procedures. He presents today for follow up of both shoulders. He is also asking about what a cervical fusion might affect his shoulders. With shoulders he reports he occasionally has sorness if he lifts too much (multiple heavy grocery bags).    PHYSICAL EXAM:  Adult male in no acute distress.  RESP: Non labored breathing  MUSCULOSKELETAL:    Bilateral UE:  No shoulder asymmetry. No muscle atrophy. No pain on palpation over AC joint, posterior glenohumeral space or long head of biceps. SILT in axillary, median, ulnar, musculocutaneous, and radial nerve distributions. 5/5 , EPL, FPL, intrinsics, wrist flexion/extension, bicep, tricep, deltoid.   Active motion bilaterally is FE to 150 without discomfort, Abduction to 130 without discomfort. ER to 30.  Today he points out a soft tissue prominence measuring 1.5cm by approximately 3 cm in length over the ulnar border at approximately the proximal third-middle third junction.  This is not firm and is mobile.  On palpation it feels somewhat similar to a lipoma.    IMAGING:  Bilateral shoulder x-rays today were reviewed:  - Implants are well-seated without evidence of loosening or fracture.    ASSESSMENT:  1. Status post bilateral revision to reverse TSA    PLAN:  I reviewed with Kobe the recommendation to continue returning and reporting if he has any concerning symptoms.  Given his history I would watch him closely.  He feels that his shoulders are relatively stable and unchanged at this point.  He continues to try to be as active as possible.  With regards to the small soft tissue prominence on his right forearm he is not interested in an MRI at this point which I offered him.  I did encourage him that if it changes in any significant way such as enlarges, becomes red or is painful that he should  return and I would obtain an MRI.  He will otherwise return every 2 to 3 years for x-rays of both shoulders.

## 2022-11-03 ENCOUNTER — TRANSFERRED RECORDS (OUTPATIENT)
Dept: HEALTH INFORMATION MANAGEMENT | Facility: CLINIC | Age: 62
End: 2022-11-03

## 2022-11-04 ENCOUNTER — MYC REFILL (OUTPATIENT)
Dept: FAMILY MEDICINE | Facility: CLINIC | Age: 62
End: 2022-11-04

## 2022-11-04 DIAGNOSIS — F43.9 STRESS: ICD-10-CM

## 2022-11-04 DIAGNOSIS — M47.812 SPONDYLOSIS OF CERVICAL REGION WITHOUT MYELOPATHY OR RADICULOPATHY: ICD-10-CM

## 2022-11-04 RX ORDER — NAPROXEN 500 MG/1
500 TABLET ORAL 2 TIMES DAILY PRN
Qty: 186 TABLET | Refills: 3 | Status: SHIPPED | OUTPATIENT
Start: 2022-11-04 | End: 2023-10-17

## 2022-11-04 RX ORDER — CLONAZEPAM 0.5 MG/1
0.5 TABLET ORAL 3 TIMES DAILY PRN
Qty: 90 TABLET | Refills: 0 | Status: SHIPPED | OUTPATIENT
Start: 2022-11-04 | End: 2022-12-06

## 2022-12-02 DIAGNOSIS — K00.9 DENTAL ANOMALY: ICD-10-CM

## 2022-12-02 RX ORDER — AMOXICILLIN 500 MG/1
CAPSULE ORAL
Qty: 24 CAPSULE | Refills: 0 | Status: SHIPPED | OUTPATIENT
Start: 2022-12-02 | End: 2023-11-02

## 2022-12-05 PROBLEM — I83.893 VARICOSE VEINS OF BOTH LEGS WITH EDEMA: Status: ACTIVE | Noted: 2022-12-05

## 2022-12-05 NOTE — PROGRESS NOTES
Assessment & Plan     (I83.893) Varicose veins of both legs with edema  (primary encounter diagnosis)  Comment: Agree with vascular evaluation  Plan: Vascular Surgery Referral            (E66.01) Morbid obesity (H)  Comment: stable, encourage exercise  Plan: therapy animal    (Z23) COVID-19 vaccine administered  Comment: agree  Plan: COVID-19,PF,MODERNA BIVALENT 18+Yrs            (F43.9) Stress  Comment: refilled, stable use on pdmp  Plan: clonazePAM (KLONOPIN) 0.5 MG tablet            (G47.01) Insomnia due to medical condition  Comment: agree with slight change  Plan: tiZANidine (ZANAFLEX) 4 MG tablet        Better to stop dual agents and use a single agent    (D50.0) Iron deficiency anemia due to chronic blood loss  Comment: etiology not well understand, previously thought periop bleeding, persistent now and with hx of gastric bypass, wonder about GI source  Plan: Hemoglobin        Will recommend EGD, last in 2014. On protonix.                   No follow-ups on file.    Demi Hardin MD  [facility]    Xu Bullock is a 62 year old, presenting for the following health issues:  Forms, Spider Veins/face, and Dme      HPI     1. [place] mobility:  -had previously after cardiac surgery.  Needing to renew this.  Does intermittently drive, but with shoulders, anxiety, weather and some future mobility       2. Skin picking:  -years of this and noticing on the forearms and shins  -sometimes noticing scratch and wakes up from sleep and had scratched in sleep, worries on legs will get infection,   -the intense picking passes    3. Varicose veins:  -current socks are sliding down and causing issues, wonders about treating his varicose veins and spider veins    4. Need letter for therapy pet: medical necessity for  animal.  -anxiety is much improved    5. Neck pains:  Wanting to stop flexeril, and only use tizanadine will want to take a pill of that as needed in the afternoon  and takes one at bedtime to help with sleep    6. Anemia: on the expensive iron supplement and wonders if he still needs to take this          Review of Systems         Objective    BP (!) 154/72   Pulse 62   Temp 97  F (36.1  C) (Oral)   Resp 20   Wt 113.4 kg (250 lb)   SpO2 96%   BMI 34.87 kg/m    Body mass index is 34.87 kg/m .  Physical Exam   GENERAL: alert, no distress and pale  PSYCH: mentation appears normal, affect normal/bright  LEGS: slight bulging varicosities on right medial knee area, scattered spider telangectasias,     Results for orders placed or performed in visit on 12/06/22 (from the past 24 hour(s))   Hemoglobin   Result Value Ref Range    Hemoglobin 11.7 (L) 13.3 - 17.7 g/dL     *Note: Due to a large number of results and/or encounters for the requested time period, some results have not been displayed. A complete set of results can be found in Results Review.

## 2022-12-06 ENCOUNTER — OFFICE VISIT (OUTPATIENT)
Dept: FAMILY MEDICINE | Facility: CLINIC | Age: 62
End: 2022-12-06
Payer: COMMERCIAL

## 2022-12-06 VITALS
WEIGHT: 250 LBS | RESPIRATION RATE: 20 BRPM | TEMPERATURE: 97 F | HEART RATE: 62 BPM | OXYGEN SATURATION: 96 % | SYSTOLIC BLOOD PRESSURE: 154 MMHG | BODY MASS INDEX: 34.87 KG/M2 | DIASTOLIC BLOOD PRESSURE: 72 MMHG

## 2022-12-06 DIAGNOSIS — E66.01 MORBID OBESITY (H): ICD-10-CM

## 2022-12-06 DIAGNOSIS — F43.9 STRESS: ICD-10-CM

## 2022-12-06 DIAGNOSIS — G47.01 INSOMNIA DUE TO MEDICAL CONDITION: ICD-10-CM

## 2022-12-06 DIAGNOSIS — D50.0 IRON DEFICIENCY ANEMIA DUE TO CHRONIC BLOOD LOSS: ICD-10-CM

## 2022-12-06 DIAGNOSIS — Z23 COVID-19 VACCINE ADMINISTERED: ICD-10-CM

## 2022-12-06 DIAGNOSIS — N18.31 STAGE 3A CHRONIC KIDNEY DISEASE (H): ICD-10-CM

## 2022-12-06 DIAGNOSIS — I83.893 VARICOSE VEINS OF BOTH LEGS WITH EDEMA: Primary | ICD-10-CM

## 2022-12-06 LAB — HGB BLD-MCNC: 11.7 G/DL (ref 13.3–17.7)

## 2022-12-06 PROCEDURE — 85018 HEMOGLOBIN: CPT | Performed by: FAMILY MEDICINE

## 2022-12-06 PROCEDURE — 0134A COVID-19 VACCINE BIVALENT BOOSTER 18+ (MODERNA): CPT | Performed by: FAMILY MEDICINE

## 2022-12-06 PROCEDURE — 91313 COVID-19 VACCINE BIVALENT BOOSTER 18+ (MODERNA): CPT | Performed by: FAMILY MEDICINE

## 2022-12-06 PROCEDURE — 99214 OFFICE O/P EST MOD 30 MIN: CPT | Mod: 25 | Performed by: FAMILY MEDICINE

## 2022-12-06 PROCEDURE — 36415 COLL VENOUS BLD VENIPUNCTURE: CPT | Performed by: FAMILY MEDICINE

## 2022-12-06 RX ORDER — CLONAZEPAM 0.5 MG/1
0.5 TABLET ORAL 3 TIMES DAILY PRN
Qty: 90 TABLET | Refills: 5 | Status: SHIPPED | OUTPATIENT
Start: 2022-12-06 | End: 2023-05-22

## 2022-12-06 ASSESSMENT — PATIENT HEALTH QUESTIONNAIRE - PHQ9
5. POOR APPETITE OR OVEREATING: SEVERAL DAYS
SUM OF ALL RESPONSES TO PHQ QUESTIONS 1-9: 10

## 2022-12-06 ASSESSMENT — ANXIETY QUESTIONNAIRES
GAD7 TOTAL SCORE: 4
6. BECOMING EASILY ANNOYED OR IRRITABLE: NOT AT ALL
1. FEELING NERVOUS, ANXIOUS, OR ON EDGE: NEARLY EVERY DAY
5. BEING SO RESTLESS THAT IT IS HARD TO SIT STILL: NOT AT ALL
2. NOT BEING ABLE TO STOP OR CONTROL WORRYING: NOT AT ALL
7. FEELING AFRAID AS IF SOMETHING AWFUL MIGHT HAPPEN: NOT AT ALL
GAD7 TOTAL SCORE: 4
3. WORRYING TOO MUCH ABOUT DIFFERENT THINGS: NOT AT ALL

## 2022-12-06 NOTE — LETTER
12/6/2022      Kobe CHAO Chanel  [address]   [place] [address]        To whom it may concern,    Kobe is a long time patient of our clinic and under my care since 2019.  Patient does have chronic anxiety and depression and does have ongoing need for a  animal as part of his therapy.  He has improved control of his symptoms with this  animal.      Sincerely,    Demi Hardin MD

## 2022-12-07 NOTE — Clinical Note
Procedure is scheduled in [facility].   Why Was The Change Made?: Please Select the Appropriate Response

## 2022-12-08 ENCOUNTER — TRANSFERRED RECORDS (OUTPATIENT)
Dept: HEALTH INFORMATION MANAGEMENT | Facility: CLINIC | Age: 62
End: 2022-12-08

## 2022-12-08 ENCOUNTER — OFFICE VISIT (OUTPATIENT)
Dept: VASCULAR SURGERY | Facility: CLINIC | Age: 62
End: 2022-12-08
Attending: FAMILY MEDICINE
Payer: COMMERCIAL

## 2022-12-08 VITALS — HEART RATE: 72 BPM | SYSTOLIC BLOOD PRESSURE: 164 MMHG | DIASTOLIC BLOOD PRESSURE: 82 MMHG

## 2022-12-08 DIAGNOSIS — I83.893 VARICOSE VEINS OF BOTH LEGS WITH EDEMA: Primary | ICD-10-CM

## 2022-12-08 PROCEDURE — 99203 OFFICE O/P NEW LOW 30 MIN: CPT | Performed by: PHYSICIAN ASSISTANT

## 2022-12-08 PROCEDURE — G0463 HOSPITAL OUTPT CLINIC VISIT: HCPCS

## 2022-12-08 ASSESSMENT — PAIN SCALES - GENERAL: PAINLEVEL: NO PAIN (0)

## 2022-12-08 NOTE — PROGRESS NOTES
VASCULAR SURGERY VEIN CLINIC CONSULTATION     LOCATION:  [facility]    Kobe Buchanan  Medical Record #: [id number]  YOB: 1960  Age: 62 year old     Date of Service: 12/8/2022    PRIMARY CARE PROVIDER: Demi Hardin    Assessment:     61 YO male with history of psoriasis and lower extremity wounds from scratching. Also has some visible spider veins. Denies any pain, heaviness, or significant swelling to the legs.     Plan:     1.Treatment options of conservative therapy of stockings use, exercise, weight loss, elevating legs when possible.    2. Script for compression stockings 20-30 mmHg   3. Follow up: PRN if he develops any lower extremity symptoms  4. NSAID'S for pain control as needed    Subjective:      Kobe Buchanan is a 62 year old male who was referred by Dr. Demi Hardin for evaluation of varicose veins. Patient notes a history of psoriasis affecting both his upper and lower extremities. This has been controlled for the past 2-3 years but recently he has been scratching more and has developed a few scattered open sores. In the past, his open sores have resulted in a staph infection and bacteremia, so he is very concerned about making sure this doesn't happen again. He denies any lower extremity pain, heaviness, significant swelling, or chronic skin changes. Mr. Buchanan does note a positive family history of vein issues which is why he thought it would be a good idea to get evaluated. He wears tall socks, but they are not compressive and fall down. Overall, patient is concerned about his scratches developing infection; otherwise denies any lower extremity symptoms whatsoever.     Allergies:Ciprofloxacin and Tape [adhesive tape]    Past Medical History:   Diagnosis Date     Acute systolic heart failure (H) 1/28/2020    Added automatically from request for surgery [id number]     NEMESIO (acute kidney injury) (H) 2/16/2020     Anxiety      Ascending aortic  aneurysm      Bicuspid aortic valve      BPPV (benign paroxysmal positional vertigo) 7/11/2014     Chronic narcotic use      Chronic neck pain      Chronic osteoarthritis      Degenerative joint disease      Depression      Hx of type 2 diabetes mellitus 3/4/2021     Hyperlipidemia 4/10/2012     Hypertension      MSSA bacteremia 10/13/2019     Multiple stiff joints      Neck injuries      NSTEMI (non-ST elevated myocardial infarction) (H) 1/29/2020     Obesity 2/9/2015     Pain in shoulder      S/P reverse total shoulder arthroplasty, 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     S/P reverse total shoulder arthroplasty, right 8/18/2020     Septic joint of left shoulder region (H) 11/13/2019     Septic joint of right shoulder region (H) 10/13/2019    s/p washout     Skin picking habit      Sleep apnea     does not use cpap     Status post total shoulder arthroplasty, left 3/3/2020    Added automatically from request for surgery [id number]     Past Surgical History:   Procedure Laterality Date     ARTHROPLASTY SHOULDER  04/15/2014    Procedure: Left Total Shoulder Arthroplasty ;  Surgeon: Analilia Aceves MD;  [facility]     ARTHROPLASTY SHOULDER Right 08/26/2014    Procedure: ARTHROPLASTY SHOULDER;  Surgeon: Analilia Aceves MD;  [facility]     ARTHROSCOPY SHOULDER WITH BIOPSY(IES) Left 01/28/2020    Procedure: Left shoulder arthroscopy and biopsy for culture;  Surgeon: Analilia Aceves MD;  [facility]     BYPASS GASTRIC TAVO-EN-Y, LIVER BIOPSY, COMBINED  08/08/2005     COLONOSCOPY  06/30/2014    Procedure: COMBINED COLONOSCOPY, SINGLE BIOPSY/POLYPECTOMY BY BIOPSY;  Surgeon: Chester Patton MD;  [facility]     CV CORONARY ANGIOGRAM  01/30/2020    Procedure: CV CORONARY ANGIOGRAM;  Surgeon: Néstor Walls MD;  [facility]     CYSTOSCOPY, BLADDER NECK CUTS, COMBINED N/A 07/18/2016    Procedure: COMBINED CYSTOSCOPY, BLADDER NECK CUTS;  Surgeon: Ritu Leslie  MD Abby;  [facility]     ESOPHAGOSCOPY, GASTROSCOPY, DUODENOSCOPY (EGD), COMBINED  06/30/2014    Procedure: COMBINED ESOPHAGOSCOPY, GASTROSCOPY, DUODENOSCOPY (EGD), BIOPSY SINGLE OR MULTIPLE;  Surgeon: Chester Patton MD;  [facility]     EXCISE MASS FINGER  06/14/2011    Procedure:EXCISE MASS FINGER; Middle Flexor Cyst; Surgeon:SHAYY RUSSELL; [facility]     IR FINE NEEDLE ASPIRATION W ULTRASOUND  11/13/2019     IR PICC PLACEMENT > 5 YRS OF AGE  11/19/2019     LASER HOLMIUM LITHOTRIPSY BLADDER N/A 10/15/2014    Procedure: LASER HOLMIUM LITHOTRIPSY BLADDER;  Surgeon: Sahil Taveras MD;  [facility]     LASER KTP GREEN LIGHT PHOTOSELECTIVE VAPORIZATION PROSTATE  01/23/2014    Procedure: LASER KTP GREEN LIGHT PHOTOSELECTIVE VAPORIZATION PROSTATE;  Greenlight Photovaporization Of Prostate  ;  Surgeon: Sahil Taveras MD;  [facility]     OTHER SURGICAL HISTORY  10/04/2016    REPAIR ANEURYSM ASCENDING AORTA     OTHER SURGICAL HISTORY Right 09/23/2019    IRRIGATION AND DEBRIDEMENT UPPER EXTREMITYshoulder     RELEASE TRIGGER FINGER Right 03/31/2017    Procedure: RELEASE TRIGGER FINGER;  Surgeon: Juan Carlos Blunt MD;  [facility]     REMOVE ANTIBIOTIC CEMENT BEADS / SPACER SHOULDER Left 05/08/2020    Procedure: Left shoulder removal of spacer;  Surgeon: Analilia Aceves MD;  [facility]     REMOVE ANTIBIOTIC CEMENT BEADS / SPACER SHOULDER Right 08/18/2020    Procedure: Removal of right shoulder antibiotic spacer;  Surgeon: Analilia Aceves MD;  [facility]     REMOVE HARDWARE ARTHROPLASTY SHOULDER. I&D, PLACE ANTIBIOTIC CEMENT BE Left 11/15/2019    Procedure: Explantation of left total shoulder arthroplasty, irrigation and debridement, and placement of antibiotic spacer;  Surgeon: Analilia Aceves MD;  [facility]     REPAIR ANEURYSM ASCENDING AORTA N/A 10/04/2016    Procedure: REPAIR ANEURYSM ASCENDING AORTA;  Surgeon:  Mckenzie Townsend MD;  [facility]     REVERSE ARTHROPLASTY SHOULDER Right 08/18/2020    Procedure: and conversion to reverse total shoulder arthroplasty;  Surgeon: Analilia Aceves MD;  [facility]     Family History   Problem Relation Age of Onset     Arthritis Other      Gastrointestinal Disease Other      Cardiovascular Father         aortic aneurysm     Arrhythmia Father      Nephrolithiasis Father      Sleep Apnea Father      Anxiety Disorder Father      Depression Father      Hypertension Father      Obesity Father      Hyperlipidemia Father      Coronary Artery Disease Father      Low Back Problems Father      Spine Problems Father      Cardiovascular Father      Other - See Comments Father         low back problems, spine problems     Anxiety Disorder Mother      Hypertension Mother      Osteoporosis Mother      Obesity Mother      Hyperlipidemia Mother      Low Back Problems Mother      Macular Degeneration Mother      Cataracts Mother      Other - See Comments Mother         low back problems     Anxiety Disorder Sister      Hypertension Sister      Osteoporosis Sister      Obesity Sister      Hyperlipidemia Sister      Low Back Problems Sister      Spine Problems Sister      Anxiety Disorder Sister      Depression Sister      Hypertension Sister      Osteoporosis Sister      Obesity Sister      Hyperlipidemia Sister      Low Back Problems Sister      Anxiety Disorder Sister      Hyperlipidemia Sister      Hypertension Sister      Obesity Sister      Other - See Comments Sister         low back problems, spine problems     Osteoporosis Sister      Anxiety Disorder Sister      Depression Sister      Hyperlipidemia Sister      Hypertension Sister      Other - See Comments Sister         low back problems     Obesity Sister      Osteoporosis Sister      Melanoma No family hx of      Skin Cancer No family hx of      Glaucoma No family hx of       reports that he quit smoking about 39 years  ago. His smoking use included cigarettes. He started smoking about 45 years ago. He has a 3.00 pack-year smoking history. He has never used smokeless tobacco. He reports that he does not drink alcohol and does not use drugs.    Review of Systems:  Pertinent items are noted in HPI.     Objective:     There were no vitals filed for this visit.  There is no height or weight on file to calculate BMI.    EXAM:  GENERAL: well-developed 62 year old male who appears his stated age  HEAD: normocephalic  HEENT: pupils equal and reactive bilaterally  CHEST/LUNG: normal respiratory effort   NEUROLOGIC: focally intact, nonfocal, alert and oriented x 3  VASCULAR: no lower extremity edema, there are no skin changes consistent with chronic venous insufficiency, no visible varicosities but spider veins are present. Very small, superficial wounds to anterior bilateral legs with healthy wound base and no surrounding erythema, warmth, or active drainage.     Angela Smith PA-C   [facility]  [phone]  Fax: [phone]

## 2022-12-08 NOTE — PATIENT INSTRUCTIONS
You can get your compression stockings at any medical supply store.    [facility] or [employer] [facility] for example.

## 2022-12-08 NOTE — NURSING NOTE
This is a new consult for Varicose Veins. The patient has varicose veins that are problematic in bilateral legs. Symptoms patient has been experiencing are tiredness, discoloration and  swelling. Patient has not been wearing compression stockings.   Patient has not been using pain medication or antiinflammatory's. Patient  has had recent imaging on legs done.      Lisa Lechuga LPN on 12/8/2022 at 10:59 AM

## 2022-12-13 ENCOUNTER — TELEPHONE (OUTPATIENT)
Dept: FAMILY MEDICINE | Facility: CLINIC | Age: 62
End: 2022-12-13

## 2022-12-13 NOTE — TELEPHONE ENCOUNTER
[facility] phone call message- general phone call:    Reason for call: Patient came into office today in regards to [employer] mobility forms. Patient stated that he brought it in with his last visit with Dr. Hardin and filled out his portion and Dr. Hardin will fill out the rest of her part and call him/fax it over once it is done. He has not yet heard back on anything in regards to forms. Please call and advise, if needed. Thank you.    Return call needed: Yes    OK to leave a message on voice mail? Yes    Primary language: English      needed? No    Call taken on December 13, 2022 at 9:51 AM by Irvin Arnold

## 2022-12-19 DIAGNOSIS — I10 ESSENTIAL HYPERTENSION: ICD-10-CM

## 2022-12-19 RX ORDER — AMLODIPINE AND BENAZEPRIL HYDROCHLORIDE 5; 20 MG/1; MG/1
1 CAPSULE ORAL 2 TIMES DAILY
Qty: 180 CAPSULE | Refills: 3 | Status: SHIPPED | OUTPATIENT
Start: 2022-12-19 | End: 2023-12-21

## 2022-12-29 DIAGNOSIS — I25.5 ISCHEMIC CARDIOMYOPATHY: ICD-10-CM

## 2022-12-29 NOTE — TELEPHONE ENCOUNTER
Message to physician:     Date of last visit: 12/6/2022    Date of next visit if scheduled:     Potassium   Date Value Ref Range Status   07/29/2022 4.0 3.4 - 5.3 mmol/L Final   08/18/2020 4.3 3.4 - 5.3 mmol/L Final     Creatinine   Date Value Ref Range Status   07/29/2022 1.15 0.66 - 1.25 mg/dL Final   08/05/2020 1.24 0.66 - 1.25 mg/dL Final     GFR Estimate   Date Value Ref Range Status   07/29/2022 72 >60 mL/min/1.73m2 Final     Comment:     Effective December 21, 2021 eGFRcr in adults is calculated using the 2021 CKD-EPI creatinine equation which includes age and gender (Kate et al., NEJ, DOI: 10.1056/WOKJdn1275516)   08/05/2020 63 >60 mL/min/[1.73_m2] Final     Comment:     Non  GFR Calc  Starting 12/18/2018, serum creatinine based estimated GFR (eGFR) will be   calculated using the Chronic Kidney Disease Epidemiology Collaboration   (CKD-EPI) equation.         BP Readings from Last 3 Encounters:   12/08/22 (!) 164/82   12/06/22 (!) 154/72   09/15/22 118/67       Hemoglobin A1C   Date Value Ref Range Status   10/05/2016 5.3 4.3 - 6.0 % Final       Please complete refill and CLOSE ENCOUNTER.  Closing the encounter signifies the refill is complete.

## 2022-12-30 RX ORDER — ATORVASTATIN CALCIUM 40 MG/1
40 TABLET, FILM COATED ORAL DAILY
Qty: 90 TABLET | Refills: 3 | Status: ON HOLD | OUTPATIENT
Start: 2022-12-30 | End: 2023-11-04

## 2023-01-05 ENCOUNTER — TRANSFERRED RECORDS (OUTPATIENT)
Dept: HEALTH INFORMATION MANAGEMENT | Facility: CLINIC | Age: 63
End: 2023-01-05

## 2023-02-01 DIAGNOSIS — K21.00 GASTROESOPHAGEAL REFLUX DISEASE WITH ESOPHAGITIS, UNSPECIFIED WHETHER HEMORRHAGE: ICD-10-CM

## 2023-02-01 DIAGNOSIS — F34.1 DYSTHYMIC DISORDER: ICD-10-CM

## 2023-02-01 RX ORDER — PANTOPRAZOLE SODIUM 40 MG/1
40 TABLET, DELAYED RELEASE ORAL DAILY PRN
Qty: 90 TABLET | Refills: 3 | Status: SHIPPED | OUTPATIENT
Start: 2023-02-01 | End: 2024-05-03

## 2023-02-01 RX ORDER — BUPROPION HYDROCHLORIDE 200 MG/1
TABLET, EXTENDED RELEASE ORAL
Qty: 180 TABLET | Refills: 3 | Status: SHIPPED | OUTPATIENT
Start: 2023-02-01 | End: 2024-04-02

## 2023-02-02 ENCOUNTER — TRANSFERRED RECORDS (OUTPATIENT)
Dept: HEALTH INFORMATION MANAGEMENT | Facility: CLINIC | Age: 63
End: 2023-02-02
Payer: COMMERCIAL

## 2023-03-01 ENCOUNTER — TRANSFERRED RECORDS (OUTPATIENT)
Dept: HEALTH INFORMATION MANAGEMENT | Facility: CLINIC | Age: 63
End: 2023-03-01

## 2023-03-02 ENCOUNTER — MYC MEDICAL ADVICE (OUTPATIENT)
Dept: FAMILY MEDICINE | Facility: CLINIC | Age: 63
End: 2023-03-02
Payer: COMMERCIAL

## 2023-03-02 DIAGNOSIS — Z98.84 HISTORY OF ROUX-EN-Y GASTRIC BYPASS: ICD-10-CM

## 2023-03-02 DIAGNOSIS — D50.0 IRON DEFICIENCY ANEMIA DUE TO CHRONIC BLOOD LOSS: Primary | ICD-10-CM

## 2023-03-02 DIAGNOSIS — I25.5 ISCHEMIC CARDIOMYOPATHY: ICD-10-CM

## 2023-03-02 DIAGNOSIS — Z86.0100 HISTORY OF COLONIC POLYPS: ICD-10-CM

## 2023-03-02 DIAGNOSIS — Z86.79 S/P THORACIC AORTIC ANEURYSM REPAIR: ICD-10-CM

## 2023-03-02 DIAGNOSIS — Z98.890 S/P THORACIC AORTIC ANEURYSM REPAIR: ICD-10-CM

## 2023-03-08 ENCOUNTER — LAB (OUTPATIENT)
Dept: LAB | Facility: CLINIC | Age: 63
End: 2023-03-08
Payer: COMMERCIAL

## 2023-03-08 DIAGNOSIS — I25.5 ISCHEMIC CARDIOMYOPATHY: ICD-10-CM

## 2023-03-08 DIAGNOSIS — Z98.84 HISTORY OF ROUX-EN-Y GASTRIC BYPASS: ICD-10-CM

## 2023-03-08 DIAGNOSIS — D50.0 IRON DEFICIENCY ANEMIA DUE TO CHRONIC BLOOD LOSS: ICD-10-CM

## 2023-03-08 LAB
CHOLEST SERPL-MCNC: 131 MG/DL
FERRITIN SERPL-MCNC: 17 NG/ML (ref 31–409)
FOLATE SERPL-MCNC: >40 NG/ML (ref 4.6–34.8)
HDLC SERPL-MCNC: 50 MG/DL
HGB BLD-MCNC: 13 G/DL (ref 13.3–17.7)
IRON BINDING CAPACITY (ROCHE): 361 UG/DL (ref 240–430)
IRON SATN MFR SERPL: 12 % (ref 15–46)
IRON SERPL-MCNC: 43 UG/DL (ref 61–157)
LDLC SERPL CALC-MCNC: 58 MG/DL
NONHDLC SERPL-MCNC: 81 MG/DL
TRIGL SERPL-MCNC: 113 MG/DL
VIT B12 SERPL-MCNC: 764 PG/ML (ref 232–1245)

## 2023-03-08 PROCEDURE — 82746 ASSAY OF FOLIC ACID SERUM: CPT

## 2023-03-08 PROCEDURE — 82728 ASSAY OF FERRITIN: CPT

## 2023-03-08 PROCEDURE — 80061 LIPID PANEL: CPT

## 2023-03-08 PROCEDURE — 82607 VITAMIN B-12: CPT

## 2023-03-08 PROCEDURE — 36415 COLL VENOUS BLD VENIPUNCTURE: CPT

## 2023-03-08 PROCEDURE — 83540 ASSAY OF IRON: CPT

## 2023-03-08 PROCEDURE — 85018 HEMOGLOBIN: CPT

## 2023-03-08 PROCEDURE — 83550 IRON BINDING TEST: CPT

## 2023-03-17 ENCOUNTER — MYC MEDICAL ADVICE (OUTPATIENT)
Dept: CARDIOLOGY | Facility: CLINIC | Age: 63
End: 2023-03-17
Payer: COMMERCIAL

## 2023-04-05 NOTE — TELEPHONE ENCOUNTER
Reviewed and approved     Glycopyrrolate Counseling:  I discussed with the patient the risks of glycopyrrolate including but not limited to skin rash, drowsiness, dry mouth, difficulty urinating, and blurred vision.

## 2023-04-21 NOTE — PROGRESS NOTES
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    Patient had a pharmacy visit prior to his doctor visit today to go over/update his medications. He is taking an ACE inhibitor and an ARB per his cardiologist, and pharmacist discussed with patient that this is not a typical combination as it can cause significant electrolyte derangements. Patient is following up with his cardiologist in the near future to discuss/adjust these medications. He did agree to take aspirin daily.    Patient is also here to discuss his potassium. BMP was checked in clinic today, and potassium was 6.0. Patient denies any palpitations, racing heart rate, or chest pain. EKG was also checked given patient's potassium level and was normal. Discussed with patient the dangers of hyperkalemia and the necessity to remove the excess potassium from his body. He is amenable to this. Feels otherwise well.         PMHX:     Patient Active Problem List   Diagnosis     Essential hypertension     Hyperlipidemia     Abdominal pain, unspecified abdominal location     Ascending aortic aneurysm (H)     Medication refill- do not delete      Pain medication agreement signed     S/P shoulder replacement     BPPV (benign paroxysmal positional vertigo)     Shoulder joint pain     Obstructive sleep apnea     Obesity     Rhinitis     Right knee pain     Status post coronary angiogram     Atrial fibrillation (H) [I48.91]     Trigger finger of right thumb     Dizzy     Spondylosis of cervical region without myelopathy or radiculopathy     Bone infection, shoulder region (H)     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   You saw Dr. Thomas Pizarro, [facility], [place].    The following is a summary of your office visit today:    Increase metoprolol to 12.5 AM/25 PM or 25 AM/12.5 PM for 7-10 days and see how your symptoms fare  Continue trying to stay active and monitoring symptoms of lethargy   Follow up with 3-5 months       If you have questions after this visit:   Send a Efizity message or contact the Cardiology Nurse Line  Office:  [phone] option #1, then #4 and ask for a Cardiology Nurse  Fax:  [phone]   Appointments:  [phone] option #1, then option #1     HOW TO CHECK YOUR BLOOD PRESSURE AT HOME:    Avoid eating, smoking, and exercising for at least 30 minutes before taking a reading.    Be sure you have taken your BP medication at least 2-3 hours before you check it.     Sit quietly for 10 minutes before a reading.     Sit in a chair with your feet flat on the floor. Rest your  arm on a table so that the arm cuff is at the same level as your heart.    Remain still during the reading.    Record your blood pressure and pulse in a log and bring to your next appointment.     If you have had any blood work, imaging or other testing completed we will be in touch within 1-2 weeks regarding the results. If you have any questions, concerns or need to schedule a follow up, please contact us at the numbers above. If you are needing refills please contact your pharmacy. For urgent after-hours care please call the the [facility] at [phone] (option #4) and ask to speak to the on-call Cardiologist.    It was a pleasure meeting with you today. Please let us know if there is anything else we can do for you so that we can be sure you are leaving completely satisfied with your care experience.     Your Cardiology Team at the [facility]     bacteremia     Moderate protein-calorie malnutrition (H)     Left shoulder pain     Septic joint of left shoulder region (H)     S/P shoulder replacement, left     Infection of prosthetic shoulder joint, subsequent encounter     Elevated troponin     Acute systolic heart failure (H)     NSTEMI (non-ST elevated myocardial infarction) (H)       Current Outpatient Medications   Medication Sig Dispense Refill     acetaminophen (TYLENOL) 325 MG tablet Take 2 tablets (650 mg) by mouth every 4 hours as needed for mild pain 100 tablet 0     amLODIPine-benazepril (LOTREL) 5-20 MG capsule Take 1 capsule by mouth daily 180 capsule 3     amoxicillin (AMOXIL) 500 MG capsule Take 4 tablets 1 hour before dental procedure 24 capsule 4     aspirin (ASA) 81 MG chewable tablet Take 1 tablet (81 mg) by mouth daily 30 tablet 11     buPROPion (WELLBUTRIN SR) 200 MG 12 hr tablet TAKE 1 TABLET BY MOUTH 2 TIMES DAILY 180 tablet 3     carvedilol (COREG) 25 MG tablet Take 1 tablet (25 mg) by mouth 2 times daily (with meals) 180 tablet 3     clonazePAM (KLONOPIN) 0.5 MG tablet Take 1 tablet (0.5 mg) by mouth 3 times daily as needed for anxiety 90 tablet 5     Cyanocobalamin 5000 MCG SUBL Place 5,000 mcg under the tongue daily       cyclobenzaprine (FLEXERIL) 10 MG tablet Take 1 tablet (10 mg) by mouth 3 times daily as needed for muscle spasms  45 tablet 1     docusate sodium (COLACE) 100 MG capsule Take 200 mg by mouth 2 times daily        Fe Heme Polypeptide-folic acid (PROFERRIN-FORTE) 12-1 MG TABS Take 1 tablet by mouth daily 90 tablet 0     fentaNYL (DURAGESIC) 75 mcg/hr 72 hr patch Place 1 patch onto the skin every 48 hours        fluocinonide (LIDEX) 0.05 % external ointment Apply twice daily to itchy skin nodules for 1-2 weeks at a time. (Patient taking differently: as needed Apply twice daily to itchy skin nodules for 1-2 weeks at a time.) 30 g 3     furosemide (LASIX) 40 MG tablet Take 1 tablet (40 mg) by mouth daily 2 tablet 0      losartan (COZAAR) 25 MG tablet Take 1 tablet by mouth every 24 hours       Multiple Minerals-Vitamins (CALCIUM CITRATE-MAG-MINERALS) TABS Take 1 tablet by mouth daily       multivitamin w/minerals (THERA-VIT-M) tablet Take 1 tablet by mouth daily       naproxen (NAPROSYN DR) 500 MG EC tablet Take 500 mg by mouth every morning       oxyCODONE IR (ROXICODONE) 10 MG tablet Take 10 mg by mouth every 4 hours as needed for severe pain Max 6 tablets per day       senna (SENOKOT) 8.6 MG tablet Take 3 tablets by mouth every morning and 4 tablets every evening       simvastatin (ZOCOR) 10 MG tablet Take 1 tablet (10 mg) by mouth At Bedtime 90 tablet 3     spironolactone (ALDACTONE) 25 MG tablet Take 1 tablet (25 mg) by mouth daily 30 tablet 3     vitamin B-Complex Take 1 tablet by mouth daily       atorvastatin (LIPITOR) 40 MG tablet Take 1 tablet (40 mg) by mouth every evening (Patient not taking: Reported on 2/14/2020) 30 tablet 11     naloxone (NARCAN) nasal spray Spray 1 spray (4 mg) into one nostril alternating nostrils as needed for opioid reversal every 2-3 minutes until assistance arrives 0.2 mL 0     pantoprazole (PROTONIX) 40 MG EC tablet Take 40 mg by mouth daily as needed for heartburn       tacrolimus (PROTOPIC) 0.1 % ointment Apply topically as needed Apply to affected areas on body. (Patient not taking: Reported on 2/14/2020) 120 g 11          Allergies   Allergen Reactions     Ciprofloxacin      History of aortic aneurysms     Tape [Adhesive Tape] Blisters     Blistering - please use paper tape       Results for orders placed or performed in visit on 02/14/20 (from the past 24 hour(s))   Basic Metabolic Panel ([facility])  - Results < 1 hr   Result Value Ref Range    Glucose 109.0 60.0 - 109.0 mg/dL    Urea Nitrogen 34.0 (H) 7.0 - 30.0 mg/dL    Creatinine 1.4 0.8 - 1.5 mg/dL    Sodium 140.0 133.0 - 144.0 mmol/L    Potassium 6.0 (H) 3.4 - 5.3 mmol/L    Chloride 102.0 94.0 - 109.0 mmol/L    Carbon Dioxide 23.0 20.0  - 32.0 mmol/L    Calcium 9.7 8.5 - 10.4 mg/dL    eGFR Calculated (Non Black Reference) 55.1 (L) >60.0 mL/min    eGFR Calculated (Black Reference) 66.7 >60.0 mL/min   CBC with Plt ([facility])   Result Value Ref Range    WBC 7.4 4.0 - 11.0 K/uL    RBC 4.28 (L) 4.40 - 5.90 M/uL    Hemoglobin 12.6 (L) 13.3 - 17.7 g/dL    Hematocrit 37.8 (L) 40.0 - 53.0 %    MCV 88.4 78.0 - 100.0 fL    MCH 29.4 26.5 - 35.0 pg    MCHC 33.3 32.0 - 36.0 g/dL    Platelets 284.0 150.0 - 450.0 K/uL                 Physical Exam:     Vitals:    02/14/20 0803   BP: 128/77   Pulse: 65   Resp: 18   Temp: 98.3  F (36.8  C)   SpO2: 100%   Weight: 86.2 kg (190 lb)     Body mass index is 27.66 kg/m .    GENERAL APPEARANCE: healthy, alert and no distress.   RESP: Lungs clear to auscultation - no rales, rhonchi or wheezes.  CV: Regular rate and rhythm, grade III/VI systolic ejection murmur, no click,  rub or gallop.  ABDOMEN: Soft, nontender, without hepatosplenomegaly or masses.  MS: extremities normal- no gross deformities noted, peripheral pulses intact.  SKIN: no suspicious lesions or rashes. Pale.  NEURO: Normal strength and tone, sensory exam grossly normal, mentation appears intact and speech normal  PSYCH: mood and affect normal/bright      Assessment and Plan     Kobe Buchanan is a 60 year-old male with complex PMH including bilateral shoulder joint replacement with subsequent bilateral prosthetic joint infection and removal with likely perioperative MI with reduced ejection fraction presenting to clinic for the following concern: potassium/medication check.    Hyperkalemia: Patient has hyperkalemia to 6.0 in clinic today. Asymptomatic and EKG is within normal limits. Discussed with patient that this is likely a result of his medications including spironolactone, ACEI, ARB.   - Lasix 40 mg x1  - Patient will return to clinic later this afternoon to recheck potassium  - Will discuss medications with cardiologist at his next follow up  Shukri Hardin MDtions discussed and agreed with the final plan.    Jeanette Khan MD      Precepted today with: Demi Hardin MD

## 2023-04-26 ENCOUNTER — TRANSFERRED RECORDS (OUTPATIENT)
Dept: HEALTH INFORMATION MANAGEMENT | Facility: CLINIC | Age: 63
End: 2023-04-26
Payer: COMMERCIAL

## 2023-05-12 ENCOUNTER — TELEPHONE (OUTPATIENT)
Dept: FAMILY MEDICINE | Facility: CLINIC | Age: 63
End: 2023-05-12
Payer: COMMERCIAL

## 2023-05-12 NOTE — TELEPHONE ENCOUNTER
[facility] phone call message - order or referral request for patient:     Order or referral being requested:     Colonoscopy / dertemology      Additional Comments:       Patient call and advise he want to have the Colonoscopy done at the [facility] at the [place] as soon as possible. Patient advise he don't want to go anywhere else.    Patient advise to call him to set up an appointment with Dr. Hardin when he is done with the Colonscopy      Patient is also requesting for a Refill on clonazePAM (KLONOPIN) 0.5 MG tablet    Dose:   Sig - Route: Take 1 tablet (0.5 mg) by mouth 3 times daily as needed for anxiety - Oral    Patient advise he will be needing it about in two weeks     Please call patient back and advise. Thank you.         OK to leave a message on voice mail? Yes    Primary language: English      needed? No    Call taken on May 12, 2023 at 8:34 AM by Norma Dotson

## 2023-05-14 NOTE — TELEPHONE ENCOUNTER
"4/23 last  Clonazepam.      6/30/2014 last colonoscopy and biopsies    Would be due for colonoscopy repeat in 2024,    Hemoglobin   Date Value Ref Range Status   03/08/2023 13.0 (L) 13.3 - 17.7 g/dL Final   08/20/2020 9.9 (L) 13.3 - 17.7 g/dL Final   ]    Does have mild anemia.    Unsure about dermatology: same \"psoriasis\"?    Unsure if new issue with gut?    "

## 2023-05-22 DIAGNOSIS — F43.9 STRESS: ICD-10-CM

## 2023-05-22 RX ORDER — CLONAZEPAM 0.5 MG/1
0.5 TABLET ORAL 3 TIMES DAILY PRN
Qty: 90 TABLET | Refills: 3 | Status: SHIPPED | OUTPATIENT
Start: 2023-05-22 | End: 2023-09-11

## 2023-05-23 ENCOUNTER — TRANSFERRED RECORDS (OUTPATIENT)
Dept: HEALTH INFORMATION MANAGEMENT | Facility: CLINIC | Age: 63
End: 2023-05-23
Payer: COMMERCIAL

## 2023-05-23 NOTE — TELEPHONE ENCOUNTER
4/23 was last clonazepam.    Authorized electronic refill.  Confirmed and checked database today.  UTD and compliant with screening.

## 2023-05-24 NOTE — TELEPHONE ENCOUNTER
Patient called again stating that he wants us to make an appointment for his Colonoscopy at the [facility] at the [place] and wants it done as soon as possible. He kept demanding that it needs to be done right away. Just wanted to update on what he said.

## 2023-06-01 ENCOUNTER — HEALTH MAINTENANCE LETTER (OUTPATIENT)
Age: 63
End: 2023-06-01

## 2023-06-08 NOTE — TELEPHONE ENCOUNTER
Patient is scheduled for surgery with Dr. Aceves      Spoke or left message with: Spoke with Kobe Via [employer]    Date of Surgery: 4/21/20    Location: [place]    Informed patient they will need an adult  Yes    H&P: Scheduled with Demi Hardin    Additional imaging/appointments: 2 and 6 week post op    Surgery packet: Given in clinic    Additional comments: Patient will await pre op phone call 1-2 days prior to surgery for arrival time   Private car

## 2023-06-20 ENCOUNTER — TRANSFERRED RECORDS (OUTPATIENT)
Dept: HEALTH INFORMATION MANAGEMENT | Facility: CLINIC | Age: 63
End: 2023-06-20
Payer: COMMERCIAL

## 2023-06-22 DIAGNOSIS — M47.812 SPONDYLOSIS OF CERVICAL REGION WITHOUT MYELOPATHY OR RADICULOPATHY: Primary | ICD-10-CM

## 2023-06-22 RX ORDER — CYCLOBENZAPRINE HCL 10 MG
10 TABLET ORAL 3 TIMES DAILY PRN
Qty: 90 TABLET | Refills: 4 | Status: SHIPPED | OUTPATIENT
Start: 2023-06-22 | End: 2024-09-01

## 2023-06-22 RX ORDER — CYCLOBENZAPRINE HCL 10 MG
10 TABLET ORAL 3 TIMES DAILY PRN
COMMUNITY
End: 2023-06-22

## 2023-07-24 ENCOUNTER — ANCILLARY PROCEDURE (OUTPATIENT)
Dept: CARDIOLOGY | Facility: CLINIC | Age: 63
End: 2023-07-24
Attending: INTERNAL MEDICINE
Payer: COMMERCIAL

## 2023-07-24 DIAGNOSIS — I71.21 ASCENDING AORTIC ANEURYSM (H): ICD-10-CM

## 2023-07-24 LAB — LVEF ECHO: NORMAL

## 2023-07-24 PROCEDURE — 99207 PR STATISTIC IV PUSH SINGLE INITIAL SUBSTANCE: CPT | Performed by: INTERNAL MEDICINE

## 2023-07-24 PROCEDURE — 93306 TTE W/DOPPLER COMPLETE: CPT | Performed by: INTERNAL MEDICINE

## 2023-07-24 RX ADMIN — Medication 5 ML: at 07:46

## 2023-07-26 ENCOUNTER — TRANSFERRED RECORDS (OUTPATIENT)
Dept: HEALTH INFORMATION MANAGEMENT | Facility: CLINIC | Age: 63
End: 2023-07-26
Payer: COMMERCIAL

## 2023-08-15 DIAGNOSIS — F41.0 ANXIETY DISORDER DUE TO GENERAL MEDICAL CONDITION WITH PANIC ATTACK: ICD-10-CM

## 2023-08-15 DIAGNOSIS — F06.4 ANXIETY DISORDER DUE TO GENERAL MEDICAL CONDITION WITH PANIC ATTACK: ICD-10-CM

## 2023-08-15 RX ORDER — PROPRANOLOL HYDROCHLORIDE 10 MG/1
10 TABLET ORAL 3 TIMES DAILY PRN
Qty: 90 TABLET | Refills: 3 | Status: SHIPPED | OUTPATIENT
Start: 2023-08-15

## 2023-08-17 NOTE — TELEPHONE ENCOUNTER
Called both number in patient chart. Lft message ending [phone].    If patient was to call back, please inform patient that medication, prppranolol was send to the [facility], however dr. Hardin is requesting for a appt. Patient is also due for some lab

## 2023-08-18 NOTE — TELEPHONE ENCOUNTER
Patient called back and relayed message. And have patient schedule for next week Thursday at 4pm. Thank you

## 2023-08-23 ENCOUNTER — TRANSFERRED RECORDS (OUTPATIENT)
Dept: HEALTH INFORMATION MANAGEMENT | Facility: CLINIC | Age: 63
End: 2023-08-23
Payer: COMMERCIAL

## 2023-08-24 ENCOUNTER — OFFICE VISIT (OUTPATIENT)
Dept: FAMILY MEDICINE | Facility: CLINIC | Age: 63
End: 2023-08-24
Payer: COMMERCIAL

## 2023-08-24 VITALS
SYSTOLIC BLOOD PRESSURE: 165 MMHG | TEMPERATURE: 97.6 F | WEIGHT: 239 LBS | HEART RATE: 59 BPM | BODY MASS INDEX: 33.46 KG/M2 | OXYGEN SATURATION: 98 % | DIASTOLIC BLOOD PRESSURE: 82 MMHG | HEIGHT: 71 IN

## 2023-08-24 DIAGNOSIS — I25.5 ISCHEMIC CARDIOMYOPATHY: Primary | ICD-10-CM

## 2023-08-24 DIAGNOSIS — D50.0 IRON DEFICIENCY ANEMIA DUE TO CHRONIC BLOOD LOSS: ICD-10-CM

## 2023-08-24 DIAGNOSIS — L30.8 OTHER ECZEMA: ICD-10-CM

## 2023-08-24 DIAGNOSIS — I10 ESSENTIAL HYPERTENSION: ICD-10-CM

## 2023-08-24 DIAGNOSIS — Z98.84 HISTORY OF ROUX-EN-Y GASTRIC BYPASS: ICD-10-CM

## 2023-08-24 LAB
ALBUMIN SERPL BCG-MCNC: 4.4 G/DL (ref 3.5–5.2)
ALP SERPL-CCNC: 103 U/L (ref 40–129)
ALT SERPL W P-5'-P-CCNC: 23 U/L (ref 0–70)
ANION GAP SERPL CALCULATED.3IONS-SCNC: 8 MMOL/L (ref 7–15)
AST SERPL W P-5'-P-CCNC: 27 U/L (ref 0–45)
BILIRUB SERPL-MCNC: 0.5 MG/DL
BUN SERPL-MCNC: 30.3 MG/DL (ref 8–23)
CALCIUM SERPL-MCNC: 9.1 MG/DL (ref 8.8–10.2)
CHLORIDE SERPL-SCNC: 102 MMOL/L (ref 98–107)
CREAT SERPL-MCNC: 1.45 MG/DL (ref 0.67–1.17)
DEPRECATED HCO3 PLAS-SCNC: 26 MMOL/L (ref 22–29)
ERYTHROCYTE [DISTWIDTH] IN BLOOD BY AUTOMATED COUNT: 13.3 % (ref 10–15)
GFR SERPL CREATININE-BSD FRML MDRD: 54 ML/MIN/1.73M2
GLUCOSE SERPL-MCNC: 85 MG/DL (ref 70–99)
HBA1C MFR BLD: 5.6 % (ref 0–5.6)
HCT VFR BLD AUTO: 41.5 % (ref 40–53)
HGB BLD-MCNC: 13.4 G/DL (ref 13.3–17.7)
MCH RBC QN AUTO: 27.9 PG (ref 26.5–33)
MCHC RBC AUTO-ENTMCNC: 32.3 G/DL (ref 31.5–36.5)
MCV RBC AUTO: 86 FL (ref 78–100)
PLATELET # BLD AUTO: 197 10E3/UL (ref 150–450)
POTASSIUM SERPL-SCNC: 4.7 MMOL/L (ref 3.4–5.3)
PROT SERPL-MCNC: 7 G/DL (ref 6.4–8.3)
PSA SERPL DL<=0.01 NG/ML-MCNC: 0.27 NG/ML (ref 0–4.5)
RBC # BLD AUTO: 4.81 10E6/UL (ref 4.4–5.9)
SODIUM SERPL-SCNC: 136 MMOL/L (ref 136–145)
WBC # BLD AUTO: 7.7 10E3/UL (ref 4–11)

## 2023-08-24 PROCEDURE — G0103 PSA SCREENING: HCPCS | Performed by: FAMILY MEDICINE

## 2023-08-24 PROCEDURE — 85027 COMPLETE CBC AUTOMATED: CPT | Performed by: FAMILY MEDICINE

## 2023-08-24 PROCEDURE — 83036 HEMOGLOBIN GLYCOSYLATED A1C: CPT | Performed by: FAMILY MEDICINE

## 2023-08-24 PROCEDURE — 36415 COLL VENOUS BLD VENIPUNCTURE: CPT | Performed by: FAMILY MEDICINE

## 2023-08-24 PROCEDURE — 99214 OFFICE O/P EST MOD 30 MIN: CPT | Mod: 25 | Performed by: FAMILY MEDICINE

## 2023-08-24 PROCEDURE — 80053 COMPREHEN METABOLIC PANEL: CPT | Performed by: FAMILY MEDICINE

## 2023-08-24 PROCEDURE — G0009 ADMIN PNEUMOCOCCAL VACCINE: HCPCS | Performed by: FAMILY MEDICINE

## 2023-08-24 PROCEDURE — 90677 PCV20 VACCINE IM: CPT | Performed by: FAMILY MEDICINE

## 2023-08-24 RX ORDER — POLYETHYLENE GLYCOL-3350 AND ELECTROLYTES 236; 6.74; 5.86; 2.97; 22.74 G/274.31G; G/274.31G; G/274.31G; G/274.31G; G/274.31G
POWDER, FOR SOLUTION ORAL
COMMUNITY
Start: 2023-07-12 | End: 2023-11-13

## 2023-08-24 RX ORDER — MOMETASONE FUROATE 1 MG/G
OINTMENT TOPICAL PRN
Qty: 30 G | Refills: 3 | Status: SHIPPED | OUTPATIENT
Start: 2023-08-24 | End: 2023-08-25

## 2023-08-24 RX ORDER — TACROLIMUS 1 MG/G
OINTMENT TOPICAL PRN
Qty: 60 G | Refills: 1 | Status: SHIPPED | OUTPATIENT
Start: 2023-08-24

## 2023-08-24 RX ORDER — FENTANYL 25 UG/1
1 PATCH TRANSDERMAL
COMMUNITY
Start: 2023-08-09

## 2023-08-24 NOTE — PROGRESS NOTES
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  Comment: normal without nutritional issues  Plan: CBC with platelets, Comprehensive metabolic         panel            (I10) Essential hypertension  Comment: not at goal will have bp follow up  Plan: Comprehensive metabolic panel, Prostate         Specific Antigen Screen, Hemoglobin A1c,         CANCELED: Hemoglobin A1c            (L30.9) Eczema  Comment: refilled  Plan: tacrolimus (PROTOPIC) 0.1 % external ointment,         DISCONTINUED: mometasone (ELOCON) 0.1 %         external ointment                     BMI:   Estimated body mass index is 33.81 kg/m  as calculated from the following:    Height as of this encounter: 1.791 m (5' 10.5\").    Weight as of this encounter: 108.4 kg (239 lb).           No follow-ups on file.    Demi Hardin MD  [facility]    Xu Bullock is a 63 year old, presenting for the following health issues:  Other (Pt here to check up for lab. No questions at this moment. )      8/24/2023     4:22 PM   Additional Questions   Roomed by Edson   Accompanied by Self       HPI         Hypertension Follow-up    Do you check your blood pressure regularly outside of the clinic? Yes   Are you following a low salt diet? Yes  Are your blood pressures ever more than 140 on the top number (systolic) OR more   than 90 on the bottom number (diastolic), for example 140/90? No    130/75 is common, sometimes 142/75, with activity may see 150s/80s    Vascular Disease Follow-up    How often do you take nitroglycerin? Never  Do you take an aspirin " "every day? Yes    RASH:  Chronic issue of picking at skin since childhood  Wanting refill of mometsone has worked well in the past    Rashes:  Chronic picking of skin with many scars  Review of Systems   Denies angina      Objective    BP (!) 165/82   Pulse 59   Temp 97.6  F (36.4  C) (Oral)   Ht 1.791 m (5' 10.5\")   Wt 108.4 kg (239 lb)   SpO2 98%   BMI 33.81 kg/m    Body mass index is 33.81 kg/m .  Physical Exam   GENERAL: healthy, alert and no distress  NECK: no adenopathy, no asymmetry, masses, or scars and thyroid normal to palpation  RESP: lungs clear to auscultation - no rales, rhonchi or wheezes  CV: regular rates and rhythm, normal S1 S2, no S3 or S4, grade 2/6 systolic murmur heard best over the upper sternum, peripheral pulses strong, and no peripheral edema  MS: no gross musculoskeletal defects noted, no edema  SKIN: scattered hypopigmented skin changes on forearms      Results for orders placed or performed in visit on 08/24/23   CBC with platelets     Status: Normal   Result Value Ref Range    WBC Count 7.7 4.0 - 11.0 10e3/uL    RBC Count 4.81 4.40 - 5.90 10e6/uL    Hemoglobin 13.4 13.3 - 17.7 g/dL    Hematocrit 41.5 40.0 - 53.0 %    MCV 86 78 - 100 fL    MCH 27.9 26.5 - 33.0 pg    MCHC 32.3 31.5 - 36.5 g/dL    RDW 13.3 10.0 - 15.0 %    Platelet Count 197 150 - 450 10e3/uL   Comprehensive metabolic panel     Status: Abnormal   Result Value Ref Range    Sodium 136 136 - 145 mmol/L    Potassium 4.7 3.4 - 5.3 mmol/L    Chloride 102 98 - 107 mmol/L    Carbon Dioxide (CO2) 26 22 - 29 mmol/L    Anion Gap 8 7 - 15 mmol/L    Urea Nitrogen 30.3 (H) 8.0 - 23.0 mg/dL    Creatinine 1.45 (H) 0.67 - 1.17 mg/dL    Calcium 9.1 8.8 - 10.2 mg/dL    Glucose 85 70 - 99 mg/dL    Alkaline Phosphatase 103 40 - 129 U/L    AST 27 0 - 45 U/L    ALT 23 0 - 70 U/L    Protein Total 7.0 6.4 - 8.3 g/dL    Albumin 4.4 3.5 - 5.2 g/dL    Bilirubin Total 0.5 <=1.2 mg/dL    GFR Estimate 54 (L) >60 mL/min/1.73m2   Prostate Specific " Antigen Screen     Status: Normal   Result Value Ref Range    Prostate Specific Antigen Screen 0.27 0.00 - 4.50 ng/mL    Narrative    This result is obtained using the Roche Elecsys total PSA method on the jabier [id number] immunoassay analyzer. Results obtained with different assay methods or kits cannot be used interchangeably.   Hemoglobin A1c     Status: Normal   Result Value Ref Range    Hemoglobin A1C 5.6 0.0 - 5.6 %

## 2023-08-24 NOTE — NURSING NOTE
Prior to immunization administration, verified patients identity using patient s name and date of birth. Please see Immunization Activity for additional information.     Screening Questionnaire for Adult Immunization    Are you sick today? NO   Do you have allergies to medications, food, a vaccine component or latex?   No   Have you ever had a serious reaction after receiving a vaccination?   No   Do you have a long-term health problem with heart, lung, kidney, or metabolic disease (e.g., diabetes), asthma, a blood disorder, no spleen, complement component deficiency, a cochlear implant, or a spinal fluid leak?  Are you on long-term aspirin therapy?   No   Do you have cancer, leukemia, HIV/AIDS, or any other immune system problem?   No   Do you have a parent, brother, or sister with an immune system problem?   No   In the past 3 months, have you taken medications that affect  your immune system, such as prednisone, other steroids, or anticancer drugs; drugs for the treatment of rheumatoid arthritis, Crohn s disease, or psoriasis; or have you had radiation treatments?   No   Have you had a seizure, or a brain or other nervous system problem?   No   During the past year, have you received a transfusion of blood or blood    products, or been given immune (gamma) globulin or antiviral drug?   No   For women: Are you pregnant or is there a chance you could become       pregnant during the next month?   No   Have you received any vaccinations in the past 4 weeks?   No     Immunization questionnaire answers were all negative.      Patient instructed to remain in clinic for 15 minutes afterwards, and to report any adverse reactions.     Screening performed by Jim Vidal MA on 8/24/2023 at 5:12 PM.

## 2023-08-25 ENCOUNTER — TELEPHONE (OUTPATIENT)
Dept: FAMILY MEDICINE | Facility: CLINIC | Age: 63
End: 2023-08-25
Payer: COMMERCIAL

## 2023-08-25 DIAGNOSIS — L30.9 ECZEMA: ICD-10-CM

## 2023-08-25 RX ORDER — MOMETASONE FUROATE 1 MG/G
OINTMENT TOPICAL
Qty: 30 G | Refills: 3 | Status: SHIPPED | OUTPATIENT
Start: 2023-08-25

## 2023-08-25 NOTE — RESULT ENCOUNTER NOTE
Normal liver blood work.  Kidney blood work also stable, mild elevation suggesting need for more water.    Normal prostate, normal A1c, Normal blood counts.

## 2023-08-25 NOTE — TELEPHONE ENCOUNTER
[facility] phone call message- medication clarification/question:    Full Medication Name: mometasone (ELOCON) 0.1 % external ointment           Question: Caller is calling to see what the frequency is for medication above? Please call and advise, or have color team call and let pharmacy know. Thank you     Pharmacy confirmed as [id number] - [place], [place] - [address]: Yes    OK to leave a message on voice mail? Yes    Primary language: English      needed? No    Call taken on August 25, 2023 at 10:52 AM by Irvin Arnold

## 2023-08-31 RX ORDER — MOMETASONE FUROATE 1 MG/G
OINTMENT TOPICAL
Qty: 30 G | Refills: 3 | OUTPATIENT
Start: 2023-08-31 | End: 2024-08-08

## 2023-09-05 NOTE — MR AVS SNAPSHOT
After Visit Summary   1/27/2017    Kobe Buchanan    MRN: [id number]           Patient Information     Date Of Birth          1960        Visit Information        Provider Department      1/27/2017 3:00 PM Luis Novoa MD [facility]        Today's Diagnoses     Ascending aortic aneurysm (H)    -  1     Atrial fibrillation, unspecified type (H)         Elevated blood pressure reading without diagnosis of hypertension           Care Instructions    Thank you for your visit today.  Please call me with any questions or concerns.   Juarez Sahu  RN  Cardiology Care Coordinator  [phone], press option 1 then option 3        Follow-ups after your visit        Follow-up notes from your care team     Return in about 1 year (around 1/27/2018) for A-Latoya, Dr. Novoa.      Your next 10 appointments already scheduled     Jan 27, 2017  4:00 PM   (Arrive by 3:45 PM)   Return Visit with Michael Styles MD   [facility] ([facility])    [address] [place] [address]  4th [facility] [id number] [phone]            Feb 02, 2017  8:00 AM   24 Hour Blood Pressure Monitor with [facility]   [facility] ([facility])    [address] [id number]               Mar 31, 2017   Procedure with Juan Carlos Blunt MD   [facility] ([facility])    [address] [place] [address]  5th [facility] [id number] [phone]           Located in the Clinics and Surgery Center at [address] [place] [address].   parking is very convenient and highly recommended.  is a $6 flat rate fee; no tips accepted.  Both  and self parkers should enter the main arrival plaza from [facility]; parking attendants will direct you based on your parking preference.            Apr 07, 2017  9:00 AM   (Arrive by 8:45 AM)   POST-OP HAND  "GI Daily Progress Note  Subjective:    Chief Complaint:  Follow up diarrhea     \"I think I am a little better.\"    Had 1 loose bowel movement this morning.       Objective:    /54   Pulse 60   Temp 98.4 °F (36.9 °C) (Oral)   Resp 18   Ht 170.2 cm (67\")   Wt 75.3 kg (166 lb)   SpO2 99%   BMI 26.00 kg/m²     Physical Exam  Constitutional:       General: He is not in acute distress.  Cardiovascular:      Rate and Rhythm: Normal rate and regular rhythm.   Pulmonary:      Effort: Pulmonary effort is normal. No respiratory distress.   Abdominal:      General: Bowel sounds are normal. There is no distension.      Palpations: Abdomen is soft.      Tenderness: There is no abdominal tenderness.   Neurological:      Mental Status: He is alert and oriented to person, place, and time.     Lab  Lab Results   Component Value Date    WBC 6.40 09/05/2023    HGB 9.3 (L) 09/05/2023    HGB 9.7 (L) 09/04/2023    HGB 10.7 (L) 08/24/2023    .7 (H) 09/05/2023     (L) 09/05/2023    INR 1.2 (H) 12/30/2022    INR 1.2 (H) 12/16/2022    INR 1.1 12/06/2022    INR 1.1 10/19/2022     Lab Results   Component Value Date    GLUCOSE 81 09/05/2023    BUN 19 09/05/2023    CREATININE 1.63 (H) 09/05/2023    EGFRIFNONA 35 (L) 03/31/2022    EGFRIFAFRI 42 (L) 03/31/2022    BCR 11.7 09/05/2023     09/05/2023    K 3.5 09/05/2023    CO2 24.0 09/05/2023    CALCIUM 8.3 (L) 09/05/2023    ALBUMIN 3.9 09/04/2023    ALKPHOS 75 09/04/2023    BILITOT 0.6 09/04/2023    BILIDIR <0.2 12/06/2022    ALT <5 09/04/2023    AST 20 09/04/2023       Assessment:    Diarrhea, possible iatrogenic from Entacopone use.     Parkinson's disease  AAA, infrarenal 6.1 cm      Plan:    GI panel PCR is negative.   O&P pending.        Diarrhea appears to be improving in terms of frequency.    Stools remain loose.    >> Begin daily fiber supplement.  Start Metamucil inpatient.     KERI Avila  09/05/23  15:06 EDT    " "with Juan Carlos Blunt MD   [facility] ([facility])    [address]  4th Floor  [facility] [address] [phone]              Future tests that were ordered for you today     Open Future Orders        Priority Expected Expires Ordered    Card 24 hour blood press monitor Routine  3/13/2017 1/27/2017    Echocardiogram Complete Routine  1/27/2018 1/27/2017            Who to contact     If you have questions or need follow up information about today's clinic visit or your schedule please contact [facility] directly at [phone].  Normal or non-critical lab and imaging results will be communicated to you by [employer], letter or phone within 4 business days after the clinic has received the results. If you do not hear from us within 7 days, please contact the clinic through [employer] or phone. If you have a critical or abnormal lab result, we will notify you by phone as soon as possible.  Submit refill requests through [employer] or call your pharmacy and they will forward the refill request to us. Please allow 3 business days for your refill to be completed.          Additional Information About Your Visit        [employer] Information     [employer] gives you secure access to your electronic health record. If you see a primary care provider, you can also send messages to your care team and make appointments. If you have questions, please call your primary care clinic.  If you do not have a primary care provider, please call [phone] and they will assist you.        Care EveryWhere ID     This is your Care EveryWhere ID. This could be used by other organizations to access your [place] medical records  [id number]        Your Vitals Were     Pulse Height Pulse Oximetry             82 1.778 m (5' 10\") 95%          Blood Pressure from Last 3 Encounters:   01/27/17 112/74   12/05/16 127/69   11/23/16 107/69    Weight from Last 3 Encounters:   01/27/17 115.168 " kg (253 lb 14.4 oz)   12/30/16 110.224 kg (243 lb)   12/19/16 109.77 kg (242 lb)                 Today's Medication Changes          These changes are accurate as of: 1/27/17  3:28 PM.  If you have any questions, ask your nurse or doctor.               These medicines have changed or have updated prescriptions.        Dose/Directions    mometasone 0.1 % ointment   Commonly known as:  ELOCON   This may have changed:    - when to take this  - reasons to take this   Used for:  Eczema        Apply  topically daily.   Quantity:  90 g   Refills:  1                Primary Care Provider Office Phone # Fax #    Michael Styles [phone]       [address] [address]        Thank you!     Thank you for choosing [facility]  for your care. Our goal is always to provide you with excellent care. Hearing back from our patients is one way we can continue to improve our services. Please take a few minutes to complete the written survey that you may receive in the mail after your visit with us. Thank you!             Your Updated Medication List - Protect others around you: Learn how to safely use, store and throw away your medicines at www.disposemymeds.org.          This list is accurate as of: 1/27/17  3:28 PM.  Always use your most recent med list.                   Brand Name Dispense Instructions for use    amLODIPine-benazepril 10-40 MG per capsule    LOTREL    90 capsule    Take 1 capsule by mouth daily       amoxicillin 500 MG capsule    AMOXIL    4 capsule    Take 4 tablets 1 hour before dental procedure       aspirin 81 MG chewable tablet     120 tablet    Take 1 tablet (81 mg) by mouth daily       buPROPion 200 MG 12 hr tablet    WELLBUTRIN SR    180 tablet    Take 1 tablet (200 mg) by mouth 2 times daily       carvedilol 25 MG tablet    COREG    180 tablet    Take 1 tablet (25 mg) by mouth 2 times daily (with meals)       clonazePAM 0.5 MG tablet     klonoPIN    6 tablet    Take 1 tablet (0.5 mg) by mouth nightly as needed for anxiety       dexamethasone 4 MG/ML injection    DECADRON    1 mL    Inject 1 mL (4 mg) as directed once for 1 dose Use 4 mg or dose determined by provider for iontophoresis.       DOCUSATE SODIUM PO      Take 100 mg by mouth daily       fentaNYL 50 mcg/hr 72 hr patch    DURAGESIC     Place 1 patch onto the skin every 72 hours       ferrous sulfate 325 (65 FE) MG tablet    IRON    30 tablet    Take 1 tablet (325 mg) by mouth daily (with breakfast)       fluocinonide 0.05 % ointment    LIDEX    30 g    Apply twice daily to itchy skin nodules for 1-2 weeks at a time.       mometasone 0.1 % ointment    ELOCON    90 g    Apply  topically daily.       NAPROXEN SODIUM PO      Take 220 mg by mouth 2 times daily       neomycin-polymyxin-hydrocortisone otic solution    CORTISPORIN    10 mL    Place 3 drops in ear(s) 4 times daily       order for DME     1 Device    Walker for cardiac rehab with 4 wheels, brakes and seat       pantoprazole 40 MG EC tablet    PROTONIX    30 tablet    Take 1 tablet (40 mg) by mouth every morning       senna-docusate 8.6-50 MG per tablet    SENOKOT-S;PERICOLACE    100 tablet    Take 1-2 tablets by mouth 2 times daily To prevent constipation while taking narcotic pain medication. Start with 1 tablet twice daily. If no bowel movement in 24 hours, increase to 2 tablets twice daily.  Discontinue if you have loose stools or when you are no longer taking narcotics.       simvastatin 10 MG tablet    ZOCOR    90 tablet    Take 1 tablet (10 mg) by mouth At Bedtime       tacrolimus 0.1 % ointment    PROTOPIC    120 g    Apply topically as needed Apply to affected areas on body.

## 2023-09-11 DIAGNOSIS — F43.9 STRESS: ICD-10-CM

## 2023-09-11 RX ORDER — CLONAZEPAM 0.5 MG/1
0.5 TABLET ORAL 3 TIMES DAILY PRN
Qty: 90 TABLET | Refills: 1 | Status: SHIPPED | OUTPATIENT
Start: 2023-09-11 | End: 2023-11-06

## 2023-09-27 ENCOUNTER — TRANSFERRED RECORDS (OUTPATIENT)
Dept: HEALTH INFORMATION MANAGEMENT | Facility: CLINIC | Age: 63
End: 2023-09-27
Payer: COMMERCIAL

## 2023-10-06 DIAGNOSIS — I10 ESSENTIAL HYPERTENSION WITH GOAL BLOOD PRESSURE LESS THAN 130/85: ICD-10-CM

## 2023-10-06 RX ORDER — CARVEDILOL 25 MG/1
50 TABLET ORAL 2 TIMES DAILY WITH MEALS
Qty: 360 TABLET | Refills: 0 | OUTPATIENT
Start: 2023-10-06

## 2023-10-06 RX ORDER — CARVEDILOL 25 MG/1
50 TABLET ORAL 2 TIMES DAILY WITH MEALS
Qty: 360 TABLET | Refills: 0 | Status: SHIPPED | OUTPATIENT
Start: 2023-10-06 | End: 2023-12-21

## 2023-10-10 ENCOUNTER — OFFICE VISIT (OUTPATIENT)
Dept: CARDIOLOGY | Facility: CLINIC | Age: 63
End: 2023-10-10
Payer: COMMERCIAL

## 2023-10-10 VITALS
SYSTOLIC BLOOD PRESSURE: 118 MMHG | HEART RATE: 57 BPM | DIASTOLIC BLOOD PRESSURE: 69 MMHG | HEIGHT: 71 IN | WEIGHT: 236.8 LBS | OXYGEN SATURATION: 95 % | BODY MASS INDEX: 33.15 KG/M2 | RESPIRATION RATE: 16 BRPM

## 2023-10-10 DIAGNOSIS — I77.9 BILATERAL CAROTID ARTERY DISEASE, UNSPECIFIED TYPE (H): ICD-10-CM

## 2023-10-10 DIAGNOSIS — I65.29 OCCLUSION AND STENOSIS OF UNSPECIFIED CAROTID ARTERY: ICD-10-CM

## 2023-10-10 DIAGNOSIS — Z98.890 S/P THORACIC AORTIC ANEURYSM REPAIR: Primary | ICD-10-CM

## 2023-10-10 DIAGNOSIS — Z86.79 S/P THORACIC AORTIC ANEURYSM REPAIR: Primary | ICD-10-CM

## 2023-10-10 PROCEDURE — 99215 OFFICE O/P EST HI 40 MIN: CPT | Performed by: INTERNAL MEDICINE

## 2023-10-10 NOTE — LETTER
10/10/2023    Deim Hardin MD  [address] [facility] [address]    RE: Kobe Buchanan       Dear Colleague,     I had the pleasure of seeing Kobe Buchanan in the [facility].           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     Prior history:      Patient was referred by Atul Martínez and [place] to establish care into congenital vascular cardiology clinic. Patient reports his nephew at 28 years old has a known bicuspid aortic valve without aortopathy.  He has been seen in [place] and followed. His father is 91 years old and has a known aneurysm of 4.9 cm in bicuspid valve. Sister is a physician in [place], owns practice in [place], retired now. Patient reports no family history of sudden death or known dissection. No history of strokes.      MRA chest in April 2021 demonstrated graft stability. Echocardiogram showed normal BAV function, with only mild aortic regurgitation. His CMR demonstrated normalization of his LVEF suggestive that prior reduction in function was due to stress CMY.      Today he is doing well. BP is well controlled. We reviewed his studies which were all good. Echocardiogram with mild aortic Regurg from calcification (BAV). His repair was valve sparing. No symptoms.     I personally reviewed echocardiogram images today.     ASSESSMENT/PLAN:      This is a 63-year-old male with hypertension, hyperlipidemia, right left fusion of his aortic valve with bicuspid aortopathy. No known coarctation.  No other peripheral aneurysmal disease.  He is status post valve sparing aortic  repair with Gelweave interposition graft. He has mild AI, and no aortic stenosis.     Blood pressure well controlled overall on current regimen. He will need ongoing long-term surveillance of both of his aortic repair and his bicuspid valve given he has some dysfunction within the native valve (mild sclerosis and mild AI).      Summary of Recommendations:     1. Echocardiogram once a year for valve function, check MRA to evaluate graft placement  2. On amlodipine-benzapril and 50 mg bid carvedilol.   3. Would check renal function and electrolytes once a year at least given issues with potassium in the past  4. Follow up in one year  5. Carotid ultrasound for carotid artery stenosis (<50%)     Enriqueta Zhou MD MSc  [facility]     Total time spent on complex clinic patient visit is more than 45 minutes, which includes face-to-face patient evaluation and physical exam, review of imaging studies and laboratory data, counseling with patient, review of outside records, and documentation of patient encounter         PAST MEDICAL HISTORY  Past Medical History:   Diagnosis Date    Acute systolic heart failure (H) 01/28/2020    Added automatically from request for surgery [id number]    NEMESIO (acute kidney injury) (H24) 02/16/2020    Anxiety     Ascending aortic aneurysm (H24)     Atrial fibrillation (H) [I48.91] 10/10/2016    Bicuspid aortic valve     BPPV (benign paroxysmal positional vertigo) 07/11/2014    Chronic narcotic use     Chronic neck pain     Chronic osteoarthritis     Degenerative joint disease     Depression     Hx of type 2 diabetes mellitus 03/04/2021    Hyperlipidemia 04/10/2012    Hypertension     Iron deficiency anemia secondary to blood loss (chronic) 08/25/2020    MSSA bacteremia 10/13/2019    Multiple stiff joints     Neck injuries     NSTEMI (non-ST elevated myocardial infarction) (H) 01/29/2020    Obesity 02/09/2015    Pain in shoulder     S/P reverse total shoulder arthroplasty, left 07/07/2020     S/P reverse total shoulder arthroplasty, right 08/18/2020    Septic joint of left shoulder region (H) 11/13/2019    Septic joint of right shoulder region (H) 10/13/2019    s/p washout    Skin picking habit     Sleep apnea     does not use cpap    Status post total shoulder arthroplasty, left 03/03/2020    Added automatically from request for surgery [id number]       CURRENT MEDICATIONS  Current Outpatient Medications   Medication Sig Dispense Refill    amLODIPine-benazepril (LOTREL) 5-20 MG capsule Take 1 capsule by mouth 2 times daily 180 capsule 3    amoxicillin (AMOXIL) 500 MG capsule Take 4 tablets 1 hour before dental procedure 24 capsule 0    aspirin 81 MG EC tablet Take 81 mg by mouth daily      atorvastatin (LIPITOR) 40 MG tablet Take 1 tablet (40 mg) by mouth daily 90 tablet 3    buPROPion (WELLBUTRIN SR) 200 MG 12 hr tablet TAKE 1 TABLET BY MOUTH 2 TIMES DAILY 180 tablet 3    carvedilol (COREG) 25 MG tablet Take 2 tablets (50 mg) by mouth 2 times daily (with meals) 360 tablet 0    chlorhexidine (PERIDEX) 0.12 % solution       clonazePAM (KLONOPIN) 0.5 MG tablet Take 1 tablet (0.5 mg) by mouth 3 times daily as needed for anxiety 90 tablet 1    Cyanocobalamin 5000 MCG SUBL Place 5,000 mcg under the tongue daily Vitamin B12      cyclobenzaprine (FLEXERIL) 10 MG tablet Take 1 tablet (10 mg) by mouth 3 times daily as needed for muscle spasms 90 tablet 4    desoximetasone (TOPICORT) 0.25 % external cream Apply topically daily as needed for inflammation or itching      diazepam (VALIUM) 5 MG tablet Take on tablet 20 minutes prior to MRI due to claustrophobia. 1 tablet 0    docusate sodium (COLACE) 100 MG capsule Take 2 capsule in the morning and 3 capsules in the evening      escitalopram (LEXAPRO) 20 MG tablet Take 1.5 tablets (30 mg) by mouth daily 135 tablet 3    Fe Heme Polypeptide-folic acid (PROFERRIN-FORTE) 12-1 MG TABS Take 1 tablet by mouth 2 times daily 90 tablet 0    fentaNYL (DURAGESIC) 50 mcg/hr 72  hr patch Place 1 patch onto the skin every 72 hours      fluocinonide (LIDEX) 0.05 % external ointment Apply twice daily to itchy skin nodules for 1-2 weeks at a time. 30 g 3    GAVILYTE-G 236 g suspension Take by oral route as directed in colonoscopy prep instructions received from [facility]*      Iron Heme Polypeptide (PROFERRIN ES) 12 MG TABS Take 12 mg/day by mouth daily 30 tablet 1    latanoprost (XALATAN) 0.005 % ophthalmic solution Place 1 drop Into the left eye daily 2.5 mL 4    mometasone (ELOCON) 0.1 % external ointment Apply topically nightly as needed (rash on arms) 30 g 3    Multiple Minerals-Vitamins (CALCIUM CITRATE-MAG-MINERALS) TABS Take 1 tablet by mouth daily      multivitamin w/minerals (THERA-VIT-M) tablet Take 1 tablet by mouth daily      naloxone (NARCAN) nasal spray Spray 1 spray (4 mg) into one nostril alternating nostrils as needed for opioid reversal every 2-3 minutes until assistance arrives 0.2 mL 0    naproxen (EC-NAPROSYN) 500 MG EC tablet Take 1 tablet (500 mg) by mouth 2 times daily as needed (pain) 186 tablet 3    oxyCODONE IR (ROXICODONE) 10 MG tablet Take 1 tablet by mouth every 4 hours as needed for pain, max 4 tablet(s) per day      pantoprazole (PROTONIX) 40 MG EC tablet Take 1 tablet (40 mg) by mouth daily as needed for heartburn 90 tablet 3    propranolol (INDERAL) 10 MG tablet Take 1 tablet (10 mg) by mouth 3 times daily as needed (panic attack) 90 tablet 3    Pyrithione Zinc 2 % SHAM Externally apply topically daily 480 mL 4    senna-docusate (SENOKOT-S/PERICOLACE) 8.6-50 MG tablet Take 1-2 tablets by mouth 2 times daily 30 tablet 0    sucralfate (CARAFATE) 1 GM tablet Take 1 tablet (1 g) by mouth 2 times daily as needed (Reflux) 180 tablet 3    tacrolimus (PROTOPIC) 0.1 % external ointment Apply topically as needed (rash on arms) Apply to affected areas on body. 60 g 1    tacrolimus (PROTOPIC) 0.1 % ointment Apply topically as needed Apply to affected areas on body. 120 g 11     tiZANidine (ZANAFLEX) 4 MG tablet Take 1 tablet (4 mg) by mouth 2 times daily 180 tablet 3    triamcinolone (KENALOG) 0.1 % external ointment Apply topically 2 times daily To affected areas of rash. Please avoid application to the face, groin or armpits as the medication is too strong for these areas. 454 g 0    triamcinolone (KENALOG) 0.1 % external ointment Apply topically 2 times daily as needed for irritation 454 g 0    vitamin B-Complex Take 1 tablet by mouth daily         PAST SURGICAL HISTORY:  Past Surgical History:   Procedure Laterality Date    ARTHROPLASTY SHOULDER  04/15/2014    Procedure: Left Total Shoulder Arthroplasty ;  Surgeon: Analilia Aceves MD;  [facility]    ARTHROPLASTY SHOULDER Right 08/26/2014    Procedure: ARTHROPLASTY SHOULDER;  Surgeon: Analilia Aceves MD;  [facility]    ARTHROSCOPY SHOULDER WITH BIOPSY(IES) Left 01/28/2020    Procedure: Left shoulder arthroscopy and biopsy for culture;  Surgeon: Analilia Aceves MD;  [facility]    BYPASS GASTRIC TAVO-EN-Y, LIVER BIOPSY, COMBINED  08/08/2005    COLONOSCOPY  06/30/2014    Procedure: COMBINED COLONOSCOPY, SINGLE BIOPSY/POLYPECTOMY BY BIOPSY;  Surgeon: Chester Patton MD;  [facility]    CV CORONARY ANGIOGRAM  01/30/2020    Procedure: CV CORONARY ANGIOGRAM;  Surgeon: Néstor Walls MD;  Location:  HEART CARDIAC CATH LAB    CYSTOSCOPY, BLADDER NECK CUTS, COMBINED N/A 07/18/2016    Procedure: COMBINED CYSTOSCOPY, BLADDER NECK CUTS;  Surgeon: Ritu Leslie MD;  [facility]    ESOPHAGOSCOPY, GASTROSCOPY, DUODENOSCOPY (EGD), COMBINED  06/30/2014    Procedure: COMBINED ESOPHAGOSCOPY, GASTROSCOPY, DUODENOSCOPY (EGD), BIOPSY SINGLE OR MULTIPLE;  Surgeon: Chester Patton MD;  [facility]    EXCISE MASS FINGER  06/14/2011    Procedure:EXCISE MASS FINGER; Middle Flexor Cyst; Surgeon:SHAYY RUSSELL; [facility]    IR FINE NEEDLE ASPIRATION W  ULTRASOUND  11/13/2019    IR PICC PLACEMENT > 5 YRS OF AGE  11/19/2019    LASER HOLMIUM LITHOTRIPSY BLADDER N/A 10/15/2014    Procedure: LASER HOLMIUM LITHOTRIPSY BLADDER;  Surgeon: Sahil Taveras MD;  [facility]    LASER KTP GREEN LIGHT PHOTOSELECTIVE VAPORIZATION PROSTATE  01/23/2014    Procedure: LASER KTP GREEN LIGHT PHOTOSELECTIVE VAPORIZATION PROSTATE;  Greenlight Photovaporization Of Prostate  ;  Surgeon: Sahil Taveras MD;  [facility]    OTHER SURGICAL HISTORY  10/04/2016    REPAIR ANEURYSM ASCENDING AORTA    OTHER SURGICAL HISTORY Right 09/23/2019    IRRIGATION AND DEBRIDEMENT UPPER EXTREMITYshoulder    RELEASE TRIGGER FINGER Right 03/31/2017    Procedure: RELEASE TRIGGER FINGER;  Surgeon: Juan Carlos Blunt MD;  [facility]    REMOVE ANTIBIOTIC CEMENT BEADS / SPACER SHOULDER Left 05/08/2020    Procedure: Left shoulder removal of spacer;  Surgeon: Analilia Aceves MD;  [facility]    REMOVE ANTIBIOTIC CEMENT BEADS / SPACER SHOULDER Right 08/18/2020    Procedure: Removal of right shoulder antibiotic spacer;  Surgeon: Analilia Aceves MD;  [facility]    REMOVE HARDWARE ARTHROPLASTY SHOULDER. I&D, PLACE ANTIBIOTIC CEMENT BE Left 11/15/2019    Procedure: Explantation of left total shoulder arthroplasty, irrigation and debridement, and placement of antibiotic spacer;  Surgeon: Analilia Aceves MD;  [facility]    REPAIR ANEURYSM ASCENDING AORTA N/A 10/04/2016    Procedure: REPAIR ANEURYSM ASCENDING AORTA;  Surgeon: Mckenzie Townsend MD;  [facility]    REVERSE ARTHROPLASTY SHOULDER Right 08/18/2020    Procedure: and conversion to reverse total shoulder arthroplasty;  Surgeon: Analilia Aceves MD;  [facility]       ALLERGIES     Allergies   Allergen Reactions    Ciprofloxacin      History of aortic aneurysms    Tape [Adhesive Tape] Blisters     Blistering - please use paper tape       FAMILY HISTORY  Family History   Problem  Relation Age of Onset    Arthritis Other     Gastrointestinal Disease Other     Cardiovascular Father         aortic aneurysm    Arrhythmia Father     Nephrolithiasis Father     Sleep Apnea Father     Anxiety Disorder Father     Depression Father     Hypertension Father     Obesity Father     Hyperlipidemia Father     Coronary Artery Disease Father     Low Back Problems Father     Spine Problems Father     Cardiovascular Father     Other - See Comments Father         low back problems, spine problems    Anxiety Disorder Mother     Hypertension Mother     Osteoporosis Mother     Obesity Mother     Hyperlipidemia Mother     Low Back Problems Mother     Macular Degeneration Mother     Cataracts Mother     Other - See Comments Mother         low back problems    Anxiety Disorder Sister     Hypertension Sister     Osteoporosis Sister     Obesity Sister     Hyperlipidemia Sister     Low Back Problems Sister     Spine Problems Sister     Anxiety Disorder Sister     Depression Sister     Hypertension Sister     Osteoporosis Sister     Obesity Sister     Hyperlipidemia Sister     Low Back Problems Sister     Anxiety Disorder Sister     Hyperlipidemia Sister     Hypertension Sister     Obesity Sister     Other - See Comments Sister         low back problems, spine problems    Osteoporosis Sister     Anxiety Disorder Sister     Depression Sister     Hyperlipidemia Sister     Hypertension Sister     Other - See Comments Sister         low back problems    Obesity Sister     Osteoporosis Sister     Melanoma No family hx of     Skin Cancer No family hx of     Glaucoma No family hx of      SOCIAL HISTORY  Social History     Socioeconomic History    Marital status: Single     Spouse name: Not on file    Number of children: Not on file    Years of education: Not on file    Highest education level: Not on file   Occupational History    Occupation: Disabled   Tobacco Use    Smoking status: Former     Packs/day: 0.50     Years: 6.00      Pack years: 3.00     Types: Cigarettes     Start date: 1977     Quit date: 1983     Years since quittin.1    Smokeless tobacco: Never   Substance and Sexual Activity    Alcohol use: No     Alcohol/week: 0.0 standard drinks of alcohol    Drug use: No    Sexual activity: Not Currently     Partners: Female     Birth control/protection: Abstinence   Other Topics Concern    Parent/sibling w/ CABG, MI or angioplasty before 65F 55M? Not Asked   Social History Narrative    Live independently[place] one sister, Supriya in [place], [place].  Does live with 2 dogs.      Social Determinants of Health     Financial Resource Strain: Low Risk  (2021)    Overall Financial Resource Strain (CARDIA)     Difficulty of Paying Living Expenses: Not hard at all   Food Insecurity: Not on file   Transportation Needs: Unmet Transportation Needs (2021)    PRAPARE - Transportation     Lack of Transportation (Medical): Yes     Lack of Transportation (Non-Medical): No   Physical Activity: Not on file   Stress: Not on file   Social Connections: Socially Isolated (2021)    Social Connection and Isolation Panel [NHANES]     Frequency of Communication with Friends and Family: Once a week     Frequency of Social Gatherings with Friends and Family: Never     Attends Sabianism Services: Never     Active Member of Clubs or Organizations: No     Attends Club or Organization Meetings: Never     Marital Status: Never    Interpersonal Safety: Not on file   Housing Stability: Unknown (2021)    Housing Stability Vital Sign     Unable to Pay for Housing in the Last Year: No     Number of Places Lived in the Last Year: Not on file     Unstable Housing in the Last Year: No       ROS:   Constitutional: No fever, chills, or sweats. No weight gain/loss   ENT: No visual disturbance, ear ache, epistaxis, sore throat  Allergies/Immunologic: Negative  Respiratory: No cough, hemoptysia  Cardiovascular: As per  "HPI  GI: No nausea, vomiting, hematemesis, melena, or hematochezia  : No urinary frequency, dysuria, or hematuria  Integument: Negative  Psychiatric: Negative  Neuro: Negative  Endocrinology: Negative   Musculoskeletal: Negative  Vascular: No walking impairment, claudication, ischemic rest pain or nonhealing wounds    EXAM:  /69   Pulse 57   Resp 16   Ht 1.791 m (5' 10.5\")   Wt 107.4 kg (236 lb 12.8 oz)   SpO2 95%   BMI 33.50 kg/m    In general, the patient is a pleasant male in no apparent distress.    HEENT: NC/AT.  PERRLA.  EOMI.  Sclerae white, not injected.  Nares clear.   Neck: No adenopathy.  No thyromegaly. Carotids +2/2 bilaterally without bruits.  No jugular venous distension.   Heart: RRR. Normal S1, S2 splits physiologically. No murmur, rub, click, or gallop.   Lungs: CTA.  No ronchi, wheezes, rales.  No dullness to percussion.   Abdomen: Soft, nontender, nondistended. No organomegaly. No AAA.  No bruits.   Extremities: No clubbing, cyanosis, or edema.  No wounds. No varicose veins signs of chronic venous insufficiency.   Vascular: No bruits are noted.    Labs:  LIPID RESULTS:  Lab Results   Component Value Date    CHOL 131 03/08/2023    CHOL 179 03/22/2018    HDL 50 03/08/2023    HDL 39 (L) 03/22/2018    LDL 58 03/08/2023    LDL 89 03/22/2018    TRIG 113 03/08/2023    TRIG 255 (H) 03/22/2018    CHOLHDLRATIO 3.7 06/08/2015    NHDL 81 03/08/2023    NHDL 140 (H) 03/22/2018       LIVER ENZYME RESULTS:  Lab Results   Component Value Date    AST 27 08/24/2023    AST 29 05/06/2020    ALT 23 08/24/2023    ALT 42 05/06/2020       CBC RESULTS:  Lab Results   Component Value Date    WBC 7.7 08/24/2023    WBC 6.4 08/05/2020    RBC 4.81 08/24/2023    RBC 4.27 (L) 08/05/2020    HGB 13.4 08/24/2023    HGB 9.9 (L) 08/20/2020    HCT 41.5 08/24/2023    HCT 31.2 (L) 08/20/2020    MCV 86 08/24/2023    MCV 74.3 (L) 08/20/2020    MCH 27.9 08/24/2023    MCH 23.6 (L) 08/20/2020    MCHC 32.3 08/24/2023    MCHC " 31.7 (L) 08/20/2020    RDW 13.3 08/24/2023    RDW 17.0 (H) 08/05/2020     08/24/2023     08/05/2020       BMP RESULTS:  Lab Results   Component Value Date     08/24/2023     08/05/2020    POTASSIUM 4.7 08/24/2023    POTASSIUM 4.0 07/29/2022    POTASSIUM 4.3 08/18/2020    CHLORIDE 102 08/24/2023    CHLORIDE 105 07/29/2022    CHLORIDE 107 08/05/2020    CO2 26 08/24/2023    CO2 26 07/29/2022    CO2 26 08/05/2020    ANIONGAP 8 08/24/2023    ANIONGAP 6 07/29/2022    ANIONGAP 4 08/05/2020    GLC 85 08/24/2023    GLC 93 07/29/2022     (H) 08/18/2020    BUN 30.3 (H) 08/24/2023    BUN 17 07/29/2022    BUN 20 08/05/2020    CR 1.45 (H) 08/24/2023    CR 1.24 08/05/2020    GFRESTIMATED 54 (L) 08/24/2023    GFRESTIMATED 63 08/05/2020    GFRESTBLACK 73 08/05/2020    NIA 9.1 08/24/2023    NIA 8.8 08/05/2020        A1C RESULTS:  Lab Results   Component Value Date    A1C 5.6 08/24/2023    A1C 5.3 10/05/2016           Thank you for allowing me to participate in the care of your patient.      Sincerely,     Enriqueta Zhou MD     [facility]  cc:   No referring provider defined for this encounter.

## 2023-10-10 NOTE — PROGRESS NOTES
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     Prior history:      Patient was referred by Atul Martínez and [place] to establish care into congenital vascular cardiology clinic. Patient reports his nephew at 28 years old has a known bicuspid aortic valve without aortopathy.  He has been seen in [place] and followed. His father is 91 years old and has a known aneurysm of 4.9 cm in bicuspid valve. Sister is a physician in [place], owns practice in [place], retired now. Patient reports no family history of sudden death or known dissection. No history of strokes.      MRA chest in April 2021 demonstrated graft stability. Echocardiogram showed normal BAV function, with only mild aortic regurgitation. His CMR demonstrated normalization of his LVEF suggestive that prior reduction in function was due to stress CMY.      Today he is doing well. BP is well controlled. We reviewed his studies which were all good. Echocardiogram with mild aortic Regurg from calcification (BAV). His repair was valve sparing. No symptoms.     I personally reviewed echocardiogram images today.     ASSESSMENT/PLAN:      This is a 63-year-old male with hypertension, hyperlipidemia, right left fusion of his aortic valve with bicuspid aortopathy. No known coarctation.  No other peripheral aneurysmal disease.  He is status post valve sparing aortic repair with Gelweave interposition graft. He has mild AI, and no aortic stenosis.     Blood pressure well controlled overall on current regimen. He will need ongoing long-term surveillance of both of his aortic repair and  his bicuspid valve given he has some dysfunction within the native valve (mild sclerosis and mild AI).      Summary of Recommendations:     1. Echocardiogram once a year for valve function, check MRA to evaluate graft placement  2. On amlodipine-benzapril and 50 mg bid carvedilol.   3. Would check renal function and electrolytes once a year at least given issues with potassium in the past  4. Follow up in one year  5. Carotid ultrasound for carotid artery stenosis (<50%)     Enriqueta Zhou MD MSc  [facility]     Total time spent on complex clinic patient visit is more than 45 minutes, which includes face-to-face patient evaluation and physical exam, review of imaging studies and laboratory data, counseling with patient, review of outside records, and documentation of patient encounter         PAST MEDICAL HISTORY  Past Medical History:   Diagnosis Date    Acute systolic heart failure (H) 01/28/2020    Added automatically from request for surgery [id number]    NEMESIO (acute kidney injury) (H24) 02/16/2020    Anxiety     Ascending aortic aneurysm (H24)     Atrial fibrillation (H) [I48.91] 10/10/2016    Bicuspid aortic valve     BPPV (benign paroxysmal positional vertigo) 07/11/2014    Chronic narcotic use     Chronic neck pain     Chronic osteoarthritis     Degenerative joint disease     Depression     Hx of type 2 diabetes mellitus 03/04/2021    Hyperlipidemia 04/10/2012    Hypertension     Iron deficiency anemia secondary to blood loss (chronic) 08/25/2020    MSSA bacteremia 10/13/2019    Multiple stiff joints     Neck injuries     NSTEMI (non-ST elevated myocardial infarction) (H) 01/29/2020    Obesity 02/09/2015    Pain in shoulder     S/P reverse total shoulder arthroplasty, left 07/07/2020    S/P reverse total shoulder arthroplasty, right 08/18/2020    Septic joint of left shoulder region (H) 11/13/2019    Septic joint of right shoulder region (H) 10/13/2019    s/p washout    Skin picking habit     Sleep  apnea     does not use cpap    Status post total shoulder arthroplasty, left 03/03/2020    Added automatically from request for surgery [id number]       CURRENT MEDICATIONS  Current Outpatient Medications   Medication Sig Dispense Refill    amLODIPine-benazepril (LOTREL) 5-20 MG capsule Take 1 capsule by mouth 2 times daily 180 capsule 3    amoxicillin (AMOXIL) 500 MG capsule Take 4 tablets 1 hour before dental procedure 24 capsule 0    aspirin 81 MG EC tablet Take 81 mg by mouth daily      atorvastatin (LIPITOR) 40 MG tablet Take 1 tablet (40 mg) by mouth daily 90 tablet 3    buPROPion (WELLBUTRIN SR) 200 MG 12 hr tablet TAKE 1 TABLET BY MOUTH 2 TIMES DAILY 180 tablet 3    carvedilol (COREG) 25 MG tablet Take 2 tablets (50 mg) by mouth 2 times daily (with meals) 360 tablet 0    chlorhexidine (PERIDEX) 0.12 % solution       clonazePAM (KLONOPIN) 0.5 MG tablet Take 1 tablet (0.5 mg) by mouth 3 times daily as needed for anxiety 90 tablet 1    Cyanocobalamin 5000 MCG SUBL Place 5,000 mcg under the tongue daily Vitamin B12      cyclobenzaprine (FLEXERIL) 10 MG tablet Take 1 tablet (10 mg) by mouth 3 times daily as needed for muscle spasms 90 tablet 4    desoximetasone (TOPICORT) 0.25 % external cream Apply topically daily as needed for inflammation or itching      diazepam (VALIUM) 5 MG tablet Take on tablet 20 minutes prior to MRI due to claustrophobia. 1 tablet 0    docusate sodium (COLACE) 100 MG capsule Take 2 capsule in the morning and 3 capsules in the evening      escitalopram (LEXAPRO) 20 MG tablet Take 1.5 tablets (30 mg) by mouth daily 135 tablet 3    Fe Heme Polypeptide-folic acid (PROFERRIN-FORTE) 12-1 MG TABS Take 1 tablet by mouth 2 times daily 90 tablet 0    fentaNYL (DURAGESIC) 50 mcg/hr 72 hr patch Place 1 patch onto the skin every 72 hours      fluocinonide (LIDEX) 0.05 % external ointment Apply twice daily to itchy skin nodules for 1-2 weeks at a time. 30 g 3    GAVILYTE-G 236 g suspension Take by oral  route as directed in colonoscopy prep instructions received from [facility]*      Iron Heme Polypeptide (PROFERRIN ES) 12 MG TABS Take 12 mg/day by mouth daily 30 tablet 1    latanoprost (XALATAN) 0.005 % ophthalmic solution Place 1 drop Into the left eye daily 2.5 mL 4    mometasone (ELOCON) 0.1 % external ointment Apply topically nightly as needed (rash on arms) 30 g 3    Multiple Minerals-Vitamins (CALCIUM CITRATE-MAG-MINERALS) TABS Take 1 tablet by mouth daily      multivitamin w/minerals (THERA-VIT-M) tablet Take 1 tablet by mouth daily      naloxone (NARCAN) nasal spray Spray 1 spray (4 mg) into one nostril alternating nostrils as needed for opioid reversal every 2-3 minutes until assistance arrives 0.2 mL 0    naproxen (EC-NAPROSYN) 500 MG EC tablet Take 1 tablet (500 mg) by mouth 2 times daily as needed (pain) 186 tablet 3    oxyCODONE IR (ROXICODONE) 10 MG tablet Take 1 tablet by mouth every 4 hours as needed for pain, max 4 tablet(s) per day      pantoprazole (PROTONIX) 40 MG EC tablet Take 1 tablet (40 mg) by mouth daily as needed for heartburn 90 tablet 3    propranolol (INDERAL) 10 MG tablet Take 1 tablet (10 mg) by mouth 3 times daily as needed (panic attack) 90 tablet 3    Pyrithione Zinc 2 % SHAM Externally apply topically daily 480 mL 4    senna-docusate (SENOKOT-S/PERICOLACE) 8.6-50 MG tablet Take 1-2 tablets by mouth 2 times daily 30 tablet 0    sucralfate (CARAFATE) 1 GM tablet Take 1 tablet (1 g) by mouth 2 times daily as needed (Reflux) 180 tablet 3    tacrolimus (PROTOPIC) 0.1 % external ointment Apply topically as needed (rash on arms) Apply to affected areas on body. 60 g 1    tacrolimus (PROTOPIC) 0.1 % ointment Apply topically as needed Apply to affected areas on body. 120 g 11    tiZANidine (ZANAFLEX) 4 MG tablet Take 1 tablet (4 mg) by mouth 2 times daily 180 tablet 3    triamcinolone (KENALOG) 0.1 % external ointment Apply topically 2 times daily To affected areas of rash. Please avoid  application to the face, groin or armpits as the medication is too strong for these areas. 454 g 0    triamcinolone (KENALOG) 0.1 % external ointment Apply topically 2 times daily as needed for irritation 454 g 0    vitamin B-Complex Take 1 tablet by mouth daily         PAST SURGICAL HISTORY:  Past Surgical History:   Procedure Laterality Date    ARTHROPLASTY SHOULDER  04/15/2014    Procedure: Left Total Shoulder Arthroplasty ;  Surgeon: Analilia Aceves MD;  [facility]    ARTHROPLASTY SHOULDER Right 08/26/2014    Procedure: ARTHROPLASTY SHOULDER;  Surgeon: Analilia Aceves MD;  [facility]    ARTHROSCOPY SHOULDER WITH BIOPSY(IES) Left 01/28/2020    Procedure: Left shoulder arthroscopy and biopsy for culture;  Surgeon: Analilia Aceves MD;  [facility]    BYPASS GASTRIC TAVO-EN-Y, LIVER BIOPSY, COMBINED  08/08/2005    COLONOSCOPY  06/30/2014    Procedure: COMBINED COLONOSCOPY, SINGLE BIOPSY/POLYPECTOMY BY BIOPSY;  Surgeon: Chester Patton MD;  [facility]    CV CORONARY ANGIOGRAM  01/30/2020    Procedure: CV CORONARY ANGIOGRAM;  Surgeon: Néstor Walls MD;  [facility]    CYSTOSCOPY, BLADDER NECK CUTS, COMBINED N/A 07/18/2016    Procedure: COMBINED CYSTOSCOPY, BLADDER NECK CUTS;  Surgeon: Ritu Leslie MD;  [facility]    ESOPHAGOSCOPY, GASTROSCOPY, DUODENOSCOPY (EGD), COMBINED  06/30/2014    Procedure: COMBINED ESOPHAGOSCOPY, GASTROSCOPY, DUODENOSCOPY (EGD), BIOPSY SINGLE OR MULTIPLE;  Surgeon: Chester Patton MD;  [facility]    EXCISE MASS FINGER  06/14/2011    Procedure:EXCISE MASS FINGER; Middle Flexor Cyst; Surgeon:SHAYY RUSSELL; [facility]    IR FINE NEEDLE ASPIRATION W ULTRASOUND  11/13/2019    IR PICC PLACEMENT > 5 YRS OF AGE  11/19/2019    LASER HOLMIUM LITHOTRIPSY BLADDER N/A 10/15/2014    Procedure: LASER HOLMIUM LITHOTRIPSY BLADDER;  Surgeon: Sahil Taveras MD;  [facility]    LASER KTP GREEN LIGHT PHOTOSELECTIVE VAPORIZATION PROSTATE  01/23/2014    Procedure: LASER KTP GREEN LIGHT PHOTOSELECTIVE VAPORIZATION PROSTATE;  Greenlight Photovaporization Of Prostate  ;  Surgeon: Sahil Taveras MD;  [facility]    OTHER SURGICAL HISTORY  10/04/2016    REPAIR ANEURYSM ASCENDING AORTA    OTHER SURGICAL HISTORY Right 09/23/2019    IRRIGATION AND DEBRIDEMENT UPPER EXTREMITYshoulder    RELEASE TRIGGER FINGER Right 03/31/2017    Procedure: RELEASE TRIGGER FINGER;  Surgeon: Juan Carlos Blunt MD;  [facility]    REMOVE ANTIBIOTIC CEMENT BEADS / SPACER SHOULDER Left 05/08/2020    Procedure: Left shoulder removal of spacer;  Surgeon: Analilia Aceves MD;  [facility]    REMOVE ANTIBIOTIC CEMENT BEADS / SPACER SHOULDER Right 08/18/2020    Procedure: Removal of right shoulder antibiotic spacer;  Surgeon: Analilia Aceves MD;  [facility]    REMOVE HARDWARE ARTHROPLASTY SHOULDER. I&D, PLACE ANTIBIOTIC CEMENT BE Left 11/15/2019    Procedure: Explantation of left total shoulder arthroplasty, irrigation and debridement, and placement of antibiotic spacer;  Surgeon: Analilia Aceves MD;  [facility]    REPAIR ANEURYSM ASCENDING AORTA N/A 10/04/2016    Procedure: REPAIR ANEURYSM ASCENDING AORTA;  Surgeon: Mckenzie Townsend MD;  [facility]    REVERSE ARTHROPLASTY SHOULDER Right 08/18/2020    Procedure: and conversion to reverse total shoulder arthroplasty;  Surgeon: Analilia Aceves MD;  [facility]       ALLERGIES     Allergies   Allergen Reactions    Ciprofloxacin      History of aortic aneurysms    Tape [Adhesive Tape] Blisters     Blistering - please use paper tape       FAMILY HISTORY  Family History   Problem Relation Age of Onset    Arthritis Other     Gastrointestinal Disease Other     Cardiovascular Father         aortic aneurysm    Arrhythmia Father     Nephrolithiasis Father     Sleep Apnea Father     Anxiety Disorder Father      Depression Father     Hypertension Father     Obesity Father     Hyperlipidemia Father     Coronary Artery Disease Father     Low Back Problems Father     Spine Problems Father     Cardiovascular Father     Other - See Comments Father         low back problems, spine problems    Anxiety Disorder Mother     Hypertension Mother     Osteoporosis Mother     Obesity Mother     Hyperlipidemia Mother     Low Back Problems Mother     Macular Degeneration Mother     Cataracts Mother     Other - See Comments Mother         low back problems    Anxiety Disorder Sister     Hypertension Sister     Osteoporosis Sister     Obesity Sister     Hyperlipidemia Sister     Low Back Problems Sister     Spine Problems Sister     Anxiety Disorder Sister     Depression Sister     Hypertension Sister     Osteoporosis Sister     Obesity Sister     Hyperlipidemia Sister     Low Back Problems Sister     Anxiety Disorder Sister     Hyperlipidemia Sister     Hypertension Sister     Obesity Sister     Other - See Comments Sister         low back problems, spine problems    Osteoporosis Sister     Anxiety Disorder Sister     Depression Sister     Hyperlipidemia Sister     Hypertension Sister     Other - See Comments Sister         low back problems    Obesity Sister     Osteoporosis Sister     Melanoma No family hx of     Skin Cancer No family hx of     Glaucoma No family hx of      SOCIAL HISTORY  Social History     Socioeconomic History    Marital status: Single     Spouse name: Not on file    Number of children: Not on file    Years of education: Not on file    Highest education level: Not on file   Occupational History    Occupation: Disabled   Tobacco Use    Smoking status: Former     Packs/day: 0.50     Years: 6.00     Pack years: 3.00     Types: Cigarettes     Start date: 1977     Quit date: 1983     Years since quittin.1    Smokeless tobacco: Never   Substance and Sexual Activity    Alcohol use: No     Alcohol/week: 0.0  standard drinks of alcohol    Drug use: No    Sexual activity: Not Currently     Partners: Female     Birth control/protection: Abstinence   Other Topics Concern    Parent/sibling w/ CABG, MI or angioplasty before 65F 55M? Not Asked   Social History Narrative    Live independently, [place] one sister, Supriya in [place], [place].  Does live with 2 dogs.      Social Determinants of Health     Financial Resource Strain: Low Risk  (8/31/2021)    Overall Financial Resource Strain (CARDIA)     Difficulty of Paying Living Expenses: Not hard at all   Food Insecurity: Not on file   Transportation Needs: Unmet Transportation Needs (8/31/2021)    PRAPARE - Transportation     Lack of Transportation (Medical): Yes     Lack of Transportation (Non-Medical): No   Physical Activity: Not on file   Stress: Not on file   Social Connections: Socially Isolated (8/31/2021)    Social Connection and Isolation Panel [NHANES]     Frequency of Communication with Friends and Family: Once a week     Frequency of Social Gatherings with Friends and Family: Never     Attends [employer] Services: Never     Active Member of Clubs or Organizations: No     Attends Club or Organization Meetings: Never     Marital Status: Never    Interpersonal Safety: Not on file   Housing Stability: Unknown (8/31/2021)    Housing Stability Vital Sign     Unable to Pay for Housing in the Last Year: No     Number of Places Lived in the Last Year: Not on file     Unstable Housing in the Last Year: No       ROS:   Constitutional: No fever, chills, or sweats. No weight gain/loss   ENT: No visual disturbance, ear ache, epistaxis, sore throat  Allergies/Immunologic: Negative  Respiratory: No cough, hemoptysia  Cardiovascular: As per HPI  GI: No nausea, vomiting, hematemesis, melena, or hematochezia  : No urinary frequency, dysuria, or hematuria  Integument: Negative  Psychiatric: Negative  Neuro: Negative  Endocrinology: Negative  "  Musculoskeletal: Negative  Vascular: No walking impairment, claudication, ischemic rest pain or nonhealing wounds    EXAM:  /69   Pulse 57   Resp 16   Ht 1.791 m (5' 10.5\")   Wt 107.4 kg (236 lb 12.8 oz)   SpO2 95%   BMI 33.50 kg/m    In general, the patient is a pleasant male in no apparent distress.    HEENT: NC/AT.  PERRLA.  EOMI.  Sclerae white, not injected.  Nares clear.   Neck: No adenopathy.  No thyromegaly. Carotids +2/2 bilaterally without bruits.  No jugular venous distension.   Heart: RRR. Normal S1, S2 splits physiologically. No murmur, rub, click, or gallop.   Lungs: CTA.  No ronchi, wheezes, rales.  No dullness to percussion.   Abdomen: Soft, nontender, nondistended. No organomegaly. No AAA.  No bruits.   Extremities: No clubbing, cyanosis, or edema.  No wounds. No varicose veins signs of chronic venous insufficiency.   Vascular: No bruits are noted.    Labs:  LIPID RESULTS:  Lab Results   Component Value Date    CHOL 131 03/08/2023    CHOL 179 03/22/2018    HDL 50 03/08/2023    HDL 39 (L) 03/22/2018    LDL 58 03/08/2023    LDL 89 03/22/2018    TRIG 113 03/08/2023    TRIG 255 (H) 03/22/2018    CHOLHDLRATIO 3.7 06/08/2015    NHDL 81 03/08/2023    NHDL 140 (H) 03/22/2018       LIVER ENZYME RESULTS:  Lab Results   Component Value Date    AST 27 08/24/2023    AST 29 05/06/2020    ALT 23 08/24/2023    ALT 42 05/06/2020       CBC RESULTS:  Lab Results   Component Value Date    WBC 7.7 08/24/2023    WBC 6.4 08/05/2020    RBC 4.81 08/24/2023    RBC 4.27 (L) 08/05/2020    HGB 13.4 08/24/2023    HGB 9.9 (L) 08/20/2020    HCT 41.5 08/24/2023    HCT 31.2 (L) 08/20/2020    MCV 86 08/24/2023    MCV 74.3 (L) 08/20/2020    MCH 27.9 08/24/2023    MCH 23.6 (L) 08/20/2020    MCHC 32.3 08/24/2023    MCHC 31.7 (L) 08/20/2020    RDW 13.3 08/24/2023    RDW 17.0 (H) 08/05/2020     08/24/2023     08/05/2020       BMP RESULTS:  Lab Results   Component Value Date     08/24/2023     " 08/05/2020    POTASSIUM 4.7 08/24/2023    POTASSIUM 4.0 07/29/2022    POTASSIUM 4.3 08/18/2020    CHLORIDE 102 08/24/2023    CHLORIDE 105 07/29/2022    CHLORIDE 107 08/05/2020    CO2 26 08/24/2023    CO2 26 07/29/2022    CO2 26 08/05/2020    ANIONGAP 8 08/24/2023    ANIONGAP 6 07/29/2022    ANIONGAP 4 08/05/2020    GLC 85 08/24/2023    GLC 93 07/29/2022     (H) 08/18/2020    BUN 30.3 (H) 08/24/2023    BUN 17 07/29/2022    BUN 20 08/05/2020    CR 1.45 (H) 08/24/2023    CR 1.24 08/05/2020    GFRESTIMATED 54 (L) 08/24/2023    GFRESTIMATED 63 08/05/2020    GFRESTBLACK 73 08/05/2020    NIA 9.1 08/24/2023    NIA 8.8 08/05/2020        A1C RESULTS:  Lab Results   Component Value Date    A1C 5.6 08/24/2023    A1C 5.3 10/05/2016

## 2023-10-10 NOTE — PATIENT INSTRUCTIONS
1. Echocardiogram in one year  2. Carotid ultrasound in one year   3. MRA chest in one year   4. Follow up with Dr. Zhou one year

## 2023-10-17 DIAGNOSIS — M47.812 SPONDYLOSIS OF CERVICAL REGION WITHOUT MYELOPATHY OR RADICULOPATHY: ICD-10-CM

## 2023-10-17 RX ORDER — NAPROXEN 500 MG/1
500 TABLET ORAL 2 TIMES DAILY PRN
Qty: 186 TABLET | Refills: 3 | Status: ON HOLD | OUTPATIENT
Start: 2023-10-17 | End: 2024-09-13

## 2023-10-19 ENCOUNTER — TELEPHONE (OUTPATIENT)
Dept: FAMILY MEDICINE | Facility: CLINIC | Age: 63
End: 2023-10-19
Payer: COMMERCIAL

## 2023-10-19 NOTE — TELEPHONE ENCOUNTER
[facility] phone call message- patient requesting form status:    Type of Form: current letter stating needs  animal for [place]      Additional Comments: patient called in requesting a new letter for above -- I saw one that was done on 12/6/2022.-- he needs the same with current date please -- thank you!      OK to leave a message on voice mail? Yes    Primary language: English      needed? No    Call taken on October 19, 2023 at 9:21 AM by Zhanna Larson

## 2023-10-19 NOTE — LETTER
[facility] [address] [place] [address]  [phone]        October 20, 2023    Regarding:  Kobe CHAO Chanel  [address]   [place] [address]              To Whom It May Concern;       Kobe is a long time patient of our clinic and under my care since 2019.  Patient does have chronic anxiety and depression and does have ongoing need for a  animal as part of his therapy.  He has improved control of his symptoms with this  animal.  Please make accommodations and allowances for this support animal.           Sincerely,        Demi Hardin MD

## 2023-10-24 ENCOUNTER — OFFICE VISIT (OUTPATIENT)
Dept: FAMILY MEDICINE | Facility: CLINIC | Age: 63
End: 2023-10-24
Payer: COMMERCIAL

## 2023-10-24 VITALS
DIASTOLIC BLOOD PRESSURE: 69 MMHG | OXYGEN SATURATION: 93 % | TEMPERATURE: 97.9 F | WEIGHT: 245.12 LBS | SYSTOLIC BLOOD PRESSURE: 123 MMHG | HEART RATE: 59 BPM | BODY MASS INDEX: 34.67 KG/M2

## 2023-10-24 DIAGNOSIS — K59.00 CONSTIPATION, UNSPECIFIED CONSTIPATION TYPE: Primary | ICD-10-CM

## 2023-10-24 DIAGNOSIS — Z00.00 ROUTINE ADULT HEALTH MAINTENANCE: ICD-10-CM

## 2023-10-24 DIAGNOSIS — R10.11 ABDOMINAL PAIN, RIGHT UPPER QUADRANT: ICD-10-CM

## 2023-10-24 PROCEDURE — 99213 OFFICE O/P EST LOW 20 MIN: CPT | Mod: GC

## 2023-10-24 NOTE — PROGRESS NOTES
Preceptor Attestation:   Patient seen, evaluated and discussed with the resident. I have verified the content of the note, which accurately reflects my assessment of the patient and the plan of care.  Supervising Physician:Alejandra Serrano MD  [facility]

## 2023-10-24 NOTE — PATIENT INSTRUCTIONS
Placed referral for screening colonoscopy - they will call to set up, bowel prep should help with constipation  Will get right upper quadrant abdominal ultrasound   Start taking fiber supplement  Encourage fruits such as pears, peaches, plums, etc. To help with bowel movements  Encourage fluids - 8 16 oz servings of water per day  Continue taking miralax as needed with senna, docusate

## 2023-10-24 NOTE — PROGRESS NOTES
{PROVIDER CHARTING PREFERENCE:897494}    Xu Bullock is a 63 year old, presenting for the following health issues:  Abdominal Pain (Backed up stool.) and Referral (Colonoscopy wants at [facility] )      10/24/2023     8:10 AM   Additional Questions   Roomed by Ety   Accompanied by self       HPI     {MA/LPN/RN Pre-Provider Visit Orders- hCG/UA/Strep (Optional):728433}  {SUPERLIST (Optional):371904}  {additonal problems for provider to add (Optional):857332}      Review of Systems   {ROS COMP (Optional):831483}      Objective    /69 (BP Location: Right arm, Patient Position: Sitting, Cuff Size: Adult Regular)   Pulse 59   Wt 111.2 kg (245 lb 1.9 oz)   SpO2 93%   BMI 34.67 kg/m    Body mass index is 34.67 kg/m .  Physical Exam   {Exam List (Optional):099027}    {Diagnostic Test Results (Optional):290156}    {AMBULATORY ATTESTATION (Optional):038766}

## 2023-10-24 NOTE — PROGRESS NOTES
"  Assessment & Plan     (K59.00) Constipation, unspecified constipation type  (primary encounter diagnosis)  Comment: Patient with history of simone en Y gastric bypass 2005 presenting with 3-4 days of constipation and RUQ abdominal pain, no improvement with miralax, senna, docusate. Suspect this is opioid induced due to chronic pain management with fentanyl.  Plan: psyllium (METAMUCIL/KONSYL) capsule, US ABDOMEN        LIMITED  Encourage fiber supplementation, 8 16 oz servings of water per day, eating fruits (I.e. pears, plums, peaches) to help with constipation.     (R10.11) Abdominal pain, right upper quadrant  Comment: RUQ abdominal pain worse w/inspiration, no rebound tenderness and francois's sign negative. Began when constipation started. Will get RUQ US to r/o gallbladder or liver pathology  Plan: US ABDOMEN LIMITED    (Z00.00) Routine adult health maintenance  Comment: Last colon cancer screening 2014  Plan: Colonoscopy Screening  Referral placed for colon cancer screening, bowel prep should also improve constipation    Follow up in 1 month for constipation or after colonoscopy is completed. Follow up sooner if worsening abdominal pain or no improvement in symptoms.      BMI:   Estimated body mass index is 34.67 kg/m  as calculated from the following:    Height as of 10/10/23: 1.791 m (5' 10.5\").    Weight as of this encounter: 111.2 kg (245 lb 1.9 oz).       Return in about 1 month (around 11/24/2023) for Follow up - constipation, colonoscopy results.    Jaden Sauer MD  [facility] Subjective   Kobe is a 63 year old, presenting for the following health issues:  Abdominal Pain (Backed up stool.) and Referral (Colonoscopy wants at the [facility] )      10/24/2023     8:10 AM   Additional Questions   Roomed by Ety   Accompanied by self       Kobe is a 64 y/o M with extensive PMH including gastric bypass Simone en Y in 2005, HTN, prediabetes, history of chronic neck pain " with spinal surgeries, who presents to clinic today for constipation. Has been taking opioids chronically, docusate and senna but presenting with 3 days of constipation. He reports R sided abdominal pain that started about 4 days ago. He says it's a sharp pain with inspiration, 2/10 but very uncomfortable. Says he took miralax yesterday, 3 doses and states this was not effective but is passing gas. No reported blood in stool or black stool. Reports no recent changes to diet. Denies nausea or vomiting. Wanting colonoscopy and upper scope. No other history of abdominal surgeries in the past. Follows with [facility] pain clinic for chronic postoperative pain, is on fentanyl patches currently.          Review of Systems   Constitutional, HEENT, cardiovascular, pulmonary, gi and gu systems are negative, except as otherwise noted.  GI - constipation, abdominal pain      Objective    /69 (BP Location: Right arm, Patient Position: Sitting, Cuff Size: Adult Regular)   Pulse 59   Wt 111.2 kg (245 lb 1.9 oz)   SpO2 93%   BMI 34.67 kg/m    Body mass index is 34.67 kg/m .  Physical Exam   GENERAL: healthy, alert and no distress  NECK: no adenopathy, no asymmetry, masses, or scars and thyroid normal to palpation  RESP: lungs clear to auscultation - no rales, rhonchi or wheezes  CV: regular rate and rhythm, normal S1 S2, no S3 or S4, no murmur, click or rub, no peripheral edema and peripheral pulses strong  ABDOMEN: soft, nontender, without hepatosplenomegaly or masses, tenderness RUQ, and bowel sounds normal  MS: no gross musculoskeletal defects noted, no edema    Jaden Sauer MD  PGY-1  [facility] Medicine [facility]  [facility]  October 24, 2023

## 2023-10-25 ENCOUNTER — TRANSFERRED RECORDS (OUTPATIENT)
Dept: HEALTH INFORMATION MANAGEMENT | Facility: CLINIC | Age: 63
End: 2023-10-25
Payer: COMMERCIAL

## 2023-10-26 ENCOUNTER — HOSPITAL ENCOUNTER (OUTPATIENT)
Facility: AMBULATORY SURGERY CENTER | Age: 63
End: 2023-10-26
Attending: INTERNAL MEDICINE
Payer: COMMERCIAL

## 2023-10-26 ENCOUNTER — TELEPHONE (OUTPATIENT)
Dept: GASTROENTEROLOGY | Facility: CLINIC | Age: 63
End: 2023-10-26
Payer: COMMERCIAL

## 2023-10-26 NOTE — TELEPHONE ENCOUNTER
"Endoscopy Scheduling Screen    Have you had a positive Covid test in the last 14 days?  No    Are you active on MyChart?   Yes    What insurance is in the chart?  Other:  [facility]    Ordering/Referring Provider: Alejandra Serrano MD    (If ordering provider performs procedure, schedule with ordering provider unless otherwise instructed. )    BMI: Estimated body mass index is 34.67 kg/m  as calculated from the following:    Height as of 10/10/23: 1.791 m (5' 10.5\").    Weight as of 10/24/23: 111.2 kg (245 lb 1.9 oz).     Sedation Ordered  MAC/deep sedation.   BMI<= 45 45 < BMI <= 48 48 < BMI < = 50  BMI > 50   No Restrictions No [facility]  No [facility]  [facility] with exceptions Hospital Only OR Only       Are you taking any prescription medications for pain 3 or more times per week?   Yes, prep only (RN Review required.)OXYCODONE AND FENTANYL DAILY    Do you have a history of malignant hyperthermia or adverse reaction to anesthesia?  No    (Females) Are you currently pregnant?   No     Have you been diagnosed or told you have pulmonary hypertension?   No    Do you have an LVAD?  No    Have you been told you have moderate to severe sleep apnea?  No    Have you been told you have COPD, asthma, or any other lung disease?  No    Do you have any heart conditions?  No     Have you ever had an organ transplant?   No    Have you ever had or are you awaiting a heart or lung transplant?   No    Have you had a stroke or transient ischemic attack (TIA aka \"mini stroke\" in the last 6 months?   No    Have you been diagnosed with or been told you have cirrhosis of the liver?   No    Are you currently on dialysis?   No    Do you need assistance transferring?   No    BMI: Estimated body mass index is 34.67 kg/m  as calculated from the following:    Height as of 10/10/23: 1.791 m (5' 10.5\").    Weight as of 10/24/23: 111.2 kg (245 lb 1.9 oz).     Is patients BMI > 40 and scheduling location [facility]?  No    Do you take an injectable medication " for weight loss or diabetes (excluding insulin)?  No    Do you take the medication Naltrexone?  No    Do you take blood thinners?  No       Prep   Are you currently on dialysis or do you have chronic kidney disease?  No    Do you have a diagnosis of diabetes?  No    Do you have a diagnosis of cystic fibrosis (CF)?  No    On a regular basis do you go 3 -5 days between bowel movements?  No    BMI > 40?  No    Preferred Pharmacy:    [id number] - [place], [place] - [address]  [address]  [place] [address]  Phone: [phone] Fax: [phone]      Final Scheduling Details   Colonoscopy prep sent?  Golytely Extended Prep    Procedure scheduled  Colonoscopy    Surgeon:  JESUS    Date of procedure:  2/15/24     Pre-OP / PAC:   No - Not required for this site.    Location  [facility] - Per order.    Sedation   MAC/Deep Sedation - Per order.      Patient Reminders:   You will receive a call from a Nurse to review instructions and health history.  This assessment must be completed prior to your procedure.  Failure to complete the Nurse assessment may result in the procedure being cancelled.      On the day of your procedure, please designate an adult(s) who can drive you home stay with you for the next 24 hours. The medicines used in the exam will make you sleepy. You will not be able to drive.      You cannot take public transportation, ride share services, or non-medical taxi service without a responsible caregiver.  Medical transport services are allowed with the requirement that a responsible caregiver will receive you at your destination.  We require that drivers and caregivers are confirmed prior to your procedure.

## 2023-10-26 NOTE — TELEPHONE ENCOUNTER
Pre Assessment RN Review    Focused Assessments    Pain Medication Review    Per scheduling questionnaire, patient reports taking prescription pain medication three or more times per week.    Per chart review, patient does not have an active prescription for an opioid medication.      Scheduling Status & Recommendations    Sedation: MAC/Deep Sedation - Per order.  Location Type: Hospital - Per RN assessment.  Prep: Golytely Extended Prep - Per exclusion criteria.    Noted carotid artery stenosis (<50%) in cardiology visit 10/10/23. Hospital setting recommended.     Angely Tafoya RN  Endoscopy Procedure Pre Assessment RN  [phone] option 4

## 2023-10-27 ENCOUNTER — HOSPITAL ENCOUNTER (OUTPATIENT)
Dept: ULTRASOUND IMAGING | Facility: HOSPITAL | Age: 63
Discharge: HOME | End: 2023-10-27
Attending: FAMILY MEDICINE | Admitting: FAMILY MEDICINE
Payer: COMMERCIAL

## 2023-10-27 ENCOUNTER — TELEPHONE (OUTPATIENT)
Dept: FAMILY MEDICINE | Facility: CLINIC | Age: 63
End: 2023-10-27
Payer: COMMERCIAL

## 2023-10-27 PROCEDURE — 76705 ECHO EXAM OF ABDOMEN: CPT

## 2023-10-27 NOTE — TELEPHONE ENCOUNTER
[facility] phone call message- general phone call:    Reason for call: Patient called asking to see Dr. Hardin, informed her scheduled is booked. Offered blue team patient stated he already saw Camacho and wants to see Dr. Hardin instead to address his concerns. Please let me know if patient can see you. Thank you    Return call needed: Yes    OK to leave a message on voice mail? Yes    Primary language: English      needed? No    Call taken on October 27, 2023 at 2:28 PM by Anjali Kennedy

## 2023-10-28 ENCOUNTER — MYC MEDICAL ADVICE (OUTPATIENT)
Dept: FAMILY MEDICINE | Facility: CLINIC | Age: 63
End: 2023-10-28
Payer: COMMERCIAL

## 2023-10-28 DIAGNOSIS — K83.8 ACQUIRED DILATION OF BILE DUCT: Primary | ICD-10-CM

## 2023-10-31 NOTE — TELEPHONE ENCOUNTER
Called and left message and sent mychart message.    Advised appointment at [facility] to determine what exactly might be causing his pain by right ribs with breathing.

## 2023-11-01 ENCOUNTER — OFFICE VISIT (OUTPATIENT)
Dept: FAMILY MEDICINE | Facility: CLINIC | Age: 63
End: 2023-11-01
Payer: COMMERCIAL

## 2023-11-01 VITALS
OXYGEN SATURATION: 95 % | HEART RATE: 72 BPM | SYSTOLIC BLOOD PRESSURE: 106 MMHG | RESPIRATION RATE: 18 BRPM | DIASTOLIC BLOOD PRESSURE: 68 MMHG

## 2023-11-01 DIAGNOSIS — R10.11 ABDOMINAL PAIN, RIGHT UPPER QUADRANT: Primary | ICD-10-CM

## 2023-11-01 DIAGNOSIS — R74.8 ELEVATED LIVER ENZYMES: ICD-10-CM

## 2023-11-01 DIAGNOSIS — K83.8 ACQUIRED DILATION OF BILE DUCT: ICD-10-CM

## 2023-11-01 DIAGNOSIS — K59.00 CONSTIPATION, UNSPECIFIED CONSTIPATION TYPE: ICD-10-CM

## 2023-11-01 PROCEDURE — 83690 ASSAY OF LIPASE: CPT

## 2023-11-01 PROCEDURE — 36415 COLL VENOUS BLD VENIPUNCTURE: CPT

## 2023-11-01 PROCEDURE — 99215 OFFICE O/P EST HI 40 MIN: CPT | Mod: GC

## 2023-11-01 PROCEDURE — 80074 ACUTE HEPATITIS PANEL: CPT

## 2023-11-01 PROCEDURE — 80076 HEPATIC FUNCTION PANEL: CPT

## 2023-11-01 NOTE — PATIENT INSTRUCTIONS
"Nice meeting you today!     Someone will call you with lab results and to schedule the MRI of your abdomen.     Try the \"P\" fruits - prunes, pears and pineapple - to see if that will get your bowels moving. Could also try Ojeda milk of magnesia as needed.   "

## 2023-11-01 NOTE — TELEPHONE ENCOUNTER
Reviewed ultrasound results and see note from recent exam.    Will order MRCP and Hepatic and lipase labs for at [facility].

## 2023-11-01 NOTE — PROGRESS NOTES
"  Assessment & Plan     Abdominal pain, right upper quadrant  Acquired dilation of bile duct  Elevated liver enzymes  Patient presents for follow up regarding abd pain. Was initially seen 10/24 for this, RUQ US ordered at that time. Showed distended GB with mild wall thickening as well as mild extrahepatic bile duct dilatation but no gallstones. MRCP was recommended. Patient continues to be symptomatic today. Has had poor PO intake, which is likely now contributing to worsening generalized weakness. Vitals reassuring, appears non-toxic, abdominal exam actually largely benign. Has more pain with inspiration which seems to actually be alleviated with palpation interestingly. Obtained labs and planned to work on next steps for getting MRCP scheduled.   UPDATE 11/2: Overnight, critical lab came back for significantly elevated ALT and AST. Resident on-call called patient and discussed presenting at ED but patient opted to wait for STAT MRCP this morning. PCP placed referral to [facility] for MRCP, but upon calling patient, patient ultimately decided to head into ED given increased fatigue. Per chart check, patient now being worked up in ED.   - Lipase  - Hepatic function panel  - MR Abdomen MRCP w/o & w Contrast    Constipation, unspecified constipation type  Patient has long-term bowel regimen history given adverse effects of chronic pain meds. However, \"it hasn't been cutting it for me\" over past couple weeks at least. Usually has 1-2 bowel movements daily but now going 4 days at a time, with significant straining. No black/bloody/tarry stools. Adding magnesium citrate \"is the only thing that works.\" Encouraged patient to try adding milk of magnesia as needed, as well as increasing \"P\" fruits in diet.       Ankush Yañez MD  [facility]    Xu Bullock is a 63 year old, presenting for the following health issues:  Abdominal Pain (Gallbladder distended  )        11/1/2023     2:18 PM " "  Additional Questions   Roomed by hser say   Accompanied by self       HPI   Follow up visit. Patient saw Dr. Sauer 10/24 for RUQ pain, also constipation.  US 10/27: distended GB with mild wall thickening but no gallstones, mild extrahepatic bile duct dilatation.   MRCP recommended.     \"Woke up one day with a pain\" radiating from RUQ to LUQ. Started \"on Monday.\" ??  Only made worse by inspiration.   Happens any time of day. Wakes up with it.   No fevers, chills, sweating, N/V.   Hasn't been eating much because of it, \"so I'm getting weak.\"   Still struggling with constipation, \"since Tuesday.\" ?? Mag citrate the only thing helpful. Already on docusate, Senokot, and Metamucil. Miralax doesn't do anything. Doing both docusate and senokot in both AM and night.   Last BM this afternoon. Lots of straining, affirms hard stool, no black/bloody/tarry.   Previously 1-2 BM everyday. Now has gone 4 days \"before I can get it to go.\" No urinary sxs.   Pain regimen has been stable long-term. Trying to back off pain regimen d/t constipation.     Review of Systems   Constitutional, HEENT, cardiovascular, pulmonary, gi and gu systems are negative, except as otherwise noted.      Objective    /68   Pulse 72   Resp 18   SpO2 95%   There is no height or weight on file to calculate BMI.  Physical Exam   GENERAL: intermittent grimacing and grabbing abdomen, no acute distress, non-toxic appearing.  EYES: Eyes grossly normal to inspection, PERRL and conjunctivae and sclerae normal  RESP: lungs clear to auscultation - no rales, rhonchi or wheezes  CV: regular rate and rhythm, normal S1 S2, no S3 or S4, no murmur, click or rub, no peripheral edema and peripheral pulses strong  ABDOMEN: soft, no hepatosplenomegaly, no masses and bowel sounds normal. Only TTP with deep palpation of RUQ. (-) Art.  SKIN: no suspicious lesions or rashes  NEURO: Normal strength and tone, mentation intact and speech normal  PSYCH: mentation appears " normal, affect normal/bright    Results for orders placed or performed in visit on 11/01/23   Lipase     Status: Abnormal   Result Value Ref Range    Lipase 12 (L) 13 - 60 U/L   Hepatic function panel     Status: Abnormal   Result Value Ref Range    Protein Total 7.6 6.4 - 8.3 g/dL    Albumin 3.8 3.5 - 5.2 g/dL    Bilirubin Total 2.2 (H) <=1.2 mg/dL    Alkaline Phosphatase 805 (H) 40 - 129 U/L    AST 2,003 (HH) 0 - 45 U/L    ALT 1,035 (HH) 0 - 70 U/L    Bilirubin Direct 1.45 (H) 0.00 - 0.30 mg/dL       ----- Service Performed and Documented by Resident or Fellow ------

## 2023-11-02 ENCOUNTER — HOSPITAL ENCOUNTER (INPATIENT)
Facility: HOSPITAL | Age: 63
LOS: 2 days | Discharge: HOME | DRG: 418 | End: 2023-11-04
Attending: FAMILY MEDICINE | Admitting: FAMILY MEDICINE
Payer: COMMERCIAL

## 2023-11-02 ENCOUNTER — APPOINTMENT (OUTPATIENT)
Dept: MRI IMAGING | Facility: HOSPITAL | Age: 63
DRG: 418 | End: 2023-11-02
Attending: FAMILY MEDICINE
Payer: COMMERCIAL

## 2023-11-02 ENCOUNTER — TELEPHONE (OUTPATIENT)
Dept: FAMILY MEDICINE | Facility: CLINIC | Age: 63
End: 2023-11-02
Payer: COMMERCIAL

## 2023-11-02 DIAGNOSIS — K80.50 CHOLEDOCHOLITHIASIS: ICD-10-CM

## 2023-11-02 DIAGNOSIS — R10.11 ABDOMINAL PAIN, RIGHT UPPER QUADRANT: Primary | ICD-10-CM

## 2023-11-02 DIAGNOSIS — K81.0 ACUTE CHOLECYSTITIS: ICD-10-CM

## 2023-11-02 DIAGNOSIS — R74.01 TRANSAMINITIS: ICD-10-CM

## 2023-11-02 DIAGNOSIS — R74.8 ELEVATED LIVER ENZYMES: ICD-10-CM

## 2023-11-02 DIAGNOSIS — R10.11 RUQ ABDOMINAL PAIN: ICD-10-CM

## 2023-11-02 DIAGNOSIS — Z90.49 S/P LAPAROSCOPIC CHOLECYSTECTOMY: ICD-10-CM

## 2023-11-02 DIAGNOSIS — K83.8 ACQUIRED DILATION OF BILE DUCT: Primary | ICD-10-CM

## 2023-11-02 DIAGNOSIS — Z86.59 HISTORY OF CLAUSTROPHOBIA: ICD-10-CM

## 2023-11-02 DIAGNOSIS — I25.5 ISCHEMIC CARDIOMYOPATHY: ICD-10-CM

## 2023-11-02 LAB
ALBUMIN SERPL BCG-MCNC: 3.8 G/DL (ref 3.5–5.2)
ALBUMIN SERPL BCG-MCNC: 3.9 G/DL (ref 3.5–5.2)
ALP SERPL-CCNC: 753 U/L (ref 40–129)
ALP SERPL-CCNC: 805 U/L (ref 40–129)
ALT SERPL W P-5'-P-CCNC: 1035 U/L (ref 0–70)
ALT SERPL W P-5'-P-CCNC: 800 U/L (ref 0–70)
ANION GAP SERPL CALCULATED.3IONS-SCNC: 12 MMOL/L (ref 7–15)
AST SERPL W P-5'-P-CCNC: 2003 U/L (ref 0–45)
AST SERPL W P-5'-P-CCNC: 864 U/L (ref 0–45)
BASOPHILS # BLD AUTO: 0.1 10E3/UL (ref 0–0.2)
BASOPHILS NFR BLD AUTO: 0 %
BILIRUB DIRECT SERPL-MCNC: 1.14 MG/DL (ref 0–0.3)
BILIRUB DIRECT SERPL-MCNC: 1.45 MG/DL (ref 0–0.3)
BILIRUB SERPL-MCNC: 2 MG/DL
BILIRUB SERPL-MCNC: 2.2 MG/DL
BUN SERPL-MCNC: 28.6 MG/DL (ref 8–23)
CALCIUM SERPL-MCNC: 9.4 MG/DL (ref 8.8–10.2)
CHLORIDE SERPL-SCNC: 96 MMOL/L (ref 98–107)
CREAT SERPL-MCNC: 1.58 MG/DL (ref 0.67–1.17)
DEPRECATED HCO3 PLAS-SCNC: 26 MMOL/L (ref 22–29)
EGFRCR SERPLBLD CKD-EPI 2021: 49 ML/MIN/1.73M2
EOSINOPHIL # BLD AUTO: 0.1 10E3/UL (ref 0–0.7)
EOSINOPHIL NFR BLD AUTO: 1 %
ERYTHROCYTE [DISTWIDTH] IN BLOOD BY AUTOMATED COUNT: 13.5 % (ref 10–15)
GLUCOSE SERPL-MCNC: 126 MG/DL (ref 70–99)
HAV IGM SERPL QL IA: NONREACTIVE
HBV CORE IGM SERPL QL IA: NONREACTIVE
HBV SURFACE AG SERPL QL IA: NONREACTIVE
HCT VFR BLD AUTO: 38.6 % (ref 40–53)
HCV AB SERPL QL IA: REACTIVE
HGB BLD-MCNC: 12.6 G/DL (ref 13.3–17.7)
HOLD SPECIMEN: NORMAL
IMM GRANULOCYTES # BLD: 0.1 10E3/UL
IMM GRANULOCYTES NFR BLD: 1 %
INR PPP: 1.27 (ref 0.85–1.15)
LIPASE SERPL-CCNC: 12 U/L (ref 13–60)
LIPASE SERPL-CCNC: 12 U/L (ref 13–60)
LYMPHOCYTES # BLD AUTO: 0.5 10E3/UL (ref 0.8–5.3)
LYMPHOCYTES NFR BLD AUTO: 3 %
MAGNESIUM SERPL-MCNC: 2.2 MG/DL (ref 1.7–2.3)
MCH RBC QN AUTO: 27.5 PG (ref 26.5–33)
MCHC RBC AUTO-ENTMCNC: 32.6 G/DL (ref 31.5–36.5)
MCV RBC AUTO: 84 FL (ref 78–100)
MONOCYTES # BLD AUTO: 1.3 10E3/UL (ref 0–1.3)
MONOCYTES NFR BLD AUTO: 9 %
NEUTROPHILS # BLD AUTO: 13 10E3/UL (ref 1.6–8.3)
NEUTROPHILS NFR BLD AUTO: 86 %
NRBC # BLD AUTO: 0 10E3/UL
NRBC BLD AUTO-RTO: 0 /100
PLATELET # BLD AUTO: 313 10E3/UL (ref 150–450)
POTASSIUM SERPL-SCNC: 4.5 MMOL/L (ref 3.4–5.3)
PROT SERPL-MCNC: 7.3 G/DL (ref 6.4–8.3)
PROT SERPL-MCNC: 7.6 G/DL (ref 6.4–8.3)
RBC # BLD AUTO: 4.59 10E6/UL (ref 4.4–5.9)
SODIUM SERPL-SCNC: 134 MMOL/L (ref 135–145)
WBC # BLD AUTO: 15 10E3/UL (ref 4–11)

## 2023-11-02 PROCEDURE — 250N000011 HC RX IP 250 OP 636: Performed by: FAMILY MEDICINE

## 2023-11-02 PROCEDURE — 258N000003 HC RX IP 258 OP 636

## 2023-11-02 PROCEDURE — 36415 COLL VENOUS BLD VENIPUNCTURE: CPT | Performed by: STUDENT IN AN ORGANIZED HEALTH CARE EDUCATION/TRAINING PROGRAM

## 2023-11-02 PROCEDURE — 82248 BILIRUBIN DIRECT: CPT | Performed by: FAMILY MEDICINE

## 2023-11-02 PROCEDURE — 74183 MRI ABD W/O CNTR FLWD CNTR: CPT

## 2023-11-02 PROCEDURE — 83690 ASSAY OF LIPASE: CPT | Performed by: FAMILY MEDICINE

## 2023-11-02 PROCEDURE — 80053 COMPREHEN METABOLIC PANEL: CPT | Performed by: FAMILY MEDICINE

## 2023-11-02 PROCEDURE — 250N000011 HC RX IP 250 OP 636: Mod: JZ | Performed by: EMERGENCY MEDICINE

## 2023-11-02 PROCEDURE — 85025 COMPLETE CBC W/AUTO DIFF WBC: CPT | Performed by: FAMILY MEDICINE

## 2023-11-02 PROCEDURE — 250N000011 HC RX IP 250 OP 636: Mod: JZ

## 2023-11-02 PROCEDURE — 250N000013 HC RX MED GY IP 250 OP 250 PS 637: Performed by: FAMILY MEDICINE

## 2023-11-02 PROCEDURE — 96374 THER/PROPH/DIAG INJ IV PUSH: CPT | Mod: 59

## 2023-11-02 PROCEDURE — 99285 EMERGENCY DEPT VISIT HI MDM: CPT | Mod: 25

## 2023-11-02 PROCEDURE — 120N000001 HC R&B MED SURG/OB

## 2023-11-02 PROCEDURE — 83735 ASSAY OF MAGNESIUM: CPT | Performed by: FAMILY MEDICINE

## 2023-11-02 PROCEDURE — 255N000002 HC RX 255 OP 636: Mod: JZ | Performed by: EMERGENCY MEDICINE

## 2023-11-02 PROCEDURE — 96375 TX/PRO/DX INJ NEW DRUG ADDON: CPT

## 2023-11-02 PROCEDURE — 85610 PROTHROMBIN TIME: CPT | Performed by: FAMILY MEDICINE

## 2023-11-02 PROCEDURE — 250N000013 HC RX MED GY IP 250 OP 250 PS 637

## 2023-11-02 PROCEDURE — A9585 GADOBUTROL INJECTION: HCPCS | Mod: JZ | Performed by: EMERGENCY MEDICINE

## 2023-11-02 RX ORDER — AMOXICILLIN 250 MG
2 CAPSULE ORAL 2 TIMES DAILY PRN
Status: DISCONTINUED | OUTPATIENT
Start: 2023-11-02 | End: 2023-11-04 | Stop reason: HOSPADM

## 2023-11-02 RX ORDER — CLONAZEPAM 0.5 MG/1
0.5 TABLET ORAL 3 TIMES DAILY PRN
Status: DISCONTINUED | OUTPATIENT
Start: 2023-11-02 | End: 2023-11-04 | Stop reason: HOSPADM

## 2023-11-02 RX ORDER — PROCHLORPERAZINE 25 MG
25 SUPPOSITORY, RECTAL RECTAL EVERY 12 HOURS PRN
Status: DISCONTINUED | OUTPATIENT
Start: 2023-11-02 | End: 2023-11-04 | Stop reason: HOSPADM

## 2023-11-02 RX ORDER — BUPROPION HYDROCHLORIDE 100 MG/1
200 TABLET, EXTENDED RELEASE ORAL 2 TIMES DAILY
Status: DISCONTINUED | OUTPATIENT
Start: 2023-11-02 | End: 2023-11-04 | Stop reason: HOSPADM

## 2023-11-02 RX ORDER — ONDANSETRON 2 MG/ML
4 INJECTION INTRAMUSCULAR; INTRAVENOUS EVERY 6 HOURS PRN
Status: DISCONTINUED | OUTPATIENT
Start: 2023-11-02 | End: 2023-11-04 | Stop reason: HOSPADM

## 2023-11-02 RX ORDER — ACETAMINOPHEN 650 MG/1
650 SUPPOSITORY RECTAL EVERY 6 HOURS PRN
Status: DISCONTINUED | OUTPATIENT
Start: 2023-11-02 | End: 2023-11-04 | Stop reason: HOSPADM

## 2023-11-02 RX ORDER — LISINOPRIL 5 MG/1
10 TABLET ORAL 2 TIMES DAILY
Status: DISCONTINUED | OUTPATIENT
Start: 2023-11-02 | End: 2023-11-04 | Stop reason: HOSPADM

## 2023-11-02 RX ORDER — PIPERACILLIN SODIUM, TAZOBACTAM SODIUM 3; .375 G/15ML; G/15ML
3.38 INJECTION, POWDER, LYOPHILIZED, FOR SOLUTION INTRAVENOUS EVERY 8 HOURS
Status: DISCONTINUED | OUTPATIENT
Start: 2023-11-02 | End: 2023-11-04 | Stop reason: HOSPADM

## 2023-11-02 RX ORDER — NALOXONE HYDROCHLORIDE 0.4 MG/ML
0.2 INJECTION, SOLUTION INTRAMUSCULAR; INTRAVENOUS; SUBCUTANEOUS
Status: DISCONTINUED | OUTPATIENT
Start: 2023-11-02 | End: 2023-11-04 | Stop reason: HOSPADM

## 2023-11-02 RX ORDER — SODIUM CHLORIDE 9 MG/ML
INJECTION, SOLUTION INTRAVENOUS CONTINUOUS
Status: DISCONTINUED | OUTPATIENT
Start: 2023-11-02 | End: 2023-11-04 | Stop reason: HOSPADM

## 2023-11-02 RX ORDER — ENOXAPARIN SODIUM 100 MG/ML
40 INJECTION SUBCUTANEOUS EVERY 24 HOURS
Status: DISCONTINUED | OUTPATIENT
Start: 2023-11-02 | End: 2023-11-04 | Stop reason: HOSPADM

## 2023-11-02 RX ORDER — DOCUSATE SODIUM 100 MG/1
100 CAPSULE, LIQUID FILLED ORAL 2 TIMES DAILY PRN
Status: DISCONTINUED | OUTPATIENT
Start: 2023-11-02 | End: 2023-11-04 | Stop reason: HOSPADM

## 2023-11-02 RX ORDER — PANTOPRAZOLE SODIUM 40 MG/1
40 TABLET, DELAYED RELEASE ORAL DAILY PRN
Status: DISCONTINUED | OUTPATIENT
Start: 2023-11-02 | End: 2023-11-04 | Stop reason: HOSPADM

## 2023-11-02 RX ORDER — TIZANIDINE 2 MG/1
4 TABLET ORAL 2 TIMES DAILY
Status: DISCONTINUED | OUTPATIENT
Start: 2023-11-02 | End: 2023-11-04 | Stop reason: HOSPADM

## 2023-11-02 RX ORDER — ONDANSETRON 4 MG/1
4 TABLET, ORALLY DISINTEGRATING ORAL EVERY 6 HOURS PRN
Status: DISCONTINUED | OUTPATIENT
Start: 2023-11-02 | End: 2023-11-04 | Stop reason: HOSPADM

## 2023-11-02 RX ORDER — AMLODIPINE BESYLATE 5 MG/1
5 TABLET ORAL 2 TIMES DAILY
Status: DISCONTINUED | OUTPATIENT
Start: 2023-11-02 | End: 2023-11-04 | Stop reason: HOSPADM

## 2023-11-02 RX ORDER — LORAZEPAM 1 MG/1
1 TABLET ORAL 3 TIMES DAILY PRN
Status: DISCONTINUED | OUTPATIENT
Start: 2023-11-03 | End: 2023-11-04 | Stop reason: HOSPADM

## 2023-11-02 RX ORDER — CARVEDILOL 12.5 MG/1
50 TABLET ORAL 2 TIMES DAILY WITH MEALS
Status: DISCONTINUED | OUTPATIENT
Start: 2023-11-02 | End: 2023-11-04 | Stop reason: HOSPADM

## 2023-11-02 RX ORDER — LISINOPRIL 5 MG/1
10 TABLET ORAL DAILY
Status: DISCONTINUED | OUTPATIENT
Start: 2023-11-03 | End: 2023-11-02

## 2023-11-02 RX ORDER — AMOXICILLIN 250 MG
1 CAPSULE ORAL 2 TIMES DAILY PRN
Status: DISCONTINUED | OUTPATIENT
Start: 2023-11-02 | End: 2023-11-04 | Stop reason: HOSPADM

## 2023-11-02 RX ORDER — ESCITALOPRAM OXALATE 10 MG/1
30 TABLET ORAL DAILY
Status: DISCONTINUED | OUTPATIENT
Start: 2023-11-03 | End: 2023-11-04 | Stop reason: HOSPADM

## 2023-11-02 RX ORDER — LORAZEPAM 2 MG/ML
0.5 INJECTION INTRAMUSCULAR ONCE
Status: COMPLETED | OUTPATIENT
Start: 2023-11-02 | End: 2023-11-02

## 2023-11-02 RX ORDER — AMLODIPINE BESYLATE 2.5 MG/1
2.5 TABLET ORAL DAILY
Status: DISCONTINUED | OUTPATIENT
Start: 2023-11-03 | End: 2023-11-02

## 2023-11-02 RX ORDER — GADOBUTROL 604.72 MG/ML
10 INJECTION INTRAVENOUS ONCE
Status: COMPLETED | OUTPATIENT
Start: 2023-11-02 | End: 2023-11-02

## 2023-11-02 RX ORDER — AMLODIPINE BESYLATE 2.5 MG/1
2.5 TABLET ORAL 2 TIMES DAILY
Status: DISCONTINUED | OUTPATIENT
Start: 2023-11-02 | End: 2023-11-02

## 2023-11-02 RX ORDER — PROCHLORPERAZINE MALEATE 10 MG
10 TABLET ORAL EVERY 6 HOURS PRN
Status: DISCONTINUED | OUTPATIENT
Start: 2023-11-02 | End: 2023-11-04 | Stop reason: HOSPADM

## 2023-11-02 RX ORDER — LISINOPRIL 5 MG/1
10 TABLET ORAL 2 TIMES DAILY
Status: DISCONTINUED | OUTPATIENT
Start: 2023-11-02 | End: 2023-11-02

## 2023-11-02 RX ORDER — NALOXONE HYDROCHLORIDE 0.4 MG/ML
0.4 INJECTION, SOLUTION INTRAMUSCULAR; INTRAVENOUS; SUBCUTANEOUS
Status: DISCONTINUED | OUTPATIENT
Start: 2023-11-02 | End: 2023-11-04 | Stop reason: HOSPADM

## 2023-11-02 RX ORDER — ACETAMINOPHEN 325 MG/1
650 TABLET ORAL EVERY 6 HOURS PRN
Status: DISCONTINUED | OUTPATIENT
Start: 2023-11-02 | End: 2023-11-04 | Stop reason: HOSPADM

## 2023-11-02 RX ORDER — OXYCODONE HYDROCHLORIDE 5 MG/1
10 TABLET ORAL EVERY 4 HOURS PRN
Status: DISCONTINUED | OUTPATIENT
Start: 2023-11-02 | End: 2023-11-04 | Stop reason: HOSPADM

## 2023-11-02 RX ORDER — ATORVASTATIN CALCIUM 40 MG/1
40 TABLET, FILM COATED ORAL EVERY EVENING
Status: DISCONTINUED | OUTPATIENT
Start: 2023-11-03 | End: 2023-11-04 | Stop reason: HOSPADM

## 2023-11-02 RX ORDER — PIPERACILLIN SODIUM, TAZOBACTAM SODIUM 3; .375 G/15ML; G/15ML
3.38 INJECTION, POWDER, LYOPHILIZED, FOR SOLUTION INTRAVENOUS ONCE
Status: COMPLETED | OUTPATIENT
Start: 2023-11-02 | End: 2023-11-02

## 2023-11-02 RX ADMIN — BUPROPION HYDROCHLORIDE 200 MG: 100 TABLET, FILM COATED, EXTENDED RELEASE ORAL at 20:25

## 2023-11-02 RX ADMIN — LISINOPRIL 10 MG: 5 TABLET ORAL at 20:26

## 2023-11-02 RX ADMIN — AMLODIPINE BESYLATE 5 MG: 5 TABLET ORAL at 20:28

## 2023-11-02 RX ADMIN — ENOXAPARIN SODIUM 40 MG: 40 INJECTION SUBCUTANEOUS at 20:25

## 2023-11-02 RX ADMIN — CARVEDILOL 50 MG: 12.5 TABLET, FILM COATED ORAL at 20:26

## 2023-11-02 RX ADMIN — CLONAZEPAM 0.5 MG: 0.5 TABLET ORAL at 20:26

## 2023-11-02 RX ADMIN — SENNOSIDES AND DOCUSATE SODIUM 2 TABLET: 8.6; 5 TABLET ORAL at 20:26

## 2023-11-02 RX ADMIN — PIPERACILLIN AND TAZOBACTAM 3.38 G: 3; .375 INJECTION, POWDER, LYOPHILIZED, FOR SOLUTION INTRAVENOUS at 22:38

## 2023-11-02 RX ADMIN — LORAZEPAM 0.5 MG: 2 INJECTION INTRAMUSCULAR; INTRAVENOUS at 14:51

## 2023-11-02 RX ADMIN — GADOBUTROL 10 ML: 604.72 INJECTION INTRAVENOUS at 15:45

## 2023-11-02 RX ADMIN — TIZANIDINE 4 MG: 2 TABLET ORAL at 20:26

## 2023-11-02 RX ADMIN — DOCUSATE SODIUM 100 MG: 100 CAPSULE, LIQUID FILLED ORAL at 20:26

## 2023-11-02 RX ADMIN — PIPERACILLIN AND TAZOBACTAM 3.38 G: 3; .375 INJECTION, POWDER, LYOPHILIZED, FOR SOLUTION INTRAVENOUS at 16:24

## 2023-11-02 RX ADMIN — SODIUM CHLORIDE: 900 INJECTION INTRAVENOUS at 19:44

## 2023-11-02 ASSESSMENT — ACTIVITIES OF DAILY LIVING (ADL)
ADLS_ACUITY_SCORE: 35

## 2023-11-02 NOTE — ED PROVIDER NOTES
EMERGENCY DEPARTMENT ENCOUNTER      NAME: Kobe Buchanan  AGE: 63 year old male  YOB: 1960  MRN: [id number]  EVALUATION DATE & TIME: 11/2/2023 10:18 AM    PCP: Demi Hardin    ED PROVIDER: Arron Clement M.D.    Chief Complaint   Patient presents with    Abdominal Pain       FINAL IMPRESSION:  1. Transaminitis    2. RUQ abdominal pain        ED COURSE & MEDICAL DECISION MAKING:    Pertinent Labs & Imaging studies independently interpreted by me. (See chart for details)  10:37 AM  Patient seen and examined, reviewed most recent office visit from yesterday when patient was seen for right upper quadrant pain and constipation, also was complaining of weakness.  Labs done at that visit demonstrated significant elevated AST, ALT, alkaline phosphatase as well as lesser elevations of the bilirubin but normal lipase.  Patient also had an ultrasound on October 27 which demonstrated biliary dilatation but no stones or findings of acute cholecystitis.  Patient presents to the emergency department today for follow-up of his elevated LFTs and general weakness.  On exam here, no right upper quadrant tenderness, vitally stable, no jaundice or scleral icterus.  Labs ordered.  11:36 AM labs independently interpreted by me with leukocytosis, white count 15, no recent prior for comparison.  Hepatic panel with elevated alkaline phosphatase, AST, and ALT but improved from yesterday, direct bilirubin is slightly elevated at 1.14 total bilirubin is elevated at 2, lipase 12, unchanged from yesterday.  Creatinine elevated at 1.58 which appears chronic.  MRI is pending and will discuss with GI or general surgery depending on MRI results.  Consider right upper quadrant ultrasound for evaluation blood cell count is elevated and hepatic panel is improving, however MRCP should give information about biliary obstruction or acute cholecystitis as well.  2:02 PM called MRI, patient is next in about 1 1/2 hours.  3:09  PM  Signout to Dr. Joseph for follow-up MRCP.    At the conclusion of the encounter I discussed the results of all of the tests and the disposition. The questions were answered. The patient or family acknowledged understanding and was agreeable with the care plan.     Medical Decision Making    History:  Supplemental history from: Documented in chart, if applicable  External Record(s) reviewed: Documented in chart, if applicable.    Work Up:  Chart documentation includes differential considered and any EKGs or imaging independently interpreted by provider, where specified.  In additional to work up documented, I considered the following work up: Documented in chart, if applicable.    External consultation:  Discussion of management with another provider: Documented in chart, if applicable    Complicating factors:  Care impacted by chronic illness: Diabetes and Hypertension  Care affected by social determinants of health: N/A    Disposition considerations: Admission considered. Patient was signed out to the oncoming physician, disposition pending.      MEDICATIONS GIVEN IN THE EMERGENCY:  Medications   piperacillin-tazobactam (ZOSYN) 3.375 g vial to attach to  mL bag (has no administration in time range)   LORazepam (ATIVAN) injection 0.5 mg (0.5 mg Intravenous $Given 11/2/23 1751)       NEW PRESCRIPTIONS STARTED AT TODAY'S ER VISIT  New Prescriptions    No medications on file       =================================================================    HPI    Patient information was obtained from: patient      Kobe Buchanan is a 63 year old male with a pertinent history of hypertension who presents to this ED for evaluation of abdominal pain and elevated liver test.  Patient had some right-sided abdominal pain started about 4 days ago, was seen in clinic yesterday and had labs done which showed elevation in the AST, ALT, and alkaline phosphatase.  Right upper quadrant ultrasound subsequently showed  dilatation of the common duct with no stones or findings for acute cholecystitis.  Patient was referred to the ER for MRCP.  Continues to have some right-sided abdominal pain especially with breathing, otherwise no complaints.      REVIEW OF SYSTEMS   Review of Systems   All other systems reviewed and negative    PAST MEDICAL HISTORY:  Past Medical History:   Diagnosis Date    Acute systolic heart failure (H) 01/28/2020    Added automatically from request for surgery [id number]    NEMESIO (acute kidney injury) (H24) 02/16/2020    Anxiety     Ascending aortic aneurysm (H24)     Atrial fibrillation (H) [I48.91] 10/10/2016    Bicuspid aortic valve     BPPV (benign paroxysmal positional vertigo) 07/11/2014    Chronic narcotic use     Chronic neck pain     Chronic osteoarthritis     Degenerative joint disease     Depression     Hx of type 2 diabetes mellitus 03/04/2021    Hyperlipidemia 04/10/2012    Hypertension     Iron deficiency anemia secondary to blood loss (chronic) 08/25/2020    MSSA bacteremia 10/13/2019    Multiple stiff joints     Neck injuries     NSTEMI (non-ST elevated myocardial infarction) (H) 01/29/2020    Obesity 02/09/2015    Pain in shoulder     S/P reverse total shoulder arthroplasty, 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/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    S/P reverse total shoulder arthroplasty, right 08/18/2020    Septic joint of left shoulder region (H) 11/13/2019    Septic joint of right shoulder region (H) 10/13/2019    s/p washout    Skin picking habit     Sleep apnea     does not use cpap    Status post total shoulder arthroplasty, left 03/03/2020    Added automatically from request for surgery [id number]       PAST SURGICAL HISTORY:  Past Surgical History:   Procedure Laterality Date    ARTHROPLASTY SHOULDER  04/15/2014    Procedure: Left Total Shoulder Arthroplasty ;  Surgeon: Analilia Aecves MD;  [facility]    ARTHROPLASTY SHOULDER Right 08/26/2014    Procedure: ARTHROPLASTY SHOULDER;  Surgeon: Analilia Aceves MD;  [facility]     ARTHROSCOPY SHOULDER WITH BIOPSY(IES) Left 01/28/2020    Procedure: Left shoulder arthroscopy and biopsy for culture;  Surgeon: Analilia Aceves MD;  [facility]    BYPASS GASTRIC TAVO-EN-Y, LIVER BIOPSY, COMBINED  08/08/2005    COLONOSCOPY  06/30/2014    Procedure: COMBINED COLONOSCOPY, SINGLE BIOPSY/POLYPECTOMY BY BIOPSY;  Surgeon: Chester Patton MD;  [facility]    CV CORONARY ANGIOGRAM  01/30/2020    Procedure: CV CORONARY ANGIOGRAM;  Surgeon: Néstor Walls MD;  [facility]    CYSTOSCOPY, BLADDER NECK CUTS, COMBINED N/A 07/18/2016    Procedure: COMBINED CYSTOSCOPY, BLADDER NECK CUTS;  Surgeon: Ritu Leslie MD;  [facility]    ESOPHAGOSCOPY, GASTROSCOPY, DUODENOSCOPY (EGD), COMBINED  06/30/2014    Procedure: COMBINED ESOPHAGOSCOPY, GASTROSCOPY, DUODENOSCOPY (EGD), BIOPSY SINGLE OR MULTIPLE;  Surgeon: Chester Patton MD;  [facility]    EXCISE MASS FINGER  06/14/2011    Procedure:EXCISE MASS FINGER; Middle Flexor Cyst; Surgeon:SHAYY RUSSELL; [facility]    IR FINE NEEDLE ASPIRATION W ULTRASOUND  11/13/2019    IR PICC PLACEMENT > 5 YRS OF AGE  11/19/2019    LASER HOLMIUM LITHOTRIPSY BLADDER N/A 10/15/2014    Procedure: LASER HOLMIUM LITHOTRIPSY BLADDER;  Surgeon: Sahil Taveras MD;  [facility]    LASER KTP GREEN LIGHT PHOTOSELECTIVE VAPORIZATION PROSTATE  01/23/2014    Procedure: LASER KTP GREEN LIGHT PHOTOSELECTIVE VAPORIZATION PROSTATE;  Greenlight Photovaporization Of Prostate  ;  Surgeon: Sahil Taveras MD;  [facility]    OTHER SURGICAL HISTORY  10/04/2016    REPAIR ANEURYSM ASCENDING AORTA    OTHER SURGICAL HISTORY Right 09/23/2019    IRRIGATION AND DEBRIDEMENT UPPER EXTREMITYshoulder    RELEASE TRIGGER FINGER Right 03/31/2017    Procedure: RELEASE TRIGGER FINGER;  Surgeon: Juan Carlos Blunt MD;  [facility]    REMOVE ANTIBIOTIC CEMENT BEADS / SPACER SHOULDER Left  05/08/2020    Procedure: Left shoulder removal of spacer;  Surgeon: Analilia Aceves MD;  [facility]    REMOVE ANTIBIOTIC CEMENT BEADS / SPACER SHOULDER Right 08/18/2020    Procedure: Removal of right shoulder antibiotic spacer;  Surgeon: Analilia Aceves MD;  [facility]    REMOVE HARDWARE ARTHROPLASTY SHOULDER. I&D, PLACE ANTIBIOTIC CEMENT BE Left 11/15/2019    Procedure: Explantation of left total shoulder arthroplasty, irrigation and debridement, and placement of antibiotic spacer;  Surgeon: Analilia Aceves MD;  [facility]    REPAIR ANEURYSM ASCENDING AORTA N/A 10/04/2016    Procedure: REPAIR ANEURYSM ASCENDING AORTA;  Surgeon: Mckenzie Townsend MD;  [facility]    REVERSE ARTHROPLASTY SHOULDER Right 08/18/2020    Procedure: and conversion to reverse total shoulder arthroplasty;  Surgeon: Analilia Aceves MD;  [facility]       CURRENT MEDICATIONS:    Current Facility-Administered Medications   Medication    piperacillin-tazobactam (ZOSYN) 3.375 g vial to attach to  mL bag     Current Outpatient Medications   Medication    amLODIPine-benazepril (LOTREL) 5-20 MG capsule    amoxicillin (AMOXIL) 500 MG capsule    aspirin 81 MG EC tablet    atorvastatin (LIPITOR) 40 MG tablet    buPROPion (WELLBUTRIN SR) 200 MG 12 hr tablet    carvedilol (COREG) 25 MG tablet    chlorhexidine (PERIDEX) 0.12 % solution    clonazePAM (KLONOPIN) 0.5 MG tablet    Cyanocobalamin 5000 MCG SUBL    cyclobenzaprine (FLEXERIL) 10 MG tablet    desoximetasone (TOPICORT) 0.25 % external cream    diazepam (VALIUM) 5 MG tablet    docusate sodium (COLACE) 100 MG capsule    escitalopram (LEXAPRO) 20 MG tablet    Fe Heme Polypeptide-folic acid (PROFERRIN-FORTE) 12-1 MG TABS    fentaNYL (DURAGESIC) 50 mcg/hr 72 hr patch    fluocinonide (LIDEX) 0.05 % external ointment    GAVILYTE-G 236 g suspension    Iron Heme Polypeptide (PROFERRIN ES) 12 MG TABS    latanoprost (XALATAN) 0.005 % ophthalmic  solution    mometasone (ELOCON) 0.1 % external ointment    Multiple Minerals-Vitamins (CALCIUM CITRATE-MAG-MINERALS) TABS    multivitamin w/minerals (THERA-VIT-M) tablet    naloxone (NARCAN) nasal spray    naproxen (EC-NAPROSYN) 500 MG EC tablet    oxyCODONE IR (ROXICODONE) 10 MG tablet    pantoprazole (PROTONIX) 40 MG EC tablet    propranolol (INDERAL) 10 MG tablet    psyllium (METAMUCIL/KONSYL) capsule    Pyrithione Zinc 2 % SHAM    senna-docusate (SENOKOT-S/PERICOLACE) 8.6-50 MG tablet    sucralfate (CARAFATE) 1 GM tablet    tacrolimus (PROTOPIC) 0.1 % external ointment    tacrolimus (PROTOPIC) 0.1 % ointment    tiZANidine (ZANAFLEX) 4 MG tablet    triamcinolone (KENALOG) 0.1 % external ointment    triamcinolone (KENALOG) 0.1 % external ointment    vitamin B-Complex     Facility-Administered Medications Ordered in Other Encounters   Medication    sodium chloride (PF) 0.9% PF flush 3 mL       ALLERGIES:  Allergies   Allergen Reactions    Ciprofloxacin      History of aortic aneurysms    Tape [Adhesive Tape] Blisters     Blistering - please use paper tape       FAMILY HISTORY:  Family History   Problem Relation Age of Onset    Arthritis Other     Gastrointestinal Disease Other     Cardiovascular Father         aortic aneurysm    Arrhythmia Father     Nephrolithiasis Father     Sleep Apnea Father     Anxiety Disorder Father     Depression Father     Hypertension Father     Obesity Father     Hyperlipidemia Father     Coronary Artery Disease Father     Low Back Problems Father     Spine Problems Father     Cardiovascular Father     Other - See Comments Father         low back problems, spine problems    Anxiety Disorder Mother     Hypertension Mother     Osteoporosis Mother     Obesity Mother     Hyperlipidemia Mother     Low Back Problems Mother     Macular Degeneration Mother     Cataracts Mother     Other - See Comments Mother         low back problems    Anxiety Disorder Sister     Hypertension Sister      Osteoporosis Sister     Obesity Sister     Hyperlipidemia Sister     Low Back Problems Sister     Spine Problems Sister     Anxiety Disorder Sister     Depression Sister     Hypertension Sister     Osteoporosis Sister     Obesity Sister     Hyperlipidemia Sister     Low Back Problems Sister     Anxiety Disorder Sister     Hyperlipidemia Sister     Hypertension Sister     Obesity Sister     Other - See Comments Sister         low back problems, spine problems    Osteoporosis Sister     Anxiety Disorder Sister     Depression Sister     Hyperlipidemia Sister     Hypertension Sister     Other - See Comments Sister         low back problems    Obesity Sister     Osteoporosis Sister     Melanoma No family hx of     Skin Cancer No family hx of     Glaucoma No family hx of        SOCIAL HISTORY:   Social History     Socioeconomic History    Marital status: Single   Occupational History    Occupation: Disabled   Tobacco Use    Smoking status: Former     Packs/day: 0.50     Years: 6.00     Additional pack years: 0.00     Total pack years: 3.00     Types: Cigarettes     Start date: 1977     Quit date: 1983     Years since quittin.1    Smokeless tobacco: Never   Substance and Sexual Activity    Alcohol use: No     Alcohol/week: 0.0 standard drinks of alcohol    Drug use: No    Sexual activity: Not Currently     Partners: Female     Birth control/protection: Abstinence   Social History Narrative    [place] one sister, Supri[place] in [facility].  Does live with 2 dogs.      Social Determinants of Health     Financial Resource Strain: Low Risk  (10/24/2023)    Financial Resource Strain     Within the past 12 months, have you or your family members you live with been unable to get utilities (heat, electricity) when it was really needed?: No   Food Insecurity: Low Risk  (10/24/2023)    Food Insecurity     Within the past 12 months, did you worry that your food would run out  "before you got money to buy more?: No     Within the past 12 months, did the food you bought just not last and you didn t have money to get more?: No   Transportation Needs: Low Risk  (10/24/2023)    Transportation Needs     Within the past 12 months, has lack of transportation kept you from medical appointments, getting your medicines, non-medical meetings or appointments, work, or from getting things that you need?: No   Social Connections: Socially Isolated (8/31/2021)    Social Connection and Isolation Panel [NHANES]     Frequency of Communication with Friends and Family: Once a week     Frequency of Social Gatherings with Friends and Family: Never     Attends Jew Services: Never     Active Member of Clubs or Organizations: No     Attends Club or Organization Meetings: Never     Marital Status: Never    Interpersonal Safety: Low Risk  (11/1/2023)    Interpersonal Safety     Do you feel physically and emotionally safe where you currently live?: Yes     Within the past 12 months, have you been hit, slapped, kicked or otherwise physically hurt by someone?: No     Within the past 12 months, have you been humiliated or emotionally abused in other ways by your partner or ex-partner?: No   Housing Stability: Low Risk  (10/24/2023)    Housing Stability     Do you have housing? : Yes     Are you worried about losing your housing?: No       VITALS:  /52   Pulse 60   Temp 98.1  F (36.7  C) (Oral)   Resp 18   Ht 1.778 m (5' 10\")   Wt 108.9 kg (240 lb)   SpO2 92%   BMI 34.44 kg/m      PHYSICAL EXAM:  Physical Exam  Vitals and nursing note reviewed.   Constitutional:       Appearance: Normal appearance.   HENT:      Head: Normocephalic and atraumatic.      Right Ear: External ear normal.      Left Ear: External ear normal.      Nose: Nose normal.      Mouth/Throat:      Mouth: Mucous membranes are moist.   Eyes:      Extraocular Movements: Extraocular movements intact.      Conjunctiva/sclera: " Conjunctivae normal.      Pupils: Pupils are equal, round, and reactive to light.   Cardiovascular:      Rate and Rhythm: Normal rate and regular rhythm.   Pulmonary:      Effort: Pulmonary effort is normal.      Breath sounds: Normal breath sounds. No wheezing or rales.   Abdominal:      General: Abdomen is flat. There is no distension.      Palpations: Abdomen is soft.      Tenderness: There is no abdominal tenderness. There is no guarding.   Musculoskeletal:         General: Normal range of motion.      Cervical back: Normal range of motion and neck supple.      Right lower leg: No edema.      Left lower leg: No edema.   Lymphadenopathy:      Cervical: No cervical adenopathy.   Skin:     General: Skin is warm and dry.      Coloration: Skin is pale.   Neurological:      General: No focal deficit present.      Mental Status: He is alert and oriented to person, place, and time. Mental status is at baseline.      Comments: No gross focal neurologic deficits   Psychiatric:         Mood and Affect: Mood normal.         Behavior: Behavior normal.         Thought Content: Thought content normal.          LAB:  All pertinent labs reviewed and interpreted.  Results for orders placed or performed during the hospital encounter of 11/02/23   Extra Blue Top Tube   Result Value Ref Range    Hold Specimen JIC    Extra Red Top Tube   Result Value Ref Range    Hold Specimen JIC    Extra Green Top (Lithium Heparin) Tube   Result Value Ref Range    Hold Specimen JIC    Extra Purple Top Tube   Result Value Ref Range    Hold Specimen JIC    Result Value Ref Range    INR 1.27 (H) 0.85 - 1.15   Basic metabolic panel   Result Value Ref Range    Sodium 134 (L) 135 - 145 mmol/L    Potassium 4.5 3.4 - 5.3 mmol/L    Chloride 96 (L) 98 - 107 mmol/L    Carbon Dioxide (CO2) 26 22 - 29 mmol/L    Anion Gap 12 7 - 15 mmol/L    Urea Nitrogen 28.6 (H) 8.0 - 23.0 mg/dL    Creatinine 1.58 (H) 0.67 - 1.17 mg/dL    GFR Estimate 49 (L) >60 mL/min/1.73m2     Calcium 9.4 8.8 - 10.2 mg/dL    Glucose 126 (H) 70 - 99 mg/dL   Hepatic function panel   Result Value Ref Range    Protein Total 7.3 6.4 - 8.3 g/dL    Albumin 3.9 3.5 - 5.2 g/dL    Bilirubin Total 2.0 (H) <=1.2 mg/dL    Alkaline Phosphatase 753 (H) 40 - 129 U/L     (HH) 0 - 45 U/L     (HH) 0 - 70 U/L    Bilirubin Direct 1.14 (H) 0.00 - 0.30 mg/dL   Result Value Ref Range    Lipase 12 (L) 13 - 60 U/L   Result Value Ref Range    Magnesium 2.2 1.7 - 2.3 mg/dL   CBC with platelets and differential   Result Value Ref Range    WBC Count 15.0 (H) 4.0 - 11.0 10e3/uL    RBC Count 4.59 4.40 - 5.90 10e6/uL    Hemoglobin 12.6 (L) 13.3 - 17.7 g/dL    Hematocrit 38.6 (L) 40.0 - 53.0 %    MCV 84 78 - 100 fL    MCH 27.5 26.5 - 33.0 pg    MCHC 32.6 31.5 - 36.5 g/dL    RDW 13.5 10.0 - 15.0 %    Platelet Count 313 150 - 450 10e3/uL    % Neutrophils 86 %    % Lymphocytes 3 %    % Monocytes 9 %    % Eosinophils 1 %    % Basophils 0 %    % Immature Granulocytes 1 %    NRBCs per 100 WBC 0 <1 /100    Absolute Neutrophils 13.0 (H) 1.6 - 8.3 10e3/uL    Absolute Lymphocytes 0.5 (L) 0.8 - 5.3 10e3/uL    Absolute Monocytes 1.3 0.0 - 1.3 10e3/uL    Absolute Eosinophils 0.1 0.0 - 0.7 10e3/uL    Absolute Basophils 0.1 0.0 - 0.2 10e3/uL    Absolute Immature Granulocytes 0.1 <=0.4 10e3/uL    Absolute NRBCs 0.0 10e3/uL       RADIOLOGY:  Reviewed all pertinent imaging. Please see official radiology report.  MR Abdomen MRCP w/o & w Contrast    (Results Pending)         Arron Clement M.D.  Emergency Medicine  [facility] EMERGENCY DEPARTMENT  [address] [place] [id number]  [phone]  Dept: [phone]       Arron Clement MD  11/02/23 1515

## 2023-11-02 NOTE — ED NOTES
Bed: JNED-36  Expected date:   Expected time:   Means of arrival:   Comments:  Jennifer ARMENDARIZ

## 2023-11-02 NOTE — ED NOTES
EMERGENCY DEPARTMENT SIGN OUT NOTE        ED COURSE AND MEDICAL DECISION MAKING  Patient was signed out to me by Dr Arron Clement at 2:55 PM    In brief, Kobe Buchanan is a 63 year old male who initially presented for RUQ abdominal pain and constipation.      At time of sign out, disposition was pending abdomen MRI.     ED Course as of 11/02/23 1633   Thu Nov 02, 2023   1453 Pt signed out to me by Dr Jam ORDONEZ MD with transaminitis and right sided abdominal pain, RUQ US with ductal dilation, no stones, unable to undergo MRCP in outpaitent setting, transaminitis mildly improved, WBC 15, MRP ordered and plan to dispo per GI after MRCP    1622 MRCP with signs of choledocolithaisis with associated possible developing gangrenous cholecystitis, surgery and [facility] paged along with admitting physician   1626 I spoke with Dr Valle from [facility] who is at bedside with patient now   1630 I spoke with Dr Blue from surgery who will see patient. I spoke with [facility] resident Tarah and endorsed him to med surg   1631 Spoke to resident hospitalist, Dr. Calderon with [facility] who accepts patient for admission.     FINAL IMPRESSION    1. Transaminitis    2. RUQ abdominal pain    3. Choledocholithiasis    4. Acute cholecystitis        ED MEDS  Medications   piperacillin-tazobactam (ZOSYN) 3.375 g vial to attach to  mL bag (3.375 g Intravenous $New Bag 11/2/23 1624)   LORazepam (ATIVAN) injection 0.5 mg (0.5 mg Intravenous $Given 11/2/23 1451)   gadobutrol (GADAVIST) injection 10 mL (10 mLs Intravenous $Given 11/2/23 1545)       LAB  Labs Ordered and Resulted from Time of ED Arrival to Time of ED Departure   INR - Abnormal       Result Value    INR 1.27 (*)    BASIC METABOLIC PANEL - Abnormal    Sodium 134 (*)     Potassium 4.5      Chloride 96 (*)     Carbon Dioxide (CO2) 26      Anion Gap 12      Urea Nitrogen 28.6 (*)     Creatinine 1.58 (*)     GFR Estimate 49 (*)     Calcium 9.4      Glucose 126 (*)    HEPATIC  FUNCTION PANEL - Abnormal    Protein Total 7.3      Albumin 3.9      Bilirubin Total 2.0 (*)     Alkaline Phosphatase 753 (*)      (*)      (*)     Bilirubin Direct 1.14 (*)    LIPASE - Abnormal    Lipase 12 (*)    CBC WITH PLATELETS AND DIFFERENTIAL - Abnormal    WBC Count 15.0 (*)     RBC Count 4.59      Hemoglobin 12.6 (*)     Hematocrit 38.6 (*)     MCV 84      MCH 27.5      MCHC 32.6      RDW 13.5      Platelet Count 313      % Neutrophils 86      % Lymphocytes 3      % Monocytes 9      % Eosinophils 1      % Basophils 0      % Immature Granulocytes 1      NRBCs per 100 WBC 0      Absolute Neutrophils 13.0 (*)     Absolute Lymphocytes 0.5 (*)     Absolute Monocytes 1.3      Absolute Eosinophils 0.1      Absolute Basophils 0.1      Absolute Immature Granulocytes 0.1      Absolute NRBCs 0.0     MAGNESIUM - Normal    Magnesium 2.2         RADIOLOGY    MR Abdomen MRCP w/o & w Contrast   Final Result   IMPRESSION:   1.  Intrahepatic and extrahepatic biliary dilation with a few small filling defects in the distal common bile duct consistent with choledocholithiasis. Abrupt narrowing at the ampulla could also represent a component of ampullary stenosis.      2.  Distended gallbladder with cholelithiasis and borderline wall thickening. There are also intraluminal curvilinear membranes, which could be sloughed mucosa in the setting of gangrenous cholecystitis.          I, Neil Cardenas, am serving as a scribe to document services personally performed by Alexandra Yap MD, based on my observations and the provider's statements to me. I, Alexandra Yap MD, attest that Neil Cardenas is acting in a scribe capacity, has observed my performance of the services and has documented them in accordance with my direction.    Alexandra Yap MD  [facility] EMERGENCY DEPARTMENT  [address] [place] [id number]  [phone]       Alexandra Yap MD  11/02/23 4602

## 2023-11-02 NOTE — ED TRIAGE NOTES
Patient presents here for evaluation of elevated liver enzymes that was found yesterday. Ultrasound had revealed abnormalities with his gallbladder.

## 2023-11-02 NOTE — CONSULTS
CONSULTING PHYSICIAN   Alexandra Yap MD     REASON FOR CONSULTATION   Abd pain     CHIEF COMPLAINT   Kobe Buchanan came to the hospital for evaluation of Abd pain     HISTORY OF PRESENT ILLNESS   Kobe Buchanan is a 63 year old male with past medical hx of Atrial fibrillation, DM, HTN, hyperlipidemia, CAD, hx of Simone En Y gastric bypass admitted with abdominal pain.    Patient notes pain since Oct 30. RUQ, worse with eating.     He went to his clinic and U/S showed distended GB without gallstones.   MRCP follow up today shows choledocholithiasis and gangrenous cholecystitis    No fever  WBC of 15    Hx of RNY GB at the [facility] >20 years ago.      PAST HISTORY   Past Medical History:   Diagnosis Date    Acute systolic heart failure (H) 01/28/2020    Added automatically from request for surgery [id number]    NEMESIO (acute kidney injury) (H24) 02/16/2020    Anxiety     Ascending aortic aneurysm (H24)     Atrial fibrillation (H) [I48.91] 10/10/2016    Bicuspid aortic valve     BPPV (benign paroxysmal positional vertigo) 07/11/2014    Chronic narcotic use     Chronic neck pain     Chronic osteoarthritis     Degenerative joint disease     Depression     Hx of type 2 diabetes mellitus 03/04/2021    Hyperlipidemia 04/10/2012    Hypertension     Iron deficiency anemia secondary to blood loss (chronic) 08/25/2020    MSSA bacteremia 10/13/2019    Multiple stiff joints     Neck injuries     NSTEMI (non-ST elevated myocardial infarction) (H) 01/29/2020    Obesity 02/09/2015    Pain in shoulder     S/P reverse total shoulder arthroplasty, left 07/07/2020    S/P reverse total shoulder arthroplasty, right 08/18/2020    Septic joint of left shoulder region (H) 11/13/2019    Septic joint of right shoulder region (H) 10/13/2019    s/p washout    Skin picking habit     Sleep apnea     does not use cpap    Status post total shoulder arthroplasty, left 03/03/2020    Added automatically from  request for surgery [id number]      Past Surgical History:   Procedure Laterality Date    ARTHROPLASTY SHOULDER  04/15/2014    Procedure: Left Total Shoulder Arthroplasty ;  Surgeon: Analilia Aceves MD;  [facility]    ARTHROPLASTY SHOULDER Right 08/26/2014    Procedure: ARTHROPLASTY SHOULDER;  Surgeon: Analilia Aceves MD;  [facility]    ARTHROSCOPY SHOULDER WITH BIOPSY(IES) Left 01/28/2020    Procedure: Left shoulder arthroscopy and biopsy for culture;  Surgeon: Analilia Aceves MD;  [facility]    BYPASS GASTRIC TAVO-EN-Y, LIVER BIOPSY, COMBINED  08/08/2005    COLONOSCOPY  06/30/2014    Procedure: COMBINED COLONOSCOPY, SINGLE BIOPSY/POLYPECTOMY BY BIOPSY;  Surgeon: Chester Patton MD;  [facility]    CV CORONARY ANGIOGRAM  01/30/2020    Procedure: CV CORONARY ANGIOGRAM;  Surgeon: Néstor Walls MD;  [facility]    CYSTOSCOPY, BLADDER NECK CUTS, COMBINED N/A 07/18/2016    Procedure: COMBINED CYSTOSCOPY, BLADDER NECK CUTS;  Surgeon: Ritu Leslie MD;  [facility]    ESOPHAGOSCOPY, GASTROSCOPY, DUODENOSCOPY (EGD), COMBINED  06/30/2014    Procedure: COMBINED ESOPHAGOSCOPY, GASTROSCOPY, DUODENOSCOPY (EGD), BIOPSY SINGLE OR MULTIPLE;  Surgeon: Chester Patton MD;  [facility]    EXCISE MASS FINGER  06/14/2011    Procedure:EXCISE MASS FINGER; Middle Flexor Cyst; Surgeon:SHAYY RUSSELL; [facility]    IR FINE NEEDLE ASPIRATION W ULTRASOUND  11/13/2019    IR PICC PLACEMENT > 5 YRS OF AGE  11/19/2019    LASER HOLMIUM LITHOTRIPSY BLADDER N/A 10/15/2014    Procedure: LASER HOLMIUM LITHOTRIPSY BLADDER;  Surgeon: Sahil Taveras MD;  [facility]    LASER KTP GREEN LIGHT PHOTOSELECTIVE VAPORIZATION PROSTATE  01/23/2014    Procedure: LASER KTP GREEN LIGHT PHOTOSELECTIVE VAPORIZATION PROSTATE;  Greenlight Photovaporization Of Prostate  ;  Surgeon: Sahil Taveras MD;  [facility]    OTHER  SURGICAL HISTORY  10/04/2016    REPAIR ANEURYSM ASCENDING AORTA    OTHER SURGICAL HISTORY Right 09/23/2019    IRRIGATION AND DEBRIDEMENT UPPER EXTREMITYshoulder    RELEASE TRIGGER FINGER Right 03/31/2017    Procedure: RELEASE TRIGGER FINGER;  Surgeon: Juan Carlos Blunt MD;  [facility]    REMOVE ANTIBIOTIC CEMENT BEADS / SPACER SHOULDER Left 05/08/2020    Procedure: Left shoulder removal of spacer;  Surgeon: Analilia Aceves MD;  [facility]    REMOVE ANTIBIOTIC CEMENT BEADS / SPACER SHOULDER Right 08/18/2020    Procedure: Removal of right shoulder antibiotic spacer;  Surgeon: Analilia Aceves MD;  [facility]    REMOVE HARDWARE ARTHROPLASTY SHOULDER. I&D, PLACE ANTIBIOTIC CEMENT BE Left 11/15/2019    Procedure: Explantation of left total shoulder arthroplasty, irrigation and debridement, and placement of antibiotic spacer;  Surgeon: Analilia cAeves MD;  [facility]    REPAIR ANEURYSM ASCENDING AORTA N/A 10/04/2016    Procedure: REPAIR ANEURYSM ASCENDING AORTA;  Surgeon: Mckenzie Townsend MD;  [facility]    REVERSE ARTHROPLASTY SHOULDER Right 08/18/2020    Procedure: and conversion to reverse total shoulder arthroplasty;  Surgeon: Analilia Aceves MD;  [facility]        Family History Social History   Family History   Problem Relation Age of Onset    Arthritis Other     Gastrointestinal Disease Other     Cardiovascular Father         aortic aneurysm    Arrhythmia Father     Nephrolithiasis Father     Sleep Apnea Father     Anxiety Disorder Father     Depression Father     Hypertension Father     Obesity Father     Hyperlipidemia Father     Coronary Artery Disease Father     Low Back Problems Father     Spine Problems Father     Cardiovascular Father     Other - See Comments Father         low back problems, spine problems    Anxiety Disorder Mother     Hypertension Mother     Osteoporosis Mother     Obesity Mother     Hyperlipidemia Mother     Low Back  "Problems Mother     Macular Degeneration Mother     Cataracts Mother     Other - See Comments Mother         low back problems    Anxiety Disorder Sister     Hypertension Sister     Osteoporosis Sister     Obesity Sister     Hyperlipidemia Sister     Low Back Problems Sister     Spine Problems Sister     Anxiety Disorder Sister     Depression Sister     Hypertension Sister     Osteoporosis Sister     Obesity Sister     Hyperlipidemia Sister     Low Back Problems Sister     Anxiety Disorder Sister     Hyperlipidemia Sister     Hypertension Sister     Obesity Sister     Other - See Comments Sister         low back problems, spine problems    Osteoporosis Sister     Anxiety Disorder Sister     Depression Sister     Hyperlipidemia Sister     Hypertension Sister     Other - See Comments Sister         low back problems    Obesity Sister     Osteoporosis Sister     Melanoma No family hx of     Skin Cancer No family hx of     Glaucoma No family hx of     Social History     Tobacco Use    Smoking status: Former     Packs/day: 0.50     Years: 6.00     Additional pack years: 0.00     Total pack years: 3.00     Types: Cigarettes     Start date: 1977     Quit date: 1983     Years since quittin.1    Smokeless tobacco: Never   Substance Use Topics    Alcohol use: No     Alcohol/week: 0.0 standard drinks of alcohol    Drug use: No          MEDICATIONS & ALLERGIES   (Not in a hospital admission)       ALLERGIES   Allergies   Allergen Reactions    Ciprofloxacin      History of aortic aneurysms    Tape [Adhesive Tape] Blisters     Blistering - please use paper tape         REVIEW OF SYSTEMS   A comprehensive review of systems was performed and was otherwise noncontributory.     OBJECTIVE   Vitals Blood pressure 109/52, pulse 60, temperature 98.1  F (36.7  C), temperature source Oral, resp. rate 18, height 1.778 m (5' 10\"), weight 108.9 kg (240 lb), SpO2 92%.           Physical  Exam  GENERAL: alert and oriented, well " nourished in no apparent distress   SKIN: warm and dry, no rashes   HEENT: atraumatic, anicteric, moist mucous membranes, neck soft/supple    PULMONARY: normal resp effort, breath sounds clear to auscultation bilateral   CARDIOVASCULAR: normal rate and rhythm, no murmurs, no edema   ABDOMEN: + RUQ tenderness, no distention, bowel sounds normal   MUSCULOSKELETAL: joints and gait normal   NEUROLOGICAL: appropriate mental status, grossly intact  DERM: no rash, no jaundice   PSYCHIATRIC: normal mood, affect and insight        LABORATORY    ELECTROLYTE PANEL   Recent Labs   Lab 11/02/23  1023   *   POTASSIUM 4.5   CHLORIDE 96*   CO2 26   *   CR 1.58*   BUN 28.6*      HEMATOLOGY PANEL   Recent Labs   Lab 11/02/23  1023   HGB 12.6*   MCV 84   WBC 15.0*      INR 1.27*      LIVER AND PANCREAS PANEL   Recent Labs   Lab 11/02/23  1023 11/01/23  1517   * 2,003*   * 1,035*   ALKPHOS 753* 805*   BILITOTAL 2.0* 2.2*   LIPASE 12* 12*     IMAGING STUDIES    EXAM: US ABDOMEN LIMITED  LOCATION: [facility]  DATE: 10/27/2023     INDICATION: RUQ pain, r o cholelithiasis or liver dysfunction  COMPARISON: None.  TECHNIQUE: Limited abdominal ultrasound.     FINDINGS:     GALLBLADDER: Gallbladder distended with mild wall thickening. Otherwise normal. No gallstones.     BILE DUCTS: Mild extrahepatic bile duct dilatation. The common duct measures 10 mm.     LIVER: Normal parenchyma with smooth contour. No focal mass.     RIGHT KIDNEY: No hydronephrosis.     PANCREAS: The visualized portions are normal.     No ascites.                                                                      IMPRESSION:  1.  Gallbladder is distended with mild wall thickening but no gallstones.     2.  Mild extrahepatic bile duct dilatation with no apparent cause.     3.  Consider MRI abdomen with MRCP with IV contrast to better characterize.      EXAM: MR ABDOMEN MRCP W/O and W CONTRAST  LOCATION: [facility]  DATE: 11/2/2023     INDICATION: Biliary obstruction on ultrasound, elevated LFTs  COMPARISON: Ultrasound 10/27/2023  TECHNIQUE: Routine MR liver/pancreas protocol including axial and coronal MRCP sequences. 2D and 3D reconstruction performed by MR technologist including MIP reconstruction and slab cholangiograms. If performed with contrast, additional dynamic T1 post   IV contrast images.  CONTRAST: 10 ml gadavist      FINDINGS:      MRCP: Mild intrahepatic and extrahepatic biliary dilation with the common bile duct measuring up to 1.2 cm. A few small rounded filling defects are present in the distal common bile duct, just proximal to the ampulla (e.g. 7/24). Abrupt sharp tapering at   the ampulla. No biliary wall thickening or enhancement. Multiple layering gallstones and sludge, as well as curvilinear filling defects in the fundus. Normal pancreatic duct anatomy. Pancreatic duct dilation.     LIVER: No significant hepatic abnormality. No focal masses. No evidence of cirrhosis or significant fat or iron deposition.     PANCREAS: No evidence of mass or pancreatitis. Diffuse fatty infiltration.     ADDITIONAL FINDINGS: No significant abnormality in the spleen, kidneys, and adrenal glands. No adenopathy. No ascites. Simple left renal cyst, which does not require further follow-up.                                                                      IMPRESSION:  1.  Intrahepatic and extrahepatic biliary dilation with a few small filling defects in the distal common bile duct consistent with choledocholithiasis. Abrupt narrowing at the ampulla could also represent a component of ampullary stenosis.     2.  Distended gallbladder with cholelithiasis and borderline wall thickening. There are also intraluminal curvilinear membranes, which could be sloughed mucosa in the setting of gangrenous cholecystitis.    I have reviewed the current diagnostic and laboratory tests.           IMPRESSION    Kobe Buchanan is a 63 year old male with past medical hx of Atrial fibrillation, DM, HTN, hyperlipidemia, CAD, hx of Simone En Y gastric bypass admitted with abdominal pain.    Cholecystitis with choledocholithiasis- will need drainage and surgery. In setting of RNYGB, this will need surgical access for ERCP.   Gen Surgery consulted     GI can be available for combined surgery/GI tomorrow at any time.     If cholecystostomy tube planned, can perform combo surgery/ERCP at a later date.                  Paco Valle MD  Thank you for the opportunity to participate in the care of this patient.   Please feel free to call me with any questions or concerns.  Phone number [phone].

## 2023-11-02 NOTE — CONFIDENTIAL NOTE
"Received referral from PCP, Demi Hardin, she is currently at [facility]. Our ADS provider accepted the patient. When calling the patient, he relayed, \"feels dead tired, like a hose. I think I should go to ER.\" Patient declined, ADS and went to [facility] ED.  "

## 2023-11-02 NOTE — RESULT ENCOUNTER NOTE
I spoke with the acute diagnostic services via telephone call and communicated these results.     Demi Hardin MD

## 2023-11-02 NOTE — MEDICATION SCRIBE - ADMISSION MEDICATION HISTORY
Medication Scribe Admission Medication History    Admission medication history is complete. The information provided in this note is only as accurate as the sources available at the time of the update.    Information Source(s): Patient and CareEverywhere/SureScripts via in-person    Pertinent Information: pt states hasn't been able to  following med's from pharmacy because of cost  Proferrin-forte, proferrin es, tacrolimus external oint, kenalog  Pt states no longer taking amoxil,asprin,peridex, cyanocobalamin,topicort, calcium citrate, thera-vit-m, narcan nasal spray, pyrithion zinc    Changes made to PTA medication list:  Added: None  Deleted: amoxil,asprin,peridex, cyanocobalamin,topicort, calcium citrate, thera-vit-m, narcan nasal spray, pyrithion zinc  Changed: None    Medication Affordability:  Not including over the counter (OTC) medications, was there a time in the past 3 months when you did not take your medications as prescribed because of cost?: No    Allergies reviewed with patient and updates made in EHR: yes    Medication History Completed By: CATHI FRAZIER 11/2/2023 4:53 PM    PTA Med List   Medication Sig Last Dose    amLODIPine-benazepril (LOTREL) 5-20 MG capsule Take 1 capsule by mouth 2 times daily 11/2/2023 at am    atorvastatin (LIPITOR) 40 MG tablet Take 1 tablet (40 mg) by mouth daily 11/1/2023 at pm    buPROPion (WELLBUTRIN SR) 200 MG 12 hr tablet TAKE 1 TABLET BY MOUTH 2 TIMES DAILY 11/2/2023 at am    carvedilol (COREG) 25 MG tablet Take 2 tablets (50 mg) by mouth 2 times daily (with meals) 11/2/2023 at am    clonazePAM (KLONOPIN) 0.5 MG tablet Take 1 tablet (0.5 mg) by mouth 3 times daily as needed for anxiety 11/2/2023 at am    cyclobenzaprine (FLEXERIL) 10 MG tablet Take 1 tablet (10 mg) by mouth 3 times daily as needed for muscle spasms Past Month at prn    diazepam (VALIUM) 5 MG tablet Take on tablet 20 minutes prior to MRI due to claustrophobia. 11/2/2023 at am    docusate  sodium (COLACE) 100 MG capsule Take 2 capsule in the morning and 3 capsules in the evening 11/2/2023 at am    escitalopram (LEXAPRO) 20 MG tablet Take 1.5 tablets (30 mg) by mouth daily 11/2/2023 at am    Fe Heme Polypeptide-folic acid (PROFERRIN-FORTE) 12-1 MG TABS Take 1 tablet by mouth 2 times daily More than a month at  states cant afford med    fentaNYL (DURAGESIC) 50 mcg/hr 72 hr patch Place 1 patch onto the skin every 72 hours 11/2/2023 at am    fluocinonide (LIDEX) 0.05 % external ointment Apply twice daily to itchy skin nodules for 1-2 weeks at a time. More than a month at prn    GAVILYTE-G 236 g suspension Take by oral route as directed in colonoscopy prep instructions received from [facility]* More than a month at prn    latanoprost (XALATAN) 0.005 % ophthalmic solution Place 1 drop Into the left eye daily Past Month at prn    mometasone (ELOCON) 0.1 % external ointment Apply topically nightly as needed (rash on arms) Past Month at prn    naproxen (EC-NAPROSYN) 500 MG EC tablet Take 1 tablet (500 mg) by mouth 2 times daily as needed (pain) 11/1/2023 at pm    oxyCODONE IR (ROXICODONE) 10 MG tablet Take 1 tablet by mouth every 4 hours as needed for pain, max 4 tablet(s) per day 11/2/2023 at am    pantoprazole (PROTONIX) 40 MG EC tablet Take 1 tablet (40 mg) by mouth daily as needed for heartburn 11/2/2023 at am    propranolol (INDERAL) 10 MG tablet Take 1 tablet (10 mg) by mouth 3 times daily as needed (panic attack) Past Month at prn    psyllium (METAMUCIL/KONSYL) capsule Take 1 capsule by mouth daily Past Month at prn    senna-docusate (SENOKOT-S/PERICOLACE) 8.6-50 MG tablet Take 1-2 tablets by mouth 2 times daily 11/2/2023 at am    sucralfate (CARAFATE) 1 GM tablet Take 1 tablet (1 g) by mouth 2 times daily as needed (Reflux) Past Month at prn    tacrolimus (PROTOPIC) 0.1 % external ointment Apply topically as needed (rash on arms) Apply to affected areas on body. Unknown at pt unable to pay for med     tacrolimus (PROTOPIC) 0.1 % ointment Apply topically as needed Apply to affected areas on body. Unknown at pt unable to pay for med    tiZANidine (ZANAFLEX) 4 MG tablet Take 1 tablet (4 mg) by mouth 2 times daily 11/1/2023 at pm    triamcinolone (KENALOG) 0.1 % external ointment Apply topically 2 times daily To affected areas of rash. Please avoid application to the face, groin or armpits as the medication is too strong for these areas. More than a month at prn    vitamin B-Complex Take 1 tablet by mouth daily 11/2/2023 at am

## 2023-11-02 NOTE — TELEPHONE ENCOUNTER
1:39 AM    Received clinic page about critical lab results on patient for AST/ALT in 2000 and 1000. Patient recently seen a couple times in our clinic for RUQ pain. US showing distended mild wall thickening and mild duct dilation of unknown cause. Plan from clinic was for patient to undergo an MRCP for further characterization. I called patient to discuss lab results and see how he is feeling. He states he feels the same as he did in the clinic today with some weakness but overall no new fevers or worsening pain. His plan was to call in am to get MRCP done. Discussed with patient being evaluated in ER for more expedited workup vs calling tomorrow. Patient opts to call early tomorrow morning in hopes to get MRCP ordered STAT. He says he likes to avoid ERs and feels safe continuing with previous planned laid out by PCP. Given patient feels same as clinic visit and vitals stable at that time and does not sound severely ill I think it's reasonable to wait now until early am to call. Discussed signs/symptoms to present to ED. Patient understood.    Meryl Lawler MD  [facility] Medicine Resident. PGY3

## 2023-11-02 NOTE — PLAN OF CARE
General surgery consult received and chart reviewed.  Patient has evidence of choledocholithiasis in the setting of a Simone-en-Y gastric bypass.  He will need gastric access for the ERCP.  We will coordinate a time with gastroenterology tomorrow for the procedure.  He should be n.p.o. after midnight and on antibiotics.      Full consult to follow.    Nelly Blue DO  General Surgeon  [facility]  Surgery [facility] - [address]  [address]  [place] [address]  Office: [phone]  Employed by - [facility]

## 2023-11-02 NOTE — H&P
[facility]    History and Physical - Hospitalist Service       Date of Admission:  11/2/2023    Assessment & Plan      Kobe Buchanna is a 63 year old male admitted on 11/2/2023. He has a history of Afib, T2DM, HTN, HLD, CAD, THERESA, s/p Simone en Y gastric bypass 2005 and is admitted for RUQ abdominal pain and found to have elevated liver enzymes in clinic, RUQ U/S and MRCP 11/2 consistent with gangrenous cholecystitis and choledocholithiasis, otherwise clinically stable and plan for coordinated gastric access and ERCP procedure 11/3 with GI and General Surgery    #Cholecystitis, poss. gangrenous   #Choledocholithiasis  #Transaminitis  #RUQ pain  #Leukocytosis  62 y/o M with one week history of RUQ abdominal pain worse w/meals and inspiration, RUQ US 10/27 demonstrated distended GB and wall thickening w/out gallstones, MRCP 11/2 demonstrated evidence of choledocholithiasis and gangrenous cholecystitis. No involvement of pancreas Pt. Afebrile and stable on admission, WBC 15. Found to have elevated liver enzymes 11/1, , ALT 1035, ASST 2003 which have improved on admission. Bilirubin 1.4, total 2.0. INR 1.27. Considered Bcx on admission but patient had already received zosyn.  -GI consulted, recommended drainage and surgery, in setting of RNYGB will need surgical access for ERCP  -General surgery consulted - plan for gastric access procedure 11/3 coordinated with GI  -NPO @ midnight  -Acute hepatitis panel - in process  -Repeat CMP 11/3  -IV zosyn q8h  -Repeat CBC AM    Chronic Conditions  #HTN  -Continue PTA amlodipine-Lisinopril  -Continue PTA carvedilol    #HLD  #CAD  -Hold PTA atorvastatin due to transaminitis    #THERESA  -Continue PTA buproprion  -Hold PTA clonazepam due to transaminitis, replace w/ 1 mg lorazepam TID PRN  -Continue PTA Lexapro    #Chronic Pain  #Constipation  -Continue scheduled oxycodone, patient is on chronic opioids for chronic pain and has been attempting to  "wean down. Continue for now due to acute abdominal pain  -Senna/docusate PRN for constipation          Diet: Combination Diet Regular Diet Adult  NPO per Anesthesia Guidelines for Procedure/Surgery Except for: Meds    DVT Prophylaxis: Enoxaparin (Lovenox) SQ  Serrano Catheter: Not present  Fluids: NS @ 100 mL/hr  Lines: None     Cardiac Monitoring: None  Code Status: Full Code      Clinically Significant Risk Factors Present on Admission               # Coagulation Defect: INR = 1.27 (Ref range: 0.85 - 1.15) and/or PTT = N/A, will monitor for bleeding    # Hypertension: Noted on problem list      # Obesity: Estimated body mass index is 34.44 kg/m  as calculated from the following:    Height as of this encounter: 1.778 m (5' 10\").    Weight as of this encounter: 108.9 kg (240 lb).              Disposition Plan      Expected Discharge Date: 11/04/2023                The patient's care was discussed with the Chief Resident/Fellow.      Jaden Sauer MD  Hospitalist Service  [facility]  Securely message with [employer] (more info)  Text page via [employer] Paging/Directory   ______________________________________________________________________    Chief Complaint   Abdominal pain    History is obtained from the patient    History of Present Illness   Kobe Buchanan is a 63 year old male who presents with around 1 week of right upper quadrant abdominal pain and constipation in the setting of s/p mark en Y gastric bypass. He states the pain is \"mild\" but worse with eating and mentions pain with inspiration. Endorses constipation but not additional bowel or bladder symptoms, no melena or hematochezia. Denies nausea and vomiting but endorses poor PO intake the past few days and generalized weakness. Last able to eat or drink 11/1. Denies chest pain, SOB. No current use of alcohol or tobacco. No syncope, denies confusion.    He previously was evaluated in clinic 10/27 with RUQ pain and constipation, RUQ " US at that time demonstrated no gallstones but showed distended GB w/mild wall thickening and extrahepatic bile duct dilatation. He states pain had been gradually getting worse the past few days especially while eating. He then revisited the clinic on 11/1, was found to have significantly elevated ALT and AST, was then referred to ED for further workup including MRCP 11/2. Was using magnesium citrate and milk of magnesia for constipation as needed which was effective. He has been on opioids for chronic pain which was felt to likely be contributing to his constipation.      Past Medical History    Past Medical History:   Diagnosis Date    Acute systolic heart failure (H) 01/28/2020    Added automatically from request for surgery [id number]    NEMESIO (acute kidney injury) (H24) 02/16/2020    Anxiety     Ascending aortic aneurysm (H24)     Atrial fibrillation (H) [I48.91] 10/10/2016    Bicuspid aortic valve     BPPV (benign paroxysmal positional vertigo) 07/11/2014    Chronic narcotic use     Chronic neck pain     Chronic osteoarthritis     Degenerative joint disease     Depression     Hx of type 2 diabetes mellitus 03/04/2021    Hyperlipidemia 04/10/2012    Hypertension     Iron deficiency anemia secondary to blood loss (chronic) 08/25/2020    MSSA bacteremia 10/13/2019    Multiple stiff joints     Neck injuries     NSTEMI (non-ST elevated myocardial infarction) (H) 01/29/2020    Obesity 02/09/2015    Pain in shoulder     S/P reverse total shoulder arthroplasty, left 07/07/2020    S/P reverse total shoulder arthroplasty, right 08/18/2020    Septic joint of left shoulder region (H) 11/13/2019    Septic joint of right shoulder region (H) 10/13/2019    s/p washout    Skin picking habit     Sleep apnea     does not use cpap    Status post total shoulder arthroplasty, left 03/03/2020    Added automatically from request for surgery [id number]       Past Surgical History   Past Surgical History:   Procedure Laterality Date     ARTHROPLASTY SHOULDER  04/15/2014    Procedure: Left Total Shoulder Arthroplasty ;  Surgeon: Analilia Aceves MD;  [facility]    ARTHROPLASTY SHOULDER Right 08/26/2014    Procedure: ARTHROPLASTY SHOULDER;  Surgeon: Analilia Aceves MD;  [facility]    ARTHROSCOPY SHOULDER WITH BIOPSY(IES) Left 01/28/2020    Procedure: Left shoulder arthroscopy and biopsy for culture;  Surgeon: Analilia Aceves MD;  [facility]    BYPASS GASTRIC TAVO-EN-Y, LIVER BIOPSY, COMBINED  08/08/2005    COLONOSCOPY  06/30/2014    Procedure: COMBINED COLONOSCOPY, SINGLE BIOPSY/POLYPECTOMY BY BIOPSY;  Surgeon: Chester Patton MD;  [facility]    CV CORONARY ANGIOGRAM  01/30/2020    Procedure: CV CORONARY ANGIOGRAM;  Surgeon: Néstor Walls MD;  [facility]    CYSTOSCOPY, BLADDER NECK CUTS, COMBINED N/A 07/18/2016    Procedure: COMBINED CYSTOSCOPY, BLADDER NECK CUTS;  Surgeon: Ritu Leslie MD;  [facility]    ESOPHAGOSCOPY, GASTROSCOPY, DUODENOSCOPY (EGD), COMBINED  06/30/2014    Procedure: COMBINED ESOPHAGOSCOPY, GASTROSCOPY, DUODENOSCOPY (EGD), BIOPSY SINGLE OR MULTIPLE;  Surgeon: Chester Patton MD;  [facility]    EXCISE MASS FINGER  06/14/2011    Procedure:EXCISE MASS FINGER; Middle Flexor Cyst; Surgeon:SHAYY RUSSELL; [facility]    IR FINE NEEDLE ASPIRATION W ULTRASOUND  11/13/2019    IR PICC PLACEMENT > 5 YRS OF AGE  11/19/2019    LASER HOLMIUM LITHOTRIPSY BLADDER N/A 10/15/2014    Procedure: LASER HOLMIUM LITHOTRIPSY BLADDER;  Surgeon: Sahil Taveras MD;  [facility]    LASER KTP GREEN LIGHT PHOTOSELECTIVE VAPORIZATION PROSTATE  01/23/2014    Procedure: LASER KTP GREEN LIGHT PHOTOSELECTIVE VAPORIZATION PROSTATE;  Greenlight Photovaporization Of Prostate  ;  Surgeon: Sahil Taveras MD;  [facility]    OTHER SURGICAL HISTORY  10/04/2016    REPAIR ANEURYSM ASCENDING AORTA    OTHER SURGICAL HISTORY  Right 09/23/2019    IRRIGATION AND DEBRIDEMENT UPPER EXTREMITYshoulder    RELEASE TRIGGER FINGER Right 03/31/2017    Procedure: RELEASE TRIGGER FINGER;  Surgeon: Jua nCarlos Blunt MD;  [facility]    REMOVE ANTIBIOTIC CEMENT BEADS / SPACER SHOULDER Left 05/08/2020    Procedure: Left shoulder removal of spacer;  Surgeon: Analilia Aceves MD;  [facility]    REMOVE ANTIBIOTIC CEMENT BEADS / SPACER SHOULDER Right 08/18/2020    Procedure: Removal of right shoulder antibiotic spacer;  Surgeon: Analilia Aceves MD;  [facility]    REMOVE HARDWARE ARTHROPLASTY SHOULDER. I&D, PLACE ANTIBIOTIC CEMENT BE Left 11/15/2019    Procedure: Explantation of left total shoulder arthroplasty, irrigation and debridement, and placement of antibiotic spacer;  Surgeon: Analilia Aceves MD;  [facility]    REPAIR ANEURYSM ASCENDING AORTA N/A 10/04/2016    Procedure: REPAIR ANEURYSM ASCENDING AORTA;  Surgeon: Mckenzie Townsend MD;  [facility]    REVERSE ARTHROPLASTY SHOULDER Right 08/18/2020    Procedure: and conversion to reverse total shoulder arthroplasty;  Surgeon: Analilia Aceves MD;  [facility]       Prior to Admission Medications   Prior to Admission Medications   Prescriptions Last Dose Informant Patient Reported? Taking?   Fe Heme Polypeptide-folic acid (PROFERRIN-FORTE) 12-1 MG TABS More than a month at [facility] afford med  No Yes   Sig: Take 1 tablet by mouth 2 times daily   GAVILYTE-G 236 g suspension More than a month at [facility]  Yes Yes   Sig: Take by oral route as directed in colonoscopy prep instructions received from [facility]*   amLODIPine-benazepril (LOTREL) 5-20 MG capsule 11/2/2023 at am  No Yes   Sig: Take 1 capsule by mouth 2 times daily   atorvastatin (LIPITOR) 40 MG tablet 11/1/2023 at pm  No Yes   Sig: Take 1 tablet (40 mg) by mouth daily   buPROPion (WELLBUTRIN SR) 200 MG 12 hr tablet 11/2/2023 at am  No Yes   Sig: TAKE 1 TABLET BY MOUTH 2 TIMES DAILY    carvedilol (COREG) 25 MG tablet 11/2/2023 at am  No Yes   Sig: Take 2 tablets (50 mg) by mouth 2 times daily (with meals)   clonazePAM (KLONOPIN) 0.5 MG tablet 11/2/2023 at am  No Yes   Sig: Take 1 tablet (0.5 mg) by mouth 3 times daily as needed for anxiety   cyclobenzaprine (FLEXERIL) 10 MG tablet Past Month at prn  No Yes   Sig: Take 1 tablet (10 mg) by mouth 3 times daily as needed for muscle spasms   diazepam (VALIUM) 5 MG tablet 11/2/2023 at am  No Yes   Sig: Take on tablet 20 minutes prior to MRI due to claustrophobia.   docusate sodium (COLACE) 100 MG capsule 11/2/2023 at am  Yes Yes   Sig: Take 2 capsule in the morning and 3 capsules in the evening   escitalopram (LEXAPRO) 20 MG tablet 11/2/2023 at am  No Yes   Sig: Take 1.5 tablets (30 mg) by mouth daily   fentaNYL (DURAGESIC) 50 mcg/hr 72 hr patch 11/2/2023 at am  Yes Yes   Sig: Place 1 patch onto the skin every 72 hours   fluocinonide (LIDEX) 0.05 % external ointment More than a month at prn  No Yes   Sig: Apply twice daily to itchy skin nodules for 1-2 weeks at a time.   latanoprost (XALATAN) 0.005 % ophthalmic solution Past Month at prn  No Yes   Sig: Place 1 drop Into the left eye daily   mometasone (ELOCON) 0.1 % external ointment Past Month at prn  No Yes   Sig: Apply topically nightly as needed (rash on arms)   naproxen (EC-NAPROSYN) 500 MG EC tablet 11/1/2023 at pm  No Yes   Sig: Take 1 tablet (500 mg) by mouth 2 times daily as needed (pain)   oxyCODONE IR (ROXICODONE) 10 MG tablet 11/2/2023 at am  Yes Yes   Sig: Take 1 tablet by mouth every 4 hours as needed for pain, max 4 tablet(s) per day   pantoprazole (PROTONIX) 40 MG EC tablet 11/2/2023 at am  No Yes   Sig: Take 1 tablet (40 mg) by mouth daily as needed for heartburn   propranolol (INDERAL) 10 MG tablet Past Month at prn  No Yes   Sig: Take 1 tablet (10 mg) by mouth 3 times daily as needed (panic attack)   psyllium (METAMUCIL/KONSYL) capsule Past Month at prn  No Yes   Sig: Take 1 capsule  by mouth daily   senna-docusate (SENOKOT-S/PERICOLACE) 8.6-50 MG tablet 11/2/2023 at am  No Yes   Sig: Take 1-2 tablets by mouth 2 times daily   sucralfate (CARAFATE) 1 GM tablet Past Month at prn  No Yes   Sig: Take 1 tablet (1 g) by mouth 2 times daily as needed (Reflux)   tacrolimus (PROTOPIC) 0.1 % external ointment Unknown at pt unable to pay for med  No Yes   Sig: Apply topically as needed (rash on arms) Apply to affected areas on body.   tacrolimus (PROTOPIC) 0.1 % ointment Unknown at pt unable to pay for med  No Yes   Sig: Apply topically as needed Apply to affected areas on body.   tiZANidine (ZANAFLEX) 4 MG tablet 11/1/2023 at pm  No Yes   Sig: Take 1 tablet (4 mg) by mouth 2 times daily   triamcinolone (KENALOG) 0.1 % external ointment More than a month at prn  No Yes   Sig: Apply topically 2 times daily To affected areas of rash. Please avoid application to the face, groin or armpits as the medication is too strong for these areas.   vitamin B-Complex 11/2/2023 at am  Yes Yes   Sig: Take 1 tablet by mouth daily      Facility-Administered Medications: None           Physical Exam   Vital Signs: Temp: 98.1  F (36.7  C) Temp src: Oral BP: 121/62 Pulse: 60   Resp: 18 SpO2: 92 % O2 Device: None (Room air)    Weight: 240 lbs 0 oz    Physical Exam  Constitutional:       General: He is not in acute distress.     Appearance: He is not ill-appearing.   HENT:      Head: Normocephalic and atraumatic.      Mouth/Throat:      Mouth: Mucous membranes are dry.      Pharynx: Oropharynx is clear.   Eyes:      Extraocular Movements: Extraocular movements intact.      Conjunctiva/sclera: Conjunctivae normal.      Pupils: Pupils are equal, round, and reactive to light.   Cardiovascular:      Rate and Rhythm: Normal rate and regular rhythm.      Pulses: Normal pulses.      Heart sounds: Normal heart sounds.   Pulmonary:      Effort: Pulmonary effort is normal.      Breath sounds: Normal breath sounds.   Abdominal:       General: Bowel sounds are normal.      Palpations: Abdomen is soft.      Tenderness: There is abdominal tenderness.      Comments: Noted RUQ tenderness to palpation, abdominal pain w/inspiration   Musculoskeletal:      Right lower leg: No edema.      Left lower leg: No edema.   Skin:     General: Skin is warm.      Coloration: Skin is not jaundiced.   Neurological:      General: No focal deficit present.      Mental Status: He is alert and oriented to person, place, and time. Mental status is at baseline.   Psychiatric:         Mood and Affect: Mood normal.         Behavior: Behavior normal.              Data     I have personally reviewed the following data over the past 24 hrs:    15.0 (H)  \   12.6 (L)   / 313     134 (L) 96 (L) 28.6 (H) /  126 (H)   4.5 26 1.58 (H) \     ALT: 800 (HH) AST: 864 (HH) AP: 753 (H) TBILI: 2.0 (H)   ALB: 3.9 TOT PROTEIN: 7.3 LIPASE: 12 (L)     INR:  1.27 (H) PTT:  N/A   D-dimer:  N/A Fibrinogen:  N/A       Imaging results reviewed over the past 24 hrs:   Recent Results (from the past 24 hour(s))   MR Abdomen MRCP w/o & w Contrast    Narrative    EXAM: MR ABDOMEN MRCP W/O and W CONTRAST  LOCATION: [facility]  DATE: 11/2/2023    INDICATION: Biliary obstruction on ultrasound, elevated LFTs  COMPARISON: Ultrasound 10/27/2023  TECHNIQUE: Routine MR liver/pancreas protocol including axial and coronal MRCP sequences. 2D and 3D reconstruction performed by MR technologist including MIP reconstruction and slab cholangiograms. If performed with contrast, additional dynamic T1 post   IV contrast images.  CONTRAST: 10 ml gadavist     FINDINGS:     MRCP: Mild intrahepatic and extrahepatic biliary dilation with the common bile duct measuring up to 1.2 cm. A few small rounded filling defects are present in the distal common bile duct, just proximal to the ampulla (e.g. 7/24). Abrupt sharp tapering at   the ampulla. No biliary wall thickening or enhancement. Multiple layering  gallstones and sludge, as well as curvilinear filling defects in the fundus. Normal pancreatic duct anatomy. Pancreatic duct dilation.    LIVER: No significant hepatic abnormality. No focal masses. No evidence of cirrhosis or significant fat or iron deposition.    PANCREAS: No evidence of mass or pancreatitis. Diffuse fatty infiltration.    ADDITIONAL FINDINGS: No significant abnormality in the spleen, kidneys, and adrenal glands. No adenopathy. No ascites. Simple left renal cyst, which does not require further follow-up.      Impression    IMPRESSION:  1.  Intrahepatic and extrahepatic biliary dilation with a few small filling defects in the distal common bile duct consistent with choledocholithiasis. Abrupt narrowing at the ampulla could also represent a component of ampullary stenosis.    2.  Distended gallbladder with cholelithiasis and borderline wall thickening. There are also intraluminal curvilinear membranes, which could be sloughed mucosa in the setting of gangrenous cholecystitis.     Jaden Sauer MD  PGY-1  [facility]  [facility]   November 2, 2023

## 2023-11-02 NOTE — PROGRESS NOTES
Called and confirmed with [facility] they will reach out to patient    Patient contacted during night refused ED services.  Discussed with ADS team recommending recheck, IV fluids, may need to hold some hepatic meds.  Unsure if tylenol level also needs to be checked?  Unsure of total daily tylenol use.      Referral to Acute and Diagnostic Services    [phone] ([facility]) [address], [address], [place]  [address]    Transition to Acute & Diagnostic Services Clinic has been discussed with patient, and he agrees with next level of care.   Patient understands that evaluation/treatment at [facility] typically takes significantly longer than in clinic/urgent care (>2 hours).  The [facility] Acute and Diagnostics Services Clinic has been contacted by provider/staff to confirm patient acceptance.         Special issues:      Claustrophobia requiring premedication                     The following provider has assessed this patient for intervention at [facility], and directed the patient for referral: Demi Hardin MD

## 2023-11-03 ENCOUNTER — ANESTHESIA EVENT (OUTPATIENT)
Dept: SURGERY | Facility: HOSPITAL | Age: 63
DRG: 418 | End: 2023-11-03
Payer: COMMERCIAL

## 2023-11-03 ENCOUNTER — APPOINTMENT (OUTPATIENT)
Dept: RADIOLOGY | Facility: HOSPITAL | Age: 63
DRG: 418 | End: 2023-11-03
Attending: SURGERY
Payer: COMMERCIAL

## 2023-11-03 ENCOUNTER — ANESTHESIA (OUTPATIENT)
Dept: SURGERY | Facility: HOSPITAL | Age: 63
DRG: 418 | End: 2023-11-03
Payer: COMMERCIAL

## 2023-11-03 LAB
ALBUMIN SERPL BCG-MCNC: 3.2 G/DL (ref 3.5–5.2)
ALP SERPL-CCNC: 537 U/L (ref 40–129)
ALT SERPL W P-5'-P-CCNC: 438 U/L (ref 0–70)
ANION GAP SERPL CALCULATED.3IONS-SCNC: 9 MMOL/L (ref 7–15)
AST SERPL W P-5'-P-CCNC: 307 U/L (ref 0–45)
BILIRUB SERPL-MCNC: 1 MG/DL
BUN SERPL-MCNC: 20.5 MG/DL (ref 8–23)
CALCIUM SERPL-MCNC: 8.6 MG/DL (ref 8.8–10.2)
CHLORIDE SERPL-SCNC: 99 MMOL/L (ref 98–107)
CREAT SERPL-MCNC: 1.31 MG/DL (ref 0.67–1.17)
DEPRECATED HCO3 PLAS-SCNC: 24 MMOL/L (ref 22–29)
EGFRCR SERPLBLD CKD-EPI 2021: 62 ML/MIN/1.73M2
ERCP: NORMAL
ERYTHROCYTE [DISTWIDTH] IN BLOOD BY AUTOMATED COUNT: 13.6 % (ref 10–15)
GLUCOSE BLDC GLUCOMTR-MCNC: 175 MG/DL (ref 70–99)
GLUCOSE BLDC GLUCOMTR-MCNC: 93 MG/DL (ref 70–99)
GLUCOSE SERPL-MCNC: 94 MG/DL (ref 70–99)
HCT VFR BLD AUTO: 34.3 % (ref 40–53)
HGB BLD-MCNC: 11.1 G/DL (ref 13.3–17.7)
MCH RBC QN AUTO: 27.2 PG (ref 26.5–33)
MCHC RBC AUTO-ENTMCNC: 32.4 G/DL (ref 31.5–36.5)
MCV RBC AUTO: 84 FL (ref 78–100)
PLATELET # BLD AUTO: 214 10E3/UL (ref 150–450)
POTASSIUM SERPL-SCNC: 3.9 MMOL/L (ref 3.4–5.3)
PROT SERPL-MCNC: 6.1 G/DL (ref 6.4–8.3)
RBC # BLD AUTO: 4.08 10E6/UL (ref 4.4–5.9)
SODIUM SERPL-SCNC: 132 MMOL/L (ref 135–145)
WBC # BLD AUTO: 6.5 10E3/UL (ref 4–11)

## 2023-11-03 PROCEDURE — 272N000001 HC OR GENERAL SUPPLY STERILE: Performed by: SURGERY

## 2023-11-03 PROCEDURE — 250N000011 HC RX IP 250 OP 636: Mod: JZ

## 2023-11-03 PROCEDURE — 250N000025 HC SEVOFLURANE, PER MIN: Performed by: SURGERY

## 2023-11-03 PROCEDURE — 0DS64ZZ REPOSITION STOMACH, PERCUTANEOUS ENDOSCOPIC APPROACH: ICD-10-PCS | Performed by: SURGERY

## 2023-11-03 PROCEDURE — 370N000017 HC ANESTHESIA TECHNICAL FEE, PER MIN: Performed by: SURGERY

## 2023-11-03 PROCEDURE — 47562 LAPAROSCOPIC CHOLECYSTECTOMY: CPT | Mod: AS | Performed by: NURSE PRACTITIONER

## 2023-11-03 PROCEDURE — C1769 GUIDE WIRE: HCPCS | Performed by: SURGERY

## 2023-11-03 PROCEDURE — 250N000011 HC RX IP 250 OP 636: Performed by: SURGERY

## 2023-11-03 PROCEDURE — 999N000181 XR SURGERY CARM FLUORO GREATER THAN 5 MIN W STILLS

## 2023-11-03 PROCEDURE — 258N000003 HC RX IP 258 OP 636

## 2023-11-03 PROCEDURE — 250N000011 HC RX IP 250 OP 636: Performed by: ANESTHESIOLOGY

## 2023-11-03 PROCEDURE — 0FC98ZZ EXTIRPATION OF MATTER FROM COMMON BILE DUCT, VIA NATURAL OR ARTIFICIAL OPENING ENDOSCOPIC: ICD-10-PCS | Performed by: INTERNAL MEDICINE

## 2023-11-03 PROCEDURE — 87522 HEPATITIS C REVRS TRNSCRPJ: CPT

## 2023-11-03 PROCEDURE — 250N000009 HC RX 250: Performed by: ANESTHESIOLOGY

## 2023-11-03 PROCEDURE — 43659 UNLISTED LAPS PX STOMACH: CPT | Mod: 51 | Performed by: SURGERY

## 2023-11-03 PROCEDURE — 88304 TISSUE EXAM BY PATHOLOGIST: CPT | Mod: TC | Performed by: SURGERY

## 2023-11-03 PROCEDURE — 258N000003 HC RX IP 258 OP 636: Performed by: ANESTHESIOLOGY

## 2023-11-03 PROCEDURE — 120N000001 HC R&B MED SURG/OB

## 2023-11-03 PROCEDURE — 250N000013 HC RX MED GY IP 250 OP 250 PS 637

## 2023-11-03 PROCEDURE — 99222 1ST HOSP IP/OBS MODERATE 55: CPT | Mod: AI

## 2023-11-03 PROCEDURE — 250N000013 HC RX MED GY IP 250 OP 250 PS 637: Performed by: FAMILY MEDICINE

## 2023-11-03 PROCEDURE — 999N000141 HC STATISTIC PRE-PROCEDURE NURSING ASSESSMENT: Performed by: SURGERY

## 2023-11-03 PROCEDURE — 710N000010 HC RECOVERY PHASE 1, LEVEL 2, PER MIN: Performed by: SURGERY

## 2023-11-03 PROCEDURE — 43659 UNLISTED LAPS PX STOMACH: CPT | Mod: AS | Performed by: NURSE PRACTITIONER

## 2023-11-03 PROCEDURE — C1726 CATH, BAL DIL, NON-VASCULAR: HCPCS | Performed by: SURGERY

## 2023-11-03 PROCEDURE — 47562 LAPAROSCOPIC CHOLECYSTECTOMY: CPT | Performed by: SURGERY

## 2023-11-03 PROCEDURE — 99222 1ST HOSP IP/OBS MODERATE 55: CPT | Mod: 57 | Performed by: SURGERY

## 2023-11-03 PROCEDURE — 258N000001 HC RX 258: Performed by: SURGERY

## 2023-11-03 PROCEDURE — 250N000009 HC RX 250: Performed by: INTERNAL MEDICINE

## 2023-11-03 PROCEDURE — 85014 HEMATOCRIT: CPT

## 2023-11-03 PROCEDURE — 0FT44ZZ RESECTION OF GALLBLADDER, PERCUTANEOUS ENDOSCOPIC APPROACH: ICD-10-PCS | Performed by: SURGERY

## 2023-11-03 PROCEDURE — 255N000002 HC RX 255 OP 636: Performed by: INTERNAL MEDICINE

## 2023-11-03 PROCEDURE — 36415 COLL VENOUS BLD VENIPUNCTURE: CPT

## 2023-11-03 PROCEDURE — 360N000084 HC SURGERY LEVEL 4 W/ FLUORO, PER MIN: Performed by: SURGERY

## 2023-11-03 PROCEDURE — 80053 COMPREHEN METABOLIC PANEL: CPT

## 2023-11-03 RX ORDER — HYDROMORPHONE HYDROCHLORIDE 1 MG/ML
0.4 INJECTION, SOLUTION INTRAMUSCULAR; INTRAVENOUS; SUBCUTANEOUS EVERY 5 MIN PRN
Status: DISCONTINUED | OUTPATIENT
Start: 2023-11-03 | End: 2023-11-03 | Stop reason: HOSPADM

## 2023-11-03 RX ORDER — GABAPENTIN 300 MG/1
300 CAPSULE ORAL EVERY 8 HOURS PRN
Qty: 16 CAPSULE | Refills: 0 | Status: SHIPPED | OUTPATIENT
Start: 2023-11-03 | End: 2023-11-13

## 2023-11-03 RX ORDER — LIDOCAINE HYDROCHLORIDE 10 MG/ML
INJECTION, SOLUTION INFILTRATION; PERINEURAL PRN
Status: DISCONTINUED | OUTPATIENT
Start: 2023-11-03 | End: 2023-11-03

## 2023-11-03 RX ORDER — BUPIVACAINE HYDROCHLORIDE 5 MG/ML
INJECTION, SOLUTION PERINEURAL PRN
Status: DISCONTINUED | OUTPATIENT
Start: 2023-11-03 | End: 2023-11-03 | Stop reason: HOSPADM

## 2023-11-03 RX ORDER — KETAMINE HYDROCHLORIDE 10 MG/ML
INJECTION INTRAMUSCULAR; INTRAVENOUS PRN
Status: DISCONTINUED | OUTPATIENT
Start: 2023-11-03 | End: 2023-11-03

## 2023-11-03 RX ORDER — SODIUM CHLORIDE, SODIUM LACTATE, POTASSIUM CHLORIDE, CALCIUM CHLORIDE 600; 310; 30; 20 MG/100ML; MG/100ML; MG/100ML; MG/100ML
INJECTION, SOLUTION INTRAVENOUS CONTINUOUS
Status: DISCONTINUED | OUTPATIENT
Start: 2023-11-03 | End: 2023-11-03 | Stop reason: HOSPADM

## 2023-11-03 RX ORDER — HYDROMORPHONE HYDROCHLORIDE 1 MG/ML
0.2 INJECTION, SOLUTION INTRAMUSCULAR; INTRAVENOUS; SUBCUTANEOUS EVERY 5 MIN PRN
Status: DISCONTINUED | OUTPATIENT
Start: 2023-11-03 | End: 2023-11-03 | Stop reason: HOSPADM

## 2023-11-03 RX ORDER — OXYCODONE HYDROCHLORIDE 5 MG/1
5 TABLET ORAL
Status: DISCONTINUED | OUTPATIENT
Start: 2023-11-03 | End: 2023-11-04 | Stop reason: HOSPADM

## 2023-11-03 RX ORDER — DEXAMETHASONE SODIUM PHOSPHATE 10 MG/ML
INJECTION, SOLUTION INTRAMUSCULAR; INTRAVENOUS PRN
Status: DISCONTINUED | OUTPATIENT
Start: 2023-11-03 | End: 2023-11-03

## 2023-11-03 RX ORDER — OXYCODONE HYDROCHLORIDE 5 MG/1
10 TABLET ORAL
Status: DISCONTINUED | OUTPATIENT
Start: 2023-11-03 | End: 2023-11-03

## 2023-11-03 RX ORDER — ACETAMINOPHEN 325 MG/1
650 TABLET ORAL EVERY 4 HOURS PRN
Qty: 50 TABLET | Refills: 0 | Status: SHIPPED | OUTPATIENT
Start: 2023-11-03 | End: 2023-11-13

## 2023-11-03 RX ORDER — LIDOCAINE 40 MG/G
CREAM TOPICAL
Status: DISCONTINUED | OUTPATIENT
Start: 2023-11-03 | End: 2023-11-04 | Stop reason: HOSPADM

## 2023-11-03 RX ORDER — CEFAZOLIN SODIUM/WATER 2 G/20 ML
2 SYRINGE (ML) INTRAVENOUS
Status: COMPLETED | OUTPATIENT
Start: 2023-11-03 | End: 2023-11-03

## 2023-11-03 RX ORDER — FENTANYL CITRATE 50 UG/ML
50 INJECTION, SOLUTION INTRAMUSCULAR; INTRAVENOUS EVERY 5 MIN PRN
Status: DISCONTINUED | OUTPATIENT
Start: 2023-11-03 | End: 2023-11-03 | Stop reason: HOSPADM

## 2023-11-03 RX ORDER — ACETAMINOPHEN 325 MG/1
650 TABLET ORAL
Status: DISCONTINUED | OUTPATIENT
Start: 2023-11-03 | End: 2023-11-04 | Stop reason: HOSPADM

## 2023-11-03 RX ORDER — PROPOFOL 10 MG/ML
INJECTION, EMULSION INTRAVENOUS PRN
Status: DISCONTINUED | OUTPATIENT
Start: 2023-11-03 | End: 2023-11-03

## 2023-11-03 RX ORDER — GLYCOPYRROLATE 0.2 MG/ML
INJECTION, SOLUTION INTRAMUSCULAR; INTRAVENOUS PRN
Status: DISCONTINUED | OUTPATIENT
Start: 2023-11-03 | End: 2023-11-03

## 2023-11-03 RX ORDER — ONDANSETRON 2 MG/ML
4 INJECTION INTRAMUSCULAR; INTRAVENOUS EVERY 30 MIN PRN
Status: DISCONTINUED | OUTPATIENT
Start: 2023-11-03 | End: 2023-11-03 | Stop reason: HOSPADM

## 2023-11-03 RX ORDER — ONDANSETRON 2 MG/ML
INJECTION INTRAMUSCULAR; INTRAVENOUS PRN
Status: DISCONTINUED | OUTPATIENT
Start: 2023-11-03 | End: 2023-11-03

## 2023-11-03 RX ORDER — FENTANYL CITRATE 50 UG/ML
25 INJECTION, SOLUTION INTRAMUSCULAR; INTRAVENOUS EVERY 5 MIN PRN
Status: DISCONTINUED | OUTPATIENT
Start: 2023-11-03 | End: 2023-11-03 | Stop reason: HOSPADM

## 2023-11-03 RX ORDER — ONDANSETRON 4 MG/1
4 TABLET, ORALLY DISINTEGRATING ORAL EVERY 30 MIN PRN
Status: DISCONTINUED | OUTPATIENT
Start: 2023-11-03 | End: 2023-11-03 | Stop reason: HOSPADM

## 2023-11-03 RX ORDER — SODIUM CHLORIDE, SODIUM LACTATE, POTASSIUM CHLORIDE, CALCIUM CHLORIDE 600; 310; 30; 20 MG/100ML; MG/100ML; MG/100ML; MG/100ML
INJECTION, SOLUTION INTRAVENOUS CONTINUOUS
Status: DISCONTINUED | OUTPATIENT
Start: 2023-11-03 | End: 2023-11-04 | Stop reason: HOSPADM

## 2023-11-03 RX ORDER — FENTANYL CITRATE 50 UG/ML
INJECTION, SOLUTION INTRAMUSCULAR; INTRAVENOUS PRN
Status: DISCONTINUED | OUTPATIENT
Start: 2023-11-03 | End: 2023-11-03

## 2023-11-03 RX ORDER — LATANOPROST 50 UG/ML
1 SOLUTION/ DROPS OPHTHALMIC AT BEDTIME
Status: DISCONTINUED | OUTPATIENT
Start: 2023-11-03 | End: 2023-11-04 | Stop reason: HOSPADM

## 2023-11-03 RX ORDER — HYDROMORPHONE HYDROCHLORIDE 1 MG/ML
.5-1 INJECTION, SOLUTION INTRAMUSCULAR; INTRAVENOUS; SUBCUTANEOUS
Status: DISCONTINUED | OUTPATIENT
Start: 2023-11-03 | End: 2023-11-04 | Stop reason: HOSPADM

## 2023-11-03 RX ADMIN — PIPERACILLIN AND TAZOBACTAM 3.38 G: 3; .375 INJECTION, POWDER, LYOPHILIZED, FOR SOLUTION INTRAVENOUS at 05:59

## 2023-11-03 RX ADMIN — GLYCOPYRROLATE 0.2 MG: 0.2 INJECTION INTRAMUSCULAR; INTRAVENOUS at 12:13

## 2023-11-03 RX ADMIN — ROCURONIUM BROMIDE 10 MG: 50 INJECTION, SOLUTION INTRAVENOUS at 13:53

## 2023-11-03 RX ADMIN — DEXAMETHASONE SODIUM PHOSPHATE 10 MG: 10 INJECTION, SOLUTION INTRAMUSCULAR; INTRAVENOUS at 11:57

## 2023-11-03 RX ADMIN — PHENYLEPHRINE HYDROCHLORIDE 100 MCG: 10 INJECTION INTRAVENOUS at 12:10

## 2023-11-03 RX ADMIN — ROCURONIUM BROMIDE 10 MG: 50 INJECTION, SOLUTION INTRAVENOUS at 12:04

## 2023-11-03 RX ADMIN — LIDOCAINE HYDROCHLORIDE 50 MG: 10 INJECTION, SOLUTION INFILTRATION; PERINEURAL at 11:56

## 2023-11-03 RX ADMIN — KETAMINE HYDROCHLORIDE 30 MG: 10 INJECTION INTRAMUSCULAR; INTRAVENOUS at 11:56

## 2023-11-03 RX ADMIN — HYDROMORPHONE HYDROCHLORIDE 0.25 MG: 1 INJECTION, SOLUTION INTRAMUSCULAR; INTRAVENOUS; SUBCUTANEOUS at 14:07

## 2023-11-03 RX ADMIN — PROPOFOL 20 MG: 10 INJECTION, EMULSION INTRAVENOUS at 15:03

## 2023-11-03 RX ADMIN — PHENYLEPHRINE HYDROCHLORIDE 100 MCG: 10 INJECTION INTRAVENOUS at 12:07

## 2023-11-03 RX ADMIN — OXYCODONE HYDROCHLORIDE 10 MG: 5 TABLET ORAL at 18:16

## 2023-11-03 RX ADMIN — ROCURONIUM BROMIDE 20 MG: 50 INJECTION, SOLUTION INTRAVENOUS at 12:48

## 2023-11-03 RX ADMIN — LISINOPRIL 10 MG: 5 TABLET ORAL at 08:36

## 2023-11-03 RX ADMIN — PROPOFOL 150 MG: 10 INJECTION, EMULSION INTRAVENOUS at 11:56

## 2023-11-03 RX ADMIN — PHENYLEPHRINE HYDROCHLORIDE 100 MCG: 10 INJECTION INTRAVENOUS at 12:42

## 2023-11-03 RX ADMIN — OXYCODONE HYDROCHLORIDE 10 MG: 5 TABLET ORAL at 00:51

## 2023-11-03 RX ADMIN — AMLODIPINE BESYLATE 5 MG: 5 TABLET ORAL at 19:33

## 2023-11-03 RX ADMIN — BUPROPION HYDROCHLORIDE 200 MG: 100 TABLET, FILM COATED, EXTENDED RELEASE ORAL at 19:34

## 2023-11-03 RX ADMIN — MIDAZOLAM 1 MG: 1 INJECTION INTRAMUSCULAR; INTRAVENOUS at 11:47

## 2023-11-03 RX ADMIN — BUPROPION HYDROCHLORIDE 200 MG: 100 TABLET, FILM COATED, EXTENDED RELEASE ORAL at 08:49

## 2023-11-03 RX ADMIN — SODIUM CHLORIDE: 900 INJECTION INTRAVENOUS at 05:56

## 2023-11-03 RX ADMIN — PIPERACILLIN AND TAZOBACTAM 3.38 G: 3; .375 INJECTION, POWDER, LYOPHILIZED, FOR SOLUTION INTRAVENOUS at 21:56

## 2023-11-03 RX ADMIN — HYDROMORPHONE HYDROCHLORIDE 0.5 MG: 1 INJECTION, SOLUTION INTRAMUSCULAR; INTRAVENOUS; SUBCUTANEOUS at 14:14

## 2023-11-03 RX ADMIN — ONDANSETRON 4 MG: 2 INJECTION INTRAMUSCULAR; INTRAVENOUS at 14:57

## 2023-11-03 RX ADMIN — PHENYLEPHRINE HYDROCHLORIDE 100 MCG: 10 INJECTION INTRAVENOUS at 12:27

## 2023-11-03 RX ADMIN — PHENYLEPHRINE HYDROCHLORIDE 100 MCG: 10 INJECTION INTRAVENOUS at 12:31

## 2023-11-03 RX ADMIN — GLYCOPYRROLATE 0.2 MG: 0.2 INJECTION INTRAMUSCULAR; INTRAVENOUS at 11:56

## 2023-11-03 RX ADMIN — Medication 2 G: at 12:04

## 2023-11-03 RX ADMIN — AMLODIPINE BESYLATE 5 MG: 5 TABLET ORAL at 08:36

## 2023-11-03 RX ADMIN — CARVEDILOL 50 MG: 12.5 TABLET, FILM COATED ORAL at 18:04

## 2023-11-03 RX ADMIN — PHENYLEPHRINE HYDROCHLORIDE 100 MCG: 10 INJECTION INTRAVENOUS at 12:12

## 2023-11-03 RX ADMIN — MIDAZOLAM 1 MG: 1 INJECTION INTRAMUSCULAR; INTRAVENOUS at 11:53

## 2023-11-03 RX ADMIN — PHENYLEPHRINE HYDROCHLORIDE 100 MCG: 10 INJECTION INTRAVENOUS at 12:48

## 2023-11-03 RX ADMIN — ROCURONIUM BROMIDE 10 MG: 50 INJECTION, SOLUTION INTRAVENOUS at 14:23

## 2023-11-03 RX ADMIN — LISINOPRIL 10 MG: 5 TABLET ORAL at 19:33

## 2023-11-03 RX ADMIN — ESCITALOPRAM OXALATE 30 MG: 10 TABLET ORAL at 08:35

## 2023-11-03 RX ADMIN — TIZANIDINE 4 MG: 2 TABLET ORAL at 19:33

## 2023-11-03 RX ADMIN — SODIUM CHLORIDE, POTASSIUM CHLORIDE, SODIUM LACTATE AND CALCIUM CHLORIDE: 600; 310; 30; 20 INJECTION, SOLUTION INTRAVENOUS at 18:23

## 2023-11-03 RX ADMIN — SUGAMMADEX 200 MG: 100 INJECTION, SOLUTION INTRAVENOUS at 15:01

## 2023-11-03 RX ADMIN — ROCURONIUM BROMIDE 30 MG: 50 INJECTION, SOLUTION INTRAVENOUS at 12:55

## 2023-11-03 RX ADMIN — SODIUM CHLORIDE: 900 INJECTION INTRAVENOUS at 12:32

## 2023-11-03 RX ADMIN — FENTANYL CITRATE 100 MCG: 50 INJECTION INTRAMUSCULAR; INTRAVENOUS at 11:56

## 2023-11-03 RX ADMIN — KETAMINE HYDROCHLORIDE 20 MG: 10 INJECTION INTRAMUSCULAR; INTRAVENOUS at 12:55

## 2023-11-03 RX ADMIN — HYDROMORPHONE HYDROCHLORIDE 0.4 MG: 1 INJECTION, SOLUTION INTRAMUSCULAR; INTRAVENOUS; SUBCUTANEOUS at 16:10

## 2023-11-03 RX ADMIN — PHENYLEPHRINE HYDROCHLORIDE 100 MCG: 10 INJECTION INTRAVENOUS at 13:42

## 2023-11-03 RX ADMIN — HYDROMORPHONE HYDROCHLORIDE 0.25 MG: 1 INJECTION, SOLUTION INTRAMUSCULAR; INTRAVENOUS; SUBCUTANEOUS at 13:59

## 2023-11-03 RX ADMIN — ENOXAPARIN SODIUM 40 MG: 40 INJECTION SUBCUTANEOUS at 18:06

## 2023-11-03 RX ADMIN — PHENYLEPHRINE HYDROCHLORIDE 100 MCG: 10 INJECTION INTRAVENOUS at 13:40

## 2023-11-03 RX ADMIN — PHENYLEPHRINE HYDROCHLORIDE 0.5 MCG/KG/MIN: 10 INJECTION INTRAVENOUS at 12:39

## 2023-11-03 RX ADMIN — ROCURONIUM BROMIDE 40 MG: 50 INJECTION, SOLUTION INTRAVENOUS at 11:57

## 2023-11-03 RX ADMIN — CARVEDILOL 50 MG: 12.5 TABLET, FILM COATED ORAL at 08:35

## 2023-11-03 ASSESSMENT — ACTIVITIES OF DAILY LIVING (ADL)
ADLS_ACUITY_SCORE: 37
ADLS_ACUITY_SCORE: 26
ADLS_ACUITY_SCORE: 23
ADLS_ACUITY_SCORE: 23
ADLS_ACUITY_SCORE: 37
ADLS_ACUITY_SCORE: 37
ADLS_ACUITY_SCORE: 23
ADLS_ACUITY_SCORE: 24
ADLS_ACUITY_SCORE: 37
ADLS_ACUITY_SCORE: 26
ADLS_ACUITY_SCORE: 23
ADLS_ACUITY_SCORE: 37

## 2023-11-03 NOTE — PROGRESS NOTES
Preceptor Attestation:  Patient's case reviewed and discussed with Ankush Yañez MD resident and I evaluated the patient. I agree with written assessment and plan of care.  Supervising Physician:  SHE BLANCHARD MD  [facility]

## 2023-11-03 NOTE — ANESTHESIA POSTPROCEDURE EVALUATION
Patient: Kobe Buchanan    Procedure: Procedure(s):  SURGICAL EXPOSURE FOR ENDOSCOPIC RETROGRADE CHOLANGIOPANCREATOGRAPHY  CHOLECYSTECTOMY, LAPAROSCOPIC  ENDOSCOPIC RETROGRADE CHOLANGIOPANCREATOGRAPHY, BILIARY SPHINCTEROTOMY, STONE REMOVAL       Anesthesia Type:  General    Note:  Disposition: Outpatient   Postop Pain Control: Uneventful            Sign Out: Well controlled pain   PONV: No   Neuro/Psych: Uneventful            Sign Out: Acceptable/Baseline neuro status   Airway/Respiratory: Uneventful            Sign Out: Acceptable/Baseline resp. status   CV/Hemodynamics: Uneventful            Sign Out: Acceptable CV status; No obvious hypovolemia; No obvious fluid overload   Other NRE: NONE   DID A NON-ROUTINE EVENT OCCUR? No           Last vitals:  Vitals Value Taken Time   /80 11/03/23 1603   Temp 37.2  C (99  F) 11/03/23 1522   Pulse 75 11/03/23 1613   Resp 21 11/03/23 1613   SpO2 93 % 11/03/23 1613   Vitals shown include unfiled device data.    Electronically Signed By: Jonathan Bernardo MD  November 3, 2023  4:15 PM

## 2023-11-03 NOTE — PROCEDURES
Endoscopic retrograde cholangiopancreatography    The scope was advanced through the surgically placed trocar into the excluded stomach and then into the duodenum and the major papilla was visualized.  The bile duct was cannulated and a cholangiogram was obtained showing multiple stones in the common bile duct.  Biliary sphincterotomy was performed with a 7 mm sphincterotomy.  There is no bleeding at the end of the procedure.  The duct was swept with a 11-1/2 mm balloon and all the stones and sludge were removed from the duct.  There is excellent drainage of the bile duct at the end of the procedure.    Cholecystectomy and surgical closure per the surgery team.  Clear liquid diet for 24 hours.  Avoid all NSAIDs and anticoagulation for 72 hours.    Juarez Sevilla MD

## 2023-11-03 NOTE — ANESTHESIA CARE TRANSFER NOTE
Patient: Kobe Buchanan    Procedure: Procedure(s):  SURGICAL EXPOSURE FOR ENDOSCOPIC RETROGRADE CHOLANGIOPANCREATOGRAPHY  CHOLECYSTECTOMY, LAPAROSCOPIC  ENDOSCOPIC RETROGRADE CHOLANGIOPANCREATOGRAPHY, BILIARY SPHINCTEROTOMY, STONE REMOVAL       Diagnosis: Choledocholithiasis [K80.50]  Diagnosis Additional Information: No value filed.    Anesthesia Type:   General     Note:    Oropharynx: oropharynx clear of all foreign objects and spontaneously breathing  Level of Consciousness: awake  Oxygen Supplementation: face mask (oxy mask)  Level of Supplemental Oxygen (L/min / FiO2): 8  Independent Airway: airway patency satisfactory and stable  Dentition: dentition unchanged  Vital Signs Stable: post-procedure vital signs reviewed and stable  Report to RN Given: handoff report given  Patient transferred to: PACU    Handoff Report: Identifed the Patient, Identified the Reponsible Provider, Reviewed the pertinent medical history, Discussed the surgical course, Reviewed Intra-OP anesthesia mangement and issues during anesthesia, Set expectations for post-procedure period and Allowed opportunity for questions and acknowledgement of understanding      Vitals:  Vitals Value Taken Time   /65 11/03/23 1523   Temp 99.0 11/03/23    1525   Pulse 59 11/03/23 1525   Resp 11 11/03/23 1525   SpO2 97 % 11/03/23 1525   Vitals shown include unfiled device data.    Electronically Signed By: ALAYNA Stuart CRNA  November 3, 2023  3:26 PM

## 2023-11-03 NOTE — ANESTHESIA PROCEDURE NOTES
Airway       Patient location during procedure: OR       Procedure Start/Stop Times: 11/3/2023 11:59 AM  Staff -        Anesthesiologist:  Jonathan Bernardo MD       CRNA: Mary Barney APRN CRNA       Performed By: CRNA  Consent for Airway        Urgency: elective  Indications and Patient Condition       Indications for airway management: suzi-procedural       Induction type:intravenous       Mask difficulty assessment: 2 - vent by mask + OA or adjuvant +/- NMBA    Final Airway Details       Final airway type: endotracheal airway       Successful airway: ETT - single  Endotracheal Airway Details        ETT size (mm): 8.0       Cuffed: yes       Successful intubation technique: direct laryngoscopy       DL Blade Type: MAC 4       Grade View of Cords: 1       Adjucts: stylet       Position: Right       Measured from: gums/teeth       Secured at (cm): 23       Bite block used: None    Post intubation assessment        Placement verified by: capnometry, equal breath sounds and chest rise        Number of attempts at approach: 1       Number of other approaches attempted: 0       Secured with: paper tape       Ease of procedure: easy       Dentition: Intact and Unchanged    Medication(s) Administered   Medication Administration Time: 11/3/2023 11:59 AM

## 2023-11-03 NOTE — PLAN OF CARE
Goal Outcome Evaluation:      Plan of Care Reviewed With: patient    Overall Patient Progress: no change      Patient alert and oriented x4. Vitals are stable. Patient going to have ERCP. Was unable to do Jc bath prior to surgery - did not have product. Tele is NSR - refused continuation of monitoring. Patient left with surgical staff with all of his belongings and power scooter. Room was empty when he left. Will use CPAP at bedtime.

## 2023-11-03 NOTE — ANESTHESIA PREPROCEDURE EVALUATION
Anesthesia Pre-Procedure Evaluation    Patient: Kobe Buchanan   MRN: [id number] : 1960        Procedure : Procedure(s):  SURGICAL EXPOSURE FOR ENDOSCOPIC RETROGRADE CHOLANGIOPANCREATOGRAPHY  CHOLECYSTECTOMY, LAPAROSCOPIC  ENDOSCOPIC RETROGRADE CHOLANGIOPANCREATOGRAPHY          Past Medical History:   Diagnosis Date    Acute systolic heart failure (H) 2020    Added automatically from request for surgery [id number]    NEMESIO (acute kidney injury) (H24) 2020    Anxiety     Ascending aortic aneurysm (H24)     Atrial fibrillation (H) [I48.91] 10/10/2016    Bicuspid aortic valve     BPPV (benign paroxysmal positional vertigo) 2014    Chronic narcotic use     Chronic neck pain     Chronic osteoarthritis     Degenerative joint disease     Depression     Hx of type 2 diabetes mellitus 2021    Hyperlipidemia 04/10/2012    Hypertension     Iron deficiency anemia secondary to blood loss (chronic) 2020    MSSA bacteremia 10/13/2019    Multiple stiff joints     Neck injuries     NSTEMI (non-ST elevated myocardial infarction) (H) 2020    Obesity 2015    Pain in shoulder     S/P reverse total shoulder arthroplasty, left 2020    S/P reverse total shoulder arthroplasty, right 2020    Septic joint of left shoulder region (H) 2019    Septic joint of right shoulder region (H) 10/13/2019    s/p washout    Skin picking habit     Sleep apnea     does not use cpap    Status post total shoulder arthroplasty, left 2020    Added automatically from request for surgery [id number]      Past Surgical History:   Procedure Laterality Date    ARTHROPLASTY SHOULDER  04/15/2014    Procedure: Left Total Shoulder Arthroplasty ;  Surgeon: Analilia Aceves MD;  [facility]    ARTHROPLASTY SHOULDER Right 2014    Procedure: ARTHROPLASTY SHOULDER;  Surgeon: Analilia Aceves MD;  [facility]    ARTHROSCOPY SHOULDER WITH BIOPSY(IES) Left 2020    Procedure:  Left shoulder arthroscopy and biopsy for culture;  Surgeon: Analilia Aceves MD;  [facility]    BYPASS GASTRIC TAVO-EN-Y, LIVER BIOPSY, COMBINED  08/08/2005    COLONOSCOPY  06/30/2014    Procedure: COMBINED COLONOSCOPY, SINGLE BIOPSY/POLYPECTOMY BY BIOPSY;  Surgeon: Chester Patton MD;  [facility]    CV CORONARY ANGIOGRAM  01/30/2020    Procedure: CV CORONARY ANGIOGRAM;  Surgeon: Néstor Walls MD;  [facility]    CYSTOSCOPY, BLADDER NECK CUTS, COMBINED N/A 07/18/2016    Procedure: COMBINED CYSTOSCOPY, BLADDER NECK CUTS;  Surgeon: Ritu Leslie MD;  [facility]    ESOPHAGOSCOPY, GASTROSCOPY, DUODENOSCOPY (EGD), COMBINED  06/30/2014    Procedure: COMBINED ESOPHAGOSCOPY, GASTROSCOPY, DUODENOSCOPY (EGD), BIOPSY SINGLE OR MULTIPLE;  Surgeon: Chester Patton MD;  [facility]    EXCISE MASS FINGER  06/14/2011    Procedure:EXCISE MASS FINGER; Middle Flexor Cyst; Surgeon:SHAYY RUSSELL; [facility]    IR FINE NEEDLE ASPIRATION W ULTRASOUND  11/13/2019    IR PICC PLACEMENT > 5 YRS OF AGE  11/19/2019    LASER HOLMIUM LITHOTRIPSY BLADDER N/A 10/15/2014    Procedure: LASER HOLMIUM LITHOTRIPSY BLADDER;  Surgeon: Sahil Taveras MD;  [facility]    LASER KTP GREEN LIGHT PHOTOSELECTIVE VAPORIZATION PROSTATE  01/23/2014    Procedure: LASER KTP GREEN LIGHT PHOTOSELECTIVE VAPORIZATION PROSTATE;  Greenlight Photovaporization Of Prostate  ;  Surgeon: Sahil Taveras MD;  [facility]    OTHER SURGICAL HISTORY  10/04/2016    REPAIR ANEURYSM ASCENDING AORTA    OTHER SURGICAL HISTORY Right 09/23/2019    IRRIGATION AND DEBRIDEMENT UPPER EXTREMITYshoulder    RELEASE TRIGGER FINGER Right 03/31/2017    Procedure: RELEASE TRIGGER FINGER;  Surgeon: Juan Carlos Blunt MD;  [facility]    REMOVE ANTIBIOTIC CEMENT BEADS / SPACER SHOULDER Left 05/08/2020    Procedure: Left shoulder removal of spacer;  Surgeon: Ketan  Analilia Vaz MD;  [facility]    REMOVE ANTIBIOTIC CEMENT BEADS / SPACER SHOULDER Right 2020    Procedure: Removal of right shoulder antibiotic spacer;  Surgeon: Analilia Aceves MD;  [facility]    REMOVE HARDWARE ARTHROPLASTY SHOULDER. I&D, PLACE ANTIBIOTIC CEMENT BE Left 11/15/2019    Procedure: Explantation of left total shoulder arthroplasty, irrigation and debridement, and placement of antibiotic spacer;  Surgeon: Analilia Aceves MD;  [facility]    REPAIR ANEURYSM ASCENDING AORTA N/A 10/04/2016    Procedure: REPAIR ANEURYSM ASCENDING AORTA;  Surgeon: Mckenzie Townsend MD;  [facility]    REVERSE ARTHROPLASTY SHOULDER Right 2020    Procedure: and conversion to reverse total shoulder arthroplasty;  Surgeon: Analilia Aceves MD;  [facility]      Allergies   Allergen Reactions    Ciprofloxacin      History of aortic aneurysms    Tape [Adhesive Tape] Blisters     Blistering - please use paper tape      Social History     Tobacco Use    Smoking status: Former     Packs/day: 0.50     Years: 6.00     Additional pack years: 0.00     Total pack years: 3.00     Types: Cigarettes     Start date: 1977     Quit date: 1983     Years since quittin.2    Smokeless tobacco: Never   Substance Use Topics    Alcohol use: No     Alcohol/week: 0.0 standard drinks of alcohol      Wt Readings from Last 1 Encounters:   23 108.9 kg (240 lb)        Anesthesia Evaluation            ROS/MED HX  ENT/Pulmonary:     (+) sleep apnea,                                      Neurologic:  - neg neurologic ROS     Cardiovascular:     (+)  hypertension- Peripheral Vascular Disease-   -  - -                                      METS/Exercise Tolerance:     Hematologic:       Musculoskeletal:  - neg musculoskeletal ROS     GI/Hepatic:       Renal/Genitourinary:     (+) renal disease, type: CRI,            Endo:     (+)               Obesity,       Psychiatric/Substance Use:      (+) psychiatric history        Infectious Disease:       Malignancy:       Other:            Physical Exam    Airway  airway exam normal      Mallampati: I   TM distance: > 3 FB   Neck ROM: full   Mouth opening: > 3 cm    Respiratory Devices and Support         Dental       (+) Edentulous      Cardiovascular   cardiovascular exam normal       Rhythm and rate: regular and normal     Pulmonary   pulmonary exam normal        breath sounds clear to auscultation           OUTSIDE LABS:  CBC:   Lab Results   Component Value Date    WBC 6.5 11/03/2023    WBC 15.0 (H) 11/02/2023    HGB 11.1 (L) 11/03/2023    HGB 12.6 (L) 11/02/2023    HCT 34.3 (L) 11/03/2023    HCT 38.6 (L) 11/02/2023     11/03/2023     11/02/2023     BMP:   Lab Results   Component Value Date     (L) 11/03/2023     (L) 11/02/2023    POTASSIUM 3.9 11/03/2023    POTASSIUM 4.5 11/02/2023    CHLORIDE 99 11/03/2023    CHLORIDE 96 (L) 11/02/2023    CO2 24 11/03/2023    CO2 26 11/02/2023    BUN 20.5 11/03/2023    BUN 28.6 (H) 11/02/2023    CR 1.31 (H) 11/03/2023    CR 1.58 (H) 11/02/2023    GLC 93 11/03/2023    GLC 94 11/03/2023     COAGS:   Lab Results   Component Value Date    PTT 39 (H) 10/04/2016    INR 1.27 (H) 11/02/2023     POC:   Lab Results   Component Value Date    BGM 78 05/08/2020     HEPATIC:   Lab Results   Component Value Date    ALBUMIN 3.2 (L) 11/03/2023    PROTTOTAL 6.1 (L) 11/03/2023     (H) 11/03/2023     (H) 11/03/2023    ALKPHOS 537 (H) 11/03/2023    BILITOTAL 1.0 11/03/2023     OTHER:   Lab Results   Component Value Date    PH 7.39 10/04/2016    LACT 0.8 02/15/2020    A1C 5.6 08/24/2023    NIA 8.6 (L) 11/03/2023    PHOS 3.7 02/26/2020    MAG 2.2 11/02/2023    LIPASE 12 (L) 11/02/2023    TSH 2.96 01/10/2018    T4 1.45 11/02/2016    CRP <2.9 01/30/2020    SED 25 (H) 01/15/2020       Anesthesia Plan    ASA Status:  3    NPO Status:  NPO Appropriate    Anesthesia Type: General.     - Airway: ETT    Induction: Intravenous, Propofol.   Maintenance: Inhalation.   Techniques and Equipment:       - Drips/Meds: Dexmed. bolus, Ketamine     Consents    Anesthesia Plan(s) and associated risks, benefits, and realistic alternatives discussed. Questions answered and patient/representative(s) expressed understanding.     - Discussed:     - Discussed with:  Patient            Postoperative Care    Pain management: IV analgesics, Oral pain medications, Multi-modal analgesia.   PONV prophylaxis: Ondansetron (or other 5HT-3), Dexamethasone or Solumedrol     Comments:                Jonathan Bernardo MD

## 2023-11-03 NOTE — PROGRESS NOTES
[facility]    Progress Note - Hospitalist Service       Date of Admission:  11/2/2023    Assessment & Plan   Kobe Buchanan is a 63 year old male admitted on 11/2/2023. He has a history of Afib, T2DM, HTN, HLD, CAD, THERESA, s/p Simone en Y gastric bypass 2005 and is admitted for RUQ abdominal pain and found to have elevated liver enzymes in clinic, RUQ U/S and MRCP 11/2 consistent with gangrenous cholecystitis and choledocholithiasis, otherwise clinically stable and plan for coordinated gastric access and ERCP procedure 11/3 with GI and General Surgery     #Cholecystitis, poss. gangrenous   #Choledocholithiasis  #Transaminitis  #RUQ pain  #Leukocytosis  64 y/o M with one week history of RUQ abdominal pain worse w/meals and inspiration, RUQ US 10/27 demonstrated distended GB and wall thickening w/out gallstones, MRCP 11/2 demonstrated evidence of choledocholithiasis and gangrenous cholecystitis. No involvement of pancreas Pt. Afebrile and stable on admission, WBC 15. Found to have elevated liver enzymes 11/1, , ALT 1035, ASST 2003 which have improved on admission. Bilirubin 1.4, total 2.0. INR 1.27.   -ERCP with cholecystectomy on 11/3/23  -Treated for hepatitis C in 1998, secondary to IVDU. RNA PCR pending   -Repeat CMP 11/4  -IV zosyn q8h  -Repeat CBC AM     Chronic Conditions  #HTN  -Continue PTA amlodipine-Lisinopril  -Continue PTA carvedilol     #HLD  #CAD  -Hold PTA atorvastatin due to transaminitis     #THERESA  -Continue PTA buproprion  -Hold PTA clonazepam due to transaminitis, replace w/ 1 mg lorazepam TID PRN  -Continue PTA Lexapro     #Chronic Pain  #Constipation  -Continue scheduled oxycodone, patient is on chronic opioids for chronic pain and has been attempting to wean down. Continue for now due to acute abdominal pain  -Senna/docusate PRN for constipation        Diet: NPO per Anesthesia Guidelines for Procedure/Surgery Except for: Meds    DVT Prophylaxis: Enoxaparin  "(Lovenox) SQ  Serrano Catheter: Not present  Fluids: NS @ 100mL/hr  Lines: None     Cardiac Monitoring: None  Code Status: Full Code      Clinically Significant Risk Factors Present on Admission              # Hypoalbuminemia: Lowest albumin = 3.2 g/dL at 11/3/2023  7:38 AM, will monitor as appropriate  # Coagulation Defect: INR = 1.27 (Ref range: 0.85 - 1.15) and/or PTT = N/A, will monitor for bleeding    # Hypertension: Noted on problem list      # Obesity: Estimated body mass index is 34.44 kg/m  as calculated from the following:    Height as of this encounter: 1.778 m (5' 10\").    Weight as of this encounter: 108.9 kg (240 lb).              Disposition Plan     Expected Discharge Date: 11/04/2023                The patient's care was discussed with MD Alexander Lal MD  [facility], PGY-1   ______________________________________________________________________    Interval History   Feeling well this morning, denying any abdominal pain. States his chronic neck pain is bothersome for him but does not want any other pain medications at this time because he feels they will be a barrier to discharge. We discussed that if he should need better pain control that he should let us know, as he is on a decent amount of pain medications daily for chronic pain.     Physical Exam   Vital Signs: Temp: 99  F (37.2  C) Temp src: Oral BP: (!) 145/67 Pulse: 58   Resp: 20 SpO2: 97 % O2 Device: None (Room air)    Weight: 240 lbs 0 oz    GEN: Pleasant male, in no acute distress.   HEENT: Normocephalic, atraumatic. Extraoccular eye movements intact. Anicteric sclera. Moist mucous membranes.   NECK: Supple. No cervical or supraclavicular adenopathy.   PULM: Non-labored breathing. No use of accessory muscles. Clear to ausculation bilaterally. No wheezes or crackles.   CV: Regular rate and rhythm. Normal S1, S2. No rubs, murmurs, or gallops.    ABDOMEN: Normoactive bowel sounds. Non-tender to " palpation. Non-distended.    EXTREMITES:  No clubbing, cyanosis, or edema.    NEURO:  Awake. Oriented to person, place, time and situation. Cranial nerves 2-12 grossly intact. Moving all extremities.    PSYCH: Calm. Appropriate affect, insight, judgment.      Data     I have personally reviewed the following data over the past 24 hrs:    6.5  \   11.1 (L)   / 214     132 (L) 99 20.5 /  93   3.9 24 1.31 (H) \     ALT: 438 (H) AST: 307 (H) AP: 537 (H) TBILI: 1.0   ALB: 3.2 (L) TOT PROTEIN: 6.1 (L) LIPASE: N/A     INR:  N/A PTT:  N/A   D-dimer:  N/A Fibrinogen:  N/A       Imaging results reviewed over the past 24 hrs:   Recent Results (from the past 24 hour(s))   MR Abdomen MRCP w/o & w Contrast    Narrative    EXAM: MR ABDOMEN MRCP W/O and W CONTRAST  LOCATION: [facility]  DATE: 11/2/2023    INDICATION: Biliary obstruction on ultrasound, elevated LFTs  COMPARISON: Ultrasound 10/27/2023  TECHNIQUE: Routine MR liver/pancreas protocol including axial and coronal MRCP sequences. 2D and 3D reconstruction performed by MR technologist including MIP reconstruction and slab cholangiograms. If performed with contrast, additional dynamic T1 post   IV contrast images.  CONTRAST: 10 ml gadavist     FINDINGS:     MRCP: Mild intrahepatic and extrahepatic biliary dilation with the common bile duct measuring up to 1.2 cm. A few small rounded filling defects are present in the distal common bile duct, just proximal to the ampulla (e.g. 7/24). Abrupt sharp tapering at   the ampulla. No biliary wall thickening or enhancement. Multiple layering gallstones and sludge, as well as curvilinear filling defects in the fundus. Normal pancreatic duct anatomy. Pancreatic duct dilation.    LIVER: No significant hepatic abnormality. No focal masses. No evidence of cirrhosis or significant fat or iron deposition.    PANCREAS: No evidence of mass or pancreatitis. Diffuse fatty infiltration.    ADDITIONAL FINDINGS: No  significant abnormality in the spleen, kidneys, and adrenal glands. No adenopathy. No ascites. Simple left renal cyst, which does not require further follow-up.      Impression    IMPRESSION:  1.  Intrahepatic and extrahepatic biliary dilation with a few small filling defects in the distal common bile duct consistent with choledocholithiasis. Abrupt narrowing at the ampulla could also represent a component of ampullary stenosis.    2.  Distended gallbladder with cholelithiasis and borderline wall thickening. There are also intraluminal curvilinear membranes, which could be sloughed mucosa in the setting of gangrenous cholecystitis.

## 2023-11-03 NOTE — BRIEF OP NOTE
[facility]    Brief Operative Note    Pre-operative diagnosis: Choledocholithiasis [K80.50]  Post-operative diagnosis Same as pre-operative diagnosis    Procedure: SURGICAL EXPOSURE FOR ENDOSCOPIC RETROGRADE CHOLANGIOPANCREATOGRAPHY, N/A - Abdomen  CHOLECYSTECTOMY, LAPAROSCOPIC, N/A - Abdomen  ENDOSCOPIC RETROGRADE CHOLANGIOPANCREATOGRAPHY, BILIARY SPHINCTEROTOMY, STONE REMOVAL, N/A - Update    Surgeon: Surgeon(s) and Role:  Panel 1:     * Kobe Mathis MD - Primary  Panel 2:     * Juarez Sevilla MD - Primary  Anesthesia: General   Estimated Blood Loss: 50 cc    Drains: None  Specimens:   ID Type Source Tests Collected by Time Destination   1 : GALLBLADDER Tissue Gallbladder SURGICAL PATHOLOGY EXAM Kobe Mathis MD 11/3/2023  2:51 PM      Findings:   Inflamed gallblader .  Complications: None.  Implants: * No implants in log *    Kobe Mathis MD  General Surgeon  [facility]  Surgery [facility] - [address] [address]  Office: [phone]

## 2023-11-03 NOTE — H&P
GENERAL PRE-PROCEDURE:   Procedure:  ERCP - Choledocholithiasis (surgical assisted)  Date/Time:  11/3/2023 10:37 AM    Verbal consent obtained?: Yes    Written consent obtained?: Yes    Risks and benefits: Risks, benefits and alternatives were discussed    Consent given by:  Patient  Patient states understanding of procedure being performed: Yes    Patient's understanding of procedure matches consent: Yes    Procedure consent matches procedure scheduled: Yes    Expected level of sedation:  Deep  Appropriately NPO:  Yes  ASA Class:  3  Mallampati  :  Grade 2- soft palate, base of uvula, tonsillar pillars, and portion of posterior pharyngeal wall visible  Lungs:  Lungs clear with good breath sounds bilaterally  Heart:  Normal heart sounds and rate and systolic murmur  History & Physical reviewed:  History and physical reviewed and no updates needed  Statement of review:  I have reviewed the lab findings, diagnostic data, medications, and the plan for sedation

## 2023-11-03 NOTE — CONSULTS
General Surgery Consult    Kobe Buchanan MRN# [id number]     Date of Admission: 11/2/2023    Reason for Consult  Ana toco lithiasis    History of Present Illness  Patient is a 63-year-old man with a history of Simone-en-Y gastric bypass, diabetes, prior thoracic aortic aneurysm repair, presenting with right upper quadrant pain.  Pain has been ongoing for several days.  Its located in the right upper quadrant.  His work-up revealed choledocholithiasis and possible cholecystitis.  Gastroenterology is requesting ERCP and the patient needs access in addition to a cholecystectomy.    Past Medical History:  Past Medical History:   Diagnosis Date    Acute systolic heart failure (H) 01/28/2020    Added automatically from request for surgery [id number]    NEMESIO (acute kidney injury) (H24) 02/16/2020    Anxiety     Ascending aortic aneurysm (H24)     Atrial fibrillation (H) [I48.91] 10/10/2016    Bicuspid aortic valve     BPPV (benign paroxysmal positional vertigo) 07/11/2014    Chronic narcotic use     Chronic neck pain     Chronic osteoarthritis     Degenerative joint disease     Depression     Hx of type 2 diabetes mellitus 03/04/2021    Hyperlipidemia 04/10/2012    Hypertension     Iron deficiency anemia secondary to blood loss (chronic) 08/25/2020    MSSA bacteremia 10/13/2019    Multiple stiff joints     Neck injuries     NSTEMI (non-ST elevated myocardial infarction) (H) 01/29/2020    Obesity 02/09/2015    Pain in shoulder     S/P reverse total shoulder arthroplasty, left 07/07/2020    S/P reverse total shoulder arthroplasty, right 08/18/2020    Septic joint of left shoulder region (H) 11/13/2019    Septic joint of right shoulder region (H) 10/13/2019    s/p washout    Skin picking habit     Sleep apnea     does not use cpap    Status post total shoulder arthroplasty, left 03/03/2020    Added automatically from request for surgery [id number]       Past Surgical History:  Past Surgical History:   Procedure Laterality  Date    ARTHROPLASTY SHOULDER  04/15/2014    Procedure: Left Total Shoulder Arthroplasty ;  Surgeon: Analilia Aceves MD;  [facility]    ARTHROPLASTY SHOULDER Right 08/26/2014    Procedure: ARTHROPLASTY SHOULDER;  Surgeon: Analilia Aceves MD;  [facility]    ARTHROSCOPY SHOULDER WITH BIOPSY(IES) Left 01/28/2020    Procedure: Left shoulder arthroscopy and biopsy for culture;  Surgeon: Analilia Aceves MD;  [facility]    BYPASS GASTRIC TAVO-EN-Y, LIVER BIOPSY, COMBINED  08/08/2005    COLONOSCOPY  06/30/2014    Procedure: COMBINED COLONOSCOPY, SINGLE BIOPSY/POLYPECTOMY BY BIOPSY;  Surgeon: Chester Patton MD;  [facility]    CV CORONARY ANGIOGRAM  01/30/2020    Procedure: CV CORONARY ANGIOGRAM;  Surgeon: Néstor Walls MD;  [facility]    CYSTOSCOPY, BLADDER NECK CUTS, COMBINED N/A 07/18/2016    Procedure: COMBINED CYSTOSCOPY, BLADDER NECK CUTS;  Surgeon: Ritu Leslie MD;  [facility]    ESOPHAGOSCOPY, GASTROSCOPY, DUODENOSCOPY (EGD), COMBINED  06/30/2014    Procedure: COMBINED ESOPHAGOSCOPY, GASTROSCOPY, DUODENOSCOPY (EGD), BIOPSY SINGLE OR MULTIPLE;  Surgeon: Chester Patton MD;  [facility]    EXCISE MASS FINGER  06/14/2011    Procedure:EXCISE MASS FINGER; Middle Flexor Cyst; Surgeon:SHAYY RUSSELL; [facility]    IR FINE NEEDLE ASPIRATION W ULTRASOUND  11/13/2019    IR PICC PLACEMENT > 5 YRS OF AGE  11/19/2019    LASER HOLMIUM LITHOTRIPSY BLADDER N/A 10/15/2014    Procedure: LASER HOLMIUM LITHOTRIPSY BLADDER;  Surgeon: Sahil Taveras MD;  [facility]    LASER KTP GREEN LIGHT PHOTOSELECTIVE VAPORIZATION PROSTATE  01/23/2014    Procedure: LASER KTP GREEN LIGHT PHOTOSELECTIVE VAPORIZATION PROSTATE;  Greenlight Photovaporization Of Prostate  ;  Surgeon: Sahil Taveras MD;  [facility]    OTHER SURGICAL HISTORY  10/04/2016    REPAIR ANEURYSM ASCENDING AORTA    OTHER SURGICAL  HISTORY Right 09/23/2019    IRRIGATION AND DEBRIDEMENT UPPER EXTREMITYshoulder    RELEASE TRIGGER FINGER Right 03/31/2017    Procedure: RELEASE TRIGGER FINGER;  Surgeon: Juan Carlos Blunt MD;  [facility]    REMOVE ANTIBIOTIC CEMENT BEADS / SPACER SHOULDER Left 05/08/2020    Procedure: Left shoulder removal of spacer;  Surgeon: Analilia Aceves MD;  [facility]    REMOVE ANTIBIOTIC CEMENT BEADS / SPACER SHOULDER Right 08/18/2020    Procedure: Removal of right shoulder antibiotic spacer;  Surgeon: Analilia Aceves MD;  [facility]    REMOVE HARDWARE ARTHROPLASTY SHOULDER. I&D, PLACE ANTIBIOTIC CEMENT BE Left 11/15/2019    Procedure: Explantation of left total shoulder arthroplasty, irrigation and debridement, and placement of antibiotic spacer;  Surgeon: Analilia Aceves MD;  [facility]    REPAIR ANEURYSM ASCENDING AORTA N/A 10/04/2016    Procedure: REPAIR ANEURYSM ASCENDING AORTA;  Surgeon: Mckenzie Townsend MD;  [facility]    REVERSE ARTHROPLASTY SHOULDER Right 08/18/2020    Procedure: and conversion to reverse total shoulder arthroplasty;  Surgeon: Analilia Aceves MD;  [facility]       Allergies:     Allergies   Allergen Reactions    Ciprofloxacin      History of aortic aneurysms    Tape [Adhesive Tape] Blisters     Blistering - please use paper tape     Medications:  sodium chloride (PF) 0.9% PF flush 3 mL    amLODIPine-benazepril (LOTREL) 5-20 MG capsule, Take 1 capsule by mouth 2 times daily  atorvastatin (LIPITOR) 40 MG tablet, Take 1 tablet (40 mg) by mouth daily  buPROPion (WELLBUTRIN SR) 200 MG 12 hr tablet, TAKE 1 TABLET BY MOUTH 2 TIMES DAILY  carvedilol (COREG) 25 MG tablet, Take 2 tablets (50 mg) by mouth 2 times daily (with meals)  clonazePAM (KLONOPIN) 0.5 MG tablet, Take 1 tablet (0.5 mg) by mouth 3 times daily as needed for anxiety  cyclobenzaprine (FLEXERIL) 10 MG tablet, Take 1 tablet (10 mg) by mouth 3 times daily as needed for muscle  spasms  diazepam (VALIUM) 5 MG tablet, Take on tablet 20 minutes prior to MRI due to claustrophobia.  docusate sodium (COLACE) 100 MG capsule, Take 2 capsule in the morning and 3 capsules in the evening  escitalopram (LEXAPRO) 20 MG tablet, Take 1.5 tablets (30 mg) by mouth daily  Fe Heme Polypeptide-folic acid (PROFERRIN-FORTE) 12-1 MG TABS, Take 1 tablet by mouth 2 times daily  fentaNYL (DURAGESIC) 50 mcg/hr 72 hr patch, Place 1 patch onto the skin every 72 hours  fluocinonide (LIDEX) 0.05 % external ointment, Apply twice daily to itchy skin nodules for 1-2 weeks at a time.  GAVILYTE-G 236 g suspension, Take by oral route as directed in colonoscopy prep instructions received from [facility]*  latanoprost (XALATAN) 0.005 % ophthalmic solution, Place 1 drop Into the left eye daily  mometasone (ELOCON) 0.1 % external ointment, Apply topically nightly as needed (rash on arms)  naproxen (EC-NAPROSYN) 500 MG EC tablet, Take 1 tablet (500 mg) by mouth 2 times daily as needed (pain)  oxyCODONE IR (ROXICODONE) 10 MG tablet, Take 1 tablet by mouth every 4 hours as needed for pain, max 4 tablet(s) per day  pantoprazole (PROTONIX) 40 MG EC tablet, Take 1 tablet (40 mg) by mouth daily as needed for heartburn  propranolol (INDERAL) 10 MG tablet, Take 1 tablet (10 mg) by mouth 3 times daily as needed (panic attack)  psyllium (METAMUCIL/KONSYL) capsule, Take 1 capsule by mouth daily  senna-docusate (SENOKOT-S/PERICOLACE) 8.6-50 MG tablet, Take 1-2 tablets by mouth 2 times daily  sucralfate (CARAFATE) 1 GM tablet, Take 1 tablet (1 g) by mouth 2 times daily as needed (Reflux)  tacrolimus (PROTOPIC) 0.1 % external ointment, Apply topically as needed (rash on arms) Apply to affected areas on body.  tacrolimus (PROTOPIC) 0.1 % ointment, Apply topically as needed Apply to affected areas on body.  tiZANidine (ZANAFLEX) 4 MG tablet, Take 1 tablet (4 mg) by mouth 2 times daily  triamcinolone (KENALOG) 0.1 % external ointment, Apply topically 2  times daily To affected areas of rash. Please avoid application to the face, groin or armpits as the medication is too strong for these areas.  vitamin B-Complex, Take 1 tablet by mouth daily      Social History:  Social History     Socioeconomic History    Marital status: Single     Spouse name: Not on file    Number of children: Not on file    Years of education: Not on file    Highest education level: Not on file   Occupational History    Occupation: Disabled   Tobacco Use    Smoking status: Former     Packs/day: 0.50     Years: 6.00     Additional pack years: 0.00     Total pack years: 3.00     Types: Cigarettes     Start date: 1977     Quit date: 1983     Years since quittin.2    Smokeless tobacco: Never   Substance and Sexual Activity    Alcohol use: No     Alcohol/week: 0.0 standard drinks of alcohol    Drug use: No    Sexual activity: Not Currently     Partners: Female     Birth control/protection: Abstinence   Other Topics Concern    Parent/sibling w/ CABG, MI or angioplasty before 65F 55M? Not Asked   Social History Narrative    Live independently[place] one sister, Supriya in [place], [place].  Does live with 2 dogs.      Social Determinants of Health     Financial Resource Strain: Low Risk  (10/24/2023)    Financial Resource Strain     Within the past 12 months, have you or your family members you live with been unable to get utilities (heat, electricity) when it was really needed?: No   Food Insecurity: Low Risk  (10/24/2023)    Food Insecurity     Within the past 12 months, did you worry that your food would run out before you got money to buy more?: No     Within the past 12 months, did the food you bought just not last and you didn t have money to get more?: No   Transportation Needs: Low Risk  (10/24/2023)    Transportation Needs     Within the past 12 months, has lack of transportation kept you from medical appointments, getting your medicines, non-medical  meetings or appointments, work, or from getting things that you need?: No   Physical Activity: Not on file   Stress: Not on file   Social Connections: Socially Isolated (8/31/2021)    Social Connection and Isolation Panel [NHANES]     Frequency of Communication with Friends and Family: Once a week     Frequency of Social Gatherings with Friends and Family: Never     Attends Synagogue Services: Never     Active Member of Clubs or Organizations: No     Attends Club or Organization Meetings: Never     Marital Status: Never    Interpersonal Safety: Low Risk  (11/1/2023)    Interpersonal Safety     Do you feel physically and emotionally safe where you currently live?: Yes     Within the past 12 months, have you been hit, slapped, kicked or otherwise physically hurt by someone?: No     Within the past 12 months, have you been humiliated or emotionally abused in other ways by your partner or ex-partner?: No   Housing Stability: Low Risk  (10/24/2023)    Housing Stability     Do you have housing? : Yes     Are you worried about losing your housing?: No     Family History:  Family History   Problem Relation Age of Onset    Arthritis Other     Gastrointestinal Disease Other     Cardiovascular Father         aortic aneurysm    Arrhythmia Father     Nephrolithiasis Father     Sleep Apnea Father     Anxiety Disorder Father     Depression Father     Hypertension Father     Obesity Father     Hyperlipidemia Father     Coronary Artery Disease Father     Low Back Problems Father     Spine Problems Father     Cardiovascular Father     Other - See Comments Father         low back problems, spine problems    Anxiety Disorder Mother     Hypertension Mother     Osteoporosis Mother     Obesity Mother     Hyperlipidemia Mother     Low Back Problems Mother     Macular Degeneration Mother     Cataracts Mother     Other - See Comments Mother         low back problems    Anxiety Disorder Sister     Hypertension Sister     Osteoporosis  "Sister     Obesity Sister     Hyperlipidemia Sister     Low Back Problems Sister     Spine Problems Sister     Anxiety Disorder Sister     Depression Sister     Hypertension Sister     Osteoporosis Sister     Obesity Sister     Hyperlipidemia Sister     Low Back Problems Sister     Anxiety Disorder Sister     Hyperlipidemia Sister     Hypertension Sister     Obesity Sister     Other - See Comments Sister         low back problems, spine problems    Osteoporosis Sister     Anxiety Disorder Sister     Depression Sister     Hyperlipidemia Sister     Hypertension Sister     Other - See Comments Sister         low back problems    Obesity Sister     Osteoporosis Sister     Melanoma No family hx of     Skin Cancer No family hx of     Glaucoma No family hx of        ROS:  12 point review negative except as stated in H&P    Exam:  BP (!) 145/67 (BP Location: Left arm)   Pulse 58   Temp 99  F (37.2  C) (Oral)   Resp 20   Ht 1.778 m (5' 10\")   Wt 108.9 kg (240 lb)   SpO2 97%   BMI 34.44 kg/m    General: Alert, interactive, NAD  Resp: Non-labored breathing  Cardiac: RRR  Abdomen: Focally tender in right upper quadrant.  Otherwise soft and nondistended    Labs:   Personally reviewed  T bili: 1.0 down from 2.2  LFTs downtrending as well.     Imaging:   Personally reviewed  WBC: 6.5 down from 15    Assessment: 63 year old male presenting with cholecystitis and choledocholithiasis.  We discussed the details of laparoscopic cholecystectomy and after creatinine access for ERCP.  We discussed the risks of bleeding, infection, injury to bile ducts and liver vasculature, and leak from gastrostomy site.  Patient consents to the operation    Plan:   -To the OR for planned surgery  -N.p.o.  -Antibiotics    Kobe Mathis MD  General Surgeon  [facility]  Surgery [facility] - [address]  [address]  [place] [address]  Office: [phone]    "

## 2023-11-04 VITALS
WEIGHT: 228.62 LBS | BODY MASS INDEX: 32.73 KG/M2 | SYSTOLIC BLOOD PRESSURE: 140 MMHG | HEART RATE: 59 BPM | RESPIRATION RATE: 20 BRPM | DIASTOLIC BLOOD PRESSURE: 65 MMHG | TEMPERATURE: 98.1 F | OXYGEN SATURATION: 95 % | HEIGHT: 70 IN

## 2023-11-04 LAB
ALBUMIN SERPL BCG-MCNC: 3 G/DL (ref 3.5–5.2)
ALP SERPL-CCNC: 422 U/L (ref 40–129)
ALT SERPL W P-5'-P-CCNC: 272 U/L (ref 0–70)
ANION GAP SERPL CALCULATED.3IONS-SCNC: 7 MMOL/L (ref 7–15)
AST SERPL W P-5'-P-CCNC: 196 U/L (ref 0–45)
BILIRUB SERPL-MCNC: 0.7 MG/DL
BUN SERPL-MCNC: 18.2 MG/DL (ref 8–23)
CALCIUM SERPL-MCNC: 8.5 MG/DL (ref 8.8–10.2)
CHLORIDE SERPL-SCNC: 98 MMOL/L (ref 98–107)
CREAT SERPL-MCNC: 1.22 MG/DL (ref 0.67–1.17)
DEPRECATED HCO3 PLAS-SCNC: 27 MMOL/L (ref 22–29)
EGFRCR SERPLBLD CKD-EPI 2021: 67 ML/MIN/1.73M2
ERYTHROCYTE [DISTWIDTH] IN BLOOD BY AUTOMATED COUNT: 13.7 % (ref 10–15)
GLUCOSE BLDC GLUCOMTR-MCNC: 199 MG/DL (ref 70–99)
GLUCOSE SERPL-MCNC: 204 MG/DL (ref 70–99)
HCT VFR BLD AUTO: 34.6 % (ref 40–53)
HCV RNA SERPL NAA+PROBE-ACNC: NOT DETECTED IU/ML
HGB BLD-MCNC: 11.1 G/DL (ref 13.3–17.7)
HOLD SPECIMEN: NORMAL
MCH RBC QN AUTO: 27.3 PG (ref 26.5–33)
MCHC RBC AUTO-ENTMCNC: 32.1 G/DL (ref 31.5–36.5)
MCV RBC AUTO: 85 FL (ref 78–100)
PLATELET # BLD AUTO: 253 10E3/UL (ref 150–450)
POTASSIUM SERPL-SCNC: 4.2 MMOL/L (ref 3.4–5.3)
PROT SERPL-MCNC: 6.1 G/DL (ref 6.4–8.3)
RBC # BLD AUTO: 4.07 10E6/UL (ref 4.4–5.9)
SODIUM SERPL-SCNC: 132 MMOL/L (ref 135–145)
WBC # BLD AUTO: 10.7 10E3/UL (ref 4–11)

## 2023-11-04 PROCEDURE — 250N000013 HC RX MED GY IP 250 OP 250 PS 637: Performed by: FAMILY MEDICINE

## 2023-11-04 PROCEDURE — 36415 COLL VENOUS BLD VENIPUNCTURE: CPT

## 2023-11-04 PROCEDURE — 80053 COMPREHEN METABOLIC PANEL: CPT

## 2023-11-04 PROCEDURE — 250N000013 HC RX MED GY IP 250 OP 250 PS 637

## 2023-11-04 PROCEDURE — 99238 HOSP IP/OBS DSCHRG MGMT 30/<: CPT | Mod: GC

## 2023-11-04 PROCEDURE — 250N000011 HC RX IP 250 OP 636: Mod: JZ

## 2023-11-04 PROCEDURE — 85027 COMPLETE CBC AUTOMATED: CPT

## 2023-11-04 RX ORDER — ATORVASTATIN CALCIUM 40 MG/1
40 TABLET, FILM COATED ORAL DAILY
Qty: 90 TABLET | Refills: 3 | Status: SHIPPED | OUTPATIENT
Start: 2023-11-04

## 2023-11-04 RX ORDER — FENTANYL 50 UG/1
50 PATCH TRANSDERMAL
Status: DISCONTINUED | OUTPATIENT
Start: 2023-11-04 | End: 2023-11-04 | Stop reason: HOSPADM

## 2023-11-04 RX ADMIN — AMLODIPINE BESYLATE 5 MG: 5 TABLET ORAL at 08:37

## 2023-11-04 RX ADMIN — CARVEDILOL 50 MG: 12.5 TABLET, FILM COATED ORAL at 08:31

## 2023-11-04 RX ADMIN — LISINOPRIL 10 MG: 5 TABLET ORAL at 08:31

## 2023-11-04 RX ADMIN — FENTANYL 1 PATCH: 50 PATCH TRANSDERMAL at 09:31

## 2023-11-04 RX ADMIN — PIPERACILLIN AND TAZOBACTAM 3.38 G: 3; .375 INJECTION, POWDER, LYOPHILIZED, FOR SOLUTION INTRAVENOUS at 05:55

## 2023-11-04 RX ADMIN — BUPROPION HYDROCHLORIDE 200 MG: 100 TABLET, FILM COATED, EXTENDED RELEASE ORAL at 08:31

## 2023-11-04 RX ADMIN — OXYCODONE HYDROCHLORIDE 10 MG: 5 TABLET ORAL at 02:02

## 2023-11-04 RX ADMIN — TIZANIDINE 4 MG: 2 TABLET ORAL at 08:31

## 2023-11-04 RX ADMIN — ESCITALOPRAM OXALATE 30 MG: 10 TABLET ORAL at 08:31

## 2023-11-04 ASSESSMENT — ACTIVITIES OF DAILY LIVING (ADL)
ADLS_ACUITY_SCORE: 26

## 2023-11-04 NOTE — DISCHARGE SUMMARY
Pt was discharge home at 1200. Vitals stable. No discomfort reported at the discharge. Discharge instruction was given, pt verbalized the understanding. Belongs sent home with patient. Bria Pina RN

## 2023-11-04 NOTE — PROGRESS NOTES
"  GASTROENTEROLOGY PROGRESS NOTE     SUBJECTIVE   Feeling improved after surgery    Very focused on his fentanyl patch. Had to explain in detail why this could not be left on surgical field      OBJECTIVE     Vitals Blood pressure 134/64, pulse 59, temperature 97.6  F (36.4  C), temperature source Oral, resp. rate 20, height 1.778 m (5' 10\"), weight 103.7 kg (228 lb 9.9 oz), SpO2 93%.          Physical Exam  General: awake, alert, oriented times three   Cardiovascular: RRR, no edema   Chest: lungs are clear to auscultation bilaterally   Abdomen: soft, non-tender, non-distended, bowel sounds present. Incisional sites C/D/I   Neurologic: grossly intact        LABORATORY    ELECTROLYTE PANEL   Recent Labs   Lab 11/04/23  0158 11/03/23  1703 11/03/23  1030 11/03/23  0738 11/02/23  1023   NA  --   --   --  132* 134*   POTASSIUM  --   --   --  3.9 4.5   CHLORIDE  --   --   --  99 96*   CO2  --   --   --  24 26   * 175* 93 94 126*   CR  --   --   --  1.31* 1.58*   BUN  --   --   --  20.5 28.6*      HEMATOLOGY PANEL   Recent Labs   Lab 11/04/23  0627 11/03/23  0738 11/02/23  1023   HGB 11.1* 11.1* 12.6*   MCV 85 84 84   WBC 10.7 6.5 15.0*    214 313   INR  --   --  1.27*      LIVER AND PANCREAS PANEL   Recent Labs   Lab 11/03/23  0738 11/02/23  1023 11/01/23  1517   * 864* 2,003*   * 800* 1,035*   ALKPHOS 537* 753* 805*   BILITOTAL 1.0 2.0* 2.2*   LIPASE  --  12* 12*     IMAGING STUDIES        I have reviewed the current diagnostic and laboratory tests.              IMPRESSION   Kobe Buchanan is a 63 year old male with past medical hx of Atrial fibrillation, DM, HTN, hyperlipidemia, CAD, hx of Simone En Y gastric bypass admitted with choledocholithiasis    Now s/p ERCP (lap assist) and CCY    Okay for discharge from GI perspective              Paco Valle MD  Thank you for the opportunity to participate in the care of this patient.   Please feel free to call me with any questions or " concerns.  Phone number [phone].

## 2023-11-04 NOTE — PROGRESS NOTES
ASSESSMENT:  1. Abdominal pain, right upper quadrant    2. Transaminitis    3. RUQ abdominal pain    4. Choledocholithiasis    5. Acute cholecystitis    6. S/P laparoscopic cholecystectomy        Kobe Buchanan is a 63 year old male who is s/p laparoscopic cholecystectomy with surgical exposure for ERCP and ERCP with sphincterotomy on 11/03/23. He is doing quite well    PLAN:  Diet as tolerated  Home form a surgical perspective  Discharge instructions and scripts placed yesterday    SUBJECTIVE:   He is feeling better. Upset that his fentanyl patch was removed. He reports pain is controlled. He is tolerating clears without issue. Passing flatus, voiding and denies nausea.      Patient Vitals for the past 24 hrs:   BP Temp Temp src Pulse Resp SpO2 Weight   11/04/23 0740 134/64 97.6  F (36.4  C) Oral 59 20 93 % --   11/04/23 0204 134/60 97.7  F (36.5  C) Oral 64 20 94 % --   11/03/23 2019 (!) 142/66 98.5  F (36.9  C) Oral 76 20 96 % --   11/03/23 1900 (!) 148/66 98.5  F (36.9  C) Oral 75 18 94 % --   11/03/23 1750 (!) 144/62 -- Oral 78 18 -- --   11/03/23 1715 (!) 155/66 98.4  F (36.9  C) Oral 75 22 94 % --   11/03/23 1654 (!) 142/74 98.3  F (36.8  C) Oral 71 22 94 % 103.7 kg (228 lb 9.9 oz)   11/03/23 1630 (!) 150/68 -- -- 66 20 94 % --   11/03/23 1615 (!) 167/74 -- -- 73 12 91 % --   11/03/23 1600 (!) 208/88 -- -- 76 12 93 % --   11/03/23 1545 (!) 166/77 -- -- 62 12 93 % --   11/03/23 1530 128/59 -- -- 59 12 94 % --   11/03/23 1522 139/65 99  F (37.2  C) Temporal 59 12 98 % --   11/03/23 1038 (!) 145/67 99  F (37.2  C) Oral 58 20 97 % --         PHYSICAL EXAM:  GEN: No acute distress, comfortable  LUNGS: CTA bilaterally  CV:RRR  ABD:soft, not distended, not tender; incisions CDI  EXT:No cyanosis, edema or obvious abnormalities    11/03 0700 - 11/04 0659  In: 2760 [P.O.:960; I.V.:1800]  Out: 1300 [Urine:1250]    Admission on 11/02/2023   Component Date Value    Hold Specimen 11/02/2023 [facility]     Hold Specimen  11/02/2023 [facility]     Hold Specimen 11/02/2023 [facility]     Hold Specimen 11/02/2023 [facility]     INR 11/02/2023 1.27 (H)     Sodium 11/02/2023 134 (L)     Potassium 11/02/2023 4.5     Chloride 11/02/2023 96 (L)     Carbon Dioxide (CO2) 11/02/2023 26     Anion Gap 11/02/2023 12     Urea Nitrogen 11/02/2023 28.6 (H)     Creatinine 11/02/2023 1.58 (H)     GFR Estimate 11/02/2023 49 (L)     Calcium 11/02/2023 9.4     Glucose 11/02/2023 126 (H)     Protein Total 11/02/2023 7.3     Albumin 11/02/2023 3.9     Bilirubin Total 11/02/2023 2.0 (H)     Alkaline Phosphatase 11/02/2023 753 (H)     AST 11/02/2023 864 (HH)     ALT 11/02/2023 800 (HH)     Bilirubin Direct 11/02/2023 1.14 (H)     Lipase 11/02/2023 12 (L)     Magnesium 11/02/2023 2.2     WBC Count 11/02/2023 15.0 (H)     RBC Count 11/02/2023 4.59     Hemoglobin 11/02/2023 12.6 (L)     Hematocrit 11/02/2023 38.6 (L)     MCV 11/02/2023 84     MCH 11/02/2023 27.5     MCHC 11/02/2023 32.6     RDW 11/02/2023 13.5     Platelet Count 11/02/2023 313     % Neutrophils 11/02/2023 86     % Lymphocytes 11/02/2023 3     % Monocytes 11/02/2023 9     % Eosinophils 11/02/2023 1     % Basophils 11/02/2023 0     % Immature Granulocytes 11/02/2023 1     NRBCs per 100 WBC 11/02/2023 0     Absolute Neutrophils 11/02/2023 13.0 (H)     Absolute Lymphocytes 11/02/2023 0.5 (L)     Absolute Monocytes 11/02/2023 1.3     Absolute Eosinophils 11/02/2023 0.1     Absolute Basophils 11/02/2023 0.1     Absolute Immature Granul* 11/02/2023 0.1     Absolute NRBCs 11/02/2023 0.0     Sodium 11/03/2023 132 (L)     Potassium 11/03/2023 3.9     Carbon Dioxide (CO2) 11/03/2023 24     Anion Gap 11/03/2023 9     Urea Nitrogen 11/03/2023 20.5     Creatinine 11/03/2023 1.31 (H)     GFR Estimate 11/03/2023 62     Calcium 11/03/2023 8.6 (L)     Chloride 11/03/2023 99     Glucose 11/03/2023 94     Alkaline Phosphatase 11/03/2023 537 (H)     AST 11/03/2023 307 (H)     ALT 11/03/2023 438 (H)     Protein Total 11/03/2023 6.1 (L)      Albumin 11/03/2023 3.2 (L)     Bilirubin Total 11/03/2023 1.0     WBC Count 11/03/2023 6.5     RBC Count 11/03/2023 4.08 (L)     Hemoglobin 11/03/2023 11.1 (L)     Hematocrit 11/03/2023 34.3 (L)     MCV 11/03/2023 84     MCH 11/03/2023 27.2     MCHC 11/03/2023 32.4     RDW 11/03/2023 13.6     Platelet Count 11/03/2023 214     GLUCOSE BY METER POCT 11/03/2023 93     ERCP 11/03/2023                      Value:[facility]  [address] [place] [address]  _______________________________________________________________________________  Patient Name: Kobe Henriquez      Procedure Date: 11/3/2023 1:15 PM  MRN: [id number]                       Account Number: [id number]  YOB: 1960              Admit Type: Inpatient  Age: 63                               Room: [place] [address]  Note Status: Finalized                Attending MD: STANFORD WESLEY MD,   Instrument Name: ERCP Scope [id number]        _______________________________________________________________________________     Procedure:             ERCP  Indications:           Common bile duct stone(s)  Providers:             STANFORD WESLEY MD  Patient Profile:       This is a 63 year old male.  Referring MD:            Medicines:             General Anesthesia  Complications:         No immediate complications.  _______________________________________________________________________________  Procedure:                                       Pre-Anesthesia Assessment:                         - Prior to the procedure, a History and Physical was                          performed, and patient medications, allergies and                          sensitivities were reviewed. The patient's tolerance                          of previous anesthesia was reviewed.                         After obtaining informed consent, the scope was passed                          under direct vision. Throughout the procedure, the                           patient's blood pressure, pulse, and oxygen                          saturations were monitored continuously. The ERCP                          scope was introduced through the mouth, and used to                          inject contrast into and used to inject contrast into                          the bile duct. The ERCP was accomplished without                          difficulty. The patient tolerated the procedure well.                                                                                                             Findings:       The  film was normal. The ERCP scope was passed down the surgical        port to the excluded stomach. The scope was advanced to a normal major        papilla in the descending duodenum. The bile duct was deeply cannulated.        Contrast was injected. The lower third of the main bile duct and middle        third of the main bile duct contained filling defect(s) thought to be a        stone. A wire was passed into the biliary tree. A 7 mm biliary        sphincterotomy was made with a traction (standard) sphincterotome using        ERBE electrocautery. There was no post-sphincterotomy bleeding. The        biliary tree was swept with an 11.5 mm balloon starting at the        bifurcation. Sludge was swept from the duct. All stones were removed.                                                                                   Moderate Sedation:       GA  Impression:            - A filling defect consistent with a stone was seen on                                                    the cholangiogram.                         - Choledocholithiasis was found. Complete removal was                          accomplished by biliary sphincterotomy and balloon                          extraction.                         - A biliary sphincterotomy was performed and all                          stones were removed with balloon sweeping.  Recommendation:        - Surgical closure and  cholecystectomy per the surgery                          team.                         - No anticoagulation for 72 hours.                         - Clear liquid diet for 24 hours.                         - If pain free after 24 hours advance diet slowly as                          tolerated.                                                                                     Stanford Sevilla MD  ____________________  STANFORD SEVILLA MD  11/3/2023 1:41:19 PM  I was physically present for the entire viewing portion of the exam.  __________________________  Signature of teaching physician  B4c/                          [id number] SEVILLA MD  Number of Addenda: 0    Note Initiated On: 11/3/2023 1:15 PM  Scope In: 1:18:55 PM  Scope Out: 1:32:14 PM      GLUCOSE BY METER POCT 11/03/2023 175 (H)     WBC Count 11/04/2023 10.7     RBC Count 11/04/2023 4.07 (L)     Hemoglobin 11/04/2023 11.1 (L)     Hematocrit 11/04/2023 34.6 (L)     MCV 11/04/2023 85     MCH 11/04/2023 27.3     MCHC 11/04/2023 32.1     RDW 11/04/2023 13.7     Platelet Count 11/04/2023 253     GLUCOSE BY METER POCT 11/04/2023 199 (H)     Hold Specimen 11/04/2023 AMRIT Mojica, APRN CNP

## 2023-11-04 NOTE — DISCHARGE SUMMARY
"[facility]  Discharge Summary - Medicine & Pediatrics       Date of Admission:  11/2/2023  Date of Discharge:  11/4/2023 12:17 PM  Discharging Provider: Christi Prakash  Discharge Service: Hospitalist Service    Discharge Diagnoses   Transaminitis  RUQ abdominal pain  Choledocholithiasis  Acute cholecystitis  Abdominal pain, right upper quadrant  S/P laparoscopic cholecystectomy        Clinically Significant Risk Factors     # Obesity: Estimated body mass index is 32.8 kg/m  as calculated from the following:    Height as of this encounter: 1.778 m (5' 10\").    Weight as of this encounter: 103.7 kg (228 lb 9.9 oz).       Follow-ups Needed After Discharge   Follow with PCP within 7 days.    Unresulted Labs Ordered in the Past 30 Days of this Admission       Date and Time Order Name Status Description    11/3/2023  2:51 PM Surgical Pathology Exam In process         These results will be followed up by primary care provider    Discharge Disposition   Discharged to home  Condition at discharge: Stable    Hospital Course   Kobe Buchanan was admitted on 11/2/2023 for RUQ pain and elevated LFTs. He underwent MRCP 11/2 with findings consistent with gangrenous cholecystitis and choledocholithiasis.  The following problems were addressed during his hospitalization:     Acute Cholecystitis, poss. gangrenous   Choledocholithiasis  Transaminitis  RUQ pain  Leukocytosis-improving  POD 1 s/p ERCP & laparoscopic cholecystectomy 11/3  64 y/o M with one week history of RUQ abdominal pain worse w/meals and inspiration, RUQ US 10/27 demonstrated distended GB and wall thickening w/out gallstones, MRCP 11/2 demonstrated evidence of choledocholithiasis and gangrenous cholecystitis. No involvement of pancreas Pt. Afebrile and stable on admission, WBC 15. Found to have elevated liver enzymes which continue to improve since admission -ERCP with cholecystectomy on 11/3/23. Was on IV zosyn during admission. Post " surgery, patient notes feeling better, able to advance diet, passing gas and wants to discharge immediately. GI and general surgery following during admission. Recommendation to advise diet as tolerated and signed off for patient to discharge.   - follow surgical pathology         Mild hyponatremia  Sodium 134 on admission likely in the setting of hypovolemia 2/2 to abdominal pain and poor oral intake. Monitor outpatient.    Chronic Conditions  CKD  Creatinine elevated 1.4, close to baseline. Improving creatinine 1.2 on discharge. Encouraged oral intake. Follow up BMP outpatient.       #HTN  -Continue PTA amlodipine-Lisinopril  -Continue PTA carvedilol         #HLD  #CAD  -Hold PTA atorvastatin due to transaminitis     #THERESA  -Continue PTA buproprion  -Hold PTA clonazepam due to transaminitis, replace w/ 1 mg lorazepam TID PRN  -Continue PTA Lexapro     #Chronic Pain  #Constipation  -Continue scheduled oxycodone, patient is on chronic opioids for chronic pain and has been attempting to wean down. Continue for now due to acute abdominal pain  -Senna/docusate PRN for constipation    Consultations This Hospital Stay   GI and general surgery    Code Status   Prior       The patient was discussed with Dr. Luis F Prakash MD  [place] [facility] [address] [place] [address]  Phone: [phone]  Fax: [phone]  ______________________________________________________________________    Physical Exam   Vital Signs: Temp: 98.1  F (36.7  C) Temp src: Oral BP: (!) 140/65 Pulse: 59   Resp: 20 SpO2: 95 % O2 Device: None (Room air)    Weight: 228 lbs 9.87 oz  General Appearance: Alert and oriented, NAD  Respiratory: normal work of breathing, clear breath sounds bilaterally.   Cardiovascular: normal S1, S2, no murmur  GI: soft, no guarding, surgical sites dry, normal bowel sounds  Skin: normal appearance and turgor, no visible rash  MSK No lower limb edema        Primary Care Physician   Demi Hardin    Discharge Orders      When to call - Contact Surgeon Team    You may experience symptoms that require follow-up before your scheduled appointment. Contact your Surgeon Team if you are concerned about pain control, large amount of bleeding, blood clots, constipation, or if you experience signs of infection (fever, growing tenderness at the surgery site, a large amount of drainage, severe pain, foul-smelling drainage, redness or swelling.     When to call - Reach out to Urgent Care    If you are experiencing uncontrolled Nausea and Vomiting, uncontrolled pain, inability to urinate and uncomfortable, and in need of immediate care, and you are NOT able to reach your Surgeon Team, go to an Urgent Care clinic. Do NOT go to the Emergency Room unless you have shortness of breath, chest pain, or other signs of a medical emergency.     When to call - Reasons to Call 911    Call 911 immediately if you experience sudden-onset chest pain, arm weakness/numbness, slurred speech, or shortness of breath     Symptoms - Fever Management    A low grade fever can be expected after surgery. Your Provider many have prescribed an Opioid pain medication that also contains acetaminophen (TYLENOL) that may help with Fever management.  Do NOT take additional acetaminophen (TYLENOL) in combination with an Opioid/acetaminophen (TYLENOL) product. Read the labels on your Over The Counter (OTC) medications with care.     Symptoms - Reduced Urine Output    If it has been greater than 8 hours since you have urinated despite drinking plenty of water, call your Surgeon Team.     No driving or operating machinery    Do NOT drive any vehicle or operate mechanical equipment for 24 hours following the end of your surgery.  Even though you may feel normal, your reactions may be affected by Anesthesia medication you received.     No Alcohol    Do NOT drink alcoholic beverages for 24 hours following your  surgery and while taking pain medications.     Diet Instructions    You may drink clear liquids (apple juice, ginger ale, 7-up, broth, etc.) tonight, and progress tomorrow to your regular diet as you feel able. It is important to stay well-hydrated after surgery and drink plenty of water.     Shower/Bathing - No restrictions, may shower after 24 hours    Shower/Bathing - No restrictions, may shower after 24 hours.     Dressing / Wound Care - Wound    Skin glue was used as a dressing. This will peel off on its own in 2-3 weeks.     It is normal to have a small rim of red present around the incisions. This should not, however, extend beyond 1/4 inch from the incision.  If your incisions become increasingly tender, red, or draining, please contact us.    You may shower after 48 hours from surgery.  It is ok to get your incisions wet, but avoid rubbing them.  Avoid soaking in bath tubs, or swimming in lakes, pools, or streams for 2 weeks following surgery.     Ice to affected area    Ice to operative site PRN     Discharge Instructions - Comfort and Pain Management    Pain after surgery is normal and expected. You will have some amount of pain after surgery. Your pain will improve with time. There are several things you can do to help reduce your pain including: rest, ice, and using pain medications as needed. Use pain interventions that are prescribed along with your regular home pain medications (home oxy prescription plus gabapentin from surgery team) and don't wait until pain level is out of control. Contact your Surgeon Team if you have pain that persists or worsens after surgery despite rest, ice, and taking your medication(s) as prescribed. You may have a dry mouth, a sore throat, muscles aches or trouble sleeping, and these symptoms should go away after 24 hours.     Discharge Instructions - Rest    Rest and relax for the next 24 hours. Make arrangements to have someone stay with you overnight, and avoid  hazardous and strenuous activities.  Do NOT make any important decisions for the next 24 hours.     No lifting    No lifting over 20 pounds and no strenuous physical activity for 2 weeks.     May return to work (WITH restrictions)    May return to work in 1-2 week(s) with the following restrictions: No lifting over 20 pounds, strenuous activities, pushing or pulling for a total of 2 weeks     Reason for your hospital stay    You were admitted with abdominal pain and found to have gallbladder inflammation. You required gallbladder removal surgery and is currently recovering well. Please follow up with your primary care doctor within one week.     Follow-up and recommended labs and tests     Follow up with primary care provider, Demi Hardin, within 7 days for hospital follow- up.  The following labs/tests are recommended: CMP 2-4 weeks.     Activity    Your activity upon discharge: activity as tolerated     Diet    Follow this diet upon discharge: Orders Placed This Encounter  Advance diet as tolerated       Regular Diet Adult       Significant Results and Procedures   Most Recent 3 CBC's:  Recent Labs   Lab Test 11/04/23 0627 11/03/23  0738 11/02/23  1023   WBC 10.7 6.5 15.0*   HGB 11.1* 11.1* 12.6*   MCV 85 84 84    214 313     Most Recent 3 BMP's:  Recent Labs   Lab Test 11/04/23  0627 11/04/23  0158 11/03/23  1703 11/03/23  1030 11/03/23  0738 11/02/23  1023   *  --   --   --  132* 134*   POTASSIUM 4.2  --   --   --  3.9 4.5   CHLORIDE 98  --   --   --  99 96*   CO2 27  --   --   --  24 26   BUN 18.2  --   --   --  20.5 28.6*   CR 1.22*  --   --   --  1.31* 1.58*   ANIONGAP 7  --   --   --  9 12   NIA 8.5*  --   --   --  8.6* 9.4   * 199* 175*   < > 94 126*    < > = values in this interval not displayed.     Most Recent 2 LFT's:  Recent Labs   Lab Test 11/04/23 0627 11/03/23  0738   * 307*   * 438*   ALKPHOS 422* 537*   BILITOTAL 0.7 1.0     Most Recent 3  Creatinines:  Recent Labs   Lab Test 11/04/23  0627 11/03/23  0738 11/02/23  1023   CR 1.22* 1.31* 1.58*   ,   Results for orders placed or performed during the hospital encounter of 11/02/23   MR Abdomen MRCP w/o & w Contrast    Narrative    EXAM: MR ABDOMEN MRCP W/O and W CONTRAST  LOCATION: [facility]  DATE: 11/2/2023    INDICATION: Biliary obstruction on ultrasound, elevated LFTs  COMPARISON: Ultrasound 10/27/2023  TECHNIQUE: Routine MR liver/pancreas protocol including axial and coronal MRCP sequences. 2D and 3D reconstruction performed by MR technologist including MIP reconstruction and slab cholangiograms. If performed with contrast, additional dynamic T1 post   IV contrast images.  CONTRAST: 10 ml gadavist     FINDINGS:     MRCP: Mild intrahepatic and extrahepatic biliary dilation with the common bile duct measuring up to 1.2 cm. A few small rounded filling defects are present in the distal common bile duct, just proximal to the ampulla (e.g. 7/24). Abrupt sharp tapering at   the ampulla. No biliary wall thickening or enhancement. Multiple layering gallstones and sludge, as well as curvilinear filling defects in the fundus. Normal pancreatic duct anatomy. Pancreatic duct dilation.    LIVER: No significant hepatic abnormality. No focal masses. No evidence of cirrhosis or significant fat or iron deposition.    PANCREAS: No evidence of mass or pancreatitis. Diffuse fatty infiltration.    ADDITIONAL FINDINGS: No significant abnormality in the spleen, kidneys, and adrenal glands. No adenopathy. No ascites. Simple left renal cyst, which does not require further follow-up.      Impression    IMPRESSION:  1.  Intrahepatic and extrahepatic biliary dilation with a few small filling defects in the distal common bile duct consistent with choledocholithiasis. Abrupt narrowing at the ampulla could also represent a component of ampullary stenosis.    2.  Distended gallbladder with cholelithiasis  and borderline wall thickening. There are also intraluminal curvilinear membranes, which could be sloughed mucosa in the setting of gangrenous cholecystitis.   XR Surgery EDUARDO Fluoro G/T 5 Min w Stills    [facility]  XR SURGERY EDUARDO FLUORO GREATER THAN 5 MIN W STILLS    ERCP.  11/3/2023 1:47 PM CDT    INDICATION: Bile duct dilatation. Choledocholithiasis by MRCP.  COMPARISON: MRCP 11/02/2023.      Impression    IMPRESSION: Choledocholithiasis with dilated common bile duct. Sphincterotomy performed.     *Note: Due to a large number of results and/or encounters for the requested time period, some results have not been displayed. A complete set of results can be found in Results Review.       Discharge Medications   Discharge Medication List as of 11/4/2023 12:08 PM        START taking these medications    Details   acetaminophen (TYLENOL) 325 MG tablet Take 2 tablets (650 mg) by mouth every 4 hours as needed for mild pain, Disp-50 tablet, R-0, E-Prescribe      gabapentin (NEURONTIN) 300 MG capsule Take 1 capsule (300 mg) by mouth every 8 hours as needed for other (abdominal pain), Disp-16 capsule, R-0, E-Prescribe           CONTINUE these medications which have CHANGED    Details   atorvastatin (LIPITOR) 40 MG tablet Take 1 tablet (40 mg) by mouth daily, Disp-90 tablet, R-3, E-PrescribeHold for 1-2 weeks.           CONTINUE these medications which have NOT CHANGED    Details   amLODIPine-benazepril (LOTREL) 5-20 MG capsule Take 1 capsule by mouth 2 times daily, Disp-180 capsule, R-3, E-Prescribe      buPROPion (WELLBUTRIN SR) 200 MG 12 hr tablet TAKE 1 TABLET BY MOUTH 2 TIMES DAILY, Disp-180 tablet, R-3, E-Prescribe      carvedilol (COREG) 25 MG tablet Take 2 tablets (50 mg) by mouth 2 times daily (with meals), Disp-360 tablet, R-0, E-Prescribe      clonazePAM (KLONOPIN) 0.5 MG tablet Take 1 tablet (0.5 mg) by mouth 3 times daily as needed for anxiety, Disp-90 tablet, R-1,  E-Prescribe      cyclobenzaprine (FLEXERIL) 10 MG tablet Take 1 tablet (10 mg) by mouth 3 times daily as needed for muscle spasms, Disp-90 tablet, R-4, E-Prescribe      diazepam (VALIUM) 5 MG tablet Take on tablet 20 minutes prior to MRI due to claustrophobia., Disp-1 tablet, R-0, E-Prescribe      docusate sodium (COLACE) 100 MG capsule Take 2 capsule in the morning and 3 capsules in the evening, Historical      escitalopram (LEXAPRO) 20 MG tablet Take 1.5 tablets (30 mg) by mouth daily, Disp-135 tablet, R-3, E-Prescribe      Fe Heme Polypeptide-folic acid (PROFERRIN-FORTE) 12-1 MG TABS Take 1 tablet by mouth 2 times daily, Disp-90 tablet,R-0, E-Prescribe      fentaNYL (DURAGESIC) 50 mcg/hr 72 hr patch Place 1 patch onto the skin every 72 hours, Historical      fluocinonide (LIDEX) 0.05 % external ointment Apply twice daily to itchy skin nodules for 1-2 weeks at a time.Disp-30 g, [id number]      GAVILYTE-G 236 g suspension Take by oral route as directed in colonoscopy prep instructions received from [facility]*, LATANYA, Historical      latanoprost (XALATAN) 0.005 % ophthalmic solution Place 1 drop Into the left eye daily, Disp-2.5 mL, R-4, E-Prescribe      mometasone (ELOCON) 0.1 % external ointment Apply topically nightly as needed (rash on arms)Disp-30 g, [id number]      naproxen (EC-NAPROSYN) 500 MG EC tablet Take 1 tablet (500 mg) by mouth 2 times daily as needed (pain), Disp-186 tablet, R-3, E-Prescribe      oxyCODONE IR (ROXICODONE) 10 MG tablet Take 1 tablet by mouth every 4 hours as needed for pain, max 4 tablet(s) per day, Historical      pantoprazole (PROTONIX) 40 MG EC tablet Take 1 tablet (40 mg) by mouth daily as needed for heartburn, Disp-90 tablet, R-3, E-Prescribe      propranolol (INDERAL) 10 MG tablet Take 1 tablet (10 mg) by mouth 3 times daily as needed (panic attack), Disp-90 tablet, R-3, E-Prescribe      psyllium (METAMUCIL/KONSYL) capsule Take 1 capsule by mouth daily, Disp-90 capsule, R-3,  E-PrescribePharmacy to dispense capsule size that is available.      senna-docusate (SENOKOT-S/PERICOLACE) 8.6-50 MG tablet Take 1-2 tablets by mouth 2 times daily, Disp-30 tablet,R-0, E-Prescribe      sucralfate (CARAFATE) 1 GM tablet Take 1 tablet (1 g) by mouth 2 times daily as needed (Reflux), Disp-180 tablet, R-3, E-Prescribe      !! tacrolimus (PROTOPIC) 0.1 % external ointment Apply topically as needed (rash on arms) Apply to affected areas on body.Disp-60 g, [id number]      !! tacrolimus (PROTOPIC) 0.1 % ointment Apply topically as needed Apply to affected areas on body.Disp-120 g, [id number]      tiZANidine (ZANAFLEX) 4 MG tablet Take 1 tablet (4 mg) by mouth 2 times daily, Disp-180 tablet, R-3, E-Prescribe      triamcinolone (KENALOG) 0.1 % external ointment Apply topically 2 times daily To affected areas of rash. Please avoid application to the face, groin or armpits as the medication is too strong for these areas.Disp-454 g, [id number]      vitamin B-Complex Take 1 tablet by mouth daily, Historical       !! - Potential duplicate medications found. Please discuss with provider.        Allergies   Allergies   Allergen Reactions    Ciprofloxacin      History of aortic aneurysms    Tape [Adhesive Tape] Blisters     Blistering - please use paper tape

## 2023-11-04 NOTE — PLAN OF CARE
Problem: Adult Inpatient Plan of Care  Goal: Optimal Comfort and Wellbeing  Outcome: Progressing     Problem: Surgery Nonspecified  Goal: Absence of Infection Signs and Symptoms  Outcome: Progressing     Problem: Surgery Nonspecified  Goal: Effective Urinary Elimination  Outcome: Progressing   Goal Outcome Evaluation:  Patient is A/O x4. VSS on 2L NC.  C/o 8/10 pain, controlled with oxy; Uses urinal; diet order not entered yet; able to make needs known, drinking water, no nausea

## 2023-11-04 NOTE — PLAN OF CARE
Problem: Adult Inpatient Plan of Care  Goal: Optimal Comfort and Wellbeing  Outcome: Progressing  Intervention: Monitor Pain and Promote Comfort  Recent Flowsheet Documentation  Taken 11/4/2023 0142 by Rebecca Fregoso, RN  Pain Management Interventions: medication (see MAR)     Problem: Surgery Nonspecified  Goal: Effective Bowel Elimination  Outcome: Progressing   Goal Outcome Evaluation:       Pt is A/O x 4, c/o ABD pain 5/10, prn oxycodone administer per order. Pt is on 2L O2 NC.  @ 2 AM, VSS continue to monitor. Pt is asking to be discharge today. Rebecca Fregoso, RN

## 2023-11-06 ENCOUNTER — PATIENT OUTREACH (OUTPATIENT)
Dept: CARE COORDINATION | Facility: CLINIC | Age: 63
End: 2023-11-06
Payer: COMMERCIAL

## 2023-11-06 DIAGNOSIS — F43.9 STRESS: ICD-10-CM

## 2023-11-06 LAB
PATH REPORT.COMMENTS IMP SPEC: NORMAL
PATH REPORT.COMMENTS IMP SPEC: NORMAL
PATH REPORT.FINAL DX SPEC: NORMAL
PATH REPORT.GROSS SPEC: NORMAL
PATH REPORT.MICROSCOPIC SPEC OTHER STN: NORMAL
PATH REPORT.RELEVANT HX SPEC: NORMAL
PHOTO IMAGE: NORMAL

## 2023-11-06 ASSESSMENT — ACTIVITIES OF DAILY LIVING (ADL): DEPENDENT_IADLS:: INDEPENDENT

## 2023-11-06 NOTE — PROGRESS NOTES
Clinic Care Coordination Contact  [facility]: Post-Discharge Note  SITUATION                                                      Admission:    Admission Date: 11/02/23   Reason for Admission: Transaminitis  RUQ abdominal pain  Choledocholithiasis  Acute cholecystitis  Abdominal pain, right upper quadrant  S/P laparoscopic cholecystectomy  Discharge:   Discharge Date: 11/04/23  Discharge Diagnosis: Transaminitis  RUQ abdominal pain  Choledocholithiasis  Acute cholecystitis  Abdominal pain, right upper quadrant  S/P laparoscopic cholecystectomy    BACKGROUND                                                      Per hospital discharge summary and inpatient provider notes:      ASSESSMENT           Discharge Assessment  How are you doing now that you are home?: Pt is doing better  How are your symptoms? (Red Flag symptoms escalate to triage hotline per guidelines): Improved  Do you feel your condition is stable enough to be safe at home until your provider visit?: Yes  Does the patient have their discharge instructions? : Yes  Does the patient have questions regarding their discharge instructions? : No  Were you started on any new medications or were there changes to any of your previous medications? : Yes  Does the patient have all of their medications?: Yes  Do you have questions regarding any of your medications? : No  Do you have all of your needed medical supplies or equipment (DME)?  (i.e. oxygen tank, CPAP, cane, etc.): Yes  Discharge follow-up appointment scheduled within 14 calendar days? : Yes  Discharge Follow Up Appointment Date: 11/13/23  Discharge Follow Up Appointment Scheduled with?: Primary Care Provider                  PLAN                                                      Outpatient Plan:      Future Appointments   Date Time Provider Department Center   11/13/2023  8:00 AM Bachman, Rachel, MD [facility] [place]         For any urgent concerns, please contact our 24 hour nurse triage line:  [phone]       OSITO HinkleW

## 2023-11-06 NOTE — TELEPHONE ENCOUNTER
Message to physician: none    Date of last visit: 11/1/2023    Date of next visit if scheduled: 11/13/23    Potassium   Date Value Ref Range Status   11/04/2023 4.2 3.4 - 5.3 mmol/L Final   07/29/2022 4.0 3.4 - 5.3 mmol/L Final   08/18/2020 4.3 3.4 - 5.3 mmol/L Final     Creatinine   Date Value Ref Range Status   11/04/2023 1.22 (H) 0.67 - 1.17 mg/dL Final   08/05/2020 1.24 0.66 - 1.25 mg/dL Final     GFR Estimate   Date Value Ref Range Status   11/04/2023 67 >60 mL/min/1.73m2 Final   08/05/2020 63 >60 mL/min/[1.73_m2] Final     Comment:     Non  GFR Calc  Starting 12/18/2018, serum creatinine based estimated GFR (eGFR) will be   calculated using the Chronic Kidney Disease Epidemiology Collaboration   (CKD-EPI) equation.         BP Readings from Last 3 Encounters:   11/04/23 (!) 140/65   11/01/23 106/68   10/24/23 123/69       Hemoglobin A1C   Date Value Ref Range Status   08/24/2023 5.6 0.0 - 5.6 % Final     Comment:     Normal <5.7%   Prediabetes 5.7-6.4%    Diabetes 6.5% or higher     Note: Adopted from ADA consensus guidelines.   10/05/2016 5.3 4.3 - 6.0 % Final       Please complete refill and CLOSE ENCOUNTER.  Closing the encounter signifies the refill is complete.

## 2023-11-07 PROCEDURE — 88304 TISSUE EXAM BY PATHOLOGIST: CPT | Mod: 26 | Performed by: PATHOLOGY

## 2023-11-07 RX ORDER — CLONAZEPAM 0.5 MG/1
0.5 TABLET ORAL 3 TIMES DAILY PRN
Qty: 90 TABLET | Refills: 1 | Status: SHIPPED | OUTPATIENT
Start: 2023-11-10 | End: 2023-12-21

## 2023-11-07 NOTE — OP NOTE
General Surgery Operative Note    Date of Surgery: 11/3/2023     Surgeon: Kobe Mathis MD    Assistant: Irish Mojica NP -her assistance was required for retraction and visualization during the case    Preoperative Diagnosis:   1.  Choledocholithiasis  2.  History of Simone-en-Y gastric bypass  3.  Acute cholecystitis    Postoperative Diagnosis:   1.  Choledocholithiasis  2.  History of Simone-en-Y gastric bypass  3.  Acute cholecystitis    Procedure:   1.  Gastric remnant access for ERCP  2.  Laparoscopic lysis of adhesions  3.  Laparoscopic cholecystectomy    EBL: 50 cc    Findings: 15 mm balloontipped trocar placed in the gastric remnant without incident.  The gallbladder itself was severely inflamed.    Indications:  Patient is a 63-year-old man with a history of Simone-en-Y gastric bypass who presented the emergency department with right upper quadrant pain where he was found to have evidence of cholecystitis and choledocholithiasis.  Gastroenterology requested gastric remnant access for ERCP.  After discussion with the patient about the risks of gastric remnant access and cholecystectomy, the patient consented to the procedures.    Details of operation:  Patient was brought to the operating room and laid on the table in the supine position.  General anesthesia was induced without incident.  The patient was prepped and draped in usual sterile fashion.  A timeout for safety was performed.     I began with a supraumbilical incision and a Veress needle was introduced.  Pneumoperitoneum was established without incident.  A 5 mm optical port was placed at the site.  There was no injury with entry.  I immediately encountered a fair amount of adhesions in his upper abdomen.  I placed a second 5 mm port in his left abdomen and began taking down adhesions.  Once I had an appropriate window I also made another incision and 5 mm port placement on the right side and a 12 mm port in the subxiphoid region to assist with  lysis and to be used for the cholecystectomy later. I was able to take down all the adhesions and identify the gastric remnant.  It was fairly high up in his chest.  I freed it from underneath the omentum until it would reach down below his costal margin.  I then used a #1 Prolene suture with a large needle that was inserted through the abdominal wall passed through the gastric remnant and then backed out the abdominal wall and secured in place.  I used a second of the sutures to secure the stomach up near the abdominal wall.  I then use electrocautery to incise the gastric remnant making sure I was intraluminal.  I then made an incision and used a 15 mm balloontipped trocar was inserted through the abdominal wall and into the gastric remnant.  The balloon was inflated.  The stay sutures were pulled tight.  At this point all instruments were removed from the table and sterile towels were placed over all of our other ports and the abdomen to allow for ERCP.  At this point Dr. Sevilla came in and performed the ERCP    After ERCP was completed we undraped the abdomen again.  I used a 3-0 absorbable V-Loc suture was brought to the abdomen.  I removed the 15 mm balloontipped trocar.  I then oversewed the gastrotomy site in 2 layers using the 3 OV lock suture.  The closure looked excellent.      We then turned our attention to the gallbladder itself.  An additional trocar was placed in the right abdomen to allow for retraction.  The gallbladder was quite inflamed.  It was grasped and retracted cephalad after the compression with an aspiration needle.  I then began a painstaking process of identifying the critical view of safety.  The visceral peritoneum was incised using electrocautery.  Using mostly blunt dissection with the suction  I was able to identify the cystic artery and cystic duct.  These were both clipped and divided.  I then took the gallbladder off of the liver bed using electrocautery.  Had  significant trouble with oozing after this removal.  I used electrocautery to control most of the bleeding.  I then placed some Surgicel powder in the fossa to assure hemostasis going forward.  The gallbladder was placed in Endo Catch bag and removed from the abdomen.  The 12 mm port site fascia was closed with an 0 Vicryl suture on a suture passer device.  The right upper quadrant was irrigated clean with saline.  Insufflation was then released and all the ports were removed.  The skin was closed with 4-0 Monocryl suture and Dermabond was used as a dressing.  Patient tolerated the procedure well.  There were no immediately apparent complications.    Kobe Mathis MD  General Surgeon  [facility]  Surgery [address]  [address] [place] [id number]  Office: [phone]

## 2023-11-13 ENCOUNTER — OFFICE VISIT (OUTPATIENT)
Dept: FAMILY MEDICINE | Facility: CLINIC | Age: 63
End: 2023-11-13
Payer: COMMERCIAL

## 2023-11-13 VITALS
OXYGEN SATURATION: 99 % | WEIGHT: 226 LBS | DIASTOLIC BLOOD PRESSURE: 71 MMHG | SYSTOLIC BLOOD PRESSURE: 123 MMHG | HEART RATE: 65 BPM | HEIGHT: 70 IN | BODY MASS INDEX: 32.35 KG/M2

## 2023-11-13 DIAGNOSIS — Z09 HOSPITAL DISCHARGE FOLLOW-UP: Primary | ICD-10-CM

## 2023-11-13 DIAGNOSIS — K80.42 CHOLEDOCHOLITHIASIS WITH ACUTE CHOLECYSTITIS: ICD-10-CM

## 2023-11-13 LAB
ALBUMIN SERPL BCG-MCNC: 3.1 G/DL (ref 3.5–5.2)
ALP SERPL-CCNC: 265 U/L (ref 40–129)
ALT SERPL W P-5'-P-CCNC: 64 U/L (ref 0–70)
ANION GAP SERPL CALCULATED.3IONS-SCNC: 13 MMOL/L (ref 7–15)
AST SERPL W P-5'-P-CCNC: 44 U/L (ref 0–45)
BILIRUB SERPL-MCNC: 0.9 MG/DL
BUN SERPL-MCNC: 15.1 MG/DL (ref 8–23)
CALCIUM SERPL-MCNC: 9.2 MG/DL (ref 8.8–10.2)
CHLORIDE SERPL-SCNC: 95 MMOL/L (ref 98–107)
CREAT SERPL-MCNC: 1.36 MG/DL (ref 0.67–1.17)
DEPRECATED HCO3 PLAS-SCNC: 24 MMOL/L (ref 22–29)
EGFRCR SERPLBLD CKD-EPI 2021: 59 ML/MIN/1.73M2
GLUCOSE SERPL-MCNC: 128 MG/DL (ref 70–99)
POTASSIUM SERPL-SCNC: 4.9 MMOL/L (ref 3.4–5.3)
PROT SERPL-MCNC: 7.8 G/DL (ref 6.4–8.3)
SODIUM SERPL-SCNC: 132 MMOL/L (ref 135–145)

## 2023-11-13 PROCEDURE — 99495 TRANSJ CARE MGMT MOD F2F 14D: CPT | Mod: GC

## 2023-11-13 PROCEDURE — 36415 COLL VENOUS BLD VENIPUNCTURE: CPT

## 2023-11-13 PROCEDURE — 80053 COMPREHEN METABOLIC PANEL: CPT

## 2023-11-13 NOTE — PROGRESS NOTES
Preceptor Attestation:  Patient's case reviewed and discussed with the resident, Angela Hardy MD, and I personally evaluated the patient. I agree with written assessment and plan of care.    Supervising Physician:  Deirdre Peralta MD   [facility]

## 2023-11-13 NOTE — PROGRESS NOTES
Assessment & Plan     Hospital discharge follow-up  Choledocholithiasis with acute cholecystitis  Patient was hospitalized from 11/2-11/4 for choledocholithiasis and underwent ERCP and cholecystectomy on 11/3. Today, he feels the symptoms he was admitted for have completely gone away. He denies any current abdominal pain or discomfort. Has chronic constipation and has only gone twice since discharge. He has magnesium citrate/milk of magnesia at home that he will try if he does get uncomfortable, not interested in additional medications for his constipation today. Will check CMP. Does not have any follow-up with GI or surgery although does not appear this was recommended at discharge.  - Comprehensive metabolic panel; Future  - Comprehensive metabolic panel     MED REC REQUIRED  Post Medication Reconciliation Status:  Discharge medications reconciled, continue medications without change    Return if symptoms worsen or fail to improve.    Angela Hardy MD  [facility] Subjective Bruce is a 63 year old, presenting for the following health issues:  Hospital F/U (11/02/23 at [facility] fo gall bladder )    States he is not eating a lot  Has only had two bowel movements since discharge  Denies any abdominal pain or discomfort, when he did go it was a normal BM for him  Does take senna and docusate for his chronic opioid use and chronic constipation  Has milk of magnesia and magnesium citrate on hand to help with bowel movements, not interested in additional agents today  No new medications, no new specialty visits        11/13/2023     7:59 AM   Additional Questions   Roomed by skylar PARRA         11/6/2023    11:37 AM   Post Discharge Outreach   Admission Date 11/2/2023   Reason for Admission Transaminitis  RUQ abdominal pain  Choledocholithiasis  Acute cholecystitis  Abdominal pain, right upper quadrant  S/P laparoscopic cholecystectomy   Discharge Date 11/4/2023    Discharge Diagnosis Transaminitis  RUQ abdominal pain  Choledocholithiasis  Acute cholecystitis  Abdominal pain, right upper quadrant  S/P laparoscopic cholecystectomy   How are you doing now that you are home? Pt is doing better   How are your symptoms? (Red Flag symptoms escalate to triage hotline per guidelines) Improved   Do you feel your condition is stable enough to be safe at home until your provider visit? Yes   Does the patient have their discharge instructions?  Yes   Does the patient have questions regarding their discharge instructions?  No   Were you started on any new medications or were there changes to any of your previous medications?  Yes   Does the patient have all of their medications? Yes   Do you have questions regarding any of your medications?  No   Do you have all of your needed medical supplies or equipment (DME)?  (i.e. oxygen tank, CPAP, cane, etc.) Yes   Discharge follow-up appointment scheduled within 14 calendar days?  Yes   Discharge Follow Up Appointment Date 11/13/2023   Discharge Follow Up Appointment Scheduled with? Primary Care Provider     Hospital Follow-up Visit:    Hospital/Nursing Home/ Rehab Facility: [facility]  Date of Admission: 11/2/23  Date of Discharge: 11/4/23  Reason(s) for Admission: Choledocholithiasis, acute cholecystitis s/p ERCP and laparoscopic cholecystectomy 11/3/23    Was your hospitalization related to COVID-19? No   Problems taking medications regularly:  None  Medication changes since discharge: None  Problems adhering to non-medication therapy:  None    Summary of hospitalization:  [facility] discharge summary reviewed  Diagnostic Tests/Treatments reviewed.  Follow up needed: no follow up scheduled  Other Healthcare Providers Involved in Patient s Care:         None  Update since discharge: improved.     Plan of care communicated with patient     Review of Systems   Constitutional, HEENT, cardiovascular,  "pulmonary, gi and gu systems are negative, except as otherwise noted.      Objective    /71   Pulse 65   Ht 1.778 m (5' 10\")   Wt 102.5 kg (226 lb)   SpO2 99%   BMI 32.43 kg/m    Body mass index is 32.43 kg/m .  Physical Exam   Gen: Pleasant. No distress.    HEENT: MMM.   Neck: No overt asymmetry.   CV: Appears well-perfused. RRR. No murmur.   Resp: Breathing comfortably on room air. Lungs clear to auscultation bilaterally without wheeze or crackle.   Abd: Non-distended, non-tender. Surgical incisions are c/d/I. Hypoactive bowel sounds. No guarding or rebound.  Ext: No edema or overt asymmetry/deformity.   Skin: No overt rash on easily visualized skin.   Neuro: Non-focal.   Psych: Calm.     "

## 2023-11-16 NOTE — TELEPHONE ENCOUNTER
Called and no answer. Left VM for pt in regards to rx request and if have any questions to return call. --HHer, CMA   Impaired gait

## 2023-11-22 ENCOUNTER — MYC MEDICAL ADVICE (OUTPATIENT)
Dept: FAMILY MEDICINE | Facility: CLINIC | Age: 63
End: 2023-11-22
Payer: COMMERCIAL

## 2023-11-22 ENCOUNTER — TRANSFERRED RECORDS (OUTPATIENT)
Dept: HEALTH INFORMATION MANAGEMENT | Facility: CLINIC | Age: 63
End: 2023-11-22
Payer: COMMERCIAL

## 2023-11-22 DIAGNOSIS — K80.42 CHOLEDOCHOLITHIASIS WITH ACUTE CHOLECYSTITIS: Primary | ICD-10-CM

## 2023-11-22 DIAGNOSIS — K80.50 CHOLEDOCHOLITHIASIS: ICD-10-CM

## 2023-11-22 DIAGNOSIS — D50.9 IRON DEFICIENCY ANEMIA, UNSPECIFIED IRON DEFICIENCY ANEMIA TYPE: ICD-10-CM

## 2023-11-22 NOTE — TELEPHONE ENCOUNTER
Called and discussed.  Having recurrence of pain in right upper quadrant, worried another bile duct area stone or something.    Tolerating eating, having BM (think they are dark/black from prunes)  No nausea/vomitting no fevers.      Agrees to labs, wonders if needs another procedure or ERCP?    Agrees and requests referral.

## 2023-11-28 ENCOUNTER — OFFICE VISIT (OUTPATIENT)
Dept: FAMILY MEDICINE | Facility: CLINIC | Age: 63
End: 2023-11-28
Payer: COMMERCIAL

## 2023-11-28 ENCOUNTER — MYC MEDICAL ADVICE (OUTPATIENT)
Dept: FAMILY MEDICINE | Facility: CLINIC | Age: 63
End: 2023-11-28

## 2023-11-28 VITALS
OXYGEN SATURATION: 96 % | SYSTOLIC BLOOD PRESSURE: 119 MMHG | BODY MASS INDEX: 31.5 KG/M2 | RESPIRATION RATE: 16 BRPM | TEMPERATURE: 98.7 F | HEIGHT: 70 IN | WEIGHT: 220 LBS | HEART RATE: 68 BPM | DIASTOLIC BLOOD PRESSURE: 72 MMHG

## 2023-11-28 DIAGNOSIS — E87.1 HYPONATREMIA: ICD-10-CM

## 2023-11-28 DIAGNOSIS — D72.829 LEUKOCYTOSIS, UNSPECIFIED TYPE: ICD-10-CM

## 2023-11-28 DIAGNOSIS — D64.9 ANEMIA, UNSPECIFIED TYPE: Primary | ICD-10-CM

## 2023-11-28 DIAGNOSIS — R53.83 OTHER FATIGUE: ICD-10-CM

## 2023-11-28 DIAGNOSIS — Z90.49 S/P LAPAROSCOPIC CHOLECYSTECTOMY: ICD-10-CM

## 2023-11-28 LAB
ERYTHROCYTE [DISTWIDTH] IN BLOOD BY AUTOMATED COUNT: 14.2 % (ref 10–15)
HCT VFR BLD AUTO: 30.7 % (ref 40–53)
HGB BLD-MCNC: 9.9 G/DL (ref 13.3–17.7)
MCH RBC QN AUTO: 27 PG (ref 26.5–33)
MCHC RBC AUTO-ENTMCNC: 32.2 G/DL (ref 31.5–36.5)
MCV RBC AUTO: 84 FL (ref 78–100)
PLATELET # BLD AUTO: 362 10E3/UL (ref 150–450)
RBC # BLD AUTO: 3.66 10E6/UL (ref 4.4–5.9)
WBC # BLD AUTO: 12.9 10E3/UL (ref 4–11)

## 2023-11-28 PROCEDURE — 99214 OFFICE O/P EST MOD 30 MIN: CPT | Mod: GC

## 2023-11-28 PROCEDURE — 36415 COLL VENOUS BLD VENIPUNCTURE: CPT

## 2023-11-28 PROCEDURE — 85027 COMPLETE CBC AUTOMATED: CPT

## 2023-11-28 PROCEDURE — 80053 COMPREHEN METABOLIC PANEL: CPT

## 2023-11-28 RX ORDER — RESPIRATORY SYNCYTIAL VIRUS VACCINE 120MCG/0.5
0.5 KIT INTRAMUSCULAR ONCE
Qty: 1 EACH | Refills: 0 | Status: CANCELLED | OUTPATIENT
Start: 2023-11-28 | End: 2023-11-28

## 2023-11-28 NOTE — PATIENT INSTRUCTIONS
Nice seeing you again today!   We will call you with the rest of your labs.   We will plan to recheck your Hgb in one week (come back sooner if become more symptomatic).

## 2023-11-28 NOTE — PROGRESS NOTES
Assessment & Plan     Anemia, unspecified type  Other fatigue  Hyponatremia  Patient presents for persistent fatigue and loss of energy he has been experiencing since discharge when hospitalized 11/2-11/4 for acute cholecystitis with choledocholithiasis, now s/p lap fam. Wanting blood draw, worried about anemia, for which he has taken oral iron long-term.   Hgb has worsened from 11.1 on day of discharge to 9.9 today. Is endorsing dark stools, which could be 2/2 oral iron, but is a new problem per patient. Will have him return for repeat draw in one week to trend Hgb, and get set up for IV iron infusion. Has follow up appt with [facility] before then.   Of note, loss of energy could also be related to hyponatremia incidentally seen on labs today; PCP already counseled patient to return for repeat sodium check and to reduce lexapro dosing. Has follow up appt with [facility] before then.   - CBC with platelets  - Comprehensive metabolic panel  - Infusion therapy ordered    S/P laparoscopic cholecystectomy  Leukocytosis  Endorsing some intermittent abd pain in R side of abdomen but not nearly as severe as prior to hospitalization. Of note, WBC mildly elevated since last check on day of discharge, but afebrile with normal vitals and incision scars healing appropriately with otherwise benign abdominal exam; no current concern for infection. Counseled by PCP to go into ED if any new fever or worsening abdominal pain before follow up.  - Comprehensive metabolic panel      Return in about 1 week (around 12/5/2023) for Follow up.    Ankush Yañez MD  [facility] Bullock is a 63 year old, presenting for the following health issues:  Blood Draw (Requesting lab work for weakness, not getting strength back from surgeries. Wants to check Iron levels)        11/28/2023     3:14 PM   Additional Questions   Roomed by Ritu PARRA   CC: Persistent fatigue and loss of energy since hospital  "discharge.   H/o gastric bypass. \"I've had issues with my red blood cells before.\"   Had been hoping they would give him blood before he was discharged.   Day of discharge 11/4: Hgb 11.1, MCV 85  Sleeping about the same.   HA at baseline. No blurry vision, lightheadedness/dizziness, SOB, CP.   Has noticed black stools - since coming home, daily. Not an issue before hospitalization. Denies diarrhea or constipation.   No numbness/tingling in legs. Denies vegetarian/vegan diet.   Takes iron supplementation daily for years.   Abd pain still comes and goes on R side, much improved. No fullness/bloating. No N/V, dyspepsia.     Review of Systems   Constitutional, HEENT, cardiovascular, pulmonary, gi and gu systems are negative, except as otherwise noted.      Objective    /72 (BP Location: Right arm, Cuff Size: Adult Regular)   Pulse 68   Temp 98.7  F (37.1  C) (Oral)   Resp 16   Ht 1.778 m (5' 10\")   Wt 99.8 kg (220 lb)   SpO2 96%   BMI 31.57 kg/m    Body mass index is 31.57 kg/m .  Physical Exam   GENERAL: healthy, alert and no distress  EYES: Eyes grossly normal to inspection, PERRL and conjunctivae and sclerae normal  RESP: lungs clear to auscultation - no rales, rhonchi or wheezes  CV: regular rate and rhythm, normal S1 S2, no murmurs  ABDOMEN: soft, nontender, no hepatosplenomegaly, no masses and bowel sounds normal. Incision scars healing appropriately  NEURO: Normal strength and tone, mentation intact and speech normal  PSYCH: mentation appears normal, affect normal/bright    Results for orders placed or performed in visit on 11/28/23   Comprehensive metabolic panel     Status: Abnormal   Result Value Ref Range    Sodium 128 (L) 135 - 145 mmol/L    Potassium 5.2 3.4 - 5.3 mmol/L    Carbon Dioxide (CO2) 26 22 - 29 mmol/L    Anion Gap 10 7 - 15 mmol/L    Urea Nitrogen 16.0 8.0 - 23.0 mg/dL    Creatinine 1.18 (H) 0.67 - 1.17 mg/dL    GFR Estimate 70 >60 mL/min/1.73m2    Calcium 9.1 8.8 - 10.2 mg/dL    " Chloride 92 (L) 98 - 107 mmol/L    Glucose 147 (H) 70 - 99 mg/dL    Alkaline Phosphatase 246 (H) 40 - 150 U/L    AST 56 (H) 0 - 45 U/L    ALT 67 0 - 70 U/L    Protein Total 7.6 6.4 - 8.3 g/dL    Albumin 3.2 (L) 3.5 - 5.2 g/dL    Bilirubin Total 1.1 <=1.2 mg/dL   CBC with platelets     Status: Abnormal   Result Value Ref Range    WBC Count 12.9 (H) 4.0 - 11.0 10e3/uL    RBC Count 3.66 (L) 4.40 - 5.90 10e6/uL    Hemoglobin 9.9 (L) 13.3 - 17.7 g/dL    Hematocrit 30.7 (L) 40.0 - 53.0 %    MCV 84 78 - 100 fL    MCH 27.0 26.5 - 33.0 pg    MCHC 32.2 31.5 - 36.5 g/dL    RDW 14.2 10.0 - 15.0 %    Platelet Count 362 150 - 450 10e3/uL       ----- Service Performed and Documented by Resident or Fellow ------

## 2023-11-29 LAB
ALBUMIN SERPL BCG-MCNC: 3.2 G/DL (ref 3.5–5.2)
ALP SERPL-CCNC: 246 U/L (ref 40–150)
ALT SERPL W P-5'-P-CCNC: 67 U/L (ref 0–70)
ANION GAP SERPL CALCULATED.3IONS-SCNC: 10 MMOL/L (ref 7–15)
AST SERPL W P-5'-P-CCNC: 56 U/L (ref 0–45)
BILIRUB SERPL-MCNC: 1.1 MG/DL
BUN SERPL-MCNC: 16 MG/DL (ref 8–23)
CALCIUM SERPL-MCNC: 9.1 MG/DL (ref 8.8–10.2)
CHLORIDE SERPL-SCNC: 92 MMOL/L (ref 98–107)
CREAT SERPL-MCNC: 1.18 MG/DL (ref 0.67–1.17)
DEPRECATED HCO3 PLAS-SCNC: 26 MMOL/L (ref 22–29)
EGFRCR SERPLBLD CKD-EPI 2021: 70 ML/MIN/1.73M2
GLUCOSE SERPL-MCNC: 147 MG/DL (ref 70–99)
POTASSIUM SERPL-SCNC: 5.2 MMOL/L (ref 3.4–5.3)
PROT SERPL-MCNC: 7.6 G/DL (ref 6.4–8.3)
SODIUM SERPL-SCNC: 128 MMOL/L (ref 135–145)

## 2023-11-29 NOTE — TELEPHONE ENCOUNTER
Called and talked with patient about labs.    He has appt tomorrow with [facility].      He'll get repeat BMP to follow up on sodium tomorrow.    He'll go to ED if he develops fever or worsening pain.

## 2023-11-29 NOTE — RESULT ENCOUNTER NOTE
I called the patient and discussed his results. States eating and fluids have been the same.  Have appt tomorrow with [facility],  Reviewed if worsened and needs to recheck sodium.    Demi Hardin MD

## 2023-11-30 ENCOUNTER — TRANSFERRED RECORDS (OUTPATIENT)
Dept: HEALTH INFORMATION MANAGEMENT | Facility: CLINIC | Age: 63
End: 2023-11-30
Payer: COMMERCIAL

## 2023-12-04 PROBLEM — E87.1 HYPONATREMIA: Status: ACTIVE | Noted: 2023-12-04

## 2023-12-04 PROBLEM — Z90.49 S/P LAPAROSCOPIC CHOLECYSTECTOMY: Status: ACTIVE | Noted: 2023-12-04

## 2023-12-04 PROBLEM — D64.9 ANEMIA, UNSPECIFIED TYPE: Status: ACTIVE | Noted: 2023-12-04

## 2023-12-04 RX ORDER — HEPARIN SODIUM (PORCINE) LOCK FLUSH IV SOLN 100 UNIT/ML 100 UNIT/ML
5 SOLUTION INTRAVENOUS
Status: CANCELLED | OUTPATIENT
Start: 2023-12-04

## 2023-12-04 RX ORDER — ALBUTEROL SULFATE 90 UG/1
1-2 AEROSOL, METERED RESPIRATORY (INHALATION)
Status: CANCELLED
Start: 2023-12-04

## 2023-12-04 RX ORDER — EPINEPHRINE 1 MG/ML
0.3 INJECTION, SOLUTION, CONCENTRATE INTRAVENOUS EVERY 5 MIN PRN
Status: CANCELLED | OUTPATIENT
Start: 2023-12-04

## 2023-12-04 RX ORDER — METHYLPREDNISOLONE SODIUM SUCCINATE 125 MG/2ML
125 INJECTION, POWDER, LYOPHILIZED, FOR SOLUTION INTRAMUSCULAR; INTRAVENOUS
Status: CANCELLED
Start: 2023-12-04

## 2023-12-04 RX ORDER — MEPERIDINE HYDROCHLORIDE 25 MG/ML
25 INJECTION INTRAMUSCULAR; INTRAVENOUS; SUBCUTANEOUS EVERY 30 MIN PRN
Status: CANCELLED | OUTPATIENT
Start: 2023-12-04

## 2023-12-04 RX ORDER — ALBUTEROL SULFATE 0.83 MG/ML
2.5 SOLUTION RESPIRATORY (INHALATION)
Status: CANCELLED | OUTPATIENT
Start: 2023-12-04

## 2023-12-04 RX ORDER — DIPHENHYDRAMINE HYDROCHLORIDE 50 MG/ML
50 INJECTION INTRAMUSCULAR; INTRAVENOUS
Status: CANCELLED
Start: 2023-12-04

## 2023-12-04 RX ORDER — HEPARIN SODIUM,PORCINE 10 UNIT/ML
5-20 VIAL (ML) INTRAVENOUS DAILY PRN
Status: CANCELLED | OUTPATIENT
Start: 2023-12-04

## 2023-12-06 ENCOUNTER — MYC REFILL (OUTPATIENT)
Dept: ORTHOPEDICS | Facility: CLINIC | Age: 63
End: 2023-12-06
Payer: COMMERCIAL

## 2023-12-06 ENCOUNTER — MYC MEDICAL ADVICE (OUTPATIENT)
Dept: FAMILY MEDICINE | Facility: CLINIC | Age: 63
End: 2023-12-06
Payer: COMMERCIAL

## 2023-12-06 DIAGNOSIS — Z86.59 HISTORY OF CLAUSTROPHOBIA: ICD-10-CM

## 2023-12-06 RX ORDER — DIAZEPAM 5 MG
TABLET ORAL
Qty: 1 TABLET | Refills: 0 | Status: SHIPPED | OUTPATIENT
Start: 2023-12-06 | End: 2024-05-14

## 2023-12-11 ENCOUNTER — HOSPITAL ENCOUNTER (OUTPATIENT)
Dept: MRI IMAGING | Facility: HOSPITAL | Age: 63
Discharge: HOME | End: 2023-12-11
Attending: INTERNAL MEDICINE | Admitting: INTERNAL MEDICINE
Payer: COMMERCIAL

## 2023-12-11 DIAGNOSIS — K80.50 CHOLEDOCHOLITHIASIS: ICD-10-CM

## 2023-12-11 PROCEDURE — A9585 GADOBUTROL INJECTION: HCPCS | Mod: JZ | Performed by: INTERNAL MEDICINE

## 2023-12-11 PROCEDURE — 74183 MRI ABD W/O CNTR FLWD CNTR: CPT

## 2023-12-11 PROCEDURE — 255N000002 HC RX 255 OP 636: Mod: JZ | Performed by: INTERNAL MEDICINE

## 2023-12-11 RX ORDER — GADOBUTROL 604.72 MG/ML
10 INJECTION INTRAVENOUS ONCE
Status: COMPLETED | OUTPATIENT
Start: 2023-12-11 | End: 2023-12-11

## 2023-12-11 RX ADMIN — GADOBUTROL 10 ML: 604.72 INJECTION INTRAVENOUS at 13:36

## 2023-12-12 LAB — RADIOLOGIST FLAGS: ABNORMAL

## 2023-12-14 ENCOUNTER — INFUSION THERAPY VISIT (OUTPATIENT)
Dept: INFUSION THERAPY | Facility: CLINIC | Age: 63
End: 2023-12-14
Attending: FAMILY MEDICINE
Payer: COMMERCIAL

## 2023-12-14 ENCOUNTER — TRANSFERRED RECORDS (OUTPATIENT)
Dept: HEALTH INFORMATION MANAGEMENT | Facility: CLINIC | Age: 63
End: 2023-12-14

## 2023-12-14 VITALS
TEMPERATURE: 98.1 F | SYSTOLIC BLOOD PRESSURE: 131 MMHG | OXYGEN SATURATION: 96 % | HEART RATE: 67 BPM | RESPIRATION RATE: 16 BRPM | DIASTOLIC BLOOD PRESSURE: 65 MMHG

## 2023-12-14 DIAGNOSIS — D64.9 ANEMIA, UNSPECIFIED TYPE: Primary | ICD-10-CM

## 2023-12-14 PROCEDURE — 250N000011 HC RX IP 250 OP 636: Performed by: FAMILY MEDICINE

## 2023-12-14 PROCEDURE — 96366 THER/PROPH/DIAG IV INF ADDON: CPT

## 2023-12-14 PROCEDURE — 258N000003 HC RX IP 258 OP 636: Performed by: FAMILY MEDICINE

## 2023-12-14 PROCEDURE — 96365 THER/PROPH/DIAG IV INF INIT: CPT

## 2023-12-14 RX ORDER — MEPERIDINE HYDROCHLORIDE 25 MG/ML
25 INJECTION INTRAMUSCULAR; INTRAVENOUS; SUBCUTANEOUS EVERY 30 MIN PRN
Status: CANCELLED | OUTPATIENT
Start: 2023-12-16

## 2023-12-14 RX ORDER — ALBUTEROL SULFATE 0.83 MG/ML
2.5 SOLUTION RESPIRATORY (INHALATION)
Status: CANCELLED | OUTPATIENT
Start: 2023-12-16

## 2023-12-14 RX ORDER — METHYLPREDNISOLONE SODIUM SUCCINATE 125 MG/2ML
125 INJECTION, POWDER, LYOPHILIZED, FOR SOLUTION INTRAMUSCULAR; INTRAVENOUS
Status: CANCELLED
Start: 2023-12-16

## 2023-12-14 RX ORDER — ALBUTEROL SULFATE 90 UG/1
1-2 AEROSOL, METERED RESPIRATORY (INHALATION)
Status: CANCELLED
Start: 2023-12-16

## 2023-12-14 RX ORDER — EPINEPHRINE 1 MG/ML
0.3 INJECTION, SOLUTION INTRAMUSCULAR; SUBCUTANEOUS EVERY 5 MIN PRN
Status: CANCELLED | OUTPATIENT
Start: 2023-12-16

## 2023-12-14 RX ORDER — HEPARIN SODIUM (PORCINE) LOCK FLUSH IV SOLN 100 UNIT/ML 100 UNIT/ML
5 SOLUTION INTRAVENOUS
Status: CANCELLED | OUTPATIENT
Start: 2023-12-16

## 2023-12-14 RX ORDER — DIPHENHYDRAMINE HYDROCHLORIDE 50 MG/ML
50 INJECTION INTRAMUSCULAR; INTRAVENOUS
Status: CANCELLED
Start: 2023-12-16

## 2023-12-14 RX ORDER — HEPARIN SODIUM,PORCINE 10 UNIT/ML
5-20 VIAL (ML) INTRAVENOUS DAILY PRN
Status: CANCELLED | OUTPATIENT
Start: 2023-12-16

## 2023-12-14 RX ADMIN — IRON SUCROSE 300 MG: 20 INJECTION, SOLUTION INTRAVENOUS at 12:02

## 2023-12-14 NOTE — PATIENT INSTRUCTIONS
Dear Kobe Buchanan    Thank you for choosing [facility] for your infusion.  The following information is a summary of our appointment as well as important reminders.          If you are a transplant patient and require transplant education, please click on this link: [email or web address].    We look forward in seeing you on your next appointment here at [facility]  Please don t hesitate to call us at [phone] to reschedule any of your appointments or to speak with one of the Psychiatric registered nurses.  It was a pleasure taking care of you today.    Sincerely,    [facility]  [address] [place] [address]  [address]  Phone:  [phone]

## 2023-12-14 NOTE — PROGRESS NOTES
Infusion Nursing Note:  Kobe Buchanan presents today for Venofer.    Patient seen by provider today: No   present during visit today: Not Applicable.    Note: Venofer 300 mg infused over 90 minutes.  Administrations This Visit       iron sucrose (VENOFER) 300 mg in sodium chloride 0.9 % 290 mL intermittent infusion       Admin Date  12/14/2023 Action  $New Bag Dose  300 mg Rate  193.3 mL/hr Route  Intravenous Documented By  Yesenia Rendon, KEELY                     Intravenous Access:  Peripheral IV placed by vascular access.    Treatment Conditions:  None.      Post Infusion Assessment:  Patient tolerated infusion without incident.  Blood return noted pre and post infusion.  Site patent and intact, free from redness, edema or discomfort.  No evidence of extravasations.  Access discontinued per protocol.       Discharge Plan:   Discharge instructions reviewed with: Patient.  Patient and/or family verbalized understanding of discharge instructions and all questions answered.  AVS to patient via [employer]. Health education regarding infusion done. Patient will return 12/18/23 for next appointment.   Patient discharged in stable condition accompanied by: self.  Departure Mode: Ambulatory.    /65 (BP Location: Left arm, Patient Position: Sitting, Cuff Size: Adult Regular)   Pulse 67   Temp 98.1  F (36.7  C) (Oral)   Resp 16   SpO2 96%      Yesenia Rendon, KEELY

## 2023-12-15 ENCOUNTER — PREP FOR PROCEDURE (OUTPATIENT)
Dept: SURGERY | Facility: CLINIC | Age: 63
End: 2023-12-15
Payer: COMMERCIAL

## 2023-12-15 DIAGNOSIS — K80.50 CHOLEDOCHOLITHIASIS: Primary | ICD-10-CM

## 2023-12-15 RX ORDER — CEFAZOLIN SODIUM/WATER 2 G/20 ML
2 SYRINGE (ML) INTRAVENOUS
Status: CANCELLED | OUTPATIENT
Start: 2024-01-05

## 2023-12-15 RX ORDER — CEFAZOLIN SODIUM/WATER 2 G/20 ML
2 SYRINGE (ML) INTRAVENOUS SEE ADMIN INSTRUCTIONS
Status: CANCELLED | OUTPATIENT
Start: 2024-01-05

## 2023-12-18 ENCOUNTER — INFUSION THERAPY VISIT (OUTPATIENT)
Dept: INFUSION THERAPY | Facility: CLINIC | Age: 63
End: 2023-12-18
Attending: FAMILY MEDICINE
Payer: COMMERCIAL

## 2023-12-18 VITALS
OXYGEN SATURATION: 96 % | HEART RATE: 89 BPM | SYSTOLIC BLOOD PRESSURE: 120 MMHG | DIASTOLIC BLOOD PRESSURE: 71 MMHG | RESPIRATION RATE: 16 BRPM | TEMPERATURE: 97.7 F

## 2023-12-18 DIAGNOSIS — D64.9 ANEMIA, UNSPECIFIED TYPE: Primary | ICD-10-CM

## 2023-12-18 PROCEDURE — 250N000011 HC RX IP 250 OP 636: Performed by: FAMILY MEDICINE

## 2023-12-18 PROCEDURE — 96366 THER/PROPH/DIAG IV INF ADDON: CPT

## 2023-12-18 PROCEDURE — 258N000003 HC RX IP 258 OP 636: Performed by: FAMILY MEDICINE

## 2023-12-18 PROCEDURE — 96365 THER/PROPH/DIAG IV INF INIT: CPT

## 2023-12-18 RX ORDER — HEPARIN SODIUM,PORCINE 10 UNIT/ML
5-20 VIAL (ML) INTRAVENOUS DAILY PRN
Status: CANCELLED | OUTPATIENT
Start: 2023-12-20

## 2023-12-18 RX ORDER — DIPHENHYDRAMINE HYDROCHLORIDE 50 MG/ML
50 INJECTION INTRAMUSCULAR; INTRAVENOUS
Status: CANCELLED
Start: 2023-12-20

## 2023-12-18 RX ORDER — EPINEPHRINE 1 MG/ML
0.3 INJECTION, SOLUTION INTRAMUSCULAR; SUBCUTANEOUS EVERY 5 MIN PRN
Status: CANCELLED | OUTPATIENT
Start: 2023-12-20

## 2023-12-18 RX ORDER — METHYLPREDNISOLONE SODIUM SUCCINATE 125 MG/2ML
125 INJECTION, POWDER, LYOPHILIZED, FOR SOLUTION INTRAMUSCULAR; INTRAVENOUS
Status: CANCELLED
Start: 2023-12-20

## 2023-12-18 RX ORDER — MEPERIDINE HYDROCHLORIDE 25 MG/ML
25 INJECTION INTRAMUSCULAR; INTRAVENOUS; SUBCUTANEOUS EVERY 30 MIN PRN
Status: CANCELLED | OUTPATIENT
Start: 2023-12-20

## 2023-12-18 RX ORDER — ALBUTEROL SULFATE 90 UG/1
1-2 AEROSOL, METERED RESPIRATORY (INHALATION)
Status: CANCELLED
Start: 2023-12-20

## 2023-12-18 RX ORDER — HEPARIN SODIUM (PORCINE) LOCK FLUSH IV SOLN 100 UNIT/ML 100 UNIT/ML
5 SOLUTION INTRAVENOUS
Status: CANCELLED | OUTPATIENT
Start: 2023-12-20

## 2023-12-18 RX ORDER — ALBUTEROL SULFATE 0.83 MG/ML
2.5 SOLUTION RESPIRATORY (INHALATION)
Status: CANCELLED | OUTPATIENT
Start: 2023-12-20

## 2023-12-18 RX ADMIN — IRON SUCROSE 300 MG: 20 INJECTION, SOLUTION INTRAVENOUS at 15:26

## 2023-12-18 NOTE — PATIENT INSTRUCTIONS
Dear Kobe Buchanan    Thank you for choosing [facility] ([facility]) for your infusion.  The following information is a summary of our appointment as well as important reminders.      We look forward in seeing you on your next appointment here at [facility] ([facility]).  Please don t hesitate to call us at [phone] to reschedule any of your appointments or to speak with one of the [facility] registered nurses.  It was a pleasure taking care of you today.    Sincerely,    [facility]  [address] [place] [address]  [address]  Phone:  [phone]

## 2023-12-18 NOTE — PROGRESS NOTES
Post Infusion Assessment:  Patient tolerated infusion without incident.  Blood return noted pre and post infusion.  Site patent and intact, free from redness, edema or discomfort.  No evidence of extravasations.  Access discontinued per protocol.     Discharge Plan:   Discharge instructions reviewed with: Patient.  Patient and/or family verbalized understanding of discharge instructions and all questions answered.  AVS to patient via MYCHART.    Patient discharged in stable condition accompanied by: self.  Departure Mode: Ambulatory.      Supriya Davalos RN

## 2023-12-18 NOTE — PROGRESS NOTES
Infusion Nursing Note:  Kobe Buchanan presents today for venofer.    Patient seen by provider today: No   present during visit today: Not Applicable.    Note:   Infusion over 90 minutes    Intravenous Access:  Peripheral IV placed by [facility].    Treatment Conditions:  Not Applicable.    Report given to late shift nurse at 1615 to resume cares    Administrations This Visit       iron sucrose (VENOFER) 300 mg in sodium chloride 0.9 % 290 mL intermittent infusion       Admin Date  12/18/2023 Action  $New Bag Dose  300 mg Rate  193.3 mL/hr Route  Intravenous Documented By  Jud Doran RN                /71 (BP Location: Right arm)   Pulse 89   Temp 97.7  F (36.5  C) (Oral)   Resp 16   SpO2 96%     Jud Doran, RN

## 2023-12-20 ENCOUNTER — INFUSION THERAPY VISIT (OUTPATIENT)
Dept: INFUSION THERAPY | Facility: CLINIC | Age: 63
End: 2023-12-20
Attending: FAMILY MEDICINE
Payer: COMMERCIAL

## 2023-12-20 ENCOUNTER — TRANSFERRED RECORDS (OUTPATIENT)
Dept: HEALTH INFORMATION MANAGEMENT | Facility: CLINIC | Age: 63
End: 2023-12-20

## 2023-12-20 VITALS
RESPIRATION RATE: 16 BRPM | OXYGEN SATURATION: 96 % | SYSTOLIC BLOOD PRESSURE: 125 MMHG | TEMPERATURE: 97.5 F | DIASTOLIC BLOOD PRESSURE: 73 MMHG | HEART RATE: 63 BPM

## 2023-12-20 DIAGNOSIS — D64.9 ANEMIA, UNSPECIFIED TYPE: Primary | ICD-10-CM

## 2023-12-20 PROCEDURE — 250N000011 HC RX IP 250 OP 636: Performed by: FAMILY MEDICINE

## 2023-12-20 PROCEDURE — 258N000003 HC RX IP 258 OP 636: Performed by: FAMILY MEDICINE

## 2023-12-20 PROCEDURE — 96365 THER/PROPH/DIAG IV INF INIT: CPT

## 2023-12-20 PROCEDURE — 96366 THER/PROPH/DIAG IV INF ADDON: CPT

## 2023-12-20 RX ORDER — MEPERIDINE HYDROCHLORIDE 25 MG/ML
25 INJECTION INTRAMUSCULAR; INTRAVENOUS; SUBCUTANEOUS EVERY 30 MIN PRN
Status: CANCELLED | OUTPATIENT
Start: 2023-12-20

## 2023-12-20 RX ORDER — HEPARIN SODIUM,PORCINE 10 UNIT/ML
5-20 VIAL (ML) INTRAVENOUS DAILY PRN
Status: CANCELLED | OUTPATIENT
Start: 2023-12-20

## 2023-12-20 RX ORDER — DIPHENHYDRAMINE HYDROCHLORIDE 50 MG/ML
50 INJECTION INTRAMUSCULAR; INTRAVENOUS
Status: CANCELLED
Start: 2023-12-20

## 2023-12-20 RX ORDER — ALBUTEROL SULFATE 0.83 MG/ML
2.5 SOLUTION RESPIRATORY (INHALATION)
Status: CANCELLED | OUTPATIENT
Start: 2023-12-20

## 2023-12-20 RX ORDER — HEPARIN SODIUM (PORCINE) LOCK FLUSH IV SOLN 100 UNIT/ML 100 UNIT/ML
5 SOLUTION INTRAVENOUS
Status: CANCELLED | OUTPATIENT
Start: 2023-12-20

## 2023-12-20 RX ORDER — EPINEPHRINE 1 MG/ML
0.3 INJECTION, SOLUTION INTRAMUSCULAR; SUBCUTANEOUS EVERY 5 MIN PRN
Status: CANCELLED | OUTPATIENT
Start: 2023-12-20

## 2023-12-20 RX ORDER — ALBUTEROL SULFATE 90 UG/1
1-2 AEROSOL, METERED RESPIRATORY (INHALATION)
Status: CANCELLED
Start: 2023-12-20

## 2023-12-20 RX ORDER — METHYLPREDNISOLONE SODIUM SUCCINATE 125 MG/2ML
125 INJECTION, POWDER, LYOPHILIZED, FOR SOLUTION INTRAMUSCULAR; INTRAVENOUS
Status: CANCELLED
Start: 2023-12-20

## 2023-12-20 RX ADMIN — IRON SUCROSE 300 MG: 20 INJECTION, SOLUTION INTRAVENOUS at 14:16

## 2023-12-20 NOTE — PATIENT INSTRUCTIONS
Dear Kobe Buchanan    Thank you for choosing [facility] ([facility]) for your infusion.  The following information is a summary of our appointment as well as important reminders.     We look forward in seeing you on your next appointment here at [facility] ([facility]).  Please don t hesitate to call us at [phone] to reschedule any of your appointments or to speak with one of the [facility] registered nurses.  It was a pleasure taking care of you today.    Sincerely,    [facility]  [address] [place] [address]  [address]  Phone:  [phone]

## 2023-12-21 ENCOUNTER — MYC REFILL (OUTPATIENT)
Dept: CARDIOLOGY | Facility: CLINIC | Age: 63
End: 2023-12-21
Payer: COMMERCIAL

## 2023-12-21 ENCOUNTER — MYC REFILL (OUTPATIENT)
Dept: FAMILY MEDICINE | Facility: CLINIC | Age: 63
End: 2023-12-21
Payer: COMMERCIAL

## 2023-12-21 DIAGNOSIS — F43.9 STRESS: ICD-10-CM

## 2023-12-21 DIAGNOSIS — F06.4 ANXIETY DISORDER DUE TO GENERAL MEDICAL CONDITION WITH PANIC ATTACK: ICD-10-CM

## 2023-12-21 DIAGNOSIS — F41.0 ANXIETY DISORDER DUE TO GENERAL MEDICAL CONDITION WITH PANIC ATTACK: ICD-10-CM

## 2023-12-21 DIAGNOSIS — I10 ESSENTIAL HYPERTENSION WITH GOAL BLOOD PRESSURE LESS THAN 130/85: ICD-10-CM

## 2023-12-21 DIAGNOSIS — I10 ESSENTIAL HYPERTENSION: ICD-10-CM

## 2023-12-21 RX ORDER — CLONAZEPAM 0.5 MG/1
0.5 TABLET ORAL 3 TIMES DAILY PRN
Qty: 90 TABLET | Refills: 1 | Status: SHIPPED | OUTPATIENT
Start: 2023-12-21 | End: 2024-02-20

## 2023-12-21 RX ORDER — CARVEDILOL 25 MG/1
50 TABLET ORAL 2 TIMES DAILY WITH MEALS
Qty: 360 TABLET | Refills: 3 | Status: SHIPPED | OUTPATIENT
Start: 2023-12-21

## 2023-12-21 RX ORDER — AMLODIPINE AND BENAZEPRIL HYDROCHLORIDE 5; 20 MG/1; MG/1
1 CAPSULE ORAL 2 TIMES DAILY
Qty: 180 CAPSULE | Refills: 3 | Status: SHIPPED | OUTPATIENT
Start: 2023-12-21

## 2023-12-21 RX ORDER — ESCITALOPRAM OXALATE 20 MG/1
30 TABLET ORAL DAILY
Qty: 135 TABLET | Refills: 3 | Status: SHIPPED | OUTPATIENT
Start: 2023-12-21

## 2023-12-21 NOTE — COMMUNITY RESOURCES LIST (ENGLISH)
12/21/2023   [facility]  N/A  For questions about this resource list or additional care needs, please contact your primary care clinic or care manager.  Phone: [phone]   Email: N/A   Address: [facility] [place] [address]   Hours: N/A        Hotlines and Helplines       Hotline - Housing crisis  1  [facility] Distance: 12.38 miles      Phone/Virtual   [address] [place] [address]  Language: English  Hours: Mon - Sun Open 24 Hours   Phone: (113) 408-9382 Email: communications@[facility].[facility] Website: https://[facility].org/oursaviourshousing/     2  [facility] (Main Office) Distance: 13.94 miles      Phone/Virtual   [address] [place] [address]  Language: English  Hours: Mon - Sun Open 24 Hours   Phone: (409) 661-8704 Email: info@[email or web address].org Website: [email or web address].[email or web address]          Housing       Coordinated Entry access point  3  [facility] Distance: 4.55 miles      In-Person, Phone/Virtual   [address] [place] [address]  Language: English  Hours: Mon - Fri 8:30 AM - 4:30 PM  Fees: Free   Phone: (326) 597-6275 Email: info@[email or web address].org Website: https://[email or web address]/locations/[facility]/     4  [facility] - Coordinated Access to Housing and Shelter ([facility]) - Coordinated Access - Coordinated Entry access point Distance: 7.06 miles      In-Person, Phone/Virtual   [address] [place] [address]  Language: English  Hours: Mon - Fri 8:00 AM - 4:30 PM  Fees: Free   Phone: (221) 495-7649 Website: https://www.[facility]./residents/assistance-support/assistance/housing-services-support     Drop-in center or day shelter  5  [facility] Distance: 3.22 miles      In-Person   [address] [place] [address]  Language: English  Hours: Mon - Fri 9:00 AM - 4:00 PM  Fees: Free   Phone: (630) 634-6468 Email: gt@[email or web address].org  Website: http://[facility].Gate2Play     6  [facility] Distance: 12.44 miles      In-Person   [address] [place] [address]  Language: English, Iraqi, Gabonese  Hours: Mon - Sat 7:00 AM - 3:00 PM  Fees: Free, Self Pay   Phone: (392) 959-9558 Email: info@[employer] Website: https://www.[employer]/locations/opportunity-center/     Housing search assistance  7  [facility] - Housing Search Assistance Distance: 3.65 miles      Phone/Virtual   [address] [place] [address]  Language: Portuguese, English, Hmong, Rosa, Iraqi, Gabonese  Hours: Mon - Fri Appt. Only  Fees: Free   Phone: (772) 160-3772 Website: https://[employer]/     8  [facility] - Online Housing Search Assistance Distance: 7.26 miles      Phone/Virtual   [address] [place] [address]  Language: English  Hours: Mon - Sun Open 24 Hours  Fees: Free   Phone: (184) 711-3647 Email: rosibel@[employer] Website: https://[employer]/     Shelter for families  9  [facility] Distance: 14.15 miles      In-Person   [address] [place] [address]  Language: English  Hours: Mon - Fri 3:00 PM - 9:00 AM , Sat - Sun Open 24 Hours  Fees: Free   Phone: (435) 361-2116 Ext.1 Website: https://www.[facility].org/2020/07/03/emergency-family-shelter/     Shelter for individuals  10  [facility] [place] [facility] [place] [facility] Distance: 4.59 miles      In-Person   [address] [place] [address]  Language: English  Hours: Mon - Sun 5:00 PM - 10:00 AM  Fees: Free, Self Pay   Phone: (842) 888-2855 Email: info@[employer] Website: https://www.[employer]/locations/[facility]/     11  [facility] - Coordinated Access to Housing and Shelter (CAHS) -  Coordinated Access - Emergency housing Distance: 7.06 miles      In-Person, Phone/Virtual   [address] [place] [address]  Language: English  Hours: Mon - Fri 8:00 AM - 4:30 PM  Fees: Free   Phone: (394) 835-7994 Website: https://[employer]/residents/assistance-support/assistance/housing-services-support          Important Numbers & Websites       Emergency Services   911  [facility]   311  Poison Control   (268) 338-1967  Suicide Prevention Lifeline   (701) 510-9166 (TALK)  Child Abuse Hotline   (824) 292-8693 (4-A-Child)  Sexual Assault Hotline   (528) 755-2854 (HOPE)  National Runaway Safeline   (294) 235-7515 (RUNAWAY)  All-Options Talkline   (514) 735-1252  Substance Abuse Referral   (457) 349-5261 (HELP)

## 2023-12-26 ENCOUNTER — OFFICE VISIT (OUTPATIENT)
Dept: FAMILY MEDICINE | Facility: CLINIC | Age: 63
End: 2023-12-26
Payer: COMMERCIAL

## 2023-12-26 VITALS
SYSTOLIC BLOOD PRESSURE: 122 MMHG | HEART RATE: 62 BPM | OXYGEN SATURATION: 97 % | WEIGHT: 220 LBS | BODY MASS INDEX: 31.57 KG/M2 | DIASTOLIC BLOOD PRESSURE: 74 MMHG

## 2023-12-26 DIAGNOSIS — Z01.818 PREOP GENERAL PHYSICAL EXAM: Primary | ICD-10-CM

## 2023-12-26 DIAGNOSIS — Z98.84 HISTORY OF ROUX-EN-Y GASTRIC BYPASS: ICD-10-CM

## 2023-12-26 DIAGNOSIS — D50.9 IRON DEFICIENCY ANEMIA, UNSPECIFIED IRON DEFICIENCY ANEMIA TYPE: ICD-10-CM

## 2023-12-26 DIAGNOSIS — K80.50 CHOLEDOCHOLITHIASIS: ICD-10-CM

## 2023-12-26 DIAGNOSIS — I10 ESSENTIAL HYPERTENSION: ICD-10-CM

## 2023-12-26 DIAGNOSIS — Z90.49 S/P LAPAROSCOPIC CHOLECYSTECTOMY: ICD-10-CM

## 2023-12-26 LAB
ALBUMIN SERPL BCG-MCNC: 3.8 G/DL (ref 3.5–5.2)
ALP SERPL-CCNC: 115 U/L (ref 40–150)
ALT SERPL W P-5'-P-CCNC: 28 U/L (ref 0–70)
ANION GAP SERPL CALCULATED.3IONS-SCNC: 8 MMOL/L (ref 7–15)
AST SERPL W P-5'-P-CCNC: 36 U/L (ref 0–45)
BILIRUB SERPL-MCNC: 0.6 MG/DL
BUN SERPL-MCNC: 22 MG/DL (ref 8–23)
CALCIUM SERPL-MCNC: 9.2 MG/DL (ref 8.8–10.2)
CHLORIDE SERPL-SCNC: 104 MMOL/L (ref 98–107)
CREAT SERPL-MCNC: 1.11 MG/DL (ref 0.67–1.17)
DEPRECATED HCO3 PLAS-SCNC: 24 MMOL/L (ref 22–29)
EGFRCR SERPLBLD CKD-EPI 2021: 75 ML/MIN/1.73M2
ERYTHROCYTE [DISTWIDTH] IN BLOOD BY AUTOMATED COUNT: 18.3 % (ref 10–15)
GLUCOSE SERPL-MCNC: 94 MG/DL (ref 70–99)
HCT VFR BLD AUTO: 39.3 % (ref 40–53)
HGB BLD-MCNC: 12.2 G/DL (ref 13.3–17.7)
MCH RBC QN AUTO: 27.9 PG (ref 26.5–33)
MCHC RBC AUTO-ENTMCNC: 31 G/DL (ref 31.5–36.5)
MCV RBC AUTO: 90 FL (ref 78–100)
PLATELET # BLD AUTO: 183 10E3/UL (ref 150–450)
POTASSIUM SERPL-SCNC: 5 MMOL/L (ref 3.4–5.3)
PROT SERPL-MCNC: 7.1 G/DL (ref 6.4–8.3)
RBC # BLD AUTO: 4.37 10E6/UL (ref 4.4–5.9)
SODIUM SERPL-SCNC: 136 MMOL/L (ref 135–145)
WBC # BLD AUTO: 5.6 10E3/UL (ref 4–11)

## 2023-12-26 PROCEDURE — 93000 ELECTROCARDIOGRAM COMPLETE: CPT | Performed by: FAMILY MEDICINE

## 2023-12-26 PROCEDURE — 36415 COLL VENOUS BLD VENIPUNCTURE: CPT | Performed by: FAMILY MEDICINE

## 2023-12-26 PROCEDURE — 80053 COMPREHEN METABOLIC PANEL: CPT | Performed by: FAMILY MEDICINE

## 2023-12-26 PROCEDURE — 99214 OFFICE O/P EST MOD 30 MIN: CPT | Performed by: FAMILY MEDICINE

## 2023-12-26 PROCEDURE — 85027 COMPLETE CBC AUTOMATED: CPT | Performed by: FAMILY MEDICINE

## 2023-12-26 NOTE — RESULT ENCOUNTER NOTE
Indication:Pre op evaluation    Interpretation: Normal Sinus Rhythm, rate 61, normal axis, normal intervals, no acute ST/T changes c/w ischemia, Q waves in lead 3, compared to 2020, new q waves    Patient informed at visit.  Will share with patient's cardiologist

## 2023-12-26 NOTE — PROGRESS NOTES
[facility] [address] [place] [address]  Phone: [phone]  Fax: [phone]  Primary Provider: Demi Blanchard  Pre-op Performing Provider: DEMI BLANCHARD      PREOPERATIVE EVALUATION:  Today's date: 12/26/2023    Kobe is a 63 year old, presenting for the following:  Pre-Op Exam (Pt states will have some gallbladder stones)        12/26/2023    10:01 AM   Additional Questions   Roomed by ety   Accompanied by self       Surgical Information:  Surgery/Procedure: ERCP  Surgery Location: [facility]  Surgeon: Dr. Juanjose Mathis  Surgery Date: 1/5/2024  Time of Surgery: 12:00  Where patient plans to recover: At home alone  Fax number for surgical facility: Note does not need to be faxed, will be available electronically in Epic.    Assessment & Plan     The proposed surgical procedure is considered LOW risk.    (Z01.818) Preop general physical exam  (primary encounter diagnosis)  Comment: cleared for endoscopic procedure  Plan: patient has tolerated, with hx of gastric bypass needs alternative route than typical upper endoscopy    (K80.50) Choledocholithiasis  Comment: procedure   Plan: Comprehensive metabolic panel            (Z90.49) S/P laparoscopic cholecystectomy  Comment: has had previous LFTs improve  Plan: Comprehensive metabolic panel            (I10) Essential hypertension  Comment: well controlled  Plan: Comprehensive metabolic panel, EKG 12-lead         complete w/read - Clinics            (D50.9) Iron deficiency anemia, unspecified iron deficiency anemia type  Comment: has recently had IV iron and improved  Plan: checking stool for blood, and lower colonoscopy scheduled in february    (Z98.84) History of Simone-en-Y gastric bypass  Comment: team aware of surgical hx.  Plan: agree            - No identified additional risk factors other than previously addressed    Antiplatelet or Anticoagulation Medication Instructions:   - Patient is on no  antiplatelet or anticoagulation medications.    Additional Medication Instructions:  Okay to hold meds day of surgery for NPO at midnight status    RECOMMENDATION:  APPROVAL GIVEN to proceed with proposed procedure, without further diagnostic evaluation.            Subjective       HPI related to upcoming procedure: Hx of previous choledocholithiasis 11/2023 and elevated LFTs had cholecystectomy 11/3/2023 and now ongoing RUQ pain with MRCP 12/11:    1.  Persistent, but decreased, biliary dilation with a few tiny filling defects in the distal common bile duct, which could represent residual debris or tiny calculi. Abrupt narrowing at the ampulla could also represent a component of ampullary stenosis.     2.  Interval cholecystectomy with a peripherally enhancing fluid collection in the gallbladder fossa, which could represent a seroma, biloma, or abscess.         12/21/2023     6:32 AM   Preop Questions   1. Have you ever had a heart attack or stroke? No   2. Have you ever had surgery on your heart or blood vessels, such as a stent placement, a coronary artery bypass, or surgery on an artery in your head, neck, heart, or legs? No   3. Do you have chest pain with activity? No   4. Do you have a history of  heart failure? No   5. Do you currently have a cold, bronchitis or symptoms of other infection? No   6. Do you have a cough, shortness of breath, or wheezing? No   7. Do you or anyone in your family have previous history of blood clots? YES - sister with PE postpartum   8. Do you or does anyone in your family have a serious bleeding problem such as prolonged bleeding following surgeries or cuts? No   9. Have you ever had problems with anemia or been told to take iron pills? YES - currently and thought post op ortho   10. Have you had any abnormal blood loss such as black, tarry or bloody stools? YES - currently dark stools   11. Have you ever had a blood transfusion? YES - postop   11a. Have you ever had a  transfusion reaction? No   12. Are you willing to have a blood transfusion if it is medically needed before, during, or after your surgery? Yes   13. Have you or any of your relatives ever had problems with anesthesia? No   14. Do you have sleep apnea, excessive snoring or daytime drowsiness? UNKNOWN - prior to gastric bypass, was diagnosed no longer using   15. Do you have any artifical heart valves or other implanted medical devices like a pacemaker, defibrillator, or continuous glucose monitor? No   16. Do you have artificial joints? YES - bilateral shoulders   17. Are you allergic to latex? No       Health Care Directive:  Patient does not have a Health Care Directive or Living Will: Discussed advance care planning with patient; however, patient declined at this time.    Preoperative Review of :   reviewed - controlled substances reflected in medication list.      Status of Chronic Conditions:  ANEMIA - Patient has a recent history of  anemia, which has not been symptomatic, currently but has had in the past. Work up to date has revealed iron deficient and ongoing workup for. Treatment has been using Iron Infusion.     HYPERTENSION - Patient has longstanding history of HTN , currently denies any symptoms referable to elevated blood pressure. Specifically denies chest pain, palpitations, dyspnea, orthopnea, PND or peripheral edema. Blood pressure readings have been in normal range. Current medication regimen is as listed below. Patient denies any side effects of medication.     Review of Systems  CONSTITUTIONAL: NEGATIVE for fever, chills, change in weight  ENT/MOUTH: NEGATIVE for ear, mouth and throat problems  RESP: NEGATIVE for significant cough or SOB  CV: NEGATIVE for chest pain, palpitations or peripheral edema    Patient Active Problem List    Diagnosis Date Noted    S/P laparoscopic cholecystectomy 12/04/2023     Priority: Medium    Hyponatremia 12/04/2023     Priority: Medium    Anemia, unspecified  type 12/04/2023     Priority: Medium    Abdominal pain, right upper quadrant 11/02/2023     Priority: Medium    Elevated liver enzymes 11/02/2023     Priority: Medium    Acquired dilation of bile duct 11/02/2023     Priority: Medium    Acute cholecystitis 11/02/2023     Priority: Medium    Calculus of bile duct without obstruction 11/02/2023     Priority: Medium    RUQ abdominal pain 11/02/2023     Priority: Medium    Transaminitis 11/02/2023     Priority: Medium    Varicose veins of both legs with edema 12/05/2022     Priority: Medium    History of Simone-en-Y gastric bypass 09/16/2022     Priority: Medium    S/P thoracic aortic aneurysm repair 09/16/2022     Priority: Medium    Prediabetes 09/16/2022     Priority: Medium    Morbid obesity (H) 02/08/2022     Priority: Medium    Hx of transurethral resection of prostate 09/06/2021     Priority: Medium    Chronic kidney disease, stage 3 (H) 08/30/2021     Priority: Medium    Psoriasis 03/04/2021     Priority: Medium     Shins and legs: sees derm: triamcinolone and vaseline BID      Plantar fasciitis 03/04/2021     Priority: Medium    Dysthymic disorder 02/14/2020     Priority: Medium    Left shoulder pain 11/12/2019     Priority: Medium    Right shoulder pain 09/20/2019     Priority: Medium     Formatting of this note might be different from the original.  Added automatically from request for surgery [id number]      Spondylosis of cervical region without myelopathy or radiculopathy 10/12/2018     Priority: Medium    Dizzy 09/15/2017     Priority: Medium    Status post coronary angiogram 08/30/2016     Priority: Medium    Right knee pain 11/09/2015     Priority: Medium    Obstructive sleep apnea 02/09/2015     Priority: Medium     Problem list name updated by automated process. Provider to review      Rhinitis 02/09/2015     Priority: Medium    Shoulder joint pain 08/26/2014     Priority: Medium    BPPV (benign paroxysmal positional vertigo) 07/11/2014     Priority: Medium     S/P shoulder replacement 04/15/2014     Priority: Medium    Pain medication agreement signed 03/07/2014     Priority: Medium    Essential hypertension 04/10/2012     Priority: Medium     Problem list name updated by automated process. Provider to review      Hyperlipidemia 04/10/2012     Priority: Medium     Problem list name updated by automated process. Provider to review        Past Medical History:   Diagnosis Date    Acute systolic heart failure (H) 01/28/2020    Added automatically from request for surgery [id number]    NEMESIO (acute kidney injury) (H24) 02/16/2020    Anxiety     Ascending aortic aneurysm (H24)     Atrial fibrillation (H) [I48.91] 10/10/2016    Bicuspid aortic valve     BPPV (benign paroxysmal positional vertigo) 07/11/2014    Chronic narcotic use     Chronic neck pain     Chronic osteoarthritis     Degenerative joint disease     Depression     Hx of type 2 diabetes mellitus 03/04/2021    Hyperlipidemia 04/10/2012    Hypertension     Iron deficiency anemia secondary to blood loss (chronic) 08/25/2020    MSSA bacteremia 10/13/2019    Multiple stiff joints     Neck injuries     NSTEMI (non-ST elevated myocardial infarction) (H) 01/29/2020    Obesity 02/09/2015    Pain in shoulder     S/P reverse total shoulder arthroplasty, left 07/07/2020    S/P reverse total shoulder arthroplasty, right 08/18/2020    Septic joint of left shoulder region (H) 11/13/2019    Septic joint of right shoulder region (H) 10/13/2019    s/p washout    Skin picking habit     Sleep apnea     does not use cpap    Status post total shoulder arthroplasty, left 03/03/2020    Added automatically from request for surgery [id number]     Past Surgical History:   Procedure Laterality Date    ARTHROPLASTY SHOULDER  04/15/2014    Procedure: Left Total Shoulder Arthroplasty ;  Surgeon: Analilia Aceves MD;  [facility]    ARTHROPLASTY SHOULDER Right 08/26/2014    Procedure: ARTHROPLASTY SHOULDER;  Surgeon: Analilia Aceves,  MD;  [facility]    ARTHROSCOPY SHOULDER WITH BIOPSY(IES) Left 01/28/2020    Procedure: Left shoulder arthroscopy and biopsy for culture;  Surgeon: Analilia Aceves MD;  [facility]    BYPASS GASTRIC TAVO-EN-Y, LIVER BIOPSY, COMBINED  08/08/2005    COLONOSCOPY  06/30/2014    Procedure: COMBINED COLONOSCOPY, SINGLE BIOPSY/POLYPECTOMY BY BIOPSY;  Surgeon: Chester Patton MD;  [facility]: [facility]    CV CORONARY ANGIOGRAM  01/30/2020    Procedure: CV CORONARY ANGIOGRAM;  Surgeon: Néstor Walls MD;  [facility]    CYSTOSCOPY, BLADDER NECK CUTS, COMBINED N/A 07/18/2016    Procedure: COMBINED CYSTOSCOPY, BLADDER NECK CUTS;  Surgeon: Ritu Leslie MD;  [facility]    ENDOSCOPIC RETROGRADE CHOLANGIOPANCREATOGRAM N/A 11/3/2023    Procedure: ENDOSCOPIC RETROGRADE CHOLANGIOPANCREATOGRAPHY, BILIARY SPHINCTEROTOMY, STONE REMOVAL;  Surgeon: Juarez Sevilla MD;  Location: [facility] OR    ESOPHAGOSCOPY, GASTROSCOPY, DUODENOSCOPY (EGD), COMBINED  06/30/2014    Procedure: COMBINED ESOPHAGOSCOPY, GASTROSCOPY, DUODENOSCOPY (EGD), BIOPSY SINGLE OR MULTIPLE;  Surgeon: Chester Patton MD;  [facility]    EXCISE MASS FINGER  06/14/2011    Procedure:EXCISE MASS FINGER; Middle Flexor Cyst; Surgeon:SAHYY RUSSELL; [facility]    IR FINE NEEDLE ASPIRATION W ULTRASOUND  11/13/2019    IR PICC PLACEMENT > 5 YRS OF AGE  11/19/2019    LAPAROSCOPIC CHOLECYSTECTOMY N/A 11/3/2023    Procedure: CHOLECYSTECTOMY, LAPAROSCOPIC;  Surgeon: Shayy Mathis MD;  Location: [facility] OR    LASER HOLMIUM LITHOTRIPSY BLADDER N/A 10/15/2014    Procedure: LASER HOLMIUM LITHOTRIPSY BLADDER;  Surgeon: Sahil Taveras MD;  [facility]    LASER KTP GREEN LIGHT PHOTOSELECTIVE VAPORIZATION PROSTATE  01/23/2014    Procedure: LASER KTP GREEN LIGHT PHOTOSELECTIVE VAPORIZATION PROSTATE;  Greenlight Photovaporization Of Prostate  ;  Surgeon: Sweet,  Sahil Duenas MD;  [facility]: [facility]    OTHER SURGICAL HISTORY  10/04/2016    REPAIR ANEURYSM ASCENDING AORTA    OTHER SURGICAL HISTORY Right 09/23/2019    IRRIGATION AND DEBRIDEMENT UPPER EXTREMITYshoulder    RELEASE TRIGGER FINGER Right 03/31/2017    Procedure: RELEASE TRIGGER FINGER;  Surgeon: Juan Carlos Blunt MD;  [facility]    REMOVE ANTIBIOTIC CEMENT BEADS / SPACER SHOULDER Left 05/08/2020    Procedure: Left shoulder removal of spacer;  Surgeon: Analilia Aceves MD;  [facility]    REMOVE ANTIBIOTIC CEMENT BEADS / SPACER SHOULDER Right 08/18/2020    Procedure: Removal of right shoulder antibiotic spacer;  Surgeon: Analilia Aceves MD;  [facility]    REMOVE HARDWARE ARTHROPLASTY SHOULDER. I&D, PLACE ANTIBIOTIC CEMENT BE Left 11/15/2019    Procedure: Explantation of left total shoulder arthroplasty, irrigation and debridement, and placement of antibiotic spacer;  Surgeon: Analilia Aceves MD;  [facility]    REPAIR ANEURYSM ASCENDING AORTA N/A 10/04/2016    Procedure: REPAIR ANEURYSM ASCENDING AORTA;  Surgeon: Mckenzie Townsend MD;  [facility]    REVERSE ARTHROPLASTY SHOULDER Right 08/18/2020    Procedure: and conversion to reverse total shoulder arthroplasty;  Surgeon: Analilia Aceves MD;  [facility]    SURGICAL EXPOSURE, LAPAROSCOPIC, W/WOUND CLOSURE, FOR ERCP, BY GENERAL SURGERY N/A 11/3/2023    Procedure: SURGICAL EXPOSURE FOR ENDOSCOPIC RETROGRADE CHOLANGIOPANCREATOGRAPHY;  Surgeon: Kobe Mathis MD;  Location: [facility] OR     Current Outpatient Medications   Medication Sig Dispense Refill    amLODIPine-benazepril (LOTREL) 5-20 MG capsule Take 1 capsule by mouth 2 times daily 180 capsule 3    atorvastatin (LIPITOR) 40 MG tablet Take 1 tablet (40 mg) by mouth daily 90 tablet 3    buPROPion (WELLBUTRIN SR) 200 MG 12 hr tablet TAKE 1 TABLET BY MOUTH 2 TIMES DAILY 180 tablet 3    carvedilol (COREG) 25 MG tablet Take 2 tablets (50  mg) by mouth 2 times daily (with meals) 360 tablet 3    clonazePAM (KLONOPIN) 0.5 MG tablet Take 1 tablet (0.5 mg) by mouth 3 times daily as needed for anxiety 90 tablet 1    cyclobenzaprine (FLEXERIL) 10 MG tablet Take 1 tablet (10 mg) by mouth 3 times daily as needed for muscle spasms 90 tablet 4    diazepam (VALIUM) 5 MG tablet Take on tablet 20 minutes prior to MRI due to claustrophobia. 1 tablet 0    docusate sodium (COLACE) 100 MG capsule Take 2 capsule in the morning and 3 capsules in the evening      escitalopram (LEXAPRO) 20 MG tablet Take 1.5 tablets (30 mg) by mouth daily 135 tablet 3    Fe Heme Polypeptide-folic acid (PROFERRIN-FORTE) 12-1 MG TABS Take 1 tablet by mouth 2 times daily 90 tablet 0    fentaNYL (DURAGESIC) 50 mcg/hr 72 hr patch Place 1 patch onto the skin every 72 hours      fluocinonide (LIDEX) 0.05 % external ointment Apply twice daily to itchy skin nodules for 1-2 weeks at a time. 30 g 3    latanoprost (XALATAN) 0.005 % ophthalmic solution Place 1 drop Into the left eye daily 2.5 mL 4    mometasone (ELOCON) 0.1 % external ointment Apply topically nightly as needed (rash on arms) 30 g 3    naproxen (EC-NAPROSYN) 500 MG EC tablet Take 1 tablet (500 mg) by mouth 2 times daily as needed (pain) 186 tablet 3    oxyCODONE IR (ROXICODONE) 10 MG tablet Take 1 tablet by mouth every 4 hours as needed for pain, max 4 tablet(s) per day      pantoprazole (PROTONIX) 40 MG EC tablet Take 1 tablet (40 mg) by mouth daily as needed for heartburn 90 tablet 3    propranolol (INDERAL) 10 MG tablet Take 1 tablet (10 mg) by mouth 3 times daily as needed (panic attack) 90 tablet 3    senna-docusate (SENOKOT-S/PERICOLACE) 8.6-50 MG tablet Take 1-2 tablets by mouth 2 times daily 30 tablet 0    sucralfate (CARAFATE) 1 GM tablet Take 1 tablet (1 g) by mouth 2 times daily as needed (Reflux) 180 tablet 3    tacrolimus (PROTOPIC) 0.1 % external ointment Apply topically as needed (rash on arms) Apply to affected areas on  body. 60 g 1    tacrolimus (PROTOPIC) 0.1 % ointment Apply topically as needed Apply to affected areas on body. 120 g 11    tiZANidine (ZANAFLEX) 4 MG tablet Take 1 tablet (4 mg) by mouth 2 times daily 180 tablet 3    triamcinolone (KENALOG) 0.1 % external ointment Apply topically 2 times daily To affected areas of rash. Please avoid application to the face, groin or armpits as the medication is too strong for these areas. 454 g 0    vitamin B-Complex Take 1 tablet by mouth daily         Allergies   Allergen Reactions    Ciprofloxacin      History of aortic aneurysms    Tape [Adhesive Tape] Blisters     Blistering - please use paper tape        Social History     Tobacco Use    Smoking status: Former     Packs/day: 0.50     Years: 6.00     Additional pack years: 0.00     Total pack years: 3.00     Types: Cigarettes     Start date: 1977     Quit date: 1983     Years since quittin.3    Smokeless tobacco: Never   Substance Use Topics    Alcohol use: No     Alcohol/week: 0.0 standard drinks of alcohol     Family History   Problem Relation Age of Onset    Arthritis Other     Gastrointestinal Disease Other     Cardiovascular Father         aortic aneurysm    Arrhythmia Father     Nephrolithiasis Father     Sleep Apnea Father     Anxiety Disorder Father     Depression Father     Hypertension Father     Obesity Father     Hyperlipidemia Father     Coronary Artery Disease Father     Low Back Problems Father     Spine Problems Father     Cardiovascular Father     Other - See Comments Father         low back problems, spine problems    Anxiety Disorder Mother     Hypertension Mother     Osteoporosis Mother     Obesity Mother     Hyperlipidemia Mother     Low Back Problems Mother     Macular Degeneration Mother     Cataracts Mother     Other - See Comments Mother         low back problems    Anxiety Disorder Sister     Hypertension Sister     Osteoporosis Sister     Obesity Sister     Hyperlipidemia Sister     Low  Back Problems Sister     Spine Problems Sister     Anxiety Disorder Sister     Depression Sister     Hypertension Sister     Osteoporosis Sister     Obesity Sister     Hyperlipidemia Sister     Low Back Problems Sister     Anxiety Disorder Sister     Hyperlipidemia Sister     Hypertension Sister     Obesity Sister     Other - See Comments Sister         low back problems, spine problems    Osteoporosis Sister     Anxiety Disorder Sister     Depression Sister     Hyperlipidemia Sister     Hypertension Sister     Other - See Comments Sister         low back problems    Obesity Sister     Osteoporosis Sister     Melanoma No family hx of     Skin Cancer No family hx of     Glaucoma No family hx of      History   Drug Use No         Objective     /74 (BP Location: Right arm, Patient Position: Sitting, Cuff Size: Adult Large)   Pulse 62   Wt 99.8 kg (220 lb)   SpO2 97%   BMI 31.57 kg/m      Physical Exam  GENERAL APPEARANCE: healthy, alert and no distress  HENT: ear canals and TM's normal and nose and mouth without ulcers or lesions  RESP: lungs clear to auscultation - no rales, rhonchi or wheezes  CV: regular rate and rhythm, normal S1 S2, no S3 or S4 and no murmur, click or rub   ABDOMEN: soft, nontender, no HSM or masses and bowel sounds normal  ABDOMEN: soft, nontender, without hepatosplenomegaly or masses and multiple well-healed laparoscopic sites  NEURO: Normal strength and tone, sensory exam grossly normal, mentation intact and speech normal    Recent Labs   Lab Test 11/28/23  1525 11/13/23  0833 11/04/23  0627 11/03/23  0738 11/02/23  1023 08/24/23  1619   HGB 9.9*  --  11.1*   < > 12.6*  --      --  253   < > 313  --    INR  --   --   --   --  1.27*  --    * 132* 132*   < > 134*  --    POTASSIUM 5.2 4.9 4.2   < > 4.5  --    CR 1.18* 1.36* 1.22*   < > 1.58*  --    A1C  --   --   --   --   --  5.6    < > = values in this interval not displayed.        Diagnostics:  Labs pending at this time.   Results will be reviewed when available.   EKG required for hx of stress cardiomyopathy and not completed in the last 90 days.   EKG: Normal Sinus Rhythm, normal axis, normal intervals, no acute ST/T changes c/w ischemia, no LVH by voltage criteria, compared to 2020, q waves in inferior leads    Revised Cardiac Risk Index (RCRI):  The patient has the following serious cardiovascular risks for perioperative complications:   - Coronary Artery Disease (MI, positive stress test, angina, Qs on EKG) = 1 point     RCRI Interpretation: 1 point: Class II (low risk - 0.9% complication rate)         Signed Electronically by: Demi Hardin MD  Copy of this evaluation report is provided to requesting physician.

## 2023-12-28 LAB — HEMOCCULT STL QL: POSITIVE

## 2023-12-28 PROCEDURE — 82272 OCCULT BLD FECES 1-3 TESTS: CPT | Performed by: FAMILY MEDICINE

## 2023-12-30 ENCOUNTER — TELEPHONE (OUTPATIENT)
Dept: FAMILY MEDICINE | Facility: CLINIC | Age: 63
End: 2023-12-30
Payer: COMMERCIAL

## 2023-12-30 DIAGNOSIS — K59.03 DRUG-INDUCED CONSTIPATION: Primary | ICD-10-CM

## 2024-01-05 ENCOUNTER — HOSPITAL ENCOUNTER (OUTPATIENT)
Facility: HOSPITAL | Age: 64
Discharge: HOME | End: 2024-01-05
Attending: SURGERY | Admitting: SURGERY
Payer: COMMERCIAL

## 2024-01-05 VITALS
HEART RATE: 57 BPM | TEMPERATURE: 98.1 F | OXYGEN SATURATION: 98 % | DIASTOLIC BLOOD PRESSURE: 66 MMHG | SYSTOLIC BLOOD PRESSURE: 150 MMHG

## 2024-01-05 LAB — GLUCOSE BLDC GLUCOMTR-MCNC: 129 MG/DL (ref 70–99)

## 2024-01-05 PROCEDURE — 82962 GLUCOSE BLOOD TEST: CPT

## 2024-01-05 RX ORDER — FENTANYL CITRATE 50 UG/ML
25 INJECTION, SOLUTION INTRAMUSCULAR; INTRAVENOUS EVERY 5 MIN PRN
Status: CANCELLED | OUTPATIENT
Start: 2024-01-05

## 2024-01-05 RX ORDER — LIDOCAINE 40 MG/G
CREAM TOPICAL
Status: DISCONTINUED | OUTPATIENT
Start: 2024-01-05 | End: 2024-01-05 | Stop reason: HOSPADM

## 2024-01-05 RX ORDER — CEFAZOLIN SODIUM/WATER 2 G/20 ML
2 SYRINGE (ML) INTRAVENOUS SEE ADMIN INSTRUCTIONS
Status: DISCONTINUED | OUTPATIENT
Start: 2024-01-05 | End: 2024-01-05 | Stop reason: HOSPADM

## 2024-01-05 RX ORDER — HYDROMORPHONE HYDROCHLORIDE 1 MG/ML
0.4 INJECTION, SOLUTION INTRAMUSCULAR; INTRAVENOUS; SUBCUTANEOUS EVERY 5 MIN PRN
Status: CANCELLED | OUTPATIENT
Start: 2024-01-05

## 2024-01-05 RX ORDER — HYDROMORPHONE HYDROCHLORIDE 1 MG/ML
0.2 INJECTION, SOLUTION INTRAMUSCULAR; INTRAVENOUS; SUBCUTANEOUS EVERY 5 MIN PRN
Status: CANCELLED | OUTPATIENT
Start: 2024-01-05

## 2024-01-05 RX ORDER — HALOPERIDOL 5 MG/ML
1 INJECTION INTRAMUSCULAR
Status: CANCELLED | OUTPATIENT
Start: 2024-01-05

## 2024-01-05 RX ORDER — INDOMETHACIN 50 MG/1
100 SUPPOSITORY RECTAL
Status: DISCONTINUED | OUTPATIENT
Start: 2024-01-05 | End: 2024-01-05 | Stop reason: HOSPADM

## 2024-01-05 RX ORDER — ACETAMINOPHEN 325 MG/1
975 TABLET ORAL ONCE
Status: DISCONTINUED | OUTPATIENT
Start: 2024-01-05 | End: 2024-01-05 | Stop reason: HOSPADM

## 2024-01-05 RX ORDER — MEPERIDINE HYDROCHLORIDE 25 MG/ML
12.5 INJECTION INTRAMUSCULAR; INTRAVENOUS; SUBCUTANEOUS EVERY 5 MIN PRN
Status: CANCELLED | OUTPATIENT
Start: 2024-01-05

## 2024-01-05 RX ORDER — SODIUM CHLORIDE, SODIUM LACTATE, POTASSIUM CHLORIDE, CALCIUM CHLORIDE 600; 310; 30; 20 MG/100ML; MG/100ML; MG/100ML; MG/100ML
INJECTION, SOLUTION INTRAVENOUS CONTINUOUS
Status: CANCELLED | OUTPATIENT
Start: 2024-01-05

## 2024-01-05 RX ORDER — ONDANSETRON 4 MG/1
4 TABLET, ORALLY DISINTEGRATING ORAL EVERY 30 MIN PRN
Status: CANCELLED | OUTPATIENT
Start: 2024-01-05

## 2024-01-05 RX ORDER — ONDANSETRON 2 MG/ML
4 INJECTION INTRAMUSCULAR; INTRAVENOUS EVERY 30 MIN PRN
Status: CANCELLED | OUTPATIENT
Start: 2024-01-05

## 2024-01-05 RX ORDER — CEFAZOLIN SODIUM/WATER 2 G/20 ML
2 SYRINGE (ML) INTRAVENOUS
Status: DISCONTINUED | OUTPATIENT
Start: 2024-01-05 | End: 2024-01-05 | Stop reason: HOSPADM

## 2024-01-05 RX ORDER — FENTANYL CITRATE 50 UG/ML
50 INJECTION, SOLUTION INTRAMUSCULAR; INTRAVENOUS EVERY 5 MIN PRN
Status: CANCELLED | OUTPATIENT
Start: 2024-01-05

## 2024-01-05 RX ORDER — SODIUM CHLORIDE, SODIUM LACTATE, POTASSIUM CHLORIDE, CALCIUM CHLORIDE 600; 310; 30; 20 MG/100ML; MG/100ML; MG/100ML; MG/100ML
INJECTION, SOLUTION INTRAVENOUS CONTINUOUS
Status: DISCONTINUED | OUTPATIENT
Start: 2024-01-05 | End: 2024-01-05 | Stop reason: HOSPADM

## 2024-01-05 ASSESSMENT — ACTIVITIES OF DAILY LIVING (ADL): ADLS_ACUITY_SCORE: 35

## 2024-01-05 NOTE — PROGRESS NOTES
I asked the patient during the preop assessment if he had someone to stay with him for 24 hours following surgery, and he responded no. When I inquired if he would stay in the hospital overnight, he answered he would never do so. The patient travels by metro and motorized wheelchair. He stated that he returned home after completing all of his previous treatments. I described the anesthesia protocol to the patient and called Dr. Mathis to inform him that if the patient is discharged, he will have no one to stay at home with him. The patient left before the surgeon could approach him.

## 2024-01-08 DIAGNOSIS — G47.01 INSOMNIA DUE TO MEDICAL CONDITION: ICD-10-CM

## 2024-01-17 ENCOUNTER — TRANSFERRED RECORDS (OUTPATIENT)
Dept: HEALTH INFORMATION MANAGEMENT | Facility: CLINIC | Age: 64
End: 2024-01-17
Payer: COMMERCIAL

## 2024-02-01 ENCOUNTER — TELEPHONE (OUTPATIENT)
Dept: GASTROENTEROLOGY | Facility: CLINIC | Age: 64
End: 2024-02-01
Payer: COMMERCIAL

## 2024-02-01 DIAGNOSIS — Z12.11 SPECIAL SCREENING FOR MALIGNANT NEOPLASMS, COLON: Primary | ICD-10-CM

## 2024-02-01 RX ORDER — BISACODYL 5 MG/1
TABLET, DELAYED RELEASE ORAL
Qty: 4 TABLET | Refills: 0 | Status: ON HOLD | OUTPATIENT
Start: 2024-02-01 | End: 2024-09-08

## 2024-02-01 NOTE — TELEPHONE ENCOUNTER
Pre visit planning completed.      Procedure details:    Patient scheduled for Colonoscopy  on 2.15.24.     Arrival time: 0630. Procedure time 0800    Pre op exam needed? N/A    Facility location: [facility]; [address] [place] [address], [address] Floor, [place] [address]    Sedation type: MAC    Indication for procedure: screening      Chart review:     Electronic implanted devices? No    Recent diagnosis of diverticulitis within the last 6 weeks? No    Diabetic? No      Medication review:    Anticoagulants? No    NSAIDS? Yes.  Naproxen (Naprosyn, Aleve).  Holding interval of 4 days.    Other medication HOLDING recommendations:  Sucralfate (Carafate): HOLD 1 day before procedure.      Prep for procedure:     Bowel prep recommendation: Extended prep Golytely    Due to:  chronic pain medication noted in chart.  and constipation noted or reported.     Prep instructions sent via [facility] Bowel prep script sent to [id number] - [place], [place] - [address] Garza RN  Endoscopy Procedure Pre Assessment RN  [phone] option 4

## 2024-02-01 NOTE — TELEPHONE ENCOUNTER
Pre assessment completed for upcoming procedure.      Procedure details:    Patient scheduled for Colonoscopy  on 2.15.24.     Arrival time: 0630. Procedure time 0800    Pre op exam needed? N/A    Facility location: [facility]; [address], [address], [place] [address]    Sedation type: MAC    Indication for procedure: screening    COVID policy reviewed.    Designated  policy reviewed. Instructed to have someone stay 24 hours post procedure.       Chart review:     Electronic implanted devices? No    Recent diagnosis of diverticulitis within the last 6 weeks?  No    Diabetic? Prediabetic    Medication review:    Anticoagulants? No    NSAIDS? Yes.  Naproxen (Naprosyn, Aleve).  Holding interval of 4 days.    Other medication HOLDING recommendations:  N/A      Prep for procedure:     Bowel prep recommendation: Extended Golytely   Due to: chronic pain medication noted in chart.  and constipation noted or reported.     Prep instructions sent via [facility] Bowel prep script sent to [id number] - [place], [place] - [address]    Reviewed procedure prep instructions.     Patient verbalized understanding and had no questions or concerns at this time.        Portia Garza RN  Endoscopy Procedure Pre Assessment RN  [phone] option 4

## 2024-02-14 ENCOUNTER — ANESTHESIA EVENT (OUTPATIENT)
Dept: GASTROENTEROLOGY | Facility: CLINIC | Age: 64
End: 2024-02-14
Payer: COMMERCIAL

## 2024-02-14 ENCOUNTER — TRANSFERRED RECORDS (OUTPATIENT)
Dept: HEALTH INFORMATION MANAGEMENT | Facility: CLINIC | Age: 64
End: 2024-02-14
Payer: COMMERCIAL

## 2024-02-15 ENCOUNTER — ANESTHESIA (OUTPATIENT)
Dept: GASTROENTEROLOGY | Facility: CLINIC | Age: 64
End: 2024-02-15
Payer: COMMERCIAL

## 2024-02-15 ENCOUNTER — HOSPITAL ENCOUNTER (OUTPATIENT)
Facility: CLINIC | Age: 64
Discharge: HOME | End: 2024-02-15
Attending: INTERNAL MEDICINE | Admitting: INTERNAL MEDICINE
Payer: COMMERCIAL

## 2024-02-15 VITALS
HEART RATE: 56 BPM | RESPIRATION RATE: 16 BRPM | DIASTOLIC BLOOD PRESSURE: 78 MMHG | OXYGEN SATURATION: 97 % | SYSTOLIC BLOOD PRESSURE: 147 MMHG | TEMPERATURE: 97.5 F

## 2024-02-15 LAB — COLONOSCOPY: NORMAL

## 2024-02-15 PROCEDURE — 88305 TISSUE EXAM BY PATHOLOGIST: CPT | Mod: TC | Performed by: INTERNAL MEDICINE

## 2024-02-15 PROCEDURE — 88305 TISSUE EXAM BY PATHOLOGIST: CPT | Mod: 26 | Performed by: PATHOLOGY

## 2024-02-15 PROCEDURE — G0500 MOD SEDAT ENDO SERVICE >5YRS: HCPCS | Mod: PT

## 2024-02-15 PROCEDURE — 45380 COLONOSCOPY AND BIOPSY: CPT | Performed by: INTERNAL MEDICINE

## 2024-02-15 PROCEDURE — 258N000003 HC RX IP 258 OP 636: Performed by: NURSE ANESTHETIST, CERTIFIED REGISTERED

## 2024-02-15 PROCEDURE — 250N000011 HC RX IP 250 OP 636: Performed by: NURSE ANESTHETIST, CERTIFIED REGISTERED

## 2024-02-15 PROCEDURE — 370N000017 HC ANESTHESIA TECHNICAL FEE, PER MIN: Performed by: INTERNAL MEDICINE

## 2024-02-15 RX ORDER — HYDROMORPHONE HCL IN WATER/PF 6 MG/30 ML
0.4 PATIENT CONTROLLED ANALGESIA SYRINGE INTRAVENOUS EVERY 5 MIN PRN
Status: DISCONTINUED | OUTPATIENT
Start: 2024-02-15 | End: 2024-02-15 | Stop reason: HOSPADM

## 2024-02-15 RX ORDER — PROCHLORPERAZINE MALEATE 5 MG
10 TABLET ORAL EVERY 6 HOURS PRN
Status: DISCONTINUED | OUTPATIENT
Start: 2024-02-15 | End: 2024-02-15 | Stop reason: HOSPADM

## 2024-02-15 RX ORDER — OXYCODONE HYDROCHLORIDE 10 MG/1
10 TABLET ORAL
Status: DISCONTINUED | OUTPATIENT
Start: 2024-02-15 | End: 2024-02-15 | Stop reason: HOSPADM

## 2024-02-15 RX ORDER — SODIUM CHLORIDE, SODIUM LACTATE, POTASSIUM CHLORIDE, CALCIUM CHLORIDE 600; 310; 30; 20 MG/100ML; MG/100ML; MG/100ML; MG/100ML
INJECTION, SOLUTION INTRAVENOUS CONTINUOUS
Status: DISCONTINUED | OUTPATIENT
Start: 2024-02-15 | End: 2024-02-15 | Stop reason: HOSPADM

## 2024-02-15 RX ORDER — NALOXONE HYDROCHLORIDE 0.4 MG/ML
0.2 INJECTION, SOLUTION INTRAMUSCULAR; INTRAVENOUS; SUBCUTANEOUS
Status: DISCONTINUED | OUTPATIENT
Start: 2024-02-15 | End: 2024-02-15 | Stop reason: HOSPADM

## 2024-02-15 RX ORDER — ONDANSETRON 2 MG/ML
4 INJECTION INTRAMUSCULAR; INTRAVENOUS
Status: DISCONTINUED | OUTPATIENT
Start: 2024-02-15 | End: 2024-02-15 | Stop reason: HOSPADM

## 2024-02-15 RX ORDER — ONDANSETRON 2 MG/ML
4 INJECTION INTRAMUSCULAR; INTRAVENOUS EVERY 30 MIN PRN
Status: DISCONTINUED | OUTPATIENT
Start: 2024-02-15 | End: 2024-02-15 | Stop reason: HOSPADM

## 2024-02-15 RX ORDER — FENTANYL CITRATE 50 UG/ML
25 INJECTION, SOLUTION INTRAMUSCULAR; INTRAVENOUS EVERY 5 MIN PRN
Status: DISCONTINUED | OUTPATIENT
Start: 2024-02-15 | End: 2024-02-15 | Stop reason: HOSPADM

## 2024-02-15 RX ORDER — ONDANSETRON 4 MG/1
4 TABLET, ORALLY DISINTEGRATING ORAL EVERY 30 MIN PRN
Status: DISCONTINUED | OUTPATIENT
Start: 2024-02-15 | End: 2024-02-15 | Stop reason: HOSPADM

## 2024-02-15 RX ORDER — ONDANSETRON 2 MG/ML
4 INJECTION INTRAMUSCULAR; INTRAVENOUS EVERY 6 HOURS PRN
Status: DISCONTINUED | OUTPATIENT
Start: 2024-02-15 | End: 2024-02-15 | Stop reason: HOSPADM

## 2024-02-15 RX ORDER — OXYCODONE HYDROCHLORIDE 5 MG/1
5 TABLET ORAL
Status: DISCONTINUED | OUTPATIENT
Start: 2024-02-15 | End: 2024-02-15 | Stop reason: HOSPADM

## 2024-02-15 RX ORDER — NALOXONE HYDROCHLORIDE 0.4 MG/ML
0.4 INJECTION, SOLUTION INTRAMUSCULAR; INTRAVENOUS; SUBCUTANEOUS
Status: DISCONTINUED | OUTPATIENT
Start: 2024-02-15 | End: 2024-02-15 | Stop reason: HOSPADM

## 2024-02-15 RX ORDER — FENTANYL CITRATE 50 UG/ML
INJECTION, SOLUTION INTRAMUSCULAR; INTRAVENOUS PRN
Status: DISCONTINUED | OUTPATIENT
Start: 2024-02-15 | End: 2024-02-15

## 2024-02-15 RX ORDER — HYDROMORPHONE HCL IN WATER/PF 6 MG/30 ML
0.2 PATIENT CONTROLLED ANALGESIA SYRINGE INTRAVENOUS EVERY 5 MIN PRN
Status: DISCONTINUED | OUTPATIENT
Start: 2024-02-15 | End: 2024-02-15 | Stop reason: HOSPADM

## 2024-02-15 RX ORDER — ONDANSETRON 4 MG/1
4 TABLET, ORALLY DISINTEGRATING ORAL EVERY 6 HOURS PRN
Status: DISCONTINUED | OUTPATIENT
Start: 2024-02-15 | End: 2024-02-15 | Stop reason: HOSPADM

## 2024-02-15 RX ORDER — SODIUM CHLORIDE, SODIUM LACTATE, POTASSIUM CHLORIDE, CALCIUM CHLORIDE 600; 310; 30; 20 MG/100ML; MG/100ML; MG/100ML; MG/100ML
INJECTION, SOLUTION INTRAVENOUS CONTINUOUS PRN
Status: DISCONTINUED | OUTPATIENT
Start: 2024-02-15 | End: 2024-02-15

## 2024-02-15 RX ORDER — DIPHENHYDRAMINE HYDROCHLORIDE 50 MG/ML
INJECTION INTRAMUSCULAR; INTRAVENOUS PRN
Status: DISCONTINUED | OUTPATIENT
Start: 2024-02-15 | End: 2024-02-15

## 2024-02-15 RX ORDER — FENTANYL CITRATE 50 UG/ML
50 INJECTION, SOLUTION INTRAMUSCULAR; INTRAVENOUS EVERY 5 MIN PRN
Status: DISCONTINUED | OUTPATIENT
Start: 2024-02-15 | End: 2024-02-15 | Stop reason: HOSPADM

## 2024-02-15 RX ORDER — FLUMAZENIL 0.1 MG/ML
0.2 INJECTION, SOLUTION INTRAVENOUS
Status: DISCONTINUED | OUTPATIENT
Start: 2024-02-15 | End: 2024-02-15 | Stop reason: HOSPADM

## 2024-02-15 RX ORDER — LIDOCAINE 40 MG/G
CREAM TOPICAL
Status: DISCONTINUED | OUTPATIENT
Start: 2024-02-15 | End: 2024-02-15 | Stop reason: HOSPADM

## 2024-02-15 RX ADMIN — MIDAZOLAM 2 MG: 1 INJECTION INTRAMUSCULAR; INTRAVENOUS at 08:05

## 2024-02-15 RX ADMIN — MIDAZOLAM 1 MG: 1 INJECTION INTRAMUSCULAR; INTRAVENOUS at 08:16

## 2024-02-15 RX ADMIN — FENTANYL CITRATE 100 MCG: 50 INJECTION INTRAMUSCULAR; INTRAVENOUS at 08:14

## 2024-02-15 RX ADMIN — DIPHENHYDRAMINE HYDROCHLORIDE 50 MG: 50 INJECTION, SOLUTION INTRAMUSCULAR; INTRAVENOUS at 08:10

## 2024-02-15 RX ADMIN — FENTANYL CITRATE 50 MCG: 50 INJECTION INTRAMUSCULAR; INTRAVENOUS at 08:17

## 2024-02-15 RX ADMIN — SODIUM CHLORIDE, POTASSIUM CHLORIDE, SODIUM LACTATE AND CALCIUM CHLORIDE: 600; 310; 30; 20 INJECTION, SOLUTION INTRAVENOUS at 08:05

## 2024-02-15 ASSESSMENT — ACTIVITIES OF DAILY LIVING (ADL)
ADLS_ACUITY_SCORE: 36
ADLS_ACUITY_SCORE: 34

## 2024-02-15 NOTE — ANESTHESIA PREPROCEDURE EVALUATION
Anesthesia Pre-Procedure Evaluation    Patient: Kobe Buchanan   MRN: [id number] : 1960        Procedure : Procedure(s):  Colonoscopy          Past Medical History:   Diagnosis Date    Acute systolic heart failure (H) 2020    Added automatically from request for surgery [id number]    NEMESIO (acute kidney injury) (H24) 2020    Anxiety     Ascending aortic aneurysm (H24)     Atrial fibrillation (H) [I48.91] 10/10/2016    Bicuspid aortic valve     BPPV (benign paroxysmal positional vertigo) 2014    Choledocholithiasis     Chronic narcotic use     Chronic neck pain     Chronic osteoarthritis     CKD (chronic kidney disease) stage 3, GFR 30-59 ml/min (H)     Degenerative joint disease     Depression     Dysthymic disorder     Hx of type 2 diabetes mellitus 2021    Hyperlipidemia 04/10/2012    Hypertension     Iron deficiency anemia secondary to blood loss (chronic) 2020    MSSA bacteremia 10/13/2019    Multiple stiff joints     Neck injuries     NSTEMI (non-ST elevated myocardial infarction) (H) 2020    Obesity 2015    Pain in shoulder     Plantar fasciitis     Pre-diabetes     S/P reverse total shoulder arthroplasty, left 2020    S/P reverse total shoulder arthroplasty, right 2020    Septic joint of left shoulder region (H) 2019    Septic joint of right shoulder region (H) 10/13/2019    s/p washout    Skin picking habit     Sleep apnea     does not use cpap    Status post total shoulder arthroplasty, left 2020    Added automatically from request for surgery [id number]      Past Surgical History:   Procedure Laterality Date    ARTHROPLASTY SHOULDER  04/15/2014    Procedure: Left Total Shoulder Arthroplasty ;  Surgeon: Analilia Aceves MD;  [facility]    ARTHROPLASTY SHOULDER Right 2014    Procedure: ARTHROPLASTY SHOULDER;  Surgeon: Analilia Aceves MD;  [facility]    ARTHROSCOPY SHOULDER WITH BIOPSY(IES) Left 2020     Procedure: Left shoulder arthroscopy and biopsy for culture;  Surgeon: Analilia Aceves MD;  [facility]    BYPASS GASTRIC TAVO-EN-Y, LIVER BIOPSY, COMBINED  08/08/2005    COLONOSCOPY  06/30/2014    Procedure: COMBINED COLONOSCOPY, SINGLE BIOPSY/POLYPECTOMY BY BIOPSY;  Surgeon: Chester Patton MD;  [facility]    CV CORONARY ANGIOGRAM  01/30/2020    Procedure: CV CORONARY ANGIOGRAM;  Surgeon: Néstor Walls MD;  [facility]    CYSTOSCOPY, BLADDER NECK CUTS, COMBINED N/A 07/18/2016    Procedure: COMBINED CYSTOSCOPY, BLADDER NECK CUTS;  Surgeon: Ritu Leslie MD;  [facility]    ENDOSCOPIC RETROGRADE CHOLANGIOPANCREATOGRAM N/A 11/03/2023    Procedure: ENDOSCOPIC RETROGRADE CHOLANGIOPANCREATOGRAPHY, BILIARY SPHINCTEROTOMY, STONE REMOVAL;  Surgeon: Juarez Sevilla MD;  [place] [facility] [place]    ESOPHAGOSCOPY, GASTROSCOPY, DUODENOSCOPY (EGD), COMBINED  06/30/2014    Procedure: COMBINED ESOPHAGOSCOPY, GASTROSCOPY, DUODENOSCOPY (EGD), BIOPSY SINGLE OR MULTIPLE;  Surgeon: Chester Patton MD;  [facility]    EXCISE MASS FINGER  06/14/2011    Procedure:EXCISE MASS FINGER; Middle Flexor Cyst; Surgeon:SHAYY RUSSELL; [facility]    IR FINE NEEDLE ASPIRATION W ULTRASOUND  11/13/2019    IR PICC PLACEMENT > 5 YRS OF AGE  11/19/2019    LAPAROSCOPIC CHOLECYSTECTOMY N/A 11/03/2023    Procedure: CHOLECYSTECTOMY, LAPAROSCOPIC;  Surgeon: Shayy Mathis MD;  [place] [facility] [place]    LASER HOLMIUM LITHOTRIPSY BLADDER N/A 10/15/2014    Procedure: LASER HOLMIUM LITHOTRIPSY BLADDER;  Surgeon: Sahil Taveras MD;  [facility]    LASER KTP GREEN LIGHT PHOTOSELECTIVE VAPORIZATION PROSTATE  01/23/2014    Procedure: LASER KTP GREEN LIGHT PHOTOSELECTIVE VAPORIZATION PROSTATE;  Greenlight Photovaporization Of Prostate  ;  Surgeon: Sahil Taveras MD;  [facility]    OTHER SURGICAL HISTORY  10/04/2016     REPAIR ANEURYSM ASCENDING AORTA    OTHER SURGICAL HISTORY Right 2019    IRRIGATION AND DEBRIDEMENT UPPER EXTREMITYshoulder    RELEASE TRIGGER FINGER Right 2017    Procedure: RELEASE TRIGGER FINGER;  Surgeon: Juan Carlos Blunt MD;  [facility]    REMOVE ANTIBIOTIC CEMENT BEADS / SPACER SHOULDER Left 2020    Procedure: Left shoulder removal of spacer;  Surgeon: Analilia Aceves MD;  [facility]    REMOVE ANTIBIOTIC CEMENT BEADS / SPACER SHOULDER Right 2020    Procedure: Removal of right shoulder antibiotic spacer;  Surgeon: Analilia Aceves MD;  [facility]    REMOVE HARDWARE ARTHROPLASTY SHOULDER. I&D, PLACE ANTIBIOTIC CEMENT BE Left 11/15/2019    Procedure: Explantation of left total shoulder arthroplasty, irrigation and debridement, and placement of antibiotic spacer;  Surgeon: Analilia Aceves MD;  [facility]    REPAIR ANEURYSM ASCENDING AORTA N/A 10/04/2016    Procedure: REPAIR ANEURYSM ASCENDING AORTA;  Surgeon: Mckenzie Townsend MD;  [facility]    REVERSE ARTHROPLASTY SHOULDER Right 2020    Procedure: and conversion to reverse total shoulder arthroplasty;  Surgeon: Analilia Aceves MD;  [facility]    SURGICAL EXPOSURE, LAPAROSCOPIC, W/WOUND CLOSURE, FOR ERCP, BY GENERAL SURGERY N/A 2023    Procedure: SURGICAL EXPOSURE FOR ENDOSCOPIC RETROGRADE CHOLANGIOPANCREATOGRAPHY;  Surgeon: Kobe Mathis MD;  [facility]: [facility] [place]    [facility]        Allergies   Allergen Reactions    Ciprofloxacin      History of aortic aneurysms    Tape [Adhesive Tape] Blisters     Blistering - please use paper tape      Social History     Tobacco Use    Smoking status: Former     Packs/day: 0.50     Years: 6.00     Additional pack years: 0.00     Total pack years: 3.00     Types: Cigarettes     Start date: 1977     Quit date: 1983     Years since quittin.4    Smokeless tobacco: Never   Substance Use Topics    Alcohol  use: No     Alcohol/week: 0.0 standard drinks of alcohol      Wt Readings from Last 1 Encounters:   12/26/23 99.8 kg (220 lb)        Anesthesia Evaluation   Pt has had prior anesthetic. Type: General and MAC.    History of anesthetic complications       ROS/MED HX  ENT/Pulmonary:     (+) sleep apnea,                                       Neurologic:       Cardiovascular:     (+)  hypertension- -   -  - -                                      METS/Exercise Tolerance:     Hematologic:       Musculoskeletal:       GI/Hepatic:       Renal/Genitourinary:     (+) renal disease,             Endo:     (+)               Obesity,       Psychiatric/Substance Use:       Infectious Disease:       Malignancy:       Other:            Physical Exam    Airway        Mallampati: II   TM distance: > 3 FB   Neck ROM: limited   Mouth opening: > 3 cm    Respiratory Devices and Support         Dental       (+) Edentulous      Cardiovascular   cardiovascular exam normal          Pulmonary   pulmonary exam normal                OUTSIDE LABS:  CBC:   Lab Results   Component Value Date    WBC 5.6 12/26/2023    WBC 12.9 (H) 11/28/2023    HGB 12.2 (L) 12/26/2023    HGB 9.9 (L) 11/28/2023    HCT 39.3 (L) 12/26/2023    HCT 30.7 (L) 11/28/2023     12/26/2023     11/28/2023     BMP:   Lab Results   Component Value Date     12/26/2023     (L) 11/28/2023    POTASSIUM 5.0 12/26/2023    POTASSIUM 5.2 11/28/2023    CHLORIDE 104 12/26/2023    CHLORIDE 92 (L) 11/28/2023    CO2 24 12/26/2023    CO2 26 11/28/2023    BUN 22.0 12/26/2023    BUN 16.0 11/28/2023    CR 1.11 12/26/2023    CR 1.18 (H) 11/28/2023     (H) 01/05/2024    GLC 94 12/26/2023     COAGS:   Lab Results   Component Value Date    PTT 39 (H) 10/04/2016    INR 1.27 (H) 11/02/2023     POC:   Lab Results   Component Value Date    BGM 78 05/08/2020     HEPATIC:   Lab Results   Component Value Date    ALBUMIN 3.8 12/26/2023    PROTTOTAL 7.1 12/26/2023    ALT 28  12/26/2023    AST 36 12/26/2023    ALKPHOS 115 12/26/2023    BILITOTAL 0.6 12/26/2023     OTHER:   Lab Results   Component Value Date    PH 7.39 10/04/2016    LACT 0.8 02/15/2020    A1C 5.6 08/24/2023    NIA 9.2 12/26/2023    PHOS 3.7 02/26/2020    MAG 2.2 11/02/2023    LIPASE 12 (L) 11/02/2023    TSH 2.96 01/10/2018    T4 1.45 11/02/2016    CRP <2.9 01/30/2020    SED 25 (H) 01/15/2020       Anesthesia Plan    ASA Status:  3    NPO Status:  NPO Appropriate    Anesthesia Type: MAC.     - Reason for MAC: chronic cardiopulmonary disease, immobility needed, straight local not clinically adequate   Induction: Intravenous.   Maintenance: TIVA.        Consents    Anesthesia Plan(s) and associated risks, benefits, and realistic alternatives discussed. Questions answered and patient/representative(s) expressed understanding.     - Discussed: Risks, Benefits and Alternatives for BOTH SEDATION and the PROCEDURE were discussed     - Discussed with:  Patient      - Extended Intubation/Ventilatory Support Discussed: No.      - Patient is DNR/DNI Status: No     Use of blood products discussed: No .     Postoperative Care    Pain management: IV analgesics.   PONV prophylaxis: Ondansetron (or other 5HT-3), Dexamethasone or Solumedrol     Comments:               Kevan Pedroza MD    I have reviewed the pertinent notes and labs in the chart from the past 30 days and (re)examined the patient.  Any updates or changes from those notes are reflected in this note.

## 2024-02-15 NOTE — ANESTHESIA CARE TRANSFER NOTE
Patient: Kobe Buchanan    Procedure: Procedure(s):  COLONOSCOPY, WITH POLYPECTOMY       Diagnosis: Routine adult health maintenance [Z00.00]  Diagnosis Additional Information: No value filed.    Anesthesia Type:   MAC     Note:    Oropharynx: oropharynx clear of all foreign objects and spontaneously breathing  Level of Consciousness: awake  Oxygen Supplementation: room air    Independent Airway: airway patency satisfactory and stable  Dentition: dentition unchanged  Vital Signs Stable: post-procedure vital signs reviewed and stable  Report to RN Given: handoff report given  Patient transferred to: Phase II    Handoff Report: Identifed the Patient, Identified the Reponsible Provider, Reviewed the pertinent medical history, Discussed the surgical course, Reviewed Intra-OP anesthesia mangement and issues during anesthesia, Set expectations for post-procedure period and Allowed opportunity for questions and acknowledgement of understanding      Vitals:  Vitals Value Taken Time   /78 02/15/24 0910   Temp     Pulse 56 02/15/24 0910   Resp 16    SpO2 97 % 02/15/24 0910       Electronically Signed By: Sophia Orellana CRNA, APRN BARON  February 15, 2024  9:22 AM

## 2024-02-15 NOTE — OR NURSING
Pt refused wheelchair transport upon discharge despite explanation of hospital policy and risks associated with refusing wheelchair transport.

## 2024-02-15 NOTE — LETTER
February 16, 2024      Kobe Buchanan  [address]   [place] [address]        Dear ,    The pathology results returned from the polyps removed at your recent colonoscopy.    The polyps were pre-cancerous but showed no active evidence of cancer.      Current guidelines recommend that you undergo a follow-up colonoscopy in 3-5 years.    Sincerely,               Power Phan MD   [facility], [place], ENDOSCOPY  [address] [place] [address]  Phone: [phone]

## 2024-02-15 NOTE — OR NURSING
Patient underwent colonoscopy with polypectomy under MAC with fentanyl + versed. Tolerated procedure. Specimens sent to lab. Report given to endo recovery RN.

## 2024-02-16 NOTE — ANESTHESIA POSTPROCEDURE EVALUATION
Patient: Kobe Buchanan    Procedure: Procedure(s):  COLONOSCOPY, WITH POLYPECTOMY       Anesthesia Type:  MAC    Note:  Disposition: Outpatient   Postop Pain Control: Uneventful            Sign Out: Well controlled pain   PONV: No   Neuro/Psych: Uneventful            Sign Out: Acceptable/Baseline neuro status   Airway/Respiratory: Uneventful            Sign Out: Acceptable/Baseline resp. status   CV/Hemodynamics: Uneventful            Sign Out: Acceptable CV status; No obvious hypovolemia; No obvious fluid overload   Other NRE: NONE   DID A NON-ROUTINE EVENT OCCUR? No           Last vitals:  Vitals Value Taken Time   /78 02/15/24 0910   Temp     Pulse 56 02/15/24 0910   Resp     SpO2 97 % 02/15/24 0910       Electronically Signed By: Kevan Pedroza MD  February 16, 2024  10:32 AM

## 2024-02-20 ENCOUNTER — MYC REFILL (OUTPATIENT)
Dept: FAMILY MEDICINE | Facility: CLINIC | Age: 64
End: 2024-02-20
Payer: COMMERCIAL

## 2024-02-20 DIAGNOSIS — F43.9 STRESS: ICD-10-CM

## 2024-02-20 RX ORDER — CLONAZEPAM 0.5 MG/1
0.5 TABLET ORAL 3 TIMES DAILY PRN
Qty: 90 TABLET | Refills: 1 | Status: SHIPPED | OUTPATIENT
Start: 2024-02-20 | End: 2024-04-16

## 2024-02-29 NOTE — PROGRESS NOTES
"  Assessment & Plan     (K80.50) Choledocholithiasis  (primary encounter diagnosis)  Comment: appears symptoms resolved  Plan: Comprehensive metabolic panel: would not pursue repeat MRCP/ERCP as symptoms resolved    (I25.5) Ischemic cardiomyopathy  Comment: appears hemodynamically stable, denies any angina, issue of taking ASA challenged due to gastric bypass and current nsaid use  Plan: Lipid Profile, Comprehensive metabolic panel.  Patient will have on hand and if any cardiac event to take, discuss with cards  .  (Z86.010) History of colonic polyps  Comment: appears stable, repeat colonoscopy in 3 years  Plan: Hemoglobin    (I77.9) Bilateral carotid artery disease, unspecified type (H24)  Comment: no symptoms, on statin    (E66.01) Morbid obesity (H)  Comment: stable    (N18.31) Stage 3a chronic kidney disease (H)  Comment: will follow and stable    (D64.9) Anemia, unspecified type  Comment: near normal in December, recheck  Plan: Hemoglobin, Reticulocyte count    (Z29.11) Need for vaccination against respiratory syncytial virus  Comment: educated and will get at pharmacy  Plan: respiratory syncytial virus vaccine, bivalent         (ABRYSVO) injection        37 minutes spent by me on the date of the encounter doing chart review, history and exam, documentation and further activities per the note      BMI  Estimated body mass index is 31.71 kg/m  as calculated from the following:    Height as of this encounter: 1.79 m (5' 10.47\").    Weight as of this encounter: 101.6 kg (224 lb).   Weight management plan: will continue exercise, possibly getting new dog          No follow-ups on file.    Xu Bullock is a 64 year old, presenting for the following health issues:  RECHECK (Anemia and hemoglobin follow up)    HPI         Anemia:  Has had issues for  decades but this recent episode really affected his health, seemed so fatigued and wonders why.  Wants to know if production or blood loss  Has had colonoscopy " shows polyps but not site of loss, has normal stools now (denies color change constipation/diarrhea) says has small anal fissues sometimes    ERCP  -following up on gallstones and post fam but having pancreatic pain and ERCP/MRCP was an issue,   -struggled with the necessary sedation for MRCP and not having  (living independent) so wonders if he needs to try to reschedule  -no symptoms and feels great      Vascular Disease Follow-up    How often do you take nitroglycerin? Never  Do you take an aspirin every day? No;  not taking because on naproxen and needing that for pain control, not willing to give this up    Depression and Anxiety Follow-Up  How are you doing with your depression since your last visit? With his health changes it's been a little hard, having to deal with elderly dog as well, likely getting new dog in 2 months  How are you doing with your anxiety since your last visit?  No change  Are you having other symptoms that might be associated with depression or anxiety? No  Have you had a significant life event? Health Concerns   Do you have any concerns with your use of alcohol or other drugs? No    Social History     Tobacco Use    Smoking status: Former     Packs/day: 0.50     Years: 6.00     Additional pack years: 0.00     Total pack years: 3.00     Types: Cigarettes     Start date: 1977     Quit date: 1983     Years since quittin.5    Smokeless tobacco: Never   Substance Use Topics    Alcohol use: No     Alcohol/week: 0.0 standard drinks of alcohol    Drug use: No         2022    11:15 AM 2023    12:28 PM 3/1/2024     8:03 AM   PHQ   PHQ-9 Total Score 10 11 0   Q9: Thoughts of better off dead/self-harm past 2 weeks Not at all Not at all Not at all         3/10/2022     2:30 PM 2022    11:15 AM   THERESA-7 SCORE   Total Score 2 4         Suicide Assessment Five-step Evaluation and Treatment (SAFE-T)      Biliary issue: previous gallstones, had ERCP    Rectal bleeding:  "Colonic polyps precancerous    How many days per week do you miss taking your medication? 0            Objective    BP (!) 143/73   Pulse 53   Temp 98.1  F (36.7  C)   Ht 1.79 m (5' 10.47\")   Wt 101.6 kg (224 lb)   SpO2 96%   BMI 31.71 kg/m    Body mass index is 31.71 kg/m .  Physical Exam   GENERAL: alert and no distress  PSYCH: mentation appears normal, affect normal/bright    Reviewed latest MRCP: showing improvement in biliary dilation        Signed Electronically by: Demi Hardin MD    "

## 2024-03-01 ENCOUNTER — OFFICE VISIT (OUTPATIENT)
Dept: FAMILY MEDICINE | Facility: CLINIC | Age: 64
End: 2024-03-01
Payer: COMMERCIAL

## 2024-03-01 VITALS
DIASTOLIC BLOOD PRESSURE: 73 MMHG | WEIGHT: 224 LBS | SYSTOLIC BLOOD PRESSURE: 143 MMHG | OXYGEN SATURATION: 96 % | HEART RATE: 53 BPM | BODY MASS INDEX: 32.07 KG/M2 | TEMPERATURE: 98.1 F | HEIGHT: 70 IN

## 2024-03-01 DIAGNOSIS — K80.50 CHOLEDOCHOLITHIASIS: Primary | ICD-10-CM

## 2024-03-01 DIAGNOSIS — D64.9 ANEMIA, UNSPECIFIED TYPE: ICD-10-CM

## 2024-03-01 DIAGNOSIS — Z86.0100 HISTORY OF COLONIC POLYPS: ICD-10-CM

## 2024-03-01 DIAGNOSIS — N18.31 STAGE 3A CHRONIC KIDNEY DISEASE (H): ICD-10-CM

## 2024-03-01 DIAGNOSIS — Z29.11 NEED FOR VACCINATION AGAINST RESPIRATORY SYNCYTIAL VIRUS: ICD-10-CM

## 2024-03-01 DIAGNOSIS — I25.5 ISCHEMIC CARDIOMYOPATHY: ICD-10-CM

## 2024-03-01 DIAGNOSIS — I77.9 BILATERAL CAROTID ARTERY DISEASE, UNSPECIFIED TYPE (H): ICD-10-CM

## 2024-03-01 DIAGNOSIS — E66.01 MORBID OBESITY (H): ICD-10-CM

## 2024-03-01 LAB
HGB BLD-MCNC: 15.1 G/DL (ref 13.3–17.7)
RETICS # AUTO: 0.07 10E6/UL (ref 0.03–0.1)
RETICS/RBC NFR AUTO: 1.3 % (ref 0.5–2)

## 2024-03-01 PROCEDURE — 36415 COLL VENOUS BLD VENIPUNCTURE: CPT | Performed by: FAMILY MEDICINE

## 2024-03-01 PROCEDURE — 85045 AUTOMATED RETICULOCYTE COUNT: CPT | Performed by: FAMILY MEDICINE

## 2024-03-01 PROCEDURE — 80061 LIPID PANEL: CPT | Performed by: FAMILY MEDICINE

## 2024-03-01 PROCEDURE — 99214 OFFICE O/P EST MOD 30 MIN: CPT | Performed by: FAMILY MEDICINE

## 2024-03-01 PROCEDURE — 85018 HEMOGLOBIN: CPT | Performed by: FAMILY MEDICINE

## 2024-03-01 PROCEDURE — 80053 COMPREHEN METABOLIC PANEL: CPT | Performed by: FAMILY MEDICINE

## 2024-03-01 RX ORDER — RESPIRATORY SYNCYTIAL VIRUS VACCINE 120MCG/0.5
0.5 KIT INTRAMUSCULAR ONCE
Qty: 1 EACH | Refills: 0 | Status: SHIPPED | OUTPATIENT
Start: 2024-03-01 | End: 2024-03-01

## 2024-03-01 ASSESSMENT — PATIENT HEALTH QUESTIONNAIRE - PHQ9: SUM OF ALL RESPONSES TO PHQ QUESTIONS 1-9: 0

## 2024-03-02 LAB
ALBUMIN SERPL BCG-MCNC: 4.3 G/DL (ref 3.5–5.2)
ALP SERPL-CCNC: 122 U/L (ref 40–150)
ALT SERPL W P-5'-P-CCNC: 30 U/L (ref 0–70)
ANION GAP SERPL CALCULATED.3IONS-SCNC: 10 MMOL/L (ref 7–15)
AST SERPL W P-5'-P-CCNC: 34 U/L (ref 0–45)
BILIRUB SERPL-MCNC: 0.4 MG/DL
BUN SERPL-MCNC: 13.9 MG/DL (ref 8–23)
CALCIUM SERPL-MCNC: 9.3 MG/DL (ref 8.8–10.2)
CHLORIDE SERPL-SCNC: 104 MMOL/L (ref 98–107)
CHOLEST SERPL-MCNC: 119 MG/DL
CREAT SERPL-MCNC: 1.03 MG/DL (ref 0.67–1.17)
DEPRECATED HCO3 PLAS-SCNC: 26 MMOL/L (ref 22–29)
EGFRCR SERPLBLD CKD-EPI 2021: 82 ML/MIN/1.73M2
FASTING STATUS PATIENT QL REPORTED: NORMAL
GLUCOSE SERPL-MCNC: 85 MG/DL (ref 70–99)
HDLC SERPL-MCNC: 54 MG/DL
LDLC SERPL CALC-MCNC: 48 MG/DL
NONHDLC SERPL-MCNC: 65 MG/DL
POTASSIUM SERPL-SCNC: 4.6 MMOL/L (ref 3.4–5.3)
PROT SERPL-MCNC: 7.2 G/DL (ref 6.4–8.3)
SODIUM SERPL-SCNC: 140 MMOL/L (ref 135–145)
TRIGL SERPL-MCNC: 87 MG/DL

## 2024-03-04 NOTE — RESULT ENCOUNTER NOTE
Lipids at good control with statin, kidney and liver normal, hemoglobin and production of red blood cells look good.

## 2024-03-27 ENCOUNTER — TRANSFERRED RECORDS (OUTPATIENT)
Dept: HEALTH INFORMATION MANAGEMENT | Facility: CLINIC | Age: 64
End: 2024-03-27
Payer: COMMERCIAL

## 2024-04-02 DIAGNOSIS — F34.1 DYSTHYMIC DISORDER: ICD-10-CM

## 2024-04-02 RX ORDER — BUPROPION HYDROCHLORIDE 200 MG/1
TABLET, EXTENDED RELEASE ORAL
Qty: 180 TABLET | Refills: 0 | Status: SHIPPED | OUTPATIENT
Start: 2024-04-02 | End: 2024-08-01

## 2024-04-02 NOTE — TELEPHONE ENCOUNTER
,Message to physician: none    Date of last visit: 3/1/2024    Date of next visit if scheduled: 5/14/2024    Potassium   Date Value Ref Range Status   03/01/2024 4.6 3.4 - 5.3 mmol/L Final   07/29/2022 4.0 3.4 - 5.3 mmol/L Final   08/18/2020 4.3 3.4 - 5.3 mmol/L Final     Creatinine   Date Value Ref Range Status   03/01/2024 1.03 0.67 - 1.17 mg/dL Final   08/05/2020 1.24 0.66 - 1.25 mg/dL Final     GFR Estimate   Date Value Ref Range Status   03/01/2024 82 >60 mL/min/1.73m2 Final   08/05/2020 63 >60 mL/min/[1.73_m2] Final     Comment:     Non  GFR Calc  Starting 12/18/2018, serum creatinine based estimated GFR (eGFR) will be   calculated using the Chronic Kidney Disease Epidemiology Collaboration   (CKD-EPI) equation.         BP Readings from Last 3 Encounters:   03/01/24 (!) 143/73   02/15/24 (!) 147/78   01/05/24 (!) 150/66       Hemoglobin A1C   Date Value Ref Range Status   08/24/2023 5.6 0.0 - 5.6 % Final     Comment:     Normal <5.7%   Prediabetes 5.7-6.4%    Diabetes 6.5% or higher     Note: Adopted from ADA consensus guidelines.   10/05/2016 5.3 4.3 - 6.0 % Final       Please complete refill and CLOSE ENCOUNTER.  Closing the encounter signifies the refill is complete.

## 2024-04-16 DIAGNOSIS — F43.9 STRESS: ICD-10-CM

## 2024-04-16 RX ORDER — CLONAZEPAM 0.5 MG/1
0.5 TABLET ORAL 3 TIMES DAILY PRN
Qty: 90 TABLET | Refills: 0 | Status: SHIPPED | OUTPATIENT
Start: 2024-04-16 | End: 2024-05-14

## 2024-04-17 NOTE — TELEPHONE ENCOUNTER
Reviewed and approved  Authorized electronic refill.  Confirmed and checked database today.  UTD and compliant with screening.  Last 3/19

## 2024-04-24 ENCOUNTER — TRANSFERRED RECORDS (OUTPATIENT)
Dept: HEALTH INFORMATION MANAGEMENT | Facility: CLINIC | Age: 64
End: 2024-04-24
Payer: COMMERCIAL

## 2024-04-29 DIAGNOSIS — M54.2 CERVICAL SPINE PAIN: Primary | ICD-10-CM

## 2024-04-30 ENCOUNTER — OFFICE VISIT (OUTPATIENT)
Dept: ORTHOPEDICS | Facility: CLINIC | Age: 64
End: 2024-04-30
Payer: COMMERCIAL

## 2024-04-30 ENCOUNTER — ANCILLARY PROCEDURE (OUTPATIENT)
Dept: GENERAL RADIOLOGY | Facility: CLINIC | Age: 64
End: 2024-04-30
Attending: ORTHOPAEDIC SURGERY
Payer: COMMERCIAL

## 2024-04-30 DIAGNOSIS — M47.812 ARTHROPATHY OF CERVICAL FACET JOINT: Primary | ICD-10-CM

## 2024-04-30 DIAGNOSIS — M54.2 CERVICAL SPINE PAIN: ICD-10-CM

## 2024-04-30 PROCEDURE — 99214 OFFICE O/P EST MOD 30 MIN: CPT | Performed by: PHYSICIAN ASSISTANT

## 2024-04-30 PROCEDURE — 72050 X-RAY EXAM NECK SPINE 4/5VWS: CPT | Mod: GC | Performed by: RADIOLOGY

## 2024-04-30 NOTE — PROGRESS NOTES
"    Spine Surgery Follow Up Visit    Subjective  Kobe Buchanan is a 64 year old male who presents today for follow up on neck symptoms, last seen 11/23/21 by Dr. Prajapati. He has h/o R total shoulder 2020. At that time, surgery was discussed but he opted for non-operative intervention.     He states his neck pain has been stable for many years. He feels that most of his pain is at the C2 level and experiences what he states is \"twine cutting off his spinal cord cord\" and a grinding sensation.  He denies radiculopathy, imbalance, pain radiating down his arms, numbness in hands, weakness of his hands, difficulty with fine motor control. He has headaches in bilateral ULI distribution and numbness in L ULI distribution. He has to lay down at 1 pm daily due to neck and head pain.     Conservative management  - Physical therapy: PT in 2019, caused increased pain  - Injections: years ago, had done many facet blocks, rhizotomies, MISHEL- has given him very short term relief in the past (less than a few weeks), also has had occipital blocks in the past (one helped)  - Medications: On opioids since 2005, managed through [facility]. On fentanyl patches 50 mcg q48 hr and oxycodone 40 mg/day. Tizanidine for sleep. Naproxen. Flexeril can also help, but usually only uses about twice/week. His pain clinic is weaning him to goal 120 MME which patient thinks will be very difficult.  Swims 3-4 times/week which helps with back symptoms    Bone health hx  Ankle fracture at age 17  No other fractures  No history of osteopenia    Physical Exam  Vitals: There were no vitals taken for this visit.  Constitutional: Patient is healthy, well-nourished and appears stated age.  Respiratory: Patient is breathing normally and in no respiratory distress.  Skin: No suspicious rashes or lesions.    Gait: Non-antalgic gait without use of assistive devices.    Neurologic -  Deep tendon reflexes absent in UE, Baxter negative. "   Musculoskeletal: Strength: 5/5 deltoids, 5/5 biceps, 5/5 triceps, 5/5 wrist extensors, 5/5 wrist flexors, 5/5 interossei, 5/5 .   Spine: overall good sagittal balance, pain and muscle spasm at craniocervical junction, over trapezius    Imaging  We independently reviewed and interpreted the following imaging at this clinic visit which were also reviewed with patient  MRI Cervical Spine, dated 11/15/2021  Disc bulging with mild stenosis at C3-4, C4-5, moderate stenosis at C5-6, and mild central stenosis at C6-C7.  No cord signal changes.    Xrays AP/lat/flex/ex cervical , dated 4/30/2024     Previous dental CT in 2020 shows advanced arthropathy at the C1-C2 and C2-C3 levels    Assessment:  64 year old male with chronic neck pain and cervicogenic headaches in the greater occipital nerve distribution with advanced arthritis at C1-C2, C2-C3. Also with cervical spondylosis and central stenosis without myeloradiculopathy.     Plan:  Dr. Prajapati met with the patient and discussed that surgery would likely involve a C1-C2 versus C1-C3 posterior fusion.  We would need advanced imaging such as a MRI and CT to determine the exact surgical plan.  This would cause about 80% reduced range of motion with lateral gaze bilaterally.  We talked about driving restrictions and the small risk that he may not return to driving after the surgery due to decreased range of motion.  He can follow-up with us on an as-needed basis if he would like to discuss surgery in the future.     All questions and concerns were answered to the patient's apparent satisfaction before leaving the clinic. We used the patient's imaging, diagrams, models to explain the pathophysiology of their disease as well as surgical and non-surgical treatment options. The patient was also seen by Dr. Prajapati who formulated and is in agreement with the above plan.    Jud Guardado PA-C   Orthopedic Spine Surgery     I, Jonathan Prajapati MD, saw and evaluated Kobe CHAO  Chanel  1960.  I have reviewed and discussed with the advanced practice provider their history, physical and plan.    I have personally reviewed the imaging, history, and physical exam.    I personally provided substantive care for this patient, including personally interpreting the imaging.  In addition, I formulated the entire plan noted above and dictated this to the PA/APRN so they could document it in the note. The plan represents my own Medical Decision making, which I determined in its entirety.      Jonathan Prajapati MD

## 2024-04-30 NOTE — PROGRESS NOTES
Reason For Visit:   Chief Complaint   Patient presents with    RECHECK     Cervical spine pain - Looking to discuss surgery        Primary MD: Demi Hardin  Ref. MD: Est    Date of injury: for the past few years   Type of injury: chronic .  Date of surgery: none   Type of surgery: none .  Smoker: No  Request smoking cessation information: No    There were no vitals taken for this visit.    Pain Assessment  Patient Currently in Pain: Yes  Patient's Stated Pain Goal: 5  0-10 Pain Scale: 5  Primary Pain Location: Neck    Oswestry (CARLYLE) Questionnaire        4/29/2024    10:49 PM   OSWESTRY DISABILITY INDEX   Count 10   Sum 26   Oswestry Score (%) 52 %            Neck Disability Index (NDI) Questionnaire        11/20/2021     1:34 PM   Neck Disability Index (NDI)   Neck Disability Index: Count 9   NDI: Total Score = SUM (points for all 10 findings) 26   Neck Disability in Percent = (Total Score) / 50 * 100 57.78 (%)              Visual Analog Pain Scale  Back Pain Scale 0-10: 0  Right leg pain: 2  Left leg pain: 0  Neck Pain Scale 0-10: 5  Right arm pain: 0  Left arm pain: 0    Promis 10 Assessment        11/20/2021    12:45 PM   PROMIS 10   In general, would you say your health is: Fair   In general, would you say your quality of life is: Fair   In general, how would you rate your physical health? Fair   In general, how would you rate your mental health, including your mood and your ability to think? Good   In general, how would you rate your satisfaction with your social activities and relationships? Fair   In general, please rate how well you carry out your usual social activities and roles Fair   To what extent are you able to carry out your everyday physical activities such as walking, climbing stairs, carrying groceries, or moving a chair? Moderately   In the past 7 days, how often have you been bothered by emotional problems such as feeling anxious, depressed, or irritable? Sometimes   In the past 7 days,  how would you rate your fatigue on average? Moderate   In the past 7 days, how would you rate your pain on average, where 0 means no pain, and 10 means worst imaginable pain? 5   In general, would you say your health is: 2   In general, would you say your quality of life is: 2   In general, how would you rate your physical health? 2   In general, how would you rate your mental health, including your mood and your ability to think? 3   In general, how would you rate your satisfaction with your social activities and relationships? 2   In general, please rate how well you carry out your usual social activities and roles. (This includes activities at home, at work and in your community, and responsibilities as a parent, child, spouse, employee, friend, etc.) 2   To what extent are you able to carry out your everyday physical activities such as walking, climbing stairs, carrying groceries, or moving a chair? 3   In the past 7 days, how often have you been bothered by emotional problems such as feeling anxious, depressed, or irritable? 3   In the past 7 days, how would you rate your fatigue on average? 3   In the past 7 days, how would you rate your pain on average, where 0 means no pain, and 10 means worst imaginable pain? 5   Global Mental Health Score 10   Global Physical Health Score 11   PROMIS TOTAL - SUBSCORES 21                Erendira Peace CMA

## 2024-04-30 NOTE — LETTER
"    4/30/2024         RE: Kobe Buchanan  [address]  [place] [address]        Dear Colleague,    Thank you for referring your patient, Kobe Buchanan, to the [facility] [place]. Please see a copy of my visit note below.        Spine Surgery Follow Up Visit    Subjective  Kobe Buchanan is a 64 year old male who presents today for follow up on neck symptoms, last seen 11/23/21 by Dr. Prajapati. He has h/o R total shoulder 2020. At that time, surgery was discussed but he opted for non-operative intervention.     He states his neck pain has been stable for many years. He feels that most of his pain is at the C2 level and experiences what he states is \"twine cutting off his spinal cord cord\" and a grinding sensation.  He denies radiculopathy, imbalance, pain radiating down his arms, numbness in hands, weakness of his hands, difficulty with fine motor control. He has headaches in bilateral ULI distribution and numbness in L ULI distribution. He has to lay down at 1 pm daily due to neck and head pain.     Conservative management  - Physical therapy: PT in 2019, caused increased pain  - Injections: years ago, had done many facet blocks, rhizotomies, MISHEL- has given him very short term relief in the past (less than a few weeks), also has had occipital blocks in the past (one helped)  - Medications: On opioids since 2005, managed through [facility]. On fentanyl patches 50 mcg q48 hr and oxycodone 40 mg/day. Tizanidine for sleep. Naproxen. Flexeril can also help, but usually only uses about twice/week. His pain clinic is weaning him to goal 120 MME which patient thinks will be very difficult.  Swims 3-4 times/week which helps with back symptoms    Bone health hx  Ankle fracture at age 17  No other fractures  No history of osteopenia    Physical Exam  Vitals: There were no vitals taken for this visit.  Constitutional: Patient is healthy, well-nourished and " appears stated age.  Respiratory: Patient is breathing normally and in no respiratory distress.  Skin: No suspicious rashes or lesions.    Gait: Non-antalgic gait without use of assistive devices.    Neurologic -  Deep tendon reflexes absent in UE, Baxter negative.   Musculoskeletal: Strength: 5/5 deltoids, 5/5 biceps, 5/5 triceps, 5/5 wrist extensors, 5/5 wrist flexors, 5/5 interossei, 5/5 .   Spine: overall good sagittal balance, pain and muscle spasm at craniocervical junction, over trapezius    Imaging  We independently reviewed and interpreted the following imaging at this clinic visit which were also reviewed with patient  MRI Cervical Spine, dated 11/15/2021  Disc bulging with mild stenosis at C3-4, C4-5, moderate stenosis at C5-6, and mild central stenosis at C6-C7.  No cord signal changes.    Xrays AP/lat/flex/ex cervical , dated 4/30/2024     Previous dental CT in 2020 shows advanced arthropathy at the C1-C2 and C2-C3 levels    Assessment:  64 year old male with chronic neck pain and cervicogenic headaches in the greater occipital nerve distribution with advanced arthritis at C1-C2, C2-C3. Also with cervical spondylosis and central stenosis without myeloradiculopathy.     Plan:  Dr. Prajapati met with the patient and discussed that surgery would likely involve a C1-C2 versus C1-C3 posterior fusion.  We would need advanced imaging such as a MRI and CT to determine the exact surgical plan.  This would cause about 80% reduced range of motion with lateral gaze bilaterally.  We talked about driving restrictions and the small risk that he may not return to driving after the surgery due to decreased range of motion.  He can follow-up with us on an as-needed basis if he would like to discuss surgery in the future.     All questions and concerns were answered to the patient's apparent satisfaction before leaving the clinic. We used the patient's imaging, diagrams, models to explain the pathophysiology of their  disease as well as surgical and non-surgical treatment options. The patient was also seen by Dr. Prajapati who formulated and is in agreement with the above plan.    Jud Guardado PA-C   Orthopedic Spine Surgery     I, Jonathan Prajapati MD, saw and evaluated Kobe Buchanan  1960.  I have reviewed and discussed with the advanced practice provider their history, physical and plan.    I have personally reviewed the imaging, history, and physical exam.    I personally provided substantive care for this patient, including personally interpreting the imaging.  In addition, I formulated the entire plan noted above and dictated this to the PA/APRN so they could document it in the note. The plan represents my own Medical Decision making, which I determined in its entirety.      Jonathan Prajapati MD        Reason For Visit:   Chief Complaint   Patient presents with    RECHECK     Cervical spine pain - Looking to discuss surgery        Primary MD: Demi Hardin  Ref. MD: Est    Date of injury: for the past few years   Type of injury: chronic .  Date of surgery: none   Type of surgery: none .  Smoker: No  Request smoking cessation information: No    There were no vitals taken for this visit.    Pain Assessment  Patient Currently in Pain: Yes  Patient's Stated Pain Goal: 5  0-10 Pain Scale: 5  Primary Pain Location: Neck    Oswestry (CARLYLE) Questionnaire        4/29/2024    10:49 PM   OSWESTRY DISABILITY INDEX   Count 10   Sum 26   Oswestry Score (%) 52 %            Neck Disability Index (NDI) Questionnaire        11/20/2021     1:34 PM   Neck Disability Index (NDI)   Neck Disability Index: Count 9   NDI: Total Score = SUM (points for all 10 findings) 26   Neck Disability in Percent = (Total Score) / 50 * 100 57.78 (%)        Visual Analog Pain Scale  Back Pain Scale 0-10: 0  Right leg pain: 2  Left leg pain: 0  Neck Pain Scale 0-10: 5  Right arm pain: 0  Left arm pain: 0    Promis 10 Assessment         11/20/2021    12:45 PM   PROMIS 10   In general, would you say your health is: Fair   In general, would you say your quality of life is: Fair   In general, how would you rate your physical health? Fair   In general, how would you rate your mental health, including your mood and your ability to think? Good   In general, how would you rate your satisfaction with your social activities and relationships? Fair   In general, please rate how well you carry out your usual social activities and roles Fair   To what extent are you able to carry out your everyday physical activities such as walking, climbing stairs, carrying groceries, or moving a chair? Moderately   In the past 7 days, how often have you been bothered by emotional problems such as feeling anxious, depressed, or irritable? Sometimes   In the past 7 days, how would you rate your fatigue on average? Moderate   In the past 7 days, how would you rate your pain on average, where 0 means no pain, and 10 means worst imaginable pain? 5   In general, would you say your health is: 2   In general, would you say your quality of life is: 2   In general, how would you rate your physical health? 2   In general, how would you rate your mental health, including your mood and your ability to think? 3   In general, how would you rate your satisfaction with your social activities and relationships? 2   In general, please rate how well you carry out your usual social activities and roles. (This includes activities at home, at work and in your community, and responsibilities as a parent, child, spouse, employee, friend, etc.) 2   To what extent are you able to carry out your everyday physical activities such as walking, climbing stairs, carrying groceries, or moving a chair? 3   In the past 7 days, how often have you been bothered by emotional problems such as feeling anxious, depressed, or irritable? 3   In the past 7 days, how would you rate your fatigue on average? 3   In the  past 7 days, how would you rate your pain on average, where 0 means no pain, and 10 means worst imaginable pain? 5   Global Mental Health Score 10   Global Physical Health Score 11   PROMIS TOTAL - SUBSCORES 21        Erendira Peace CMA

## 2024-05-03 DIAGNOSIS — K21.00 GASTROESOPHAGEAL REFLUX DISEASE WITH ESOPHAGITIS, UNSPECIFIED WHETHER HEMORRHAGE: ICD-10-CM

## 2024-05-03 RX ORDER — PANTOPRAZOLE SODIUM 40 MG/1
40 TABLET, DELAYED RELEASE ORAL DAILY PRN
Qty: 90 TABLET | Refills: 0 | Status: SHIPPED | OUTPATIENT
Start: 2024-05-03 | End: 2024-08-01

## 2024-05-03 NOTE — TELEPHONE ENCOUNTER
Message to physician:     Date of last visit: 3/1/2024    Date of next visit if scheduled:     Potassium   Date Value Ref Range Status   03/01/2024 4.6 3.4 - 5.3 mmol/L Final   07/29/2022 4.0 3.4 - 5.3 mmol/L Final   08/18/2020 4.3 3.4 - 5.3 mmol/L Final     Creatinine   Date Value Ref Range Status   03/01/2024 1.03 0.67 - 1.17 mg/dL Final   08/05/2020 1.24 0.66 - 1.25 mg/dL Final     GFR Estimate   Date Value Ref Range Status   03/01/2024 82 >60 mL/min/1.73m2 Final   08/05/2020 63 >60 mL/min/[1.73_m2] Final     Comment:     Non  GFR Calc  Starting 12/18/2018, serum creatinine based estimated GFR (eGFR) will be   calculated using the Chronic Kidney Disease Epidemiology Collaboration   (CKD-EPI) equation.         BP Readings from Last 3 Encounters:   03/01/24 (!) 143/73   02/15/24 (!) 147/78   01/05/24 (!) 150/66       Hemoglobin A1C   Date Value Ref Range Status   08/24/2023 5.6 0.0 - 5.6 % Final     Comment:     Normal <5.7%   Prediabetes 5.7-6.4%    Diabetes 6.5% or higher     Note: Adopted from ADA consensus guidelines.   10/05/2016 5.3 4.3 - 6.0 % Final       Please complete refill and CLOSE ENCOUNTER.  Closing the encounter signifies the refill is complete.

## 2024-05-14 ENCOUNTER — OFFICE VISIT (OUTPATIENT)
Dept: FAMILY MEDICINE | Facility: CLINIC | Age: 64
End: 2024-05-14
Payer: COMMERCIAL

## 2024-05-14 ENCOUNTER — MYC REFILL (OUTPATIENT)
Dept: FAMILY MEDICINE | Facility: CLINIC | Age: 64
End: 2024-05-14

## 2024-05-14 VITALS
SYSTOLIC BLOOD PRESSURE: 161 MMHG | TEMPERATURE: 98 F | WEIGHT: 236 LBS | HEART RATE: 55 BPM | HEIGHT: 70 IN | RESPIRATION RATE: 16 BRPM | BODY MASS INDEX: 33.79 KG/M2 | DIASTOLIC BLOOD PRESSURE: 85 MMHG | OXYGEN SATURATION: 94 %

## 2024-05-14 DIAGNOSIS — Z86.79: Primary | ICD-10-CM

## 2024-05-14 DIAGNOSIS — F43.9 STRESS: ICD-10-CM

## 2024-05-14 DIAGNOSIS — I25.5 ISCHEMIC CARDIOMYOPATHY: ICD-10-CM

## 2024-05-14 DIAGNOSIS — N18.31 STAGE 3A CHRONIC KIDNEY DISEASE (H): ICD-10-CM

## 2024-05-14 DIAGNOSIS — E66.01 MORBID OBESITY (H): ICD-10-CM

## 2024-05-14 DIAGNOSIS — F34.1 DYSTHYMIC DISORDER: ICD-10-CM

## 2024-05-14 DIAGNOSIS — I10 ESSENTIAL HYPERTENSION: ICD-10-CM

## 2024-05-14 PROCEDURE — 99214 OFFICE O/P EST MOD 30 MIN: CPT | Performed by: FAMILY MEDICINE

## 2024-05-14 RX ORDER — CLONAZEPAM 0.5 MG/1
0.5 TABLET ORAL 3 TIMES DAILY PRN
Status: ON HOLD | COMMUNITY
Start: 2024-05-14 | End: 2024-09-08

## 2024-05-14 RX ORDER — CLONAZEPAM 0.5 MG/1
0.5 TABLET ORAL 3 TIMES DAILY PRN
Qty: 90 TABLET | Refills: 3 | Status: SHIPPED | OUTPATIENT
Start: 2024-05-14 | End: 2024-09-06

## 2024-05-14 NOTE — TELEPHONE ENCOUNTER
Reviewed and approved  Authorized electronic refill.  Confirmed and checked database today.  UTD and compliant with screening.

## 2024-05-14 NOTE — PROGRESS NOTES
Assessment & Plan     (Z86.79) Hx of chronic heart failure  (primary encounter diagnosis)  Comment: patient last echo 2023 normal EF, after lowest of 30% prior to valve  Plan: empagliflozin (JARDIANCE) 10 MG TABS tablet        Will start preventive for CKD and CHF hx.  May have some benefit for obesity.    (E66.01) Morbid obesity (H)  Comment: reviewed medication, patient has been weight stable after bpass  Plan: empagliflozin (JARDIANCE) 10 MG TABS tablet            (N18.31) Stage 3a chronic kidney disease (H)  Comment: hx of possibly dialysis urgent but has had improved GFR since  Plan: empagliflozin (JARDIANCE) 10 MG TABS tablet        Goal of BP control and will start SGLT2    (I25.5) Ischemic cardiomyopathy  Comment: see above, asymptomatic  Plan: jardiance    (I10) Essential hypertension  Comment: not controlled  Plan: on combo amlodipine/benazepril and caredilol    (F34.1) Dysthymic disorder  Comment: mixed depressive/anxiety  Plan: not interested in ongoing therapy, not interested in med changes (wellbutrin and lexapro)    Reviewed has a pet, chronic pain is making him consider even DNR/DNI status, will discuss with sister physician      37 minutes spent by me on the date of the encounter doing chart review, history and exam, documentation and further activities per the note            No follow-ups on file.    Xu Bullock is a 64 year old, presenting for the following health issues:  RECHECK and knot / bruise  (Per patient woke up one morning with a bruise, no injuries )      5/14/2024     7:56 AM   Additional Questions   Roomed by Norma webster         5/14/2024    Information    services provided? No     HPI           1.Right calf bruise and wonders about hematoma  -    2. Chronic cervical pain: 15 years and frustrated  -not wanting to do a surgery/fusion (neurosurgery [facility] Dr. Prajapati)  -pain clinic titrating pain meds down 120 MEQ    -not really wanting to fuse  "c-spine from a driving  -has pain increasing throughout the day with a developing headache    3. HTN:  Measures intermittently at home, typically 140s/80s  Didn't take any of his          Objective    BP (!) 161/85   Pulse 55   Temp 98  F (36.7  C) (Oral)   Resp 16   Ht 1.778 m (5' 10\")   Wt 107 kg (236 lb)   SpO2 94%   BMI 33.86 kg/m    Body mass index is 33.86 kg/m .  Physical Exam   GENERAL: alert and no distress  RESP: lungs clear to auscultation - no rales, rhonchi or wheezes  CV: regular rate and rhythm, normal S1 S2, no S3 or S4, no murmur, click or rub, no peripheral edema  MS: no gross musculoskeletal defects noted, no edema  SKIN: no suspicious lesions or rashes and faint bruise and small 2 cm subcutaneous nodule under bruise on medial lower left calf            Signed Electronically by: Demi Hardin MD    "

## 2024-05-22 ENCOUNTER — TRANSFERRED RECORDS (OUTPATIENT)
Dept: HEALTH INFORMATION MANAGEMENT | Facility: CLINIC | Age: 64
End: 2024-05-22
Payer: COMMERCIAL

## 2024-06-17 ENCOUNTER — TRANSFERRED RECORDS (OUTPATIENT)
Dept: HEALTH INFORMATION MANAGEMENT | Facility: CLINIC | Age: 64
End: 2024-06-17
Payer: COMMERCIAL

## 2024-07-18 ENCOUNTER — TRANSFERRED RECORDS (OUTPATIENT)
Dept: HEALTH INFORMATION MANAGEMENT | Facility: CLINIC | Age: 64
End: 2024-07-18
Payer: COMMERCIAL

## 2024-08-01 DIAGNOSIS — K21.00 GASTROESOPHAGEAL REFLUX DISEASE WITH ESOPHAGITIS, UNSPECIFIED WHETHER HEMORRHAGE: ICD-10-CM

## 2024-08-01 DIAGNOSIS — F34.1 DYSTHYMIC DISORDER: ICD-10-CM

## 2024-08-01 RX ORDER — PANTOPRAZOLE SODIUM 40 MG/1
40 TABLET, DELAYED RELEASE ORAL DAILY PRN
Qty: 90 TABLET | Refills: 0 | Status: SHIPPED | OUTPATIENT
Start: 2024-08-01

## 2024-08-01 RX ORDER — BUPROPION HYDROCHLORIDE 200 MG/1
TABLET, EXTENDED RELEASE ORAL
Qty: 180 TABLET | Refills: 0 | Status: SHIPPED | OUTPATIENT
Start: 2024-08-01

## 2024-08-10 ENCOUNTER — HEALTH MAINTENANCE LETTER (OUTPATIENT)
Age: 64
End: 2024-08-10

## 2024-08-13 ENCOUNTER — OFFICE VISIT (OUTPATIENT)
Dept: FAMILY MEDICINE | Facility: CLINIC | Age: 64
End: 2024-08-13
Payer: COMMERCIAL

## 2024-08-13 VITALS
OXYGEN SATURATION: 97 % | RESPIRATION RATE: 20 BRPM | HEIGHT: 70 IN | WEIGHT: 229 LBS | SYSTOLIC BLOOD PRESSURE: 164 MMHG | DIASTOLIC BLOOD PRESSURE: 84 MMHG | BODY MASS INDEX: 32.78 KG/M2 | TEMPERATURE: 98.2 F | HEART RATE: 64 BPM

## 2024-08-13 DIAGNOSIS — Z86.79: ICD-10-CM

## 2024-08-13 DIAGNOSIS — I10 ESSENTIAL HYPERTENSION: Primary | ICD-10-CM

## 2024-08-13 PROCEDURE — G2211 COMPLEX E/M VISIT ADD ON: HCPCS | Performed by: FAMILY MEDICINE

## 2024-08-13 PROCEDURE — 99214 OFFICE O/P EST MOD 30 MIN: CPT | Performed by: FAMILY MEDICINE

## 2024-08-13 NOTE — PROGRESS NOTES
"  Assessment & Plan     (I10) Essential hypertension  (primary encounter diagnosis)  Comment: not at goal, wanting to get SGLT2 medication  Plan: discussed with patient, options include adding on diuretic possibly spironolactone but that may be difficult with K already 4.6-5 range.  Consider [place] [facility] coverage?    (Z86.79) Hx of chronic heart failure  Comment: stable, patient due for cardiology visit  Plan: appears euvolemic and appears asymptomatic and sinus.  Would also benefit perhaps from SGLT2 medication    Follow up via mychart in about 2 weeks, continue current meds.    The longitudinal plan of care for the diagnosis(es)/condition(s) as documented were addressed during this visit. Due to the added complexity in care, I will continue to support Kobe in the subsequent management and with ongoing continuity of care.                No follow-ups on file.    Xu Bullock is a 64 year old, presenting for the following health issues:  Follow Up    HPI       Hypertension Follow-up-did not start or  jardiance as it was $100    Do you check your blood pressure regularly outside of the clinic? Yes   Are you following a low salt diet? Yes  Are your blood pressures ever more than 140 on the top number (systolic) OR more   than 90 on the bottom number (diastolic), for example 140/90? Yes  but always 140s-160s    Dislikes diuretics and states \"I've always had BP in this range\"    Heart Failure Follow-up   Are you experiencing any shortness of breath? No  Are you experiencing any swelling in your legs or feet?  No  Are you using more pillows than usual? No  Do you cough at night?  No  Do you check your weight daily?  No  Have you had a weight change recently?  No  Are you having any of the following side effects from your medications? (Select all that apply)  The patient does not report symptoms of dizziness, fatigue, cough, swelling, or slow heart beat.  Since your last visit, how many times have you " "gone to the cardiologist, urgent care, emergency room, or hospital because of your heart failure?   None  Last Echo:   Echo result w/o MOPS: Interpretation SummaryH/O Bicuspid aortic valve with valve sparing aortic root replacement in 2016.Global and regional left ventricular function is normal with an EF of 55-60%.Right ventricular function, chamber size, wall motion, and thickness arenormal.The aortic valve is bicuspid.Trace to mild aortic insufficiency is present.Sinuses of Valsalva 3.9 cm.Ascending aorta 3.5 cm.The inferior vena cava is normal.No pericardial effusion is present.No significant changes noted.  How many days per week do you miss taking your medication? 0    Chronic pain: pain specialist has reduced fentanyl but increased oxycodone,   -still neck pain: had follow-up with ortho on cervical surgery  Feeling some pins and needles in fingers bilateral , worries about that but not wanting cervical surgery              Objective    BP (!) 164/84   Pulse 64   Temp 98.2  F (36.8  C) (Tympanic)   Resp 20   Ht 1.778 m (5' 10\")   Wt 103.9 kg (229 lb)   SpO2 97%   BMI 32.86 kg/m    Body mass index is 32.86 kg/m .  Physical Exam   GENERAL: alert and no distress  NECK: no asymmetry, masses, or scars and appears to have comfortable range of rotation and flexion/extension  RESP: lungs clear to auscultation - no rales, rhonchi or wheezes  CV: regular rates and rhythm, grade 3/6 systolic murmur heard best over the URSB, peripheral pulses strong, and no peripheral edema  MS: no gross musculoskeletal defects noted, no edema            Signed Electronically by: Demi Hardin MD    "

## 2024-08-14 ENCOUNTER — TRANSFERRED RECORDS (OUTPATIENT)
Dept: HEALTH INFORMATION MANAGEMENT | Facility: CLINIC | Age: 64
End: 2024-08-14
Payer: COMMERCIAL

## 2024-08-14 ENCOUNTER — TELEPHONE (OUTPATIENT)
Dept: CARDIOLOGY | Facility: CLINIC | Age: 64
End: 2024-08-14
Payer: COMMERCIAL

## 2024-08-14 NOTE — TELEPHONE ENCOUNTER
No openings within pts. Desired time frame. Will have scheduling reach out to pt. To put in on a cancellation list if pt. Wishes to do so.     Can also look into scheduling at another location like mentioned in previous note below.    Sharon Headley on 8/14/2024 at 2:45 PM

## 2024-08-14 NOTE — TELEPHONE ENCOUNTER
Reason for Call:  Other appointment    Detailed comments: Patient is wondering if there is any way he could do a work-in appt to move up his cardiology appt with Dr. Zhou for sometime in September?     He is wanting to do this as he is going to be having surgery on his neck, and his surgeon would like him to see the cardiologist prior to this appointment.     Also sent patient to general cardiology line to check other locations besides [place] to see if he can get in with Dr. Zhou elsewhere sooner than 12/19/24 with [facility].    Phone Number Patient can be reached at: Cell number on file:    Telephone Information:   Mobile [phone]     Best Time: N/A    Can we leave a detailed message on this number? YES    Call taken on 8/14/2024 at 2:37 PM by Ryley Neal

## 2024-08-20 ENCOUNTER — HOSPITAL ENCOUNTER (OUTPATIENT)
Dept: MRI IMAGING | Facility: HOSPITAL | Age: 64
Discharge: HOME | End: 2024-08-20
Attending: ORTHOPAEDIC SURGERY | Admitting: ORTHOPAEDIC SURGERY
Payer: COMMERCIAL

## 2024-08-20 ENCOUNTER — OFFICE VISIT (OUTPATIENT)
Dept: ORTHOPEDICS | Facility: CLINIC | Age: 64
End: 2024-08-20
Payer: COMMERCIAL

## 2024-08-20 DIAGNOSIS — M48.02 CERVICAL STENOSIS OF SPINAL CANAL: ICD-10-CM

## 2024-08-20 DIAGNOSIS — M54.2 CERVICAL SPINE PAIN: ICD-10-CM

## 2024-08-20 DIAGNOSIS — M54.2 CERVICAL SPINE PAIN: Primary | ICD-10-CM

## 2024-08-20 DIAGNOSIS — M54.12 CERVICAL RADICULOPATHY: ICD-10-CM

## 2024-08-20 DIAGNOSIS — F41.9 ANXIETY DUE TO INVASIVE PROCEDURE: ICD-10-CM

## 2024-08-20 PROCEDURE — 72141 MRI NECK SPINE W/O DYE: CPT

## 2024-08-20 PROCEDURE — 99213 OFFICE O/P EST LOW 20 MIN: CPT | Mod: GC | Performed by: ORTHOPAEDIC SURGERY

## 2024-08-20 RX ORDER — DIAZEPAM 5 MG
5-10 TABLET ORAL ONCE
Qty: 2 TABLET | Refills: 0 | Status: SHIPPED | OUTPATIENT
Start: 2024-08-20 | End: 2024-08-20

## 2024-08-20 NOTE — LETTER
"8/20/2024      Kobe Buchanan  [address]  [place] [address]      Dear Colleague,    Thank you for referring your patient, Kobe Buchanan, to the [facility] [place]. Please see a copy of my visit note below.    Spine Surgery Return Clinic Visit      Chief Complaint:   RECHECK (Patient returns for recheck cervical spine.  Last seen on 4/30/24.  Now endorsing UE radicular Sx.)  Last seen in clinic on 4/30/24 when he opted out of surgical management.     Interval HPI:  Symptom Profile Including: location of symptoms, onset, severity, exacerbating/alleviating factors, previous treatments:        Kobe Buchanan is a 64 year old male who returns to clinic for re-evaluation of neck pain. He reports that his symptoms have progressed. He notes that he has numbness in all of his fingers. He also describes gait disturbance. He notes that he is off balance. The pain itself is the same in the neck region. He continues to describe \"twine cutting off the spinal cord\" description as well as a \"large screw going into my eye and exiting the back of the head\". He denies any difficulty with fine motor movement including handwriting, opening jars, buttoning up shirts. Currently on fentanyl patches, oxycodone daily, naproxen, tylenol.       Denies any other new concerns.        Conservative management  - Physical therapy: PT in 2019, caused increased pain  - Injections: years ago, had done many facet blocks, rhizotomies, MISHEL- has given him very short term relief in the past (less than a few weeks), also has had occipital blocks in the past (one helped)  - Medications: On opioids since 2005, managed through [facility]. On fentanyl patches 50 mcg q48 hr and oxycodone 40 mg/day. Tizanidine for sleep. Naproxen. Flexeril can also help, but usually only uses about twice/week. His pain clinic is weaning him to goal 120 MME which patient thinks will be very " difficult.  Swims 3-4 times/week which helps with back symptoms            Past Medical History:     Past Medical History:   Diagnosis Date     Acute systolic heart failure (H) 01/28/2020    Added automatically from request for surgery [id number]     NEMESIO (acute kidney injury) (H24) 02/16/2020     Anxiety      Ascending aortic aneurysm (H24)      Atrial fibrillation (H) [I48.91] 10/10/2016     Bicuspid aortic valve      BPPV (benign paroxysmal positional vertigo) 07/11/2014     Choledocholithiasis      Chronic narcotic use      Chronic neck pain      Chronic osteoarthritis      CKD (chronic kidney disease) stage 3, GFR 30-59 ml/min (H)      Degenerative joint disease      Depression      Dysthymic disorder      Hx of type 2 diabetes mellitus 03/04/2021     Hyperlipidemia 04/10/2012     Hypertension      Iron deficiency anemia secondary to blood loss (chronic) 08/25/2020     MSSA bacteremia 10/13/2019     Multiple stiff joints      Neck injuries      NSTEMI (non-ST elevated myocardial infarction) (H) 01/29/2020     Obesity 02/09/2015     Pain in shoulder      Plantar fasciitis      Pre-diabetes      S/P reverse total shoulder arthroplasty, left 07/07/2020     S/P reverse total shoulder arthroplasty, right 08/18/2020     Septic joint of left shoulder region (H) 11/13/2019     Septic joint of right shoulder region (H) 10/13/2019    s/p washout     Skin picking habit      Sleep apnea     does not use cpap     Status post total shoulder arthroplasty, left 03/03/2020    Added automatically from request for surgery [id number]            Past Surgical History:     Past Surgical History:   Procedure Laterality Date     ARTHROPLASTY SHOULDER  04/15/2014    Procedure: Left Total Shoulder Arthroplasty ;  Surgeon: Analilia Aceves MD;  [facility]     ARTHROPLASTY SHOULDER Right 08/26/2014    Procedure: ARTHROPLASTY SHOULDER;  Surgeon: Analilia Aceves MD;  [facility]     ARTHROSCOPY SHOULDER WITH BIOPSY(IES)  Left 01/28/2020    Procedure: Left shoulder arthroscopy and biopsy for culture;  Surgeon: Analilia Aceves MD;  [facility]     BYPASS GASTRIC TAVO-EN-Y, LIVER BIOPSY, COMBINED  08/08/2005     COLONOSCOPY  06/30/2014    Procedure: COMBINED COLONOSCOPY, SINGLE BIOPSY/POLYPECTOMY BY BIOPSY;  Surgeon: Chester Patton MD;  [facility]     COLONOSCOPY N/A 2/15/2024    Procedure: COLONOSCOPY, WITH POLYPECTOMY;  Surgeon: Power Duran MD;  [facility]     CV CORONARY ANGIOGRAM  01/30/2020    Procedure: CV CORONARY ANGIOGRAM;  Surgeon: Néstor Walls MD;  [facility]     CYSTOSCOPY, BLADDER NECK CUTS, COMBINED N/A 07/18/2016    Procedure: COMBINED CYSTOSCOPY, BLADDER NECK CUTS;  Surgeon: Ritu Leslie MD;  [facility]     ENDOSCOPIC RETROGRADE CHOLANGIOPANCREATOGRAM N/A 11/03/2023    Procedure: ENDOSCOPIC RETROGRADE CHOLANGIOPANCREATOGRAPHY, BILIARY SPHINCTEROTOMY, STONE REMOVAL;  Surgeon: Juarez Sevilla MD;  [facility]     ESOPHAGOSCOPY, GASTROSCOPY, DUODENOSCOPY (EGD), COMBINED  06/30/2014    Procedure: COMBINED ESOPHAGOSCOPY, GASTROSCOPY, DUODENOSCOPY (EGD), BIOPSY SINGLE OR MULTIPLE;  Surgeon: Chester Patton MD;  [facility]     EXCISE MASS FINGER  06/14/2011    Procedure:EXCISE MASS FINGER; Middle Flexor Cyst; Surgeon:SHAYY RUSSELL; [facility]     IR FINE NEEDLE ASPIRATION W ULTRASOUND  11/13/2019     IR PICC PLACEMENT > 5 YRS OF AGE  11/19/2019     LAPAROSCOPIC CHOLECYSTECTOMY N/A 11/03/2023    Procedure: CHOLECYSTECTOMY, LAPAROSCOPIC;  Surgeon: Shayy Mathis MD;  Location: [facility]     LASER HOLMIUM LITHOTRIPSY BLADDER N/A 10/15/2014    Procedure: LASER HOLMIUM LITHOTRIPSY BLADDER;  Surgeon: Sahil Taveras MD;  [facility]     LASER KTP GREEN LIGHT PHOTOSELECTIVE VAPORIZATION PROSTATE  01/23/2014    Procedure: LASER KTP GREEN LIGHT PHOTOSELECTIVE  VAPORIZATION PROSTATE;  Greenlight Photovaporization Of Prostate  ;  Surgeon: Sahil Taveras MD;  [facility]     OTHER SURGICAL HISTORY  10/04/2016    REPAIR ANEURYSM ASCENDING AORTA     OTHER SURGICAL HISTORY Right 2019    IRRIGATION AND DEBRIDEMENT UPPER EXTREMITYshoulder     RELEASE TRIGGER FINGER Right 2017    Procedure: RELEASE TRIGGER FINGER;  Surgeon: Juan Carlos Blunt MD;  [facility]     REMOVE ANTIBIOTIC CEMENT BEADS / SPACER SHOULDER Left 2020    Procedure: Left shoulder removal of spacer;  Surgeon: Analilia Aceves MD;  [facility]     REMOVE ANTIBIOTIC CEMENT BEADS / SPACER SHOULDER Right 2020    Procedure: Removal of right shoulder antibiotic spacer;  Surgeon: Analilia Aceves MD;  [facility]     REMOVE HARDWARE ARTHROPLASTY SHOULDER. I&D, PLACE ANTIBIOTIC CEMENT BE Left 11/15/2019    Procedure: Explantation of left total shoulder arthroplasty, irrigation and debridement, and placement of antibiotic spacer;  Surgeon: Analilia Aceves MD;  [facility]     REPAIR ANEURYSM ASCENDING AORTA N/A 10/04/2016    Procedure: REPAIR ANEURYSM ASCENDING AORTA;  Surgeon: Mckenzie Townsend MD;  [facility]     REVERSE ARTHROPLASTY SHOULDER Right 2020    Procedure: and conversion to reverse total shoulder arthroplasty;  Surgeon: Analilia Aceves MD;  [facility]     SURGICAL EXPOSURE, LAPAROSCOPIC, W/WOUND CLOSURE, FOR ERCP, BY GENERAL SURGERY N/A 2023    Procedure: SURGICAL EXPOSURE FOR ENDOSCOPIC RETROGRADE CHOLANGIOPANCREATOGRAPHY;  Surgeon: Kobe Mathis MD;  Location: [facility] OR     [facility]              Social History:     Social History     Tobacco Use     Smoking status: Former     Current packs/day: 0.00     Average packs/day: 0.5 packs/day for 6.6 years (3.3 ttl pk-yrs)     Types: Cigarettes     Start date: 1977     Quit date: 1983     Years since quittin.9     Smokeless  tobacco: Never   Substance Use Topics     Alcohol use: No     Alcohol/week: 0.0 standard drinks of alcohol            Family History:     Family History   Problem Relation Age of Onset     Arthritis Other      Gastrointestinal Disease Other      Cardiovascular Father         aortic aneurysm     Arrhythmia Father      Nephrolithiasis Father      Sleep Apnea Father      Anxiety Disorder Father      Depression Father      Hypertension Father      Obesity Father      Hyperlipidemia Father      Coronary Artery Disease Father      Low Back Problems Father      Spine Problems Father      Cardiovascular Father      Other - See Comments Father         low back problems, spine problems     Anxiety Disorder Mother      Hypertension Mother      Osteoporosis Mother      Obesity Mother      Hyperlipidemia Mother      Low Back Problems Mother      Macular Degeneration Mother      Cataracts Mother      Other - See Comments Mother         low back problems     Anxiety Disorder Sister      Hypertension Sister      Osteoporosis Sister      Obesity Sister      Hyperlipidemia Sister      Low Back Problems Sister      Spine Problems Sister      Anxiety Disorder Sister      Depression Sister      Hypertension Sister      Osteoporosis Sister      Obesity Sister      Hyperlipidemia Sister      Low Back Problems Sister      Anxiety Disorder Sister      Hyperlipidemia Sister      Hypertension Sister      Obesity Sister      Other - See Comments Sister         low back problems, spine problems     Osteoporosis Sister      Anxiety Disorder Sister      Depression Sister      Hyperlipidemia Sister      Hypertension Sister      Other - See Comments Sister         low back problems     Obesity Sister      Osteoporosis Sister      Melanoma No family hx of      Skin Cancer No family hx of      Glaucoma No family hx of             Allergies:     Allergies   Allergen Reactions     Ciprofloxacin      History of aortic aneurysms     Tape [Adhesive Tape]  Blisters     Blistering - please use paper tape            Medications:     Current Outpatient Medications   Medication Sig Dispense Refill     amLODIPine-benazepril (LOTREL) 5-20 MG capsule Take 1 capsule by mouth 2 times daily 180 capsule 3     atorvastatin (LIPITOR) 40 MG tablet Take 1 tablet (40 mg) by mouth daily 90 tablet 3     bisacodyl (DULCOLAX) 5 MG EC tablet Two days prior to exam take two (2) tablets at 4pm. One day prior to exam take two (2) tablets at 4pm 4 tablet 0     buPROPion (WELLBUTRIN SR) 200 MG 12 hr tablet TAKE 1 TABLET BY MOUTH 2 TIMES DAILY. 180 tablet 0     carvedilol (COREG) 25 MG tablet Take 2 tablets (50 mg) by mouth 2 times daily (with meals) 360 tablet 3     clonazePAM (KLONOPIN) 0.5 MG tablet Take 1 tablet (0.5 mg) by mouth 3 times daily as needed for anxiety 90 tablet 3     clonazePAM (KLONOPIN) 0.5 MG tablet Take 1 tablet (0.5 mg) by mouth 3 times daily as needed for anxiety       cyclobenzaprine (FLEXERIL) 10 MG tablet Take 1 tablet (10 mg) by mouth 3 times daily as needed for muscle spasms 90 tablet 4     docusate sodium (COLACE) 100 MG capsule Take 2 capsule in the morning and 3 capsules in the evening       escitalopram (LEXAPRO) 20 MG tablet Take 1.5 tablets (30 mg) by mouth daily 135 tablet 3     Fe Heme Polypeptide-folic acid (PROFERRIN-FORTE) 12-1 MG TABS Take 1 tablet by mouth 2 times daily 90 tablet 0     fentaNYL (DURAGESIC) 50 mcg/hr 72 hr patch Place 1 patch onto the skin every 72 hours       fluocinonide (LIDEX) 0.05 % external ointment Apply twice daily to itchy skin nodules for 1-2 weeks at a time. 30 g 3     latanoprost (XALATAN) 0.005 % ophthalmic solution Place 1 drop Into the left eye daily 2.5 mL 4     mometasone (ELOCON) 0.1 % external ointment Apply topically nightly as needed (rash on arms) 30 g 3     naproxen (EC-NAPROSYN) 500 MG EC tablet Take 1 tablet (500 mg) by mouth 2 times daily as needed (pain) 186 tablet 3     oxyCODONE IR (ROXICODONE) 10 MG tablet  Take 1 tablet by mouth every 4 hours as needed for pain, max 4 tablet(s) per day       pantoprazole (PROTONIX) 40 MG EC tablet Take 1 tablet (40 mg) by mouth daily as needed for heartburn, 90 tablet 0     polyethylene glycol (GOLYTELY) 236 g suspension Take as directed. Two days before your exam fill the first container with water. Cover and shake until mixed well. At 5:00pm drink one 8oz glass every 10-15 minutes until half (1/2) of the first container is empty. Store the remainder in the refrigerator. One day before your exam at 5:00pm drink the second half of the first container until it is gone. Before you go to bed mix the second container with water and put in refrigerator. Six hours before your check in time drink one 8oz glass every 10-15 minutes until half of container is empty. Discard the remainder of solution. 8000 mL 0     propranolol (INDERAL) 10 MG tablet Take 1 tablet (10 mg) by mouth 3 times daily as needed (panic attack) 90 tablet 3     senna-docusate (SENOKOT-S/PERICOLACE) 8.6-50 MG tablet Take 1-2 tablets by mouth 2 times daily 30 tablet 0     sucralfate (CARAFATE) 1 GM tablet Take 1 tablet (1 g) by mouth 2 times daily as needed (Reflux) 180 tablet 3     tacrolimus (PROTOPIC) 0.1 % external ointment Apply topically as needed (rash on arms) Apply to affected areas on body. 60 g 1     tacrolimus (PROTOPIC) 0.1 % ointment Apply topically as needed Apply to affected areas on body. 120 g 11     tiZANidine (ZANAFLEX) 4 MG tablet Take 1 tablet (4 mg) by mouth 2 times daily 180 tablet 3     triamcinolone (KENALOG) 0.1 % external ointment Apply topically 2 times daily To affected areas of rash. Please avoid application to the face, groin or armpits as the medication is too strong for these areas. 454 g 0     vitamin B-Complex Take 1 tablet by mouth daily       No current facility-administered medications for this visit.     Facility-Administered Medications Ordered in Other Visits   Medication Dose Route  Frequency Provider Last Rate Last Admin     sodium chloride (PF) 0.9% PF flush 3 mL  3 mL Intracatheter Q8H HONEY Scruggs MD   3 mL at 07/24/23 0778             Review of Systems:   A focused musculoskeletal and neurologic ROS was performed with pertinent positives and negatives noted in the HPI.  Additional systems were also reviewed and are documented at the bottom of the note.         Physical Exam:   Vitals: There were no vitals taken for this visit.  Musculoskeletal, Neurologic, and Spine:   Cervical spine:    Appearance -no gross step-offs, kyphosis.    Motor -     C5: Deltoids R 5/5 and L 5/5 strength    C6: Biceps R 5/5 and L 5/5 strength     C7: Triceps R 5/5 and L 5/5 strength     C8:  R 5/5 and L 5/5 strength     T1: Dorsal interossei R 5/5 and L 5/5 strength        Sensation: intact to light touch in C5-T1 but overall numb      Special Tests -      Lhermitte's Test - Negative     Spurling's Test - Negative      Baxter's Test - Positive bilaterally    Positive Romberg.          Imaging:   No new imaging today       Assessment and Plan:     64 year old male with chronic neck pain and cervicogenic headaches in the greater occipital nerve distribution with advanced arthritis at C1-C2, C2-C3. Also with cervical spondylosis and central stenosis  now presenting with myeloradiculopathy symptoms including gait disturbances and positive Baxter's.     Discussed with patient treatment options.  Patient has exhausted conservative management at this time.  Discussed with patient that his pain is most likely related to the degenerative changes in his cervical spine.  However, he is adamant about not pursuing C1-C3 posterior fusion to preserve his neck range of motion.  Discussed with patient that this is reasonable and to continue conservative management at this time.        We also discussed his myelopathic and radiculopathy type symptoms.  Discussed with patient that he had degenerative changes and  central stenosis that was mild on his previous MRI from 2021.  This was involving C4-7.  Discussed with patient that considering his progression of his symptoms and myelopathic findings that we should pursue advanced imaging in the form of MRI prior to proceeding with any surgical management at this time.  Counseled patient that I will call him with the results after the MRI is performed and to discuss surgical management at that time.  All questions were answered.    Plan:   - MRI cervical spine    --  Jeffrey Camp MD  Orthopedic Surgery PGY-4    Patient seen and discussed with Dr. Prajapati.    Attending MD (Dr. Jonathan Prajapati) : I personally performed greater than 50% of the effort related to this patient visit and am responsible for the medical decision making and billing in this case.  I met with the patient, reviewed and verified the history and physical exam of the patient and discussed the patient's management with the other clinical providers involved in this patient's care including any involved residents or physicians assistants. I also personally reviewed the imaging and I personally formulated the treatment plan and diagnosis in their entirety.  I reviewed the above note and agree with the documented findings and plan of care, which were communicated to the patient.      Jonathan Prajapati MD           Again, thank you for allowing me to participate in the care of your patient.        Sincerely,        Jonathan Prajapati MD

## 2024-08-20 NOTE — PROGRESS NOTES
"Spine Surgery Return Clinic Visit      Chief Complaint:   RECHECK (Patient returns for recheck cervical spine.  Last seen on 4/30/24.  Now endorsing UE radicular Sx.)  Last seen in clinic on 4/30/24 when he opted out of surgical management.     Interval HPI:  Symptom Profile Including: location of symptoms, onset, severity, exacerbating/alleviating factors, previous treatments:        Kobe Buchanan is a 64 year old male who returns to clinic for re-evaluation of neck pain. He reports that his symptoms have progressed. He notes that he has numbness in all of his fingers. He also describes gait disturbance. He notes that he is off balance. The pain itself is the same in the neck region. He continues to describe \"twine cutting off the spinal cord\" description as well as a \"large screw going into my eye and exiting the back of the head\". He denies any difficulty with fine motor movement including handwriting, opening jars, buttoning up shirts. Currently on fentanyl patches, oxycodone daily, naproxen, tylenol.       Denies any other new concerns.        Conservative management  - Physical therapy: PT in 2019, caused increased pain  - Injections: years ago, had done many facet blocks, rhizotomies, MISHEL- has given him very short term relief in the past (less than a few weeks), also has had occipital blocks in the past (one helped)  - Medications: On opioids since 2005, managed through [facility]. On fentanyl patches 50 mcg q48 hr and oxycodone 40 mg/day. Tizanidine for sleep. Naproxen. Flexeril can also help, but usually only uses about twice/week. His pain clinic is weaning him to goal 120 MME which patient thinks will be very difficult.  Swims 3-4 times/week which helps with back symptoms            Past Medical History:     Past Medical History:   Diagnosis Date    Acute systolic heart failure (H) 01/28/2020    Added automatically from request for surgery [id number]    NEMESIO (acute kidney injury) (H24) " 02/16/2020    Anxiety     Ascending aortic aneurysm (H24)     Atrial fibrillation (H) [I48.91] 10/10/2016    Bicuspid aortic valve     BPPV (benign paroxysmal positional vertigo) 07/11/2014    Choledocholithiasis     Chronic narcotic use     Chronic neck pain     Chronic osteoarthritis     CKD (chronic kidney disease) stage 3, GFR 30-59 ml/min (H)     Degenerative joint disease     Depression     Dysthymic disorder     Hx of type 2 diabetes mellitus 03/04/2021    Hyperlipidemia 04/10/2012    Hypertension     Iron deficiency anemia secondary to blood loss (chronic) 08/25/2020    MSSA bacteremia 10/13/2019    Multiple stiff joints     Neck injuries     NSTEMI (non-ST elevated myocardial infarction) (H) 01/29/2020    Obesity 02/09/2015    Pain in shoulder     Plantar fasciitis     Pre-diabetes     S/P reverse total shoulder arthroplasty, left 07/07/2020    S/P reverse total shoulder arthroplasty, right 08/18/2020    Septic joint of left shoulder region (H) 11/13/2019    Septic joint of right shoulder region (H) 10/13/2019    s/p washout    Skin picking habit     Sleep apnea     does not use cpap    Status post total shoulder arthroplasty, left 03/03/2020    Added automatically from request for surgery [id number]            Past Surgical History:     Past Surgical History:   Procedure Laterality Date    ARTHROPLASTY SHOULDER  04/15/2014    Procedure: Left Total Shoulder Arthroplasty ;  Surgeon: Analilia Aceves MD;  [facility]    ARTHROPLASTY SHOULDER Right 08/26/2014    Procedure: ARTHROPLASTY SHOULDER;  Surgeon: Analilia Aceves MD;  [facility]    ARTHROSCOPY SHOULDER WITH BIOPSY(IES) Left 01/28/2020    Procedure: Left shoulder arthroscopy and biopsy for culture;  Surgeon: Analilia Aceves MD;  [facility]    BYPASS GASTRIC TAVO-EN-Y, LIVER BIOPSY, COMBINED  08/08/2005    COLONOSCOPY  06/30/2014    Procedure: COMBINED COLONOSCOPY, SINGLE BIOPSY/POLYPECTOMY BY BIOPSY;  Surgeon: Jasper  Chester Hale MD;  [facility]    COLONOSCOPY N/A 2/15/2024    Procedure: COLONOSCOPY, WITH POLYPECTOMY;  Surgeon: Power Duran MD;  [facility]    CV CORONARY ANGIOGRAM  01/30/2020    Procedure: CV CORONARY ANGIOGRAM;  Surgeon: Néstor Walls MD;  [facility]    CYSTOSCOPY, BLADDER NECK CUTS, COMBINED N/A 07/18/2016    Procedure: COMBINED CYSTOSCOPY, BLADDER NECK CUTS;  Surgeon: Ritu Leslie MD;  [facility]    ENDOSCOPIC RETROGRADE CHOLANGIOPANCREATOGRAM N/A 11/03/2023    Procedure: ENDOSCOPIC RETROGRADE CHOLANGIOPANCREATOGRAPHY, BILIARY SPHINCTEROTOMY, STONE REMOVAL;  Surgeon: Juarez Sevilla MD;  Location: [facility] OR    ESOPHAGOSCOPY, GASTROSCOPY, DUODENOSCOPY (EGD), COMBINED  06/30/2014    Procedure: COMBINED ESOPHAGOSCOPY, GASTROSCOPY, DUODENOSCOPY (EGD), BIOPSY SINGLE OR MULTIPLE;  Surgeon: Chester Patton MD;  Location:  GI    EXCISE MASS FINGER  06/14/2011    Procedure:EXCISE MASS FINGER; Middle Flexor Cyst; Surgeon:SHAYY RUSSELL; [facility]    IR FINE NEEDLE ASPIRATION W ULTRASOUND  11/13/2019    IR PICC PLACEMENT > 5 YRS OF AGE  11/19/2019    LAPAROSCOPIC CHOLECYSTECTOMY N/A 11/03/2023    Procedure: CHOLECYSTECTOMY, LAPAROSCOPIC;  Surgeon: Shayy Mathis MD;  Location: [facility] OR    LASER HOLMIUM LITHOTRIPSY BLADDER N/A 10/15/2014    Procedure: LASER HOLMIUM LITHOTRIPSY BLADDER;  Surgeon: Sahil Taveras MD;  [facility]    LASER KTP GREEN LIGHT PHOTOSELECTIVE VAPORIZATION PROSTATE  01/23/2014    Procedure: LASER KTP GREEN LIGHT PHOTOSELECTIVE VAPORIZATION PROSTATE;  Greenlight Photovaporization Of Prostate  ;  Surgeon: Sahil Taveras MD;  [facility]    OTHER SURGICAL HISTORY  10/04/2016    REPAIR ANEURYSM ASCENDING AORTA    OTHER SURGICAL HISTORY Right 09/23/2019    IRRIGATION AND DEBRIDEMENT UPPER EXTREMITYshoulder    RELEASE TRIGGER FINGER Right 03/31/2017     Procedure: RELEASE TRIGGER FINGER;  Surgeon: Juan Carlos Blunt MD;  [facility]    REMOVE ANTIBIOTIC CEMENT BEADS / SPACER SHOULDER Left 2020    Procedure: Left shoulder removal of spacer;  Surgeon: Analilia Aceves MD;  [facility]    REMOVE ANTIBIOTIC CEMENT BEADS / SPACER SHOULDER Right 2020    Procedure: Removal of right shoulder antibiotic spacer;  Surgeon: Analilia Aceves MD;  [facility]    REMOVE HARDWARE ARTHROPLASTY SHOULDER. I&D, PLACE ANTIBIOTIC CEMENT BE Left 11/15/2019    Procedure: Explantation of left total shoulder arthroplasty, irrigation and debridement, and placement of antibiotic spacer;  Surgeon: Analilia Aceves MD;  [facility]    REPAIR ANEURYSM ASCENDING AORTA N/A 10/04/2016    Procedure: REPAIR ANEURYSM ASCENDING AORTA;  Surgeon: Mckenzie Townsend MD;  [facility]    REVERSE ARTHROPLASTY SHOULDER Right 2020    Procedure: and conversion to reverse total shoulder arthroplasty;  Surgeon: Analilia Aceves MD;  [facility]    SURGICAL EXPOSURE, LAPAROSCOPIC, W/WOUND CLOSURE, FOR ERCP, BY GENERAL SURGERY N/A 2023    Procedure: SURGICAL EXPOSURE FOR ENDOSCOPIC RETROGRADE CHOLANGIOPANCREATOGRAPHY;  Surgeon: Kobe Mathis MD;  Location: [facility] OR    [facility]              Social History:     Social History     Tobacco Use    Smoking status: Former     Current packs/day: 0.00     Average packs/day: 0.5 packs/day for 6.6 years (3.3 ttl pk-yrs)     Types: Cigarettes     Start date: 1977     Quit date: 1983     Years since quittin.9    Smokeless tobacco: Never   Substance Use Topics    Alcohol use: No     Alcohol/week: 0.0 standard drinks of alcohol            Family History:     Family History   Problem Relation Age of Onset    Arthritis Other     Gastrointestinal Disease Other     Cardiovascular Father         aortic aneurysm    Arrhythmia Father     Nephrolithiasis Father     Sleep Apnea  Father     Anxiety Disorder Father     Depression Father     Hypertension Father     Obesity Father     Hyperlipidemia Father     Coronary Artery Disease Father     Low Back Problems Father     Spine Problems Father     Cardiovascular Father     Other - See Comments Father         low back problems, spine problems    Anxiety Disorder Mother     Hypertension Mother     Osteoporosis Mother     Obesity Mother     Hyperlipidemia Mother     Low Back Problems Mother     Macular Degeneration Mother     Cataracts Mother     Other - See Comments Mother         low back problems    Anxiety Disorder Sister     Hypertension Sister     Osteoporosis Sister     Obesity Sister     Hyperlipidemia Sister     Low Back Problems Sister     Spine Problems Sister     Anxiety Disorder Sister     Depression Sister     Hypertension Sister     Osteoporosis Sister     Obesity Sister     Hyperlipidemia Sister     Low Back Problems Sister     Anxiety Disorder Sister     Hyperlipidemia Sister     Hypertension Sister     Obesity Sister     Other - See Comments Sister         low back problems, spine problems    Osteoporosis Sister     Anxiety Disorder Sister     Depression Sister     Hyperlipidemia Sister     Hypertension Sister     Other - See Comments Sister         low back problems    Obesity Sister     Osteoporosis Sister     Melanoma No family hx of     Skin Cancer No family hx of     Glaucoma No family hx of             Allergies:     Allergies   Allergen Reactions    Ciprofloxacin      History of aortic aneurysms    Tape [Adhesive Tape] Blisters     Blistering - please use paper tape            Medications:     Current Outpatient Medications   Medication Sig Dispense Refill    amLODIPine-benazepril (LOTREL) 5-20 MG capsule Take 1 capsule by mouth 2 times daily 180 capsule 3    atorvastatin (LIPITOR) 40 MG tablet Take 1 tablet (40 mg) by mouth daily 90 tablet 3    bisacodyl (DULCOLAX) 5 MG EC tablet Two days prior to exam take two (2)  tablets at 4pm. One day prior to exam take two (2) tablets at 4pm 4 tablet 0    buPROPion (WELLBUTRIN SR) 200 MG 12 hr tablet TAKE 1 TABLET BY MOUTH 2 TIMES DAILY. 180 tablet 0    carvedilol (COREG) 25 MG tablet Take 2 tablets (50 mg) by mouth 2 times daily (with meals) 360 tablet 3    clonazePAM (KLONOPIN) 0.5 MG tablet Take 1 tablet (0.5 mg) by mouth 3 times daily as needed for anxiety 90 tablet 3    clonazePAM (KLONOPIN) 0.5 MG tablet Take 1 tablet (0.5 mg) by mouth 3 times daily as needed for anxiety      cyclobenzaprine (FLEXERIL) 10 MG tablet Take 1 tablet (10 mg) by mouth 3 times daily as needed for muscle spasms 90 tablet 4    docusate sodium (COLACE) 100 MG capsule Take 2 capsule in the morning and 3 capsules in the evening      escitalopram (LEXAPRO) 20 MG tablet Take 1.5 tablets (30 mg) by mouth daily 135 tablet 3    Fe Heme Polypeptide-folic acid (PROFERRIN-FORTE) 12-1 MG TABS Take 1 tablet by mouth 2 times daily 90 tablet 0    fentaNYL (DURAGESIC) 50 mcg/hr 72 hr patch Place 1 patch onto the skin every 72 hours      fluocinonide (LIDEX) 0.05 % external ointment Apply twice daily to itchy skin nodules for 1-2 weeks at a time. 30 g 3    latanoprost (XALATAN) 0.005 % ophthalmic solution Place 1 drop Into the left eye daily 2.5 mL 4    mometasone (ELOCON) 0.1 % external ointment Apply topically nightly as needed (rash on arms) 30 g 3    naproxen (EC-NAPROSYN) 500 MG EC tablet Take 1 tablet (500 mg) by mouth 2 times daily as needed (pain) 186 tablet 3    oxyCODONE IR (ROXICODONE) 10 MG tablet Take 1 tablet by mouth every 4 hours as needed for pain, max 4 tablet(s) per day      pantoprazole (PROTONIX) 40 MG EC tablet Take 1 tablet (40 mg) by mouth daily as needed for heartburn, 90 tablet 0    polyethylene glycol (GOLYTELY) 236 g suspension Take as directed. Two days before your exam fill the first container with water. Cover and shake until mixed well. At 5:00pm drink one 8oz glass every 10-15 minutes until  half (1/2) of the first container is empty. Store the remainder in the refrigerator. One day before your exam at 5:00pm drink the second half of the first container until it is gone. Before you go to bed mix the second container with water and put in refrigerator. Six hours before your check in time drink one 8oz glass every 10-15 minutes until half of container is empty. Discard the remainder of solution. 8000 mL 0    propranolol (INDERAL) 10 MG tablet Take 1 tablet (10 mg) by mouth 3 times daily as needed (panic attack) 90 tablet 3    senna-docusate (SENOKOT-S/PERICOLACE) 8.6-50 MG tablet Take 1-2 tablets by mouth 2 times daily 30 tablet 0    sucralfate (CARAFATE) 1 GM tablet Take 1 tablet (1 g) by mouth 2 times daily as needed (Reflux) 180 tablet 3    tacrolimus (PROTOPIC) 0.1 % external ointment Apply topically as needed (rash on arms) Apply to affected areas on body. 60 g 1    tacrolimus (PROTOPIC) 0.1 % ointment Apply topically as needed Apply to affected areas on body. 120 g 11    tiZANidine (ZANAFLEX) 4 MG tablet Take 1 tablet (4 mg) by mouth 2 times daily 180 tablet 3    triamcinolone (KENALOG) 0.1 % external ointment Apply topically 2 times daily To affected areas of rash. Please avoid application to the face, groin or armpits as the medication is too strong for these areas. 454 g 0    vitamin B-Complex Take 1 tablet by mouth daily       No current facility-administered medications for this visit.     Facility-Administered Medications Ordered in Other Visits   Medication Dose Route Frequency Provider Last Rate Last Admin    sodium chloride (PF) 0.9% PF flush 3 mL  3 mL Intracatheter Q8H HONEY Scruggs MD   3 mL at 07/24/23 0745             Review of Systems:   A focused musculoskeletal and neurologic ROS was performed with pertinent positives and negatives noted in the HPI.  Additional systems were also reviewed and are documented at the bottom of the note.         Physical Exam:   Vitals: There were  no vitals taken for this visit.  Musculoskeletal, Neurologic, and Spine:   Cervical spine:    Appearance -no gross step-offs, kyphosis.    Motor -     C5: Deltoids R 5/5 and L 5/5 strength    C6: Biceps R 5/5 and L 5/5 strength     C7: Triceps R 5/5 and L 5/5 strength     C8:  R 5/5 and L 5/5 strength     T1: Dorsal interossei R 5/5 and L 5/5 strength        Sensation: intact to light touch in C5-T1 but overall numb      Special Tests -      Lhermitte's Test - Negative     Spurling's Test - Negative      Baxter's Test - Positive bilaterally    Positive Romberg.          Imaging:   No new imaging today       Assessment and Plan:     64 year old male with chronic neck pain and cervicogenic headaches in the greater occipital nerve distribution with advanced arthritis at C1-C2, C2-C3. Also with cervical spondylosis and central stenosis  now presenting with myeloradiculopathy symptoms including gait disturbances and positive Baxter's.     Discussed with patient treatment options.  Patient has exhausted conservative management at this time.  Discussed with patient that his pain is most likely related to the degenerative changes in his cervical spine.  However, he is adamant about not pursuing C1-C3 posterior fusion to preserve his neck range of motion.  Discussed with patient that this is reasonable and to continue conservative management at this time.        We also discussed his myelopathic and radiculopathy type symptoms.  Discussed with patient that he had degenerative changes and central stenosis that was mild on his previous MRI from 2021.  This was involving C4-7.  Discussed with patient that considering his progression of his symptoms and myelopathic findings that we should pursue advanced imaging in the form of MRI prior to proceeding with any surgical management at this time.  Counseled patient that I will call him with the results after the MRI is performed and to discuss surgical management at that time.   All questions were answered.    Plan:   - MRI cervical spine    --  Jeffrey Camp MD  Orthopedic Surgery PGY-4    Patient seen and discussed with Dr. Prajapati.    Attending MD (Dr. Jonathan Prajapati) : I personally performed greater than 50% of the effort related to this patient visit and am responsible for the medical decision making and billing in this case.  I met with the patient, reviewed and verified the history and physical exam of the patient and discussed the patient's management with the other clinical providers involved in this patient's care including any involved residents or physicians assistants. I also personally reviewed the imaging and I personally formulated the treatment plan and diagnosis in their entirety.  I reviewed the above note and agree with the documented findings and plan of care, which were communicated to the patient.      Jonathan Prajapati MD

## 2024-08-20 NOTE — NURSING NOTE
Reason For Visit:   Chief Complaint   Patient presents with    RECHECK     Patient returns for recheck cervical spine.  Last seen on 4/30/24.  Now endorsing UE radicular Sx.       Primary MD: Demi Hardin  Ref. MD: est    Date of injury: for the past few years   Type of injury: chronic .  Date of surgery: none   Type of surgery: none .  Smoker: No  Request smoking cessation information: No    There were no vitals taken for this visit.    Pain Assessment  Patient Currently in Pain: Yes  0-10 Pain Scale: 4  Primary Pain Location: Neck    Oswestry (CARLYLE) Questionnaire        8/15/2024    12:29 PM   OSWESTRY DISABILITY INDEX   Count 10   Sum 25   Oswestry Score (%) 50 %            Neck Disability Index (NDI) Questionnaire        11/20/2021     1:34 PM   Neck Disability Index (NDI)   Neck Disability Index: Count 9   NDI: Total Score = SUM (points for all 10 findings) 26   Neck Disability in Percent = (Total Score) / 50 * 100 57.78 (%)              Visual Analog Pain Scale  Back Pain Scale 0-10: 0  Right leg pain: 0  Left leg pain: 0  Neck Pain Scale 0-10: 4  Right arm pain: 0  Left arm pain: 0    Promis 10 Assessment        11/20/2021    12:45 PM   PROMIS 10   In general, would you say your health is: Fair   In general, would you say your quality of life is: Fair   In general, how would you rate your physical health? Fair   In general, how would you rate your mental health, including your mood and your ability to think? Good   In general, how would you rate your satisfaction with your social activities and relationships? Fair   In general, please rate how well you carry out your usual social activities and roles Fair   To what extent are you able to carry out your everyday physical activities such as walking, climbing stairs, carrying groceries, or moving a chair? Moderately   In the past 7 days, how often have you been bothered by emotional problems such as feeling anxious, depressed, or irritable? Sometimes   In  the past 7 days, how would you rate your fatigue on average? Moderate   In the past 7 days, how would you rate your pain on average, where 0 means no pain, and 10 means worst imaginable pain? 5   In general, would you say your health is: 2   In general, would you say your quality of life is: 2   In general, how would you rate your physical health? 2   In general, how would you rate your mental health, including your mood and your ability to think? 3   In general, how would you rate your satisfaction with your social activities and relationships? 2   In general, please rate how well you carry out your usual social activities and roles. (This includes activities at home, at work and in your community, and responsibilities as a parent, child, spouse, employee, friend, etc.) 2   To what extent are you able to carry out your everyday physical activities such as walking, climbing stairs, carrying groceries, or moving a chair? 3   In the past 7 days, how often have you been bothered by emotional problems such as feeling anxious, depressed, or irritable? 3   In the past 7 days, how would you rate your fatigue on average? 3   In the past 7 days, how would you rate your pain on average, where 0 means no pain, and 10 means worst imaginable pain? 5   Global Mental Health Score 10   Global Physical Health Score 11   PROMIS TOTAL - SUBSCORES 21                Juan Carlos Lomeli ATC

## 2024-08-22 ENCOUNTER — HOSPITAL ENCOUNTER (OUTPATIENT)
Dept: MRI IMAGING | Facility: CLINIC | Age: 64
Discharge: HOME | End: 2024-08-22
Attending: ORTHOPAEDIC SURGERY | Admitting: ORTHOPAEDIC SURGERY
Payer: COMMERCIAL

## 2024-08-22 DIAGNOSIS — M54.9 BACK PAIN: ICD-10-CM

## 2024-08-22 PROCEDURE — 72148 MRI LUMBAR SPINE W/O DYE: CPT

## 2024-08-22 PROCEDURE — 72146 MRI CHEST SPINE W/O DYE: CPT

## 2024-08-22 PROCEDURE — 72146 MRI CHEST SPINE W/O DYE: CPT | Mod: 26 | Performed by: RADIOLOGY

## 2024-08-26 ENCOUNTER — TELEPHONE (OUTPATIENT)
Dept: NEUROSURGERY | Facility: CLINIC | Age: 64
End: 2024-08-26

## 2024-08-26 ENCOUNTER — VIRTUAL VISIT (OUTPATIENT)
Dept: ORTHOPEDICS | Facility: CLINIC | Age: 64
End: 2024-08-26
Payer: COMMERCIAL

## 2024-08-26 ENCOUNTER — HOSPITAL ENCOUNTER (INPATIENT)
Facility: CLINIC | Age: 64
Setting detail: SURGERY ADMIT
End: 2024-08-26
Attending: ORTHOPAEDIC SURGERY | Admitting: ORTHOPAEDIC SURGERY
Payer: COMMERCIAL

## 2024-08-26 VITALS — WEIGHT: 229 LBS | HEIGHT: 70 IN | BODY MASS INDEX: 32.78 KG/M2

## 2024-08-26 DIAGNOSIS — M48.02 CERVICAL STENOSIS OF SPINAL CANAL: ICD-10-CM

## 2024-08-26 DIAGNOSIS — M54.12 CERVICAL RADICULOPATHY: Primary | ICD-10-CM

## 2024-08-26 PROCEDURE — 99442 PR PHYSICIAN TELEPHONE EVALUATION 11-20 MIN: CPT | Mod: 93 | Performed by: ORTHOPAEDIC SURGERY

## 2024-08-26 ASSESSMENT — PAIN SCALES - GENERAL: PAINLEVEL: NO PAIN (0)

## 2024-08-26 NOTE — TELEPHONE ENCOUNTER
Patient is scheduled for surgery with Dr. Prajapati.     Spoke with: Patient    Date of Surgery: 9/11/2024    [facility]     Pre op with Provider: MALKA     H&P: Patient is scheduled for an in clinic visit with PAC on 8/29/24 at 7:00 pm.     Additional imaging/appointments: Patient is scheduled for a 6 week post op on 10/28/24 at 8:15 am.     Surgery packet: Patient felt mailing a surgery packet was not necessary.      Additional comments: MALKA Powers on 8/26/2024 at 2:53 PM

## 2024-08-26 NOTE — LETTER
8/26/2024      Kobe Buchanan  [address]  [place] [address]      Dear Colleague,    Thank you for referring your patient, Kobe Buchanan, to the [facility] [place]. Please see a copy of my visit note below.    Virtual Visit Details    Type of service:  Telephone Visit   Phone call duration: 13 minutes   Originating Location (pt. Location): Home    Distant Location (provider location):  Off-site    Diagnosis: Cervical Radiculopathy and Myelopathy    Kobe says things are worsening for him with more problems walking and feeling like his hands are worsening.  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  He has preserved lordosis.  Given his severe neurologic findings and worsening myelopathic symptoms I recommended a cervical laminoplasty procedure.    Risks of this surgery include risk of infection, risk of dural tear resulting in CSF leak which might result in headaches, or possible need for lumbar drain, or possible revision surgery in the setting of a persistent leak. Risk of hematoma or seroma resulting in wound complications.  Possible nerve root injury resulting in numbness weakness or paralysis into the arms or legs. Possible radiculitis which could result in similar symptoms or could result in significant neurogenic type pain. There is also a risk of delayed onset nerve root palsy, which I explained is potentially due to stretch injury from the dorsal decompression, and is sometimes temporary but can also be permanent.  Risk of incomplete decompression which might require revision surgery in the future.  Risk of adjacent segment problems requiring surgery in the future. Risk of incomplete relief of symptoms possibly requiring revision surgery in the future. Furthermore, although rare, there are risks of major vessel injury such as to the vertebral artery or major organ injury such as to the esophagus or trachea from the surgery.   There are risks of dysphagia or dysphonia which can make it hard to swallow or talk.  Lastly, although rare, there are certainly risks of the anesthetic including stroke heart attack and death.       In most cases we need to use a bone graft extender in addition to local autograft.  The bone graft extender is usually crushed cancellous allograft from [employer], [employer] spinal graft [employer], produce crushed cancellous allograft  This is needed because there is usually not enough autograft available to complete the fusion.  In some cases we will also add autogenous iliac crest autograft if the quality of the local bone is poor or very limited in quantity.      For cervical laminoplasty we use mediafeedia plates.     No further conservative care is indicated, and the surgery is medically necessary for the following reasons:    There is no indication for further physical therapy in this case.  Further physical therapy would delay necessary medical treatment and prolong the patient's suffering with no benefit and the delay would increase the risk of neurologic decline and/or symptom worsening unnecessarily, with no benefit to the patient and possible harm.       The patient has already trialed oral medications as listed in the medication history, and has trialed activity modification such as decreasing their bending and twisting, and decreasing their walking distance.  In spite of this, and in spite of the conservative therapies documented above, the patient has significant functional disability in their activities of daily living such as prolonged sitting and standing, prolonged walking, and daily chores, each of which are very difficult or painful because of this spinal problem.  Therefore the proposed surgery is medically necessary and the patient has failed conservative care.      There are no untreated, underlying mental health conditions (including but not limited to psychological conditions or drug or alcohol  abuse) that will preclude an appropriate recovery from this surgery or that are contraindications to proceeding with surgery.         Again, thank you for allowing me to participate in the care of your patient.        Sincerely,        Jonathan Prajapati MD

## 2024-08-26 NOTE — NURSING NOTE
Current patient location: [address]   [place] [address]    Is the patient currently in the state of [place]? YES    Visit mode:TELEPHONE    If the visit is dropped, the patient can be reconnected by: TELEPHONE VISIT: Phone number:   Telephone Information:   Mobile [phone]       Will anyone else be joining the visit? NO  (If patient encounters technical issues they should call [phone]    How would you like to obtain your AVS? MyChart    Are changes needed to the allergy or medication list? Pt stated no changes to allergies and Pt stated no med changes    Are refills needed on medications prescribed by this physician? NO    Rooming Documentation:  Questionnaire(s) not pre-assigned      Reason for visit: RECHGIFTY CRUZF      Nasal Turnover Hinge Flap Text: The defect edges were debeveled with a #15 scalpel blade.  Given the size, depth, location of the defect and the defect being full thickness a nasal turnover hinge flap was deemed most appropriate.  Using a sterile surgical marker, an appropriate hinge flap was drawn incorporating the defect. The area thus outlined was incised with a #15 scalpel blade. The flap was designed to recreate the nasal mucosal lining and the alar rim. The skin margins were undermined to an appropriate distance in all directions utilizing iris scissors.

## 2024-08-26 NOTE — PROGRESS NOTES
Virtual Visit Details    Type of service:  Telephone Visit   Phone call duration: 13 minutes   Originating Location (pt. Location): Home    Distant Location (provider location):  Off-site    Diagnosis: Cervical Radiculopathy and Myelopathy    Kobe says things are worsening for him with more problems walking and feeling like his hands are worsening.  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  He has preserved lordosis.  Given his severe neurologic findings and worsening myelopathic symptoms I recommended a cervical laminoplasty procedure.    Risks of this surgery include risk of infection, risk of dural tear resulting in CSF leak which might result in headaches, or possible need for lumbar drain, or possible revision surgery in the setting of a persistent leak. Risk of hematoma or seroma resulting in wound complications.  Possible nerve root injury resulting in numbness weakness or paralysis into the arms or legs. Possible radiculitis which could result in similar symptoms or could result in significant neurogenic type pain. There is also a risk of delayed onset nerve root palsy, which I explained is potentially due to stretch injury from the dorsal decompression, and is sometimes temporary but can also be permanent.  Risk of incomplete decompression which might require revision surgery in the future.  Risk of adjacent segment problems requiring surgery in the future. Risk of incomplete relief of symptoms possibly requiring revision surgery in the future. Furthermore, although rare, there are risks of major vessel injury such as to the vertebral artery or major organ injury such as to the esophagus or trachea from the surgery.  There are risks of dysphagia or dysphonia which can make it hard to swallow or talk.  Lastly, although rare, there are certainly risks of the anesthetic including stroke heart attack and death.       In most cases we need to use a bone graft extender in addition to  local autograft.  The bone graft extender is usually crushed cancellous allograft from [employer], [employer] spinal graft [employer], produce crushed cancellous allograft  This is needed because there is usually not enough autograft available to complete the fusion.  In some cases we will also add autogenous iliac crest autograft if the quality of the local bone is poor or very limited in quantity.      For cervical laminoplasty we use medtronic plates.     No further conservative care is indicated, and the surgery is medically necessary for the following reasons:    There is no indication for further physical therapy in this case.  Further physical therapy would delay necessary medical treatment and prolong the patient's suffering with no benefit and the delay would increase the risk of neurologic decline and/or symptom worsening unnecessarily, with no benefit to the patient and possible harm.       The patient has already trialed oral medications as listed in the medication history, and has trialed activity modification such as decreasing their bending and twisting, and decreasing their walking distance.  In spite of this, and in spite of the conservative therapies documented above, the patient has significant functional disability in their activities of daily living such as prolonged sitting and standing, prolonged walking, and daily chores, each of which are very difficult or painful because of this spinal problem.  Therefore the proposed surgery is medically necessary and the patient has failed conservative care.      There are no untreated, underlying mental health conditions (including but not limited to psychological conditions or drug or alcohol abuse) that will preclude an appropriate recovery from this surgery or that are contraindications to proceeding with surgery.

## 2024-08-27 NOTE — TELEPHONE ENCOUNTER
FUTURE VISIT INFORMATION      SURGERY INFORMATION:  Date: 24  Location: ur or  Surgeon:  Jonathan Prajapati MD   Anesthesia Type:  general  Procedure: Cervical 3 and 7 dome laminectomy and 4-6 laminoplasty with medtronic plates   Consult: virtual visit 24    RECORDS REQUESTED FROM:       Primary Care Provider: Demi Hardin MD - [facility]    Pertinent Medical History: hypertension, bilateral carotid artery disease     Most recent EKG+ Tracin23    Most recent ECHO: 10/10/23    Most recent Coronary Angiogram: 20

## 2024-08-28 LAB
ABO/RH(D): NORMAL
ANTIBODY SCREEN: NEGATIVE
SPECIMEN EXPIRATION DATE: NORMAL

## 2024-08-29 ENCOUNTER — LAB (OUTPATIENT)
Dept: LAB | Facility: CLINIC | Age: 64
End: 2024-08-29
Payer: COMMERCIAL

## 2024-08-29 ENCOUNTER — APPOINTMENT (OUTPATIENT)
Dept: LAB | Facility: CLINIC | Age: 64
End: 2024-08-29
Payer: COMMERCIAL

## 2024-08-29 ENCOUNTER — OFFICE VISIT (OUTPATIENT)
Dept: SURGERY | Facility: CLINIC | Age: 64
End: 2024-08-29
Payer: COMMERCIAL

## 2024-08-29 ENCOUNTER — PRE VISIT (OUTPATIENT)
Dept: SURGERY | Facility: CLINIC | Age: 64
End: 2024-08-29

## 2024-08-29 ENCOUNTER — ANESTHESIA EVENT (OUTPATIENT)
Dept: SURGERY | Facility: CLINIC | Age: 64
DRG: 029 | End: 2024-08-29
Payer: COMMERCIAL

## 2024-08-29 VITALS
BODY MASS INDEX: 33.21 KG/M2 | OXYGEN SATURATION: 94 % | DIASTOLIC BLOOD PRESSURE: 81 MMHG | HEIGHT: 70 IN | HEART RATE: 50 BPM | TEMPERATURE: 97.7 F | WEIGHT: 232 LBS | SYSTOLIC BLOOD PRESSURE: 130 MMHG

## 2024-08-29 DIAGNOSIS — M48.02 CERVICAL STENOSIS OF SPINAL CANAL: ICD-10-CM

## 2024-08-29 DIAGNOSIS — Z01.818 PREOP EXAMINATION: Primary | ICD-10-CM

## 2024-08-29 DIAGNOSIS — M54.12 CERVICAL RADICULOPATHY: ICD-10-CM

## 2024-08-29 DIAGNOSIS — R54 FRAILTY: ICD-10-CM

## 2024-08-29 LAB
ANION GAP SERPL CALCULATED.3IONS-SCNC: 10 MMOL/L (ref 7–15)
BUN SERPL-MCNC: 14.9 MG/DL (ref 8–23)
CALCIUM SERPL-MCNC: 9.4 MG/DL (ref 8.8–10.4)
CHLORIDE SERPL-SCNC: 100 MMOL/L (ref 98–107)
CREAT SERPL-MCNC: 1.32 MG/DL (ref 0.67–1.17)
EGFRCR SERPLBLD CKD-EPI 2021: 61 ML/MIN/1.73M2
ERYTHROCYTE [DISTWIDTH] IN BLOOD BY AUTOMATED COUNT: 12.8 % (ref 10–15)
GLUCOSE SERPL-MCNC: 122 MG/DL (ref 70–99)
HCO3 SERPL-SCNC: 26 MMOL/L (ref 22–29)
HCT VFR BLD AUTO: 45.6 % (ref 40–53)
HGB BLD-MCNC: 15.7 G/DL (ref 13.3–17.7)
MCH RBC QN AUTO: 29.6 PG (ref 26.5–33)
MCHC RBC AUTO-ENTMCNC: 34.4 G/DL (ref 31.5–36.5)
MCV RBC AUTO: 86 FL (ref 78–100)
PLATELET # BLD AUTO: 189 10E3/UL (ref 150–450)
POTASSIUM SERPL-SCNC: 4.4 MMOL/L (ref 3.4–5.3)
RBC # BLD AUTO: 5.3 10E6/UL (ref 4.4–5.9)
SODIUM SERPL-SCNC: 136 MMOL/L (ref 135–145)
WBC # BLD AUTO: 6.7 10E3/UL (ref 4–11)

## 2024-08-29 PROCEDURE — 36415 COLL VENOUS BLD VENIPUNCTURE: CPT | Performed by: PATHOLOGY

## 2024-08-29 PROCEDURE — 99205 OFFICE O/P NEW HI 60 MIN: CPT | Performed by: NURSE PRACTITIONER

## 2024-08-29 PROCEDURE — 86900 BLOOD TYPING SEROLOGIC ABO: CPT

## 2024-08-29 PROCEDURE — 85027 COMPLETE CBC AUTOMATED: CPT | Performed by: PATHOLOGY

## 2024-08-29 PROCEDURE — 80048 BASIC METABOLIC PNL TOTAL CA: CPT | Performed by: PATHOLOGY

## 2024-08-29 ASSESSMENT — ENCOUNTER SYMPTOMS: SEIZURES: 0

## 2024-08-29 ASSESSMENT — LIFESTYLE VARIABLES: TOBACCO_USE: 1

## 2024-08-29 ASSESSMENT — PAIN SCALES - GENERAL: PAINLEVEL: MILD PAIN (2)

## 2024-08-29 NOTE — PATIENT INSTRUCTIONS
Preparing for Your Surgery      Name:  Kobe Buchanan   MRN:  [id number]   :  1960   Today's Date:  2024       Arriving for surgery:  Surgery date:  2024  Arrival time:  8:15 am    Please come to:         [facility] [address]   [address] [place]  [address]     The Green Ramp for patients and visitors is beneath the [facility]. The parking facility entrance is at the intersection of [address] [place] [address] and [address]. Patients and visitors who self-park will receive the reduced hospital parking rate (no ticket validation needed).     [employer] parking, located at the [facility] main entrance on [address] [place] [address], is available Monday - Friday from 7 am to 3:30 pm.     Discounted parking pass options can be purchased from  attendants during business hours.     -Check in at the security desk in the [facility] ([facility])   Lobby. They will direct you to the correct elevators.   -Proceed to the 3rd floor, Adult Surgery Waiting Lounge. [phone]     If you need directions, a wheelchair or escort please stop at the Information Desk in the lobby.  Inform the information person you are here for surgery; a wheelchair and escort to [address] will be provided.   An escort to the Adult Surgery Waiting Lounge will be provided. .    What can I eat or drink?  -  You may eat and drink normally up to 8 hours prior to arrival time. (Until Midnight)  -  You may have clear liquids until 2 hours prior to arrival time. (Until 6:15 am)    Examples of clear liquids:  Water  Clear broth  Juices (apple, white grape, white cranberry  and cider) without pulp  Noncarbonated, powder based beverages  (lemonade and Shawn-Aid)  Sodas (Sprite, 7-Up, ginger ale and seltzer)  Coffee or tea (without milk or cream)  Gatorade    -  No Alcohol or cannabis products for at least 24  hours before surgery.     Which medicines can I take?    Hold Aspirin for 7 days before surgery.   Hold Multivitamins for 7 days before surgery.  Hold Supplements for 7 days before surgery.  Hold Ibuprofen (Advil, Motrin) for 3 day(s) before surgery--unless otherwise directed by surgeon.    Hold Naproxen (Aleve) for 4 days before surgery.    -  DO NOT take these medications the day of surgery:  Amlodipine benazepril   Docusate  Senna  Sucralfate  Vitamin B complex       -  PLEASE TAKE these medications the day of surgery:  Bupropion  Carvedilol  Clonazepam if needed  Cyclobenzaprine if needed  Escitalopram   Fentanyl patch per your routine   Oxycodone if needed  Pantoprazole if needed  Propranolol if needed         How do I prepare myself?  - Please take 2 showers (one the night prior to surgery and one the morning of surgery) using Scrubcare or Hibiclens soap.    Use this soap only from the neck to your toes.     Leave the soap on your skin for one minute--then rinse thoroughly.      You may use your own shampoo and conditioner. No other hair products.   - Please remove all jewelry and body piercings.  - No lotions, deodorants or fragrance.  - No makeup or fingernail polish.   - Bring your ID and insurance card.    -If you use a CPAP machine, please bring the CPAP machine, tubing, and mask to hospital.    -If you have a Deep Brain Stimulator, Spinal Cord Stimulator, or any Neuro Stimulator device---you must bring the remote control to the hospital.      ALL PATIENTS GOING HOME THE SAME DAY OF SURGERY ARE REQUIRED TO HAVE A RESPONSIBLE ADULT TO DRIVE AND BE IN ATTENDANCE WITH THEM FOR 24 HOURS FOLLOWING SURGERY.    Covid testing policy as of 12/06/2022  Your surgeon will notify and schedule you for a COVID test if one is needed before surgery--please direct any questions or COVID symptoms to your surgeon      Questions or Concerns:    - For any questions regarding the day of surgery or your hospital stay, please  contact the Pre Admission Nursing Office at [phone].       - If you have health changes between today and your surgery, please call your surgeon.       - For questions after surgery, please call your surgeons office.           Current Visitor Guidelines    You may have 2 visitors in the pre op area.    Visiting hours: 8 a.m. to 8:30 p.m.    Patients confirmed or suspected to have symptoms of COVID 19 or flu:     No visitors allowed for adult patients.   Children (under age 18) can have 1 named visitor.     People who are sick or showing symptoms of COVID 19 or flu:    Are not allowed to visit patients--we can only make exceptions in special situations.       Please follow these guidelines for your visit:          Please maintain social distance          Masking is optional--however at times you may be asked to wear a mask for the safety of yourself and others     Clean your hands with alcohol hand . Do this when you arrive at and leave the building and patient room,    And again after you touch your mask or anything in the room.     Go directly to and from the room you are visiting.     Stay in the patient s room during your visit. Limit going to other places in the hospital as much as possible     Leave bags and jackets at home or in the car.     For everyone s health, please don t come and go during your visit. That includes for smoking   during your visit.

## 2024-08-29 NOTE — H&P
Pre-Operative H & P     CC:  Preoperative exam to assess for increased cardiopulmonary risk while undergoing surgery and anesthesia.    Date of Encounter: 8/29/2024  Primary Care Physician:  Demi Hardin     Reason for visit:   Encounter Diagnoses   Name Primary?    Preop examination Yes    Cervical radiculopathy     Cervical stenosis of spinal canal        HPI  Kobe Buchanan is a 64 year old male who presents for pre-operative H & P in preparation for  Procedure Information       Case: [id number] Date/Time: 09/11/24 1040    Procedure: Cervical 3 and 7 dome laminectomy and 4-6 laminoplasty with medtronic plates (Spine)    Anesthesia type: General    Diagnosis:       Cervical radiculopathy [M54.12]      Cervical stenosis of spinal canal [M48.02]    Pre-op diagnosis:       Cervical radiculopathy [M54.12]      Cervical stenosis of spinal canal [M48.02]    [facility]: [facility]    Providers: Jonathan Prajapati MD            The patient presents to the PAC in person today in preparation for the above scheduled procedure with comorbid conditions including  HTN, HLD, prior smoking history, bicuspid AV valve s/p valve sparing repair with Gelweave graft 2016, NICM (EF 30-35%)  since resolved (thought to be stress induced cardiomyopathy), SAVITA, anemia, h/o blood transfusion, BPPV, DM II, obesity, GERD, CKD, chronic pain, depression, anxiety, insomnia, difficult IV, and DJD s/p bilateral shoulder replacement with subsequent bilateral prosthetic joint infection with subsequent removal and reimplantation.      The patient has been followed by Dr. Prajapati in the orthopedic spine surgery clinic for complaints of cervical spine radiculopathy and myopathy.  Over the last few months, the patient endorsed progressive symptoms.  He is having more problems walking and feels like his hands are worsening.  Margins to lesion, imaging showed severe cord stenosis C3-4 and C5-7  Dr Prajapati counseled the patient of the  findings and treatment options.  The patient has now been scheduled for the procedure as listed above.      History is obtained from the patient and chart review    Hx of abnormal bleeding or anti-platelet use: denies      Past Medical History  Past Medical History:   Diagnosis Date    Acute systolic heart failure (H) 01/28/2020    Added automatically from request for surgery [id number]    NEMESIO (acute kidney injury) (H24) 02/16/2020    Anxiety     Ascending aortic aneurysm (H24)     Atrial fibrillation (H) [I48.91] 10/10/2016    Bicuspid aortic valve     BPPV (benign paroxysmal positional vertigo) 07/11/2014    Choledocholithiasis     Chronic narcotic use     Chronic neck pain     Chronic osteoarthritis     CKD (chronic kidney disease) stage 3, GFR 30-59 ml/min (H)     Degenerative joint disease     Depression     Dysthymic disorder     Hx of type 2 diabetes mellitus 03/04/2021    Hyperlipidemia 04/10/2012    Hypertension     Iron deficiency anemia secondary to blood loss (chronic) 08/25/2020    MSSA bacteremia 10/13/2019    Multiple stiff joints     Neck injuries     NSTEMI (non-ST elevated myocardial infarction) (H) 01/29/2020    Obesity 02/09/2015    Pain in shoulder     Plantar fasciitis     Pre-diabetes     S/P reverse total shoulder arthroplasty, left 07/07/2020    S/P reverse total shoulder arthroplasty, right 08/18/2020    Septic joint of left shoulder region (H) 11/13/2019    Septic joint of right shoulder region (H) 10/13/2019    s/p washout    Skin picking habit     Sleep apnea     does not use cpap    Status post total shoulder arthroplasty, left 03/03/2020    Added automatically from request for surgery [id number]       Past Surgical History  Past Surgical History:   Procedure Laterality Date    ARTHROPLASTY SHOULDER  04/15/2014    Procedure: Left Total Shoulder Arthroplasty ;  Surgeon: Analilia Aceves MD;  [facility]    ARTHROPLASTY SHOULDER Right 08/26/2014    Procedure: ARTHROPLASTY SHOULDER;   Surgeon: Analilia Aceves MD;  [facility]    ARTHROSCOPY SHOULDER WITH BIOPSY(IES) Left 01/28/2020    Procedure: Left shoulder arthroscopy and biopsy for culture;  Surgeon: Analilia Aceves MD;  [facility]    BYPASS GASTRIC TAVO-EN-Y, LIVER BIOPSY, COMBINED  08/08/2005    COLONOSCOPY  06/30/2014    Procedure: COMBINED COLONOSCOPY, SINGLE BIOPSY/POLYPECTOMY BY BIOPSY;  Surgeon: Chester Patton MD;  [facility]    COLONOSCOPY N/A 2/15/2024    Procedure: COLONOSCOPY, WITH POLYPECTOMY;  Surgeon: Power Duran MD;  [facility]    CV CORONARY ANGIOGRAM  01/30/2020    Procedure: CV CORONARY ANGIOGRAM;  Surgeon: Néstor Walls MD;  Location:  [facility]    CYSTOSCOPY, BLADDER NECK CUTS, COMBINED N/A 07/18/2016    Procedure: COMBINED CYSTOSCOPY, BLADDER NECK CUTS;  Surgeon: Ritu Leslie MD;  [facility]    ENDOSCOPIC RETROGRADE CHOLANGIOPANCREATOGRAM N/A 11/03/2023    Procedure: ENDOSCOPIC RETROGRADE CHOLANGIOPANCREATOGRAPHY, BILIARY SPHINCTEROTOMY, STONE REMOVAL;  Surgeon: Juarez Sevilla MD;  Location: [facility] OR    ESOPHAGOSCOPY, GASTROSCOPY, DUODENOSCOPY (EGD), COMBINED  06/30/2014    Procedure: COMBINED ESOPHAGOSCOPY, GASTROSCOPY, DUODENOSCOPY (EGD), BIOPSY SINGLE OR MULTIPLE;  Surgeon: Chester Patton MD;  [facility]    EXCISE MASS FINGER  06/14/2011    Procedure:EXCISE MASS FINGER; Middle Flexor Cyst; Surgeon:SHAYY RUSSELL; [facility]    IR FINE NEEDLE ASPIRATION W ULTRASOUND  11/13/2019    IR PICC PLACEMENT > 5 YRS OF AGE  11/19/2019    LAPAROSCOPIC CHOLECYSTECTOMY N/A 11/03/2023    Procedure: CHOLECYSTECTOMY, LAPAROSCOPIC;  Surgeon: Shayy Mathis MD;  Location: [facility] OR    LASER HOLMIUM LITHOTRIPSY BLADDER N/A 10/15/2014    Procedure: LASER HOLMIUM LITHOTRIPSY BLADDER;  Surgeon: Sahil Taveras MD;  [facility]    LASER KTP GREEN LIGHT PHOTOSELECTIVE  VAPORIZATION PROSTATE  01/23/2014    Procedure: LASER KTP GREEN LIGHT PHOTOSELECTIVE VAPORIZATION PROSTATE;  Greenlight Photovaporization Of Prostate  ;  Surgeon: Sahil Taveras MD;  [facility]    OTHER SURGICAL HISTORY  10/04/2016    REPAIR ANEURYSM ASCENDING AORTA    OTHER SURGICAL HISTORY Right 09/23/2019    IRRIGATION AND DEBRIDEMENT UPPER EXTREMITYshoulder    RELEASE TRIGGER FINGER Right 03/31/2017    Procedure: RELEASE TRIGGER FINGER;  Surgeon: Juan Carlos Blunt MD;  [facility]    REMOVE ANTIBIOTIC CEMENT BEADS / SPACER SHOULDER Left 05/08/2020    Procedure: Left shoulder removal of spacer;  Surgeon: Analilia Aceves MD;  [facility]    REMOVE ANTIBIOTIC CEMENT BEADS / SPACER SHOULDER Right 08/18/2020    Procedure: Removal of right shoulder antibiotic spacer;  Surgeon: Analilia Aceves MD;  [facility]    REMOVE HARDWARE ARTHROPLASTY SHOULDER. I&D, PLACE ANTIBIOTIC CEMENT BE Left 11/15/2019    Procedure: Explantation of left total shoulder arthroplasty, irrigation and debridement, and placement of antibiotic spacer;  Surgeon: Analilia Aceves MD;  [facility]    REPAIR ANEURYSM ASCENDING AORTA N/A 10/04/2016    Procedure: REPAIR ANEURYSM ASCENDING AORTA;  Surgeon: Mckenzie Townsend MD;  [facility]    REVERSE ARTHROPLASTY SHOULDER Right 08/18/2020    Procedure: and conversion to reverse total shoulder arthroplasty;  Surgeon: Analilia Aceves MD;  [facility]    SURGICAL EXPOSURE, LAPAROSCOPIC, W/WOUND CLOSURE, FOR ERCP, BY GENERAL SURGERY N/A 11/03/2023    Procedure: SURGICAL EXPOSURE FOR ENDOSCOPIC RETROGRADE CHOLANGIOPANCREATOGRAPHY;  Surgeon: Kobe Mathis MD;  Location: [facility] OR    TURP         Prior to Admission Medications  Current Outpatient Medications   Medication Sig Dispense Refill    amLODIPine-benazepril (LOTREL) 5-20 MG capsule Take 1 capsule by mouth 2 times daily 180 capsule 3    atorvastatin (LIPITOR) 40  MG tablet Take 1 tablet (40 mg) by mouth daily (Patient taking differently: Take 40 mg by mouth every evening.) 90 tablet 3    buPROPion (WELLBUTRIN SR) 200 MG 12 hr tablet TAKE 1 TABLET BY MOUTH 2 TIMES DAILY. (Patient taking differently: 200 mg 2 times daily. TAKE 1 TABLET BY MOUTH 2 TIMES DAILY) 180 tablet 0    carvedilol (COREG) 25 MG tablet Take 2 tablets (50 mg) by mouth 2 times daily (with meals) 360 tablet 3    clonazePAM (KLONOPIN) 0.5 MG tablet Take 1 tablet (0.5 mg) by mouth 3 times daily as needed for anxiety      cyclobenzaprine (FLEXERIL) 10 MG tablet Take 1 tablet (10 mg) by mouth 3 times daily as needed for muscle spasms 90 tablet 4    docusate sodium (COLACE) 100 MG capsule Take 100 mg by mouth 2 times daily. Take 2 capsule in the morning and 3 capsules in the evening      escitalopram (LEXAPRO) 20 MG tablet Take 1.5 tablets (30 mg) by mouth daily (Patient taking differently: Take 30 mg by mouth every morning.) 135 tablet 3    fentaNYL (DURAGESIC) 50 mcg/hr 72 hr patch Place 1 patch onto the skin every 72 hours. Pt taking Every 48 hours      naproxen (EC-NAPROSYN) 500 MG EC tablet Take 1 tablet (500 mg) by mouth 2 times daily as needed (pain) 186 tablet 3    oxyCODONE IR (ROXICODONE) 10 MG tablet Take 10 mg by mouth every 4 hours as needed.      pantoprazole (PROTONIX) 40 MG EC tablet Take 1 tablet (40 mg) by mouth daily as needed for heartburn, 90 tablet 0    propranolol (INDERAL) 10 MG tablet Take 1 tablet (10 mg) by mouth 3 times daily as needed (panic attack) 90 tablet 3    senna-docusate (SENOKOT-S/PERICOLACE) 8.6-50 MG tablet Take 1-2 tablets by mouth 2 times daily 30 tablet 0    sucralfate (CARAFATE) 1 GM tablet Take 1 tablet (1 g) by mouth 2 times daily as needed (Reflux) (Patient taking differently: as needed. Take 1 tablet (1 g) by mouth 2 times daily as needed (Reflux)) 180 tablet 3    tiZANidine (ZANAFLEX) 4 MG tablet Take 1 tablet (4 mg) by mouth 2 times daily (Patient taking  differently: Take 4 mg by mouth nightly as needed.) 180 tablet 3    vitamin B-Complex Take 1 tablet by mouth every morning.      bisacodyl (DULCOLAX) 5 MG EC tablet Two days prior to exam take two (2) tablets at 4pm. One day prior to exam take two (2) tablets at 4pm 4 tablet 0    clonazePAM (KLONOPIN) 0.5 MG tablet Take 1 tablet (0.5 mg) by mouth 3 times daily as needed for anxiety (Patient taking differently: Take 0.5 mg by mouth 3 times daily as needed for anxiety.) 90 tablet 3    Fe Heme Polypeptide-folic acid (PROFERRIN-FORTE) 12-1 MG TABS Take 1 tablet by mouth 2 times daily (Patient not taking: Reported on 8/29/2024) 90 tablet 0    fluocinonide (LIDEX) 0.05 % external ointment Apply twice daily to itchy skin nodules for 1-2 weeks at a time. 30 g 3    latanoprost (XALATAN) 0.005 % ophthalmic solution Place 1 drop Into the left eye daily (Patient not taking: Reported on 8/29/2024) 2.5 mL 4    mometasone (ELOCON) 0.1 % external ointment Apply topically nightly as needed (rash on arms) 30 g 3    polyethylene glycol (GOLYTELY) 236 g suspension Take as directed. Two days before your exam fill the first container with water. Cover and shake until mixed well. At 5:00pm drink one 8oz glass every 10-15 minutes until half (1/2) of the first container is empty. Store the remainder in the refrigerator. One day before your exam at 5:00pm drink the second half of the first container until it is gone. Before you go to bed mix the second container with water and put in refrigerator. Six hours before your check in time drink one 8oz glass every 10-15 minutes until half of container is empty. Discard the remainder of solution. 8000 mL 0    tacrolimus (PROTOPIC) 0.1 % external ointment Apply topically as needed (rash on arms) Apply to affected areas on body. 60 g 1    tacrolimus (PROTOPIC) 0.1 % ointment Apply topically as needed Apply to affected areas on body. 120 g 11    triamcinolone (KENALOG) 0.1 % external ointment Apply  topically 2 times daily To affected areas of rash. Please avoid application to the face, groin or armpits as the medication is too strong for these areas. 454 g 0       Allergies  Allergies   Allergen Reactions    Ciprofloxacin      History of aortic aneurysms    Tape [Adhesive Tape] Blisters     Blistering - please use paper tape       Social History  Social History     Socioeconomic History    Marital status: Single     Spouse name: Not on file    Number of children: Not on file    Years of education: Not on file    Highest education level: Not on file   Occupational History    Occupation: Disabled   Tobacco Use    Smoking status: Former     Current packs/day: 0.00     Average packs/day: 0.5 packs/day for 6.6 years (3.3 ttl pk-yrs)     Types: Cigarettes     Start date: 1977     Quit date: 1983     Years since quittin.0     Passive exposure: Never (per pt)    Smokeless tobacco: Never   Substance and Sexual Activity    Alcohol use: No     Alcohol/week: 0.0 standard drinks of alcohol    Drug use: No    Sexual activity: Not Currently     Partners: Female     Birth control/protection: Abstinence   Other Topics Concern    Parent/sibling w/ CABG, MI or angioplasty before 65F 55M? Not Asked   Social History Narrative    Live independently[place] one sister, Supriya in [place], [place].  Does live with 2 dogs.      Social Determinants of Health     Financial Resource Strain: Low Risk  (2023)    Financial Resource Strain     Within the past 12 months, have you or your family members you live with been unable to get utilities (heat, electricity) when it was really needed?: No   Food Insecurity: Low Risk  (2023)    Food Insecurity     Within the past 12 months, did you worry that your food would run out before you got money to buy more?: No     Within the past 12 months, did the food you bought just not last and you didn t have money to get more?: No   Transportation Needs: Low  Risk  (12/21/2023)    Transportation Needs     Within the past 12 months, has lack of transportation kept you from medical appointments, getting your medicines, non-medical meetings or appointments, work, or from getting things that you need?: No   Physical Activity: Not on file   Stress: Not on file   Social Connections: Socially Isolated (8/31/2021)    Social Connection and Isolation Panel [NHANES]     Frequency of Communication with Friends and Family: Once a week     Frequency of Social Gatherings with Friends and Family: Never     Attends Orthodox Services: Never     Active Member of Clubs or Organizations: No     Attends Club or Organization Meetings: Never     Marital Status: Never    Interpersonal Safety: Low Risk  (5/14/2024)    Interpersonal Safety     Do you feel physically and emotionally safe where you currently live?: Yes     Within the past 12 months, have you been hit, slapped, kicked or otherwise physically hurt by someone?: No     Within the past 12 months, have you been humiliated or emotionally abused in other ways by your partner or ex-partner?: No   Housing Stability: High Risk (12/21/2023)    Housing Stability     Do you have housing? : No     Are you worried about losing your housing?: No       Family History  Family History   Problem Relation Age of Onset    Arthritis Other     Gastrointestinal Disease Other     Cardiovascular Father         aortic aneurysm    Arrhythmia Father     Nephrolithiasis Father     Sleep Apnea Father     Anxiety Disorder Father     Depression Father     Hypertension Father     Obesity Father     Hyperlipidemia Father     Coronary Artery Disease Father     Low Back Problems Father     Spine Problems Father     Cardiovascular Father     Other - See Comments Father         low back problems, spine problems    Anxiety Disorder Mother     Hypertension Mother     Osteoporosis Mother     Obesity Mother     Hyperlipidemia Mother     Low Back Problems Mother      "Macular Degeneration Mother     Cataracts Mother     Other - See Comments Mother         low back problems    Anxiety Disorder Sister     Hypertension Sister     Osteoporosis Sister     Obesity Sister     Hyperlipidemia Sister     Low Back Problems Sister     Spine Problems Sister     Anxiety Disorder Sister     Depression Sister     Hypertension Sister     Osteoporosis Sister     Obesity Sister     Hyperlipidemia Sister     Low Back Problems Sister     Anxiety Disorder Sister     Hyperlipidemia Sister     Hypertension Sister     Obesity Sister     Other - See Comments Sister         low back problems, spine problems    Osteoporosis Sister     Anxiety Disorder Sister     Depression Sister     Hyperlipidemia Sister     Hypertension Sister     Other - See Comments Sister         low back problems    Obesity Sister     Osteoporosis Sister     Melanoma No family hx of     Skin Cancer No family hx of     Glaucoma No family hx of        Review of Systems  The complete review of systems is negative other than noted in the HPI or here.   Anesthesia Evaluation   Pt has had prior anesthetic.     History of anesthetic complications (SAVITA)  - .      ROS/MED HX  ENT/Pulmonary:     (+) sleep apnea (Has not used CPAP since gastric bypass.), doesn't use CPAP,              tobacco use (Quit at age 24), Past use,                    (-) asthma   Neurologic: Comment: Cervical radiculopathy.     H/o BPPV no recent acute episodes.    (-) no seizures, no CVA, no TIA and migraines   Cardiovascular: Comment:  NICM (EF 30-35%)  since resolved (thought to be stress induced cardiomyopathy)    Denies cardiac symptoms including chest pain, SOB, palpitations, syncope, STALEY, orthopnea, or PND.    Per record review:   Anesthesia record 1/2020:Event described per intraoperative record and postop \"significant event\" note. Severe hypertension intraoperatively and prolonged runs of ectopy. Sent to ER for evaluation.  Per chart review:  \"Patient was noted " "by anesthesia to have a 10 beat run of VT and profound hypertension between 206/134-226/152. This period of hypertensive emergency lasted ~4 minutes per records. Anaesthesia referred him to the ED due to the NSVT run\".     Per ED's 1/28/2020 note, \"Given his lack of ACS symptoms, suspect this is demand in the setting of hypertensive emergency likely triggered by severe pain. He is on a significant amount of narcotics at baseline due to chronic pain from the above surgeries\". Patient on telemetry overnight and TTE done.     Found to have NICM with LVEF 30-35%.  Coronary angiogram done with non-obstructed CAD.  Subsequently, patient LVEF has recovered and thought to be stress induced cardiomyopathy.     Patient tells me this was in the setting of infection with his shoulder.         (+) Dyslipidemia hypertension- -   -  - -                           valvular problems/murmurs  , bicuspid AV valve s/p valve sparing repair with Gelweave graft 2016.    Previous cardiac testing   Echo: Date: 2023 Results:  Echocardiogram with two-dimensional, color and spectral Doppler performed.  Contrast Optison. Technically difficult study. 4 ml wasted. Patient was given  5 ml mixture of 3 ml Optison and 6 ml saline.  ______________________________________________________________________________  Interpretation Summary  H/O Bicuspid aortic valve with valve sparing aortic root replacement in 2016.  Global and regional left ventricular function is normal with an EF of 55-60%.  Right ventricular function, chamber size, wall motion, and thickness are  normal.  The aortic valve is bicuspid.  Trace to mild aortic insufficiency is present.  Sinuses of Valsalva 3.9 cm.  Ascending aorta 3.5 cm.  The inferior vena cava is normal.  No pericardial effusion is present.  No significant changes noted.    Stress Test:  Date: Results:    ECG Reviewed:  Date: 2023 Results:  Normal Sinus Rhythm, rate 61, normal axis, normal intervals, no acute ST/T " "changes c/w ischemia, Q waves in lead 3, compared to 2020, new q waves  Cath:  Date: 2020 Results:  Conclusion      Mild non obstructive coronary disease      (-) taking anticoagulants/antiplatelets   METS/Exercise Tolerance: >4 METS Comment: Patient has two dogs and walks them regularly.  Reports he can easily walk 2 blocks.    Hematologic:     (+)      anemia, history of blood transfusion, no previous transfusion reaction, Known PRBC Anitbodies:No    (-) history of blood clots   Musculoskeletal: Comment: DJD, b/l reverse total shoulder with explant 2/2 infection. Subsequently reimplantation.  (+)  arthritis,             GI/Hepatic:     (+) GERD, Asymptomatic on medication,        cholecystitis/cholelithiasis (s/p cholecytectomy), hepatitis (s/p treatment in 1998) type C,     (-) liver disease   Renal/Genitourinary:     (+) renal disease, type: CRI,      BPH (s/p TURP),      Endo:     (+)  type II DM (Since gastric bypass, A1C's have been WNL.), Last HgA1c: 5.6, date: 2023, Not using insulin, - not using insulin pump.         Obesity (s/p gastric bypass 2005),    (-) thyroid disease and chronic steroid usage   Psychiatric/Substance Use:     (+) psychiatric history (Insomnia) anxiety, depression and other (comment)    (-) alcohol abuse history and chronic opioid use history   Infectious Disease:  - neg infectious disease ROS     Malignancy:  - neg malignancy ROS  (-) malignancy   Other:  - neg other ROS    (+)  , H/O Chronic Pain,         /81 (BP Location: Right arm, Patient Position: Sitting, Cuff Size: Adult Large)   Pulse 50   Temp 97.7  F (36.5  C) (Oral)   Ht 1.778 m (5' 10\")   Wt 105.2 kg (232 lb)   SpO2 94%   BMI 33.29 kg/m      Physical Exam   Constitutional: Awake, alert, cooperative, no apparent distress, and appears stated age.  Eyes: Pupils equal, round and reactive to light, extra ocular muscles intact, sclera clear, conjunctiva normal.  HENT: Normocephalic, oral pharynx with moist mucus " membranes, upper denture with bottom endentulous with evidence of denture anchors. No goiter appreciated.   Respiratory: Clear to auscultation bilaterally, no crackles or wheezing.  Cardiovascular: Regular rate and rhythm, normal S1 and S2, and 2/6 systolic murmur noted.  Carotids +2, no bruits. No edema. Palpable pulses to radial  DP and PT arteries.   GI: Normal bowel sounds, soft, non-distended, non-tender, no masses palpated, no hepatosplenomegaly.  Surgical scars: well healed  Lymph/Hematologic: No cervical lymphadenopathy and no supraclavicular lymphadenopathy.  Skin: Warm and dry.    Musculoskeletal: Limited ROM of neck. There is no redness, warmth, or swelling of the joints. Gross motor strength is normal.    Neurologic: Awake, alert, oriented to name, place and time. Cranial nerves II-XII are grossly intact. Gait is normal.   Neuropsychiatric: Calm, cooperative. Normal affect.     Prior Labs/Diagnostic Studies   All labs and imaging personally reviewed    Latest Reference Range & Units 03/01/24 08:51   Sodium 135 - 145 mmol/L 140   Potassium 3.4 - 5.3 mmol/L 4.6   Chloride 98 - 107 mmol/L 104   Carbon Dioxide (CO2) 22 - 29 mmol/L 26   Urea Nitrogen 8.0 - 23.0 mg/dL 13.9   Creatinine 0.67 - 1.17 mg/dL 1.03   GFR Estimate >60 mL/min/1.73m2 82   Calcium 8.8 - 10.2 mg/dL 9.3   Anion Gap 7 - 15 mmol/L 10   Albumin 3.5 - 5.2 g/dL 4.3   Protein Total 6.4 - 8.3 g/dL 7.2   Alkaline Phosphatase 40 - 150 U/L 122   ALT 0 - 70 U/L 30   AST 0 - 45 U/L 34   Bilirubin Total <=1.2 mg/dL 0.4   Cholesterol <200 mg/dL 119   Patient Fasting?  Unknown   Glucose 70 - 99 mg/dL 85   HDL Cholesterol >=40 mg/dL 54   LDL Cholesterol Calculated <=100 mg/dL 48   Non HDL Cholesterol <130 mg/dL 65   Triglycerides <150 mg/dL 87   Hemoglobin 13.3 - 17.7 g/dL 15.1   % Retic 0.5 - 2.0 % 1.3   Absolute Retic 0.025 - 0.095 10e6/uL 0.067       Lab Results   Component Value Date    A1C 5.6 08/24/2023    A1C 5.3 10/05/2016    A1C 5.4 07/12/2016     A1C 5.0 10/31/2011    A1C 4.9 11/02/2010     PROCEDURES  BILATERAL CAROTID DUPLEX DOPPLER ULTRASOUND 8/2/2021 7:22 AM  CLINICAL HISTORY: Vertigo                                                        IMPRESSION:     1. RIGHT ICA: Less than 50% diameter narrowing by grayscale imaging  and sonographic velocity criteria.     2. LEFT ICA:  Less than 50% diameter narrowing by grayscale imaging  and sonographic velocity criteria.     CMR Report   4/2021  SUMMARY   ==========================================================================================================     Clinical history: 61 M ascending aortic graft repair  Comparison MRA: 02/2020     1. The aortic root, transverse arch and descending thoracic aorta are normal in size without an aneurysm or  dissection. Ascending aortic graft repair has normal size and appearance.     2. The aortic arch is left sided. There is normal variant branching of the arch vessels, with common origin  of the innominate and left common carotid arteries. There is no coarctation.     3. The main and proximal branch pulmonary arteries are normal in size.      4. The systemic venous connections are normal.      CONCLUSIONS: Normal appearance of ascending aortic graft, with normal caliber of thoracic aorta. No change  from prior MRA in 2020.     EKG/ stress test - if available please see in ROS above   Echo result w/o MOPS: Interpretation SummaryH/O Bicuspid aortic valve with valve sparing aortic root replacement in 2016.Global and regional left ventricular function is normal with an EF of 55-60%.Right ventricular function, chamber size, wall motion, and thickness arenormal.The aortic valve is bicuspid.Trace to mild aortic insufficiency is present.Sinuses of Valsalva 3.9 cm.Ascending aorta 3.5 cm.The inferior vena cava is normal.No pericardial effusion is present.No significant changes noted.           No data to display                  The patient's records and results personally  reviewed by this provider.     Outside records reviewed from: Care Everywhere    LAB/DIAGNOSTIC STUDIES TODAY:     Latest Reference Range & Units 08/29/24 08:20   Sodium 135 - 145 mmol/L 136   Potassium 3.4 - 5.3 mmol/L 4.4   Chloride 98 - 107 mmol/L 100   Carbon Dioxide (CO2) 22 - 29 mmol/L 26   Urea Nitrogen 8.0 - 23.0 mg/dL 14.9   Creatinine 0.67 - 1.17 mg/dL 1.32 (H)   GFR Estimate >60 mL/min/1.73m2 61   Calcium 8.8 - 10.4 mg/dL 9.4   Anion Gap 7 - 15 mmol/L 10   Glucose 70 - 99 mg/dL 122 (H)   WBC 4.0 - 11.0 10e3/uL 6.7   Hemoglobin 13.3 - 17.7 g/dL 15.7   Hematocrit 40.0 - 53.0 % 45.6   Platelet Count 150 - 450 10e3/uL 189   RBC Count 4.40 - 5.90 10e6/uL 5.30   MCV 78 - 100 fL 86   MCH 26.5 - 33.0 pg 29.6   MCHC 31.5 - 36.5 g/dL 34.4   RDW 10.0 - 15.0 % 12.8   ABO/Rh(D)  O POS   Antibody Screen Negative  Negative   SPECIMEN EXPIRATION DATE  [id number]   (H): Data is abnormally high    Assessment    Kobe Buchanan is a 64 year old male seen as a PAC referral for risk assessment and optimization for anesthesia.    Plan/Recommendations  Pt will be optimized for the proposed procedure.  See below for details on the assessment, risk, and preoperative recommendations    NEUROLOGY  - No history of TIA, CVA or seizure    - BPPV without recent symptoms    - Chronic Pain with Cervical radiculopathy.  OPIOID Tolerant.  Follows with [facility]  ~ Prescribed Oxycodone 10 mg every 4 hours as needed>>patient reports that he takes Oxycodone 30 mg twice daily  ~ Fentanyl patch 50 mcg/hr every 72 hours>>patient reports he changes his fentanyl patch every 48 hours as believes it works better.    ~ Chronic opiates, morphine equivilant = 210 MME/Day   RECOMMEND GETTING PAIN TEAM INVOLVED EARLY    -Post Op delirium risk factors:  High co-morbid index    ENT  - No current airway concerns.  Will need to be reassessed day of surgery.  Mallampati: I  TM: > 3    CARDIAC  - H/O Bicuspid aortic valve with valve  sparing aortic root replacement in 2016   ~ Echo 7/2023>>Mild aortic valve calcification is present. Trace to mild aortic insufficiency is present.     - Mild non-obstructive CAD on coronary angiogram in 2020.     ~ denies cardiac symptoms   ~ continue statin    - H/O NICM (EF 30-35%)  since resolved (thought to be stress induced cardiomyopathy)   ~ LVEF 55-60%    ~ normal Biventricular structure and function on echo from 2023    - HTN well controlled on amlodipine   ~ managed with carvedilol, amlodipine-benazepril     **Patient is scheduled for routine echocardiogram, carotid US and MRA chest with contrast the day prior to above surgery.  He is scheduled to see his cardiologist AFTER the above surgery. Discussed with Dr. Sheriff and would recommend cardiac testing and cardiac visit occur prior to the above cervical spine surgery.  Notified Dr. Prajapati's team.     ADDENDUM 9/4/2024  From: Senthil Walker RN   Sent: 9/3/2024   3:31 PM CDT   To: Jonathan Prajapati MD     Hi Dr. Prajapati,     I am one of Dr. Ramirez nurses and helping her go through her messages. We have discussed Kobe and Dr. Zhou is comfortable with proceeding with the surgery as planned. The testing is routine for surveillance and can be postponed to after the surgery if needed. Dr. Zhou has no concerns on her end. Please let us know if you have any further questions or concerns. Thanks!     Angelina RIGGS     - METS (Metabolic Equivalents)  Patient performs 4 or more METS exercise without symptoms             Total Score: 0      RCRI-Low risk: Class 2 0.9% complication rate             Total Score: 1    RCRI: CHF        PULMONARY  - Obstructive Sleep Apnea, untreated since gastric bypass   ~ SAVITA without home CPAP use.    - Denies asthma or inhaler use    - Tobacco History    History   Smoking Status    Former    Types: Cigarettes   Smokeless Tobacco    Never       GI  - GERD   ~ Controlled on medications: Proton Pump Inhibitor and  "Sucralfate    - PONV Medium Risk  Total Score: 2           1 AN PONV: Patient is not a current smoker    1 AN PONV: Intended Post Op Opioids        /RENAL  - CKD  ~ Creatinine/GFR see above   ~ Monitoring renal function during periop period.   ~ Avoidance of nephrotoxins as possible     - BPH s/p TURPX2    ENDOCRINE    - BMI: Estimated body mass index is 33.29 kg/m  as calculated from the following:    Height as of this encounter: 1.778 m (5' 10\").    Weight as of this encounter: 105.2 kg (232 lb).  Obesity (BMI >30) s/p gastric bypass 2005    - Diabetes Mellitus, Type 2, non-insulin dependent.   ~ A1C 5.6  ~ Patient reports this has been in remission since his gastric bypass in 2005.    HEME  - VTE Low Risk 0.5%             Total Score: 3    VTE: Greater than 59 yrs old    VTE: Male      - No history of abnormal bleeding or antiplatelet use.    - H/o anemia with blood transfusion in the past.  Denies h/o reaction.   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  We are making you aware, as rehabilitation will be recommended before surgery and all recommendations from the PMR physician should be in collaboration with you as the patient's primary care provider (PCP).    PSYCH  - Depression, Anxiety and insomnia    ~ stable on bupropion, clonazepam, tizanidine, escitalopram and propranolol     ACCESS  - Difficult IV stick >>needs US    Different anesthesia methods/types have been discussed with the patient, but they are aware that the final plan will be decided by the assigned anesthesia provider on the date of service.  Patient was discussed with " Jaziel    The patient is optimized for their procedure. AVS with information on surgery time/arrival time, meds and NPO status given by nursing staff. No further diagnostic testing indicated.      On the day of service:     Prep time: 20 minutes  Visit time: 30 minutes  Documentation time: 20 minutes  ------------------------------------------  Total time: 70 minutes      ALAYNA Mann CNP  Preoperative Assessment Center  [facility]  Clinic and Surgery Center  Phone: [phone]  Fax: [phone]

## 2024-08-30 DIAGNOSIS — M47.812 SPONDYLOSIS OF CERVICAL REGION WITHOUT MYELOPATHY OR RADICULOPATHY: ICD-10-CM

## 2024-09-01 RX ORDER — CYCLOBENZAPRINE HCL 10 MG
10 TABLET ORAL 3 TIMES DAILY PRN
Qty: 90 TABLET | Refills: 0 | Status: SHIPPED | OUTPATIENT
Start: 2024-09-01

## 2024-09-03 ENCOUNTER — VIRTUAL VISIT (OUTPATIENT)
Dept: ORTHOPEDICS | Facility: CLINIC | Age: 64
End: 2024-09-03
Payer: COMMERCIAL

## 2024-09-03 VITALS — HEIGHT: 70 IN | WEIGHT: 229 LBS | BODY MASS INDEX: 32.78 KG/M2

## 2024-09-03 DIAGNOSIS — M48.02 CERVICAL STENOSIS OF SPINAL CANAL: Primary | ICD-10-CM

## 2024-09-03 PROCEDURE — 99442 PR PHYSICIAN TELEPHONE EVALUATION 11-20 MIN: CPT | Mod: 93 | Performed by: ORTHOPAEDIC SURGERY

## 2024-09-03 RX ORDER — ACETAMINOPHEN 325 MG/1
975 TABLET ORAL ONCE
Status: CANCELLED | OUTPATIENT
Start: 2024-09-03 | End: 2024-09-03

## 2024-09-03 RX ORDER — TRANEXAMIC ACID 10 MG/ML
10 INJECTION, SOLUTION INTRAVENOUS CONTINUOUS
Status: CANCELLED | OUTPATIENT
Start: 2024-09-03

## 2024-09-03 RX ORDER — CEFAZOLIN SODIUM/WATER 2 G/20 ML
2 SYRINGE (ML) INTRAVENOUS
Status: CANCELLED | OUTPATIENT
Start: 2024-09-03

## 2024-09-03 RX ORDER — GABAPENTIN 300 MG/1
300 CAPSULE ORAL
Status: CANCELLED | OUTPATIENT
Start: 2024-09-03

## 2024-09-03 RX ORDER — CEFAZOLIN SODIUM/WATER 2 G/20 ML
2 SYRINGE (ML) INTRAVENOUS SEE ADMIN INSTRUCTIONS
Status: CANCELLED | OUTPATIENT
Start: 2024-09-03

## 2024-09-03 ASSESSMENT — PAIN SCALES - GENERAL: PAINLEVEL: MILD PAIN (3)

## 2024-09-03 NOTE — TELEPHONE ENCOUNTER
RN Called patient to discuss as he had MyChart questions as well. Patient stating that he doesn't want to change his surgery because of Cardiology. Patient need Cardiac clearance before we can safely send him to surgery. Patient wants to discuss with Dr. Alo Blandon, RN

## 2024-09-03 NOTE — TELEPHONE ENCOUNTER
Called patient to reschedule surgery with Dr. Prajapati on 9/11 out further due to new cardio appointment scheduled for patient on 9/26/24. Patient did not want to reschedule surgery as he said his pain/symptoms have worsened the past week or so. Patient asked that someone within Dr. Prajapati's care team reach back out to further discuss what can be done to keep surgery scheduled on 9/11. Patient was also made aware that cardio team was called to confirm if earlier dates were available but unfortunately at this time there are no earlier appointment dates.     Steph Powers on 9/3/2024 at 8:33 AM

## 2024-09-03 NOTE — LETTER
9/3/2024      Kobe Buchanan  [address]  [place] [address]      Dear Colleague,    Thank you for referring your patient, Kobe Buchanan, to the [facility]. Please see a copy of my visit note below.    Virtual Visit Details    Type of service:  Telephone Visit   Phone call duration: 13 minutes   Originating Location (pt. Location): Home    Distant Location (provider location):  On-site    Diagnosis: Cervical Myelopathy    Kobe really does not want to wait for his neck surgery.  He is feeling like the neurologic symptoms are worsening.  He has a cardiac workup scheduled on September 10.  I reached out to his cardiologist personally to see if there is anything we can do or to find out if this is really needed.  If I do not hear back from them I would plan to admit him to the hospital on Friday.  Going to have a phone call with him Friday where we discussed these options and then if we do have to admit him potentially we could have cardiology consulted as an inpatient before planned surgery next week.    Jonathan Prajapati MD      Again, thank you for allowing me to participate in the care of your patient.        Sincerely,        Jonathan Prajapati MD

## 2024-09-03 NOTE — PROGRESS NOTES
Virtual Visit Details    Type of service:  Telephone Visit   Phone call duration: 13 minutes   Originating Location (pt. Location): Home    Distant Location (provider location):  On-site    Diagnosis: Cervical Myelopathy    Kobe really does not want to wait for his neck surgery.  He is feeling like the neurologic symptoms are worsening.  He has a cardiac workup scheduled on September 10.  I reached out to his cardiologist personally to see if there is anything we can do or to find out if this is really needed.  If I do not hear back from them I would plan to admit him to the hospital on Friday.  Going to have a phone call with him Friday where we discussed these options and then if we do have to admit him potentially we could have cardiology consulted as an inpatient before planned surgery next week.    Jonathan Prajapati MD

## 2024-09-03 NOTE — NURSING NOTE
Current patient location: [address]   [place] [address]    Is the patient currently in the state of [place]? YES    Visit mode:TELEPHONE    If the visit is dropped, the patient can be reconnected by: TELEPHONE VISIT: Phone number:   Telephone Information:   Mobile [phone]       Will anyone else be joining the visit? NO  (If patient encounters technical issues they should call [phone]    How would you like to obtain your AVS? MyChart    Are changes needed to the allergy or medication list? Pt declined med review and Pt stated no med changes    Are refills needed on medications prescribed by this physician? NO    Rooming Documentation:  Not applicable      Reason for visit: JOI ALEJANDRA       Libtayo Counseling- I discussed with the patient the risks of Libtayo including but not limited to nausea, vomiting, diarrhea, and bone or muscle pain.  The patient verbalized understanding of the proper use and possible adverse effects of Libtayo.  All of the patient's questions and concerns were addressed.

## 2024-09-04 ENCOUNTER — MYC MEDICAL ADVICE (OUTPATIENT)
Dept: FAMILY MEDICINE | Facility: CLINIC | Age: 64
End: 2024-09-04
Payer: COMMERCIAL

## 2024-09-04 ENCOUNTER — MYC MEDICAL ADVICE (OUTPATIENT)
Dept: NEUROSURGERY | Facility: CLINIC | Age: 64
End: 2024-09-04
Payer: COMMERCIAL

## 2024-09-04 DIAGNOSIS — M47.812 SPONDYLOSIS OF CERVICAL REGION WITHOUT MYELOPATHY OR RADICULOPATHY: Primary | ICD-10-CM

## 2024-09-04 RX ORDER — DIAZEPAM 5 MG
TABLET ORAL
Qty: 2 TABLET | Refills: 0 | Status: SHIPPED | OUTPATIENT
Start: 2024-09-04

## 2024-09-04 NOTE — TELEPHONE ENCOUNTER
----- Message from Jonathan Prajapati sent at 9/3/2024  3:39 PM CDT -----  Thank you Irasema Mg, please see the note below from Cardiology.  Do you have any further objections to us proceeding with surgery as scheduled on 9/11?      Thank you    Travis  ----- Message -----  From: Senthil Walker RN  Sent: 9/3/2024   3:31 PM CDT  To: Jonathan Prajapati MD    Hi Dr. Prajapati,     I am one of Dr. Zhous nurses and helping her go through her messages. We have discussed Kobe and Dr. Zhou is comfortable with proceeding with the surgery as planned. The testing is routine for surveillance and can be postponed to after the surgery if needed. Dr. Zhou has no concerns on her end. Please let us know if you have any further questions or concerns. Thanks!     Angelina RIGGS  ----- Message -----  From: Jonathan Prajapati MD  Sent: 9/3/2024  12:11 PM CDT  To: MD Enriqueta Maier Bruce is scheduled for surgery with me for cervical myelopathy and is quite worried about his progressive neurologic symptoms.    He saw our Anesthesia team for a pre-op H&P and they noted he has a cardiac workup on 9/10.  They recommended he see his cardiology team before completing surgery.  The patient is currently scheduled for surgery on 9/11.    I spoke to the patient about delaying his surgery and he is quite worried about his progressive neurologic symptoms.  He tells me this testing is routine, and is done annually just for monitoring, and did not want to wait.      Based on your knowledge of the patient, do you think we should delay surgery for this workup, and if so is there any way to expedite it?    Thank you    Travis

## 2024-09-05 NOTE — TELEPHONE ENCOUNTER
Called patient and let him know that we did receive clearance from Dr. Prajapati and providers within cardiology to keep patients surgery with Dr. Prajapati scheduled as is on 9/11/24. Patient asked to follow back up should he have any additional questions.    Steph Powers on 9/5/2024 at 3:25 PM]

## 2024-09-06 ENCOUNTER — TRANSFERRED RECORDS (OUTPATIENT)
Dept: HEALTH INFORMATION MANAGEMENT | Facility: CLINIC | Age: 64
End: 2024-09-06

## 2024-09-06 DIAGNOSIS — F43.9 STRESS: ICD-10-CM

## 2024-09-06 RX ORDER — CLONAZEPAM 0.5 MG/1
0.5 TABLET ORAL 3 TIMES DAILY PRN
Qty: 90 TABLET | Refills: 0 | Status: SHIPPED | OUTPATIENT
Start: 2024-09-06

## 2024-09-06 NOTE — TELEPHONE ENCOUNTER
Authorized electronic refill.  Confirmed and checked database today.  UTD and compliant with screening.    Last 8/9/2024

## 2024-09-08 ENCOUNTER — HOSPITAL ENCOUNTER (INPATIENT)
Facility: CLINIC | Age: 64
LOS: 6 days | Discharge: HOME | DRG: 029 | End: 2024-09-14
Attending: EMERGENCY MEDICINE | Admitting: ORTHOPAEDIC SURGERY
Payer: COMMERCIAL

## 2024-09-08 DIAGNOSIS — M48.02 CERVICAL STENOSIS OF SPINAL CANAL: Primary | ICD-10-CM

## 2024-09-08 PROCEDURE — 99223 1ST HOSP IP/OBS HIGH 75: CPT | Performed by: STUDENT IN AN ORGANIZED HEALTH CARE EDUCATION/TRAINING PROGRAM

## 2024-09-08 PROCEDURE — 250N000013 HC RX MED GY IP 250 OP 250 PS 637: Performed by: STUDENT IN AN ORGANIZED HEALTH CARE EDUCATION/TRAINING PROGRAM

## 2024-09-08 PROCEDURE — 999N000127 HC STATISTIC PERIPHERAL IV START W US GUIDANCE

## 2024-09-08 PROCEDURE — 250N000011 HC RX IP 250 OP 636: Performed by: EMERGENCY MEDICINE

## 2024-09-08 PROCEDURE — 99285 EMERGENCY DEPT VISIT HI MDM: CPT | Mod: 25 | Performed by: EMERGENCY MEDICINE

## 2024-09-08 PROCEDURE — 120N000002 HC R&B MED SURG/OB UMMC

## 2024-09-08 PROCEDURE — 99285 EMERGENCY DEPT VISIT HI MDM: CPT | Performed by: EMERGENCY MEDICINE

## 2024-09-08 PROCEDURE — 96374 THER/PROPH/DIAG INJ IV PUSH: CPT | Performed by: EMERGENCY MEDICINE

## 2024-09-08 PROCEDURE — 250N000011 HC RX IP 250 OP 636: Performed by: STUDENT IN AN ORGANIZED HEALTH CARE EDUCATION/TRAINING PROGRAM

## 2024-09-08 PROCEDURE — 250N000013 HC RX MED GY IP 250 OP 250 PS 637: Performed by: EMERGENCY MEDICINE

## 2024-09-08 RX ORDER — AMOXICILLIN 250 MG
1-2 CAPSULE ORAL 2 TIMES DAILY
Status: DISCONTINUED | OUTPATIENT
Start: 2024-09-08 | End: 2024-09-10

## 2024-09-08 RX ORDER — CYCLOBENZAPRINE HCL 10 MG
10-20 TABLET ORAL DAILY PRN
Status: DISCONTINUED | OUTPATIENT
Start: 2024-09-08 | End: 2024-09-12

## 2024-09-08 RX ORDER — CARVEDILOL 25 MG/1
50 TABLET ORAL 2 TIMES DAILY WITH MEALS
Status: DISCONTINUED | OUTPATIENT
Start: 2024-09-08 | End: 2024-09-09

## 2024-09-08 RX ORDER — ONDANSETRON 4 MG/1
4 TABLET, ORALLY DISINTEGRATING ORAL EVERY 6 HOURS PRN
Status: DISCONTINUED | OUTPATIENT
Start: 2024-09-08 | End: 2024-09-14 | Stop reason: HOSPADM

## 2024-09-08 RX ORDER — AMOXICILLIN 250 MG
1 CAPSULE ORAL 2 TIMES DAILY
Status: DISCONTINUED | OUTPATIENT
Start: 2024-09-08 | End: 2024-09-08

## 2024-09-08 RX ORDER — ONDANSETRON 2 MG/ML
4 INJECTION INTRAMUSCULAR; INTRAVENOUS EVERY 6 HOURS PRN
Status: DISCONTINUED | OUTPATIENT
Start: 2024-09-08 | End: 2024-09-14 | Stop reason: HOSPADM

## 2024-09-08 RX ORDER — HYDROMORPHONE HCL IN WATER/PF 6 MG/30 ML
0.2 PATIENT CONTROLLED ANALGESIA SYRINGE INTRAVENOUS
Status: DISCONTINUED | OUTPATIENT
Start: 2024-09-08 | End: 2024-09-08

## 2024-09-08 RX ORDER — PROCHLORPERAZINE MALEATE 5 MG
10 TABLET ORAL EVERY 6 HOURS PRN
Status: DISCONTINUED | OUTPATIENT
Start: 2024-09-08 | End: 2024-09-14 | Stop reason: HOSPADM

## 2024-09-08 RX ORDER — AMOXICILLIN 250 MG
3 CAPSULE ORAL EVERY EVENING
COMMUNITY

## 2024-09-08 RX ORDER — FENTANYL 25 UG/1
25 PATCH TRANSDERMAL
Status: DISCONTINUED | OUTPATIENT
Start: 2024-09-09 | End: 2024-09-08

## 2024-09-08 RX ORDER — CLONAZEPAM 0.5 MG/1
0.5 TABLET ORAL 3 TIMES DAILY PRN
Status: DISCONTINUED | OUTPATIENT
Start: 2024-09-08 | End: 2024-09-14 | Stop reason: HOSPADM

## 2024-09-08 RX ORDER — POLYETHYLENE GLYCOL 3350 17 G/17G
17 POWDER, FOR SOLUTION ORAL DAILY
Status: DISCONTINUED | OUTPATIENT
Start: 2024-09-09 | End: 2024-09-08

## 2024-09-08 RX ORDER — NALOXONE HYDROCHLORIDE 0.4 MG/ML
0.2 INJECTION, SOLUTION INTRAMUSCULAR; INTRAVENOUS; SUBCUTANEOUS
Status: DISCONTINUED | OUTPATIENT
Start: 2024-09-08 | End: 2024-09-14 | Stop reason: HOSPADM

## 2024-09-08 RX ORDER — ACETAMINOPHEN 325 MG/1
975 TABLET ORAL EVERY 8 HOURS
Status: COMPLETED | OUTPATIENT
Start: 2024-09-08 | End: 2024-09-11

## 2024-09-08 RX ORDER — LIDOCAINE 40 MG/G
CREAM TOPICAL
Status: DISCONTINUED | OUTPATIENT
Start: 2024-09-08 | End: 2024-09-11

## 2024-09-08 RX ORDER — BUPROPION HYDROCHLORIDE 200 MG/1
200 TABLET, EXTENDED RELEASE ORAL 2 TIMES DAILY
Status: DISCONTINUED | OUTPATIENT
Start: 2024-09-08 | End: 2024-09-09

## 2024-09-08 RX ORDER — BISACODYL 10 MG
10 SUPPOSITORY, RECTAL RECTAL DAILY PRN
Status: DISCONTINUED | OUTPATIENT
Start: 2024-09-11 | End: 2024-09-14 | Stop reason: HOSPADM

## 2024-09-08 RX ORDER — POLYETHYLENE GLYCOL 3350 17 G/17G
17 POWDER, FOR SOLUTION ORAL 2 TIMES DAILY
Status: DISCONTINUED | OUTPATIENT
Start: 2024-09-08 | End: 2024-09-10

## 2024-09-08 RX ORDER — DEXAMETHASONE SODIUM PHOSPHATE 10 MG/ML
10 INJECTION, SOLUTION INTRAMUSCULAR; INTRAVENOUS EVERY 8 HOURS
Status: DISCONTINUED | OUTPATIENT
Start: 2024-09-08 | End: 2024-09-13

## 2024-09-08 RX ORDER — NALOXONE HYDROCHLORIDE 0.4 MG/ML
0.4 INJECTION, SOLUTION INTRAMUSCULAR; INTRAVENOUS; SUBCUTANEOUS
Status: DISCONTINUED | OUTPATIENT
Start: 2024-09-08 | End: 2024-09-14 | Stop reason: HOSPADM

## 2024-09-08 RX ORDER — OXYCODONE HYDROCHLORIDE 5 MG/1
5 TABLET ORAL EVERY 4 HOURS PRN
Status: DISCONTINUED | OUTPATIENT
Start: 2024-09-08 | End: 2024-09-08

## 2024-09-08 RX ORDER — FENTANYL 25 UG/1
25 PATCH TRANSDERMAL
Status: DISCONTINUED | OUTPATIENT
Start: 2024-09-09 | End: 2024-09-14 | Stop reason: HOSPADM

## 2024-09-08 RX ORDER — ATORVASTATIN CALCIUM 40 MG/1
40 TABLET, FILM COATED ORAL EVERY EVENING
Status: DISCONTINUED | OUTPATIENT
Start: 2024-09-08 | End: 2024-09-09

## 2024-09-08 RX ORDER — ESCITALOPRAM OXALATE 10 MG/1
30 TABLET ORAL EVERY MORNING
Status: DISCONTINUED | OUTPATIENT
Start: 2024-09-09 | End: 2024-09-08

## 2024-09-08 RX ORDER — PANTOPRAZOLE SODIUM 40 MG/1
40 TABLET, DELAYED RELEASE ORAL DAILY PRN
Status: DISCONTINUED | OUTPATIENT
Start: 2024-09-08 | End: 2024-09-13

## 2024-09-08 RX ORDER — ACETAMINOPHEN 325 MG/1
650 TABLET ORAL EVERY 4 HOURS PRN
Status: DISCONTINUED | OUTPATIENT
Start: 2024-09-11 | End: 2024-09-11

## 2024-09-08 RX ORDER — CYCLOBENZAPRINE HCL 10 MG
10 TABLET ORAL ONCE
Status: DISCONTINUED | OUTPATIENT
Start: 2024-09-08 | End: 2024-09-08

## 2024-09-08 RX ORDER — AMOXICILLIN 250 MG
2 CAPSULE ORAL EVERY MORNING
COMMUNITY

## 2024-09-08 RX ORDER — OXYCODONE HYDROCHLORIDE 15 MG/1
30 TABLET ORAL 2 TIMES DAILY PRN
Status: DISCONTINUED | OUTPATIENT
Start: 2024-09-08 | End: 2024-09-10

## 2024-09-08 RX ORDER — HYDROMORPHONE HYDROCHLORIDE 1 MG/ML
0.5 INJECTION, SOLUTION INTRAMUSCULAR; INTRAVENOUS; SUBCUTANEOUS
Status: CANCELLED | OUTPATIENT
Start: 2024-09-08

## 2024-09-08 RX ORDER — OXYCODONE HYDROCHLORIDE 10 MG/1
10 TABLET ORAL EVERY 4 HOURS PRN
Status: DISCONTINUED | OUTPATIENT
Start: 2024-09-08 | End: 2024-09-08

## 2024-09-08 RX ORDER — BISACODYL 5 MG
5-10 TABLET, DELAYED RELEASE (ENTERIC COATED) ORAL 2 TIMES DAILY
Status: DISCONTINUED | OUTPATIENT
Start: 2024-09-08 | End: 2024-09-14 | Stop reason: HOSPADM

## 2024-09-08 RX ORDER — ESCITALOPRAM OXALATE 10 MG/1
20 TABLET ORAL EVERY MORNING
Status: DISCONTINUED | OUTPATIENT
Start: 2024-09-09 | End: 2024-09-09

## 2024-09-08 RX ORDER — HYDROMORPHONE HCL IN WATER/PF 6 MG/30 ML
0.4 PATIENT CONTROLLED ANALGESIA SYRINGE INTRAVENOUS
Status: DISCONTINUED | OUTPATIENT
Start: 2024-09-08 | End: 2024-09-08

## 2024-09-08 RX ORDER — HYDROMORPHONE HYDROCHLORIDE 1 MG/ML
0.5 INJECTION, SOLUTION INTRAMUSCULAR; INTRAVENOUS; SUBCUTANEOUS
Status: DISCONTINUED | OUTPATIENT
Start: 2024-09-08 | End: 2024-09-08

## 2024-09-08 RX ORDER — DOCUSATE SODIUM 100 MG/1
100 CAPSULE, LIQUID FILLED ORAL EVERY EVENING
COMMUNITY

## 2024-09-08 RX ADMIN — HYDROMORPHONE HYDROCHLORIDE 0.5 MG: 1 INJECTION, SOLUTION INTRAMUSCULAR; INTRAVENOUS; SUBCUTANEOUS at 12:10

## 2024-09-08 RX ADMIN — ATORVASTATIN CALCIUM 40 MG: 40 TABLET, FILM COATED ORAL at 20:14

## 2024-09-08 RX ADMIN — SENNOSIDES AND DOCUSATE SODIUM 1 TABLET: 50; 8.6 TABLET ORAL at 20:14

## 2024-09-08 RX ADMIN — CYCLOBENZAPRINE 10 MG: 10 TABLET, FILM COATED ORAL at 13:25

## 2024-09-08 RX ADMIN — CARVEDILOL 50 MG: 25 TABLET, FILM COATED ORAL at 22:05

## 2024-09-08 RX ADMIN — BUPROPION HYDROCHLORIDE 200 MG: 200 TABLET, FILM COATED ORAL at 20:14

## 2024-09-08 RX ADMIN — BISACODYL 5 MG: 5 TABLET, COATED ORAL at 20:14

## 2024-09-08 RX ADMIN — OXYCODONE HYDROCHLORIDE 30 MG: 15 TABLET ORAL at 20:14

## 2024-09-08 RX ADMIN — DEXAMETHASONE SODIUM PHOSPHATE 10 MG: 10 INJECTION, SOLUTION INTRAMUSCULAR; INTRAVENOUS at 20:16

## 2024-09-08 ASSESSMENT — ACTIVITIES OF DAILY LIVING (ADL)
ADLS_ACUITY_SCORE: 38
ADLS_ACUITY_SCORE: 23
ADLS_ACUITY_SCORE: 38

## 2024-09-08 ASSESSMENT — COLUMBIA-SUICIDE SEVERITY RATING SCALE - C-SSRS
2. HAVE YOU ACTUALLY HAD ANY THOUGHTS OF KILLING YOURSELF IN THE PAST MONTH?: NO
1. IN THE PAST MONTH, HAVE YOU WISHED YOU WERE DEAD OR WISHED YOU COULD GO TO SLEEP AND NOT WAKE UP?: NO
6. HAVE YOU EVER DONE ANYTHING, STARTED TO DO ANYTHING, OR PREPARED TO DO ANYTHING TO END YOUR LIFE?: NO

## 2024-09-08 NOTE — ED TRIAGE NOTES
States that he is suppose to be seen on Tuesday at [facility] for a cardiac work up     Triage Assessment (Adult)       Row Name 09/08/24 0732          Triage Assessment    Airway WDL WDL        Respiratory WDL    Respiratory WDL WDL        Skin Circulation/Temperature WDL    Skin Circulation/Temperature WDL WDL        Cardiac WDL    Cardiac WDL WDL        Peripheral/Neurovascular WDL    Peripheral Neurovascular WDL WDL        Cognitive/Neuro/Behavioral WDL    Cognitive/Neuro/Behavioral WDL WDL

## 2024-09-08 NOTE — ED PROVIDER NOTES
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 He is scheduled for cervical laminoplasty with orthopedic surgery on 9/11 and was recently cleared by his cardiology and anesthesia team for surgery.  He states that his neck pain, myelopathy, and radiculopathy radiating down his lumbar spine are getting worse and he is concerned that he will fall and he would like surgery sooner if possible.  He is having no chest pain, shortness of breath, palpitations. He wants to be admitted and have orthopedic surgery see him today.    A medically appropriate review of systems was performed with pertinent positives and negatives noted in the HPI, and all other systems negative.    Physical Exam   BP: (!) 151/77  Pulse: 59  Temp: 97.3  F (36.3  C)  Resp: 16  Height: 177.8 cm (5' 10\")  Weight: 106.1 kg (234 lb)  SpO2: 96 %  Physical Exam  Vitals and nursing note reviewed.   Constitutional:       General: He is not in acute distress.     Appearance: Normal appearance. He is not toxic-appearing.   HENT:      Head: Normocephalic and atraumatic.   Eyes:      General: No scleral icterus.     Conjunctiva/sclera: Conjunctivae normal.   Cardiovascular:      Rate and Rhythm: Normal rate and regular rhythm.      Heart sounds: Normal heart sounds.   Pulmonary:      Effort: Pulmonary effort is normal. No respiratory distress.      Breath sounds: Normal breath sounds.   Abdominal:      Palpations: Abdomen is soft.      Tenderness: There is no abdominal tenderness.   Musculoskeletal:         General: No deformity. Normal range of motion.      Cervical back: Neck supple.   Skin:     General: Skin is warm.   Neurological:      Mental Status: He is alert.      Motor: No " weakness.      Gait: Gait abnormal.       ED Course, Procedures, & Data           Results for orders placed or performed during the hospital encounter of 09/08/24   XR Surgery EDUARDO L/T 5 Min Fluoro     Status: None    Narrative    This exam was marked as non-reportable because it will not be read by a   radiologist or a [facility] non-radiologist provider.         Glucose by meter     Status: Abnormal   Result Value Ref Range    GLUCOSE BY METER POCT 154 (H) 70 - 99 mg/dL   Glucose by meter     Status: Abnormal   Result Value Ref Range    GLUCOSE BY METER POCT 175 (H) 70 - 99 mg/dL   Glucose by meter     Status: Abnormal   Result Value Ref Range    GLUCOSE BY METER POCT 197 (H) 70 - 99 mg/dL   Glucose by meter     Status: Abnormal   Result Value Ref Range    GLUCOSE BY METER POCT 195 (H) 70 - 99 mg/dL   Glucose by meter     Status: Abnormal   Result Value Ref Range    GLUCOSE BY METER POCT 227 (H) 70 - 99 mg/dL   Glucose by meter     Status: Abnormal   Result Value Ref Range    GLUCOSE BY METER POCT 231 (H) 70 - 99 mg/dL   Glucose by meter     Status: Abnormal   Result Value Ref Range    GLUCOSE BY METER POCT 165 (H) 70 - 99 mg/dL   Glucose by meter     Status: Abnormal   Result Value Ref Range    GLUCOSE BY METER POCT 158 (H) 70 - 99 mg/dL   Glucose by meter     Status: Abnormal   Result Value Ref Range    GLUCOSE BY METER POCT 237 (H) 70 - 99 mg/dL   Basic metabolic panel     Status: Abnormal   Result Value Ref Range    Sodium 133 (L) 135 - 145 mmol/L    Potassium 4.3 3.4 - 5.3 mmol/L    Chloride 100 98 - 107 mmol/L    Carbon Dioxide (CO2) 25 22 - 29 mmol/L    Anion Gap 8 7 - 15 mmol/L    Urea Nitrogen 32.0 (H) 8.0 - 23.0 mg/dL    Creatinine 1.09 0.67 - 1.17 mg/dL    GFR Estimate 76 >60 mL/min/1.73m2    Calcium 9.2 8.8 - 10.4 mg/dL    Glucose 180 (H) 70 - 99 mg/dL   INR     Status: Normal   Result Value Ref Range    INR 1.05 0.85 - 1.15   CBC with platelets and differential     Status: Abnormal   Result Value Ref  Range    WBC Count 20.9 (H) 4.0 - 11.0 10e3/uL    RBC Count 4.98 4.40 - 5.90 10e6/uL    Hemoglobin 15.2 13.3 - 17.7 g/dL    Hematocrit 42.8 40.0 - 53.0 %    MCV 86 78 - 100 fL    MCH 30.5 26.5 - 33.0 pg    MCHC 35.5 31.5 - 36.5 g/dL    RDW 12.7 10.0 - 15.0 %    Platelet Count 184 150 - 450 10e3/uL    % Neutrophils 93 %    % Lymphocytes 3 %    % Monocytes 2 %    % Eosinophils 0 %    % Basophils 0 %    % Immature Granulocytes 2 %    NRBCs per 100 WBC 0 <1 /100    Absolute Neutrophils 19.4 (H) 1.6 - 8.3 10e3/uL    Absolute Lymphocytes 0.7 (L) 0.8 - 5.3 10e3/uL    Absolute Monocytes 0.3 0.0 - 1.3 10e3/uL    Absolute Eosinophils 0.1 0.0 - 0.7 10e3/uL    Absolute Basophils 0.0 0.0 - 0.2 10e3/uL    Absolute Immature Granulocytes 0.3 <=0.4 10e3/uL    Absolute NRBCs 0.0 10e3/uL   Glucose by meter     Status: Abnormal   Result Value Ref Range    GLUCOSE BY METER POCT 197 (H) 70 - 99 mg/dL   Glucose by meter     Status: Abnormal   Result Value Ref Range    GLUCOSE BY METER POCT 171 (H) 70 - 99 mg/dL   Hemoglobin     Status: Normal   Result Value Ref Range    Hemoglobin 16.0 13.3 - 17.7 g/dL   Glucose by meter     Status: Abnormal   Result Value Ref Range    GLUCOSE BY METER POCT 147 (H) 70 - 99 mg/dL   Glucose by meter     Status: Abnormal   Result Value Ref Range    GLUCOSE BY METER POCT 218 (H) 70 - 99 mg/dL   Echo Complete     Status: None   Result Value Ref Range    LVEF  60-65%     [facility]    [id number]  Formerly [facility]  [id number]  [id number]^ISAURA HAGAN^[facility]  Echocardiography Laboratory  [address] [place] [id number]     Name: RAZA SHEPPARD  MRN: [id number]  : 1960  Study Date: 2024 01:55 PM  Age: 64 yrs  Gender: Male  Patient Location: [id number]  Reason For Study: Cardiomyopathy  Ordering Physician: EJ BAKER  Performed By: Keke Mccabe RDCS     BSA: 2.2 m2  Height: 70 in  Weight: 234 lb  HR: 67  BP: 138/71  mmHg  ______________________________________________________________________________  Procedure  Complete Portable Echo Adult. Contrast Optison. Poor acoustic windows. Optison  (NDC #0113-4582-36) given intravenously. Patient was given 5 ml mixture of 3  ml Optison and 6 ml saline. 4 ml wasted.  ______________________________________________________________________________  Interpretation Summary  H/O Bicuspid aortic valve with valve sparing aortic root replacement (2016).     Left ventricular function is normal.The ejection fraction is 60-65%. No  regional wall motion abnormalities are seen.  Global right ventricular function is normal.  The aortic valve is bicuspid. Fusion of the right and left coronary cusps.  Lucy I. Trace aortic insufficiency is present.  No pericardial effusion is present.     This study was compared with the study from 7/24/23 .  No significant changes noted.  ______________________________________________________________________________  Left Ventricle  Left ventricular function is normal.The ejection fraction is 60-65%. Left  ventricular wall thickness is normal. Left ventricular size is normal. Left  ventricular diastolic function is normal. No regional wall motion  abnormalities are seen.     Right Ventricle  The right ventricle is normal size. Global right ventricular function is  normal.     Atria  Both atria appear normal.     Mitral Valve  The mitral valve is normal. Trace mitral insufficiency is present.     Aortic Valve  The aortic valve is bicuspid. Mild aortic valve calcification is present.  Fusion of the right and left coronary cusps. Lucy I. Trace aortic  insufficiency is present.     Tricuspid Valve  The tricuspid valve is normal. Trace tricuspid insufficiency is present. The  peak velocity of the tricuspid regurgitant jet is not obtainable. Pulmonary  artery systolic pressure cannot be assessed.     Pulmonic Valve  The pulmonic valve is normal. Trace pulmonic  insufficiency is present.     Vessels  The aorta root is normal. Sinuses of Valsalva normal when indexed to BSA (3.9  cm, index 1.8 cm/m2). The inferior vena cava cannot be assessed. Unable to  assess mean RA pressure due to technically difficult study.     Pericardium  No pericardial effusion is present.     Compared to Previous Study  This study was compared with the study from 23 . No significant changes  noted.     Attestation  I have personally viewed the imaging and agree with the interpretation and  report as documented by the fellow, Kel Sebastian, and/or edited by me.  ______________________________________________________________________________  MMode/2D Measurements & Calculations  IVSd: 1.0 cm  LVIDd: 4.7 cm  LVIDs: 2.9 cm  LVPWd: 0.93 cm  FS: 38.7 %  LV mass(C)d: 162.2 grams  LV mass(C)dI: 72.7 grams/m2  Ao root diam: 3.9 cm  asc Aorta Diam: 3.5 cm  LVOT diam: 2.5 cm  LVOT area: 4.9 cm2  Ao root diam index Ht(cm/m): 2.2  Ao root diam index BSA (cm/m2): 1.7  Asc Ao diam index BSA (cm/m2): 1.6  Asc Ao diam index Ht(cm/m): 2.0  LA Volume (BP): 52.8 ml     LA Volume Index (BP): 23.7 ml/m2  RWT: 0.39  TAPSE: 1.4 cm     Doppler Measurements & Calculations  MV E max driss: 48.3 cm/sec  MV A max driss: 57.6 cm/sec  MV E/A: 0.84  MV dec time: 0.20 sec  Ao V2 max: 202.0 cm/sec  Ao max P.3 mmHg  Ao V2 mean: 133.0 cm/sec  Ao mean P.0 mmHg  Ao V2 VTI: 40.0 cm  VINCE(I,D): 2.5 cm2  VINCE(V,D): 2.4 cm2  LV V1 max PG: 3.9 mmHg  LV V1 max: 98.5 cm/sec  LV V1 VTI: 20.1 cm  SV(LVOT): 98.7 ml  SI(LVOT): 44.2 ml/m2  PA acc time: 0.12 sec     AV Driss Ratio (DI): 0.49  VINCE Index (cm2/m2): 1.1  E/E' av.7  Lateral E/e': 4.3  Medial E/e': 7.1  RV S Driss: 11.0 cm/sec     ______________________________________________________________________________  Report approved by: Cristo MARRERO 2024 03:35 PM         Adult Type and Screen     Status: None   Result Value Ref Range    ABO/RH(D) O POS     Antibody Screen  Negative Negative    SPECIMEN EXPIRATION DATE [id number]    CBC with Platelets & Differential     Status: Abnormal    Narrative    The following orders were created for panel order CBC with Platelets & Differential.  Procedure                               Abnormality         Status                     ---------                               -----------         ------                     CBC with platelets and d...[[id number]]  Abnormal            Final result                 Please view results for these tests on the individual orders.   ABO/Rh type and screen     Status: None    Narrative    The following orders were created for panel order ABO/Rh type and screen.  Procedure                               Abnormality         Status                     ---------                               -----------         ------                     Adult Type and Screen[[id number]]                            Final result                 Please view results for these tests on the individual orders.     Medications   sodium chloride (PF) 0.9% PF flush 3 mL (3 mLs Intracatheter $Given 9/12/24 2036)   sodium chloride (PF) 0.9% PF flush 3 mL ( Intracatheter Unhold 9/11/24 1620)   ondansetron (ZOFRAN ODT) ODT tab 4 mg ( Oral Unhold 9/11/24 1620)     Or   ondansetron (ZOFRAN) injection 4 mg ( Intravenous Unhold 9/11/24 1620)   prochlorperazine (COMPAZINE) injection 10 mg ( Intravenous Unhold 9/11/24 1620)     Or   prochlorperazine (COMPAZINE) tablet 10 mg ( Oral Unhold 9/11/24 1620)   bisacodyl (DULCOLAX) suppository 10 mg ( Rectal Unhold 9/11/24 1620)   naloxone (NARCAN) injection 0.2 mg ( Intravenous Unhold 9/11/24 1620)     Or   naloxone (NARCAN) injection 0.4 mg ( Intravenous Unhold 9/11/24 1620)     Or   naloxone (NARCAN) injection 0.2 mg ( Intramuscular Unhold 9/11/24 1620)     Or   naloxone (NARCAN) injection 0.4 mg ( Intramuscular Unhold 9/11/24 1620)   clonazePAM (klonoPIN) tablet 0.5 mg (0.5 mg Oral $Given 9/12/24 2043)    pantoprazole (PROTONIX) EC tablet 40 mg ( Oral Unhold 9/11/24 1620)   bisacodyl (DULCOLAX) EC tablet 5-10 mg (10 mg Oral $Given 9/12/24 1653)   dexAMETHasone PF (DECADRON) injection 10 mg (10 mg Intravenous $Given 9/12/24 2035)   fentaNYL (DURAGESIC) 25 mcg/hr 72 hr patch 1 patch ( Transdermal Unhold 9/11/24 1620)     And   fentaNYL (DURAGESIC) Patch in Place ( Transdermal Patch in Place 9/12/24 2036)   tiZANidine (ZANAFLEX) tablet 2-4 mg ( Oral Unhold 9/11/24 1620)   buPROPion (WELLBUTRIN SR) 12 hr tablet 200 mg (200 mg Oral $Given 9/12/24 1652)   carvedilol (COREG) tablet 50 mg (50 mg Oral $Given 9/12/24 1652)   escitalopram (LEXAPRO) tablet 20 mg (20 mg Oral $Given 9/12/24 0504)   atorvastatin (LIPITOR) tablet 40 mg (40 mg Oral $Given 9/12/24 1652)   amLODIPine (NORVASC) tablet 5 mg (5 mg Oral $Given 9/12/24 1652)     And   lisinopril (ZESTRIL) tablet 20 mg (20 mg Oral $Given 9/12/24 1652)   simethicone (MYLICON) suspension 40 mg ( Oral Unhold 9/11/24 1620)   polyethylene glycol (MIRALAX) Packet 17 g ( Oral Unhold 9/11/24 1620)   senna-docusate (SENOKOT-S/PERICOLACE) 8.6-50 MG per tablet 2 tablet (2 tablets Oral $Given 9/12/24 0905)     And   senna-docusate (SENOKOT-S/PERICOLACE) 8.6-50 MG per tablet 3 tablet (3 tablets Oral Not Given 9/12/24 1655)   lidocaine 1 % 0.1-1 mL (has no administration in time range)   lidocaine (LMX4) cream (has no administration in time range)   sodium chloride (PF) 0.9% PF flush 3 mL (3 mLs Intracatheter Not Given 9/12/24 1615)   sodium chloride (PF) 0.9% PF flush 3 mL (has no administration in time range)   sodium chloride 0.9 % infusion ( Intravenous $New Bag 9/12/24 0304)   ceFAZolin Sodium (ANCEF) injection 2 g (2 g Intravenous $Given 9/12/24 2036)   polyethylene glycol (MIRALAX) Packet 17 g (17 g Oral Not Given 9/12/24 0917)   magnesium hydroxide (MILK OF MAGNESIA) suspension 30 mL (has no administration in time range)   bisacodyl (DULCOLAX) suppository 10 mg (has no  administration in time range)   famotidine (PEPCID) tablet 20 mg (20 mg Oral $Given 9/12/24 2035)     Or   famotidine (PEPCID) injection 20 mg ( Intravenous See Alternative 9/12/24 2035)   benzocaine-menthol (CHLORASEPTIC) 6-10 MG lozenge 1 lozenge (has no administration in time range)   HYDROmorphone (PF) (DILAUDID) injection 0.3 mg ( Intravenous See Alternative 9/12/24 0240)     Or   HYDROmorphone (PF) (DILAUDID) injection 0.4 mg (0.4 mg Intravenous $Given 9/12/24 0240)   oxyCODONE (ROXICODONE) tablet 15 mg ( Oral See Alternative 9/12/24 1735)     Or   oxyCODONE IR (ROXICODONE) tablet 30 mg (30 mg Oral $Given 9/12/24 1735)   acetaminophen (TYLENOL) tablet 975 mg (975 mg Oral $Given 9/12/24 1652)   acetaminophen (TYLENOL) tablet 650 mg (has no administration in time range)   cyclobenzaprine (FLEXERIL) tablet 10 mg (has no administration in time range)   acetaminophen (TYLENOL) tablet 975 mg (975 mg Oral $Given 9/11/24 0927)   perflutren diluted 1mL to 2mL with saline (OPTISON) diluted injection 5 mL (5 mLs Intravenous $Given 9/9/24 1425)   oxyCODONE IR (ROXICODONE) tablet 10 mg (10 mg Oral $Given 9/11/24 1508)   HYDROmorphone (PF) (DILAUDID) 0.5 MG/0.5 ML injection (  Not Given 9/11/24 1926)     Labs Ordered and Resulted from Time of ED Arrival to Time of ED Departure - No data to display  XR Surgery EDUARDO L/T 5 Min Fluoro   Final Result      Echo Complete   Final Result      XR Cervical Spine 2/3 Views    (Results Pending)              Assessment & Plan    Kobe Buchanan is a 64 year old male with a history of 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    Neck pain  Patient is scheduled for cervical laminoplasty orthopedic surgery on 9/11. Given IV Dilaudid for pain. Consulted orthopedic surgery to examine him. Orthopedic surgery did not find any urgent neurological findings that would necessitate expediting his surgery and will plan to admit him until his scheduled surgery on 9/11.      Disposition  Patient was admitted to the orthopedic surgery service.    --    ED Attending Physician Attestation    I BRENT MACIAS MD, cared for this patient with the Medical Student. I performed, or re-performed, the physical exam and medical decision-making. I have verified the accuracy of all the medical student findings and documentation above, and have edited as necessary.    Summary of HPI, PE, ED Course   Patient is a 64 year old male evaluated in the emergency department for neck pain. Exam and ED course notable for neck pain and known stenosis. After the completion of care in the emergency department, the patient was admitted to inpatient.      BRENT MACIAS MD  Emergency Medicine       I have reviewed the nursing notes. I have reviewed the findings, diagnosis, plan and need for follow up with the patient.    Current Discharge Medication List          Final diagnoses:   Cervical stenosis of spinal canal       Aram Jerome, MS4  [facility] within [facility] EMERGENCY DEPARTMENT  9/8/2024     Brent Macias MD  09/13/24 0054

## 2024-09-08 NOTE — CONSULTS
[facility]  Consult Note - Hospitalist Service  Date of Admission:  9/8/2024  Consult Requested by: ortho  Reason for Consult: medical co-management    Assessment & Plan   Kobe Buchanan is a 64 year old male with PMH of HTN, HLD, prior smoking hx, bicuspid AV s/p valve sparing repair with Gelweave graft (2016), NICM (LVEF 30-35%, now resolved), SAVITA, anemia, BPPV, T2DM, GERD, CKD, chronic pain, MDD, THERESA, degenerative joint disease s/p bilateral shoulder replacement 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 Prajapati on 9/11 but p/w worsening sxs and inability to care for himself at home.     #Cervical stenosis   #Cervical radiculopathy  #Hx of C3 and C7 laminectomies and C4-6 laminoplasty with medtronic plates  #Chronic opioid use  Pt follows with Ortho as an outpatient.  Was scheduled for cervical procedure on 9/11.  Began having shocklike shooting pains down his neck to his lower back starting this morning.  Reports intermittent numbness and tingling down BUEs, denies weakness or sensory changes in BLEs.  Symptoms are sometimes reproduced by neck flexion and extension, however was unable to reproduce symptoms on exam.  Was seen by Ortho in ED, plans to start Decadron.  Patient is on chronic opiates with significant opiate tolerance.  Will continue his current home regimen which consists of fentanyl patch 25 mcg/h every 48 hours, as needed oxycodone 30 mg bid.   - ortho primary  - activity as tolerated  - regular diet  - plan for scheduled surgery on 9/11 (NPO midnight 9/10)   - additional pain mgmt per ortho   - continue home pain regimen   - fentanyl patch 25 mcg/h every 48 h (next patch due 9/9)   - oxycodone 30 mg bid prn   - continue scheduled bowel regimen  - PT/OT    #Hx of nonischemic cardiomyopathy  Per chart hx it is suspectedt his was stress induced. Home meds of  "coreg 50 mg bid, amlodipine-benazepril 5-20 mg bid. Last TTE 7/2023 with recovered LVEF 55-60%, normal LV wall thickness, LV size, LV diastolic function, no WMAs, no significant valvular abnormalities.  - continue carvedilol 50 mg bid with holding parameters  - consider restarting amlodipine-benazepril in AM if BP stable    #Bradycardia  Sinus bradycardia on admission with HR high-50s. Appears stable. No chest pain.  Appears chronic while on Coreg.  - monitor  - consider EKG if worsening    #T2DM   Per chart review, with prior Hgb A1c >6.5%, but most recent was 08/2023 with A1c 5.6%. Not on meds at baseline.  - monitor CBGS for now, no insulin or SSI ordered    #CKD stage 3a   Cr baseline 1.1-1.3. Cr currently 1.32.  - monitor    #Chronic pain   On fentanyl patch 50 mcg/h 72 h patch, naproxen 500 mg bid, oxycodone 10 mg q4h prn.  - defer pain mgmt to primary as above    #MDD  #THERESA  On lexapro 30 mg daily and wellbutrin 200 mg bid. Also on clonazepam 0.5 mg tid prn    #HLD -continue home atorvastatin 40 mg nightly  #HTN - home anti-hypertensives as above  #HLD - on atorvastatin 40 mg daily  #SAVITA - does not use a CPAP at home     Clinically Significant Risk Factors Present on Admission                  # Hypertension: Noted on problem list         # Obesity: Estimated body mass index is 33.58 kg/m  as calculated from the following:    Height as of this encounter: 1.778 m (5' 10\").    Weight as of this encounter: 106.1 kg (234 lb).            Aris Dougherty MD  Hospitalist Service  Securely message with [employer] (more info)  Text page via [facility] Paging/Directory   ______________________________________________________________________    Chief Complaint   Neck pain    History is obtained from the patient    History of Present Illness   Kobe Buchanan is a 64 year old male with PMH of HTN, HLD, prior smoking hx, bicuspid AV s/p valve sparing repair with Gelweave graft (2016), NICM (LVEF 30-35%, now resolved), SAVITA, " "anemia, BPPV, T2DM, GERD, CKD, chronic pain, MDD, THERESA, degenerative joint disease s/p bilateral shoulder replacement 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 Prajapati on 9/11 but p/w worsening sxs and inability to care for himself at home.     Was recently cleared by cards and anesthesia for planned surgery on 9/11. Reports that he has neck pain, radiculopathy that radiates into his mid/lower back) been getting worse.  States that standing this morning he started having \"electric, shock-like\" pains down his neck with neck flexion and extension.  He reports some numbness and tingling down his bilateral arms.  Reports some weakness in bilateral upper extremities when he holds his arms up for prolonged periods of time.  The shocklike pains making concern for falls at home.  Denies leg pain, numbness tingling down BLEs.  Denies symptoms concerning for cauda equina syndrome.    He presented to the ED to see if he could get surgery sooner.  He denies chest pain, shortness of breath, palpitations.  Requested Ortho to see him today upon his presentation to ED.    Was given IV Dilaudid for pain, Ortho evaluated in ED.  No urgent neurologic deficits concerning for emergent surgery at this time.  Currently has plan to continue with his scheduled surgery on 9/11.  He is being admitted for ongoing pain management until then.  Medicine was consulted for medical comanagement.    Patient reports that he prefers to take his morning meds at 0500 and his evening meds at 1700.    ED vitals:   BP 140s-50s/70s-80s, afebrile, HR 50s, RR 16, 97% O2 on RA    Labs:  -No significant abnormalities    EKG:   - pending    Past Medical History    Past Medical History:   Diagnosis Date    Acute systolic heart failure (H) 01/28/2020    Added automatically from request for surgery [id number]    NEMESIO (acute kidney injury) (H24) 02/16/2020    Anxiety     Ascending " aortic aneurysm (H24)     Atrial fibrillation (H) [I48.91] 10/10/2016    Bicuspid aortic valve     BPPV (benign paroxysmal positional vertigo) 07/11/2014    Choledocholithiasis     Chronic narcotic use     Chronic neck pain     Chronic osteoarthritis     CKD (chronic kidney disease) stage 3, GFR 30-59 ml/min (H)     Degenerative joint disease     Depression     Dysthymic disorder     Hx of type 2 diabetes mellitus 03/04/2021    Hyperlipidemia 04/10/2012    Hypertension     Iron deficiency anemia secondary to blood loss (chronic) 08/25/2020    MSSA bacteremia 10/13/2019    Multiple stiff joints     Neck injuries     NSTEMI (non-ST elevated myocardial infarction) (H) 01/29/2020    Obesity 02/09/2015    Pain in shoulder     Plantar fasciitis     Pre-diabetes     S/P reverse total shoulder arthroplasty, left 07/07/2020    S/P reverse total shoulder arthroplasty, right 08/18/2020    Septic joint of left shoulder region (H) 11/13/2019    Septic joint of right shoulder region (H) 10/13/2019    s/p washout    Skin picking habit     Sleep apnea     does not use cpap    Status post total shoulder arthroplasty, left 03/03/2020    Added automatically from request for surgery [id number]       Past Surgical History   Past Surgical History:   Procedure Laterality Date    ARTHROPLASTY SHOULDER  04/15/2014    Procedure: Left Total Shoulder Arthroplasty ;  Surgeon: Analilia Aceves MD;  [facility]    ARTHROPLASTY SHOULDER Right 08/26/2014    Procedure: ARTHROPLASTY SHOULDER;  Surgeon: Analilia Aceves MD;  [facility]    ARTHROSCOPY SHOULDER WITH BIOPSY(IES) Left 01/28/2020    Procedure: Left shoulder arthroscopy and biopsy for culture;  Surgeon: Analilia Aceves MD;  [facility]    BYPASS GASTRIC TAVO-EN-Y, LIVER BIOPSY, COMBINED  08/08/2005    COLONOSCOPY  06/30/2014    Procedure: COMBINED COLONOSCOPY, SINGLE BIOPSY/POLYPECTOMY BY BIOPSY;  Surgeon: Chester Patton MD;  [facility]     COLONOSCOPY N/A 2/15/2024    Procedure: COLONOSCOPY, WITH POLYPECTOMY;  Surgeon: Power Duran MD;  [facility]    CV CORONARY ANGIOGRAM  01/30/2020    Procedure: CV CORONARY ANGIOGRAM;  Surgeon: Néstor Walls MD;  [facility]    CYSTOSCOPY, BLADDER NECK CUTS, COMBINED N/A 07/18/2016    Procedure: COMBINED CYSTOSCOPY, BLADDER NECK CUTS;  Surgeon: Ritu Leslie MD;  [facility]    ENDOSCOPIC RETROGRADE CHOLANGIOPANCREATOGRAM N/A 11/03/2023    Procedure: ENDOSCOPIC RETROGRADE CHOLANGIOPANCREATOGRAPHY, BILIARY SPHINCTEROTOMY, STONE REMOVAL;  Surgeon: Juarez Sevilla MD;  Location: [facility]    ESOPHAGOSCOPY, GASTROSCOPY, DUODENOSCOPY (EGD), COMBINED  06/30/2014    Procedure: COMBINED ESOPHAGOSCOPY, GASTROSCOPY, DUODENOSCOPY (EGD), BIOPSY SINGLE OR MULTIPLE;  Surgeon: Chester Patton MD;  Location:  GI    EXCISE MASS FINGER  06/14/2011    Procedure:EXCISE MASS FINGER; Middle Flexor Cyst; Surgeon:SHAYY RUSSELL; [facility]    IR FINE NEEDLE ASPIRATION W ULTRASOUND  11/13/2019    IR PICC PLACEMENT > 5 YRS OF AGE  11/19/2019    LAPAROSCOPIC CHOLECYSTECTOMY N/A 11/03/2023    Procedure: CHOLECYSTECTOMY, LAPAROSCOPIC;  Surgeon: Shayy Mathis MD;  Location: [facility] OR    LASER HOLMIUM LITHOTRIPSY BLADDER N/A 10/15/2014    Procedure: LASER HOLMIUM LITHOTRIPSY BLADDER;  Surgeon: Sahil Taveras MD;  [facility]    LASER KTP GREEN LIGHT PHOTOSELECTIVE VAPORIZATION PROSTATE  01/23/2014    Procedure: LASER KTP GREEN LIGHT PHOTOSELECTIVE VAPORIZATION PROSTATE;  Greenlight Photovaporization Of Prostate  ;  Surgeon: aShil Taveras MD;  [facility]    OTHER SURGICAL HISTORY  10/04/2016    REPAIR ANEURYSM ASCENDING AORTA    OTHER SURGICAL HISTORY Right 09/23/2019    IRRIGATION AND DEBRIDEMENT UPPER EXTREMITYshoulder    RELEASE TRIGGER FINGER Right 03/31/2017    Procedure: RELEASE TRIGGER FINGER;   Surgeon: Juan Carlos Blunt MD;  [facility]    REMOVE ANTIBIOTIC CEMENT BEADS / SPACER SHOULDER Left 2020    Procedure: Left shoulder removal of spacer;  Surgeon: Analilia Aceves MD;  [facility]    REMOVE ANTIBIOTIC CEMENT BEADS / SPACER SHOULDER Right 2020    Procedure: Removal of right shoulder antibiotic spacer;  Surgeon: Analilia Aceves MD;  [facility]    REMOVE HARDWARE ARTHROPLASTY SHOULDER. I&D, PLACE ANTIBIOTIC CEMENT BE Left 11/15/2019    Procedure: Explantation of left total shoulder arthroplasty, irrigation and debridement, and placement of antibiotic spacer;  Surgeon: Analilia Aceves MD;  [facility]    REPAIR ANEURYSM ASCENDING AORTA N/A 10/04/2016    Procedure: REPAIR ANEURYSM ASCENDING AORTA;  Surgeon: Mckenzie Townsend MD;  [facility]    REVERSE ARTHROPLASTY SHOULDER Right 2020    Procedure: and conversion to reverse total shoulder arthroplasty;  Surgeon: Analilia Aceves MD;  [facility]    SURGICAL EXPOSURE, LAPAROSCOPIC, W/WOUND CLOSURE, FOR ERCP, BY GENERAL SURGERY N/A 2023    Procedure: SURGICAL EXPOSURE FOR ENDOSCOPIC RETROGRADE CHOLANGIOPANCREATOGRAPHY;  Surgeon: Kobe Mathis MD;  [place] [facility] [place]    TURP         Medications   I have reviewed this patient's current medications       Review of Systems    The 10 point Review of Systems is negative other than noted in the HPI or here.     Social History   I have reviewed this patient's social history and updated it with pertinent information if needed.  Social History     Tobacco Use    Smoking status: Former     Current packs/day: 0.00     Average packs/day: 0.5 packs/day for 6.6 years (3.3 ttl pk-yrs)     Types: Cigarettes     Start date: 1977     Quit date: 1983     Years since quittin.0     Passive exposure: Never (per pt)    Smokeless tobacco: Never   Substance Use Topics    Alcohol use: No     Alcohol/week: 0.0  standard drinks of alcohol    Drug use: No         Family History   I have reviewed this patient's family history and updated it with pertinent information if needed.  Family History   Problem Relation Age of Onset    Arthritis Other     Gastrointestinal Disease Other     Cardiovascular Father         aortic aneurysm    Arrhythmia Father     Nephrolithiasis Father     Sleep Apnea Father     Anxiety Disorder Father     Depression Father     Hypertension Father     Obesity Father     Hyperlipidemia Father     Coronary Artery Disease Father     Low Back Problems Father     Spine Problems Father     Cardiovascular Father     Other - See Comments Father         low back problems, spine problems    Anxiety Disorder Mother     Hypertension Mother     Osteoporosis Mother     Obesity Mother     Hyperlipidemia Mother     Low Back Problems Mother     Macular Degeneration Mother     Cataracts Mother     Other - See Comments Mother         low back problems    Anxiety Disorder Sister     Hypertension Sister     Osteoporosis Sister     Obesity Sister     Hyperlipidemia Sister     Low Back Problems Sister     Spine Problems Sister     Anxiety Disorder Sister     Depression Sister     Hypertension Sister     Osteoporosis Sister     Obesity Sister     Hyperlipidemia Sister     Low Back Problems Sister     Anxiety Disorder Sister     Hyperlipidemia Sister     Hypertension Sister     Obesity Sister     Other - See Comments Sister         low back problems, spine problems    Osteoporosis Sister     Anxiety Disorder Sister     Depression Sister     Hyperlipidemia Sister     Hypertension Sister     Other - See Comments Sister         low back problems    Obesity Sister     Osteoporosis Sister     Melanoma No family hx of     Skin Cancer No family hx of     Glaucoma No family hx of          Allergies   Allergies   Allergen Reactions    Ciprofloxacin      History of aortic aneurysms    Tape [Adhesive Tape] Blisters     Blistering - please  use paper tape        Physical Exam   Vital Signs: Temp: 98.4  F (36.9  C) Temp src: Oral BP: (!) 158/80 Pulse: 57   Resp: 18 SpO2: 97 % O2 Device: None (Room air)    Weight: 234 lbs 0 oz    General: NAD, laying in bed  HEENT: EOMI, PERRLA, MMM, full ROM of neck without reproduction of symptoms  Resp: CTAB, no w/r/r, no increased WOB  CV: RRR, no m/r/g, pulses intact and equal, no BLE edema  GI: soft, non-tender, non-distended  Neuro: AOx3, no focal neuro deficits, Strength 5/5 in all 4 extremities, sensation intact and equal in all 4 extremities, intermittent shooting neck/back pains   Psych: mood appropriate, full affect, judgement intact, no SI/SA      Medical Decision Making       75 MINUTES SPENT BY ME on the date of service doing chart review, history, exam, documentation & further activities per the note.      Data   ------------------------- PAST 24 HR DATA REVIEWED -----------------------------------------------        Imaging results reviewed over the past 24 hrs:   No results found for this or any previous visit (from the past 24 hour(s)).

## 2024-09-08 NOTE — CONSULTS
"Consult received for Vascular access care.  See LDA for details. For additional needs place \"Nursing to Consult for Vascular Access\" [id number] order in EPIC.      "

## 2024-09-08 NOTE — H&P
[facility]  Orthopedic Surgery history and physical examination    Name: Kobe Buchanan  Age: 64 year old  MRN: [id number]  YOB: 1960      Assessment and Plan:     Assessment:  Kobe Buchanan is a 64-year-old male patient with cervical canal stenosis and myeloradiculopathy symptoms are progressive.    He is scheduled for cervical laminoplasty with Dr. Prajapati on 9/11/2024.  He is presenting today with worsening symptoms and inability to care for himself at home.    Given his progressive weakness and his concerns about being able to manage with his current level of symptoms and disability at home, he will be admitted to the hospital in anticipation of the upcoming surgery.    We will start Decadron. We will continue with the plan for surgery on 9/11.    Plan:  Activity: As tolerated, use appropriate assistive device and supervision until independent  Diet: Regular diet.  N.p.o. after midnight on 9/10  DVT prophylaxis: Mechanical, SCDs  Pain management: Orals PRN, IV for breakthrough only  X-rays: Imaging complete  Physical Therapy: Mobilization, ROM, ADL's  Occupational Therapy: ADL's  Consults: PT, OT, Medicine. Appreciate assistance in caring for this patient throughout the perioperative period  Disposition: pending operative course      The patient was discussed his surgeon, Dr. Alo Monique MD  Orthopedic Surgery PGY4  [phone]    Please page me directly with any questions/concerns during regular weekday hours before 5 pm. If there is no response, if it is a weekend, or if it is during evening hours then please page the orthopedic surgery resident on call.    History of Present Illness:     Patient was seen and examined by me. History, PMH, Meds, SH, complete ROS (10 organ systems) and PE reviewed with patient and prior medical records.      Kobe Buchanan is a 64-year-old male patient well-known to the orthopedic surgery service for his  following outpatient in the orthopedic spine clinic.  He has a history of severe canal stenosis at C3-4 and C5-7 with cervical radiculopathy and myelopathy.  He is scheduled to undergo Cervical 3 and 7 dome laminectomy and 4-6 laminoplasty with Dr. Prajapati on 9/11/2024.    See previous clinic notes for full description of the history of present illness.  He comes to the emergency department today with worsening of his myelopathy symptoms, severe neck pain and pain radiating down his back.  He reports he is limited in his ambulation, and is now relegated to using a walker for ambulation, this is new for him.  He describes a sensation of his legs buckling out from underneath him.  This is quite troubling to him and it limits his ability to care for himself at home.  He is concerned that he will fall.    Additionally, he describes pain/electrical shocks running from his neck down his back, exacerbated when he leans his head forward.  This is also recent development for him.  He endorses difficulty with fine motor tasks.     Past Medical History:     Past Medical History:   Diagnosis Date    Acute systolic heart failure (H) 01/28/2020    Added automatically from request for surgery [id number]    NEMESIO (acute kidney injury) (H24) 02/16/2020    Anxiety     Ascending aortic aneurysm (H24)     Atrial fibrillation (H) [I48.91] 10/10/2016    Bicuspid aortic valve     BPPV (benign paroxysmal positional vertigo) 07/11/2014    Choledocholithiasis     Chronic narcotic use     Chronic neck pain     Chronic osteoarthritis     CKD (chronic kidney disease) stage 3, GFR 30-59 ml/min (H)     Degenerative joint disease     Depression     Dysthymic disorder     Hx of type 2 diabetes mellitus 03/04/2021    Hyperlipidemia 04/10/2012    Hypertension     Iron deficiency anemia secondary to blood loss (chronic) 08/25/2020    MSSA bacteremia 10/13/2019    Multiple stiff joints     Neck injuries     NSTEMI (non-ST elevated myocardial infarction) (H)  01/29/2020    Obesity 02/09/2015    Pain in shoulder     Plantar fasciitis     Pre-diabetes     S/P reverse total shoulder arthroplasty, left 07/07/2020    S/P reverse total shoulder arthroplasty, right 08/18/2020    Septic joint of left shoulder region (H) 11/13/2019    Septic joint of right shoulder region (H) 10/13/2019    s/p washout    Skin picking habit     Sleep apnea     does not use cpap    Status post total shoulder arthroplasty, left 03/03/2020    Added automatically from request for surgery [id number]       Past Surgical History:     Past Surgical History:   Procedure Laterality Date    ARTHROPLASTY SHOULDER  04/15/2014    Procedure: Left Total Shoulder Arthroplasty ;  Surgeon: Analilia Aceves MD;  [facility]    ARTHROPLASTY SHOULDER Right 08/26/2014    Procedure: ARTHROPLASTY SHOULDER;  Surgeon: Analilia Aceves MD;  [facility]    ARTHROSCOPY SHOULDER WITH BIOPSY(IES) Left 01/28/2020    Procedure: Left shoulder arthroscopy and biopsy for culture;  Surgeon: Analilia Aceves MD;  [facility]    BYPASS GASTRIC TAVO-EN-Y, LIVER BIOPSY, COMBINED  08/08/2005    COLONOSCOPY  06/30/2014    Procedure: COMBINED COLONOSCOPY, SINGLE BIOPSY/POLYPECTOMY BY BIOPSY;  Surgeon: Chester Patton MD;  [facility]: [facility]    COLONOSCOPY N/A 2/15/2024    Procedure: COLONOSCOPY, WITH POLYPECTOMY;  Surgeon: Power Duran MD;  [facility]    CV CORONARY ANGIOGRAM  01/30/2020    Procedure: CV CORONARY ANGIOGRAM;  Surgeon: Néstor Walls MD;  Location:  [facility]    CYSTOSCOPY, BLADDER NECK CUTS, COMBINED N/A 07/18/2016    Procedure: COMBINED CYSTOSCOPY, BLADDER NECK CUTS;  Surgeon: Ritu Leslie MD;  [facility]    ENDOSCOPIC RETROGRADE CHOLANGIOPANCREATOGRAM N/A 11/03/2023    Procedure: ENDOSCOPIC RETROGRADE CHOLANGIOPANCREATOGRAPHY, BILIARY SPHINCTEROTOMY, STONE REMOVAL;  Surgeon: Juarez Sevilla MD;  Location: [facility] [place]     ESOPHAGOSCOPY, GASTROSCOPY, DUODENOSCOPY (EGD), COMBINED  06/30/2014    Procedure: COMBINED ESOPHAGOSCOPY, GASTROSCOPY, DUODENOSCOPY (EGD), BIOPSY SINGLE OR MULTIPLE;  Surgeon: Chester Patton MD;  [facility]    EXCISE MASS FINGER  06/14/2011    Procedure:EXCISE MASS FINGER; Middle Flexor Cyst; Surgeon:SHAYY RUSSELL; [facility]    IR FINE NEEDLE ASPIRATION W ULTRASOUND  11/13/2019    IR PICC PLACEMENT > 5 YRS OF AGE  11/19/2019    LAPAROSCOPIC CHOLECYSTECTOMY N/A 11/03/2023    Procedure: CHOLECYSTECTOMY, LAPAROSCOPIC;  Surgeon: Shayy Mathis MD;  [place] [facility] [place]    LASER HOLMIUM LITHOTRIPSY BLADDER N/A 10/15/2014    Procedure: LASER HOLMIUM LITHOTRIPSY BLADDER;  Surgeon: Sahil Taveras MD;  [facility]: [facility]    LASER KTP GREEN LIGHT PHOTOSELECTIVE VAPORIZATION PROSTATE  01/23/2014    Procedure: LASER KTP GREEN LIGHT PHOTOSELECTIVE VAPORIZATION PROSTATE;  Greenlight Photovaporization Of Prostate  ;  Surgeon: Sahil Taveras MD;  [facility]    OTHER SURGICAL HISTORY  10/04/2016    REPAIR ANEURYSM ASCENDING AORTA    OTHER SURGICAL HISTORY Right 09/23/2019    IRRIGATION AND DEBRIDEMENT UPPER EXTREMITYshoulder    RELEASE TRIGGER FINGER Right 03/31/2017    Procedure: RELEASE TRIGGER FINGER;  Surgeon: Juan Carlos Blunt MD;  [facility]    REMOVE ANTIBIOTIC CEMENT BEADS / SPACER SHOULDER Left 05/08/2020    Procedure: Left shoulder removal of spacer;  Surgeon: Analilia Aceves MD;  [facility]    REMOVE ANTIBIOTIC CEMENT BEADS / SPACER SHOULDER Right 08/18/2020    Procedure: Removal of right shoulder antibiotic spacer;  Surgeon: Analilia Aceves MD;  [facility]    REMOVE HARDWARE ARTHROPLASTY SHOULDER. I&D, PLACE ANTIBIOTIC CEMENT BE Left 11/15/2019    Procedure: Explantation of left total shoulder arthroplasty, irrigation and debridement, and placement of antibiotic spacer;  Surgeon: Analilia Aceves MD;  [facility]     REPAIR ANEURYSM ASCENDING AORTA N/A 10/04/2016    Procedure: REPAIR ANEURYSM ASCENDING AORTA;  Surgeon: Mckenzie Townsend MD;  [facility]    REVERSE ARTHROPLASTY SHOULDER Right 2020    Procedure: and conversion to reverse total shoulder arthroplasty;  Surgeon: Analilia Aceves MD;  [facility]    SURGICAL EXPOSURE, LAPAROSCOPIC, W/WOUND CLOSURE, FOR ERCP, BY GENERAL SURGERY N/A 2023    Procedure: SURGICAL EXPOSURE FOR ENDOSCOPIC RETROGRADE CHOLANGIOPANCREATOGRAPHY;  Surgeon: Kobe Mathis MD;  Location: [facility] [place] [facility]    [facility]         Social History:     Social History     Socioeconomic History    Marital status: Single     Spouse name: None    Number of children: None    Years of education: None    Highest education level: None   Occupational History    Occupation: Disabled   Tobacco Use    Smoking status: Former     Current packs/day: 0.00     Average packs/day: 0.5 packs/day for 6.6 years (3.3 ttl pk-yrs)     Types: Cigarettes     Start date: 1977     Quit date: 1983     Years since quittin.0     Passive exposure: Never (per pt)    Smokeless tobacco: Never   Substance and Sexual Activity    Alcohol use: No     Alcohol/week: 0.0 standard drinks of alcohol    Drug use: No    Sexual activity: Not Currently     Partners: Female     Birth control/protection: Abstinence   Social History Narrative    [place] one sister, Supri[place] in [facility] [facility].  Does live with 2 dogs.      Social Determinants of Health     Financial Resource Strain: Low Risk  (2023)    Financial Resource Strain     Within the past 12 months, have you or your family members you live with been unable to get utilities (heat, electricity) when it was really needed?: No   Food Insecurity: Low Risk  (2023)    Food Insecurity     Within the past 12 months, did you worry that your food would run out before you got money to buy more?: No     Within the  past 12 months, did the food you bought just not last and you didn t have money to get more?: No   Transportation Needs: Low Risk  (12/21/2023)    Transportation Needs     Within the past 12 months, has lack of transportation kept you from medical appointments, getting your medicines, non-medical meetings or appointments, work, or from getting things that you need?: No   Social Connections: Socially Isolated (8/31/2021)    Social Connection and Isolation Panel [NHANES]     Frequency of Communication with Friends and Family: Once a week     Frequency of Social Gatherings with Friends and Family: Never     Attends Faith Services: Never     Active Member of Clubs or Organizations: No     Attends Club or Organization Meetings: Never     Marital Status: Never    Interpersonal Safety: Low Risk  (5/14/2024)    Interpersonal Safety     Do you feel physically and emotionally safe where you currently live?: Yes     Within the past 12 months, have you been hit, slapped, kicked or otherwise physically hurt by someone?: No     Within the past 12 months, have you been humiliated or emotionally abused in other ways by your partner or ex-partner?: No   Housing Stability: High Risk (12/21/2023)    Housing Stability     Do you have housing? : No     Are you worried about losing your housing?: No       Family History:     Family History   Problem Relation Age of Onset    Arthritis Other     Gastrointestinal Disease Other     Cardiovascular Father         aortic aneurysm    Arrhythmia Father     Nephrolithiasis Father     Sleep Apnea Father     Anxiety Disorder Father     Depression Father     Hypertension Father     Obesity Father     Hyperlipidemia Father     Coronary Artery Disease Father     Low Back Problems Father     Spine Problems Father     Cardiovascular Father     Other - See Comments Father         low back problems, spine problems    Anxiety Disorder Mother     Hypertension Mother     Osteoporosis Mother      Obesity Mother     Hyperlipidemia Mother     Low Back Problems Mother     Macular Degeneration Mother     Cataracts Mother     Other - See Comments Mother         low back problems    Anxiety Disorder Sister     Hypertension Sister     Osteoporosis Sister     Obesity Sister     Hyperlipidemia Sister     Low Back Problems Sister     Spine Problems Sister     Anxiety Disorder Sister     Depression Sister     Hypertension Sister     Osteoporosis Sister     Obesity Sister     Hyperlipidemia Sister     Low Back Problems Sister     Anxiety Disorder Sister     Hyperlipidemia Sister     Hypertension Sister     Obesity Sister     Other - See Comments Sister         low back problems, spine problems    Osteoporosis Sister     Anxiety Disorder Sister     Depression Sister     Hyperlipidemia Sister     Hypertension Sister     Other - See Comments Sister         low back problems    Obesity Sister     Osteoporosis Sister     Melanoma No family hx of     Skin Cancer No family hx of     Glaucoma No family hx of        Medications:     No current facility-administered medications for this encounter.     Current Outpatient Medications   Medication Sig Dispense Refill    amLODIPine-benazepril (LOTREL) 5-20 MG capsule Take 1 capsule by mouth 2 times daily 180 capsule 3    atorvastatin (LIPITOR) 40 MG tablet Take 1 tablet (40 mg) by mouth daily (Patient taking differently: Take 40 mg by mouth every evening.) 90 tablet 3    bisacodyl (DULCOLAX) 5 MG EC tablet Two days prior to exam take two (2) tablets at 4pm. One day prior to exam take two (2) tablets at 4pm 4 tablet 0    buPROPion (WELLBUTRIN SR) 200 MG 12 hr tablet TAKE 1 TABLET BY MOUTH 2 TIMES DAILY. (Patient taking differently: 200 mg 2 times daily. TAKE 1 TABLET BY MOUTH 2 TIMES DAILY) 180 tablet 0    carvedilol (COREG) 25 MG tablet Take 2 tablets (50 mg) by mouth 2 times daily (with meals) 360 tablet 3    clonazePAM (KLONOPIN) 0.5 MG tablet Take 1 tablet (0.5 mg) by mouth 3  times daily as needed for anxiety 90 tablet 0    clonazePAM (KLONOPIN) 0.5 MG tablet Take 1 tablet (0.5 mg) by mouth 3 times daily as needed for anxiety      cyclobenzaprine (FLEXERIL) 10 MG tablet Take 1 tablet (10 mg) by mouth 3 times daily as needed for muscle spasms 90 tablet 0    diazepam (VALIUM) 5 MG tablet Take one before MRI/MRA procedure about 30 minutes before 2 tablet 0    docusate sodium (COLACE) 100 MG capsule Take 100 mg by mouth 2 times daily. Take 2 capsule in the morning and 3 capsules in the evening      escitalopram (LEXAPRO) 20 MG tablet Take 1.5 tablets (30 mg) by mouth daily (Patient taking differently: Take 30 mg by mouth every morning.) 135 tablet 3    Fe Heme Polypeptide-folic acid (PROFERRIN-FORTE) 12-1 MG TABS Take 1 tablet by mouth 2 times daily 90 tablet 0    fentaNYL (DURAGESIC) 50 mcg/hr 72 hr patch Place 1 patch onto the skin every 72 hours. Pt taking Every 48 hours      fluocinonide (LIDEX) 0.05 % external ointment Apply twice daily to itchy skin nodules for 1-2 weeks at a time. 30 g 3    latanoprost (XALATAN) 0.005 % ophthalmic solution Place 1 drop Into the left eye daily 2.5 mL 4    mometasone (ELOCON) 0.1 % external ointment Apply topically nightly as needed (rash on arms) 30 g 3    naproxen (EC-NAPROSYN) 500 MG EC tablet Take 1 tablet (500 mg) by mouth 2 times daily as needed (pain) 186 tablet 3    oxyCODONE IR (ROXICODONE) 10 MG tablet Take 10 mg by mouth every 4 hours as needed.      pantoprazole (PROTONIX) 40 MG EC tablet Take 1 tablet (40 mg) by mouth daily as needed for heartburn, 90 tablet 0    polyethylene glycol (GOLYTELY) 236 g suspension Take as directed. Two days before your exam fill the first container with water. Cover and shake until mixed well. At 5:00pm drink one 8oz glass every 10-15 minutes until half (1/2) of the first container is empty. Store the remainder in the refrigerator. One day before your exam at 5:00pm drink the second half of the first container  until it is gone. Before you go to bed mix the second container with water and put in refrigerator. Six hours before your check in time drink one 8oz glass every 10-15 minutes until half of container is empty. Discard the remainder of solution. 8000 mL 0    propranolol (INDERAL) 10 MG tablet Take 1 tablet (10 mg) by mouth 3 times daily as needed (panic attack) 90 tablet 3    senna-docusate (SENOKOT-S/PERICOLACE) 8.6-50 MG tablet Take 1-2 tablets by mouth 2 times daily 30 tablet 0    sucralfate (CARAFATE) 1 GM tablet Take 1 tablet (1 g) by mouth 2 times daily as needed (Reflux) (Patient taking differently: as needed. Take 1 tablet (1 g) by mouth 2 times daily as needed (Reflux)) 180 tablet 3    tacrolimus (PROTOPIC) 0.1 % external ointment Apply topically as needed (rash on arms) Apply to affected areas on body. 60 g 1    tacrolimus (PROTOPIC) 0.1 % ointment Apply topically as needed Apply to affected areas on body. 120 g 11    tiZANidine (ZANAFLEX) 4 MG tablet Take 1 tablet (4 mg) by mouth 2 times daily (Patient taking differently: Take 4 mg by mouth nightly as needed.) 180 tablet 3    triamcinolone (KENALOG) 0.1 % external ointment Apply topically 2 times daily To affected areas of rash. Please avoid application to the face, groin or armpits as the medication is too strong for these areas. 454 g 0    vitamin B-Complex Take 1 tablet by mouth every morning.       Facility-Administered Medications Ordered in Other Encounters   Medication Dose Route Frequency Provider Last Rate Last Admin    sodium chloride (PF) 0.9% PF flush 3 mL  3 mL Intracatheter Q8H HONEY Scruggs MD   3 mL at 07/24/23 7341       Allergies:     Allergies   Allergen Reactions    Ciprofloxacin      History of aortic aneurysms    Tape [Adhesive Tape] Blisters     Blistering - please use paper tape       Review of Systems:     A comprehensive 10 point review of systems (constitutional, ENT, cardiac, peripheral vascular, respiratory, GI, ,  "musculoskeletal, skin, neurological) was performed and found to be negative except as described in this note.      Physical Exam:     Vital Signs: BP (!) 149/76   Pulse 57   Temp 97.5  F (36.4  C) (Oral)   Resp 16   Ht 1.778 m (5' 10\")   Wt 106.1 kg (234 lb)   SpO2 96%   BMI 33.58 kg/m    General: Resting comfortably in bed, awake, alert, cooperative, no apparent distress, appears stated age.  HEENT: Normocephalic, atraumatic, EOMI, no scleral icterus.  Respiratory: Breathing comfortably on room air, no wheezing.  Skin: No rashes or lesions.  Musculoskeletal:    Cervical spine:    Motor -     C5: Deltoids R 5/5 and L 5/5 strength    C6: Biceps R 5/5 and L 5/5 strength     C7: Triceps R 5/5 and L 5/5 strength     C8:  R 5/5 and L 5/5 strength     T1: Dorsal interossei R 4/5 and L 4/5 strength        Sensation: intact to light touch in C5-T1 grossly but subjectively diminished globally bilaterally     Lumbar Spine:    Motor -     L2-3: Hip flexion 5/5 R and 5/5 L strength          L3/4:  Knee extension R 5/5 and L 5/5 strength         L4/5:  Foot dorsiflexion R 5/5 L 5/5 and       EHL dorsiflexion R 5/5 L 5/5 strength         S1:  Plantarflexion/Peroneal Muscles  R 5/5 and L 5/5 strength    Sensation: intact to light touch L3-S1 distribution BLE grossly      Neurologic:      REFLEXES Left Right   Biceps 3+ 3+   Brachioradialis 3+ 3+   Patella 3+ 3+   Ankle jerk 3+ 3+   Clonus 0 beats 0 beats            Imaging:     No new imaging obtained.     Data:     CBC:  Lab Results   Component Value Date    WBC 6.7 08/29/2024    HGB 15.7 08/29/2024     08/29/2024     BMP:  Lab Results   Component Value Date     08/29/2024    POTASSIUM 4.4 08/29/2024    CHLORIDE 100 08/29/2024    CO2 26 08/29/2024    BUN 14.9 08/29/2024    CR 1.32 (H) 08/29/2024    ANIONGAP 10 08/29/2024    NIA 9.4 08/29/2024     (H) 08/29/2024     Inflammatory Markers:  Lab Results   Component Value Date    WBC 6.7 08/29/2024    " WBC 5.6 12/26/2023    WBC 12.9 (H) 11/28/2023    CRP <2.9 01/30/2020    CRP <2.9 01/15/2020    CRP 0.1 12/26/2019    SED 25 (H) 01/15/2020    SED 9 12/26/2019    SED 19 (H) 12/17/2019

## 2024-09-09 ENCOUNTER — APPOINTMENT (OUTPATIENT)
Dept: OCCUPATIONAL THERAPY | Facility: CLINIC | Age: 64
DRG: 029 | End: 2024-09-09
Attending: STUDENT IN AN ORGANIZED HEALTH CARE EDUCATION/TRAINING PROGRAM
Payer: COMMERCIAL

## 2024-09-09 ENCOUNTER — APPOINTMENT (OUTPATIENT)
Dept: CARDIOLOGY | Facility: CLINIC | Age: 64
DRG: 029 | End: 2024-09-09
Attending: INTERNAL MEDICINE
Payer: COMMERCIAL

## 2024-09-09 LAB
GLUCOSE BLDC GLUCOMTR-MCNC: 154 MG/DL (ref 70–99)
GLUCOSE BLDC GLUCOMTR-MCNC: 175 MG/DL (ref 70–99)
GLUCOSE BLDC GLUCOMTR-MCNC: 195 MG/DL (ref 70–99)
GLUCOSE BLDC GLUCOMTR-MCNC: 197 MG/DL (ref 70–99)
GLUCOSE BLDC GLUCOMTR-MCNC: 227 MG/DL (ref 70–99)
LVEF ECHO: NORMAL

## 2024-09-09 PROCEDURE — 250N000011 HC RX IP 250 OP 636: Performed by: STUDENT IN AN ORGANIZED HEALTH CARE EDUCATION/TRAINING PROGRAM

## 2024-09-09 PROCEDURE — 120N000002 HC R&B MED SURG/OB UMMC

## 2024-09-09 PROCEDURE — 97535 SELF CARE MNGMENT TRAINING: CPT | Mod: GO

## 2024-09-09 PROCEDURE — 250N000013 HC RX MED GY IP 250 OP 250 PS 637: Performed by: ORTHOPAEDIC SURGERY

## 2024-09-09 PROCEDURE — 255N000002 HC RX 255 OP 636: Performed by: INTERNAL MEDICINE

## 2024-09-09 PROCEDURE — 250N000013 HC RX MED GY IP 250 OP 250 PS 637: Performed by: STUDENT IN AN ORGANIZED HEALTH CARE EDUCATION/TRAINING PROGRAM

## 2024-09-09 PROCEDURE — 99222 1ST HOSP IP/OBS MODERATE 55: CPT | Mod: GC | Performed by: INTERNAL MEDICINE

## 2024-09-09 PROCEDURE — 250N000013 HC RX MED GY IP 250 OP 250 PS 637: Performed by: PEDIATRICS

## 2024-09-09 PROCEDURE — C8929 TTE W OR WO FOL WCON,DOPPLER: HCPCS

## 2024-09-09 PROCEDURE — 93306 TTE W/DOPPLER COMPLETE: CPT | Mod: 26 | Performed by: INTERNAL MEDICINE

## 2024-09-09 PROCEDURE — 999N000208 ECHOCARDIOGRAM COMPLETE

## 2024-09-09 PROCEDURE — 97165 OT EVAL LOW COMPLEX 30 MIN: CPT | Mod: GO

## 2024-09-09 PROCEDURE — 99232 SBSQ HOSP IP/OBS MODERATE 35: CPT | Performed by: INTERNAL MEDICINE

## 2024-09-09 PROCEDURE — 250N000013 HC RX MED GY IP 250 OP 250 PS 637: Performed by: INTERNAL MEDICINE

## 2024-09-09 RX ORDER — ATORVASTATIN CALCIUM 40 MG/1
40 TABLET, FILM COATED ORAL EVERY EVENING
Status: DISCONTINUED | OUTPATIENT
Start: 2024-09-09 | End: 2024-09-14 | Stop reason: HOSPADM

## 2024-09-09 RX ORDER — TIZANIDINE 2 MG/1
2-4 TABLET ORAL EVERY 8 HOURS PRN
Status: DISCONTINUED | OUTPATIENT
Start: 2024-09-09 | End: 2024-09-14 | Stop reason: HOSPADM

## 2024-09-09 RX ORDER — ESCITALOPRAM OXALATE 20 MG/1
20 TABLET ORAL EVERY MORNING
Status: DISCONTINUED | OUTPATIENT
Start: 2024-09-10 | End: 2024-09-14 | Stop reason: HOSPADM

## 2024-09-09 RX ORDER — LISINOPRIL 20 MG/1
20 TABLET ORAL 2 TIMES DAILY
Status: DISCONTINUED | OUTPATIENT
Start: 2024-09-09 | End: 2024-09-09

## 2024-09-09 RX ORDER — CARVEDILOL 25 MG/1
50 TABLET ORAL 2 TIMES DAILY WITH MEALS
Status: DISCONTINUED | OUTPATIENT
Start: 2024-09-09 | End: 2024-09-14 | Stop reason: HOSPADM

## 2024-09-09 RX ORDER — BUPROPION HYDROCHLORIDE 200 MG/1
200 TABLET, EXTENDED RELEASE ORAL 2 TIMES DAILY
Status: DISCONTINUED | OUTPATIENT
Start: 2024-09-09 | End: 2024-09-14 | Stop reason: HOSPADM

## 2024-09-09 RX ORDER — AMLODIPINE BESYLATE 5 MG/1
5 TABLET ORAL 2 TIMES DAILY
Status: DISCONTINUED | OUTPATIENT
Start: 2024-09-09 | End: 2024-09-09

## 2024-09-09 RX ORDER — AMLODIPINE BESYLATE 5 MG/1
5 TABLET ORAL 2 TIMES DAILY
Status: DISCONTINUED | OUTPATIENT
Start: 2024-09-09 | End: 2024-09-14 | Stop reason: HOSPADM

## 2024-09-09 RX ORDER — LISINOPRIL 20 MG/1
20 TABLET ORAL 2 TIMES DAILY
Status: DISCONTINUED | OUTPATIENT
Start: 2024-09-09 | End: 2024-09-14 | Stop reason: HOSPADM

## 2024-09-09 RX ADMIN — AMLODIPINE BESYLATE 5 MG: 5 TABLET ORAL at 19:47

## 2024-09-09 RX ADMIN — PANTOPRAZOLE SODIUM 40 MG: 40 TABLET, DELAYED RELEASE ORAL at 15:34

## 2024-09-09 RX ADMIN — AMLODIPINE BESYLATE 5 MG: 5 TABLET ORAL at 07:01

## 2024-09-09 RX ADMIN — ACETAMINOPHEN 975 MG: 325 TABLET ORAL at 17:12

## 2024-09-09 RX ADMIN — TIZANIDINE 4 MG: 2 TABLET ORAL at 01:03

## 2024-09-09 RX ADMIN — CARVEDILOL 50 MG: 25 TABLET, FILM COATED ORAL at 17:14

## 2024-09-09 RX ADMIN — SENNOSIDES AND DOCUSATE SODIUM 1 TABLET: 50; 8.6 TABLET ORAL at 05:09

## 2024-09-09 RX ADMIN — LISINOPRIL 20 MG: 20 TABLET ORAL at 08:40

## 2024-09-09 RX ADMIN — CLONAZEPAM 0.5 MG: 0.5 TABLET ORAL at 15:34

## 2024-09-09 RX ADMIN — ACETAMINOPHEN 975 MG: 325 TABLET ORAL at 01:03

## 2024-09-09 RX ADMIN — OXYCODONE HYDROCHLORIDE 30 MG: 15 TABLET ORAL at 08:41

## 2024-09-09 RX ADMIN — ACETAMINOPHEN 975 MG: 325 TABLET ORAL at 10:37

## 2024-09-09 RX ADMIN — BISACODYL 5 MG: 5 TABLET, COATED ORAL at 05:09

## 2024-09-09 RX ADMIN — ESCITALOPRAM OXALATE 20 MG: 10 TABLET ORAL at 05:43

## 2024-09-09 RX ADMIN — DEXAMETHASONE SODIUM PHOSPHATE 10 MG: 10 INJECTION, SOLUTION INTRAMUSCULAR; INTRAVENOUS at 05:09

## 2024-09-09 RX ADMIN — CYCLOBENZAPRINE 20 MG: 10 TABLET, FILM COATED ORAL at 07:00

## 2024-09-09 RX ADMIN — FENTANYL 1 PATCH: 25 PATCH TRANSDERMAL at 08:41

## 2024-09-09 RX ADMIN — SENNOSIDES AND DOCUSATE SODIUM 1 TABLET: 50; 8.6 TABLET ORAL at 17:13

## 2024-09-09 RX ADMIN — DEXAMETHASONE SODIUM PHOSPHATE 10 MG: 10 INJECTION, SOLUTION INTRAMUSCULAR; INTRAVENOUS at 19:41

## 2024-09-09 RX ADMIN — CLONAZEPAM 0.5 MG: 0.5 TABLET ORAL at 21:32

## 2024-09-09 RX ADMIN — BISACODYL 10 MG: 5 TABLET, COATED ORAL at 17:15

## 2024-09-09 RX ADMIN — DEXAMETHASONE SODIUM PHOSPHATE 10 MG: 10 INJECTION, SOLUTION INTRAMUSCULAR; INTRAVENOUS at 13:03

## 2024-09-09 RX ADMIN — CARVEDILOL 50 MG: 25 TABLET, FILM COATED ORAL at 05:42

## 2024-09-09 RX ADMIN — BUPROPION HYDROCHLORIDE 200 MG: 200 TABLET, EXTENDED RELEASE ORAL at 17:14

## 2024-09-09 RX ADMIN — OXYCODONE HYDROCHLORIDE 30 MG: 15 TABLET ORAL at 19:41

## 2024-09-09 RX ADMIN — HUMAN ALBUMIN MICROSPHERES AND PERFLUTREN 5 ML: 10; .22 INJECTION, SOLUTION INTRAVENOUS at 14:25

## 2024-09-09 RX ADMIN — BUPROPION HYDROCHLORIDE 200 MG: 200 TABLET, FILM COATED ORAL at 05:43

## 2024-09-09 RX ADMIN — LISINOPRIL 20 MG: 20 TABLET ORAL at 19:47

## 2024-09-09 RX ADMIN — CLONAZEPAM 0.5 MG: 0.5 TABLET ORAL at 00:15

## 2024-09-09 RX ADMIN — ATORVASTATIN CALCIUM 40 MG: 40 TABLET, FILM COATED ORAL at 17:13

## 2024-09-09 ASSESSMENT — ACTIVITIES OF DAILY LIVING (ADL)
ADLS_ACUITY_SCORE: 22
ADLS_ACUITY_SCORE: 24
ADLS_ACUITY_SCORE: 22
ADLS_ACUITY_SCORE: 24
PREVIOUS_RESPONSIBILITIES: MEAL PREP;HOUSEKEEPING;LAUNDRY;SHOPPING;YARDWORK;MEDICATION MANAGEMENT;FINANCES;DRIVING
ADLS_ACUITY_SCORE: 22
ADLS_ACUITY_SCORE: 23
ADLS_ACUITY_SCORE: 23
ADLS_ACUITY_SCORE: 24
ADLS_ACUITY_SCORE: 24
ADLS_ACUITY_SCORE: 23
ADLS_ACUITY_SCORE: 23
ADLS_ACUITY_SCORE: 24
ADLS_ACUITY_SCORE: 23
ADLS_ACUITY_SCORE: 24
ADLS_ACUITY_SCORE: 22
ADLS_ACUITY_SCORE: 24
ADLS_ACUITY_SCORE: 22
ADLS_ACUITY_SCORE: 24
ADLS_ACUITY_SCORE: 24

## 2024-09-09 NOTE — PLAN OF CARE
Goal Outcome Evaluation:    Alert and oriented X4.    Independent in the room.     Left PIV saline locked.    Pain managed with tizanidine and klonopin.    Patient Requested for morning medications to be given to him at 0500. Provider informed and provider stated that was ok. Morning medications given. Patient requested for amlodipine and benazepril. Provider informed to come and talk to patient. Provide came by to talk to patient.

## 2024-09-09 NOTE — PHARMACY-ADMISSION MEDICATION HISTORY
Pharmacy Intern Admission Medication History    Admission medication history is complete. The information provided in this note is only as accurate as the sources available at the time of the update.    Information Source(s): Patient and CareEverywhere/SureScripts via in-person    Pertinent Information: pt is a good historian of his medications.  Fluocinonide oint, mometasone oint, tacrolimus oint, and Kenalog oint - of note, outside dispense records showed no recent fill for these medications. Pt reported supplies at home but never used these medications since prescribed. Left on PTA list and did not check as taking.  Changes made to PTA medication list:  Added: None  Deleted: latanoprost 0.005% ophthalmic soln, vitamin B-complex  Changed:   Colace: sig was 3 cap in the evening.  Lexapro: sig was 30 mg.  Oxycodone: sig was 10 mg Q4H PRN.  Senna-docusate: sig was 1-2 tab BID.  Tizanidine: sig was BID.    Allergies reviewed with patient and updates made in EHR: yes    Medication History Completed By: Tanner Lindo 9/8/2024 7:32 PM    PTA Med List   Medication Sig Last Dose    amLODIPine-benazepril (LOTREL) 5-20 MG capsule Take 1 capsule by mouth 2 times daily 9/8/2024 at am    atorvastatin (LIPITOR) 40 MG tablet Take 1 tablet (40 mg) by mouth daily (Patient taking differently: Take 40 mg by mouth every evening.) 9/8/2024 at am    bisacodyl (DULCOLAX) 5 MG EC tablet Two days prior to exam take two (2) tablets at 4pm. One day prior to exam take two (2) tablets at 4pm 9/8/2024    buPROPion (WELLBUTRIN SR) 200 MG 12 hr tablet TAKE 1 TABLET BY MOUTH 2 TIMES DAILY. 9/8/2024 x1 at am    carvedilol (COREG) 25 MG tablet Take 2 tablets (50 mg) by mouth 2 times daily (with meals) 9/8/2024 x1 at am    clonazePAM (KLONOPIN) 0.5 MG tablet Take 1 tablet (0.5 mg) by mouth 3 times daily as needed for anxiety 9/7/2024 at 1700    cyclobenzaprine (FLEXERIL) 10 MG tablet Take 1 tablet (10 mg) by mouth 3 times daily as needed for muscle  spasms 9/7/2024    diazepam (VALIUM) 5 MG tablet Take one before MRI/MRA procedure about 30 minutes before Past Month    docusate sodium (COLACE) 100 MG capsule Take 100 mg by mouth every evening.     docusate sodium (COLACE) 100 MG capsule Take 200 mg by mouth every morning. 9/8/2024    escitalopram (LEXAPRO) 20 MG tablet Take 1.5 tablets (30 mg) by mouth daily (Patient taking differently: Take 20 mg by mouth every morning.) 9/8/2024 at am    Fe Heme Polypeptide-folic acid (PROFERRIN-FORTE) 12-1 MG TABS Take 1 tablet by mouth 2 times daily More than a month    fentaNYL (DURAGESIC) 25 mcg/hr 72 hr patch Place 1 patch onto the skin every 48 hours. Pt taking Every 48 hours 9/7/2024 at am    naproxen (EC-NAPROSYN) 500 MG EC tablet Take 1 tablet (500 mg) by mouth 2 times daily as needed (pain) 9/7/2024    oxyCODONE IR (ROXICODONE) 10 MG tablet Take 30 mg by mouth 2 times daily as needed. 9/7/2024    pantoprazole (PROTONIX) 40 MG EC tablet Take 1 tablet (40 mg) by mouth daily as needed for heartburn, 9/7/2024    polyethylene glycol (GOLYTELY) 236 g suspension Take as directed. Two days before your exam fill the first container with water. Cover and shake until mixed well. At 5:00pm drink one 8oz glass every 10-15 minutes until half (1/2) of the first container is empty. Store the remainder in the refrigerator. One day before your exam at 5:00pm drink the second half of the first container until it is gone. Before you go to bed mix the second container with water and put in refrigerator. Six hours before your check in time drink one 8oz glass every 10-15 minutes until half of container is empty. Discard the remainder of solution. Unknown    propranolol (INDERAL) 10 MG tablet Take 1 tablet (10 mg) by mouth 3 times daily as needed (panic attack) More than a month    senna-docusate (SENOKOT-S/PERICOLACE) 8.6-50 MG tablet Take 2 tablets by mouth every morning. 9/8/2024    senna-docusate (SENOKOT-S/PERICOLACE) 8.6-50 MG tablet  Take 3 tablets by mouth every evening. 9/7/2024    sucralfate (CARAFATE) 1 GM tablet Take 1 tablet (1 g) by mouth 2 times daily as needed (Reflux) (Patient taking differently: as needed. Take 1 tablet (1 g) by mouth 2 times daily as needed (Reflux)) Past Week    tiZANidine (ZANAFLEX) 4 MG tablet Take 1 tablet (4 mg) by mouth 2 times daily (Patient taking differently: Take 4 mg by mouth nightly as needed.) Past Week

## 2024-09-09 NOTE — PROGRESS NOTES
"   09/09/24 1134   Appointment Info   Signing Clinician's Name / Credentials (OT) Ashia Salty MS, OTR/L, CLT   Rehab Comments (OT) conservative spine - OR planned for 9/11; PT holding   Living Environment   People in Home alone   Current Living Arrangements house   Home Accessibility no concerns   Transportation Anticipated family or friend will provide   Living Environment Comments Pt lives alone with his dogs. Pt denies any stairs but does report a tall threshold at the back door that he needs to navigate to let the dogs in/out. Pt reports he can receive some A from sister at d/c but will likely be very intermittent.   Self-Care   Usual Activity Tolerance good   Current Activity Tolerance good   Regular Exercise Yes   Activity/Exercise Type swimming   Equipment Currently Used at Home walker, rolling   Fall history within last six months no   Activity/Exercise/Self-Care Comment Pt normally ind, has been using 4WW recently 2/2 increased neuropathy and parasthesias in B LE.   Instrumental Activities of Daily Living (IADL)   Previous Responsibilities meal prep;housekeeping;laundry;shopping;yardwork;medication management;finances;driving   IADL Comments Pt is ind with IADL, driving is very important to him. Pt reports he can have some assist with IADL but interested in speaking with SW to see about additional support. Pt has two dogs.   General Information   Onset of Illness/Injury or Date of Surgery 09/08/24  (plan for OR 9/11)   Referring Physician Neftali Monique MD   Patient/Family Therapy Goal Statement (OT) get pain under control, improve walking   Additional Occupational Profile Info/Pertinent History of Current Problem Per chart: \"Kobe Buchanan is a 64 year old male with PMH of HTN, HLD, prior smoking hx, bicuspid AV s/p valve sparing repair with Gelweave graft (2016), NICM (LVEF 30-35%, now resolved), SAVITA, anemia, BPPV, T2DM, GERD, CKD, chronic pain, MDD, THERESA, degenerative joint disease s/p " "bilateral shoulder replacement 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 Prajapati on 9/11 but p/w worsening sxs and inability to care for himself at home.\"   Existing Precautions/Restrictions spinal   Left Upper Extremity (Weight-bearing Status) partial weight-bearing (PWB)  (10# lifting restriction)   Right Upper Extremity (Weight-bearing Status) partial weight-bearing (PWB)  (10# lifting restriction)   Left Lower Extremity (Weight-bearing Status) weight-bearing as tolerated (WBAT)   Right Lower Extremity (Weight-bearing Status) weight-bearing as tolerated (WBAT)   General Observations and Info Activity: ambulate   Cognitive Status Examination   Orientation Status orientation to person, place and time   Cognitive Status Comments Pt reports feeling \"a little altered\" but generally alert and appropriate. Pt talkative throughout.   Visual Perception   Visual Impairment/Limitations corrective lenses full-time   Pain Assessment   Patient Currently in Pain Yes, see Vital Sign flowsheet   Range of Motion Comprehensive   Comment, General Range of Motion B shoulders limited with B reverse TSAs   Strength Comprehensive (MMT)   Comment, General Manual Muscle Testing (MMT) Assessment generalized weakness to BUE and B LE which is baseline   Bed Mobility   Comment (Bed Mobility) ind   Transfers   Transfer Comments ind 4WW   Balance   Balance Comments mod I 4WW for distance   Activities of Daily Living   BADL Assessment/Intervention lower body dressing;toileting   Lower Body Dressing Assessment/Training   [place] Level (Lower Body Dressing) minimum assist (75% patient effort)   Toileting   [place] Level (Toileting) independent   Clinical Impression   Criteria for Skilled Therapeutic Interventions Met (OT) Yes, treatment indicated   OT Diagnosis impaired ADL   OT Problem List-Impairments impacting ADL activity tolerance " impaired;pain;post-surgical precautions   Assessment of Occupational Performance 1-3 Performance Deficits   Identified Performance Deficits dressing, IADL   Planned Therapy Interventions (OT) ADL retraining;transfer training   Clinical Decision Making Complexity (OT) problem focused assessment/low complexity   Risk & Benefits of therapy have been explained evaluation/treatment results reviewed;care plan/treatment goals reviewed;risks/benefits reviewed;current/potential barriers reviewed;participants voiced agreement with care plan;participants included;patient   Clinical Impression Comments Pt with impaired ADL 2/2 pain, planned surgery and will have post op precautions following. Pt to benefit from skilled OT to address and maximize safety and I with ADL.   OT Total Evaluation Time   OT Eval, Low Complexity Minutes (76458) 9   OT Goals   Therapy Frequency (OT) 2 times/week   OT Predicted Duration/Target Date for Goal Attainment 09/13/24   OT Goals Lower Body Dressing;Toilet Transfer/Toileting;OT Goal 1;Upper Body Dressing   OT: Upper Body Dressing Modified independent;within precautions;including orthotic   OT: Lower Body Dressing Modified independent;within precautions   OT Discharge Planning   OT Plan re eval POD 1; check BR set up at home and update goals as needed, review ADL within precautions, logroll, and collar management if needed   OT Rationale for DC Rec Will update POD 1.   OT Brief overview of current status Pt mod I currently, ok to amb in halls with 4WW. Can have RN staff assist if feeling weak.   Total Session Time   Timed Code Treatment Minutes 15   Total Session Time (sum of timed and untimed services) 24

## 2024-09-09 NOTE — PROGRESS NOTES
Orthopaedic Surgery Progress Note 09/09/2024    S: No acute events overnight.  Reports that the electric shock like sensations have improved and aren't happening today. No new motion or sensation changes, just his chronic decreased BUE sensation. No other new complaints this morning.    O:  Temp: 97.9  F (36.6  C) Temp src: Oral BP: 135/82 Pulse: 60   Resp: 18 SpO2: 93 % O2 Device: None (Room air)      Exam:  Gen: No acute distress, resting comfortably in bed.  Resp: Non-labored breathing  MSK:  Spine:  Cervical spine:                    Motor -                     C5: Deltoids R 5/5 and L 5/5 strength                    C6: Biceps R 5/5 and L 5/5 strength                     C7: Triceps R 5/5 and L 5/5 strength                     C8:  R 5/5 and L 5/5 strength                     T1: Dorsal interossei R 4/5 and L 4/5 strength                         Sensation: intact to light touch in C5-T1 grossly but subjectively diminished globally bilaterally    Baxter present bilaterally           Lumbar Spine:                    Motor -                     L2-3: Hip flexion 5/5 R and 5/5 L strength                     L3/4:  Knee extension R 5/5 and L 5/5 strength                    L4/5:  Foot dorsiflexion R 5/5 L 5/5 and                                 EHL dorsiflexion R 5/5 L 5/5 strength                    S1:  Plantarflexion/Peroneal Muscles  R 5/5 and L 5/5 strength                    Sensation: intact to light touch L3-S1 distribution BLE grossly                       Neurologic:                      REFLEXES Left Right   Biceps 3+ 3+   Brachioradialis 3+ 3+   Patella 3+ 3+   Ankle jerk 3+ 3+   Clonus 1 beats 1 beats              Drain output:   Output by Drain (mL) 09/07/24 0700 - 09/07/24 1459 09/07/24 1500 - 09/07/24 2259 09/07/24 2300 - 09/08/24 0659 09/08/24 0700 - 09/08/24 1459 09/08/24 1500 - 09/08/24 2259 09/08/24 2300 - 09/09/24 0659 09/09/24 0700 - 09/09/24 0702   Patient has no LDAs of requested type  "attached.       No lab results found in last 7 days.    Invalid input(s): \"SEDRATE\"  Sed Rate   Date Value Ref Range Status   01/15/2020 25 (H) 0 - 20 mm/h Final         Culture results:   30-Day Micro Results       No results found for the last 720 hours.            Assessment: Kobe Buchanan is a 64-year-old male patient with cervical canal stenosis and myeloradiculopathy symptoms are progressive.     He is scheduled for cervical laminoplasty with Dr. Prajapati on 9/11/2024.  He was admitted prior to upcoming surgery worsening symptoms and inability to care for himself at home.    Today:  Will continue decadron as he seems to be feeling better with this.   Hospitalist consult and cardiology consult for preoperative recommendations.    Plan:  Ortho spine Primary  Activity: As tolerated, use appropriate assistive device and supervision until independent  Diet: Regular diet.  N.p.o. after midnight on 9/10  DVT prophylaxis: Mechanical, SCDs  Pain management: Orals PRN, IV for breakthrough only  X-rays: Imaging complete  Physical Therapy: Mobilization, ROM, ADL's  Occupational Therapy: ADL's  Consults: PT, OT, Medicine. Appreciate assistance in caring for this patient throughout the perioperative period  Disposition: pending operative course    Future Appointments   Date Time Provider Department Center   9/9/2024  8:45 AM Nicolas Sandoval, PT [facility] [place]   9/9/2024  3:00 PM Ashia Pond, OT [facility] [place]   9/10/2024  7:30 AM [email or web address]   9/10/2024  8:50 [email or web address]   9/10/2024  9:30 AM [email or web address]   9/18/2024  9:20 AM Yanna Robbins MD [facility]   10/28/2024  8:15 AM Jonathan Prajapati MD [facility]       Patient discussed with Dr. Prajapati.        --  Neftali Monique MD   Orthopedic Surgery PGY-4    Please page me directly via amcom with any questions/concerns during regular weekday hours before 7pm. If there is no response, if it is a weekend, or " if it is during evening hours, then please page the orthopedic surgery resident on call.

## 2024-09-09 NOTE — CONSULTS
Cardiology Inpatient Consultation  Date of Service: 09/09/2024  Patient: Kobe Buchanan  MRN: [id number]  Admission Date: 9/8/2024  Hospital Day: 1            IMPRESSION     Perioperative risk stratification for C3-7 dome laminectomy, C4-6 laminoplasty  Bicuspid aortic valve with trace aortic insufficiency  Ascending aortic aneurysm s/p valve-sparing aortic repair (2016)  Heart failure with improved ejection fraction, likely previous takotsubo cardiomyopathy  Hypertension  Hyperlipidemia  Prior tobacco use             ASSESSMENT AND PLAN     This patient's RCRI score is 2, which is associated with a 10.1% 30-day risk of death, MI, or cardiac arrest. There is currently no evidence for active ACS or decompensated heart failure. Pre-operative functional status is appropriate for age with ability to achieve at least 4 METS. In addition, orthopedic surgery has deemed this an urgent procedure. Per the 2014 ACC/AHA perioperative guidelines, this patient does NOT require further cardiac testing prior to surgery.     Recommendations:   - No additional cardiac testing is indicated  - Continue carvedilol and atorvastatin uninterrupted throughout perioperative period  - Okay to hold other medications as needed on day of surgery    Plan of care discussed with Dr. Ocasio, who agrees with above plan.    Thank you for consulting the cardiovascular services at the [facility]. Please do not hesitate to call us with any questions.     Peter Herndon MD  Cardiovascular Disease Fellow             HISTORY OF PRESENT ILLNESS     Kobe Buchanan is a 64 year old male with PMH of bicuspid AV and ascending aortic aneurysm s/p valve-sparing aortic repair (2016), HFimpEF (thought to be takotsubo), HTN, HLD, and prior tobacco use who presented with worsening myelopathy symptoms. There is a plan for urgent orthopedic intervention and cardiology is consulted for perioperative risk stratification.      Discussed with patient via telehealth visit as he is at [facility]. He states that prior to very recently, he was active. Swam laps in his apartment pool. No limiting chest pain, dyspnea, or other symptoms. Relatively inactive the past few weeks due to neurologic symptoms and pain. He is compliant with his home medications.    At the time of interview, the patient denies chest pain, dyspnea at rest or with exertion, orthopnea, PND, palpitations, lightheadedness, or syncope.     Review of Systems:    Complete review of systems was performed and negative except per HPI.             CARDIAC HISTORY AND IMAGING     12-Lead EC23: NSR, poor R wave progression    Transthoracic Echocardiogram(s):  24: normal BiV function, bicuspid aortic valve with trace AI    Catheterization(s):   20: nonobstructive coronary disease    CMR and/or Additional Imaging  20: normal cardiac function without myocardial fibrosis. Rapid resolution of cardiomyopathy with absence of fibrosis suggests a resolved stress induced CM.            PAST MEDICAL HISTORY      Past Medical History:   Diagnosis Date    Acute systolic heart failure (H) 2020    Added automatically from request for surgery [id number]    NEMESIO (acute kidney injury) (H24) 2020    Anxiety     Ascending aortic aneurysm (H24)     Atrial fibrillation (H) [I48.91] 10/10/2016    Bicuspid aortic valve     BPPV (benign paroxysmal positional vertigo) 2014    Choledocholithiasis     Chronic narcotic use     Chronic neck pain     Chronic osteoarthritis     CKD (chronic kidney disease) stage 3, GFR 30-59 ml/min (H)     Degenerative joint disease     Depression     Dysthymic disorder     Hx of type 2 diabetes mellitus 2021    Hyperlipidemia 04/10/2012    Hypertension     Iron deficiency anemia secondary to blood loss (chronic) 2020    MSSA bacteremia 10/13/2019    Multiple stiff joints     Neck injuries     NSTEMI (non-ST elevated myocardial  infarction) (H) 01/29/2020    Obesity 02/09/2015    Pain in shoulder     Plantar fasciitis     Pre-diabetes     S/P reverse total shoulder arthroplasty, left 07/07/2020    S/P reverse total shoulder arthroplasty, right 08/18/2020    Septic joint of left shoulder region (H) 11/13/2019    Septic joint of right shoulder region (H) 10/13/2019    s/p washout    Skin picking habit     Sleep apnea     does not use cpap    Status post total shoulder arthroplasty, left 03/03/2020    Added automatically from request for surgery [id number]     Active Problems:  Patient Active Problem List    Diagnosis Date Noted    Cervical stenosis of spinal canal 09/08/2024     Priority: Medium    Bilateral carotid artery disease, unspecified type (H24) 03/01/2024     Priority: Medium    S/P laparoscopic cholecystectomy 12/04/2023     Priority: Medium    Hyponatremia 12/04/2023     Priority: Medium    Anemia, unspecified type 12/04/2023     Priority: Medium    Abdominal pain, right upper quadrant 11/02/2023     Priority: Medium    Elevated liver enzymes 11/02/2023     Priority: Medium    Acquired dilation of bile duct 11/02/2023     Priority: Medium    Acute cholecystitis 11/02/2023     Priority: Medium    Calculus of bile duct without obstruction 11/02/2023     Priority: Medium    RUQ abdominal pain 11/02/2023     Priority: Medium    Transaminitis 11/02/2023     Priority: Medium    Varicose veins of both legs with edema 12/05/2022     Priority: Medium    History of Simone-en-Y gastric bypass 09/16/2022     Priority: Medium    S/P thoracic aortic aneurysm repair 09/16/2022     Priority: Medium    Prediabetes 09/16/2022     Priority: Medium    Morbid obesity (H) 02/08/2022     Priority: Medium    Hx of transurethral resection of prostate 09/06/2021     Priority: Medium    Chronic kidney disease, stage 3 (H) 08/30/2021     Priority: Medium    Psoriasis 03/04/2021     Priority: Medium     Shins and legs: sees derm: triamcinolone and vaseline BID       Plantar fasciitis 2021     Priority: Medium    Dysthymic disorder 2020     Priority: Medium    Left shoulder pain 2019     Priority: Medium    Right shoulder pain 2019     Priority: Medium     Formatting of this note might be different from the original.  Added automatically from request for surgery [id number]      Spondylosis of cervical region without myelopathy or radiculopathy 10/12/2018     Priority: Medium    Dizzy 09/15/2017     Priority: Medium    Status post coronary angiogram 2016     Priority: Medium    Right knee pain 2015     Priority: Medium    Rhinitis 2015     Priority: Medium    Shoulder joint pain 2014     Priority: Medium    BPPV (benign paroxysmal positional vertigo) 2014     Priority: Medium    S/P shoulder replacement 04/15/2014     Priority: Medium    Pain medication agreement signed 2014     Priority: Medium    Essential hypertension 04/10/2012     Priority: Medium     Problem list name updated by automated process. Provider to review      Hyperlipidemia 04/10/2012     Priority: Medium     Problem list name updated by automated process. Provider to review       Social History:  Social History     Tobacco Use    Smoking status: Former     Current packs/day: 0.00     Average packs/day: 0.5 packs/day for 6.6 years (3.3 ttl pk-yrs)     Types: Cigarettes     Start date: 1977     Quit date: 1983     Years since quittin.0     Passive exposure: Never (per pt)    Smokeless tobacco: Never   Substance Use Topics    Alcohol use: No     Alcohol/week: 0.0 standard drinks of alcohol    Drug use: No     Family History:  Family History   Problem Relation Age of Onset    Arthritis Other     Gastrointestinal Disease Other     Cardiovascular Father         aortic aneurysm    Arrhythmia Father     Nephrolithiasis Father     Sleep Apnea Father     Anxiety Disorder Father     Depression Father     Hypertension Father     Obesity Father      Hyperlipidemia Father     Coronary Artery Disease Father     Low Back Problems Father     Spine Problems Father     Cardiovascular Father     Other - See Comments Father         low back problems, spine problems    Anxiety Disorder Mother     Hypertension Mother     Osteoporosis Mother     Obesity Mother     Hyperlipidemia Mother     Low Back Problems Mother     Macular Degeneration Mother     Cataracts Mother     Other - See Comments Mother         low back problems    Anxiety Disorder Sister     Hypertension Sister     Osteoporosis Sister     Obesity Sister     Hyperlipidemia Sister     Low Back Problems Sister     Spine Problems Sister     Anxiety Disorder Sister     Depression Sister     Hypertension Sister     Osteoporosis Sister     Obesity Sister     Hyperlipidemia Sister     Low Back Problems Sister     Anxiety Disorder Sister     Hyperlipidemia Sister     Hypertension Sister     Obesity Sister     Other - See Comments Sister         low back problems, spine problems    Osteoporosis Sister     Anxiety Disorder Sister     Depression Sister     Hyperlipidemia Sister     Hypertension Sister     Other - See Comments Sister         low back problems    Obesity Sister     Osteoporosis Sister     Melanoma No family hx of     Skin Cancer No family hx of     Glaucoma No family hx of      Medications:  Current Facility-Administered Medications   Medication Dose Route Frequency Provider Last Rate Last Admin    acetaminophen (TYLENOL) tablet 975 mg  975 mg Oral Q8H Neftali Monique MD   975 mg at 09/09/24 1037    amLODIPine (NORVASC) tablet 5 mg  5 mg Oral BID Vinay Maurice MD        And    lisinopril (ZESTRIL) tablet 20 mg  20 mg Oral BID Vinay Maurice MD        atorvastatin (LIPITOR) tablet 40 mg  40 mg Oral QPM Nam Joshua DO        bisacodyl (DULCOLAX) EC tablet 5-10 mg  5-10 mg Oral BID Aris Dougherty MD   5 mg at 09/09/24 0509    buPROPion (WELLBUTRIN SR) 12 hr tablet 200 mg  " 200 mg Oral BID Nam Joshua DO        carvedilol (COREG) tablet 50 mg  50 mg Oral BID w/meals Nam Joshua DO        dexAMETHasone PF (DECADRON) injection 10 mg  10 mg Intravenous Q8H Nefatli Monique MD   10 mg at 09/09/24 1303    [START ON 9/10/2024] escitalopram (LEXAPRO) tablet 20 mg  20 mg Oral QAM Nam Joshua DO        fentaNYL (DURAGESIC) 25 mcg/hr 72 hr patch 1 patch  25 mcg Transdermal Q48H Aris Dougherty MD   1 patch at 09/09/24 0841    And    fentaNYL (DURAGESIC) Patch in Place   Transdermal Q48H Aris Dougherty MD        polyethylene glycol (MIRALAX) Packet 17 g  17 g Oral BID Nam Joshua DO        senna-docusate (SENOKOT-S/PERICOLACE) 8.6-50 MG per tablet 1-2 tablet  1-2 tablet Oral BID Nam Joshua DO   1 tablet at 09/09/24 0509    sodium chloride (PF) 0.9% PF flush 3 mL  3 mL Intracatheter Q8H Neftali Monique MD   3 mL at 09/09/24 1038     Current Facility-Administered Medications   Medication Dose Route Frequency Provider Last Rate Last Admin             PHYSICAL EXAM     Temp:  [97.9  F (36.6  C)-98.5  F (36.9  C)] 98.5  F (36.9  C)  Pulse:  [57-73] 73  Resp:  [18] 18  BP: (127-158)/(64-82) 141/82  SpO2:  [93 %-97 %] 96 %    Intake/Output Summary (Last 24 hours) at 9/9/2024 1707  Last data filed at 9/9/2024 1314  Gross per 24 hour   Intake 880 ml   Output 750 ml   Net 130 ml     Telehealth visit  Limited exam  No acute distress  Speaking in complete sentences            DIAGNOSTICS     All labs and imaging were reviewed, of note:    CMP  Recent Labs   Lab 09/09/24  1642 09/09/24  1118 09/09/24  0838 09/09/24  0019   * 197* 175* 154*     CBCNo lab results found in last 7 days.  INRNo lab results found in last 7 days.  Arterial Blood GasNo lab results found in last 7 days.  TroponinNo results found for: \"CTROPT\", \"TROPONINIS\", \"TROPIHSMAYO\"    I saw and evaluated patient with CV fellow. I examined patient with CV fellow. I discussed the results with " patient and CV fellow. I discussed our plan with patient and CV fellow.  I agree with CV fellow's note and I edited the CV fellow's note to make it a more comprehensive document.    40 min were directly spend with patient and and CV fellow.    Christopher Ocasio MD, PhD  Professor of Medicine  Division of Cardiology

## 2024-09-09 NOTE — PLAN OF CARE
Admission Note    Reason for admission:  Worsening symptoms and inability to care for himself at home.   Primary team notified of pt arrival. Yes  Admitted from: ED  Via: Bed  Accompanied by: ED RN.  Belongings: Placed in closet; valuables sent home with family  Admission Required Doc Completed: Yes/No  Teaching: Orientation to unit and call light- call light within reach, use of console, meal times, when to call for the RN, and enforced importance of safety.  IV Access: YES  Telemetry: Yes/No  Ht./Wt.: Completed  Code Status verified on armband: Yes/No  2 RN Skin Assessment Completed with:Fatma Ariel  Suction/Ambu bag/Flowmeter at bedside: Yes/No    Pt status: Calm, and comfortable.      Temp:  [97.3  F (36.3  C)-98.4  F (36.9  C)] 98.4  F (36.9  C)  Pulse:  [57-59] 57  Resp:  [16-18] 18  BP: (149-158)/(76-80) 158/80  SpO2:  [96 %-97 %] 97 %

## 2024-09-09 NOTE — PROGRESS NOTES
[facility]    Medicine Progress Note - Hospitalist Service, GOLD TEAM 18    Date of Admission:  9/8/2024    Assessment & Plan    Kobe Buchanan is a 64 year old male with PMH of HTN, HLD, prior smoking hx, bicuspid AV s/p valve sparing repair with Gelweave graft (2016), NICM (LVEF 30-35%, now resolved), SAVITA, anemia, BPPV, T2DM, GERD, CKD, chronic pain, MDD, THERESA, degenerative joint disease s/p bilateral shoulder replacement 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 Prajapati on 9/11 but p/w worsening sxs and inability to care for himself at home.      #Cervical stenosis   #Cervical radiculopathy  #Hx of C3 and C7 laminectomies and C4-6 laminoplasty with medtronic plates  #Chronic opioid use  Pt follows with Ortho as an outpatient.  Was scheduled for cervical procedure on 9/11.  Began having shocklike shooting pains down his neck to his lower back starting this morning.  Reports intermittent numbness and tingling down BUEs, denies weakness or sensory changes in BLEs.  Symptoms are sometimes reproduced by neck flexion and extension, however was unable to reproduce symptoms on exam.  Was seen by Ortho in ED, plans to start Decadron.  Patient is on chronic opiates with significant opiate tolerance.  Will continue his current home regimen which consists of fentanyl patch 25 mcg/h every 48 hours, as needed oxycodone 30 mg bid.   - ortho primary  - activity as tolerated  - regular diet  - plan for scheduled surgery on 9/11 (NPO midnight 9/10)   - additional pain mgmt per ortho                 - continue home pain regimen                 - fentanyl patch 25 mcg/h every 48 h (next patch due 9/9)                 - oxycodone 30 mg bid prn                 - continue scheduled bowel regimen  - PT/OT     #Hx of nonischemic cardiomyopathy  Per chart hx it is suspectedt his was stress induced.  "Home meds of coreg 50 mg bid, amlodipine-benazepril 5-20 mg bid. Last TTE 7/2023 with recovered LVEF 55-60%, normal LV wall thickness, LV size, LV diastolic function, no WMAs, no significant valvular abnormalities.  - continue carvedilol 50 mg bid with holding parameters  - cResume amlodipine-benazepril in AM if BP stable  - Repeat echo today as part of Preop tests      #Bradycardia  Sinus bradycardia on admission with HR high-50s. Appears stable. No chest pain.  Appears chronic while on Coreg.  - monitor  - consider EKG if worsening  - Echo to be done today     #T2DM   Per chart review, with prior Hgb A1c >6.5%, but most recent was 08/2023 with A1c 5.6%. Not on meds at baseline.  - monitor CBGS for now, no insulin or SSI ordered     #CKD stage 3a   Cr baseline 1.1-1.3. Cr currently 1.32.  - monitor     #Chronic pain   On fentanyl patch 50 mcg/h 72 h patch, naproxen 500 mg bid, oxycodone 10 mg q4h prn.  - defer pain mgmt to primary as above     #MDD  #THERESA  On lexapro 30 mg daily and wellbutrin 200 mg bid. Also on clonazepam 0.5 mg tid prn     #HLD -continue home atorvastatin 40 mg nightly  #HTN - home anti-hypertensives as above  #HLD - on atorvastatin 40 mg daily  #SAVITA - does not use a CPAP at home             Diet: Regular Diet Adult    DVT Prophylaxis: Pneumatic Compression Devices  Serrano Catheter: Not present  Lines: None     Cardiac Monitoring: None  Code Status: Full Code      Clinically Significant Risk Factors Present on Admission                  # Hypertension: Noted on problem list         # Obesity: Estimated body mass index is 33.58 kg/m  as calculated from the following:    Height as of this encounter: 1.778 m (5' 10\").    Weight as of this encounter: 106.1 kg (234 lb).                    Disposition Plan     Medically Ready for Discharge: Anticipated in 5+ Days             Kevin Holguin MD  Hospitalist Service, GOLD TEAM 18  [facility]  Securely message with [employer] (more info)  Text page via [facility] Paging/Directory   See signed in provider for up to date coverage information  ______________________________________________________________________    Interval History   Charts reviewed and patient was examined  Up and having breakfast. Denies fevers or chills  Eating and drinking well   Wants to have his Echo and carotid doppler done, which was scheduled for tomorrow in preparation for surgery  Denies chest pain or SOB       Physical Exam   Vital Signs: Temp: 98.4  F (36.9  C) Temp src: Oral BP: 138/71 Pulse: 61   Resp: 18 SpO2: 96 % O2 Device: None (Room air)    Weight: 234 lbs 0 oz    General Appearance: Awake, alert and not in distress  Respiratory: Clear breath sounds bilaterally   Cardiovascular: Normal heart sounds. No murmurs   GI: Soft, non tender. Normal bowel sounds   Skin: No bruising or bleeding   Other:Awake, alert and orientated X 3       Medical Decision Making       35   MINUTES SPENT BY ME on the date of service doing chart review, history, exam, documentation & further activities per the note.  MANAGEMENT DISCUSSED with the following over the past 24 hours: patient, bedside RN, care management and orthopedic team        Data

## 2024-09-09 NOTE — PLAN OF CARE
"Goal Outcome Evaluation:      Plan of Care Reviewed With: patient    Overall Patient Progress: no change    Shift [id number]    Blood pressure 138/71, pulse 61, temperature 98.4  F (36.9  C), temperature source Oral, resp. rate 18, height 1.778 m (5' 10\"), weight 106.1 kg (234 lb), SpO2 96%.    Changes this shift: None    Neuro: Alert and oriented x4.    Cardiac: HR WNL, normotensive. Afebrile    Respiratory: O2 sat > 90% on RA. Lung sounds clear    GI/: Continent B&B     Diet: Regular diet, denies N/V.     Pain: Pain managed with scheduled Tylenol, PRN oxycodone, fentanyl patch applied this morning.    Skin: Intact    LDA's: Left PIV SL    Activity: Independent    Plan: In bed resting, call light within reach. Plan of care ongoing, pain mgmt continue                       "

## 2024-09-10 LAB
GLUCOSE BLDC GLUCOMTR-MCNC: 158 MG/DL (ref 70–99)
GLUCOSE BLDC GLUCOMTR-MCNC: 165 MG/DL (ref 70–99)
GLUCOSE BLDC GLUCOMTR-MCNC: 231 MG/DL (ref 70–99)
GLUCOSE BLDC GLUCOMTR-MCNC: 237 MG/DL (ref 70–99)

## 2024-09-10 PROCEDURE — 250N000011 HC RX IP 250 OP 636: Performed by: STUDENT IN AN ORGANIZED HEALTH CARE EDUCATION/TRAINING PROGRAM

## 2024-09-10 PROCEDURE — 99222 1ST HOSP IP/OBS MODERATE 55: CPT | Performed by: PHYSICIAN ASSISTANT

## 2024-09-10 PROCEDURE — 250N000013 HC RX MED GY IP 250 OP 250 PS 637: Performed by: PEDIATRICS

## 2024-09-10 PROCEDURE — 250N000013 HC RX MED GY IP 250 OP 250 PS 637: Performed by: STUDENT IN AN ORGANIZED HEALTH CARE EDUCATION/TRAINING PROGRAM

## 2024-09-10 PROCEDURE — 99232 SBSQ HOSP IP/OBS MODERATE 35: CPT | Performed by: INTERNAL MEDICINE

## 2024-09-10 PROCEDURE — 250N000013 HC RX MED GY IP 250 OP 250 PS 637: Performed by: INTERNAL MEDICINE

## 2024-09-10 PROCEDURE — 120N000002 HC R&B MED SURG/OB UMMC

## 2024-09-10 PROCEDURE — 250N000013 HC RX MED GY IP 250 OP 250 PS 637: Performed by: NURSE PRACTITIONER

## 2024-09-10 RX ORDER — POLYETHYLENE GLYCOL 3350 17 G/17G
17 POWDER, FOR SOLUTION ORAL 2 TIMES DAILY PRN
Status: DISCONTINUED | OUTPATIENT
Start: 2024-09-10 | End: 2024-09-14 | Stop reason: HOSPADM

## 2024-09-10 RX ORDER — DEXAMETHASONE SODIUM PHOSPHATE 4 MG/ML
4 INJECTION, SOLUTION INTRA-ARTICULAR; INTRALESIONAL; INTRAMUSCULAR; INTRAVENOUS; SOFT TISSUE
Status: CANCELLED | OUTPATIENT
Start: 2024-09-10

## 2024-09-10 RX ORDER — FENTANYL CITRATE 50 UG/ML
25 INJECTION, SOLUTION INTRAMUSCULAR; INTRAVENOUS
Status: CANCELLED | OUTPATIENT
Start: 2024-09-10

## 2024-09-10 RX ORDER — ONDANSETRON 4 MG/1
4 TABLET, ORALLY DISINTEGRATING ORAL EVERY 30 MIN PRN
Status: CANCELLED | OUTPATIENT
Start: 2024-09-10

## 2024-09-10 RX ORDER — ONDANSETRON 2 MG/ML
4 INJECTION INTRAMUSCULAR; INTRAVENOUS EVERY 30 MIN PRN
Status: CANCELLED | OUTPATIENT
Start: 2024-09-10

## 2024-09-10 RX ORDER — OXYCODONE HYDROCHLORIDE 5 MG/1
10 TABLET ORAL
Status: CANCELLED | OUTPATIENT
Start: 2024-09-10

## 2024-09-10 RX ORDER — OXYCODONE HYDROCHLORIDE 30 MG/1
30 TABLET ORAL EVERY 8 HOURS PRN
Status: DISCONTINUED | OUTPATIENT
Start: 2024-09-10 | End: 2024-09-11

## 2024-09-10 RX ORDER — NALOXONE HYDROCHLORIDE 0.4 MG/ML
0.1 INJECTION, SOLUTION INTRAMUSCULAR; INTRAVENOUS; SUBCUTANEOUS
Status: CANCELLED | OUTPATIENT
Start: 2024-09-10

## 2024-09-10 RX ORDER — HYDROMORPHONE HYDROCHLORIDE 1 MG/ML
0.2 INJECTION, SOLUTION INTRAMUSCULAR; INTRAVENOUS; SUBCUTANEOUS EVERY 5 MIN PRN
Status: CANCELLED | OUTPATIENT
Start: 2024-09-10

## 2024-09-10 RX ORDER — SODIUM CHLORIDE, SODIUM LACTATE, POTASSIUM CHLORIDE, CALCIUM CHLORIDE 600; 310; 30; 20 MG/100ML; MG/100ML; MG/100ML; MG/100ML
INJECTION, SOLUTION INTRAVENOUS CONTINUOUS
Status: CANCELLED | OUTPATIENT
Start: 2024-09-10

## 2024-09-10 RX ORDER — AMOXICILLIN 250 MG
3 CAPSULE ORAL EVERY EVENING
Status: DISCONTINUED | OUTPATIENT
Start: 2024-09-10 | End: 2024-09-14 | Stop reason: HOSPADM

## 2024-09-10 RX ORDER — OXYCODONE HYDROCHLORIDE 5 MG/1
5 TABLET ORAL
Status: CANCELLED | OUTPATIENT
Start: 2024-09-10

## 2024-09-10 RX ORDER — HYDROMORPHONE HYDROCHLORIDE 1 MG/ML
0.4 INJECTION, SOLUTION INTRAMUSCULAR; INTRAVENOUS; SUBCUTANEOUS EVERY 5 MIN PRN
Status: CANCELLED | OUTPATIENT
Start: 2024-09-10

## 2024-09-10 RX ORDER — FENTANYL CITRATE 50 UG/ML
25 INJECTION, SOLUTION INTRAMUSCULAR; INTRAVENOUS EVERY 5 MIN PRN
Status: CANCELLED | OUTPATIENT
Start: 2024-09-10

## 2024-09-10 RX ORDER — AMOXICILLIN 250 MG
3 CAPSULE ORAL AT BEDTIME
Status: DISCONTINUED | OUTPATIENT
Start: 2024-09-10 | End: 2024-09-10

## 2024-09-10 RX ORDER — FENTANYL CITRATE 50 UG/ML
50 INJECTION, SOLUTION INTRAMUSCULAR; INTRAVENOUS EVERY 5 MIN PRN
Status: CANCELLED | OUTPATIENT
Start: 2024-09-10

## 2024-09-10 RX ORDER — AMOXICILLIN 250 MG
2 CAPSULE ORAL EVERY MORNING
Status: DISCONTINUED | OUTPATIENT
Start: 2024-09-11 | End: 2024-09-10

## 2024-09-10 RX ORDER — AMOXICILLIN 250 MG
2 CAPSULE ORAL EVERY MORNING
Status: DISCONTINUED | OUTPATIENT
Start: 2024-09-11 | End: 2024-09-14 | Stop reason: HOSPADM

## 2024-09-10 RX ORDER — LIDOCAINE HYDROCHLORIDE ANHYDROUS AND DEXTROSE MONOHYDRATE .8; 5 G/100ML; G/100ML
1 INJECTION, SOLUTION INTRAVENOUS CONTINUOUS
Status: CANCELLED | OUTPATIENT
Start: 2024-09-10 | End: 2024-09-12

## 2024-09-10 RX ORDER — SIMETHICONE 40MG/0.6ML
40 SUSPENSION, DROPS(FINAL DOSAGE FORM)(ML) ORAL EVERY 6 HOURS PRN
Status: DISCONTINUED | OUTPATIENT
Start: 2024-09-10 | End: 2024-09-14 | Stop reason: HOSPADM

## 2024-09-10 RX ADMIN — BUPROPION HYDROCHLORIDE 200 MG: 200 TABLET, EXTENDED RELEASE ORAL at 04:35

## 2024-09-10 RX ADMIN — BISACODYL 10 MG: 5 TABLET, COATED ORAL at 17:23

## 2024-09-10 RX ADMIN — OXYCODONE HYDROCHLORIDE 30 MG: 15 TABLET ORAL at 20:10

## 2024-09-10 RX ADMIN — ACETAMINOPHEN 975 MG: 325 TABLET ORAL at 17:24

## 2024-09-10 RX ADMIN — SENNOSIDES AND DOCUSATE SODIUM 1 TABLET: 50; 8.6 TABLET ORAL at 04:35

## 2024-09-10 RX ADMIN — SENNOSIDES AND DOCUSATE SODIUM 3 TABLET: 50; 8.6 TABLET ORAL at 17:23

## 2024-09-10 RX ADMIN — OXYCODONE HYDROCHLORIDE 30 MG: 15 TABLET ORAL at 11:34

## 2024-09-10 RX ADMIN — OXYCODONE HYDROCHLORIDE 30 MG: 15 TABLET ORAL at 04:53

## 2024-09-10 RX ADMIN — ESCITALOPRAM OXALATE 20 MG: 20 TABLET ORAL at 04:53

## 2024-09-10 RX ADMIN — ACETAMINOPHEN 975 MG: 325 TABLET ORAL at 11:16

## 2024-09-10 RX ADMIN — CYCLOBENZAPRINE 20 MG: 10 TABLET, FILM COATED ORAL at 17:40

## 2024-09-10 RX ADMIN — CLONAZEPAM 0.5 MG: 0.5 TABLET ORAL at 11:35

## 2024-09-10 RX ADMIN — DEXAMETHASONE SODIUM PHOSPHATE 10 MG: 10 INJECTION, SOLUTION INTRAMUSCULAR; INTRAVENOUS at 20:10

## 2024-09-10 RX ADMIN — LISINOPRIL 20 MG: 20 TABLET ORAL at 04:35

## 2024-09-10 RX ADMIN — ATORVASTATIN CALCIUM 40 MG: 40 TABLET, FILM COATED ORAL at 17:23

## 2024-09-10 RX ADMIN — BISACODYL 10 MG: 5 TABLET, COATED ORAL at 04:35

## 2024-09-10 RX ADMIN — BUPROPION HYDROCHLORIDE 200 MG: 200 TABLET, EXTENDED RELEASE ORAL at 17:23

## 2024-09-10 RX ADMIN — CLONAZEPAM 0.5 MG: 0.5 TABLET ORAL at 04:53

## 2024-09-10 RX ADMIN — CLONAZEPAM 0.5 MG: 0.5 TABLET ORAL at 18:46

## 2024-09-10 RX ADMIN — AMLODIPINE BESYLATE 5 MG: 5 TABLET ORAL at 04:35

## 2024-09-10 RX ADMIN — CARVEDILOL 50 MG: 25 TABLET, FILM COATED ORAL at 17:23

## 2024-09-10 RX ADMIN — PANTOPRAZOLE SODIUM 40 MG: 40 TABLET, DELAYED RELEASE ORAL at 04:53

## 2024-09-10 RX ADMIN — CARVEDILOL 50 MG: 25 TABLET, FILM COATED ORAL at 04:35

## 2024-09-10 RX ADMIN — DEXAMETHASONE SODIUM PHOSPHATE 10 MG: 10 INJECTION, SOLUTION INTRAMUSCULAR; INTRAVENOUS at 04:34

## 2024-09-10 RX ADMIN — ACETAMINOPHEN 975 MG: 325 TABLET ORAL at 01:45

## 2024-09-10 RX ADMIN — DEXAMETHASONE SODIUM PHOSPHATE 10 MG: 10 INJECTION, SOLUTION INTRAMUSCULAR; INTRAVENOUS at 11:15

## 2024-09-10 ASSESSMENT — ACTIVITIES OF DAILY LIVING (ADL)
ADLS_ACUITY_SCORE: 28
ADLS_ACUITY_SCORE: 28
ADLS_ACUITY_SCORE: 24
ADLS_ACUITY_SCORE: 28
ADLS_ACUITY_SCORE: 28
ADLS_ACUITY_SCORE: 24
ADLS_ACUITY_SCORE: 28
ADLS_ACUITY_SCORE: 24
ADLS_ACUITY_SCORE: 28
ADLS_ACUITY_SCORE: 24
ADLS_ACUITY_SCORE: 28
ADLS_ACUITY_SCORE: 24
ADLS_ACUITY_SCORE: 28
ADLS_ACUITY_SCORE: 24

## 2024-09-10 ASSESSMENT — ENCOUNTER SYMPTOMS: SEIZURES: 0

## 2024-09-10 ASSESSMENT — LIFESTYLE VARIABLES: TOBACCO_USE: 1

## 2024-09-10 NOTE — ANESTHESIA PREPROCEDURE EVALUATION
Pre-Operative H & P     CC:  Preoperative exam to assess for increased cardiopulmonary risk while undergoing surgery and anesthesia.    Date of Encounter: 8/29/2024  Primary Care Physician:  Demi Hardin     Reason for visit:   No diagnosis found.      HPI  Kobe Buchanan is a 64 year old male who presents for pre-operative H & P in preparation for  Procedure Information       Case: [id number] Date/Time: 09/11/24 1040    Procedure: Cervical 3 and 7 dome laminectomy and 4-6 laminoplasty with medtronic plates (Spine)    Anesthesia type: General    Diagnosis:       Cervical radiculopathy [M54.12]      Cervical stenosis of spinal canal [M48.02]    Pre-op diagnosis:       Cervical radiculopathy [M54.12]      Cervical stenosis of spinal canal [M48.02]    [facility]: [facility]    Providers: Jonathan Prajapati MD            The patient presents to the PAC in person today in preparation for the above scheduled procedure with comorbid conditions including  HTN, HLD, prior smoking history, bicuspid AV valve s/p valve sparing repair with Gelweave graft 2016, NICM (EF 30-35%)  since resolved (thought to be stress induced cardiomyopathy), SAVITA, anemia, h/o blood transfusion, BPPV, DM II, obesity, GERD, CKD, chronic pain, depression, anxiety, insomnia, difficult IV, and DJD s/p bilateral shoulder replacement with subsequent bilateral prosthetic joint infection with subsequent removal and reimplantation.      The patient has been followed by Dr. Prajapati in the orthopedic spine surgery clinic for complaints of cervical spine radiculopathy and myopathy.  Over the last few months, the patient endorsed progressive symptoms.  He is having more problems walking and feels like his hands are worsening.  Margins to lesion, imaging showed severe cord stenosis C3-4 and C5-7  Dr Prajapati counseled the patient of the findings and treatment options.  The patient has now been scheduled for the procedure as listed above.       History is obtained from the patient and chart review    Hx of abnormal bleeding or anti-platelet use: denies      Past Medical History  Past Medical History:   Diagnosis Date    Acute systolic heart failure (H) 01/28/2020    Added automatically from request for surgery [id number]    NEMESIO (acute kidney injury) (H24) 02/16/2020    Anxiety     Ascending aortic aneurysm (H24)     Atrial fibrillation (H) [I48.91] 10/10/2016    Bicuspid aortic valve     BPPV (benign paroxysmal positional vertigo) 07/11/2014    Choledocholithiasis     Chronic narcotic use     Chronic neck pain     Chronic osteoarthritis     CKD (chronic kidney disease) stage 3, GFR 30-59 ml/min (H)     Degenerative joint disease     Depression     Dysthymic disorder     Hx of type 2 diabetes mellitus 03/04/2021    Hyperlipidemia 04/10/2012    Hypertension     Iron deficiency anemia secondary to blood loss (chronic) 08/25/2020    MSSA bacteremia 10/13/2019    Multiple stiff joints     Neck injuries     NSTEMI (non-ST elevated myocardial infarction) (H) 01/29/2020    Obesity 02/09/2015    Pain in shoulder     Plantar fasciitis     Pre-diabetes     S/P reverse total shoulder arthroplasty, left 07/07/2020    S/P reverse total shoulder arthroplasty, right 08/18/2020    Septic joint of left shoulder region (H) 11/13/2019    Septic joint of right shoulder region (H) 10/13/2019    s/p washout    Skin picking habit     Sleep apnea     does not use cpap    Status post total shoulder arthroplasty, left 03/03/2020    Added automatically from request for surgery [id number]       Past Surgical History  Past Surgical History:   Procedure Laterality Date    ARTHROPLASTY SHOULDER  04/15/2014    Procedure: Left Total Shoulder Arthroplasty ;  Surgeon: Analilia Aceves MD;  [facility]    ARTHROPLASTY SHOULDER Right 08/26/2014    Procedure: ARTHROPLASTY SHOULDER;  Surgeon: Analilia Aceves MD;  [facility]    ARTHROSCOPY SHOULDER WITH BIOPSY(IES) Left  01/28/2020    Procedure: Left shoulder arthroscopy and biopsy for culture;  Surgeon: Analilia Aceves MD;  [facility]    BYPASS GASTRIC TAVO-EN-Y, LIVER BIOPSY, COMBINED  08/08/2005    COLONOSCOPY  06/30/2014    Procedure: COMBINED COLONOSCOPY, SINGLE BIOPSY/POLYPECTOMY BY BIOPSY;  Surgeon: Chester Patton MD;  [facility]    COLONOSCOPY N/A 2/15/2024    Procedure: COLONOSCOPY, WITH POLYPECTOMY;  Surgeon: Power Duran MD;  [facility]    CV CORONARY ANGIOGRAM  01/30/2020    Procedure: CV CORONARY ANGIOGRAM;  Surgeon: Néstor Walls MD;  [facility]    CYSTOSCOPY, BLADDER NECK CUTS, COMBINED N/A 07/18/2016    Procedure: COMBINED CYSTOSCOPY, BLADDER NECK CUTS;  Surgeon: Ritu Leslie MD;  [facility]    ENDOSCOPIC RETROGRADE CHOLANGIOPANCREATOGRAM N/A 11/03/2023    Procedure: ENDOSCOPIC RETROGRADE CHOLANGIOPANCREATOGRAPHY, BILIARY SPHINCTEROTOMY, STONE REMOVAL;  Surgeon: Juarez Sevilla MD;  Location: [facility] OR    ESOPHAGOSCOPY, GASTROSCOPY, DUODENOSCOPY (EGD), COMBINED  06/30/2014    Procedure: COMBINED ESOPHAGOSCOPY, GASTROSCOPY, DUODENOSCOPY (EGD), BIOPSY SINGLE OR MULTIPLE;  Surgeon: Chester Patton MD;  [facility]    EXCISE MASS FINGER  06/14/2011    Procedure:EXCISE MASS FINGER; Middle Flexor Cyst; Surgeon:SHAYY RUSSELL; [facility]    IR FINE NEEDLE ASPIRATION W ULTRASOUND  11/13/2019    IR PICC PLACEMENT > 5 YRS OF AGE  11/19/2019    LAPAROSCOPIC CHOLECYSTECTOMY N/A 11/03/2023    Procedure: CHOLECYSTECTOMY, LAPAROSCOPIC;  Surgeon: Shayy Mathis MD;  Location: [facility] OR    LASER HOLMIUM LITHOTRIPSY BLADDER N/A 10/15/2014    Procedure: LASER HOLMIUM LITHOTRIPSY BLADDER;  Surgeon: Sahil Taveras MD;  [facility]    LASER KTP GREEN LIGHT PHOTOSELECTIVE VAPORIZATION PROSTATE  01/23/2014    Procedure: LASER KTP GREEN LIGHT PHOTOSELECTIVE VAPORIZATION PROSTATE;   Greenlight Photovaporization Of Prostate  ;  Surgeon: Sahil Taveras MD;  [facility]    OTHER SURGICAL HISTORY  10/04/2016    REPAIR ANEURYSM ASCENDING AORTA    OTHER SURGICAL HISTORY Right 09/23/2019    IRRIGATION AND DEBRIDEMENT UPPER EXTREMITYshoulder    RELEASE TRIGGER FINGER Right 03/31/2017    Procedure: RELEASE TRIGGER FINGER;  Surgeon: Juan Carlos Blunt MD;  [facility]    REMOVE ANTIBIOTIC CEMENT BEADS / SPACER SHOULDER Left 05/08/2020    Procedure: Left shoulder removal of spacer;  Surgeon: Analilia Aceves MD;  [facility]    REMOVE ANTIBIOTIC CEMENT BEADS / SPACER SHOULDER Right 08/18/2020    Procedure: Removal of right shoulder antibiotic spacer;  Surgeon: Analilia Aceves MD;  [facility]    REMOVE HARDWARE ARTHROPLASTY SHOULDER. I&D, PLACE ANTIBIOTIC CEMENT BE Left 11/15/2019    Procedure: Explantation of left total shoulder arthroplasty, irrigation and debridement, and placement of antibiotic spacer;  Surgeon: Analilia Aceves MD;  [facility]    REPAIR ANEURYSM ASCENDING AORTA N/A 10/04/2016    Procedure: REPAIR ANEURYSM ASCENDING AORTA;  Surgeon: Mckenzie Townsend MD;  [facility]    REVERSE ARTHROPLASTY SHOULDER Right 08/18/2020    Procedure: and conversion to reverse total shoulder arthroplasty;  Surgeon: Analilia Aceves MD;  [facility]    SURGICAL EXPOSURE, LAPAROSCOPIC, W/WOUND CLOSURE, FOR ERCP, BY GENERAL SURGERY N/A 11/03/2023    Procedure: SURGICAL EXPOSURE FOR ENDOSCOPIC RETROGRADE CHOLANGIOPANCREATOGRAPHY;  Surgeon: Kobe Mathis MD;  Location: [facility] OR    [facility]         Prior to Admission Medications  No current outpatient medications on file.       Allergies  Allergies   Allergen Reactions    Ciprofloxacin      History of aortic aneurysms    Tape [Adhesive Tape] Blisters     Blistering - please use paper tape       Social History  Social History     Socioeconomic History    Marital status: Single      Spouse name: Not on file    Number of children: Not on file    Years of education: Not on file    Highest education level: Not on file   Occupational History    Occupation: Disabled   Tobacco Use    Smoking status: Former     Current packs/day: 0.00     Average packs/day: 0.5 packs/day for 6.6 years (3.3 ttl pk-yrs)     Types: Cigarettes     Start date: 1977     Quit date: 1983     Years since quittin.0     Passive exposure: Never (per pt)    Smokeless tobacco: Never   Substance and Sexual Activity    Alcohol use: No     Alcohol/week: 0.0 standard drinks of alcohol    Drug use: No    Sexual activity: Not Currently     Partners: Female     Birth control/protection: Abstinence   Other Topics Concern    Parent/sibling w/ CABG, MI or angioplasty before 65F 55M? Not Asked   Social History Narrative    Live independently[place] one sister, Supriya in [place], [place].  Does live with 2 dogs.      Social Determinants of Health     Financial Resource Strain: Low Risk  (2023)    Financial Resource Strain     Within the past 12 months, have you or your family members you live with been unable to get utilities (heat, electricity) when it was really needed?: No   Food Insecurity: Low Risk  (2023)    Food Insecurity     Within the past 12 months, did you worry that your food would run out before you got money to buy more?: No     Within the past 12 months, did the food you bought just not last and you didn t have money to get more?: No   Transportation Needs: Low Risk  (2023)    Transportation Needs     Within the past 12 months, has lack of transportation kept you from medical appointments, getting your medicines, non-medical meetings or appointments, work, or from getting things that you need?: No   Physical Activity: Not on file   Stress: Not on file   Social Connections: Socially Isolated (2021)    Social Connection and Isolation Panel [NHANES]     Frequency of  Communication with Friends and Family: Once a week     Frequency of Social Gatherings with Friends and Family: Never     Attends Yazdanism Services: Never     Active Member of Clubs or Organizations: No     Attends Club or Organization Meetings: Never     Marital Status: Never    Interpersonal Safety: Low Risk  (9/8/2024)    Interpersonal Safety     Do you feel physically and emotionally safe where you currently live?: Yes     Within the past 12 months, have you been hit, slapped, kicked or otherwise physically hurt by someone?: No     Within the past 12 months, have you been humiliated or emotionally abused in other ways by your partner or ex-partner?: No   Housing Stability: High Risk (12/21/2023)    Housing Stability     Do you have housing? : No     Are you worried about losing your housing?: No       Family History  Family History   Problem Relation Age of Onset    Arthritis Other     Gastrointestinal Disease Other     Cardiovascular Father         aortic aneurysm    Arrhythmia Father     Nephrolithiasis Father     Sleep Apnea Father     Anxiety Disorder Father     Depression Father     Hypertension Father     Obesity Father     Hyperlipidemia Father     Coronary Artery Disease Father     Low Back Problems Father     Spine Problems Father     Cardiovascular Father     Other - See Comments Father         low back problems, spine problems    Anxiety Disorder Mother     Hypertension Mother     Osteoporosis Mother     Obesity Mother     Hyperlipidemia Mother     Low Back Problems Mother     Macular Degeneration Mother     Cataracts Mother     Other - See Comments Mother         low back problems    Anxiety Disorder Sister     Hypertension Sister     Osteoporosis Sister     Obesity Sister     Hyperlipidemia Sister     Low Back Problems Sister     Spine Problems Sister     Anxiety Disorder Sister     Depression Sister     Hypertension Sister     Osteoporosis Sister     Obesity Sister     Hyperlipidemia Sister   "   Low Back Problems Sister     Anxiety Disorder Sister     Hyperlipidemia Sister     Hypertension Sister     Obesity Sister     Other - See Comments Sister         low back problems, spine problems    Osteoporosis Sister     Anxiety Disorder Sister     Depression Sister     Hyperlipidemia Sister     Hypertension Sister     Other - See Comments Sister         low back problems    Obesity Sister     Osteoporosis Sister     Melanoma No family hx of     Skin Cancer No family hx of     Glaucoma No family hx of        Review of Systems  The complete review of systems is negative other than noted in the HPI or here.   Anesthesia Evaluation   Pt has had prior anesthetic.     History of anesthetic complications (SAVITA)  - .      ROS/MED HX  ENT/Pulmonary:     (+) sleep apnea (Has not used CPAP since gastric bypass.), doesn't use CPAP,              tobacco use (Quit at age 24), Past use,                    (-) asthma   Neurologic: Comment: Cervical radiculopathy.     H/o BPPV no recent acute episodes.    (-) no seizures, no CVA, no TIA and migraines   Cardiovascular: Comment:  NICM (EF 30-35%)  since resolved (thought to be stress induced cardiomyopathy)    Denies cardiac symptoms including chest pain, SOB, palpitations, syncope, STALEY, orthopnea, or PND.    Per record review:   Anesthesia record 1/2020:Event described per intraoperative record and postop \"significant event\" note. Severe hypertension intraoperatively and prolonged runs of ectopy. Sent to ER for evaluation.  Per chart review:  \"Patient was noted by anesthesia to have a 10 beat run of VT and profound hypertension between 206/134-226/152. This period of hypertensive emergency lasted ~4 minutes per records. Anaesthesia referred him to the ED due to the NSVT run\".     Per ED's 1/28/2020 note, \"Given his lack of ACS symptoms, suspect this is demand in the setting of hypertensive emergency likely triggered by severe pain. He is on a significant amount of narcotics at " "baseline due to chronic pain from the above surgeries\". Patient on telemetry overnight and TTE done.     Found to have NICM with LVEF 30-35%.  Coronary angiogram done with non-obstructed CAD.  Subsequently, patient LVEF has recovered and thought to be stress induced cardiomyopathy.     Patient tells me this was in the setting of infection with his shoulder.         (+) Dyslipidemia hypertension- -   -  - -                           valvular problems/murmurs  , bicuspid AV valve s/p valve sparing repair with Gelweave graft 2016.    Previous cardiac testing   Echo: Date: 9/8/2024 Results:  Left ventricular function is normal.The ejection fraction is 60-65%. No  regional wall motion abnormalities are seen.  Global right ventricular function is normal.  The aortic valve is bicuspid. Fusion of the right and left coronary cusps.  Lucy I. Trace aortic insufficiency is present.  No pericardial effusion is present.    Stress Test:  Date: Results:    ECG Reviewed:  Date: 2023 Results:  Normal Sinus Rhythm, rate 61, normal axis, normal intervals, no acute ST/T changes c/w ischemia, Q waves in lead 3, compared to 2020, new q waves  Cath:  Date: 2020 Results:  Conclusion      Mild non obstructive coronary disease      (-) taking anticoagulants/antiplatelets   METS/Exercise Tolerance: >4 METS Comment: Patient has two dogs and walks them regularly.  Reports he can easily walk 2 blocks.    Hematologic:     (+)      anemia, history of blood transfusion, no previous transfusion reaction, Known PRBC Anitbodies:No    (-) history of blood clots   Musculoskeletal: Comment: DJD, b/l reverse total shoulder with explant 2/2 infection. Subsequently reimplantation.  (+)  arthritis,             GI/Hepatic:     (+) GERD, Asymptomatic on medication,        cholecystitis/cholelithiasis (s/p cholecytectomy), hepatitis (s/p treatment in 1998) type C,     (-) liver disease   Renal/Genitourinary:     (+) renal disease, type: CRI,      BPH (s/p " TURP),      Endo:     (+)  type II DM (Since gastric bypass, A1C's have been WNL.), Last HgA1c: 5.6, date: 2023, Not using insulin, - not using insulin pump.         Obesity (s/p gastric bypass 2005),    (-) thyroid disease and chronic steroid usage   Psychiatric/Substance Use:     (+) psychiatric history (Insomnia) anxiety, depression and other (comment)    (-) alcohol abuse history and chronic opioid use history   Infectious Disease:  - neg infectious disease ROS     Malignancy:  - neg malignancy ROS  (-) malignancy   Other:  - neg other ROS    (+)  , H/O Chronic Pain,         There were no vitals taken for this visit.    Physical Exam   Constitutional: Awake, alert, cooperative, no apparent distress, and appears stated age.  Eyes: Pupils equal, round and reactive to light, extra ocular muscles intact, sclera clear, conjunctiva normal.  HENT: Normocephalic, oral pharynx with moist mucus membranes, upper denture with bottom endentulous with evidence of denture anchors. No goiter appreciated.   Respiratory: Clear to auscultation bilaterally, no crackles or wheezing.  Cardiovascular: Regular rate and rhythm, normal S1 and S2, and 2/6 systolic murmur noted.  Carotids +2, no bruits. No edema. Palpable pulses to radial  DP and PT arteries.   GI: Normal bowel sounds, soft, non-distended, non-tender, no masses palpated, no hepatosplenomegaly.  Surgical scars: well healed  Lymph/Hematologic: No cervical lymphadenopathy and no supraclavicular lymphadenopathy.  Skin: Warm and dry.    Musculoskeletal: Limited ROM of neck. There is no redness, warmth, or swelling of the joints. Gross motor strength is normal.    Neurologic: Awake, alert, oriented to name, place and time. Cranial nerves II-XII are grossly intact. Gait is normal.   Neuropsychiatric: Calm, cooperative. Normal affect.     Prior Labs/Diagnostic Studies   All labs and imaging personally reviewed    Latest Reference Range & Units 03/01/24 08:51   Sodium 135 - 145  mmol/L 140   Potassium 3.4 - 5.3 mmol/L 4.6   Chloride 98 - 107 mmol/L 104   Carbon Dioxide (CO2) 22 - 29 mmol/L 26   Urea Nitrogen 8.0 - 23.0 mg/dL 13.9   Creatinine 0.67 - 1.17 mg/dL 1.03   GFR Estimate >60 mL/min/1.73m2 82   Calcium 8.8 - 10.2 mg/dL 9.3   Anion Gap 7 - 15 mmol/L 10   Albumin 3.5 - 5.2 g/dL 4.3   Protein Total 6.4 - 8.3 g/dL 7.2   Alkaline Phosphatase 40 - 150 U/L 122   ALT 0 - 70 U/L 30   AST 0 - 45 U/L 34   Bilirubin Total <=1.2 mg/dL 0.4   Cholesterol <200 mg/dL 119   Patient Fasting?  Unknown   Glucose 70 - 99 mg/dL 85   HDL Cholesterol >=40 mg/dL 54   LDL Cholesterol Calculated <=100 mg/dL 48   Non HDL Cholesterol <130 mg/dL 65   Triglycerides <150 mg/dL 87   Hemoglobin 13.3 - 17.7 g/dL 15.1   % Retic 0.5 - 2.0 % 1.3   Absolute Retic 0.025 - 0.095 10e6/uL 0.067       Lab Results   Component Value Date    A1C 5.6 08/24/2023    A1C 5.3 10/05/2016    A1C 5.4 07/12/2016    A1C 5.0 10/31/2011    A1C 4.9 11/02/2010     PROCEDURES  BILATERAL CAROTID DUPLEX DOPPLER ULTRASOUND 8/2/2021 7:22 AM  CLINICAL HISTORY: Vertigo                                                        IMPRESSION:     1. RIGHT ICA: Less than 50% diameter narrowing by grayscale imaging  and sonographic velocity criteria.     2. LEFT ICA:  Less than 50% diameter narrowing by grayscale imaging  and sonographic velocity criteria.     CMR Report   4/2021  SUMMARY   ==========================================================================================================     Clinical history: 61 M ascending aortic graft repair  Comparison MRA: 02/2020     1. The aortic root, transverse arch and descending thoracic aorta are normal in size without an aneurysm or  dissection. Ascending aortic graft repair has normal size and appearance.     2. The aortic arch is left sided. There is normal variant branching of the arch vessels, with common origin  of the innominate and left common carotid arteries. There is no coarctation.     3. The  main and proximal branch pulmonary arteries are normal in size.      4. The systemic venous connections are normal.      CONCLUSIONS: Normal appearance of ascending aortic graft, with normal caliber of thoracic aorta. No change  from prior MRA in 2020.     EKG/ stress test - if available please see in ROS above   Echo result w/o MOPS: Interpretation SummaryH/O Bicuspid aortic valve with valve sparing aortic root replacement in 2016.Global and regional left ventricular function is normal with an EF of 55-60%.Right ventricular function, chamber size, wall motion, and thickness arenormal.The aortic valve is bicuspid.Trace to mild aortic insufficiency is present.Sinuses of Valsalva 3.9 cm.Ascending aorta 3.5 cm.The inferior vena cava is normal.No pericardial effusion is present.No significant changes noted.           No data to display                  The patient's records and results personally reviewed by this provider.     Outside records reviewed from: Care Everywhere    LAB/DIAGNOSTIC STUDIES TODAY:     Latest Reference Range & Units 08/29/24 08:20   Sodium 135 - 145 mmol/L 136   Potassium 3.4 - 5.3 mmol/L 4.4   Chloride 98 - 107 mmol/L 100   Carbon Dioxide (CO2) 22 - 29 mmol/L 26   Urea Nitrogen 8.0 - 23.0 mg/dL 14.9   Creatinine 0.67 - 1.17 mg/dL 1.32 (H)   GFR Estimate >60 mL/min/1.73m2 61   Calcium 8.8 - 10.4 mg/dL 9.4   Anion Gap 7 - 15 mmol/L 10   Glucose 70 - 99 mg/dL 122 (H)   WBC 4.0 - 11.0 10e3/uL 6.7   Hemoglobin 13.3 - 17.7 g/dL 15.7   Hematocrit 40.0 - 53.0 % 45.6   Platelet Count 150 - 450 10e3/uL 189   RBC Count 4.40 - 5.90 10e6/uL 5.30   MCV 78 - 100 fL 86   MCH 26.5 - 33.0 pg 29.6   MCHC 31.5 - 36.5 g/dL 34.4   RDW 10.0 - 15.0 % 12.8   ABO/Rh(D)  O POS   Antibody Screen Negative  Negative   SPECIMEN EXPIRATION DATE  [id number]   (H): Data is abnormally high    Assessment    Kobe Buchanan is a 64 year old male seen as a PAC referral for risk assessment and optimization for  anesthesia.    Plan/Recommendations  Pt will be optimized for the proposed procedure.  See below for details on the assessment, risk, and preoperative recommendations    NEUROLOGY  - No history of TIA, CVA or seizure    - BPPV without recent symptoms    - Chronic Pain with Cervical radiculopathy.  OPIOID Tolerant.  Follows with [facility]  ~ Prescribed Oxycodone 10 mg every 4 hours as needed>>patient reports that he takes Oxycodone 30 mg twice daily  ~ Fentanyl patch 50 mcg/hr every 72 hours>>patient reports he changes his fentanyl patch every 48 hours as believes it works better.    ~ Chronic opiates, morphine equivilant = 210 MME/Day   RECOMMEND GETTING PAIN TEAM INVOLVED EARLY    -Post Op delirium risk factors:  High co-morbid index    ENT  - No current airway concerns.  Will need to be reassessed day of surgery.  Mallampati: I  TM: > 3    CARDIAC  - H/O Bicuspid aortic valve with valve sparing aortic root replacement in 2016   ~ Echo 7/2023>>Mild aortic valve calcification is present. Trace to mild aortic insufficiency is present.     - Mild non-obstructive CAD on coronary angiogram in 2020.     ~ denies cardiac symptoms   ~ continue statin    - H/O NICM (EF 30-35%)  since resolved (thought to be stress induced cardiomyopathy)   ~ LVEF 55-60%    ~ normal Biventricular structure and function on echo from 2023    - HTN well controlled on amlodipine   ~ managed with carvedilol, amlodipine-benazepril     **Patient is scheduled for routine echocardiogram, carotid US and MRA chest with contrast the day prior to above surgery.  He is scheduled to see his cardiologist AFTER the above surgery. Discussed with Dr. Sheriff and would recommend cardiac testing and cardiac visit occur prior to the above cervical spine surgery.  Notified Dr. Prajapati's team.     ADDENDUM 9/4/2024  From: Senthil Walker RN   Sent: 9/3/2024   3:31 PM CDT   To: Jonathan Prajapati MD     Hi Dr. Prajapati,     I am one of   "[employer] nurses and helping her go through her messages. We have discussed Kobe and Dr. Zhou is comfortable with proceeding with the surgery as planned. The testing is routine for surveillance and can be postponed to after the surgery if needed. Dr. Zhou has no concerns on her end. Please let us know if you have any further questions or concerns. Thanks!     Angelina RN     - METS (Metabolic Equivalents)  Patient performs 4 or more METS exercise without symptoms             Total Score: 0      RCRI-Low risk: Class 2 0.9% complication rate             Total Score: 1    RCRI: CHF        PULMONARY  - Obstructive Sleep Apnea, untreated since gastric bypass   ~ SAVITA without home CPAP use.    - Denies asthma or inhaler use    - Tobacco History    History   Smoking Status    Former    Types: Cigarettes   Smokeless Tobacco    Never       GI  - GERD   ~ Controlled on medications: Proton Pump Inhibitor and Sucralfate    - PONV Medium Risk  Total Score: 2           1 AN PONV: Patient is not a current smoker    1 AN PONV: Intended Post Op Opioids        /RENAL  - CKD  ~ Creatinine/GFR see above   ~ Monitoring renal function during periop period.   ~ Avoidance of nephrotoxins as possible     - BPH s/p TURPX2    ENDOCRINE    - BMI: Estimated body mass index is 33.58 kg/m  as calculated from the following:    Height as of 9/8/24: 1.778 m (5' 10\").    Weight as of 9/8/24: 106.1 kg (234 lb).  Obesity (BMI >30) s/p gastric bypass 2005    - Diabetes Mellitus, Type 2, non-insulin dependent.   ~ A1C 5.6  ~ Patient reports this has been in remission since his gastric bypass in 2005.    HEME  - VTE Low Risk 0.5%             Total Score: 3    VTE: Greater than 59 yrs old    VTE: Male      - No history of abnormal bleeding or antiplatelet use.    - H/o anemia with blood transfusion in the past.  Denies h/o reaction.   ~ CBC and T&S today    MSK  - DJD s/p b/l reverse total shoulder replacements complicated by infection s/p explant and " reimplant     - Patient IS Frail             Total Score: 4    Frailty: Slower walking speed    Frailty: Decrease in strength    Frailty: Increased exhaustion    Frailty: Overall lack of energy      Due to the patient's risk, a referral to Physical Medicine and Rehabilitation has been made.  We are making you aware, as rehabilitation will be recommended before surgery and all recommendations from the PMR physician should be in collaboration with you as the patient's primary care provider (PCP).    PSYCH  - Depression, Anxiety and insomnia    ~ stable on bupropion, clonazepam, tizanidine, escitalopram and propranolol     ACCESS  - Difficult IV stick >>needs US    Different anesthesia methods/types have been discussed with the patient, but they are aware that the final plan will be decided by the assigned anesthesia provider on the date of service.  Patient was discussed with Dr Sheriff    The patient is optimized for their procedure. AVS with information on surgery time/arrival time, meds and NPO status given by nursing staff. No further diagnostic testing indicated.      On the day of service:     Prep time: 20 minutes  Visit time: 30 minutes  Documentation time: 20 minutes  ------------------------------------------  Total time: 70 minutes      ALAYNA Mann CNP  Preoperative Assessment Center  [facility]  Phone: [phone]  Fax: [phone]    Physical Exam    Airway  airway exam normal      Mallampati: I       Respiratory Devices and Support         Dental       (+) Edentulous      Cardiovascular   cardiovascular exam normal          Pulmonary   pulmonary exam normal                Anesthesia Plan    ASA Status:  3    NPO Status:  NPO Appropriate    Anesthesia Type: General.     - Airway: ETT   Induction: Intravenous, Propofol.   Maintenance: Balanced.   Techniques and Equipment:     - Airway: Video-Laryngoscope     - Lines/Monitors: 2nd IV      Consents            Postoperative Care    Pain management: Oral pain medications, IV analgesics, Multi-modal analgesia.   PONV prophylaxis: Ondansetron (or other 5HT-3), Dexamethasone or Solumedrol, Background Propofol Infusion     Comments:    Other Comments: Ketamine boluses

## 2024-09-10 NOTE — PLAN OF CARE
"Goal Outcome Evaluation:      Plan of Care Reviewed With: patient    Overall Patient Progress: no change     Outcome Evaluation: Pt is calm, cooperative, able to make his needs known, has call light within reach and uses appropriately.    VS: BP (!) 143/71 (BP Location: Left arm)   Pulse 64   Temp 97.5  F (36.4  C) (Oral)   Resp 16   Ht 1.778 m (5' 10\")   Wt 106.1 kg (234 lb)   SpO2 95%   BMI 33.58 kg/m     O2: Room Air - Obstructive Sleep Apnea   Output: Continent x 2   Last BM: 9/10/24   Activity: Independent w/ rolling walker   Up for meals? Good appetite - Sits upright to eat   Skin: Intact   Pain: 5/10 baseline   CMS: A&O x 4; Steady w/ walker; Numbness in all extremities & occasional tingling in bilateral LE   Dressing: Tegaderm   Diet: Regular/Thin/Pills Whole; NPO starting at Midnight   LDA: PIV left anterior forearm - SL   Plan: Surgery prep for tomorrow's Cervical Surgery & continue POC   Additional Info: Pt experiences intermittent Tinnitus          "

## 2024-09-10 NOTE — PLAN OF CARE
Goal Outcome Evaluation:    End of Shift Summary: See flowsheets for VS and assessment details.    A&Ox4, calm and cooperative, able to make needs known with call light in reach.  Independent in room.  VSS, sats WNL on room air, continent, LBM 9/10.  C/o 5/10 pain to neck and back, given prn oxycodone.  Pt requested klonopin and protonix this morning with 0500 meds for anxiety and heartburn.  Pt also requested simethicone for gas.  Provider Nam Joshua paged for prn options for all requested meds.  Basline numbness/tingling to hands/toes unchanged.  LPIV patent and saline locked.  No observed skin concerns.  Sleep and hydration promoted.  Continue POC.

## 2024-09-10 NOTE — PROGRESS NOTES
Orthopaedic Surgery Progress Note 09/10/2024    S: No acute events overnight.  Numerous questions about surgery, answered. Requesting IV narcotics this morning for pain between his normal oral doses, discussed this would be reserved for after surgery. He wants to talk to pain management service as he has many questions regarding his pain plan post operatively.    O:  Temp: 98  F (36.7  C) Temp src: Oral BP: 127/68 Pulse: 68   Resp: 18 SpO2: 95 % O2 Device: None (Room air)      Exam:  Gen: No acute distress, resting comfortably in bed.  Resp: Non-labored breathing  MSK:  Spine:  Cervical spine:                    Motor -                     C5: Deltoids R 5/5 and L 5/5 strength                    C6: Biceps R 5/5 and L 5/5 strength                     C7: Triceps R 5/5 and L 5/5 strength                     C8:  R 5/5 and L 5/5 strength                     T1: Dorsal interossei R 4/5 and L 4/5 strength                         Sensation: intact to light touch in C5-T1 grossly but subjectively diminished globally bilaterally    Baxter present bilaterally           Lumbar Spine:                    Motor -                     L2-3: Hip flexion 5/5 R and 5/5 L strength                     L3/4:  Knee extension R 5/5 and L 5/5 strength                    L4/5:  Foot dorsiflexion R 5/5 L 5/5 and                                 EHL dorsiflexion R 5/5 L 5/5 strength                    S1:  Plantarflexion/Peroneal Muscles  R 5/5 and L 5/5 strength                    Sensation: intact to light touch L3-S1 distribution BLE grossly                       Neurologic:                      REFLEXES Left Right   Biceps 3+ 3+   Brachioradialis 3+ 3+   Patella 3+ 3+   Ankle jerk 3+ 3+   Clonus 1 beats 1 beats              Drain output:   Output by Drain (mL) 09/08/24 0700 - 09/08/24 1459 09/08/24 1500 - 09/08/24 2259 09/08/24 2300 - 09/09/24 0659 09/09/24 0700 - 09/09/24 1459 09/09/24 1500 - 09/09/24 2259 09/09/24 2300 - 09/10/24  "0531   Patient has no LDAs of requested type attached.       No lab results found in last 7 days.    Invalid input(s): \"SEDRATE\"  Sed Rate   Date Value Ref Range Status   01/15/2020 25 (H) 0 - 20 mm/h Final         Culture results:   30-Day Micro Results       No results found for the last 720 hours.            Assessment: Kobe Buchanan is a 64-year-old male patient with cervical canal stenosis and myeloradiculopathy symptoms are progressive.     He is scheduled for cervical laminoplasty with Dr. Prajapati on 9/11/2024.  He was admitted prior to upcoming surgery worsening symptoms and inability to care for himself at home.    Evaluated by hospitalist and cardiology. Echo obtained. No additional testing needed prior to surgery tomorrow.    Today:  NPO after midnight  Pain service consult    Plan:  Ortho spine Primary  Activity: As tolerated, use appropriate assistive device and supervision until independent  Diet: Regular diet.  NPO after midnight tonight  DVT prophylaxis: Mechanical, SCDs  Pain management: Orals PRN, IV for breakthrough only  X-rays: Imaging complete  Physical Therapy: Mobilization, ROM, ADL's  Occupational Therapy: ADL's  Consults: PT, OT, Medicine, pain service, cardiology. Appreciate assistance in caring for this patient throughout the perioperative period  Disposition: pending operative course    Future Appointments   Date Time Provider Department Center   9/9/2024  8:45 AM Nicolas Sandoval, PT [facility] [place]   9/9/2024  3:00 PM Ashia Pond, OT [facility] [place]   9/10/2024  7:30 AM [email or web address] [place] [facility]   9/10/2024  8:50 AM [facility]   9/10/2024  9:30 AM [email or web address] [place] [facility]   9/18/2024  9:20 AM Yanna Robbins MD [facility]   10/28/2024  8:15 AM Jonathan Prajapati MD [facility]       Patient discussed with Dr. Prajapati.        --  Neftali Monique MD   Orthopedic Surgery PGY-4    Please page me directly via amcom with any questions/concerns during " regular weekday hours before 7pm. If there is no response, if it is a weekend, or if it is during evening hours, then please page the orthopedic surgery resident on call.

## 2024-09-10 NOTE — PLAN OF CARE
Pt was given PRN oxycodone once this shift. Pt was also given 2nd and 3rd dose of klonopin this shift.    Pain consult was placed by primary per pt request.    Also passed along to provider that pt usually takes flexeril TID; pt current ordered once daily. Pt was offered Zanaflex to which pt mentions it makes him feel sleepy and her did not want that at this time. This will likely be addressed during pain consult.    Plan for pt to go to surgery on 9/11. Pt states he has N/T in hands and recently in the feet. States provider is aware.    Pt was calm, cooperative, and makes needs known.

## 2024-09-10 NOTE — PROGRESS NOTES
[facility]    Medicine Progress Note - Hospitalist Service, GOLD TEAM 18    Date of Admission:  9/8/2024    Assessment & Plan    Kobe Buchanan is a 64 year old male with PMH of HTN, HLD, prior smoking hx, bicuspid AV s/p valve sparing repair with Gelweave graft (2016), NICM (LVEF 30-35%, now resolved), SAVITA, anemia, BPPV, T2DM, GERD, CKD, chronic pain, MDD, THERESA, degenerative joint disease s/p bilateral shoulder replacement 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 Prajapati on 9/11 but p/w worsening sxs and inability to care for himself at home.      # Cervical stenosis   # Cervical radiculopathy  # Hx of C3 and C7 laminectomies and C4-6 laminoplasty with medtronic plates  # Chronic opioid use  Pt follows with Ortho as an outpatient.  Was scheduled for cervical procedure on 9/11.  Began having shocklike shooting pains down his neck to his lower back starting this morning.  Reports intermittent numbness and tingling down BUEs, denies weakness or sensory changes in BLEs.  Symptoms are sometimes reproduced by neck flexion and extension, however was unable to reproduce symptoms on exam.  Was seen by Ortho in ED, plans to start Decadron.  Patient is on chronic opiates with significant opiate tolerance.  Will continue his current home regimen which consists of fentanyl patch 25 mcg/h every 48 hours, as needed oxycodone 30 mg bid.   - ortho primary  - activity as tolerated  - regular diet  - plan for scheduled surgery on 9/11 (NPO midnight 9/10)   - additional pain mgmt per ortho                 - continue home pain regimen                 - fentanyl patch 25 mcg/h every 48 h (next patch due 9/9)                 - oxycodone 30 mg bid prn                 - continue scheduled bowel regimen     - Agree with pain team consult.  - PT/OT     # Hx of nonischemic cardiomyopathy  - Per chart  "hx it is suspected this was stress induced.   - Home meds of coreg 50 mg bid, amlodipine-benazepril 5-20 mg bid.   - Last TTE 7/2023 with recovered LVEF 55-60%, normal LV wall thickness, LV size, LV diastolic function, no WMAs, no significant valvular abnormalities.  -TTE obtained this admission, normal EF, no significant change from echocardiogram from July 2023.  - Continue carvedilol 50 mg bid with holding parameters  - Hold PTA amlodipine and lisinopril while pending surgery.  -Seen by cardiology this admission, deemed fully optimized for surgery at this time.     # Bradycardia - asymptomatic   Sinus bradycardia on admission with HR high-50s. Appears stable. No chest pain.  Appears chronic while on Coreg.  - monitor  - consider EKG if worsening     # T2DM   Per chart review, with prior Hgb A1c >6.5%, but most recent was 08/2023 with A1c 5.6%. Not on meds at baseline.  - monitor CBGS for now, no insulin or SSI ordered     # CKD stage 3a   Cr baseline 1.1-1.3. Cr currently 1.32.  - monitor     # Chronic pain   On fentanyl patch 50 mcg/h 72 h patch, naproxen 500 mg bid, oxycodone 10 mg q4h prn.  - defer pain mgmt to primary as above     # MDD  # THERESA  On lexapro 30 mg daily and wellbutrin 200 mg bid. Also on clonazepam 0.5 mg tid prn     # HLD -continue home atorvastatin 40 mg nightly  # HTN - home anti-hypertensives as above  # HLD - on atorvastatin 40 mg daily  # SAVITA - does not use a CPAP at home       Diet: NPO per Anesthesia Guidelines for Procedure/Surgery Except for: Meds  Regular Diet Adult Thin Liquids (level 0)    DVT Prophylaxis: Pneumatic Compression Devices  Serrano Catheter: Not present  Lines: None     Cardiac Monitoring: None  Code Status: Full Code      Clinically Significant Risk Factors                  # Hypertension: Noted on problem list           # Obesity: Estimated body mass index is 33.58 kg/m  as calculated from the following:    Height as of this encounter: 1.778 m (5' 10\").    Weight as of " this encounter: 106.1 kg (234 lb)., PRESENT ON ADMISSION                  Disposition Plan     Medically Ready for Discharge: Anticipated in 5+ Days             ADELIA ESCOBEDO MD  Hospitalist Service, GOLD TEAM 18  [facility]  Securely message with [employer] (more info)  Text page via [facility] Paging/Directory   See signed in provider for up to date coverage information  ______________________________________________________________________    Interval History   Charts reviewed and patient was examined  Afebrile and hemodynamically stable  Scheduled for O.R later tomorrow      Physical Exam   Vital Signs: Temp: 98.4  F (36.9  C) Temp src: Oral BP: 120/61 Pulse: 57   Resp: 16 SpO2: 95 % O2 Device: None (Room air)    Weight: 234 lbs 0 oz    General Appearance: Awake, alert and not in distress  Respiratory: Clear breath sounds bilaterally   Cardiovascular: Normal heart sounds.  Systolic murmur, normal rate.  GI: Soft, non tender. Normal bowel sounds   Skin: No bruising or bleeding   Other:Awake, alert and orientated X 3       Medical Decision Making       35   MINUTES SPENT BY ME on the date of service doing chart review, history, exam, documentation & further activities per the note.  MANAGEMENT DISCUSSED with the following over the past 24 hours: patient, bedside RN, care management and orthopedic team        Data

## 2024-09-10 NOTE — PLAN OF CARE
"BP (!) 143/71 (BP Location: Left arm)   Pulse 64   Temp 97.5  F (36.4  C) (Oral)   Resp 16   Ht 1.778 m (5' 10\")   Wt 106.1 kg (234 lb)   SpO2 95%   BMI 33.58 kg/m      Discussed with provider to adjust timing for senna tabs so patient could receive them at 5pm just as he does for his daily home regimen.   Patient calls himself 'basically a doctor.' See flowsheets for assessment and pain assessment details. Wears glasses.   Skin WNL. Independent in room. L PIV SL.   Denies CP, SOB, or N/V.     Plan: NPO at midnight.   Handoff given to KEELY [place].   "

## 2024-09-10 NOTE — CONSULTS
"Pain Service Consultation Note  [facility]      Patient Name: Kobe Buchanan  MRN: [id number]   Age: 64 year old  Sex: male  Date: September 10, 2024                                      Reviewed: Yes    Referring Provider:   Bishop JENNIFER Ritter PA-C   Referring Service:  ortho  Reason for Consultation: \"pain management strategies. scheduled for cervical laminoplasty. preop narcotcis. \"    Assessment/Recommendations:  Kobe Buchanan is a 64 year old male who has PMH of HTN, HLD, prior smoking hx, bicuspid AV s/p valve sparing repair with Gelweave graft (2016), NICM (LVEF 30-35%, now resolved), SAVITA, anemia, BPPV, T2DM, GERD, CKD, chronic pain, MDD, THERESA, degenerative joint disease s/p bilateral shoulder replacement c/b bilateral prosthetic joint infection s/p hardware removal and reimplantation who is scheduled for  cervical laminoplasty with Dr. Prajapati on 9/11/24.    Pain service consulted to assist with pain management.     PTA, had been on Fentanyl 25mcg/hour patch TD Q 48 hours and Oxycodone 30mg PO BID as prescribed by [facility] pain clinic.    Kobe is in NAD. Chatting on phone, moving arms and neck without difficulties.  He reports pain at neck and shoulder with pain level at 4/10 as recently received pain medications.  We discussed pain recommendations as below for today as well as plan for post op. Pain management.       Plan:   Continue PTA dose of Fentanyl 25mcg/hour patch TD Q 48 hours.  Oxycodone 30mg PO TID PRN at least 4 hours apart.   Bowel regimen-does well with colace/senna.  Feels that miralax does not work well for him.      -Tentative Recommendations for post op pain management after surgery on 9/11/24  Continue PTA dose of Fentanyl 25mcg/hour patch TD Q 48 hours.  Oxycodone 20- 30mg PO Q 6 hours PRN.   Start at 30mg.  IV Dilaudid 0.3-0.4mg IV Q 2 hours PRN  Consider ketamine infusion 5-10mg/hour.  (Note h/o PTSD from past relationship)  Consider  lower " lidocaine infusion if needed per protocol however note baseline tinnitus.  Bowel regimen-does well with colace/senna.      Thank you for the opportunity to participate in the care of Kobe Buchanan  Pain Service will continue to follow.  Will see on POD #1.    Discussed with attending anesthesiologist  Primary Service Contacted with Recommendations? Yes    Karishma Bo PA-C  9/10/2024        Per [place]  review pulled from system on 09/10/24.   09/04/2024 09/04/2024 1 Diazepam 5 Mg Tablet 2.00 2 An Smi [id number] Sup (6435) 0/0 0.50 [place]   09/03/2024 08/14/2024 1 Fentanyl 25 Mcg/hr Patch 15.00 30 Be [facility] [id number] Sup (6435) 0/0 90.00 [place]   08/30/2024 08/14/2024 1 Oxycodone Hcl (Ir) 10 Mg Tab 180.00 30 Be [facility] [id number] Sup (6435) 0/0 90.[place]   08/20/2024 08/20/2024 1 Diazepam 5 Mg Tablet 2.00 1 Da a [id number] Sup (6435) 0/0 1.00 [place]   08/09/2024 05/14/2024 1 Clonazepam 0.5 Mg Tablet 90.00 30 An Smi [id number] Sup (6435) 3/3 3.00 [place]   08/02/2024 08/02/2024 1 Oxycodone Hcl (Ir) 10 Mg Tab 180.00 30 [facility]- [id number] Sup (0814) 0/0 90.00 MME       Pain Medications/Prescriber: [facility] pain clinic    Past Medical History:  Past Medical History:   Diagnosis Date    Acute systolic heart failure (H) 01/28/2020    Added automatically from request for surgery [id number]    NEMESIO (acute kidney injury) (H24) 02/16/2020    Anxiety     Ascending aortic aneurysm (H24)     Atrial fibrillation (H) [I48.91] 10/10/2016    Bicuspid aortic valve     BPPV (benign paroxysmal positional vertigo) 07/11/2014    Choledocholithiasis     Chronic narcotic use     Chronic neck pain     Chronic osteoarthritis     CKD (chronic kidney disease) stage 3, GFR 30-59 ml/min (H)     Degenerative joint disease     Depression     Dysthymic disorder     Hx of type 2 diabetes mellitus 03/04/2021    Hyperlipidemia 04/10/2012    Hypertension     Iron deficiency anemia secondary to blood loss (chronic)  08/25/2020    MSSA bacteremia 10/13/2019    Multiple stiff joints     Neck injuries     NSTEMI (non-ST elevated myocardial infarction) (H) 01/29/2020    Obesity 02/09/2015    Pain in shoulder     Plantar fasciitis     Pre-diabetes     S/P reverse total shoulder arthroplasty, left 07/07/2020    S/P reverse total shoulder arthroplasty, right 08/18/2020    Septic joint of left shoulder region (H) 11/13/2019    Septic joint of right shoulder region (H) 10/13/2019    s/p washout    Skin picking habit     Sleep apnea     does not use cpap    Status post total shoulder arthroplasty, left 03/03/2020    Added automatically from request for surgery [id number]         Family History:    Family History   Problem Relation Age of Onset    Arthritis Other     Gastrointestinal Disease Other     Cardiovascular Father         aortic aneurysm    Arrhythmia Father     Nephrolithiasis Father     Sleep Apnea Father     Anxiety Disorder Father     Depression Father     Hypertension Father     Obesity Father     Hyperlipidemia Father     Coronary Artery Disease Father     Low Back Problems Father     Spine Problems Father     Cardiovascular Father     Other - See Comments Father         low back problems, spine problems    Anxiety Disorder Mother     Hypertension Mother     Osteoporosis Mother     Obesity Mother     Hyperlipidemia Mother     Low Back Problems Mother     Macular Degeneration Mother     Cataracts Mother     Other - See Comments Mother         low back problems    Anxiety Disorder Sister     Hypertension Sister     Osteoporosis Sister     Obesity Sister     Hyperlipidemia Sister     Low Back Problems Sister     Spine Problems Sister     Anxiety Disorder Sister     Depression Sister     Hypertension Sister     Osteoporosis Sister     Obesity Sister     Hyperlipidemia Sister     Low Back Problems Sister     Anxiety Disorder Sister     Hyperlipidemia Sister     Hypertension Sister     Obesity Sister     Other - See Comments Sister   "       low back problems, spine problems    Osteoporosis Sister     Anxiety Disorder Sister     Depression Sister     Hyperlipidemia Sister     Hypertension Sister     Other - See Comments Sister         low back problems    Obesity Sister     Osteoporosis Sister     Melanoma No family hx of     Skin Cancer No family hx of     Glaucoma No family hx of        Social History:  Social History     Tobacco Use    Smoking status: Former     Current packs/day: 0.00     Average packs/day: 0.5 packs/day for 6.6 years (3.3 ttl pk-yrs)     Types: Cigarettes     Start date: 1977     Quit date: 1983     Years since quittin.0     Passive exposure: Never (per pt)    Smokeless tobacco: Never   Substance Use Topics    Alcohol use: No     Alcohol/week: 0.0 standard drinks of alcohol               Review of Systems:  Complete ROS reviewed. Unless otherwise noted, all other systems found to be negative.        Laboratory Results:  Recent Labs   Lab Test 24  0820 23  0738 23  1023 10/04/16  2205 10/04/16  1508   INR  --   --  1.27*   < > 1.43*      < > 313   < > 141*   PTT  --   --   --   --  39*   BUN 14.9   < > 28.6*   < > 14    < > = values in this interval not displayed.       Allergies:  Allergies   Allergen Reactions    Ciprofloxacin      History of aortic aneurysms    Tape [Adhesive Tape] Blisters     Blistering - please use paper tape         Current Pain Related Medications:  Medications related to Pain Management (From now, onward)      Start     Dose/Rate Route Frequency Ordered Stop    24 0800  senna-docusate (SENOKOT-S/PERICOLACE) 8.6-50 MG per tablet 2 tablet        Note to Pharmacy: PTA Sig:Take 1-2 tablets by mouth 2 times daily     Placed in \"And\" Linked Group    2 tablet Oral EVERY MORNING 09/10/24 1240      24 0000  acetaminophen (TYLENOL) tablet 650 mg         650 mg Oral EVERY 4 HOURS PRN 24 1740      24 0000  bisacodyl (DULCOLAX) suppository 10 mg      " "   10 mg Rectal DAILY PRN 09/08/24 1740      09/10/24 2200  senna-docusate (SENOKOT-S/PERICOLACE) 8.6-50 MG per tablet 3 tablet        Note to Pharmacy: PTA Sig:Take 1-2 tablets by mouth 2 times daily     Placed in \"And\" Linked Group    3 tablet Oral AT BEDTIME 09/10/24 1240      09/10/24 1300  oxyCODONE IR (ROXICODONE) tablet 30 mg        Note to Pharmacy: PTA Sig:Take 30 mg by mouth 2 times daily as needed.      30 mg Oral EVERY 8 HOURS PRN 09/10/24 1023      09/10/24 1300  polyethylene glycol (MIRALAX) Packet 17 g         17 g Oral 2 TIMES DAILY PRN 09/10/24 1240      09/10/24 0527  simethicone (MYLICON) suspension 40 mg         40 mg Oral EVERY 6 HOURS PRN 09/10/24 0528      09/10/24 0000  magnesium hydroxide (MILK OF MAGNESIA) suspension 30 mL         30 mL Oral DAILY PRN 09/08/24 1740      09/09/24 0900  fentaNYL (DURAGESIC) 25 mcg/hr 72 hr patch 1 patch        Note to Pharmacy: PTA Sig:Place 1 patch onto the skin every 48 hours. Pt taking Every 48 hours     Placed in \"And\" Linked Group    25 mcg  over 72 Hours Transdermal EVERY 48 HOURS 09/08/24 2004 09/09/24 0029  tiZANidine (ZANAFLEX) tablet 2-4 mg         2-4 mg Oral EVERY 8 HOURS PRN 09/09/24 0029      09/08/24 1900  bisacodyl (DULCOLAX) EC tablet 5-10 mg        Note to Pharmacy: PTA Sig:Two days prior to exam take two (2) tablets at 4pm. One day prior to exam take two (2) tablets at 4pm      5-10 mg Oral 2 TIMES DAILY 09/08/24 1858 09/08/24 1858  clonazePAM (klonoPIN) tablet 0.5 mg        Note to Pharmacy: PTA Sig:Take 1 tablet (0.5 mg) by mouth 3 times daily as needed for anxiety      0.5 mg Oral 3 TIMES DAILY PRN 09/08/24 1858      09/08/24 1858  cyclobenzaprine (FLEXERIL) tablet 10-20 mg        Note to Pharmacy: PTA Sig:Take 1 tablet (10 mg) by mouth 3 times daily as needed for muscle spasms      10-20 mg Oral DAILY PRN 09/08/24 1858      09/08/24 1800  acetaminophen (TYLENOL) tablet 975 mg         975 mg Oral EVERY 8 HOURS 09/08/24 1740 " "09/11/24 1759 09/08/24 1737  lidocaine 1 % 0.1-1 mL         0.1-1 mL Other EVERY 1 HOUR PRN 09/08/24 1740 09/08/24 1737  lidocaine (LMX4) cream          Topical EVERY 1 HOUR PRN 09/08/24 1740                Physical Exam:  Vitals: /61 (BP Location: Left arm)   Pulse 57   Temp 98.4  F (36.9  C) (Oral)   Resp 16   Ht 1.778 m (5' 10\")   Wt 106.1 kg (234 lb)   SpO2 95%   BMI 33.58 kg/m      Physical Exam:     CONSTITUTIONAL/GENERAL APPEARANCE:  NAD  EYES: EOMI, sclera anicteric,   ENT/NECK: atraumatic, lips and oral mucous membranes dry  RESPIRATORY: non-labored breathing. No cough, wheeze  CV: bradycardic  ABDOMEN: Soft, non-tender, non-distended  MUSCULOSKELETAL/BACK/SPINE/EXTREMITIES: Moves all extremities purposefully.  No edema or obvious joint deformities   NEURO: Alert and Oriented x3. Answers questions appropriately  SKIN/VASCULAR EXAM:  No jaundice, no visible rashes or lesions            Acute Inpatient Pain Service [facility]  Hours of pain coverage 24/7   Page via Amcom- Please Page the Pain Team Via Amcom: \"PAIN MANAGEMENT ACUTE INPATIENT/ [facility]\"           "

## 2024-09-11 ENCOUNTER — ANESTHESIA (OUTPATIENT)
Dept: SURGERY | Facility: CLINIC | Age: 64
DRG: 029 | End: 2024-09-11
Payer: COMMERCIAL

## 2024-09-11 ENCOUNTER — APPOINTMENT (OUTPATIENT)
Dept: GENERAL RADIOLOGY | Facility: CLINIC | Age: 64
DRG: 029 | End: 2024-09-11
Attending: ORTHOPAEDIC SURGERY
Payer: COMMERCIAL

## 2024-09-11 LAB
ABO/RH(D): NORMAL
ANION GAP SERPL CALCULATED.3IONS-SCNC: 8 MMOL/L (ref 7–15)
ANTIBODY SCREEN: NEGATIVE
BASOPHILS # BLD AUTO: 0 10E3/UL (ref 0–0.2)
BASOPHILS NFR BLD AUTO: 0 %
BUN SERPL-MCNC: 32 MG/DL (ref 8–23)
CALCIUM SERPL-MCNC: 9.2 MG/DL (ref 8.8–10.4)
CHLORIDE SERPL-SCNC: 100 MMOL/L (ref 98–107)
CREAT SERPL-MCNC: 1.09 MG/DL (ref 0.67–1.17)
EGFRCR SERPLBLD CKD-EPI 2021: 76 ML/MIN/1.73M2
EOSINOPHIL # BLD AUTO: 0.1 10E3/UL (ref 0–0.7)
EOSINOPHIL NFR BLD AUTO: 0 %
ERYTHROCYTE [DISTWIDTH] IN BLOOD BY AUTOMATED COUNT: 12.7 % (ref 10–15)
GLUCOSE BLDC GLUCOMTR-MCNC: 171 MG/DL (ref 70–99)
GLUCOSE BLDC GLUCOMTR-MCNC: 197 MG/DL (ref 70–99)
GLUCOSE SERPL-MCNC: 180 MG/DL (ref 70–99)
HCO3 SERPL-SCNC: 25 MMOL/L (ref 22–29)
HCT VFR BLD AUTO: 42.8 % (ref 40–53)
HGB BLD-MCNC: 15.2 G/DL (ref 13.3–17.7)
IMM GRANULOCYTES # BLD: 0.3 10E3/UL
IMM GRANULOCYTES NFR BLD: 2 %
INR PPP: 1.05 (ref 0.85–1.15)
LYMPHOCYTES # BLD AUTO: 0.7 10E3/UL (ref 0.8–5.3)
LYMPHOCYTES NFR BLD AUTO: 3 %
MCH RBC QN AUTO: 30.5 PG (ref 26.5–33)
MCHC RBC AUTO-ENTMCNC: 35.5 G/DL (ref 31.5–36.5)
MCV RBC AUTO: 86 FL (ref 78–100)
MONOCYTES # BLD AUTO: 0.3 10E3/UL (ref 0–1.3)
MONOCYTES NFR BLD AUTO: 2 %
NEUTROPHILS # BLD AUTO: 19.4 10E3/UL (ref 1.6–8.3)
NEUTROPHILS NFR BLD AUTO: 93 %
NRBC # BLD AUTO: 0 10E3/UL
NRBC BLD AUTO-RTO: 0 /100
PLATELET # BLD AUTO: 184 10E3/UL (ref 150–450)
POTASSIUM SERPL-SCNC: 4.3 MMOL/L (ref 3.4–5.3)
RBC # BLD AUTO: 4.98 10E6/UL (ref 4.4–5.9)
SODIUM SERPL-SCNC: 133 MMOL/L (ref 135–145)
SPECIMEN EXPIRATION DATE: NORMAL
WBC # BLD AUTO: 20.9 10E3/UL (ref 4–11)

## 2024-09-11 PROCEDURE — 63050 CERVICAL LAMINOPLSTY 2/> SEG: CPT | Performed by: ANESTHESIOLOGY

## 2024-09-11 PROCEDURE — 258N000003 HC RX IP 258 OP 636: Performed by: ANESTHESIOLOGY

## 2024-09-11 PROCEDURE — 360N000084 HC SURGERY LEVEL 4 W/ FLUORO, PER MIN: Performed by: ORTHOPAEDIC SURGERY

## 2024-09-11 PROCEDURE — 0PH304Z INSERTION OF INTERNAL FIXATION DEVICE INTO CERVICAL VERTEBRA, OPEN APPROACH: ICD-10-PCS | Performed by: ORTHOPAEDIC SURGERY

## 2024-09-11 PROCEDURE — 250N000011 HC RX IP 250 OP 636: Performed by: ANESTHESIOLOGY

## 2024-09-11 PROCEDURE — 0KXG0ZZ TRANSFER LEFT TRUNK MUSCLE, OPEN APPROACH: ICD-10-PCS | Performed by: ORTHOPAEDIC SURGERY

## 2024-09-11 PROCEDURE — 250N000011 HC RX IP 250 OP 636: Performed by: STUDENT IN AN ORGANIZED HEALTH CARE EDUCATION/TRAINING PROGRAM

## 2024-09-11 PROCEDURE — 272N000001 HC OR GENERAL SUPPLY STERILE: Performed by: ORTHOPAEDIC SURGERY

## 2024-09-11 PROCEDURE — 120N000002 HC R&B MED SURG/OB UMMC

## 2024-09-11 PROCEDURE — 250N000011 HC RX IP 250 OP 636: Performed by: NURSE ANESTHETIST, CERTIFIED REGISTERED

## 2024-09-11 PROCEDURE — 0KXF0ZZ TRANSFER RIGHT TRUNK MUSCLE, OPEN APPROACH: ICD-10-PCS | Performed by: ORTHOPAEDIC SURGERY

## 2024-09-11 PROCEDURE — 250N000013 HC RX MED GY IP 250 OP 250 PS 637: Performed by: STUDENT IN AN ORGANIZED HEALTH CARE EDUCATION/TRAINING PROGRAM

## 2024-09-11 PROCEDURE — 250N000011 HC RX IP 250 OP 636: Performed by: ORTHOPAEDIC SURGERY

## 2024-09-11 PROCEDURE — 250N000013 HC RX MED GY IP 250 OP 250 PS 637: Performed by: INTERNAL MEDICINE

## 2024-09-11 PROCEDURE — 86901 BLOOD TYPING SEROLOGIC RH(D): CPT | Performed by: NURSE PRACTITIONER

## 2024-09-11 PROCEDURE — 250N000009 HC RX 250: Performed by: NURSE ANESTHETIST, CERTIFIED REGISTERED

## 2024-09-11 PROCEDURE — C1713 ANCHOR/SCREW BN/BN,TIS/BN: HCPCS | Performed by: ORTHOPAEDIC SURGERY

## 2024-09-11 PROCEDURE — 01N10ZZ RELEASE CERVICAL NERVE, OPEN APPROACH: ICD-10-PCS | Performed by: ORTHOPAEDIC SURGERY

## 2024-09-11 PROCEDURE — 4A1104G MONITORING OF PERIPHERAL NERVOUS ELECTRICAL ACTIVITY, INTRAOPERATIVE, OPEN APPROACH: ICD-10-PCS | Performed by: ORTHOPAEDIC SURGERY

## 2024-09-11 PROCEDURE — 258N000003 HC RX IP 258 OP 636: Performed by: NURSE ANESTHETIST, CERTIFIED REGISTERED

## 2024-09-11 PROCEDURE — 63050 CERVICAL LAMINOPLSTY 2/> SEG: CPT | Performed by: NURSE ANESTHETIST, CERTIFIED REGISTERED

## 2024-09-11 PROCEDURE — 370N000017 HC ANESTHESIA TECHNICAL FEE, PER MIN: Performed by: ORTHOPAEDIC SURGERY

## 2024-09-11 PROCEDURE — 250N000025 HC SEVOFLURANE, PER MIN: Performed by: ORTHOPAEDIC SURGERY

## 2024-09-11 PROCEDURE — 36415 COLL VENOUS BLD VENIPUNCTURE: CPT | Performed by: NURSE PRACTITIONER

## 2024-09-11 PROCEDURE — 85025 COMPLETE CBC W/AUTO DIFF WBC: CPT | Performed by: NURSE PRACTITIONER

## 2024-09-11 PROCEDURE — 250N000013 HC RX MED GY IP 250 OP 250 PS 637: Performed by: NURSE PRACTITIONER

## 2024-09-11 PROCEDURE — 272N000002 HC OR SUPPLY OTHER OPNP: Performed by: ORTHOPAEDIC SURGERY

## 2024-09-11 PROCEDURE — 250N000013 HC RX MED GY IP 250 OP 250 PS 637: Performed by: PEDIATRICS

## 2024-09-11 PROCEDURE — 710N000010 HC RECOVERY PHASE 1, LEVEL 2, PER MIN: Performed by: ORTHOPAEDIC SURGERY

## 2024-09-11 PROCEDURE — 86900 BLOOD TYPING SEROLOGIC ABO: CPT | Performed by: NURSE PRACTITIONER

## 2024-09-11 PROCEDURE — 85610 PROTHROMBIN TIME: CPT | Performed by: NURSE PRACTITIONER

## 2024-09-11 PROCEDURE — 999N000180 XR SURGERY CARM FLUORO LESS THAN 5 MIN: Mod: TC

## 2024-09-11 PROCEDURE — 80048 BASIC METABOLIC PNL TOTAL CA: CPT | Performed by: NURSE PRACTITIONER

## 2024-09-11 PROCEDURE — 00NW0ZZ RELEASE CERVICAL SPINAL CORD, OPEN APPROACH: ICD-10-PCS | Performed by: ORTHOPAEDIC SURGERY

## 2024-09-11 PROCEDURE — 250N000009 HC RX 250: Performed by: STUDENT IN AN ORGANIZED HEALTH CARE EDUCATION/TRAINING PROGRAM

## 2024-09-11 PROCEDURE — 999N000141 HC STATISTIC PRE-PROCEDURE NURSING ASSESSMENT: Performed by: ORTHOPAEDIC SURGERY

## 2024-09-11 PROCEDURE — 250N000009 HC RX 250: Performed by: ORTHOPAEDIC SURGERY

## 2024-09-11 PROCEDURE — 250N000013 HC RX MED GY IP 250 OP 250 PS 637: Performed by: ANESTHESIOLOGY

## 2024-09-11 PROCEDURE — 258N000003 HC RX IP 258 OP 636: Performed by: STUDENT IN AN ORGANIZED HEALTH CARE EDUCATION/TRAINING PROGRAM

## 2024-09-11 DEVICE — IMPLANTABLE DEVICE: Type: IMPLANTABLE DEVICE | Site: POSTERIOR CERVICAL | Status: FUNCTIONAL

## 2024-09-11 RX ORDER — SODIUM CHLORIDE 9 MG/ML
INJECTION, SOLUTION INTRAVENOUS CONTINUOUS
Status: DISCONTINUED | OUTPATIENT
Start: 2024-09-11 | End: 2024-09-14 | Stop reason: HOSPADM

## 2024-09-11 RX ORDER — SODIUM CHLORIDE, SODIUM LACTATE, POTASSIUM CHLORIDE, CALCIUM CHLORIDE 600; 310; 30; 20 MG/100ML; MG/100ML; MG/100ML; MG/100ML
INJECTION, SOLUTION INTRAVENOUS CONTINUOUS
Status: DISCONTINUED | OUTPATIENT
Start: 2024-09-11 | End: 2024-09-11 | Stop reason: HOSPADM

## 2024-09-11 RX ORDER — OXYCODONE HYDROCHLORIDE 5 MG/1
15 TABLET ORAL EVERY 6 HOURS PRN
Status: DISCONTINUED | OUTPATIENT
Start: 2024-09-11 | End: 2024-09-14 | Stop reason: HOSPADM

## 2024-09-11 RX ORDER — DEXAMETHASONE SODIUM PHOSPHATE 4 MG/ML
INJECTION, SOLUTION INTRA-ARTICULAR; INTRALESIONAL; INTRAMUSCULAR; INTRAVENOUS; SOFT TISSUE PRN
Status: DISCONTINUED | OUTPATIENT
Start: 2024-09-11 | End: 2024-09-11

## 2024-09-11 RX ORDER — ONDANSETRON 4 MG/1
4 TABLET, ORALLY DISINTEGRATING ORAL EVERY 30 MIN PRN
Status: DISCONTINUED | OUTPATIENT
Start: 2024-09-11 | End: 2024-09-11 | Stop reason: HOSPADM

## 2024-09-11 RX ORDER — ONDANSETRON 2 MG/ML
4 INJECTION INTRAMUSCULAR; INTRAVENOUS EVERY 6 HOURS PRN
Status: DISCONTINUED | OUTPATIENT
Start: 2024-09-11 | End: 2024-09-11

## 2024-09-11 RX ORDER — LIDOCAINE HYDROCHLORIDE ANHYDROUS AND DEXTROSE MONOHYDRATE .8; 5 G/100ML; G/100ML
0.5 INJECTION, SOLUTION INTRAVENOUS CONTINUOUS
Status: DISCONTINUED | OUTPATIENT
Start: 2024-09-11 | End: 2024-09-12

## 2024-09-11 RX ORDER — NALOXONE HYDROCHLORIDE 0.4 MG/ML
0.1 INJECTION, SOLUTION INTRAMUSCULAR; INTRAVENOUS; SUBCUTANEOUS
Status: DISCONTINUED | OUTPATIENT
Start: 2024-09-11 | End: 2024-09-11 | Stop reason: HOSPADM

## 2024-09-11 RX ORDER — OXYCODONE HYDROCHLORIDE 10 MG/1
10 TABLET ORAL
Status: COMPLETED | OUTPATIENT
Start: 2024-09-11 | End: 2024-09-11

## 2024-09-11 RX ORDER — DEXAMETHASONE SODIUM PHOSPHATE 4 MG/ML
4 INJECTION, SOLUTION INTRA-ARTICULAR; INTRALESIONAL; INTRAMUSCULAR; INTRAVENOUS; SOFT TISSUE
Status: DISCONTINUED | OUTPATIENT
Start: 2024-09-11 | End: 2024-09-11 | Stop reason: HOSPADM

## 2024-09-11 RX ORDER — CEFAZOLIN SODIUM/WATER 2 G/20 ML
SYRINGE (ML) INTRAVENOUS PRN
Status: DISCONTINUED | OUTPATIENT
Start: 2024-09-11 | End: 2024-09-11

## 2024-09-11 RX ORDER — ONDANSETRON 2 MG/ML
4 INJECTION INTRAMUSCULAR; INTRAVENOUS EVERY 30 MIN PRN
Status: DISCONTINUED | OUTPATIENT
Start: 2024-09-11 | End: 2024-09-11 | Stop reason: HOSPADM

## 2024-09-11 RX ORDER — CEFAZOLIN SODIUM/WATER 2 G/20 ML
2 SYRINGE (ML) INTRAVENOUS EVERY 8 HOURS
Status: DISCONTINUED | OUTPATIENT
Start: 2024-09-11 | End: 2024-09-13

## 2024-09-11 RX ORDER — ACETAMINOPHEN 325 MG/1
650 TABLET ORAL EVERY 4 HOURS PRN
Status: DISCONTINUED | OUTPATIENT
Start: 2024-09-14 | End: 2024-09-14 | Stop reason: HOSPADM

## 2024-09-11 RX ORDER — FAMOTIDINE 20 MG/1
20 TABLET, FILM COATED ORAL 2 TIMES DAILY
Status: DISCONTINUED | OUTPATIENT
Start: 2024-09-11 | End: 2024-09-14 | Stop reason: HOSPADM

## 2024-09-11 RX ORDER — FENTANYL CITRATE 50 UG/ML
25 INJECTION, SOLUTION INTRAMUSCULAR; INTRAVENOUS EVERY 5 MIN PRN
Status: DISCONTINUED | OUTPATIENT
Start: 2024-09-11 | End: 2024-09-11 | Stop reason: HOSPADM

## 2024-09-11 RX ORDER — KETAMINE HYDROCHLORIDE 10 MG/ML
INJECTION INTRAMUSCULAR; INTRAVENOUS PRN
Status: DISCONTINUED | OUTPATIENT
Start: 2024-09-11 | End: 2024-09-11

## 2024-09-11 RX ORDER — HYDROMORPHONE HYDROCHLORIDE 1 MG/ML
0.4 INJECTION, SOLUTION INTRAMUSCULAR; INTRAVENOUS; SUBCUTANEOUS EVERY 5 MIN PRN
Status: DISCONTINUED | OUTPATIENT
Start: 2024-09-11 | End: 2024-09-11 | Stop reason: HOSPADM

## 2024-09-11 RX ORDER — MAGNESIUM HYDROXIDE 1200 MG/15ML
LIQUID ORAL PRN
Status: DISCONTINUED | OUTPATIENT
Start: 2024-09-11 | End: 2024-09-11 | Stop reason: HOSPADM

## 2024-09-11 RX ORDER — PROCHLORPERAZINE MALEATE 10 MG
10 TABLET ORAL EVERY 6 HOURS PRN
Status: DISCONTINUED | OUTPATIENT
Start: 2024-09-11 | End: 2024-09-11

## 2024-09-11 RX ORDER — HYDROMORPHONE HYDROCHLORIDE 1 MG/ML
0.2 INJECTION, SOLUTION INTRAMUSCULAR; INTRAVENOUS; SUBCUTANEOUS EVERY 5 MIN PRN
Status: DISCONTINUED | OUTPATIENT
Start: 2024-09-11 | End: 2024-09-11 | Stop reason: HOSPADM

## 2024-09-11 RX ORDER — SODIUM CHLORIDE, SODIUM LACTATE, POTASSIUM CHLORIDE, CALCIUM CHLORIDE 600; 310; 30; 20 MG/100ML; MG/100ML; MG/100ML; MG/100ML
INJECTION, SOLUTION INTRAVENOUS CONTINUOUS PRN
Status: DISCONTINUED | OUTPATIENT
Start: 2024-09-11 | End: 2024-09-11

## 2024-09-11 RX ORDER — OXYCODONE HYDROCHLORIDE 5 MG/1
5 TABLET ORAL
Status: DISCONTINUED | OUTPATIENT
Start: 2024-09-11 | End: 2024-09-11 | Stop reason: HOSPADM

## 2024-09-11 RX ORDER — PROPOFOL 10 MG/ML
INJECTION, EMULSION INTRAVENOUS PRN
Status: DISCONTINUED | OUTPATIENT
Start: 2024-09-11 | End: 2024-09-11

## 2024-09-11 RX ORDER — TOBRAMYCIN 1.2 G/30ML
INJECTION, POWDER, LYOPHILIZED, FOR SOLUTION INTRAVENOUS PRN
Status: DISCONTINUED | OUTPATIENT
Start: 2024-09-11 | End: 2024-09-11 | Stop reason: HOSPADM

## 2024-09-11 RX ORDER — ONDANSETRON 2 MG/ML
INJECTION INTRAMUSCULAR; INTRAVENOUS PRN
Status: DISCONTINUED | OUTPATIENT
Start: 2024-09-11 | End: 2024-09-11

## 2024-09-11 RX ORDER — OXYCODONE HYDROCHLORIDE 10 MG/1
30 TABLET ORAL EVERY 6 HOURS PRN
Status: DISCONTINUED | OUTPATIENT
Start: 2024-09-11 | End: 2024-09-14 | Stop reason: HOSPADM

## 2024-09-11 RX ORDER — ACETAMINOPHEN 325 MG/1
975 TABLET ORAL EVERY 8 HOURS
Status: COMPLETED | OUTPATIENT
Start: 2024-09-11 | End: 2024-09-14

## 2024-09-11 RX ORDER — VANCOMYCIN HYDROCHLORIDE 1 G/20ML
INJECTION, POWDER, LYOPHILIZED, FOR SOLUTION INTRAVENOUS PRN
Status: DISCONTINUED | OUTPATIENT
Start: 2024-09-11 | End: 2024-09-11 | Stop reason: HOSPADM

## 2024-09-11 RX ORDER — BISACODYL 10 MG
10 SUPPOSITORY, RECTAL RECTAL DAILY PRN
Status: DISCONTINUED | OUTPATIENT
Start: 2024-09-14 | End: 2024-09-14 | Stop reason: HOSPADM

## 2024-09-11 RX ORDER — HYDROMORPHONE HYDROCHLORIDE 1 MG/ML
INJECTION, SOLUTION INTRAMUSCULAR; INTRAVENOUS; SUBCUTANEOUS
Status: COMPLETED
Start: 2024-09-11 | End: 2024-09-11

## 2024-09-11 RX ORDER — POLYETHYLENE GLYCOL 3350 17 G/17G
17 POWDER, FOR SOLUTION ORAL DAILY
Status: DISCONTINUED | OUTPATIENT
Start: 2024-09-12 | End: 2024-09-14 | Stop reason: HOSPADM

## 2024-09-11 RX ORDER — FENTANYL CITRATE 50 UG/ML
INJECTION, SOLUTION INTRAMUSCULAR; INTRAVENOUS PRN
Status: DISCONTINUED | OUTPATIENT
Start: 2024-09-11 | End: 2024-09-11

## 2024-09-11 RX ORDER — HYDROMORPHONE HYDROCHLORIDE 1 MG/ML
0.4 INJECTION, SOLUTION INTRAMUSCULAR; INTRAVENOUS; SUBCUTANEOUS
Status: DISCONTINUED | OUTPATIENT
Start: 2024-09-11 | End: 2024-09-14 | Stop reason: HOSPADM

## 2024-09-11 RX ORDER — ONDANSETRON 4 MG/1
4 TABLET, ORALLY DISINTEGRATING ORAL EVERY 6 HOURS PRN
Status: DISCONTINUED | OUTPATIENT
Start: 2024-09-11 | End: 2024-09-11

## 2024-09-11 RX ORDER — BUPIVACAINE HYDROCHLORIDE AND EPINEPHRINE 2.5; 5 MG/ML; UG/ML
INJECTION, SOLUTION INFILTRATION; PERINEURAL PRN
Status: DISCONTINUED | OUTPATIENT
Start: 2024-09-11 | End: 2024-09-11 | Stop reason: HOSPADM

## 2024-09-11 RX ORDER — LIDOCAINE 40 MG/G
CREAM TOPICAL
Status: DISCONTINUED | OUTPATIENT
Start: 2024-09-11 | End: 2024-09-14 | Stop reason: HOSPADM

## 2024-09-11 RX ORDER — AMOXICILLIN 250 MG
1 CAPSULE ORAL 2 TIMES DAILY
Status: DISCONTINUED | OUTPATIENT
Start: 2024-09-11 | End: 2024-09-11

## 2024-09-11 RX ORDER — FENTANYL CITRATE 50 UG/ML
25 INJECTION, SOLUTION INTRAMUSCULAR; INTRAVENOUS
Status: DISCONTINUED | OUTPATIENT
Start: 2024-09-11 | End: 2024-09-11 | Stop reason: HOSPADM

## 2024-09-11 RX ORDER — LIDOCAINE HYDROCHLORIDE 20 MG/ML
INJECTION, SOLUTION INFILTRATION; PERINEURAL PRN
Status: DISCONTINUED | OUTPATIENT
Start: 2024-09-11 | End: 2024-09-11

## 2024-09-11 RX ORDER — FENTANYL CITRATE 50 UG/ML
50 INJECTION, SOLUTION INTRAMUSCULAR; INTRAVENOUS EVERY 5 MIN PRN
Status: DISCONTINUED | OUTPATIENT
Start: 2024-09-11 | End: 2024-09-11 | Stop reason: HOSPADM

## 2024-09-11 RX ORDER — HYDROMORPHONE HYDROCHLORIDE 1 MG/ML
0.3 INJECTION, SOLUTION INTRAMUSCULAR; INTRAVENOUS; SUBCUTANEOUS
Status: DISCONTINUED | OUTPATIENT
Start: 2024-09-11 | End: 2024-09-14 | Stop reason: HOSPADM

## 2024-09-11 RX ADMIN — ONDANSETRON 4 MG: 2 INJECTION INTRAMUSCULAR; INTRAVENOUS at 11:13

## 2024-09-11 RX ADMIN — SODIUM CHLORIDE, POTASSIUM CHLORIDE, SODIUM LACTATE AND CALCIUM CHLORIDE: 600; 310; 30; 20 INJECTION, SOLUTION INTRAVENOUS at 14:15

## 2024-09-11 RX ADMIN — ACETAMINOPHEN 975 MG: 325 TABLET ORAL at 09:27

## 2024-09-11 RX ADMIN — SENNOSIDES AND DOCUSATE SODIUM 2 TABLET: 50; 8.6 TABLET ORAL at 07:59

## 2024-09-11 RX ADMIN — HYDROMORPHONE HYDROCHLORIDE 0.4 MG: 1 INJECTION, SOLUTION INTRAMUSCULAR; INTRAVENOUS; SUBCUTANEOUS at 21:14

## 2024-09-11 RX ADMIN — OXYCODONE HYDROCHLORIDE 10 MG: 5 TABLET ORAL at 15:08

## 2024-09-11 RX ADMIN — CYCLOBENZAPRINE 20 MG: 10 TABLET, FILM COATED ORAL at 17:16

## 2024-09-11 RX ADMIN — SODIUM CHLORIDE, POTASSIUM CHLORIDE, SODIUM LACTATE AND CALCIUM CHLORIDE: 600; 310; 30; 20 INJECTION, SOLUTION INTRAVENOUS at 11:09

## 2024-09-11 RX ADMIN — Medication 2 G: at 11:50

## 2024-09-11 RX ADMIN — FAMOTIDINE 20 MG: 20 TABLET ORAL at 22:56

## 2024-09-11 RX ADMIN — Medication 20 MG: at 12:14

## 2024-09-11 RX ADMIN — FENTANYL 1 PATCH: 25 PATCH TRANSDERMAL at 09:25

## 2024-09-11 RX ADMIN — KETAMINE HYDROCHLORIDE 10 MG/HR: 10 INJECTION INTRAMUSCULAR; INTRAVENOUS at 15:38

## 2024-09-11 RX ADMIN — SODIUM CHLORIDE: 9 INJECTION, SOLUTION INTRAVENOUS at 16:10

## 2024-09-11 RX ADMIN — OXYCODONE HYDROCHLORIDE 30 MG: 10 TABLET ORAL at 18:47

## 2024-09-11 RX ADMIN — BUPROPION HYDROCHLORIDE 200 MG: 200 TABLET, EXTENDED RELEASE ORAL at 04:46

## 2024-09-11 RX ADMIN — HYDROMORPHONE HYDROCHLORIDE 0.4 MG: 1 INJECTION, SOLUTION INTRAMUSCULAR; INTRAVENOUS; SUBCUTANEOUS at 17:10

## 2024-09-11 RX ADMIN — HYDROMORPHONE HYDROCHLORIDE 0.5 MG: 1 INJECTION, SOLUTION INTRAMUSCULAR; INTRAVENOUS; SUBCUTANEOUS at 14:10

## 2024-09-11 RX ADMIN — SUGAMMADEX 200 MG: 100 INJECTION, SOLUTION INTRAVENOUS at 13:59

## 2024-09-11 RX ADMIN — FENTANYL CITRATE 100 MCG: 50 INJECTION INTRAMUSCULAR; INTRAVENOUS at 11:24

## 2024-09-11 RX ADMIN — FENTANYL CITRATE 50 MCG: 50 INJECTION INTRAMUSCULAR; INTRAVENOUS at 14:50

## 2024-09-11 RX ADMIN — Medication 50 MG: at 11:26

## 2024-09-11 RX ADMIN — SENNOSIDES AND DOCUSATE SODIUM 3 TABLET: 50; 8.6 TABLET ORAL at 17:16

## 2024-09-11 RX ADMIN — BISACODYL 10 MG: 5 TABLET, COATED ORAL at 17:17

## 2024-09-11 RX ADMIN — HYDROMORPHONE HYDROCHLORIDE 0.5 MG: 1 INJECTION, SOLUTION INTRAMUSCULAR; INTRAVENOUS; SUBCUTANEOUS at 12:14

## 2024-09-11 RX ADMIN — PROPOFOL 150 MG: 10 INJECTION, EMULSION INTRAVENOUS at 11:24

## 2024-09-11 RX ADMIN — MIDAZOLAM 2 MG: 1 INJECTION INTRAMUSCULAR; INTRAVENOUS at 11:11

## 2024-09-11 RX ADMIN — Medication 30 MG: at 11:38

## 2024-09-11 RX ADMIN — BUPROPION HYDROCHLORIDE 200 MG: 200 TABLET, EXTENDED RELEASE ORAL at 17:16

## 2024-09-11 RX ADMIN — CLONAZEPAM 0.5 MG: 0.5 TABLET ORAL at 17:32

## 2024-09-11 RX ADMIN — DEXAMETHASONE SODIUM PHOSPHATE 10 MG: 10 INJECTION, SOLUTION INTRAMUSCULAR; INTRAVENOUS at 04:07

## 2024-09-11 RX ADMIN — LIDOCAINE HYDROCHLORIDE 100 MG: 20 INJECTION, SOLUTION INFILTRATION; PERINEURAL at 11:24

## 2024-09-11 RX ADMIN — Medication 20 MG: at 12:51

## 2024-09-11 RX ADMIN — DEXAMETHASONE SODIUM PHOSPHATE 10 MG: 10 INJECTION, SOLUTION INTRAMUSCULAR; INTRAVENOUS at 22:55

## 2024-09-11 RX ADMIN — PROPOFOL 50 MG: 10 INJECTION, EMULSION INTRAVENOUS at 11:37

## 2024-09-11 RX ADMIN — CARVEDILOL 50 MG: 25 TABLET, FILM COATED ORAL at 17:16

## 2024-09-11 RX ADMIN — CARVEDILOL 50 MG: 25 TABLET, FILM COATED ORAL at 04:46

## 2024-09-11 RX ADMIN — HYDROMORPHONE HYDROCHLORIDE 0.5 MG: 1 INJECTION, SOLUTION INTRAMUSCULAR; INTRAVENOUS; SUBCUTANEOUS at 13:17

## 2024-09-11 RX ADMIN — OXYCODONE HYDROCHLORIDE 30 MG: 15 TABLET ORAL at 07:03

## 2024-09-11 RX ADMIN — FENTANYL CITRATE 25 MCG: 50 INJECTION INTRAMUSCULAR; INTRAVENOUS at 14:38

## 2024-09-11 RX ADMIN — DEXAMETHASONE SODIUM PHOSPHATE 10 MG: 4 INJECTION, SOLUTION INTRAMUSCULAR; INTRAVENOUS at 11:55

## 2024-09-11 RX ADMIN — BISACODYL 10 MG: 5 TABLET, COATED ORAL at 04:46

## 2024-09-11 RX ADMIN — Medication 2 G: at 21:03

## 2024-09-11 RX ADMIN — ACETAMINOPHEN 975 MG: 325 TABLET ORAL at 17:17

## 2024-09-11 ASSESSMENT — ACTIVITIES OF DAILY LIVING (ADL)
ADLS_ACUITY_SCORE: 28

## 2024-09-11 NOTE — ANESTHESIA PROCEDURE NOTES
Airway       Patient location during procedure: OR       Procedure Start/Stop Times: 9/11/2024 11:29 AM  Staff -        CRNA: Marva Sevilla APRN CRNA       Performed By: CRNA  Consent for Airway        Urgency: elective  Indications and Patient Condition       Indications for airway management: suzi-procedural       Induction type:intravenous       Mask difficulty assessment: 2 - vent by mask + OA or adjuvant +/- NMBA    Final Airway Details       Final airway type: endotracheal airway       Successful airway: ETT - single  Endotracheal Airway Details        ETT size (mm): 8.0       Cuffed: yes       Successful intubation technique: video laryngoscopy       VL Blade Size: Glidescope 3       Grade View of Cords: 1       Adjucts: stylet       Position: Right       Measured from: gums/teeth       Secured at (cm): 22       Bite block used: None    Post intubation assessment        Placement verified by: capnometry, equal breath sounds and chest rise        Number of attempts at approach: 1       Number of other approaches attempted: 0       Secured with: tape       Ease of procedure: easy       Dentition: Intact and Unchanged    Medication(s) Administered   Medication Administration Time: 9/11/2024 11:29 AM

## 2024-09-11 NOTE — PROGRESS NOTES
"Orthopaedic Surgery Progress Note:       Subjective:   Patient seen and evaluated in PACU.  Overall doing well.  Pain currently controlled    Objective:   /75 (BP Location: Right arm)   Pulse 61   Temp 97.4  F (36.3  C) (Oral)   Resp 16   Ht 1.778 m (5' 10\")   Wt 106.1 kg (234 lb)   SpO2 94%   BMI 33.58 kg/m    I/O this shift:  In: 800 [I.V.:800]  Out: 425 [Urine:425]  Gen: NAD. Resting comfortably in bed  Resp: Breathing comfortably on RA  : Serrano in place    Spine:  Cervical spine:    Dressing clean and dry  Drain in place maintaining suction                      Motor -                     C5: Deltoids R 5/5 and L 5/5 strength                    C6: Biceps R 5/5 and L 5/5 strength                     C7: Triceps R 5/5 and L 5/5 strength                     C8:  R 5/5 and L 5/5 strength                     T1: Dorsal interossei R 4/5 and L 4/5 strength                         Sensation: intact to light touch in C5-T1 grossly but subjectively diminished globally bilaterally     Baxter present bilaterally           Lumbar Spine:                    Motor -                     L2-3: Hip flexion 5/5 R and 5/5 L strength                     L3/4:  Knee extension R 5/5 and L 5/5 strength                    L4/5:  Foot dorsiflexion R 5/5 L 5/5 and                                 EHL dorsiflexion R 5/5 L 5/5 strength                    S1:  Plantarflexion/Peroneal Muscles  R 5/5 and L 5/5 strength                    Sensation: intact to light touch L3-S1 distribution BLE grossly                       Neurologic:                      REFLEXES Left Right   Biceps 3+ 3+   Brachioradialis 3+ 3+   Patella 3+ 3+   Ankle jerk 3+ 3+   Clonus 1 beats 1 beats                Labs:  Lab Results   Component Value Date    WBC 20.9 (H) 09/11/2024    HGB 15.2 09/11/2024     09/11/2024    INR 1.05 09/11/2024        Assessment & Plan:   Assessment and Plan: Kobe Buchanan is a 64 year old male now s/p C3 and " C7 dome laminectomy and C4-6 laminoplasty on 9/11 with Dr. Prajapati.      Ortho (Alo) Primary  Activity:   - Up with assist until independent. No excessive bending or twisting. No lifting >10 lbs x 6 weeks.   Weight bearing status: WBAT.  Pain management:   - IV lidocaine: discontinue at 8am on POD#2 or earlier if complications arise.  - Transition to PO narcotics as tolerated. No NSAIDs x 3 months.   -Pain management on consult  Antibiotics: Ancef until drains out  Diet: Begin with clear fluids and progress diet as tolerated.   DVT prophylaxis: SCDs only. No chemical DVT ppx needed.  Imaging: XR Upright c spine PTDC - ordered.  Labs: Hgb POD#1.  Bracing/Splinting: None.  Dressings: Keep dressing clean and dry.  Drains: Document output per shift, will be discontinued at Orthopedic Surgery discretion.  Serrano catheter: Remove POD#1.  Physical Therapy/Occupational Therapy: Eval and treat.  Cultures: none.    Consults: Hospitalist.  Follow-up: Clinic with Dr. Bueno in 6 weeks with repeat x-rays.   Disposition: Pending progress with therapies, pain control on orals, and medical stability, anticipate discharge to home on POD #3-4.    Sahil Bullard, DO  Spine Fellow

## 2024-09-11 NOTE — PLAN OF CARE
Goal Outcome Evaluation:         Overall Patient Progress: no changeOverall Patient Progress: no change    Patient went to surgery around 9:30AM and arrived back from PACU at 1630.     Pt A&Ox4 upon arrival back to the unit however complaining of pain. PRN flexeril, klonopin, IV dilaudid and oxy given for pain control. Pain consistently rated 6/10 despite all meds given    Serrano pulled per patient request at 1830. Urinal given and patient instructed to call after voiding     Denies SOB, CP and new N/T     Not OOB post surgery     Soft collar on and Hemovac draining sanguinous drainage

## 2024-09-11 NOTE — PROGRESS NOTES
PACU to Inpatient Nursing Handoff    Patient Kobe Buchanan is a 64 year old male who speaks English.   Procedure Procedure(s):  Cervical 3 and 7 dome laminectomy and 4-6 laminoplasty with medtronic plates   Surgeon(s) Primary: Jonathan Prajapati MD  Resident - Assisting: Sahil Bullard DO     Allergies   Allergen Reactions    Ciprofloxacin      History of aortic aneurysms    Tape [Adhesive Tape] Blisters     Blistering - please use paper tape       Isolation  No active isolations     Past Medical History   has a past medical history of Acute systolic heart failure (H) (01/28/2020), NEMESIO (acute kidney injury) (H24) (02/16/2020), Anxiety, Ascending aortic aneurysm (H24), Atrial fibrillation (H) [I48.91] (10/10/2016), Bicuspid aortic valve, BPPV (benign paroxysmal positional vertigo) (07/11/2014), Choledocholithiasis, Chronic narcotic use, Chronic neck pain, Chronic osteoarthritis, CKD (chronic kidney disease) stage 3, GFR 30-59 ml/min (H), Degenerative joint disease, Depression, Dysthymic disorder, type 2 diabetes mellitus (03/04/2021), Hyperlipidemia (04/10/2012), Hypertension, Iron deficiency anemia secondary to blood loss (chronic) (08/25/2020), MSSA bacteremia (10/13/2019), Multiple stiff joints, Neck injuries, NSTEMI (non-ST elevated myocardial infarction) (H) (01/29/2020), Obesity (02/09/2015), Pain in shoulder, Plantar fasciitis, Pre-diabetes, S/P reverse total shoulder arthroplasty, left (07/07/2020), S/P reverse total shoulder arthroplasty, right (08/18/2020), Septic joint of left shoulder region (H) (11/13/2019), Septic joint of right shoulder region (H) (10/13/2019), Skin picking habit, Sleep apnea, and Status post total shoulder arthroplasty, left (03/03/2020).    Anesthesia General   Dermatome Level     Preop Meds acetaminophen (Tylenol) - time given: 0927  oxycodone (Oxycontin) - time given: 0703  Fentanyl patch - time given:     Nerve block Not applicable   Intraop Meds midazolam 1 mg/mL  (mg)   Total dose: 2 mg  Date/Time Rate/Dose/Volume Action Route Admin User Audit    09/11/24 1111 2 mg Given Intravenous Praveenacandice, Marva FRIAS, APRN CRNA     fentaNYL 50 mcg/mL (mcg)   Total dose: 100 mcg  Date/Time Rate/Dose/Volume Action Route Admin User Audit    09/11/24 1124 100 mcg Given Intravenous Renny Dubois MD     HYDROmorphone 1 mg/mL (mg)   Total dose: 1.5 mg  Date/Time Rate/Dose/Volume Action Route Admin User Audit    09/11/24 1214 0.5 mg Given Intravenous Shirilla, Marva M, APRN CRNA     1317 0.5 mg Given Intravenous Shirilla, Marva M, APRN CRNA     1410 0.5 mg Given Intravenous Shirilla, Marva M, APRN CRNA     lidocaine 2% (mg)   Total dose: 100 mg  Date/Time Rate/Dose/Volume Action Route Admin User Audit    09/11/24 1124 100 mg Given Intravenous Renny Dubois MD     propofol 10 mg/mL (mg)   Total dose: 200 mg  Date/Time Rate/Dose/Volume Action Route Admin User Audit    09/11/24 1124 150 mg Given Intravenous Renny Dubois MD     1137 50 mg Given Intravenous Roel, Marva M, APRN CRNA     rocuronium 10 mg/mL (mg)   Total dose: 70 mg  Date/Time Rate/Dose/Volume Action Route Admin User Audit    09/11/24 1126 50 mg Given Intravenous Renny Dubois MD     1251 20 mg Given Intravenous Roel, Marva M, APRN CRNA     dexamethasone (DECADRON) 4 mg/mL (mg)   Total dose: 10 mg  Date/Time Rate/Dose/Volume Action Route Admin User Audit    09/11/24 1155 10 mg Given Intravenous Roel, Marva M, APRN CRNA     ondansetron 2 mg/mL (mg)   Total dose: 4 mg  Date/Time Rate/Dose/Volume Action Route Admin User Audit    09/11/24 1113 4 mg Given Intravenous Shirilla, Marva M, APRN CRNA     sugammadex (BRIDION) 200mg/2mL (mg)   Total dose: 200 mg  Date/Time Rate/Dose/Volume Action Route Admin User Audit    09/11/24 1359 200 mg Given Intravenous Shircandice, Marva M, APRN CRNA     ketamine 10 mg/mL (mg)   Total dose: 50 mg  Date/Time Rate/Dose/Volume Action Route Admin User Audit    09/11/24 1138 30 mg Given Intravenous Marva Sevilla  ALAYNA FRIAS CRNA     1214 20 mg Given Intravenous RoelMarva APRN CRNA     ceFAZolin (ANCEF) IV solution 2g/20 mL syringe (g)   Total dose: 2 g  Date/Time Rate/Dose/Volume Action Route Admin User Audit    09/11/24 1150 2 g (over 5 min) Given Intravenous RoelMarva APRN CRNA     LR (mL)   Total volume: 800 mL  Date/Time Rate/Dose/Volume Action Route Admin User Audit    09/11/24 1109  New Bag Intravenous PraveenacandiceMarva APRN CRNA     1410 800 mL Anesthesia Volume Adjustment Intravenous Marva Sevilla APRN CRNA                Local Meds Yes   Antibiotics cefazolin (Ancef) - last given at 1150     Pain Patient Currently in Pain: yes   PACU meds  fentanyl (Sublimaze): 75 mcg (total dose) last given at 1450      PCA / epidural Yes. Ketamine drip   Capnography     Telemetry ECG Rhythm: Normal sinus rhythm   Inpatient Telemetry Monitor Ordered? No        Labs Glucose Lab Results   Component Value Date     09/11/2024    GLC 93 07/29/2022     08/18/2020       Hgb Lab Results   Component Value Date    HGB 15.2 09/11/2024    HGB 9.9 08/20/2020       INR Lab Results   Component Value Date    INR 1.05 09/11/2024    INR 1.10 02/15/2020      PACU Imaging Not applicable     Wound/Incision Incision/Surgical Site 08/18/20 Anterior;Right Shoulder (Active)   Number of days: 1485       Incision/Surgical Site 08/18/20 Right Shoulder (Active)   Number of days: 1485       Incision/Surgical Site 11/03/23 Abdomen (Active)   Number of days: 313       Incision/Surgical Site 09/11/24 Cervical spine (Active)   Incision Assessment UTV 09/11/24 1530   Closure Sutures;Other (Comment) 09/11/24 1328   Incision Drainage Amount None 09/11/24 1530   Dressing Intervention Clean, dry, intact 09/11/24 1530   Number of days: 0      CMS        Equipment ice pack   Other LDA       IV Access Peripheral IV 09/11/24 Right;Anterior Antecubital fossa (Active)   Site Assessment WDL 09/11/24 1530   Line Status Infusing 09/11/24 1530    Dressing Transparent 09/11/24 1530   Dressing Status clean;dry;intact 09/11/24 1530   Phlebitis Scale 0-->no symptoms 09/11/24 1530   Number of days: 0       Peripheral IV 09/11/24 Left;Posterior Hand (Active)   Site Assessment WDL 09/11/24 1530   Line Status Saline locked 09/11/24 1530   Dressing Transparent 09/11/24 1530   Dressing Status clean;dry;intact 09/11/24 1530   Phlebitis Scale 0-->no symptoms 09/11/24 1530   Number of days: 0      Blood Products Not applicable EBL 50 mL   Intake/Output Date 09/11/24 0700 - 09/12/24 0659   Shift 1571-2074 3391-6050 2182-5291 24 Hour Total   INTAKE   I.V. 800   800   Shift Total(mL/kg) 800(7.54)   800(7.54)   OUTPUT   Urine 425   425   Shift Total(mL/kg) 425(4)   425(4)   Weight (kg) 106.14 106.14 106.14 106.14      Drains / Serrano Closed/Suction Drain 1 Posterior Neck Accordion 10 Stateless (Active)   Site Description UTV 09/11/24 1530   Dressing Status Normal: Clean, Dry & Intact 09/11/24 1530   Status To bulb suction 09/11/24 1530   Number of days: 0       Urethral Catheter 09/11/24 Latex;Double-lumen;Straight-tip 16 fr (Active)   Collection Container Patent 09/11/24 1530   Securement Method Securing device (Describe) 09/11/24 1530   Number of days: 0      Time of void PreOp Time of Void Prior to Procedure: 0930 (09/11/24 0944)    PostOp Voided (mL): 450 mL (09/09/24 1314)  Urine Occurrence: 1 (09/10/24 1125)    Diapered? No   Bladder Scan      mL (09/09/24 1314)  tolerating sips     Vitals    B/P: (!) 163/81  T: 98.1  F (36.7  C)    Temp src: Axillary  P:  Pulse: 64 (09/11/24 1430)          R: 10  O2:  SpO2: 100 %    O2 Device: Simple face mask (per pt request for comfort) (09/11/24 1515)    Oxygen Delivery: 2 LPM (09/11/24 5333)         Family/support present     Patient belongings     Patient transported on cart   DC meds/scripts (obs/outpt) Not applicable   Inpatient Pain Meds Released? No       Special needs/considerations None   Tasks needing completion None        Hoda Victoria RN

## 2024-09-11 NOTE — PLAN OF CARE
Goal Outcome Evaluation:    End of Shift Summary: See flowsheets for VS and assessment details.    [id number]    Pt placed on NPO status at 0000, pt stated that he had already completed 2 CHG baths.  Surgery scheduled for today 9/11.  A&Ox4, calm and cooperative, able to make needs known with call light in reach.  Independent in room.  VSS, sats WNL on room air, continent, LBM 9/11.  C/o 4-5/10 pain, given prn oxy to manage per pt request.  LPIV saline locked and patent.  Fentanyl patch in place.  Sleep promoted.  Continue POC.

## 2024-09-11 NOTE — OP NOTE
DATE OF SURGERY: 9/11/2024    PREOPERATIVE DIAGNOSIS:   Cervical radiculopathy [M54.12]  Cervical stenosis of spinal canal [M48.02]               POSTOPERATIVE DIAGNOSIS: Same    PROCEDURES:  1. C3 and C7 dome laminectomies for decompression of the spinal cord.  2. C4-C6 open door laminoplasty with segmental Medtronic Ti-mesh plate fixation.  3. Cervicothoracic paraspinal muscle flaps: Right paraspinal muscle flap   4.  Cervicothoracic paraspinal muscle flaps: Left paraspinal muscle flap   5. Complex Layered Closure Trunk wound measuring 9cm length, 11cm depth, 6cm width    Primary Surgeon: Jonathan Prajapati MD    FIRST ASSISTANT: Sahil Bullard, Fellow Surgeon, there was no qualified resident available and the assistant was necessary for retraction, visualization, and wound closure.      ANESTHESIA: General Endotracheal    COMPLICATIONS:  None.    SPECIMENS: None.    ESTIMATED BLOOD LOSS: 50cc    INDICATIONS:                          Kobe Buchanan is a 64 year old male with significant neurologic compression in the cervical spine.  The patient elected surgical treatment, and understood the indications  for this surgery, as well as its risks, benefits, and alternatives. We reviewed the risks and benefits of the surgery in detail. The risks include, but are not limited to, the general risks associated with anesthesia, including death, pulmonary embolism, DVT, stroke, myocardial infarction, pneumonia, and urinary tract infection. Additional risks specific to the surgery include the risk of infection, failure to heal (non-union), dural tear with resultant CSF leak which might necessitate placement of a drain or revision surgery or could result in headaches, nerve injury resulting in weakness or paralysis, risk of adjacent segment disease, the risks of vascular injury, need for revision surgery in the future due to one of the above issues, or risk of incomplete symptom relief. Kobe Buchanan understands  the risks of the surgery and wishes to proceed.  No guarantees were given.     DESCRIPTION OF PROCEDURE:           Kobe Buchanan was taken to the operating  room, where the Anesthesiology Service induced satisfactory general anesthesia.  Ancef was given IV.  Venous thromboembolic prophylaxis  was performed with sequential devices placed on the calves bilaterally.  Serrano catheter was placed under standard sterile techniques.  The patient had Pak tongs placed according to standard techniques.  I then carefully log rolled the patient into the prone  position onto longitudinal bolsters.  The neck was placed into a neutral  alignment.  All bony prominences were well padded.  The Pak was attached  to the bed frame.  The posterior neck was then prepped and draped in its  entirety in the usual sterile fashion. We then held a multidisciplinary time out in which we verified the patient, procedure, antibiotics, and operative plan.  All team members were in agreement.    I asked the Anesthesia Service to maintain a mean arterial pressure of 85 in order to maximize cord perfusion.  This was emphasized throughout the case.    We took a midline longitudinal approach and performed subperiosteal dissection  out to the lateral mass laminar junction from C3 down to C7.  Intraoperative radiographic localization of levels was confirmed.      I performed a dome laminectomy at C3. The caudal half of the lamina was removed with a rongeur.  I then thinned the lamina using a 4mm round high speed bur down to the level of the ligamenm flavum.  A currette was used to identify the inferior edge of the lamina and to split the ligamentum flavum.  A 2mm kerrison was then used to remove the flavum and the inferior half of the remaining lamina.  This was carried out to the level of the facets bilaterally.  I then performed a dome laminectomy at C7 in the same fashion. The cephalad half of the lamina was removed with a rongeur.  I  then thinned the lamina using a high speed bur down to the level of the ligamenm flavum.  A currette was used to identify the superior edge of the lamina and to split the ligamentum flavum.  A 2mm kerrison was then used to remove the flavum and the superior half of the remaining lamina.  This was carried out to the level of the facets bilaterally.    We then began laminoplasty.  On the right  side at the lateral mass laminar junction, a bur was used to drill both the dorsal and the ventral cortices  until there was just a thin flake of ventral bone that was removed with a combination of a microcurette and kerrison.  This was done sequentially from C4 to C6.     We then went about creating our trough.  On the left  side, again at the lateral mass laminar junction, the bur was used to drill only the dorsal cortex leaving the ventral intact.  This was performed at the same levels as noted above. Segmental greenstick fractures were then carefully performed elevating the cut edge of  the lamina and placing tension on the ligamentum flavum.  The flavum was then  excised in its entirety using a 2mm kerrison.  Having done this, excellent dural expansion was noted. LensX Laserstronic laminoplasty ti-mesh plate fixation was then applied to each level.  Two screws were placed into the lateral mass and 1 or 2  screws as needed into the cut edge of the lamina.  The laminoplasty had  excellent fixation and excellent dural expansion was noted. At this point, I saw no further evidence of dorsal neurologic compression.    We irrigated the wound, achieved hemostasis, and then routinely closed the incision over multiple layers of interrupted Vicryl, a deep Hemovac drain and 1 g of vancomycin powder.  Monocryl and dermabond were used for the skin.  Upon closure, the patient was then transferred to a hospital bed, and taken to recovery after extubation in  stable condition. Spinal cord monitoring data remained stable throughout.  Final x-rays  demonstrated excellent position of the spine and all of the implants.    Cervicothoracic, left and right paraspinal muscle flaps for closure of complex cervicothoracic wound Complex Layered Closure Trunk wound measurincm length, 11cm depth, 6cm width.    Deep drain was inserted and placed overlying the spinal hardware.    Left paraspinal muscle mobilization: Rakes were used to elevate the skin and subcutaneous layer on the left side of the cervicothoracic incision.  Where needed, Bovie electrocautery, on low setting, was used to release the paraspinal muscle off the fascial layer underneath the subcutaneous tissue. Hemostasis was obtained. After release of the fascial layer attached to paraspinal muscles on left side, we gently mobilized the paraspinal muscle flap into the midline to provide coverage of the hardware and to decrease deep cervical dead space.    Right paraspinal muscle mobilization:  using the same technique, rakes were inserted on the right side of the cervical incision to elevate the skin and subcutaneous tissue off the paraspinal muscles.   Where needed, Bovie electrocautery, on low setting, was used to release the fascial layer attaching the paraspinal muscles to the subcutaneous tissue.  Hemostasis was obtained.  After release of the fascial layer attached to paraspinal muscles on right side, we gently mobilized the paraspinal muscle flap into the midline to cover the hardware and deep cervical space.    We then sewed the two muscle flaps together in the midline with a running #1 vicryl suture.      After approximating  the paraspinal muscle flaps towards the midline, #1 vicryl was used to suture the fascia over the flaps in a water tight fashion with multiple interuppted sutures.   At this point, the deep space was well approximated with good coverage of the hardw    The 2nd fascial/ subcutaneous layer was approximated to the midline and directly attached to the underlying paraspinal muscle  fascia using #1 vicryl with interupted sutures.  At this point, the deep and superficial layers were noted to be well approximated.  A drain was placed above the fascial layer as needed.    Subcutaneous layer was closed with 2-0 vicryl. The subcuticular layer was closed with running 3-0 Monocryl then a Dermabond dressing applied.    We irrigated the wound, achieved hemostasis, and then routinely closed the incision over multiple layers of interrupted Vicryl, a deep Hemovac drain and 1 g of vancomycin powder.  Monocryl and dermabond were used for the skin.  Upon closure, the patient was then transferred to a hospital bed, and taken to recovery after extubation in  stable condition. Spinal cord monitoring data remained stable throughout.  Final x-rays demonstrated excellent position of the spine and all of the implants.    IJonathan MD was present and scrubbed for the critical portions of the procedure including: Positioning of the patient, the exposure, decompression and creation of the laminoplasty, placement of instrumentation, and evaluation of final imaging.

## 2024-09-11 NOTE — ANESTHESIA POSTPROCEDURE EVALUATION
Patient: Kobe Buchanan    Procedure: Procedure(s):  Cervical 3 and 7 dome laminectomy and 4-6 laminoplasty with medtronic plates       Anesthesia Type:  General    Note:  Disposition: Inpatient   Postop Pain Control: Challenging   PONV: No   Neuro/Psych: Uneventful            Sign Out: Acceptable/Baseline neuro status   Airway/Respiratory: Uneventful            Sign Out: Acceptable/Baseline resp. status   CV/Hemodynamics: Uneventful            Sign Out: Acceptable CV status; No obvious hypovolemia; No obvious fluid overload   Other NRE:    DID A NON-ROUTINE EVENT OCCUR?            Last vitals:  Vitals Value Taken Time   /61 09/11/24 1600   Temp 36.7  C (98.1  F) 09/11/24 1415   Pulse 69 09/11/24 1611   Resp 10 09/11/24 1515   SpO2 97 % 09/11/24 1611   Vitals shown include unfiled device data.    Electronically Signed By: Jeff Wood MD  September 11, 2024  4:11 PM

## 2024-09-11 NOTE — ANESTHESIA CARE TRANSFER NOTE
Patient: Kobe Buchanan    Procedure: Procedure(s):  Cervical 3 and 7 dome laminectomy and 4-6 laminoplasty with medtronic plates       Diagnosis: Cervical radiculopathy [M54.12]  Cervical stenosis of spinal canal [M48.02]  Diagnosis Additional Information: No value filed.    Anesthesia Type:   General     Note:      Level of Consciousness: awake  Oxygen Supplementation: face mask  Level of Supplemental Oxygen (L/min / FiO2): 6  Independent Airway: airway patency satisfactory and stable  Dentition: dentition unchanged  Vital Signs Stable: post-procedure vital signs reviewed and stable  Report to RN Given: handoff report given  Patient transferred to: PACU    Handoff Report: Identifed the Patient, Identified the Reponsible Provider, Reviewed the pertinent medical history, Discussed the surgical course, Reviewed Intra-OP anesthesia mangement and issues during anesthesia, Set expectations for post-procedure period and Allowed opportunity for questions and acknowledgement of understanding  Vitals:  Vitals Value Taken Time   /74 09/11/24 1418   Temp     Pulse 78 09/11/24 1419   Resp     SpO2 100 % 09/11/24 1419   Vitals shown include unfiled device data.    Electronically Signed By: ALAYNA Perez CRNA  September 11, 2024  2:20 PM

## 2024-09-11 NOTE — BRIEF OP NOTE
Brief Operative Note    Preop Dx:   Cervical radiculopathy [M54.12]  Cervical stenosis of spinal canal [M48.02]  Post op Dx:   Same  Procedure:    Procedure(s):  Cervical 3 and 7 dome laminectomy and 4-6 laminoplasty with medtronic plates  Surgeon:     Dr. Prajapati   Assistants:    Sahil Bullard DO Spine Fellow   Anesthesia:   General  EBL:    50mL   Total IV Fluids:  See Anesthesia Record  Specimens:   None  Findings:   See Operative Dictation  Complications:  None .    Assessment and Plan: Kobe Buchanan is a 64 year old male now s/p C3 and C7 dome laminectomy and C4-6 laminoplasty on 9/11 with Dr. Prajapati.     Ortho ([facility]) Primary  Activity:   - Up with assist until independent. No excessive bending or twisting. No lifting >10 lbs x 6 weeks.   Weight bearing status: WBAT.  Pain management:   - IV lidocaine: discontinue at 8am on POD#2 or earlier if complications arise.  - Transition from PCA to PO narcotics as tolerated. No NSAIDs x 3 months.   Antibiotics: Ancef until drains out  Diet: Begin with clear fluids and progress diet as tolerated.   DVT prophylaxis: SCDs only. No chemical DVT ppx needed.  Imaging: XR Upright c spine PTDC - ordered.  Labs: Hgb POD#1.  Bracing/Splinting: None.  Dressings: Keep dressing clean and dry.  Drains: Document output per shift, will be discontinued at Orthopedic Surgery discretion.  Serrano catheter: Remove POD#1.  Physical Therapy/Occupational Therapy: Eval and treat.  Cultures: none.    Consults: Hospitalist.  Follow-up: Clinic with Dr. Bueno in 6 weeks with repeat x-rays.   Disposition: Pending progress with therapies, pain control on orals, and medical stability, anticipate discharge to home on POD #3-4.      The procedure was medically necessary for an assistant. My assistance was necessary for patient positioning, prepping and draping, soft tissue retraction, bone graft preparation and placement, and closure. The assistance that I provided reduced operative time which  meant less general anesthetic for the patient.    Sahil Bullard, DO  Spine Fellow       Implants:   Implant Name Type Inv. Item Serial No.  Lot No. LRB No. Used Action   [employer] 2x5mm screw    [employer]  N/A 9 Implanted   PLATE BN 12MM PRECONTOUR TI MLT HL OPN DOOR PCUT KICKSTAND - [id number] Metallic Hardware/[place] Siding PLATE BN 12MM PRECONTOUR TI MLT HL OPN DOOR PCUT KICKSTAND  [facility]  N/A 3 Implanted

## 2024-09-11 NOTE — PROGRESS NOTES
Orthopaedic Surgery Progress Note 09/11/2024    S: No acute events overnight.  Pain appropriately controlled. NPO since midnight for surgery today. No other complaints this morning. Feels ready for surgery.    O:  Temp: 97.8  F (36.6  C) Temp src: Oral BP: (!) 142/65 Pulse: 59   Resp: 16 SpO2: 95 % O2 Device: None (Room air)      Exam:  Gen: No acute distress, resting comfortably in bed.  Resp: Non-labored breathing  MSK:  Spine:  Cervical spine:                    Motor -                     C5: Deltoids R 5/5 and L 5/5 strength                    C6: Biceps R 5/5 and L 5/5 strength                     C7: Triceps R 5/5 and L 5/5 strength                     C8:  R 5/5 and L 5/5 strength                     T1: Dorsal interossei R 4/5 and L 4/5 strength                         Sensation: intact to light touch in C5-T1 grossly but subjectively diminished globally bilaterally    Baxter present bilaterally           Lumbar Spine:                    Motor -                     L2-3: Hip flexion 5/5 R and 5/5 L strength                     L3/4:  Knee extension R 5/5 and L 5/5 strength                    L4/5:  Foot dorsiflexion R 5/5 L 5/5 and                                 EHL dorsiflexion R 5/5 L 5/5 strength                    S1:  Plantarflexion/Peroneal Muscles  R 5/5 and L 5/5 strength                    Sensation: intact to light touch L3-S1 distribution BLE grossly                       Neurologic:                      REFLEXES Left Right   Biceps 3+ 3+   Brachioradialis 3+ 3+   Patella 3+ 3+   Ankle jerk 3+ 3+   Clonus 1 beats 1 beats              Drain output:   Output by Drain (mL) 09/09/24 0700 - 09/09/24 1459 09/09/24 1500 - 09/09/24 2259 09/09/24 2300 - 09/10/24 0659 09/10/24 0700 - 09/10/24 1459 09/10/24 1500 - 09/10/24 2259 09/10/24 2300 - 09/11/24 0655   Patient has no LDAs of requested type attached.       Recent Labs   Lab 09/11/24  0030   WBC 20.9*   HGB 15.2        Sed Rate   Date  Value Ref Range Status   01/15/2020 25 (H) 0 - 20 mm/h Final         Culture results:   30-Day Micro Results       No results found for the last 720 hours.            Assessment: Kobe Buchanan is a 64-year-old male patient with cervical canal stenosis and myeloradiculopathy symptoms are progressive.     He is scheduled for cervical laminoplasty with Dr. Prajapati on 9/11/2024.  He was admitted prior to upcoming surgery worsening symptoms and inability to care for himself at home.    Evaluated by hospitalist and cardiology. Echo obtained. No additional testing needed prior to surgery.    Today:  NPO  Surgery today with Dr. Prajapati    Plan:  Ortho spine Primary  Activity: As tolerated, use appropriate assistive device and supervision until independent  Diet: NPO for OR today  DVT prophylaxis: Mechanical, SCDs  Pain management: appreciate pain service recommendations  X-rays: Imaging complete  Physical Therapy: Mobilization, ROM, ADL's  Occupational Therapy: ADL's  Consults: PT, OT, Medicine, pain service, cardiology. Appreciate assistance in caring for this patient throughout the perioperative period  Disposition: pending operative course    Future Appointments   Date Time Provider Department Center   9/9/2024  8:45 AM Nicolas Sandoval, PT [facility] [place]   9/9/2024  3:00 PM Ashia Pond, OT [facility] [place]   9/10/2024  7:30 AM [email or web address] [facility]   9/10/2024  8:50 AM [email or web address] [facility]   9/10/2024  9:30 AM [email or web address] [facility]   9/18/2024  9:20 AM Yanna Robbins MD [facility]   10/28/2024  8:15 AM Jonathan Prajapati MD [facility]       Patient discussed with Dr. Prajapati.        --  Neftali Monique MD   Orthopedic Surgery PGY-4    Please page me directly via amcom with any questions/concerns during regular weekday hours before 7pm. If there is no response, if it is a weekend, or if it is during evening hours, then please page the orthopedic surgery resident on call.

## 2024-09-11 NOTE — PROGRESS NOTES
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 Prajapati on 9/11 but p/w worsening sxs and inability to care for himself at home.  Patient is now s/p C3 and C7 dome laminectomy and C4-6 laminoplasty on 9/11 with Dr. Prajapati      # Cervical stenosis   # Cervical radiculopathy  # Hx of C3 and C7 laminectomies and C4-6 laminoplasty with medtronic plates  # Chronic opioid use  - Pt follows with Ortho as an outpatient.    - Was scheduled for cervical procedure on 9/11.    - Began having shocklike shooting pains down his neck to his lower back starting this morning.    - Reports intermittent numbness and tingling down BUEs, denies weakness or sensory changes in BLEs.    - Was seen by Ortho in ED, plans to start Decadron.    - Patient is on chronic opiates with significant opiate tolerance.    - Now s/p C3 and C7 dome laminectomy and C4-6 laminoplasty on 9/11 with Dr. Prajapati   - Extensive PTA pain regimen including: fentanyl patch 25 mcg/h every 48 hours, as needed oxycodone 30 mg bid.   - Ortho primary  - Pain consult, defer for assistance with pain mgt   - PT/OT     # Hx of nonischemic cardiomyopathy  - Per chart hx it is suspected this was stress induced.   - Home meds of coreg 50 mg bid, amlodipine-benazepril 5-20 mg bid.   - Last TTE 7/2023 with recovered LVEF 55-60%, normal LV wall thickness, LV size, LV diastolic function, no WMAs, no significant valvular  "abnormalities.  -TTE obtained this admission, normal EF, no significant change from echocardiogram from July 2023.  - Continue carvedilol 50 mg bid with holding parameters  - Okay to resume PTA amlodipine and lisinopril post-op  - Seen by cardiology this admission, deemed fully optimized for surgery, now post-op.      # Bradycardia - asymptomatic, with chronotropic response  Sinus bradycardia on admission with HR high-50s. Appears stable. No chest pain.  Appears chronic while on Coreg.  - monitor  - consider EKG if worsening     # T2DM   Per chart review, with prior Hgb A1c >6.5%, but most recent was 08/2023 with A1c 5.6%. Not on meds at baseline.  - monitor CBGS for now, no insulin or SSI ordered     # CKD stage 3a   Cr baseline 1.1-1.3. Cr currently 1.32.  - monitor     # Chronic pain   On fentanyl patch 50 mcg/h 72 h patch, naproxen 500 mg bid, oxycodone 10 mg q4h prn.  - defer pain mgmt to primary as above     # MDD  # THERESA  On lexapro 30 mg daily and wellbutrin 200 mg bid. Also on clonazepam 0.5 mg tid prn     # HLD -continue home atorvastatin 40 mg nightly  # HTN - home anti-hypertensives as above  # HLD - on atorvastatin 40 mg daily  # SAVITA - does not use a CPAP at home       Diet: Advance Diet as Tolerated: Regular Diet Adult  Snacks/Supplements Adult: [place]; With Meals    DVT Prophylaxis: Pneumatic Compression Devices  Serrano Catheter: Not present  Lines: None     Cardiac Monitoring: None  Code Status: Full Code      Clinically Significant Risk Factors                  # Hypertension: Noted on problem list           # Obesity: Estimated body mass index is 33.58 kg/m  as calculated from the following:    Height as of this encounter: 1.778 m (5' 10\").    Weight as of this encounter: 106.1 kg (234 lb)., PRESENT ON ADMISSION                  Disposition Plan     Medically Ready for Discharge: Anticipated in 2-4 Days      ADELIA ESCOBEDO MD  Hospitalist Service, GOLD TEAM 18  [facility]" [facility]  Securely message with [employer] (more info)  Text page via [employer] Paging/Directory   See signed in provider for up to date coverage information  ______________________________________________________________________    Interval History   Charts reviewed and patient was examined  Afebrile and hemodynamically stable  O.R yesterday  Had multiple issues with pain regimen which he'll address with primary team      Physical Exam   Vital Signs: Temp: 98.2  F (36.8  C) Temp src: Oral BP: (!) 152/77 Pulse: 71   Resp: 19 SpO2: 98 % O2 Device: Nasal cannula Oxygen Delivery: 2 LPM  Weight: 234 lbs 0 oz    General Appearance: Awake, alert and not in distress  Respiratory: Clear breath sounds bilaterally   Cardiovascular: Normal heart sounds.  Systolic murmur, normal rate.  GI: Soft, non tender. Normal bowel sounds   Skin: No bruising or bleeding   Other:Awake, alert and orientated X 3       Medical Decision Making       35   MINUTES SPENT BY ME on the date of service doing chart review, history, exam, documentation & further activities per the note.  MANAGEMENT DISCUSSED with the following over the past 24 hours: patient, bedside RN, care management and orthopedic team        Data     I have personally reviewed the following data over the past 24 hrs:    N/A  \   16.0   / N/A     N/A N/A N/A /  147 (H)   N/A N/A N/A \

## 2024-09-12 ENCOUNTER — DOCUMENTATION ONLY (OUTPATIENT)
Dept: MEDSURG UNIT | Facility: CLINIC | Age: 64
End: 2024-09-12

## 2024-09-12 ENCOUNTER — APPOINTMENT (OUTPATIENT)
Dept: OCCUPATIONAL THERAPY | Facility: CLINIC | Age: 64
DRG: 029 | End: 2024-09-12
Attending: STUDENT IN AN ORGANIZED HEALTH CARE EDUCATION/TRAINING PROGRAM
Payer: COMMERCIAL

## 2024-09-12 LAB
GLUCOSE BLDC GLUCOMTR-MCNC: 147 MG/DL (ref 70–99)
GLUCOSE BLDC GLUCOMTR-MCNC: 218 MG/DL (ref 70–99)
HGB BLD-MCNC: 16 G/DL (ref 13.3–17.7)

## 2024-09-12 PROCEDURE — 97168 OT RE-EVAL EST PLAN CARE: CPT | Mod: GO

## 2024-09-12 PROCEDURE — 97535 SELF CARE MNGMENT TRAINING: CPT | Mod: GO

## 2024-09-12 PROCEDURE — 120N000002 HC R&B MED SURG/OB UMMC

## 2024-09-12 PROCEDURE — 250N000011 HC RX IP 250 OP 636: Performed by: STUDENT IN AN ORGANIZED HEALTH CARE EDUCATION/TRAINING PROGRAM

## 2024-09-12 PROCEDURE — 99232 SBSQ HOSP IP/OBS MODERATE 35: CPT | Performed by: INTERNAL MEDICINE

## 2024-09-12 PROCEDURE — 999N000147 HC STATISTIC PT IP EVAL DEFER

## 2024-09-12 PROCEDURE — 36415 COLL VENOUS BLD VENIPUNCTURE: CPT | Performed by: STUDENT IN AN ORGANIZED HEALTH CARE EDUCATION/TRAINING PROGRAM

## 2024-09-12 PROCEDURE — 258N000003 HC RX IP 258 OP 636: Performed by: STUDENT IN AN ORGANIZED HEALTH CARE EDUCATION/TRAINING PROGRAM

## 2024-09-12 PROCEDURE — 85018 HEMOGLOBIN: CPT | Performed by: STUDENT IN AN ORGANIZED HEALTH CARE EDUCATION/TRAINING PROGRAM

## 2024-09-12 PROCEDURE — 250N000013 HC RX MED GY IP 250 OP 250 PS 637: Performed by: PEDIATRICS

## 2024-09-12 PROCEDURE — 250N000013 HC RX MED GY IP 250 OP 250 PS 637: Performed by: STUDENT IN AN ORGANIZED HEALTH CARE EDUCATION/TRAINING PROGRAM

## 2024-09-12 PROCEDURE — 99232 SBSQ HOSP IP/OBS MODERATE 35: CPT | Performed by: PHYSICIAN ASSISTANT

## 2024-09-12 PROCEDURE — 250N000013 HC RX MED GY IP 250 OP 250 PS 637: Performed by: INTERNAL MEDICINE

## 2024-09-12 RX ORDER — OXYCODONE HYDROCHLORIDE 30 MG/1
30 TABLET ORAL EVERY 6 HOURS PRN
Qty: 28 TABLET | Refills: 0 | Status: SHIPPED | OUTPATIENT
Start: 2024-09-12 | End: 2024-09-19

## 2024-09-12 RX ORDER — CYCLOBENZAPRINE HCL 10 MG
10 TABLET ORAL 3 TIMES DAILY PRN
Status: DISCONTINUED | OUTPATIENT
Start: 2024-09-12 | End: 2024-09-14 | Stop reason: HOSPADM

## 2024-09-12 RX ADMIN — ACETAMINOPHEN 975 MG: 325 TABLET ORAL at 09:05

## 2024-09-12 RX ADMIN — Medication 2 G: at 20:36

## 2024-09-12 RX ADMIN — DEXAMETHASONE SODIUM PHOSPHATE 10 MG: 10 INJECTION, SOLUTION INTRAMUSCULAR; INTRAVENOUS at 12:51

## 2024-09-12 RX ADMIN — CLONAZEPAM 0.5 MG: 0.5 TABLET ORAL at 20:43

## 2024-09-12 RX ADMIN — OXYCODONE HYDROCHLORIDE 30 MG: 10 TABLET ORAL at 00:25

## 2024-09-12 RX ADMIN — SODIUM CHLORIDE: 9 INJECTION, SOLUTION INTRAVENOUS at 03:04

## 2024-09-12 RX ADMIN — CARVEDILOL 50 MG: 25 TABLET, FILM COATED ORAL at 16:52

## 2024-09-12 RX ADMIN — ACETAMINOPHEN 975 MG: 325 TABLET ORAL at 16:52

## 2024-09-12 RX ADMIN — CLONAZEPAM 0.5 MG: 0.5 TABLET ORAL at 09:04

## 2024-09-12 RX ADMIN — BUPROPION HYDROCHLORIDE 200 MG: 200 TABLET, EXTENDED RELEASE ORAL at 05:04

## 2024-09-12 RX ADMIN — ACETAMINOPHEN 975 MG: 325 TABLET ORAL at 01:25

## 2024-09-12 RX ADMIN — LISINOPRIL 20 MG: 20 TABLET ORAL at 05:04

## 2024-09-12 RX ADMIN — ESCITALOPRAM OXALATE 20 MG: 20 TABLET ORAL at 05:04

## 2024-09-12 RX ADMIN — OXYCODONE HYDROCHLORIDE 30 MG: 10 TABLET ORAL at 05:39

## 2024-09-12 RX ADMIN — Medication 2 G: at 12:55

## 2024-09-12 RX ADMIN — OXYCODONE HYDROCHLORIDE 30 MG: 10 TABLET ORAL at 17:35

## 2024-09-12 RX ADMIN — CLONAZEPAM 0.5 MG: 0.5 TABLET ORAL at 00:02

## 2024-09-12 RX ADMIN — DEXAMETHASONE SODIUM PHOSPHATE 10 MG: 10 INJECTION, SOLUTION INTRAMUSCULAR; INTRAVENOUS at 20:35

## 2024-09-12 RX ADMIN — HYDROMORPHONE HYDROCHLORIDE 0.4 MG: 1 INJECTION, SOLUTION INTRAMUSCULAR; INTRAVENOUS; SUBCUTANEOUS at 00:02

## 2024-09-12 RX ADMIN — FAMOTIDINE 20 MG: 20 TABLET ORAL at 20:35

## 2024-09-12 RX ADMIN — LISINOPRIL 20 MG: 20 TABLET ORAL at 16:52

## 2024-09-12 RX ADMIN — BISACODYL 10 MG: 5 TABLET, COATED ORAL at 16:53

## 2024-09-12 RX ADMIN — DEXAMETHASONE SODIUM PHOSPHATE 10 MG: 10 INJECTION, SOLUTION INTRAMUSCULAR; INTRAVENOUS at 04:58

## 2024-09-12 RX ADMIN — HYDROMORPHONE HYDROCHLORIDE 0.4 MG: 1 INJECTION, SOLUTION INTRAMUSCULAR; INTRAVENOUS; SUBCUTANEOUS at 02:40

## 2024-09-12 RX ADMIN — AMLODIPINE BESYLATE 5 MG: 5 TABLET ORAL at 01:26

## 2024-09-12 RX ADMIN — AMLODIPINE BESYLATE 5 MG: 5 TABLET ORAL at 16:52

## 2024-09-12 RX ADMIN — BISACODYL 10 MG: 5 TABLET, COATED ORAL at 05:04

## 2024-09-12 RX ADMIN — ATORVASTATIN CALCIUM 40 MG: 40 TABLET, FILM COATED ORAL at 16:52

## 2024-09-12 RX ADMIN — SENNOSIDES AND DOCUSATE SODIUM 2 TABLET: 50; 8.6 TABLET ORAL at 09:05

## 2024-09-12 RX ADMIN — BUPROPION HYDROCHLORIDE 200 MG: 200 TABLET, EXTENDED RELEASE ORAL at 16:52

## 2024-09-12 RX ADMIN — CARVEDILOL 50 MG: 25 TABLET, FILM COATED ORAL at 05:04

## 2024-09-12 RX ADMIN — CYCLOBENZAPRINE 20 MG: 10 TABLET, FILM COATED ORAL at 09:07

## 2024-09-12 RX ADMIN — OXYCODONE HYDROCHLORIDE 30 MG: 10 TABLET ORAL at 11:36

## 2024-09-12 RX ADMIN — Medication 2 G: at 05:39

## 2024-09-12 ASSESSMENT — ACTIVITIES OF DAILY LIVING (ADL)
ADLS_ACUITY_SCORE: 28
ADLS_ACUITY_SCORE: 28
ADLS_ACUITY_SCORE: 29
ADLS_ACUITY_SCORE: 28
ADLS_ACUITY_SCORE: 29
ADLS_ACUITY_SCORE: 28
ADLS_ACUITY_SCORE: 29
ADLS_ACUITY_SCORE: 29
ADLS_ACUITY_SCORE: 28
ADLS_ACUITY_SCORE: 29
ADLS_ACUITY_SCORE: 29
ADLS_ACUITY_SCORE: 28
ADLS_ACUITY_SCORE: 28
ADLS_ACUITY_SCORE: 29
DEPENDENT_IADLS:: INDEPENDENT;TRANSPORTATION
ADLS_ACUITY_SCORE: 28
ADLS_ACUITY_SCORE: 29
ADLS_ACUITY_SCORE: 29
ADLS_ACUITY_SCORE: 28
ADLS_ACUITY_SCORE: 28
ADLS_ACUITY_SCORE: 29
ADLS_ACUITY_SCORE: 28

## 2024-09-12 NOTE — PLAN OF CARE
"Goal Outcome Evaluation:    BP (!) 166/83   Pulse 68   Temp 98.1  F (36.7  C) (Oral)   Resp 12   Ht 1.778 m (5' 10\")   Wt 106.1 kg (234 lb)   SpO2 97%   BMI 33.58 kg/m       Alert,well orientated.Talkative.  LS clear,wanting oxygen placed overnight inspite doing well on room air.  Up SBA1 and walker.    Posterior neck drsg,CDI.Soft collar on.  HV patent.  Baseline numbness/tingling on both upper extremities,unchanged.    Posterior neck pain rating high at start of shift.  IV dilaudid,oxycodone,tylenol given.Ketamine drip running. Fentanyl patch to abdomen intact.Ice pack refreshed.Declined tizanidine,states making him too sleepy.Wants fentanyl patch dose increased.Ortho aware,states to continue offer all meds available and to be seen by ortho resident on rounding this morning.    Retaining urine initially,but able to do double voiding and emptied bladder.See flowsheet for specifics.  Is passing gas,last BM 2 days ago.    Regular diet.Denies nausea.  PIV line R forearm with IV fluid at 100ml/hr and ketamine drip.  PIV line L hand ,saline locked.  Very well makes his needs known.    0520:Now complaints headache,believes side effect of IV dilaudid and doesn't want to take IV dilaudid anymore.However,wanting oxycodone frequency be increased from every 6 hours to every 3 hours.Ortho on call mentioned to await ortho rounding for it to be addressed.                            "

## 2024-09-12 NOTE — PROGRESS NOTES
09/12/24 1255   Appointment Info   Signing Clinician's Name / Credentials (OT) Bradley Pereira, OTR/L   Rehab Comments (OT) No excessive bending or twisting. No lifting >10 lbs x 6 weeks. No brace   Living Environment   People in Home alone   Current Living Arrangements house   Home Accessibility no concerns   Transportation Anticipated family or friend will provide   Living Environment Comments Pt lives alone with his dogs. Pt denies any stairs but does report a tall threshold at the back door that he needs to navigate to let the dogs in/out. Pt reports he can receive some A from sister at d/c but will likely be very intermittent.   Self-Care   Usual Activity Tolerance good   Current Activity Tolerance good   Equipment Currently Used at Home walker, rolling   Fall history within last six months no   Activity/Exercise/Self-Care Comment Pt normally ind with ADL and IADL, has been using 4WW recently 2/2 increased neuropathy and parasthesias in B LE.   General Information   Onset of Illness/Injury or Date of Surgery 09/08/24   Referring Physician , DO Sahil   Patient/Family Therapy Goal Statement (OT) To go home   Additional Occupational Profile Info/Pertinent History of Current Problem Kobe Buchanan is a 64 year old male now s/p C3 and C7 dome laminectomy and C4-6 laminoplasty on 9/11 with Dr. Prajapati.   Existing Precautions/Restrictions spinal   Cognitive Status Examination   Orientation Status orientation to person, place and time   Affect/Mental Status (Cognitive) agitated   Follows Commands follows one-step commands;75-90% accuracy   Safety Deficit impulsivity;insight into deficits/self-awareness;judgment   Visual Perception   Visual Impairment/Limitations corrective lenses full-time   Pain Assessment   Patient Currently in Pain Yes, see Vital Sign flowsheet   Range of Motion Comprehensive   Comment, General Range of Motion Shoulders limited bilaterally 2/2 history of r TSAs   Strength Comprehensive  (MMT)   Comment, General Manual Muscle Testing (MMT) Assessment Strength WLF for ADL   Bed Mobility   Bed Mobility scooting/bridging;supine-sit;sit-supine   Scooting/Bridging [place] (Bed Mobility) supervision   Supine-Sit [place] (Bed Mobility) supervision   Sit-Supine [place] (Bed Mobility) supervision   Transfers   Transfer Comments supervision - mod I w/ 4WW   Activities of Daily Living   BADL Assessment/Intervention lower body dressing   Comment, BADL Assessment/Training Pt declines participating in ADL. Expresses no concern with ability to complete ADL IND at home.   Additional Documentation Comment, BADL Assessment/Training (Row)   Lower Body Dressing Assessment/Training   [place] Level (Lower Body Dressing) supervision   Clinical Impression   Criteria for Skilled Therapeutic Interventions Met (OT) Yes, treatment indicated   OT Diagnosis decreased ADL   OT Problem List-Impairments impacting ADL problems related to;post-surgical precautions;pain   Assessment of Occupational Performance 3-5 Performance Deficits   Identified Performance Deficits functional mobility, home mgmt, pet care, laundry, and other IADL   OT Total Evaluation Time   OT Re-Eval Minutes (98303) 5   OT Goals   Therapy Frequency (OT) One time eval and treatment   OT Predicted Duration/Target Date for Goal Attainment 09/12/24   OT Goals Transfers   OT: Transfer Supervision/stand-by assist;within precautions;Goal Met   OT: Goal 1 Patient will listen to education on spinal precautions and verbalize understanding. Goal met.   Self-Care/Home Management   Self-Care/Home Mgmt/ADL, Compensatory, Meal Prep Minutes (06781) 15   Symptoms Noted During/After Treatment (Meal Preparation/Planning Training) behavioral stress signs   Treatment Detail/Skilled Intervention Pt is slightly agitated and fairly impulsive throughout session. Provided education to pt on spinal precautions as they relate to I/ADL safety and independence. Pt states  he is going to do whatever he has to do but does express understanding of precautions. Pt impulsively standing and walking out of room w/ 4WW, moving Supervision, progressing to mod I. Cues to slow pace and limit head turning. ambulates 150 feet w/ 4WW. Pt declines any further need for therapy and has no concerns w/ managing I/ADL at home at discharge. Emphasized importance of spinal precautions several times throughout session.   OT Discharge Planning   OT Plan discharge OT   OT Discharge Recommendation (DC Rec) home with assist   OT Rationale for DC Rec Pt is impulsive throughout session and with limited concern for spinal precautions. Despite this, pt does demonstrate ability to mobilize and complete basic ADL with supervision assist - independent. Also does so with minimal bending/twisting at cervical spine. Pt appears to be mobilizing at/near baseline. Declines any concern with I/ADL at discharge. Recomend discharge home with assist as needed from sister for heavier IADL.

## 2024-09-12 NOTE — PROGRESS NOTES
Patient was observed walking the julian with an IV pole in hand and his soft collar on. RN Delma requests that the patient be seen urgently as he stated he wanted to leave against medical advice (AMA).   Karishma Bo from the anestheia/ pain service enters the room with me so we are all on the same page as he seemed frustrated by his previous conversation.    Patient had just been seen by Pain team and immediately expresses frustration about his pain plan. Just after 8 am, I heard the RN review pain with this patient and he stated his pain is a 0-1/10. Now, he reiterates to me he has little pain. But is very concerned about what he will need at discharge. His chief concerns appear to be related to two separate issues:   1) pharmacy  and prior authorization from his insurance   And 2) that we are not meeting him at his baseline pain needs.     1) Pharmacy prior authorization: At 1345, I spoke with [facility] regarding pre-auth for 30mg tabs of IR oxycodone. With his current script of 30mg Q 6 hours PRN, I ordered 28 pills of oxycodone 30mg IR to be used x 1 week. If he needs a refill of this medication, he would be able to call our clinic for the refill.   Patient explains that his current pain clinic has been weaning his opioids (Fentanyl patch and PO opioids) and he is frustrated by this because with a higher dose Fentanyl patch, his pain has been better controlled.     2) Regarding baseline needs: Karishma again explains surgical and pain teams' priority is safe prescription at discharge. We acknowledge that in the past, he has used higher doses with better effects than his current chronic regimen. However, as patients age, their ability to process opioid medications reduces.  He interrupts, stating he understands that his age affects what is recommended and that he does not get typical side effects.    We review that Fentanyl patches have a black box warning, and they are not used for acute  "surgical pain.     We review that baseline needs change over time. Karishma further explains a common way overdose occurs is a patient takes a previous, higher prescribed dose, and it causes severe and at times, deadly side effects (e.g. respiratory depression.)  He states several times he is not worried about how other people have overdosed and . He states directly to Karishma regarding his pain meds, \"I know better [what I need to manage my pain] than you.\"     At home he uses a 30 mg IR BID. During the post op period, we doubled this dose. Also reduced Flexeril to recommended 30mg total per day (instead of 10-20 mg per day.)    Will maintain:   Fentanyl patch: 25 mcg/hr x 48 hours  Tizanidine 4mg Q 8hr PRN  Klonopin 0.5 TID PRN    IV dosing: He is currently getting 0.5mg/kg/hr of ketamine. He agrees to stop the ketamine to challenge our current regimen. Has not used any IV breakthrough since 240.     Exam: Patient is alert and oriented x 4. Patient appears comfortable and moves himself around his bed and into the hallway easily. He currently is not describing any significant pain, nor showing non verbal signs of pain therefore, there is no need to increase his dosing at this time.    Dressing: CDI to posterior cervical site. No edema, erythema.     I explain that if he wanted to leave AMA, which I am not recommending, I would remove his drain. He states that he doesn't want to leave AMA due to possible financial liability for his hospital stay.   He is adament about discharging home on Saturday. I explained that I will prepare his orders and begin the prior authorization process for the medication (as described above) and that I will review his medication regimen with him tomorrow to discuss any possible changes.   Patient agreeable to this.     Choco Melgar, APRN  "

## 2024-09-12 NOTE — CONSULTS
Care Management Initial Consult    General Information  Assessment completed with: Patient,    Type of CM/SW Visit: Initial Assessment    Primary Care Provider verified and updated as needed: Yes   Readmission within the last 30 days: no previous admission in last 30 days      Reason for Consult: discharge planning  Advance Care Planning: Advance Care Planning Reviewed: no concerns identified (No healthcare directive on file. Patient declined further information.)          Communication Assessment  Patient's communication style: spoken language (English or Bilingual)    Hearing Difficulty or Deaf: no   Wear Glasses or Blind: yes    Cognitive  Cognitive/Neuro/Behavioral: (P) WDL  Level of Consciousness: (P) alert  Arousal Level: (P) opens eyes spontaneously  Orientation: (P) oriented x 4  Mood/Behavior: calm, behavior appropriate to situation  Best Language: (P) 0 - No aphasia  Speech: (P) clear, spontaneous, logical    Living Environment:   People in home: alone (2 dogs)     Current living Arrangements: apartment      Able to return to prior arrangements: yes       Family/Social Support:  Care provided by: self  Provides care for: pet(s)  Marital Status: Single  Support system: Sibling(s)          Description of Support System: Uninvolved    Support Assessment: Limited social contact and support    Current Resources:   Patient receiving home care services: No        Community Resources: None  Equipment currently used at home: walker, rolling  Supplies currently used at home: Other (CPAP (currently not using))    Employment/Financial:  Employment Status: disabled        Financial Concerns: none   Referral to Financial Worker: No       Does the patient's insurance plan have a 3 day qualifying hospital stay waiver?  Yes     Which insurance plan 3 day waiver is available? Alternative insurance waiver    Will the waiver be used for post-acute placement? No    Lifestyle & Psychosocial Needs:  Social Determinants of Health      Food Insecurity: Low Risk  (12/21/2023)    Food Insecurity     Within the past 12 months, did you worry that your food would run out before you got money to buy more?: No     Within the past 12 months, did the food you bought just not last and you didn t have money to get more?: No   Depression: Not at risk (8/13/2024)    PHQ-2     PHQ-2 Score: 2   Housing Stability: Low Risk  (9/12/2024)    Housing Stability     Do you have housing? : Yes     Are you worried about losing your housing?: No   Tobacco Use: Medium Risk (9/8/2024)    Patient History     Smoking Tobacco Use: Former     Smokeless Tobacco Use: Never     Passive Exposure: Never   Financial Resource Strain: Low Risk  (12/21/2023)    Financial Resource Strain     Within the past 12 months, have you or your family members you live with been unable to get utilities (heat, electricity) when it was really needed?: No   Alcohol Use: Not on file   Transportation Needs: Low Risk  (12/21/2023)    Transportation Needs     Within the past 12 months, has lack of transportation kept you from medical appointments, getting your medicines, non-medical meetings or appointments, work, or from getting things that you need?: No   Physical Activity: Not on file   Interpersonal Safety: Low Risk  (9/8/2024)    Interpersonal Safety     Do you feel physically and emotionally safe where you currently live?: Yes     Within the past 12 months, have you been hit, slapped, kicked or otherwise physically hurt by someone?: No     Within the past 12 months, have you been humiliated or emotionally abused in other ways by your partner or ex-partner?: No   Stress: Not on file   Social Connections: Socially Isolated (8/31/2021)    Social Connection and Isolation Panel [NHANES]     Frequency of Communication with Friends and Family: Once a week     Frequency of Social Gatherings with Friends and Family: Never     Attends [employer]ist Services: Never     Active Member of Clubs or Organizations:  "No     Attends Club or Organization Meetings: Never     Marital Status: Never    Health Literacy: Not on file       Functional Status:  Prior to admission patient needed assistance:   Dependent ADLs:: Ambulation-walker  Dependent IADLs:: Independent, Transportation (Drives and uses [employer])  Assesssment of Functional Status: Needs assistance with transportation    Mental Health Status:  Mental Health Status: Current Concern (Reports anxiety and depression)  Mental Health Management: Medication    Chemical Dependency Status:  Chemical Dependency Status: No Current Concerns             Values/Beliefs:  Spiritual, Cultural Beliefs, Religion Practices, Values that affect care: no       Cultural/Religion Practices Patient Routinely Participates In:  (N/A)       Discussed  Partnership in Safe Discharge Planning  document with patient/family: No    Additional Information:  Care management consulted due to elevated risk score. Writer met with patient in room to discuss discharge planning. Introduced self and RNCC role. Verified address, phone number, PCP, patient contacts, and health insurance. No healthcare directive on file. Patient declined further information. He stated, \"I can look it up online.\"    Patient lives alone with his 2 dogs. Currently, his sister, Supriya, is watching his dogs. Patient was independent of all ADLs and IADLs prior to hospitalization. He uses a 4WW for ambulation. He is able to drive. Pt also uses [employer] for transportation. He does not have any stairs to navigate at his apartment. Pt states that he \"inherited\" an electric wheelchair that he uses sometimes when he goes to appointments. Patient also reports having an electric adjustable bed.    Patient explained that he does not have many social supports. His sister, Leah, lives in [place] and is not easily available to help with anything. And his sister, Supriya, lives nearby but cannot help much. He does not have much other " friends, family, or neighbors to assist if necessary.    Patient is unsure of transportation at discharge; either his sister or [employer] will pick him up.    Next Steps:   . DONE. Provided list of some transportation companies in the [facility] from [email or web address].    Margarita Orellana, MSN, APRN, [facility] [phone]  Nurse Coordinator  Securely message with Terri, 5 Med Surg Vocera

## 2024-09-12 NOTE — PROGRESS NOTES
Prior Authorization **INITIATED**    Medication: OXYCODONE HCL 30 MG PO TABS  Insurance Company: [facility] - Phone [phone] Fax [phone]  Pharmacy Filling the Rx: [place], [place] - [address]  Filling Pharmacy Phone: [phone]  Filling Pharmacy Fax: [phone]  Start Date: 9/12/2024  Reference #: CoverMyMeds Key: [id number] PA Case ID #: [id number]  Comments:  Previous MME limit exceeded. New PA needed for new MME of 180MME due to current dosing.      Kay Britton CPhT  Discharge Pharmacy Liaison  [facility]  Pronouns: She/Her/Hers    Securely message with [employer], [employer] Secure Chat, or [employer]  Phone: [phone]  Fax: [phone]  [email or web address]

## 2024-09-12 NOTE — PROGRESS NOTES
"Pain Service Progress Note  [facility]  Date: 09/12/2024       Patient Name: Kobe Buchanan  MRN: [id number]  Age: 64 year old  Sex: male      Assessment:  Kobe Buchanan is a 64 year old male who has PMH of HTN, HLD, prior smoking hx, bicuspid AV s/p valve sparing repair with Gelweave graft (2016), NICM (LVEF 30-35%, now resolved), SAVITA, anemia, BPPV, T2DM, GERD, CKD, chronic pain, MDD, THERESA, degenerative joint disease s/p bilateral shoulder replacement c/b bilateral prosthetic joint infection s/p hardware removal and reimplantation who underwent Cervical 3 and 7 dome laminectomy and 4-6 laminoplasty with medtronic plates with Dr. Prajapati on 9/11/24.     PTA, had been on Fentanyl 25mcg/hour patch TD Q 48 hours and Oxycodone 30mg PO BID as prescribed by [facility] pain clinic which was lowered from his previous opioid prescriber which frustrates Kobe.    Kobe states pain at rest is 1-1.5/10 while sitting up is 5/10.  He is moving his neck and body very well.  Noted to walk independently in hallways.  He has been upset that his chronic opioids have been tapered by [facility] pain clinic PTA.  Today, would like to increase fentanyl patches to 50mcg/hour TD Q 48 hours and oxycodone 30mg PO Q 4 hours PRN.  Discussed that his pain medications have been increased from PTA as expected due to post op pain, however would not further increase at this time. We discussed indications, risks and benefits of the opioids. Discussed using the lowest most effective dose for the shortest duration of time in order to minimize side effects .     At one point, Kobe stated that since he \"is not being helped\", he would \"like to leave AMA.\"  ortho team had discussion with Kobe who agreed to stay after providing options to stay or leave with risks discussed.  Pain recommendations as discussed below.    Plan/Recommendations:  Discontinue ketamine infusion-pt requested  Continue PTA dose of Fentanyl " "25mcg/hour patch TD Q 48 hours. (-do not recommend increasing)  Oxycodone 30mg PO Q 6 hours PRN   IV Dilaudid PRN   Bowel regimen-does well with colace/senna.  Feels that miralax does not work well for him.    Pain Service will continue to follow.    Discussed with attending anesthesiologist    Karishma Bo PA-C  09/12/2024       Diet: Advance Diet as Tolerated: Regular Diet Adult  Snacks/Supplements Adult: Chase; With Meals    Relevant Labs:  Recent Labs   Lab Test 09/11/24  0030 10/04/16  2205 10/04/16  1508   INR 1.05   < > 1.43*      < > 141*   PTT  --   --  39*   BUN 32.0*   < > 14    < > = values in this interval not displayed.       Physical Exam:  Vitals: BP (!) 152/77   Pulse 71   Temp 98.2  F (36.8  C) (Oral)   Resp 19   Ht 1.778 m (5' 10\")   Wt 106.1 kg (234 lb)   SpO2 98%   BMI 33.58 kg/m      Physical Exam:   CONSTITUTIONAL/GENERAL APPEARANCE: Conversant.  EYES: EOMI, sclerae clear  ENT/NECK: soft collar present  RESPIRATORY:non labored breathing, on room air  CARDIOVASCULAR: HR within normal limits  GI:soft, nontender,   MUSCULOSKELETAL/BACK/SPINE/EXTREMITIES: Moving UE and LE independently.     NEURO:  AAOx3.   SKIN/VASCULAR EXAM:  Dry and warm.  PSYCHIATRIC/BEHAVIORAL/OBSERVATIONS:  No objective signs of pain observed during our interview.   Judgment/Insight -fair   Orientation - x3   Memory -fair   Mood and affect - irritable.         Relevant Medications:  Current Pain Medications:  Medications related to Pain Management (From now, onward)      Start     Dose/Rate Route Frequency Ordered Stop    09/14/24 0000  bisacodyl (DULCOLAX) suppository 10 mg         10 mg Rectal DAILY PRN 09/11/24 1425      09/14/24 0000  acetaminophen (TYLENOL) tablet 650 mg         650 mg Oral EVERY 4 HOURS PRN 09/11/24 1425      09/13/24 0000  magnesium hydroxide (MILK OF MAGNESIA) suspension 30 mL         30 mL Oral DAILY PRN 09/11/24 1425      09/12/24 0940  cyclobenzaprine (FLEXERIL) tablet 10 mg      " "  Note to Pharmacy: PTA Sig:Take 1 tablet (10 mg) by mouth 3 times daily as needed for muscle spasms      10 mg Oral 3 TIMES DAILY PRN 09/12/24 0940      09/12/24 0800  polyethylene glycol (MIRALAX) Packet 17 g         17 g Oral DAILY 09/11/24 1425      09/12/24 0000  oxyCODONE IR (ROXICODONE) 30 MG tablet         30 mg Oral EVERY 6 HOURS PRN 09/12/24 1343      09/11/24 1730  acetaminophen (TYLENOL) tablet 975 mg         975 mg Oral EVERY 8 HOURS 09/11/24 1425 09/14/24 1729    09/11/24 1425  HYDROmorphone (PF) (DILAUDID) injection 0.3 mg        Placed in \"Or\" Linked Group    0.3 mg Intravenous EVERY 2 HOURS PRN 09/11/24 1425      09/11/24 1425  HYDROmorphone (PF) (DILAUDID) injection 0.4 mg        Placed in \"Or\" Linked Group    0.4 mg Intravenous EVERY 2 HOURS PRN 09/11/24 1425      09/11/24 1425  oxyCODONE (ROXICODONE) tablet 15 mg        Placed in \"Or\" Linked Group    15 mg Oral EVERY 6 HOURS PRN 09/11/24 1425      09/11/24 1425  oxyCODONE IR (ROXICODONE) tablet 30 mg        Placed in \"Or\" Linked Group    30 mg Oral EVERY 6 HOURS PRN 09/11/24 1425      09/11/24 1425  lidocaine 1 % 0.1-1 mL         0.1-1 mL Other EVERY 1 HOUR PRN 09/11/24 1425      09/11/24 1425  lidocaine (LMX4) cream          Topical EVERY 1 HOUR PRN 09/11/24 1425      09/11/24 0800  senna-docusate (SENOKOT-S/PERICOLACE) 8.6-50 MG per tablet 2 tablet        Note to Pharmacy: PTA Sig:Take 1-2 tablets by mouth 2 times daily     Placed in \"And\" Linked Group    2 tablet Oral EVERY MORNING 09/10/24 1616      09/11/24 0000  bisacodyl (DULCOLAX) suppository 10 mg         10 mg Rectal DAILY PRN 09/08/24 1740      09/10/24 1700  senna-docusate (SENOKOT-S/PERICOLACE) 8.6-50 MG per tablet 3 tablet        Note to Pharmacy: PTA Sig:Take 1-2 tablets by mouth 2 times daily     Placed in \"And\" Linked Group    3 tablet Oral EVERY EVENING 09/10/24 1616      09/10/24 1300  polyethylene glycol (MIRALAX) Packet 17 g         17 g Oral 2 TIMES DAILY PRN 09/10/24 1240   " "   09/10/24 0527  simethicone (MYLICON) suspension 40 mg         40 mg Oral EVERY 6 HOURS PRN 09/10/24 0528      09/09/24 0900  fentaNYL (DURAGESIC) 25 mcg/hr 72 hr patch 1 patch        Note to Pharmacy: PTA Sig:Place 1 patch onto the skin every 48 hours. Pt taking Every 48 hours     Placed in \"And\" Linked Group    25 mcg  over 72 Hours Transdermal EVERY 48 HOURS 09/08/24 2004 09/09/24 0029  tiZANidine (ZANAFLEX) tablet 2-4 mg         2-4 mg Oral EVERY 8 HOURS PRN 09/09/24 0029      09/08/24 1900  bisacodyl (DULCOLAX) EC tablet 5-10 mg        Note to Pharmacy: PTA Sig:Two days prior to exam take two (2) tablets at 4pm. One day prior to exam take two (2) tablets at 4pm      5-10 mg Oral 2 TIMES DAILY 09/08/24 1858      09/08/24 1858  clonazePAM (klonoPIN) tablet 0.5 mg        Note to Pharmacy: PTA Sig:Take 1 tablet (0.5 mg) by mouth 3 times daily as needed for anxiety      0.5 mg Oral 3 TIMES DAILY PRN 09/08/24 1858              Primary Service Contacted with Recommendations? Yes            Acute Inpatient Pain Service [facility]  Hours of pain coverage 24/7   Page via Amcom- Please Page the Pain Team Via Amcom: \"PAIN MANAGEMENT ACUTE INPATIENT/ [facility]\"            "
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
## 2024-09-12 NOTE — PLAN OF CARE
Goal Outcome Evaluation:      Plan of Care Reviewed With: patient    Overall Patient Progress: no changeOverall Patient Progress: no change    Outcome Evaluation: Met with patient in room to discuss discharge planning.    Margarita Orellana, MSN, APRN, [facility]   5MS [phone]  Nurse Coordinator  Securely message with Terri, 5 Med Surg Vocera

## 2024-09-12 NOTE — PROGRESS NOTES
"Pain Service Progress Note  [facility]  Date: 09/12/2024       Patient Name: Kobe Buchanan  MRN: [id number]  Age: 64 year old  Sex: male      Assessment:  ***    Procedure: ***    Date of Surgery: ***    Date of Catheter Placement: ***    Plan/Recommendations:  1. Regional Anesthesia/Analgesia  -Continuous Catheter Type/Site: {Laterality:495821} {ANEBlock:6040000} {Insertion List:659980242}  Infusate: ***  Continuous Infusion at *** mL/hr  Programmed Intermittent Bolus (PIB) at *** mL Q60 min via each catheter, total infusion rate of *** mL/hr    Plan to maintain catheter approximately of 7 days    2. Anticoagulation  -Please contact Inpatient Pain Service before ordering or making any anticoagulation changes       3. Multimodal Analgesia  - ***    Pain Service will {IPS_SIGNOFF:682430::\"continue to follow.\"}    Discussed with attending anesthesiologist    Karishma Bo PA-C  09/12/2024     Overnight Events: ***    Tubes/Drains: {IPS_PAIN_YES_NO:514150::\"Yes\"}  ***    Subjective:  ***   Nausea: {IPS_PAIN_YES_NO:455649::\"Yes\"}  Vomiting: {IPS_PAIN_YES_NO:807419::\"Yes\"}  Pruritus: {IPS_PAIN_YES_NO:668436::\"Yes\"}  Symptoms of LAST: {IPS_PAIN_YES_NO:721511::\"Yes\"}    Pain Location:  ***    Pain Intensity:    Pain at Rest: {PAIN LEVEL (SCALE OF 10):384341}   Pain with Activity: {PAIN LEVEL (SCALE OF 10):687314}  Comfort Goal: {PAIN LEVEL (SCALE OF 10):869731}   Baseline Pain: {PAIN LEVEL (SCALE OF 10):118651}   Satisfied with your level of pain control: {IPS_PAIN_YES_NO:995136::\"Yes\"}    Diet: Advance Diet as Tolerated: Regular Diet Adult    Relevant Labs:  Recent Labs   Lab Test 09/11/24  0030 10/04/16  2205 10/04/16  1508   INR 1.05   < > 1.43*      < > 141*   PTT  --   --  39*   BUN 32.0*   < > 14    < > = values in this interval not displayed.       Physical Exam:  Vitals: BP (!) 157/75 (BP Location: Left arm, Patient Position: Semi-Dawn's, Cuff Size: Adult Regular)   " "Pulse 69   Temp 97.8  F (36.6  C) (Oral)   Resp 16   Ht 1.778 m (5' 10\")   Wt 106.1 kg (234 lb)   SpO2 98%   BMI 33.58 kg/m      Physical Exam:   Orientation:  Alert, oriented, and in no acute distress: {IPS_PAIN_YES_NO:816218::\"Yes\"}  Sedation: {IPS_PAIN_YES_NO:635368::\"Yes\"}    Motor Examination:  5/5 Strength in lower extremities: {IPS_PAIN_YES_NO:653293::\"Yes\"}    Sensory Level:   Decrease in sensation: {IPS_PAIN_YES_NO:079440::\"Yes\"}    Catheter Site:   Catheter entry site is clean/dry/intact: {IPS_PAIN_YES_NO:115152::\"Yes\"}    Tender: {IPS_PAIN_YES_NO:495061::\"Yes\"}      Relevant Medications:  Current Pain Medications:  Medications related to Pain Management (From now, onward)      Start     Dose/Rate Route Frequency Ordered Stop    09/14/24 0000  bisacodyl (DULCOLAX) suppository 10 mg         10 mg Rectal DAILY PRN 09/11/24 1425      09/14/24 0000  acetaminophen (TYLENOL) tablet 650 mg         650 mg Oral EVERY 4 HOURS PRN 09/11/24 1425      09/13/24 0000  magnesium hydroxide (MILK OF MAGNESIA) suspension 30 mL         30 mL Oral DAILY PRN 09/11/24 1425      09/12/24 0800  polyethylene glycol (MIRALAX) Packet 17 g         17 g Oral DAILY 09/11/24 1425      09/11/24 1730  acetaminophen (TYLENOL) tablet 975 mg         975 mg Oral EVERY 8 HOURS 09/11/24 1425 09/14/24 1729    09/11/24 1500  ketamine (KETALAR) 2 mg/mL in sodium chloride 0.9 % 50 mL ANALGESIA infusion         10 mg/hr  5 mL/hr  Intravenous CONTINUOUS 09/11/24 1447      09/11/24 1425  HYDROmorphone (PF) (DILAUDID) injection 0.3 mg        Placed in \"Or\" Linked Group    0.3 mg Intravenous EVERY 2 HOURS PRN 09/11/24 1425      09/11/24 1425  HYDROmorphone (PF) (DILAUDID) injection 0.4 mg        Placed in \"Or\" Linked Group    0.4 mg Intravenous EVERY 2 HOURS PRN 09/11/24 1425      09/11/24 1425  oxyCODONE (ROXICODONE) tablet 15 mg        Placed in \"Or\" Linked Group    15 mg Oral EVERY 6 HOURS PRN 09/11/24 1425      09/11/24 1425  oxyCODONE IR " "(ROXICODONE) tablet 30 mg        Placed in \"Or\" Linked Group    30 mg Oral EVERY 6 HOURS PRN 09/11/24 1425      09/11/24 1425  lidocaine 1 % 0.1-1 mL         0.1-1 mL Other EVERY 1 HOUR PRN 09/11/24 1425      09/11/24 1425  lidocaine (LMX4) cream          Topical EVERY 1 HOUR PRN 09/11/24 1425      09/11/24 0800  senna-docusate (SENOKOT-S/PERICOLACE) 8.6-50 MG per tablet 2 tablet        Note to Pharmacy: PTA Sig:Take 1-2 tablets by mouth 2 times daily     Placed in \"And\" Linked Group    2 tablet Oral EVERY MORNING 09/10/24 1616      09/11/24 0000  bisacodyl (DULCOLAX) suppository 10 mg         10 mg Rectal DAILY PRN 09/08/24 1740      09/10/24 1700  senna-docusate (SENOKOT-S/PERICOLACE) 8.6-50 MG per tablet 3 tablet        Note to Pharmacy: PTA Sig:Take 1-2 tablets by mouth 2 times daily     Placed in \"And\" Linked Group    3 tablet Oral EVERY EVENING 09/10/24 1616      09/10/24 1300  polyethylene glycol (MIRALAX) Packet 17 g         17 g Oral 2 TIMES DAILY PRN 09/10/24 1240      09/10/24 0527  simethicone (MYLICON) suspension 40 mg         40 mg Oral EVERY 6 HOURS PRN 09/10/24 0528      09/09/24 0900  fentaNYL (DURAGESIC) 25 mcg/hr 72 hr patch 1 patch        Note to Pharmacy: PTA Sig:Place 1 patch onto the skin every 48 hours. Pt taking Every 48 hours     Placed in \"And\" Linked Group    25 mcg  over 72 Hours Transdermal EVERY 48 HOURS 09/08/24 2004 09/09/24 0029  tiZANidine (ZANAFLEX) tablet 2-4 mg         2-4 mg Oral EVERY 8 HOURS PRN 09/09/24 0029      09/08/24 1900  bisacodyl (DULCOLAX) EC tablet 5-10 mg        Note to Pharmacy: PTA Sig:Two days prior to exam take two (2) tablets at 4pm. One day prior to exam take two (2) tablets at 4pm      5-10 mg Oral 2 TIMES DAILY 09/08/24 1858 09/08/24 1858  clonazePAM (klonoPIN) tablet 0.5 mg        Note to Pharmacy: GIANCARLO Sig:Take 1 tablet (0.5 mg) by mouth 3 times daily as needed for anxiety      0.5 mg Oral 3 TIMES DAILY PRN 09/08/24 1858 09/08/24 1858  " "cyclobenzaprine (FLEXERIL) tablet 10-20 mg        Note to Pharmacy: PTA Sig:Take 1 tablet (10 mg) by mouth 3 times daily as needed for muscle spasms      10-20 mg Oral DAILY PRN 09/08/24 5135              Primary Service Contacted with Recommendations? Yes      {BILLING ON [facility]   :429028}      Acute Inpatient Pain Service [facility]  Hours of pain coverage 24/7   Page via Amcom- Please Page the Pain Team Via Amcom: \"PAIN MANAGEMENT ACUTE INPATIENT/ [facility]\"            "

## 2024-09-12 NOTE — PROGRESS NOTES
Goal Outcome Evaluation: reviewed with pt.     Received alert and oriented x4, with O2 at 2LPM  Complained of pain 6/10, given IV Dilaudid.   Denies N/V, SOB and chest discomfort.  Baseline numbness on b/l hands.  Surgical dressing - dry and intact, w/ hemovac.  Due IV antibiotic given. With ongoing Ketamine drip.  Blood glucose checked.  Able to stand up at the bedside. Assist of 1.   Keep call bell and urinal within reach.  No acute changes on this shift.

## 2024-09-12 NOTE — PLAN OF CARE
Physical Therapy: Orders received. Chart reviewed and discussed with care team.? Physical Therapy not indicated following discussion with OT. Patient mobilizing at baseline with 4ww during OT session. Patient's sister to assist with IADL's as needed  upon discharge. OT to continue following patient and progressing mobility while in hospital .? Defer discharge recommendations to OT.? Will complete orders.

## 2024-09-12 NOTE — PROGRESS NOTES
"Orthopaedic Surgery Progress Note:       Subjective:   Patient seen and evaluated.  States that he would like significant modifications to his pain regimen.  He would like to increase his fentanyl patch to 50 along with discontinue Dilaudid and increase his oxycodone frequency to every 2 hours instead of every 6 hours.  Informed patient that we will have to discuss this with pain management.  Patient also discontinued his Serrano overnight and has voided since.  Denies any new upper extremity pain.    Objective:   BP (!) 157/75 (BP Location: Left arm, Patient Position: Semi-Dawn's, Cuff Size: Adult Regular)   Pulse 69   Temp 97.8  F (36.6  C) (Oral)   Resp 16   Ht 1.778 m (5' 10\")   Wt 106.1 kg (234 lb)   SpO2 98%   BMI 33.58 kg/m    No intake/output data recorded.  Gen: NAD. Resting comfortably in bed  Resp: Breathing comfortably on RA  : Serrano in place    Spine:  Cervical spine:    Dressing clean and dry  Drain in place maintaining suction                      Motor -                     C5: Deltoids R 5/5 and L 5/5 strength                    C6: Biceps R 5/5 and L 5/5 strength                     C7: Triceps R 5/5 and L 5/5 strength                     C8:  R 5/5 and L 5/5 strength                     T1: Dorsal interossei R 4/5 and L 4/5 strength                         Sensation: intact to light touch in C5-T1 grossly but subjectively diminished globally bilaterally     Baxter present bilaterally           Lumbar Spine:                    Motor -                     L2-3: Hip flexion 5/5 R and 5/5 L strength                     L3/4:  Knee extension R 5/5 and L 5/5 strength                    L4/5:  Foot dorsiflexion R 5/5 L 5/5 and                                 EHL dorsiflexion R 5/5 L 5/5 strength                    S1:  Plantarflexion/Peroneal Muscles  R 5/5 and L 5/5 strength                    Sensation: intact to light touch L3-S1 distribution BLE grossly                       " Neurologic:                      REFLEXES Left Right   Biceps 3+ 3+   Brachioradialis 3+ 3+   Patella 3+ 3+   Ankle jerk 3+ 3+   Clonus 1 beats 1 beats                Labs:  Lab Results   Component Value Date    WBC 20.9 (H) 09/11/2024    HGB 16.0 09/12/2024     09/11/2024    INR 1.05 09/11/2024        Assessment & Plan:   Assessment and Plan: Kobe Buchanan is a 64 year old male now s/p C3 and C7 dome laminectomy and C4-6 laminoplasty on 9/11 with Dr. Prajapati.     --Discussed pain medication modification with pain team  --Ambulate with therapy  --Continue to monitor drain  --X-rays prior to discharge     Ortho (Alo) Primary  Activity:   - Up with assist until independent. No excessive bending or twisting. No lifting >10 lbs x 6 weeks.   Weight bearing status: WBAT.  Pain management:   - IV lidocaine: discontinue at 8am on POD#2 or earlier if complications arise.  - Transition to PO narcotics as tolerated. No NSAIDs x 3 months.   -Pain management on consult  Antibiotics: Ancef until drains out  Diet: Begin with clear fluids and progress diet as tolerated.   DVT prophylaxis: SCDs only. No chemical DVT ppx needed.  Imaging: XR Upright c spine PTDC - ordered.  Labs: Hgb POD#1.  Bracing/Splinting: None.  Dressings: Keep dressing clean and dry.  Drains: Document output per shift, will be discontinued at Orthopedic Surgery discretion.  Serrano catheter: Remove POD#1.  Physical Therapy/Occupational Therapy: Eval and treat.  Cultures: none.    Consults: Hospitalist.  Follow-up: Clinic with Dr. Bueno in 6 weeks with repeat x-rays.   Disposition: Pending progress with therapies, pain control on orals, and medical stability, anticipate discharge to home on POD #3-4.    Sahil Bullard, DO  Spine Fellow

## 2024-09-13 ENCOUNTER — APPOINTMENT (OUTPATIENT)
Dept: GENERAL RADIOLOGY | Facility: CLINIC | Age: 64
DRG: 029 | End: 2024-09-13
Attending: STUDENT IN AN ORGANIZED HEALTH CARE EDUCATION/TRAINING PROGRAM
Payer: COMMERCIAL

## 2024-09-13 LAB
GLUCOSE BLDC GLUCOMTR-MCNC: 160 MG/DL (ref 70–99)
GLUCOSE BLDC GLUCOMTR-MCNC: 190 MG/DL (ref 70–99)
GLUCOSE BLDC GLUCOMTR-MCNC: 198 MG/DL (ref 70–99)
GLUCOSE BLDC GLUCOMTR-MCNC: 262 MG/DL (ref 70–99)

## 2024-09-13 PROCEDURE — 250N000011 HC RX IP 250 OP 636: Performed by: STUDENT IN AN ORGANIZED HEALTH CARE EDUCATION/TRAINING PROGRAM

## 2024-09-13 PROCEDURE — 99232 SBSQ HOSP IP/OBS MODERATE 35: CPT | Performed by: PHYSICIAN ASSISTANT

## 2024-09-13 PROCEDURE — 120N000002 HC R&B MED SURG/OB UMMC

## 2024-09-13 PROCEDURE — 250N000013 HC RX MED GY IP 250 OP 250 PS 637: Performed by: PEDIATRICS

## 2024-09-13 PROCEDURE — 99232 SBSQ HOSP IP/OBS MODERATE 35: CPT | Performed by: INTERNAL MEDICINE

## 2024-09-13 PROCEDURE — 250N000013 HC RX MED GY IP 250 OP 250 PS 637: Performed by: STUDENT IN AN ORGANIZED HEALTH CARE EDUCATION/TRAINING PROGRAM

## 2024-09-13 PROCEDURE — 999N000007 HC SITE CHECK

## 2024-09-13 PROCEDURE — 250N000013 HC RX MED GY IP 250 OP 250 PS 637: Performed by: INTERNAL MEDICINE

## 2024-09-13 PROCEDURE — 72040 X-RAY EXAM NECK SPINE 2-3 VW: CPT | Mod: 26 | Performed by: RADIOLOGY

## 2024-09-13 PROCEDURE — 999N000127 HC STATISTIC PERIPHERAL IV START W US GUIDANCE

## 2024-09-13 PROCEDURE — 93010 ELECTROCARDIOGRAM REPORT: CPT | Performed by: INTERNAL MEDICINE

## 2024-09-13 PROCEDURE — 999N000065 XR CERVICAL SPINE 2/3 VIEWS

## 2024-09-13 RX ORDER — ACETAMINOPHEN 325 MG/1
650 TABLET ORAL EVERY 4 HOURS PRN
COMMUNITY
Start: 2024-09-14

## 2024-09-13 RX ORDER — BISACODYL 10 MG
10 SUPPOSITORY, RECTAL RECTAL DAILY PRN
COMMUNITY
Start: 2024-09-13

## 2024-09-13 RX ORDER — PANTOPRAZOLE SODIUM 40 MG/1
40 TABLET, DELAYED RELEASE ORAL 2 TIMES DAILY PRN
Status: DISCONTINUED | OUTPATIENT
Start: 2024-09-13 | End: 2024-09-14 | Stop reason: HOSPADM

## 2024-09-13 RX ORDER — TIZANIDINE 2 MG/1
2-4 TABLET ORAL EVERY 8 HOURS PRN
Qty: 25 TABLET | Refills: 0 | Status: SHIPPED | OUTPATIENT
Start: 2024-09-13 | End: 2024-09-19

## 2024-09-13 RX ORDER — CEFAZOLIN SODIUM 2 G/100ML
2 INJECTION, SOLUTION INTRAVENOUS EVERY 8 HOURS
Status: DISCONTINUED | OUTPATIENT
Start: 2024-09-13 | End: 2024-09-13

## 2024-09-13 RX ADMIN — CARVEDILOL 50 MG: 25 TABLET, FILM COATED ORAL at 16:42

## 2024-09-13 RX ADMIN — LISINOPRIL 20 MG: 20 TABLET ORAL at 05:22

## 2024-09-13 RX ADMIN — PANTOPRAZOLE SODIUM 40 MG: 40 TABLET, DELAYED RELEASE ORAL at 10:39

## 2024-09-13 RX ADMIN — FENTANYL 1 PATCH: 25 PATCH TRANSDERMAL at 08:31

## 2024-09-13 RX ADMIN — ESCITALOPRAM OXALATE 20 MG: 20 TABLET ORAL at 05:22

## 2024-09-13 RX ADMIN — LISINOPRIL 20 MG: 20 TABLET ORAL at 16:42

## 2024-09-13 RX ADMIN — ACETAMINOPHEN 975 MG: 325 TABLET ORAL at 16:42

## 2024-09-13 RX ADMIN — OXYCODONE HYDROCHLORIDE 30 MG: 10 TABLET ORAL at 01:24

## 2024-09-13 RX ADMIN — CARVEDILOL 50 MG: 25 TABLET, FILM COATED ORAL at 05:21

## 2024-09-13 RX ADMIN — ACETAMINOPHEN 975 MG: 325 TABLET ORAL at 01:24

## 2024-09-13 RX ADMIN — OXYCODONE HYDROCHLORIDE 30 MG: 10 TABLET ORAL at 08:32

## 2024-09-13 RX ADMIN — OXYCODONE HYDROCHLORIDE 30 MG: 10 TABLET ORAL at 14:19

## 2024-09-13 RX ADMIN — HYDROMORPHONE HYDROCHLORIDE 0.4 MG: 1 INJECTION, SOLUTION INTRAMUSCULAR; INTRAVENOUS; SUBCUTANEOUS at 12:41

## 2024-09-13 RX ADMIN — TIZANIDINE 4 MG: 2 TABLET ORAL at 12:14

## 2024-09-13 RX ADMIN — PANTOPRAZOLE SODIUM 40 MG: 40 TABLET, DELAYED RELEASE ORAL at 02:13

## 2024-09-13 RX ADMIN — BUPROPION HYDROCHLORIDE 200 MG: 200 TABLET, EXTENDED RELEASE ORAL at 16:42

## 2024-09-13 RX ADMIN — CLONAZEPAM 0.5 MG: 0.5 TABLET ORAL at 10:39

## 2024-09-13 RX ADMIN — CLONAZEPAM 0.5 MG: 0.5 TABLET ORAL at 20:21

## 2024-09-13 RX ADMIN — HYDROMORPHONE HYDROCHLORIDE 0.4 MG: 1 INJECTION, SOLUTION INTRAMUSCULAR; INTRAVENOUS; SUBCUTANEOUS at 10:39

## 2024-09-13 RX ADMIN — BUPROPION HYDROCHLORIDE 200 MG: 200 TABLET, EXTENDED RELEASE ORAL at 05:21

## 2024-09-13 RX ADMIN — FAMOTIDINE 20 MG: 20 TABLET ORAL at 20:22

## 2024-09-13 RX ADMIN — CLONAZEPAM 0.5 MG: 0.5 TABLET ORAL at 02:12

## 2024-09-13 RX ADMIN — Medication 2 G: at 05:21

## 2024-09-13 RX ADMIN — AMLODIPINE BESYLATE 5 MG: 5 TABLET ORAL at 16:42

## 2024-09-13 RX ADMIN — ACETAMINOPHEN 975 MG: 325 TABLET ORAL at 08:41

## 2024-09-13 RX ADMIN — ATORVASTATIN CALCIUM 40 MG: 40 TABLET, FILM COATED ORAL at 16:42

## 2024-09-13 RX ADMIN — CYCLOBENZAPRINE 10 MG: 10 TABLET, FILM COATED ORAL at 02:12

## 2024-09-13 RX ADMIN — OXYCODONE HYDROCHLORIDE 30 MG: 10 TABLET ORAL at 20:21

## 2024-09-13 RX ADMIN — DEXAMETHASONE SODIUM PHOSPHATE 10 MG: 10 INJECTION, SOLUTION INTRAMUSCULAR; INTRAVENOUS at 05:21

## 2024-09-13 RX ADMIN — TIZANIDINE 2 MG: 2 TABLET ORAL at 20:21

## 2024-09-13 RX ADMIN — AMLODIPINE BESYLATE 5 MG: 5 TABLET ORAL at 05:22

## 2024-09-13 ASSESSMENT — ACTIVITIES OF DAILY LIVING (ADL)
ADLS_ACUITY_SCORE: 29
ADLS_ACUITY_SCORE: 28
ADLS_ACUITY_SCORE: 29
ADLS_ACUITY_SCORE: 28
ADLS_ACUITY_SCORE: 29
ADLS_ACUITY_SCORE: 28
ADLS_ACUITY_SCORE: 28
ADLS_ACUITY_SCORE: 29

## 2024-09-13 NOTE — PROGRESS NOTES
"Pain Service Progress Note  [facility]  Date: 09/13/2024       Patient Name: Kobe Buchanan  MRN: [id number]  Age: 64 year old  Sex: male      Assessment:  Kobe Buchanan is a 64 year old male who has PMH of HTN, HLD, prior smoking hx, bicuspid AV s/p valve sparing repair with Gelweave graft (2016), NICM (LVEF 30-35%, now resolved), SAVITA, anemia, BPPV, T2DM, GERD, CKD, chronic pain, MDD, THERESA, degenerative joint disease s/p bilateral shoulder replacement c/b bilateral prosthetic joint infection s/p hardware removal and reimplantation who underwent Cervical 3 and 7 dome laminectomy and 4-6 laminoplasty with medtronic plates with Dr. Prajapati on 9/11/24.      PTA, had been on Fentanyl 25mcg/hour patch TD Q 48 hours and Oxycodone 30mg PO BID as prescribed by [facility] pain [facility] which was lowered from his previous opioid prescriber which frustrates Kobe.    Kobe is seen with ortho team today.  He is moving well in and out of bed. NAD.  He would like to be discharged home soon however would like higher dose of opioids-\"oxycodone 30mg every 4 hours.\"  Note, yesterday, his requirement/use was oxycodone 30mg TID.  He dismissed the pain service as he felt pain provider was \"unqualified.\"        Plan/Recommendations:    Continue PTA dose of Fentanyl 25mcg/hour patch TD Q 48 hours. (-do not recommend increasing)  Oxycodone 30mg PO Q 6 hours PRN.    Discharge, with oxycodone 30mg PO Q 6 hours PRN x 2-3 days then to Q 8 hours PRN x 2-3days then return to baseline of 30mg bid PRN.   -please follow up at [facility] pain clinic  Stop IV Dilaudid  Bowel regimen-does well with colace/senna.  Feels that miralax does not work well for him.     Pain Service will continue sign off     Discussed with attending anesthesiologist      Karishma Bo PA-C  09/13/2024     Diet: Advance Diet as Tolerated: Regular Diet Adult  Snacks/Supplements Adult: Chase; With Meals    Relevant Labs:  Recent Labs " "  Lab Test 09/11/24  0030 10/04/16  2205 10/04/16  1508   INR 1.05   < > 1.43*      < > 141*   PTT  --   --  39*   BUN 32.0*   < > 14    < > = values in this interval not displayed.       Physical Exam:  Vitals: BP (!) 150/84 (BP Location: Left arm)   Pulse 74   Temp 98  F (36.7  C) (Oral)   Resp 19   Ht 1.778 m (5' 10\")   Wt 106.1 kg (234 lb)   SpO2 96%   BMI 33.58 kg/m      Physical Exam:   CONSTITUTIONAL/GENERAL APPEARANCE: Conversant.  EYES: EOMI, sclerae clear  ENT/NECK: neck in soft collar.  RESPIRATORY:non labored breathing, on room air  CARDIOVASCULAR: HR within normal limits  MUSCULOSKELETAL/BACK/SPINE/EXTREMITIES: Moving UE and LE independently.     NEURO:  AAOx3.   SKIN/VASCULAR EXAM:  Dry and warm.  PSYCHIATRIC/BEHAVIORAL/OBSERVATIONS:  No objective signs of pain observed during our interview.   Judgment/Insight -fair   Orientation - x3   Memory -fair   Mood and affect -easily irritable         Relevant Medications:  Current Pain Medications:  Medications related to Pain Management (From now, onward)      Start     Dose/Rate Route Frequency Ordered Stop    09/14/24 0000  bisacodyl (DULCOLAX) suppository 10 mg         10 mg Rectal DAILY PRN 09/11/24 1425      09/14/24 0000  acetaminophen (TYLENOL) tablet 650 mg         650 mg Oral EVERY 4 HOURS PRN 09/11/24 1425      09/13/24 0000  magnesium hydroxide (MILK OF MAGNESIA) suspension 30 mL         30 mL Oral DAILY PRN 09/11/24 1425      09/12/24 0940  cyclobenzaprine (FLEXERIL) tablet 10 mg        Note to Pharmacy: PTA Sig:Take 1 tablet (10 mg) by mouth 3 times daily as needed for muscle spasms      10 mg Oral 3 TIMES DAILY PRN 09/12/24 0940      09/12/24 0800  polyethylene glycol (MIRALAX) Packet 17 g         17 g Oral DAILY 09/11/24 1425      09/12/24 0000  oxyCODONE IR (ROXICODONE) 30 MG tablet         30 mg Oral EVERY 6 HOURS PRN 09/12/24 1343      09/11/24 1730  acetaminophen (TYLENOL) tablet 975 mg         975 mg Oral EVERY 8 HOURS " "09/11/24 1425 09/14/24 1729    09/11/24 1425  HYDROmorphone (PF) (DILAUDID) injection 0.3 mg        Placed in \"Or\" Linked Group    0.3 mg Intravenous EVERY 2 HOURS PRN 09/11/24 1425      09/11/24 1425  HYDROmorphone (PF) (DILAUDID) injection 0.4 mg        Placed in \"Or\" Linked Group    0.4 mg Intravenous EVERY 2 HOURS PRN 09/11/24 1425      09/11/24 1425  oxyCODONE (ROXICODONE) tablet 15 mg        Placed in \"Or\" Linked Group    15 mg Oral EVERY 6 HOURS PRN 09/11/24 1425      09/11/24 1425  oxyCODONE IR (ROXICODONE) tablet 30 mg        Placed in \"Or\" Linked Group    30 mg Oral EVERY 6 HOURS PRN 09/11/24 1425      09/11/24 1425  lidocaine 1 % 0.1-1 mL         0.1-1 mL Other EVERY 1 HOUR PRN 09/11/24 1425      09/11/24 1425  lidocaine (LMX4) cream          Topical EVERY 1 HOUR PRN 09/11/24 1425      09/11/24 0800  senna-docusate (SENOKOT-S/PERICOLACE) 8.6-50 MG per tablet 2 tablet        Note to Pharmacy: PTA Sig:Take 1-2 tablets by mouth 2 times daily     Placed in \"And\" Linked Group    2 tablet Oral EVERY MORNING 09/10/24 1616      09/11/24 0000  bisacodyl (DULCOLAX) suppository 10 mg         10 mg Rectal DAILY PRN 09/08/24 1740      09/10/24 1700  senna-docusate (SENOKOT-S/PERICOLACE) 8.6-50 MG per tablet 3 tablet        Note to Pharmacy: PTA Sig:Take 1-2 tablets by mouth 2 times daily     Placed in \"And\" Linked Group    3 tablet Oral EVERY EVENING 09/10/24 1616      09/10/24 1300  polyethylene glycol (MIRALAX) Packet 17 g         17 g Oral 2 TIMES DAILY PRN 09/10/24 1240      09/10/24 0527  simethicone (MYLICON) suspension 40 mg         40 mg Oral EVERY 6 HOURS PRN 09/10/24 0528      09/09/24 0900  fentaNYL (DURAGESIC) 25 mcg/hr 72 hr patch 1 patch        Note to Pharmacy: PTA Sig:Place 1 patch onto the skin every 48 hours. Pt taking Every 48 hours     Placed in \"And\" Linked Group    25 mcg  over 72 Hours Transdermal EVERY 48 HOURS 09/08/24 2004 09/09/24 0029  tiZANidine (ZANAFLEX) tablet 2-4 mg         2-4 mg " "Oral EVERY 8 HOURS PRN 09/09/24 0029      09/08/24 1900  bisacodyl (DULCOLAX) EC tablet 5-10 mg        Note to Pharmacy: PTA Sig:Two days prior to exam take two (2) tablets at 4pm. One day prior to exam take two (2) tablets at 4pm      5-10 mg Oral 2 TIMES DAILY 09/08/24 1858      09/08/24 1858  clonazePAM (klonoPIN) tablet 0.5 mg        Note to Pharmacy: PTA Sig:Take 1 tablet (0.5 mg) by mouth 3 times daily as needed for anxiety      0.5 mg Oral 3 TIMES DAILY PRN 09/08/24 1858              Primary Service Contacted with Recommendations? Yes            Acute Inpatient Pain Service [facility]  Hours of pain coverage 24/7   Page via Amcom- Please Page the Pain Team Via Amcom: \"PAIN MANAGEMENT ACUTE INPATIENT/ [facility]\"            "

## 2024-09-13 NOTE — PROGRESS NOTES
Goal Outcome Evaluation: reviewed with pt.     Received alert and oriented x4, in room air.  Complained of pain, given Oxycodone, Tylenol, Flexeril.  Fentanyl patch on the abdomen.  Applied cold packs.  Denies N/V, SOB and chest discomfort.  Baseline numbness on b/l hands.  Surgical dressing - dry and intact, w/ hemovac.  Due IV antibiotic given.   Blood glucose checked.  Ambulated using walker. Independent.   Keep call bell within reach.  No acute changes on this shift.

## 2024-09-13 NOTE — PROGRESS NOTES
[facility]    Medicine Progress Note - Hospitalist Service, GOLD TEAM 18    Date of Admission:  9/8/2024    Assessment & Plan    Kobe Buchanan is a 64 year old male with PMH of HTN, HLD, prior smoking hx, bicuspid AV s/p valve sparing repair with Gelweave graft (2016), NICM (LVEF 30-35%, now resolved), SAVITA, anemia, BPPV, T2DM, GERD, CKD, chronic pain, MDD, THERESA, degenerative joint disease s/p bilateral shoulder replacement 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 Prajapati on 9/11 but p/w worsening sxs and inability to care for himself at home.  Patient is now s/p C3 and C7 dome laminectomy and C4-6 laminoplasty on 9/11 with Dr. Prajapati      # Cervical stenosis   # Cervical radiculopathy  # Hx of C3 and C7 laminectomies and C4-6 laminoplasty with medtronic plates  # Chronic opioid use  - Pt follows with Ortho as an outpatient.    - Was scheduled for cervical procedure on 9/11.    - Began having shocklike shooting pains down his neck to his lower back starting this morning.    - Reports intermittent numbness and tingling down BUEs, denies weakness or sensory changes in BLEs.    - Was seen by Ortho in ED, plans to start Decadron.    - Patient is on chronic opiates with significant opiate tolerance.    - Now s/p C3 and C7 dome laminectomy and C4-6 laminoplasty on 9/11 with Dr. Prajapati   - Extensive PTA pain regimen including: fentanyl patch 25 mcg/h every 48 hours, as needed oxycodone 30 mg bid.   - Ortho primary  - Pain consult, defer for assistance with pain mgt   - PT/OT     # Hx of nonischemic cardiomyopathy  - Per chart hx it is suspected this was stress induced.   - Home meds of coreg 50 mg bid, amlodipine-benazepril 5-20 mg bid.   - Last TTE 7/2023 with recovered LVEF 55-60%, normal LV wall thickness, LV size, LV diastolic function, no WMAs, no significant valvular  "abnormalities.  -TTE obtained this admission, normal EF, no significant change from echocardiogram from July 2023.  - Seen by cardiology this admission, deemed fully optimized for surgery   - Continue carvedilol 50 mg bid with holding parameters  - Okay to resume PTA amlodipine and lisinopril post-op       # Bradycardia - asymptomatic, with chronotropic response. Resolved   - Sinus bradycardia on admission with HR high-50s.   - Appears stable. No chest pain.  Appears chronic while on Coreg.  - monitor  - EKG here showing nsr     # T2DM   Per chart review, with prior Hgb A1c >6.5%, but most recent was 08/2023 with A1c 5.6%. Not on meds at baseline.  - monitor CBGS for now, no insulin or SSI ordered     # CKD stage 3a   Cr baseline 1.1-1.3. Cr currently 1.32.  - monitor     # Chronic pain   On fentanyl patch 50 mcg/h 72 h patch, naproxen 500 mg bid, oxycodone 10 mg q4h prn.  - defer pain mgmt to primary as above     # MDD  # THERESA  On lexapro 30 mg daily and wellbutrin 200 mg bid. Also on clonazepam 0.5 mg tid prn     # HLD -continue home atorvastatin 40 mg nightly  # HTN - home anti-hypertensives as above  # HLD - on atorvastatin 40 mg daily  # SAVITA - does not use a CPAP at home       Diet: Advance Diet as Tolerated: Regular Diet Adult  Snacks/Supplements Adult: Chase; With Meals    DVT Prophylaxis: Pneumatic Compression Devices  Serrano Catheter: Not present  Lines: None     Cardiac Monitoring: None  Code Status: Full Code      Clinically Significant Risk Factors                  # Hypertension: Noted on problem list           # Obesity: Estimated body mass index is 33.58 kg/m  as calculated from the following:    Height as of this encounter: 1.778 m (5' 10\").    Weight as of this encounter: 106.1 kg (234 lb).      # Financial/Environmental Concerns: none        Disposition Plan     Medically Ready for Discharge: Anticipated in 2-4 Days      ADELIA ESCOBEDO MD  Hospitalist Service, GOLD TEAM 18  [facility] " [facility]  Securely message with [employer] (more info)  Text page via [employer] Paging/Directory   See signed in provider for up to date coverage information  ______________________________________________________________________    Interval History   Charts reviewed and patient was examined  Afebrile and hemodynamically stable  RN's notes reviewed, no acute issues      Physical Exam   Vital Signs: Temp: 97.7  F (36.5  C) Temp src: Oral BP: (!) 148/72 Pulse: 73   Resp: 18 SpO2: 95 % O2 Device: None (Room air) Oxygen Delivery: 2 LPM  Weight: 234 lbs 0 oz    General Appearance: Awake, alert and not in distress  Respiratory: Clear breath sounds bilaterally   Cardiovascular: Normal heart sounds.  Systolic murmur, normal rate.  GI: Soft, non tender. Normal bowel sounds   Skin: No bruising or bleeding   Other:Awake, alert and orientated X 3       Medical Decision Making       35   MINUTES SPENT BY ME on the date of service doing chart review, history, exam, documentation & further activities per the note.  MANAGEMENT DISCUSSED with the following over the past 24 hours: patient, bedside RN, care management and orthopedic team        Data

## 2024-09-13 NOTE — PROGRESS NOTES
"Orthopaedic Surgery Progress Note:       Subjective:   Patient seen and evaluated.  Continues to be concerned about his pain medications upon discharge.  Is requesting 30 mg of oxycodone every 3 hours.  He is voiding. + BM.  Cleared Occupational Therapy yesterday    Objective:   BP (!) 148/72 (BP Location: Left arm, Patient Position: Semi-Dawn's, Cuff Size: Adult Regular)   Pulse 73   Temp 97.7  F (36.5  C) (Oral)   Resp 18   Ht 1.778 m (5' 10\")   Wt 106.1 kg (234 lb)   SpO2 95%   BMI 33.58 kg/m    No intake/output data recorded.  Gen: NAD. Resting comfortably in bed  Resp: Breathing comfortably on RA  : Serrano in place    Spine:  Cervical spine:    Dressing clean and dry  Drain in place maintaining suction                      Motor -                     C5: Deltoids R 5/5 and L 5/5 strength                    C6: Biceps R 5/5 and L 5/5 strength                     C7: Triceps R 5/5 and L 5/5 strength                     C8:  R 5/5 and L 5/5 strength                     T1: Dorsal interossei R 4/5 and L 4/5 strength                         Sensation: intact to light touch in C5-T1 grossly but subjectively diminished globally bilaterally         Labs:  Lab Results   Component Value Date    WBC 20.9 (H) 09/11/2024    HGB 16.0 09/12/2024     09/11/2024    INR 1.05 09/11/2024        Assessment & Plan:   Assessment and Plan: Kobe Buchanan is a 64 year old male now s/p C3 and C7 dome laminectomy and C4-6 laminoplasty on 9/11 with Dr. Prajapati.     -- Pain management per pain team  -- Cleared therapy  --Continue to monitor drain, will remove prior to discharge  --X-rays prior to discharge  --Plan to discharge home tomorrow morning     Ortho  Primary  Activity:   - Up with assist until independent. No excessive bending or twisting. No lifting >10 lbs x 6 weeks.   Weight bearing status: WBAT.  Pain management:   - IV lidocaine: discontinue at 8am on POD#2 or earlier if complications " arise.  - Transition to PO narcotics as tolerated. No NSAIDs x 3 months.   -Pain management on consult  Antibiotics: Ancef until drains out  Diet: Begin with clear fluids and progress diet as tolerated.   DVT prophylaxis: SCDs only. No chemical DVT ppx needed.  Imaging: XR Upright c spine PTDC - ordered.  Labs: Hgb POD#1.  Bracing/Splinting: None.  Dressings: Keep dressing clean and dry.  Drains: Document output per shift, will be discontinued at Orthopedic Surgery discretion.  Serrano catheter: Remove POD#1.  Physical Therapy/Occupational Therapy: Eval and treat.  Cultures: none.    Consults: Hospitalist.  Follow-up: Clinic with Dr. Bueno in 6 weeks with repeat x-rays.   Disposition: Pending progress with therapies, pain control on orals, and medical stability, anticipate discharge to home on POD #3-4.    Sahil Bullard,   Spine Fellow

## 2024-09-13 NOTE — PLAN OF CARE
"BP (!) 150/84 (BP Location: Left arm)   Pulse 74   Temp 98  F (36.7  C) (Oral)   Resp 19   Ht 1.778 m (5' 10\")   Wt 106.1 kg (234 lb)   SpO2 96%   BMI 33.58 kg/m    Pt A&Ox4 VSS on RA. Denies CP or SOB. Baseline N/T all extremities   Up ambulating in room   Voiding in bathroom   Pain management- partially managed with Oral PRN Oxycodone, Zanaflex, IV dilaudid x2, NEW Fentanyl patch applied to R upper Abdomen   Drsg CDI   Hemovac removed today   Standing XRAYS completed   DM2- Blood glucose checks   Pt able to make needs known, call light within reach   Conintue POC- discharge possible tomorrow 9/14  PT SET UP RIDE FOR 1PM to home 9/14    Problem: Adult Inpatient Plan of Care  Goal: Plan of Care Review  Description: The Plan of Care Review/Shift note should be completed every shift.  The Outcome Evaluation is a brief statement about your assessment that the patient is improving, declining, or no change.  This information will be displayed automatically on your shift  note.  Outcome: Progressing  Flowsheets (Taken 9/13/2024 0949)  Outcome Evaluation: Pt up ambulating in room. VSS on RA. Adquate intake and output. Voding in bathroom. Pain management  Plan of Care Reviewed With: patient  Overall Patient Progress: improving  Goal: Patient-Specific Goal (Individualized)  Description: You can add care plan individualizations to a care plan. Examples of Individualization might be:  \"Parent requests to be called daily at 9am for status\", \"I have a hard time hearing out of my right ear\", or \"Do not touch me to wake me up as it startles  me\".  Outcome: Progressing  Goal: Absence of Hospital-Acquired Illness or Injury  Outcome: Progressing  Goal: Optimal Comfort and Wellbeing  Outcome: Progressing  Goal: Readiness for Transition of Care  Outcome: Progressing     Problem: Spinal Surgery  Goal: Absence of Bleeding  Outcome: Progressing  Goal: Effective Bowel Elimination  Outcome: Progressing  Goal: Optimal Functional " Ability  Outcome: Progressing  Goal: Absence of Infection Signs and Symptoms  Outcome: Progressing  Goal: Optimal Neurologic Function  Outcome: Progressing  Goal: Anesthesia/Sedation Recovery  Outcome: Progressing  Goal: Optimal Pain Control and Function  Outcome: Progressing  Goal: Nausea and Vomiting Relief  Outcome: Progressing  Goal: Effective Urinary Elimination  Outcome: Progressing  Goal: Effective Oxygenation and Ventilation  Outcome: Progressing   Goal Outcome Evaluation:      Plan of Care Reviewed With: patient    Overall Patient Progress: improvingOverall Patient Progress: improving    Outcome Evaluation: Pt up ambulating in room. VSS on RA. Adquate intake and output. Voding in bathroom. Pain management

## 2024-09-13 NOTE — PROGRESS NOTES
Care Management Follow Up    Length of Stay (days): 5    Expected Discharge Date:       Concerns to be Addressed: no discharge needs identified     Patient plan of care discussed at interdisciplinary rounds: Yes    Anticipated Discharge Disposition: Home     Anticipated Discharge Services: None  Anticipated Discharge DME: None    Patient/family educated on Medicare website which has current facility and service quality ratings:    Education Provided on the Discharge Plan:    Patient/Family in Agreement with the Plan: yes    Referrals Placed by CM/SW:    Private pay costs discussed: Not applicable    Discussed  Partnership in Safe Discharge Planning  document with patient/family: No     Handoff Completed: Not at this time    Additional Information:  As discussed in IDT rounds, plan for pt to discharge tomorrow. No discharge needs anticipated.    Next Steps:   At this time, there are no further discernible needs requiring care coordination team involvement.  Please reach out to RNCC/SW if needs arise prior to discharge.    Margarita Orellana, MSN, APRN, [facility]   5MS [phone]  Nurse Coordinator  Securely message with Terri, 5 Med Surg Vocera

## 2024-09-13 NOTE — PLAN OF CARE
Goal Outcome Evaluation:    Pt A&Ox4   Denies SOB, CP and new N/T  Expresses frustration with pain management and pain control. Pain rated between 2-6, pt appears comfortable   Inc BM x2 today. Patient insistent on taking ducolax despite loose stools (education given to patient on stool softeners)   Up ind In halls, cleared by PT/OT  Pt requested PIV in right AC be removed   Plan: Patient would like to discharge early on Saturday if possible.

## 2024-09-14 VITALS
BODY MASS INDEX: 33.5 KG/M2 | RESPIRATION RATE: 18 BRPM | WEIGHT: 234 LBS | TEMPERATURE: 98.5 F | OXYGEN SATURATION: 98 % | HEART RATE: 92 BPM | HEIGHT: 70 IN | DIASTOLIC BLOOD PRESSURE: 73 MMHG | SYSTOLIC BLOOD PRESSURE: 142 MMHG

## 2024-09-14 LAB — GLUCOSE BLDC GLUCOMTR-MCNC: 223 MG/DL (ref 70–99)

## 2024-09-14 PROCEDURE — 250N000013 HC RX MED GY IP 250 OP 250 PS 637: Performed by: PEDIATRICS

## 2024-09-14 PROCEDURE — 250N000013 HC RX MED GY IP 250 OP 250 PS 637: Performed by: INTERNAL MEDICINE

## 2024-09-14 PROCEDURE — 250N000013 HC RX MED GY IP 250 OP 250 PS 637: Performed by: STUDENT IN AN ORGANIZED HEALTH CARE EDUCATION/TRAINING PROGRAM

## 2024-09-14 PROCEDURE — 99232 SBSQ HOSP IP/OBS MODERATE 35: CPT | Performed by: INTERNAL MEDICINE

## 2024-09-14 RX ADMIN — ACETAMINOPHEN 975 MG: 325 TABLET ORAL at 10:11

## 2024-09-14 RX ADMIN — BUPROPION HYDROCHLORIDE 200 MG: 200 TABLET, EXTENDED RELEASE ORAL at 06:52

## 2024-09-14 RX ADMIN — OXYCODONE HYDROCHLORIDE 30 MG: 10 TABLET ORAL at 08:19

## 2024-09-14 RX ADMIN — OXYCODONE HYDROCHLORIDE 30 MG: 10 TABLET ORAL at 01:58

## 2024-09-14 RX ADMIN — AMLODIPINE BESYLATE 5 MG: 5 TABLET ORAL at 06:52

## 2024-09-14 RX ADMIN — ACETAMINOPHEN 975 MG: 325 TABLET ORAL at 01:58

## 2024-09-14 RX ADMIN — ESCITALOPRAM OXALATE 20 MG: 20 TABLET ORAL at 06:50

## 2024-09-14 RX ADMIN — CARVEDILOL 50 MG: 25 TABLET, FILM COATED ORAL at 06:52

## 2024-09-14 RX ADMIN — PANTOPRAZOLE SODIUM 40 MG: 40 TABLET, DELAYED RELEASE ORAL at 10:12

## 2024-09-14 RX ADMIN — LISINOPRIL 20 MG: 20 TABLET ORAL at 06:50

## 2024-09-14 RX ADMIN — CLONAZEPAM 0.5 MG: 0.5 TABLET ORAL at 10:12

## 2024-09-14 ASSESSMENT — ACTIVITIES OF DAILY LIVING (ADL)
ADLS_ACUITY_SCORE: 28

## 2024-09-14 NOTE — PLAN OF CARE
Goal Outcome Evaluation:      Plan of Care Reviewed With: patient    Overall Patient Progress: improvingOverall Patient Progress: improving    Outcome Evaluation: No acute changes overnight. Patient continues to report severe pain levels with little relief from medications; verbalizes frustration with staff, and with differences from home medication regimen. Patient further states that he does not want to discuss pain management with surgeon, desires to go home. Pt has chronically elevated BP, managed with home oral medication, SBP in 160s overnight, asymptomatic.    Patient ambulation status: independent with walker  Patient able to stand for X-ray:Yes  Standing/upright x-ray complete: Yes  Patient using oral analgesics:yes  Voiding spontaneously:yes  Drains discontinued:yes  Incision clean and dry:yes  Bowel status:BM 9/12, passing gas

## 2024-09-14 NOTE — PROGRESS NOTES
Orthopedic Surgery Progress Note 09/14/2024    S: No acute events overnight. Pain controlled at this time. Ready to go home.    O:  Temp: 97.9  F (36.6  C) Temp src: Oral BP: (!) 148/75 Pulse: 86   Resp: 18 SpO2: 98 % O2 Device: None (Room air)      Exam:  Gen: NAD. Resting comfortably in bed  Resp: Breathing comfortably on RA  : Serrano in place     Spine:  Cervical spine:     Dressing clean and dry                    Motor -                     C5: Deltoids R 5/5 and L 5/5 strength                    C6: Biceps R 5/5 and L 5/5 strength                     C7: Triceps R 5/5 and L 5/5 strength                     C8:  R 5/5 and L 5/5 strength                     T1: Dorsal interossei R 4/5 and L 4/5 strength                         Sensation: intact to light touch in C5-T1 grossly but subjectively diminished globally bilaterally    Recent Labs   Lab 09/12/24  0642 09/11/24  0030   WBC  --  20.9*   HGB 16.0 15.2   PLT  --  184         Assessment: Kobe Buchanan is a 64 year old male now s/p C3 and C7 dome laminectomy and C4-6 laminoplasty on 9/11 with Dr. Prajapati.      -- Pain management per pain team recommendations. 30 mg IR Oxycodone ordered by me 28 tabs prescribed based on his needs on 9/12. If changes are made to the prescription, an additional prior authorization needs to be completed.  -- Cleared therapy.   --X-rays completed, OT cleared.   --Plan to discharge home today     Ortho Mehul) Primary  Activity:   - Up with assist until independent. No excessive bending or twisting. No lifting >10 lbs x 6 weeks.   Weight bearing status: WBAT.  Pain management:   - Transition to PO narcotics as tolerated. No NSAIDs x 3 months.   -Pain management on consult  Antibiotics: Ancef until drains out  Diet: Begin with clear fluids and progress diet as tolerated.   DVT prophylaxis: SCDs only. No chemical DVT ppx needed.  Imaging: XR Upright c spine PTDC - completed.  Bracing/Splinting: None.  Dressings: Keep  dressing clean and dry.  Drains: Removed 9/13  Physical Therapy/Occupational Therapy: Eval and treat.  Cultures: none.    Consults: Hospitalist.  Follow-up: Clinic with Dr. Bueno in 6 weeks with repeat x-rays.   Disposition: Pending progress with therapies, pain control on orals, and medical stability, anticipate discharge to home on POD #3-4.    Orthopedic surgery staff for this patient is Dr. Prajapati. Discussed.    --  Jeffrey Camp MD  Orthopedic Surgery PGY-4

## 2024-09-14 NOTE — PROVIDER NOTIFICATION
09/13/24 2353   Vital Signs   BP (!) 162/76     Notified on call physician of SBP >140 per orders, asymptomatic. Per Dr. Currie, continue to monitor and notify if symptomatic.

## 2024-09-14 NOTE — PROGRESS NOTES
Pt. discharged at 1140 am  to home, and left with personal belongings. Pt. received complete discharge paperwork and discharge medications as filled by discharge pharmacy. Pt received and signed for the narcotic medication oxycodone. Pt. was given times of last dose for all discharge medications in writing on discharge medication sheets. Discharge teaching included oxycodone medication, pain management, activity restrictions, dressing changes, and signs and symptoms of infection. Dressing supplies sent home, no, not needed. Pt. to follow up with surgeon team  in 6 weeks. Pt. had no further questions at the time of discharge and no unmet needs were identified.     Patient spinal incision dressing was changed with MD approval. Sterile technique was maintained, 4x4 gauze was applied, and covered with tegaderm.

## 2024-09-16 ENCOUNTER — PATIENT OUTREACH (OUTPATIENT)
Dept: CARE COORDINATION | Facility: CLINIC | Age: 64
End: 2024-09-16
Payer: COMMERCIAL

## 2024-09-16 LAB
ATRIAL RATE - MUSE: 74 BPM
DIASTOLIC BLOOD PRESSURE - MUSE: NORMAL MMHG
INTERPRETATION ECG - MUSE: NORMAL
P AXIS - MUSE: 43 DEGREES
PR INTERVAL - MUSE: 186 MS
QRS DURATION - MUSE: 72 MS
QT - MUSE: 398 MS
QTC - MUSE: 441 MS
R AXIS - MUSE: 1 DEGREES
SYSTOLIC BLOOD PRESSURE - MUSE: NORMAL MMHG
T AXIS - MUSE: 67 DEGREES
VENTRICULAR RATE- MUSE: 74 BPM

## 2024-09-16 ASSESSMENT — ACTIVITIES OF DAILY LIVING (ADL): DEPENDENT_IADLS:: INDEPENDENT

## 2024-09-16 NOTE — DISCHARGE SUMMARY
ORTHOPAEDIC DISCHARGE SUMMARY     Date of Admission: 9/8/2024  Date of Discharge: 9/14/2024 12:16 PM  Disposition: Home  Staff Physician: No att. providers found  Primary Care Provider: Demi Hardin    DISCHARGE DIAGNOSIS:  Cervical radiculopathy [M54.12]  Cervical stenosis of spinal canal [M48.02]    PROCEDURES: Procedure(s):  Cervical 3 and 7 dome laminectomy and 4-6 laminoplasty with medtronic plates  on 9/8/2024 - 9/11/2024    HOSPITAL COURSE:    Surgery was uncomplicated. Kobe Buchanan has done well post-operatively. Medicine was consulted post operatively to aid in management of medical comorbidities. See final recommendations below. The patient received routine nursing cares and is medically stable. Vital signs are stable. The patient is tolerating a regular diet without GI distress/nausea or vomiting. Voiding spontaneously. All PT/OT goals have been met for safe mobility. Pain is now controlled on oral medications which will be available on discharge. Stool softeners have been used while taking pain medications to help prevent constipation. Kobe Buchanan is deemed medically safe to discharge.     Antibiotics:  Ancef given until drains out  DVT prophylaxis:  Ambulatory with SCD's  PT Progress:  Has met PT/OT goals for safe mobility.   Pain Meds:  Weaned off all IV pain meds by discharge.  Inpatient Events: No significant events or complications.     Discharge orders and instructions as below.    FOLLOWUP:    Future Appointments   Date Time Provider Department Center   10/28/2024  8:15 AM Jonathan Prajapati MD [facility]       Appointments on [facility]. Call [phone] if you haven't heard regarding these appointments within 7 days of discharge.    PLANNED DISCHARGE ORDERS:     DVT Prophylaxis: Ambulatory      Discharge Medication List as of 9/14/2024 11:19 AM        START taking these medications    Details   acetaminophen (TYLENOL) 325 MG  tablet Take 2 tablets (650 mg) by mouth every 4 hours as needed for other (For optimal non-opioid multimodal pain management to improve pain control.)., OTC      bisacodyl (DULCOLAX) 10 MG suppository Place 1 suppository (10 mg) rectally daily as needed for constipation (Use if magnesium hydroxide (MILK of MAGNESIA) is not effective after 24 hours. May discontinue if patient having bowel movement.)., OTC      naloxone (NARCAN) 4 MG/0.1ML nasal spray Spray 1 spray (4 mg) into one nostril alternating nostrils as needed for opioid reversal. every 2-3 minutes until assistance arrives, Disp-2 each, R-0, E-Prescribe           CONTINUE these medications which have CHANGED    Details   oxyCODONE IR (ROXICODONE) 30 MG tablet Take 1 tablet (30 mg) by mouth every 6 hours as needed for severe pain., Disp-28 tablet, R-0, E-Prescribe      tiZANidine (ZANAFLEX) 2 MG tablet Take 1-2 tablets (2-4 mg) by mouth every 8 hours as needed for muscle spasms., Disp-25 tablet, R-0, E-Prescribe           CONTINUE these medications which have NOT CHANGED    Details   amLODIPine-benazepril (LOTREL) 5-20 MG capsule Take 1 capsule by mouth 2 times daily, Disp-180 capsule, R-3, E-Prescribe      atorvastatin (LIPITOR) 40 MG tablet Take 1 tablet (40 mg) by mouth daily, Disp-90 tablet, R-3, E-PrescribeHold for 1-2 weeks.      buPROPion (WELLBUTRIN SR) 200 MG 12 hr tablet TAKE 1 TABLET BY MOUTH 2 TIMES DAILY., Disp-180 tablet, R-0, E-Prescribe      carvedilol (COREG) 25 MG tablet Take 2 tablets (50 mg) by mouth 2 times daily (with meals), Disp-360 tablet, R-3, E-Prescribe      clonazePAM (KLONOPIN) 0.5 MG tablet Take 1 tablet (0.5 mg) by mouth 3 times daily as needed for anxiety, Disp-90 tablet, R-0, E-Prescribe      cyclobenzaprine (FLEXERIL) 10 MG tablet Take 1 tablet (10 mg) by mouth 3 times daily as needed for muscle spasms, Disp-90 tablet, R-0, E-Prescribe      diazepam (VALIUM) 5 MG tablet Take one before MRI/MRA procedure about 30 minutes  before, Disp-2 tablet, R-0, E-Prescribe      !! docusate sodium (COLACE) 100 MG capsule Take 100 mg by mouth every evening., Historical      !! docusate sodium (COLACE) 100 MG capsule Take 200 mg by mouth every morning., Historical      escitalopram (LEXAPRO) 20 MG tablet Take 1.5 tablets (30 mg) by mouth daily, Disp-135 tablet, R-3, E-Prescribe      Fe Heme Polypeptide-folic acid (PROFERRIN-FORTE) 12-1 MG TABS Take 1 tablet by mouth 2 times daily, Disp-90 tablet,R-0, E-Prescribe      fentaNYL (DURAGESIC) 25 mcg/hr 72 hr patch Place 1 patch onto the skin every 48 hours. Pt taking Every 48 hours, Historical      pantoprazole (PROTONIX) 40 MG EC tablet Take 1 tablet (40 mg) by mouth daily as needed for heartburn,, Disp-90 tablet, R-0, E-Prescribe      polyethylene glycol (GOLYTELY) 236 g suspension Take as directed. Two days before your exam fill the first container with water. Cover and shake until mixed well. At 5:00pm drink one 8oz glass every 10-15 minutes until half (1/2) of the first container is empty. Store the remainder in the refrigerator . One day before your exam at 5:00pm drink the second half of the first container until it is gone. Before you go to bed mix the second container with water and put in refrigerator. Six hours before your check in time drink one 8oz glass every 10-15 angelic nichol until half of container is empty. Discard the remainder of solution., Disp-8000 mL, R-0, E-PrescribePharmacy may substitute for equivalent. Not a duplicate. Needs two containers for extended bowel prep      propranolol (INDERAL) 10 MG tablet Take 1 tablet (10 mg) by mouth 3 times daily as needed (panic attack), Disp-90 tablet, R-3, E-Prescribe      !! senna-docusate (SENOKOT-S/PERICOLACE) 8.6-50 MG tablet Take 2 tablets by mouth every morning., Historical      !! senna-docusate (SENOKOT-S/PERICOLACE) 8.6-50 MG tablet Take 3 tablets by mouth every evening., Historical      sucralfate (CARAFATE) 1 GM tablet Take 1 tablet  "(1 g) by mouth 2 times daily as needed (Reflux), Disp-180 tablet, R-3, E-Prescribe      fluocinonide (LIDEX) 0.05 % external ointment Apply twice daily to itchy skin nodules for 1-2 weeks at a time.Disp-30 g, [id number]      mometasone (ELOCON) 0.1 % external ointment Apply topically nightly as needed (rash on arms)Disp-30 g, [id number]      tacrolimus (PROTOPIC) 0.1 % external ointment Apply topically as needed (rash on arms) Apply to affected areas on body.Disp-60 g, [id number]      triamcinolone (KENALOG) 0.1 % external ointment Apply topically 2 times daily To affected areas of rash. Please avoid application to the face, groin or armpits as the medication is too strong for these areas.Disp-454 g, [id number]       !! - Potential duplicate medications found. Please discuss with provider.        STOP taking these medications       bisacodyl (DULCOLAX) 5 MG EC tablet Comments:   Reason for Stopping:         naproxen (EC-NAPROSYN) 500 MG EC tablet Comments:   Reason for Stopping:                 Discharge Procedure Orders   Reason for your hospital stay   Order Comments: Cervical decompression and laminoplasty     Brief Discharge Instructions   Order Comments: Post-operative Discharge Instructions:     WOUND CARE:  You have a dressing on your incision which can be worn for up to 7 days, and it can be worn in the shower. After the dressing has been removed, you do not need a dressing. There is \"surgical glue\" directly over the incision. This will fall off on its own, which can take up to 2 weeks. It is okay to shower and wash gently with soap and water. Do not soak in the bath. No pools, hot tubs, or lakes for 6 weeks.      After surgery, you may have a sensitive scar.  When the incision has fully healed, you may massage the scar to decrease sensitivity and help break down scar tissue. Do this up to 4 times per day.     DIET & EXERCISE:  - When you get home, you may resume your normal diet as " tolerated. You may not be very hungry but try to eat small healthy meals to help you heal. Remember to drink plenty of water/fluids to help keep you hydrated.     - You will be seen in the clinic at 6 weeks following surgery. You will not need to attend physical therapy during this time. You can focus on cardiovascular fitness by walking as much as you can tolerate. Avoid bending, lifting, and twisting. Your weight lifting restriction is 10 pounds until your first follow-up appointment.       PAIN MEDICATIONS:  For postoperative pain control, we have prescribed a variety of medications. Tylenol has been prescribed and should be taken as part of the complete pain management regimen. You may also have been prescribed a muscle relaxer to be taken as needed for muscle spasms. Other pain management techniques include icing the surgical area (for 15 minutes on, 15 minutes off) throughout the day to help with inflammation. Avoid NSAIDs (ibuprofen, Aleve, Advil, etc) for the first 12 weeks after surgery, unless approved by your surgeon.     You also received a prescription for a opioid medication.  This should be taken for the first few weeks following surgery.  As pain improves, decrease the amount you take (see tapering plan below). Opioid have numerous side effects including nausea, constipation, and drowsiness. If you experience nausea, this may be relieved by taking the medication with food or a light meal. To avoid constipation, please use an over-the-counter stool softeners and drink lots of water and eat fruits and vegetables. No operating heavy machinery or driving an automobile while on opioid medications.        Tapering opioids: As your pain symptoms improve, focus your efforts on decreasing (tapering) use of opioid medications. The most successful tapering strategy is to first, decrease the number of tablets you take every 4-6 hours to the minimum prescribed. Then, increase the amount of time between  "doses.  For example:  First, taper to   or 1 tablet every 4-6 hours.  Then, taper to   or 1 tablet every 6-8 hours.  Then, taper to   or 1 tablet every 8-10 hours.  Then, taper to   or 1 tablet every 10-12 hours.  Then, taper to   or 1 tablet at bedtime.  The bedtime dose can help with comfort during sleep and is typically the last dose to be discontinued after surgery.     Call the orthopedic clinic at [phone] several business days in advance of when you need a refill. Early refills will not be provided, and you may not take more than what is prescribed. Inform the nurse how much of the medication you are taking and how many tablets you have left.     WHEN TO CALL:  Please call or return if you experience the following:  - Fevers (temperature greater than 100.4 degrees Fahrenheit).  - Pain that is getting worse or does not respond to pain medications.  - Drainage from your wound.  - Increasing redness about the wound.  - Changes in strength or sensation to your arms/legs.   - Any other worrisome symptoms.     You may reach the clinic by dialing [phone].  After hours, you may reach the resident on call by dialing [phone].      When to call - Contact Surgeon Team   Order Comments: You may experience symptoms that require follow-up before your scheduled appointment. Refer to the \"Stoplight Tool\" for instructions on when to contact your Surgeon Team if you are concerned about pain control, blood clots, constipation, or if you are unable to urinate.     When to call - Reach out to Urgent Care   Order Comments: If you are not able to reach your Surgeon Team and you need immediate care, go to the Orthopedic Walk-in Clinic or Urgent Care at your Surgeon's office.  Do NOT go to the Emergency Room unless you have shortness of breath, chest pain, or other signs of a medical emergency.     When to call - Reasons to Call 911   Order Comments: Call 911 immediately if you experience sudden-onset chest pain, arm " weakness/numbness, slurred speech, or shortness of breath     Discharge Instruction - Breathing exercises   Order Comments: Perform breathing exercises using your Incentive Spirometer 10 times per hour while awake for 2 weeks.     Symptoms - Fever Management   Order Comments: A low grade fever can be expected after surgery.  Use acetaminophen (TYLENOL) as needed for fever management.  Contact your Surgeon Team if you have a fever greater than 101.5 F, chills, and/or night sweats.     Symptoms - Constipation management   Order Comments: Constipation (hard, dry bowel movements) is expected after surgery due to the combination of being less active, the anesthetic, and the opioid pain medication.  You can do the following to help reduce constipation:  ~  FLUIDS:  Drink clear liquids (water or Gatorade), or juice (apple/prune).  ~  DIET:  Eat a fiber rich diet.    ~  ACTIVITY:  Get up and move around several times a day.  Increase your activity as you are able.  MEDICATIONS:  Reduce the risk of constipation by starting medications before you are constipated.  You can take Miralax   (1 packet as directed) and/or a stool softener (Senokot 1-2 tablets 1-2 times a day).  If you already have constipation and these medications are not working, you can get magnesium citrate and use as directed.  If you continue to have constipation you can try an over the counter suppository or enema.  Call your Surgeon Team if it has been greater than 3 days since your last bowel movement.     Symptoms - Reduced Urine Output   Order Comments: Changes in the amount of fluids you drank before and after surgery may result in problems urinating.  It is important to stay well-hydrated after surgery and drink plenty of water. If it has been greater than 8 hours since you have urinated despite drinking plenty of water, call your Surgeon Team.     Activity - Exercises to prevent blood clots   Order Comments: Unless otherwise directed by your Surgeon  team, perform the following exercises at least three times per day for the first four weeks after surgery to prevent blood clots in your legs: 1) Point and flex your feet (Ankle Pumps), 2) Move your ankle around in big circles, 3) Wiggle your toes, 4) Walk, even for short distances, several times a day, will help decrease the risk of blood clots.     Order Specific Question Answer Comments   Is discharge order? Yes      Comfort and Pain Management - Pain after Surgery   Order Comments: Pain after surgery is normal and expected.  You will have some amount of pain for several weeks after surgery.  Your pain will improve with time.  There are several things you can do to help reduce your pain including: rest, ice, elevation, and using pain medications as needed. Contact your Surgeon Team if you have pain that persists or worsens after surgery despite rest, ice, elevation, and taking your medication(s) as prescribed. Contact your Surgeon Team if you have new numbness, tingling, or weakness in your operative extremity.     Comfort and Pain Management - Swelling after Surgery   Order Comments: Swelling and/or bruising of the surgical extremity is common and may persist for several months after surgery. In addition to frequent icing and elevation, gentle compressive support with an ACE wrap or tubigrip may help with swelling. Apply compression regularly, removing at least twice daily to perform skin checks. Contact your Surgeon Team if your swelling increases and is NOT associated with an increase in your activity level, or if your swelling increases and is associated with redness and pain.     Comfort and Pain Management - Cold therapy   Order Comments: Ice can be used to control swelling and discomfort after surgery. Place a thin towel over your operative site and apply the ice pack overtop. Leave ice pack in place for 20 minutes, then remove for 20 minutes. Repeat this 20 minutes on/20 minutes off routine as often as  tolerated.     Medication Instructions - Acetaminophen (TYLENOL) Instructions   Order Comments: You were discharged with acetaminophen (TYLENOL) for pain management after surgery. Acetaminophen most effectively manages pain symptoms when it is taken on a schedule without missing doses (every four, six, or eight hours). Your Provider will prescribe a safe daily dose between 3000 - 4000 mg.  Do NOT exceed this daily dose. Most patients use acetaminophen for pain control for the first four weeks after surgery.  You can wean from this medication as your pain decreases.     Medication Instructions - NSAID Instructions   Order Comments: Do not use NSAIDs x 12 weeks. Some common anti-inflammatories include: ibuprofen (ADVIL, MOTRIN), naproxen (ALEVE, NAPROSYN), celecoxib (CELEBREX), meloxicam (MOBIC), ketorolac (TORADOL).     Medication Instructions - Muscle relaxant Medication Instructions   Order Comments: You were discharged with a muscle relaxer medication that can be used in conjunction with acetaminophen (TYLENOL) and the opioid medication to manage pain symptoms. Take the muscle relaxant medication exactly as directed.     Follow Up Care   Order Comments: Follow-up with your Surgeon Team in 6 weeks for wound check.     Medication instructions - No pharmacologic VTE prophylaxis prescribed   Order Comments: Your Surgeon did not prescribe medication for anticoagulation.     Opioid Instructions (Less than 65 years)   Order Comments: You were discharged with an opioid medication (hydromorphone, oxycodone, hydrocodone, or tramadol). This medication should only be taken for breakthrough pain that is not controlled with acetaminophen (TYLENOL). If you rate your pain less than 3 you do not need this medication. Pain rating 0-3: You do not need this medication. Pain rating 4-6: Take 1 tablet every 4-6 hours as needed Pain rating 7-10: Take 2 tablets every 4-6 hours as needed. Do not exceed 6 tablets per day     Medication  Instructions - Opioids - Tapering Instructions   Order Comments: In the first three days following surgery, your symptoms may warrant use of the narcotic pain medication every four to six hours as prescribed. This is normal. As your pain symptoms improve, focus your efforts on decreasing (tapering) use of narcotic medications. The most successful tapering strategy is to first, decrease the number of tablets you take every 4-6 hours to the minimum prescribed. Then, increase the amount of time between doses. For example: First, taper to   or 1 tablet every 4-6 hours. Then, taper to   or 1 tablet every 6-8 hours. Then, taper to   or 1 tablet every 8-10 hours. Then, taper to   or 1 tablet every 10-12 hours. Then, taper to   or 1 tablet at bedtime. The bedtime dose can help with comfort during sleep and is typically the last dose to be discontinued after surgery.     Discharge Instruction - Regular Diet Adult   Order Comments: Return to your pre-surgery diet unless instructed otherwise     Order Specific Question Answer Comments   Is discharge order? Yes          Sahil Prior, DO  Spine Fellow

## 2024-09-16 NOTE — PROGRESS NOTES
Clinic Care Coordination Contact  Transitions of Care Outreach    Chief Complaint   Patient presents with    Clinic Care Coordination - Post Hospital     TCM-Hospital Follow up       Most Recent Admission Date: 9/8/2024   Most Recent Admission Diagnosis: Cervical stenosis of spinal canal - M48.02     Most Recent Discharge Date: 9/14/2024   Most Recent Discharge Diagnosis: Cervical stenosis of spinal canal - M48.02     Transitions of Care Assessment    Discharge Assessment  How are you doing now that you are home?: Pt is doing better  How are your symptoms? (Red Flag symptoms escalate to triage hotline per guidelines): Improved  Do you know how to contact your clinic care team if you have future questions or changes to your health status? : Yes  Does the patient have their discharge instructions? : Yes  Does the patient have questions regarding their discharge instructions? : No  Were you started on any new medications or were there changes to any of your previous medications? : Yes  Does the patient have all of their medications?: Yes  Do you have questions regarding any of your medications? : No  Do you have all of your needed medical supplies or equipment (DME)?  (i.e. oxygen tank, CPAP, cane, etc.): Yes                  Follow up Plan     Discharge Follow-Up  Discharge follow up appointment scheduled in alignment with recommended follow up timeframe or Transitions of Risk Category? (Low = within 30 days; Moderate= within 14 days; High= within 7 days): Yes  Discharge Follow Up Appointment Date: 09/24/24  Discharge Follow Up Appointment Scheduled with?: Primary Care Provider    Future Appointments   Date Time Provider Department Center   9/24/2024 10:20 AM Demi Haridn MD [facility]   10/28/2024  8:15 AM Jonathan Prajapati MD [facility]       Outpatient Plan as outlined on AVS reviewed with patient.       For any urgent concerns, please contact our 24 hour clinic line:   [place]  Clinic: [phone]       OSITO HinkleW

## 2024-09-19 ENCOUNTER — MYC REFILL (OUTPATIENT)
Dept: FAMILY MEDICINE | Facility: CLINIC | Age: 64
End: 2024-09-19
Payer: COMMERCIAL

## 2024-09-19 ENCOUNTER — MYC MEDICAL ADVICE (OUTPATIENT)
Dept: ORTHOPEDICS | Facility: CLINIC | Age: 64
End: 2024-09-19
Payer: COMMERCIAL

## 2024-09-19 DIAGNOSIS — M48.02 CERVICAL STENOSIS OF SPINAL CANAL: ICD-10-CM

## 2024-09-19 RX ORDER — OXYCODONE HYDROCHLORIDE 30 MG/1
30 TABLET ORAL EVERY 6 HOURS PRN
Qty: 28 TABLET | Refills: 0 | Status: CANCELLED | OUTPATIENT
Start: 2024-09-19

## 2024-09-19 RX ORDER — TIZANIDINE 2 MG/1
2-4 TABLET ORAL EVERY 8 HOURS PRN
Qty: 25 TABLET | Refills: 0 | Status: SHIPPED | OUTPATIENT
Start: 2024-09-19

## 2024-09-19 RX ORDER — PREGABALIN 25 MG/1
25 CAPSULE ORAL 2 TIMES DAILY
Qty: 60 CAPSULE | Refills: 0 | Status: SHIPPED | OUTPATIENT
Start: 2024-09-19 | End: 2024-10-03

## 2024-09-19 RX ORDER — OXYCODONE HYDROCHLORIDE 30 MG/1
30 TABLET ORAL EVERY 6 HOURS PRN
Qty: 28 TABLET | Refills: 0 | Status: SHIPPED | OUTPATIENT
Start: 2024-09-19 | End: 2024-09-20

## 2024-09-19 NOTE — TELEPHONE ENCOUNTER
Pt called, inquiring about his tiZANidine (ZANAFLEX) 2 MG tablet.    Pt is wondering if there are any options for his nerve pain, other than tiZANidine (ZANAFLEX) 2 MG tablet.    Pt's concern is his nerve pain.    Pt also submitted a Hub[employer] message.    Pt is also requesting a refill of Oxycodone.     Pt would like a CALL BACK to discuss.

## 2024-09-19 NOTE — TELEPHONE ENCOUNTER
Kobe was prescribed 14 total tablets of oxycodone and recommended to follow wean proposals by inpatient team.   oxycodone 30mg PO Q 6 hours PRN x 2-3 days then to Q 8 hours PRN x 2-3days then return to baseline of 30mg bid PRN.  Reached out to patients pharmacy.    Bishop LUZ Ritter PA-C

## 2024-09-20 RX ORDER — OXYCODONE HYDROCHLORIDE 30 MG/1
TABLET ORAL
Qty: 14 TABLET | Refills: 0 | Status: SHIPPED | OUTPATIENT
Start: 2024-09-20 | End: 2024-09-20

## 2024-09-20 RX ORDER — OXYCODONE HYDROCHLORIDE 30 MG/1
TABLET ORAL
Qty: 14 TABLET | Refills: 0 | Status: SHIPPED | OUTPATIENT
Start: 2024-09-20 | End: 2024-09-26

## 2024-09-23 PROBLEM — M47.12 CERVICAL SPONDYLOSIS WITH MYELOPATHY AND RADICULOPATHY: Status: ACTIVE | Noted: 2018-10-12

## 2024-09-23 PROBLEM — M47.22 CERVICAL SPONDYLOSIS WITH MYELOPATHY AND RADICULOPATHY: Status: ACTIVE | Noted: 2018-10-12

## 2024-09-23 ASSESSMENT — PATIENT HEALTH QUESTIONNAIRE - PHQ9
SUM OF ALL RESPONSES TO PHQ QUESTIONS 1-9: 12
10. IF YOU CHECKED OFF ANY PROBLEMS, HOW DIFFICULT HAVE THESE PROBLEMS MADE IT FOR YOU TO DO YOUR WORK, TAKE CARE OF THINGS AT HOME, OR GET ALONG WITH OTHER PEOPLE: SOMEWHAT DIFFICULT
SUM OF ALL RESPONSES TO PHQ QUESTIONS 1-9: 12

## 2024-09-24 ENCOUNTER — OFFICE VISIT (OUTPATIENT)
Dept: FAMILY MEDICINE | Facility: CLINIC | Age: 64
End: 2024-09-24
Payer: COMMERCIAL

## 2024-09-24 VITALS
TEMPERATURE: 97.1 F | WEIGHT: 226 LBS | RESPIRATION RATE: 20 BRPM | DIASTOLIC BLOOD PRESSURE: 66 MMHG | BODY MASS INDEX: 32.35 KG/M2 | HEIGHT: 70 IN | SYSTOLIC BLOOD PRESSURE: 122 MMHG | OXYGEN SATURATION: 99 % | HEART RATE: 62 BPM

## 2024-09-24 DIAGNOSIS — Z98.890 STATUS POST CORONARY ANGIOGRAM: ICD-10-CM

## 2024-09-24 DIAGNOSIS — Z98.890 HISTORY OF LAMINECTOMY: Primary | ICD-10-CM

## 2024-09-24 DIAGNOSIS — Z23 NEED FOR TDAP VACCINATION: ICD-10-CM

## 2024-09-24 DIAGNOSIS — E66.01 MORBID OBESITY (H): ICD-10-CM

## 2024-09-24 DIAGNOSIS — M48.02 CERVICAL STENOSIS OF SPINAL CANAL: ICD-10-CM

## 2024-09-24 DIAGNOSIS — Z23 ENCOUNTER FOR ADMINISTRATION OF VACCINE: ICD-10-CM

## 2024-09-24 LAB
ERYTHROCYTE [DISTWIDTH] IN BLOOD BY AUTOMATED COUNT: 12.4 % (ref 10–15)
HCT VFR BLD AUTO: 47.5 % (ref 40–53)
HGB BLD-MCNC: 15.3 G/DL (ref 13.3–17.7)
MCH RBC QN AUTO: 28.7 PG (ref 26.5–33)
MCHC RBC AUTO-ENTMCNC: 32.2 G/DL (ref 31.5–36.5)
MCV RBC AUTO: 89 FL (ref 78–100)
PLATELET # BLD AUTO: 196 10E3/UL (ref 150–450)
RBC # BLD AUTO: 5.33 10E6/UL (ref 4.4–5.9)
WBC # BLD AUTO: 8.6 10E3/UL (ref 4–11)

## 2024-09-24 PROCEDURE — 91320 SARSCV2 VAC 30MCG TRS-SUC IM: CPT | Performed by: FAMILY MEDICINE

## 2024-09-24 PROCEDURE — 90480 ADMN SARSCOV2 VAC 1/ONLY CMP: CPT | Performed by: FAMILY MEDICINE

## 2024-09-24 PROCEDURE — 85027 COMPLETE CBC AUTOMATED: CPT | Performed by: FAMILY MEDICINE

## 2024-09-24 PROCEDURE — 99495 TRANSJ CARE MGMT MOD F2F 14D: CPT | Performed by: FAMILY MEDICINE

## 2024-09-24 PROCEDURE — 36415 COLL VENOUS BLD VENIPUNCTURE: CPT | Performed by: FAMILY MEDICINE

## 2024-09-24 RX ORDER — ASPIRIN 81 MG/1
81 TABLET ORAL DAILY
Qty: 90 TABLET | Refills: 3 | Status: SHIPPED | OUTPATIENT
Start: 2024-09-24

## 2024-09-24 NOTE — LETTER
My Depression Action Plan  Name: Kobe Buchanan   Date of Birth 1960  Date: 9/24/2024    My doctor: Demi Hardin   My clinic: [facility] [address]  [place] [address]  [phone]            GREEN    ZONE   Good Control    What it looks like:   Things are going generally well. You have normal ups and downs. You may even feel depressed from time to time, but bad moods usually last less than a day.   What you need to do:  Continue to care for yourself (see self care plan)  Check your depression survival kit and update it as needed  Follow your physician s recommendations including any medication.  Do not stop taking medication unless you consult with your physician first.             YELLOW         ZONE Getting Worse    What it looks like:   Depression is starting to interfere with your life.   It may be hard to get out of bed; you may be starting to isolate yourself from others.  Symptoms of depression are starting to last most all day and this has happened for several days.   You may have suicidal thoughts but they are not constant.   What you need to do:     Call your care team. Your response to treatment will improve if you keep your care team informed of your progress. Yellow periods are signs an adjustment may need to be made.     Continue your self-care.  Just get dressed and ready for the day.  Don't give yourself time to talk yourself out of it.    Talk to someone in your support network.    Open up your Depression Self-Care Plan/Wellness Kit.             RED    ZONE Medical Alert - Get Help    What it looks like:   Depression is seriously interfering with your life.   You may experience these or other symptoms: You can t get out of bed most days, can t work or engage in other necessary activities, you have trouble taking care of basic hygiene, or basic responsibilities, thoughts of suicide or death that will not go away,  self-injurious behavior.     What you need to do:  Call your care team and request a same-day appointment. If they are not available (weekends or after hours) call your local crisis line, emergency room or 911.          Depression Self-Care Plan / Wellness Kit    Many people find that medication and therapy are helpful treatments for managing depression. In addition, making small changes to your everyday life can help to boost your mood and improve your wellbeing. Below are some tips for you to consider. Be sure to talk with your medical provider and/or behavioral health consultant if your symptoms are worsening or not improving.     Sleep   Sleep hygiene  means all of the habits that support good, restful sleep. It includes maintaining a consistent bedtime and wake time, using your bedroom only for sleeping or sex, and keeping the bedroom dark and free of distractions like a computer, smartphone, or television.     Develop a Healthy Routine  Maintain good hygiene. Get out of bed in the morning, make your bed, brush your teeth, take a shower, and get dressed. Don t spend too much time viewing media that makes you feel stressed. Find time to relax each day.    Exercise  Get some form of exercise every day. This will help reduce pain and release endorphins, the  feel good  chemicals in your brain. It can be as simple as just going for a walk or doing some gardening, anything that will get you moving.      Diet  Strive to eat healthy foods, including fruits and vegetables. Drink plenty of water. Avoid excessive sugar, caffeine, alcohol, and other mood-altering substances.     Stay Connected with Others  Stay in touch with friends and family members.    Manage Your Mood  Try deep breathing, massage therapy, biofeedback, or meditation. Take part in fun activities when you can. Try to find something to smile about each day.     Psychotherapy  Be open to working with a therapist if your provider recommends it.      Medication  Be sure to take your medication as prescribed. Most anti-depressants need to be taken every day. It usually takes several weeks for medications to work. Not all medicines work for all people. It is important to follow-up with your provider to make sure you have a treatment plan that is working for you. Do not stop your medication abruptly without first discussing it with your provider.    Crisis Resources   These hotlines are for both adults and children. They and are open 24 hours a day, 7 days a week unless noted otherwise.    National Suicide Prevention Lifeline   988 or 6-575-291-CQOY (0544)    Crisis Text Line    www.crisistextline.org  Text HOME to 401993 from anywhere in the [place], anytime, about any type of crisis. A live, trained crisis counselor will receive the text and respond quickly.  For [facility] crisis numbers, visit the [facility] website at:  https://[place].gov/dhs/people-we-serve/adults/health-care/mental-health/resources/crisis-contacts.jsp

## 2024-09-24 NOTE — PROGRESS NOTES
Prior to immunization administration, verified patients identity using patient s name and date of birth. Please see Immunization Activity for additional information.     Screening Questionnaire for Adult Immunization    Are you sick today?   No   Do you have allergies to medications, food, a vaccine component or latex?   No   Have you ever had a serious reaction after receiving a vaccination?   No   Do you have a long-term health problem with heart, lung, kidney, or metabolic disease (e.g., diabetes), asthma, a blood disorder, no spleen, complement component deficiency, a cochlear implant, or a spinal fluid leak?  Are you on long-term aspirin therapy?   No   Do you have cancer, leukemia, HIV/AIDS, or any other immune system problem?   No   Do you have a parent, brother, or sister with an immune system problem?   No   In the past 3 months, have you taken medications that affect  your immune system, such as prednisone, other steroids, or anticancer drugs; drugs for the treatment of rheumatoid arthritis, Crohn s disease, or psoriasis; or have you had radiation treatments?   No   Have you had a seizure, or a brain or other nervous system problem?   No   During the past year, have you received a transfusion of blood or blood    products, or been given immune (gamma) globulin or antiviral drug?   No   For women: Are you pregnant or is there a chance you could become       pregnant during the next month?   No   Have you received any vaccinations in the past 4 weeks?   No     Immunization questionnaire answers were all negative.      Patient instructed to remain in clinic for 15 minutes afterwards, and to report any adverse reactions.     Screening performed by Sathish Hanson CMA on 9/24/2024 at 10:52 AM.

## 2024-09-24 NOTE — PROGRESS NOTES
"  Assessment & Plan     (Z98.890) History of laminectomy cervical with laminoplasty  (primary encounter diagnosis)  Comment: incision appears within expected healing, and overall pain and ROM are stable  Plan: continue to follow postop guidelines from neurosurgery, has follow up appt. Current incision appears fine.    (M48.02) Cervical stenosis of spinal canal  Comment: improved symptoms now in arms, mainly fingertips issues  Plan: CBC with platelets        No sign of infection     (Z23) Need for Tdap vaccination  Comment: agrees  Plan: administer      (E66.01) Morbid obesity (H)  Comment: patient has lost weight  Plan: hope that with improved pain, more mobility and more exercise    (Z23) Encounter for administration of vaccine  Comment: agree  Plan: COVID-19 12+ (PFIZER), Tdap,         tetanus-diptheria-acell pertussis, (BOOSTRIX)         5-2.5-18.5 LF-MCG/0.5 XENIA injection            (Z98.890) Status post coronary angiogram  Comment: reviewed to restart low dose asa  Plan: aspirin (ASPIRIN LOW DOSE) 81 MG EC tablet                MED REC REQUIRED  Post Medication Reconciliation Status: discharge medications reconciled and changed, per note/orders  BMI  Estimated body mass index is 32.43 kg/m  as calculated from the following:    Height as of this encounter: 1.778 m (5' 10\").    Weight as of this encounter: 102.5 kg (226 lb).   Weight management plan: continue with post op rehab and reassess          No follow-ups on file.    Xu Bullock is a 64 year old, presenting for the following health issues:  Hospital F/U (Cervical stenosis of spinal canal. Feeling a lot better today )    HPI        Hospital Follow-up Visit:    Hospital/Nursing Home/IP Rehab Facility: [facility]  Date of Admission: 9/8/2024   Date of Discharge: 9/14/2024  Reason(s) for Admission: Cervical laminectomy  Was the patient in the ICU or did the patient experience delirium during " "hospitalization?  No  Do you have any other stressors you would like to discuss with your provider? No    Problems taking medications regularly:  None  Medication changes since discharge: None  Problems adhering to non-medication therapy:  None    Summary of hospitalization:  [facility] discharge summary reviewed  Diagnostic Tests/Treatments reviewed.  Follow up needed: surgical appt  Other Healthcare Providers Involved in Patient s Care:         Specialist appointment - pain clinic and neurosurgery, Surgical follow-up appointment - 6 weeks post op, and    Update since discharge: improved. Notices in fingers fine touch seems better with tizanadine, so using that more consistently.      Now much improved with no longer totally numb arms, that has resolved.  Just fingers not yet quite where he's been normal.    Plan of care communicated with patient           Tizanidine: helps with sleep only, doesn't like to take very much  Flexeril seems to help more with muscle relaxer      Wt Readings from Last 5 Encounters:   09/24/24 102.5 kg (226 lb)   09/08/24 106.1 kg (234 lb)   09/03/24 103.9 kg (229 lb)   08/29/24 105.2 kg (232 lb)   08/26/24 103.9 kg (229 lb)             Objective    /66   Pulse 62   Temp 97.1  F (36.2  C) (Oral)   Resp 20   Ht 1.778 m (5' 10\")   Wt 102.5 kg (226 lb)   SpO2 99%   BMI 32.43 kg/m    Body mass index is 32.43 kg/m .  Physical Exam   GENERAL: alert and no distress  NECK: no adenopathy and good ROM, with healing posterior scar, with slight assym  RESP: lungs clear to auscultation - no rales, rhonchi or wheezes  CV: regular rate and rhythm, normal S1 S2, no S3 or S4, no murmur, click or rub, no peripheral edema  MS: no gross musculoskeletal defects noted, no edema            Signed Electronically by: Demi Hardin MD    Answers submitted by the patient for this visit:  Patient Health Questionnaire (Submitted on 9/23/2024)  If you checked off any problems, how " difficult have these problems made it for you to do your work, take care of things at home, or get along with other people?: Somewhat difficult  PHQ9 TOTAL SCORE: 12

## 2024-09-26 ENCOUNTER — MYC MEDICAL ADVICE (OUTPATIENT)
Dept: FAMILY MEDICINE | Facility: CLINIC | Age: 64
End: 2024-09-26

## 2024-09-28 ENCOUNTER — MYC MEDICAL ADVICE (OUTPATIENT)
Dept: ORTHOPEDICS | Facility: CLINIC | Age: 64
End: 2024-09-28
Payer: COMMERCIAL

## 2024-09-28 DIAGNOSIS — M48.02 CERVICAL STENOSIS OF SPINAL CANAL: ICD-10-CM

## 2024-10-03 RX ORDER — PREGABALIN 50 MG/1
50 CAPSULE ORAL 3 TIMES DAILY
Qty: 90 CAPSULE | Refills: 1 | Status: SHIPPED | OUTPATIENT
Start: 2024-10-03 | End: 2024-10-28

## 2024-10-03 NOTE — TELEPHONE ENCOUNTER
I called pt & informed pt that script from Christina MARTINEZ went to pharmacy & pt understood & replied to MY Chart confirming that pt will not go in pool as Christina advised not to.   Call back prn. Carla Albarran RN.

## 2024-10-09 ENCOUNTER — TELEPHONE (OUTPATIENT)
Dept: FAMILY MEDICINE | Facility: CLINIC | Age: 64
End: 2024-10-09
Payer: COMMERCIAL

## 2024-10-09 ENCOUNTER — TRANSFERRED RECORDS (OUTPATIENT)
Dept: HEALTH INFORMATION MANAGEMENT | Facility: CLINIC | Age: 64
End: 2024-10-09
Payer: COMMERCIAL

## 2024-10-09 DIAGNOSIS — Z98.890 HISTORY OF LAMINECTOMY: Primary | ICD-10-CM

## 2024-10-09 RX ORDER — OXYCODONE HYDROCHLORIDE 10 MG/1
10 TABLET ORAL EVERY 6 HOURS PRN
COMMUNITY
Start: 2024-09-29

## 2024-10-09 RX ORDER — NAPROXEN 500 MG/1
500 TABLET ORAL 2 TIMES DAILY WITH MEALS
Qty: 180 TABLET | Refills: 1 | Status: SHIPPED | OUTPATIENT
Start: 2024-10-09 | End: 2024-10-10

## 2024-10-09 NOTE — TELEPHONE ENCOUNTER
"  Medication Question or Refill    Patient stated this medication was discontinued due to surgery. After surgery Patient was prescribed a \"Fancy $600 Aleve\", that insurance does not cover. Patient would like the one that was originally prescribed by PCP, Patient Rep was unable to pend to chart. Please review. Thank you!    What medication are you calling about (include dose and sig)?: Naproxen Sodium    Preferred Pharmacy:     [id number] - [place], [place] - [address]   [address] [place] [address]  Phone: [phone] Fax: [phone]      Controlled Substance Agreement on file:   CSA -- Patient Level:    CSA: None found at the patient level.       Who prescribed the medication?: PCP    Do you need a refill? Yes    When did you use the medication last? Before surgery    Patient offered an appointment? No      "

## 2024-10-10 ENCOUNTER — TELEPHONE (OUTPATIENT)
Dept: FAMILY MEDICINE | Facility: CLINIC | Age: 64
End: 2024-10-10
Payer: COMMERCIAL

## 2024-10-10 DIAGNOSIS — Z98.890 HISTORY OF LAMINECTOMY: Primary | ICD-10-CM

## 2024-10-10 RX ORDER — NAPROXEN 500 MG/1
500 TABLET ORAL 2 TIMES DAILY WITH MEALS
Qty: 180 TABLET | Refills: 1 | Status: SHIPPED | OUTPATIENT
Start: 2024-10-10

## 2024-10-10 NOTE — TELEPHONE ENCOUNTER
Pharmacy called requesting the prescription for the below medication be resent as the Enteric Coated version -- please send if able -- thank you     naproxen (NAPROSYN) 500 MG tablet

## 2024-10-16 DIAGNOSIS — F34.1 DYSTHYMIC DISORDER: ICD-10-CM

## 2024-10-17 RX ORDER — BUPROPION HYDROCHLORIDE 200 MG/1
TABLET, EXTENDED RELEASE ORAL
Qty: 180 TABLET | Refills: 0 | Status: SHIPPED | OUTPATIENT
Start: 2024-10-17

## 2024-10-18 NOTE — PROGRESS NOTES
Prior Authorization **APPROVED**    Authorization Effective Date: 9/12/2024  Authorization Expiration Date: 9/11/2025  Medication: OXYCODONE HCL 30 MG PO TABS  Approved Dose/Quantity: -  Reference #: CoverMyMeds Key: [id number] PA Case ID #: [id number]   Insurance Company: [employer] - Phone [phone] Fax [phone]  Expected CoPay: $ 8.19    CoPay Card Available: No    Foundation Assistance Needed: -  Which Pharmacy is filling the prescription (Not needed for infusion/clinic administered): [facility] [place], [place] - [address]  Pharmacy Notified: Yes  Patient Notified: Yes  Comments:  Previous MME limit exceeded. New PA needed for new MME of 180MME due to current dosing.        Kay Britton [facility]  Discharge Pharmacy Liaison  [facility]  Pronouns: She/Her/Hers    Securely message with Zok[employer], Epic Secure Chat, or [employer]  Phone: [phone]  Fax: [phone]  [email or web address]

## 2024-10-21 DIAGNOSIS — Z98.890 H/O CERVICAL SPINE SURGERY: Primary | ICD-10-CM

## 2024-10-28 ENCOUNTER — ANCILLARY PROCEDURE (OUTPATIENT)
Dept: GENERAL RADIOLOGY | Facility: CLINIC | Age: 64
End: 2024-10-28
Attending: ORTHOPAEDIC SURGERY
Payer: COMMERCIAL

## 2024-10-28 ENCOUNTER — OFFICE VISIT (OUTPATIENT)
Dept: ORTHOPEDICS | Facility: CLINIC | Age: 64
End: 2024-10-28
Payer: COMMERCIAL

## 2024-10-28 DIAGNOSIS — M54.16 LUMBAR RADICULOPATHY: ICD-10-CM

## 2024-10-28 DIAGNOSIS — M54.50 LUMBAR PAIN: Primary | ICD-10-CM

## 2024-10-28 DIAGNOSIS — Z96.611 STATUS POST TOTAL SHOULDER ARTHROPLASTY, RIGHT: ICD-10-CM

## 2024-10-28 DIAGNOSIS — Z98.890 H/O CERVICAL SPINE SURGERY: ICD-10-CM

## 2024-10-28 DIAGNOSIS — M48.02 CERVICAL STENOSIS OF SPINAL CANAL: ICD-10-CM

## 2024-10-28 DIAGNOSIS — Z96.612 STATUS POST TOTAL SHOULDER ARTHROPLASTY, LEFT: ICD-10-CM

## 2024-10-28 PROCEDURE — 72050 X-RAY EXAM NECK SPINE 4/5VWS: CPT | Mod: GC | Performed by: RADIOLOGY

## 2024-10-28 PROCEDURE — 73030 X-RAY EXAM OF SHOULDER: CPT | Mod: GC | Performed by: RADIOLOGY

## 2024-10-28 PROCEDURE — 99024 POSTOP FOLLOW-UP VISIT: CPT | Performed by: ORTHOPAEDIC SURGERY

## 2024-10-28 RX ORDER — PREGABALIN 75 MG/1
75 CAPSULE ORAL 3 TIMES DAILY
Qty: 90 CAPSULE | Refills: 0 | Status: SHIPPED | OUTPATIENT
Start: 2024-10-28

## 2024-10-28 NOTE — NURSING NOTE
Reason For Visit:   Chief Complaint   Patient presents with    RECHECK     Patient returns 6w 5d s/p C3 and 7 dome laminectomy and 4-6 laminoplasty with [employer] plates.  DOS: 9/11/24.       Primary MD: Demi Hardin  Ref. MD: est    Date of surgery: 9/11/24  Type of surgery: C3 and 7 dome laminectomy and 4-6 laminoplasty.      There were no vitals taken for this visit.         Oswestry (CARLYLE) Questionnaire        8/15/2024    12:29 PM   OSWESTRY DISABILITY INDEX   Count 10   Sum 25   Oswestry Score (%) 50 %            Neck Disability Index (NDI) Questionnaire        11/20/2021     1:34 PM   Neck Disability Index (NDI)   Neck Disability Index: Count 9   NDI: Total Score = SUM (points for all 10 findings) 26   Neck Disability in Percent = (Total Score) / 50 * 100 57.78 (%)                   Promis 10 Assessment        11/20/2021    12:45 PM   PROMIS 10   In general, would you say your health is: Fair   In general, would you say your quality of life is: Fair   In general, how would you rate your physical health? Fair   In general, how would you rate your mental health, including your mood and your ability to think? Good   In general, how would you rate your satisfaction with your social activities and relationships? Fair   In general, please rate how well you carry out your usual social activities and roles Fair   To what extent are you able to carry out your everyday physical activities such as walking, climbing stairs, carrying groceries, or moving a chair? Moderately   In the past 7 days, how often have you been bothered by emotional problems such as feeling anxious, depressed, or irritable? Sometimes   In the past 7 days, how would you rate your fatigue on average? Moderate   In the past 7 days, how would you rate your pain on average, where 0 means no pain, and 10 means worst imaginable pain? 5   In general, would you say your health is: 2    In general, would you say your quality of life is: 2    In  general, how would you rate your physical health? 2    In general, how would you rate your mental health, including your mood and your ability to think? 3    In general, how would you rate your satisfaction with your social activities and relationships? 2    In general, please rate how well you carry out your usual social activities and roles. (This includes activities at home, at work and in your community, and responsibilities as a parent, child, spouse, employee, friend, etc.) 2    To what extent are you able to carry out your everyday physical activities such as walking, climbing stairs, carrying groceries, or moving a chair? 3    In the past 7 days, how often have you been bothered by emotional problems such as feeling anxious, depressed, or irritable? 3    In the past 7 days, how would you rate your fatigue on average? 3    In the past 7 days, how would you rate your pain on average, where 0 means no pain, and 10 means worst imaginable pain? 5    Global Mental Health Score 10   Global Physical Health Score 11   PROMIS TOTAL - SUBSCORES 21       Patient-reported                Juan Carlos Lomeli, ATC

## 2024-10-28 NOTE — LETTER
10/28/2024      Kobe Buchanan  [address]  [place] [address]      Dear Colleague,    Thank you for referring your patient, Kobe Buchanan, to the [facility] [place]. Please see a copy of my visit note below.    Spine Surgery Return Clinic Visit    Chief Complaint:   RECHECK (Patient returns 6w 5d s/p C3 and 7 dome laminectomy and 4-6 laminoplasty with Medtronic plates.  DOS: 9/11/24.)    Interval HPI:     Kobe Buchanan is a 64 year old male who presents today for 6 weeks from C3-7 dome laminectomy and C4-6 laminoplasty.    He notes that he is improving with balance is improving. Denies any new symptoms. Denies any drainage from his incision. Back to his pre op narcotic dosage from his pain provider. He also describes back pain on the left side that goes down his legs. Notes wasn't present prior to surgery but more noticeable recently. Would like to have interventions done in his low back.         Past Medical History:     Past Medical History:   Diagnosis Date     Acute systolic heart failure (H) 01/28/2020    Added automatically from request for surgery [id number]     NEMESIO (acute kidney injury) (H) 02/16/2020     Anxiety      Ascending aortic aneurysm (H)      Atrial fibrillation (H) [I48.91] 10/10/2016     Bicuspid aortic valve      BPPV (benign paroxysmal positional vertigo) 07/11/2014     Choledocholithiasis      Chronic narcotic use      Chronic neck pain      Chronic osteoarthritis      CKD (chronic kidney disease) stage 3, GFR 30-59 ml/min (H)      Degenerative joint disease      Depression      Dysthymic disorder      Hx of type 2 diabetes mellitus 03/04/2021     Hyperlipidemia 04/10/2012     Hypertension      Iron deficiency anemia secondary to blood loss (chronic) 08/25/2020     MSSA bacteremia 10/13/2019     Multiple stiff joints      Neck injuries      NSTEMI (non-ST elevated myocardial infarction) (H) 01/29/2020     Obesity 02/09/2015     Pain  in shoulder      Plantar fasciitis      Pre-diabetes      S/P reverse total shoulder arthroplasty, left 07/07/2020     S/P reverse total shoulder arthroplasty, right 08/18/2020     Septic joint of left shoulder region (H) 11/13/2019     Septic joint of right shoulder region (H) 10/13/2019    s/p washout     Skin picking habit      Sleep apnea     does not use cpap     Status post total shoulder arthroplasty, left 03/03/2020    Added automatically from request for surgery [id number]            Past Surgical History:     Past Surgical History:   Procedure Laterality Date     ARTHROPLASTY SHOULDER  04/15/2014    Procedure: Left Total Shoulder Arthroplasty ;  Surgeon: Analilia Aceves MD;  [facility]     ARTHROPLASTY SHOULDER Right 08/26/2014    Procedure: ARTHROPLASTY SHOULDER;  Surgeon: Analilia Aceves MD;  [facility]     ARTHROSCOPY SHOULDER WITH BIOPSY(IES) Left 01/28/2020    Procedure: Left shoulder arthroscopy and biopsy for culture;  Surgeon: Analilia Aceves MD;  [facility]     BYPASS GASTRIC TAVO-EN-Y, LIVER BIOPSY, COMBINED  08/08/2005     COLONOSCOPY  06/30/2014    Procedure: COMBINED COLONOSCOPY, SINGLE BIOPSY/POLYPECTOMY BY BIOPSY;  Surgeon: Chester Patton MD;  [facility]: [facility]     COLONOSCOPY N/A 2/15/2024    Procedure: COLONOSCOPY, WITH POLYPECTOMY;  Surgeon: Power Duran MD;  [facility]     CV CORONARY ANGIOGRAM  01/30/2020    Procedure: CV CORONARY ANGIOGRAM;  Surgeon: Néstor Walls MD;  Location:  [facility]     CYSTOSCOPY, BLADDER NECK CUTS, COMBINED N/A 07/18/2016    Procedure: COMBINED CYSTOSCOPY, BLADDER NECK CUTS;  Surgeon: Ritu Leslie MD;  [facility]     ENDOSCOPIC RETROGRADE CHOLANGIOPANCREATOGRAM N/A 11/03/2023    Procedure: ENDOSCOPIC RETROGRADE CHOLANGIOPANCREATOGRAPHY, BILIARY SPHINCTEROTOMY, STONE REMOVAL;  Surgeon: Juarez Sevilla MD;  [place] [facility]     ESOPHAGOSCOPY,  GASTROSCOPY, DUODENOSCOPY (EGD), COMBINED  06/30/2014    Procedure: COMBINED ESOPHAGOSCOPY, GASTROSCOPY, DUODENOSCOPY (EGD), BIOPSY SINGLE OR MULTIPLE;  Surgeon: Chester Patton MD;  [facility]     EXCISE MASS FINGER  06/14/2011    Procedure:EXCISE MASS FINGER; Middle Flexor Cyst; Surgeon:KOBE RUSSELL; [facility]     IR FINE NEEDLE ASPIRATION W ULTRASOUND  11/13/2019     IR PICC PLACEMENT > 5 YRS OF AGE  11/19/2019     LAMINOPLASTY CERVICAL POSTERIOR THREE+ LEVELS N/A 9/11/2024    Procedure: Cervical 3 and 7 dome laminectomy and 4-6 laminoplasty with medtronic plates;  Surgeon: Jonathan Prajapati MD;  [facility]     LAPAROSCOPIC CHOLECYSTECTOMY N/A 11/03/2023    Procedure: CHOLECYSTECTOMY, LAPAROSCOPIC;  Surgeon: Kobe Mathis MD;  Location: [facility] [place] [facility]     LASER HOLMIUM LITHOTRIPSY BLADDER N/A 10/15/2014    Procedure: LASER HOLMIUM LITHOTRIPSY BLADDER;  Surgeon: Sahil Taveras MD;  [facility]     LASER KTP GREEN LIGHT PHOTOSELECTIVE VAPORIZATION PROSTATE  01/23/2014    Procedure: LASER KTP GREEN LIGHT PHOTOSELECTIVE VAPORIZATION PROSTATE;  Greenlight Photovaporization Of Prostate  ;  Surgeon: Sahil Taveras MD;  [facility]     OTHER SURGICAL HISTORY  10/04/2016    REPAIR ANEURYSM ASCENDING AORTA     OTHER SURGICAL HISTORY Right 09/23/2019    IRRIGATION AND DEBRIDEMENT UPPER EXTREMITYshoulder     RELEASE TRIGGER FINGER Right 03/31/2017    Procedure: RELEASE TRIGGER FINGER;  Surgeon: Juan Carlos Blunt MD;  [facility]     REMOVE ANTIBIOTIC CEMENT BEADS / SPACER SHOULDER Left 05/08/2020    Procedure: Left shoulder removal of spacer;  Surgeon: Analilia Aceves MD;  [facility]     REMOVE ANTIBIOTIC CEMENT BEADS / SPACER SHOULDER Right 08/18/2020    Procedure: Removal of right shoulder antibiotic spacer;  Surgeon: Analilia Aceves MD;  [facility]     REMOVE HARDWARE ARTHROPLASTY SHOULDER. I&D, PLACE ANTIBIOTIC  CEMENT BE Left 11/15/2019    Procedure: Explantation of left total shoulder arthroplasty, irrigation and debridement, and placement of antibiotic spacer;  Surgeon: Analilia Aceves MD;  [facility]     REPAIR ANEURYSM ASCENDING AORTA N/A 10/04/2016    Procedure: REPAIR ANEURYSM ASCENDING AORTA;  Surgeon: Mckenzie Townsend MD;  [facility]     REVERSE ARTHROPLASTY SHOULDER Right 2020    Procedure: and conversion to reverse total shoulder arthroplasty;  Surgeon: Analilia Aceves MD;  [facility]     SURGICAL EXPOSURE, LAPAROSCOPIC, W/WOUND CLOSURE, FOR ERCP, BY GENERAL SURGERY N/A 2023    Procedure: SURGICAL EXPOSURE FOR ENDOSCOPIC RETROGRADE CHOLANGIOPANCREATOGRAPHY;  Surgeon: Kobe Mathis MD;  [facility]     [facility]              Social History:     Social History     Tobacco Use     Smoking status: Former     Current packs/day: 0.00     Average packs/day: 0.5 packs/day for 6.6 years (3.3 ttl pk-yrs)     Types: Cigarettes     Start date: 1977     Quit date: 1983     Years since quittin.1     Passive exposure: Never (per pt)     Smokeless tobacco: Never   Substance Use Topics     Alcohol use: No     Alcohol/week: 0.0 standard drinks of alcohol            Family History:     Family History   Problem Relation Age of Onset     Arthritis Other      Gastrointestinal Disease Other      Cardiovascular Father         aortic aneurysm     Arrhythmia Father      Nephrolithiasis Father      Sleep Apnea Father      Anxiety Disorder Father      Depression Father      Hypertension Father      Obesity Father      Hyperlipidemia Father      Coronary Artery Disease Father      Low Back Problems Father      Spine Problems Father      Cardiovascular Father      Other - See Comments Father         low back problems, spine problems     Anxiety Disorder Mother      Hypertension Mother      Osteoporosis Mother      Obesity Mother      Hyperlipidemia Mother      Low  Back Problems Mother      Macular Degeneration Mother      Cataracts Mother      Other - See Comments Mother         low back problems     Anxiety Disorder Sister      Hypertension Sister      Osteoporosis Sister      Obesity Sister      Hyperlipidemia Sister      Low Back Problems Sister      Spine Problems Sister      Anxiety Disorder Sister      Depression Sister      Hypertension Sister      Osteoporosis Sister      Obesity Sister      Hyperlipidemia Sister      Low Back Problems Sister      Anxiety Disorder Sister      Hyperlipidemia Sister      Hypertension Sister      Obesity Sister      Other - See Comments Sister         low back problems, spine problems     Osteoporosis Sister      Anxiety Disorder Sister      Depression Sister      Hyperlipidemia Sister      Hypertension Sister      Other - See Comments Sister         low back problems     Obesity Sister      Osteoporosis Sister      Melanoma No family hx of      Skin Cancer No family hx of      Glaucoma No family hx of             Allergies:     Allergies   Allergen Reactions     Ciprofloxacin      History of aortic aneurysms     Gabapentin Dizziness     Tape [Adhesive Tape] Blisters     Blistering - please use paper tape            Medications:     Current Outpatient Medications   Medication Sig Dispense Refill     acetaminophen (TYLENOL) 325 MG tablet Take 2 tablets (650 mg) by mouth every 4 hours as needed for other (For optimal non-opioid multimodal pain management to improve pain control.).       amLODIPine-benazepril (LOTREL) 5-20 MG capsule Take 1 capsule by mouth 2 times daily 180 capsule 3     aspirin (ASPIRIN LOW DOSE) 81 MG EC tablet Take 1 tablet (81 mg) by mouth daily. 90 tablet 3     atorvastatin (LIPITOR) 40 MG tablet Take 1 tablet (40 mg) by mouth daily (Patient taking differently: Take 40 mg by mouth every evening.) 90 tablet 3     bisacodyl (DULCOLAX) 10 MG suppository Place 1 suppository (10 mg) rectally daily as needed for  constipation (Use if magnesium hydroxide (MILK of MAGNESIA) is not effective after 24 hours. May discontinue if patient having bowel movement.).       buPROPion (WELLBUTRIN SR) 200 MG 12 hr tablet TAKE 1 TABLET BY MOUTH 2 TIMES DAILY 180 tablet 0     carvedilol (COREG) 25 MG tablet Take 2 tablets (50 mg) by mouth 2 times daily (with meals) 360 tablet 3     clonazePAM (KLONOPIN) 0.5 MG tablet Take 1 tablet (0.5 mg) by mouth 3 times daily as needed for anxiety. 90 tablet 0     cyclobenzaprine (FLEXERIL) 10 MG tablet Take 1 tablet (10 mg) by mouth 3 times daily as needed for muscle spasms 90 tablet 0     diazepam (VALIUM) 5 MG tablet Take one before MRI/MRA procedure about 30 minutes before 2 tablet 0     docusate sodium (COLACE) 100 MG capsule Take 100 mg by mouth every evening.       docusate sodium (COLACE) 100 MG capsule Take 200 mg by mouth every morning.       escitalopram (LEXAPRO) 20 MG tablet Take 1.5 tablets (30 mg) by mouth daily (Patient taking differently: Take 20 mg by mouth every morning.) 135 tablet 3     Fe Heme Polypeptide-folic acid (PROFERRIN-FORTE) 12-1 MG TABS Take 1 tablet by mouth 2 times daily 90 tablet 0     fentaNYL (DURAGESIC) 25 mcg/hr 72 hr patch Place 1 patch onto the skin every 48 hours. Pt taking Every 48 hours       fluocinonide (LIDEX) 0.05 % external ointment Apply twice daily to itchy skin nodules for 1-2 weeks at a time. 30 g 3     mometasone (ELOCON) 0.1 % external ointment Apply topically nightly as needed (rash on arms) 30 g 3     naloxone (NARCAN) 4 MG/0.1ML nasal spray Spray 1 spray (4 mg) into one nostril alternating nostrils as needed for opioid reversal. every 2-3 minutes until assistance arrives 2 each 0     naproxen (EC-NAPROSYN) 500 MG EC tablet Take 1 tablet (500 mg) by mouth 2 times daily (with meals). 180 tablet 1     oxyCODONE IR (ROXICODONE) 10 MG tablet Take 10 mg by mouth every 6 hours as needed for moderate to severe pain.       pantoprazole (PROTONIX) 40 MG EC  tablet Take 1 tablet (40 mg) by mouth daily as needed for heartburn, 90 tablet 0     polyethylene glycol (GOLYTELY) 236 g suspension Take as directed. Two days before your exam fill the first container with water. Cover and shake until mixed well. At 5:00pm drink one 8oz glass every 10-15 minutes until half (1/2) of the first container is empty. Store the remainder in the refrigerator. One day before your exam at 5:00pm drink the second half of the first container until it is gone. Before you go to bed mix the second container with water and put in refrigerator. Six hours before your check in time drink one 8oz glass every 10-15 minutes until half of container is empty. Discard the remainder of solution. 8000 mL 0     pregabalin (LYRICA) 50 MG capsule Take 1 capsule (50 mg) by mouth 3 times daily. 90 capsule 1     propranolol (INDERAL) 10 MG tablet Take 1 tablet (10 mg) by mouth 3 times daily as needed (panic attack) 90 tablet 3     senna-docusate (SENOKOT-S/PERICOLACE) 8.6-50 MG tablet Take 2 tablets by mouth every morning.       senna-docusate (SENOKOT-S/PERICOLACE) 8.6-50 MG tablet Take 3 tablets by mouth every evening.       sucralfate (CARAFATE) 1 GM tablet Take 1 tablet (1 g) by mouth 2 times daily as needed (Reflux) (Patient taking differently: as needed. Take 1 tablet (1 g) by mouth 2 times daily as needed (Reflux)) 180 tablet 3     tacrolimus (PROTOPIC) 0.1 % external ointment Apply topically as needed (rash on arms) Apply to affected areas on body. 60 g 1     tiZANidine (ZANAFLEX) 2 MG tablet Take 1-2 tablets (2-4 mg) by mouth every 8 hours as needed for muscle spasms. 25 tablet 0     triamcinolone (KENALOG) 0.1 % external ointment Apply topically 2 times daily To affected areas of rash. Please avoid application to the face, groin or armpits as the medication is too strong for these areas. 454 g 0     No current facility-administered medications for this visit.     Facility-Administered Medications Ordered  in Other Visits   Medication Dose Route Frequency Provider Last Rate Last Admin     sodium chloride (PF) 0.9% PF flush 3 mL  3 mL Intracatheter Q8H HONEY Scruggs MD   3 mL at 07/24/23 0745             Physical Exam:   Vitals: There were no vitals taken for this visit.  Constitutional: Patient is healthy, well-nourished and appears stated age.  Respiratory: Patient is breathing normally and in no respiratory distress.  Skin: No suspicious rashes or lesions. Incision well healed.   Gait: Non-antalgic gait without use of assistive devices. Able to tandem gait without difficulty.   Neurologic - Sensation intact to light touch bilaterally. Only has percieved numbness to his finger tips.   Musculoskeletal: Strength: 5/5 bilateral Deltoid bicep tricep  strength.          Imaging:   I independently reviewed and interpreted the following imaging at this clinic visit which were also reviewed with patient    XR Cervical 10/28/2024    Stable cervical, Hardware without any retropulsion or fracture    MR lumbar 8/2 to 8/22/2024    L4-5 disc protrusion resulting in central canal stenosis left greater than right lateral recess stenosis and foraminal stenosis on the left side.  This compresses the L5 nerve bilaterally at L4-5     Assessment:     64 year old male with     6 weeks s/p C3-7 dome laminectomy C4-6 laminoplasty with Medtronic plates  Lumbar radiculopathy  L4-5 disc protrusion with lateral recess stenosis and foraminal stenosis    He presents 6 weeks from his laminoplasty in stable condition.  He is experiencing ongoing numbness in his fingertips and radiating pain down the left leg.  He has a disc herniation at L4-5 that results in lateral recess stenosis and foraminal stenosis.  Will plan on starting physical therapy and referring him for a left L4-5 TFESI.  Will also plan on increasing his Lyrica to 75 mg 3 times a day.  Plan to follow-up in clinic in 6 weeks with a telephone visit.     Plan:     Left L4-5  TFESi  Lumbar PT  Follow up virtual telephone visit 6 weeks     Plan formulated with Dr. Prajapati who also saw the patient and reviewed imaging. We used the patient's imaging and models to explain their pathophysiology and treatment options. All the patient's questions were answered to their satisfaction.     Thank you for allowing me to be a part of this patient's care.     Bishop LUZ Ritter PA-C   Orthopedic Spine Surgery    Attending MD (Dr. Jonathan Prajapati) : I personally performed greater than 50% of the effort related to this patient visit and am responsible for the medical decision making and billing in this case.  I met with the patient, reviewed and verified the history and physical exam of the patient and discussed the patient's management with the other clinical providers involved in this patient's care including any involved residents or physicians assistants. I also personally reviewed the imaging and I personally formulated the treatment plan and diagnosis in their entirety.  I reviewed the above note and agree with the documented findings and plan of care, which were communicated to the patient.      Jonathan Prajapati MD      Again, thank you for allowing me to participate in the care of your patient.        Sincerely,        Jonathan Prajapati MD

## 2024-10-28 NOTE — PROGRESS NOTES
Spine Surgery Return Clinic Visit    Chief Complaint:   RECHECK (Patient returns 6w 5d s/p C3 and 7 dome laminectomy and 4-6 laminoplasty with Medtronic plates.  DOS: 9/11/24.)    Interval HPI:     Kobe Buchanan is a 64 year old male who presents today for 6 weeks from C3-7 dome laminectomy and C4-6 laminoplasty.    He notes that he is improving with balance is improving. Denies any new symptoms. Denies any drainage from his incision. Back to his pre op narcotic dosage from his pain provider. He also describes back pain on the left side that goes down his legs. Notes wasn't present prior to surgery but more noticeable recently. Would like to have interventions done in his low back.         Past Medical History:     Past Medical History:   Diagnosis Date    Acute systolic heart failure (H) 01/28/2020    Added automatically from request for surgery [id number]    NEMESIO (acute kidney injury) (H) 02/16/2020    Anxiety     Ascending aortic aneurysm (H)     Atrial fibrillation (H) [I48.91] 10/10/2016    Bicuspid aortic valve     BPPV (benign paroxysmal positional vertigo) 07/11/2014    Choledocholithiasis     Chronic narcotic use     Chronic neck pain     Chronic osteoarthritis     CKD (chronic kidney disease) stage 3, GFR 30-59 ml/min (H)     Degenerative joint disease     Depression     Dysthymic disorder     Hx of type 2 diabetes mellitus 03/04/2021    Hyperlipidemia 04/10/2012    Hypertension     Iron deficiency anemia secondary to blood loss (chronic) 08/25/2020    MSSA bacteremia 10/13/2019    Multiple stiff joints     Neck injuries     NSTEMI (non-ST elevated myocardial infarction) (H) 01/29/2020    Obesity 02/09/2015    Pain in shoulder     Plantar fasciitis     Pre-diabetes     S/P reverse total shoulder arthroplasty, left 07/07/2020    S/P reverse total shoulder arthroplasty, right 08/18/2020    Septic joint of left shoulder region (H) 11/13/2019    Septic joint of right shoulder region (H) 10/13/2019    s/p  washout    Skin picking habit     Sleep apnea     does not use cpap    Status post total shoulder arthroplasty, left 03/03/2020    Added automatically from request for surgery [id number]            Past Surgical History:     Past Surgical History:   Procedure Laterality Date    ARTHROPLASTY SHOULDER  04/15/2014    Procedure: Left Total Shoulder Arthroplasty ;  Surgeon: Analilia Aceves MD;  [facility]    ARTHROPLASTY SHOULDER Right 08/26/2014    Procedure: ARTHROPLASTY SHOULDER;  Surgeon: Analilia Aceves MD;  [facility]    ARTHROSCOPY SHOULDER WITH BIOPSY(IES) Left 01/28/2020    Procedure: Left shoulder arthroscopy and biopsy for culture;  Surgeon: Analilia Aceves MD;  [facility]    BYPASS GASTRIC TAVO-EN-Y, LIVER BIOPSY, COMBINED  08/08/2005    COLONOSCOPY  06/30/2014    Procedure: COMBINED COLONOSCOPY, SINGLE BIOPSY/POLYPECTOMY BY BIOPSY;  Surgeon: Chester Patton MD;  [facility]    COLONOSCOPY N/A 2/15/2024    Procedure: COLONOSCOPY, WITH POLYPECTOMY;  Surgeon: Power Duran MD;  [facility]    CV CORONARY ANGIOGRAM  01/30/2020    Procedure: CV CORONARY ANGIOGRAM;  Surgeon: Néstor Walls MD;  [facility]    CYSTOSCOPY, BLADDER NECK CUTS, COMBINED N/A 07/18/2016    Procedure: COMBINED CYSTOSCOPY, BLADDER NECK CUTS;  Surgeon: Ritu Leslie MD;  [facility]    ENDOSCOPIC RETROGRADE CHOLANGIOPANCREATOGRAM N/A 11/03/2023    Procedure: ENDOSCOPIC RETROGRADE CHOLANGIOPANCREATOGRAPHY, BILIARY SPHINCTEROTOMY, STONE REMOVAL;  Surgeon: Juarez Sevilla MD;  Location: [facility] OR    ESOPHAGOSCOPY, GASTROSCOPY, DUODENOSCOPY (EGD), COMBINED  06/30/2014    Procedure: COMBINED ESOPHAGOSCOPY, GASTROSCOPY, DUODENOSCOPY (EGD), BIOPSY SINGLE OR MULTIPLE;  Surgeon: Chester Patton MD;  [facility]    EXCISE MASS FINGER  06/14/2011    Procedure:EXCISE MASS FINGER; Middle Flexor Cyst; Surgeon:YVONNE  KOBE CHI; [facility]    IR FINE NEEDLE ASPIRATION W ULTRASOUND  11/13/2019    IR PICC PLACEMENT > 5 YRS OF AGE  11/19/2019    LAMINOPLASTY CERVICAL POSTERIOR THREE+ LEVELS N/A 9/11/2024    Procedure: Cervical 3 and 7 dome laminectomy and 4-6 laminoplasty with medtronic plates;  Surgeon: Jonathan Prajapati MD;  [facility]    LAPAROSCOPIC CHOLECYSTECTOMY N/A 11/03/2023    Procedure: CHOLECYSTECTOMY, LAPAROSCOPIC;  Surgeon: Kobe Mathis MD;  Location: [facility] OR    LASER HOLMIUM LITHOTRIPSY BLADDER N/A 10/15/2014    Procedure: LASER HOLMIUM LITHOTRIPSY BLADDER;  Surgeon: Sahil Taveras MD;  [facility]    LASER KTP GREEN LIGHT PHOTOSELECTIVE VAPORIZATION PROSTATE  01/23/2014    Procedure: LASER KTP GREEN LIGHT PHOTOSELECTIVE VAPORIZATION PROSTATE;  Greenlight Photovaporization Of Prostate  ;  Surgeon: Sahil Taveras MD;  [facility]    OTHER SURGICAL HISTORY  10/04/2016    REPAIR ANEURYSM ASCENDING AORTA    OTHER SURGICAL HISTORY Right 09/23/2019    IRRIGATION AND DEBRIDEMENT UPPER EXTREMITYshoulder    RELEASE TRIGGER FINGER Right 03/31/2017    Procedure: RELEASE TRIGGER FINGER;  Surgeon: Juan Carlos Blunt MD;  [facility]    REMOVE ANTIBIOTIC CEMENT BEADS / SPACER SHOULDER Left 05/08/2020    Procedure: Left shoulder removal of spacer;  Surgeon: Analilia Aceves MD;  [facility]    REMOVE ANTIBIOTIC CEMENT BEADS / SPACER SHOULDER Right 08/18/2020    Procedure: Removal of right shoulder antibiotic spacer;  Surgeon: Analilia Aceves MD;  [facility]    REMOVE HARDWARE ARTHROPLASTY SHOULDER. I&D, PLACE ANTIBIOTIC CEMENT BE Left 11/15/2019    Procedure: Explantation of left total shoulder arthroplasty, irrigation and debridement, and placement of antibiotic spacer;  Surgeon: Analilia Aceves MD;  [facility]    REPAIR ANEURYSM ASCENDING AORTA N/A 10/04/2016    Procedure: REPAIR ANEURYSM ASCENDING AORTA;  Surgeon:  Mckenzie Townsend MD;  [facility]    REVERSE ARTHROPLASTY SHOULDER Right 2020    Procedure: and conversion to reverse total shoulder arthroplasty;  Surgeon: Analilia Aceves MD;  [facility]    SURGICAL EXPOSURE, LAPAROSCOPIC, W/WOUND CLOSURE, FOR ERCP, BY GENERAL SURGERY N/A 2023    Procedure: SURGICAL EXPOSURE FOR ENDOSCOPIC RETROGRADE CHOLANGIOPANCREATOGRAPHY;  Surgeon: Kobe Mathis MD;  Location: [facility] OR    [facility]              Social History:     Social History     Tobacco Use    Smoking status: Former     Current packs/day: 0.00     Average packs/day: 0.5 packs/day for 6.6 years (3.3 ttl pk-yrs)     Types: Cigarettes     Start date: 1977     Quit date: 1983     Years since quittin.1     Passive exposure: Never (per pt)    Smokeless tobacco: Never   Substance Use Topics    Alcohol use: No     Alcohol/week: 0.0 standard drinks of alcohol            Family History:     Family History   Problem Relation Age of Onset    Arthritis Other     Gastrointestinal Disease Other     Cardiovascular Father         aortic aneurysm    Arrhythmia Father     Nephrolithiasis Father     Sleep Apnea Father     Anxiety Disorder Father     Depression Father     Hypertension Father     Obesity Father     Hyperlipidemia Father     Coronary Artery Disease Father     Low Back Problems Father     Spine Problems Father     Cardiovascular Father     Other - See Comments Father         low back problems, spine problems    Anxiety Disorder Mother     Hypertension Mother     Osteoporosis Mother     Obesity Mother     Hyperlipidemia Mother     Low Back Problems Mother     Macular Degeneration Mother     Cataracts Mother     Other - See Comments Mother         low back problems    Anxiety Disorder Sister     Hypertension Sister     Osteoporosis Sister     Obesity Sister     Hyperlipidemia Sister     Low Back Problems Sister     Spine Problems Sister     Anxiety Disorder Sister      Depression Sister     Hypertension Sister     Osteoporosis Sister     Obesity Sister     Hyperlipidemia Sister     Low Back Problems Sister     Anxiety Disorder Sister     Hyperlipidemia Sister     Hypertension Sister     Obesity Sister     Other - See Comments Sister         low back problems, spine problems    Osteoporosis Sister     Anxiety Disorder Sister     Depression Sister     Hyperlipidemia Sister     Hypertension Sister     Other - See Comments Sister         low back problems    Obesity Sister     Osteoporosis Sister     Melanoma No family hx of     Skin Cancer No family hx of     Glaucoma No family hx of             Allergies:     Allergies   Allergen Reactions    Ciprofloxacin      History of aortic aneurysms    Gabapentin Dizziness    Tape [Adhesive Tape] Blisters     Blistering - please use paper tape            Medications:     Current Outpatient Medications   Medication Sig Dispense Refill    acetaminophen (TYLENOL) 325 MG tablet Take 2 tablets (650 mg) by mouth every 4 hours as needed for other (For optimal non-opioid multimodal pain management to improve pain control.).      amLODIPine-benazepril (LOTREL) 5-20 MG capsule Take 1 capsule by mouth 2 times daily 180 capsule 3    aspirin (ASPIRIN LOW DOSE) 81 MG EC tablet Take 1 tablet (81 mg) by mouth daily. 90 tablet 3    atorvastatin (LIPITOR) 40 MG tablet Take 1 tablet (40 mg) by mouth daily (Patient taking differently: Take 40 mg by mouth every evening.) 90 tablet 3    bisacodyl (DULCOLAX) 10 MG suppository Place 1 suppository (10 mg) rectally daily as needed for constipation (Use if magnesium hydroxide (MILK of MAGNESIA) is not effective after 24 hours. May discontinue if patient having bowel movement.).      buPROPion (WELLBUTRIN SR) 200 MG 12 hr tablet TAKE 1 TABLET BY MOUTH 2 TIMES DAILY 180 tablet 0    carvedilol (COREG) 25 MG tablet Take 2 tablets (50 mg) by mouth 2 times daily (with meals) 360 tablet 3    clonazePAM (KLONOPIN) 0.5 MG tablet  Take 1 tablet (0.5 mg) by mouth 3 times daily as needed for anxiety. 90 tablet 0    cyclobenzaprine (FLEXERIL) 10 MG tablet Take 1 tablet (10 mg) by mouth 3 times daily as needed for muscle spasms 90 tablet 0    diazepam (VALIUM) 5 MG tablet Take one before MRI/MRA procedure about 30 minutes before 2 tablet 0    docusate sodium (COLACE) 100 MG capsule Take 100 mg by mouth every evening.      docusate sodium (COLACE) 100 MG capsule Take 200 mg by mouth every morning.      escitalopram (LEXAPRO) 20 MG tablet Take 1.5 tablets (30 mg) by mouth daily (Patient taking differently: Take 20 mg by mouth every morning.) 135 tablet 3    Fe Heme Polypeptide-folic acid (PROFERRIN-FORTE) 12-1 MG TABS Take 1 tablet by mouth 2 times daily 90 tablet 0    fentaNYL (DURAGESIC) 25 mcg/hr 72 hr patch Place 1 patch onto the skin every 48 hours. Pt taking Every 48 hours      fluocinonide (LIDEX) 0.05 % external ointment Apply twice daily to itchy skin nodules for 1-2 weeks at a time. 30 g 3    mometasone (ELOCON) 0.1 % external ointment Apply topically nightly as needed (rash on arms) 30 g 3    naloxone (NARCAN) 4 MG/0.1ML nasal spray Spray 1 spray (4 mg) into one nostril alternating nostrils as needed for opioid reversal. every 2-3 minutes until assistance arrives 2 each 0    naproxen (EC-NAPROSYN) 500 MG EC tablet Take 1 tablet (500 mg) by mouth 2 times daily (with meals). 180 tablet 1    oxyCODONE IR (ROXICODONE) 10 MG tablet Take 10 mg by mouth every 6 hours as needed for moderate to severe pain.      pantoprazole (PROTONIX) 40 MG EC tablet Take 1 tablet (40 mg) by mouth daily as needed for heartburn, 90 tablet 0    polyethylene glycol (GOLYTELY) 236 g suspension Take as directed. Two days before your exam fill the first container with water. Cover and shake until mixed well. At 5:00pm drink one 8oz glass every 10-15 minutes until half (1/2) of the first container is empty. Store the remainder in the refrigerator. One day before your  exam at 5:00pm drink the second half of the first container until it is gone. Before you go to bed mix the second container with water and put in refrigerator. Six hours before your check in time drink one 8oz glass every 10-15 minutes until half of container is empty. Discard the remainder of solution. 8000 mL 0    pregabalin (LYRICA) 50 MG capsule Take 1 capsule (50 mg) by mouth 3 times daily. 90 capsule 1    propranolol (INDERAL) 10 MG tablet Take 1 tablet (10 mg) by mouth 3 times daily as needed (panic attack) 90 tablet 3    senna-docusate (SENOKOT-S/PERICOLACE) 8.6-50 MG tablet Take 2 tablets by mouth every morning.      senna-docusate (SENOKOT-S/PERICOLACE) 8.6-50 MG tablet Take 3 tablets by mouth every evening.      sucralfate (CARAFATE) 1 GM tablet Take 1 tablet (1 g) by mouth 2 times daily as needed (Reflux) (Patient taking differently: as needed. Take 1 tablet (1 g) by mouth 2 times daily as needed (Reflux)) 180 tablet 3    tacrolimus (PROTOPIC) 0.1 % external ointment Apply topically as needed (rash on arms) Apply to affected areas on body. 60 g 1    tiZANidine (ZANAFLEX) 2 MG tablet Take 1-2 tablets (2-4 mg) by mouth every 8 hours as needed for muscle spasms. 25 tablet 0    triamcinolone (KENALOG) 0.1 % external ointment Apply topically 2 times daily To affected areas of rash. Please avoid application to the face, groin or armpits as the medication is too strong for these areas. 454 g 0     No current facility-administered medications for this visit.     Facility-Administered Medications Ordered in Other Visits   Medication Dose Route Frequency Provider Last Rate Last Admin    sodium chloride (PF) 0.9% PF flush 3 mL  3 mL Intracatheter Q8H HONEY Scruggs MD   3 mL at 07/24/23 0745             Physical Exam:   Vitals: There were no vitals taken for this visit.  Constitutional: Patient is healthy, well-nourished and appears stated age.  Respiratory: Patient is breathing normally and in no respiratory  distress.  Skin: No suspicious rashes or lesions. Incision well healed.   Gait: Non-antalgic gait without use of assistive devices. Able to tandem gait without difficulty.   Neurologic - Sensation intact to light touch bilaterally. Only has percieved numbness to his finger tips.   Musculoskeletal: Strength: 5/5 bilateral Deltoid bicep tricep  strength.          Imaging:   I independently reviewed and interpreted the following imaging at this clinic visit which were also reviewed with patient    XR Cervical 10/28/2024    Stable cervical, Hardware without any retropulsion or fracture    MR lumbar 8/2 to 8/22/2024    L4-5 disc protrusion resulting in central canal stenosis left greater than right lateral recess stenosis and foraminal stenosis on the left side.  This compresses the L5 nerve bilaterally at L4-5     Assessment:     64 year old male with     6 weeks s/p C3-7 dome laminectomy C4-6 laminoplasty with Medtronic plates  Lumbar radiculopathy  L4-5 disc protrusion with lateral recess stenosis and foraminal stenosis    He presents 6 weeks from his laminoplasty in stable condition.  He is experiencing ongoing numbness in his fingertips and radiating pain down the left leg.  He has a disc herniation at L4-5 that results in lateral recess stenosis and foraminal stenosis.  Will plan on starting physical therapy and referring him for a left L4-5 TFESI.  Will also plan on increasing his Lyrica to 75 mg 3 times a day.  Plan to follow-up in clinic in 6 weeks with a telephone visit.     Plan:     Left L4-5 TFESi  Lumbar PT  Follow up virtual telephone visit 6 weeks     Plan formulated with Dr. Prajapati who also saw the patient and reviewed imaging. We used the patient's imaging and models to explain their pathophysiology and treatment options. All the patient's questions were answered to their satisfaction.     Thank you for allowing me to be a part of this patient's care.     Bishop LUZ Ritter PA-C   Orthopedic Spine  Surgery    Attending MD (Dr. Jonathan Prajapati) : I personally performed greater than 50% of the effort related to this patient visit and am responsible for the medical decision making and billing in this case.  I met with the patient, reviewed and verified the history and physical exam of the patient and discussed the patient's management with the other clinical providers involved in this patient's care including any involved residents or physicians assistants. I also personally reviewed the imaging and I personally formulated the treatment plan and diagnosis in their entirety.  I reviewed the above note and agree with the documented findings and plan of care, which were communicated to the patient.      Jonathan Prajapati MD

## 2024-10-30 ENCOUNTER — TELEPHONE (OUTPATIENT)
Dept: MEDSURG UNIT | Facility: CLINIC | Age: 64
End: 2024-10-30
Payer: COMMERCIAL

## 2024-11-01 DIAGNOSIS — M47.812 SPONDYLOSIS OF CERVICAL REGION WITHOUT MYELOPATHY OR RADICULOPATHY: ICD-10-CM

## 2024-11-01 RX ORDER — NICOTINE POLACRILEX 4 MG
15-30 LOZENGE BUCCAL
Status: DISCONTINUED | OUTPATIENT
Start: 2024-11-01 | End: 2024-11-05 | Stop reason: HOSPADM

## 2024-11-01 RX ORDER — DEXTROSE MONOHYDRATE 25 G/50ML
25-50 INJECTION, SOLUTION INTRAVENOUS
Status: DISCONTINUED | OUTPATIENT
Start: 2024-11-01 | End: 2024-11-05 | Stop reason: HOSPADM

## 2024-11-01 RX ORDER — LIDOCAINE 40 MG/G
CREAM TOPICAL
OUTPATIENT
Start: 2024-11-01

## 2024-11-01 RX ORDER — CYCLOBENZAPRINE HCL 10 MG
10 TABLET ORAL 3 TIMES DAILY PRN
Qty: 90 TABLET | Refills: 0 | Status: SHIPPED | OUTPATIENT
Start: 2024-11-01

## 2024-11-04 ENCOUNTER — HOSPITAL ENCOUNTER (OUTPATIENT)
Facility: CLINIC | Age: 64
Discharge: HOME | End: 2024-11-04
Payer: COMMERCIAL

## 2024-11-04 ENCOUNTER — HOSPITAL ENCOUNTER (OUTPATIENT)
Dept: GENERAL RADIOLOGY | Facility: CLINIC | Age: 64
Discharge: HOME | End: 2024-11-04
Attending: ORTHOPAEDIC SURGERY
Payer: COMMERCIAL

## 2024-11-04 VITALS — DIASTOLIC BLOOD PRESSURE: 73 MMHG | OXYGEN SATURATION: 97 % | HEART RATE: 65 BPM | SYSTOLIC BLOOD PRESSURE: 149 MMHG

## 2024-11-04 VITALS
SYSTOLIC BLOOD PRESSURE: 172 MMHG | DIASTOLIC BLOOD PRESSURE: 89 MMHG | RESPIRATION RATE: 16 BRPM | HEART RATE: 66 BPM | OXYGEN SATURATION: 99 %

## 2024-11-04 PROCEDURE — 250N000009 HC RX 250: Performed by: ORTHOPAEDIC SURGERY

## 2024-11-04 PROCEDURE — 999N000154 HC STATISTIC RADIOLOGY XRAY, US, CT, MAR, NM

## 2024-11-04 PROCEDURE — 255N000002 HC RX 255 OP 636: Performed by: ORTHOPAEDIC SURGERY

## 2024-11-04 PROCEDURE — 64483 NJX AA&/STRD TFRM EPI L/S 1: CPT

## 2024-11-04 PROCEDURE — 250N000011 HC RX IP 250 OP 636: Performed by: ORTHOPAEDIC SURGERY

## 2024-11-04 RX ORDER — IOPAMIDOL 408 MG/ML
10 INJECTION, SOLUTION INTRATHECAL ONCE
Status: COMPLETED | OUTPATIENT
Start: 2024-11-04 | End: 2024-11-04

## 2024-11-04 RX ORDER — DEXAMETHASONE SODIUM PHOSPHATE 10 MG/ML
10 INJECTION, SOLUTION INTRAMUSCULAR; INTRAVENOUS ONCE
Status: COMPLETED | OUTPATIENT
Start: 2024-11-04 | End: 2024-11-04

## 2024-11-04 RX ADMIN — LIDOCAINE HYDROCHLORIDE 6 ML: 10 INJECTION, SOLUTION EPIDURAL; INFILTRATION; INTRACAUDAL; PERINEURAL at 14:33

## 2024-11-04 RX ADMIN — IOPAMIDOL 2 ML: 408 INJECTION, SOLUTION INTRATHECAL at 14:36

## 2024-11-04 RX ADMIN — DEXAMETHASONE SODIUM PHOSPHATE 20 MG: 10 INJECTION, SOLUTION INTRAMUSCULAR; INTRAVENOUS at 14:36

## 2024-11-04 ASSESSMENT — ACTIVITIES OF DAILY LIVING (ADL)
ADLS_ACUITY_SCORE: 0

## 2024-11-04 NOTE — DISCHARGE INSTRUCTIONS
Steroid Injection Discharge Instructions     After you go home:    You may resume your normal diet.    Care of Puncture Site:    If you have a bandaid on your puncture site, you may remove it the next morning  You may shower tomorrow  No bath tubs, whirlpools or swimming pool for at least 48 hours  Use ice packs as needed for discomfort     Activity:    Minimize your activity today. You may gradually resume your normal activity as tolerated  Avoid vigorous or strenuous activity until your symptoms improve or as directed by your doctor  Do NOT drive a vehicle for a few hours after the injection - or longer if you develop numbness in your arm or leg    Medicines:    You may resume all medications, including blood thinners  Resume your Platelet Inhibitors and Aspirin tomorrow at your regular dose  For minor discomfort, you may take Acetaminophen (Tylenol) or Ibuprofen (Advil)    Pain:     You may experience increased or different pain over the next 24-48 hours  For the next 48 hrs - you may use ice packs for discomfort     Call your primary care doctor if:    You have severe pain that does not improve with pain medication  You have chills or a fever greater than 101 F (38 C)  The site is red, swollen, hot or tender  Increase in pain, weakness or numbness  New problems with your bowel or bladder  Any questions or concerns    What to watch for:    It can be normal to have some bruising or slight swelling at the puncture site.   After the procedure, you may have some new weakness or numbness down your arm/leg from the numbing medicine. This should resolve in a few hours.   You may feel some temporary relief from the numbing medicine, but that will wear off within a few hours.  Your symptoms may return to pre procedure level, or can even be worse for the first 1-2 days.  For many people, the steroid begins to provide some relief within 2-3 days, but it can take up to 2 weeks to obtain the full results.  Some people will  get lasting relief from a single injection. Others may require up to 3 injections to get results. If you have more than one steroid injection, they should be given 2 weeks apart.  If you have no improvement in your symptoms after two weeks, please contact the doctor who ordered this procedure to discuss the next steps.  Side effects of your steroid injection are mild and will go away in 2-3 days  Insomnia  Irritability  Flushed face  Water retention  Restlessness  Difficulty sleeping  Increased appetite  Increased blood sugar    If you have questions or concerns call:                  [facility] @ [phone]                                    between 8am-4:30pm Mon-Fri    If you have urgent questions outside of these normal business hours, please contact the [facility] on call doctor @ [phone]    Or you can contact your provider via My Chart

## 2024-11-04 NOTE — PROGRESS NOTES
Care Suites Discharge Nursing Note    Patient Information  Name: Kobe Buchanan  Age: 64 year old    Discharge Education:  Discharge instructions reviewed: Yes  Additional education/resources provided: none  Patient/patient representative verbalizes understanding: Yes  Patient discharging on new medications: No  Medication education completed: N/A    Discharge Plans:   Discharge location: home  Discharge ride contacted: [employer]  Approximate discharge time: 1600    Discharge Criteria:  Discharge criteria met and vital signs stable: Yes    Patient Belongs:  Patient belongings returned to patient: Yes    Angely Amin RN

## 2024-11-05 DIAGNOSIS — M48.02 CERVICAL STENOSIS OF SPINAL CANAL: ICD-10-CM

## 2024-11-05 RX ORDER — TIZANIDINE 2 MG/1
2-4 TABLET ORAL EVERY 8 HOURS PRN
Qty: 100 TABLET | Refills: 3 | Status: SHIPPED | OUTPATIENT
Start: 2024-11-05

## 2024-11-05 NOTE — TELEPHONE ENCOUNTER
Outside RN standing orders, no protocols populated. Routing to PCP to review and fill if appropriate. Alison RIGGS

## 2024-11-08 ENCOUNTER — MYC REFILL (OUTPATIENT)
Dept: FAMILY MEDICINE | Facility: CLINIC | Age: 64
End: 2024-11-08
Payer: COMMERCIAL

## 2024-11-08 DIAGNOSIS — F43.9 STRESS: ICD-10-CM

## 2024-11-08 NOTE — TELEPHONE ENCOUNTER
Outside RN standing orders due to drug class, routing to PCP to review and fill if appropriate. Alison RIGGS

## 2024-11-09 RX ORDER — CLONAZEPAM 0.5 MG/1
0.5 TABLET ORAL 3 TIMES DAILY PRN
Qty: 90 TABLET | Refills: 0 | Status: SHIPPED | OUTPATIENT
Start: 2024-11-09

## 2024-11-11 ENCOUNTER — THERAPY VISIT (OUTPATIENT)
Dept: PHYSICAL THERAPY | Facility: REHABILITATION | Age: 64
End: 2024-11-11
Payer: COMMERCIAL

## 2024-11-11 DIAGNOSIS — M54.16 LUMBAR RADICULOPATHY: Primary | ICD-10-CM

## 2024-11-11 PROCEDURE — 97110 THERAPEUTIC EXERCISES: CPT | Mod: GP | Performed by: PHYSICAL THERAPIST

## 2024-11-11 PROCEDURE — 97161 PT EVAL LOW COMPLEX 20 MIN: CPT | Mod: GP | Performed by: PHYSICAL THERAPIST

## 2024-11-11 NOTE — PROGRESS NOTES
"PHYSICAL THERAPY EVALUATION  Type of Visit: Evaluation        Fall Risk Screen:  Have you fallen 2 or more times in the past year?: Yes  Have you fallen and had an injury in the past year?: No  Is patient a fall risk?: Yes    Subjective  \"I am here to check the  box to come here so I can have a laminectomy in my lumbar spine\". Doesn't want to have a continuous course of rehab. Had a pool and would roll in pool which would keep his core strong, and if he stopped doing this his pain would come back.     Having pain in left lower gluteal, and certain positions \"I don't know what they are\", leg will give out. States he's gone down a few times. Stumbled while walking down a decline, legs couldn't keep up.         Presenting condition or subjective complaint:    Date of onset: 10/28/24    Relevant medical history:     Dates & types of surgery:      Prior diagnostic imaging/testing results:       Prior therapy history for the same diagnosis, illness or injury:        Prior Level of Function  Transfers:   Ambulation:   ADL:   IADL:     Living Environment  Social support:     Type of home:     Stairs to enter the home:         Ramp:     Stairs inside the home:         Help at home:    Equipment owned:       Employment:      Hobbies/Interests:      Patient goals for therapy:      Pain assessment:      Objective   LUMBAR SPINE EVALUATION  PAIN:   INTEGUMENTARY (edema, incisions):   POSTURE:   GAIT:   Weightbearing Status:   Assistive Device(s):   Gait Deviations: Base of support increased  BALANCE/PROPRIOCEPTION:   WEIGHTBEARING ALIGNMENT:   NON-WEIGHTBEARING ALIGNMENT:    ROM:   (Degrees) Left AROM Left PROM  Right AROM Right PROM   Hip Flexion       Hip Extension       Hip Abduction       Hip Adduction       Hip Internal Rotation       Hip External Rotation       Knee Flexion       Knee Extension       Lumbar Side glide     Lumbar Flexion WFL, no pain   Lumbar Extension WFL, no pain. Reports left glute pain with standing " afterwards   Pain:   End feel:   PELVIC/SI SCREEN:   STRENGTH:  supine sit up: 2/5    MYOTOMES:    Left Right   T12-L3 (Hip Flexion) 4+ 4+   L2-4 (Quads)  5 5   L4 (Ankle DF) 5 5   L5 (Great Toe Ext) 5 5   S1 (Toe Raise) 5 5     DTR S:    Left Right   C5 (Biceps)     C6 (Brachioradialis)     C7 (Triceps)     L4 (Quad)     S1 (Achilles)       CORD SIGNS:   DERMATOMES:   NEURAL TENSION:  positive SLR, left  FLEXIBILITY:   LUMBAR/HIP Special Tests:    PELVIS/SI SPECIAL TESTS:   FUNCTIONAL TESTS:   PALPATION:   SPINAL SEGMENTAL CONCLUSIONS:       10 meter walk test: 7.14 seconds    Assessment & Plan   CLINICAL IMPRESSIONS  Medical Diagnosis: M54.50 (ICD-10-CM) - Lumbar pain  M54.16 (ICD-10-CM) - Lumbar radiculopathy    Treatment Diagnosis: low back pain with intolerance with standing and walking, gait deficits   Impression/Assessment: Patient is a 64 year old male with low back pain with referred/radiating pain into left lower extremity.  The following significant findings have been identified: Pain, Decreased strength, Impaired balance, Impaired gait, Impaired muscle performance, Decreased activity tolerance, and walking balance . These impairments interfere with their ability to perform recreational activities, household chores, household mobility, and community mobility as compared to previous level of function. Patient will benefit from skilled physical therapy to address impairments and functional limitations in order to achieve their goals.       Clinical Decision Making (Complexity):  Clinical Presentation: Stable/Uncomplicated  Clinical Presentation Rationale: based on medical and personal factors listed in PT evaluation  Clinical Decision Making (Complexity): Low complexity    PLAN OF CARE  Treatment Interventions:  Interventions: Gait Training, Manual Therapy, Neuromuscular Re-education, Therapeutic Activity, Therapeutic Exercise, Self-Care/Home Management    Long Term Goals     PT Goal 1  Goal Identifier:  HEP  Goal Description: Patient will demonstrate at least 50% compliance in their HEP to support progress towards functional and patient specific goals  Target Date: 02/03/25  PT Goal 2  Goal Identifier: 10 meter walk test  Goal Description: Patient will improve gait speed during 10 meter walk test by at least 0.2 meters per second compared to baseline to reflect significant improvements in gait speed and improved mobility  Target Date: 02/03/25      Frequency of Treatment: every other week  Duration of Treatment: 12 weeks    Recommended Referrals to Other Professionals:   Education Assessment:   Learner/Method: Patient    Risks and benefits of evaluation/treatment have been explained.   Patient/Family/caregiver agrees with Plan of Care.     Evaluation Time:     PT Eval, Low Complexity Minutes (79233): 25       Signing Clinician: Daron Zayas PT        [facility]                                                                                   OUTPATIENT PHYSICAL THERAPY      PLAN OF TREATMENT FOR OUTPATIENT REHABILITATION   Patient's Last Name, First Name, Kobe Mcdonnell YOB: 1960   Provider's Name   [facility]   Medical Record No.  [id number]     Onset Date: 10/28/24  Start of Care Date: 11/11/24     Medical Diagnosis:  M54.50 (ICD-10-CM) - Lumbar pain  M54.16 (ICD-10-CM) - Lumbar radiculopathy      PT Treatment Diagnosis:  low back pain with intolerance with standing and walking, gait deficits Plan of Treatment  Frequency/Duration: every other week/ 12 weeks    Certification date from 11/11/24 to 02/03/25         See note for plan of treatment details and functional goals     Daron Zayas, MURPHY                         I CERTIFY THE NEED FOR THESE SERVICES FURNISHED UNDER        THIS PLAN OF TREATMENT AND WHILE UNDER MY CARE     (Physician attestation of this document indicates review and certification of the therapy  plan).              Referring Provider:  Jonathan Prajapati    Initial Assessment  See Epic Evaluation- Start of Care Date: 11/11/24

## 2024-11-13 ENCOUNTER — TRANSFERRED RECORDS (OUTPATIENT)
Dept: HEALTH INFORMATION MANAGEMENT | Facility: CLINIC | Age: 64
End: 2024-11-13
Payer: COMMERCIAL

## 2024-12-02 DIAGNOSIS — F43.9 STRESS: ICD-10-CM

## 2024-12-02 RX ORDER — CLONAZEPAM 0.5 MG/1
0.5 TABLET ORAL 3 TIMES DAILY PRN
Qty: 90 TABLET | Refills: 0 | Status: SHIPPED | OUTPATIENT
Start: 2024-12-02

## 2024-12-02 NOTE — TELEPHONE ENCOUNTER
Last refill 11/10/2024  Authorized electronic refill.  Confirmed and checked database today.  UTD and compliant with screening.

## 2024-12-03 ENCOUNTER — VIRTUAL VISIT (OUTPATIENT)
Dept: ORTHOPEDICS | Facility: CLINIC | Age: 64
End: 2024-12-03
Attending: ORTHOPAEDIC SURGERY
Payer: COMMERCIAL

## 2024-12-03 VITALS — BODY MASS INDEX: 32.21 KG/M2 | WEIGHT: 225 LBS | HEIGHT: 70 IN

## 2024-12-03 DIAGNOSIS — M54.16 LUMBAR RADICULOPATHY: Primary | ICD-10-CM

## 2024-12-03 DIAGNOSIS — M48.062 SPINAL STENOSIS OF LUMBAR REGION WITH NEUROGENIC CLAUDICATION: ICD-10-CM

## 2024-12-03 DIAGNOSIS — M47.816 LUMBAR SPONDYLOSIS: ICD-10-CM

## 2024-12-03 PROCEDURE — 99442 PR PHYSICIAN TELEPHONE EVALUATION 11-20 MIN: CPT | Mod: 24 | Performed by: ORTHOPAEDIC SURGERY

## 2024-12-03 NOTE — LETTER
12/3/2024      Lela Buchanan  [address]  [place] [address]      Dear Colleague,    Thank you for referring your patient, Lela Buchanan, to the [facility]. Please see a copy of my visit note below.    Virtual Visit Details    Type of service:  Telephone Visit   Phone call duration: 14 minutes   Originating Location (pt. Location): Home    Distant Location (provider location):  On-site    Diagnosis: Lumbar Stenosis with claudication    I called lela and he says the injection helped for only 1-2 days.  He also did PT and increased his lyrica to 75mg and this has had some mild benefit but he still feels very disabled by the symptoms.  He says he cannot stand and walk for distances and he is having claudicatory buttock and thigh symptoms as well as radicular pains in an L5 distribution.  He cannot go more than a block and cannot complete his daily activities.  Given his failure of nonoperative treatment as well as continued ongoing disability he and I discussed an L4-5 decompression.  I reviewed his MRI and there is spondylosis and disc herniation at L4-5 with central and lateral recess stenosis.  We went over the risks and benefits below and he would like to proceed.    Risks of this surgery include risk of infection, risk of dural tear resulting in CSF leak which might result in headaches, or possible need for lumbar drain, or possible revision surgery in the setting of a persistent leak. Risk of seroma or hematoma requiring revision surgery. Possible nerve root injury resulting in numbness weakness or paralysis into the leg. Possible radiculitis which could result in similar symptoms or could result in significant neurogenic type pain into the leg. Risk of incomplete decompression which might require revision surgery in the future.  Risk of pars fracture or postoperative instability requiring conversion to a fusion in the future. Risk of adjacent  segment problems requiring surgery in the future. Risk of incomplete relief of symptoms possibly requiring revision surgery in the future. There is a risk of blood clots in the legs or the lungs.  Furthermore, although rare, there are risks of major vessel or major organ injury from the surgery, and risks of the anesthetic including stroke heart attack and death.    No further conservative care is indicated, and the surgery is medically necessary for the following reasons:    There is no indication for further physical therapy in this case.  Further physical therapy would delay necessary medical treatment and prolong the patient's suffering with no benefit and the delay would increase the risk of neurologic decline and/or symptom worsening unnecessarily, with no benefit to the patient and possible harm.       The patient has already trialed oral medications as listed in the medication history, and has trialed activity modification such as decreasing their bending and twisting, and decreasing their walking distance.  In spite of this, and in spite of the conservative therapies documented above, the patient has significant functional disability in their activities of daily living such as prolonged sitting and standing, prolonged walking, and daily chores, each of which are very difficult or painful because of this spinal problem.  Therefore the proposed surgery is medically necessary and the patient has failed conservative care.      There are no untreated, underlying mental health conditions (including but not limited to psychological conditions or drug or alcohol abuse) that will preclude an appropriate recovery from this surgery or that are contraindications to proceeding with surgery.       Jonathan Prajapati MD      Again, thank you for allowing me to participate in the care of your patient.        Sincerely,        Jonathan Prajapati MD

## 2024-12-03 NOTE — NURSING NOTE
Patient declined medication review with VVF during rooming.       Current patient location: [address]   [place] [address]    Is the patient currently in the state of [place]? YES    Visit mode:TELEPHONE    If the visit is dropped, the patient can be reconnected by:TELEPHONE VISIT: Phone number:   Telephone Information:   Mobile [phone]       Will anyone else be joining the visit? NO  (If patient encounters technical issues they should call [phone]    Are changes needed to the allergy or medication list? Pt declined med review    Are refills needed on medications prescribed by this physician? NO    Rooming Documentation:  Not applicable    Reason for visit: JOI Diaz VVF

## 2024-12-03 NOTE — PROGRESS NOTES
Virtual Visit Details    Type of service:  Telephone Visit   Phone call duration: 14 minutes   Originating Location (pt. Location): Home    Distant Location (provider location):  On-site    Diagnosis: Lumbar Stenosis with claudication    I called lela and he says the injection helped for only 1-2 days.  He also did PT and increased his lyrica to 75mg and this has had some mild benefit but he still feels very disabled by the symptoms.  He says he cannot stand and walk for distances and he is having claudicatory buttock and thigh symptoms as well as radicular pains in an L5 distribution.  He cannot go more than a block and cannot complete his daily activities.  Given his failure of nonoperative treatment as well as continued ongoing disability he and I discussed an L4-5 decompression.  I reviewed his MRI and there is spondylosis and disc herniation at L4-5 with central and lateral recess stenosis.  We went over the risks and benefits below and he would like to proceed.    Risks of this surgery include risk of infection, risk of dural tear resulting in CSF leak which might result in headaches, or possible need for lumbar drain, or possible revision surgery in the setting of a persistent leak. Risk of seroma or hematoma requiring revision surgery. Possible nerve root injury resulting in numbness weakness or paralysis into the leg. Possible radiculitis which could result in similar symptoms or could result in significant neurogenic type pain into the leg. Risk of incomplete decompression which might require revision surgery in the future.  Risk of pars fracture or postoperative instability requiring conversion to a fusion in the future. Risk of adjacent segment problems requiring surgery in the future. Risk of incomplete relief of symptoms possibly requiring revision surgery in the future. There is a risk of blood clots in the legs or the lungs.  Furthermore, although rare, there are risks of major vessel or major organ  injury from the surgery, and risks of the anesthetic including stroke heart attack and death.    No further conservative care is indicated, and the surgery is medically necessary for the following reasons:    There is no indication for further physical therapy in this case.  Further physical therapy would delay necessary medical treatment and prolong the patient's suffering with no benefit and the delay would increase the risk of neurologic decline and/or symptom worsening unnecessarily, with no benefit to the patient and possible harm.       The patient has already trialed oral medications as listed in the medication history, and has trialed activity modification such as decreasing their bending and twisting, and decreasing their walking distance.  In spite of this, and in spite of the conservative therapies documented above, the patient has significant functional disability in their activities of daily living such as prolonged sitting and standing, prolonged walking, and daily chores, each of which are very difficult or painful because of this spinal problem.  Therefore the proposed surgery is medically necessary and the patient has failed conservative care.      There are no untreated, underlying mental health conditions (including but not limited to psychological conditions or drug or alcohol abuse) that will preclude an appropriate recovery from this surgery or that are contraindications to proceeding with surgery.       Jonathan Prajapati MD

## 2024-12-04 ENCOUNTER — TELEPHONE (OUTPATIENT)
Dept: NEUROSURGERY | Facility: CLINIC | Age: 64
End: 2024-12-04
Payer: COMMERCIAL

## 2024-12-04 NOTE — TELEPHONE ENCOUNTER
Patient is scheduled for surgery with Dr. Prajapati.     Spoke with: Patient     Date of Surgery: 12/26/24    Location: [facility]     Pre op with Provider: MALKA     H&P: Scheduled for an in clinic visit with PAC on 12/12/24 at 7:00.     Additional imaging/appointments:  Patient is scheduled for 6 week post op on 2/11/25 at 8:40.   Patient is scheduled for XR on 12/12/24 at 8:45.    Surgery packet: Patient declined surgery packet being mailed.      Additional comments: MALKA Powers on 12/4/2024 at 11:02 AM

## 2024-12-05 NOTE — TELEPHONE ENCOUNTER
FUTURE VISIT INFORMATION      SURGERY INFORMATION:  Date: 24  [facility] or  Surgeon:  Jonathan Prajapati MD   Anesthesia Type:  general  Procedure: Lumbar 4 to 5 decompression   Consult: virtual visit 12/3/24    RECORDS REQUESTED FROM:       Primary Care Provider: Demi Hardin MD - [facility]    Most recent EKG+ Tracin24    Most recent ECHO: 24    Most recent Coronary Angiogram: 20

## 2024-12-10 ENCOUNTER — HOSPITAL ENCOUNTER (OUTPATIENT)
Dept: ULTRASOUND IMAGING | Facility: CLINIC | Age: 64
Discharge: HOME | End: 2024-12-10
Attending: INTERNAL MEDICINE
Payer: COMMERCIAL

## 2024-12-10 ENCOUNTER — HOSPITAL ENCOUNTER (OUTPATIENT)
Dept: CARDIOLOGY | Facility: CLINIC | Age: 64
Discharge: HOME | End: 2024-12-10
Attending: INTERNAL MEDICINE
Payer: COMMERCIAL

## 2024-12-10 VITALS — DIASTOLIC BLOOD PRESSURE: 79 MMHG | HEART RATE: 56 BPM | SYSTOLIC BLOOD PRESSURE: 146 MMHG

## 2024-12-10 DIAGNOSIS — Z98.890 S/P THORACIC AORTIC ANEURYSM REPAIR: ICD-10-CM

## 2024-12-10 DIAGNOSIS — I65.29 OCCLUSION AND STENOSIS OF UNSPECIFIED CAROTID ARTERY: ICD-10-CM

## 2024-12-10 DIAGNOSIS — Z86.79 S/P THORACIC AORTIC ANEURYSM REPAIR: ICD-10-CM

## 2024-12-10 DIAGNOSIS — I77.9 BILATERAL CAROTID ARTERY DISEASE, UNSPECIFIED TYPE (H): ICD-10-CM

## 2024-12-10 PROCEDURE — 255N000002 HC RX 255 OP 636: Performed by: INTERNAL MEDICINE

## 2024-12-10 PROCEDURE — A9585 GADOBUTROL INJECTION: HCPCS | Performed by: INTERNAL MEDICINE

## 2024-12-10 PROCEDURE — 93880 EXTRACRANIAL BILAT STUDY: CPT

## 2024-12-10 PROCEDURE — 71555 MRI ANGIO CHEST W OR W/O DYE: CPT

## 2024-12-10 RX ORDER — NITROGLYCERIN 0.4 MG/1
0.4 TABLET SUBLINGUAL EVERY 5 MIN PRN
Status: ACTIVE | OUTPATIENT
Start: 2024-12-10 | End: 2024-12-10

## 2024-12-10 RX ORDER — LORAZEPAM 0.5 MG/1
0.5 TABLET ORAL EVERY 30 MIN PRN
Status: DISCONTINUED | OUTPATIENT
Start: 2024-12-10 | End: 2024-12-11 | Stop reason: HOSPADM

## 2024-12-10 RX ORDER — LIDOCAINE 40 MG/G
CREAM TOPICAL
Status: DISCONTINUED | OUTPATIENT
Start: 2024-12-10 | End: 2024-12-11 | Stop reason: HOSPADM

## 2024-12-10 RX ORDER — DIAZEPAM 5 MG/1
5 TABLET ORAL EVERY 30 MIN PRN
Status: DISCONTINUED | OUTPATIENT
Start: 2024-12-10 | End: 2024-12-11 | Stop reason: HOSPADM

## 2024-12-10 RX ORDER — GADOBUTROL 604.72 MG/ML
20 INJECTION INTRAVENOUS ONCE
Status: COMPLETED | OUTPATIENT
Start: 2024-12-10 | End: 2024-12-10

## 2024-12-10 RX ADMIN — GADOBUTROL 20 ML: 604.72 INJECTION INTRAVENOUS at 11:12

## 2024-12-11 ENCOUNTER — TRANSFERRED RECORDS (OUTPATIENT)
Dept: HEALTH INFORMATION MANAGEMENT | Facility: CLINIC | Age: 64
End: 2024-12-11
Payer: COMMERCIAL

## 2024-12-12 ENCOUNTER — OFFICE VISIT (OUTPATIENT)
Dept: SURGERY | Facility: CLINIC | Age: 64
End: 2024-12-12
Payer: COMMERCIAL

## 2024-12-12 ENCOUNTER — LAB (OUTPATIENT)
Dept: LAB | Facility: CLINIC | Age: 64
End: 2024-12-12
Payer: COMMERCIAL

## 2024-12-12 ENCOUNTER — ANESTHESIA EVENT (OUTPATIENT)
Dept: SURGERY | Facility: CLINIC | Age: 64
End: 2024-12-12
Payer: COMMERCIAL

## 2024-12-12 ENCOUNTER — PRE VISIT (OUTPATIENT)
Dept: SURGERY | Facility: CLINIC | Age: 64
End: 2024-12-12

## 2024-12-12 VITALS
OXYGEN SATURATION: 98 % | HEART RATE: 63 BPM | TEMPERATURE: 97.4 F | DIASTOLIC BLOOD PRESSURE: 75 MMHG | WEIGHT: 225.1 LBS | RESPIRATION RATE: 16 BRPM | HEIGHT: 70 IN | SYSTOLIC BLOOD PRESSURE: 145 MMHG | BODY MASS INDEX: 32.22 KG/M2

## 2024-12-12 DIAGNOSIS — M54.16 LUMBAR RADICULOPATHY: ICD-10-CM

## 2024-12-12 DIAGNOSIS — M48.062 SPINAL STENOSIS OF LUMBAR REGION WITH NEUROGENIC CLAUDICATION: ICD-10-CM

## 2024-12-12 DIAGNOSIS — M47.816 LUMBAR SPONDYLOSIS: ICD-10-CM

## 2024-12-12 DIAGNOSIS — Z01.818 PREOP EXAMINATION: ICD-10-CM

## 2024-12-12 DIAGNOSIS — Z01.818 PREOP EXAMINATION: Primary | ICD-10-CM

## 2024-12-12 LAB
ANION GAP SERPL CALCULATED.3IONS-SCNC: 11 MMOL/L (ref 7–15)
BUN SERPL-MCNC: 16.2 MG/DL (ref 8–23)
CALCIUM SERPL-MCNC: 9.5 MG/DL (ref 8.8–10.4)
CHLORIDE SERPL-SCNC: 102 MMOL/L (ref 98–107)
CREAT SERPL-MCNC: 1.1 MG/DL (ref 0.67–1.17)
EGFRCR SERPLBLD CKD-EPI 2021: 75 ML/MIN/1.73M2
ERYTHROCYTE [DISTWIDTH] IN BLOOD BY AUTOMATED COUNT: 12.2 % (ref 10–15)
GLUCOSE SERPL-MCNC: 100 MG/DL (ref 70–99)
HCO3 SERPL-SCNC: 27 MMOL/L (ref 22–29)
HCT VFR BLD AUTO: 45.6 % (ref 40–53)
HGB BLD-MCNC: 15.4 G/DL (ref 13.3–17.7)
MCH RBC QN AUTO: 29 PG (ref 26.5–33)
MCHC RBC AUTO-ENTMCNC: 33.8 G/DL (ref 31.5–36.5)
MCV RBC AUTO: 86 FL (ref 78–100)
PLATELET # BLD AUTO: 248 10E3/UL (ref 150–450)
POTASSIUM SERPL-SCNC: 4.5 MMOL/L (ref 3.4–5.3)
RBC # BLD AUTO: 5.31 10E6/UL (ref 4.4–5.9)
SODIUM SERPL-SCNC: 140 MMOL/L (ref 135–145)
WBC # BLD AUTO: 8.1 10E3/UL (ref 4–11)

## 2024-12-12 PROCEDURE — 36415 COLL VENOUS BLD VENIPUNCTURE: CPT | Performed by: PATHOLOGY

## 2024-12-12 PROCEDURE — 80048 BASIC METABOLIC PNL TOTAL CA: CPT | Performed by: PATHOLOGY

## 2024-12-12 PROCEDURE — 85027 COMPLETE CBC AUTOMATED: CPT | Performed by: PATHOLOGY

## 2024-12-12 RX ORDER — PREGABALIN 100 MG/1
100 CAPSULE ORAL 3 TIMES DAILY
COMMUNITY
Start: 2024-11-13

## 2024-12-12 ASSESSMENT — LIFESTYLE VARIABLES: TOBACCO_USE: 1

## 2024-12-12 ASSESSMENT — PAIN SCALES - GENERAL: PAINLEVEL_OUTOF10: MODERATE PAIN (5)

## 2024-12-12 ASSESSMENT — ENCOUNTER SYMPTOMS: SEIZURES: 0

## 2024-12-12 NOTE — PATIENT INSTRUCTIONS
Preparing for Your Surgery      Name:  Kobe Buchanan   MRN:  [id number]   :  1960   Today's Date:  2024       Arriving for surgery:  Surgery date:  24  Arrival time:  5:30 am  Surgery time: 7:30 am    Please come to:     Please come to:       [facility] [address]   [facility]   [address] [place]  [address]     The Green Ramp for patients and visitors is beneath the [facility]. The parking facility entrance is at the intersection of [address] [place] [address] and [address]. Patients and visitors who self-park will receive the reduced hospital parking rate (no ticket validation needed).     [employer] parking, located at the [facility] main entrance on [address] [place] [address], is available Monday - Friday from 7 am to 3:30 pm.     Discounted parking pass options can be purchased from  attendants during business hours.     -Check in at the security desk in the [facility] ([facility])   Lobby. They will direct you to the correct elevators.   -Proceed to the 3rd floor, Adult Surgery Waiting Lounge. [phone]     If you need directions, a wheelchair or escort please stop at the Information Desk in the lobby.  Inform the information person you are here for surgery; a wheelchair and escort to [address] will be provided.   An escort to the Adult Surgery Waiting Lounge will be provided. .    What can I eat or drink?  -  You may eat and drink normally up to 8 hours prior to arrival time. (Until 9:30 pm on 24)  -  You may have clear liquids until 2 hours prior to arrival time. (Until 3:30 am on 24)    Examples of clear liquids:  Water  Clear broth  Juices (apple, white grape, white cranberry  and cider) without pulp  Noncarbonated, powder based beverages  (lemonade and Shawn-Aid)  Sodas (Sprite, 7-Up, ginger ale and seltzer)  Coffee or  tea (without milk or cream)  Gatorade    -  No Alcohol or cannabis products for at least 24 hours before surgery.     Which medicines can I take?    Hold Aspirin for 7 days before surgery.     Hold Ibuprofen (Advil, Motrin) for 3 day(s) before surgery--unless otherwise directed by surgeon.  Hold Naproxen (Aleve) for 4 days before surgery.    -  DO NOT take these medications the day of surgery:   Amlodipine-benazepril (Lotrel)   Docusate   No ointments   Senna-docusate   Sucralfate       -  PLEASE TAKE these medications the day of surgery:   Acetaminophen (Tylenol) as needed   Bupropion (Wellbutrin)   Carvedilol (Coreg)   Clonazepam (Klonopin) as needed   Cyclobenzaprine (Flexeril) as needed   Escitalopram (Lexapro)   Fentanyl patch- ok to have on; it may be removed in preop   Oxycodone as needed   Pantoprazole (Protonix) as needed   Pregabalin (Lyrica)   Propranolol (Inderal)   Tizanidine (Zanaflex) as needed          How do I prepare myself?  - Please take 2 showers (one the night prior to surgery and one the morning of surgery) using Scrubcare or Hibiclens soap.    Use this soap only from the neck to your toes. Avoid genital area      Leave the soap on your skin for one minute--then rinse thoroughly.      You may use your own shampoo and conditioner. No other hair products.   - Please remove all jewelry and body piercings.  - No lotions, deodorants or fragrance.  - Bring your ID and insurance card.    -If you use a CPAP machine, please bring the CPAP machine, tubing, and mask to hospital.    -If you have a Deep Brain Stimulator, Spinal Cord Stimulator, or any Neuro Stimulator device---you must bring the remote control to the hospital.        Covid testing policy as of 12/06/2022  Your surgeon will notify and schedule you for a COVID test if one is needed before surgery--please direct any questions or COVID symptoms to your surgeon      Questions or Concerns:    - For any questions regarding the day of surgery or  your hospital stay, please contact the Pre Admission Nursing Office at [phone].       - If you have health changes between today and your surgery, please call your surgeon.       - For questions after surgery, please call your surgeons office.           Current Visitor Guidelines    You may have 2 visitors in the pre op area.    Visiting hours: 8 a.m. to 8:30 p.m.    Patients confirmed or suspected to have symptoms of COVID 19 or flu:     No visitors allowed for adult patients.   Children (under age 18) can have 1 named visitor.     People who are sick or showing symptoms of COVID 19 or flu:    Are not allowed to visit patients--we can only make exceptions in special situations.       Please follow these guidelines for your visit:          Please maintain social distance          Masking is optional--however at times you may be asked to wear a mask for the safety of yourself and others     Clean your hands with alcohol hand . Do this when you arrive at and leave the building and patient room,    And again after you touch your mask or anything in the room.     Go directly to and from the room you are visiting.     Stay in the patient s room during your visit. Limit going to other places in the hospital as much as possible     Leave bags and jackets at home or in the car.     For everyone s health, please don t come and go during your visit. That includes for smoking   during your visit.

## 2024-12-12 NOTE — H&P
Pre-Operative H & P     CC:  Preoperative exam to assess for increased cardiopulmonary risk while undergoing surgery and anesthesia.    Date of Encounter: 12/12/2024  Primary Care Physician:  Demi Hardin     Reason for visit:   Encounter Diagnoses   Name Primary?    Lumbar radiculopathy Yes    Spinal stenosis of lumbar region with neurogenic claudication     Lumbar spondylosis     Preop examination        HPI  Kobe Buchanan is a 64 year old male who presents for pre-operative H & P in preparation for  Procedure Information       Case: [id number] Date/Time: 12/26/24 0730    Procedure: Lumbar 4 to 5 decompression (Spine)    Anesthesia type: General    Diagnosis:       Lumbar radiculopathy [M54.16]      Spinal stenosis of lumbar region with neurogenic claudication [M48.062]      Lumbar spondylosis [M47.816]    Pre-op diagnosis:       Lumbar radiculopathy [M54.16]      Spinal stenosis of lumbar region with neurogenic claudication [M48.062]      Lumbar spondylosis [M47.816]    [facility]    Providers: Jonathan Prajapati MD            Patient is being evaluated for comorbid conditions of HTN, HLD, prior smoking history, bicuspid AV valve s/p valve sparing repair with Gelweave graft 2016, NICM (EF 30-35%)  since resolved (thought to be stress induced cardiomyopathy), SAVITA, anemia, h/o blood transfusion, BPPV, DM II, obesity, GERD, CKD, chronic pain, depression, anxiety, insomnia, difficult IV, BPH s/p TURP, and DJD s/p bilateral shoulder replacement with subsequent bilateral prosthetic joint infection with subsequent removal and reimplantation, cervical radiculopathy s/p cervical laminectomy/ laminoplasty.    Mr. Buchanan has a history of Lumbar Stenosis with claudication. He is experiencing significant disability and has exhausted nonoperative treatments. He has been counseled by Dr. Prajapati and now presents for the above procedure.      History is obtained from the patient and  chart review    Hx of abnormal bleeding or anti-platelet use: on ASA 81 mg      Past Medical History  Past Medical History:   Diagnosis Date    Acute systolic heart failure (H) 01/28/2020    Added automatically from request for surgery [id number]    NEMESIO (acute kidney injury) (H) 02/16/2020    Anxiety     Ascending aortic aneurysm (H)     Atrial fibrillation (H) [I48.91] 10/10/2016    Bicuspid aortic valve     BPPV (benign paroxysmal positional vertigo) 07/11/2014    Choledocholithiasis     Chronic narcotic use     Chronic neck pain     Chronic osteoarthritis     CKD (chronic kidney disease) stage 3, GFR 30-59 ml/min (H)     Degenerative joint disease     Depression     Dysthymic disorder     Hx of type 2 diabetes mellitus 03/04/2021    Hyperlipidemia 04/10/2012    Hypertension     Iron deficiency anemia secondary to blood loss (chronic) 08/25/2020    MSSA bacteremia 10/13/2019    Multiple stiff joints     Neck injuries     NSTEMI (non-ST elevated myocardial infarction) (H) 01/29/2020    Obesity 02/09/2015    Pain in shoulder     Plantar fasciitis     Pre-diabetes     S/P reverse total shoulder arthroplasty, left 07/07/2020    S/P reverse total shoulder arthroplasty, right 08/18/2020    Septic joint of left shoulder region (H) 11/13/2019    Septic joint of right shoulder region (H) 10/13/2019    s/p washout    Skin picking habit     Sleep apnea     does not use cpap    Spinal stenosis of lumbar region with neurogenic claudication     Status post total shoulder arthroplasty, left 03/03/2020    Added automatically from request for surgery [id number]       Past Surgical History  Past Surgical History:   Procedure Laterality Date    ARTHROPLASTY SHOULDER  04/15/2014    Procedure: Left Total Shoulder Arthroplasty ;  Surgeon: Analilia Aceves MD;  [facility]    ARTHROPLASTY SHOULDER Right 08/26/2014    Procedure: ARTHROPLASTY SHOULDER;  Surgeon: Analilia Aceves MD;  [facility]    ARTHROSCOPY SHOULDER  WITH BIOPSY(IES) Left 01/28/2020    Procedure: Left shoulder arthroscopy and biopsy for culture;  Surgeon: Analilia Aceves MD;  [facility]    BYPASS GASTRIC TAVO-EN-Y, LIVER BIOPSY, COMBINED  08/08/2005    COLONOSCOPY  06/30/2014    Procedure: COMBINED COLONOSCOPY, SINGLE BIOPSY/POLYPECTOMY BY BIOPSY;  Surgeon: Chester Patton MD;  [facility]    COLONOSCOPY N/A 2/15/2024    Procedure: COLONOSCOPY, WITH POLYPECTOMY;  Surgeon: Power Duran MD;  [facility]    CV CORONARY ANGIOGRAM  01/30/2020    Procedure: CV CORONARY ANGIOGRAM;  Surgeon: Néstor Walls MD;  [facility]    CYSTOSCOPY, BLADDER NECK CUTS, COMBINED N/A 07/18/2016    Procedure: COMBINED CYSTOSCOPY, BLADDER NECK CUTS;  Surgeon: Ritu Leslie MD;  [facility]    ENDOSCOPIC RETROGRADE CHOLANGIOPANCREATOGRAM N/A 11/03/2023    Procedure: ENDOSCOPIC RETROGRADE CHOLANGIOPANCREATOGRAPHY, BILIARY SPHINCTEROTOMY, STONE REMOVAL;  Surgeon: Juarez Sevilla MD;  Location: [facility] OR    ESOPHAGOSCOPY, GASTROSCOPY, DUODENOSCOPY (EGD), COMBINED  06/30/2014    Procedure: COMBINED ESOPHAGOSCOPY, GASTROSCOPY, DUODENOSCOPY (EGD), BIOPSY SINGLE OR MULTIPLE;  Surgeon: Chester Patton MD;  [facility]    EXCISE MASS FINGER  06/14/2011    Procedure:EXCISE MASS FINGER; Middle Flexor Cyst; Surgeon:SHAYY RUSSELL; [facility]    IR FINE NEEDLE ASPIRATION W ULTRASOUND  11/13/2019    IR PICC PLACEMENT > 5 YRS OF AGE  11/19/2019    LAMINOPLASTY CERVICAL POSTERIOR THREE+ LEVELS N/A 9/11/2024    Procedure: Cervical 3 and 7 dome laminectomy and 4-6 laminoplasty with medtronic plates;  Surgeon: Jonathan Prajapati MD;  [facility]    LAPAROSCOPIC CHOLECYSTECTOMY N/A 11/03/2023    Procedure: CHOLECYSTECTOMY, LAPAROSCOPIC;  Surgeon: Shayy Mathis MD;  Location: [facility] OR    LASER HOLMIUM LITHOTRIPSY BLADDER N/A 10/15/2014    Procedure:  LASER HOLMIUM LITHOTRIPSY BLADDER;  Surgeon: Sahil Taveras MD;  [facility]    LASER KTP GREEN LIGHT PHOTOSELECTIVE VAPORIZATION PROSTATE  01/23/2014    Procedure: LASER KTP GREEN LIGHT PHOTOSELECTIVE VAPORIZATION PROSTATE;  Greenlight Photovaporization Of Prostate  ;  Surgeon: Sahil Taveras MD;  [facility]    OTHER SURGICAL HISTORY  10/04/2016    REPAIR ANEURYSM ASCENDING AORTA    OTHER SURGICAL HISTORY Right 09/23/2019    IRRIGATION AND DEBRIDEMENT UPPER EXTREMITYshoulder    RELEASE TRIGGER FINGER Right 03/31/2017    Procedure: RELEASE TRIGGER FINGER;  Surgeon: Juan Carlos Blunt MD;  [facility]    REMOVE ANTIBIOTIC CEMENT BEADS / SPACER SHOULDER Left 05/08/2020    Procedure: Left shoulder removal of spacer;  Surgeon: Analilia Aceves MD;  [facility]    REMOVE ANTIBIOTIC CEMENT BEADS / SPACER SHOULDER Right 08/18/2020    Procedure: Removal of right shoulder antibiotic spacer;  Surgeon: Analilia Aceves MD;  [facility]    REMOVE HARDWARE ARTHROPLASTY SHOULDER. I&D, PLACE ANTIBIOTIC CEMENT BE Left 11/15/2019    Procedure: Explantation of left total shoulder arthroplasty, irrigation and debridement, and placement of antibiotic spacer;  Surgeon: Analilia Aceves MD;  [facility]    REPAIR ANEURYSM ASCENDING AORTA N/A 10/04/2016    Procedure: REPAIR ANEURYSM ASCENDING AORTA;  Surgeon: Mckenzie Townsend MD;  [facility]    REVERSE ARTHROPLASTY SHOULDER Right 08/18/2020    Procedure: and conversion to reverse total shoulder arthroplasty;  Surgeon: Analilia Aceves MD;  [facility]    SURGICAL EXPOSURE, LAPAROSCOPIC, W/WOUND CLOSURE, FOR ERCP, BY GENERAL SURGERY N/A 11/03/2023    Procedure: SURGICAL EXPOSURE FOR ENDOSCOPIC RETROGRADE CHOLANGIOPANCREATOGRAPHY;  Surgeon: Kobe Mathis MD;  Location: [facility] OR    TURP         Prior to Admission Medications  Current Outpatient Medications   Medication Sig Dispense Refill     acetaminophen (TYLENOL) 325 MG tablet Take 2 tablets (650 mg) by mouth every 4 hours as needed for other (For optimal non-opioid multimodal pain management to improve pain control.).      amLODIPine-benazepril (LOTREL) 5-20 MG capsule Take 1 capsule by mouth 2 times daily 180 capsule 3    aspirin (ASPIRIN LOW DOSE) 81 MG EC tablet Take 1 tablet (81 mg) by mouth daily. (Patient taking differently: Take 81 mg by mouth every morning.) 90 tablet 3    atorvastatin (LIPITOR) 40 MG tablet Take 1 tablet (40 mg) by mouth daily (Patient taking differently: Take 40 mg by mouth every evening.) 90 tablet 3    bisacodyl (DULCOLAX) 10 MG suppository Place 1 suppository (10 mg) rectally daily as needed for constipation (Use if magnesium hydroxide (MILK of MAGNESIA) is not effective after 24 hours. May discontinue if patient having bowel movement.).      buPROPion (WELLBUTRIN SR) 200 MG 12 hr tablet TAKE 1 TABLET BY MOUTH 2 TIMES DAILY 180 tablet 0    carvedilol (COREG) 25 MG tablet Take 2 tablets (50 mg) by mouth 2 times daily (with meals) 360 tablet 3    clonazePAM (KLONOPIN) 0.5 MG tablet Take 1 tablet (0.5 mg) by mouth 3 times daily as needed for anxiety 90 tablet 0    cyclobenzaprine (FLEXERIL) 10 MG tablet Take 1 tablet (10 mg) by mouth 3 times daily as needed for muscle spasms. 90 tablet 0    docusate sodium (COLACE) 100 MG capsule Take 200 mg by mouth every evening.      docusate sodium (COLACE) 100 MG capsule Take 100 mg by mouth every morning.      escitalopram (LEXAPRO) 20 MG tablet Take 1.5 tablets (30 mg) by mouth daily (Patient taking differently: Take 20 mg by mouth every morning.) 135 tablet 3    fentaNYL (DURAGESIC) 25 mcg/hr 72 hr patch Place 1 patch onto the skin every 48 hours. Pt taking Every 48 hours      fluocinonide (LIDEX) 0.05 % external ointment Apply twice daily to itchy skin nodules for 1-2 weeks at a time. (Patient taking differently: Apply topically as needed. Apply twice daily to itchy skin nodules  for 1-2 weeks at a time.) 30 g 3    mometasone (ELOCON) 0.1 % external ointment Apply topically nightly as needed (rash on arms) 30 g 3    naproxen (EC-NAPROSYN) 500 MG EC tablet Take 1 tablet (500 mg) by mouth 2 times daily (with meals). 180 tablet 1    oxyCODONE IR (ROXICODONE) 10 MG tablet Take 10 mg by mouth every 6 hours as needed for moderate to severe pain.      pantoprazole (PROTONIX) 40 MG EC tablet Take 1 tablet (40 mg) by mouth daily as needed for heartburn, 90 tablet 0    pregabalin (LYRICA) 100 MG capsule Take 100 mg by mouth 3 times daily.      propranolol (INDERAL) 10 MG tablet Take 1 tablet (10 mg) by mouth 3 times daily as needed (panic attack) 90 tablet 3    senna-docusate (SENOKOT-S/PERICOLACE) 8.6-50 MG tablet Take 1 tablet by mouth every morning.      senna-docusate (SENOKOT-S/PERICOLACE) 8.6-50 MG tablet Take 2 tablets by mouth every evening.      sucralfate (CARAFATE) 1 GM tablet Take 1 tablet (1 g) by mouth 2 times daily as needed (Reflux) (Patient taking differently: as needed. Take 1 tablet (1 g) by mouth 2 times daily as needed (Reflux)) 180 tablet 3    tacrolimus (PROTOPIC) 0.1 % external ointment Apply topically as needed (rash on arms) Apply to affected areas on body. 60 g 1    tiZANidine (ZANAFLEX) 2 MG tablet Take 1-2 tablets (2-4 mg) by mouth every 8 hours as needed for muscle spasms. 100 tablet 3    triamcinolone (KENALOG) 0.1 % external ointment Apply topically 2 times daily To affected areas of rash. Please avoid application to the face, groin or armpits as the medication is too strong for these areas. 454 g 0    diazepam (VALIUM) 5 MG tablet Take one before MRI/MRA procedure about 30 minutes before (Patient not taking: Reported on 12/12/2024) 2 tablet 0    Fe Heme Polypeptide-folic acid (PROFERRIN-FORTE) 12-1 MG TABS Take 1 tablet by mouth 2 times daily (Patient not taking: Reported on 12/12/2024) 90 tablet 0    naloxone (NARCAN) 4 MG/0.1ML nasal spray Spray 1 spray (4 mg) into  one nostril alternating nostrils as needed for opioid reversal. every 2-3 minutes until assistance arrives 2 each 0    polyethylene glycol (GOLYTELY) 236 g suspension Take as directed. Two days before your exam fill the first container with water. Cover and shake until mixed well. At 5:00pm drink one 8oz glass every 10-15 minutes until half (1/2) of the first container is empty. Store the remainder in the refrigerator. One day before your exam at 5:00pm drink the second half of the first container until it is gone. Before you go to bed mix the second container with water and put in refrigerator. Six hours before your check in time drink one 8oz glass every 10-15 minutes until half of container is empty. Discard the remainder of solution. (Patient not taking: Reported on 2024) 8000 mL 0       Allergies  Allergies   Allergen Reactions    Ciprofloxacin      History of aortic aneurysms    Gabapentin Dizziness    Tape [Adhesive Tape] Blisters     Blistering - please use paper tape       Social History  Social History     Socioeconomic History    Marital status: Single     Spouse name: Not on file    Number of children: Not on file    Years of education: Not on file    Highest education level: Not on file   Occupational History    Occupation: Disabled   Tobacco Use    Smoking status: Former     Current packs/day: 0.00     Average packs/day: 0.5 packs/day for 6.6 years (3.3 ttl pk-yrs)     Types: Cigarettes     Start date: 1977     Quit date: 1983     Years since quittin.3     Passive exposure: Never (per pt)    Smokeless tobacco: Never   Substance and Sexual Activity    Alcohol use: No     Alcohol/week: 0.0 standard drinks of alcohol    Drug use: No    Sexual activity: Not Currently     Partners: Female     Birth control/protection: Abstinence   Other Topics Concern    Parent/sibling w/ CABG, MI or angioplasty before 65F 55M? Not Asked   Social History Narrative    Live independently, one sister, Zhanna  on [facility], one sister, Supriya in [place], [place].  Does live with 2 dogs.      Social Drivers of Health     Financial Resource Strain: Low Risk  (9/13/2024)    Financial Resource Strain     Within the past 12 months, have you or your family members you live with been unable to get utilities (heat, electricity) when it was really needed?: No   Food Insecurity: Low Risk  (9/13/2024)    Food Insecurity     Within the past 12 months, did you worry that your food would run out before you got money to buy more?: No     Within the past 12 months, did the food you bought just not last and you didn t have money to get more?: No   Transportation Needs: Low Risk  (9/13/2024)    Transportation Needs     Within the past 12 months, has lack of transportation kept you from medical appointments, getting your medicines, non-medical meetings or appointments, work, or from getting things that you need?: No   Physical Activity: Not on file   Stress: Not on file   Social Connections: Socially Isolated (8/31/2021)    Social Connection and Isolation Panel [NHANES]     Frequency of Communication with Friends and Family: Once a week     Frequency of Social Gatherings with Friends and Family: Never     Attends Sabianism Services: Never     Active Member of Clubs or Organizations: No     Attends Club or Organization Meetings: Never     Marital Status: Never    Interpersonal Safety: Low Risk  (9/8/2024)    Interpersonal Safety     Do you feel physically and emotionally safe where you currently live?: Yes     Within the past 12 months, have you been hit, slapped, kicked or otherwise physically hurt by someone?: No     Within the past 12 months, have you been humiliated or emotionally abused in other ways by your partner or ex-partner?: No   Housing Stability: Low Risk  (9/13/2024)    Housing Stability     Do you have housing? : Yes     Are you worried about losing your housing?: No       Family History  Family History   Problem Relation  Age of Onset    Anxiety Disorder Mother     Hypertension Mother     Osteoporosis Mother     Obesity Mother     Hyperlipidemia Mother     Low Back Problems Mother     Macular Degeneration Mother     Cataracts Mother     Other - See Comments Mother         low back problems    Cardiovascular Father         aortic aneurysm    Arrhythmia Father     Nephrolithiasis Father     Sleep Apnea Father     Anxiety Disorder Father     Depression Father     Hypertension Father     Obesity Father     Hyperlipidemia Father     Coronary Artery Disease Father     Low Back Problems Father     Spine Problems Father     Other - See Comments Father         low back problems, spine problems    Anxiety Disorder Sister     Hypertension Sister     Osteoporosis Sister     Obesity Sister     Hyperlipidemia Sister     Low Back Problems Sister     Spine Problems Sister     Anxiety Disorder Sister     Depression Sister     Hypertension Sister     Osteoporosis Sister     Obesity Sister     Hyperlipidemia Sister     Low Back Problems Sister     Pulmonary Embolism Sister     Anxiety Disorder Sister     Hyperlipidemia Sister     Hypertension Sister     Obesity Sister     Other - See Comments Sister         low back problems, spine problems    Osteoporosis Sister     Anxiety Disorder Sister     Depression Sister     Hyperlipidemia Sister     Hypertension Sister     Other - See Comments Sister         low back problems    Obesity Sister     Osteoporosis Sister     Arthritis Other     Gastrointestinal Disease Other     Melanoma No family hx of     Skin Cancer No family hx of     Glaucoma No family hx of     Anesthesia Reaction No family hx of        Review of Systems  The complete review of systems is negative other than noted in the HPI or here.     Anesthesia Evaluation   Pt has had prior anesthetic.     No history of anesthetic complications       ROS/MED HX  ENT/Pulmonary:     (+) sleep apnea (Has not used CPAP since gastric bypass.), doesn't use  "CPAP,              tobacco use (Quit at age 24), Past use,                    (-) asthma and recent URI   Neurologic: Comment: H/o BPPV no recent acute episodes.    (-) no seizures, no CVA, no TIA and migraines   Cardiovascular: Comment: Hx stress induced cardiomyopathy w/ EF 30-35%; Resolved. Coronary angiogram showed non-obstructive CAD. Patient tells me this was in the setting of infection with his shoulder.     Denies cardiac symptoms including chest pain, SOB, palpitations, syncope, STALEY, orthopnea, or PND.    Per record review:   Anesthesia record 1/2020:Event described per intraoperative record and postop \"significant event\" note. Severe hypertension intraoperatively and prolonged runs of ectopy. Sent to ER for evaluation.  Per chart review:  \"Patient was noted by anesthesia to have a 10 beat run of VT and profound hypertension between 206/134-226/152. This period of hypertensive emergency lasted ~4 minutes per records. Anaesthesia referred him to the ED due to the NSVT run\".     Per ED's 1/28/2020 note, \"Given his lack of ACS symptoms, suspect this is demand in the setting of hypertensive emergency likely triggered by severe pain. He is on a significant amount of narcotics at baseline due to chronic pain from the above surgeries\". Patient on telemetry overnight and TTE done.          (+) Dyslipidemia hypertension- -   -  - -   Taking blood thinners (ASA 81 mg)                        valvular problems/murmurs  Bicuspid AV valve s/p valve sparing repair with Gelweave graft 2016.    Previous cardiac testing   Echo: Date: 9/9/24 Results:  H/O Bicuspid aortic valve with valve sparing aortic root replacement (2016).     Left ventricular function is normal.The ejection fraction is 60-65%. No  regional wall motion abnormalities are seen.  Global right ventricular function is normal.  The aortic valve is bicuspid. Fusion of the right and left coronary cusps.  Lucy I. Trace aortic insufficiency is present.  No " pericardial effusion is present.     This study was compared with the study from 7/24/23 .  No significant changes noted.    Stress Test:  Date: Results:    ECG Reviewed:  Date: 2023 Results:  Normal Sinus Rhythm, rate 61, normal axis, normal intervals, no acute ST/T changes c/w ischemia, Q waves in lead 3, compared to 2020, new q waves  Cath:  Date: 2020 Results:    Mild non obstructive coronary disease         METS/Exercise Tolerance: >4 METS Comment: Patient has two small dogs and walks them regularly.  Can walk 2 blocks. Endorses back pain if dogs pull.    Hematologic: Comments: Difficult IV access    (+)      anemia, history of blood transfusion, no previous transfusion reaction, Known PRBC Anitbodies:No    (-) history of blood clots   Musculoskeletal: Comment: DJD, s/p B/L reverse total shoulder with explant 2/2 infection. Subsequent reimplantation.    Cervical radiculopathy s/p cervical laminectomy/ laminoplasty.    (+)  arthritis,             GI/Hepatic: Comment: S/p gastric bypass 2005    (+) GERD, Asymptomatic on medication,        cholecystitis/cholelithiasis (s/p cholecytectomy), hepatitis (s/p treatment in 1998) type C,     (-) liver disease   Renal/Genitourinary: Comment: Baseline creatinine 1-1.3    (+) renal disease, type: CRI,      BPH (s/p TURP),      Endo:     (+)  type II DM (Since gastric bypass, A1C's have been WNL.), Last HgA1c: 5.6, date: 2023, Not using insulin, - not using insulin pump.         Obesity (s/p gastric bypass 2005),    (-) thyroid disease and chronic steroid usage   Psychiatric/Substance Use:     (+) psychiatric history (Insomnia) anxiety, depression and other (comment)    (-) alcohol abuse history and chronic opioid use history   Infectious Disease:  - neg infectious disease ROS     Malignancy:  - neg malignancy ROS  (-) malignancy   Other:  - neg other ROS    (+)  , H/O Chronic Pain,         BP (!) 145/75 (BP Location: Right arm, Patient Position: Sitting, Cuff Size: Adult  "Regular)   Pulse 63   Temp 97.4  F (36.3  C) (Oral)   Resp 16   Ht 1.778 m (5' 10\")   Wt 102.1 kg (225 lb 1.6 oz)   SpO2 98%   BMI 32.30 kg/m      Physical Exam  Constitutional: Awake, alert, cooperative, no apparent distress, and appears stated age.  Eyes: Pupils equal, round and reactive to light, extra ocular muscles intact, sclera clear, conjunctiva normal.  HENT: Normocephalic, oral pharynx with moist mucus membranes. No goiter appreciated. Has upper & lower dentures.  Respiratory: Clear to auscultation bilaterally, no crackles or wheezing. No SOB when supine.  Cardiovascular: Distant heart sounds. Regular rate and rhythm, normal S1 and S2, and 1/6 murmur noted.  Carotids +2, no bruits. No edema. Palpable pulses to radial, DP and PT arteries.   GI: Normal bowel sounds, soft, non-distended, non-tender, no masses palpated.    Lymph/Hematologic: No cervical lymphadenopathy and no supraclavicular lymphadenopathy.  Genitourinary:  deferred  Skin: Warm and dry.  No rashes.   Musculoskeletal: full ROM of neck. There is no redness, warmth, or swelling of the joints. Gross motor strength is normal.    Neurologic: Awake, alert, oriented to name, place and time. Cranial nerves II-XII are grossly intact. Gait is normal. Ambulates from chair to exam table, seats self, lies supine and sits back up w/o assistance.  Neuropsychiatric: Calm, cooperative. Normal affect. Pleasant. Answers questions appropriately, follows commands w/o difficulty.        PRIOR LABS/DIAGNOSTIC STUDIES:    All pertinent labs and imaging personally reviewed      Heart cath -  please see in ROS above     9/9/24 Echo result w/o MOPS: Interpretation SummaryH/O Bicuspid aortic valve with valve sparing aortic root replacement (2016). Left ventricular function is normal.The ejection fraction is 60-65%. Noregional wall motion abnormalities are seen.Global right ventricular function is normal.The aortic valve is bicuspid. Fusion of the right and left " "coronary cusps.Lucy I. Trace aortic insufficiency is present.No pericardial effusion is present. This study was compared with the study from 7/24/23 .No significant changes noted.      The patient's records and results personally reviewed by this provider.       LAB/DIAGNOSTIC STUDIES TODAY:  BMP, CBC    Assessment    Kobe Buchanan is a 64 year old male seen as a PAC referral for risk assessment and optimization for anesthesia.    Plan/Recommendations  Pt will be optimized for the proposed procedure.  See below for details on the assessment, risk, and preoperative recommendations    NEUROLOGY  - No history of TIA, CVA or seizure    -Post Op delirium risk factors:  No risk identified    ANESTHESIA  - Per record review:   Anesthesia record 1/2020: Event described per intraoperative record and postop \"significant event\" note. Severe hypertension intraoperatively and prolonged runs of ectopy. Sent to ER for evaluation.  Per chart review:  \"Patient was noted by anesthesia to have a 10 beat run of VT and profound hypertension between 206/134-226/152. This period of hypertensive emergency lasted ~4 minutes per records. Anesthesia referred him to the ED due to the NSVT run\".     ENT  - No current airway concerns.  Will need to be reassessed day of surgery.  Mallampati: III  TM: > 3  - Upper & lower dentures    CARDIAC  - HTN, hold amlodipine-banazepril DOS, continue Coreg (& prn propanolol for panic attacks).   - Bicuspid AV valve s/p valve sparing repair with Gelweave graft 2016.  - Hx stress induced cardiomyopathy in 2020 w/ EF 30-35%; Resolved. Coronary angiogram showed non-obstructive CAD. Patient tells me this was in the setting of infection with his shoulder.   - Echo 9/9/24: EF 60-65%, normal function    - METS (Metabolic Equivalents)  Patient has two small dogs and walks them regularly.  Can walk 2 blocks. Endorses back pain if dogs pull.     Patient performs 4 or more METS exercise without symptoms        " "     Total Score: 0      RCRI-Very low risk: Class 1 0.4% complication rate             Total Score: 0        PULMONARY  - Hx SAVITA, resolved following gastric bypass/ wt loss    SAVITA Medium Risk             Total Score: 3    SAVITA: Hypertension    SAVITA: Over 50 ys old    SAVITA: Male      - Denies asthma or inhaler use  - Tobacco History    History   Smoking Status    Former    Types: Cigarettes   Smokeless Tobacco    Never       GI  - GERD, Controlled on medications: Proton Pump Inhibitor  - s/p gastric bypass 2005  - Hx Hep C, s/p treatment    PONV Medium Risk  Total Score: 2           1 AN PONV: Patient is not a current smoker    1 AN PONV: Intended Post Op Opioids        /RENAL  - Baseline Creatinine  1-1.3  - BPH, s/p TURP    ENDOCRINE    - BMI: Estimated body mass index is 32.3 kg/m  as calculated from the following:    Height as of this encounter: 1.778 m (5' 10\").    Weight as of this encounter: 102.1 kg (225 lb 1.6 oz).  Obesity (BMI >30)  - Hx DM, resolved following bariatric surgery. A1c's have been WNL since.    HEME  VTE Medium Risk 1.8%             Total Score: 7    VTE: Greater than 59 yrs old    VTE: Male    VTE: Family Hx of VTE      - On ASA 81 mg, hold x7d prior  - Difficult IV access      MSK  Patient is NOT Frail             Total Score: 1    Frailty: Slower walking speed      - DJD, s/p B/L reverse total shoulder with explant 2/2 infection. Subsequent reimplantation.  - Cervical radiculopathy s/p cervical laminectomy/ laminoplasty.  - Lumbar radiculopathy      The patient is aware that the final anesthesia plan will be decided by the assigned anesthesia provider on the date of service.      The patient is optimized for their procedure. AVS with information on surgery time/arrival time, meds and NPO status given by nursing staff. No further diagnostic testing indicated.      On the day of service:     Prep time: 15 minutes  Visit time: 26 minutes  Documentation time: 16 " minutes  ------------------------------------------  Total time: 57 minutes      Marisol Marroquin PA-C  Preoperative Assessment Center  [facility]  [facility]  Phone: [phone]  Fax: [phone]

## 2024-12-25 ASSESSMENT — LIFESTYLE VARIABLES: TOBACCO_USE: 1

## 2024-12-25 ASSESSMENT — ENCOUNTER SYMPTOMS: SEIZURES: 0

## 2024-12-25 NOTE — ANESTHESIA PREPROCEDURE EVALUATION
Anesthesia Pre-Procedure Evaluation    Patient: Kobe Buchanan   MRN: [id number] : 1960        Procedure : Procedure(s):  Lumbar 4 to 5 decompression          Past Medical History:   Diagnosis Date    Acute systolic heart failure (H) 2020    Added automatically from request for surgery [id number]    NEMESIO (acute kidney injury) (H) 2020    Anxiety     Ascending aortic aneurysm (H)     Atrial fibrillation (H) [I48.91] 10/10/2016    Bicuspid aortic valve     BPPV (benign paroxysmal positional vertigo) 2014    Choledocholithiasis     Chronic narcotic use     Chronic neck pain     Chronic osteoarthritis     CKD (chronic kidney disease) stage 3, GFR 30-59 ml/min (H)     Degenerative joint disease     Depression     Dysthymic disorder     Hx of type 2 diabetes mellitus 2021    Hyperlipidemia 04/10/2012    Hypertension     Iron deficiency anemia secondary to blood loss (chronic) 2020    MSSA bacteremia 10/13/2019    Multiple stiff joints     Neck injuries     NSTEMI (non-ST elevated myocardial infarction) (H) 2020    Obesity 2015    Pain in shoulder     Plantar fasciitis     Pre-diabetes     S/P reverse total shoulder arthroplasty, left 2020    S/P reverse total shoulder arthroplasty, right 2020    Septic joint of left shoulder region (H) 2019    Septic joint of right shoulder region (H) 10/13/2019    s/p washout    Skin picking habit     Sleep apnea     does not use cpap    Spinal stenosis of lumbar region with neurogenic claudication     Status post total shoulder arthroplasty, left 2020    Added automatically from request for surgery [id number]      Past Surgical History:   Procedure Laterality Date    ARTHROPLASTY SHOULDER  04/15/2014    Procedure: Left Total Shoulder Arthroplasty ;  Surgeon: Analilia Aceves MD;  [facility]    ARTHROPLASTY SHOULDER Right 2014    Procedure: ARTHROPLASTY SHOULDER;  Surgeon: Analilia Aceves,  MD;  [facility]    ARTHROSCOPY SHOULDER WITH BIOPSY(IES) Left 01/28/2020    Procedure: Left shoulder arthroscopy and biopsy for culture;  Surgeon: Analilia Aceves MD;  [facility]    BYPASS GASTRIC TAVO-EN-Y, LIVER BIOPSY, COMBINED  08/08/2005    COLONOSCOPY  06/30/2014    Procedure: COMBINED COLONOSCOPY, SINGLE BIOPSY/POLYPECTOMY BY BIOPSY;  Surgeon: Chester Patton MD;  [facility]    COLONOSCOPY N/A 2/15/2024    Procedure: COLONOSCOPY, WITH POLYPECTOMY;  Surgeon: Power Duran MD;  [facility]    CV CORONARY ANGIOGRAM  01/30/2020    Procedure: CV CORONARY ANGIOGRAM;  Surgeon: Néstor Walls MD;  [facility]    CYSTOSCOPY, BLADDER NECK CUTS, COMBINED N/A 07/18/2016    Procedure: COMBINED CYSTOSCOPY, BLADDER NECK CUTS;  Surgeon: Ritu Leslie MD;  [facility]    ENDOSCOPIC RETROGRADE CHOLANGIOPANCREATOGRAM N/A 11/03/2023    Procedure: ENDOSCOPIC RETROGRADE CHOLANGIOPANCREATOGRAPHY, BILIARY SPHINCTEROTOMY, STONE REMOVAL;  Surgeon: Juarez Sevilla MD;  Location: [facility] OR    ESOPHAGOSCOPY, GASTROSCOPY, DUODENOSCOPY (EGD), COMBINED  06/30/2014    Procedure: COMBINED ESOPHAGOSCOPY, GASTROSCOPY, DUODENOSCOPY (EGD), BIOPSY SINGLE OR MULTIPLE;  Surgeon: Chester Patton MD;  [facility]    EXCISE MASS FINGER  06/14/2011    Procedure:EXCISE MASS FINGER; Middle Flexor Cyst; Surgeon:SHAYY RUSSELL; [facility]    IR FINE NEEDLE ASPIRATION W ULTRASOUND  11/13/2019    IR PICC PLACEMENT > 5 YRS OF AGE  11/19/2019    LAMINOPLASTY CERVICAL POSTERIOR THREE+ LEVELS N/A 9/11/2024    Procedure: Cervical 3 and 7 dome laminectomy and 4-6 laminoplasty with medtronic plates;  Surgeon: Jonathan Prajapati MD;  [facility]    LAPAROSCOPIC CHOLECYSTECTOMY N/A 11/03/2023    Procedure: CHOLECYSTECTOMY, LAPAROSCOPIC;  Surgeon: Shayy Mathis MD;  Location: [facility] OR    LASER HOLMIUM  LITHOTRIPSY BLADDER N/A 10/15/2014    Procedure: LASER HOLMIUM LITHOTRIPSY BLADDER;  Surgeon: Sahil Taveras MD;  [facility]    LASER KTP GREEN LIGHT PHOTOSELECTIVE VAPORIZATION PROSTATE  01/23/2014    Procedure: LASER KTP GREEN LIGHT PHOTOSELECTIVE VAPORIZATION PROSTATE;  Greenlight Photovaporization Of Prostate  ;  Surgeon: Sahil Taveras MD;  [facility]    OTHER SURGICAL HISTORY  10/04/2016    REPAIR ANEURYSM ASCENDING AORTA    OTHER SURGICAL HISTORY Right 09/23/2019    IRRIGATION AND DEBRIDEMENT UPPER EXTREMITYshoulder    RELEASE TRIGGER FINGER Right 03/31/2017    Procedure: RELEASE TRIGGER FINGER;  Surgeon: Juan Carlos Blunt MD;  [facility]    REMOVE ANTIBIOTIC CEMENT BEADS / SPACER SHOULDER Left 05/08/2020    Procedure: Left shoulder removal of spacer;  Surgeon: Analilia Aceves MD;  [facility]    REMOVE ANTIBIOTIC CEMENT BEADS / SPACER SHOULDER Right 08/18/2020    Procedure: Removal of right shoulder antibiotic spacer;  Surgeon: Analilia Aceves MD;  [facility]    REMOVE HARDWARE ARTHROPLASTY SHOULDER. I&D, PLACE ANTIBIOTIC CEMENT BE Left 11/15/2019    Procedure: Explantation of left total shoulder arthroplasty, irrigation and debridement, and placement of antibiotic spacer;  Surgeon: Analilia Aceves MD;  [facility]    REPAIR ANEURYSM ASCENDING AORTA N/A 10/04/2016    Procedure: REPAIR ANEURYSM ASCENDING AORTA;  Surgeon: Mckenzie Townsend MD;  [facility]    REVERSE ARTHROPLASTY SHOULDER Right 08/18/2020    Procedure: and conversion to reverse total shoulder arthroplasty;  Surgeon: Analilia Aceves MD;  [facility]    SURGICAL EXPOSURE, LAPAROSCOPIC, W/WOUND CLOSURE, FOR ERCP, BY GENERAL SURGERY N/A 11/03/2023    Procedure: SURGICAL EXPOSURE FOR ENDOSCOPIC RETROGRADE CHOLANGIOPANCREATOGRAPHY;  Surgeon: Kobe Mathis MD;  Location: [facility] OR    TURP        Allergies   Allergen Reactions    Ciprofloxacin   "    History of aortic aneurysms    Gabapentin Dizziness    Tape [Adhesive Tape] Blisters     Blistering - please use paper tape      Social History     Tobacco Use    Smoking status: Former     Current packs/day: 0.00     Average packs/day: 0.5 packs/day for 6.6 years (3.3 ttl pk-yrs)     Types: Cigarettes     Start date: 1977     Quit date: 1983     Years since quittin.3     Passive exposure: Never (per pt)    Smokeless tobacco: Never   Substance Use Topics    Alcohol use: No     Alcohol/week: 0.0 standard drinks of alcohol      Wt Readings from Last 1 Encounters:   24 102.1 kg (225 lb 1.6 oz)        Anesthesia Evaluation   Pt has had prior anesthetic.     No history of anesthetic complications       ROS/MED HX  ENT/Pulmonary:     (+) sleep apnea (Has not used CPAP since gastric bypass.), doesn't use CPAP,              tobacco use (Quit at age 24), Past use,                    (-) asthma and recent URI   Neurologic: Comment: H/o BPPV no recent acute episodes.    (-) no seizures, no CVA, no TIA and migraines   Cardiovascular: Comment: Hx stress induced cardiomyopathy w/ EF 30-35%; Resolved. Coronary angiogram showed non-obstructive CAD. Patient tells me this was in the setting of infection with his shoulder.     Denies cardiac symptoms including chest pain, SOB, palpitations, syncope, STALEY, orthopnea, or PND.    Per record review:   Anesthesia record 2020:Event described per intraoperative record and postop \"significant event\" note. Severe hypertension intraoperatively and prolonged runs of ectopy. Sent to ER for evaluation.  Per chart review:  \"Patient was noted by anesthesia to have a 10 beat run of VT and profound hypertension between 206/134-226/152. This period of hypertensive emergency lasted ~4 minutes per records. Anaesthesia referred him to the ED due to the NSVT run\".     Per ED's 2020 note, \"Given his lack of ACS symptoms, suspect this is demand in the setting of hypertensive " "emergency likely triggered by severe pain. He is on a significant amount of narcotics at baseline due to chronic pain from the above surgeries\". Patient on telemetry overnight and TTE done.          (+) Dyslipidemia hypertension- -   -  - -   Taking blood thinners (ASA 81 mg)                        valvular problems/murmurs  Bicuspid AV valve s/p valve sparing repair with Gelweave graft 2016.    Previous cardiac testing   Echo: Date: 9/9/24 Results:  H/O Bicuspid aortic valve with valve sparing aortic root replacement (2016).     Left ventricular function is normal.The ejection fraction is 60-65%. No  regional wall motion abnormalities are seen.  Global right ventricular function is normal.  The aortic valve is bicuspid. Fusion of the right and left coronary cusps.  Lucy I. Trace aortic insufficiency is present.  No pericardial effusion is present.     This study was compared with the study from 7/24/23 .  No significant changes noted.    Stress Test:  Date: Results:    ECG Reviewed:  Date: 2023 Results:  Normal Sinus Rhythm, rate 61, normal axis, normal intervals, no acute ST/T changes c/w ischemia, Q waves in lead 3, compared to 2020, new q waves  Cath:  Date: 2020 Results:    Mild non obstructive coronary disease         METS/Exercise Tolerance: >4 METS Comment: Patient has two small dogs and walks them regularly.  Can walk 2 blocks. Endorses back pain if dogs pull.    Hematologic: Comments: Difficult IV access    (+)      anemia, history of blood transfusion, no previous transfusion reaction, Known PRBC Anitbodies:No    (-) history of blood clots   Musculoskeletal: Comment: DJD, s/p B/L reverse total shoulder with explant 2/2 infection. Subsequent reimplantation.    Cervical radiculopathy s/p cervical laminectomy/ laminoplasty.    (+)  arthritis,             GI/Hepatic: Comment: S/p gastric bypass 2005    (+) GERD, Asymptomatic on medication,        cholecystitis/cholelithiasis (s/p cholecytectomy), hepatitis " (s/p treatment in 1998) type C,     (-) liver disease   Renal/Genitourinary: Comment: Baseline creatinine 1-1.3    (+) renal disease, type: CRI,      BPH (s/p TURP),      Endo:     (+)  type II DM (Since gastric bypass, A1C's have been WNL.), Last HgA1c: 5.6, date: 2023, Not using insulin, - not using insulin pump.         Obesity (s/p gastric bypass 2005),    (-) thyroid disease and chronic steroid usage   Psychiatric/Substance Use:     (+) psychiatric history (Insomnia) anxiety, depression and other (comment)    (-) alcohol abuse history and chronic opioid use history   Infectious Disease:  - neg infectious disease ROS     Malignancy:  - neg malignancy ROS  (-) malignancy   Other:  - neg other ROS    (+)  , H/O Chronic Pain,         Physical Exam    Airway        Mallampati: I   TM distance: > 3 FB   Neck ROM: full   Mouth opening: > 3 cm    Respiratory Devices and Support         Dental       (+) Edentulous and Removable bridges or other hardware      Cardiovascular   cardiovascular exam normal          Pulmonary   pulmonary exam normal            Other findings:  Placement Date: 09/11/24; Placement Time: 1129 (created via procedure documentation); Mask Ventilation: 2; Induction Type: Intravenous; Ease of Intubation: Easy; Technique: Video laryngoscopy; Tube Size: 8 mm; VL Blade Size: Glide scope 3; Grade View: 1; Adjucts: Stylet; Placement Person: CRNA; Attempts: 1    OUTSIDE LABS:  CBC:   Lab Results   Component Value Date    WBC 8.1 12/12/2024    WBC 8.6 09/24/2024    HGB 15.4 12/12/2024    HGB 15.3 09/24/2024    HCT 45.6 12/12/2024    HCT 47.5 09/24/2024     12/12/2024     09/24/2024     BMP:   Lab Results   Component Value Date     12/12/2024     (L) 09/11/2024    POTASSIUM 4.5 12/12/2024    POTASSIUM 4.3 09/11/2024    CHLORIDE 102 12/12/2024    CHLORIDE 100 09/11/2024    CO2 27 12/12/2024    CO2 25 09/11/2024    BUN 16.2 12/12/2024    BUN 32.0 (H) 09/11/2024    CR 1.10 12/12/2024     CR 1.09 09/11/2024     (H) 12/12/2024     (H) 09/14/2024     COAGS:   Lab Results   Component Value Date    PTT 39 (H) 10/04/2016    INR 1.05 09/11/2024     POC:   Lab Results   Component Value Date    BGM 78 05/08/2020     HEPATIC:   Lab Results   Component Value Date    ALBUMIN 4.3 03/01/2024    PROTTOTAL 7.2 03/01/2024    ALT 30 03/01/2024    AST 34 03/01/2024    ALKPHOS 122 03/01/2024    BILITOTAL 0.4 03/01/2024     OTHER:   Lab Results   Component Value Date    PH 7.39 10/04/2016    LACT 0.8 02/15/2020    A1C 5.6 08/24/2023    NIA 9.5 12/12/2024    PHOS 3.7 02/26/2020    MAG 2.2 11/02/2023    LIPASE 12 (L) 11/02/2023    TSH 2.96 01/10/2018    T4 1.45 11/02/2016    CRP <2.9 01/30/2020    SED 25 (H) 01/15/2020       Anesthesia Plan    ASA Status:  3    NPO Status:  NPO Appropriate    Anesthesia Type: General.     - Airway: ETT   Induction: Intravenous, Propofol.   Maintenance: Balanced.   Techniques and Equipment:     - Airway: Video-Laryngoscope       Consents    Anesthesia Plan(s) and associated risks, benefits, and realistic alternatives discussed. Questions answered and patient/representative(s) expressed understanding.     - Discussed: Risks, Benefits and Alternatives for BOTH SEDATION and the PROCEDURE were discussed     - Discussed with:  Patient      - Extended Intubation/Ventilatory Support Discussed: No.      - Patient is DNR/DNI Status: No     Use of blood products discussed: No .     Postoperative Care    Pain management: IV analgesics, Oral pain medications, Multi-modal analgesia.   PONV prophylaxis: Ondansetron (or other 5HT-3), Dexamethasone or Solumedrol, Background Propofol Infusion     Comments:               Jeff Wood MD    I have reviewed the pertinent notes and labs in the chart from the past 30 days and (re)examined the patient.  Any updates or changes from those notes are reflected in this note.               # Hypertension: Noted on problem list           # Obesity:  "Estimated body mass index is 32.3 kg/m  as calculated from the following:    Height as of 12/12/24: 1.778 m (5' 10\").    Weight as of 12/12/24: 102.1 kg (225 lb 1.6 oz).       # Financial/Environmental Concerns:          "

## 2024-12-26 ENCOUNTER — APPOINTMENT (OUTPATIENT)
Dept: GENERAL RADIOLOGY | Facility: CLINIC | Age: 64
DRG: 519 | End: 2024-12-26
Attending: ORTHOPAEDIC SURGERY
Payer: COMMERCIAL

## 2024-12-26 ENCOUNTER — HOSPITAL ENCOUNTER (INPATIENT)
Facility: CLINIC | Age: 64
End: 2024-12-26
Attending: ORTHOPAEDIC SURGERY | Admitting: STUDENT IN AN ORGANIZED HEALTH CARE EDUCATION/TRAINING PROGRAM
Payer: COMMERCIAL

## 2024-12-26 ENCOUNTER — ANESTHESIA (OUTPATIENT)
Dept: SURGERY | Facility: CLINIC | Age: 64
End: 2024-12-26
Payer: COMMERCIAL

## 2024-12-26 VITALS
BODY MASS INDEX: 31.72 KG/M2 | DIASTOLIC BLOOD PRESSURE: 85 MMHG | TEMPERATURE: 98 F | RESPIRATION RATE: 18 BRPM | HEART RATE: 84 BPM | SYSTOLIC BLOOD PRESSURE: 139 MMHG | HEIGHT: 70 IN | OXYGEN SATURATION: 98 % | WEIGHT: 221.56 LBS

## 2024-12-26 DIAGNOSIS — M48.02 CERVICAL STENOSIS OF SPINAL CANAL: ICD-10-CM

## 2024-12-26 DIAGNOSIS — Z98.890 STATUS POST LUMBAR SPINE SURGERY FOR DECOMPRESSION OF SPINAL CORD: ICD-10-CM

## 2024-12-26 DIAGNOSIS — M48.062 LUMBAR STENOSIS WITH NEUROGENIC CLAUDICATION: Primary | ICD-10-CM

## 2024-12-26 LAB
GLUCOSE BLDC GLUCOMTR-MCNC: 119 MG/DL (ref 70–99)
INR PPP: 1.04 (ref 0.85–1.15)

## 2024-12-26 PROCEDURE — 250N000013 HC RX MED GY IP 250 OP 250 PS 637: Performed by: STUDENT IN AN ORGANIZED HEALTH CARE EDUCATION/TRAINING PROGRAM

## 2024-12-26 PROCEDURE — 250N000011 HC RX IP 250 OP 636: Performed by: PHYSICIAN ASSISTANT

## 2024-12-26 PROCEDURE — 250N000009 HC RX 250: Performed by: NURSE ANESTHETIST, CERTIFIED REGISTERED

## 2024-12-26 PROCEDURE — 360N000084 HC SURGERY LEVEL 4 W/ FLUORO, PER MIN: Performed by: ORTHOPAEDIC SURGERY

## 2024-12-26 PROCEDURE — 250N000011 HC RX IP 250 OP 636: Performed by: STUDENT IN AN ORGANIZED HEALTH CARE EDUCATION/TRAINING PROGRAM

## 2024-12-26 PROCEDURE — 250N000013 HC RX MED GY IP 250 OP 250 PS 637: Performed by: PHYSICIAN ASSISTANT

## 2024-12-26 PROCEDURE — 250N000011 HC RX IP 250 OP 636: Mod: JZ | Performed by: STUDENT IN AN ORGANIZED HEALTH CARE EDUCATION/TRAINING PROGRAM

## 2024-12-26 PROCEDURE — 250N000013 HC RX MED GY IP 250 OP 250 PS 637

## 2024-12-26 PROCEDURE — 710N000010 HC RECOVERY PHASE 1, LEVEL 2, PER MIN: Performed by: ORTHOPAEDIC SURGERY

## 2024-12-26 PROCEDURE — 272N000001 HC OR GENERAL SUPPLY STERILE: Performed by: ORTHOPAEDIC SURGERY

## 2024-12-26 PROCEDURE — 258N000003 HC RX IP 258 OP 636: Performed by: NURSE ANESTHETIST, CERTIFIED REGISTERED

## 2024-12-26 PROCEDURE — 250N000011 HC RX IP 250 OP 636: Performed by: NURSE ANESTHETIST, CERTIFIED REGISTERED

## 2024-12-26 PROCEDURE — 36415 COLL VENOUS BLD VENIPUNCTURE: CPT | Performed by: PHYSICIAN ASSISTANT

## 2024-12-26 PROCEDURE — 999N000141 HC STATISTIC PRE-PROCEDURE NURSING ASSESSMENT: Performed by: ORTHOPAEDIC SURGERY

## 2024-12-26 PROCEDURE — 999N000063 XR LUMBAR SPINE PORT 1 VIEW

## 2024-12-26 PROCEDURE — 63048 LAM FACETEC &FORAMOT EA ADDL: CPT | Performed by: ORTHOPAEDIC SURGERY

## 2024-12-26 PROCEDURE — 250N000011 HC RX IP 250 OP 636: Mod: JZ | Performed by: ORTHOPAEDIC SURGERY

## 2024-12-26 PROCEDURE — 85610 PROTHROMBIN TIME: CPT | Performed by: PHYSICIAN ASSISTANT

## 2024-12-26 PROCEDURE — 0SB20ZZ EXCISION OF LUMBAR VERTEBRAL DISC, OPEN APPROACH: ICD-10-PCS | Performed by: ORTHOPAEDIC SURGERY

## 2024-12-26 PROCEDURE — 370N000017 HC ANESTHESIA TECHNICAL FEE, PER MIN: Performed by: ORTHOPAEDIC SURGERY

## 2024-12-26 PROCEDURE — 63047 LAM FACETEC & FORAMOT LUMBAR: CPT | Performed by: ORTHOPAEDIC SURGERY

## 2024-12-26 PROCEDURE — 01NB0ZZ RELEASE LUMBAR NERVE, OPEN APPROACH: ICD-10-PCS | Performed by: ORTHOPAEDIC SURGERY

## 2024-12-26 PROCEDURE — 120N000002 HC R&B MED SURG/OB UMMC

## 2024-12-26 PROCEDURE — 999N000127 HC STATISTIC PERIPHERAL IV START W US GUIDANCE

## 2024-12-26 PROCEDURE — 250N000025 HC SEVOFLURANE, PER MIN: Performed by: ORTHOPAEDIC SURGERY

## 2024-12-26 RX ORDER — FENTANYL CITRATE 50 UG/ML
INJECTION, SOLUTION INTRAMUSCULAR; INTRAVENOUS PRN
Status: DISCONTINUED | OUTPATIENT
Start: 2024-12-26 | End: 2024-12-26

## 2024-12-26 RX ORDER — ACETAMINOPHEN 325 MG/1
975 TABLET ORAL ONCE
Status: DISCONTINUED | OUTPATIENT
Start: 2024-12-26 | End: 2024-12-26 | Stop reason: HOSPADM

## 2024-12-26 RX ORDER — DEXAMETHASONE SODIUM PHOSPHATE 4 MG/ML
INJECTION, SOLUTION INTRA-ARTICULAR; INTRALESIONAL; INTRAMUSCULAR; INTRAVENOUS; SOFT TISSUE PRN
Status: DISCONTINUED | OUTPATIENT
Start: 2024-12-26 | End: 2024-12-26

## 2024-12-26 RX ORDER — LORAZEPAM 2 MG/ML
.5-1 INJECTION INTRAMUSCULAR
Status: DISCONTINUED | OUTPATIENT
Start: 2024-12-26 | End: 2024-12-26 | Stop reason: HOSPADM

## 2024-12-26 RX ORDER — CEFAZOLIN SODIUM 2 G/100ML
2 INJECTION, SOLUTION INTRAVENOUS EVERY 8 HOURS
Status: DISPENSED | OUTPATIENT
Start: 2024-12-26 | End: 2024-12-27

## 2024-12-26 RX ORDER — KETAMINE HYDROCHLORIDE 10 MG/ML
INJECTION INTRAMUSCULAR; INTRAVENOUS PRN
Status: DISCONTINUED | OUTPATIENT
Start: 2024-12-26 | End: 2024-12-26

## 2024-12-26 RX ORDER — AMLODIPINE BESYLATE 5 MG/1
5 TABLET ORAL 2 TIMES DAILY
Status: DISPENSED | OUTPATIENT
Start: 2024-12-26

## 2024-12-26 RX ORDER — ALBUTEROL SULFATE 0.83 MG/ML
2.5 SOLUTION RESPIRATORY (INHALATION) EVERY 4 HOURS PRN
Status: DISCONTINUED | OUTPATIENT
Start: 2024-12-26 | End: 2024-12-26 | Stop reason: HOSPADM

## 2024-12-26 RX ORDER — ONDANSETRON 2 MG/ML
4 INJECTION INTRAMUSCULAR; INTRAVENOUS EVERY 30 MIN PRN
Status: DISCONTINUED | OUTPATIENT
Start: 2024-12-26 | End: 2024-12-26 | Stop reason: HOSPADM

## 2024-12-26 RX ORDER — NALOXONE HYDROCHLORIDE 0.4 MG/ML
0.2 INJECTION, SOLUTION INTRAMUSCULAR; INTRAVENOUS; SUBCUTANEOUS
Status: ACTIVE | OUTPATIENT
Start: 2024-12-26

## 2024-12-26 RX ORDER — ONDANSETRON 2 MG/ML
INJECTION INTRAMUSCULAR; INTRAVENOUS PRN
Status: DISCONTINUED | OUTPATIENT
Start: 2024-12-26 | End: 2024-12-26

## 2024-12-26 RX ORDER — ESCITALOPRAM OXALATE 10 MG/1
20 TABLET ORAL EVERY MORNING
Status: ACTIVE | OUTPATIENT
Start: 2024-12-27

## 2024-12-26 RX ORDER — FENTANYL CITRATE 50 UG/ML
50 INJECTION, SOLUTION INTRAMUSCULAR; INTRAVENOUS EVERY 5 MIN PRN
Status: DISCONTINUED | OUTPATIENT
Start: 2024-12-26 | End: 2024-12-26 | Stop reason: HOSPADM

## 2024-12-26 RX ORDER — PREGABALIN 50 MG/1
100 CAPSULE ORAL 3 TIMES DAILY
Status: DISPENSED | OUTPATIENT
Start: 2024-12-26

## 2024-12-26 RX ORDER — CARVEDILOL 25 MG/1
50 TABLET ORAL 2 TIMES DAILY WITH MEALS
Status: DISPENSED | OUTPATIENT
Start: 2024-12-26

## 2024-12-26 RX ORDER — DIPHENHYDRAMINE HCL 25 MG
25 CAPSULE ORAL EVERY 6 HOURS PRN
Status: DISCONTINUED | OUTPATIENT
Start: 2024-12-26 | End: 2024-12-26 | Stop reason: HOSPADM

## 2024-12-26 RX ORDER — PROPOFOL 10 MG/ML
INJECTION, EMULSION INTRAVENOUS PRN
Status: DISCONTINUED | OUTPATIENT
Start: 2024-12-26 | End: 2024-12-26

## 2024-12-26 RX ORDER — METHOCARBAMOL 500 MG/1
500 TABLET, FILM COATED ORAL EVERY 6 HOURS PRN
Status: DISCONTINUED | OUTPATIENT
Start: 2024-12-26 | End: 2024-12-26

## 2024-12-26 RX ORDER — NALOXONE HYDROCHLORIDE 0.4 MG/ML
0.4 INJECTION, SOLUTION INTRAMUSCULAR; INTRAVENOUS; SUBCUTANEOUS
Status: ACTIVE | OUTPATIENT
Start: 2024-12-26

## 2024-12-26 RX ORDER — SODIUM CHLORIDE, SODIUM LACTATE, POTASSIUM CHLORIDE, CALCIUM CHLORIDE 600; 310; 30; 20 MG/100ML; MG/100ML; MG/100ML; MG/100ML
INJECTION, SOLUTION INTRAVENOUS CONTINUOUS
Status: DISCONTINUED | OUTPATIENT
Start: 2024-12-26 | End: 2024-12-26 | Stop reason: HOSPADM

## 2024-12-26 RX ORDER — OXYCODONE HYDROCHLORIDE 5 MG/1
5 TABLET ORAL
Status: DISCONTINUED | OUTPATIENT
Start: 2024-12-26 | End: 2024-12-26 | Stop reason: HOSPADM

## 2024-12-26 RX ORDER — HYDROMORPHONE HYDROCHLORIDE 1 MG/ML
0.2 INJECTION, SOLUTION INTRAMUSCULAR; INTRAVENOUS; SUBCUTANEOUS EVERY 5 MIN PRN
Status: DISCONTINUED | OUTPATIENT
Start: 2024-12-26 | End: 2024-12-26 | Stop reason: HOSPADM

## 2024-12-26 RX ORDER — DOCUSATE SODIUM 100 MG/1
200 CAPSULE, LIQUID FILLED ORAL EVERY EVENING
Status: DISPENSED | OUTPATIENT
Start: 2024-12-26

## 2024-12-26 RX ORDER — NALOXONE HYDROCHLORIDE 0.4 MG/ML
0.1 INJECTION, SOLUTION INTRAMUSCULAR; INTRAVENOUS; SUBCUTANEOUS
Status: DISCONTINUED | OUTPATIENT
Start: 2024-12-26 | End: 2024-12-26 | Stop reason: HOSPADM

## 2024-12-26 RX ORDER — LIDOCAINE 40 MG/G
CREAM TOPICAL
Status: DISCONTINUED | OUTPATIENT
Start: 2024-12-26 | End: 2024-12-26 | Stop reason: HOSPADM

## 2024-12-26 RX ORDER — CEFAZOLIN SODIUM/WATER 2 G/20 ML
2 SYRINGE (ML) INTRAVENOUS SEE ADMIN INSTRUCTIONS
Status: DISCONTINUED | OUTPATIENT
Start: 2024-12-26 | End: 2024-12-26 | Stop reason: HOSPADM

## 2024-12-26 RX ORDER — LIDOCAINE HYDROCHLORIDE 20 MG/ML
INJECTION, SOLUTION INFILTRATION; PERINEURAL PRN
Status: DISCONTINUED | OUTPATIENT
Start: 2024-12-26 | End: 2024-12-26

## 2024-12-26 RX ORDER — SODIUM CHLORIDE 9 MG/ML
INJECTION, SOLUTION INTRAVENOUS CONTINUOUS
Status: ACTIVE | OUTPATIENT
Start: 2024-12-26

## 2024-12-26 RX ORDER — PROCHLORPERAZINE MALEATE 5 MG/1
10 TABLET ORAL EVERY 6 HOURS PRN
Status: ACTIVE | OUTPATIENT
Start: 2024-12-26

## 2024-12-26 RX ORDER — ONDANSETRON 2 MG/ML
4 INJECTION INTRAMUSCULAR; INTRAVENOUS EVERY 6 HOURS PRN
Status: ACTIVE | OUTPATIENT
Start: 2024-12-26

## 2024-12-26 RX ORDER — FAMOTIDINE 20 MG/1
20 TABLET, FILM COATED ORAL 2 TIMES DAILY
Status: DISPENSED | OUTPATIENT
Start: 2024-12-26

## 2024-12-26 RX ORDER — ACETAMINOPHEN 325 MG/1
975 TABLET ORAL ONCE
Status: COMPLETED | OUTPATIENT
Start: 2024-12-26 | End: 2024-12-26

## 2024-12-26 RX ORDER — ONDANSETRON 4 MG/1
4 TABLET, ORALLY DISINTEGRATING ORAL EVERY 30 MIN PRN
Status: DISCONTINUED | OUTPATIENT
Start: 2024-12-26 | End: 2024-12-26 | Stop reason: HOSPADM

## 2024-12-26 RX ORDER — OXYCODONE HYDROCHLORIDE 10 MG/1
10 TABLET ORAL
Status: DISCONTINUED | OUTPATIENT
Start: 2024-12-26 | End: 2024-12-26 | Stop reason: HOSPADM

## 2024-12-26 RX ORDER — LABETALOL HYDROCHLORIDE 5 MG/ML
10 INJECTION, SOLUTION INTRAVENOUS
Status: DISCONTINUED | OUTPATIENT
Start: 2024-12-26 | End: 2024-12-26 | Stop reason: HOSPADM

## 2024-12-26 RX ORDER — TIZANIDINE 2 MG/1
2-4 TABLET ORAL EVERY 8 HOURS PRN
Status: DISCONTINUED | OUTPATIENT
Start: 2024-12-26 | End: 2024-12-26

## 2024-12-26 RX ORDER — SUCRALFATE 1 G/1
1 TABLET ORAL 2 TIMES DAILY PRN
Status: ACTIVE | OUTPATIENT
Start: 2024-12-26

## 2024-12-26 RX ORDER — ACETAMINOPHEN 325 MG/1
975 TABLET ORAL EVERY 8 HOURS
Status: DISPENSED | OUTPATIENT
Start: 2024-12-26

## 2024-12-26 RX ORDER — TIZANIDINE 2 MG/1
4 TABLET ORAL EVERY 8 HOURS PRN
Status: DISPENSED | OUTPATIENT
Start: 2024-12-26

## 2024-12-26 RX ORDER — KETOROLAC TROMETHAMINE 30 MG/ML
15 INJECTION, SOLUTION INTRAMUSCULAR; INTRAVENOUS
Status: DISCONTINUED | OUTPATIENT
Start: 2024-12-26 | End: 2024-12-26 | Stop reason: HOSPADM

## 2024-12-26 RX ORDER — HYDROMORPHONE HYDROCHLORIDE 1 MG/ML
0.4 INJECTION, SOLUTION INTRAMUSCULAR; INTRAVENOUS; SUBCUTANEOUS
Status: ACTIVE | OUTPATIENT
Start: 2024-12-26

## 2024-12-26 RX ORDER — FENTANYL CITRATE 50 UG/ML
25 INJECTION, SOLUTION INTRAMUSCULAR; INTRAVENOUS
Status: DISCONTINUED | OUTPATIENT
Start: 2024-12-26 | End: 2024-12-26 | Stop reason: HOSPADM

## 2024-12-26 RX ORDER — ONDANSETRON 4 MG/1
4 TABLET, ORALLY DISINTEGRATING ORAL EVERY 6 HOURS PRN
Status: ACTIVE | OUTPATIENT
Start: 2024-12-26

## 2024-12-26 RX ORDER — LIDOCAINE 40 MG/G
CREAM TOPICAL
Status: ACTIVE | OUTPATIENT
Start: 2024-12-26

## 2024-12-26 RX ORDER — OXYCODONE HYDROCHLORIDE 10 MG/1
30 TABLET ORAL 2 TIMES DAILY PRN
Status: DISPENSED | OUTPATIENT
Start: 2024-12-26

## 2024-12-26 RX ORDER — SODIUM CHLORIDE, SODIUM LACTATE, POTASSIUM CHLORIDE, CALCIUM CHLORIDE 600; 310; 30; 20 MG/100ML; MG/100ML; MG/100ML; MG/100ML
INJECTION, SOLUTION INTRAVENOUS CONTINUOUS PRN
Status: DISCONTINUED | OUTPATIENT
Start: 2024-12-26 | End: 2024-12-26

## 2024-12-26 RX ORDER — HYDROMORPHONE HYDROCHLORIDE 4 MG/1
4 TABLET ORAL EVERY 4 HOURS PRN
Status: DISCONTINUED | OUTPATIENT
Start: 2024-12-26 | End: 2024-12-26

## 2024-12-26 RX ORDER — BUPIVACAINE HYDROCHLORIDE 2.5 MG/ML
INJECTION, SOLUTION EPIDURAL; INFILTRATION; INTRACAUDAL PRN
Status: DISCONTINUED | OUTPATIENT
Start: 2024-12-26 | End: 2024-12-26 | Stop reason: HOSPADM

## 2024-12-26 RX ORDER — CEFAZOLIN SODIUM/WATER 2 G/20 ML
2 SYRINGE (ML) INTRAVENOUS
Status: COMPLETED | OUTPATIENT
Start: 2024-12-26 | End: 2024-12-26

## 2024-12-26 RX ORDER — HYDROMORPHONE HYDROCHLORIDE 1 MG/ML
0.4 INJECTION, SOLUTION INTRAMUSCULAR; INTRAVENOUS; SUBCUTANEOUS EVERY 5 MIN PRN
Status: DISCONTINUED | OUTPATIENT
Start: 2024-12-26 | End: 2024-12-26 | Stop reason: HOSPADM

## 2024-12-26 RX ORDER — ATORVASTATIN CALCIUM 40 MG/1
40 TABLET, FILM COATED ORAL EVERY EVENING
Status: DISPENSED | OUTPATIENT
Start: 2024-12-26

## 2024-12-26 RX ORDER — HYDROMORPHONE HYDROCHLORIDE 1 MG/ML
0.2 INJECTION, SOLUTION INTRAMUSCULAR; INTRAVENOUS; SUBCUTANEOUS
Status: ACTIVE | OUTPATIENT
Start: 2024-12-26

## 2024-12-26 RX ORDER — FENTANYL CITRATE 50 UG/ML
25 INJECTION, SOLUTION INTRAMUSCULAR; INTRAVENOUS EVERY 5 MIN PRN
Status: DISCONTINUED | OUTPATIENT
Start: 2024-12-26 | End: 2024-12-26 | Stop reason: HOSPADM

## 2024-12-26 RX ORDER — BISACODYL 10 MG
10 SUPPOSITORY, RECTAL RECTAL DAILY PRN
Status: ACTIVE | OUTPATIENT
Start: 2024-12-29

## 2024-12-26 RX ORDER — FENTANYL 25 UG/1
25 PATCH TRANSDERMAL
Status: ACTIVE | OUTPATIENT
Start: 2024-12-27

## 2024-12-26 RX ORDER — BUPROPION HYDROCHLORIDE 200 MG/1
200 TABLET, EXTENDED RELEASE ORAL 2 TIMES DAILY
Status: DISPENSED | OUTPATIENT
Start: 2024-12-26

## 2024-12-26 RX ORDER — PANTOPRAZOLE SODIUM 40 MG/1
40 TABLET, DELAYED RELEASE ORAL DAILY PRN
Status: ACTIVE | OUTPATIENT
Start: 2024-12-26

## 2024-12-26 RX ORDER — AMOXICILLIN 250 MG
1 CAPSULE ORAL 2 TIMES DAILY
Status: DISCONTINUED | OUTPATIENT
Start: 2024-12-26 | End: 2024-12-26

## 2024-12-26 RX ORDER — POLYETHYLENE GLYCOL 3350 17 G/17G
17 POWDER, FOR SOLUTION ORAL DAILY
Status: ACTIVE | OUTPATIENT
Start: 2024-12-27

## 2024-12-26 RX ORDER — DEXAMETHASONE SODIUM PHOSPHATE 4 MG/ML
4 INJECTION, SOLUTION INTRA-ARTICULAR; INTRALESIONAL; INTRAMUSCULAR; INTRAVENOUS; SOFT TISSUE
Status: DISCONTINUED | OUTPATIENT
Start: 2024-12-26 | End: 2024-12-26 | Stop reason: HOSPADM

## 2024-12-26 RX ORDER — CLONAZEPAM 0.5 MG/1
0.5 TABLET ORAL 2 TIMES DAILY PRN
Status: DISPENSED | OUTPATIENT
Start: 2024-12-26

## 2024-12-26 RX ORDER — AMOXICILLIN 250 MG
2 CAPSULE ORAL EVERY MORNING
Status: ACTIVE | OUTPATIENT
Start: 2024-12-27

## 2024-12-26 RX ORDER — LISINOPRIL 10 MG/1
20 TABLET ORAL 2 TIMES DAILY
Status: DISPENSED | OUTPATIENT
Start: 2024-12-26

## 2024-12-26 RX ORDER — AMOXICILLIN 250 MG
3 CAPSULE ORAL EVERY EVENING
Status: DISPENSED | OUTPATIENT
Start: 2024-12-26

## 2024-12-26 RX ORDER — HYDROMORPHONE HYDROCHLORIDE 2 MG/1
2 TABLET ORAL EVERY 4 HOURS PRN
Status: DISCONTINUED | OUTPATIENT
Start: 2024-12-26 | End: 2024-12-26

## 2024-12-26 RX ORDER — MEPERIDINE HYDROCHLORIDE 25 MG/ML
12.5 INJECTION INTRAMUSCULAR; INTRAVENOUS; SUBCUTANEOUS EVERY 5 MIN PRN
Status: DISCONTINUED | OUTPATIENT
Start: 2024-12-26 | End: 2024-12-26 | Stop reason: HOSPADM

## 2024-12-26 RX ORDER — DOCUSATE SODIUM 100 MG/1
100 CAPSULE, LIQUID FILLED ORAL EVERY MORNING
Status: ACTIVE | OUTPATIENT
Start: 2024-12-27

## 2024-12-26 RX ORDER — HYDRALAZINE HYDROCHLORIDE 20 MG/ML
2.5-5 INJECTION INTRAMUSCULAR; INTRAVENOUS EVERY 10 MIN PRN
Status: DISCONTINUED | OUTPATIENT
Start: 2024-12-26 | End: 2024-12-26 | Stop reason: HOSPADM

## 2024-12-26 RX ORDER — DIPHENHYDRAMINE HYDROCHLORIDE 50 MG/ML
25 INJECTION INTRAMUSCULAR; INTRAVENOUS EVERY 6 HOURS PRN
Status: DISCONTINUED | OUTPATIENT
Start: 2024-12-26 | End: 2024-12-26 | Stop reason: HOSPADM

## 2024-12-26 RX ADMIN — PROPOFOL 200 MG: 10 INJECTION, EMULSION INTRAVENOUS at 11:59

## 2024-12-26 RX ADMIN — SODIUM CHLORIDE, POTASSIUM CHLORIDE, SODIUM LACTATE AND CALCIUM CHLORIDE: 600; 310; 30; 20 INJECTION, SOLUTION INTRAVENOUS at 11:59

## 2024-12-26 RX ADMIN — DOCUSATE SODIUM 200 MG: 100 CAPSULE, LIQUID FILLED ORAL at 19:14

## 2024-12-26 RX ADMIN — BUPROPION HYDROCHLORIDE 200 MG: 200 TABLET, FILM COATED ORAL at 21:15

## 2024-12-26 RX ADMIN — ACETAMINOPHEN 975 MG: 325 TABLET, FILM COATED ORAL at 21:44

## 2024-12-26 RX ADMIN — PROPOFOL 50 MG: 10 INJECTION, EMULSION INTRAVENOUS at 13:31

## 2024-12-26 RX ADMIN — DEXAMETHASONE SODIUM PHOSPHATE 8 MG: 4 INJECTION, SOLUTION INTRAMUSCULAR; INTRAVENOUS at 11:59

## 2024-12-26 RX ADMIN — LIDOCAINE HYDROCHLORIDE 100 MG: 20 INJECTION, SOLUTION INFILTRATION; PERINEURAL at 11:59

## 2024-12-26 RX ADMIN — TIZANIDINE 4 MG: 2 TABLET ORAL at 21:15

## 2024-12-26 RX ADMIN — PROPOFOL 30 MG: 10 INJECTION, EMULSION INTRAVENOUS at 13:34

## 2024-12-26 RX ADMIN — FAMOTIDINE 20 MG: 20 TABLET, FILM COATED ORAL at 20:35

## 2024-12-26 RX ADMIN — CARVEDILOL 50 MG: 25 TABLET, FILM COATED ORAL at 21:15

## 2024-12-26 RX ADMIN — PROPOFOL 20 MG: 10 INJECTION, EMULSION INTRAVENOUS at 13:36

## 2024-12-26 RX ADMIN — HYDRALAZINE HYDROCHLORIDE 5 MG: 20 INJECTION INTRAMUSCULAR; INTRAVENOUS at 14:41

## 2024-12-26 RX ADMIN — FENTANYL CITRATE 25 MCG: 50 INJECTION INTRAMUSCULAR; INTRAVENOUS at 14:22

## 2024-12-26 RX ADMIN — SENNOSIDES AND DOCUSATE SODIUM 3 TABLET: 50; 8.6 TABLET ORAL at 19:14

## 2024-12-26 RX ADMIN — OXYCODONE HYDROCHLORIDE 30 MG: 10 TABLET ORAL at 20:35

## 2024-12-26 RX ADMIN — Medication 50 MG: at 11:59

## 2024-12-26 RX ADMIN — CLONAZEPAM 0.5 MG: 0.5 TABLET ORAL at 21:45

## 2024-12-26 RX ADMIN — LISINOPRIL 20 MG: 10 TABLET ORAL at 19:15

## 2024-12-26 RX ADMIN — FENTANYL CITRATE 100 MCG: 50 INJECTION INTRAMUSCULAR; INTRAVENOUS at 11:59

## 2024-12-26 RX ADMIN — PREGABALIN 100 MG: 50 CAPSULE ORAL at 20:36

## 2024-12-26 RX ADMIN — HYDROMORPHONE HYDROCHLORIDE 0.5 MG: 1 INJECTION, SOLUTION INTRAMUSCULAR; INTRAVENOUS; SUBCUTANEOUS at 12:21

## 2024-12-26 RX ADMIN — SUGAMMADEX 200 MG: 100 INJECTION, SOLUTION INTRAVENOUS at 13:22

## 2024-12-26 RX ADMIN — AMLODIPINE BESYLATE 5 MG: 5 TABLET ORAL at 19:15

## 2024-12-26 RX ADMIN — ATORVASTATIN CALCIUM 40 MG: 40 TABLET, FILM COATED ORAL at 19:15

## 2024-12-26 RX ADMIN — CEFAZOLIN SODIUM 2 G: 2 INJECTION, SOLUTION INTRAVENOUS at 20:36

## 2024-12-26 RX ADMIN — Medication 20 MG: at 12:55

## 2024-12-26 RX ADMIN — ONDANSETRON 4 MG: 2 INJECTION INTRAMUSCULAR; INTRAVENOUS at 13:10

## 2024-12-26 RX ADMIN — HYDRALAZINE HYDROCHLORIDE 2.5 MG: 20 INJECTION INTRAMUSCULAR; INTRAVENOUS at 14:31

## 2024-12-26 RX ADMIN — Medication 2 G: at 12:01

## 2024-12-26 RX ADMIN — Medication 25 MG: at 12:28

## 2024-12-26 ASSESSMENT — ACTIVITIES OF DAILY LIVING (ADL)
ADLS_ACUITY_SCORE: 58
ADLS_ACUITY_SCORE: 56
ADLS_ACUITY_SCORE: 58
ADLS_ACUITY_SCORE: 58
ADLS_ACUITY_SCORE: 61
ADLS_ACUITY_SCORE: 60
ADLS_ACUITY_SCORE: 61
ADLS_ACUITY_SCORE: 58
ADLS_ACUITY_SCORE: 56
ADLS_ACUITY_SCORE: 58
ADLS_ACUITY_SCORE: 61
ADLS_ACUITY_SCORE: 60

## 2024-12-26 NOTE — ANESTHESIA CARE TRANSFER NOTE
Patient: Kobe Buchanan    Procedure: Procedure(s):  Lumbar 4 to 5 decompression       Diagnosis: Lumbar radiculopathy [M54.16]  Spinal stenosis of lumbar region with neurogenic claudication [M48.062]  Lumbar spondylosis [M47.816]  Diagnosis Additional Information: No value filed.    Anesthesia Type:   General     Note:    Oropharynx: oropharynx clear of all foreign objects  Level of Consciousness: awake  Oxygen Supplementation: face mask  Level of Supplemental Oxygen (L/min / FiO2): 6  Independent Airway: airway patency satisfactory and stable  Dentition: dentition unchanged  Vital Signs Stable: post-procedure vital signs reviewed and stable  Report to RN Given: handoff report given  Patient transferred to: PACU    Handoff Report: Identifed the Patient, Identified the Reponsible Provider, Reviewed the pertinent medical history, Discussed the surgical course, Reviewed Intra-OP anesthesia mangement and issues during anesthesia, Set expectations for post-procedure period and Allowed opportunity for questions and acknowledgement of understanding  Vitals:  Vitals Value Taken Time   BP     Temp     Pulse 64 12/26/24 1359   Resp 22 12/26/24 1359   SpO2 100 % 12/26/24 1359   Vitals shown include unfiled device data.    Electronically Signed By: ALAYNA Hoffman CRNA  December 26, 2024  2:00 PM

## 2024-12-26 NOTE — OP NOTE
DATE OF SURGERY: 12/26/2024    PREOPERATIVE DIAGNOSIS:   Lumbar radiculopathy [M54.16]  Spinal stenosis of lumbar region with neurogenic claudication [M48.062]  Lumbar spondylosis [M47.816]               POSTOPERATIVE DIAGNOSIS: Same    PROCEDURES:  1. L4 and L5 laminectomy and partial medial facetectomies and foraminotomies for decompression of the L4 and L5 nerve roots.      PRIMARY SURGEON: Jonathan Prajapati MD    FIRST ASSISTANT: Jean-Pierre Samayoa, Fellow Surgeon,The assistant was necessary for all phases of the operation, decompression, and all bone work and also for visualization, and closure.  There was no qualified resident available.      ANESTHESIA: General Endotracheal    COMPLICATIONS:  None.    SPECIMENS: None.    ESTIMATED BLOOD LOSS: 20 mL    INDICATIONS:                          Kobe Buchanan is a 64 year old male who elected surgical treatment, and understood the indications for this surgery, as well as its risks, benefits, and alternatives as documented in the pre-operative H&P.  Specifically, we reviewed the risks and benefits of the surgery in detail. The risks include, but are not limited to, the general risks associated with anesthesia, including death, pulmonary embolism, DVT, stroke, myocardial infarction, pneumonia, and urinary tract infection. Additional risks specific to the surgery include the risk of infection, dural tear with resultant CSF leak which might necessitate placement of a drain or revision surgery or could result in headaches, nerve injury resulting in weakness or paralysis, risk of adjacent segment disease, the risks of vascular injury, need for revision surgery in the future due to one of the above issues, or risk of incomplete symptom relief. Kobe Buchanan understands the risks of the surgery and wishes to proceed.  No Guarantees were given.       DESCRIPTION OF PROCEDURE:           Kobe Buchanan was taken to the operating room, where the Anesthesiology  Service induced satisfactory general anesthesia. Ancef was given IV.  Venous thromboembolic prophylaxis was performed with sequential devices.  A Serrano catheter was placed under standard sterile techniques.  The patient was placed prone on an open OSI frame with the abdomen hanging free and all bony prominences well padded.  The low back was then prepped and draped in its entirety in the usual sterile fashion.  We then held a multidisciplinary time out in which we verified the patient, procedure, antibiotics, and operative plan.  All team members were in agreement.    Digital Radiography was brought into the sterile field to obtain a true lateral view and needles were placed to darron the intended point of incision.  We made a midline incision and a bilateral subperiosteal exposure was performed of the L4 through L5 spinous processes and medial lamina.  A needle was placed into the medial facet joint at the caudal most level and a final image was then obtained to verify our position.     The decompression was performed in the same fashion at each level sequentially including the L4 and L5 levels which resulted in the decompression of the L4 and L5 nerve roots.      For each level, the spinous process was removed with a rongeur. The dorsal cortex of the lamina was then thinned with the rongeur.  We palpated the lateral border of the pars to verify the planned width of the decompression.  I used a cautery to darron out the planned limits of the resection to provide a visual reference.  A 4mm round bur was then used to remove the remaining dorsal cortical and cancellous layers.  Eventually, the ligamentum flavum was uncovered by the bur in the midline.  The proximal origin of the ligamentum flavum and epidural fat were identified. A curette was used to split and elevate the flavum in the midline, exposing the epidural fat underneath.  Kerrison punches were then used to resect the flavum down to the caudad laminar edge.  At  this point the central decompression was complete, and I turned my attention to the partial medial facetectomy.  A 5mm strait osteotome was used to resect the remaining overhanging medial facet.  Where necessary the resection was completed with a kerrison.  This was continued laterally until the medial wall of the pedicle was readily palpable.  I then completed the foraminotomies.  A kalia was used to trace out the edge of the pedicle.  I then introduced a 2mm kerrison into the foramen below the pedicle, keeping the shoe of the kerrison facing the nerve root.  Using multiple bites in this way, I was able to remove the remaining facet capsule and osteophytes that were compressing the exiting nerve root.  In the same fashion, I was able to reach out into the foramen above the pedicle and remove any compression on the exiting nerve root above. This process was performed sequentially at each of the levels noted.       In performing the decompression, I found there was some disc material impinging on the left side and tenting the annulus.  I incised into this and retrieved roughly 2cc of disc and after this the nerves felt quite free.      The wound was then thoroughly irrigated.  Hemostasis was achieved.  A hemovac drain was placed.  The wound was closed in layers with vicryl suture, followed by monocryl and dermabond for the skin.  A sterile dressing was applied. The patient was turned supine, extubated, and returned to the recovery area in stable condition.      I was present and scrubbed for the critical portions of the procedure including the exposure and decompression.    Jonathan Prajapati MD

## 2024-12-26 NOTE — PHARMACY-ADMISSION MEDICATION HISTORY
Pharmacist Admission Medication History    Admission medication history is complete. The information provided in this note is only as accurate as the sources available at the time of the update.    Information Source(s): Patient and CareEverywhere/SureScripts via phone    Pertinent Information: Kobe was an excellent historian. He has a very particular regimen at home. He has had frequent hospitalizations and his reported information is consistent with his prior admissions here. He reports that oxycodone at lower doses does not work for him. He does have a history of 30 mg Q6H PRN for severe pain, which was his regimen in September 2024 when he was last here. He also reports that he has the various topical medications listed but he doesn't really use them.    Changes made to PTA medication list:  Added: None  Deleted: None  Changed:   Oxycodone - Patient takes 30 mg BID PRN (consistent with past admissions)  Senokot S - Patient takes 2 in the AM and 3 tabs in the PM  Docusate - Patient takes 1 tab in the AM and 2 tabs in the PM  Sucralfate - Uses PRN very rarely    Allergies reviewed with patient and updates made in EHR: yes    Medication History Completed By: MANJULA COLBERT [facility] 12/26/2024 5:30 PM    PTA Med List   Medication Sig Last Dose/Taking    acetaminophen (TYLENOL) 325 MG tablet Take 2 tablets (650 mg) by mouth every 4 hours as needed for other (For optimal non-opioid multimodal pain management to improve pain control.). 12/25/2024 at  5:00 PM    amLODIPine-benazepril (LOTREL) 5-20 MG capsule Take 1 capsule by mouth 2 times daily 12/25/2024 at  5:00 PM    aspirin (ASPIRIN LOW DOSE) 81 MG EC tablet Take 1 tablet (81 mg) by mouth daily. (Patient taking differently: Take 81 mg by mouth every morning.) More than a month    atorvastatin (LIPITOR) 40 MG tablet Take 1 tablet (40 mg) by mouth daily (Patient taking differently: Take 40 mg by mouth every evening.) 12/25/2024 at  5:00 PM    bisacodyl (DULCOLAX) 10 MG  suppository Place 1 suppository (10 mg) rectally daily as needed for constipation (Use if magnesium hydroxide (MILK of MAGNESIA) is not effective after 24 hours. May discontinue if patient having bowel movement.). 12/25/2024    buPROPion (WELLBUTRIN SR) 200 MG 12 hr tablet TAKE 1 TABLET BY MOUTH 2 TIMES DAILY 12/26/2024 at  3:30 AM    carvedilol (COREG) 25 MG tablet Take 2 tablets (50 mg) by mouth 2 times daily (with meals) 12/26/2024 at  3:30 AM    clonazePAM (KLONOPIN) 0.5 MG tablet Take 1 tablet (0.5 mg) by mouth 3 times daily as needed for anxiety 12/25/2024 at  5:00 PM    cyclobenzaprine (FLEXERIL) 10 MG tablet Take 1 tablet (10 mg) by mouth 3 times daily as needed for muscle spasms. More than a month    docusate sodium (COLACE) 100 MG capsule Take 200 mg by mouth every evening. Past Week    docusate sodium (COLACE) 100 MG capsule Take 100 mg by mouth every morning. 12/25/2024 at  5:00 PM    escitalopram (LEXAPRO) 20 MG tablet Take 1.5 tablets (30 mg) by mouth daily (Patient taking differently: Take 20 mg by mouth every morning.) 12/25/2024 Morning    fentaNYL (DURAGESIC) 25 mcg/hr 72 hr patch Place 1 patch onto the skin every 48 hours. Pt taking Every 48 hours 12/25/2024 at  3:00 AM    naproxen (EC-NAPROSYN) 500 MG EC tablet Take 1 tablet (500 mg) by mouth 2 times daily (with meals). Past Week    oxyCODONE IR (ROXICODONE) 10 MG tablet Take 30 mg by mouth 2 times daily as needed for moderate to severe pain. 12/25/2024 at  5:00 PM    pantoprazole (PROTONIX) 40 MG EC tablet Take 1 tablet (40 mg) by mouth daily as needed for heartburn, 12/25/2024 at  5:00 PM    pregabalin (LYRICA) 100 MG capsule Take 100 mg by mouth 3 times daily. 12/26/2024 at  3:30 AM    propranolol (INDERAL) 10 MG tablet Take 1 tablet (10 mg) by mouth 3 times daily as needed (panic attack) More than a month    senna-docusate (SENOKOT-S/PERICOLACE) 8.6-50 MG tablet Take 2 tablets by mouth every morning. 12/25/2024 Morning    senna-docusate  (SENOKOT-S/PERICOLACE) 8.6-50 MG tablet Take 3 tablets by mouth every evening. 12/25/2024 at  5:00 PM    sucralfate (CARAFATE) 1 GM tablet Take 1 tablet (1 g) by mouth 2 times daily as needed (Reflux) (Patient taking differently: as needed. Take 1 tablet (1 g) by mouth 2 times daily as needed (Reflux)) More than a month    tacrolimus (PROTOPIC) 0.1 % external ointment Apply topically as needed (rash on arms) Apply to affected areas on body. More than a month    tiZANidine (ZANAFLEX) 2 MG tablet Take 1-2 tablets (2-4 mg) by mouth every 8 hours as needed for muscle spasms. 12/25/2024 at  5:00 PM    triamcinolone (KENALOG) 0.1 % external ointment Apply topically 2 times daily To affected areas of rash. Please avoid application to the face, groin or armpits as the medication is too strong for these areas. More than a month

## 2024-12-26 NOTE — ANESTHESIA POSTPROCEDURE EVALUATION
Patient: Kobe Buchanan    Procedure: Procedure(s):  Lumbar 4 to 5 decompression       Anesthesia Type:  General    Note:  Disposition: Inpatient   Postop Pain Control: Uneventful            Sign Out: Well controlled pain   PONV: No   Neuro/Psych: Uneventful            Sign Out: Acceptable/Baseline neuro status   Airway/Respiratory: Uneventful            Sign Out: Acceptable/Baseline resp. status   CV/Hemodynamics: Uneventful            Sign Out: Acceptable CV status; No obvious hypovolemia; No obvious fluid overload   Other NRE:    DID A NON-ROUTINE EVENT OCCUR?            Last vitals:  Vitals Value Taken Time   /75 12/26/24 1403   Temp     Pulse 62 12/26/24 1419   Resp 17 12/26/24 1419   SpO2 100 % 12/26/24 1419   Vitals shown include unfiled device data.    Electronically Signed By: Jeff Wood MD  December 26, 2024  2:20 PM

## 2024-12-26 NOTE — CONSULTS
[facility]  Consult Note - Hospitalist Service, GOLD TEAM   Date of Admission:  12/26/2024  Consult Requested by: ortho  Reason for Consult: perioperative comanagement    Assessment & Plan   Kobe Buchanan is a 64 year old male admitted on 12/26/2024. 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 Medicine consulted for perioperative comanagement.    Lumbar Radiculopathy s/p L4-5 decompression (12/26/24)  Recent C3/C7 dome laminectomy and C4-6 laminoplasty (9/11/24)  Chronic pain with chronic opioid use  Patient had planned procedure today with Dr. Prajapati. Pain currently rated 6/10, he reports his baseline is generally around a 5. He is on chronic pain medications with Fentanyl patches 25mcg q48 hours, Oxycodone 30mg BID PRN which he takes relatively regularly, Lyrica 100mg TID, Zanaflex PRN. He reports no history of oversedation with high doses of analgesics.  -- Postoperative management per primary team:  Activity: Up with assist until independent No excessive bending or twisting. No lifting >10 lbs x 6 weeks. No Aida lift for transfers.   Weight bearing status: WBAT.  Pain management:   Transition from IV to PO narcotics as tolerated.   Antibiotics: Antibtioics x 24 hours.  Diet: Begin with clear fluids and progress diet as tolerated.   DVT prophylaxis: SCDs only. No chemical DVT ppx needed.  Labs: Hgb POD 1  Bracing/Splinting: None.  Dressings: Keep Bandages  Drains: x1 Document output per shift, will  be discontinued at Orthopedic Surgery discretion.  Serrano catheter: Remove POD#1.   Physical Therapy/Occupational Therapy: Eval and treat  Cultures: none.    Consults: Hospitalist  Follow-up: Clinic with Dr. Prajapati in 6 weeks with repeat x-rays.   Disposition: Pending progress with therapies, pain control on orals, and medical stability, anticipate discharge to Home on POD #2-3.  -- Discontinue PO Dilaudid and restart PTA Oxycodone 30mg BID PRN  -- Continue PTA Lyrica 100mg TID  -- Continue PTA Colace 100mg in the morning and 200mg in the evening  -- Continue PTA Senokot 2 tablets in the morning and 3 tablets in the evening  -- Discontinue PO Robaxin and restart PTA Tizanidine 2-4mg q8 PRN  -- Discussed medication changes with orthopedic team given high doses of sedating medications    HTN  Hx of stress induced cardiomyopathy, resolved  Nonobstructive CAD  Has history of NSVT runs and significant postprocedural hypertension surrounding prior surgeries. BP elevated today with SBPs up to 170s, improving trend this evening. Patient reports no previous history of hypotension with antihypertensives in the setting of significant analgesics.  -- Continue Amlodipine-Lisinopril in place of PTA Amlodipine-Benazepril with hold parameters  -- Continue PTA Coreg 50mg BID  -- Continue PTA Atorvastatin 40mg qPM  -- Patient reports not taking ASA outpatient; encourage to follow up regarding need for chronic antiplatelet    GERD  -- Continue PTA PPI PRN  -- Continue PTA Sucralfate PRN    s/p Gastric Bypass with significant weight loss  T2DM, stable  Hx SAVITA, resolved   Since bypass and weight loss, A1C has been WNL. SAVITA also resolved with weight loss.   -- CTM    Anxiety/Depression  Insomnia  -- Continute PTA Bupropion 200mg BID   -- Continue PTA Escitalopram 20mg qAM  -- Clonazepam 0.5mg BID PRN       The patient's care was discussed with the Primary Team via this note      Clinically Significant Risk Factors Present on Admission     "             # Drug Induced Platelet Defect: home medication list includes an antiplatelet medication   # Hypertension: Noted on problem list           # Obesity: Estimated body mass index is 31.79 kg/m  as calculated from the following:    Height as of this encounter: 1.778 m (5' 10\").    Weight as of this encounter: 100.5 kg (221 lb 9 oz).       # Financial/Environmental Concerns:           Tracy Mcgee PA-C  Hospitalist Service, GOLD TEAM   Securely message with [employer] (more info)  Text page via [facility] Paging/Directory   See signed in provider for up to date coverage information  ______________________________________________________________________    Chief Complaint   Lumbar decompression    History is obtained from the patient    History of Present Illness   Kobe Buchanan is a 64 year old male admitted on 12/26/2024. 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 Medicine consulted for perioperative comanagement.    Patient reports no acute medical concerns aside from ensuring his home medications are ordered given he has gotten behind on pain control for his chronic pain. Pain currently 6/10, his baseline is somewhere around a 5/10. Denies any symptoms including CP, dyspnea, SOB.    Past Medical History    Past Medical History:   Diagnosis Date    Acute systolic heart failure (H) 01/28/2020    Added automatically from request for surgery [id number]    NEMESIO (acute kidney injury) (H) 02/16/2020    Anxiety     Ascending aortic aneurysm (H)     Atrial fibrillation " (H) [I48.91] 10/10/2016    Bicuspid aortic valve     BPPV (benign paroxysmal positional vertigo) 07/11/2014    Choledocholithiasis     Chronic narcotic use     Chronic neck pain     Chronic osteoarthritis     CKD (chronic kidney disease) stage 3, GFR 30-59 ml/min (H)     Degenerative joint disease     Depression     Dysthymic disorder     Hx of type 2 diabetes mellitus 03/04/2021    Hyperlipidemia 04/10/2012    Hypertension     Iron deficiency anemia secondary to blood loss (chronic) 08/25/2020    MSSA bacteremia 10/13/2019    Multiple stiff joints     Neck injuries     NSTEMI (non-ST elevated myocardial infarction) (H) 01/29/2020    Obesity 02/09/2015    Pain in shoulder     Plantar fasciitis     Pre-diabetes     S/P reverse total shoulder arthroplasty, left 07/07/2020    S/P reverse total shoulder arthroplasty, right 08/18/2020    Septic joint of left shoulder region (H) 11/13/2019    Septic joint of right shoulder region (H) 10/13/2019    s/p washout    Skin picking habit     Sleep apnea     does not use cpap    Spinal stenosis of lumbar region with neurogenic claudication     Status post total shoulder arthroplasty, left 03/03/2020    Added automatically from request for surgery [id number]       Past Surgical History   Past Surgical History:   Procedure Laterality Date    ARTHROPLASTY SHOULDER  04/15/2014    Procedure: Left Total Shoulder Arthroplasty ;  Surgeon: Analilia Aceves MD;  [facility]    ARTHROPLASTY SHOULDER Right 08/26/2014    Procedure: ARTHROPLASTY SHOULDER;  Surgeon: Analilia Aceves MD;  [facility]    ARTHROSCOPY SHOULDER WITH BIOPSY(IES) Left 01/28/2020    Procedure: Left shoulder arthroscopy and biopsy for culture;  Surgeon: Analilia Aceves MD;  [facility]    BYPASS GASTRIC TAVO-EN-Y, LIVER BIOPSY, COMBINED  08/08/2005    COLONOSCOPY  06/30/2014    Procedure: COMBINED COLONOSCOPY, SINGLE BIOPSY/POLYPECTOMY BY BIOPSY;  Surgeon: Chester Patton MD;   [facility]    COLONOSCOPY N/A 2/15/2024    Procedure: COLONOSCOPY, WITH POLYPECTOMY;  Surgeon: Power Duran MD;  [facility]    CV CORONARY ANGIOGRAM  01/30/2020    Procedure: CV CORONARY ANGIOGRAM;  Surgeon: Néstor Walls MD;  [facility]    CYSTOSCOPY, BLADDER NECK CUTS, COMBINED N/A 07/18/2016    Procedure: COMBINED CYSTOSCOPY, BLADDER NECK CUTS;  Surgeon: Ritu Leslie MD;  [facility]    ENDOSCOPIC RETROGRADE CHOLANGIOPANCREATOGRAM N/A 11/03/2023    Procedure: ENDOSCOPIC RETROGRADE CHOLANGIOPANCREATOGRAPHY, BILIARY SPHINCTEROTOMY, STONE REMOVAL;  Surgeon: Juarez Sevilla MD;  Location: [facility] OR    ESOPHAGOSCOPY, GASTROSCOPY, DUODENOSCOPY (EGD), COMBINED  06/30/2014    Procedure: COMBINED ESOPHAGOSCOPY, GASTROSCOPY, DUODENOSCOPY (EGD), BIOPSY SINGLE OR MULTIPLE;  Surgeon: Chester Patton MD;  Location:  GI    EXCISE MASS FINGER  06/14/2011    Procedure:EXCISE MASS FINGER; Middle Flexor Cyst; Surgeon:SHAYY RUSSELL; [facility]    IR FINE NEEDLE ASPIRATION W ULTRASOUND  11/13/2019    IR PICC PLACEMENT > 5 YRS OF AGE  11/19/2019    LAMINOPLASTY CERVICAL POSTERIOR THREE+ LEVELS N/A 9/11/2024    Procedure: Cervical 3 and 7 dome laminectomy and 4-6 laminoplasty with medtronic plates;  Surgeon: Jonathan Prajapati MD;  [facility]    LAPAROSCOPIC CHOLECYSTECTOMY N/A 11/03/2023    Procedure: CHOLECYSTECTOMY, LAPAROSCOPIC;  Surgeon: Shayy Mathis MD;  Location: [facility] OR    LASER HOLMIUM LITHOTRIPSY BLADDER N/A 10/15/2014    Procedure: LASER HOLMIUM LITHOTRIPSY BLADDER;  Surgeon: Sahil Taveras MD;  [facility]    LASER KTP GREEN LIGHT PHOTOSELECTIVE VAPORIZATION PROSTATE  01/23/2014    Procedure: LASER KTP GREEN LIGHT PHOTOSELECTIVE VAPORIZATION PROSTATE;  Greenlight Photovaporization Of Prostate  ;  Surgeon: Sahil Taveras MD;  [facility]    OTHER SURGICAL  HISTORY  10/04/2016    REPAIR ANEURYSM ASCENDING AORTA    OTHER SURGICAL HISTORY Right 09/23/2019    IRRIGATION AND DEBRIDEMENT UPPER EXTREMITYshoulder    RELEASE TRIGGER FINGER Right 03/31/2017    Procedure: RELEASE TRIGGER FINGER;  Surgeon: Juan Carlos Blunt MD;  [facility]    REMOVE ANTIBIOTIC CEMENT BEADS / SPACER SHOULDER Left 05/08/2020    Procedure: Left shoulder removal of spacer;  Surgeon: Analilia Aceves MD;  [facility]    REMOVE ANTIBIOTIC CEMENT BEADS / SPACER SHOULDER Right 08/18/2020    Procedure: Removal of right shoulder antibiotic spacer;  Surgeon: Analilia Aceves MD;  [facility]    REMOVE HARDWARE ARTHROPLASTY SHOULDER. I&D, PLACE ANTIBIOTIC CEMENT BE Left 11/15/2019    Procedure: Explantation of left total shoulder arthroplasty, irrigation and debridement, and placement of antibiotic spacer;  Surgeon: Analilia Aceves MD;  [facility]    REPAIR ANEURYSM ASCENDING AORTA N/A 10/04/2016    Procedure: REPAIR ANEURYSM ASCENDING AORTA;  Surgeon: Mckenzie Townsend MD;  [facility]    REVERSE ARTHROPLASTY SHOULDER Right 08/18/2020    Procedure: and conversion to reverse total shoulder arthroplasty;  Surgeon: Analilia Aceves MD;  [facility]    SURGICAL EXPOSURE, LAPAROSCOPIC, W/WOUND CLOSURE, FOR ERCP, BY GENERAL SURGERY N/A 11/03/2023    Procedure: SURGICAL EXPOSURE FOR ENDOSCOPIC RETROGRADE CHOLANGIOPANCREATOGRAPHY;  Surgeon: Kobe Mathis MD;  Location: [facility] OR    [facility]         Medications   I have reviewed this patient's current medications       Review of Systems    The 5 point Review of Systems is negative other than noted in the HPI or here.      Physical Exam   Vital Signs: Temp: 97.9  F (36.6  C) Temp src: Axillary BP: (!) 142/72 Pulse: 71   Resp: 18 SpO2: 99 % O2 Device: Nasal cannula Oxygen Delivery: 2 LPM  Weight: 221 lbs 9 oz    Physical Exam  Vitals reviewed.   Constitutional:       General: He is not in  acute distress.     Appearance: He is not diaphoretic.   Cardiovascular:      Rate and Rhythm: Normal rate and regular rhythm.      Heart sounds: Murmur (Systolic murmur) heard.   Pulmonary:      Effort: Pulmonary effort is normal.      Breath sounds: No wheezing, rhonchi or rales.   Skin:     General: Skin is warm and dry.   Neurological:      Mental Status: He is alert and oriented to person, place, and time.           Medical Decision Making       60 MINUTES SPENT BY ME on the date of service doing chart review, history, exam, documentation & further activities per the note.      Data         Imaging results reviewed over the past 24 hrs:   Recent Results (from the past 24 hours)   XR Lumbar Spine Port 1 View    Narrative    EXAM: XR LUMBAR SPINE PORT 1 VIEW  LOCATION: [facility]  DATE: 12/26/2024    INDICATION: Lumbar 4 to 5 decompression  COMPARISON: Lumbar radiograph dated 12/12/2020 4P    TECHNIQUE: Intraoperative radiographs performed during the patient's procedure.    FINDINGS: There are 2 lateral radiographs of the lumbar spine. There are 5 lumbar type vertebral bodies. On series 1, there are 2 surgical instruments pointing to the L3-L4 and L4-L5 disc spaces. On series 2, there is a surgical instrument pointing to   the L4-L5 disc space.

## 2024-12-26 NOTE — PROGRESS NOTES
Pt requested to not have local anesthesia used for future surgeries, MD Castro paged w/ this request.

## 2024-12-26 NOTE — CONSULTS
"Consult received for Vascular access care.  See LDA for details. For additional needs place \"Nursing to Consult for Vascular Access\" [id number] order in EPIC.  "

## 2024-12-26 NOTE — BRIEF OP NOTE
Brief Operative Note    Preop Dx:   Lumbar radiculopathy [M54.16]  Spinal stenosis of lumbar region with neurogenic claudication [M48.062]  Lumbar spondylosis [M47.816]  Post op Dx:   Same  Procedure:    Procedure(s):  Lumbar 4 to 5 decompression  Surgeon:     Jonathan Prajapati MD   Assistants:    Jean-Pierre Castro MD   Anesthesia:   General  EBL:    20ml  Total IV Fluids:  See Anesthesia Record  Specimens:   None  Findings:   See Operative Dictation      Assessment and Plan: Kobe Buchanan is a 64 year old male with PMH including lumbar radiculopathy and neurogenic claudication now s/p above procedure on 12/26/2024 with Dr. Prajapati.     Alo Primary  Activity: Up with assist until independent No excessive bending or twisting. No lifting >10 lbs x 6 weeks. No Aida lift for transfers.   Weight bearing status: WBAT.  Pain management:   Transition from IV to PO narcotics as tolerated.   Antibiotics: Antibtioics x 24 hours.  Diet: Begin with clear fluids and progress diet as tolerated.   DVT prophylaxis: SCDs only. No chemical DVT ppx needed.  Labs: Hgb POD 1  Bracing/Splinting: None.  Dressings: Keep Bandages  Drains: x1 Document output per shift, will be discontinued at Orthopedic Surgery discretion.  Serrano catheter: Remove POD#1.   Physical Therapy/Occupational Therapy: Eval and treat  Cultures: none.    Consults: Hospitalist  Follow-up: Clinic with Dr. Prajapati in 6 weeks with repeat x-rays.   Disposition: Pending progress with therapies, pain control on orals, and medical stability, anticipate discharge to Home on POD #2-3.    Indications for assistant:  I assisted in positioning, exposure, retraction, instrumentation, hemostasis, and wound closure allowing for reduction in anesthesia time and blood loss.     Jean-Pierre Castro MD  Spine Fellow    Please page me  with any questions/concerns during regular weekday hours before 4pm. If there is no response, if it is a weekend, or if it is during evening  hours then please page the orthopaedic surgery resident on call.

## 2024-12-26 NOTE — PROGRESS NOTES
PACU to Inpatient Nursing Handoff    Patient Kobe Buchanan is a 64 year old male who speaks English.   Procedure Procedure(s):  Lumbar 4 to 5 decompression   Surgeon(s) Primary: Jonathan Prajapati MD  Assisting: Bishop JENNIFER Ritter PA-C  Resident - Assisting: Jean-Pierre Castro MD     Allergies   Allergen Reactions    Ciprofloxacin      History of aortic aneurysms    Gabapentin Dizziness    Tape [Adhesive Tape] Blisters     Blistering - please use paper tape       Isolation  No active isolations     Past Medical History   has a past medical history of Acute systolic heart failure (H) (01/28/2020), NEMESIO (acute kidney injury) (H) (02/16/2020), Anxiety, Ascending aortic aneurysm (H), Atrial fibrillation (H) [I48.91] (10/10/2016), Bicuspid aortic valve, BPPV (benign paroxysmal positional vertigo) (07/11/2014), Choledocholithiasis, Chronic narcotic use, Chronic neck pain, Chronic osteoarthritis, CKD (chronic kidney disease) stage 3, GFR 30-59 ml/min (H), Degenerative joint disease, Depression, Dysthymic disorder, type 2 diabetes mellitus (03/04/2021), Hyperlipidemia (04/10/2012), Hypertension, Iron deficiency anemia secondary to blood loss (chronic) (08/25/2020), MSSA bacteremia (10/13/2019), Multiple stiff joints, Neck injuries, NSTEMI (non-ST elevated myocardial infarction) (H) (01/29/2020), Obesity (02/09/2015), Pain in shoulder, Plantar fasciitis, Pre-diabetes, S/P reverse total shoulder arthroplasty, left (07/07/2020), S/P reverse total shoulder arthroplasty, right (08/18/2020), Septic joint of left shoulder region (H) (11/13/2019), Septic joint of right shoulder region (H) (10/13/2019), Skin picking habit, Sleep apnea, Spinal stenosis of lumbar region with neurogenic claudication, and Status post total shoulder arthroplasty, left (03/03/2020).    Anesthesia General   Dermatome Level     Preop Meds Not applicable   Nerve block Not applicable   Intraop Meds dexamethasone (Decadron)  fentanyl  (Sublimaze): 100 mcg total  hydromorphone (Dilaudid): 0.5 mg total  ondansetron (Zofran): last given at 1310  Ketamine 25 mg @ 1228   Local Meds Yes   Antibiotics cefazolin (Ancef) - last given at 1201     Pain Patient Currently in Pain: yes   PACU meds  fentanyl (Sublimaze): 25 mcg (total dose) last given at 1422   hydralazine (Apresoline): 2.5 mg (total dose) last given at 1431    PCA / epidural No   Capnography Respiratory Monitoring (EtCO2): 37 mmHg   Telemetry ECG Rhythm: Normal sinus rhythm   Inpatient Telemetry Monitor Ordered? No        Labs Glucose Lab Results   Component Value Date     12/26/2024    GLC 93 07/29/2022     08/18/2020       Hgb Lab Results   Component Value Date    HGB 15.4 12/12/2024    HGB 9.9 08/20/2020       INR Lab Results   Component Value Date    INR 1.05 09/11/2024    INR 1.10 02/15/2020      PACU Imaging Not applicable     Wound/Incision Incision/Surgical Site 08/18/20 Anterior;Right Shoulder (Active)   Number of days: 1591       Incision/Surgical Site 08/18/20 Right Shoulder (Active)   Number of days: 1591       Incision/Surgical Site 11/03/23 Abdomen (Active)   Number of days: 419       Incision/Surgical Site 09/11/24 Cervical spine (Active)   Number of days: 106       Incision/Surgical Site 09/11/24 Head (Active)   Number of days: 106       Incision/Surgical Site 11/04/24 Posterior;Lower Back (Active)   Number of days: 52       Incision/Surgical Site 12/26/24 Lower;Midline Back (Active)   Incision Assessment UTV 12/26/24 1500   Dressing Dry gauze;Transparent film (Opsite, Tegaderm) 12/26/24 1327   Closure Approximated;Liquid bandage;Sutures 12/26/24 1327   Incision Drainage Amount None 12/26/24 1500   Incision Care Normal saline 12/26/24 1327   Dressing Intervention Clean, dry, intact 12/26/24 1500   Number of days: 0      CMS        Equipment Not applicable   Other LDA       IV Access Peripheral IV 12/26/24 Anterior;Right Lower forearm (Active)   Site Assessment WDL  12/26/24 1500   Line Status Infusing 12/26/24 1500   Dressing Transparent 12/26/24 1012   Dressing Status clean;dry;intact 12/26/24 1012   Dressing Intervention New dressing  12/26/24 1012   Line Intervention Flushed;Cap change;Tubing change 12/26/24 1012   Phlebitis Scale 0-->no symptoms 12/26/24 1500   Infiltration? no 12/26/24 1500   Number of days: 0      Blood Products Not applicable EBL 20 mL   Intake/Output Date 12/26/24 0700 - 12/27/24 0659   Shift 6324-8936 8758-8429 8087-2646 24 Hour Total   INTAKE   I.V. 800   800   Shift Total(mL/kg) 800(7.96)   800(7.96)   OUTPUT   Urine 700   700   Blood 20   20   Shift Total(mL/kg) 720(7.16)   720(7.16)   Weight (kg) 100.5 100.5 100.5 100.5      Drains / Serrano Closed/Suction Drain 1 Posterior;Inferior Back Accordion 10 Lao (Active)   Site Description UTV 12/26/24 1500   Dressing Status Normal: Clean, Dry & Intact 12/26/24 1500   Drainage Appearance Bloody/Bright Red 12/26/24 1500   Status To bulb suction 12/26/24 1500   Number of days: 0      Time of void PreOp Time of Void Prior to Procedure: 0830 (12/26/24 0918)    PostOp Voided (mL): 700 mL (12/26/24 1100)    Diapered? No   Bladder Scan     PO    tolerating sips and ice chips     Vitals    B/P: (!) 159/93  T: 98.4  F (36.9  C)    Temp src: Oral  P:  Pulse: 71 (12/26/24 1500)          R: 20  O2:  SpO2: 100 %    O2 Device: Nasal cannula (12/26/24 1430)    Oxygen Delivery: 2 LPM (12/26/24 1430)         Family/support present -   Patient belongings     Patient transported on bed   DC meds/scripts (obs/outpt) Not applicable   Inpatient Pain Meds Released? Yes       Special needs/considerations None   Tasks needing completion None       Mayur Murray

## 2024-12-26 NOTE — ANESTHESIA PROCEDURE NOTES
Airway       Patient location during procedure: OR       Procedure Start/Stop Times: 12/26/2024 12:02 PM  Staff -        CRNA: Alexandra Branch APRN CRNA       Performed By: CRNA  Consent for Airway        Urgency: elective  Indications and Patient Condition       Indications for airway management: suzi-procedural       Induction type:intravenous       Mask difficulty assessment: 1 - vent by mask    Final Airway Details       Final airway type: endotracheal airway       Successful airway: ETT - single  Endotracheal Airway Details        ETT size (mm): 7.5       Cuffed: yes       Successful intubation technique: video laryngoscopy       VL Blade Size: MAC 4       Grade View of Cords: 1       Adjucts: stylet       Position: Right       Measured from: gums/teeth       Secured at (cm): 23       Bite block used: None    Post intubation assessment        Placement verified by: capnometry, equal breath sounds and chest rise        Number of attempts at approach: 1       Ease of procedure: easy       Dentition: Intact and Unchanged    Medication(s) Administered   Medication Administration Time: 12/26/2024 12:02 PM

## 2024-12-27 ENCOUNTER — APPOINTMENT (OUTPATIENT)
Dept: PHYSICAL THERAPY | Facility: CLINIC | Age: 64
DRG: 519 | End: 2024-12-27
Attending: STUDENT IN AN ORGANIZED HEALTH CARE EDUCATION/TRAINING PROGRAM
Payer: COMMERCIAL

## 2024-12-27 LAB
GLUCOSE BLDC GLUCOMTR-MCNC: 176 MG/DL (ref 70–99)
HGB BLD-MCNC: 14.4 G/DL (ref 13.3–17.7)

## 2024-12-27 PROCEDURE — 999N000111 HC STATISTIC OT IP EVAL DEFER

## 2024-12-27 PROCEDURE — 250N000011 HC RX IP 250 OP 636: Mod: JZ | Performed by: NURSE PRACTITIONER

## 2024-12-27 PROCEDURE — 250N000013 HC RX MED GY IP 250 OP 250 PS 637

## 2024-12-27 PROCEDURE — 250N000013 HC RX MED GY IP 250 OP 250 PS 637: Performed by: PHYSICIAN ASSISTANT

## 2024-12-27 PROCEDURE — 85018 HEMOGLOBIN: CPT | Performed by: STUDENT IN AN ORGANIZED HEALTH CARE EDUCATION/TRAINING PROGRAM

## 2024-12-27 PROCEDURE — 250N000013 HC RX MED GY IP 250 OP 250 PS 637: Performed by: STUDENT IN AN ORGANIZED HEALTH CARE EDUCATION/TRAINING PROGRAM

## 2024-12-27 PROCEDURE — 250N000013 HC RX MED GY IP 250 OP 250 PS 637: Performed by: NURSE PRACTITIONER

## 2024-12-27 PROCEDURE — 120N000002 HC R&B MED SURG/OB UMMC

## 2024-12-27 PROCEDURE — 36415 COLL VENOUS BLD VENIPUNCTURE: CPT | Performed by: STUDENT IN AN ORGANIZED HEALTH CARE EDUCATION/TRAINING PROGRAM

## 2024-12-27 PROCEDURE — 250N000011 HC RX IP 250 OP 636: Mod: JZ | Performed by: STUDENT IN AN ORGANIZED HEALTH CARE EDUCATION/TRAINING PROGRAM

## 2024-12-27 PROCEDURE — 97161 PT EVAL LOW COMPLEX 20 MIN: CPT | Mod: GP

## 2024-12-27 RX ORDER — CLONAZEPAM 0.5 MG/1
.5-1 TABLET ORAL 3 TIMES DAILY PRN
Status: DISCONTINUED | OUTPATIENT
Start: 2024-12-27 | End: 2024-12-28 | Stop reason: HOSPADM

## 2024-12-27 RX ORDER — TIZANIDINE 2 MG/1
2-4 TABLET ORAL EVERY 8 HOURS PRN
Qty: 50 TABLET | Refills: 0 | Status: SHIPPED | OUTPATIENT
Start: 2024-12-27

## 2024-12-27 RX ORDER — CEFAZOLIN SODIUM 2 G/100ML
2 INJECTION, SOLUTION INTRAVENOUS EVERY 8 HOURS
Status: DISCONTINUED | OUTPATIENT
Start: 2024-12-27 | End: 2024-12-28 | Stop reason: HOSPADM

## 2024-12-27 RX ORDER — CLONAZEPAM 0.5 MG/1
.5-1 TABLET ORAL 2 TIMES DAILY PRN
Status: DISCONTINUED | OUTPATIENT
Start: 2024-12-27 | End: 2024-12-27

## 2024-12-27 RX ORDER — POLYETHYLENE GLYCOL 3350 17 G/17G
1 POWDER, FOR SOLUTION ORAL DAILY
Qty: 510 G | Refills: 0 | Status: SHIPPED | OUTPATIENT
Start: 2024-12-27

## 2024-12-27 RX ORDER — TIZANIDINE 2 MG/1
4-8 TABLET ORAL EVERY 8 HOURS PRN
Status: DISCONTINUED | OUTPATIENT
Start: 2024-12-27 | End: 2024-12-28 | Stop reason: HOSPADM

## 2024-12-27 RX ORDER — OXYCODONE HYDROCHLORIDE 10 MG/1
30 TABLET ORAL 2 TIMES DAILY PRN
Status: DISCONTINUED | OUTPATIENT
Start: 2024-12-27 | End: 2024-12-28

## 2024-12-27 RX ORDER — OXYCODONE HYDROCHLORIDE 30 MG/1
15-30 TABLET ORAL 2 TIMES DAILY PRN
Qty: 14 TABLET | Refills: 0 | Status: SHIPPED | OUTPATIENT
Start: 2024-12-27

## 2024-12-27 RX ORDER — OXYCODONE HYDROCHLORIDE 10 MG/1
30 TABLET ORAL ONCE
Status: COMPLETED | OUTPATIENT
Start: 2024-12-27 | End: 2024-12-27

## 2024-12-27 RX ORDER — TIZANIDINE 2 MG/1
4 TABLET ORAL ONCE
Status: COMPLETED | OUTPATIENT
Start: 2024-12-27 | End: 2024-12-27

## 2024-12-27 RX ADMIN — TIZANIDINE 4 MG: 2 TABLET ORAL at 21:58

## 2024-12-27 RX ADMIN — BUPROPION HYDROCHLORIDE 200 MG: 200 TABLET, EXTENDED RELEASE ORAL at 17:26

## 2024-12-27 RX ADMIN — LISINOPRIL 20 MG: 10 TABLET ORAL at 04:36

## 2024-12-27 RX ADMIN — FAMOTIDINE 20 MG: 20 TABLET, FILM COATED ORAL at 04:37

## 2024-12-27 RX ADMIN — AMLODIPINE BESYLATE 5 MG: 5 TABLET ORAL at 17:23

## 2024-12-27 RX ADMIN — LISINOPRIL 20 MG: 10 TABLET ORAL at 17:23

## 2024-12-27 RX ADMIN — OXYCODONE HYDROCHLORIDE 30 MG: 10 TABLET ORAL at 21:02

## 2024-12-27 RX ADMIN — CARVEDILOL 50 MG: 25 TABLET, FILM COATED ORAL at 17:26

## 2024-12-27 RX ADMIN — TIZANIDINE 4 MG: 2 TABLET ORAL at 05:21

## 2024-12-27 RX ADMIN — CLONAZEPAM 0.5 MG: 0.5 TABLET ORAL at 08:38

## 2024-12-27 RX ADMIN — AMLODIPINE BESYLATE 5 MG: 5 TABLET ORAL at 04:36

## 2024-12-27 RX ADMIN — TIZANIDINE 4 MG: 2 TABLET ORAL at 10:52

## 2024-12-27 RX ADMIN — CLONAZEPAM 0.5 MG: 0.5 TABLET ORAL at 10:52

## 2024-12-27 RX ADMIN — ATORVASTATIN CALCIUM 40 MG: 40 TABLET, FILM COATED ORAL at 17:23

## 2024-12-27 RX ADMIN — PREGABALIN 100 MG: 50 CAPSULE ORAL at 13:06

## 2024-12-27 RX ADMIN — CLONAZEPAM 1 MG: 0.5 TABLET ORAL at 21:02

## 2024-12-27 RX ADMIN — FENTANYL 1 PATCH: 25 PATCH TRANSDERMAL at 04:42

## 2024-12-27 RX ADMIN — PREGABALIN 100 MG: 50 CAPSULE ORAL at 08:38

## 2024-12-27 RX ADMIN — PREGABALIN 100 MG: 50 CAPSULE ORAL at 21:02

## 2024-12-27 RX ADMIN — DOCUSATE SODIUM 200 MG: 100 CAPSULE, LIQUID FILLED ORAL at 17:23

## 2024-12-27 RX ADMIN — CEFAZOLIN SODIUM 2 G: 2 INJECTION, SOLUTION INTRAVENOUS at 04:38

## 2024-12-27 RX ADMIN — SENNOSIDES AND DOCUSATE SODIUM 2 TABLET: 50; 8.6 TABLET ORAL at 04:36

## 2024-12-27 RX ADMIN — BUPROPION HYDROCHLORIDE 200 MG: 200 TABLET, EXTENDED RELEASE ORAL at 04:56

## 2024-12-27 RX ADMIN — FAMOTIDINE 20 MG: 20 TABLET, FILM COATED ORAL at 17:22

## 2024-12-27 RX ADMIN — CARVEDILOL 50 MG: 25 TABLET, FILM COATED ORAL at 04:56

## 2024-12-27 RX ADMIN — OXYCODONE HYDROCHLORIDE 30 MG: 10 TABLET ORAL at 08:38

## 2024-12-27 RX ADMIN — DOCUSATE SODIUM 100 MG: 100 CAPSULE, LIQUID FILLED ORAL at 04:38

## 2024-12-27 RX ADMIN — CEFAZOLIN SODIUM 2 G: 2 INJECTION, SOLUTION INTRAVENOUS at 21:05

## 2024-12-27 RX ADMIN — OXYCODONE HYDROCHLORIDE 30 MG: 10 TABLET ORAL at 15:01

## 2024-12-27 RX ADMIN — CEFAZOLIN SODIUM 2 G: 2 INJECTION, SOLUTION INTRAVENOUS at 13:06

## 2024-12-27 RX ADMIN — ESCITALOPRAM OXALATE 20 MG: 10 TABLET ORAL at 04:37

## 2024-12-27 ASSESSMENT — ACTIVITIES OF DAILY LIVING (ADL)
ADLS_ACUITY_SCORE: 59
ADLS_ACUITY_SCORE: 59
ADLS_ACUITY_SCORE: 61
ADLS_ACUITY_SCORE: 60
ADLS_ACUITY_SCORE: 60
ADLS_ACUITY_SCORE: 61
ADLS_ACUITY_SCORE: 60
ADLS_ACUITY_SCORE: 61
ADLS_ACUITY_SCORE: 59
ADLS_ACUITY_SCORE: 60
ADLS_ACUITY_SCORE: 60
ADLS_ACUITY_SCORE: 59
ADLS_ACUITY_SCORE: 59
ADLS_ACUITY_SCORE: 60
ADLS_ACUITY_SCORE: 60
ADLS_ACUITY_SCORE: 59
ADLS_ACUITY_SCORE: 59
ADLS_ACUITY_SCORE: 60
ADLS_ACUITY_SCORE: 59
ADLS_ACUITY_SCORE: 60
ADLS_ACUITY_SCORE: 60
ADLS_ACUITY_SCORE: 59
ADLS_ACUITY_SCORE: 60

## 2024-12-27 NOTE — PLAN OF CARE
"5MS Admission Note    Reason for admission: L4-L5 decompression  Primary team notified of pt arrival.  Admitted from: PACU  Via: Hospital bed   Accompanied by: None  Belongings: Placed in closet; valuables sent home with family  Admission Required Doc Completed: No  Teaching: Orientation to unit and call light- call light within reach, use of console, meal times, when to call for the RN, and enforced importance of safety.  IV Access: R PIV SL   Telemetry: No  Ht./Wt.: Not Completed  Code Status verified on armband: Yes  2 RN Skin Assessment Completed with: Delma ATWOOD RN  Suction/Ambu bag/Flowmeter at bedside: Yes    End of shift Summary: See flowsheet for VS and detail assessments.     Changes this Shift:     Neuro/CMS: A&Ox4. Calm but refused to have CAPNO and SCD on. Able to make needs known. Denies dizziness, headaches, N/V and N/T.   Pulmonary: Stable on RA; saturating O2 above 97%. Denies chest pain and SOB.   Output: Continent of bowel/bladder. Voids spontaneously and adequately in the bathroom. Had BM this morning before OR per pt report.   Activity: Up SBA w/ walker. Refused to be boosted in bed stating that he needs \" head to be flat\". Pt ambulated on the hallway independently.   Skin: Blanchable redness on bilateral knee and chest. Scattered scabs and bruises. Fentanyl patch on L side of the abdomen. Spinal incision.   Dressing/Drain: Tegaderm and gauze dressing on spinal incision C/D/I. Hemovac intact and patent.   IV: R PIV SL and patent.      Plan: Continue with POC.           "

## 2024-12-27 NOTE — PROGRESS NOTES
[facility]    Medicine Progress Note - Hospitalist Service, GOLD TEAM 19    Date of Admission:  12/26/2024    Assessment & Plan   Kobe Buchanan is a 64 year old male admitted on 12/26/2024. 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 Medicine consulted for perioperative comanagement.    Lumbar Radiculopathy s/p L4-5 decompression (12/26/24)  Recent C3/C7 dome laminectomy and C4-6 laminoplasty (9/11/24)  Chronic pain with chronic opioid use  Patient had planned procedure today with Dr. Prjaapati. Pain currently rated 6/10, he reports his baseline is generally around a 5. He is on chronic pain medications with Fentanyl patches 25mcg q48 hours, Oxycodone 30mg BID PRN which he takes relatively regularly, Lyrica 100mg TID, Zanaflex PRN. He reports no history of oversedation with high doses of analgesics.  -- Postoperative management per primary team:  Activity: Up with assist until independent No excessive bending or twisting. No lifting >10 lbs x 6 weeks. No Aida lift for transfers.   Weight bearing status: WBAT.  Pain management:   Transition from IV to PO narcotics as tolerated.   Antibiotics: Antibtioics x 24 hours.  Diet: Begin with clear fluids and progress diet as tolerated.   DVT prophylaxis: SCDs only. No chemical DVT ppx needed.  Labs: Hgb POD 1  Bracing/Splinting: None.  Dressings: Keep Bandages  Drains: x1 Document output per shift, will be discontinued at Orthopedic Surgery discretion.  Zach  catheter: Remove POD#1.   Physical Therapy/Occupational Therapy: Eval and treat  Cultures: none.    Consults: Hospitalist  Follow-up: Clinic with Dr. Prajapati in 6 weeks with repeat x-rays.   Disposition: Pending progress with therapies, pain control on orals, and medical stability, anticipate discharge to Home on POD #2-3.  -- Discontinue PO Dilaudid and restart PTA Oxycodone 30mg BID PRN per pt preference   - ok for 1-2 additional doses oxycodone prn overnight  - continue PTA tizanidine  -- Continue PTA Lyrica 100mg TID  -- Continue PTA Colace 100mg in the morning and 200mg in the evening  -- Continue PTA Senokot 2 tablets in the morning and 3 tablets in the evening  -- Discontinue PO Robaxin and restart PTA Tizanidine per pt preference  -- Discussed medication changes with orthopedic team given high doses of sedating medications    HTN  Hx of stress induced cardiomyopathy, resolved  Nonobstructive CAD  Has history of NSVT runs and significant postprocedural hypertension surrounding prior surgeries. BP elevated today with SBPs up to 170s, improving trend this evening. Patient reports no previous history of hypotension with antihypertensives in the setting of significant analgesics.  -- Continue Amlodipine-Lisinopril in place of PTA Amlodipine-Benazepril with hold parameters  -- Continue PTA Coreg 50mg BID  -- Continue PTA Atorvastatin 40mg qPM  -- Patient reports not taking ASA outpatient; encourage to follow up regarding need for chronic antiplatelet    GERD  -- Continue PTA PPI PRN  -- Continue PTA Sucralfate PRN    s/p Gastric Bypass with significant weight loss  T2DM, stable  Hx SAVITA, resolved   Since bypass and weight loss, A1C has been WNL. SAVITA also resolved with weight loss.   -- CTM    Anxiety/Depression  Insomnia  -- Continute PTA Bupropion 200mg BID   -- Continue PTA Escitalopram 20mg qAM  -- Clonazepam 0.5-1 mg bid prn          Diet: Advance Diet as Tolerated: Regular Diet Adult    DVT Prophylaxis: Defer  "to primary service  Serrano Catheter: Not present  Lines: None     Cardiac Monitoring: None  Code Status: Full Code      Clinically Significant Risk Factors Present on Admission                 # Drug Induced Platelet Defect: home medication list includes an antiplatelet medication   # Hypertension: Noted on problem list           # Obesity: Estimated body mass index is 31.79 kg/m  as calculated from the following:    Height as of this encounter: 1.778 m (5' 10\").    Weight as of this encounter: 100.5 kg (221 lb 9 oz).       # Financial/Environmental Concerns:           Social Drivers of Health    Depression: Not at risk (12/12/2024)    PHQ-2     PHQ-2 Score: 0   Recent Concern: Depression - At risk (9/24/2024)    PHQ-2     PHQ-2 Score: 4   Tobacco Use: Medium Risk (12/12/2024)    Patient History     Smoking Tobacco Use: Former     Smokeless Tobacco Use: Never     Passive Exposure: Never   Social Connections: Socially Isolated (8/31/2021)    Social Connection and Isolation Panel [NHANES]     Frequency of Communication with Friends and Family: Once a week     Frequency of Social Gatherings with Friends and Family: Never     Attends [employer] Services: Never     Active Member of Clubs or Organizations: No     Attends Club or Organization Meetings: Never     Marital Status: Never           Disposition Plan     Medically Ready for Discharge: Anticipated in 2-4 Days             Aris Dougherty MD  Hospitalist Service, GOLD TEAM 19  [facility]  Securely message with [employer] (more info)  Text page via [employer] Paging/Directory   See signed in provider for up to date coverage information  ______________________________________________________________________    Interval History   Admitted overnight    Physical Exam   Vital Signs: Temp: 98.1  F (36.7  C) Temp src: Oral BP: (!) 144/75 Pulse: 66   Resp: 19 SpO2: 98 % O2 Device: None (Room air) Oxygen Delivery: 2 LPM  Weight: 221 " lbs 9 oz    General Appearance: NAD, irritable, demanding to leave  Respiratory: CTAB, no inc WOB, no w/r/r  Cardiovascular: RRR, no m/r/g  GI: NTND, soft  MSK: drain with bloody output     Medical Decision Making       65 MINUTES SPENT BY ME on the date of service doing chart review, history, exam, documentation & further activities per the note.      Data   ------------------------- PAST 24 HR DATA REVIEWED -----------------------------------------------    I have personally reviewed the following data over the past 24 hrs:    INR:  1.04 PTT:  N/A   D-dimer:  N/A Fibrinogen:  N/A       Imaging results reviewed over the past 24 hrs:   Recent Results (from the past 24 hours)   XR Lumbar Spine Port 1 View    Narrative    EXAM: XR LUMBAR SPINE PORT 1 VIEW  LOCATION: [facility]  DATE: 12/26/2024    INDICATION: Lumbar 4 to 5 decompression  COMPARISON: Lumbar radiograph dated 12/12/2020 4P    TECHNIQUE: Intraoperative radiographs performed during the patient's procedure.    FINDINGS: There are 2 lateral radiographs of the lumbar spine. There are 5 lumbar type vertebral bodies. On series 1, there are 2 surgical instruments pointing to the L3-L4 and L4-L5 disc spaces. On series 2, there is a surgical instrument pointing to   the L4-L5 disc space.

## 2024-12-27 NOTE — PLAN OF CARE
Occupational Therapy: Orders received. Chart reviewed and discussed with care team.? Occupational Therapy not indicated due to per discussion with PT, pt is up IND, has all necessary AE and is familiar with his precautions with no IP OT needs at this.? Defer discharge recommendations to PT.? Will complete orders.

## 2024-12-27 NOTE — PROGRESS NOTES
"EVAL ONLY     12/27/24 8401   Appointment Info   Signing Clinician's Name / Credentials (PT) Sandra Narayanan, DPMARCELL   Living Environment   People in Home alone   Home Accessibility no concerns   Number of Stairs, Within Home, Primary none   Transportation Anticipated health plan transportation   Living Environment Comments walk in shower with seat, no stairs   Self-Care   Usual Activity Tolerance good   Current Activity Tolerance good   Equipment Currently Used at Home walker, rolling;wheelchair, power;shower chair  (4WW)   Fall history within last six months no   Activity/Exercise/Self-Care Comment IND at baseline with all cares and mobility, uses 4WW occasionally post surgeries and with pain   General Information   Onset of Illness/Injury or Date of Surgery 12/27/24   Referring Physician Dr. Prajapati   Pertinent History of Current Problem (include personal factors and/or comorbidities that impact the POC) per chart review, \"Kobe Buchanan is a 64 year old male admitted on 12/26/2024. 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 Medicine consulted for perioperative comanagement.\"   Existing Precautions/Restrictions fall;spinal   Weight-Bearing Status - LUE   (10#)   Weight-Bearing Status - RUE   (10#)   Weight-Bearing Status - LLE weight-bearing as tolerated   Weight-Bearing Status - RLE weight-bearing as tolerated   General Observations very eager for therapy and to discharge   Cognition   Affect/Mental Status (Cognition) WNL   Orientation Status " (Cognition) oriented x 4   Follows Commands (Cognition) WNL   Pain Assessment   Patient Currently in Pain Yes, see Vital Sign flowsheet   Integumentary/Edema   Integumentary/Edema no deficits were identifed   Posture    Posture Not impaired   Range of Motion (ROM)   Range of Motion ROM is WNL   Strength (Manual Muscle Testing)   Strength (Manual Muscle Testing) strength is WNL   Bed Mobility   Comment, (Bed Mobility) IND with good log roll   Transfers   Comment, (Transfers) IND   Gait/Stairs (Locomotion)   Comment, (Gait/Stairs) pt ambulates x200 Benji with FWW, steady gait, step through pattern, intermittently taking hands off walker with good balance and stability   Balance   Balance no deficits were identified   Sensory Examination   Sensory Perception patient reports no sensory changes   Coordination   Coordination no deficits were identified   Clinical Impression   Criteria for Skilled Therapeutic Intervention Evaluation only   Clinical Presentation (PT Evaluation Complexity) stable   Clinical Presentation Rationale PMH, clinician impression   Clinical Decision Making (Complexity) low complexity   Risk & Benefits of therapy have been explained evaluation/treatment results reviewed;participants included;patient   Clinical Impression Comments mobilizing near baseline, very familiar with spine precautions from previous surgeries   PT Total Evaluation Time   PT Eval, Low Complexity Minutes ([id number]) 15   PT Discharge Planning   PT Plan eval only   PT Discharge Recommendation (DC Rec) home   PT Rationale for DC Rec pt up Benji with 4WW, at his baseline, has all equipment needs in place and familiar with spine precautions and mobility/activity modifications from previous surgeries   PT Brief overview of current status Benji with 4WW   Physical Therapy Time and Intention   Total Session Time (sum of timed and untimed services) 15

## 2024-12-27 NOTE — PLAN OF CARE
Goal Outcome Evaluation:      Plan of Care Reviewed With: patient    Overall Patient Progress: improving    End of shift Summary: See flowsheet for VS and detail assessments.    Orientation:Pt remains alert and oriented x 4.    Oxygen:On RA. Reports no SOB and/or chest pain.    Bowel:Continent LBM: 12/27.    Bladder:Continent.     Pain:Rates pain 4-8/10; Managed with PRN Klonopin, Oxycodone, tizanidine.    Ambulation/Transfers: Independent in the room.    Diet/ Liquids:Regular/ Thin    Tubes/ Lines/ Drains: HV drain (25 ML). Right PIV SL.    Skin/ dressing: Surgical dressing C/D/I.    Other info: Pt would like his pain pills together at bedtime. Code status changed to DNR/DNI per patient request.  Personal belongings and call light within reach.     Continue with current POC.

## 2024-12-27 NOTE — PLAN OF CARE
"  VS: /66 (BP Location: Left arm)   Pulse 62   Temp 98  F (36.7  C) (Oral)   Resp 18   Ht 1.778 m (5' 10\")   Wt 100.5 kg (221 lb 9 oz)   SpO2 95%   BMI 31.79 kg/m       O2: Room air   Output: urinary hesitancy, reports narrow urethra   Last BM: 12/27 per pt report   Activity: Up independently as tolerated   Skin: Scattered scabs on R and L Upper extremities   Pain: Pain managed with PRN medications (klonopin, oxycodone, tizanidine)   CMS: A&Ox2; intermittently disoriented to time and situation   Dressing: Surgical incision dressing CDI   Diet: Regular Adult diet    LDA: R PIV infusing LR   Equipment: IV pole, personal belongings, call light   Plan: Continue plan of care, pain management   Additional Info: Fentanyl patch on L abdomen           "

## 2024-12-27 NOTE — PROGRESS NOTES
"Orthopedic Surgery Progress Note:     L4-L5 Decompression  12/26/24    Subjective:   No acute events overnight. Pain well-controlled from post op.  Tolerating a clear diet. Not yet moving bowels, +flatus. No new concerns or complaints.    Extensive discussion with APRN today. Patient denies any new lumbar spine pain, despite his decompression, and reports his left lateral leg pain (in L5 dermatomal pattern) is resolved.     Patient's chief complaints today are related to chronic cervical pain and headache, and inability to sleep. He states at home to \"break cycles\" of insomnia, he will take his klonipin, Lyrica, tizanidine and 30mg Oxycodone all together. He acknowledges that several healthcare professionals have not recommended this related to safety, but states, \"I am able to take double what others take. It doesn't do anything.\" He expresses he has little concern for concurrent side effects because it has worked in the past. Also uses home Fentanyl patches. He has several complaints about his home pain management treatment course.    Objective:   BP (!) 144/75 (BP Location: Left arm, Patient Position: Semi-Dawn's, Cuff Size: Adult Large)   Pulse 66   Temp 98.1  F (36.7  C) (Oral)   Resp 19   Ht 1.778 m (5' 10\")   Wt 100.5 kg (221 lb 9 oz)   SpO2 98%   BMI 31.79 kg/m    No intake/output data recorded.  General: NAD. Resting comfortably in bed. Able to stand by himself and move around his room.   Respiratory: Breathing comfortably on RA.  Drain Output: 125ml  Musculoskeletal: Clean and dry    Motor Strength Right Left   Hip flexion: L1, L2, L3 5/5 5/5   Hip adduction: L2, L3 5/5 5/5   Knee flexion: S1 5/5 5/5   Knee extension: L3, L4 5/5 5/5   Ankle dosiflexion: L4, L5 5/5 5/5   EHL: L5 5/5 5/5   Ankle plantarflexion: S1 5/5 5/5     Sensation from L1-S2 is preserved.    Laboratory Data:  Lab Results   Component Value Date    WBC 8.1 12/12/2024    HGB 15.4 12/12/2024     12/12/2024    INR 1.04 " 12/26/2024           Assessment & Plan:   Kobe Buchanan is a 64 year old male with PMH including lumbar radiculopathy and neurogenic claudication now s/p above procedure on 12/26/2024 with Dr. Prajapati.     Upon evaluation patient found doing well today. Clean bandages and neurologically intact. Refers that he wants to go home today, after discussion with Dr Prajapati, we believe it is best to have his drain monitored a little longer due to higher output than we would like before removal. Patient is disappointed but agreeable.     APRN discussion:  I explain I do not have control over his home pain regimen, but am happy to hear our current regimen of 30mg Oxycodone is helpful this morning. He expresses desire to be able to have additional doses of Klonipin and or Tizanidine, and again he reiterates that he tolerates these well. Acknowledging that these medications have concurrent side effects, while inpatient he is being monitored and sleep is important for healing and patient experience. I will increase the frequency of his PRN klonopin and tizanidine and increase dosing of tizanidine as these have been helpful previously. I offered a formal pain team consult which he politely declined at this time. Could consider in the future.      Continue pain management  Monitor drains  Out of bed with PT OT.   Anticipate discharge tomorrow.     Alo Primary  Activity: Up with assist until independent No excessive bending or twisting. No lifting >10 lbs x 6 weeks. No Aida lift for transfers.   Weight bearing status: WBAT.  Pain management:   Transition from IV to PO narcotics as tolerated.   Antibiotics: Antibtioics while drain is in place.   Diet: Begin with clear fluids and progress diet as tolerated.   DVT prophylaxis: SCDs only. No chemical DVT ppx needed.  Labs: Hgb POD 1  Bracing/Splinting: None.  Dressings: Keep Bandages  Drains: x1 Document output per shift, will be discontinued at Orthopedic Surgery  discretion.  Serrano catheter: Remove POD#1.   Physical Therapy/Occupational Therapy: Eval and treat  Cultures: none.    Consults: Hospitalist  Follow-up: Clinic with Dr. Prajapati in 6 weeks with repeat x-rays.   Disposition: Pending progress with therapies, pain control on orals, and medical stability, anticipate discharge to Home on POD #2-3.    Jean-Pierre Samayoa MD  Spine Fellow    PLEASE PAGE ME directly with any questions/concerns during regular weekday hours before 5 pm. If there is no response, if it is a weekend, or if it is during evening hours then please page the orthopedic surgery resident on call.    Addendum completed by Choco Melgar, ALAYNA    FOLLOWUP:    Future Appointments   Date Time Provider Department Center   12/27/2024 11:00 AM Sandra Narayanan, PT URPT [place]   12/27/2024 11:15 AM Janice Tipton, OTR UROT [place]   2/11/2025  8:40 AM Jonathan Prajapati MD [facility]   4/10/2025 11:30 AM Enriqueta Zhou MD [facility]

## 2024-12-28 VITALS
DIASTOLIC BLOOD PRESSURE: 51 MMHG | HEART RATE: 58 BPM | HEIGHT: 70 IN | TEMPERATURE: 98 F | SYSTOLIC BLOOD PRESSURE: 114 MMHG | WEIGHT: 221.56 LBS | OXYGEN SATURATION: 97 % | BODY MASS INDEX: 31.72 KG/M2 | RESPIRATION RATE: 17 BRPM

## 2024-12-28 LAB — GLUCOSE BLDC GLUCOMTR-MCNC: 96 MG/DL (ref 70–99)

## 2024-12-28 PROCEDURE — 250N000013 HC RX MED GY IP 250 OP 250 PS 637: Performed by: NURSE PRACTITIONER

## 2024-12-28 PROCEDURE — 250N000013 HC RX MED GY IP 250 OP 250 PS 637: Performed by: STUDENT IN AN ORGANIZED HEALTH CARE EDUCATION/TRAINING PROGRAM

## 2024-12-28 PROCEDURE — 250N000011 HC RX IP 250 OP 636: Mod: JZ | Performed by: NURSE PRACTITIONER

## 2024-12-28 PROCEDURE — 250N000013 HC RX MED GY IP 250 OP 250 PS 637

## 2024-12-28 RX ORDER — OXYCODONE HYDROCHLORIDE 10 MG/1
30 TABLET ORAL 3 TIMES DAILY PRN
Status: DISCONTINUED | OUTPATIENT
Start: 2024-12-28 | End: 2024-12-28 | Stop reason: HOSPADM

## 2024-12-28 RX ADMIN — CEFAZOLIN SODIUM 2 G: 2 INJECTION, SOLUTION INTRAVENOUS at 05:14

## 2024-12-28 RX ADMIN — PREGABALIN 100 MG: 50 CAPSULE ORAL at 08:02

## 2024-12-28 RX ADMIN — CLONAZEPAM 1 MG: 0.5 TABLET ORAL at 05:31

## 2024-12-28 RX ADMIN — BUPROPION HYDROCHLORIDE 200 MG: 200 TABLET, EXTENDED RELEASE ORAL at 05:15

## 2024-12-28 RX ADMIN — PANTOPRAZOLE SODIUM 40 MG: 40 TABLET, DELAYED RELEASE ORAL at 06:06

## 2024-12-28 RX ADMIN — ESCITALOPRAM OXALATE 20 MG: 10 TABLET ORAL at 05:15

## 2024-12-28 RX ADMIN — LISINOPRIL 20 MG: 10 TABLET ORAL at 05:14

## 2024-12-28 RX ADMIN — DOCUSATE SODIUM 100 MG: 100 CAPSULE, LIQUID FILLED ORAL at 05:14

## 2024-12-28 RX ADMIN — AMLODIPINE BESYLATE 5 MG: 5 TABLET ORAL at 05:14

## 2024-12-28 RX ADMIN — FAMOTIDINE 20 MG: 20 TABLET, FILM COATED ORAL at 05:15

## 2024-12-28 RX ADMIN — SENNOSIDES AND DOCUSATE SODIUM 2 TABLET: 50; 8.6 TABLET ORAL at 05:16

## 2024-12-28 RX ADMIN — OXYCODONE HYDROCHLORIDE 30 MG: 10 TABLET ORAL at 08:32

## 2024-12-28 RX ADMIN — CARVEDILOL 50 MG: 25 TABLET, FILM COATED ORAL at 05:15

## 2024-12-28 ASSESSMENT — ACTIVITIES OF DAILY LIVING (ADL)
ADLS_ACUITY_SCORE: 62
ADLS_ACUITY_SCORE: 59
ADLS_ACUITY_SCORE: 60
ADLS_ACUITY_SCORE: 59
ADLS_ACUITY_SCORE: 62
ADLS_ACUITY_SCORE: 60
ADLS_ACUITY_SCORE: 59
ADLS_ACUITY_SCORE: 59
ADLS_ACUITY_SCORE: 62
ADLS_ACUITY_SCORE: 62
ADLS_ACUITY_SCORE: 59

## 2024-12-28 NOTE — PROGRESS NOTES
[facility]    Medicine Progress Note - Hospitalist Service, GOLD TEAM 19    Date of Admission:  12/26/2024    Assessment & Plan   Kobe Buchanan is a 64 year old male admitted on 12/26/2024. 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 Medicine consulted for perioperative comanagement.    Lumbar Radiculopathy s/p L4-5 decompression (12/26/24)  Recent C3/C7 dome laminectomy and C4-6 laminoplasty (9/11/24)  Chronic pain with chronic opioid use  Patient had planned procedure today with Dr. Prajapati. Pain currently rated 6/10, he reports his baseline is generally around a 5. He is on chronic pain medications with Fentanyl patches 25mcg q48 hours, Oxycodone 30mg BID PRN which he takes relatively regularly, Lyrica 100mg TID, Zanaflex PRN. He reports no history of oversedation with high doses of analgesics.  -- Postoperative management per primary team:  Activity: Up with assist until independent No excessive bending or twisting. No lifting >10 lbs x 6 weeks. No Aida lift for transfers.   Weight bearing status: WBAT.  Pain management:   Transition from IV to PO narcotics as tolerated.   Antibiotics: Antibtioics x 24 hours.  Diet: Begin with clear fluids and progress diet as tolerated.   DVT prophylaxis: SCDs only. No chemical DVT ppx needed.  Labs: Hgb POD 1  Bracing/Splinting: None.  Dressings: Keep Bandages  Drains: x1 Document output per shift, will be discontinued at Orthopedic Surgery discretion.  Zach  catheter: Remove POD#1.   Physical Therapy/Occupational Therapy: Eval and treat  Cultures: none.    Consults: Hospitalist  Follow-up: Clinic with Dr. Prajapati in 6 weeks with repeat x-rays.   Disposition: Pending progress with therapies, pain control on orals, and medical stability, anticipate discharge to Home on POD #2-3.  -- resume PTA oxycodone on discharge, additional oxycodone on d/c per primary team ortho  -- continue PTA tizanidine  -- Continue PTA Lyrica 100mg TID  -- Continue PTA Colace 100mg in the morning and 200mg in the evening  -- Continue PTA Senokot 2 tablets in the morning and 3 tablets in the evening  -- d/c home when drain removed and cleared from ortho standpoint    HTN  Hx of stress induced cardiomyopathy, resolved  Nonobstructive CAD  Has history of NSVT runs and significant postprocedural hypertension surrounding prior surgeries. BP elevated today with SBPs up to 170s, improving trend this evening. Patient reports no previous history of hypotension with antihypertensives in the setting of significant analgesics.  -- Continue Amlodipine-Lisinopril in place of PTA Amlodipine-Benazepril with hold parameters  -- Continue PTA Coreg 50mg BID  -- Continue PTA Atorvastatin 40mg qPM  -- Patient reports not taking ASA outpatient; encourage to follow up regarding need for chronic antiplatelet    GERD  -- Continue PTA PPI PRN  -- Continue PTA Sucralfate PRN    S/p Gastric Bypass with significant weight loss  T2DM, stable  Hx SAVITA, resolved   Since bypass and weight loss, A1C has been WNL. SAVITA also resolved with weight loss.   -- CTM    Anxiety/Depression  Insomnia  -- Continute PTA Bupropion 200mg BID   -- Continue PTA Escitalopram 20mg qAM  -- Clonazepam 0.5-1 mg bid prn    Code status  D/w pt at bedside on 12/27 who reports he would not like CPR or breathing tube placed. Code status changed to DNR/DNI.          Diet: Advance Diet as Tolerated: Regular Diet Adult  Discharge Instruction - Regular Diet Adult  "   DVT Prophylaxis: Defer to primary service  Serrano Catheter: Not present  Lines: None     Cardiac Monitoring: None  Code Status: No CPR- Do NOT Intubate      Clinically Significant Risk Factors                   # Hypertension: Noted on problem list            # Obesity: Estimated body mass index is 31.79 kg/m  as calculated from the following:    Height as of this encounter: 1.778 m (5' 10\").    Weight as of this encounter: 100.5 kg (221 lb 9 oz)., PRESENT ON ADMISSION       # Financial/Environmental Concerns:           Social Drivers of Health    Depression: Not at risk (12/12/2024)    PHQ-2     PHQ-2 Score: 0   Recent Concern: Depression - At risk (9/24/2024)    PHQ-2     PHQ-2 Score: 4   Tobacco Use: Medium Risk (12/12/2024)    Patient History     Smoking Tobacco Use: Former     Smokeless Tobacco Use: Never     Passive Exposure: Never   Social Connections: Socially Isolated (8/31/2021)    Social Connection and Isolation Panel [NHANES]     Frequency of Communication with Friends and Family: Once a week     Frequency of Social Gatherings with Friends and Family: Never     Attends Shinto Services: Never     Active Member of Clubs or Organizations: No     Attends Club or Organization Meetings: Never     Marital Status: Never           Disposition Plan     Medically Ready for Discharge: Ready Now             Aris Dougherty MD  Hospitalist Service, GOLD TEAM 19  [facility]  Securely message with [employer] (more info)  Text page via [employer] Paging/Directory   See signed in provider for up to date coverage information  ______________________________________________________________________    Interval History   No complaints. Wants to go home    Physical Exam   Vital Signs: Temp: 98  F (36.7  C) Temp src: Oral BP: 114/51 Pulse: 58   Resp: 17 SpO2: 97 % O2 Device: None (Room air)    Weight: 221 lbs 9 oz    General Appearance: NAD, wants to leave  Respiratory: CTAB, no " inc WOB, no w/r/r  Cardiovascular: RRR, no m/r/g  GI: NTND, soft  MSK: drain with minimal output    Medical Decision Making       50 MINUTES SPENT BY ME on the date of service doing chart review, history, exam, documentation & further activities per the note.      Data   ------------------------- PAST 24 HR DATA REVIEWED -----------------------------------------------    I have personally reviewed the following data over the past 24 hrs:    N/A  \   N/A   / N/A     N/A N/A N/A /  96   N/A N/A N/A \       Imaging results reviewed over the past 24 hrs:   No results found for this or any previous visit (from the past 24 hours).

## 2024-12-28 NOTE — PROGRESS NOTES
"Orthopedic Surgery Progress Note:     L4-L5 Decompression  12/26/24    Subjective:   No acute events overnight. Pain well-controlled in the low back, ongoing chronic neck pain. Tolerating a diet. Voiding, +flatus, +BM. No new concerns or complaints. Passed PT/OT, up with a walker.    Objective:   /51 (BP Location: Left arm, Patient Position: Supine, Cuff Size: Adult Large)   Pulse 58   Temp 98  F (36.7  C) (Oral)   Resp 17   Ht 1.778 m (5' 10\")   Wt 100.5 kg (221 lb 9 oz)   SpO2 97%   BMI 31.79 kg/m    No intake/output data recorded.  General: NAD. Resting comfortably in bed. Able to stand by himself and move around his room.   Respiratory: Breathing comfortably on RA.  Drain Output: 33ml  Musculoskeletal: Clean and dry    Motor Strength Right Left   Hip flexion: L1, L2, L3 5/5 5/5   Hip adduction: L2, L3 5/5 5/5   Knee flexion: S1 5/5 5/5   Knee extension: L3, L4 5/5 5/5   Ankle dosiflexion: L4, L5 5/5 4/5   EHL: L5 5/5 4/5   Ankle plantarflexion: S1 5/5 5/5     Sensation from L1-S2 is preserved.    Laboratory Data:  Lab Results   Component Value Date    WBC 8.1 12/12/2024    HGB 14.4 12/27/2024     12/12/2024    INR 1.04 12/26/2024     Assessment & Plan:   Kobe Buchanan is a 64 year old male with PMH including lumbar radiculopathy and neurogenic claudication now s/p above procedure on 12/26/2024 with Dr. Prajapati. Upon evaluation patient found doing well today. Clean bandages and neurologically intact. He wants the drain removed and to go home today.     - Continue pain management  - Monitor drains - plan to remove today prior to discharge.  - Mobilize per PT/OT.   - Plan to discharge today.     Alo Primary  Activity: Up with assist until independent No excessive bending or twisting. No lifting >10 lbs x 6 weeks. No Aida lift for transfers.   Weight bearing status: WBAT.  Pain management: Transition from IV to PO narcotics as tolerated.   Antibiotics: Antibtioics while drain is in " place.   Diet: Begin with clear fluids and progress diet as tolerated.   DVT prophylaxis: SCDs only. No chemical DVT ppx needed.  Labs: Hgb POD 1  Bracing/Splinting: None.  Dressings: Keep Bandages in place.  Drains: x1 Document output per shift, will be discontinued at Orthopedic Surgery discretion.  Serrano catheter: Removed.  Physical Therapy/Occupational Therapy: Eval and treat  Cultures: none.    Consults: Hospitalist  Follow-up: Clinic with Dr. Prajapati in 6 weeks with repeat x-rays.   Disposition: Pending progress with therapies, pain control on orals, and medical stability, anticipate discharge to Home on POD #2-3.    Byron Martinez DO  Orthopaedic Surgery Resident  Pager: [phone]  December 28, 2024, 8:15 AM    Please page me directly with any questions/concerns during regular weekday hours before 5pm. If there is no response, if it is a weekend, or if it is during evening hours, then please page the orthopedic surgery resident on call.    FOLLOWUP:    Future Appointments   Date Time Provider Department Center   12/27/2024 11:00 AM Sandra Narayanan, PT URPT [place]   12/27/2024 11:15 AM Janice Tipton, OTR UROT [place]   2/11/2025  8:40 AM Jonathan Prajapati MD [facility]   4/10/2025 11:30 AM Enriqueta Zhou MD [facility]

## 2024-12-28 NOTE — PLAN OF CARE
Patient is A&Ox4. VSS. LS clear, on RA. BS active, LBM on 12/26/2024. Voiding well. Pain managed with 30mg three times daily. R PIV is patent and SL. HV patent and draining well. Patient up independently. Continue to monitor.     0800: Patient upset he is getting oxycodone two times daily. Requested to be 3t imes daily. MD changed order to three times daily. Continue to monitor    1030: Patient discharged home. R PIV and HV drain removed. Pt refused to review discharge orders with writer. Patient refused transport in wheelchair. Pt only wanted oxycodone meds. Pt had no questions or concerns.

## 2024-12-28 NOTE — PLAN OF CARE
Goal Outcome Evaluation:  Plan of Care Reviewed With: patient  Overall Patient Progress: improving    Shift: [id number]    End of shift summary: No significant changes this shift. Pain rating 4-5 out of 10.   PRN Clonazepam 1 mg, scheduled Oxycodone 30 mg, and scheduled Lyrica given at 2102. Pt wanted to wait on the PRN Tizanidine to see if the increase in clonazepam helped him sleep. Pt ended up requesting Tizanidine at 2158.   Pt currently resting comfortably and RR normal.

## 2024-12-28 NOTE — DISCHARGE SUMMARY
ORTHOPAEDIC SURGERY DISCHARGE SUMMARY     Date of Admission: 12/26/2024  Date of Discharge: 12/28/2024 11:13 AM  Disposition: Home  Staff Physician: Jonathan Prajapati  Primary Care Provider: Demi Hardin    DISCHARGE DIAGNOSIS:  Lumbar radiculopathy [M54.16]  Spinal stenosis of lumbar region with neurogenic claudication [M48.062]  Lumbar spondylosis [M47.816]    PROCEDURES: Procedure(s):  Lumbar 4 to 5 decompression on 12/26/2024    BRIEF HISTORY:  This is a 64 year old patient who has been followed in clinic. Please refer to that documentation for full details. Briefly, the patient has a history of lumbar radiculopathy, spinal stenosis of lumbar region with neurogenic claudication, and lumbar spondylosis. The patient has failed conservative treatment of symptoms and desires more definitive intervention. Therefore, after reviewing non-operative and operative options including the risks and benefits associated with each, the patient elected to proceed with the above stated procedure.     HOSPITAL COURSE:    The patient was admitted following the above listed procedures for pain control and rehabilitation. Kobe CHAO Hadleywily did well post-operatively. Medicine was consulted post operatively to aid in management of medical co-morbidities. The patient received routine nursing cares and at the time of discharge was medically stable. Vital signs were stable throughout admission. His drain was removed on 12/28. The patient is tolerating a regular diet and is voiding spontaneously. All PT/OT goals have been met for safe mobility. Pain is now controlled on oral medications which will be available on discharge. Stool softeners have been used while taking pain medications to help prevent constipation. Kobe Buchanan is deemed medically safe to discharge.     Antibiotics:  Ancef given periop and while the drain was in place.  DVT prophylaxis:  Mechanical  PT Progress:  Has met PT/OT goals for safe mobility.    Pain Meds:  Weaned off all IV pain meds by discharge.  Inpatient Events: No significant events or complications.     PHYSICAL EXAM:    General: NAD. Resting comfortably in bed. Able to stand by himself and move around his room.   Respiratory: Breathing comfortably on RA.  Drain Output: 33ml  Musculoskeletal: Clean and dry     Motor Strength Right Left   Hip flexion: L1, L2, L3 5/5 5/5   Hip adduction: L2, L3 5/5 5/5   Knee flexion: S1 5/5 5/5   Knee extension: L3, L4 5/5 5/5   Ankle dosiflexion: L4, L5 5/5 4/5   EHL: L5 5/5 4/5   Ankle plantarflexion: S1 5/5 5/5      Sensation from L1-S2 is preserved.    FOLLOWUP:    Follow up with Dr. Prajapati at 6 weeks postoperatively with repeat x-rays.    Future Appointments   Date Time Provider Department Center   2/11/2025  8:40 AM Jonathan Prajapati MD [facility]   4/10/2025 11:30 AM Enriqueta Zhou MD [facility]       Orthopaedic Surgery appointments are at the [facility] [place] ([address] [place] [address]). Call [phone] to schedule a follow-up appointment at this location with your provider.     PLANNED DISCHARGE ORDERS:     DVT Prophylaxis: Mechanical     Activity: WBAT, No excessive bending or twisting. No lifting >10 lbs x 6 weeks. No Aida lift for transfers.      Wound Care: see Below      Discharge Medication List as of 12/28/2024 10:35 AM        START taking these medications    Details   magnesium hydroxide (MILK OF MAGNESIA) 400 MG/5ML suspension Take 30 mLs by mouth daily as needed for constipation (Use if polyethylene glycol (Miralax) is not effective after 24 hours.)., OTC      !! naloxone (NARCAN) 4 MG/0.1ML nasal spray Spray 1 spray (4 mg) into one nostril alternating nostrils as needed for opioid reversal. every 2-3 minutes until assistance arrives, Disp-2 each, R-0, E-Prescribe      polyethylene glycol (MIRALAX) 17 GM/Dose powder Take 17 g (1 Capful) by mouth daily., Disp-510 g, R-0, E-Prescribe        !! - Potential duplicate medications found. Please discuss with provider.        CONTINUE these medications which have CHANGED    Details   oxyCODONE IR (ROXICODONE) 30 MG tablet Take 0.5-1 tablets (15-30 mg) by mouth 2 times daily as needed for severe pain (return back to preo-op dose of oxycodone as able)., Disp-14 tablet, R-0, E-Prescribe      tiZANidine (ZANAFLEX) 2 MG tablet Take 1-2 tablets (2-4 mg) by mouth every 8 hours as needed for muscle spasms., Disp-50 tablet, R-0, E-Prescribe           CONTINUE these medications which have NOT CHANGED    Details   acetaminophen (TYLENOL) 325 MG tablet Take 2 tablets (650 mg) by mouth every 4 hours as needed for other (For optimal non-opioid multimodal pain management to improve pain control.)., OTC      amLODIPine-benazepril (LOTREL) 5-20 MG capsule Take 1 capsule by mouth 2 times daily, Disp-180 capsule, R-3, E-Prescribe      aspirin (ASPIRIN LOW DOSE) 81 MG EC tablet Take 1 tablet (81 mg) by mouth daily., Disp-90 tablet, R-3, E-Prescribe      atorvastatin (LIPITOR) 40 MG tablet Take 1 tablet (40 mg) by mouth daily, Disp-90 tablet, R-3, E-PrescribeHold for 1-2 weeks.      bisacodyl (DULCOLAX) 10 MG suppository Place 1 suppository (10 mg) rectally daily as needed for constipation (Use if magnesium hydroxide (MILK of MAGNESIA) is not effective after 24 hours. May discontinue if patient having bowel movement.)., OTC      buPROPion (WELLBUTRIN SR) 200 MG 12 hr tablet TAKE 1 TABLET BY MOUTH 2 TIMES DAILY, Disp-180 tablet, R-0, E-Prescribe      carvedilol (COREG) 25 MG tablet Take 2 tablets (50 mg) by mouth 2 times daily (with meals), Disp-360 tablet, R-3, E-Prescribe      clonazePAM (KLONOPIN) 0.5 MG tablet Take 1 tablet (0.5 mg) by mouth 3 times daily as needed for anxiety, Disp-90 tablet, R-0, E-Prescribe      cyclobenzaprine (FLEXERIL) 10 MG tablet Take 1 tablet (10 mg) by mouth 3 times daily as needed for muscle spasms., Disp-90 tablet, R-0, E-Prescribe      !!  docusate sodium (COLACE) 100 MG capsule Take 200 mg by mouth every evening., Historical      !! docusate sodium (COLACE) 100 MG capsule Take 100 mg by mouth every morning., Historical      escitalopram (LEXAPRO) 20 MG tablet Take 1.5 tablets (30 mg) by mouth daily, Disp-135 tablet, R-3, E-Prescribe      Fe Heme Polypeptide-folic acid (PROFERRIN-FORTE) 12-1 MG TABS Take 1 tablet by mouth 2 times daily, Disp-90 tablet,R-0, E-Prescribe      fentaNYL (DURAGESIC) 25 mcg/hr 72 hr patch Place 1 patch onto the skin every 48 hours. Pt taking Every 48 hours, Historical      naproxen (EC-NAPROSYN) 500 MG EC tablet Take 1 tablet (500 mg) by mouth 2 times daily (with meals)., Disp-180 tablet, R-1, E-Prescribe      pantoprazole (PROTONIX) 40 MG EC tablet Take 1 tablet (40 mg) by mouth daily as needed for heartburn,, Disp-90 tablet, R-0, E-Prescribe      pregabalin (LYRICA) 100 MG capsule Take 100 mg by mouth 3 times daily., Historical      propranolol (INDERAL) 10 MG tablet Take 1 tablet (10 mg) by mouth 3 times daily as needed (panic attack), Disp-90 tablet, R-3, E-Prescribe      !! senna-docusate (SENOKOT-S/PERICOLACE) 8.6-50 MG tablet Take 2 tablets by mouth every morning., Historical      !! senna-docusate (SENOKOT-S/PERICOLACE) 8.6-50 MG tablet Take 3 tablets by mouth every evening., Historical      sucralfate (CARAFATE) 1 GM tablet Take 1 tablet (1 g) by mouth 2 times daily as needed (Reflux), Disp-180 tablet, R-3, E-Prescribe      tacrolimus (PROTOPIC) 0.1 % external ointment Apply topically as needed (rash on arms) Apply to affected areas on body.Disp-60 g, [id number]      triamcinolone (KENALOG) 0.1 % external ointment Apply topically 2 times daily To affected areas of rash. Please avoid application to the face, groin or armpits as the medication is too strong for these areas.Disp-454 g, [id number]      !! naloxone (NARCAN) 4 MG/0.1ML nasal spray Spray 1 spray (4 mg) into one nostril alternating nostrils as  needed for opioid reversal. every 2-3 minutes until assistance arrives, Disp-2 each, R-0, E-Prescribe       !! - Potential duplicate medications found. Please discuss with provider.            Discharge Procedure Orders   Primary Care - Care Coordination Referral   Standing Status: Future   Referral Priority: Routine: Next available opening Referral Type: Care Coordination   Number of Visits Requested: 1     Reason for your hospital stay   Order Comments: Lumbar decompression post-op     Brief Discharge Instructions   Order Comments: You may leave the dry dressing in place over your incision for 2 days after surgery. After, you may remove dressing and replace with clean gauze and tape; change dressing daily or if it becomes wet or soiled. There is surgical glue over the incision. This will gradually come off after a few weeks. If there are areas that are sticking, vaseline can be applied and it will come off easily. After 2 weeks, you can leave incision open to air.  You can shower with incision uncovered; allow warm, soapy water to wash over the incision and then pat dry with a towel. Apply a sterile gauze dressing after your shower. Do NOT soak incision in water. Do not apply topical creams or gels over the incision.       You wound should remain free of discharge or significant surrounding redness. If you notice any drainage or discharge, or it it develops significant  Redness, please contact us the clinic immediately at [phone] and ask for a nurse, or send a [employer] message.     Activity:  We encourage you to be up and out of bed regularly. Please try to walk 30 minutes per day and increase this to several times a day as your pain allows. Avoid lifting over 10 pounds, avoid lifting anything overhead, and avoid repetitive bending or twisting.  This means no sporting activities. (such as running, tennis, bowling, golf, bicycling, etc.) until you are seen in clinic.     When to call - Contact Surgeon Team  "  Order Comments: You may experience symptoms that require follow-up before your scheduled appointment. Refer to the \"[employer] Tool\" for instructions on when to contact your Surgeon Team if you are concerned about pain control, blood clots, constipation, or if you are unable to urinate.     When to call - Reach out to Urgent Care   Order Comments: If you are not able to reach your Surgeon Team and you need immediate care, go to the Orthopedic Walk-in Clinic or Urgent Care at your Surgeon's office.  Do NOT go to the Emergency Room unless you have shortness of breath, chest pain, or other signs of a medical emergency.     When to call - Reasons to Call 911   Order Comments: Call 911 immediately if you experience sudden-onset chest pain, arm weakness/numbness, slurred speech, or shortness of breath     Discharge Instruction - Breathing exercises   Order Comments: Perform breathing exercises 10 times per hours while awake for 2 weeks. (If given, use your Incentive Spirometer)     Symptoms - Fever Management   Order Comments: A low grade fever can be expected after surgery.  Use acetaminophen (TYLENOL) as needed for fever management.  Contact your Surgeon Team if you have a fever greater than 101.5 F, chills, and/or night sweats.     Symptoms - Constipation management   Order Comments: Constipation (hard, dry bowel movements) is expected after surgery due to the combination of being less active, the anesthetic, and the opioid pain medication.  You can do the following to help reduce constipation:  ~  FLUIDS:  Drink clear liquids (water or Gatorade), or juice (apple/prune).  ~  DIET:  Eat a fiber rich diet.    ~  ACTIVITY:  Get up and move around several times a day.  Increase your activity as you are able.  MEDICATIONS:  Reduce the risk of constipation by starting medications before you are constipated.  You can take Miralax   (1 packet as directed) and/or a stool softener (Senokot 1-2 tablets 1-2 times a day).  If you " already have constipation and these medications are not working, you can get magnesium citrate and use as directed.  If you continue to have constipation you can try an over the counter suppository or enema.  Call your Surgeon Team if it has been greater than 3 days since your last bowel movement.     Symptoms - Reduced Urine Output   Order Comments: Changes in the amount of fluids you drank before and after surgery may result in problems urinating.  It is important to stay well-hydrated after surgery and drink plenty of water. If it has been greater than 8 hours since you have urinated despite drinking plenty of water, call your Surgeon Team.     Medication instructions - No pharmacologic VTE prophylaxis prescribed   Order Comments: Your Surgeon did not prescribe medication for anticoagulation.     Activity - Exercises to prevent blood clots   Order Comments: Unless otherwise directed by your Surgeon team, perform the following exercises at least three times per day for the first four weeks after surgery to prevent blood clots in your legs: 1) Point and flex your feet (Ankle Pumps), 2) Move your ankle around in big circles, 3) Wiggle your toes, 4) Walk, even for short distances, several times a day, will help decrease the risk of blood clots.     Order Specific Question Answer Comments   Is discharge order? Yes      Comfort and Pain Management - Pain after Surgery   Order Comments: Pain after surgery is normal and expected.  You will have some amount of pain for several weeks after surgery.  Your pain will improve with time.  There are several things you can do to help reduce your pain including: rest, ice, elevation, and using pain medications as needed. Contact your Surgeon Team if you have pain that persists or worsens after surgery despite rest, ice, elevation, and taking your medication(s) as prescribed. Contact your Surgeon Team if you have new numbness, tingling, or weakness in your operative extremity.      Comfort and Pain Management - Swelling after Surgery   Order Comments: Swelling and/or bruising of the surgical extremity is common and may persist for several months after surgery. In addition to frequent icing and elevation, gentle compressive support with an ACE wrap or tubigrip may help with swelling. Apply compression regularly, removing at least twice daily to perform skin checks. Contact your Surgeon Team if your swelling increases and is NOT associated with an increase in your activity level, or if your swelling increases and is associated with redness and pain.     Comfort and Pain Management - Cold therapy   Order Comments: Ice can be used to control swelling and discomfort after surgery. Place a thin towel over your operative site and apply the ice pack overtop. Leave ice pack in place for 20 minutes, then remove for 20 minutes. Repeat this 20 minutes on/20 minutes off routine as often as tolerated.     Medication Instructions - Acetaminophen (TYLENOL) Instructions   Order Comments: You were discharged with acetaminophen (TYLENOL) for pain management after surgery. Acetaminophen most effectively manages pain symptoms when it is taken on a schedule without missing doses (every four, six, or eight hours). Your Provider will prescribe a safe daily dose between 3000 - 4000 mg.  Do NOT exceed this daily dose. Most patients use acetaminophen for pain control for the first four weeks after surgery.  You can wean from this medication as your pain decreases.     Medication Instructions - NSAID Instructions   Order Comments: You should avoid NSAIDs for the first 7 days after surgery because they can slow or inhibit bone healing.  Some common anti-inflammatories include: ibuprofen (ADVIL, MOTRIN), naproxen (ALEVE, NAPROSYN), celecoxib (CELEBREX), meloxicam (MOBIC), ketorolac (TORADOL).     Medication Instructions - Muscle relaxant Medication Instructions   Order Comments: You were discharged with a muscle  relaxer medication that can be used in conjunction with acetaminophen (TYLENOL) and the narcotic medication to manage pain symptoms. Take the muscle relaxant medication exactly as directed.     Follow Up Care   Order Comments: Follow-up with your Surgeon Team  as scheduled in 6 weeks.     Opioid Instructions (Less than 65 years)   Order Comments: You were discharged with an opioid medication (hydromorphone, oxycodone, hydrocodone, or tramadol). This medication should only be taken for breakthrough pain that is not controlled with acetaminophen (TYLENOL). If you rate your pain less than 3 you do not need this medication. Pain rating 0-3: You do not need this medication. Pain rating 4-6: Take 1 tablet every 4-6 hours as needed Pain rating 7-10: Take 2 tablets every 4-6 hours as needed. Do not exceed 6 tablets per day     Medication Instructions - Opioids - Tapering Instructions   Order Comments: In the first three days following surgery, your symptoms may warrant use of the narcotic pain medication every four to six hours as prescribed. This is normal. As your pain symptoms improve, focus your efforts on decreasing (tapering) use of narcotic medications. The most successful tapering strategy is to first, decrease the number of tablets you take every 4-6 hours to the minimum prescribed. Then, increase the amount of time between doses. For example: First, taper to   or 1 tablet every 4-6 hours. Then, taper to   or 1 tablet every 6-8 hours. Then, taper to   or 1 tablet every 8-10 hours. Then, taper to   or 1 tablet every 10-12 hours. Then, taper to   or 1 tablet at bedtime. The bedtime dose can help with comfort during sleep and is typically the last dose to be discontinued after surgery.     Discharge Instruction - Regular Diet Adult   Order Comments: Return to your pre-surgery diet unless instructed otherwise     Order Specific Question Answer Comments   Is discharge order? Yes        Byron Martinez,   Orthopaedic  Surgery Resident  Pager: [phone]  December 28, 2024, 12:50 PM

## 2024-12-28 NOTE — PLAN OF CARE
Goal Outcome Evaluation:      Plan of Care Reviewed With: patient    Overall Patient Progress: improving       VS: Temp: 97.9  F (36.6  C) Temp src: Oral BP: 104/56 Pulse: 60   Resp: 16 SpO2: 95 % O2 Device: None (Room air)      O2: None on room air. Denies SOB and chest pain   Output: Continent of B/B   Last BM: 12/27   Activity: Up with walker. Patient encouraged to call for assistance but has been getting out of bed without calling. Patient refused bed alarm   Skin: Surgical dressing CDI   Pain: Reports head and neck pain. Patient is not satisfied with current pain regimen. Patient would like his oxy to be scheduled Q4. Explained to patient that its scheduled for 2 times a day.   CMS: A&Ox4. CMS intact   Dressing: Surgical dressing CDI   Diet: Regular diet. Thin liquids. Whole meds   LDA: R PIV SL   Equipment: Walker and personal belongings   Plan: Continue POC   Additional Info:

## 2025-01-05 ENCOUNTER — MYC REFILL (OUTPATIENT)
Dept: FAMILY MEDICINE | Facility: CLINIC | Age: 65
End: 2025-01-05
Payer: COMMERCIAL

## 2025-01-05 DIAGNOSIS — I10 ESSENTIAL HYPERTENSION WITH GOAL BLOOD PRESSURE LESS THAN 130/85: ICD-10-CM

## 2025-01-05 DIAGNOSIS — F43.9 STRESS: ICD-10-CM

## 2025-01-05 DIAGNOSIS — M47.812 SPONDYLOSIS OF CERVICAL REGION WITHOUT MYELOPATHY OR RADICULOPATHY: ICD-10-CM

## 2025-01-05 DIAGNOSIS — I10 ESSENTIAL HYPERTENSION: ICD-10-CM

## 2025-01-05 RX ORDER — CYCLOBENZAPRINE HCL 10 MG
10 TABLET ORAL 3 TIMES DAILY PRN
Qty: 90 TABLET | Refills: 0 | Status: SHIPPED | OUTPATIENT
Start: 2025-01-05

## 2025-01-05 RX ORDER — AMLODIPINE AND BENAZEPRIL HYDROCHLORIDE 5; 20 MG/1; MG/1
1 CAPSULE ORAL 2 TIMES DAILY
Qty: 180 CAPSULE | Refills: 0 | Status: SHIPPED | OUTPATIENT
Start: 2025-01-05

## 2025-01-06 RX ORDER — CARVEDILOL 25 MG/1
50 TABLET ORAL 2 TIMES DAILY WITH MEALS
Qty: 360 TABLET | Refills: 0 | Status: SHIPPED | OUTPATIENT
Start: 2025-01-06

## 2025-01-06 RX ORDER — CLONAZEPAM 0.5 MG/1
0.5 TABLET ORAL 3 TIMES DAILY PRN
Qty: 90 TABLET | Refills: 0 | Status: SHIPPED | OUTPATIENT
Start: 2025-01-06

## 2025-01-06 NOTE — TELEPHONE ENCOUNTER
Last clonazepam 12/8.    Authorized electronic refill.  Confirmed and checked database today.  UTD and compliant with screening.

## 2025-01-22 ENCOUNTER — PATIENT OUTREACH (OUTPATIENT)
Dept: FAMILY MEDICINE | Facility: CLINIC | Age: 65
End: 2025-01-22
Payer: COMMERCIAL

## 2025-01-22 NOTE — TELEPHONE ENCOUNTER
Patient Quality Outreach    Patient is due for the following:   Hypertension -  BP check    Action(s) Taken:   Patient has upcoming appointment, these items will be addressed at that time.    Type of outreach:    Chart review performed, no outreach needed.    Questions for provider review:    None           Sathish Hanson CMA  Chart routed to Care Team.

## 2025-01-27 NOTE — PROGRESS NOTES
"  Assessment & Plan     (Z98.890) Status post lumbar spine surgery for decompression of spinal cord  (primary encounter diagnosis)  Comment: improved pain  Plan: patient notes less sciatica on left, still neuropathic pain in toes, unsure if \"diabetic\" or from lumbar spine, patient motived to reduce narcotic    (F33.1) Moderate recurrent major depression (H)  Comment: appears stable and well controlled  Plan: no change,      (E66.01) Morbid obesity (H)  Comment: agree to rechecking , last in 2023 was not diabetic  Plan: Hemoglobin A1c        Will inform and treat if indicated  Wt Readings from Last 5 Encounters:   01/28/25 103.4 kg (228 lb)   12/26/24 100.5 kg (221 lb 9 oz)   12/12/24 102.1 kg (225 lb 1.6 oz)   12/03/24 102.1 kg (225 lb)   09/24/24 102.5 kg (226 lb)         (N18.31) Stage 3a chronic kidney disease (H)  Comment: has been stable  Last Comprehensive Metabolic Panel:  Lab Results   Component Value Date     12/12/2024    POTASSIUM 4.5 12/12/2024    CHLORIDE 102 12/12/2024    CO2 27 12/12/2024    ANIONGAP 11 12/12/2024    GLC 96 12/28/2024    BUN 16.2 12/12/2024    CR 1.10 12/12/2024    GFRESTIMATED 75 12/12/2024    NIA 9.5 12/12/2024    Patient does rely on chronic NSAID use and will monitor carefully  Plan: no changes to meds    (F43.9) Stress  Comment: seems improved with pain so some worries less,  Plan: no some worries about tapering meds, also his pain clinic doing more procedures less chronic oral pain meds, discussed I would assist with tapering    (Z23) Need for Tdap vaccination  Comment: agrees  Plan: Tdap, tetanus-diptheria-acell pertussis,         (BOOSTRIX) 5-2.5-18.5 LF-MCG/0.5 XENIA injection            (Z79.899) Medication management  Comment: placed  Plan: Med Therapy Management Referral            (G63) Polyneuropathy associated with underlying disease  Comment: denies common symptoms (redness/warmth/swelling and location slightly different)  Plan: Uric acid        Add on to comfirm " low suspicion for gout    (R73.03) Prediabetes  Comment: agree to recheck  Plan: Hemoglobin A1c            The longitudinal plan of care for the diagnosis(es)/condition(s) as documented were addressed during this visit. Due to the added complexity in care, I will continue to support Kobe in the subsequent management and with ongoing continuity of care.      38 minutes spent by me on the date of the encounter doing chart review, history and exam, documentation and further activities per the note  No follow-ups on file.    Subjective   Kobe is a 64 year old, presenting for the following health issues:  Arthritis (Patient states gout on both big toes. Starting on the following surgery of Cervical stenosis of spinal canal. (09/08/24)) and Medication Refill    HPI     Follow up cervical spine and lumbar spine surgeries:   -cervical procedure did reduce neck and head pain  -has neuropathy in the hands and that has resolved in the fingers--noticing lyrica has significantly helped (100 TID)  -[facility] pain has been managing but less long term opioid oral management:   -thinking he can reduce fentanyl and get off the fentanyl  -hoping to reduce oxycodone/all pain meds    Back pain has been improved and left sciatic pain around left hip thigh pain has been improved  Toes: left big toe is painful, feels like neuropathic pain and frustrated by that (is it gout??)      GOAL: reduce all narcotic to zero just to see how the pain level is now after these procedures:      Depression   How are you doing with your depression since your last visit? Overall good/fine, some worries about reducing meds and what is the future  Are you having other symptoms that might be associated with depression? No  Have you had a significant life event?  Health Concerns   Are you feeling anxious or having panic attacks?   No  Do you have any concerns with your use of alcohol or other drugs? No    Sleep: used to use tizanidine at night when needed,  and now on this round the clock and feels zonked all the time with interrupted sleep and now reducing back to just at night  If takes flexeril and clonazepam the combo helps his sleep  Social History     Tobacco Use    Smoking status: Former     Current packs/day: 0.00     Average packs/day: 0.5 packs/day for 6.6 years (3.3 ttl pk-yrs)     Types: Cigarettes     Start date: 1977     Quit date: 1983     Years since quittin.4     Passive exposure: Never (per pt)    Smokeless tobacco: Never   Substance Use Topics    Alcohol use: No     Alcohol/week: 0.0 standard drinks of alcohol    Drug use: No         3/1/2024     8:03 AM 2024    12:56 PM 2025     7:53 AM   PHQ   PHQ-9 Total Score 0 12 5    Q9: Thoughts of better off dead/self-harm past 2 weeks Not at all Not at all Not at all       Patient-reported         3/10/2022     2:30 PM 2022    11:15 AM   THERESA-7 SCORE   Total Score 2 4         2025     7:53 AM   Last PHQ-9   1.  Little interest or pleasure in doing things 1   2.  Feeling down, depressed, or hopeless 1   3.  Trouble falling or staying asleep, or sleeping too much 2   4.  Feeling tired or having little energy 1   5.  Poor appetite or overeating 0   6.  Feeling bad about yourself 0   7.  Trouble concentrating 0   8.  Moving slowly or restless 0   Q9: Thoughts of better off dead/self-harm past 2 weeks 0   PHQ-9 Total Score 5        Patient-reported         2022    11:15 AM   THERESA-7    1. Feeling nervous, anxious, or on edge 3   2. Not being able to stop or control worrying 0   3. Worrying too much about different things 0   4. Trouble relaxing 1   5. Being so restless that it is hard to sit still 0   6. Becoming easily annoyed or irritable 0   7. Feeling afraid, as if something awful might happen 0   THERESA-7 Total Score 4     Needs another letter for emotional support animal.    Suicide Assessment Five-step Evaluation and Treatment (SAFE-T)      How many days per week do you miss  "taking your medication? 0      Enteric coated naproxen sodium: needs it coated, needs it and takes twice daily    -may need prior authorization and feels it is better than narcotic pain med        Negative ROS      Objective    /75   Pulse 57   Temp 98  F (36.7  C) (Oral)   Resp 20   Ht 1.778 m (5' 10\")   Wt 103.4 kg (228 lb)   SpO2 96%   BMI 32.71 kg/m    Body mass index is 32.71 kg/m .  Physical Exam   GENERAL: alert and no distress  RESP: lungs clear to auscultation - no rales, rhonchi or wheezes  CV: regular rate and rhythm, normal S1 S2, no S3 or S4, no murmur, click or rub, no peripheral edema  MS: no gross musculoskeletal defects noted, no edema  PSYCH: normal mood, affect, judgement and insight intact,             Signed Electronically by: Demi Hardin MD    "

## 2025-01-28 ENCOUNTER — OFFICE VISIT (OUTPATIENT)
Dept: FAMILY MEDICINE | Facility: CLINIC | Age: 65
End: 2025-01-28
Payer: COMMERCIAL

## 2025-01-28 VITALS
TEMPERATURE: 98 F | DIASTOLIC BLOOD PRESSURE: 75 MMHG | WEIGHT: 228 LBS | OXYGEN SATURATION: 96 % | BODY MASS INDEX: 32.64 KG/M2 | SYSTOLIC BLOOD PRESSURE: 138 MMHG | RESPIRATION RATE: 20 BRPM | HEIGHT: 70 IN | HEART RATE: 57 BPM

## 2025-01-28 DIAGNOSIS — E66.01 MORBID OBESITY (H): ICD-10-CM

## 2025-01-28 DIAGNOSIS — R73.03 PREDIABETES: ICD-10-CM

## 2025-01-28 DIAGNOSIS — Z79.899 MEDICATION MANAGEMENT: ICD-10-CM

## 2025-01-28 DIAGNOSIS — F33.1 MODERATE RECURRENT MAJOR DEPRESSION (H): ICD-10-CM

## 2025-01-28 DIAGNOSIS — G63 POLYNEUROPATHY ASSOCIATED WITH UNDERLYING DISEASE: ICD-10-CM

## 2025-01-28 DIAGNOSIS — Z23 NEED FOR TDAP VACCINATION: ICD-10-CM

## 2025-01-28 DIAGNOSIS — F43.9 STRESS: ICD-10-CM

## 2025-01-28 DIAGNOSIS — Z98.890 STATUS POST LUMBAR SPINE SURGERY FOR DECOMPRESSION OF SPINAL CORD: Primary | ICD-10-CM

## 2025-01-28 DIAGNOSIS — N18.31 STAGE 3A CHRONIC KIDNEY DISEASE (H): ICD-10-CM

## 2025-01-28 LAB
EST. AVERAGE GLUCOSE BLD GHB EST-MCNC: 114 MG/DL
HBA1C MFR BLD: 5.6 % (ref 0–5.6)
URATE SERPL-MCNC: 5.8 MG/DL (ref 3.4–7)

## 2025-01-28 RX ORDER — OXYCODONE HYDROCHLORIDE 10 MG/1
10 TABLET ORAL EVERY 4 HOURS PRN
COMMUNITY
Start: 2024-12-31

## 2025-01-28 ASSESSMENT — PATIENT HEALTH QUESTIONNAIRE - PHQ9
SUM OF ALL RESPONSES TO PHQ QUESTIONS 1-9: 5
10. IF YOU CHECKED OFF ANY PROBLEMS, HOW DIFFICULT HAVE THESE PROBLEMS MADE IT FOR YOU TO DO YOUR WORK, TAKE CARE OF THINGS AT HOME, OR GET ALONG WITH OTHER PEOPLE: SOMEWHAT DIFFICULT
SUM OF ALL RESPONSES TO PHQ QUESTIONS 1-9: 5

## 2025-01-28 NOTE — LETTER
[facility] [address] [place] [address]  [phone]        January 28, 2025    Regarding:  Kobe CHAO Chanel  [address]   [place] [address]              To Whom It May Concern;        Kobe is a long time patient of our clinic and under my care since 2019.  Patient does have chronic anxiety and depression and does have ongoing need for a  animal as part of his therapy.  He has improved control of his symptoms of both pain and anxiety and low mood with this  animal. His involvement in walks improves his pain and mobility. Please make accommodations and allowances for this support animal.              Sincerely,        Demi Hardin MD

## 2025-01-30 DIAGNOSIS — F43.9 STRESS: ICD-10-CM

## 2025-01-30 RX ORDER — CLONAZEPAM 0.5 MG/1
0.5 TABLET ORAL 3 TIMES DAILY PRN
Qty: 90 TABLET | Refills: 0 | Status: SHIPPED | OUTPATIENT
Start: 2025-01-30

## 2025-02-03 ENCOUNTER — MYC REFILL (OUTPATIENT)
Dept: FAMILY MEDICINE | Facility: CLINIC | Age: 65
End: 2025-02-03
Payer: COMMERCIAL

## 2025-02-03 DIAGNOSIS — F34.1 DYSTHYMIC DISORDER: ICD-10-CM

## 2025-02-04 RX ORDER — BUPROPION HYDROCHLORIDE 200 MG/1
TABLET, EXTENDED RELEASE ORAL
Qty: 180 TABLET | Refills: 0 | Status: SHIPPED | OUTPATIENT
Start: 2025-02-04

## 2025-02-05 ENCOUNTER — VIRTUAL VISIT (OUTPATIENT)
Dept: PHARMACY | Facility: CLINIC | Age: 65
End: 2025-02-05
Attending: FAMILY MEDICINE
Payer: COMMERCIAL

## 2025-02-05 DIAGNOSIS — E66.01 MORBID OBESITY (H): ICD-10-CM

## 2025-02-05 DIAGNOSIS — I77.9 BILATERAL CAROTID ARTERY DISEASE, UNSPECIFIED TYPE: ICD-10-CM

## 2025-02-05 DIAGNOSIS — F33.1 MODERATE RECURRENT MAJOR DEPRESSION (H): ICD-10-CM

## 2025-02-05 DIAGNOSIS — I10 ESSENTIAL HYPERTENSION: Primary | ICD-10-CM

## 2025-02-05 DIAGNOSIS — Z98.890 STATUS POST LUMBAR SPINE SURGERY FOR DECOMPRESSION OF SPINAL CORD: Primary | ICD-10-CM

## 2025-02-05 DIAGNOSIS — G89.4 CHRONIC PAIN SYNDROME: ICD-10-CM

## 2025-02-05 DIAGNOSIS — I25.5 ISCHEMIC CARDIOMYOPATHY: ICD-10-CM

## 2025-02-05 PROCEDURE — 99605 MTMS BY PHARM NP 15 MIN: CPT | Mod: 93 | Performed by: PHARMACIST

## 2025-02-05 RX ORDER — ATORVASTATIN CALCIUM 40 MG/1
40 TABLET, FILM COATED ORAL DAILY
Qty: 90 TABLET | Refills: 3 | Status: SHIPPED | OUTPATIENT
Start: 2025-02-05

## 2025-02-05 NOTE — LETTER
"Recommended To-Do List      Prepared on: Feb 5, 2025       You can get the best results from your medications by completing the items on this \"To-Do List.\"      Bring your To-Do List when you go to your doctor. And, share it with your family or caregivers.    My To-Do List:  What we talked about: What I should do:   What my medicines are for, how to know if my medicines are working, made sure my medicines are safe for me and reviewed how to take my medicines.     Take my medicines every day                "

## 2025-02-05 NOTE — PATIENT INSTRUCTIONS
Topics to discuss at our next visit:    Discuss use of aspirin for secondary prevention.  Discuss use of atorvastatin for secondary prevention.  Review bowel regimen and constipation in the setting of chronic opioids.   Follow-up in one month to discuss proposed medication changes listed below.

## 2025-02-05 NOTE — PROGRESS NOTES
Medication Therapy Management (MTM) Encounter    ASSESSMENT:                            Medication Adherence/Access: Concerns for patient self-managing medication titrations and discontinuations. This is seemingly an longstanding issue and will likely present medication management challenges.     Essential hypertension  Blood pressure mostly at goal of <130/80 mg. Vitals from chart review suggest significant bradycardia in the setting of high dose carvedilol, though patient is seemingly asymptomatic. It may be that CNS depressants are masking side effects of bradycardia. Continue to monitor. May consider switching benazepril to lisinopril to allow for once daily dosing of antihypertensives.     Bilateral carotid artery disease, unspecified type  Statin therapy for secondary prevention, meeting LDL goal <55 mg/dL. Needing aspirin for secondary prevention; unclear if adherent to either. Need to address in future.     Morbid obesity (H)  Would likely benefit from weight loss with GLP-1 agonist given history of CAD, NSTEMI, and aortic aneurysm, but would hold off at this time given pain clinic is initiating an opioid taper. Sensation that he associates with clonazepam use sounds more like acid reflux. May benefit from scheduled PPI in the setting of acid reflux and chronic NSAID use.     Chronic pain syndrome  Given long term risk, should work to limiting CNS depressants. Supported patient in plan to taper opioid use. Given multiple CNS depressants, reported episodes of difficulty breathing, and limited evidence for use, would recommend discontinuing muscle relaxants completely. Acknowledge that managing pain for this patient is complex and dynamic. Patient is also taking over the naproxen daily max dose putting him at higher risk for GIB and NEMESIO. Recommend no more than 1.5 g of naproxen daily, though would prefer 1 g daily at most. Consider use of acetaminophen to augment naproxen. Scheduled PPI may reduce the risk of  "GIB in this patient.    Moderate recurrent major depression (H)  Controlled per patient. Would likely benefit from restarting escitalopram for both depression and skin lesion scratching. Has been on clonazepam scheduled for years. Patient is aware of the risks of benzodiazepines but would recommend starting the process of tapering off. Recommend use of VA/DOD \"Helping patients taper from benzodiazepines\" resource. Link below. Shared-decision making is key for how to best taper and if other medications need to be added in the short term to promote a successful taper. Switching to a longer acting benzodiazepine may prevent peaks and troughs which could ease the symptoms of withdrawal as patient tapers. Expect the first 50% of the dose reduction to be easier and faster than the last 50% of the initial dose. Given long term use, would prefer switching to a longer acting benzodiazepine and would consider staying at 50% reduced dose for a few months before continuing taper. Options for taper are listed below:  Reduce dose by 50% the first 2-4 weeks then maintain on that dose for 1-2 months then reduce dose by 25% every two weeks.   Switching to a longer acting benzodiazepine may be considered if clinically appropriate.  Clonazepam 0.5 mg = diazepam 5 mg per [employer] PTSD  *Daily equivalent diazepam dose would be 15 mg    https://www.va.gov/painmanagement/docs/OSI_6_Toolkit_Taper_Benzodiazepines_Clinicians.pdf    Psoriasis  Patient managing with creams listed. Controlled at this time.    PLAN:                            Patient Instructions   Topics to discuss at our next visit:    Discuss use of aspirin for secondary prevention.  Discuss use of atorvastatin for secondary prevention.  Review bowel regimen and constipation in the setting of chronic opioids.   Follow-up in one month to discuss proposed medication changes listed below.    Follow-up: Return in about 1 month (around 3/5/2025).    Medication issues to " be addressed at a future visit     Schedule pantoprazole 40 mg daily.  Restart escitalopram 5 mg daily.  Switch to clonazepam to diazepam 15 mg once daily for 1 week then reduce dose to 10 mg for 1 week then 7.5 mg for 2 months then continue taper by 25% every 2 weeks thereafter. (https://www.pbm.va.gov/PBM/AcademicDetailingService/Documents/Academic_Detailing_Educational_Material_Catalog/59_PTSD_NCPTSD_Provider_Helping_Patients_Taper_BZD.pdf)  Discontinue tizanidine and cyclobenzaprine and encourage patient to discard home supply.  Review maximum dose of naproxen and reinforce the risks with high doses.  Consider switching benazepril to lisinopril for once daily antihypertensive dosing. Would allow for bedtime dosing.    SUBJECTIVE/OBJECTIVE:                          Kobe Buchanan is a 65 year old male coming in for pharmacist visit.  used for visit (Y/N): No; English.    Reason for visit: Annual Medicare CMR.    Allergies/ADRs: Reviewed in chart  Past Medical History: Reviewed in chart  Social History     Tobacco Use    Smoking status: Former     Current packs/day: 0.00     Average packs/day: 0.5 packs/day for 6.6 years (3.3 ttl pk-yrs)     Types: Cigarettes     Start date: 1977     Quit date: 1983     Years since quittin.4     Passive exposure: Never (per pt)    Smokeless tobacco: Never   Substance Use Topics    Alcohol use: No     Alcohol/week: 0.0 standard drinks of alcohol    Drug use: No     ^Reviewed today    Medication Adherence/Access: no issues reported.    Essential hypertension  Reports longstanding history of hypertension and has been on multiple antihypertensives including ACEi, CCB, BB, and thiazide. Was on hydrochlorothiazide in the past and reports urinary frequency. Notes some dizziness especially when letting puppy outside. Patient is unsure if this is medication related or secondary to his puppy spinning him around.  -amlodipine/benazepril 5 mg/20 mg BID  -carvedilol  50 mg BID    Bilateral carotid artery disease, unspecified type  Patient discussed CAD and reports a history of aortic aneurysm. Patient discussed several procedures and other past medical history. Unclear from conversation if he is taking aspirin and atorvastatin daily. Aspirin and atorvastatin were not specifically discussed during this visit.    Morbid obesity (H)  History of Simone-en-y gastric bypass over a decade ago. Patient reviewed this history. Has sucralfate prescribed and a stockpile at home, but has only used once or twice over the last 5 years. Has been told he has an iron deficiency in the past and thinks it may be from his gastric bypass. Had severe GI upset on ferrous gluconate so was taking a chelated oral iron supplement but did not have much impact on his hemoglobin. Reports he received IV iron which has seemingly corrected his CARLOS.  -sucralfate 1 g BID as needed  -pantoprazole 40 mg every day as needed    Chronic pain syndrome  Patient discussed history with pain extensively. Briefly, patient has been experiencing chronic pain for a majority of his life resulting in the need for multiple procedures and pharmacotherapeutic management. Most recently, patient underwent lumbar decompression. Follows with pain clinic for opioid prescribing. Mentioned that his goal is to reduce the number of medications he is taking to assess pain control following recent procedures. He is concerned with his ongoing neck pain that he describes as twine tied around his spinal cord that feels like razors. Concerned that if he reduces his pain medications this may worsen. Acknowledges that he will manage his own medications and take how he feels is appropriate. Mentions that he has medications stockpiled at home. An example he gave was regarding naproxen. Patient reports taking anywhere between 1 g and 2 g daily depending on his pain and knows this is not how it is prescribed. Has run out of naproxen before because it is  "too soon to refill. He is aware of the risks associated with taking medications in ways that are not prescribed. He feels naproxen is arguably the most beneficial pain medication that he has used. Takes oxycodone 30 mg BID instead of 10 mg q4h as prescribed because 30 mg provides relief that he needs for the period of time he needs the relief. Mentions being open to reducing fentanyl patch dose. Thinks tizanidine works well but only uses it occasionally. Reports an episode of difficulty breathing when using tizanidine in the past but thinks this is no longer an issue. Uses tizanidine 2-3x per month to help him sleep. Also thinks that tizanidine slows down his cognition. Also uses cyclobenzaprine, which he has had for many years, for trapezius pain. Never uses TID and uses waxes and wanes. Does not make him dizzy or lose balance. Aware of the risks of multiple CNS depressants and familiar with processes at pain clinics. Patient mentioned bowel regimen but did not discuss in detail today. Denies dark/tarry stools that would suggest GIB at this time.   -Oxycodone 30 mg BID  -fentanyl patch 25 mcg/hr  -cyclobenzaprine 10 mg TID as needed  -tizanidine 2-4 mg every 8 hours as needed  -pregabalin 100 mg TID  -Naproxen  mg BID    Moderate recurrent major depression (H)  Feels mood has improved. Thinks he stopped taking escitalopram 1-2 years ago. It was initially added to bupropion to treat depression and he felt it worked well. Discontinued it because mood improved and he wants to start limiting the number of medications he is taking daily. Mentions that he struggles with scratching skin lesions of all kinds and has followed with psych and dermatology in the past for this. Takes clonazepam TID scheduled every day and feels \"off\" if he misses a dose. Has propranolol on hand if needed for breakthrough anxiety despite clonazepam use. Notes this tends to occur when discussing decreasing his opioid use. Rarely, he " "experiences a sensation in his esophagus that he attributes to clonazepam. When this occurs, he takes a dose of pantoprazole.  -bupropion 200 mg SR BID    Psoriasis  Uses emollient cream as first-line management of psoriasis with tacrolimus as needed. Has 2 tubes of tacrolimus at home that he uses sparingly if he feels it will help the flare. Also has triamcinolone that he learned about from his father. Uses this as needed for anal fissures mostly, but this has not been an issue recently.  -triamcinolone 0.1% cream as needed  -tacrolimus 0.1% cream as needed      Today's Vitals:   BP Readings from Last 1 Encounters:   01/28/25 138/75     Pulse Readings from Last 1 Encounters:   01/28/25 57     Wt Readings from Last 1 Encounters:   01/28/25 228 lb (103.4 kg)     Ht Readings from Last 1 Encounters:   01/28/25 5' 10\" (1.778 m)     Estimated body mass index is 32.71 kg/m  as calculated from the following:    Height as of 1/28/25: 5' 10\" (1.778 m).    Weight as of 1/28/25: 228 lb (103.4 kg).    Temp Readings from Last 1 Encounters:   01/28/25 98  F (36.7  C) (Oral)       ----------------      I spent 70 minutes with this patient today. All changes were made via collaborative practice agreement with Demi Hardin MD.     The patient was declined by the patient a summary of these recommendations.     Shawn Arreola, P4 Student  [employer]    Preceptor Attestation:  I was present with the pharmacy student who participated in the service and in the documentation of this note. I have verified the history, personally performed the medical decision making, and have verified the content of the note, which accurately reflects my assessment of the patient and the plan of care.     Ashia Rivero, Pharm.D., [facility]  Phone: [phone]    Telemedicine Visit Details  The patient's medications can be safely assessed via a telemedicine encounter.  Type " of service:  Telephone visit  Originating Location (pt. Location): Home    Distant Location (provider location):  On-site  Start Time:  11:05  End Time:  12:15    For insurance/tracking purposes, MTM Team Documentation:   Medication Therapy Recommendations  No medication therapy recommendations to display

## 2025-02-05 NOTE — LETTER
February 8, 2025  Kobe Buchanan  [address]   [place] [address]    Dear RODRIGUE[facility]     Thank you for talking with me on Feb 5, 2025 about your health and medications. As a follow-up to our conversation, I have included two documents:      Your Recommended To-Do List has steps you should take to get the best results from your medications.  Your Medication List will help you keep track of your medications and how to take them.    If you want to talk about these documents, please call Ashia Rivero RPH at phone: [phone], Monday-Friday 8-4:30pm.    I look forward to working with you and your doctors to make sure your medications work well for you.    Sincerely,  Ashia Rivero RPH  [facility] Pharmacist, [facility]

## 2025-02-05 NOTE — LETTER
_  Medication List        Prepared on: Feb 5, 2025     Bring your Medication List when you go to the doctor, hospital, or   emergency room. And, share it with your family or caregivers.     Note any changes to how you take your medications.  Cross out medications when you no longer use them.    Medication How I take it Why I use it Prescriber   amLODIPine-benazepril (LOTREL) 5-20 MG capsule Take 1 capsule by mouth 2 times daily Essential Hypertension Demi Hardin MD   aspirin (ASPIRIN LOW DOSE) 81 MG EC tablet Take 1 tablet (81 mg) by mouth daily. Status Post Coronary Angiogram Demi Hardin MD   atorvastatin (LIPITOR) 40 MG tablet Take 1 tablet (40 mg) by mouth daily. Ischemic Cardiomyopathy Demi Hardin MD   bisacodyl (DULCOLAX) 10 MG suppository Place 1 suppository (10 mg) rectally daily as needed for constipation (Use if magnesium hydroxide (MILK of MAGNESIA) is not effective after 24 hours. May discontinue if patient having bowel movement.). Cervical Stenosis of Spinal Canal Choco Melgar, ALISE   buPROPion (WELLBUTRIN SR) 200 MG 12 hr tablet TAKE 1 TABLET BY MOUTH 2 TIMES DAILY Dysthymic Disorder Demi Hardin MD   carvedilol (COREG) 25 MG tablet Take 2 tablets (50 mg) by mouth 2 times daily (with meals) Essential hypertension with goal blood pressure less than 130/85 Enriqueta Zhou MD   clonazePAM (KLONOPIN) 0.5 MG tablet Take 1 tablet (0.5 mg) by mouth 3 times daily as needed for anxiety Stress Demi Hardin MD   cyclobenzaprine (FLEXERIL) 10 MG tablet Take 1 tablet (10 mg) by mouth 3 times daily as needed for muscle spasms. Spondylosis of cervical region without myelopathy or radiculopathy Demi Hardin MD   fentaNYL (DURAGESIC) 25 mcg/hr 72 hr patch Place 1 patch onto the skin every 48 hours. Pt taking Every 48 hours Cervical Stenosis of Spinal Canal Patient Reported   magnesium hydroxide (MILK OF MAGNESIA) 400 MG/5ML suspension Take 30 mLs by mouth daily as needed  for constipation (Use if polyethylene glycol (Miralax) is not effective after 24 hours.). Lumbar stenosis with neurogenic claudication; Status post lumbar spine surgery for decompression of spinal cord Christina Boland PA-C   naloxone (NARCAN) 4 MG/0.1ML nasal spray Spray 1 spray (4 mg) into one nostril alternating nostrils as needed for opioid reversal. every 2-3 minutes until assistance arrives Status post lumbar spine surgery for decompression of spinal cord Christina Boland PA-C   naproxen (EC-NAPROSYN) 500 MG EC tablet Take 1 tablet (500 mg) by mouth 2 times daily (with meals). History of Laminectomy Demi Hardin MD   oxyCODONE IR (ROXICODONE) 10 MG tablet Take 10 mg by mouth every 4 hours as needed for breakthrough pain (max 6 daily). Cervical Stenosis of Spinal Canal Patient Reported   pantoprazole (PROTONIX) 40 MG EC tablet Take 1 tablet (40 mg) by mouth daily as needed for heartburn, Gastroesophageal reflux disease with esophagitis, unspecified whether hemorrhage April Ashia Terrazas MD   polyethylene glycol (MIRALAX) 17 GM/Dose powder Take 17 g (1 Capful) by mouth daily. Lumbar stenosis with neurogenic claudication; Status post lumbar spine surgery for decompression of spinal cord Christina Boland PA-C   pregabalin (LYRICA) 100 MG capsule Take 100 mg by mouth 3 times daily. Cervical Stenosis of Spinal Canal Patient Reported   propranolol (INDERAL) 10 MG tablet Take 1 tablet (10 mg) by mouth 3 times daily as needed (panic attack) Anxiety disorder due to general medical condition with panic attack Demi Hardin MD   senna-docusate (SENOKOT-S/PERICOLACE) 8.6-50 MG tablet Take 2 tablets by mouth every morning. Cervical Stenosis of Spinal Canal Entered By History Unknown   senna-docusate (SENOKOT-S/PERICOLACE) 8.6-50 MG tablet Take 3 tablets by mouth every evening. Cervical Stenosis of Spinal Canal Entered By History Unknown   sucralfate (CARAFATE) 1 GM tablet Take 1 tablet (1 g) by mouth 2 times  daily as needed (Reflux) Gastroesophageal reflux disease with esophagitis, unspecified whether hemorrhage Demi Hardin MD   tacrolimus (PROTOPIC) 0.1 % external ointment Apply topically as needed (rash on arms) Apply to affected areas on body. Other eczema Demi Hardin MD   tiZANidine (ZANAFLEX) 2 MG tablet Take 1-2 tablets (2-4 mg) by mouth every 8 hours as needed for muscle spasms. Cervical Stenosis of Spinal Canal Christina Boland PA-C   triamcinolone (KENALOG) 0.1 % external ointment Apply topically 2 times daily To affected areas of rash. Please avoid application to the face, groin or armpits as the medication is too strong for these areas. Psoriasis Jud Lam MD         Add new medications, over-the-counter drugs, herbals, vitamins, or  minerals in the blank rows below.    Medication How I take it Why I use it Prescriber                                      Allergies:      - Ciprofloxacin  - Gabapentin - Dizziness  - Tape [adhesive Tape] - Blisters        Side effects I have had:      Not on File        Other Information:              My notes and questions:

## 2025-02-11 ENCOUNTER — OFFICE VISIT (OUTPATIENT)
Dept: ORTHOPEDICS | Facility: CLINIC | Age: 65
End: 2025-02-11
Payer: COMMERCIAL

## 2025-02-11 ENCOUNTER — ANCILLARY PROCEDURE (OUTPATIENT)
Dept: GENERAL RADIOLOGY | Facility: CLINIC | Age: 65
End: 2025-02-11
Attending: ORTHOPAEDIC SURGERY
Payer: COMMERCIAL

## 2025-02-11 DIAGNOSIS — R26.9 ABNORMAL GAIT: ICD-10-CM

## 2025-02-11 DIAGNOSIS — M48.02 CERVICAL STENOSIS OF SPINAL CANAL: Primary | ICD-10-CM

## 2025-02-11 DIAGNOSIS — M47.816 LUMBAR SPONDYLOSIS: ICD-10-CM

## 2025-02-11 DIAGNOSIS — Z98.890 STATUS POST LUMBAR SPINE SURGERY FOR DECOMPRESSION OF SPINAL CORD: ICD-10-CM

## 2025-02-11 DIAGNOSIS — M54.50 LUMBAR PAIN: ICD-10-CM

## 2025-02-11 DIAGNOSIS — M48.062 SPINAL STENOSIS OF LUMBAR REGION WITH NEUROGENIC CLAUDICATION: ICD-10-CM

## 2025-02-11 PROCEDURE — 99213 OFFICE O/P EST LOW 20 MIN: CPT | Mod: 24 | Performed by: ORTHOPAEDIC SURGERY

## 2025-02-11 PROCEDURE — 72110 X-RAY EXAM L-2 SPINE 4/>VWS: CPT | Performed by: STUDENT IN AN ORGANIZED HEALTH CARE EDUCATION/TRAINING PROGRAM

## 2025-02-11 NOTE — PROGRESS NOTES
Spine Surgery Return Clinic Visit      Chief Complaint:   Surgical Followup (6 wk post-op lumbar 4 to 5 decompression DOS 12/26/24. Pt reports right lumbar area)      Interval HPI:  Symptom Profile Including: location of symptoms, onset, severity, exacerbating/alleviating factors, previous treatments:        Kobe Buchanan is a 65 year old male who returns s/p decompression doing very well with that.  Says his preop radicular complaints are totally resolved.      His main concerns today are residual gait imbalance.  He had a prior laminoplasty for myelopathy and feels like he had gait instability before surgery but that it has gotten a little different since the last visit.  No new arm symptoms.  He has a wheel chair his father left him and wants to know if medicare would cover repairs for it.              Past Medical History:     Past Medical History:   Diagnosis Date    Acute systolic heart failure (H) 01/28/2020    Added automatically from request for surgery [id number]    NEMESIO (acute kidney injury) 02/16/2020    Anxiety     Ascending aortic aneurysm     Atrial fibrillation (H) [I48.91] 10/10/2016    Bicuspid aortic valve     BPPV (benign paroxysmal positional vertigo) 07/11/2014    Choledocholithiasis     Chronic narcotic use     Chronic neck pain     Chronic osteoarthritis     CKD (chronic kidney disease) stage 3, GFR 30-59 ml/min (H)     Degenerative joint disease     Depression     Dysthymic disorder     Hx of type 2 diabetes mellitus 03/04/2021    Hyperlipidemia 04/10/2012    Hypertension     Iron deficiency anemia secondary to blood loss (chronic) 08/25/2020    MSSA bacteremia 10/13/2019    Multiple stiff joints     Neck injuries     NSTEMI (non-ST elevated myocardial infarction) (H) 01/29/2020    Obesity 02/09/2015    Pain in shoulder     Plantar fasciitis     Pre-diabetes     S/P reverse total shoulder arthroplasty, left 07/07/2020    S/P reverse total shoulder arthroplasty, right 08/18/2020    Septic  joint of left shoulder region (H) 11/13/2019    Septic joint of right shoulder region (H) 10/13/2019    s/p washout    Skin picking habit     Sleep apnea     does not use cpap    Spinal stenosis of lumbar region with neurogenic claudication     Status post total shoulder arthroplasty, left 03/03/2020    Added automatically from request for surgery [id number]            Past Surgical History:     Past Surgical History:   Procedure Laterality Date    ARTHROPLASTY SHOULDER  04/15/2014    Procedure: Left Total Shoulder Arthroplasty ;  Surgeon: Analliia Aceves MD;  [facility]    ARTHROPLASTY SHOULDER Right 08/26/2014    Procedure: ARTHROPLASTY SHOULDER;  Surgeon: Analilia Aceves MD;  [facility]    ARTHROSCOPY SHOULDER WITH BIOPSY(IES) Left 01/28/2020    Procedure: Left shoulder arthroscopy and biopsy for culture;  Surgeon: Analilia Aceves MD;  [facility]    BYPASS GASTRIC TAVO-EN-Y, LIVER BIOPSY, COMBINED  08/08/2005    COLONOSCOPY  06/30/2014    Procedure: COMBINED COLONOSCOPY, SINGLE BIOPSY/POLYPECTOMY BY BIOPSY;  Surgeon: Chester Patton MD;  [facility]    COLONOSCOPY N/A 2/15/2024    Procedure: COLONOSCOPY, WITH POLYPECTOMY;  Surgeon: Power Duran MD;  [facility]    CV CORONARY ANGIOGRAM  01/30/2020    Procedure: CV CORONARY ANGIOGRAM;  Surgeon: Néstor Walls MD;  [facility]    CYSTOSCOPY, BLADDER NECK CUTS, COMBINED N/A 07/18/2016    Procedure: COMBINED CYSTOSCOPY, BLADDER NECK CUTS;  Surgeon: Ritu Leslie MD;  [facility]    DECOMPRESSION LUMBAR ONE LEVEL N/A 12/26/2024    Procedure: Lumbar 4 to 5 decompression;  Surgeon: Jonathan Prajapati MD;  [facility]    ENDOSCOPIC RETROGRADE CHOLANGIOPANCREATOGRAM N/A 11/03/2023    Procedure: ENDOSCOPIC RETROGRADE CHOLANGIOPANCREATOGRAPHY, BILIARY SPHINCTEROTOMY, STONE REMOVAL;  Surgeon: Juarez Sevilla MD;  Location: [facility] [place]     ESOPHAGOSCOPY, GASTROSCOPY, DUODENOSCOPY (EGD), COMBINED  06/30/2014    Procedure: COMBINED ESOPHAGOSCOPY, GASTROSCOPY, DUODENOSCOPY (EGD), BIOPSY SINGLE OR MULTIPLE;  Surgeon: Chester Patton MD;  [facility]    EXCISE MASS FINGER  06/14/2011    Procedure:EXCISE MASS FINGER; Middle Flexor Cyst; Surgeon:KOBE RUSSELL; [facility]    IR FINE NEEDLE ASPIRATION W ULTRASOUND  11/13/2019    IR PICC PLACEMENT > 5 YRS OF AGE  11/19/2019    LAMINOPLASTY CERVICAL POSTERIOR THREE+ LEVELS N/A 9/11/2024    Procedure: Cervical 3 and 7 dome laminectomy and 4-6 laminoplasty with medtronic plates;  Surgeon: Jonathan Prajapati MD;  [facility]    LAPAROSCOPIC CHOLECYSTECTOMY N/A 11/03/2023    Procedure: CHOLECYSTECTOMY, LAPAROSCOPIC;  Surgeon: Kobe Mathis MD;  Location[facility] OR    LASER HOLMIUM LITHOTRIPSY BLADDER N/A 10/15/2014    Procedure: LASER HOLMIUM LITHOTRIPSY BLADDER;  Surgeon: Sahil Taveras MD;  [facility]    LASER KTP GREEN LIGHT PHOTOSELECTIVE VAPORIZATION PROSTATE  01/23/2014    Procedure: LASER KTP GREEN LIGHT PHOTOSELECTIVE VAPORIZATION PROSTATE;  Greenlight Photovaporization Of Prostate  ;  Surgeon: Sahil Taveras MD;  [facility]    OTHER SURGICAL HISTORY  10/04/2016    REPAIR ANEURYSM ASCENDING AORTA    OTHER SURGICAL HISTORY Right 09/23/2019    IRRIGATION AND DEBRIDEMENT UPPER EXTREMITYshoulder    RELEASE TRIGGER FINGER Right 03/31/2017    Procedure: RELEASE TRIGGER FINGER;  Surgeon: Juan Carlos Blunt MD;  [facility]    REMOVE ANTIBIOTIC CEMENT BEADS / SPACER SHOULDER Left 05/08/2020    Procedure: Left shoulder removal of spacer;  Surgeon: Analilia Aceves MD;  [facility]    REMOVE ANTIBIOTIC CEMENT BEADS / SPACER SHOULDER Right 08/18/2020    Procedure: Removal of right shoulder antibiotic spacer;  Surgeon: Analilia Aceves MD;  [facility]    REMOVE HARDWARE ARTHROPLASTY SHOULDER. I&D, PLACE  ANTIBIOTIC CEMENT BE Left 11/15/2019    Procedure: Explantation of left total shoulder arthroplasty, irrigation and debridement, and placement of antibiotic spacer;  Surgeon: Analilia Aceves MD;  [facility]    REPAIR ANEURYSM ASCENDING AORTA N/A 10/04/2016    Procedure: REPAIR ANEURYSM ASCENDING AORTA;  Surgeon: Mckenzie Townsend MD;  [facility]    REVERSE ARTHROPLASTY SHOULDER Right 2020    Procedure: and conversion to reverse total shoulder arthroplasty;  Surgeon: Analilia Aceves MD;  [facility]    SURGICAL EXPOSURE, LAPAROSCOPIC, W/WOUND CLOSURE, FOR ERCP, BY GENERAL SURGERY N/A 2023    Procedure: SURGICAL EXPOSURE FOR ENDOSCOPIC RETROGRADE CHOLANGIOPANCREATOGRAPHY;  Surgeon: Kobe Mathis MD;  Location: [facility]    [facility]              Social History:     Social History     Tobacco Use    Smoking status: Former     Current packs/day: 0.00     Average packs/day: 0.5 packs/day for 6.6 years (3.3 ttl pk-yrs)     Types: Cigarettes     Start date: 1977     Quit date: 1983     Years since quittin.4     Passive exposure: Never (per pt)    Smokeless tobacco: Never   Substance Use Topics    Alcohol use: No     Alcohol/week: 0.0 standard drinks of alcohol            Family History:     Family History   Problem Relation Age of Onset    Anxiety Disorder Mother     Hypertension Mother     Osteoporosis Mother     Obesity Mother     Hyperlipidemia Mother     Low Back Problems Mother     Macular Degeneration Mother     Cataracts Mother     Other - See Comments Mother         low back problems    Cardiovascular Father         aortic aneurysm    Arrhythmia Father     Nephrolithiasis Father     Sleep Apnea Father     Anxiety Disorder Father     Depression Father     Hypertension Father     Obesity Father     Hyperlipidemia Father     Coronary Artery Disease Father     Low Back Problems Father     Spine Problems Father     Other - See Comments Father          low back problems, spine problems    Anxiety Disorder Sister     Hypertension Sister     Osteoporosis Sister     Obesity Sister     Hyperlipidemia Sister     Low Back Problems Sister     Spine Problems Sister     Anxiety Disorder Sister     Depression Sister     Hypertension Sister     Osteoporosis Sister     Obesity Sister     Hyperlipidemia Sister     Low Back Problems Sister     Pulmonary Embolism Sister     Anxiety Disorder Sister     Hyperlipidemia Sister     Hypertension Sister     Obesity Sister     Other - See Comments Sister         low back problems, spine problems    Osteoporosis Sister     Anxiety Disorder Sister     Depression Sister     Hyperlipidemia Sister     Hypertension Sister     Other - See Comments Sister         low back problems    Obesity Sister     Osteoporosis Sister     Arthritis Other     Gastrointestinal Disease Other     Melanoma No family hx of     Skin Cancer No family hx of     Glaucoma No family hx of     Anesthesia Reaction No family hx of             Allergies:     Allergies   Allergen Reactions    Ciprofloxacin      History of aortic aneurysms    Gabapentin Dizziness    Tape [Adhesive Tape] Blisters     Blistering - please use paper tape            Medications:     Current Outpatient Medications   Medication Sig Dispense Refill    amLODIPine-benazepril (LOTREL) 5-20 MG capsule Take 1 capsule by mouth 2 times daily 180 capsule 0    aspirin (ASPIRIN LOW DOSE) 81 MG EC tablet Take 1 tablet (81 mg) by mouth daily. (Patient taking differently: Take 81 mg by mouth every morning.) 90 tablet 3    atorvastatin (LIPITOR) 40 MG tablet Take 1 tablet (40 mg) by mouth daily. 90 tablet 3    bisacodyl (DULCOLAX) 10 MG suppository Place 1 suppository (10 mg) rectally daily as needed for constipation (Use if magnesium hydroxide (MILK of MAGNESIA) is not effective after 24 hours. May discontinue if patient having bowel movement.).      buPROPion (WELLBUTRIN SR) 200 MG 12 hr tablet TAKE 1 TABLET BY  MOUTH 2 TIMES DAILY 180 tablet 0    carvedilol (COREG) 25 MG tablet Take 2 tablets (50 mg) by mouth 2 times daily (with meals) 360 tablet 0    clonazePAM (KLONOPIN) 0.5 MG tablet Take 1 tablet (0.5 mg) by mouth 3 times daily as needed for anxiety (Patient taking differently: Take 0.5 mg by mouth 3 times daily.) 90 tablet 0    cyclobenzaprine (FLEXERIL) 10 MG tablet Take 1 tablet (10 mg) by mouth 3 times daily as needed for muscle spasms. 90 tablet 0    fentaNYL (DURAGESIC) 25 mcg/hr 72 hr patch Place 1 patch onto the skin every 48 hours. Pt taking Every 48 hours      magnesium hydroxide (MILK OF MAGNESIA) 400 MG/5ML suspension Take 30 mLs by mouth daily as needed for constipation (Use if polyethylene glycol (Miralax) is not effective after 24 hours.).      naloxone (NARCAN) 4 MG/0.1ML nasal spray Spray 1 spray (4 mg) into one nostril alternating nostrils as needed for opioid reversal. every 2-3 minutes until assistance arrives 2 each 0    naproxen (EC-NAPROSYN) 500 MG EC tablet Take 1 tablet (500 mg) by mouth 2 times daily (with meals). 180 tablet 1    oxyCODONE IR (ROXICODONE) 10 MG tablet Take 10 mg by mouth every 4 hours as needed for breakthrough pain (max 6 daily).      pantoprazole (PROTONIX) 40 MG EC tablet Take 1 tablet (40 mg) by mouth daily as needed for heartburn, 90 tablet 0    polyethylene glycol (MIRALAX) 17 GM/Dose powder Take 17 g (1 Capful) by mouth daily. 510 g 0    pregabalin (LYRICA) 100 MG capsule Take 100 mg by mouth 3 times daily.      propranolol (INDERAL) 10 MG tablet Take 1 tablet (10 mg) by mouth 3 times daily as needed (panic attack) 90 tablet 3    senna-docusate (SENOKOT-S/PERICOLACE) 8.6-50 MG tablet Take 2 tablets by mouth every morning.      senna-docusate (SENOKOT-S/PERICOLACE) 8.6-50 MG tablet Take 3 tablets by mouth every evening.      sucralfate (CARAFATE) 1 GM tablet Take 1 tablet (1 g) by mouth 2 times daily as needed (Reflux) 180 tablet 3    tacrolimus (PROTOPIC) 0.1 % external  ointment Apply topically as needed (rash on arms) Apply to affected areas on body. 60 g 1    tiZANidine (ZANAFLEX) 2 MG tablet Take 1-2 tablets (2-4 mg) by mouth every 8 hours as needed for muscle spasms. 50 tablet 0    triamcinolone (KENALOG) 0.1 % external ointment Apply topically 2 times daily To affected areas of rash. Please avoid application to the face, groin or armpits as the medication is too strong for these areas. 454 g 0     No current facility-administered medications for this visit.             Review of Systems:   A focused musculoskeletal and neurologic ROS was performed with pertinent positives and negatives noted in the HPI.  Additional systems were also reviewed and are documented at the bottom of the note.         Physical Exam:   Vitals: There were no vitals taken for this visit.  Musculoskeletal, Neurologic, and Spine:     Cervical spine:    Appearance -no gross step-offs, kyphosis.    Motor -     C5: Deltoids R 5/5 and L 5/5 strength    C6: Biceps R 5/5 and L 5/5 strength     C7: Triceps R 5/5 and L 5/5 strength     C8:  R 5/5 and L 5/5 strength     T1: Dorsal interossei R 4/5 and L 4/5 strength        Sensation: intact to light touch in C5-T1      Special Tests -      Lhermitte's Test - Negative     Spurling's Test - Negative      Baxter's Test - Negative       Lumbar Spine:    Appearance - No gross stepoffs or deformities    Motor -     L2-3: Hip flexion 5/5 R and 5/5 L strength          L3/4:  Knee extension R 5/5 and L 5/5 strength         L4/5:  Foot dorsiflexion R 5/5 L 5/5 and       EHL dorsiflexion R 4/5 L 4/5 strength         S1:  Plantarflexion/Peroneal Muscles  R 5/5 and L 5/5 strength    Sensation: intact to light touch L3-S1 distribution BLE      Neurologic:      REFLEXES Left Right   Biceps 1+ 1+   Triceps 1+ 1+   Brachioradialis 1+ 1+   Patella 1+ 1+   Ankle jerk 1+ 1+   Babinski No upgoing great toe No upgoing great toe   Clonus 0 beats 0 beats     Hip Exam:  No pain with  hip log roll and no tenderness over the greater trochanters.    Alignment:  Patient stands with a neutral standing sagittal balance.           Imaging:   We ordered and independently reviewed new radiographs at this clinic visit. The results were discussed with the patient. Findings include:       Lumbar radiographs today show no changes,     Assessment and Plan:     65 year old male with history of cervical myelopathy, now s/p lumbar decompression.    He says he cannot walk more than 50 feet because of his long standing gait issues.  Given this, I think it is reasonable that he would require support with occasional motorized wheel chair for longer distances and I told him I would document this today.  We will also support his continued need for a handicap parking sticker as needed.    I would like him to continue lumbar PT and gait/balance issues.      Given there is no acute decline on my exam I think we will continue to monitor these symptoms.  His cervical spine has been decompressed and I don't see signs of thoracic myelopathy.  I asked him to let me know if this worsens and we could consider updated imaging if so.  Otherwise follow up in 6 months with PA team member.             Respectfully,  Jonathan Prajapati MD  Spine Surgery  [facility]

## 2025-02-11 NOTE — NURSING NOTE
Reason For Visit:   Chief Complaint   Patient presents with    Surgical Followup     6 wk post-op lumbar 4 to 5 decompression DOS 12/26/24. Pt reports right lumbar area       Primary MD: Demi Hardin  Ref. MD: est      Date of surgery: lumbar 4 and 5 decompression   Type of surgery: 12/26/24.      There were no vitals taken for this visit.    Pain Assessment  Patient Currently in Pain: Yes  Primary Pain Location: Back (right lumbar)              -YOGESH Mcgee- [facility]

## 2025-02-11 NOTE — LETTER
2/11/2025      Kobe Buchanan  [address]  [place] [address]      Dear Colleague,    Thank you for referring your patient, Kobe Buchanan, to the [facility] [place]. Please see a copy of my visit note below.    Spine Surgery Return Clinic Visit      Chief Complaint:   Surgical Followup (6 wk post-op lumbar 4 to 5 decompression DOS 12/26/24. Pt reports right lumbar area)      Interval HPI:  Symptom Profile Including: location of symptoms, onset, severity, exacerbating/alleviating factors, previous treatments:        Kobe Buchanan is a 65 year old male who returns s/p decompression doing very well with that.  Says his preop radicular complaints are totally resolved.      His main concerns today are residual gait imbalance.  He had a prior laminoplasty for myelopathy and feels like he had gait instability before surgery but that it has gotten a little different since the last visit.  No new arm symptoms.  He has a wheel chair his father left him and wants to know if medicare would cover repairs for it.              Past Medical History:     Past Medical History:   Diagnosis Date     Acute systolic heart failure (H) 01/28/2020    Added automatically from request for surgery [id number]     NEMESIO (acute kidney injury) 02/16/2020     Anxiety      Ascending aortic aneurysm      Atrial fibrillation (H) [I48.91] 10/10/2016     Bicuspid aortic valve      BPPV (benign paroxysmal positional vertigo) 07/11/2014     Choledocholithiasis      Chronic narcotic use      Chronic neck pain      Chronic osteoarthritis      CKD (chronic kidney disease) stage 3, GFR 30-59 ml/min (H)      Degenerative joint disease      Depression      Dysthymic disorder      Hx of type 2 diabetes mellitus 03/04/2021     Hyperlipidemia 04/10/2012     Hypertension      Iron deficiency anemia secondary to blood loss (chronic) 08/25/2020     MSSA bacteremia 10/13/2019     Multiple stiff joints      Neck  injuries      NSTEMI (non-ST elevated myocardial infarction) (H) 01/29/2020     Obesity 02/09/2015     Pain in shoulder      Plantar fasciitis      Pre-diabetes      S/P reverse total shoulder arthroplasty, left 07/07/2020     S/P reverse total shoulder arthroplasty, right 08/18/2020     Septic joint of left shoulder region (H) 11/13/2019     Septic joint of right shoulder region (H) 10/13/2019    s/p washout     Skin picking habit      Sleep apnea     does not use cpap     Spinal stenosis of lumbar region with neurogenic claudication      Status post total shoulder arthroplasty, left 03/03/2020    Added automatically from request for surgery [id number]            Past Surgical History:     Past Surgical History:   Procedure Laterality Date     ARTHROPLASTY SHOULDER  04/15/2014    Procedure: Left Total Shoulder Arthroplasty ;  Surgeon: Analilia Aceves MD;  [facility]     ARTHROPLASTY SHOULDER Right 08/26/2014    Procedure: ARTHROPLASTY SHOULDER;  Surgeon: Analilia Aceves MD;  [facility]     ARTHROSCOPY SHOULDER WITH BIOPSY(IES) Left 01/28/2020    Procedure: Left shoulder arthroscopy and biopsy for culture;  Surgeon: Analilia Aceves MD;  [facility]     BYPASS GASTRIC TAVO-EN-Y, LIVER BIOPSY, COMBINED  08/08/2005     COLONOSCOPY  06/30/2014    Procedure: COMBINED COLONOSCOPY, SINGLE BIOPSY/POLYPECTOMY BY BIOPSY;  Surgeon: Chester Patton MD;  [facility]     COLONOSCOPY N/A 2/15/2024    Procedure: COLONOSCOPY, WITH POLYPECTOMY;  Surgeon: Power Duran MD;  [facility]     CV CORONARY ANGIOGRAM  01/30/2020    Procedure: CV CORONARY ANGIOGRAM;  Surgeon: Néstor Walls MD;  [facility]     CYSTOSCOPY, BLADDER NECK CUTS, COMBINED N/A 07/18/2016    Procedure: COMBINED CYSTOSCOPY, BLADDER NECK CUTS;  Surgeon: Ritu Leslie MD;  [facility]     DECOMPRESSION LUMBAR ONE LEVEL N/A 12/26/2024    Procedure: Lumbar  4 to 5 decompression;  Surgeon: Jonathan Prajapati MD;  [facility]     ENDOSCOPIC RETROGRADE CHOLANGIOPANCREATOGRAM N/A 11/03/2023    Procedure: ENDOSCOPIC RETROGRADE CHOLANGIOPANCREATOGRAPHY, BILIARY SPHINCTEROTOMY, STONE REMOVAL;  Surgeon: Juarez Sevilla MD;  [place] [facility]     ESOPHAGOSCOPY, GASTROSCOPY, DUODENOSCOPY (EGD), COMBINED  06/30/2014    Procedure: COMBINED ESOPHAGOSCOPY, GASTROSCOPY, DUODENOSCOPY (EGD), BIOPSY SINGLE OR MULTIPLE;  Surgeon: Chester Patton MD;  [facility]     EXCISE MASS FINGER  06/14/2011    Procedure:EXCISE MASS FINGER; Middle Flexor Cyst; Surgeon:SHAYY RUSSELL; [facility]     IR FINE NEEDLE ASPIRATION W ULTRASOUND  11/13/2019     IR PICC PLACEMENT > 5 YRS OF AGE  11/19/2019     LAMINOPLASTY CERVICAL POSTERIOR THREE+ LEVELS N/A 9/11/2024    Procedure: Cervical 3 and 7 dome laminectomy and 4-6 laminoplasty with medtronic plates;  Surgeon: Jonathan Prajapati MD;  [facility]     LAPAROSCOPIC CHOLECYSTECTOMY N/A 11/03/2023    Procedure: CHOLECYSTECTOMY, LAPAROSCOPIC;  Surgeon: Shayy Mathis MD;  [facility] [facility]     LASER HOLMIUM LITHOTRIPSY BLADDER N/A 10/15/2014    Procedure: LASER HOLMIUM LITHOTRIPSY BLADDER;  Surgeon: Sahil Taveras MD;  [facility]     LASER KTP GREEN LIGHT PHOTOSELECTIVE VAPORIZATION PROSTATE  01/23/2014    Procedure: LASER KTP GREEN LIGHT PHOTOSELECTIVE VAPORIZATION PROSTATE;  Greenlight Photovaporization Of Prostate  ;  Surgeon: Sahil Taveras MD;  [facility]     OTHER SURGICAL HISTORY  10/04/2016    REPAIR ANEURYSM ASCENDING AORTA     OTHER SURGICAL HISTORY Right 09/23/2019    IRRIGATION AND DEBRIDEMENT UPPER EXTREMITYshoulder     RELEASE TRIGGER FINGER Right 03/31/2017    Procedure: RELEASE TRIGGER FINGER;  Surgeon: Juan Carlos Blunt MD;  [facility]     REMOVE ANTIBIOTIC CEMENT BEADS / SPACER SHOULDER Left 05/08/2020    Procedure: Left  shoulder removal of spacer;  Surgeon: Analilia Aceves MD;  [facility]     REMOVE ANTIBIOTIC CEMENT BEADS / SPACER SHOULDER Right 2020    Procedure: Removal of right shoulder antibiotic spacer;  Surgeon: Analilia Aceves MD;  [facility]     REMOVE HARDWARE ARTHROPLASTY SHOULDER. I&D, PLACE ANTIBIOTIC CEMENT BE Left 11/15/2019    Procedure: Explantation of left total shoulder arthroplasty, irrigation and debridement, and placement of antibiotic spacer;  Surgeon: Analilia Aceves MD;  [facility]     REPAIR ANEURYSM ASCENDING AORTA N/A 10/04/2016    Procedure: REPAIR ANEURYSM ASCENDING AORTA;  Surgeon: Mckenzie Townsend MD;  [facility]     REVERSE ARTHROPLASTY SHOULDER Right 2020    Procedure: and conversion to reverse total shoulder arthroplasty;  Surgeon: Analilia Aceves MD;  [facility]     SURGICAL EXPOSURE, LAPAROSCOPIC, W/WOUND CLOSURE, FOR ERCP, BY GENERAL SURGERY N/A 2023    Procedure: SURGICAL EXPOSURE FOR ENDOSCOPIC RETROGRADE CHOLANGIOPANCREATOGRAPHY;  Surgeon: Kobe Mathis MD;  [facility]              Social History:     Social History     Tobacco Use     Smoking status: Former     Current packs/day: 0.00     Average packs/day: 0.5 packs/day for 6.6 years (3.3 ttl pk-yrs)     Types: Cigarettes     Start date: 1977     Quit date: 1983     Years since quittin.4     Passive exposure: Never (per pt)     Smokeless tobacco: Never   Substance Use Topics     Alcohol use: No     Alcohol/week: 0.0 standard drinks of alcohol            Family History:     Family History   Problem Relation Age of Onset     Anxiety Disorder Mother      Hypertension Mother      Osteoporosis Mother      Obesity Mother      Hyperlipidemia Mother      Low Back Problems Mother      Macular Degeneration Mother      Cataracts Mother      Other - See Comments Mother         low back problems     Cardiovascular Father          aortic aneurysm     Arrhythmia Father      Nephrolithiasis Father      Sleep Apnea Father      Anxiety Disorder Father      Depression Father      Hypertension Father      Obesity Father      Hyperlipidemia Father      Coronary Artery Disease Father      Low Back Problems Father      Spine Problems Father      Other - See Comments Father         low back problems, spine problems     Anxiety Disorder Sister      Hypertension Sister      Osteoporosis Sister      Obesity Sister      Hyperlipidemia Sister      Low Back Problems Sister      Spine Problems Sister      Anxiety Disorder Sister      Depression Sister      Hypertension Sister      Osteoporosis Sister      Obesity Sister      Hyperlipidemia Sister      Low Back Problems Sister      Pulmonary Embolism Sister      Anxiety Disorder Sister      Hyperlipidemia Sister      Hypertension Sister      Obesity Sister      Other - See Comments Sister         low back problems, spine problems     Osteoporosis Sister      Anxiety Disorder Sister      Depression Sister      Hyperlipidemia Sister      Hypertension Sister      Other - See Comments Sister         low back problems     Obesity Sister      Osteoporosis Sister      Arthritis Other      Gastrointestinal Disease Other      Melanoma No family hx of      Skin Cancer No family hx of      Glaucoma No family hx of      Anesthesia Reaction No family hx of             Allergies:     Allergies   Allergen Reactions     Ciprofloxacin      History of aortic aneurysms     Gabapentin Dizziness     Tape [Adhesive Tape] Blisters     Blistering - please use paper tape            Medications:     Current Outpatient Medications   Medication Sig Dispense Refill     amLODIPine-benazepril (LOTREL) 5-20 MG capsule Take 1 capsule by mouth 2 times daily 180 capsule 0     aspirin (ASPIRIN LOW DOSE) 81 MG EC tablet Take 1 tablet (81 mg) by mouth daily. (Patient taking differently: Take 81 mg by mouth every morning.) 90 tablet 3      atorvastatin (LIPITOR) 40 MG tablet Take 1 tablet (40 mg) by mouth daily. 90 tablet 3     bisacodyl (DULCOLAX) 10 MG suppository Place 1 suppository (10 mg) rectally daily as needed for constipation (Use if magnesium hydroxide (MILK of MAGNESIA) is not effective after 24 hours. May discontinue if patient having bowel movement.).       buPROPion (WELLBUTRIN SR) 200 MG 12 hr tablet TAKE 1 TABLET BY MOUTH 2 TIMES DAILY 180 tablet 0     carvedilol (COREG) 25 MG tablet Take 2 tablets (50 mg) by mouth 2 times daily (with meals) 360 tablet 0     clonazePAM (KLONOPIN) 0.5 MG tablet Take 1 tablet (0.5 mg) by mouth 3 times daily as needed for anxiety (Patient taking differently: Take 0.5 mg by mouth 3 times daily.) 90 tablet 0     cyclobenzaprine (FLEXERIL) 10 MG tablet Take 1 tablet (10 mg) by mouth 3 times daily as needed for muscle spasms. 90 tablet 0     fentaNYL (DURAGESIC) 25 mcg/hr 72 hr patch Place 1 patch onto the skin every 48 hours. Pt taking Every 48 hours       magnesium hydroxide (MILK OF MAGNESIA) 400 MG/5ML suspension Take 30 mLs by mouth daily as needed for constipation (Use if polyethylene glycol (Miralax) is not effective after 24 hours.).       naloxone (NARCAN) 4 MG/0.1ML nasal spray Spray 1 spray (4 mg) into one nostril alternating nostrils as needed for opioid reversal. every 2-3 minutes until assistance arrives 2 each 0     naproxen (EC-NAPROSYN) 500 MG EC tablet Take 1 tablet (500 mg) by mouth 2 times daily (with meals). 180 tablet 1     oxyCODONE IR (ROXICODONE) 10 MG tablet Take 10 mg by mouth every 4 hours as needed for breakthrough pain (max 6 daily).       pantoprazole (PROTONIX) 40 MG EC tablet Take 1 tablet (40 mg) by mouth daily as needed for heartburn, 90 tablet 0     polyethylene glycol (MIRALAX) 17 GM/Dose powder Take 17 g (1 Capful) by mouth daily. 510 g 0     pregabalin (LYRICA) 100 MG capsule Take 100 mg by mouth 3 times daily.       propranolol (INDERAL) 10 MG tablet Take 1 tablet (10  mg) by mouth 3 times daily as needed (panic attack) 90 tablet 3     senna-docusate (SENOKOT-S/PERICOLACE) 8.6-50 MG tablet Take 2 tablets by mouth every morning.       senna-docusate (SENOKOT-S/PERICOLACE) 8.6-50 MG tablet Take 3 tablets by mouth every evening.       sucralfate (CARAFATE) 1 GM tablet Take 1 tablet (1 g) by mouth 2 times daily as needed (Reflux) 180 tablet 3     tacrolimus (PROTOPIC) 0.1 % external ointment Apply topically as needed (rash on arms) Apply to affected areas on body. 60 g 1     tiZANidine (ZANAFLEX) 2 MG tablet Take 1-2 tablets (2-4 mg) by mouth every 8 hours as needed for muscle spasms. 50 tablet 0     triamcinolone (KENALOG) 0.1 % external ointment Apply topically 2 times daily To affected areas of rash. Please avoid application to the face, groin or armpits as the medication is too strong for these areas. 454 g 0     No current facility-administered medications for this visit.             Review of Systems:   A focused musculoskeletal and neurologic ROS was performed with pertinent positives and negatives noted in the HPI.  Additional systems were also reviewed and are documented at the bottom of the note.         Physical Exam:   Vitals: There were no vitals taken for this visit.  Musculoskeletal, Neurologic, and Spine:     Cervical spine:    Appearance -no gross step-offs, kyphosis.    Motor -     C5: Deltoids R 5/5 and L 5/5 strength    C6: Biceps R 5/5 and L 5/5 strength     C7: Triceps R 5/5 and L 5/5 strength     C8:  R 5/5 and L 5/5 strength     T1: Dorsal interossei R 4/5 and L 4/5 strength        Sensation: intact to light touch in C5-T1      Special Tests -      Lhermitte's Test - Negative     Spurling's Test - Negative      Baxter's Test - Negative       Lumbar Spine:    Appearance - No gross stepoffs or deformities    Motor -     L2-3: Hip flexion 5/5 R and 5/5 L strength          L3/4:  Knee extension R 5/5 and L 5/5 strength         L4/5:  Foot dorsiflexion R 5/5 L  5/5 and       EHL dorsiflexion R 4/5 L 4/5 strength         S1:  Plantarflexion/Peroneal Muscles  R 5/5 and L 5/5 strength    Sensation: intact to light touch L3-S1 distribution BLE      Neurologic:      REFLEXES Left Right   Biceps 1+ 1+   Triceps 1+ 1+   Brachioradialis 1+ 1+   Patella 1+ 1+   Ankle jerk 1+ 1+   Babinski No upgoing great toe No upgoing great toe   Clonus 0 beats 0 beats     Hip Exam:  No pain with hip log roll and no tenderness over the greater trochanters.    Alignment:  Patient stands with a neutral standing sagittal balance.           Imaging:   We ordered and independently reviewed new radiographs at this clinic visit. The results were discussed with the patient. Findings include:       Lumbar radiographs today show no changes,     Assessment and Plan:     65 year old male with history of cervical myelopathy, now s/p lumbar decompression.    He says he cannot walk more than 50 feet because of his long standing gait issues.  Given this, I think it is reasonable that he would require support with occasional motorized wheel chair for longer distances and I told him I would document this today.  We will also support his continued need for a handicap parking sticker as needed.    I would like him to continue lumbar PT and gait/balance issues.      Given there is no acute decline on my exam I think we will continue to monitor these symptoms.  His cervical spine has been decompressed and I don't see signs of thoracic myelopathy.  I asked him to let me know if this worsens and we could consider updated imaging if so.  Otherwise follow up in 6 months with PA team member.             Respectfully,  Jonathan Prajapati MD  Spine Surgery  [facility]      Again, thank you for allowing me to participate in the care of your patient.        Sincerely,        Jonathan Prajapati MD    Electronically signed

## 2025-02-12 ENCOUNTER — TELEPHONE (OUTPATIENT)
Dept: ORTHOPEDICS | Facility: CLINIC | Age: 65
End: 2025-02-12
Payer: COMMERCIAL

## 2025-02-12 NOTE — TELEPHONE ENCOUNTER
Patient confirmed scheduled appointment:     Date: 8/11/25  Time: 9:25 AM  Visit type: Return surgical spine  Visit mode: In Person  Provider:  Dr. Jonathan Prajapati  [place] [facility] [place] Notes:

## 2025-03-03 ENCOUNTER — MYC REFILL (OUTPATIENT)
Dept: FAMILY MEDICINE | Facility: CLINIC | Age: 65
End: 2025-03-03
Payer: COMMERCIAL

## 2025-03-03 DIAGNOSIS — F43.9 STRESS: ICD-10-CM

## 2025-03-03 RX ORDER — CLONAZEPAM 0.5 MG/1
0.5 TABLET ORAL 3 TIMES DAILY PRN
Qty: 90 TABLET | Refills: 0 | Status: SHIPPED | OUTPATIENT
Start: 2025-03-03

## 2025-03-03 NOTE — TELEPHONE ENCOUNTER
#90 filled 2/4/2025    Also Fentanyl 12 (lower dose) and oxycodone 10 #240    Still high risk meds but adheres.

## 2025-03-04 ENCOUNTER — TELEPHONE (OUTPATIENT)
Dept: FAMILY MEDICINE | Facility: CLINIC | Age: 65
End: 2025-03-04
Payer: COMMERCIAL

## 2025-03-04 ENCOUNTER — MYC REFILL (OUTPATIENT)
Dept: FAMILY MEDICINE | Facility: CLINIC | Age: 65
End: 2025-03-04
Payer: COMMERCIAL

## 2025-03-04 DIAGNOSIS — Z98.890 HISTORY OF LAMINECTOMY: ICD-10-CM

## 2025-03-04 RX ORDER — NAPROXEN 500 MG/1
500 TABLET ORAL 2 TIMES DAILY WITH MEALS
Qty: 180 TABLET | Refills: 1 | Status: SHIPPED | OUTPATIENT
Start: 2025-03-04

## 2025-03-04 RX ORDER — NAPROXEN 500 MG/1
500 TABLET ORAL 2 TIMES DAILY WITH MEALS
Qty: 180 TABLET | Refills: 1 | Status: CANCELLED | OUTPATIENT
Start: 2025-03-04

## 2025-03-04 NOTE — TELEPHONE ENCOUNTER
FYI - Status Update    Who is Calling: patient    Update: Patient needs med refill and is requesting PA to be done for this asap. Please send in prescriptions and complete PA. Thank you!

## 2025-03-04 NOTE — TELEPHONE ENCOUNTER
Aware of NSAID use, needing EC as hx of gastric bypass.      Patient understands concerns about BP control, CKD, and gastric ulcer.

## 2025-03-04 NOTE — TELEPHONE ENCOUNTER
Outside RN standing order due to associated dx. Routing to PCP for review and fill. Patient also requesting PA to be completed. Alison RIGGS

## 2025-03-05 ENCOUNTER — VIRTUAL VISIT (OUTPATIENT)
Dept: PHARMACY | Facility: CLINIC | Age: 65
End: 2025-03-05
Payer: COMMERCIAL

## 2025-03-05 DIAGNOSIS — F41.0 ANXIETY DISORDER DUE TO GENERAL MEDICAL CONDITION WITH PANIC ATTACK: ICD-10-CM

## 2025-03-05 DIAGNOSIS — Z23 NEED FOR INFLUENZA VACCINATION: ICD-10-CM

## 2025-03-05 DIAGNOSIS — F33.1 MODERATE RECURRENT MAJOR DEPRESSION (H): ICD-10-CM

## 2025-03-05 DIAGNOSIS — K21.00 GASTROESOPHAGEAL REFLUX DISEASE WITH ESOPHAGITIS, UNSPECIFIED WHETHER HEMORRHAGE: ICD-10-CM

## 2025-03-05 DIAGNOSIS — G89.4 CHRONIC PAIN SYNDROME: ICD-10-CM

## 2025-03-05 DIAGNOSIS — Z98.84 HISTORY OF ROUX-EN-Y GASTRIC BYPASS: ICD-10-CM

## 2025-03-05 DIAGNOSIS — F06.4 ANXIETY DISORDER DUE TO GENERAL MEDICAL CONDITION WITH PANIC ATTACK: ICD-10-CM

## 2025-03-05 DIAGNOSIS — I77.9 BILATERAL CAROTID ARTERY DISEASE, UNSPECIFIED TYPE: Primary | ICD-10-CM

## 2025-03-05 PROCEDURE — 99607 MTMS BY PHARM ADDL 15 MIN: CPT | Mod: 93 | Performed by: PHARMACIST

## 2025-03-05 PROCEDURE — 99606 MTMS BY PHARM EST 15 MIN: CPT | Mod: 93 | Performed by: PHARMACIST

## 2025-03-05 RX ORDER — DOCUSATE SODIUM 100 MG/1
CAPSULE, LIQUID FILLED ORAL
Status: SHIPPED
Start: 2025-03-05

## 2025-03-05 RX ORDER — FENTANYL 12.5 UG/1
1 PATCH TRANSDERMAL
COMMUNITY
Start: 2025-03-05

## 2025-03-05 RX ORDER — ESCITALOPRAM OXALATE 20 MG/1
20 TABLET ORAL DAILY
Qty: 90 TABLET | Refills: 3 | Status: SHIPPED | OUTPATIENT
Start: 2025-03-05

## 2025-03-05 RX ORDER — PANTOPRAZOLE SODIUM 40 MG/1
40 TABLET, DELAYED RELEASE ORAL DAILY
Qty: 90 TABLET | Refills: 3 | Status: SHIPPED | OUTPATIENT
Start: 2025-03-05

## 2025-03-05 RX ORDER — SENNOSIDES 8.6 MG
TABLET ORAL
Status: SHIPPED
Start: 2025-03-05

## 2025-03-05 NOTE — PROGRESS NOTES
Medication Therapy Management (MTM) Encounter    ASSESSMENT:                            Medication Adherence/Access: See below for considerations.    Bilateral carotid artery disease, unspecified type  On appropriate agents for secondary prevention - antiplatelet and statin, LDL at goal <55 mg/dL.     Chronic pain syndrome  History of Simone-en-Y gastric bypass  Gastroesophageal reflux disease with esophagitis, unspecified whether hemorrhage  Comanaged with pain clinic. Concern for high NSAID doses. Given gastric bypass and daily NSAID + ASA, ideally should start daily PPI for gastroprotection. Concerns for insurance coverage for current EC Naproxen. Unclear if this formulation is necessary. Enteric coating in theory decreases risks, however this has not borne out in clinical trials. While COX2 inhibitors have decreased GI risks, given patient's history of carotid disease, concern for increased ASCVD risk with COX2 inhibitors. It is possible that Ibuprofen and Diclofenac have lower GI risks comparatively, however whichever NSAID is chosen, need to use lowest dose possible as GI risk is dose-dependent.     Moderate recurrent major depression (H)  Anxiety disorder due to general medical condition with panic attack  Most recent PHQ9 with clinically significant improvement from previous. Effective per patient report. Long term goal would be to taper of benzodiazepine, however currently tapering opioids, ideally would not do at same time. Future opportunity, details documented in last encounter. Will forward to primary care provider.     Need for influenza vaccination  Vaccine opportunity.    PLAN:                            Patient Instructions   - The pharmacy is unable to tell us if a prior authorization is needed on the Naproxen until March 8th. If one is needed they will send it to us.  - Please take Pantoprazole daily to prevent ulcer formation at your Simone-en-Y surgical site.   - The maximum dose of Naproxen is 500  mg twice daily.   - You are due for your annual influenza vaccine. Your last dose was 2023.   - I have shared an idea with Dr. Hardin as to how to taper off of your Clonazepam. However I agree, that first you need to get accustomed to your new pain medicine dosages.     Follow-up: Return in about 1 year (around 3/5/2026).    SUBJECTIVE/OBJECTIVE:                          Kobe Buchanan is a 65 year old male called for pharmacist visit.  used for visit (Y/N): No; English.     Reason for visit: comprehensive medicine review follow up.    Allergies/ADRs: Reviewed in chart  Past Medical History: Reviewed in chart  Social History     Tobacco Use    Smoking status: Former     Current packs/day: 0.00     Average packs/day: 0.5 packs/day for 6.6 years (3.3 ttl pk-yrs)     Types: Cigarettes     Start date: 1977     Quit date: 1983     Years since quittin.5     Passive exposure: Never (per pt)    Smokeless tobacco: Never   Substance Use Topics    Alcohol use: No     Alcohol/week: 0.0 standard drinks of alcohol    Drug use: No     ^Reviewed today    Medication Adherence/Access: see below.    Bilateral carotid artery disease, unspecified type  Current medicines per patient report:   Aspirin 81 mg/day  Atorvastatin 40 mg/day  No concerns.     Chronic pain syndrome  Received letter in December indicating that they will stop paying for EC Naproxen. Taking twice daily, or more.   Concern for continued pain. Concern for need to decrease dose, now on Fentanyl 12 mcg patch Q48 hours. Next appt on Tuesday 3/11.   No concerns for stomach upset with current medication use - pantoprazole as needed.  Not interested in therapies for pain management.   Tizanidine - doesn't help, concerns for side effects - dry mouth; prescribd postoperatively. Cyclobenzaprine doesn't help with with pain, instead muscle tension. Initially helped with sleep.  Constipation managed by Senna 2 tablets in the morning and 3  "tablets in the evening and Docusate 100 mg - 1 capsules in the morning and 2 in the evening.  Not needing, doesn't like Miralax; no Milk of mag, bisacodyl    Moderate recurrent major depression (H)  Anxiety disorder due to general medical condition with panic attack  Bupropion 200 mg twice daily   Previous had benefit from Escitalopram around time of parents death.   Escitalopram 20 mg/day -continues on, misunderstanding at previous visit. Not taking higher dose 30 mg/day, that was previously prescribed. \"I'm not depressed\".   Clonazepam - can't go without. In same breath would like to stop. But with ongoing pain concerns, too much to do at one time.       Today's Vitals:   BP Readings from Last 1 Encounters:   01/28/25 138/75     Pulse Readings from Last 1 Encounters:   01/28/25 57     Wt Readings from Last 1 Encounters:   01/28/25 228 lb (103.4 kg)     Ht Readings from Last 1 Encounters:   01/28/25 5' 10\" (1.778 m)     Estimated body mass index is 32.71 kg/m  as calculated from the following:    Height as of 1/28/25: 5' 10\" (1.778 m).    Weight as of 1/28/25: 228 lb (103.4 kg).    Temp Readings from Last 1 Encounters:   01/28/25 98  F (36.7  C) (Oral)       ----------------      I spent 55 minutes with this patient today. All changes were made via collaborative practice agreement with Demi Hardin MD.     The patient was mailed to the patient a summary of these recommendations.     Ashia Rivero, Pharm.D., [facility]  Phone: [phone]    Telemedicine Visit Details  The patient's medications can be safely assessed via a telemedicine encounter.  Type of service:  Telephone visit  Originating Location (pt. Location): Home    Distant Location (provider location):  On-site  Start Time:  11:04 AM  End Time:  11:59 AM    For insurance/tracking purposes, MTM Team Documentation:   Medication Therapy Recommendations  History of Simone-en-Y gastric bypass   1 Current " Medication: pantoprazole (PROTONIX) 40 MG EC tablet   Current Medication Sig: Take 1 tablet (40 mg) by mouth daily.   Rationale: Preventive therapy - Needs additional medication therapy - Indication   Recommendation: Start Medication   Status: Accepted per CPA   Identified Date: 3/5/2025 Completed Date: 3/5/2025         Need for influenza vaccination   1 Rationale: Preventive therapy - Needs additional medication therapy - Indication   Recommendation: Order Vaccine   Status: Declined per Patient   Identified Date: 3/5/2025 Completed Date: 3/5/2025

## 2025-03-05 NOTE — PATIENT INSTRUCTIONS
- The pharmacy is unable to tell us if a prior authorization is needed on the Naproxen until March 8th. If one is needed they will send it to us.  - Please take Pantoprazole daily to prevent ulcer formation at your Simone-en-Y surgical site.   - The maximum dose of Naproxen is 500 mg twice daily.   - You are due for your annual influenza vaccine. Your last dose was October 2023.   - I have shared an idea with Dr. Hardin as to how to taper off of your Clonazepam. However I agree, that first you need to get accustomed to your new pain medicine dosages.

## 2025-03-05 NOTE — Clinical Note
Pain clinic tapering his fentanyl. Eventually he would like to come off of his Clonazepam, maybe once thing are settled. See our last note for how to do this (per [facility] guideline)

## 2025-03-14 ENCOUNTER — TRANSFERRED RECORDS (OUTPATIENT)
Dept: HEALTH INFORMATION MANAGEMENT | Facility: CLINIC | Age: 65
End: 2025-03-14
Payer: COMMERCIAL

## 2025-03-26 DIAGNOSIS — I10 ESSENTIAL HYPERTENSION: ICD-10-CM

## 2025-03-26 DIAGNOSIS — M47.812 SPONDYLOSIS OF CERVICAL REGION WITHOUT MYELOPATHY OR RADICULOPATHY: ICD-10-CM

## 2025-03-26 DIAGNOSIS — I10 ESSENTIAL HYPERTENSION WITH GOAL BLOOD PRESSURE LESS THAN 130/85: ICD-10-CM

## 2025-03-26 RX ORDER — CARVEDILOL 25 MG/1
50 TABLET ORAL 2 TIMES DAILY WITH MEALS
Qty: 120 TABLET | Refills: 0 | Status: SHIPPED | OUTPATIENT
Start: 2025-03-26

## 2025-03-26 RX ORDER — AMLODIPINE AND BENAZEPRIL HYDROCHLORIDE 5; 20 MG/1; MG/1
1 CAPSULE ORAL 2 TIMES DAILY
Qty: 180 CAPSULE | Refills: 3 | Status: SHIPPED | OUTPATIENT
Start: 2025-03-26

## 2025-03-26 RX ORDER — CYCLOBENZAPRINE HCL 10 MG
10 TABLET ORAL 3 TIMES DAILY PRN
Qty: 90 TABLET | Refills: 3 | Status: SHIPPED | OUTPATIENT
Start: 2025-03-26

## 2025-04-04 ENCOUNTER — TRANSFERRED RECORDS (OUTPATIENT)
Dept: HEALTH INFORMATION MANAGEMENT | Facility: CLINIC | Age: 65
End: 2025-04-04
Payer: COMMERCIAL

## 2025-04-10 ENCOUNTER — OFFICE VISIT (OUTPATIENT)
Dept: CARDIOLOGY | Facility: CLINIC | Age: 65
End: 2025-04-10
Attending: INTERNAL MEDICINE
Payer: COMMERCIAL

## 2025-04-10 VITALS
RESPIRATION RATE: 16 BRPM | BODY MASS INDEX: 34.15 KG/M2 | DIASTOLIC BLOOD PRESSURE: 76 MMHG | HEART RATE: 57 BPM | WEIGHT: 238 LBS | SYSTOLIC BLOOD PRESSURE: 133 MMHG | OXYGEN SATURATION: 99 %

## 2025-04-10 DIAGNOSIS — Z98.890 S/P THORACIC AORTIC ANEURYSM REPAIR: ICD-10-CM

## 2025-04-10 DIAGNOSIS — Z86.79 S/P THORACIC AORTIC ANEURYSM REPAIR: ICD-10-CM

## 2025-04-10 RX ORDER — HYDRALAZINE HYDROCHLORIDE 10 MG/1
10 TABLET, FILM COATED ORAL 3 TIMES DAILY PRN
Qty: 90 TABLET | Refills: 3 | Status: SHIPPED | OUTPATIENT
Start: 2025-04-10

## 2025-04-10 NOTE — LETTER
4/10/2025    Demi Hardin MD  [address] [facility] [address]    RE: Kobe Buchanan       Dear Colleague,     I had the pleasure of seeing Kobe Buchanan in the [facility].           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     Prior history:      Patient was referred by Atul Martínez and [place] to establish care into congenital vascular cardiology clinic. Patient reports his nephew at 28 years old has a known bicuspid aortic valve without aortopathy.  He has been seen in [place] and followed. His father is 91 years old and has a known aneurysm of 4.9 cm in bicuspid valve. Sister is a physician in [place], owns practice in [place], retired now. Patient reports no family history of sudden death or known dissection. No history of strokes.      MRA chest in April 2021 demonstrated graft stability. Echocardiogram showed normal BAV function, with only mild aortic regurgitation. His CMR demonstrated normalization of his LVEF suggestive that prior reduction in function was due to stress CMY.      Today he is doing well. BP is well controlled. We reviewed his studies which were all good. MRA and carotid ultrasound personally reviewed.     Suspect pain is driving blood pressure elevation. Its been labile, especially early in am. Takes  meds at 3-4 am and then again  12 hours later. Checks BP bid.    Today's visit mostly focused on Kobe discussing his pain management. His pain clinic is trying to decrease his opiates.      ASSESSMENT/PLAN:      This is a 65-year-old male with hypertension,  hyperlipidemia, right left fusion of his aortic valve with bicuspid aortopathy. No known coarctation.  No other peripheral aneurysmal disease.  He is status post valve sparing aortic repair with Gelweave interposition graft. He has mild AI, and no aortic stenosis.     Blood pressure is intermittently labile thus we discussed his medication regimen. Will continue him on his current regimen (couldn't increase coreg in the past) and add hydralazine as needed for when he is going through his opiate taper. If he requires a lot of hydralazine we can rx it more permanently for now.    For aortic disease, surveillance echo in one year. Intermittent MRA chest for graft evaluation and arch sizing    Follow up one year     The longitudinal plan of care for the diagnosis(es)/condition(s) as documented were addressed during this visit. Due to the added complexity in care, I will continue to support Kobe in the subsequent management and with ongoing continuity of care.     Enriqueta Zhou MD MSc  [facility]      Labs and studies personally reviewed.       PAST MEDICAL HISTORY  Past Medical History:   Diagnosis Date     Acute systolic heart failure (H) 01/28/2020    Added automatically from request for surgery [id number]     NEMESIO (acute kidney injury) 02/16/2020     Anxiety      Ascending aortic aneurysm      Atrial fibrillation (H) [I48.91] 10/10/2016     Bicuspid aortic valve      BPPV (benign paroxysmal positional vertigo) 07/11/2014     Choledocholithiasis      Chronic narcotic use      Chronic neck pain      Chronic osteoarthritis      CKD (chronic kidney disease) stage 3, GFR 30-59 ml/min (H)      Degenerative joint disease      Depression      Dysthymic disorder      Hx of type 2 diabetes mellitus 03/04/2021     Hyperlipidemia 04/10/2012     Hypertension      Iron deficiency anemia secondary to blood loss (chronic) 08/25/2020     MSSA bacteremia 10/13/2019     Multiple stiff joints      Neck injuries      NSTEMI  (non-ST elevated myocardial infarction) (H) 01/29/2020     Obesity 02/09/2015     Pain in shoulder      Plantar fasciitis      Pre-diabetes      S/P reverse total shoulder arthroplasty, left 07/07/2020     S/P reverse total shoulder arthroplasty, right 08/18/2020     Septic joint of left shoulder region (H) 11/13/2019     Septic joint of right shoulder region (H) 10/13/2019    s/p washout     Skin picking habit      Sleep apnea     does not use cpap     Spinal stenosis of lumbar region with neurogenic claudication      Status post total shoulder arthroplasty, left 03/03/2020    Added automatically from request for surgery [id number]       CURRENT MEDICATIONS  Current Outpatient Medications   Medication Sig Dispense Refill     amLODIPine-benazepril (LOTREL) 5-20 MG capsule Take 1 capsule by mouth 2 times daily. 180 capsule 3     aspirin (ASPIRIN LOW DOSE) 81 MG EC tablet Take 1 tablet (81 mg) by mouth daily. 90 tablet 3     atorvastatin (LIPITOR) 40 MG tablet Take 1 tablet (40 mg) by mouth daily. 90 tablet 3     buPROPion (WELLBUTRIN SR) 200 MG 12 hr tablet TAKE 1 TABLET BY MOUTH 2 TIMES DAILY 180 tablet 0     carvedilol (COREG) 25 MG tablet Take 2 tablets (50 mg) by mouth 2 times daily (with meals). 120 tablet 0     clonazePAM (KLONOPIN) 0.5 MG tablet Take 1 tablet (0.5 mg) by mouth 3 times daily as needed for anxiety. 90 tablet 3     cyclobenzaprine (FLEXERIL) 10 MG tablet Take 1 tablet (10 mg) by mouth 3 times daily as needed for muscle spasms 90 tablet 3     docusate sodium (COLACE) 100 MG capsule Take 1 capsule (100 mg) by mouth every morning AND 2 capsules (200 mg) every evening.       escitalopram (LEXAPRO) 20 MG tablet Take 1 tablet (20 mg) by mouth daily. 90 tablet 3     fentaNYL (DURAGESIC) 12 mcg/hr 72 hr patch Place 1 patch over 72 hours onto the skin every 48 hours. remove old patch.       naproxen (NAPROSYN) 500 MG tablet Take 1 tablet (500 mg) by mouth 2 times daily (with meals). 180 tablet 1      oxyCODONE IR (ROXICODONE) 10 MG tablet Take 10 mg by mouth every 4 hours as needed for breakthrough pain (max 6 daily).       pantoprazole (PROTONIX) 40 MG EC tablet Take 1 tablet (40 mg) by mouth daily. 90 tablet 3     pregabalin (LYRICA) 100 MG capsule Take 100 mg by mouth 3 times daily.       propranolol (INDERAL) 10 MG tablet Take 1 tablet (10 mg) by mouth 3 times daily as needed (panic attack) 90 tablet 3     sennosides (SENOKOT) 8.6 MG tablet Take 2 tablets by mouth every morning AND 3 tablets every evening.       sucralfate (CARAFATE) 1 GM tablet Take 1 tablet (1 g) by mouth 2 times daily as needed (Reflux) 180 tablet 3     tacrolimus (PROTOPIC) 0.1 % external ointment Apply topically as needed (rash on arms) Apply to affected areas on body. 60 g 1     triamcinolone (KENALOG) 0.1 % external ointment Apply topically 2 times daily To affected areas of rash. Please avoid application to the face, groin or armpits as the medication is too strong for these areas. 454 g 0     naloxone (NARCAN) 4 MG/0.1ML nasal spray Spray 1 spray (4 mg) into one nostril alternating nostrils as needed for opioid reversal. every 2-3 minutes until assistance arrives 2 each 0       PAST SURGICAL HISTORY:  Past Surgical History:   Procedure Laterality Date     ARTHROPLASTY SHOULDER  04/15/2014    Procedure: Left Total Shoulder Arthroplasty ;  Surgeon: Analilia Aceves MD;  [facility]     ARTHROPLASTY SHOULDER Right 08/26/2014    Procedure: ARTHROPLASTY SHOULDER;  Surgeon: Analilia Aceves MD;  [facility]     ARTHROSCOPY SHOULDER WITH BIOPSY(IES) Left 01/28/2020    Procedure: Left shoulder arthroscopy and biopsy for culture;  Surgeon: Analilia Aceves MD;  [facility]     BYPASS GASTRIC TAVO-EN-Y, LIVER BIOPSY, COMBINED  08/08/2005     COLONOSCOPY  06/30/2014    Procedure: COMBINED COLONOSCOPY, SINGLE BIOPSY/POLYPECTOMY BY BIOPSY;  Surgeon: Chester Patton MD;  Location:  GI     COLONOSCOPY N/A  2/15/2024    Procedure: COLONOSCOPY, WITH POLYPECTOMY;  Surgeon: Power Duran MD;  [facility]: [facility]     CV CORONARY ANGIOGRAM  01/30/2020    Procedure: CV CORONARY ANGIOGRAM;  Surgeon: Néstor Walls MD;  [facility]     CYSTOSCOPY, BLADDER NECK CUTS, COMBINED N/A 07/18/2016    Procedure: COMBINED CYSTOSCOPY, BLADDER NECK CUTS;  Surgeon: Ritu Leslie MD;  [facility]     DECOMPRESSION LUMBAR ONE LEVEL N/A 12/26/2024    Procedure: Lumbar 4 to 5 decompression;  Surgeon: Jonathan Prajapati MD;  [facility]     ENDOSCOPIC RETROGRADE CHOLANGIOPANCREATOGRAM N/A 11/03/2023    Procedure: ENDOSCOPIC RETROGRADE CHOLANGIOPANCREATOGRAPHY, BILIARY SPHINCTEROTOMY, STONE REMOVAL;  Surgeon: Juarez Sevilla MD;  Location: [facility] OR     ESOPHAGOSCOPY, GASTROSCOPY, DUODENOSCOPY (EGD), COMBINED  06/30/2014    Procedure: COMBINED ESOPHAGOSCOPY, GASTROSCOPY, DUODENOSCOPY (EGD), BIOPSY SINGLE OR MULTIPLE;  Surgeon: Chester Patton MD;  [facility]     EXCISE MASS FINGER  06/14/2011    Procedure:EXCISE MASS FINGER; Middle Flexor Cyst; Surgeon:KOBE RUSSELL; [facility]     IR FINE NEEDLE ASPIRATION W ULTRASOUND  11/13/2019     IR PICC PLACEMENT > 5 YRS OF AGE  11/19/2019     LAMINOPLASTY CERVICAL POSTERIOR THREE+ LEVELS N/A 9/11/2024    Procedure: Cervical 3 and 7 dome laminectomy and 4-6 laminoplasty with medtronic plates;  Surgeon: Jonathan Prajapati MD;  [facility]     LAPAROSCOPIC CHOLECYSTECTOMY N/A 11/03/2023    Procedure: CHOLECYSTECTOMY, LAPAROSCOPIC;  Surgeon: Kobe Mathis MD;  Location: [facility] OR     LASER HOLMIUM LITHOTRIPSY BLADDER N/A 10/15/2014    Procedure: LASER HOLMIUM LITHOTRIPSY BLADDER;  Surgeon: Sahil Taveras MD;  [facility]     LASER KTP GREEN LIGHT PHOTOSELECTIVE VAPORIZATION PROSTATE  01/23/2014    Procedure: LASER KTP GREEN LIGHT PHOTOSELECTIVE VAPORIZATION  PROSTATE;  Greenlight Photovaporization Of Prostate  ;  Surgeon: Sahil Taveras MD;  [facility]     OTHER SURGICAL HISTORY  10/04/2016    REPAIR ANEURYSM ASCENDING AORTA     OTHER SURGICAL HISTORY Right 09/23/2019    IRRIGATION AND DEBRIDEMENT UPPER EXTREMITYshoulder     RELEASE TRIGGER FINGER Right 03/31/2017    Procedure: RELEASE TRIGGER FINGER;  Surgeon: Juan Carlos Blunt MD;  [facility]     REMOVE ANTIBIOTIC CEMENT BEADS / SPACER SHOULDER Left 05/08/2020    Procedure: Left shoulder removal of spacer;  Surgeon: Analilia Aceves MD;  [facility]     REMOVE ANTIBIOTIC CEMENT BEADS / SPACER SHOULDER Right 08/18/2020    Procedure: Removal of right shoulder antibiotic spacer;  Surgeon: Analilia Aceves MD;  [facility]     REMOVE HARDWARE ARTHROPLASTY SHOULDER. I&D, PLACE ANTIBIOTIC CEMENT BE Left 11/15/2019    Procedure: Explantation of left total shoulder arthroplasty, irrigation and debridement, and placement of antibiotic spacer;  Surgeon: Analilia Aceves MD;  [facility]     REPAIR ANEURYSM ASCENDING AORTA N/A 10/04/2016    Procedure: REPAIR ANEURYSM ASCENDING AORTA;  Surgeon: Mckenzie Townsend MD;  [facility]     REVERSE ARTHROPLASTY SHOULDER Right 08/18/2020    Procedure: and conversion to reverse total shoulder arthroplasty;  Surgeon: Analilia Aceves MD;  [facility]     SURGICAL EXPOSURE, LAPAROSCOPIC, W/WOUND CLOSURE, FOR ERCP, BY GENERAL SURGERY N/A 11/03/2023    Procedure: SURGICAL EXPOSURE FOR ENDOSCOPIC RETROGRADE CHOLANGIOPANCREATOGRAPHY;  Surgeon: Kobe Mathis MD;  Location: [facility] OR     TURP         ALLERGIES     Allergies   Allergen Reactions     Ciprofloxacin      History of aortic aneurysms     Gabapentin Dizziness     Tape [Adhesive Tape] Blisters     Blistering - please use paper tape       FAMILY HISTORY  Family History   Problem Relation Age of Onset     Anxiety Disorder Mother      Hypertension  Mother      Osteoporosis Mother      Obesity Mother      Hyperlipidemia Mother      Low Back Problems Mother      Macular Degeneration Mother      Cataracts Mother      Other - See Comments Mother         low back problems     Cardiovascular Father         aortic aneurysm     Arrhythmia Father      Nephrolithiasis Father      Sleep Apnea Father      Anxiety Disorder Father      Depression Father      Hypertension Father      Obesity Father      Hyperlipidemia Father      Coronary Artery Disease Father      Low Back Problems Father      Spine Problems Father      Other - See Comments Father         low back problems, spine problems     Anxiety Disorder Sister      Hypertension Sister      Osteoporosis Sister      Obesity Sister      Hyperlipidemia Sister      Low Back Problems Sister      Spine Problems Sister      Anxiety Disorder Sister      Depression Sister      Hypertension Sister      Osteoporosis Sister      Obesity Sister      Hyperlipidemia Sister      Low Back Problems Sister      Pulmonary Embolism Sister      Anxiety Disorder Sister      Hyperlipidemia Sister      Hypertension Sister      Obesity Sister      Other - See Comments Sister         low back problems, spine problems     Osteoporosis Sister      Anxiety Disorder Sister      Depression Sister      Hyperlipidemia Sister      Hypertension Sister      Other - See Comments Sister         low back problems     Obesity Sister      Osteoporosis Sister      Arthritis Other      Gastrointestinal Disease Other      Melanoma No family hx of      Skin Cancer No family hx of      Glaucoma No family hx of      Anesthesia Reaction No family hx of          SOCIAL HISTORY  Social History     Socioeconomic History     Marital status: Single     Spouse name: Not on file     Number of children: Not on file     Years of education: Not on file     Highest education level: Not on file   Occupational History     Occupation: Disabled   Tobacco Use     Smoking status:  Former     Current packs/day: 0.00     Average packs/day: 0.5 packs/day for 6.6 years (3.3 ttl pk-yrs)     Types: Cigarettes     Start date: 1977     Quit date: 1983     Years since quittin.6     Passive exposure: Never (per pt)     Smokeless tobacco: Never   Substance and Sexual Activity     Alcohol use: No     Alcohol/week: 0.0 standard drinks of alcohol     Drug use: No     Sexual activity: Not Currently     Partners: Female     Birth control/protection: Abstinence   Other Topics Concern     Parent/sibling w/ CABG, MI or angioplasty before 65F 55M? Not Asked   Social History Narrative    Live independently[place] one sister, Supriya in [place], [place].  Does live with 2 dogs.      Social Drivers of Health     Financial Resource Strain: Low Risk  (2024)    Financial Resource Strain      Within the past 12 months, have you or your family members you live with been unable to get utilities (heat, electricity) when it was really needed?: No   Food Insecurity: Low Risk  (2024)    Food Insecurity      Within the past 12 months, did you worry that your food would run out before you got money to buy more?: No      Within the past 12 months, did the food you bought just not last and you didn t have money to get more?: No   Transportation Needs: Low Risk  (2024)    Transportation Needs      Within the past 12 months, has lack of transportation kept you from medical appointments, getting your medicines, non-medical meetings or appointments, work, or from getting things that you need?: No   Physical Activity: Not on file   Stress: Not on file   Social Connections: Socially Isolated (2021)    Social Connection and Isolation Panel [NHANES]      Frequency of Communication with Friends and Family: Once a week      Frequency of Social Gatherings with Friends and Family: Never      Attends Congregation Services: Never      Active Member of Clubs or Organizations: No      Attends  Club or Organization Meetings: Never      Marital Status: Never    Interpersonal Safety: Low Risk  (12/26/2024)    Interpersonal Safety      Do you feel physically and emotionally safe where you currently live?: Yes      Within the past 12 months, have you been hit, slapped, kicked or otherwise physically hurt by someone?: No      Within the past 12 months, have you been humiliated or emotionally abused in other ways by your partner or ex-partner?: No   Housing Stability: Low Risk  (9/13/2024)    Housing Stability      Do you have housing? : Yes      Are you worried about losing your housing?: No       ROS:   Constitutional: No fever, chills, or sweats. No weight gain/loss   ENT: No visual disturbance, ear ache, epistaxis, sore throat  Allergies/Immunologic: Negative  Respiratory: No cough, hemoptysia  Cardiovascular: As per HPI  GI: No nausea, vomiting, hematemesis, melena, or hematochezia  : No urinary frequency, dysuria, or hematuria  Integument: Negative  Psychiatric: Negative  Neuro: Negative  Endocrinology: Negative   Musculoskeletal: Negative  Vascular: No walking impairment, claudication, ischemic rest pain or nonhealing wounds    EXAM:  /76 (BP Location: Right arm, Patient Position: Sitting)   Pulse 57   Resp 16   Wt 108 kg (238 lb)   SpO2 99%   BMI 34.15 kg/m    In general, the patient is a pleasant male in no apparent distress.    HEENT: NC/AT.  PERRLA.  EOMI.  Sclerae white, not injected.    Neck: No adenopathy.  No thyromegaly. Carotids +2/2 bilaterally without bruits.  No jugular venous distension.   Heart: RRR. Normal S1, S2 splits physiologically. No murmur, rub, click, or gallop.   Lungs: CTA.  No ronchi, wheezes, rales.  No dullness to percussion.   Abdomen: Soft, nontender, nondistended. No organomegaly. No AAA.  No bruits.   Extremities: No clubbing, cyanosis, or edema.  No wounds.  Vascular: No bruits are noted.    Labs:  LIPID RESULTS:  Lab Results   Component Value Date     CHOL 119 03/01/2024    CHOL 179 03/22/2018    HDL 54 03/01/2024    HDL 39 (L) 03/22/2018    LDL 48 03/01/2024    LDL 89 03/22/2018    TRIG 87 03/01/2024    TRIG 255 (H) 03/22/2018    CHOLHDLRATIO 3.7 06/08/2015    NHDL 65 03/01/2024    NHDL 140 (H) 03/22/2018       LIVER ENZYME RESULTS:  Lab Results   Component Value Date    AST 34 03/01/2024    AST 29 05/06/2020    ALT 30 03/01/2024    ALT 42 05/06/2020       CBC RESULTS:  Lab Results   Component Value Date    WBC 8.1 12/12/2024    WBC 6.4 08/05/2020    RBC 5.31 12/12/2024    RBC 4.27 (L) 08/05/2020    HGB 14.4 12/27/2024    HGB 9.9 (L) 08/20/2020    HCT 45.6 12/12/2024    HCT 31.2 (L) 08/20/2020    MCV 86 12/12/2024    MCV 74.3 (L) 08/20/2020    MCH 29.0 12/12/2024    MCH 23.6 (L) 08/20/2020    MCHC 33.8 12/12/2024    MCHC 31.7 (L) 08/20/2020    RDW 12.2 12/12/2024    RDW 17.0 (H) 08/05/2020     12/12/2024     08/05/2020       BMP RESULTS:  Lab Results   Component Value Date     12/12/2024     08/05/2020    POTASSIUM 4.5 12/12/2024    POTASSIUM 4.0 07/29/2022    POTASSIUM 4.3 08/18/2020    CHLORIDE 102 12/12/2024    CHLORIDE 105 07/29/2022    CHLORIDE 107 08/05/2020    CO2 27 12/12/2024    CO2 26 07/29/2022    CO2 26 08/05/2020    ANIONGAP 11 12/12/2024    ANIONGAP 6 07/29/2022    ANIONGAP 4 08/05/2020    GLC 96 12/28/2024    GLC 93 07/29/2022     (H) 08/18/2020    BUN 16.2 12/12/2024    BUN 17 07/29/2022    BUN 20 08/05/2020    CR 1.10 12/12/2024    CR 1.24 08/05/2020    GFRESTIMATED 75 12/12/2024    GFRESTIMATED 63 08/05/2020    GFRESTBLACK 73 08/05/2020    NIA 9.5 12/12/2024    NIA 8.8 08/05/2020        A1C RESULTS:  Lab Results   Component Value Date    A1C 5.6 01/28/2025    A1C 5.3 10/05/2016   Thank you for allowing me to participate in the care of your patient.      Sincerely,     Enriqueta Zhou MD     [facility]  cc:   Enriqueta Zhou MD  [address] [place] [address]  [place] [address]

## 2025-04-10 NOTE — PROGRESS NOTES
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     Prior history:      Patient was referred by Atul Martínez and [place] to establish care into congenital vascular cardiology clinic. Patient reports his nephew at 28 years old has a known bicuspid aortic valve without aortopathy.  He has been seen in [place] and followed. His father is 91 years old and has a known aneurysm of 4.9 cm in bicuspid valve. Sister is a physician in [place], owns practice in [place], retired now. Patient reports no family history of sudden death or known dissection. No history of strokes.      MRA chest in April 2021 demonstrated graft stability. Echocardiogram showed normal BAV function, with only mild aortic regurgitation. His CMR demonstrated normalization of his LVEF suggestive that prior reduction in function was due to stress CMY.      Today he is doing well. BP is well controlled. We reviewed his studies which were all good. MRA and carotid ultrasound personally reviewed.     Suspect pain is driving blood pressure elevation. Its been labile, especially early in am. Takes  meds at 3-4 am and then again  12 hours later. Checks BP bid.    Today's visit mostly focused on Kobe discussing his pain management. His pain clinic is trying to decrease his opiates.      ASSESSMENT/PLAN:      This is a 65-year-old male with hypertension, hyperlipidemia, right left fusion of his aortic valve with bicuspid aortopathy. No known coarctation.  No other peripheral aneurysmal disease.  He is status post valve sparing aortic repair with Gelweave interposition graft. He has  mild AI, and no aortic stenosis.     Blood pressure is intermittently labile thus we discussed his medication regimen. Will continue him on his current regimen (couldn't increase coreg in the past) and add hydralazine as needed for when he is going through his opiate taper. If he requires a lot of hydralazine we can rx it more permanently for now.    For aortic disease, surveillance echo in one year. Intermittent MRA chest for graft evaluation and arch sizing    Follow up one year     The longitudinal plan of care for the diagnosis(es)/condition(s) as documented were addressed during this visit. Due to the added complexity in care, I will continue to support Kobe in the subsequent management and with ongoing continuity of care.     Enriqueta Zhou MD MSc  [facility]      Labs and studies personally reviewed.       PAST MEDICAL HISTORY  Past Medical History:   Diagnosis Date    Acute systolic heart failure (H) 01/28/2020    Added automatically from request for surgery [id number]    NEMESIO (acute kidney injury) 02/16/2020    Anxiety     Ascending aortic aneurysm     Atrial fibrillation (H) [I48.91] 10/10/2016    Bicuspid aortic valve     BPPV (benign paroxysmal positional vertigo) 07/11/2014    Choledocholithiasis     Chronic narcotic use     Chronic neck pain     Chronic osteoarthritis     CKD (chronic kidney disease) stage 3, GFR 30-59 ml/min (H)     Degenerative joint disease     Depression     Dysthymic disorder     Hx of type 2 diabetes mellitus 03/04/2021    Hyperlipidemia 04/10/2012    Hypertension     Iron deficiency anemia secondary to blood loss (chronic) 08/25/2020    MSSA bacteremia 10/13/2019    Multiple stiff joints     Neck injuries     NSTEMI (non-ST elevated myocardial infarction) (H) 01/29/2020    Obesity 02/09/2015    Pain in shoulder     Plantar fasciitis     Pre-diabetes     S/P reverse total shoulder arthroplasty, left 07/07/2020    S/P reverse total shoulder arthroplasty, right 08/18/2020     Septic joint of left shoulder region (H) 11/13/2019    Septic joint of right shoulder region (H) 10/13/2019    s/p washout    Skin picking habit     Sleep apnea     does not use cpap    Spinal stenosis of lumbar region with neurogenic claudication     Status post total shoulder arthroplasty, left 03/03/2020    Added automatically from request for surgery [id number]       CURRENT MEDICATIONS  Current Outpatient Medications   Medication Sig Dispense Refill    amLODIPine-benazepril (LOTREL) 5-20 MG capsule Take 1 capsule by mouth 2 times daily. 180 capsule 3    aspirin (ASPIRIN LOW DOSE) 81 MG EC tablet Take 1 tablet (81 mg) by mouth daily. 90 tablet 3    atorvastatin (LIPITOR) 40 MG tablet Take 1 tablet (40 mg) by mouth daily. 90 tablet 3    buPROPion (WELLBUTRIN SR) 200 MG 12 hr tablet TAKE 1 TABLET BY MOUTH 2 TIMES DAILY 180 tablet 0    carvedilol (COREG) 25 MG tablet Take 2 tablets (50 mg) by mouth 2 times daily (with meals). 120 tablet 0    clonazePAM (KLONOPIN) 0.5 MG tablet Take 1 tablet (0.5 mg) by mouth 3 times daily as needed for anxiety. 90 tablet 3    cyclobenzaprine (FLEXERIL) 10 MG tablet Take 1 tablet (10 mg) by mouth 3 times daily as needed for muscle spasms 90 tablet 3    docusate sodium (COLACE) 100 MG capsule Take 1 capsule (100 mg) by mouth every morning AND 2 capsules (200 mg) every evening.      escitalopram (LEXAPRO) 20 MG tablet Take 1 tablet (20 mg) by mouth daily. 90 tablet 3    fentaNYL (DURAGESIC) 12 mcg/hr 72 hr patch Place 1 patch over 72 hours onto the skin every 48 hours. remove old patch.      naproxen (NAPROSYN) 500 MG tablet Take 1 tablet (500 mg) by mouth 2 times daily (with meals). 180 tablet 1    oxyCODONE IR (ROXICODONE) 10 MG tablet Take 10 mg by mouth every 4 hours as needed for breakthrough pain (max 6 daily).      pantoprazole (PROTONIX) 40 MG EC tablet Take 1 tablet (40 mg) by mouth daily. 90 tablet 3    pregabalin (LYRICA) 100 MG capsule Take 100 mg by mouth 3 times daily.       propranolol (INDERAL) 10 MG tablet Take 1 tablet (10 mg) by mouth 3 times daily as needed (panic attack) 90 tablet 3    sennosides (SENOKOT) 8.6 MG tablet Take 2 tablets by mouth every morning AND 3 tablets every evening.      sucralfate (CARAFATE) 1 GM tablet Take 1 tablet (1 g) by mouth 2 times daily as needed (Reflux) 180 tablet 3    tacrolimus (PROTOPIC) 0.1 % external ointment Apply topically as needed (rash on arms) Apply to affected areas on body. 60 g 1    triamcinolone (KENALOG) 0.1 % external ointment Apply topically 2 times daily To affected areas of rash. Please avoid application to the face, groin or armpits as the medication is too strong for these areas. 454 g 0    naloxone (NARCAN) 4 MG/0.1ML nasal spray Spray 1 spray (4 mg) into one nostril alternating nostrils as needed for opioid reversal. every 2-3 minutes until assistance arrives 2 each 0       PAST SURGICAL HISTORY:  Past Surgical History:   Procedure Laterality Date    ARTHROPLASTY SHOULDER  04/15/2014    Procedure: Left Total Shoulder Arthroplasty ;  Surgeon: Analilia Aceves MD;  [facility]    ARTHROPLASTY SHOULDER Right 08/26/2014    Procedure: ARTHROPLASTY SHOULDER;  Surgeon: Analilia Aceves MD;  [facility]    ARTHROSCOPY SHOULDER WITH BIOPSY(IES) Left 01/28/2020    Procedure: Left shoulder arthroscopy and biopsy for culture;  Surgeon: Analilia Aceves MD;  [facility]    BYPASS GASTRIC TAVO-EN-Y, LIVER BIOPSY, COMBINED  08/08/2005    COLONOSCOPY  06/30/2014    Procedure: COMBINED COLONOSCOPY, SINGLE BIOPSY/POLYPECTOMY BY BIOPSY;  Surgeon: Chester Patton MD;  Location:  GI    COLONOSCOPY N/A 2/15/2024    Procedure: COLONOSCOPY, WITH POLYPECTOMY;  Surgeon: Power Duran MD;  [facility]    CV CORONARY ANGIOGRAM  01/30/2020    Procedure: CV CORONARY ANGIOGRAM;  Surgeon: Néstor Walls MD;  Location:  [facility]ARD[facility] CATH LAB    CYSTOSCOPY, BLADDER NECK CUTS,  COMBINED N/A 07/18/2016    Procedure: COMBINED CYSTOSCOPY, BLADDER NECK CUTS;  Surgeon: Ritu Leslie MD;  [facility]    DECOMPRESSION LUMBAR ONE LEVEL N/A 12/26/2024    Procedure: Lumbar 4 to 5 decompression;  Surgeon: Jonathan Prajapati MD;  [facility]    ENDOSCOPIC RETROGRADE CHOLANGIOPANCREATOGRAM N/A 11/03/2023    Procedure: ENDOSCOPIC RETROGRADE CHOLANGIOPANCREATOGRAPHY, BILIARY SPHINCTEROTOMY, STONE REMOVAL;  Surgeon: Juarez Sevilla MD;  Location: [facility] OR    ESOPHAGOSCOPY, GASTROSCOPY, DUODENOSCOPY (EGD), COMBINED  06/30/2014    Procedure: COMBINED ESOPHAGOSCOPY, GASTROSCOPY, DUODENOSCOPY (EGD), BIOPSY SINGLE OR MULTIPLE;  Surgeon: Chester Patton MD;  [facility]    EXCISE MASS FINGER  06/14/2011    Procedure:EXCISE MASS FINGER; Middle Flexor Cyst; Surgeon:SHAYY RUSSELL; [facility]    IR FINE NEEDLE ASPIRATION W ULTRASOUND  11/13/2019    IR PICC PLACEMENT > 5 YRS OF AGE  11/19/2019    LAMINOPLASTY CERVICAL POSTERIOR THREE+ LEVELS N/A 9/11/2024    Procedure: Cervical 3 and 7 dome laminectomy and 4-6 laminoplasty with medtronic plates;  Surgeon: Jonathan Prajapati MD;  [facility]    LAPAROSCOPIC CHOLECYSTECTOMY N/A 11/03/2023    Procedure: CHOLECYSTECTOMY, LAPAROSCOPIC;  Surgeon: Shayy Mathis MD;  Location: [facility] OR    LASER HOLMIUM LITHOTRIPSY BLADDER N/A 10/15/2014    Procedure: LASER HOLMIUM LITHOTRIPSY BLADDER;  Surgeon: Sahil Taveras MD;  [facility]    LASER KTP GREEN LIGHT PHOTOSELECTIVE VAPORIZATION PROSTATE  01/23/2014    Procedure: LASER KTP GREEN LIGHT PHOTOSELECTIVE VAPORIZATION PROSTATE;  Greenlight Photovaporization Of Prostate  ;  Surgeon: Sahil Taveras MD;  [facility]    OTHER SURGICAL HISTORY  10/04/2016    REPAIR ANEURYSM ASCENDING AORTA    OTHER SURGICAL HISTORY Right 09/23/2019    IRRIGATION AND DEBRIDEMENT UPPER EXTREMITYshoulder    RELEASE TRIGGER FINGER Right  03/31/2017    Procedure: RELEASE TRIGGER FINGER;  Surgeon: Juan Carlos Blunt MD;  [facility]    REMOVE ANTIBIOTIC CEMENT BEADS / SPACER SHOULDER Left 05/08/2020    Procedure: Left shoulder removal of spacer;  Surgeon: Analilia Aceves MD;  [facility]    REMOVE ANTIBIOTIC CEMENT BEADS / SPACER SHOULDER Right 08/18/2020    Procedure: Removal of right shoulder antibiotic spacer;  Surgeon: Analilia Aceves MD;  [facility]    REMOVE HARDWARE ARTHROPLASTY SHOULDER. I&D, PLACE ANTIBIOTIC CEMENT BE Left 11/15/2019    Procedure: Explantation of left total shoulder arthroplasty, irrigation and debridement, and placement of antibiotic spacer;  Surgeon: Analilia Aceves MD;  [facility]    REPAIR ANEURYSM ASCENDING AORTA N/A 10/04/2016    Procedure: REPAIR ANEURYSM ASCENDING AORTA;  Surgeon: Mckenzie Townsend MD;  [facility]    REVERSE ARTHROPLASTY SHOULDER Right 08/18/2020    Procedure: and conversion to reverse total shoulder arthroplasty;  Surgeon: Analilia Aceves MD;  [facility]    SURGICAL EXPOSURE, LAPAROSCOPIC, W/WOUND CLOSURE, FOR ERCP, BY GENERAL SURGERY N/A 11/03/2023    Procedure: SURGICAL EXPOSURE FOR ENDOSCOPIC RETROGRADE CHOLANGIOPANCREATOGRAPHY;  Surgeon: Kobe Mathis MD;  Location: [facility] OR    [facility]         ALLERGIES     Allergies   Allergen Reactions    Ciprofloxacin      History of aortic aneurysms    Gabapentin Dizziness    Tape [Adhesive Tape] Blisters     Blistering - please use paper tape       FAMILY HISTORY  Family History   Problem Relation Age of Onset    Anxiety Disorder Mother     Hypertension Mother     Osteoporosis Mother     Obesity Mother     Hyperlipidemia Mother     Low Back Problems Mother     Macular Degeneration Mother     Cataracts Mother     Other - See Comments Mother         low back problems    Cardiovascular Father         aortic aneurysm    Arrhythmia Father     Nephrolithiasis Father     Sleep Apnea  Father     Anxiety Disorder Father     Depression Father     Hypertension Father     Obesity Father     Hyperlipidemia Father     Coronary Artery Disease Father     Low Back Problems Father     Spine Problems Father     Other - See Comments Father         low back problems, spine problems    Anxiety Disorder Sister     Hypertension Sister     Osteoporosis Sister     Obesity Sister     Hyperlipidemia Sister     Low Back Problems Sister     Spine Problems Sister     Anxiety Disorder Sister     Depression Sister     Hypertension Sister     Osteoporosis Sister     Obesity Sister     Hyperlipidemia Sister     Low Back Problems Sister     Pulmonary Embolism Sister     Anxiety Disorder Sister     Hyperlipidemia Sister     Hypertension Sister     Obesity Sister     Other - See Comments Sister         low back problems, spine problems    Osteoporosis Sister     Anxiety Disorder Sister     Depression Sister     Hyperlipidemia Sister     Hypertension Sister     Other - See Comments Sister         low back problems    Obesity Sister     Osteoporosis Sister     Arthritis Other     Gastrointestinal Disease Other     Melanoma No family hx of     Skin Cancer No family hx of     Glaucoma No family hx of     Anesthesia Reaction No family hx of          SOCIAL HISTORY  Social History     Socioeconomic History    Marital status: Single     Spouse name: Not on file    Number of children: Not on file    Years of education: Not on file    Highest education level: Not on file   Occupational History    Occupation: Disabled   Tobacco Use    Smoking status: Former     Current packs/day: 0.00     Average packs/day: 0.5 packs/day for 6.6 years (3.3 ttl pk-yrs)     Types: Cigarettes     Start date: 1977     Quit date: 1983     Years since quittin.6     Passive exposure: Never (per pt)    Smokeless tobacco: Never   Substance and Sexual Activity    Alcohol use: No     Alcohol/week: 0.0 standard drinks of alcohol    Drug use: No     Sexual activity: Not Currently     Partners: Female     Birth control/protection: Abstinence   Other Topics Concern    Parent/sibling w/ CABG, MI or angioplasty before 65F 55M? Not Asked   Social History Narrative    Live independently, [place], one sister, Supriya in [place], [place].  Does live with 2 dogs.      Social Drivers of Health     Financial Resource Strain: Low Risk  (9/13/2024)    Financial Resource Strain     Within the past 12 months, have you or your family members you live with been unable to get utilities (heat, electricity) when it was really needed?: No   Food Insecurity: Low Risk  (9/13/2024)    Food Insecurity     Within the past 12 months, did you worry that your food would run out before you got money to buy more?: No     Within the past 12 months, did the food you bought just not last and you didn t have money to get more?: No   Transportation Needs: Low Risk  (9/13/2024)    Transportation Needs     Within the past 12 months, has lack of transportation kept you from medical appointments, getting your medicines, non-medical meetings or appointments, work, or from getting things that you need?: No   Physical Activity: Not on file   Stress: Not on file   Social Connections: Socially Isolated (8/31/2021)    Social Connection and Isolation Panel [NHANES]     Frequency of Communication with Friends and Family: Once a week     Frequency of Social Gatherings with Friends and Family: Never     Attends Tenriism Services: Never     Active Member of Clubs or Organizations: No     Attends Club or Organization Meetings: Never     Marital Status: Never    Interpersonal Safety: Low Risk  (12/26/2024)    Interpersonal Safety     Do you feel physically and emotionally safe where you currently live?: Yes     Within the past 12 months, have you been hit, slapped, kicked or otherwise physically hurt by someone?: No     Within the past 12 months, have you been humiliated or emotionally  abused in other ways by your partner or ex-partner?: No   Housing Stability: Low Risk  (9/13/2024)    Housing Stability     Do you have housing? : Yes     Are you worried about losing your housing?: No       ROS:   Constitutional: No fever, chills, or sweats. No weight gain/loss   ENT: No visual disturbance, ear ache, epistaxis, sore throat  Allergies/Immunologic: Negative  Respiratory: No cough, hemoptysia  Cardiovascular: As per HPI  GI: No nausea, vomiting, hematemesis, melena, or hematochezia  : No urinary frequency, dysuria, or hematuria  Integument: Negative  Psychiatric: Negative  Neuro: Negative  Endocrinology: Negative   Musculoskeletal: Negative  Vascular: No walking impairment, claudication, ischemic rest pain or nonhealing wounds    EXAM:  /76 (BP Location: Right arm, Patient Position: Sitting)   Pulse 57   Resp 16   Wt 108 kg (238 lb)   SpO2 99%   BMI 34.15 kg/m    In general, the patient is a pleasant male in no apparent distress.    HEENT: NC/AT.  PERRLA.  EOMI.  Sclerae white, not injected.    Neck: No adenopathy.  No thyromegaly. Carotids +2/2 bilaterally without bruits.  No jugular venous distension.   Heart: RRR. Normal S1, S2 splits physiologically. No murmur, rub, click, or gallop.   Lungs: CTA.  No ronchi, wheezes, rales.  No dullness to percussion.   Abdomen: Soft, nontender, nondistended. No organomegaly. No AAA.  No bruits.   Extremities: No clubbing, cyanosis, or edema.  No wounds.  Vascular: No bruits are noted.    Labs:  LIPID RESULTS:  Lab Results   Component Value Date    CHOL 119 03/01/2024    CHOL 179 03/22/2018    HDL 54 03/01/2024    HDL 39 (L) 03/22/2018    LDL 48 03/01/2024    LDL 89 03/22/2018    TRIG 87 03/01/2024    TRIG 255 (H) 03/22/2018    CHOLHDLRATIO 3.7 06/08/2015    NHDL 65 03/01/2024    NHDL 140 (H) 03/22/2018       LIVER ENZYME RESULTS:  Lab Results   Component Value Date    AST 34 03/01/2024    AST 29 05/06/2020    ALT 30 03/01/2024    ALT 42 05/06/2020        CBC RESULTS:  Lab Results   Component Value Date    WBC 8.1 12/12/2024    WBC 6.4 08/05/2020    RBC 5.31 12/12/2024    RBC 4.27 (L) 08/05/2020    HGB 14.4 12/27/2024    HGB 9.9 (L) 08/20/2020    HCT 45.6 12/12/2024    HCT 31.2 (L) 08/20/2020    MCV 86 12/12/2024    MCV 74.3 (L) 08/20/2020    MCH 29.0 12/12/2024    MCH 23.6 (L) 08/20/2020    MCHC 33.8 12/12/2024    MCHC 31.7 (L) 08/20/2020    RDW 12.2 12/12/2024    RDW 17.0 (H) 08/05/2020     12/12/2024     08/05/2020       BMP RESULTS:  Lab Results   Component Value Date     12/12/2024     08/05/2020    POTASSIUM 4.5 12/12/2024    POTASSIUM 4.0 07/29/2022    POTASSIUM 4.3 08/18/2020    CHLORIDE 102 12/12/2024    CHLORIDE 105 07/29/2022    CHLORIDE 107 08/05/2020    CO2 27 12/12/2024    CO2 26 07/29/2022    CO2 26 08/05/2020    ANIONGAP 11 12/12/2024    ANIONGAP 6 07/29/2022    ANIONGAP 4 08/05/2020    GLC 96 12/28/2024    GLC 93 07/29/2022     (H) 08/18/2020    BUN 16.2 12/12/2024    BUN 17 07/29/2022    BUN 20 08/05/2020    CR 1.10 12/12/2024    CR 1.24 08/05/2020    GFRESTIMATED 75 12/12/2024    GFRESTIMATED 63 08/05/2020    GFRESTBLACK 73 08/05/2020    NIA 9.5 12/12/2024    NIA 8.8 08/05/2020        A1C RESULTS:  Lab Results   Component Value Date    A1C 5.6 01/28/2025    A1C 5.3 10/05/2016

## 2025-04-28 PROBLEM — G89.4 CHRONIC PAIN SYNDROME: Status: ACTIVE | Noted: 2025-04-28

## 2025-04-28 RX ORDER — OXYCODONE HYDROCHLORIDE 20 MG/1
20 TABLET ORAL 4 TIMES DAILY
COMMUNITY
Start: 2025-04-01

## 2025-04-28 NOTE — PROGRESS NOTES
"  Assessment & Plan     (G89.4) Chronic pain syndrome  (primary encounter diagnosis)  Comment: seeing [facility]  Plan: titrating down on fentanyl first, then will titrate down on oxycodone   Support patient with these activities    (Z98.890) Status post coronary angiogram  Comment: asymptomatic, seeing cardiology  Plan: no changes working on better BP control with new hydralazine    (E78.2) Mixed hyperlipidemia  Comment: checking  Plan: Lipid Profile            (I10) Essential hypertension  Comment: uncontrolled, new hydralazine to   Plan: will be monitoring    (Z71.89) ACP (advance care planning)  Comment: given the honoring choices form to complete  -documented patient's wishes in ACP area  Plan: patient confirms DNR/DNI and hoping to even live until possibly 90s but wants to live independent and mobile   Confirmed sister in [place] would be his decision making choice but as the youngest in family he thinks he'll outlive siblings.      The longitudinal plan of care for the diagnosis(es)/condition(s) as documented were addressed during this visit. Due to the added complexity in care, I will continue to support Kobe in the subsequent management and with ongoing continuity of care.  BMI  Estimated body mass index is 32.64 kg/m  as calculated from the following:    Height as of this encounter: 1.803 m (5' 11\").    Weight as of this encounter: 106.1 kg (234 lb).             Subjective   Kobe is a 65 year old, presenting for the following health issues:  Follow Up (Pain )    HPI        Chronic/Recurring CERVICAL and Back Pain Follow Up    Plan to reduce fentanyl from 25 now to 12.5 patch and coping with bumping up oxycodone to then taper off that after tapering down fentanyl    Where is your back pain located? (Select all that apply) LOW  BACK pain is improved, feels toe neuropathy, really struggles with the cervical pain more than lumbar  How would you describe your back pain?  sharp and " "shooting and sense of constant tension/tightness  Where does your back pain spread? Feels like shaving and neck movement can cause more pain into the face  Since your last clinic visit for back pain, how has your pain changed? Some pains since surgery are better but feels other things  Since your last visit, have you tried any new treatment? Yes -  increasing gabapentin and interested in lyrica  How many days per week do you miss taking your medication? Denies missing, but has tried to not be as regimented with    Hyperlipidemia Follow-Up    Are you regularly taking any medication or supplement to lower your cholesterol?   Yes- no issues  Are you having muscle aches or other side effects that you think could be caused by your cholesterol lowering medication?  No    HTN:    This  systolic before meds, took his meds and then dropped to 150s and stays  -has just been prescribed hydralazine but hasn't started that yet.    Some insomnia: hard to fall asleep and frequent waking due to neck pain/headache        Objective    BP (!) 150/75   Pulse 57   Resp 16   Ht 1.803 m (5' 11\")   Wt 106.1 kg (234 lb)   SpO2 95%   BMI 32.64 kg/m    Body mass index is 32.64 kg/m .  Physical Exam   GENERAL: alert and no distress  NECK: posterior cervical vertical scar well healed  RESP: lungs clear to auscultation - no rales, rhonchi or wheezes  CV: regular rate and rhythm, normal S1 S2, no S3 or S4, no murmur, click or rub, no peripheral edema  MS: no gross musculoskeletal defects noted, no edema            Signed Electronically by: Demi Hardin MD    "

## 2025-04-29 ENCOUNTER — OFFICE VISIT (OUTPATIENT)
Dept: FAMILY MEDICINE | Facility: CLINIC | Age: 65
End: 2025-04-29
Payer: COMMERCIAL

## 2025-04-29 VITALS
SYSTOLIC BLOOD PRESSURE: 139 MMHG | HEIGHT: 71 IN | BODY MASS INDEX: 32.76 KG/M2 | RESPIRATION RATE: 16 BRPM | HEART RATE: 57 BPM | DIASTOLIC BLOOD PRESSURE: 81 MMHG | WEIGHT: 234 LBS | OXYGEN SATURATION: 95 %

## 2025-04-29 DIAGNOSIS — E78.2 MIXED HYPERLIPIDEMIA: ICD-10-CM

## 2025-04-29 DIAGNOSIS — Z98.890 STATUS POST CORONARY ANGIOGRAM: ICD-10-CM

## 2025-04-29 DIAGNOSIS — I10 ESSENTIAL HYPERTENSION: ICD-10-CM

## 2025-04-29 DIAGNOSIS — G89.4 CHRONIC PAIN SYNDROME: Primary | ICD-10-CM

## 2025-04-29 DIAGNOSIS — Z71.89 ACP (ADVANCE CARE PLANNING): ICD-10-CM

## 2025-04-29 LAB
CHOLEST SERPL-MCNC: 138 MG/DL
FASTING STATUS PATIENT QL REPORTED: NORMAL
HDLC SERPL-MCNC: 55 MG/DL
LDLC SERPL CALC-MCNC: 66 MG/DL
NONHDLC SERPL-MCNC: 83 MG/DL
TRIGL SERPL-MCNC: 87 MG/DL

## 2025-05-01 DIAGNOSIS — F34.1 DYSTHYMIC DISORDER: ICD-10-CM

## 2025-05-01 RX ORDER — BUPROPION HYDROCHLORIDE 200 MG/1
200 TABLET, EXTENDED RELEASE ORAL
Qty: 180 TABLET | Refills: 0 | Status: SHIPPED | OUTPATIENT
Start: 2025-05-01

## 2025-05-06 ENCOUNTER — TRANSFERRED RECORDS (OUTPATIENT)
Dept: HEALTH INFORMATION MANAGEMENT | Facility: CLINIC | Age: 65
End: 2025-05-06
Payer: COMMERCIAL

## 2025-05-27 DIAGNOSIS — I10 ESSENTIAL HYPERTENSION WITH GOAL BLOOD PRESSURE LESS THAN 130/85: ICD-10-CM

## 2025-05-27 RX ORDER — CARVEDILOL 25 MG/1
50 TABLET ORAL 2 TIMES DAILY WITH MEALS
Qty: 360 TABLET | Refills: 3 | Status: SHIPPED | OUTPATIENT
Start: 2025-05-27

## 2025-06-24 ENCOUNTER — OFFICE VISIT (OUTPATIENT)
Dept: FAMILY MEDICINE | Facility: CLINIC | Age: 65
End: 2025-06-24
Payer: COMMERCIAL

## 2025-06-24 VITALS
DIASTOLIC BLOOD PRESSURE: 76 MMHG | HEART RATE: 60 BPM | TEMPERATURE: 98 F | OXYGEN SATURATION: 93 % | SYSTOLIC BLOOD PRESSURE: 130 MMHG | RESPIRATION RATE: 16 BRPM | WEIGHT: 241.08 LBS | HEIGHT: 71 IN | BODY MASS INDEX: 33.75 KG/M2

## 2025-06-24 DIAGNOSIS — I10 ESSENTIAL HYPERTENSION: ICD-10-CM

## 2025-06-24 DIAGNOSIS — G89.4 CHRONIC PAIN SYNDROME: ICD-10-CM

## 2025-06-24 DIAGNOSIS — N18.31 STAGE 3A CHRONIC KIDNEY DISEASE (H): Primary | ICD-10-CM

## 2025-06-24 DIAGNOSIS — Z00.00 WELLNESS EXAMINATION: Primary | ICD-10-CM

## 2025-06-24 DIAGNOSIS — Z79.899 MEDICATION MANAGEMENT: ICD-10-CM

## 2025-06-24 NOTE — PROGRESS NOTES
"  {PROVIDER CHARTING PREFERENCE:194208}    Xu Bullock is a 65 year old, presenting for the following health issues:  Hypertension (BP Follow Up) and Refill Request (Patient needs two medications refill please go over med list with patient )      6/24/2025     8:06 AM   Additional Questions   Roomed by Gissell Mendes   Accompanied by Self         6/24/2025    Information    services provided? No     HPI      {MA/LPN/RN Pre-Provider Visit Orders- hCG/UA/Strep (Optional):404452}  {SUPERLIST (Optional):073614}  {additonal problems for provider to add (Optional):653839}    {ROS Picklists (Optional):495974}      Objective    /76   Pulse 60   Temp 98  F (36.7  C) (Oral)   Resp 16   Ht 1.803 m (5' 11\")   Wt 109.4 kg (241 lb 1.3 oz)   SpO2 93%   BMI 33.62 kg/m    Body mass index is 33.62 kg/m .  Physical Exam   {Exam List (Optional):326594}    {Diagnostic Test Results (Optional):640020}        Signed Electronically by: Demi Hardin MD  {Email feedback regarding this note to [email or web address]   :654178}  "

## 2025-06-24 NOTE — PROGRESS NOTES
"  Assessment & Plan     (N18.31) Stage 3a chronic kidney disease (H)  (primary encounter diagnosis)  Comment: Patient willing to try medication  Plan: empagliflozin (JARDIANCE) 10 MG TABS tablet        See if covered by insurance, see if tolerates, reviewed SE of genital infections, yeast, urinary increase.  Patient will monitor and give feedback via [employer]    (Z79.899) Medication management  Comment: clarified pain medication per [facility]  Plan: updated med list    (I10) Essential hypertension  Comment: see above and continue current bp meds, adding on jardiance  Plan: empagliflozin (JARDIANCE) 10 MG TABS tablet            (G89.4) Chronic pain syndrome  Comment: on lyrica, trying some oxycodone taper  Plan: staying current           BMI  Estimated body mass index is 33.62 kg/m  as calculated from the following:    Height as of this encounter: 1.803 m (5' 11\").    Weight as of this encounter: 109.4 kg (241 lb 1.3 oz).             Xu Bullock is a 65 year old, presenting for the following health issues:  Hypertension (BP Follow Up) and Refill Request (Patient needs two medications refill please go over med list with patient )    HPI        Chronic/Recurring CERVICAL and Back Pain Follow Up    Sees [facility] and having some challenges tapering  Stopping shaving as that aggravates his neck pain and subsequent headaches    Using oxycodone 10 mg 8 tabs daily (with goal of down to 6/day some days can do this)  On fentanyl 12.5 and trying to change patch a little longer    Where is your back pain located? (Select all that apply) LOW  BACK pain is improved, feels toe neuropathy, really struggles with the cervical pain more than lumbar  Since your last visit, have you tried any new treatment? Yes -  lyrica now at max of 100 TID and helping  How many days per week do you miss taking your medication? Denies missing, but has tried to not be as regimented with      HTN:  Now one month on hydralazine    " "    Objective    /76   Pulse 60   Temp 98  F (36.7  C) (Oral)   Resp 16   Ht 1.803 m (5' 11\")   Wt 109.4 kg (241 lb 1.3 oz)   SpO2 93%   BMI 33.62 kg/m    Body mass index is 33.62 kg/m .  Physical Exam   GENERAL: alert and no distress  NECK: posterior cervical vertical scar well healed  RESP: lungs clear to auscultation - no rales, rhonchi or wheezes  CV: regular rate and rhythm, normal S1 S2, no S3 or S4, no murmur, click or rub, no peripheral edema  MS: no gross musculoskeletal defects noted, no edema,   SKIN;dry patch on lateral right shin, skin intact            Signed Electronically by: Demi Hardin MD    "

## 2025-07-02 ENCOUNTER — TRANSFERRED RECORDS (OUTPATIENT)
Dept: HEALTH INFORMATION MANAGEMENT | Facility: CLINIC | Age: 65
End: 2025-07-02
Payer: COMMERCIAL

## 2025-07-23 DIAGNOSIS — F43.9 STRESS: ICD-10-CM

## 2025-07-23 RX ORDER — CLONAZEPAM 0.5 MG/1
0.5 TABLET ORAL 3 TIMES DAILY PRN
Qty: 90 TABLET | Refills: 0 | Status: SHIPPED | OUTPATIENT
Start: 2025-07-23

## 2025-07-30 ENCOUNTER — MYC MEDICAL ADVICE (OUTPATIENT)
Dept: FAMILY MEDICINE | Facility: CLINIC | Age: 65
End: 2025-07-30
Payer: COMMERCIAL

## 2025-07-30 DIAGNOSIS — G89.4 CHRONIC PAIN SYNDROME: Primary | ICD-10-CM

## 2025-07-31 RX ORDER — TIZANIDINE 2 MG/1
2 TABLET ORAL
Qty: 30 TABLET | Refills: 0 | Status: SHIPPED | OUTPATIENT
Start: 2025-07-31

## 2025-08-06 ENCOUNTER — TRANSFERRED RECORDS (OUTPATIENT)
Dept: HEALTH INFORMATION MANAGEMENT | Facility: CLINIC | Age: 65
End: 2025-08-06
Payer: COMMERCIAL

## 2025-08-08 DIAGNOSIS — F34.1 DYSTHYMIC DISORDER: ICD-10-CM

## 2025-08-11 RX ORDER — BUPROPION HYDROCHLORIDE 200 MG/1
200 TABLET, EXTENDED RELEASE ORAL
Qty: 180 TABLET | Refills: 0 | Status: SHIPPED | OUTPATIENT
Start: 2025-08-11

## 2025-08-12 ENCOUNTER — OFFICE VISIT (OUTPATIENT)
Dept: ORTHOPEDICS | Facility: CLINIC | Age: 65
End: 2025-08-12
Attending: ORTHOPAEDIC SURGERY
Payer: COMMERCIAL

## 2025-08-12 ENCOUNTER — ANCILLARY PROCEDURE (OUTPATIENT)
Dept: GENERAL RADIOLOGY | Facility: CLINIC | Age: 65
End: 2025-08-12
Attending: ORTHOPAEDIC SURGERY
Payer: COMMERCIAL

## 2025-08-12 VITALS — BODY MASS INDEX: 33.88 KG/M2 | WEIGHT: 242 LBS | HEIGHT: 71 IN

## 2025-08-12 DIAGNOSIS — M54.50 LUMBAR PAIN: Primary | ICD-10-CM

## 2025-08-12 PROCEDURE — 99213 OFFICE O/P EST LOW 20 MIN: CPT | Performed by: ORTHOPAEDIC SURGERY

## 2025-08-12 PROCEDURE — 72110 X-RAY EXAM L-2 SPINE 4/>VWS: CPT | Performed by: STUDENT IN AN ORGANIZED HEALTH CARE EDUCATION/TRAINING PROGRAM

## 2025-08-16 ENCOUNTER — HEALTH MAINTENANCE LETTER (OUTPATIENT)
Age: 65
End: 2025-08-16

## 2025-08-18 SDOH — HEALTH STABILITY: PHYSICAL HEALTH: ON AVERAGE, HOW MANY DAYS PER WEEK DO YOU ENGAGE IN MODERATE TO STRENUOUS EXERCISE (LIKE A BRISK WALK)?: 7 DAYS

## 2025-08-18 SDOH — HEALTH STABILITY: PHYSICAL HEALTH: ON AVERAGE, HOW MANY MINUTES DO YOU ENGAGE IN EXERCISE AT THIS LEVEL?: 10 MIN

## 2025-08-18 ASSESSMENT — PATIENT HEALTH QUESTIONNAIRE - PHQ9
SUM OF ALL RESPONSES TO PHQ QUESTIONS 1-9: 6
10. IF YOU CHECKED OFF ANY PROBLEMS, HOW DIFFICULT HAVE THESE PROBLEMS MADE IT FOR YOU TO DO YOUR WORK, TAKE CARE OF THINGS AT HOME, OR GET ALONG WITH OTHER PEOPLE: SOMEWHAT DIFFICULT
SUM OF ALL RESPONSES TO PHQ QUESTIONS 1-9: 6
10. IF YOU CHECKED OFF ANY PROBLEMS, HOW DIFFICULT HAVE THESE PROBLEMS MADE IT FOR YOU TO DO YOUR WORK, TAKE CARE OF THINGS AT HOME, OR GET ALONG WITH OTHER PEOPLE: SOMEWHAT DIFFICULT

## 2025-08-18 ASSESSMENT — SOCIAL DETERMINANTS OF HEALTH (SDOH)
HOW OFTEN DO YOU GET TOGETHER WITH FRIENDS OR RELATIVES?: PATIENT DECLINED
HOW OFTEN DO YOU GET TOGETHER WITH FRIENDS OR RELATIVES?: PATIENT DECLINED

## 2025-08-19 ENCOUNTER — OFFICE VISIT (OUTPATIENT)
Dept: FAMILY MEDICINE | Facility: CLINIC | Age: 65
End: 2025-08-19
Payer: COMMERCIAL

## 2025-08-19 VITALS
DIASTOLIC BLOOD PRESSURE: 82 MMHG | HEART RATE: 66 BPM | SYSTOLIC BLOOD PRESSURE: 145 MMHG | TEMPERATURE: 98.4 F | OXYGEN SATURATION: 96 % | RESPIRATION RATE: 20 BRPM

## 2025-08-19 DIAGNOSIS — M47.812 SPONDYLOSIS OF CERVICAL REGION WITHOUT MYELOPATHY OR RADICULOPATHY: ICD-10-CM

## 2025-08-19 DIAGNOSIS — Z00.00 ENCOUNTER FOR MEDICARE ANNUAL WELLNESS EXAM: Primary | ICD-10-CM

## 2025-08-19 DIAGNOSIS — L98.9 SKIN LESION: ICD-10-CM

## 2025-08-19 DIAGNOSIS — G89.4 CHRONIC PAIN SYNDROME: ICD-10-CM

## 2025-08-19 RX ORDER — CYCLOBENZAPRINE HCL 10 MG
10 TABLET ORAL 2 TIMES DAILY PRN
Qty: 60 TABLET | Refills: 3 | Status: SHIPPED | OUTPATIENT
Start: 2025-08-19

## 2025-08-19 RX ORDER — TIZANIDINE 2 MG/1
4 TABLET ORAL
Qty: 60 TABLET | Refills: 3 | Status: SHIPPED | OUTPATIENT
Start: 2025-08-19

## 2025-08-20 ENCOUNTER — PATIENT OUTREACH (OUTPATIENT)
Dept: CARE COORDINATION | Facility: CLINIC | Age: 65
End: 2025-08-20
Payer: COMMERCIAL

## 2025-08-26 ENCOUNTER — MYC REFILL (OUTPATIENT)
Dept: FAMILY MEDICINE | Facility: CLINIC | Age: 65
End: 2025-08-26
Payer: COMMERCIAL

## 2025-08-26 DIAGNOSIS — H40.003 GLAUCOMA SUSPECT OF BOTH EYES: ICD-10-CM

## 2025-08-26 DIAGNOSIS — H35.30 ARMD (AGE-RELATED MACULAR DEGENERATION), BILATERAL: Primary | ICD-10-CM

## 2025-08-26 DIAGNOSIS — F43.9 STRESS: ICD-10-CM

## 2025-08-26 RX ORDER — CLONAZEPAM 0.5 MG/1
0.5 TABLET ORAL 3 TIMES DAILY PRN
Qty: 90 TABLET | Refills: 0 | Status: SHIPPED | OUTPATIENT
Start: 2025-08-26

## 2025-09-03 ENCOUNTER — OFFICE VISIT (OUTPATIENT)
Dept: OPHTHALMOLOGY | Facility: CLINIC | Age: 65
End: 2025-09-03
Attending: OPTOMETRIST
Payer: COMMERCIAL

## 2025-09-03 DIAGNOSIS — H40.003 GLAUCOMA SUSPECT OF BOTH EYES: ICD-10-CM

## 2025-09-03 DIAGNOSIS — H35.30 ARMD (AGE-RELATED MACULAR DEGENERATION), BILATERAL: ICD-10-CM

## 2025-09-03 PROCEDURE — 92083 EXTENDED VISUAL FIELD XM: CPT | Mod: 26 | Performed by: OPHTHALMOLOGY

## 2025-09-03 PROCEDURE — G0463 HOSPITAL OUTPT CLINIC VISIT: HCPCS | Performed by: OPHTHALMOLOGY

## 2025-09-03 PROCEDURE — 92133 CPTRZD OPH DX IMG PST SGM ON: CPT | Performed by: OPHTHALMOLOGY

## 2025-09-03 PROCEDURE — 92134 CPTRZ OPH DX IMG PST SGM RTA: CPT | Performed by: OPHTHALMOLOGY

## 2025-09-03 PROCEDURE — 92083 EXTENDED VISUAL FIELD XM: CPT | Performed by: OPHTHALMOLOGY

## 2025-09-03 PROCEDURE — 99207 OCT OPTIC NERVE RNFL SPECTRALIS OU (BOTH EYES): CPT | Performed by: OPHTHALMOLOGY

## 2025-09-03 PROCEDURE — 92134 CPTRZ OPH DX IMG PST SGM RTA: CPT | Mod: 26 | Performed by: OPHTHALMOLOGY

## 2025-09-03 PROCEDURE — 99203 OFFICE O/P NEW LOW 30 MIN: CPT | Mod: GC | Performed by: OPHTHALMOLOGY

## 2025-09-03 ASSESSMENT — VISUAL ACUITY
OS_CC+: +1
METHOD: SNELLEN - LINEAR
CORRECTION_TYPE: GLASSES
OD_CC: 20/20
OS_CC: 20/25

## 2025-09-03 ASSESSMENT — CONF VISUAL FIELD
METHOD: COUNTING FINGERS
OD_NORMAL: 1
OD_INFERIOR_NASAL_RESTRICTION: 0
OS_SUPERIOR_NASAL_RESTRICTION: 0
OS_NORMAL: 1
OD_INFERIOR_TEMPORAL_RESTRICTION: 0
OD_SUPERIOR_NASAL_RESTRICTION: 0
OS_INFERIOR_NASAL_RESTRICTION: 0
OD_SUPERIOR_TEMPORAL_RESTRICTION: 0
OS_INFERIOR_TEMPORAL_RESTRICTION: 0
OS_SUPERIOR_TEMPORAL_RESTRICTION: 0

## 2025-09-03 ASSESSMENT — SLIT LAMP EXAM - LIDS
COMMENTS: NORMAL
COMMENTS: NORMAL

## 2025-09-03 ASSESSMENT — REFRACTION_WEARINGRX
OD_SPHERE: +1.00
OS_AXIS: 008
OD_CYLINDER: +1.50
OD_AXIS: 175
OS_CYLINDER: +1.25
OS_ADD: +2.75
OS_SPHERE: +1.00
SPECS_TYPE: PAL
OD_ADD: +2.75

## 2025-09-03 ASSESSMENT — TONOMETRY
OS_IOP_MMHG: 15
IOP_METHOD: ICARE
OD_IOP_MMHG: 13

## 2025-09-03 ASSESSMENT — EXTERNAL EXAM - LEFT EYE: OS_EXAM: NORMAL

## 2025-09-03 ASSESSMENT — CUP TO DISC RATIO
OS_RATIO: 0.75
OD_RATIO: 0.75

## 2025-09-03 ASSESSMENT — EXTERNAL EXAM - RIGHT EYE: OD_EXAM: NORMAL

## (undated) DEVICE — LINEN ORTHO PACK 5446

## (undated) DEVICE — KIT CULTURE TRANSPORT SYS A.C.T. II DUAL ANEROBE R124022

## (undated) DEVICE — CATH ANGIO INFINITI 3DRC 6FRX100CM 534676T

## (undated) DEVICE — PREP DURAPREP 26ML APL 8630

## (undated) DEVICE — SUCTION MANIFOLD DORNOCH ULTRA CART UL-CL500

## (undated) DEVICE — TOOL DISSECT MIDAS MR8 14CM MATCH HEAD 3MM MR8-14MH30

## (undated) DEVICE — SOL WATER IRRIG 1000ML BOTTLE 2F7114

## (undated) DEVICE — Device

## (undated) DEVICE — SOL NACL 0.9% INJ 1000ML BAG 2B1324X

## (undated) DEVICE — SU ETHIBOND 2 V-37 4X30" MX69G

## (undated) DEVICE — SYR 50ML LL W/O NDL 309653

## (undated) DEVICE — SYR BULB IRRIG 50ML LATEX FREE 0035280

## (undated) DEVICE — PREP CHLORAPREP 26ML TINTED HI-LITE ORANGE 930815

## (undated) DEVICE — GLOVE PROTEXIS W/NEU-THERA 7.5  2D73TE75

## (undated) DEVICE — SOL HYDROGEN PEROXIDE 3% 4OZ BOTTLE F0010

## (undated) DEVICE — NDL SPINAL 18GA 3.5" 405184

## (undated) DEVICE — 0.035IN X 260CM, EMERALD DIAGNOSTIC GUIDEWIRE, FIXED-CORE PTFE COATED, STANDARD, 3MM EXCHANGE J-TIP (EA/1)

## (undated) DEVICE — SYR 30ML LL W/O NDL 302832

## (undated) DEVICE — NDL 27GA 1.25" 305136

## (undated) DEVICE — DECANTER FLUID L3 IN TRANSFER STRL LF DYNJDEC03

## (undated) DEVICE — ENDO TROCAR FIRST ENTRY KII FIOS Z-THRD 05X100MM CTF03

## (undated) DEVICE — TUBING SUCTION MEDI-VAC 1/4"X20' N620A

## (undated) DEVICE — SU MONOCRYL 3-0 PS-1 27" Y936H

## (undated) DEVICE — GOWN IMPERVIOUS SPECIALTY XLG/XLONG 32474

## (undated) DEVICE — SUTURE MONOCRYL 3-0 18 PS2 UND MCP497G

## (undated) DEVICE — SU SILK 2-0 TIE 12X30" A305H

## (undated) DEVICE — DRAPE IOBAN INCISE 23X17" 6650EZ

## (undated) DEVICE — ENDO POUCH UNIV RETRIEVAL SYSTEM INZII 10MM CD001

## (undated) DEVICE — KIT HAND CONTROL ACIST 014644 AR-P54

## (undated) DEVICE — GOWN XLG DISP 9545

## (undated) DEVICE — SU MONOCRYL+ 4-0 18IN PS2 UND MCP496G

## (undated) DEVICE — SUTURE VICRYL+ 1 CT-1 CR 8X18" VIO VCP741D

## (undated) DEVICE — PREP SKIN SCRUB TRAY 4461A

## (undated) DEVICE — SLEEVE TR BAND RADIAL COMPRESSION DEVICE 24CM TRB24-REG

## (undated) DEVICE — LINEN TOWEL PACK X5 5464

## (undated) DEVICE — ENDO TROCAR FIRST ENTRY KII FIOS Z-THRD 11X100MM CTF33

## (undated) DEVICE — SYR 03ML LL W/O NDL 309657

## (undated) DEVICE — EYE PREP BETADINE 5% SOLUTION 30ML 0065-0411-30

## (undated) DEVICE — TUBING IRRIG CYSTO/BLADDER SET 81" LF 2C4040

## (undated) DEVICE — IOM SUPPLIES/CASE FEE

## (undated) DEVICE — DRAPE C-ARMOR 5 SIDED 5523

## (undated) DEVICE — SU MONOCRYL 3-0 PS-2 18" UND MCP497G

## (undated) DEVICE — ESU ELEC NDL 1" E1552

## (undated) DEVICE — SU PDS II 0 CT-1 27" Z340H

## (undated) DEVICE — DRAPE U-DRAPE 1015NSD NON-STERILE

## (undated) DEVICE — POSITIONER ARMBOARD FOAM 1PAIR LF FP-ARMB1

## (undated) DEVICE — GLOVE BIOGEL PI INDICATOR 8.0 LF 41680

## (undated) DEVICE — SUCTION MANIFOLD NEPTUNE 2 SYS 1 PORT 702-025-000

## (undated) DEVICE — ESU PENCIL W/SMOKE EVAC NEPTUNE STRYKER 0703-046-000

## (undated) DEVICE — SUCTION IRR SYSTEM W/O TIP INTERPULSE HANDPIECE 0210-100-000

## (undated) DEVICE — SU VICRYL 1 CT-1 36" UND J947H

## (undated) DEVICE — SUCTION MANIFOLD NEPTUNE 2 SYS 4 PORT 0702-020-000

## (undated) DEVICE — DRSG DRAIN 2X2" 7087

## (undated) DEVICE — STATLOCK PSI DEVICE

## (undated) DEVICE — LIGHT HANDLE X1 31140133

## (undated) DEVICE — SOL NACL 0.9% IRRIG 1000ML BOTTLE 2F7124

## (undated) DEVICE — DRSG GAUZE 2X2" 8042

## (undated) DEVICE — SPONGE LAP 18X18" X8435

## (undated) DEVICE — COVER CAMERA IN-LIGHT DISP LT-C02

## (undated) DEVICE — SU MONOCRYL 2-0 SH 27" UND Y417H

## (undated) DEVICE — DRAIN ROUND W/RESERV KIT JACKSON PRATT 10FR 400ML SU130-402D

## (undated) DEVICE — SU ETHILON 3-0 PS-1 18" 1663H

## (undated) DEVICE — SU DERMABOND ADVANCED .7ML DNX12

## (undated) DEVICE — DRSG DRAIN 4X4" 7086

## (undated) DEVICE — STRAP KNEE/BODY 31143004

## (undated) DEVICE — DRAPE POUCH INSTRUMENT 1018

## (undated) DEVICE — DRAIN HEMOVAC RESERVOIR KIT 10FR 1/8" MED 00-2550-002-10

## (undated) DEVICE — BRUSH SURGICAL SCRUB W/4% CHG SOL 25ML 371073

## (undated) DEVICE — PACK SET-UP STD 9102

## (undated) DEVICE — CATH ANGIO INFINITI 3DRC 4FRX100CM 538476

## (undated) DEVICE — SPECIMEN CONTAINER 5OZ STERILE 2600SA

## (undated) DEVICE — SOL NACL 0.9% IRRIG 3000ML BAG 2B7477

## (undated) DEVICE — SOL NACL 0.9% IRRIG 500ML BOTTLE 2F7123

## (undated) DEVICE — TUBING PRESSURE 30"

## (undated) DEVICE — ENDO FUSION OMNI-TOME G31903

## (undated) DEVICE — DRAPE MAYO STAND 23X54 8337

## (undated) DEVICE — DRAPE STOCKINETTE 4" 8544

## (undated) DEVICE — PREP CHLORHEXIDINE 4% 4OZ (HIBICLENS) 57504

## (undated) DEVICE — GLOVE PROTEXIS POWDER FREE 7.5 ORTHOPEDIC 2D73ET75

## (undated) DEVICE — DRSG STERI STRIP 1/2X4" R1547

## (undated) DEVICE — BLADE CLIPPER SGL USE 9680

## (undated) DEVICE — GLOVE BIOGEL PI ULTRATOUCH G SZ 7.5 42175

## (undated) DEVICE — PLATE GROUNDING ADULT W/CORD 9165L

## (undated) DEVICE — CATH TRAY FOLEY SURESTEP 16FR WDRAIN BAG STLK LATEX A300316A

## (undated) DEVICE — RESTRAINT LIMB HOLDER ANKLE/WRIST FOAM W/QUICK RELEASE 2533

## (undated) DEVICE — RX SURGIFLO HEMOSTATIC MATRIX W/THROMBIN 8ML 2994

## (undated) DEVICE — LINEN DRAPE 54X72" 5467

## (undated) DEVICE — ESU CLEANER TIP 31142717

## (undated) DEVICE — BALLOON EXTRACTION 15X1950MM 3.2MM TL B-V243Q-A

## (undated) DEVICE — IMM KIT SHOULDER STABILIZATION 7210573

## (undated) DEVICE — BLADE KNIFE SURG 15 371115

## (undated) DEVICE — GLOVE PROTEXIS POWDER FREE 8.0 ORTHOPEDIC 2D73ET80

## (undated) DEVICE — ENDO TROCAR SLEEVE KII Z-THREADED 05X100MM CTS02

## (undated) DEVICE — SUTURE VICRYL+ 2-0 CT-2 CR 8X18" VCP726D

## (undated) DEVICE — DRSG TEGADERM 4X4 3/4" 1626W

## (undated) DEVICE — 3.2 MM CENTRAL SCREW DRILL BIT

## (undated) DEVICE — CONTAINER SPECIMEN 4OZ STERILE 17099

## (undated) DEVICE — DRAPE U-POUCH 34X29" 1067

## (undated) DEVICE — SU PROLENE 1-0 TP-1  30" BLUE 8824G

## (undated) DEVICE — GLOVE BIOGEL PI ULTRATOUCH G SZ 6.5 42165

## (undated) DEVICE — SU WND CLOSURE VLOC 90 ABS 3-0 12" V-20 VLOCM0614

## (undated) DEVICE — IMM COLLAR CERVICAL MED UNIVERSAL 2.5X24" 79-83520

## (undated) DEVICE — SU ETHIBOND 1 CTX CR 8/18" CX30D

## (undated) DEVICE — CATH ANGIO INFINITI AL1 4FRX100CM 538445

## (undated) DEVICE — DECANTER TRANSFER DEVICE 2008S

## (undated) DEVICE — IMM KIT SHOULDER TMAX MASK FACE 7210559

## (undated) DEVICE — DECANTER VIAL 2006S

## (undated) DEVICE — GLOVE PROTEXIS POWDER FREE 7.0 ORTHOPEDIC 2D73ET70

## (undated) DEVICE — SURGICEL ENDOSCOPIC APPLICATOR FOR ORC POWDER 3123SPEA

## (undated) DEVICE — ESU GROUND PAD ADULT W/CORD E7507

## (undated) DEVICE — BONE CLEANING TIP INTERPULSE  0210-010-000

## (undated) DEVICE — SLING ARM LG 79-99157

## (undated) DEVICE — TAPE DURAPORE 3" SILK 1538-3

## (undated) DEVICE — GLOVE BIOGEL PI MICRO INDICATOR UNDERGLOVE SZ 8.0 48980

## (undated) DEVICE — LINEN BACK PACK 5440

## (undated) DEVICE — GLOVE PROTEXIS W/NEU-THERA 7.0  2D73TE70

## (undated) DEVICE — PIN SKULL MAYFIELD ADULT TITANIUM 3/PK A1120

## (undated) DEVICE — GLOVE BIOGEL PI SZ 7.5 40875

## (undated) DEVICE — TUBING SMOKE EVAC PNEUMOCLEAR HIGH FLOW 0620050250

## (undated) DEVICE — INTRO GLIDESHEATH SLENDER 6FR 10X45CM 60-1060

## (undated) DEVICE — DRAPE ARTHROSCOPY SHOULDER BEACHCHAIR 29369

## (undated) DEVICE — BLADE SAW SAGITTAL STRK 20.7X85X0.89MM 2108-109-000S13

## (undated) DEVICE — SURGICEL POWDER ABSORBABLE HEMOSTAT 3GM 3013SP

## (undated) DEVICE — DRSG GAUZE 4X4" 2187

## (undated) DEVICE — SU VICRYL 2-0 CT-1 27" UND J259H

## (undated) DEVICE — DRSG GAUZE 4X8" NON21842

## (undated) DEVICE — DRSG AQUACEL AG 3.5X9.75" HYDROFIBER 412011

## (undated) DEVICE — ESU GROUND PAD ADULT REM W/15' CORD E7507DB

## (undated) DEVICE — CLIP APPLIER ENDO ROTATING 10MM MED/LG ER320

## (undated) DEVICE — PREP DURAPREP REMOVER 4OZ 8611

## (undated) DEVICE — DRSG TEGADERM 4X10" 1627

## (undated) DEVICE — NDL COUNTER 40CT  31142311

## (undated) DEVICE — DEVICE RETRIEVER HEWSON 71111579

## (undated) DEVICE — SU VICRYL 2-0 CT-2 CR 8X18" J726D

## (undated) DEVICE — PACK HEART LEFT CUSTOM

## (undated) DEVICE — SU VICRYL+ 0 27 UR6 VLT VCP603H

## (undated) DEVICE — NEEDLE SPINAL DISP 18GA X 3.5" QUINCKE 333350

## (undated) DEVICE — TUBING SYSTEM ARTHREX PATIENT REDEUCE AR-6421

## (undated) DEVICE — BONE CLEANING TIP INTERPULSE FEMORAL CANAL 0210-008-000

## (undated) DEVICE — SYR 50ML CATH TIP W/O NDL 309620

## (undated) DEVICE — MANIFOLD KIT ANGIO AUTOMATED 014613

## (undated) DEVICE — BRUSH SURGICAL SCRUB W/PCMX 4457A

## (undated) DEVICE — BNDG KLING 2" 2231

## (undated) DEVICE — NDL INSUFFLATION 13GA 120MM C2201

## (undated) DEVICE — DRAPE STERI U 1015

## (undated) DEVICE — SOL WATER IRRIG 500ML BOTTLE 2F7113

## (undated) DEVICE — DRSG ADAPTIC 3X8" 6113

## (undated) DEVICE — STPL SKIN 35W ROTATING HEAD PRW35

## (undated) DEVICE — CATH ANGIO SUPERTORQUE PLUS JL4 6FRX100CM 533620

## (undated) DEVICE — 2.7 MM PERIPHERAL SCREW DRILL BIT

## (undated) DEVICE — SUCTION STRYKERFLOW II 250-070-500

## (undated) DEVICE — SU FIBERWIRE 2 38"  AR-7200

## (undated) DEVICE — WIRE GUIDE 0.35X270CM STRAIGHT TIP VISIGLIDE G-260-3527S

## (undated) DEVICE — SYR 10ML FINGER CONTROL W/O NDL 309695

## (undated) DEVICE — DRAPE C-ARM W/STRAPS 42X72" 07-CA104

## (undated) DEVICE — PACK HAND CUSTOM ASC

## (undated) DEVICE — ESU HOLSTER PLASTIC DISP E2400

## (undated) DEVICE — BACTISURE WOUND LAVAGE

## (undated) DEVICE — DRAPE STERI TOWEL LG 1010

## (undated) DEVICE — PACK SHOULDER CUSTOM ASC SOP15ASUMA

## (undated) DEVICE — SU MONOCRYL 5-0 P-3 18" UND Y493G

## (undated) DEVICE — ENDO SHEARS RENEW LAP ENDOCUT SCISSOR TIP 16.5MM 3142

## (undated) DEVICE — SOL ISOPROPYL RUBBING ALCOHOL USP 70% 4OZ HDX-20 I0020

## (undated) DEVICE — WIPES FOLEY CARE SURESTEP PROVON DFC100

## (undated) DEVICE — GLOVE PROTEXIS POWDER FREE SMT 7.0  2D72PT70X

## (undated) DEVICE — CUSTOM PACK LAP CHOLE SBA5BLCHEA

## (undated) DEVICE — ESU LIGASURE MARYLAND LAPAROSCOPIC SLR/DVDR 5MMX37CM LF1937

## (undated) DEVICE — LINEN GOWN XLG 5407

## (undated) RX ORDER — ETOMIDATE 2 MG/ML
INJECTION INTRAVENOUS
Status: DISPENSED
Start: 2020-05-08

## (undated) RX ORDER — HYDROMORPHONE HYDROCHLORIDE 1 MG/ML
INJECTION, SOLUTION INTRAMUSCULAR; INTRAVENOUS; SUBCUTANEOUS
Status: DISPENSED
Start: 2024-09-11

## (undated) RX ORDER — PHENYLEPHRINE HCL IN 0.9% NACL 1 MG/10 ML
SYRINGE (ML) INTRAVENOUS
Status: DISPENSED
Start: 2020-05-08

## (undated) RX ORDER — FENTANYL CITRATE 50 UG/ML
INJECTION, SOLUTION INTRAMUSCULAR; INTRAVENOUS
Status: DISPENSED
Start: 2019-11-15

## (undated) RX ORDER — HYDROMORPHONE HYDROCHLORIDE 1 MG/ML
INJECTION, SOLUTION INTRAMUSCULAR; INTRAVENOUS; SUBCUTANEOUS
Status: DISPENSED
Start: 2024-12-26

## (undated) RX ORDER — BUPIVACAINE HYDROCHLORIDE 2.5 MG/ML
INJECTION, SOLUTION EPIDURAL; INFILTRATION; INTRACAUDAL
Status: DISPENSED
Start: 2024-12-26

## (undated) RX ORDER — DIPHENHYDRAMINE HYDROCHLORIDE 50 MG/ML
INJECTION INTRAMUSCULAR; INTRAVENOUS
Status: DISPENSED
Start: 2024-02-15

## (undated) RX ORDER — SIMETHICONE 40MG/0.6ML
SUSPENSION, DROPS(FINAL DOSAGE FORM)(ML) ORAL
Status: DISPENSED
Start: 2024-02-15

## (undated) RX ORDER — PROPOFOL 10 MG/ML
INJECTION, EMULSION INTRAVENOUS
Status: DISPENSED
Start: 2019-11-15

## (undated) RX ORDER — KETAMINE HCL IN 0.9 % NACL 50 MG/5 ML
SYRINGE (ML) INTRAVENOUS
Status: DISPENSED
Start: 2020-01-28

## (undated) RX ORDER — LIDOCAINE HYDROCHLORIDE 20 MG/ML
INJECTION, SOLUTION EPIDURAL; INFILTRATION; INTRACAUDAL; PERINEURAL
Status: DISPENSED
Start: 2019-11-15

## (undated) RX ORDER — OXYCODONE HYDROCHLORIDE 5 MG/1
TABLET ORAL
Status: DISPENSED
Start: 2024-09-11

## (undated) RX ORDER — DEXAMETHASONE SODIUM PHOSPHATE 4 MG/ML
INJECTION, SOLUTION INTRA-ARTICULAR; INTRALESIONAL; INTRAMUSCULAR; INTRAVENOUS; SOFT TISSUE
Status: DISPENSED
Start: 2020-01-28

## (undated) RX ORDER — FENTANYL CITRATE 50 UG/ML
INJECTION, SOLUTION INTRAMUSCULAR; INTRAVENOUS
Status: DISPENSED
Start: 2024-12-26

## (undated) RX ORDER — GABAPENTIN 300 MG/1
CAPSULE ORAL
Status: DISPENSED
Start: 2020-08-18

## (undated) RX ORDER — OXYCODONE HYDROCHLORIDE 5 MG/1
TABLET ORAL
Status: DISPENSED
Start: 2020-01-28

## (undated) RX ORDER — ACETAMINOPHEN 325 MG/1
TABLET ORAL
Status: DISPENSED
Start: 2020-01-28

## (undated) RX ORDER — LIDOCAINE HYDROCHLORIDE 20 MG/ML
INJECTION, SOLUTION EPIDURAL; INFILTRATION; INTRACAUDAL; PERINEURAL
Status: DISPENSED
Start: 2020-01-28

## (undated) RX ORDER — LIDOCAINE HYDROCHLORIDE 10 MG/ML
INJECTION, SOLUTION EPIDURAL; INFILTRATION; INTRACAUDAL; PERINEURAL
Status: DISPENSED
Start: 2019-11-19

## (undated) RX ORDER — TOBRAMYCIN 1.2 G/30ML
INJECTION, POWDER, LYOPHILIZED, FOR SOLUTION INTRAVENOUS
Status: DISPENSED
Start: 2024-09-11

## (undated) RX ORDER — PHENYLEPHRINE HCL IN 0.9% NACL 1 MG/10 ML
SYRINGE (ML) INTRAVENOUS
Status: DISPENSED
Start: 2019-11-15

## (undated) RX ORDER — FENTANYL CITRATE 50 UG/ML
INJECTION, SOLUTION INTRAMUSCULAR; INTRAVENOUS
Status: DISPENSED
Start: 2020-01-28

## (undated) RX ORDER — ONDANSETRON 2 MG/ML
INJECTION INTRAMUSCULAR; INTRAVENOUS
Status: DISPENSED
Start: 2020-05-08

## (undated) RX ORDER — LIDOCAINE HYDROCHLORIDE AND EPINEPHRINE 10; 10 MG/ML; UG/ML
INJECTION, SOLUTION INFILTRATION; PERINEURAL
Status: DISPENSED
Start: 2020-08-18

## (undated) RX ORDER — FENTANYL CITRATE 50 UG/ML
INJECTION, SOLUTION INTRAMUSCULAR; INTRAVENOUS
Status: DISPENSED
Start: 2020-05-08

## (undated) RX ORDER — PHENYLEPHRINE HYDROCHLORIDE 10 MG/ML
INJECTION INTRAVENOUS
Status: DISPENSED
Start: 2023-11-03

## (undated) RX ORDER — FENTANYL CITRATE 50 UG/ML
INJECTION, SOLUTION INTRAMUSCULAR; INTRAVENOUS
Status: DISPENSED
Start: 2024-02-15

## (undated) RX ORDER — FENTANYL CITRATE 50 UG/ML
INJECTION, SOLUTION INTRAMUSCULAR; INTRAVENOUS
Status: DISPENSED
Start: 2020-08-18

## (undated) RX ORDER — GABAPENTIN 300 MG/1
CAPSULE ORAL
Status: DISPENSED
Start: 2020-05-08

## (undated) RX ORDER — BUPIVACAINE HYDROCHLORIDE AND EPINEPHRINE 5; 5 MG/ML; UG/ML
INJECTION, SOLUTION EPIDURAL; INTRACAUDAL; PERINEURAL
Status: DISPENSED
Start: 2020-08-18

## (undated) RX ORDER — DEXAMETHASONE SODIUM PHOSPHATE 4 MG/ML
INJECTION, SOLUTION INTRA-ARTICULAR; INTRALESIONAL; INTRAMUSCULAR; INTRAVENOUS; SOFT TISSUE
Status: DISPENSED
Start: 2019-11-15

## (undated) RX ORDER — CEFAZOLIN SODIUM 2 G/100ML
INJECTION, SOLUTION INTRAVENOUS
Status: DISPENSED
Start: 2020-05-08

## (undated) RX ORDER — LIDOCAINE HYDROCHLORIDE 10 MG/ML
INJECTION, SOLUTION EPIDURAL; INFILTRATION; INTRACAUDAL; PERINEURAL
Status: DISPENSED
Start: 2020-01-30

## (undated) RX ORDER — VANCOMYCIN HYDROCHLORIDE 1 G/20ML
INJECTION, POWDER, LYOPHILIZED, FOR SOLUTION INTRAVENOUS
Status: DISPENSED
Start: 2020-08-18

## (undated) RX ORDER — HYDRALAZINE HYDROCHLORIDE 20 MG/ML
INJECTION INTRAMUSCULAR; INTRAVENOUS
Status: DISPENSED
Start: 2024-12-26

## (undated) RX ORDER — CEFAZOLIN SODIUM 2 G/100ML
INJECTION, SOLUTION INTRAVENOUS
Status: DISPENSED
Start: 2020-08-18

## (undated) RX ORDER — SODIUM CHLORIDE 9 MG/ML
INJECTION, SOLUTION INTRAVENOUS
Status: DISPENSED
Start: 2024-09-11

## (undated) RX ORDER — DEXAMETHASONE SODIUM PHOSPHATE 10 MG/ML
INJECTION, SOLUTION INTRAMUSCULAR; INTRAVENOUS
Status: DISPENSED
Start: 2024-11-04

## (undated) RX ORDER — DEXAMETHASONE SODIUM PHOSPHATE 4 MG/ML
INJECTION, SOLUTION INTRA-ARTICULAR; INTRALESIONAL; INTRAMUSCULAR; INTRAVENOUS; SOFT TISSUE
Status: DISPENSED
Start: 2024-09-11

## (undated) RX ORDER — NITROGLYCERIN 5 MG/ML
VIAL (ML) INTRAVENOUS
Status: DISPENSED
Start: 2020-01-30

## (undated) RX ORDER — BUPIVACAINE HYDROCHLORIDE 5 MG/ML
INJECTION, SOLUTION EPIDURAL; INTRACAUDAL
Status: DISPENSED
Start: 2024-01-05

## (undated) RX ORDER — LIDOCAINE HYDROCHLORIDE 10 MG/ML
INJECTION, SOLUTION EPIDURAL; INFILTRATION; INTRACAUDAL; PERINEURAL
Status: DISPENSED
Start: 2024-11-04

## (undated) RX ORDER — BUPIVACAINE HYDROCHLORIDE 2.5 MG/ML
INJECTION, SOLUTION INFILTRATION; PERINEURAL
Status: DISPENSED
Start: 2023-11-03

## (undated) RX ORDER — ACETAMINOPHEN 325 MG/1
TABLET ORAL
Status: DISPENSED
Start: 2020-05-08

## (undated) RX ORDER — ONDANSETRON 2 MG/ML
INJECTION INTRAMUSCULAR; INTRAVENOUS
Status: DISPENSED
Start: 2024-09-11

## (undated) RX ORDER — SODIUM CHLORIDE, SODIUM LACTATE, POTASSIUM CHLORIDE, CALCIUM CHLORIDE 600; 310; 30; 20 MG/100ML; MG/100ML; MG/100ML; MG/100ML
INJECTION, SOLUTION INTRAVENOUS
Status: DISPENSED
Start: 2020-05-08

## (undated) RX ORDER — FENTANYL CITRATE 50 UG/ML
INJECTION, SOLUTION INTRAMUSCULAR; INTRAVENOUS
Status: DISPENSED
Start: 2020-01-30

## (undated) RX ORDER — CEFAZOLIN SODIUM/WATER 2 G/20 ML
SYRINGE (ML) INTRAVENOUS
Status: DISPENSED
Start: 2024-12-26

## (undated) RX ORDER — GABAPENTIN 300 MG/1
CAPSULE ORAL
Status: DISPENSED
Start: 2020-01-28

## (undated) RX ORDER — DEXAMETHASONE SODIUM PHOSPHATE 4 MG/ML
INJECTION, SOLUTION INTRA-ARTICULAR; INTRALESIONAL; INTRAMUSCULAR; INTRAVENOUS; SOFT TISSUE
Status: DISPENSED
Start: 2020-05-08

## (undated) RX ORDER — PROPOFOL 10 MG/ML
INJECTION, EMULSION INTRAVENOUS
Status: DISPENSED
Start: 2020-05-08

## (undated) RX ORDER — BUPIVACAINE HYDROCHLORIDE 5 MG/ML
INJECTION, SOLUTION PERINEURAL
Status: DISPENSED
Start: 2024-01-05

## (undated) RX ORDER — PROPOFOL 10 MG/ML
INJECTION, EMULSION INTRAVENOUS
Status: DISPENSED
Start: 2024-09-11

## (undated) RX ORDER — OXYCODONE HCL 10 MG/1
TABLET, FILM COATED, EXTENDED RELEASE ORAL
Status: DISPENSED
Start: 2024-09-11

## (undated) RX ORDER — LIDOCAINE HYDROCHLORIDE AND EPINEPHRINE 10; 10 MG/ML; UG/ML
INJECTION, SOLUTION INFILTRATION; PERINEURAL
Status: DISPENSED
Start: 2017-03-31

## (undated) RX ORDER — FENTANYL CITRATE 50 UG/ML
INJECTION, SOLUTION INTRAMUSCULAR; INTRAVENOUS
Status: DISPENSED
Start: 2023-11-03

## (undated) RX ORDER — HEPARIN SODIUM 1000 [USP'U]/ML
INJECTION, SOLUTION INTRAVENOUS; SUBCUTANEOUS
Status: DISPENSED
Start: 2020-01-30

## (undated) RX ORDER — EPINEPHRINE NASAL SOLUTION 1 MG/ML
SOLUTION NASAL
Status: DISPENSED
Start: 2020-01-28

## (undated) RX ORDER — FENTANYL CITRATE-0.9 % NACL/PF 10 MCG/ML
PLASTIC BAG, INJECTION (ML) INTRAVENOUS
Status: DISPENSED
Start: 2024-09-11

## (undated) RX ORDER — DEXAMETHASONE SODIUM PHOSPHATE 4 MG/ML
INJECTION, SOLUTION INTRA-ARTICULAR; INTRALESIONAL; INTRAMUSCULAR; INTRAVENOUS; SOFT TISSUE
Status: DISPENSED
Start: 2024-12-26

## (undated) RX ORDER — FENTANYL CITRATE 50 UG/ML
INJECTION, SOLUTION INTRAMUSCULAR; INTRAVENOUS
Status: DISPENSED
Start: 2024-09-11

## (undated) RX ORDER — OXYCODONE HYDROCHLORIDE 10 MG/1
TABLET ORAL
Status: DISPENSED
Start: 2020-05-08

## (undated) RX ORDER — HEPARIN SODIUM,PORCINE 10 UNIT/ML
VIAL (ML) INTRAVENOUS
Status: DISPENSED
Start: 2019-12-02

## (undated) RX ORDER — BUPIVACAINE HYDROCHLORIDE 5 MG/ML
INJECTION, SOLUTION EPIDURAL; INTRACAUDAL
Status: DISPENSED
Start: 2017-03-31

## (undated) RX ORDER — VANCOMYCIN HYDROCHLORIDE 1 G/20ML
INJECTION, POWDER, LYOPHILIZED, FOR SOLUTION INTRAVENOUS
Status: DISPENSED
Start: 2019-11-15

## (undated) RX ORDER — CEFAZOLIN SODIUM 1 G/3ML
INJECTION, POWDER, FOR SOLUTION INTRAMUSCULAR; INTRAVENOUS
Status: DISPENSED
Start: 2020-01-28

## (undated) RX ORDER — VANCOMYCIN HYDROCHLORIDE 1 G/20ML
INJECTION, POWDER, LYOPHILIZED, FOR SOLUTION INTRAVENOUS
Status: DISPENSED
Start: 2020-05-08

## (undated) RX ORDER — ONDANSETRON 2 MG/ML
INJECTION INTRAMUSCULAR; INTRAVENOUS
Status: DISPENSED
Start: 2019-11-15

## (undated) RX ORDER — PHENYLEPHRINE HCL IN 0.9% NACL 1 MG/10 ML
SYRINGE (ML) INTRAVENOUS
Status: DISPENSED
Start: 2020-01-28

## (undated) RX ORDER — CEFAZOLIN SODIUM 2 G/100ML
INJECTION, SOLUTION INTRAVENOUS
Status: DISPENSED
Start: 2019-11-15

## (undated) RX ORDER — VANCOMYCIN HYDROCHLORIDE 1 G/20ML
INJECTION, POWDER, LYOPHILIZED, FOR SOLUTION INTRAVENOUS
Status: DISPENSED
Start: 2024-09-11

## (undated) RX ORDER — OXYMETAZOLINE HYDROCHLORIDE 0.05 G/100ML
SPRAY NASAL
Status: DISPENSED
Start: 2024-02-15

## (undated) RX ORDER — ONDANSETRON 2 MG/ML
INJECTION INTRAMUSCULAR; INTRAVENOUS
Status: DISPENSED
Start: 2020-01-28

## (undated) RX ORDER — PROPOFOL 10 MG/ML
INJECTION, EMULSION INTRAVENOUS
Status: DISPENSED
Start: 2020-01-28

## (undated) RX ORDER — ONDANSETRON 2 MG/ML
INJECTION INTRAMUSCULAR; INTRAVENOUS
Status: DISPENSED
Start: 2024-12-26

## (undated) RX ORDER — BUPIVACAINE HYDROCHLORIDE 2.5 MG/ML
INJECTION, SOLUTION INFILTRATION; PERINEURAL
Status: DISPENSED
Start: 2024-12-26

## (undated) RX ORDER — ACETAMINOPHEN 325 MG/1
TABLET ORAL
Status: DISPENSED
Start: 2020-08-18

## (undated) RX ORDER — ACETAMINOPHEN 325 MG/1
TABLET ORAL
Status: DISPENSED
Start: 2024-12-26

## (undated) RX ORDER — BUPIVACAINE HYDROCHLORIDE 2.5 MG/ML
INJECTION, SOLUTION EPIDURAL; INFILTRATION; INTRACAUDAL
Status: DISPENSED
Start: 2020-05-08